# Patient Record
Sex: FEMALE | Race: WHITE | NOT HISPANIC OR LATINO | Employment: FULL TIME | URBAN - METROPOLITAN AREA
[De-identification: names, ages, dates, MRNs, and addresses within clinical notes are randomized per-mention and may not be internally consistent; named-entity substitution may affect disease eponyms.]

---

## 2017-05-03 ENCOUNTER — OFFICE VISIT (OUTPATIENT)
Dept: URGENT CARE | Facility: CLINIC | Age: 62
End: 2017-05-03
Payer: COMMERCIAL

## 2017-05-03 PROCEDURE — S9088 SERVICES PROVIDED IN URGENT: HCPCS

## 2017-05-03 PROCEDURE — 99203 OFFICE O/P NEW LOW 30 MIN: CPT

## 2017-06-15 ENCOUNTER — APPOINTMENT (OUTPATIENT)
Dept: RADIOLOGY | Facility: CLINIC | Age: 62
End: 2017-06-15
Payer: OTHER MISCELLANEOUS

## 2017-06-15 ENCOUNTER — OFFICE VISIT (OUTPATIENT)
Dept: URGENT CARE | Facility: CLINIC | Age: 62
End: 2017-06-15
Payer: OTHER MISCELLANEOUS

## 2017-06-15 ENCOUNTER — GENERIC CONVERSION - ENCOUNTER (OUTPATIENT)
Dept: OTHER | Facility: OTHER | Age: 62
End: 2017-06-15

## 2017-06-15 ENCOUNTER — TRANSCRIBE ORDERS (OUTPATIENT)
Dept: URGENT CARE | Facility: CLINIC | Age: 62
End: 2017-06-15

## 2017-06-15 DIAGNOSIS — M54.50 LOWER BACK PAIN: ICD-10-CM

## 2017-06-15 PROCEDURE — G0383 LEV 4 HOSP TYPE B ED VISIT: HCPCS

## 2017-06-15 PROCEDURE — 99284 EMERGENCY DEPT VISIT MOD MDM: CPT

## 2017-06-15 PROCEDURE — 72100 X-RAY EXAM L-S SPINE 2/3 VWS: CPT

## 2017-06-20 ENCOUNTER — OFFICE VISIT (OUTPATIENT)
Dept: URGENT CARE | Facility: CLINIC | Age: 62
End: 2017-06-20
Payer: OTHER MISCELLANEOUS

## 2017-06-20 PROCEDURE — 99213 OFFICE O/P EST LOW 20 MIN: CPT

## 2017-06-29 ENCOUNTER — OFFICE VISIT (OUTPATIENT)
Dept: URGENT CARE | Facility: CLINIC | Age: 62
End: 2017-06-29
Payer: OTHER MISCELLANEOUS

## 2017-06-29 PROCEDURE — 99213 OFFICE O/P EST LOW 20 MIN: CPT

## 2018-01-11 NOTE — RESULT NOTES
Verified Results  * XR SPINE LUMBAR 2 OR 3 VIEWS INJURY [id number] 10:16AM Annken Cain     Test Name Result Flag Reference   XR SPINE LUMBAR 2 OR 3 VIEWS (Report)     LUMBAR SPINE     INDICATION: Back pain  COMPARISON: None     VIEWS: AP, lateral and coned-down projections     IMAGES: 3     FINDINGS:     Alignment is unremarkable  There is no radiographic evidence of acute fracture or destructive osseous lesion  At L4-L5 there is degenerative disc disease with disc space loss, endplate sclerosis, osteophytosis, and a vacuum disc phenomena  Atherosclerotic vascular calcifications are noted  Visualized soft tissues appear otherwise unremarkable  IMPRESSION:     No acute osseous abnormality  Degenerative changes as described         Workstation performed: [id number]     Signed by:   Natasha Aguilar MD   6/15/17

## 2018-04-21 LAB
ALBUMIN SERPL BCP-MCNC: 3.8 G/DL (ref 3.5–5.7)
ALP SERPL-CCNC: 56 IU/L (ref 55–165)
ALT SERPL W P-5'-P-CCNC: 30 IU/L (ref 10–30)
ANION GAP SERPL CALCULATED.3IONS-SCNC: 12.3 MM/L
AST SERPL W P-5'-P-CCNC: 27 U/L (ref 7–26)
BASOPHILS # BLD AUTO: 0 X3/UL (ref 0–0.3)
BASOPHILS # BLD AUTO: 0.4 % (ref 0–2)
BILIRUB SERPL-MCNC: 0.3 MG/DL (ref 0.3–1)
BUN SERPL-MCNC: 13 MG/DL (ref 7–25)
CALCIUM SERPL-MCNC: 9.3 MG/DL (ref 8.6–10.5)
CHLORIDE SERPL-SCNC: 102 MM/L (ref 98–107)
CHOLEST SERPL-MCNC: 244 MG/DL (ref 0–200)
CO2 SERPL-SCNC: 27 MM/L (ref 21–31)
CREAT SERPL-MCNC: 0.81 MG/DL (ref 0.6–1.2)
DEPRECATED RDW RBC AUTO: 13.3 % (ref 11.5–14.5)
EGFR (HISTORICAL): > 60 GFR
EGFR AFRICAN AMERICAN (HISTORICAL): > 60 GFR
EOSINOPHIL # BLD AUTO: 0.1 X3/UL (ref 0–0.5)
EOSINOPHIL NFR BLD AUTO: 1.4 % (ref 0–5)
GLUCOSE (HISTORICAL): 96 MG/DL (ref 65–99)
HCT VFR BLD AUTO: 43.4 % (ref 37–47)
HDLC SERPL-MCNC: 61 MG/DL (ref 40–60)
HGB BLD-MCNC: 14 G/DL (ref 12–16)
LDLC SERPL CALC-MCNC: 152.8 MG/DL (ref 75–193)
LYMPHOCYTES # BLD AUTO: 2.6 X3/UL (ref 1.2–4.2)
LYMPHOCYTES NFR BLD AUTO: 32.2 % (ref 20.5–51.1)
MCH RBC QN AUTO: 30.7 PG (ref 26–34)
MCHC RBC AUTO-ENTMCNC: 32.3 G/DL (ref 31–36)
MCV RBC AUTO: 95.2 FL (ref 81–99)
MONOCYTES # BLD AUTO: 0.7 X3/UL (ref 0–1)
MONOCYTES NFR BLD AUTO: 9 % (ref 1.7–12)
NEUTROPHILS # BLD AUTO: 4.6 X3/UL (ref 1.4–6.5)
NEUTS SEG NFR BLD AUTO: 57 % (ref 42.2–75.2)
OSMOLALITY, SERUM (HISTORICAL): 274 MOSM (ref 262–291)
PLATELET # BLD AUTO: 249 X3/UL (ref 130–400)
PMV BLD AUTO: 8.3 FL (ref 8.6–11.7)
POTASSIUM SERPL-SCNC: 4.3 MM/L (ref 3.5–5.5)
RBC # BLD AUTO: 4.56 X6/UL (ref 3.9–5.2)
SODIUM SERPL-SCNC: 137 MM/L (ref 134–143)
TOTAL PROTEIN (HISTORICAL): 7 G/DL (ref 6.4–8.9)
TRIGL SERPL-MCNC: 150 MG/DL (ref 44–166)
TSH SERPL DL<=0.05 MIU/L-ACNC: 0.76 UIU/M (ref 0.45–5.33)
VLDL CHOLESTEROL (HISTORICAL): 30 MG/DL (ref 5–51)
WBC # BLD AUTO: 8 X3/UL (ref 4.8–10.8)

## 2018-10-29 ENCOUNTER — TRANSCRIBE ORDERS (OUTPATIENT)
Dept: ADMINISTRATIVE | Facility: HOSPITAL | Age: 63
End: 2018-10-29

## 2018-10-29 ENCOUNTER — HOSPITAL ENCOUNTER (OUTPATIENT)
Dept: RADIOLOGY | Facility: HOSPITAL | Age: 63
Discharge: HOME | End: 2018-10-29
Payer: COMMERCIAL

## 2018-10-29 DIAGNOSIS — E04.2 NONTOXIC MULTINODULAR GOITER: Primary | ICD-10-CM

## 2018-10-29 DIAGNOSIS — E04.2 NONTOXIC MULTINODULAR GOITER: ICD-10-CM

## 2018-10-29 DIAGNOSIS — R13.10 PROBLEMS WITH SWALLOWING AND MASTICATION: ICD-10-CM

## 2018-10-29 PROCEDURE — 71046 X-RAY EXAM CHEST 2 VIEWS: CPT

## 2018-12-06 ENCOUNTER — TRANSCRIBE ORDERS (OUTPATIENT)
Dept: ADMINISTRATIVE | Facility: HOSPITAL | Age: 63
End: 2018-12-06

## 2018-12-06 DIAGNOSIS — I67.1 CEREBRAL ANEURYSM, NONRUPTURED: Primary | ICD-10-CM

## 2018-12-13 ENCOUNTER — HOSPITAL ENCOUNTER (OUTPATIENT)
Dept: MRI IMAGING | Facility: HOSPITAL | Age: 63
Discharge: HOME | End: 2018-12-13
Payer: COMMERCIAL

## 2018-12-13 DIAGNOSIS — I67.1 CEREBRAL ANEURYSM, NONRUPTURED: ICD-10-CM

## 2018-12-13 PROCEDURE — 70544 MR ANGIOGRAPHY HEAD W/O DYE: CPT

## 2019-01-16 ENCOUNTER — HOSPITAL ENCOUNTER (OUTPATIENT)
Dept: RADIOLOGY | Facility: HOSPITAL | Age: 64
Discharge: HOME | End: 2019-01-16
Payer: COMMERCIAL

## 2019-01-16 ENCOUNTER — TRANSCRIBE ORDERS (OUTPATIENT)
Dept: ADMINISTRATIVE | Facility: HOSPITAL | Age: 64
End: 2019-01-16

## 2019-01-16 DIAGNOSIS — R93.89 ABNORMAL CXR (CHEST X-RAY): ICD-10-CM

## 2019-01-16 DIAGNOSIS — R06.02 SHORTNESS OF BREATH: ICD-10-CM

## 2019-01-16 DIAGNOSIS — J18.9 UNRESOLVED PNEUMONIA: ICD-10-CM

## 2019-01-16 DIAGNOSIS — R40.0 SOMNOLENCE: Primary | ICD-10-CM

## 2019-01-16 DIAGNOSIS — R06.02 SHORTNESS OF BREATH: Primary | ICD-10-CM

## 2019-01-16 PROCEDURE — 71046 X-RAY EXAM CHEST 2 VIEWS: CPT

## 2019-02-09 ENCOUNTER — HOSPITAL ENCOUNTER (OUTPATIENT)
Dept: RADIOLOGY | Facility: HOSPITAL | Age: 64
Discharge: HOME | End: 2019-02-09
Payer: COMMERCIAL

## 2019-02-09 ENCOUNTER — TRANSCRIBE ORDERS (OUTPATIENT)
Dept: ADMINISTRATIVE | Facility: HOSPITAL | Age: 64
End: 2019-02-09

## 2019-02-09 DIAGNOSIS — J18.1 UNRESOLVED LOBAR PNEUMONIA (HCC): Primary | ICD-10-CM

## 2019-02-09 DIAGNOSIS — J18.1 UNRESOLVED LOBAR PNEUMONIA (HCC): ICD-10-CM

## 2019-02-09 PROCEDURE — 71046 X-RAY EXAM CHEST 2 VIEWS: CPT

## 2019-02-12 ENCOUNTER — APPOINTMENT (EMERGENCY)
Dept: CT IMAGING | Facility: HOSPITAL | Age: 64
DRG: 309 | End: 2019-02-12
Payer: COMMERCIAL

## 2019-02-12 ENCOUNTER — HOSPITAL ENCOUNTER (INPATIENT)
Facility: HOSPITAL | Age: 64
LOS: 2 days | Discharge: HOME | DRG: 309 | End: 2019-02-14
Attending: EMERGENCY MEDICINE | Admitting: INTERNAL MEDICINE
Payer: COMMERCIAL

## 2019-02-12 ENCOUNTER — APPOINTMENT (EMERGENCY)
Dept: RADIOLOGY | Facility: HOSPITAL | Age: 64
DRG: 309 | End: 2019-02-12
Payer: COMMERCIAL

## 2019-02-12 DIAGNOSIS — R07.9 CHEST PAIN: Primary | ICD-10-CM

## 2019-02-12 DIAGNOSIS — I48.91 ATRIAL FIBRILLATION WITH RAPID VENTRICULAR RESPONSE (HCC): ICD-10-CM

## 2019-02-12 DIAGNOSIS — R74.01 TRANSAMINITIS: ICD-10-CM

## 2019-02-12 DIAGNOSIS — R59.0 MEDIASTINAL LYMPHADENOPATHY: ICD-10-CM

## 2019-02-12 PROBLEM — R79.89 ELEVATED TSH: Status: ACTIVE | Noted: 2019-02-12

## 2019-02-12 PROBLEM — E78.00 HYPERCHOLESTEREMIA: Status: ACTIVE | Noted: 2019-02-12

## 2019-02-12 PROBLEM — R59.1 LYMPHADENOPATHY: Status: ACTIVE | Noted: 2019-02-12

## 2019-02-12 LAB
ALBUMIN SERPL BCP-MCNC: 3.5 G/DL (ref 3.5–5)
ALP SERPL-CCNC: 135 U/L (ref 46–116)
ALT SERPL W P-5'-P-CCNC: 151 U/L (ref 12–78)
ANION GAP SERPL CALCULATED.3IONS-SCNC: 7 MMOL/L (ref 4–13)
APTT PPP: 40 SECONDS (ref 26–38)
AST SERPL W P-5'-P-CCNC: 91 U/L (ref 5–45)
BACTERIA UR QL AUTO: ABNORMAL /HPF
BASOPHILS # BLD AUTO: 0.03 THOUSANDS/ΜL (ref 0–0.1)
BASOPHILS NFR BLD AUTO: 0 % (ref 0–1)
BILIRUB SERPL-MCNC: 0.5 MG/DL (ref 0.2–1)
BILIRUB UR QL STRIP: NEGATIVE
BUN SERPL-MCNC: 14 MG/DL (ref 5–25)
CALCIUM SERPL-MCNC: 9 MG/DL (ref 8.3–10.1)
CHLORIDE SERPL-SCNC: 103 MMOL/L (ref 100–108)
CLARITY UR: CLEAR
CO2 SERPL-SCNC: 30 MMOL/L (ref 21–32)
COLOR UR: YELLOW
CREAT SERPL-MCNC: 0.98 MG/DL (ref 0.6–1.3)
DEPRECATED D DIMER PPP: 869 NG/ML (FEU)
EOSINOPHIL # BLD AUTO: 0.17 THOUSAND/ΜL (ref 0–0.61)
EOSINOPHIL NFR BLD AUTO: 2 % (ref 0–6)
ERYTHROCYTE [DISTWIDTH] IN BLOOD BY AUTOMATED COUNT: 13.2 % (ref 11.6–15.1)
GFR SERPL CREATININE-BSD FRML MDRD: 62 ML/MIN/1.73SQ M
GLUCOSE SERPL-MCNC: 94 MG/DL (ref 65–140)
GLUCOSE UR STRIP-MCNC: NEGATIVE MG/DL
HCT VFR BLD AUTO: 41.5 % (ref 34.8–46.1)
HGB BLD-MCNC: 13.2 G/DL (ref 11.5–15.4)
HGB UR QL STRIP.AUTO: ABNORMAL
IMM GRANULOCYTES # BLD AUTO: 0.04 THOUSAND/UL (ref 0–0.2)
IMM GRANULOCYTES NFR BLD AUTO: 0 % (ref 0–2)
INR PPP: 1.22 (ref 0.86–1.17)
KETONES UR STRIP-MCNC: NEGATIVE MG/DL
L PNEUMO1 AG UR QL IA.RAPID: NEGATIVE
LACTATE SERPL-SCNC: 1.4 MMOL/L (ref 0.5–2)
LEUKOCYTE ESTERASE UR QL STRIP: NEGATIVE
LYMPHOCYTES # BLD AUTO: 2.46 THOUSANDS/ΜL (ref 0.6–4.47)
LYMPHOCYTES NFR BLD AUTO: 25 % (ref 14–44)
MCH RBC QN AUTO: 31.1 PG (ref 26.8–34.3)
MCHC RBC AUTO-ENTMCNC: 31.8 G/DL (ref 31.4–37.4)
MCV RBC AUTO: 98 FL (ref 82–98)
MONOCYTES # BLD AUTO: 0.81 THOUSAND/ΜL (ref 0.17–1.22)
MONOCYTES NFR BLD AUTO: 8 % (ref 4–12)
NEUTROPHILS # BLD AUTO: 6.51 THOUSANDS/ΜL (ref 1.85–7.62)
NEUTS SEG NFR BLD AUTO: 65 % (ref 43–75)
NITRITE UR QL STRIP: NEGATIVE
NON-SQ EPI CELLS URNS QL MICRO: ABNORMAL /HPF
NRBC BLD AUTO-RTO: 0 /100 WBCS
NT-PROBNP SERPL-MCNC: 1187 PG/ML
PH UR STRIP.AUTO: 6.5 [PH] (ref 4.5–8)
PLATELET # BLD AUTO: 263 THOUSANDS/UL (ref 149–390)
PMV BLD AUTO: 9.3 FL (ref 8.9–12.7)
POTASSIUM SERPL-SCNC: 3.7 MMOL/L (ref 3.5–5.3)
PROT SERPL-MCNC: 8.2 G/DL (ref 6.4–8.2)
PROT UR STRIP-MCNC: NEGATIVE MG/DL
PROTHROMBIN TIME: 14.8 SECONDS (ref 11.8–14.2)
RBC # BLD AUTO: 4.24 MILLION/UL (ref 3.81–5.12)
RBC #/AREA URNS AUTO: ABNORMAL /HPF
S PNEUM AG UR QL: NEGATIVE
SODIUM SERPL-SCNC: 140 MMOL/L (ref 136–145)
SP GR UR STRIP.AUTO: <=1.005 (ref 1–1.03)
TROPONIN I SERPL-MCNC: <0.02 NG/ML
TSH SERPL DL<=0.05 MIU/L-ACNC: 4.21 UIU/ML (ref 0.36–3.74)
UROBILINOGEN UR QL STRIP.AUTO: 0.2 E.U./DL
WBC # BLD AUTO: 10.02 THOUSAND/UL (ref 4.31–10.16)
WBC #/AREA URNS AUTO: ABNORMAL /HPF

## 2019-02-12 PROCEDURE — 85610 PROTHROMBIN TIME: CPT | Performed by: PHYSICIAN ASSISTANT

## 2019-02-12 PROCEDURE — 96361 HYDRATE IV INFUSION ADD-ON: CPT

## 2019-02-12 PROCEDURE — 83605 ASSAY OF LACTIC ACID: CPT | Performed by: PHYSICIAN ASSISTANT

## 2019-02-12 PROCEDURE — 87040 BLOOD CULTURE FOR BACTERIA: CPT | Performed by: PHYSICIAN ASSISTANT

## 2019-02-12 PROCEDURE — 81001 URINALYSIS AUTO W/SCOPE: CPT | Performed by: INTERNAL MEDICINE

## 2019-02-12 PROCEDURE — 96374 THER/PROPH/DIAG INJ IV PUSH: CPT

## 2019-02-12 PROCEDURE — 85730 THROMBOPLASTIN TIME PARTIAL: CPT | Performed by: PHYSICIAN ASSISTANT

## 2019-02-12 PROCEDURE — 71045 X-RAY EXAM CHEST 1 VIEW: CPT

## 2019-02-12 PROCEDURE — 99223 1ST HOSP IP/OBS HIGH 75: CPT | Performed by: PHYSICIAN ASSISTANT

## 2019-02-12 PROCEDURE — 99285 EMERGENCY DEPT VISIT HI MDM: CPT

## 2019-02-12 PROCEDURE — 85025 COMPLETE CBC W/AUTO DIFF WBC: CPT | Performed by: PHYSICIAN ASSISTANT

## 2019-02-12 PROCEDURE — 87081 CULTURE SCREEN ONLY: CPT | Performed by: INTERNAL MEDICINE

## 2019-02-12 PROCEDURE — 93005 ELECTROCARDIOGRAM TRACING: CPT

## 2019-02-12 PROCEDURE — 84443 ASSAY THYROID STIM HORMONE: CPT | Performed by: PHYSICIAN ASSISTANT

## 2019-02-12 PROCEDURE — 80053 COMPREHEN METABOLIC PANEL: CPT | Performed by: PHYSICIAN ASSISTANT

## 2019-02-12 PROCEDURE — 87631 RESP VIRUS 3-5 TARGETS: CPT | Performed by: INTERNAL MEDICINE

## 2019-02-12 PROCEDURE — 87086 URINE CULTURE/COLONY COUNT: CPT | Performed by: INTERNAL MEDICINE

## 2019-02-12 PROCEDURE — 84484 ASSAY OF TROPONIN QUANT: CPT | Performed by: PHYSICIAN ASSISTANT

## 2019-02-12 PROCEDURE — 84145 PROCALCITONIN (PCT): CPT | Performed by: INTERNAL MEDICINE

## 2019-02-12 PROCEDURE — 84484 ASSAY OF TROPONIN QUANT: CPT | Performed by: INTERNAL MEDICINE

## 2019-02-12 PROCEDURE — 71275 CT ANGIOGRAPHY CHEST: CPT

## 2019-02-12 PROCEDURE — 85379 FIBRIN DEGRADATION QUANT: CPT | Performed by: PHYSICIAN ASSISTANT

## 2019-02-12 PROCEDURE — 87449 NOS EACH ORGANISM AG IA: CPT | Performed by: INTERNAL MEDICINE

## 2019-02-12 PROCEDURE — 83880 ASSAY OF NATRIURETIC PEPTIDE: CPT

## 2019-02-12 PROCEDURE — 36415 COLL VENOUS BLD VENIPUNCTURE: CPT | Performed by: PHYSICIAN ASSISTANT

## 2019-02-12 RX ORDER — DOXYCYCLINE HYCLATE 100 MG/1
100 CAPSULE ORAL
COMMUNITY
Start: 2019-02-11 | End: 2019-02-14 | Stop reason: HOSPADM

## 2019-02-12 RX ORDER — FUROSEMIDE 20 MG/1
40 TABLET ORAL 2 TIMES DAILY
Status: ON HOLD | COMMUNITY
Start: 2019-02-11 | End: 2020-02-29 | Stop reason: SDUPTHER

## 2019-02-12 RX ORDER — ATORVASTATIN CALCIUM 20 MG/1
20 TABLET, FILM COATED ORAL DAILY
COMMUNITY
Start: 2019-02-05 | End: 2019-02-14 | Stop reason: HOSPADM

## 2019-02-12 RX ORDER — ASPIRIN 81 MG/1
81 TABLET ORAL
COMMUNITY
Start: 2019-02-05 | End: 2019-02-14 | Stop reason: HOSPADM

## 2019-02-12 RX ORDER — ASPIRIN 81 MG/1
81 TABLET ORAL DAILY
Status: DISCONTINUED | OUTPATIENT
Start: 2019-02-13 | End: 2019-02-13

## 2019-02-12 RX ORDER — ONDANSETRON 2 MG/ML
4 INJECTION INTRAMUSCULAR; INTRAVENOUS EVERY 6 HOURS PRN
Status: DISCONTINUED | OUTPATIENT
Start: 2019-02-12 | End: 2019-02-14 | Stop reason: HOSPADM

## 2019-02-12 RX ORDER — VENLAFAXINE HYDROCHLORIDE 37.5 MG/1
37.5 CAPSULE, EXTENDED RELEASE ORAL DAILY
COMMUNITY
Start: 2018-12-31

## 2019-02-12 RX ORDER — MELOXICAM 7.5 MG/1
TABLET ORAL DAILY
COMMUNITY
End: 2019-02-14 | Stop reason: HOSPADM

## 2019-02-12 RX ORDER — ACETAMINOPHEN 325 MG/1
650 TABLET ORAL EVERY 6 HOURS PRN
Status: DISCONTINUED | OUTPATIENT
Start: 2019-02-12 | End: 2019-02-12

## 2019-02-12 RX ORDER — ATORVASTATIN CALCIUM 10 MG/1
20 TABLET, FILM COATED ORAL
Status: DISCONTINUED | OUTPATIENT
Start: 2019-02-12 | End: 2019-02-12

## 2019-02-12 RX ORDER — TAMOXIFEN CITRATE 20 MG/1
TABLET ORAL DAILY
Status: ON HOLD | COMMUNITY
End: 2019-02-13 | Stop reason: ALTCHOICE

## 2019-02-12 RX ORDER — FLECAINIDE ACETATE 50 MG/1
50 TABLET ORAL 2 TIMES DAILY
COMMUNITY
Start: 2019-02-11 | End: 2019-02-14 | Stop reason: HOSPADM

## 2019-02-12 RX ORDER — ASPIRIN 81 MG/1
243 TABLET, CHEWABLE ORAL ONCE
Status: COMPLETED | OUTPATIENT
Start: 2019-02-12 | End: 2019-02-12

## 2019-02-12 RX ORDER — PANTOPRAZOLE SODIUM 40 MG/1
40 TABLET, DELAYED RELEASE ORAL
Status: DISCONTINUED | OUTPATIENT
Start: 2019-02-12 | End: 2019-02-14 | Stop reason: HOSPADM

## 2019-02-12 RX ORDER — FLECAINIDE ACETATE 100 MG/1
50 TABLET ORAL 2 TIMES DAILY
Status: DISCONTINUED | OUTPATIENT
Start: 2019-02-12 | End: 2019-02-13

## 2019-02-12 RX ORDER — LEVOTHYROXINE SODIUM 0.12 MG/1
125 TABLET ORAL
Status: DISCONTINUED | OUTPATIENT
Start: 2019-02-13 | End: 2019-02-14 | Stop reason: HOSPADM

## 2019-02-12 RX ORDER — TAMOXIFEN CITRATE 10 MG/1
20 TABLET ORAL DAILY
Status: DISCONTINUED | OUTPATIENT
Start: 2019-02-13 | End: 2019-02-13

## 2019-02-12 RX ORDER — METOPROLOL SUCCINATE 100 MG/1
100 TABLET, EXTENDED RELEASE ORAL DAILY
COMMUNITY
Start: 2019-02-11 | End: 2019-02-14 | Stop reason: HOSPADM

## 2019-02-12 RX ORDER — MESALAMINE 800 MG/1
800 TABLET, DELAYED RELEASE ORAL 2 TIMES DAILY
COMMUNITY
Start: 2019-01-09 | End: 2020-02-29 | Stop reason: HOSPADM

## 2019-02-12 RX ORDER — VENLAFAXINE HYDROCHLORIDE 37.5 MG/1
37.5 CAPSULE, EXTENDED RELEASE ORAL DAILY
Status: DISCONTINUED | OUTPATIENT
Start: 2019-02-13 | End: 2019-02-14 | Stop reason: HOSPADM

## 2019-02-12 RX ORDER — OXYCODONE HYDROCHLORIDE 5 MG/1
5 TABLET ORAL EVERY 4 HOURS PRN
Status: DISCONTINUED | OUTPATIENT
Start: 2019-02-12 | End: 2019-02-14 | Stop reason: HOSPADM

## 2019-02-12 RX ORDER — PANTOPRAZOLE SODIUM 40 MG/1
40 TABLET, DELAYED RELEASE ORAL
Status: DISCONTINUED | OUTPATIENT
Start: 2019-02-13 | End: 2019-02-12

## 2019-02-12 RX ORDER — OMEPRAZOLE 40 MG/1
40 CAPSULE, DELAYED RELEASE ORAL 2 TIMES DAILY
Status: ON HOLD | COMMUNITY
Start: 2018-10-17 | End: 2019-02-14 | Stop reason: SDUPTHER

## 2019-02-12 RX ORDER — DICYCLOMINE HCL 20 MG
20 TABLET ORAL
COMMUNITY
Start: 2018-11-02 | End: 2020-02-23 | Stop reason: ALTCHOICE

## 2019-02-12 RX ORDER — LEVOTHYROXINE SODIUM 0.12 MG/1
125 TABLET ORAL DAILY
COMMUNITY
Start: 2019-01-22 | End: 2020-05-04

## 2019-02-12 RX ORDER — METOPROLOL SUCCINATE 50 MG/1
50 TABLET, EXTENDED RELEASE ORAL DAILY
COMMUNITY
Start: 2019-02-11 | End: 2019-02-14 | Stop reason: HOSPADM

## 2019-02-12 RX ADMIN — ACETAMINOPHEN 650 MG: 325 TABLET, FILM COATED ORAL at 18:01

## 2019-02-12 RX ADMIN — SODIUM CHLORIDE 1000 ML: 0.9 INJECTION, SOLUTION INTRAVENOUS at 15:50

## 2019-02-12 RX ADMIN — ATORVASTATIN CALCIUM 20 MG: 10 TABLET, FILM COATED ORAL at 18:02

## 2019-02-12 RX ADMIN — SODIUM CHLORIDE 500 ML: 0.9 INJECTION, SOLUTION INTRAVENOUS at 13:58

## 2019-02-12 RX ADMIN — IOHEXOL 85 ML: 350 INJECTION, SOLUTION INTRAVENOUS at 14:37

## 2019-02-12 RX ADMIN — DILTIAZEM HYDROCHLORIDE 5 MG/HR: 5 INJECTION INTRAVENOUS at 15:54

## 2019-02-12 RX ADMIN — APIXABAN 5 MG: 5 TABLET, FILM COATED ORAL at 18:01

## 2019-02-12 RX ADMIN — ASPIRIN 81 MG 243 MG: 81 TABLET ORAL at 13:57

## 2019-02-12 RX ADMIN — FLECAINIDE ACETATE 50 MG: 100 TABLET ORAL at 18:02

## 2019-02-12 RX ADMIN — MORPHINE SULFATE 2 MG: 2 INJECTION, SOLUTION INTRAMUSCULAR; INTRAVENOUS at 14:06

## 2019-02-12 RX ADMIN — PANTOPRAZOLE SODIUM 40 MG: 40 TABLET, DELAYED RELEASE ORAL at 20:31

## 2019-02-12 NOTE — ED PROVIDER NOTES
History  Chief Complaint   Patient presents with    Chest Pain     Chest pain starting 1 hour ago, radiating down left arm     Patient presents to the emergency department today offering a chief complaint chest pain  She states the pain left-sided chest radiating down the left arm into left neck  This has been ongoing for 1 hour  She states that it feels though there is a ton of bricks sitting on my chest   No shortness of breath  No back pain  No abdominal pain nausea vomiting  She is new to os however states she has a history of thyroid disorder as well as CHF and atrial fibrillation which she takes Eliquis for  States she took a baby aspirin earlier today  Prior to Admission Medications   Prescriptions Last Dose Informant Patient Reported? Taking?    apixaban (ELIQUIS) 5 mg 2/12/2019 at Unknown time  Yes Yes   Sig: Take 5 mg by mouth   aspirin (ECOTRIN LOW STRENGTH) 81 mg EC tablet 2/12/2019 at Unknown time  Yes Yes   Sig: Take 81 mg by mouth   atorvastatin (LIPITOR) 20 mg tablet 2/11/2019 at Unknown time  Yes Yes   Sig: Take 20 mg by mouth   dicyclomine (BENTYL) 20 mg tablet 2/12/2019 at Unknown time  Yes Yes   Sig: Take 20 mg by mouth   doxycycline hyclate (VIBRAMYCIN) 100 mg capsule   Yes Yes   Sig: Take 100 mg by mouth   flecainide (TAMBOCOR) 50 mg tablet 2/12/2019 at Unknown time  Yes Yes   Sig: Take 50 mg by mouth   furosemide (LASIX) 20 mg tablet 2/12/2019 at Unknown time  Yes Yes   Sig: Take 20 mg by mouth   levothyroxine 125 mcg tablet 2/12/2019 at Unknown time  Yes Yes   Sig: Take 125 mcg by mouth   meloxicam (MOBIC) 7 5 mg tablet 2/12/2019 at Unknown time  Yes Yes   Sig: Take by mouth   mesalamine (ASACOL) 800 MG EC tablet 2/12/2019 at Unknown time  Yes Yes   Sig: Take 800 mg by mouth   metoprolol succinate (TOPROL XL) 50 mg 24 hr tablet 2/12/2019 at Unknown time  Yes Yes   Sig: Take 50 mg by mouth   metoprolol succinate (TOPROL-XL) 100 mg 24 hr tablet 2/12/2019 at Unknown time  Yes Yes   Sig: Take 100 mg by mouth   omeprazole (PriLOSEC) 40 MG capsule 2/12/2019 at Unknown time  Yes Yes   Sig: Take 40 mg by mouth   tamoxifen (NOLVADEX) 20 mg tablet 2/12/2019 at Unknown time  Yes Yes   Sig: Take by mouth   venlafaxine (EFFEXOR XR) 37 5 mg 24 hr capsule 2/12/2019 at Unknown time  Yes Yes   Sig: Take 37 5 mg by mouth      Facility-Administered Medications: None       Past Medical History:   Diagnosis Date    [facility]fib [facility])     Brain aneurysm     Cardiac disease     [facility] )     Crohn disease ([facility] )     Disease of thyroid gland     GERD (gastroesophageal reflux disease)     Hyperlipidemia     Hypertension        Past Surgical History:   Procedure Laterality Date    HYSTERECTOMY         History reviewed  No pertinent family history  I have reviewed and agree with the history as documented  Social History     Tobacco Use    Smoking status: Never Smoker    Smokeless tobacco: Never Used   Substance Use Topics    Alcohol use: Never     Frequency: Never    Drug use: Never        Review of Systems   Constitutional: Negative  HENT: Negative  Eyes: Negative  Respiratory: Negative  Cardiovascular: Positive for chest pain  Negative for palpitations and leg swelling  Gastrointestinal: Negative  Endocrine: Negative  Genitourinary: Negative  Musculoskeletal: Negative  Skin: Negative  Allergic/Immunologic: Negative  Neurological: Negative  Hematological: Negative  Psychiatric/Behavioral: Negative  All other systems reviewed and are negative  Physical Exam  Physical Exam   Constitutional: She is oriented to person, place, and time  She appears well-developed and well-nourished  HENT:   Head: Normocephalic  Eyes: Pupils are equal, round, and reactive to light  Neck: Normal range of motion  Cardiovascular: An irregularly irregular rhythm present     Patient exhibits and irregularly irregular tachycardic rhythm at 141 beats per minute   Pulmonary/Chest: Effort normal  No accessory muscle usage or stridor  No tachypnea  No respiratory distress  Abdominal: Soft  There is no tenderness  Musculoskeletal: Normal range of motion  Right lower leg: Normal         Left lower leg: Normal    Neurological: She is alert and oriented to person, place, and time  Skin: Skin is warm  Psychiatric: She has a normal mood and affect  Vital Signs  ED Triage Vitals [02/12/19 1336]   Temperature Pulse Respirations Blood Pressure SpO2   99 1 °F (37 3 °C) (!) 157 20 138/88 95 %      Temp Source Heart Rate Source Patient Position - Orthostatic VS BP Location FiO2 (%)   Temporal Monitor Lying Right arm --      Pain Score       8           Vitals:    02/12/19 1336 02/12/19 1400 02/12/19 1415 02/12/19 1430   BP: 138/88 131/93 136/66    Pulse: (!) 157 (!) 146 (!) 127 (!) 126   Patient Position - Orthostatic VS: Lying          Visual Acuity      ED Medications  Medications   sodium chloride 0 9 % bolus 1,000 mL (has no administration in time range)   sodium chloride 0 9 % bolus 500 mL (0 mL Intravenous Stopped 2/12/19 1434)   aspirin chewable tablet 243 mg (243 mg Oral Given 2/12/19 1357)   morphine injection 2 mg (2 mg Intravenous Given 2/12/19 1406)   iohexol (OMNIPAQUE) 350 MG/ML injection (SINGLE-DOSE) 85 mL (85 mL Intravenous Given 2/12/19 1437)       Diagnostic Studies  Results Reviewed     Procedure Component Value Units Date/Time    MRSA culture [[id number]]     Lab Status:  No result Specimen:  Nares from Nose     Influenza A/B and RSV by PCR [[id number]]     Lab Status:  No result Specimen:  Nasopharyngeal Swab     TSH [[id number]]  (Abnormal) Collected:  02/12/19 1358    Lab Status:  Final result Specimen:  Blood from Arm, Left Updated:  02/12/19 1435     TSH 3RD GENERATON 4 210 uIU/mL     Narrative:       Patients undergoing fluorescein dye angiography may retain small amounts of fluorescein in the body for 48-72 hours post procedure  Samples containing fluorescein can produce falsely depressed TSH values  If the patient had this procedure,a specimen should be resubmitted post fluorescein clearance  NT-BNP PRO (BNP for [place], [place], [place], [place], [place], [place], [place], [place] campuses) [[id number]]  (Abnormal) Collected:  02/12/19 1358    Lab Status:  Final result Specimen:  Blood from Arm, Left Updated:  02/12/19 1431     NT-proBNP 1,187 pg/mL     Troponin I [[id number]]  (Normal) Collected:  02/12/19 1358    Lab Status:  Final result Specimen:  Blood from Arm, Left Updated:  02/12/19 1428     Troponin I <0 02 ng/mL     Lactic acid, plasma [[id number]]  (Normal) Collected:  02/12/19 1358    Lab Status:  Final result Specimen:  Blood from Arm, Left Updated:  02/12/19 1428     LACTIC ACID 1 4 mmol/L     Narrative:       Result may be elevated if tourniquet was used during collection  Comprehensive metabolic panel [[id number]]  (Abnormal) Collected:  02/12/19 1358    Lab Status:  Final result Specimen:  Blood from Arm, Left Updated:  02/12/19 1425     Sodium 140 mmol/L      Potassium 3 7 mmol/L      Chloride 103 mmol/L      CO2 30 mmol/L      ANION GAP 7 mmol/L      BUN 14 mg/dL      Creatinine 0 98 mg/dL      Glucose 94 mg/dL      Calcium 9 0 mg/dL      AST 91 U/L       U/L      Alkaline Phosphatase 135 U/L      Total Protein 8 2 g/dL      Albumin 3 5 g/dL      Total Bilirubin 0 50 mg/dL      eGFR 62 ml/min/1 73sq m     Narrative:       National Kidney Disease Education Program recommendations are as follows:  GFR calculation is accurate only with a steady state creatinine  Chronic Kidney disease less than 60 ml/min/1 73 sq  meters  Kidney failure less than 15 ml/min/1 73 sq  meters      D-Dimer [[id number]]  (Abnormal) Collected:  02/12/19 1358    Lab Status:  Final result Specimen:  Blood from Arm, Left Updated:  02/12/19 1421     D-Dimer, Quant 869 ng/ml (FEU)     Protime-INR [[id number]]  (Abnormal) Collected:  02/12/19 1358    Lab Status:  Final result Specimen: Blood from Arm, Left Updated:  02/12/19 1420     Protime 14 8 seconds      INR 1 22    APTT [[id number]]  (Abnormal) Collected:  02/12/19 1358    Lab Status:  Final result Specimen:  Blood from Arm, Left Updated:  02/12/19 1420     PTT 40 seconds     CBC and differential [[id number]] Collected:  02/12/19 1358    Lab Status:  Final result Specimen:  Blood from Arm, Left Updated:  02/12/19 1408     WBC 10 02 Thousand/uL      RBC 4 24 Million/uL      Hemoglobin 13 2 g/dL      Hematocrit 41 5 %      MCV 98 fL      MCH 31 1 pg      MCHC 31 8 g/dL      RDW 13 2 %      MPV 9 3 fL      Platelets 222 Thousands/uL      nRBC 0 /100 WBCs      Neutrophils Relative 65 %      Immat GRANS % 0 %      Lymphocytes Relative 25 %      Monocytes Relative 8 %      Eosinophils Relative 2 %      Basophils Relative 0 %      Neutrophils Absolute 6 51 Thousands/µL      Immature Grans Absolute 0 04 Thousand/uL      Lymphocytes Absolute 2 46 Thousands/µL      Monocytes Absolute 0 81 Thousand/µL      Eosinophils Absolute 0 17 Thousand/µL      Basophils Absolute 0 03 Thousands/µL     Blood culture #2 [[id number]] Collected:  02/12/19 1358    Lab Status: In process Specimen:  Blood from Arm, Left Updated:  02/12/19 1405    Blood culture #1 [[id number]]     Lab Status:  No result Specimen:  Blood                  CTA ED chest PE study   Final Result by David Sharma MD (02/12 1502)      1  No pulmonary embolism or other acute thoracic findings  2   Mediastinal and hilar lymphadenopathy  Common differential considerations include lymphoma, sarcoidosis, collagen vascular disease, metastatic disease, or atypical infection including tuberculosis and fungal infections  If clinical/laboratory workup    reveals no evidence for lymphoproliferative disorder or other definitive etiology, then transbronchial biopsy and/or follow-up chest CT in 3 months could be considered        Workstation performed: [id number]         XR chest 1 view portable   Final Result by Keith Morejon MD (02/12 1431)      No acute cardiopulmonary disease  Workstation performed: [id number]                    Procedures  Procedures       Phone Contacts  ED Phone Contact    ED Course  ED Course as of Feb 12 1528 Tue Feb 12, 2019   1343 Temperature: 99 1 °F (37 3 °C)   1343 Pulse(!): 157   1343 Respirations: 20   1343 SpO2: 95 %   1456 TSH 3RD GENERATON(!): 4 210   1456 LACTIC ACID: 1 4   1456 Sodium: 140   1456 Potassium: 3 7   1456 Chloride: 103   1456 CO2: 30   1456 Anion Gap: 7   1456 Calcium: 9 0   1456 Alkaline Phosphatase(!): 135   1456 Total Protein: 8 2   1456 WBC: 10 02   1456 Hemoglobin: 13 2   1456 INR(!): 1 22   1456 D-DIMER QUANTITATIVE(!): 869   1456 Troponin I: <0 02   1456 NT-proBNP(!): 1,187   1456 FINDINGS:    Cardiomediastinal silhouette appears unremarkable  The lungs are clear   No pneumothorax or pleural effusion  Osseous structures appear within normal limits for patient age  Impression       No acute cardiopulmonary disease  18 Patient states although she has been diagnosed with pneumonia she has started her antibiotics yet  1506 Impression       1   No pulmonary embolism or other acute thoracic findings  2   Mediastinal and hilar lymphadenopathy  Common differential considerations include lymphoma, sarcoidosis, collagen vascular disease, metastatic disease, or atypical infection including tuberculosis and fungal infections  If clinical/laboratory workup   reveals no evidence for lymphoproliferative disorder or other definitive etiology, then transbronchial biopsy and/or follow-up chest CT in 3 months could be considered  [phone] Patient was re-evaluated at 1510 hours  She states that she is still experiencing some of the left lateral neck pain however the left arm pain has resolved  She states she has overall improvement from prior  Heart rate at bedside is still between 122-140 atrial fibrillation    Blood pressure 121/72  HEART Risk Score      Most Recent Value   History  1 Filed at: 02/12/2019 1349   ECG  0 Filed at: 02/12/2019 1349   Age  1 Filed at: 02/12/2019 1349   Risk Factors  1 Filed at: 02/12/2019 1349   Troponin  0 Filed at: 02/12/2019 1349   Heart Score Risk Calculator   History  1 Filed at: 02/12/2019 1349   ECG  0 Filed at: 02/12/2019 1349   Age  1 Filed at: 02/12/2019 1349   Risk Factors  1 Filed at: 02/12/2019 1349   Troponin  0 Filed at: 02/12/2019 1349   HEART Score  3 Filed at: 02/12/2019 1349   HEART Score  3 Filed at: 02/12/2019 1349                            MDM    Disposition  Final diagnoses:   Chest pain   Mediastinal lymphadenopathy   Atrial fibrillation with rapid ventricular response ([facility] )     Time reflects when diagnosis was documented in both MDM as applicable and the Disposition within this note     Time User Action Codes Description Comment    2/12/2019  3:06 PM Redell Genesee Add [R07 9] Chest pain     2/12/2019  3:06 PM Jeremías Ruffini D Add [R59 0] Mediastinal lymphadenopathy     2/12/2019  3:07 PM Jeremías Ruffini D Add [I48 91] Atrial fibrillation with rapid ventricular response [facility])       ED Disposition     ED Disposition Condition Date/Time Comment    Admit Stable Tue Feb 12, 2019  3:26 PM Case was discussed with Dr Dylan Paredes and the patient's admission status was agreed to be Admission Status: inpatient status to the service of Dr Dylan Paredes   Follow-up Information    None         Patient's Medications   Discharge Prescriptions    No medications on file     No discharge procedures on file      ED Provider  Electronically Signed by           Ephraim Kolb PA-C  02/12/19 8209

## 2019-02-12 NOTE — ASSESSMENT & PLAN NOTE
· CTA chest PE study: Mediastinal and hilar lymphadenopathy  · This may be due to recent Pneumonia the patient was treated for in January 2019  · Will check LDH  · Will need repeat CT chest in 3 months

## 2019-02-12 NOTE — H&P
H&P- Richard South 1955, 59 y o  female MRN: [id number]    Unit/Bed#:  Encounter: [id number]    Primary Care Provider: Luisa Fiore MD   Date and time admitted to hospital: 2/12/2019  1:34 PM        * Atrial fibrillation with rapid ventricular response ([facility] )  Assessment & Plan  · Patient with complaint of chest heaviness, left shoulder pain radiating to left neck  · She was found to be in Afib with RVR with a heart rate in the 140's in the ED  · Admit to ICU for continued monitoring  · Start Cardizem drip/infusion  · The patient's Toprol was increased from 100 mg to 150 mg yesterday by her Cardiologist, will continue this increased dose  · Patient already anticoagulated on eliquis 5 mg PO BID  · Cardiology consult  · Echocardiogram  · Labs in am      Lymphadenopathy  Assessment & Plan  · CTA chest PE study: Mediastinal and hilar lymphadenopathy  · This may be due to recent Pneumonia the patient was treated for in January 2019  · Will check LDH  · Will need repeat CT chest in 3 months  Transaminitis  Assessment & Plan  Component      Latest Ref Rng & Units 2/12/2019   AST      5 - 45 U/L 91 (H)   ALT      12 - 78 U/L 151 (H)   Alkaline Phosphatase      46 - 116 U/L 135 (H)   · Check acute hepatitis panel  · Avoid hepatotoxins  · Continue to monitor  Elevated TSH  Assessment & Plan  · Elevated at 4 210  · Outpatient follow up with PCP for repeat TSH    Hypercholesteremia  Assessment & Plan  · Continue statin  VTE Prophylaxis: Apixaban (Eliquis)  / sequential compression device   Code Status: full code  POLST: POLST form is not discussed and not completed at this time  Anticipated Length of Stay:  Patient will be admitted on an Inpatient basis with an anticipated length of stay of  Greater than 2 midnights     Justification for Hospital Stay: monitoring and treatment of afib with rvr with cardizem drip, echo, and cardiology consult    Total Time for Visit, including Counseling / Coordination of Care: 30 minutes  Greater than 50% of this total time spent on direct patient counseling and coordination of care  Chief Complaint:   Chest heaviness    History of Present Illness:    Mary Garsia is a 59 y o  female with a history of afib who presents with a complaint of chest heaviness, left shoulder pain that radiated to left neck  The patient states the pain started about 1 hour prior to arriving in the ED  She states it felt like there was "a ton of bricks" on her chest  The patient states she was seen at her cardiologist by the [address] [place] [address] who told her she was in CHF and atrial fibrillation with a heart rate in the 140's  She states she started her on lasix and increased her Toprol from 100 mg to 150 mg yesterday  The patient states she was treated for pneumonia last month by her PCP and was sent for a repeat chest x-ray which showed the CHF  She denies any sob, cough, fevers, chills  Review of Systems:    Review of Systems   Constitutional: Negative  HENT: Negative  Eyes: Negative  Respiratory: Positive for chest tightness  Cardiovascular: Positive for chest pain  Left shoulder pain, left neck pain   Gastrointestinal: Negative  Endocrine: Negative  Genitourinary: Negative  Musculoskeletal: Negative  Skin: Negative  Allergic/Immunologic: Negative  Neurological: Negative  Hematological: Negative  Psychiatric/Behavioral: Negative  Past Medical and Surgical History:     Past Medical History:   Diagnosis Date    [facility]fib [facility])     Brain aneurysm     Cardiac disease     [facility]     Cro[facility]     Disease of thyroid gland     GERD (gastroesophageal reflux disease)     Hyperlipidemia     Hypertension        Past Surgical History:   Procedure Laterality Date    HYSTERECTOMY         Meds/Allergies:    Prior to Admission medications    Medication Sig Start Date End Date Taking?  Authorizing Provider   apixaban (ELIQUIS) 5 mg Take 5 mg by mouth 2 (two) times a day  2/5/19  Yes Historical Provider, MD   aspirin (ECOTRIN LOW STRENGTH) 81 mg EC tablet Take 81 mg by mouth 2/5/19  Yes Historical Provider, MD   atorvastatin (LIPITOR) 20 mg tablet Take 20 mg by mouth daily  2/5/19  Yes Historical Provider, MD   dicyclomine (BENTYL) 20 mg tablet Take 20 mg by mouth 11/2/18  Yes Historical Provider, MD   doxycycline hyclate (VIBRAMYCIN) 100 mg capsule Take 100 mg by mouth 2/11/19 2/18/19 Yes Historical Provider, MD   flecainide (TAMBOCOR) 50 mg tablet Take 50 mg by mouth 2 (two) times a day  2/11/19  Yes Historical Provider, MD   furosemide (LASIX) 20 mg tablet Take 20 mg by mouth daily  2/11/19  Yes Historical Provider, MD   levothyroxine 125 mcg tablet Take 125 mcg by mouth daily  1/22/19  Yes Historical Provider, MD   meloxicam (MOBIC) 7 5 mg tablet Take by mouth daily    Yes Historical Provider, MD   mesalamine (ASACOL) 800 MG EC tablet Take 800 mg by mouth 1/9/19  Yes Historical Provider, MD   metoprolol succinate (TOPROL XL) 50 mg 24 hr tablet Take 50 mg by mouth daily  2/11/19  Yes Historical Provider, MD   metoprolol succinate (TOPROL-XL) 100 mg 24 hr tablet Take 100 mg by mouth daily  2/11/19  Yes Historical Provider, MD   omeprazole (PriLOSEC) 40 MG capsule Take 40 mg by mouth daily  10/17/18  Yes Historical Provider, MD   tamoxifen (NOLVADEX) 20 mg tablet Take by mouth daily    Yes Historical Provider, MD   venlafaxine (EFFEXOR XR) 37 5 mg 24 hr capsule Take 37 5 mg by mouth daily  12/31/18  Yes Historical Provider, MD     I have reviewed home medications using allscripts  Allergies:    Allergies   Allergen Reactions    Sulfa Antibiotics Rash       Social History:     Marital Status: /Civil Union   Occupation: unknown  Patient Pre-hospital Living Situation: lives at home   Patient Pre-hospital Level of Mobility: independent  Patient Pre-hospital Diet Restrictions: none  Substance Use History:   Social History Substance and Sexual Activity   Alcohol Use Never    Frequency: Never     Social History     Tobacco Use   Smoking Status Former Smoker    Last attempt to quit: 2018    Years since quittin 1   Smokeless Tobacco Never Used     Social History     Substance and Sexual Activity   Drug Use Never       Family History:    non-contributory    Physical Exam:     Vitals:   Blood Pressure: 95/70 (19 1630)  Pulse: (!) 119 (19 1640)  Temperature: 99 1 °F (37 3 °C) (19 133)  Temp Source: Temporal (19)  Respirations: 20 (19)  Height: 5' 9" (175 3 cm) (19)  Weight - Scale: 105 kg (230 lb 9 6 oz) (19)  SpO2: 97 % (19)    Physical Exam   Constitutional: She is oriented to person, place, and time  She appears well-developed and well-nourished  She is active and cooperative  HENT:   Head: Normocephalic and atraumatic  Cardiovascular: An irregularly irregular rhythm present  Tachycardia present  Pulmonary/Chest: Effort normal and breath sounds normal  She has no wheezes  She has no rhonchi  She has no rales  Abdominal: Soft  Normal appearance and bowel sounds are normal  She exhibits no distension  There is no tenderness  Neurological: She is alert and oriented to person, place, and time  No cranial nerve deficit  Skin: Skin is warm, dry and intact  Psychiatric: She has a normal mood and affect  Her speech is normal and behavior is normal  Judgment and thought content normal  Cognition and memory are normal    Nursing note and vitals reviewed  Additional Data:     Lab Results: I have personally reviewed pertinent reports        Results from last 7 days   Lab Units 19  1358   WBC Thousand/uL 10 02   HEMOGLOBIN g/dL 13 2   HEMATOCRIT % 41 5   PLATELETS Thousands/uL 263   NEUTROS PCT % 65   LYMPHS PCT % 25   MONOS PCT % 8   EOS PCT % 2     Results from last 7 days   Lab Units 19  1358   SODIUM mmol/L 140   POTASSIUM mmol/L 3 7   CHLORIDE mmol/L 103   CO2 mmol/L 30   BUN mg/dL 14   CREATININE mg/dL 0 98   ANION GAP mmol/L 7   CALCIUM mg/dL 9 0   ALBUMIN g/dL 3 5   TOTAL BILIRUBIN mg/dL 0 50   ALK PHOS U/L 135*   ALT U/L 151*   AST U/L 91*   GLUCOSE RANDOM mg/dL 94     Results from last 7 days   Lab Units 02/12/19  1358   INR  1 22*             Results from last 7 days   Lab Units 02/12/19  1358   LACTIC ACID mmol/L 1 4       Imaging: I have personally reviewed pertinent reports  CTA ED chest PE study   Final Result by Claudia James MD (02/12 3563)      1  No pulmonary embolism or other acute thoracic findings  2   Mediastinal and hilar lymphadenopathy  Common differential considerations include lymphoma, sarcoidosis, collagen vascular disease, metastatic disease, or atypical infection including tuberculosis and fungal infections  If clinical/laboratory workup    reveals no evidence for lymphoproliferative disorder or other definitive etiology, then transbronchial biopsy and/or follow-up chest CT in 3 months could be considered  Workstation performed: [id number]         XR chest 1 view portable   Final Result by Veronica Alvarez MD (02/12 1431)      No acute cardiopulmonary disease  Workstation performed: [id number]             EKG, Pathology, and Other Studies Reviewed on Admission:   · EKG: Afib with heart rate of 141 bpm    Allscripts / Epic Records Reviewed: Yes     ** Please Note: This note has been constructed using a voice recognition system   **

## 2019-02-12 NOTE — ED PROCEDURE NOTE
Procedure  ECG 12 Lead Documentation  Date/Time: 2/12/2019 1:56 PM  Performed by: Kvng Trevino PA-C  Authorized by: Kvng Trevino PA-C     Indications / Diagnosis:  Chest pain  ECG reviewed by me, the ED Provider: yes    Patient location:  Bedside  Previous ECG:     Comparison to cardiac monitor: Yes    Interpretation:     Interpretation: abnormal    Rate:     ECG rate:  141    ECG rate assessment: tachycardic    Rhythm:     Rhythm: atrial fibrillation    QRS:     QRS axis:  Normal    QRS intervals:  Normal  Conduction:     Conduction: normal    ST segments:     ST segments:  Normal  T waves:     T waves: normal                       Kvng Trevino PA-C  02/12/19 1357

## 2019-02-12 NOTE — ASSESSMENT & PLAN NOTE
· Patient with complaint of chest heaviness, left shoulder pain radiating to left neck  · She was found to be in Afib with RVR with a heart rate in the 140's in the ED  · Admit to ICU for continued monitoring  · Start Cardizem drip/infusion  · The patient's Toprol was increased from 100 mg to 150 mg yesterday by her Cardiologist, will continue this increased dose  · Patient already anticoagulated on eliquis 5 mg PO BID    · Cardiology consult  · Echocardiogram  · Labs in am

## 2019-02-12 NOTE — ASSESSMENT & PLAN NOTE
Component      Latest Ref Rng & Units 2/12/2019   AST      5 - 45 U/L 91 (H)   ALT      12 - 78 U/L 151 (H)   Alkaline Phosphatase      46 - 116 U/L 135 (H)   · Check acute hepatitis panel  · Avoid hepatotoxins  · Continue to monitor

## 2019-02-12 NOTE — ED NOTES
Patient transported to [facility], [address] [place] [address]  02/12/19 8232

## 2019-02-13 ENCOUNTER — APPOINTMENT (INPATIENT)
Dept: RADIOLOGY | Facility: HOSPITAL | Age: 64
DRG: 309 | End: 2019-02-13
Payer: COMMERCIAL

## 2019-02-13 ENCOUNTER — ANESTHESIA EVENT (INPATIENT)
Dept: PERIOP | Facility: HOSPITAL | Age: 64
DRG: 309 | End: 2019-02-13
Payer: COMMERCIAL

## 2019-02-13 ENCOUNTER — APPOINTMENT (INPATIENT)
Dept: NON INVASIVE DIAGNOSTICS | Facility: HOSPITAL | Age: 64
DRG: 309 | End: 2019-02-13
Payer: COMMERCIAL

## 2019-02-13 LAB
ALBUMIN SERPL BCP-MCNC: 2.9 G/DL (ref 3.5–5)
ALP SERPL-CCNC: 108 U/L (ref 46–116)
ALT SERPL W P-5'-P-CCNC: 159 U/L (ref 12–78)
ANION GAP SERPL CALCULATED.3IONS-SCNC: 7 MMOL/L (ref 4–13)
AST SERPL W P-5'-P-CCNC: 105 U/L (ref 5–45)
ATRIAL RATE: 144 BPM
BACTERIA UR CULT: NORMAL
BASOPHILS # BLD AUTO: 0.03 THOUSANDS/ΜL (ref 0–0.1)
BASOPHILS NFR BLD AUTO: 0 % (ref 0–1)
BILIRUB SERPL-MCNC: 0.5 MG/DL (ref 0.2–1)
BUN SERPL-MCNC: 11 MG/DL (ref 5–25)
CALCIUM SERPL-MCNC: 8.5 MG/DL (ref 8.3–10.1)
CHLORIDE SERPL-SCNC: 104 MMOL/L (ref 100–108)
CK SERPL-CCNC: 76 U/L (ref 26–192)
CO2 SERPL-SCNC: 30 MMOL/L (ref 21–32)
CREAT SERPL-MCNC: 0.87 MG/DL (ref 0.6–1.3)
EOSINOPHIL # BLD AUTO: 0.18 THOUSAND/ΜL (ref 0–0.61)
EOSINOPHIL NFR BLD AUTO: 2 % (ref 0–6)
ERYTHROCYTE [DISTWIDTH] IN BLOOD BY AUTOMATED COUNT: 13.3 % (ref 11.6–15.1)
FLUAV AG SPEC QL: NOT DETECTED
FLUBV AG SPEC QL: NOT DETECTED
GFR SERPL CREATININE-BSD FRML MDRD: 72 ML/MIN/1.73SQ M
GLUCOSE SERPL-MCNC: 110 MG/DL (ref 65–140)
HAV IGM SER QL: NORMAL
HBV CORE IGM SER QL: NORMAL
HBV SURFACE AG SER QL: NORMAL
HCT VFR BLD AUTO: 39 % (ref 34.8–46.1)
HCV AB SER QL: NORMAL
HGB BLD-MCNC: 11.9 G/DL (ref 11.5–15.4)
IMM GRANULOCYTES # BLD AUTO: 0.02 THOUSAND/UL (ref 0–0.2)
IMM GRANULOCYTES NFR BLD AUTO: 0 % (ref 0–2)
LDH SERPL-CCNC: 259 U/L (ref 81–234)
LYMPHOCYTES # BLD AUTO: 1.37 THOUSANDS/ΜL (ref 0.6–4.47)
LYMPHOCYTES NFR BLD AUTO: 18 % (ref 14–44)
MAGNESIUM SERPL-MCNC: 1.9 MG/DL (ref 1.6–2.6)
MCH RBC QN AUTO: 30.3 PG (ref 26.8–34.3)
MCHC RBC AUTO-ENTMCNC: 30.5 G/DL (ref 31.4–37.4)
MCV RBC AUTO: 99 FL (ref 82–98)
MONOCYTES # BLD AUTO: 0.6 THOUSAND/ΜL (ref 0.17–1.22)
MONOCYTES NFR BLD AUTO: 8 % (ref 4–12)
NEUTROPHILS # BLD AUTO: 5.44 THOUSANDS/ΜL (ref 1.85–7.62)
NEUTS SEG NFR BLD AUTO: 72 % (ref 43–75)
NRBC BLD AUTO-RTO: 0 /100 WBCS
NT-PROBNP SERPL-MCNC: 811 PG/ML
PHOSPHATE SERPL-MCNC: 3.9 MG/DL (ref 2.3–4.1)
PLATELET # BLD AUTO: 217 THOUSANDS/UL (ref 149–390)
PMV BLD AUTO: 9.7 FL (ref 8.9–12.7)
POTASSIUM SERPL-SCNC: 3.8 MMOL/L (ref 3.5–5.3)
PROCALCITONIN SERPL-MCNC: <0.05 NG/ML
PROCALCITONIN SERPL-MCNC: <0.05 NG/ML
PROT SERPL-MCNC: 7.2 G/DL (ref 6.4–8.2)
QRS AXIS: 53 DEGREES
QRSD INTERVAL: 76 MS
QT INTERVAL: 318 MS
QTC INTERVAL: 487 MS
RBC # BLD AUTO: 3.93 MILLION/UL (ref 3.81–5.12)
RSV B RNA SPEC QL NAA+PROBE: NOT DETECTED
SODIUM SERPL-SCNC: 141 MMOL/L (ref 136–145)
T WAVE AXIS: 41 DEGREES
VENTRICULAR RATE: 141 BPM
WBC # BLD AUTO: 7.64 THOUSAND/UL (ref 4.31–10.16)

## 2019-02-13 PROCEDURE — 86664 EPSTEIN-BARR NUCLEAR ANTIGEN: CPT | Performed by: PHYSICIAN ASSISTANT

## 2019-02-13 PROCEDURE — 84145 PROCALCITONIN (PCT): CPT | Performed by: PHYSICIAN ASSISTANT

## 2019-02-13 PROCEDURE — 86665 EPSTEIN-BARR CAPSID VCA: CPT | Performed by: PHYSICIAN ASSISTANT

## 2019-02-13 PROCEDURE — 71045 X-RAY EXAM CHEST 1 VIEW: CPT

## 2019-02-13 PROCEDURE — 80053 COMPREHEN METABOLIC PANEL: CPT | Performed by: PHYSICIAN ASSISTANT

## 2019-02-13 PROCEDURE — 83615 LACTATE (LD) (LDH) ENZYME: CPT | Performed by: PHYSICIAN ASSISTANT

## 2019-02-13 PROCEDURE — 82550 ASSAY OF CK (CPK): CPT | Performed by: PHYSICIAN ASSISTANT

## 2019-02-13 PROCEDURE — 93306 TTE W/DOPPLER COMPLETE: CPT

## 2019-02-13 PROCEDURE — 83880 ASSAY OF NATRIURETIC PEPTIDE: CPT | Performed by: INTERNAL MEDICINE

## 2019-02-13 PROCEDURE — 86663 EPSTEIN-BARR ANTIBODY: CPT | Performed by: PHYSICIAN ASSISTANT

## 2019-02-13 PROCEDURE — 80074 ACUTE HEPATITIS PANEL: CPT | Performed by: PHYSICIAN ASSISTANT

## 2019-02-13 PROCEDURE — 85025 COMPLETE CBC W/AUTO DIFF WBC: CPT | Performed by: PHYSICIAN ASSISTANT

## 2019-02-13 PROCEDURE — 99232 SBSQ HOSP IP/OBS MODERATE 35: CPT | Performed by: PHYSICIAN ASSISTANT

## 2019-02-13 PROCEDURE — 84100 ASSAY OF PHOSPHORUS: CPT | Performed by: PHYSICIAN ASSISTANT

## 2019-02-13 PROCEDURE — 83735 ASSAY OF MAGNESIUM: CPT | Performed by: PHYSICIAN ASSISTANT

## 2019-02-13 PROCEDURE — 93306 TTE W/DOPPLER COMPLETE: CPT | Performed by: INTERNAL MEDICINE

## 2019-02-13 PROCEDURE — 93010 ELECTROCARDIOGRAM REPORT: CPT | Performed by: INTERNAL MEDICINE

## 2019-02-13 RX ORDER — METOPROLOL TARTRATE 5 MG/5ML
5 INJECTION INTRAVENOUS EVERY 6 HOURS PRN
Status: DISCONTINUED | OUTPATIENT
Start: 2019-02-13 | End: 2019-02-14 | Stop reason: HOSPADM

## 2019-02-13 RX ORDER — FLECAINIDE ACETATE 100 MG/1
100 TABLET ORAL 2 TIMES DAILY
Status: DISCONTINUED | OUTPATIENT
Start: 2019-02-13 | End: 2019-02-14

## 2019-02-13 RX ORDER — METOPROLOL TARTRATE 5 MG/5ML
5 INJECTION INTRAVENOUS EVERY 6 HOURS PRN
Status: DISCONTINUED | OUTPATIENT
Start: 2019-02-13 | End: 2019-02-13

## 2019-02-13 RX ORDER — POTASSIUM CHLORIDE 20 MEQ/1
20 TABLET, EXTENDED RELEASE ORAL ONCE
Status: COMPLETED | OUTPATIENT
Start: 2019-02-13 | End: 2019-02-13

## 2019-02-13 RX ORDER — FLECAINIDE ACETATE 100 MG/1
50 TABLET ORAL ONCE
Status: COMPLETED | OUTPATIENT
Start: 2019-02-13 | End: 2019-02-13

## 2019-02-13 RX ADMIN — PANTOPRAZOLE SODIUM 40 MG: 40 TABLET, DELAYED RELEASE ORAL at 15:47

## 2019-02-13 RX ADMIN — METOPROLOL TARTRATE 5 MG: 5 INJECTION, SOLUTION INTRAVENOUS at 16:58

## 2019-02-13 RX ADMIN — VENLAFAXINE HYDROCHLORIDE 37.5 MG: 37.5 CAPSULE, EXTENDED RELEASE ORAL at 06:43

## 2019-02-13 RX ADMIN — APIXABAN 5 MG: 5 TABLET, FILM COATED ORAL at 09:28

## 2019-02-13 RX ADMIN — ASPIRIN 81 MG: 81 TABLET, COATED ORAL at 09:34

## 2019-02-13 RX ADMIN — PANTOPRAZOLE SODIUM 40 MG: 40 TABLET, DELAYED RELEASE ORAL at 06:43

## 2019-02-13 RX ADMIN — FLECAINIDE ACETATE 100 MG: 100 TABLET ORAL at 18:12

## 2019-02-13 RX ADMIN — FLECAINIDE ACETATE 50 MG: 100 TABLET ORAL at 09:37

## 2019-02-13 RX ADMIN — APIXABAN 5 MG: 5 TABLET, FILM COATED ORAL at 18:12

## 2019-02-13 RX ADMIN — FLECAINIDE ACETATE 50 MG: 100 TABLET ORAL at 10:30

## 2019-02-13 RX ADMIN — MAGNESIUM OXIDE TAB 400 MG (241.3 MG ELEMENTAL MG) 400 MG: 400 (241.3 MG) TAB at 09:31

## 2019-02-13 RX ADMIN — METOPROLOL SUCCINATE 150 MG: 50 TABLET, EXTENDED RELEASE ORAL at 09:28

## 2019-02-13 RX ADMIN — TAMOXIFEN CITRATE 20 MG: 10 TABLET, FILM COATED ORAL at 09:33

## 2019-02-13 RX ADMIN — POTASSIUM CHLORIDE 20 MEQ: 1500 TABLET, EXTENDED RELEASE ORAL at 09:35

## 2019-02-13 RX ADMIN — LEVOTHYROXINE SODIUM 125 MCG: 125 TABLET ORAL at 05:07

## 2019-02-13 NOTE — ASSESSMENT & PLAN NOTE
· Pain has resolved  · The patient continues to be in Afib with RVR with a heart rate in the 120's  · Cardizem drip stopped overnight due to hypotension  · Continue to monitor in the ICU  · The patient's Toprol was increased from 100 mg to 150 mg on 2/11/19 by her Cardiologist, will continue this increased dose  · The patient was started on flecainide 50 mg PO BID on 2/11/19 by her cardiologist, this was increased to 100 mg PO BID by cardiology today  · Patient already anticoagulated on eliquis 5 mg PO BID  · PRN metoprolol 5 mg IV for heart rate greater than 140  · Cardiology plans for a cardioversion tomorrow 2/14/19, NPO after midnight    · Cardiology consultation appreicated  · Echocardiogram pending  · Labs in am

## 2019-02-13 NOTE — PHYSICAL THERAPY NOTE
PT NOTE:            Attempted to see pt this am however HR elevated in a-fib 108-135 at rest  Nursing also stating pt is (I) in room with ambulation ad jack  Pt going to OR tomorrow for cardioversion  Will reattempt evaluation tomorrow after cardioversion to ensure pt is (I) and safe for return home

## 2019-02-13 NOTE — ASSESSMENT & PLAN NOTE
· CTA chest PE study: Mediastinal and hilar lymphadenopathy  · This may be due to recent Pneumonia the patient was treated for in January 2019  · LDH mildly elevated 259  · Consult Heme/Onc  · Will need repeat CT chest in 3 months

## 2019-02-13 NOTE — PLAN OF CARE
Problem: Potential for Falls  Goal: Patient will remain free of falls  Description  INTERVENTIONS:  - Assess patient frequently for physical needs  -  Identify cognitive and physical deficits and behaviors that affect risk of falls    -  [place] fall precautions as indicated by assessment   - Educate patient/family on patient safety including physical limitations  - Instruct patient to call for assistance with activity based on assessment  - Modify environment to reduce risk of injury  - Consider OT/PT consult to assist with strengthening/mobility  Outcome: Progressing     Problem: PAIN - ADULT  Goal: Verbalizes/displays adequate comfort level or baseline comfort level  Description  Interventions:  - Encourage patient to monitor pain and request assistance  - Assess pain using appropriate pain scale  - Administer analgesics based on type and severity of pain and evaluate response  - Implement non-pharmacological measures as appropriate and evaluate response  - Consider cultural and social influences on pain and pain management  - Notify physician/advanced practitioner if interventions unsuccessful or patient reports new pain  Outcome: Progressing     Problem: INFECTION - ADULT  Goal: Absence or prevention of progression during hospitalization  Description  INTERVENTIONS:  - Assess and monitor for signs and symptoms of infection  - Monitor lab/diagnostic results  - Monitor all insertion sites, i e  indwelling lines  - Monitor nasal secretions for changes in amount and color  - [place] appropriate cooling/warming therapies per order  - Administer medications as ordered  - Instruct and encourage patient and family to use good hand hygiene technique  - Identify and instruct in appropriate isolation precautions for identified infection/condition   Outcome: Progressing     Problem: SAFETY ADULT  Goal: Maintain or return to baseline ADL function  Description  INTERVENTIONS:  -  Assess patient's ability to carry out ADLs; assess patient's baseline for ADL function and identify physical deficits which impact ability to perform ADLs (bathing, care of mouth/teeth, toileting, grooming, dressing, etc )  - Assess/evaluate cause of self-care deficits   - Assess range of motion  - Assess patient's mobility; develop plan if impaired  - Assess patient's need for assistive devices and provide as appropriate  - Encourage maximum independence but intervene and supervise when necessary  ¯ Involve family in performance of ADLs  ¯ Assess for home care needs following discharge   ¯ Request OT consult to assist with ADL evaluation and planning for discharge  ¯ Provide patient education as appropriate  Outcome: Progressing  Goal: Maintain or return mobility status to optimal level  Description  INTERVENTIONS:  - Assess patient's baseline mobility status (ambulation, transfers, stairs, etc )    - Identify cognitive and physical deficits and behaviors that affect mobility  - Identify mobility aids required to assist with transfers and/or ambulation (gait belt, sit-to-stand, lift, walker, cane, etc )  - [place] fall precautions as indicated by assessment  - Record patient progress and toleration of activity level on Mobility SBAR; progress patient to next Phase/Stage  - Instruct patient to call for assistance with activity based on assessment  - Request Rehabilitation consult to assist with strengthening/weightbearing, etc   Outcome: Progressing     Problem: DISCHARGE PLANNING  Goal: Discharge to home or other facility with appropriate resources  Description  INTERVENTIONS:  - Identify barriers to discharge w/patient and caregiver  - Arrange for needed discharge resources and transportation as appropriate  - Identify discharge learning needs (meds, wound care, etc )  - Arrange for interpretive services to assist at discharge as needed  - Refer to Case Management Department for coordinating discharge planning if the patient needs post-hospital services based on physician/advanced practitioner order or complex needs related to functional status, cognitive ability, or social support system  Outcome: Progressing     Problem: Knowledge Deficit  Goal: Patient/family/caregiver demonstrates understanding of disease process, treatment plan, medications, and discharge instructions  Description  Complete learning assessment and assess knowledge base  Interventions:  - Provide teaching at level of understanding  - Provide teaching via preferred learning methods  Outcome: Progressing     Problem: CARDIOVASCULAR - ADULT  Goal: Maintains optimal cardiac output and hemodynamic stability  Description  INTERVENTIONS:  - Monitor I/O, vital signs and rhythm  - Monitor for S/S and trends of decreased cardiac output i e  bleeding, hypotension  - Administer and titrate ordered vasoactive medications to optimize hemodynamic stability  - Assess quality of pulses, skin color and temperature  - Assess for signs of decreased coronary artery perfusion - ex   Angina  - Instruct patient to report change in severity of symptoms  Outcome: Progressing  Goal: Absence of cardiac dysrhythmias or at baseline rhythm  Description  INTERVENTIONS:  - Continuous cardiac monitoring, monitor vital signs, obtain 12 lead EKG if indicated  - Administer antiarrhythmic and heart rate control medications as ordered  - Monitor electrolytes and administer replacement therapy as ordered  Outcome: Progressing

## 2019-02-13 NOTE — PROGRESS NOTES
Progress Note - Giovani Howard 1955, 59 y o  female MRN: [id number]    Unit/Bed#:  Encounter: [id number]    Primary Care Provider: Mirella Rojo MD   Date and time admitted to hospital: 2/12/2019  1:34 PM        * Atrial fibrillation with rapid ventricular response ([address] )  Assessment & Plan  · Pain has resolved  · The patient continues to be in Afib with RVR with a heart rate in the 120's  · Cardizem drip stopped overnight due to hypotension  · Continue to monitor in the ICU  · The patient's Toprol was increased from 100 mg to 150 mg on 2/11/19 by her Cardiologist, will continue this increased dose  · The patient was started on flecainide 50 mg PO BID on 2/11/19 by her cardiologist, this was increased to 100 mg PO BID by cardiology today  · Patient already anticoagulated on eliquis 5 mg PO BID  · PRN metoprolol 5 mg IV for heart rate greater than 140  · Cardiology plans for a cardioversion tomorrow 2/14/19, NPO after midnight  · Cardiology consultation appreicated  · Echocardiogram pending  · Labs in am      Lymphadenopathy  Assessment & Plan  · CTA chest PE study: Mediastinal and hilar lymphadenopathy  · This may be due to recent Pneumonia the patient was treated for in January 2019  · LDH mildly elevated 259  · Consult Heme/Onc  · Will need repeat CT chest in 3 months  Transaminitis  Assessment & Plan  · acute hepatitis panel negative  · Avoid hepatotoxins  · Continue to monitor  Elevated TSH  Assessment & Plan  · Elevated at 4 210  · Outpatient follow up with PCP for repeat TSH    Hypercholesteremia  Assessment & Plan  · Continue statin  VTE Pharmacologic Prophylaxis:   Pharmacologic: Apixaban (Eliquis)  Mechanical VTE Prophylaxis in Place: Yes    Patient Centered Rounds: I have performed bedside rounds with nursing staff today  Discussions with Specialists or Other Care Team Provider: nursing, CM, and attending      Time Spent for Care: 30 minutes    More than 50% of total time spent on counseling and coordination of care as described above  Current Length of Stay: 1 day(s)    Current Patient Status: Inpatient   Certification Statement: The patient will continue to require additional inpatient hospital stay due to continued monitoring of heart rate and planned CV    Discharge Plan: TBD    Code Status: Level 1 - Full Code      Subjective: The patient was seen and examined  The patient states her pain has resolved  She denies any shortness of breath    Objective:     Vitals:   Temp (24hrs), Av °F (37 2 °C), Min:98 4 °F (36 9 °C), Max:99 6 °F (37 6 °C)    Temp:  [98 4 °F (36 9 °C)-99 6 °F (37 6 °C)] 99 6 °F (37 6 °C)  HR:  [] 113  Resp:  [15-37] 25  BP: ()/(44-84) 114/84  SpO2:  [91 %-98 %] 93 %  Body mass index is 34 05 kg/m²  Input and Output Summary (last 24 hours): Intake/Output Summary (Last 24 hours) at 2019 1430  Last data filed at 2019 1052  Gross per 24 hour   Intake 1282 54 ml   Output 300 ml   Net 982 54 ml       Physical Exam:     Physical Exam   Constitutional: She is oriented to person, place, and time  She appears well-developed and well-nourished  She is active and cooperative  Cardiovascular: Normal rate and regular rhythm  Pulmonary/Chest: Effort normal and breath sounds normal    Abdominal: Soft  Normal appearance and bowel sounds are normal  There is no tenderness  Neurological: She is alert and oriented to person, place, and time  No cranial nerve deficit  Skin: Skin is warm, dry and intact  Nursing note and vitals reviewed        Additional Data:     Labs:    Results from last 7 days   Lab Units 19  0514   WBC Thousand/uL 7 64   HEMOGLOBIN g/dL 11 9   HEMATOCRIT % 39 0   PLATELETS Thousands/uL 217   NEUTROS PCT % 72   LYMPHS PCT % 18   MONOS PCT % 8   EOS PCT % 2     Results from last 7 days   Lab Units 19  0514   SODIUM mmol/L 141   POTASSIUM mmol/L 3 8   CHLORIDE mmol/L 104   CO2 mmol/L 30   BUN mg/dL 11   CREATININE mg/dL 0 87   ANION GAP mmol/L 7   CALCIUM mg/dL 8 5   ALBUMIN g/dL 2 9*   TOTAL BILIRUBIN mg/dL 0 50   ALK PHOS U/L 108   ALT U/L 159*   AST U/L 105*   GLUCOSE RANDOM mg/dL 110     Results from last 7 days   Lab Units 02/12/19  1358   INR  1 22*             Results from last 7 days   Lab Units 02/13/19  0514 02/12/19  1708 02/12/19  1358   LACTIC ACID mmol/L  --   --  1 4   PROCALCITONIN ng/ml <0 05 <0 05  --            * I Have Reviewed All Lab Data Listed Above  * Additional Pertinent Lab Tests Reviewed: All Labs Within Last 24 Hours Reviewed    Imaging:    Imaging Reports Reviewed Today Include: none  Imaging Personally Reviewed by Myself Includes:  none    Recent Cultures (last 7 days):     Results from last 7 days   Lab Units 02/12/19  1551 02/12/19  1534   INFLUENZA B PCR   --  Not Detected   RSV PCR   --  Not Detected   LEGIONELLA URINARY ANTIGEN  Negative  --        Last 24 Hours Medication List:     Current Facility-Administered Medications:  apixaban 5 mg Oral BID Rosalba Araujo, TOLU   flecainide 100 mg Oral BID Aj Fish MD   levothyroxine 125 mcg Oral Early Morning Rosalba Araujo, TOLU   metoprolol 5 mg Intravenous Q6H PRN Rosalba Araujo, TOLU   metoprolol succinate 150 mg Oral Daily Dave Wilkinson PA-C   ondansetron 4 mg Intravenous Q6H PRN Rosalba Gayle, TOLU   oxyCODONE 5 mg Oral Q4H PRN [employer], DO   pantoprazole 40 mg Oral BID ANDREA Wilkinson PA-C   venlafaxine 37 5 mg Oral Daily Rosalba Gayle, TOLU        Today, Patient Was Seen By: Rosalba Araujo PA-C    ** Please Note: Dictation voice to text software may have been used in the creation of this document   **

## 2019-02-13 NOTE — OCCUPATIONAL THERAPY NOTE
Order received and chart review performed; per nursing report, pt is performing at Independent level with self-care tasks as well as functional mobility with no device; pt does not require skilled OT needs at this time; will d/c OT orders at this time

## 2019-02-13 NOTE — CONSULTS
Consultation - Cardiology   Sasha Singleton 59 y o  female MRN: [id number]  Unit/Bed#:  Encounter: [id number]  Physician Requesting Consult: Valerie Interiano DO  Reason for Consult / Principal Problem: SOB, arm and neck pain, AF    Assessment:  Principal Problem:    Atrial fibrillation with rapid ventricular response (HCC)  Active Problems:    Lymphadenopathy    Elevated TSH    Hypercholesteremia    Transaminitis    SOB    Arm and neck pain    Plan:  I suspect that she has been back in AF with a rapid HR which is causing her SOB  She has been on Eliquis since 1/2019  Recommend:  1  Increase Flecainide to 100 mg BID  2  NPO after MN for CV tomorrow  3  Check BNP  4  Continue Eliquis 5 mg BID  5  Continue Toprol  mg daily  6  D/C ASA    History of Present Illness     HPI: Sasha Singleton is a 59y o  year old female has a history of AF discovered in 1/2018  At that time she was evaluated for SOB  She was seen by a cardiologist in the [facility]  She underwent LIONEL / CV and her SOB improved when she was in SR  She has been on [facility] since that time  She followed up with cardiology 1 time  Over the past several weeks, her SOB has worsened  She was seen by her cardiologist's PA 2 days ago and she was thought to be in heart failure and was found the be back in AF with a rapid HR  She was admitted yesterday with chest heaviness, L shoulder pain , left neck pain      ECG shows AF with rapid ventricular response    Troponin negative    CXR - NAD    CT chest - No pulmonary embolus    Home cardiac meds - Eliquis 5 mg BID, ASA, Lipitor, Flecainide 50 mg BID ( started on 2/11/2019 ), Lasix 20 mg daily, Toprol  mg daily                Review of Systems:    Alert awake oriented, comfortable, denies any complaints  No fevers chills nausea vomiting  No weakness, dizziness, seizures  positive for - shortness of breath  Denies any palpitations, chest pain, diaphoresis  Denies leg edema, pain or paresthesias  Denies any skin rashes  Denies abdominal pain, bloody stools, masses  Denies any depression or suicidal ideations      Historical Information   Past Medical History:   Diagnosis Date    A-fib ([place] [address] )     Brain aneurysm     Cardiac disease     [facility])     Crohn disease ([place] [address] )     Disease of thyroid gland     GERD (gastroesophageal reflux disease)     Hyperlipidemia     Hypertension      Past Surgical History:   Procedure Laterality Date    HYSTERECTOMY       Social History     Substance and Sexual Activity   Alcohol Use Never    Frequency: Never     Social History     Substance and Sexual Activity   Drug Use Never     Social History     Tobacco Use   Smoking Status Former Smoker    Last attempt to quit: 2018    Years since quittin 1   Smokeless Tobacco Never Used     Family History: non-contributory    Meds/Allergies   all current active meds have been reviewed  Allergies   Allergen Reactions    Sulfa Antibiotics Rash       Objective   Vitals: Blood pressure 128/66, pulse 105, temperature 99 6 °F (37 6 °C), temperature source Tympanic, resp  rate 18, height 5' 9" (1 753 m), weight 105 kg (230 lb 9 6 oz), SpO2 94 %  , Body mass index is 34 05 kg/m² ,   Orthostatic Blood Pressures      Most Recent Value   Blood Pressure  128/66 filed at 2019 0757   Patient Position - Orthostatic VS  Lying filed at 2019 0757            Intake/Output Summary (Last 24 hours) at 2019 0911  Last data filed at 2019 0757  Gross per 24 hour   Intake 922 54 ml   Output 300 ml   Net 622 54 ml               Physical Exam:  GEN: Julia Done appears well, alert and oriented x 3, pleasant and cooperative   HEENT: pupils equal, round, and reactive to light; extraocular muscles intact  NECK: supple, no carotid bruits   HEART: irregular, normal S1 and S2, no murmurs, clicks, gallops or rubs   LUNGS: clear to auscultation bilaterally; no wheezes, rales, or rhonchi   ABDOMEN: normal bowel sounds, soft, no tenderness, no distention  EXTREMITIES: peripheral pulses normal; no clubbing, cyanosis, or edema  NEURO: no focal findings   SKIN: normal without suspicious lesions on exposed skin    Lab Results:   Admission on 02/12/2019   Component Date Value Ref Range Status    WBC 02/12/2019 10 02  4 31 - 10 16 Thousand/uL Final    RBC 02/12/2019 4 24  3 81 - 5 12 Million/uL Final    Hemoglobin 02/12/2019 13 2  11 5 - 15 4 g/dL Final    Hematocrit 02/12/2019 41 5  34 8 - 46 1 % Final    MCV 02/12/2019 98  82 - 98 fL Final    MCH 02/12/2019 31 1  26 8 - 34 3 pg Final    MCHC 02/12/2019 31 8  31 4 - 37 4 g/dL Final    RDW 02/12/2019 13 2  11 6 - 15 1 % Final    MPV 02/12/2019 9 3  8 9 - 12 7 fL Final    Platelets 19/05/5414 263  149 - 390 Thousands/uL Final    nRBC 02/12/2019 0  /100 WBCs Final    Neutrophils Relative 02/12/2019 65  43 - 75 % Final    Immat GRANS % 02/12/2019 0  0 - 2 % Final    Lymphocytes Relative 02/12/2019 25  14 - 44 % Final    Monocytes Relative 02/12/2019 8  4 - 12 % Final    Eosinophils Relative 02/12/2019 2  0 - 6 % Final    Basophils Relative 02/12/2019 0  0 - 1 % Final    Neutrophils Absolute 02/12/2019 6 51  1 85 - 7 62 Thousands/µL Final    Immature Grans Absolute 02/12/2019 0 04  0 00 - 0 20 Thousand/uL Final    Lymphocytes Absolute 02/12/2019 2 46  0 60 - 4 47 Thousands/µL Final    Monocytes Absolute 02/12/2019 0 81  0 17 - 1 22 Thousand/µL Final    Eosinophils Absolute 02/12/2019 0 17  0 00 - 0 61 Thousand/µL Final    Basophils Absolute 02/12/2019 0 03  0 00 - 0 10 Thousands/µL Final    Protime 02/12/2019 14 8* 11 8 - 14 2 seconds Final    INR 02/12/2019 1 22* 0 86 - 1 17 Final    PTT 02/12/2019 40* 26 - 38 seconds Final    Therapeutic Heparin Range =  60-90 seconds    Sodium 02/12/2019 140  136 - 145 mmol/L Final    Potassium 02/12/2019 3 7  3 5 - 5 3 mmol/L Final    Chloride 02/12/2019 103  100 - 108 mmol/L Final    CO2 02/12/2019 30  21 - 32 mmol/L Final    ANION GAP 02/12/2019 7  4 - 13 mmol/L Final    BUN 02/12/2019 14  5 - 25 mg/dL Final    Creatinine 02/12/2019 0 98  0 60 - 1 30 mg/dL Final    Standardized to IDMS reference method    Glucose 02/12/2019 94  65 - 140 mg/dL Final      If the patient is fasting, the ADA then defines impaired fasting glucose as > 100 mg/dL and diabetes as > or equal to 123 mg/dL  Specimen collection should occur prior to Sulfasalazine administration due to the potential for falsely depressed results  Specimen collection should occur prior to Sulfapyridine administration due to the potential for falsely elevated results   Calcium 02/12/2019 9 0  8 3 - 10 1 mg/dL Final    AST 02/12/2019 91* 5 - 45 U/L Final      Specimen collection should occur prior to Sulfasalazine administration due to the potential for falsely depressed results   ALT 02/12/2019 151* 12 - 78 U/L Final      Specimen collection should occur prior to Sulfasalazine administration due to the potential for falsely depressed results   Alkaline Phosphatase 02/12/2019 135* 46 - 116 U/L Final    Total Protein 02/12/2019 8 2  6 4 - 8 2 g/dL Final    Albumin 02/12/2019 3 5  3 5 - 5 0 g/dL Final    Total Bilirubin 02/12/2019 0 50  0 20 - 1 00 mg/dL Final    eGFR 02/12/2019 62  ml/min/1 73sq m Final    Troponin I 02/12/2019 <0 02  <=0 04 ng/mL Final    3Autovalidation override  Siemens Chemistry analyzer 99% cutoff is > 0 04 ng/mL in network labs     o cTnI 99% cutoff is useful only when applied to patients in the clinical setting of myocardial ischemia   o cTnI 99% cutoff should be interpreted in the context of clinical history, ECG findings and possibly cardiac imaging to establish correct diagnosis  o cTnI 99% cutoff may be suggestive but clearly not indicative of a coronary event without the clinical setting of myocardial ischemia        TSH 3RD GENERATON 02/12/2019 4 210* 0 358 - 3 740 uIU/mL Final      The recommended reference ranges for TSH during pregnancy are as follows:   First trimester 0 1 to 2 5 uIU/mL   Second trimester  0 2 to 3 0 uIU/mL   Third trimester 0 3 to 3 0 uIU/m    Note: Normal ranges may not apply to patients who are transgender, non-binary, or whose legal sex, sex at birth, and gender identity differ  Using supplements with high doses of biotin 20 to more than 300 times greater than the adequate daily intake for adults of 30 mcg/day as established by the [place] [employer], can cause falsely depress results   D-Dimer, Quant 02/12/2019 869* <500 ng/ml (FEU) Final      Reference and upper limits to exclude DVT and PE are the same  Do not use to exclude if clinical symptoms are present  Pregnant women:  1st trimester:  <220 - 1060 ng/ml (FEU)  2nd trimester:  <220 - 1880 ng/ml (FEU)  3rd trimester:   238 - 3280 ng/ml (FEU)    Note: Normal ranges may not apply to patients who are transgender, non-binary, or whose legal sex, sex at birth, and gender identity differ      LACTIC ACID 02/12/2019 1 4  0 5 - 2 0 mmol/L Final    NT-proBNP 02/12/2019 1,187* <125 pg/mL Final    Clarity, UA 02/12/2019 Clear   Final    Color, UA 02/12/2019 Yellow   Final    Specific Gravity, UA 02/12/2019 <=1 005  1 003 - 1 030 Final    pH, UA 02/12/2019 6 5  4 5 - 8 0 Final    Glucose, UA 02/12/2019 Negative  Negative mg/dl Final    Ketones, UA 02/12/2019 Negative  Negative mg/dl Final    Blood, UA 02/12/2019 Trace-Intact* Negative Final    Protein, UA 02/12/2019 Negative  Negative mg/dl Final    Nitrite, UA 02/12/2019 Negative  Negative Final    Bilirubin, UA 02/12/2019 Negative  Negative Final    Urobilinogen, UA 02/12/2019 0 2  0 2, 1 0 E U /dl E U /dl Final    Leukocytes, UA 02/12/2019 Negative  Negative Final    WBC, UA 02/12/2019 None Seen  None Seen, 0-5, 5-55, 5-65 /hpf Final    RBC, UA 02/12/2019 0-1* None Seen, 0-5 /hpf Final    Bacteria, UA 02/12/2019 Occasional  None Seen, Occasional /hpf Final    Epithelial Cells 02/12/2019 Occasional  None Seen, Occasional /hpf Final    Strep pneumoniae antigen, urine 02/12/2019 Negative  Negative Final    Legionella Urinary Antigen 02/12/2019 Negative  Negative Final    Troponin I 02/12/2019 <0 02  <=0 04 ng/mL Final    3Autovalidation override  Siemens Chemistry analyzer 99% cutoff is > 0 04 ng/mL in network labs     o cTnI 99% cutoff is useful only when applied to patients in the clinical setting of myocardial ischemia   o cTnI 99% cutoff should be interpreted in the context of clinical history, ECG findings and possibly cardiac imaging to establish correct diagnosis  o cTnI 99% cutoff may be suggestive but clearly not indicative of a coronary event without the clinical setting of myocardial ischemia   Troponin I 02/12/2019 <0 02  <=0 04 ng/mL Final    3Autovalidation override  S[employer]ens Chemistry analyzer 99% cutoff is > 0 04 ng/mL in network labs     o cTnI 99% cutoff is useful only when applied to patients in the clinical setting of myocardial ischemia   o cTnI 99% cutoff should be interpreted in the context of clinical history, ECG findings and possibly cardiac imaging to establish correct diagnosis  o cTnI 99% cutoff may be suggestive but clearly not indicative of a coronary event without the clinical setting of myocardial ischemia        Total CK 02/13/2019 76  26 - 192 U/L Final    WBC 02/13/2019 7 64  4 31 - 10 16 Thousand/uL Final    RBC 02/13/2019 3 93  3 81 - 5 12 Million/uL Final    Hemoglobin 02/13/2019 11 9  11 5 - 15 4 g/dL Final    Hematocrit 02/13/2019 39 0  34 8 - 46 1 % Final    MCV 02/13/2019 99* 82 - 98 fL Final    MCH 02/13/2019 30 3  26 8 - 34 3 pg Final    MCHC 02/13/2019 30 5* 31 4 - 37 4 g/dL Final    RDW 02/13/2019 13 3  11 6 - 15 1 % Final    MPV 02/13/2019 9 7  8 9 - 12 7 fL Final    Platelets 69/27/2317 217  149 - 390 Thousands/uL Final    nRBC 02/13/2019 0  /100 WBCs Final    Neutrophils Relative 02/13/2019 72  43 - 75 % Final    Immat GRANS % 02/13/2019 0  0 - 2 % Final    Lymphocytes Relative 02/13/2019 18  14 - 44 % Final    Monocytes Relative 02/13/2019 8  4 - 12 % Final    Eosinophils Relative 02/13/2019 2  0 - 6 % Final    Basophils Relative 02/13/2019 0  0 - 1 % Final    Neutrophils Absolute 02/13/2019 5 44  1 85 - 7 62 Thousands/µL Final    Immature Grans Absolute 02/13/2019 0 02  0 00 - 0 20 Thousand/uL Final    Lymphocytes Absolute 02/13/2019 1 37  0 60 - 4 47 Thousands/µL Final    Monocytes Absolute 02/13/2019 0 60  0 17 - 1 22 Thousand/µL Final    Eosinophils Absolute 02/13/2019 0 18  0 00 - 0 61 Thousand/µL Final    Basophils Absolute 02/13/2019 0 03  0 00 - 0 10 Thousands/µL Final    Sodium 02/13/2019 141  136 - 145 mmol/L Final    Potassium 02/13/2019 3 8  3 5 - 5 3 mmol/L Final    Chloride 02/13/2019 104  100 - 108 mmol/L Final    CO2 02/13/2019 30  21 - 32 mmol/L Final    ANION GAP 02/13/2019 7  4 - 13 mmol/L Final    BUN 02/13/2019 11  5 - 25 mg/dL Final    Creatinine 02/13/2019 0 87  0 60 - 1 30 mg/dL Final    Standardized to IDMS reference method    Glucose 02/13/2019 110  65 - 140 mg/dL Final      If the patient is fasting, the ADA then defines impaired fasting glucose as > 100 mg/dL and diabetes as > or equal to 123 mg/dL  Specimen collection should occur prior to Sulfasalazine administration due to the potential for falsely depressed results  Specimen collection should occur prior to Sulfapyridine administration due to the potential for falsely elevated results   Calcium 02/13/2019 8 5  8 3 - 10 1 mg/dL Final    AST 02/13/2019 105* 5 - 45 U/L Final      Specimen collection should occur prior to Sulfasalazine administration due to the potential for falsely depressed results   ALT 02/13/2019 159* 12 - 78 U/L Final      Specimen collection should occur prior to Sulfasalazine administration due to the potential for falsely depressed results       Alkaline Phosphatase 02/13/2019 108  46 - 116 U/L Final    Total Protein 02/13/2019 7 2  6 4 - 8 2 g/dL Final    Albumin 02/13/2019 2 9* 3 5 - 5 0 g/dL Final    Total Bilirubin 02/13/2019 0 50  0 20 - 1 00 mg/dL Final    eGFR 02/13/2019 72  ml/min/1 73sq m Final    Magnesium 02/13/2019 1 9  1 6 - 2 6 mg/dL Final    Phosphorus 02/13/2019 3 9  2 3 - 4 1 mg/dL Final    LD 02/13/2019 259* 81 - 234 U/L Final       Results from last 7 days   Lab Units 02/13/19  0514 02/12/19 2035 02/12/19  1708 02/12/19  1358   CK TOTAL U/L 76  --   --   --    TROPONIN I ng/mL  --  <0 02 <0 02 <0 02     Results from last 7 days   Lab Units 02/13/19  0514 02/12/19  1358   WBC Thousand/uL 7 64 10 02   HEMOGLOBIN g/dL 11 9 13 2   HEMATOCRIT % 39 0 41 5   PLATELETS Thousands/uL 217 263         Results from last 7 days   Lab Units 02/13/19  0514 02/12/19  1358   POTASSIUM mmol/L 3 8 3 7   CHLORIDE mmol/L 104 103   CO2 mmol/L 30 30   BUN mg/dL 11 14   CREATININE mg/dL 0 87 0 98   CALCIUM mg/dL 8 5 9 0   ALK PHOS U/L 108 135*   ALT U/L 159* 151*   AST U/L 105* 91*     Results from last 7 days   Lab Units 02/12/19  1358   INR  1 22*           Imaging: I have personally reviewed pertinent reports  EKG: Atrial Fibrillation with rapid ventricular response    Echo: Pending                Counseling / Coordination of Care  Total floor / unit time spent today 60 minutes  Greater than 50% of total time was spent with the patient and / or family counseling and / or coordination of care

## 2019-02-13 NOTE — UTILIZATION REVIEW
Initial Clinical Review    Admission:   2/12/19 @ 1527   Orders Placed This Encounter   Procedures    Inpatient Admission     Standing Status:   Standing     Number of Occurrences:   1     Order Specific Question:   Admitting Physician     Answer:   Abiodun Phillips     Order Specific Question:   Level of Care     Answer:   Level 1 Stepdown [13]     Order Specific Question:   Estimated length of stay     Answer:   More than 2 Midnights     Order Specific Question:   Certification     Answer:   I certify that inpatient services are medically necessary for this patient for a duration of greater than two midnights  See H&P and MD Progress Notes for additional information about the patient's course of treatment  ED: Date/Time/Mode of Arrival:   ED Arrival Information     Expected Arrival Acuity Means of Arrival Escorted By Service Admission Type    - 2/12/2019 13:28 Emergent Walk-In Family Member General Medicine Emergency    Arrival Complaint    CHEST PAIN        Chief Complaint:   Chief Complaint   Patient presents with    Chest Pain     Chest pain starting 1 hour ago, radiating down left arm     History of Illness:   left-sided chest radiating down the left arm into left neck  This has been ongoing for 1 hour    She states that it feels though there is a ton of bricks sitting on my chest    - states she has a history of thyroid disorder as well as CHF and atrial fibrillation which she takes Eliquis  - States she took a baby aspirin earlier today      02/12/19 1605    144Abnormal   19      96 %  Nasal cannula     02/12/19 1600    128Abnormal   19  118/73    97 %  Nasal cannula     02/12/19 1555    141Abnormal   20  113/63    97 %  Nasal cannula     02/12/19 1539    138Abnormal   20  109/69    95 %  Nasal cannula  Sitting   02/12/19 1430    126Abnormal         94 %       02/12/19 1415    127Abnormal   23Abnormal   136/66    94 %  Nasal cannula     02/12/19 1400    146Abnormal 22  131/93    90 %       02/12/19 1336  99 1 °F (37 3 °C)  157Abnormal   20  138/88    95 %  None (Room air)  Lying     105 kg (230 lb 9 6 oz)    Pertinent Labs/Diagnostic Test Results:  ast  91   105  Alt  151   159  Alk phos  135   108  Trop  <0 02  (x3)   Total ck 76    bnp  1187   811  Wbc  10 02   7 64  hgb  13 2   11 9  D-dimer  869  cxr - nad  ekg -  afib     ED Treatment:   Medication Administration from 02/12/2019 1328 to 02/12/2019 1654       Date/Time Order Dose Route Action Action by Comments     02/12/2019 1434 sodium chloride 0 9 % bolus 500 mL 0 mL Intravenous Stopped Fidelina Arriaga RN      02/12/2019 1358 sodium chloride 0 9 % bolus 500 mL 500 mL Intravenous 1333 [facility]      02/12/2019 1357 aspirin chewable tablet 243 mg 243 mg Oral Given Fidelina Arriaga RN      02/12/2019 1406 morphine injection 2 mg 2 mg Intravenous Given Fidelina Arriaga RN      02/12/2019 1437 iohexol (OMNIPAQUE) 350 MG/ML injection (SINGLE-DOSE) 85 mL 85 mL Intravenous Given Maribel Moya      02/12/2019 1550 sodium chloride 0 9 % bolus 1,000 mL 1,000 mL Intravenous [facility]3 [facility]      02/12/2019 1554 diltiazem (CARDIZEM) 125 mg in sodium chloride 0 9 % 125 mL infusion 5 mg/hr Intravenous 1333 [facility] Amina Rogers RN starting HR of 138, blood pressure 113/63 R arm  Past Medical/Surgical History:    Active Ambulatory Problems     Diagnosis Date Noted    No Active Ambulatory Problems     Resolved Ambulatory Problems     Diagnosis Date Noted    No Resolved Ambulatory Problems     Past Medical History:   Diagnosis Date    A-fib [facility])     Brain aneurysm     Cardiac disease     CHF (congestive heart failure) (HCC)     Crohn disease (HCC)     Disease of thyroid gland     GERD (gastroesophageal reflux disease)     Hyperlipidemia     Hypertension      Admitting Diagnosis: Chest pain [R07 9]  Mediastinal lymphadenopathy [R59 0]  Atrial fibrillation with rapid ventricular response ([address] ) [I48 91]      Assessment/Plan:   Afib w/rvr -  ICU, cardizem gtt,  Cont eliquis, cardiology consult, echo , serial labs   Pt's toprol increased to 150 mg yesterday by her cardiologist,  Will cont this dose    Mediastinal and hilar Lymphadenopathy, poss due to recent pnu treated for in Jan 2019 -  Ck LDH, repeat ct in 3 mo    Transaminitis -  Ck hepatitis panel, avoid hepatotoxins, monitor  Hypercholesteremia - cont statin     Admission Orders:  Admit icu  Continuous Cardizem gtt with hourly vs  Oxygen prn (continuous @ 2L w/sats 91-93%)    Sequential compression device to b/l LE  PT eval and treat  Schedule Cath lab/Cardioversion  For 2/14  Echo  I/o q shift  Flecainide 50 mg @  1800  (changed to 100 mg BID on 2/13)     2/13/2019  99 6    - 118    20-25      128/66    Weaned to RA w/sats 91-93%  Tele - Afib   reamains on continuous Cardizem gtt with cardioversion scheduled for tomorrow if no conversion  on cardizem and po flecainide     Scheduled Meds:   Current Facility-Administered Medications:  apixaban 5 mg Oral BID Joselyn Clifton PA-C    diltiazem 1-15 mg/hr Intravenous Titrated Joselyn Clifton PA-C Last Rate: Stopped (02/12/19 3216)   flecainide 100 mg Oral BID Yair Rivas MD    levothyroxine 125 mcg Oral Early Morning Joselyn Clifton PA-C    metoprolol succinate 150 mg Oral Daily Dave Wilkinson PA-C    ondansetron 4 mg Intravenous Q6H PRN Joselyn Clifton PA-C    oxyCODONE 5 mg Oral Q4H PRN Willem Montez DO    pantoprazole 40 mg Oral BID AC Dave Wilkinson PA-C    venlafaxine 37 5 mg Oral Daily Joselyn Clifton PA-C

## 2019-02-14 ENCOUNTER — APPOINTMENT (INPATIENT)
Dept: NON INVASIVE DIAGNOSTICS | Facility: HOSPITAL | Age: 64
DRG: 309 | End: 2019-02-14
Attending: INTERNAL MEDICINE
Payer: COMMERCIAL

## 2019-02-14 ENCOUNTER — ANESTHESIA (INPATIENT)
Dept: PERIOP | Facility: HOSPITAL | Age: 64
DRG: 309 | End: 2019-02-14
Payer: COMMERCIAL

## 2019-02-14 VITALS
RESPIRATION RATE: 18 BRPM | DIASTOLIC BLOOD PRESSURE: 64 MMHG | HEIGHT: 69 IN | OXYGEN SATURATION: 91 % | HEART RATE: 68 BPM | BODY MASS INDEX: 34.16 KG/M2 | TEMPERATURE: 98.5 F | SYSTOLIC BLOOD PRESSURE: 111 MMHG | WEIGHT: 230.6 LBS

## 2019-02-14 PROBLEM — I27.20 PULMONARY HYPERTENSION (HCC): Status: ACTIVE | Noted: 2019-02-14

## 2019-02-14 PROBLEM — R31.29 MICROSCOPIC HEMATURIA: Status: ACTIVE | Noted: 2019-02-14

## 2019-02-14 LAB
ALBUMIN SERPL BCP-MCNC: 2.9 G/DL (ref 3.5–5)
ALP SERPL-CCNC: 109 U/L (ref 46–116)
ALT SERPL W P-5'-P-CCNC: 138 U/L (ref 12–78)
ANION GAP SERPL CALCULATED.3IONS-SCNC: 6 MMOL/L (ref 4–13)
AST SERPL W P-5'-P-CCNC: 83 U/L (ref 5–45)
ATRIAL RATE: 76 BPM
BASOPHILS # BLD AUTO: 0.02 THOUSANDS/ΜL (ref 0–0.1)
BASOPHILS NFR BLD AUTO: 0 % (ref 0–1)
BILIRUB SERPL-MCNC: 0.4 MG/DL (ref 0.2–1)
BUN SERPL-MCNC: 13 MG/DL (ref 5–25)
CALCIUM SERPL-MCNC: 8.8 MG/DL (ref 8.3–10.1)
CHLORIDE SERPL-SCNC: 105 MMOL/L (ref 100–108)
CO2 SERPL-SCNC: 30 MMOL/L (ref 21–32)
CREAT SERPL-MCNC: 0.97 MG/DL (ref 0.6–1.3)
EBV EA IGG SER-ACNC: <9 U/ML (ref 0–8.9)
EBV NA IGG SER IA-ACNC: 35.7 U/ML (ref 0–17.9)
EBV PATRN SPEC IB-IMP: ABNORMAL
EBV VCA IGG SER IA-ACNC: <18 U/ML (ref 0–17.9)
EBV VCA IGM SER IA-ACNC: <36 U/ML (ref 0–35.9)
EOSINOPHIL # BLD AUTO: 0.19 THOUSAND/ΜL (ref 0–0.61)
EOSINOPHIL NFR BLD AUTO: 3 % (ref 0–6)
ERYTHROCYTE [DISTWIDTH] IN BLOOD BY AUTOMATED COUNT: 13.2 % (ref 11.6–15.1)
GFR SERPL CREATININE-BSD FRML MDRD: 63 ML/MIN/1.73SQ M
GLUCOSE SERPL-MCNC: 109 MG/DL (ref 65–140)
HCT VFR BLD AUTO: 38.5 % (ref 34.8–46.1)
HGB BLD-MCNC: 12 G/DL (ref 11.5–15.4)
IMM GRANULOCYTES # BLD AUTO: 0.02 THOUSAND/UL (ref 0–0.2)
IMM GRANULOCYTES NFR BLD AUTO: 0 % (ref 0–2)
LYMPHOCYTES # BLD AUTO: 1.47 THOUSANDS/ΜL (ref 0.6–4.47)
LYMPHOCYTES NFR BLD AUTO: 22 % (ref 14–44)
MCH RBC QN AUTO: 31 PG (ref 26.8–34.3)
MCHC RBC AUTO-ENTMCNC: 31.2 G/DL (ref 31.4–37.4)
MCV RBC AUTO: 100 FL (ref 82–98)
MONOCYTES # BLD AUTO: 0.57 THOUSAND/ΜL (ref 0.17–1.22)
MONOCYTES NFR BLD AUTO: 9 % (ref 4–12)
MRSA NOSE QL CULT: NORMAL
NEUTROPHILS # BLD AUTO: 4.36 THOUSANDS/ΜL (ref 1.85–7.62)
NEUTS SEG NFR BLD AUTO: 66 % (ref 43–75)
NRBC BLD AUTO-RTO: 0 /100 WBCS
P AXIS: 67 DEGREES
PLATELET # BLD AUTO: 222 THOUSANDS/UL (ref 149–390)
PMV BLD AUTO: 9.4 FL (ref 8.9–12.7)
POTASSIUM SERPL-SCNC: 4.4 MMOL/L (ref 3.5–5.3)
PR INTERVAL: 158 MS
PROT SERPL-MCNC: 7.1 G/DL (ref 6.4–8.2)
QRS AXIS: 61 DEGREES
QRSD INTERVAL: 82 MS
QT INTERVAL: 416 MS
QTC INTERVAL: 468 MS
RBC # BLD AUTO: 3.87 MILLION/UL (ref 3.81–5.12)
SODIUM SERPL-SCNC: 141 MMOL/L (ref 136–145)
T WAVE AXIS: 51 DEGREES
VENTRICULAR RATE: 76 BPM
WBC # BLD AUTO: 6.63 THOUSAND/UL (ref 4.31–10.16)

## 2019-02-14 PROCEDURE — 99239 HOSP IP/OBS DSCHRG MGMT >30: CPT | Performed by: INTERNAL MEDICINE

## 2019-02-14 PROCEDURE — 93010 ELECTROCARDIOGRAM REPORT: CPT | Performed by: INTERNAL MEDICINE

## 2019-02-14 PROCEDURE — G8979 MOBILITY GOAL STATUS: HCPCS | Performed by: PHYSICAL THERAPIST

## 2019-02-14 PROCEDURE — 97162 PT EVAL MOD COMPLEX 30 MIN: CPT | Performed by: PHYSICAL THERAPIST

## 2019-02-14 PROCEDURE — G8980 MOBILITY D/C STATUS: HCPCS | Performed by: PHYSICAL THERAPIST

## 2019-02-14 PROCEDURE — 80053 COMPREHEN METABOLIC PANEL: CPT | Performed by: PHYSICIAN ASSISTANT

## 2019-02-14 PROCEDURE — 94761 N-INVAS EAR/PLS OXIMETRY MLT: CPT

## 2019-02-14 PROCEDURE — 85025 COMPLETE CBC W/AUTO DIFF WBC: CPT | Performed by: PHYSICIAN ASSISTANT

## 2019-02-14 PROCEDURE — 93005 ELECTROCARDIOGRAM TRACING: CPT

## 2019-02-14 PROCEDURE — G8978 MOBILITY CURRENT STATUS: HCPCS | Performed by: PHYSICAL THERAPIST

## 2019-02-14 PROCEDURE — 5A2204Z RESTORATION OF CARDIAC RHYTHM, SINGLE: ICD-10-PCS | Performed by: INTERNAL MEDICINE

## 2019-02-14 PROCEDURE — 97116 GAIT TRAINING THERAPY: CPT | Performed by: PHYSICAL THERAPIST

## 2019-02-14 RX ORDER — PROPOFOL 10 MG/ML
INJECTION, EMULSION INTRAVENOUS AS NEEDED
Status: DISCONTINUED | OUTPATIENT
Start: 2019-02-14 | End: 2019-02-14 | Stop reason: SURG

## 2019-02-14 RX ORDER — OMEPRAZOLE 20 MG/1
20 CAPSULE, DELAYED RELEASE ORAL
Status: ON HOLD
Start: 2019-02-14 | End: 2020-02-29 | Stop reason: SDUPTHER

## 2019-02-14 RX ORDER — SODIUM CHLORIDE, SODIUM LACTATE, POTASSIUM CHLORIDE, CALCIUM CHLORIDE 600; 310; 30; 20 MG/100ML; MG/100ML; MG/100ML; MG/100ML
50 INJECTION, SOLUTION INTRAVENOUS CONTINUOUS
Status: DISCONTINUED | OUTPATIENT
Start: 2019-02-14 | End: 2019-02-14 | Stop reason: HOSPADM

## 2019-02-14 RX ORDER — SODIUM CHLORIDE, SODIUM LACTATE, POTASSIUM CHLORIDE, CALCIUM CHLORIDE 600; 310; 30; 20 MG/100ML; MG/100ML; MG/100ML; MG/100ML
INJECTION, SOLUTION INTRAVENOUS CONTINUOUS PRN
Status: DISCONTINUED | OUTPATIENT
Start: 2019-02-14 | End: 2019-02-14 | Stop reason: SURG

## 2019-02-14 RX ORDER — METOPROLOL SUCCINATE 100 MG/1
100 TABLET, EXTENDED RELEASE ORAL EVERY 12 HOURS
Qty: 60 TABLET | Refills: 0 | Status: SHIPPED | OUTPATIENT
Start: 2019-02-14

## 2019-02-14 RX ORDER — METOPROLOL SUCCINATE 100 MG/1
100 TABLET, EXTENDED RELEASE ORAL DAILY
Status: DISCONTINUED | OUTPATIENT
Start: 2019-02-14 | End: 2019-02-14 | Stop reason: HOSPADM

## 2019-02-14 RX ORDER — FLECAINIDE ACETATE 100 MG/1
100 TABLET ORAL EVERY 12 HOURS SCHEDULED
Qty: 60 TABLET | Refills: 0 | Status: SHIPPED | OUTPATIENT
Start: 2019-02-14 | End: 2020-02-23 | Stop reason: ALTCHOICE

## 2019-02-14 RX ORDER — METOPROLOL TARTRATE 5 MG/5ML
INJECTION INTRAVENOUS AS NEEDED
Status: DISCONTINUED | OUTPATIENT
Start: 2019-02-14 | End: 2019-02-14 | Stop reason: SURG

## 2019-02-14 RX ORDER — FLECAINIDE ACETATE 100 MG/1
50 TABLET ORAL EVERY 12 HOURS SCHEDULED
Status: DISCONTINUED | OUTPATIENT
Start: 2019-02-14 | End: 2019-02-14 | Stop reason: HOSPADM

## 2019-02-14 RX ADMIN — METOPROLOL TARTRATE 3 MG: 1 INJECTION, SOLUTION INTRAVENOUS at 09:04

## 2019-02-14 RX ADMIN — FLECAINIDE ACETATE 50 MG: 100 TABLET ORAL at 10:34

## 2019-02-14 RX ADMIN — SODIUM CHLORIDE, SODIUM LACTATE, POTASSIUM CHLORIDE, AND CALCIUM CHLORIDE: .6; .31; .03; .02 INJECTION, SOLUTION INTRAVENOUS at 08:58

## 2019-02-14 RX ADMIN — METOPROLOL TARTRATE 2 MG: 1 INJECTION, SOLUTION INTRAVENOUS at 09:07

## 2019-02-14 RX ADMIN — VENLAFAXINE HYDROCHLORIDE 37.5 MG: 37.5 CAPSULE, EXTENDED RELEASE ORAL at 06:41

## 2019-02-14 RX ADMIN — APIXABAN 5 MG: 5 TABLET, FILM COATED ORAL at 10:23

## 2019-02-14 RX ADMIN — PROPOFOL 50 MG: 10 INJECTION, EMULSION INTRAVENOUS at 09:04

## 2019-02-14 RX ADMIN — PROPOFOL 30 MG: 10 INJECTION, EMULSION INTRAVENOUS at 09:07

## 2019-02-14 RX ADMIN — PANTOPRAZOLE SODIUM 40 MG: 40 TABLET, DELAYED RELEASE ORAL at 06:41

## 2019-02-14 RX ADMIN — METOPROLOL SUCCINATE 100 MG: 100 TABLET, FILM COATED, EXTENDED RELEASE ORAL at 10:34

## 2019-02-14 RX ADMIN — LEVOTHYROXINE SODIUM 125 MCG: 125 TABLET ORAL at 05:55

## 2019-02-14 NOTE — ASSESSMENT & PLAN NOTE
· An acute hepatitis panel was completed with the following results:  Results for Chu Blue (MRN [id number]) as of 2/14/2019 17:59   Ref   Range 2/13/2019 05:14   HEPATITIS A IGM ANTIBODY Latest Ref Range: Non-reactive, Equivocal-Suggest Recollect  Non-reactive   HEPATITIS B SURFACE ANTIGEN Latest Ref Range: Non-reactive, NonReactive - Confirmed  Non-reactive   HEPATITIS B CORE IGM ANTIBODY Latest Ref Range: Non-reactive  Non-reactive   HEPATITIS C ANTIBODY Latest Ref Range: Non-reactive  Non-reactive     · Her statin will be held until the transaminitis resolves  · Avoid all hepatotoxic agents  · She will need an outpatient liver ultrasound completed  · Outpatient follow-up with Gastroenterology  Outpatient follow-up with PCP

## 2019-02-14 NOTE — DISCHARGE SUMMARY
Discharge- Susana Meeks 1955, 59 y o  female MRN: [id number]    Unit/Bed#:  Encounter: [id number]    Primary Care Provider: Mohsen Carcamo MD   Date and time admitted to hospital: 2/12/2019  1:34 PM        * At[facility] fibrillation with rapid [facility] response [facility]  Assessment & Plan  · The patient was initially treated with an IV cardizem drip/infusion  · She was seen in consultation by Cardiology and underwent a successful cardioversion today, 02/14/2019  · She was discharged home in normal sinus rhythm  · Full anti-coagulation will be continued with eliquis 5 mg PO BID  · Cardiology discontinued aspirin  · Her toprol XL was increased to 100 mg PO every 12 hours  · Her flecainide was increased to 100 mg PO every 12 hours  · She was also started on lasix 20 mg PO Qdaily  · She will need close outpatient follow-up with Cardiology  · She has an outpatient stress test and an outpatient sleep study already scheduled    Pulmonary hypertension ([facility] )  Assessment & Plan  · Outpatient follow-up with Pulmonology  · She has an outpatient sleep study already scheduled    Microscopic hematuria  Assessment & Plan  · Outpatient follow-up with Urology for a possible cystoscopy  · A urine culture was completed with the following result:  Procedure Component Value - Date/Time   Urine culture [[id number]] Collected: 02/12/19 1551   Lab Status: Final result Specimen: Urine, Clean Catch Updated: 02/13/19 1647    Urine Culture 10,000-19,000 cfu/ml     Comment: Mixed Contaminants X3            Transaminitis  Assessment & Plan  · An acute hepatitis panel was completed with the following results:  Results for Maria Del Carmen Bolivar (MRN [id number]) as of 2/14/2019 17:59   Ref   Range 2/13/2019 05:14   HEPATITIS A IGM ANTIBODY Latest Ref Range: Non-reactive, Equivocal-Suggest Recollect  Non-reactive   HEPATITIS B SURFACE ANTIGEN Latest Ref Range: Non-reactive, NonReactive - Confirmed  Non-reactive   HEPATITIS B CORE IGM ANTIBODY Latest Ref Range: Non-reactive  Non-reactive   HEPATITIS C ANTIBODY Latest Ref Range: Non-reactive  Non-reactive     · Her statin will be held until the transaminitis resolves  · Avoid all hepatotoxic agents  · She will need an outpatient liver ultrasound completed  · Outpatient follow-up with Gastroenterology  Outpatient follow-up with PCP      Hypercholesteremia  Assessment & Plan  · Hold statin in the setting of transaminitis  Outpatient follow-up with PCP in regards to this matter      Elevated TSH  Assessment & Plan  · A TSH level was check with the following result:  Results for Kimberly Garvey (MRN [id number]) as of 2/14/2019 17:59   Ref  Range 2/12/2019 13:58   TSH 3RD GENERATON Latest Ref Range: 0 358 - 3 740 uIU/mL 4 210 (H)     · Continue her home dose of PO levothyroxine  · Recheck a TSH level as an outpatient with her PCP  · If the elevated TSH level persists, she will need an increased dose of PO levothyroxine prescribed by her PCP as an outpatient    Lymphadenopathy  Assessment & Plan  · An LDH level was checked with the following result:  Results for Kimberly Garvey (MRN [id number]) as of 2/14/2019 17:59   Ref  Range 2/13/2019 05:14   LD (LDH) Latest Ref Range: 81 - 234 U/L 259 (H)     · A CTA of the chest/PE protocol was completed on 02/12/2019 with the following results/impression:  FINDINGS:     PULMONARY ARTERIAL TREE:  No pulmonary embolus demonstrated       LUNGS:  CT pulmonary arteriography protocol is acquired with the patient in expiration by design  Resultant scattered groundglass atelectatic opacities may obscure nodules, interstitial changes, pathologic groundglass opacities, and other small   abnormalities      Mild emphysema  No acute consolidation  No interstitial thickening  No endobronchial lesions    No suspicious pulmonary nodules or masses      PLEURA:  Unremarkable      HEART/GREAT VESSELS:  Unremarkable for patient's age      MEDIASTINUM AND KEYSHA:  Image numbers below are taken from series 2  Mediastinal and hilar lymphadenopathy  Prevascular enlarged node measuring 2 7 cm x 1 5 cm on image 70  Precarinal enlarged node measuring 1 8 cm x 1 4 cm on image 80  Subcarinal gabriella   confluence measuring 4 1 cm x 1 8 cm  Largest right hilar node measures 2 0 cm x 1 5 cm on image 94  Enlarged left infrahilar/periaortic node measuring 1 7 cm x 1 5 cm on image 135  There are are also prominent AP window lymph nodes, though not   pathologically enlarged       CHEST WALL AND LOWER NECK:   Shotty bilateral supraclavicular lymph nodes are demonstrated in the neck  No pathologically enlarged axillary lymphadenopathy      VISUALIZED STRUCTURES IN THE UPPER ABDOMEN:  Severe hepatic steatosis  Prior cholecystectomy  Prominent periportal lymph node measuring 8 mm in the short axis  Left periaortic nodes measuring up to 8 mm in the short axis       OSSEOUS STRUCTURES:  No acute fracture or destructive osseous lesion      IMPRESSION:     1  No pulmonary embolism or other acute thoracic findings      2   Mediastinal and hilar lymphadenopathy  Common differential considerations include lymphoma, sarcoidosis, collagen vascular disease, metastatic disease, or atypical infection including tuberculosis and fungal infections  If clinical/laboratory workup reveals no evidence for lymphoproliferative disorder or other definitive etiology, then transbronchial biopsy and/or follow-up chest CT in 3 months could be considered      · She will need outpatient follow-up with Pulmonology for surveillance imaging to ensure resolution of the lymphadenopathy  · If the lymphadenopathy persists, she will need outpatient follow-up with Hematology/Oncology and biopsy of the lymphadenopathy        Discharging Physician / Practitioner: Harshad Hopkins DO  PCP: Mary Zuniga MD  Admission Date: 02/12/19  Discharge Date: 02/14/19      Consultations During Hospital Stay:  · Cardiology    Procedures Performed:   · Cardioversion on 02/14/2019    Significant Findings / Test Results:   Xr Chest Portable    Result Date: 2/13/2019  Impression: Mild central vascular prominence  No focal consolidation  Workstation performed: [id number]     Xr Chest 1 View Portable    Result Date: 2/12/2019  Impression: No acute cardiopulmonary disease  Workstation performed: [id number]     Cta Ed Chest Pe Study    Result Date: 2/12/2019  Impression: 1  No pulmonary embolism or other acute thoracic findings  2   Mediastinal and hilar lymphadenopathy  Common differential considerations include lymphoma, sarcoidosis, collagen vascular disease, metastatic disease, or atypical infection including tuberculosis and fungal infections  If clinical/laboratory workup reveals no evidence for lymphoproliferative disorder or other definitive etiology, then transbronchial biopsy and/or follow-up chest CT in 3 months could be considered  Workstation performed: [id number]     ECG 12 lead   Order: [id number]   Status:  Final result   Visible to patient:  No (Inaccessible in [address])   Next appt:  02/18/2019 at 09:00 AM in Sleep Medicine (Usha Servin MD)   (important suggestion)  Newer results are available  Click to view them now      Ref Range & Units 2/12/19 1334   Ventricular Rate     Atrial Rate     HI Interval ms    QRSD Interval ms 76    QT Interval ms 318    QTC Interval ms 487    P Axis degrees    QRS Axis degrees 53    T Wave Axis degrees 41       Narrative     Atrial fibrillation with rapid ventricular response  Abnormal ECG  No previous ECGs available  Confirmed by Anton Castillo [phone]) on 2/13/2019 7:31:30 AM      Specimen Collected: 02/12/19 13:34   Last Resulted: 02/13/19 07:31           12 lead   Order: [id number]   Status:  Final result   Visible to patient:  No (Inaccessible in [address])   Next appt:  02/18/2019 at 09:00 AM in Sleep Medicine Usha Servin MD)    Ref Range & Units 2/14/19 0913   Ventricular Rate BPM 76    Atrial Rate BPM 76 HI Interval ms 158    QRSD Interval ms 82    QT Interval ms 416    QTC Interval ms 468    P [place] degrees 67    QRS Axis degrees 61    T Wave Axis degrees 51       Narrative     Sinus rhythm with Premature atrial complexes  Otherwise normal ECG  When compared with ECG of 12-FEB-2019 13:34,  Sinus rhythm has replaced Atrial fibrillation  Vent  rate has decreased BY  65 BPM  Confirmed by Marielena Walker [phone]) on 2/14/2019 10:59:21 AM      Specimen Collected: 02/14/19 09:13   Last Resulted: 02/14/19 10:59           Echocardiogram (02/13/2019):  SUMMARY     LEFT VENTRICLE:  Size was normal   Systolic function was at the lower limits of normal  Ejection fraction was estimated in the range of 50 % to 55 %  There was mild hypokinesis  Wall thickness was normal   There was no evidence of concentric hypertrophy      LEFT ATRIUM:  The atrium was mildly dilated      MITRAL VALVE:  There was trace regurgitation      TRICUSPID VALVE:  There was mild regurgitation      HISTORY: PRIOR HISTORY: Afib     PROCEDURE: The procedure was performed at the bedside  This was a routine study  The transthoracic approach was used  The study included complete 2D imaging, complete spectral Doppler, and color Doppler  The heart rate was 122 bpm, at the  start of the study  Image quality was adequate      LEFT VENTRICLE: Size was normal  Systolic function was at the lower limits of normal  Ejection fraction was estimated in the range of 50 % to 55 %  There was mild hypokinesis  Wall thickness was normal  There was no evidence of concentric  hypertrophy      RIGHT VENTRICLE: The size was normal  Systolic function was normal  Wall thickness was normal      LEFT ATRIUM: The atrium was mildly dilated      RIGHT ATRIUM: Size was normal      MITRAL VALVE: Valve structure was normal  There was normal leaflet separation  DOPPLER: The transmitral velocity was within the normal range  There was no evidence for stenosis   There was trace regurgitation      AORTIC VALVE: The valve was trileaflet  Leaflets exhibited normal thickness and normal cuspal separation  DOPPLER: Transaortic velocity was within the normal range  There was no evidence for stenosis  There was no regurgitation      TRICUSPID VALVE: The valve structure was normal  There was normal leaflet separation  DOPPLER: The transtricuspid velocity was within the normal range  There was no evidence for stenosis  There was mild regurgitation  Estimated peak PA  pressure was 35 mmHg      PULMONIC VALVE: Not well visualized      PERICARDIUM: There was no pericardial effusion  The pericardium was normal in appearance      AORTA: The root exhibited normal size      SYSTEM MEASUREMENT TABLES     2D  %FS: 30 99 %  Ao Diam: 2 51 cm  EDV(Teich): 69 94 ml  EF(Teich): 59 24 %  ESV(Teich): 28 51 ml  IVSd: 0 96 cm  LA Area: 18 45 cm2  LA Diam: 4 31 cm  LVEDV MOD A4C: 63 18 ml  LVEF MOD A4C: 52 35 %  LVESV MOD A4C: 30 1 ml  LVIDd: 4 cm  LVIDs: 2 76 cm  LVLd A4C: 6 77 cm  LVLs A4C: 6 32 cm  LVPWd: 0 77 cm  RA Area: 17 75 cm2  RV DIAM: 2 68 cm  SV MOD A4C: 33 07 ml  SV(Teich): 41 43 ml     CW  AV Vmax: 1 45 m/s  AV maxP 39 mmHg  TR Vmax: 2 86 m/s  TR maxP 7 mmHg     PW  E' Sept: 0 12 m/s  LVOT Vmax: 0 83 m/s  LVOT maxP 75 mmHg  MV E Juan: 1 03 m/s     [facility]     Prepared and electronically signed by  Sintia Santa MD  Signed 2019 12:43:15       Outpatient Tests Requested:  · CBC with diff , CMP, Mag, Phos in 5-7 days  · Repeat CT scan of the chest with contrast in 3 months to ensure resolution of the lymphadenopathy    Complications:  None    Reason for Admission:  Atrial fibrillation with rapid ventricular response    Hospital Course:     Lourdes Stone is a 59 y o  female patient who originally presented to the hospital on 2019 due to chest pain      Please see above list of diagnoses and related plan for additional information  Condition at Discharge: good     Discharge Day Visit / Exam:     Subjective: The patient was seen and examined  The patient is doing better  No chest pain  No shortness of breath  No abdominal pain  No nausea or vomiting  Vitals: Blood Pressure: 111/64 (02/14/19 1034)  Pulse: 68 (02/14/19 1034)  Temperature: 98 5 °F (36 9 °C) (02/14/19 0951)  Temp Source: Tympanic (02/14/19 0951)  Respirations: 18 (02/14/19 0951)  Height: 5' 9" (175 3 cm) (02/12/19 1657)  Weight - Scale: 105 kg (230 lb 9 6 oz) (02/12/19 1657)  SpO2: 91 % (02/14/19 1106)  Exam:   Physical Exam  General:  NAD, awake, alert, follows commands  HEENT:  NC/AT, mucous membranes moist  Neck:  Supple, No JVP elevation  CV:  + S1, + S2, RRR  Pulm:  Lung fields are CTA bilaterally  Abd:  Soft, Non-tender, Non-distended  Ext:  No clubbing/cyanosis/edema  Skin:  No rashes    Discharge instructions/Information to patient and family:   See after visit summary for information provided to patient and family  Provisions for Follow-Up Care:  See after visit summary for information related to follow-up care and any pertinent home health orders  Disposition:     Home    Planned Readmission:  No     Discharge Statement:  I spent 35 minutes discharging the patient  This time was spent on the day of discharge  I had direct contact with the patient on the day of discharge  Greater than 50% of the total time was spent examining patient, answering all patient questions, arranging and discussing plan of care with patient as well as directly providing post-discharge instructions  Additional time then spent on discharge activities  Discharge Medications:  See after visit summary for reconciled discharge medications provided to patient and family        ** Please Note: This note has been constructed using a voice recognition system **

## 2019-02-14 NOTE — ANESTHESIA PREPROCEDURE EVALUATION
Review of Systems/Medical History          Cardiovascular  EKG reviewed, Hyperlipidemia, Hypertension , Dysrhythmias , atrial fibrillation, CHF ,    Pulmonary       GI/Hepatic    GERD ,             Endo/Other  History of thyroid disease , hypothyroidism,      GYN       Hematology   Musculoskeletal       Neurology   Psychology           Physical Exam    Airway    Mallampati score: II    Neck ROM: full     Dental   No notable dental hx     Cardiovascular      Pulmonary      Other Findings        Anesthesia Plan  ASA Score- 3     Anesthesia Type- IV sedation with anesthesia with ASA Monitors  Additional Monitors:   Airway Plan:     Comment: I have seen the patient and reviewed the history  Patient to receive IV sedation with full ASA monitors  Risks discussed with the patient, consent signed        Plan Factors-    Induction- intravenous  Postoperative Plan-     Informed Consent- Anesthetic plan and risks discussed with patient  I personally reviewed this patient with the CRNA  Discussed and agreed on the Anesthesia Plan with the CRNA  Lan Rosales

## 2019-02-14 NOTE — ASSESSMENT & PLAN NOTE
· An LDH level was checked with the following result:  Results for Deejay Griffiths (MRN [id number]) as of 2/14/2019 17:59   Ref  Range 2/13/2019 05:14   LD (LDH) Latest Ref Range: 81 - 234 U/L 259 (H)     · A CTA of the chest/PE protocol was completed on 02/12/2019 with the following results/impression:  FINDINGS:     PULMONARY ARTERIAL TREE:  No pulmonary embolus demonstrated       LUNGS:  CT pulmonary arteriography protocol is acquired with the patient in expiration by design  Resultant scattered groundglass atelectatic opacities may obscure nodules, interstitial changes, pathologic groundglass opacities, and other small   abnormalities      Mild emphysema  No acute consolidation  No interstitial thickening  No endobronchial lesions  No suspicious pulmonary nodules or masses      PLEURA:  Unremarkable      HEART/GREAT VESSELS:  Unremarkable for patient's age      MEDIASTINUM AND KEYSHA:  Image numbers below are taken from series 2  Mediastinal and hilar lymphadenopathy  Prevascular enlarged node measuring 2 7 cm x 1 5 cm on image 70  Precarinal enlarged node measuring 1 8 cm x 1 4 cm on image 80  Subcarinal gabriella   confluence measuring 4 1 cm x 1 8 cm  Largest right hilar node measures 2 0 cm x 1 5 cm on image 94  Enlarged left infrahilar/periaortic node measuring 1 7 cm x 1 5 cm on image 135  There are are also prominent AP window lymph nodes, though not   pathologically enlarged       CHEST WALL AND LOWER NECK:   Shotty bilateral supraclavicular lymph nodes are demonstrated in the neck  No pathologically enlarged axillary lymphadenopathy      VISUALIZED STRUCTURES IN THE UPPER ABDOMEN:  Severe hepatic steatosis  Prior cholecystectomy  Prominent periportal lymph node measuring 8 mm in the short axis  Left periaortic nodes measuring up to 8 mm in the short axis       OSSEOUS STRUCTURES:  No acute fracture or destructive osseous lesion      IMPRESSION:     1    No pulmonary embolism or other acute thoracic findings      2   Mediastinal and hilar lymphadenopathy  Common differential considerations include lymphoma, sarcoidosis, collagen vascular disease, metastatic disease, or atypical infection including tuberculosis and fungal infections  If clinical/laboratory workup reveals no evidence for lymphoproliferative disorder or other definitive etiology, then transbronchial biopsy and/or follow-up chest CT in 3 months could be considered      · She will need outpatient follow-up with Pulmonology for surveillance imaging to ensure resolution of the lymphadenopathy  · If the lymphadenopathy persists, she will need outpatient follow-up with Hematology/Oncology and biopsy of the lymphadenopathy

## 2019-02-14 NOTE — ASSESSMENT & PLAN NOTE
· The patient was initially treated with an IV cardizem drip/infusion  · She was seen in consultation by Cardiology and underwent a successful cardioversion today, 02/14/2019  · She was discharged home in normal sinus rhythm  · Full anti-coagulation will be continued with eliquis 5 mg PO BID  · Cardiology discontinued aspirin  · Her toprol XL was increased to 100 mg PO every 12 hours  · Her flecainide was increased to 100 mg PO every 12 hours  · She was also started on lasix 20 mg PO Qdaily  · She will need close outpatient follow-up with Cardiology  · She has an outpatient stress test and an outpatient sleep study already scheduled

## 2019-02-14 NOTE — ASSESSMENT & PLAN NOTE
· Outpatient follow-up with Urology for a possible cystoscopy  · A urine culture was completed with the following result:  Procedure Component Value - Date/Time   Urine culture [[id number]] Collected: 02/12/19 1551   Lab Status: Final result Specimen: Urine, Clean Catch Updated: 02/13/19 1647    Urine Culture 10,000-19,000 cfu/ml     Comment: Mixed Contaminants X3

## 2019-02-14 NOTE — SOCIAL WORK
Pt is being discharged today  Pt's family will give the patient a ride home   Follow up appointments reviewed with the patient with a good understanding  Case Management reviewed discharge planning process including the following: identifying help that is needed at home, pt's preference for discharge needs and Meds at [facility]  Reviewed with Pt that any member of the healthcare team can answer questions regarding : medications, jmportance of recognizing  Signs and symptoms of any  medical problems  Case Management also encouraged pt to follow up with all recommended appointments after discharge

## 2019-02-14 NOTE — ANESTHESIA POSTPROCEDURE EVALUATION
Post-Op Assessment Note    CV Status:  Stable  Pain Score: 0    Pain management: adequate     Mental Status:  Alert and awake   Hydration Status:  Euvolemic   PONV Controlled:  Controlled   Airway Patency:  Patent   Post Op Vitals Reviewed: Yes      Staff: CRNA           BP 96/54 (02/14/19 0917)    Temp 98 3 °F (36 8 °C) (02/14/19 0917)    Pulse 64 (02/14/19 0917)   Resp 22 (02/14/19 0917)    SpO2 98 % (02/14/19 0917)

## 2019-02-14 NOTE — ASSESSMENT & PLAN NOTE
· Hold statin in the setting of transaminitis  Outpatient follow-up with PCP in regards to this matter

## 2019-02-14 NOTE — PHYSICAL THERAPY NOTE
Physical Therapy Evaluation    Patient Name: Hermila Roque    VZLCK'S Date: 2/14/2019     Problem List  Patient Active Problem List   Diagnosis    Lymphadenopathy    Atrial fibrillation with rapid ventricular response (HCC)    Elevated TSH    Hypercholesteremia    Transaminitis        Past Medical History  Past Medical History:   Diagnosis Date    [facility]     Brain aneurysm     Cardiac disease     CHF (congestive heart failure) (HCC)     [facility]hn disease ([facility]     Disease of thyroid gland     GERD (gastroesophageal reflux disease)     Hyperlipidemia     Hypertension         Past Surgical History  Past Surgical History:   Procedure Laterality Date    CARDIOVERSION N/A 2/14/2019    Procedure: CARDIOVERSION;  Surgeon: Meaghan Flores MD;  Location: [facility];  Service: Cardiology    HYSTERECTOMY             02/14/19 1211   Note Type   Note type Eval/Treat   Pain Assessment   Pain Assessment No/denies pain   Pain Score No Pain   Home Living   Type of [address] Multi-level;Bed/bath upstairs;Stairs to enter with rails  (8-10 SUZANNE w/ HR, full flight to 2nd with HR, bathroom 1st fl )   Bathroom Shower/Tub Tub/shower unit   Bathroom Toilet Standard   Bathroom Equipment Grab bars in shower   [address]   (no DME at home )   Prior Function   Level of [place] Independent with ADLs and functional mobility  ((I) ambulation no A  D )   Lives With Spouse  (sister)   ADL Assistance Independent   IADLs Independent   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   Restrictions/Precautions   Other Precautions Contact/isolation;Telemetry;Multiple lines; Fall Risk   General   Family/Caregiver Present Yes   Cognition   Arousal/Participation Alert   Orientation Level Oriented X4   Following Commands Follows all commands and directions without difficulty   RLE Assessment   RLE Assessment WFL  (4+ to 5/5 throughout)   LLE Assessment   LLE Assessment WFL  (4+ to 5/5 )   Coordination   Sensation WFL   Light Touch   RLE Light Touch Grossly intact   LLE Light Touch Grossly intact   Bed Mobility   Supine to Sit 7  Independent   Sit to Supine   (seated EOB with bell in reach)   Additional Comments Pt with no difficulty with bed mobility  Pt on room air with spo2 at rest 93-94% HR 71  Pt with exertion ambulation and stair negotiation with Spo2 91-92% HR 93  Transfers   Sit to Stand 7  Independent   Stand to Sit 7  Independent   Stand pivot 7  Independent   Additional Comments Pt (I) with all transfers and ambulation with good stability  Pt first ambulated 230ft with Spo2 90-91% HR 93 however became dizzy and lightheaded with SOB  Pt with seated rest break x 4-5 minutes then ascended/descended 22 steps reciprocally (I) with SpO2 91-92% HR 92  Pt with SOB with stair negotiation requiring standing rest break but no dizziness or lightheadedness   Ambulation/Elevation   Gait pattern WNL   Gait Assistance 7  Independent   Assistive Device None   Distance 230ft no A D  (I) with SOB and dizziness but no drop in spO2 below 91%  Stair Management Assistance 7  Independent   Stair Management Technique Reciprocal;Alternating pattern   Number of Stairs 22   Balance   Static Sitting Good   Dynamic Sitting Good   Static Standing Good   Dynamic Standing Good   Ambulatory Good   Endurance Deficit   Endurance Deficit Yes   Endurance Deficit Description limited ambulation tolerance due to SOB   Activity Tolerance   Activity Tolerance Patient limited by fatigue   Assessment   Prognosis Good   Problem List Decreased endurance   Assessment Pt is a 59year old female admitted with chest heaviness L shoulder pain radiating to neck with SOB  pt found to be in a-fib with RVR and found to have mediastinal lymphadenopathy  Pt with PMH as listed above   Pt seen for moderate complexity PT evaluation s/p cardioversion this am  Pt presents with fair to good strength good balance and independent bed mobility transfers ambulation and stair negotiation  Pt with no drop in SpO2 on room air below 90-91% with HR 71-93  Pt with one episode of lightheadedness and dizziness after 230ft requiring seated rest break prior to stair negotiation  Pt with no LOB or instability with stair negotiation  Respiratory also present to montior HR and Spo2  Pt safe to return home on discharge and is being discharged this date  Will d/c skilled PT  Goals   Patient Goals To return home and to work   Plan   Treatment/Interventions Functional transfer training;Elevations; Endurance training;Patient/family training;Bed mobility;Gait training   PT Frequency One time visit   Recommendation   Recommendation Home independently   PT - OK to Discharge Yes

## 2019-02-14 NOTE — DISCHARGE INSTRUCTIONS
YOUR LIVER TESTS WERE FOUND TO BE ELEVATED  PLEASE DO NOT TAKE YOUR LIPITOR (ATORVASTATIN) UNTIL YOUR LIVER TESTS ARE RECHECKED BY YOUR PRIMARY CARE PHYSICIAN  PLEASE ALSO AVOID ALL ALCOHOL AND AVOID ALL TYLENOL/ACETAMINOPHEN  A-fib (Atrial Fibrillation)   WHAT YOU NEED TO KNOW:   A-fib may come and go, or it may be a long-term condition  A-fib can cause blood clots, stroke, or heart failure  These conditions may become life-threatening  It is important to treat and manage a-fib to help prevent a blood clot, stroke, or heart failure  DISCHARGE INSTRUCTIONS:   Call 911 for any of the following:   · You have any of the following signs of a heart attack:      ¨ Squeezing, pressure, or pain in your chest that lasts longer than 5 minutes or returns    ¨ Discomfort or pain in your back, neck, jaw, stomach, or arm     ¨ Trouble breathing    ¨ Nausea or vomiting    ¨ Lightheadedness or a sudden cold sweat, especially with chest pain or trouble breathing    · You have any of the following signs of a stroke:      ¨ Numbness or drooping on one side of your face     ¨ Weakness in an arm or leg    ¨ Confusion or difficulty speaking    ¨ Dizziness, a severe headache, or vision loss  Seek care immediately if:  You have any of the following signs of a blood clot:  · You feel lightheaded, are short of breath, and have chest pain  · You cough up blood  · You have swelling, redness, pain, or warmth in your arm or leg  Contact your cardiologist or healthcare provider if:   · Your heart rate is higher than your healthcare provider said it should be  · You have new or worsening swelling in your legs, feet, ankles, or abdomen  · You are short of breath, even at rest      · You have questions or concerns about your condition or care  Medicines: You may need any of the following:  · Heart medicines  help control your heart rate and rhythm  You may need more than one medicine to treat your symptoms       · Blood thinners    help prevent blood clots  Examples of blood thinners include heparin and warfarin  Clots can cause strokes, heart attacks, and death  The following are general safety guidelines to follow while you are taking a blood thinner:    ¨ Watch for bleeding and bruising while you take blood thinners  Watch for bleeding from your gums or nose  Watch for blood in your urine and bowel movements  Use a soft washcloth on your skin, and a soft toothbrush to brush your teeth  This can keep your skin and gums from bleeding  If you shave, use an electric shaver  Do not play contact sports  ¨ Tell your dentist and other healthcare providers that you take anticoagulants  Wear a bracelet or necklace that says you take this medicine  ¨ Do not start or stop any medicines unless your healthcare provider tells you to  Many medicines cannot be used with blood thinners  ¨ Tell your healthcare provider right away if you forget to take the medicine, or if you take too much  ¨ Warfarin  is a blood thinner that you may need to take  The following are things you should be aware of if you take warfarin  § Foods and medicines can affect the amount of warfarin in your blood  Do not make major changes to your diet while you take warfarin  Warfarin works best when you eat about the same amount of vitamin K every day  Vitamin K is found in green leafy vegetables and certain other foods  Ask for more information about what to eat when you are taking warfarin  § You will need to see your healthcare provider for follow-up visits when you are on warfarin  You will need regular blood tests  These tests are used to decide how much medicine you need  · Antiplatelets , such as aspirin, help prevent blood clots  Take your antiplatelet medicine exactly as directed  These medicines make it more likely for you to bleed or bruise  If you are told to take aspirin, do not take acetaminophen or ibuprofen instead       · Take your medicine as directed  Contact your healthcare provider if you think your medicine is not helping or if you have side effects  Tell him or her if you are allergic to any medicine  Keep a list of the medicines, vitamins, and herbs you take  Include the amounts, and when and why you take them  Bring the list or the pill bottles to follow-up visits  Carry your medicine list with you in case of an emergency  Follow up with your cardiologist as directed: You will need regular blood tests and monitoring  Write down your questions so you remember to ask them during your visits  Manage A-fib:   · Know your target heart rate  Learn how to take your pulse and monitor your heart rate  · Manage other health conditions  This includes high blood pressure, sleep apnea, thyroid disease, diabetes, and other heart conditions  Take medicine as directed and follow your treatment plan  · Limit or do not drink alcohol  Alcohol can make a-fib hard to manage  Ask your healthcare provider if it is safe for you to drink alcohol  A drink of alcohol is 12 ounces of beer, 5 ounces of wine, or 1½ ounces of liquor  · Do not smoke  Nicotine and other chemicals in cigarettes and cigars can cause heart and lung damage  Ask your healthcare provider for information if you currently smoke and need help to quit  E-cigarettes or smokeless tobacco still contain nicotine  Talk to your healthcare provider before you use these products  · Eat heart-healthy foods  Heart healthy foods will help keep your cholesterol low  These include fruits, vegetables, whole-grain breads, low-fat dairy products, beans, lean meats, and fish  Replace butter and margarine with heart-healthy oils such as olive oil and canola oil  · Maintain a healthy weight  Ask your healthcare provider how much you should weigh  Ask him to help you create a weight loss plan if you are overweight  · Exercise for 30 minutes  most days of the week   Ask your healthcare provider about the best exercise plan for you  © 2017 [address] Information is for End User's use only and may not be sold, redistributed or otherwise used for commercial purposes  All illustrations and images included in CareNotes® are the copyrighted property of [employer]  or [place] [employer]  The above information is an  only  It is not intended as medical advice for individual conditions or treatments  Talk to your doctor, nurse or pharmacist before following any medical regimen to see if it is safe and effective for you  A-fib (Atrial Fibrillation)   WHAT YOU NEED TO KNOW:   What is atrial fibrillation (a-fib)? A-fib is an irregular heartbeat  It reduces your heart's ability to pump blood through your body  A-fib may come and go, or it may be a long-term condition  A-fib can cause life-threatening blood clots, stroke, or heart failure  It is important to treat and manage a-fib to help prevent these problems  What increases my risk for a-fib? · High blood pressure, heart failure, or heart valve disease    · Smoking    · COPD, sleep apnea, a blood clot in your lung, or other lung diseases     · Diabetes, obesity, or thyroid disease    · Heavy alcohol use or large amounts of caffeine    · Age 72 years or older     · A family history of a-fib or other heart problems  What are the signs and symptoms of a-fib? · A heartbeat that races, pounds, or flutters    · Weakness, severe tiredness, or confusion    · Feeling lightheaded, sweaty, dizzy, or faint    · Shortness of breath or anxiety    · Chest pain or pressure  How is a-fib diagnosed? Your healthcare provider will examine you  Tell the provider about your symptoms, health conditions, and medicines  Tell the provider if you drink alcohol, smoke cigarettes, or use any illegal drugs  You will need an EKG to check your heart rhythm and how fast your heart beats   You may also need to wear a Holter monitor at home while you do your usual activities  The Holter monitor is a portable EKG machine  How is a-fib treated? Conditions that cause a-fib, such as thyroid disease, will be treated  You may also need any of the following:  · Heart medicines  help control your heart rate and rhythm  You may need more than one medicine to treat your symptoms  · Antiplatelet and blood thinner medicines  help prevent blood clots  · Cardioversion  is a procedure to return your heart rate and rhythm to normal  It can be done using medicines or electric shock  · A-fib ablation  is a procedure that uses energy to burn a small area of heart tissue  This creates scar tissue and prevents electrical signals that cause a-fib  You may need this procedure more than once  Ask for more information on a-fib ablation  · A pacemaker  may be inserted into your heart  A pacemaker is a device that controls your heartbeat  A pacemaker may be inserted during an ablation procedure or surgery  Ask your healthcare provider for more information on pacemakers  · Surgery  may be needed if other procedures do not work  During surgery your healthcare provider will make cuts in the upper part of your heart  The provider will stitch the cuts together to create scar tissue  The scar tissue will prevent electrical signals that cause a-fib  How can I manage a-fib? · Know your target heart rate  Learn how to take your pulse and monitor your heart rate  · Manage other health conditions  This includes high blood pressure, sleep apnea, thyroid disease, diabetes, and other heart conditions  Take medicine as directed and follow your treatment plan  · Limit or do not drink alcohol  Alcohol can make a-fib hard to manage  Ask your healthcare provider if it is safe for you to drink alcohol  A drink of alcohol is 12 ounces of beer, 5 ounces of wine, or 1½ ounces of liquor  · Do not smoke    Nicotine and other chemicals in cigarettes and cigars can cause heart and lung damage  Ask your healthcare provider for information if you currently smoke and need help to quit  E-cigarettes or smokeless tobacco still contain nicotine  Talk to your healthcare provider before you use these products  · Eat heart-healthy foods  Heart healthy foods will help keep your cholesterol low  These include fruits, vegetables, whole-grain breads, low-fat dairy products, beans, lean meats, and fish  Replace butter and margarine with heart-healthy oils such as olive oil and canola oil  · Maintain a healthy weight  Ask your healthcare provider how much you should weigh  Ask him to help you create a weight loss plan if you are overweight  · Exercise for 30 minutes  most days of the week  Ask your healthcare provider about the best exercise plan for you  Call 911 for any of the following:   · You have any of the following signs of a heart attack:      ¨ Squeezing, pressure, or pain in your chest that lasts longer than 5 minutes or returns    ¨ Discomfort or pain in your back, neck, jaw, stomach, or arm     ¨ Trouble breathing    ¨ Nausea or vomiting    ¨ Lightheadedness or a sudden cold sweat, especially with chest pain or trouble breathing    · You have any of the following signs of a stroke:      ¨ Numbness or drooping on one side of your face     ¨ Weakness in an arm or leg    ¨ Confusion or difficulty speaking    ¨ Dizziness, a severe headache, or vision loss  When should I seek immediate care? You have any of the following signs of a blood clot:  · You feel lightheaded, are short of breath, and have chest pain  · You cough up blood  · You have swelling, redness, pain, or warmth in your arm or leg  When should I contact my healthcare provider? · Your target heart rate is not in the range it should be  · You have new or worsening swelling in your legs, feet, ankles, or abdomen       · You are short of breath, even at rest      · You have questions or concerns about your condition or care  CARE AGREEMENT:   You have the right to help plan your care  Learn about your health condition and how it may be treated  Discuss treatment options with your caregivers to decide what care you want to receive  You always have the right to refuse treatment  The above information is an  only  It is not intended as medical advice for individual conditions or treatments  Talk to your doctor, nurse or pharmacist before following any medical regimen to see if it is safe and effective for you  © 2017 [address] Information is for End User's use only and may not be sold, redistributed or otherwise used for commercial purposes  All illustrations and images included in CareNotes® are the copyrighted property of [employer]  or [place] [employer]  Sleep Apnea   WHAT YOU NEED TO KNOW:   What is sleep apnea? Sleep apnea is also called obstructive sleep apnea  It is a condition that causes you to stop breathing for 10 seconds or more while you are sleeping  During normal sleep, your throat is kept open by muscles that let air pass through easily  Sleep apnea causes the muscles and tissues around your throat to relax and block air from passing through  Sleep apnea may happen many times while you are asleep  What increases my risk for sleep apnea? · Drinking alcohol or taking medicine to relax you before you sleep    · Obesity or a neck measuring 16 inches (41 centimeters) or more     · Smoking cigarettes    · Being a man or a menopausal woman    · A family history of sleep apnea    · Certain medical conditions, such as high blood pressure, diabetes, or stroke     · A deviated septum, a large tongue or tonsils, an overbite, or a small chin  What are the signs and symptoms of sleep apnea?    · No signs of breathing for 10 seconds or more while you sleep    · Snoring loudly, snorting, gasping or choking while you sleep, and waking up suddenly because of these    · A hard time thinking, remembering things, or focusing on your tasks the following day    · Headache or nausea    · Bedwetting or waking up often during the night to urinate    · Feeling sleepy, slow, and tired during the day  How is sleep apnea diagnosed? · Your healthcare provider will ask about your signs and symptoms, when they began, and how bad they are  He may ask about medical conditions you have and what medicines you take  Tell your healthcare provider if you smoke and whether anyone in your family has sleep apnea  Your healthcare provider may ask the person who sleeps beside you about your signs  · You may need to wear a device that monitors the oxygen level in your blood while you sleep  You may need to have a sleep study (polysomnography) if you have daytime sleepiness  A sleep study helps show your brain, heart, and respiratory system are working during sleep  Sleep studies may monitor the stages of sleep, oxygen levels, body position, eye movement, and snoring  How is sleep apnea treated? · A continuous positive airway pressure (CPAP)  machine is used to keep your airway open during sleep  A mask is placed over your nose and mouth, or just your nose  The mask is hooked to the CPAP machine  The CPAP blows a gentle stream of air into the mask when you breathe  This helps keep your airway open so you can breathe more regularly  Extra oxygen may be given through the machine  · A mouth device  may be recommended by your healthcare provider  The device looks like a mouth guard or dental retainer  It stops your tongue and mouth tissues from blocking your throat while you sleep  · Surgery  may be needed to remove extra tissues that block your mouth, throat, or nose  How can I manage my symptoms? · Do not smoke  Nicotine and other chemicals in cigarettes and cigars can cause lung damage   Ask your healthcare provider for information if you currently smoke and need help to quit  E-cigarettes or smokeless tobacco still contain nicotine  Talk to your healthcare provider before you use these products  · Do not drink alcohol or take sedative medicine before you go to sleep  Alcohol and sedatives can relax the muscles and tissues around your throat  This can block the airflow to your lungs  · Maintain a healthy weight  Excess tissue around your throat may restrict your breathing  Ask your healthcare provider for information if you need to lose weight  · Sleep on your side or use pillows designed to prevent sleep apnea  This prevents your tongue or other tissues from blocking your throat  You can also raise the head of your bed  When should I seek immediate care? · You have chest pain or trouble breathing  When should I contact my healthcare provider? · You feel very tired or depressed  · You have trouble staying awake during the day  · You have trouble thinking and remembering things  · You have questions or concerns about your condition or care  CARE AGREEMENT:   You have the right to help plan your care  Learn about your health condition and how it may be treated  Discuss treatment options with your caregivers to decide what care you want to receive  You always have the right to refuse treatment  The above information is an  only  It is not intended as medical advice for individual conditions or treatments  Talk to your doctor, nurse or pharmacist before following any medical regimen to see if it is safe and effective for you  © 2017 [address] Information is for End User's use only and may not be sold, redistributed or otherwise used for commercial purposes  All illustrations and images included in CareNotes® are the copyrighted property of [employer]  or [employer]

## 2019-02-14 NOTE — ASSESSMENT & PLAN NOTE
· A TSH level was check with the following result:  Results for Anabel Saldivar (MRN [id number]) as of 2/14/2019 17:59   Ref   Range 2/12/2019 13:58   TSH 3RD GENERATON Latest Ref Range: 0 358 - 3 740 uIU/mL 4 210 (H)     · Continue her home dose of PO levothyroxine  · Recheck a TSH level as an outpatient with her PCP  · If the elevated TSH level persists, she will need an increased dose of PO levothyroxine prescribed by her PCP as an outpatient

## 2019-02-17 LAB
BACTERIA BLD CULT: NORMAL
BACTERIA BLD CULT: NORMAL

## 2019-02-18 ENCOUNTER — APPOINTMENT (OUTPATIENT)
Dept: LAB | Facility: MEDICAL CENTER | Age: 64
End: 2019-02-18
Payer: COMMERCIAL

## 2019-02-18 ENCOUNTER — OFFICE VISIT (OUTPATIENT)
Dept: SLEEP CENTER | Facility: CLINIC | Age: 64
End: 2019-02-18
Payer: COMMERCIAL

## 2019-02-18 ENCOUNTER — TRANSCRIBE ORDERS (OUTPATIENT)
Dept: LAB | Facility: MEDICAL CENTER | Age: 64
End: 2019-02-18

## 2019-02-18 VITALS
BODY MASS INDEX: 33.5 KG/M2 | HEIGHT: 69 IN | WEIGHT: 226.2 LBS | DIASTOLIC BLOOD PRESSURE: 76 MMHG | SYSTOLIC BLOOD PRESSURE: 118 MMHG | OXYGEN SATURATION: 96 %

## 2019-02-18 DIAGNOSIS — R35.1 NOCTURIA: ICD-10-CM

## 2019-02-18 DIAGNOSIS — G25.81 RLS (RESTLESS LEGS SYNDROME): Primary | ICD-10-CM

## 2019-02-18 DIAGNOSIS — G47.8 NON-RESTORATIVE SLEEP: ICD-10-CM

## 2019-02-18 DIAGNOSIS — G25.81 RESTLESS LEG SYNDROME: ICD-10-CM

## 2019-02-18 DIAGNOSIS — G25.81 RLS (RESTLESS LEGS SYNDROME): ICD-10-CM

## 2019-02-18 DIAGNOSIS — R06.83 SNORING: ICD-10-CM

## 2019-02-18 DIAGNOSIS — R40.0 SOMNOLENCE: ICD-10-CM

## 2019-02-18 DIAGNOSIS — G47.19 EXCESSIVE DAYTIME SLEEPINESS: ICD-10-CM

## 2019-02-18 DIAGNOSIS — G25.81 RESTLESS LEG SYNDROME: Primary | ICD-10-CM

## 2019-02-18 LAB
FERRITIN SERPL-MCNC: 216 NG/ML (ref 8–388)
IRON SERPL-MCNC: 45 UG/DL (ref 50–170)
TIBC SERPL-MCNC: 362 UG/DL (ref 250–450)

## 2019-02-18 PROCEDURE — 83540 ASSAY OF IRON: CPT

## 2019-02-18 PROCEDURE — 99244 OFF/OP CNSLTJ NEW/EST MOD 40: CPT | Performed by: PSYCHIATRY & NEUROLOGY

## 2019-02-18 PROCEDURE — 36415 COLL VENOUS BLD VENIPUNCTURE: CPT

## 2019-02-18 PROCEDURE — 82728 ASSAY OF FERRITIN: CPT

## 2019-02-18 PROCEDURE — 83550 IRON BINDING TEST: CPT

## 2019-02-18 NOTE — PATIENT INSTRUCTIONS
Recommendations:  1) In lab diagnostic Polysomnography and RLS labs  2) Driving safety was reviewed with patient  If the patient feels too sleepy to drive she knows not to drive  If she becomes sleepy while driving she will pull over and nap  3) Follow-up in 6-8 weeks after initiation of treatment if indicated  4) Call with any questions or concerns
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
## 2019-02-21 ENCOUNTER — HOSPITAL ENCOUNTER (OUTPATIENT)
Dept: SLEEP CENTER | Facility: HOSPITAL | Age: 64
Discharge: HOME | End: 2019-02-21
Attending: PSYCHIATRY & NEUROLOGY
Payer: COMMERCIAL

## 2019-02-21 DIAGNOSIS — G47.19 EXCESSIVE DAYTIME SLEEPINESS: ICD-10-CM

## 2019-02-21 DIAGNOSIS — G25.81 RLS (RESTLESS LEGS SYNDROME): ICD-10-CM

## 2019-02-21 DIAGNOSIS — R06.83 SNORING: ICD-10-CM

## 2019-02-21 DIAGNOSIS — R35.1 NOCTURIA: ICD-10-CM

## 2019-02-21 DIAGNOSIS — G47.8 NON-RESTORATIVE SLEEP: ICD-10-CM

## 2019-02-21 PROCEDURE — 95810 POLYSOM 6/> YRS 4/> PARAM: CPT

## 2019-02-21 PROCEDURE — 95810 POLYSOM 6/> YRS 4/> PARAM: CPT | Performed by: PSYCHIATRY & NEUROLOGY

## 2019-02-22 NOTE — PROGRESS NOTES
Sleep Study Documentation    Pre-Sleep Study       Sleep testing procedure explained to patient:YES    Patient napped prior to study:YES- more than 30 minutes  Napped after 2PM: yes    Caffeine:Dayshift worker after 12PM   Caffeine use:YES- coffee  6 to 18 ounces    Alcohol:Dayshift workers after 5PM: Alcohol use:NO    Typical day for patient:YES       Study Documentation    Sleep Study Indications: Snoring, witnessed apneas, EDS    Diagnostic   Snore:Severe  Supplemental O2: no    O2 flow rate (L/min) range N/A  O2 flow rate (L/min) final N/A  Minimum SaO2 77  Baseline SaO2 89        Mode of Therapy:    EKG abnormalities: yes:  EPOCH example and comments: A-Fib throughout    EEG abnormalities: no    Sleep Study Recorded < 2 hours: N/A    Sleep Study Recorded > 2 hours but incomplete study: N/A    Sleep Study Recorded 6 hours but no sleep obtained: NO    Patient classification: employed       Post-Sleep Study    Medication used at bedtime or during sleep study:NO    Patient reports time it took to fall asleep:less than 20 minutes    Patient reports waking up during study:1 to 2 times  Patient reports returning to sleep in 10 to 30 minutes  Patient reports sleeping 4 to 6 hours without dreaming  Patient reports sleep during study:typical    Patient rated sleepiness: Not sleepy or tired    PAP treatment:no

## 2019-02-24 DIAGNOSIS — G47.33 OSA (OBSTRUCTIVE SLEEP APNEA): Primary | ICD-10-CM

## 2019-02-24 DIAGNOSIS — I48.91 ATRIAL FIBRILLATION, UNSPECIFIED TYPE (HCC): ICD-10-CM

## 2019-02-24 DIAGNOSIS — G47.34 NOCTURNAL HYPOXEMIA: ICD-10-CM

## 2019-02-24 NOTE — PROGRESS NOTES
I have reviewed all the lab results  Iron level is slightly reduced but TIBC and Ferritin levels are adequate and likely not the reason for her RLS symptoms  Would not recommend repletion at this time

## 2019-02-25 ENCOUNTER — TELEPHONE (OUTPATIENT)
Dept: SLEEP CENTER | Facility: CLINIC | Age: 64
End: 2019-02-25

## 2019-02-25 NOTE — TELEPHONE ENCOUNTER
As per Dr Stormy Hayes: Please call with results and have the patient come in ASAP for a titration  Left message for patient to call back  Sleep study reveals sleep apnea, DR Sharif recommends CPAP titration study scheduled ASAP

## 2019-03-01 ENCOUNTER — APPOINTMENT (EMERGENCY)
Dept: CT IMAGING | Facility: HOSPITAL | Age: 64
End: 2019-03-01
Payer: COMMERCIAL

## 2019-03-01 ENCOUNTER — HOSPITAL ENCOUNTER (EMERGENCY)
Facility: HOSPITAL | Age: 64
Discharge: HOME | End: 2019-03-01
Attending: EMERGENCY MEDICINE | Admitting: EMERGENCY MEDICINE
Payer: COMMERCIAL

## 2019-03-01 ENCOUNTER — APPOINTMENT (EMERGENCY)
Dept: RADIOLOGY | Facility: HOSPITAL | Age: 64
End: 2019-03-01
Payer: COMMERCIAL

## 2019-03-01 VITALS
HEART RATE: 92 BPM | OXYGEN SATURATION: 94 % | HEIGHT: 69 IN | BODY MASS INDEX: 34.19 KG/M2 | RESPIRATION RATE: 20 BRPM | TEMPERATURE: 98.5 F | SYSTOLIC BLOOD PRESSURE: 101 MMHG | DIASTOLIC BLOOD PRESSURE: 57 MMHG | WEIGHT: 230.82 LBS

## 2019-03-01 DIAGNOSIS — I10 HTN (HYPERTENSION): ICD-10-CM

## 2019-03-01 DIAGNOSIS — R07.9 CHEST PAIN: Primary | ICD-10-CM

## 2019-03-01 DIAGNOSIS — I48.91 ATRIAL FIBRILLATION (HCC): ICD-10-CM

## 2019-03-01 LAB
ALBUMIN SERPL BCP-MCNC: 3.1 G/DL (ref 3.5–5)
ALP SERPL-CCNC: 142 U/L (ref 46–116)
ALT SERPL W P-5'-P-CCNC: 109 U/L (ref 12–78)
ANION GAP SERPL CALCULATED.3IONS-SCNC: 4 MMOL/L (ref 4–13)
AST SERPL W P-5'-P-CCNC: 75 U/L (ref 5–45)
BASOPHILS # BLD AUTO: 0.04 THOUSANDS/ΜL (ref 0–0.1)
BASOPHILS NFR BLD AUTO: 1 % (ref 0–1)
BILIRUB SERPL-MCNC: 0.3 MG/DL (ref 0.2–1)
BILIRUB UR QL STRIP: NEGATIVE
BUN SERPL-MCNC: 18 MG/DL (ref 5–25)
CALCIUM SERPL-MCNC: 9 MG/DL (ref 8.3–10.1)
CHLORIDE SERPL-SCNC: 102 MMOL/L (ref 100–108)
CLARITY UR: CLEAR
CO2 SERPL-SCNC: 34 MMOL/L (ref 21–32)
COLOR UR: YELLOW
CREAT SERPL-MCNC: 0.95 MG/DL (ref 0.6–1.3)
DEPRECATED D DIMER PPP: 797 NG/ML (FEU)
EOSINOPHIL # BLD AUTO: 0.18 THOUSAND/ΜL (ref 0–0.61)
EOSINOPHIL NFR BLD AUTO: 2 % (ref 0–6)
ERYTHROCYTE [DISTWIDTH] IN BLOOD BY AUTOMATED COUNT: 12.8 % (ref 11.6–15.1)
GFR SERPL CREATININE-BSD FRML MDRD: 64 ML/MIN/1.73SQ M
GLUCOSE SERPL-MCNC: 98 MG/DL (ref 65–140)
GLUCOSE UR STRIP-MCNC: NEGATIVE MG/DL
HCT VFR BLD AUTO: 45 % (ref 34.8–46.1)
HGB BLD-MCNC: 14 G/DL (ref 11.5–15.4)
HGB UR QL STRIP.AUTO: NEGATIVE
IMM GRANULOCYTES # BLD AUTO: 0.03 THOUSAND/UL (ref 0–0.2)
IMM GRANULOCYTES NFR BLD AUTO: 0 % (ref 0–2)
KETONES UR STRIP-MCNC: NEGATIVE MG/DL
LEUKOCYTE ESTERASE UR QL STRIP: NEGATIVE
LYMPHOCYTES # BLD AUTO: 2.91 THOUSANDS/ΜL (ref 0.6–4.47)
LYMPHOCYTES NFR BLD AUTO: 33 % (ref 14–44)
MAGNESIUM SERPL-MCNC: 2.1 MG/DL (ref 1.6–2.6)
MCH RBC QN AUTO: 30.2 PG (ref 26.8–34.3)
MCHC RBC AUTO-ENTMCNC: 31.1 G/DL (ref 31.4–37.4)
MCV RBC AUTO: 97 FL (ref 82–98)
MONOCYTES # BLD AUTO: 0.84 THOUSAND/ΜL (ref 0.17–1.22)
MONOCYTES NFR BLD AUTO: 10 % (ref 4–12)
NEUTROPHILS # BLD AUTO: 4.82 THOUSANDS/ΜL (ref 1.85–7.62)
NEUTS SEG NFR BLD AUTO: 54 % (ref 43–75)
NITRITE UR QL STRIP: NEGATIVE
NRBC BLD AUTO-RTO: 0 /100 WBCS
NT-PROBNP SERPL-MCNC: 1047 PG/ML
PH UR STRIP.AUTO: 5.5 [PH] (ref 4.5–8)
PLATELET # BLD AUTO: 340 THOUSANDS/UL (ref 149–390)
PMV BLD AUTO: 9.2 FL (ref 8.9–12.7)
POTASSIUM SERPL-SCNC: 3.9 MMOL/L (ref 3.5–5.3)
PROT SERPL-MCNC: 8 G/DL (ref 6.4–8.2)
PROT UR STRIP-MCNC: NEGATIVE MG/DL
RBC # BLD AUTO: 4.63 MILLION/UL (ref 3.81–5.12)
SODIUM SERPL-SCNC: 140 MMOL/L (ref 136–145)
SP GR UR STRIP.AUTO: <=1.005 (ref 1–1.03)
TROPONIN I SERPL-MCNC: <0.02 NG/ML
TROPONIN I SERPL-MCNC: <0.02 NG/ML
UROBILINOGEN UR QL STRIP.AUTO: 0.2 E.U./DL
WBC # BLD AUTO: 8.82 THOUSAND/UL (ref 4.31–10.16)

## 2019-03-01 PROCEDURE — 83735 ASSAY OF MAGNESIUM: CPT | Performed by: EMERGENCY MEDICINE

## 2019-03-01 PROCEDURE — 84484 ASSAY OF TROPONIN QUANT: CPT | Performed by: EMERGENCY MEDICINE

## 2019-03-01 PROCEDURE — 80053 COMPREHEN METABOLIC PANEL: CPT | Performed by: EMERGENCY MEDICINE

## 2019-03-01 PROCEDURE — 99285 EMERGENCY DEPT VISIT HI MDM: CPT

## 2019-03-01 PROCEDURE — 81003 URINALYSIS AUTO W/O SCOPE: CPT | Performed by: EMERGENCY MEDICINE

## 2019-03-01 PROCEDURE — 85379 FIBRIN DEGRADATION QUANT: CPT | Performed by: EMERGENCY MEDICINE

## 2019-03-01 PROCEDURE — 83880 ASSAY OF NATRIURETIC PEPTIDE: CPT | Performed by: EMERGENCY MEDICINE

## 2019-03-01 PROCEDURE — 71275 CT ANGIOGRAPHY CHEST: CPT

## 2019-03-01 PROCEDURE — 93005 ELECTROCARDIOGRAM TRACING: CPT

## 2019-03-01 PROCEDURE — 36415 COLL VENOUS BLD VENIPUNCTURE: CPT | Performed by: EMERGENCY MEDICINE

## 2019-03-01 PROCEDURE — 71045 X-RAY EXAM CHEST 1 VIEW: CPT

## 2019-03-01 PROCEDURE — 85025 COMPLETE CBC W/AUTO DIFF WBC: CPT | Performed by: EMERGENCY MEDICINE

## 2019-03-01 RX ADMIN — IOHEXOL 85 ML: 350 INJECTION, SOLUTION INTRAVENOUS at 11:34

## 2019-03-01 NOTE — ED PROVIDER NOTES
History  Chief Complaint   Patient presents with    Chest Pain     Chest pain starting at 0930, constant, sharp, radiating across the chest     Patient is a 59-year-old female with a history of AFib on Eliquis, hypertension, hyperlipidemia, brain aneurysm, GERD, CHF coming in today with sudden onset sharp chest pain that started while at rest   She states that she had severe sharp shooting chest pain in the central upper chest that just moved across my chest  Did radiate into her lower part of her left neck  This resolved  She was given aspirin and 1 nitro by EMS and resolved  She did not have any shortness of breath, diaphoresis or nausea  This pain did not move into her back  Patient states she was recently admitted proximally 2 weeks ago for rapid AFib words she had to be cardioverted  She does report she just had a stress test this week up in [place]  She does not know the results  She has no syncope, near-syncope, hemoptysis, fevers, chills, palpitations, lower extremity edema        History provided by:  EMS personnel and patient   used: No    Chest Pain   Pain location:  L chest and substernal area  Pain quality: sharp and shooting    Pain radiates to:  Neck  Pain radiates to the back: no    Pain severity:  Moderate  Onset quality:  Sudden  Duration:  1 hour  Timing:  Constant  Progression:  Waxing and waning  Context: at rest    Context: not breathing, no drug use, not eating, no intercourse, not lifting, no movement, not raising an arm and no stress    Relieved by:  Nitroglycerin (one)  Worsened by:  Nothing tried  Ineffective treatments:  Aspirin  Associated symptoms: no abdominal pain, no AICD problem, no altered mental status, no anorexia, no anxiety, no back pain, no claudication, no cough, no diaphoresis, no dizziness, no dysphagia, no fatigue, no fever, no headache, no heartburn, no lower extremity edema, no nausea, no near-syncope, no numbness, no orthopnea, no palpitations, no PND, no shortness of breath, no syncope, not vomiting and no weakness    Risk factors: diabetes mellitus, high cholesterol, hypertension and obesity    Risk factors: no aortic disease, no birth control, no coronary artery disease, no immobilization, not male, no Marfan's syndrome, not pregnant, no prior DVT/PE, no smoking and no surgery    Risk factors comment:  Afib      Prior to Admission Medications   Prescriptions Last Dose Informant Patient Reported? Taking? apixaban (ELIQUIS) 5 mg   Yes Yes   Sig: Take 5 mg by mouth 2 (two) times a day    dicyclomine (BENTYL) 20 mg tablet   Yes Yes   Sig: Take 20 mg by mouth   flecainide (TAMBOCOR) 100 mg tablet   No Yes   Sig: Take 1 tablet (100 mg total) by mouth every 12 (twelve) hours   furosemide (LASIX) 20 mg tablet   Yes Yes   Sig: Take 20 mg by mouth daily    levothyroxine 125 mcg tablet   Yes Yes   Sig: Take 125 mcg by mouth daily    mesalamine (ASACOL) 800 MG EC tablet   Yes Yes   Sig: Take 800 mg by mouth   metoprolol succinate (TOPROL-XL) 100 mg 24 hr tablet   No Yes   Sig: Take 1 tablet (100 mg total) by mouth every 12 (twelve) hours   omeprazole (PriLOSEC) 20 mg delayed release capsule   No Yes   Sig: Take 1 capsule (20 mg total) by mouth daily before breakfast   venlafaxine (EFFEXOR XR) 37 5 mg 24 hr capsule   Yes Yes   Sig: Take 37 5 mg by mouth daily       Facility-Administered Medications: None       Past Medical History:   Diagnosis Date    A-fib ([place] [address] )     Brain aneurysm     Cardiac disease     [facility]     Crohn disease ([place] [address] )     Disease of thyroid gland     GERD (gastroesophageal reflux disease)     Hyperlipidemia     Hypertension        Past Surgical History:   Procedure Laterality Date    CARDIOVERSION N/A 2/14/2019    Procedure: CARDIOVERSION;  Surgeon: Tee Monaco MD;  Location: [facility];  Service: Cardiology    HYSTERECTOMY      TONSILLECTOMY AND ADENOIDECTOMY         History reviewed  No pertinent family history  I have reviewed and agree with the history as documented  Social History     Tobacco Use    Smoking status: Former Smoker     Last attempt to quit: 2018     Years since quittin 1    Smokeless tobacco: Never Used   Substance Use Topics    Alcohol use: Never     Frequency: Never    Drug use: Never        Review of Systems   Constitutional: Negative for diaphoresis, fatigue and fever  HENT: Negative for ear pain, sore throat and trouble swallowing  Eyes: Negative for visual disturbance  Respiratory: Negative for cough, chest tightness and shortness of breath  Cardiovascular: Positive for chest pain  Negative for palpitations, orthopnea, claudication, syncope, PND and near-syncope  Gastrointestinal: Negative for abdominal pain, anorexia, heartburn, nausea and vomiting  Genitourinary: Negative for difficulty urinating and dysuria  Musculoskeletal: Negative for back pain and neck pain  Skin: Negative for rash  Neurological: Negative for dizziness, weakness, numbness and headaches  Psychiatric/Behavioral: Negative for confusion  All other systems reviewed and are negative  Physical Exam  Physical Exam   Constitutional: She is oriented to person, place, and time  She appears well-developed and well-nourished  No distress  HENT:   Head: Normocephalic and atraumatic  Mouth/Throat: Oropharynx is clear and moist    Patient maintaining airway maintaining secretions  Uvula midline without edema   Eyes: Pupils are equal, round, and reactive to light  Conjunctivae and EOM are normal    Neck: Normal range of motion  Neck supple  Cardiovascular: Normal heart sounds and intact distal pulses  An irregularly irregular rhythm present  Tachycardia present  No murmur heard  Pulses:       Carotid pulses are 2+ on the right side, and 2+ on the left side  Dorsalis pedis pulses are 2+ on the right side, and 2+ on the left side     Pulmonary/Chest: Effort normal and breath sounds normal  No stridor  No respiratory distress  Abdominal: Soft  Bowel sounds are normal  She exhibits no distension  There is no tenderness  Musculoskeletal: Normal range of motion  She exhibits no edema  Neurological: She is alert and oriented to person, place, and time  No cranial nerve deficit  GCS eye subscore is 4  GCS verbal subscore is 5  GCS motor subscore is 6  Skin: Skin is warm  Capillary refill takes less than 2 seconds  She is not diaphoretic  Nursing note and vitals reviewed        Vital Signs  ED Triage Vitals [03/01/19 1036]   Temperature Pulse Respirations Blood Pressure SpO2   98 5 °F (36 9 °C) 99 18 118/68 92 %      Temp Source Heart Rate Source Patient Position - Orthostatic VS BP Location FiO2 (%)   Temporal Monitor Lying Left arm --      Pain Score       No Pain           Vitals:    03/01/19 1245 03/01/19 1327 03/01/19 1400 03/01/19 1444   BP: 125/63  94/52 101/57   Pulse: 95 85 93 92   Patient Position - Orthostatic VS: Lying  Lying Lying       Visual Acuity  Visual Acuity      Most Recent Value   L Pupil Size (mm)  3   R Pupil Size (mm)  3          ED Medications  Medications   iohexol (OMNIPAQUE) 350 MG/ML injection (SINGLE-DOSE) 85 mL (85 mL Intravenous Given 3/1/19 1134)       Diagnostic Studies  Results Reviewed     Procedure Component Value Units Date/Time    Troponin I [[id number]]  (Normal) Collected:  03/01/19 1403    Lab Status:  Final result Specimen:  Blood from Arm, Right Updated:  03/01/19 1436     Troponin I <0 02 ng/mL     UA w Reflex to Microscopic [[id number]] Collected:  03/01/19 1236    Lab Status:  Final result Specimen:  Urine, Clean Catch Updated:  03/01/19 1243     Color, UA Yellow     Clarity, UA Clear     Specific Gravity, UA <=1 005     pH, UA 5 5     Leukocytes, UA Negative     Nitrite, UA Negative     Protein, UA Negative mg/dl      Glucose, UA Negative mg/dl      Ketones, UA Negative mg/dl      Urobilinogen, UA 0 2 E U /dl Bilirubin, UA Negative     Blood, UA Negative    Magnesium [[id number]]  (Normal) Collected:  03/01/19 1045    Lab Status:  Final result Specimen:  Blood from Arm, Right Updated:  03/01/19 1129     Magnesium 2 1 mg/dL     Troponin I [[id number]]  (Normal) Collected:  03/01/19 1045    Lab Status:  Final result Specimen:  Blood from Arm, Right Updated:  03/01/19 1129     Troponin I <0 02 ng/mL     B-type natriuretic peptide [[id number]]  (Abnormal) Collected:  03/01/19 1045    Lab Status:  Final result Specimen:  Blood from Arm, Right Updated:  03/01/19 1129     NT-proBNP 1,047 pg/mL     Comprehensive metabolic panel [[id number]]  (Abnormal) Collected:  03/01/19 1045    Lab Status:  Final result Specimen:  Blood from Arm, Right Updated:  03/01/19 1120     Sodium 140 mmol/L      Potassium 3 9 mmol/L      Chloride 102 mmol/L      CO2 34 mmol/L      ANION GAP 4 mmol/L      BUN 18 mg/dL      Creatinine 0 95 mg/dL      Glucose 98 mg/dL      Calcium 9 0 mg/dL      AST 75 U/L       U/L      Alkaline Phosphatase 142 U/L      Total Protein 8 0 g/dL      Albumin 3 1 g/dL      Total Bilirubin 0 30 mg/dL      eGFR 64 ml/min/1 73sq m     Narrative:       National Kidney Disease Education Program recommendations are as follows:  GFR calculation is accurate only with a steady state creatinine  Chronic Kidney disease less than 60 ml/min/1 73 sq  meters  Kidney failure less than 15 ml/min/1 73 sq  meters      D-Dimer [[id number]]  (Abnormal) Collected:  03/01/19 1045    Lab Status:  Final result Specimen:  Blood from Arm, Right Updated:  03/01/19 1113     D-Dimer, Quant 797 ng/ml (FEU)     CBC and differential [[id number]]  (Abnormal) Collected:  03/01/19 1045    Lab Status:  Final result Specimen:  Blood from Arm, Right Updated:  03/01/19 1100     WBC 8 82 Thousand/uL      RBC 4 63 Million/uL      Hemoglobin 14 0 g/dL      Hematocrit 45 0 %      MCV 97 fL      MCH 30 2 pg      MCHC 31 1 g/dL      RDW 12 8 %      MPV 9 2 fL Platelets 562 Thousands/uL      nRBC 0 /100 WBCs      Neutrophils Relative 54 %      Immat GRANS % 0 %      Lymphocytes Relative 33 %      Monocytes Relative 10 %      Eosinophils Relative 2 %      Basophils Relative 1 %      Neutrophils Absolute 4 82 Thousands/µL      Immature Grans Absolute 0 03 Thousand/uL      Lymphocytes Absolute 2 91 Thousands/µL      Monocytes Absolute 0 84 Thousand/µL      Eosinophils Absolute 0 18 Thousand/µL      Basophils Absolute 0 04 Thousands/µL                  CTA ED chest PE study   Final Result by Anneliese Cruz DO (03/01 1209)   1  No CT evidence of pulmonary embolism  2   Mild vascular congestion  3   Improving mediastinal and bilateral hilar adenopathy  Workstation performed: [id number]         XR chest 1 view portable   Final Result by Anneliese Cruz DO (03/01 1141)   No acute cardiopulmonary disease              Workstation performed: [id number]                    Procedures  Procedures       Phone Contacts  ED Phone Contact    ED Course         HEART Risk Score      Most Recent Value   History  0 Filed at: 03/01/2019 1132   ECG  1 Filed at: 03/01/2019 1132   Age  1 Filed at: 03/01/2019 1132   Risk Factors  2 Filed at: 03/01/2019 1132   Troponin  0 Filed at: 03/01/2019 1132   Heart Score Risk Calculator   History  0 Filed at: 03/01/2019 1132   ECG  1 Filed at: 03/01/2019 1132   Age  1 Filed at: 03/01/2019 1132   Risk Factors  2 Filed at: 03/01/2019 1132   Troponin  0 Filed at: 03/01/2019 1132   HEART Score  4 Filed at: 03/01/2019 1132   HEART Score  4 Filed at: 03/01/2019 1132            PERC Rule for PE      Most Recent Value   PERC Rule for PE   Age >=50  1 Filed at: 03/01/2019 1046   HR >=100  1 Filed at: 03/01/2019 1046   O2 Sat on room air < 95%  0 Filed at: 03/01/2019 1046   History of PE or DVT  0 Filed at: 03/01/2019 1046   Recent trauma or surgery  0 Filed at: 03/01/2019 1046   Hemoptysis  0 Filed at: 03/01/2019 1046   Exogenous estrogen  0 Filed at: 03/01/2019 1046   Unilateral leg swelling  0 Filed at: 03/01/2019 1046   PERC Rule for PE Results  2 Filed at: 03/01/2019 1046                Wells' Criteria for PE      Most Recent Value   Wells' Criteria for PE   Clinical signs and symptoms of DVT  0 Filed at: 03/01/2019 1046   PE is primary diagnosis or equally likely  0 Filed at: 03/01/2019 1046   HR >100  1 5 Filed at: 03/01/2019 1046   Immobilization at least 3 days or Surgery in the previous 4 weeks  0 Filed at: 03/01/2019 1046   Previous, objectively diagnosed PE or DVT  0 Filed at: 03/01/2019 1046   Hemoptysis  0 Filed at: 03/01/2019 1046   Malignancy with treatment within 6 months or palliative  0 Filed at: 03/01/2019 1046   Wells' Criteria Total  1 5 Filed at: 03/01/2019 1046            MDM  Number of Diagnoses or Management Options  Atrial fibrillation [facility]):   Chest pain:   HTN (hypertension):   Diagnosis management comments: Patient is a 81-year-old female coming in today with complaints of chest pain  On exam she is nontoxic appearing in no distress  She is in AFib  Will check basic labs as well as chest x-ray  Will add a D-dimer is patient is perc positive and low wells  EKG INTERPRETATION at 10:30 a m  RHYTHM:  Rapid AFib at 100 beats per minute with RVR  AXIS:  Normal axis  INTERVALS:   Jaskaran Side QRS COMPLEX:  QRS measured at 84 milliseconds  ST SEGMENT:  Nonspecific ST segment changes  Diffuse artifact  QT INTERVAL:  QTC measured at 466 millisecond  COMPARED WITH PRIOR   Jaskaran Side Interpretation by Danna Carrera DO    10:53 AM  [facility] cardiology office and will have faxed over a port of her stress test   Received paperwork  Interpretation reveals Lexiscan protocol with no EKG changes  Nuclear cardiac stress test negative for ischemia  LV ejection fracture is catheterization at 51%  Gated imaging reveals normal myocardial thickening and wall motion  11:20 AM  D dimer elevated   Will CT      12:14 PM  Patient with negative CT for PE - mild vascular congestion  Will call Cardiology for discussion  Will call back  Patient and family updated on labs, CT, CXR  Patient has no SOB, chest pain  Will obtain walking saturation, repeat T2 at 145 given HEART score  Patient agreeable  Patient ambulated throughout the ER with O2 sats greater than 95% without any shortness of breath or chest pain  12:55 PM  Discussed with Dr Carl Austin from Cardiology - agree with work up and patient needs appointment with EP     EKG INTERPRETATION 1333  RHYTHM:  AFib at 90 beats per minute  AXIS:  Normal axis  INTERVALS:   Nicolas Prince QRS COMPLEX:  QRS measured at 90 milliseconds  ST SEGMENT:  Nonspecific ST segment changes  Diffuse artifact  QT INTERVAL:  QTC measured at 487 milliseconds  COMPARED WITH PRIOR   Nicolas Prince Interpretation by Joanna Rizo DO    2:14 PM  Patient and family updated on EKG in my discussion with Cardiology  Offered her admission/observation overnight but she wishes to go home  Are again reviewed stress test, my discussion with cards, CT, labs  She does take all her medications and took her medications today  She wishes to wait for the T2 and go home if this is negative  2:45 PM  Patient with negative T2  Patient sitting on the edge of bed and wishes to go home  Patient does have appointment with a cardiologist on the 14th of this month and we with electrophysiologist on the 28th of this month  Discussed with patient to continue her at medications as prescribed and return to ER instructions given patient states that she has no pain and did not have a ever since this morning  Discussed with her importance of follow-up with PCP and to call her cardiologist today for follow-up  Under exam patient was alert oriented x3, cranial nerves 2-12 grossly intact with no focal deficit  Regular rhythm no normal rate  No respiratory distress  No lower extremity edema  Portions of the record may have been created with voice recognition software   Occasional wrong word or "sound a like" substitutions may have occurred due to the inherent limitations of voice recognition software  Read the chart carefully and recognize, using context, where substitutions have occurred  Amount and/or Complexity of Data Reviewed  Clinical lab tests: ordered and reviewed  Tests in the radiology section of CPT®: ordered and reviewed  Tests in the medicine section of CPT®: ordered and reviewed  Independent visualization of images, tracings, or specimens: yes (Portable upright chest x-ray reveals no acute pathology )        Disposition  Final diagnoses:   Chest pain   Atrial fibrillation ([facility] [address] )   HTN (hypertension)     Time reflects when diagnosis was documented in both MDM as applicable and the Disposition within this note     Time User Action Codes Description Comment    3/1/2019 11:35 AM Richard Alvarado Add [R07 9] Chest pain     3/1/2019  2:14 PM Stacey Alvarado Add [I48 91] Atrial fibrillation ([facility] [address] )     3/1/2019  2:40 PM Stacey Alvarado Add [I10] HTN (hypertension)       ED Disposition     ED Disposition Condition Date/Time Comment    Discharge Stable Fri Mar 1, 2019  2:39 PM Deysi Campos discharge to home/self care  Follow-up Information     Follow up With Specialties Details Why Celsa Curran MD Family Medicine Schedule an appointment as soon as possible for a visit in 2 days  [address]      Mary Muñoz MD Cardiology  CALL YOUR CARDIOLOGIST TODAY TO VERIFY YOUR APPOINTMENT WITH THE PHYSICIAN WHO SPECIALIZES IN EP  [address]  [facility]  [address], [address] [address] [facility]  [address]  [phone]            Patient's Medications   Discharge Prescriptions    No medications on file     No discharge procedures on file      ED Provider  Electronically Signed by           Greg Parr DO  03/01/19 8960

## 2019-03-05 LAB
ATRIAL RATE: 288 BPM
ATRIAL RATE: 91 BPM
QRS AXIS: 30 DEGREES
QRS AXIS: 61 DEGREES
QRSD INTERVAL: 84 MS
QRSD INTERVAL: 90 MS
QT INTERVAL: 360 MS
QT INTERVAL: 396 MS
QTC INTERVAL: 466 MS
QTC INTERVAL: 487 MS
T WAVE AXIS: 39 DEGREES
T WAVE AXIS: 72 DEGREES
VENTRICULAR RATE: 101 BPM
VENTRICULAR RATE: 91 BPM

## 2019-03-05 PROCEDURE — 93010 ELECTROCARDIOGRAM REPORT: CPT | Performed by: INTERNAL MEDICINE

## 2019-04-10 ENCOUNTER — HOSPITAL ENCOUNTER (OUTPATIENT)
Dept: SLEEP CENTER | Facility: HOSPITAL | Age: 64
Discharge: HOME | End: 2019-04-10
Attending: PSYCHIATRY & NEUROLOGY
Payer: COMMERCIAL

## 2019-04-10 DIAGNOSIS — I48.91 ATRIAL FIBRILLATION, UNSPECIFIED TYPE (HCC): ICD-10-CM

## 2019-04-10 DIAGNOSIS — G47.34 NOCTURNAL HYPOXEMIA: ICD-10-CM

## 2019-04-10 DIAGNOSIS — G47.33 OSA (OBSTRUCTIVE SLEEP APNEA): ICD-10-CM

## 2019-04-10 PROCEDURE — 95811 POLYSOM 6/>YRS CPAP 4/> PARM: CPT

## 2019-04-10 PROCEDURE — 95811 POLYSOM 6/>YRS CPAP 4/> PARM: CPT | Performed by: PSYCHIATRY & NEUROLOGY

## 2019-04-16 DIAGNOSIS — G47.33 OSA (OBSTRUCTIVE SLEEP APNEA): Primary | ICD-10-CM

## 2019-04-16 DIAGNOSIS — I48.91 ATRIAL FIBRILLATION WITH TACHYCARDIC VENTRICULAR RATE (HCC): ICD-10-CM

## 2019-04-17 ENCOUNTER — TELEPHONE (OUTPATIENT)
Dept: SLEEP CENTER | Facility: CLINIC | Age: 64
End: 2019-04-17

## 2019-07-01 ENCOUNTER — OFFICE VISIT (OUTPATIENT)
Dept: SLEEP CENTER | Facility: CLINIC | Age: 64
End: 2019-07-01
Payer: COMMERCIAL

## 2019-07-01 VITALS
HEIGHT: 70 IN | OXYGEN SATURATION: 93 % | WEIGHT: 236 LBS | DIASTOLIC BLOOD PRESSURE: 60 MMHG | BODY MASS INDEX: 33.79 KG/M2 | HEART RATE: 69 BPM | SYSTOLIC BLOOD PRESSURE: 108 MMHG

## 2019-07-01 DIAGNOSIS — G47.33 OSA (OBSTRUCTIVE SLEEP APNEA): Primary | ICD-10-CM

## 2019-07-01 PROCEDURE — 99213 OFFICE O/P EST LOW 20 MIN: CPT | Performed by: PSYCHIATRY & NEUROLOGY

## 2019-07-01 RX ORDER — DOFETILIDE 0.5 MG/1
500 CAPSULE ORAL 2 TIMES DAILY
COMMUNITY

## 2019-07-01 NOTE — PATIENT INSTRUCTIONS
Recommendations:    1) CPAP at 12cm  2) Safe driving reviewed  3) Follow-up 6 months  4) Call with any questions or concerns

## 2019-07-01 NOTE — PROGRESS NOTES
Sleep Medicine Follow-Up Note    HPI: 66yo F with MARY ELLEN presenting for a compliance visit  Treatment Summary: 2019 PSG showed severe MARY ELLEN with AHI 68 and 102 in REM  Ed of 77% with borderline oxygenation as well at baseline  2019 had CPAP titration which showed an adequate pressure of 12cm and correction of hypoxemia  HPI:  Today, patient presents unaccompanied  She stated that she was in the hospital for 4 days (ablation for A fib) and was unable to use her CPAP but otherwise has been able to use her CPAP every night  There were a few days with less than 4 hours a night but that was due to coughing  She stated that she really loves her machine and mask  She feels so much better with it  She usually is sleeping through the night  She has a nasal mask and really likes it  Treatment: CPAP  -Pressure: 12cm   -Pressure intolerance: no   -Aerophagia: yes, only if she opens her mouth   -Air Hunger: no  -Interface: NM  -Fit: good  -Chin strap: no  -HCC: YME  -Patient's perceived outcome: helping very much! Sleeping better at night  She is still tired during the day but less so       Compliance Card Data:    - Date Range: 19-19   - Settincm   - Residual AHI: 0 7   - Vibratory Snore Index: 10 7   - %  Night in Large Leak: 0%   - Average usage days used: 7h 45 m   - % Days used: 87%   - % Days with Usage > 4 hrs: 77%    Respiratory:  -Ongoing Snoring: no  -Mouth Breathing: no  -Dry Mouth: no  -Nocturnal Gasping: no    ROS:   Genitourinary none   Cardiology none   Gastrointestinal none   Neurology none   Constitutional weight change   Integumentary none   Psychiatry mood change   Musculoskeletal joint pain, muscle aches, legs twitching/jerking and leg cramps   Pulmonary none   ENT none   Endocrine none   Hematological none       Sleep Pattern:  -Position: side  -Bedtime: 7-9pm  -Lights Out: same time  -Environment:  Turns TV it on for a few mins then turns it off   -Latency: mins  -Awakenings: 0-1  -Wake Time: 5-6am  -Rise Time: same time  -Patient's estimate of Sleep Time: 6-8h    Daytime Symptoms:  -Upon Awakening: better  -Daytime fatigue/sleepiness: tired, still sleepy sometimes  -Naps: sometimes feels she could nap  -Involuntary Dozing: rests in the afternoon, unable to nap normally, more so resting her eyes  -Cognitive Symptoms: denied  -Driving: Difficulty with sleepiness and driving:  No longer sleepy while driving   -- Close calls related to sleepiness: no   -- Accidents related to sleepiness: no    Questionnaire:  Sitting and reading: Moderate chance of dozing  Watching TV: High chance of dozing  Sitting, inactive in a public place (e g  a theatre or a meeting): Would never doze  As a passenger in a car for an hour without a break: Moderate chance of dozing  Lying down to rest in the afternoon when circumstances permit: Moderate chance of dozing  Sitting and talking to someone: Would never doze  Sitting quietly after a lunch without alcohol: Would never doze  In a car, while stopped for a few minutes in traffic: Slight chance of dozing  Total score: 10    Substance Use:  -Caffeine: 1 cup in the morning and evening but its decaf only  -Alcohol: denied  -THC: denied    --> Supplemental Oxygen Use: denies      PE:    /60   Pulse 69   Ht 5' 9 5" (1 765 m)   Wt 107 kg (236 lb)   SpO2 93%   BMI 34 35 kg/m²     General:  In NAD  Pul: Respirations: unlaboured  MS: No atrophy  Neuro: No resting tremor  Gait normal turning & station; unremarkable overall  Psych: Socially appropriate  Pleasant  No overt dysphoria  Assessment: The patient has great compliance with her CPAP and her obstructive events are well controlled with a residual AHI 0 7  She is deriving a lot of benefit from using her CPAP as she is less tired than she was in the past, feeling like she is rested and sleeping better in the morning, and no longer has sleepiness while driving   Will not make any changes to her pressure today  Residual symptoms are likely due to co-morbid conditions and medications  Recommendations:    1) CPAP at 12cm  2) Safe driving reviewed  3) Follow-up 6 months  4) Call with any questions or concerns  All questions answered for the patient, who indicated understanding and agreed with the plan       Mynor Cast MD  Psychiatry/ Sleep medicine

## 2019-09-16 ENCOUNTER — APPOINTMENT (EMERGENCY)
Dept: CT IMAGING | Facility: HOSPITAL | Age: 64
End: 2019-09-16
Payer: COMMERCIAL

## 2019-09-16 ENCOUNTER — HOSPITAL ENCOUNTER (OUTPATIENT)
Facility: HOSPITAL | Age: 64
Setting detail: OBSERVATION
Discharge: HOME | End: 2019-09-17
Attending: EMERGENCY MEDICINE | Admitting: FAMILY MEDICINE
Payer: COMMERCIAL

## 2019-09-16 DIAGNOSIS — R79.89 ELEVATED TSH: ICD-10-CM

## 2019-09-16 DIAGNOSIS — R51.9 HEADACHE: ICD-10-CM

## 2019-09-16 DIAGNOSIS — E78.00 HYPERCHOLESTEREMIA: ICD-10-CM

## 2019-09-16 DIAGNOSIS — R07.9 CHEST PAIN: Primary | ICD-10-CM

## 2019-09-16 DIAGNOSIS — R94.31 ABNORMAL EKG: ICD-10-CM

## 2019-09-16 PROBLEM — E03.9 HYPOTHYROIDISM: Status: ACTIVE | Noted: 2019-09-16

## 2019-09-16 PROBLEM — R07.89 ATYPICAL CHEST PAIN: Status: ACTIVE | Noted: 2019-09-16

## 2019-09-16 PROBLEM — K50.919 CROHN'S DISEASE WITH COMPLICATION (HCC): Status: ACTIVE | Noted: 2019-09-16

## 2019-09-16 PROBLEM — R42 DIZZINESS: Status: ACTIVE | Noted: 2019-09-16

## 2019-09-16 PROBLEM — I48.0 PAROXYSMAL ATRIAL FIBRILLATION (HCC): Status: ACTIVE | Noted: 2019-02-12

## 2019-09-16 PROBLEM — I67.1 BRAIN ANEURYSM: Status: ACTIVE | Noted: 2019-09-16

## 2019-09-16 LAB
ALBUMIN SERPL BCP-MCNC: 3.3 G/DL (ref 3.5–5)
ALP SERPL-CCNC: 143 U/L (ref 46–116)
ALT SERPL W P-5'-P-CCNC: 93 U/L (ref 12–78)
ANION GAP SERPL CALCULATED.3IONS-SCNC: 10 MMOL/L (ref 4–13)
APTT PPP: 31 SECONDS (ref 23–37)
AST SERPL W P-5'-P-CCNC: 73 U/L (ref 5–45)
BASOPHILS # BLD AUTO: 0.04 THOUSANDS/ΜL (ref 0–0.1)
BASOPHILS NFR BLD AUTO: 0 % (ref 0–1)
BILIRUB SERPL-MCNC: 0.2 MG/DL (ref 0.2–1)
BILIRUB UR QL STRIP: NEGATIVE
BUN SERPL-MCNC: 13 MG/DL (ref 5–25)
CALCIUM SERPL-MCNC: 9.1 MG/DL (ref 8.3–10.1)
CHLORIDE SERPL-SCNC: 102 MMOL/L (ref 100–108)
CLARITY UR: CLEAR
CO2 SERPL-SCNC: 28 MMOL/L (ref 21–32)
COLOR UR: NORMAL
CREAT SERPL-MCNC: 0.95 MG/DL (ref 0.6–1.3)
EOSINOPHIL # BLD AUTO: 0.16 THOUSAND/ΜL (ref 0–0.61)
EOSINOPHIL NFR BLD AUTO: 2 % (ref 0–6)
ERYTHROCYTE [DISTWIDTH] IN BLOOD BY AUTOMATED COUNT: 13.6 % (ref 11.6–15.1)
GFR SERPL CREATININE-BSD FRML MDRD: 64 ML/MIN/1.73SQ M
GLUCOSE SERPL-MCNC: 134 MG/DL (ref 65–140)
GLUCOSE UR STRIP-MCNC: NEGATIVE MG/DL
HCT VFR BLD AUTO: 42.6 % (ref 34.8–46.1)
HGB BLD-MCNC: 13 G/DL (ref 11.5–15.4)
HGB UR QL STRIP.AUTO: NEGATIVE
HOLD SPECIMEN: NORMAL
IMM GRANULOCYTES # BLD AUTO: 0.09 THOUSAND/UL (ref 0–0.2)
IMM GRANULOCYTES NFR BLD AUTO: 1 % (ref 0–2)
INR PPP: 1.16 (ref 0.84–1.19)
KETONES UR STRIP-MCNC: NEGATIVE MG/DL
LEUKOCYTE ESTERASE UR QL STRIP: NEGATIVE
LYMPHOCYTES # BLD AUTO: 2.5 THOUSANDS/ΜL (ref 0.6–4.47)
LYMPHOCYTES NFR BLD AUTO: 27 % (ref 14–44)
MCH RBC QN AUTO: 28.8 PG (ref 26.8–34.3)
MCHC RBC AUTO-ENTMCNC: 30.5 G/DL (ref 31.4–37.4)
MCV RBC AUTO: 94 FL (ref 82–98)
MONOCYTES # BLD AUTO: 0.8 THOUSAND/ΜL (ref 0.17–1.22)
MONOCYTES NFR BLD AUTO: 9 % (ref 4–12)
NEUTROPHILS # BLD AUTO: 5.76 THOUSANDS/ΜL (ref 1.85–7.62)
NEUTS SEG NFR BLD AUTO: 61 % (ref 43–75)
NITRITE UR QL STRIP: NEGATIVE
NRBC BLD AUTO-RTO: 0 /100 WBCS
PH UR STRIP.AUTO: 5.5 [PH]
PLATELET # BLD AUTO: 285 THOUSANDS/UL (ref 149–390)
PMV BLD AUTO: 9.4 FL (ref 8.9–12.7)
POTASSIUM SERPL-SCNC: 4 MMOL/L (ref 3.5–5.3)
PROT SERPL-MCNC: 8.1 G/DL (ref 6.4–8.2)
PROT UR STRIP-MCNC: NEGATIVE MG/DL
PROTHROMBIN TIME: 14.8 SECONDS (ref 11.6–14.5)
RBC # BLD AUTO: 4.52 MILLION/UL (ref 3.81–5.12)
SODIUM SERPL-SCNC: 140 MMOL/L (ref 136–145)
SP GR UR STRIP.AUTO: <=1.005 (ref 1–1.03)
TROPONIN I SERPL-MCNC: <0.02 NG/ML
UROBILINOGEN UR QL STRIP.AUTO: 0.2 E.U./DL
WBC # BLD AUTO: 9.35 THOUSAND/UL (ref 4.31–10.16)

## 2019-09-16 PROCEDURE — 93005 ELECTROCARDIOGRAM TRACING: CPT

## 2019-09-16 PROCEDURE — 85025 COMPLETE CBC W/AUTO DIFF WBC: CPT

## 2019-09-16 PROCEDURE — 99285 EMERGENCY DEPT VISIT HI MDM: CPT

## 2019-09-16 PROCEDURE — 84484 ASSAY OF TROPONIN QUANT: CPT | Performed by: EMERGENCY MEDICINE

## 2019-09-16 PROCEDURE — 96375 TX/PRO/DX INJ NEW DRUG ADDON: CPT

## 2019-09-16 PROCEDURE — 85730 THROMBOPLASTIN TIME PARTIAL: CPT

## 2019-09-16 PROCEDURE — 85610 PROTHROMBIN TIME: CPT

## 2019-09-16 PROCEDURE — 84484 ASSAY OF TROPONIN QUANT: CPT | Performed by: FAMILY MEDICINE

## 2019-09-16 PROCEDURE — 99220 PR INITIAL OBSERVATION CARE/DAY 70 MINUTES: CPT | Performed by: FAMILY MEDICINE

## 2019-09-16 PROCEDURE — 96365 THER/PROPH/DIAG IV INF INIT: CPT

## 2019-09-16 PROCEDURE — 70498 CT ANGIOGRAPHY NECK: CPT

## 2019-09-16 PROCEDURE — 70496 CT ANGIOGRAPHY HEAD: CPT

## 2019-09-16 PROCEDURE — 81003 URINALYSIS AUTO W/O SCOPE: CPT | Performed by: EMERGENCY MEDICINE

## 2019-09-16 PROCEDURE — 36415 COLL VENOUS BLD VENIPUNCTURE: CPT | Performed by: EMERGENCY MEDICINE

## 2019-09-16 PROCEDURE — 84484 ASSAY OF TROPONIN QUANT: CPT

## 2019-09-16 PROCEDURE — 80053 COMPREHEN METABOLIC PANEL: CPT

## 2019-09-16 PROCEDURE — 99285 EMERGENCY DEPT VISIT HI MDM: CPT | Performed by: EMERGENCY MEDICINE

## 2019-09-16 RX ORDER — MAGNESIUM SULFATE HEPTAHYDRATE 40 MG/ML
2 INJECTION, SOLUTION INTRAVENOUS ONCE
Status: COMPLETED | OUTPATIENT
Start: 2019-09-16 | End: 2019-09-16

## 2019-09-16 RX ORDER — DIPHENHYDRAMINE HYDROCHLORIDE 50 MG/ML
25 INJECTION INTRAMUSCULAR; INTRAVENOUS ONCE
Status: COMPLETED | OUTPATIENT
Start: 2019-09-16 | End: 2019-09-16

## 2019-09-16 RX ORDER — VENLAFAXINE HYDROCHLORIDE 37.5 MG/1
37.5 CAPSULE, EXTENDED RELEASE ORAL DAILY
Status: DISCONTINUED | OUTPATIENT
Start: 2019-09-17 | End: 2019-09-17 | Stop reason: HOSPADM

## 2019-09-16 RX ORDER — DOFETILIDE 0.12 MG/1
500 CAPSULE ORAL 2 TIMES DAILY
Status: DISCONTINUED | OUTPATIENT
Start: 2019-09-16 | End: 2019-09-17 | Stop reason: HOSPADM

## 2019-09-16 RX ORDER — SODIUM CHLORIDE 9 MG/ML
100 INJECTION, SOLUTION INTRAVENOUS CONTINUOUS
Status: DISCONTINUED | OUTPATIENT
Start: 2019-09-16 | End: 2019-09-17 | Stop reason: HOSPADM

## 2019-09-16 RX ORDER — METOCLOPRAMIDE HYDROCHLORIDE 5 MG/ML
10 INJECTION INTRAMUSCULAR; INTRAVENOUS ONCE
Status: COMPLETED | OUTPATIENT
Start: 2019-09-16 | End: 2019-09-16

## 2019-09-16 RX ORDER — ASPIRIN 81 MG/1
81 TABLET, CHEWABLE ORAL DAILY
Status: DISCONTINUED | OUTPATIENT
Start: 2019-09-17 | End: 2019-09-17 | Stop reason: HOSPADM

## 2019-09-16 RX ORDER — DICYCLOMINE HCL 20 MG
20 TABLET ORAL
Status: DISCONTINUED | OUTPATIENT
Start: 2019-09-17 | End: 2019-09-17 | Stop reason: HOSPADM

## 2019-09-16 RX ORDER — MESALAMINE 400 MG/1
800 CAPSULE, DELAYED RELEASE ORAL 2 TIMES DAILY
Status: DISCONTINUED | OUTPATIENT
Start: 2019-09-16 | End: 2019-09-17 | Stop reason: HOSPADM

## 2019-09-16 RX ORDER — PANTOPRAZOLE SODIUM 40 MG/1
40 TABLET, DELAYED RELEASE ORAL
Status: DISCONTINUED | OUTPATIENT
Start: 2019-09-17 | End: 2019-09-17 | Stop reason: HOSPADM

## 2019-09-16 RX ORDER — LEVOTHYROXINE SODIUM 0.12 MG/1
125 TABLET ORAL DAILY
Status: DISCONTINUED | OUTPATIENT
Start: 2019-09-17 | End: 2019-09-17 | Stop reason: HOSPADM

## 2019-09-16 RX ORDER — MAGNESIUM SULFATE HEPTAHYDRATE 40 MG/ML
2 INJECTION, SOLUTION INTRAVENOUS ONCE
Status: DISCONTINUED | OUTPATIENT
Start: 2019-09-16 | End: 2019-09-16

## 2019-09-16 RX ORDER — ASPIRIN 81 MG/1
81 TABLET, CHEWABLE ORAL DAILY
Status: DISCONTINUED | OUTPATIENT
Start: 2019-09-17 | End: 2019-09-16 | Stop reason: SDUPTHER

## 2019-09-16 RX ORDER — METOPROLOL SUCCINATE 100 MG/1
100 TABLET, EXTENDED RELEASE ORAL EVERY 12 HOURS SCHEDULED
Status: DISCONTINUED | OUTPATIENT
Start: 2019-09-16 | End: 2019-09-17 | Stop reason: HOSPADM

## 2019-09-16 RX ORDER — FLECAINIDE ACETATE 100 MG/1
100 TABLET ORAL EVERY 12 HOURS SCHEDULED
Status: DISCONTINUED | OUTPATIENT
Start: 2019-09-16 | End: 2019-09-17 | Stop reason: HOSPADM

## 2019-09-16 RX ORDER — ATORVASTATIN CALCIUM 40 MG/1
40 TABLET, FILM COATED ORAL EVERY EVENING
Status: DISCONTINUED | OUTPATIENT
Start: 2019-09-16 | End: 2019-09-17 | Stop reason: HOSPADM

## 2019-09-16 RX ORDER — SODIUM CHLORIDE 9 MG/ML
100 INJECTION, SOLUTION INTRAVENOUS CONTINUOUS
Status: DISCONTINUED | OUTPATIENT
Start: 2019-09-16 | End: 2019-09-16 | Stop reason: SDUPTHER

## 2019-09-16 RX ADMIN — DOFETILIDE 500 MCG: 0.12 CAPSULE ORAL at 19:13

## 2019-09-16 RX ADMIN — SODIUM CHLORIDE 1000 ML: 0.9 INJECTION, SOLUTION INTRAVENOUS at 13:32

## 2019-09-16 RX ADMIN — IOHEXOL 85 ML: 350 INJECTION, SOLUTION INTRAVENOUS at 10:04

## 2019-09-16 RX ADMIN — APIXABAN 5 MG: 5 TABLET, FILM COATED ORAL at 19:13

## 2019-09-16 RX ADMIN — SODIUM CHLORIDE 100 ML/HR: 0.9 INJECTION, SOLUTION INTRAVENOUS at 18:20

## 2019-09-16 RX ADMIN — DIPHENHYDRAMINE HYDROCHLORIDE 25 MG: 50 INJECTION, SOLUTION INTRAMUSCULAR; INTRAVENOUS at 13:32

## 2019-09-16 RX ADMIN — FLECAINIDE ACETATE 100 MG: 100 TABLET ORAL at 21:49

## 2019-09-16 RX ADMIN — METOCLOPRAMIDE 10 MG: 5 INJECTION, SOLUTION INTRAMUSCULAR; INTRAVENOUS at 13:33

## 2019-09-16 RX ADMIN — MESALAMINE 800 MG: 400 CAPSULE, DELAYED RELEASE ORAL at 19:13

## 2019-09-16 RX ADMIN — MAGNESIUM SULFATE HEPTAHYDRATE 2 G: 40 INJECTION, SOLUTION INTRAVENOUS at 13:34

## 2019-09-16 RX ADMIN — METOPROLOL SUCCINATE 100 MG: 100 TABLET, FILM COATED, EXTENDED RELEASE ORAL at 21:49

## 2019-09-16 NOTE — ASSESSMENT & PLAN NOTE
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

## 2019-09-16 NOTE — ASSESSMENT & PLAN NOTE
No focal deficits or dizziness on exam and given that her dizziness is positional (when reaching for alarm) likely BPPV   However given her medical history I will   Initiate ischemic stroke pathway  Frequent neurochecks  A1c and lipid panel  2D echo  CTA reviewed   PT / OT eval   Orthostatic BP   NS @ 100cc/hr

## 2019-09-16 NOTE — ASSESSMENT & PLAN NOTE
Post surgical hypothyroidism with hx of thyroid carcinoma, follows with endocrinology   Continue Synthroid and check TSH

## 2019-09-16 NOTE — ED PROVIDER NOTES
History  Chief Complaint   Patient presents with    Chest Pain     patient states having chest pains, dizziness, and light headedness for at least one week   Dizziness     21-year-old female presents with multiple complaints  She states over the last week she has been having intermittent chest pain this last for approximately 2 minutes it is anterior nonradiating and not triggered by activity she also has some right shoulder pain which has been intermittent and seems to be separate she denies any shortness of breath no pleuritic pain she does have known dyspnea on exertion which is unchanged she denies any prior history of DVT or PE  She is anticoagulated on Eliquis for prior history of paroxysmal atrial fibrillation  She has intermittent headaches that are centered behind the left eye she currently denies headache or current chest pain  The chest pain did happen prior to arrival   She denies taking significant doses of Tylenol or ibuprofen she does have a history of coiling to her for prior aneurysm and still has a small aneurysm near the coil  She denies any fever chills she has had an intermittent cough she denies any nausea or vomiting no abdominal pain no diarrhea no history of melena or hematochezia no dysuria or increased urinary frequency she is denying any palpitations no history of falls or trauma  She has been tolerating a diet  The dizziness she is experiencing is a lightheaded sensation worse if she changes postion;           Prior to Admission Medications   Prescriptions Last Dose Informant Patient Reported? Taking?    apixaban (ELIQUIS) 5 mg 9/16/2019 at Unknown time  Yes Yes   Sig: Take 5 mg by mouth 2 (two) times a day    aspirin 81 mg chewable tablet 9/16/2019 at Unknown time  Yes Yes   Sig: Chew 81 mg   dicyclomine (BENTYL) 20 mg tablet Not Taking at Unknown time  Yes No   Sig: Take 20 mg by mouth   dofetilide (TIKOSYN) 500 mcg capsule 9/16/2019 at Unknown time  Yes Yes   Sig: Take 500 mcg by mouth 2 (two) times a day   flecainide (TAMBOCOR) 100 mg tablet 2019 at Unknown time  No Yes   Sig: Take 1 tablet (100 mg total) by mouth every 12 (twelve) hours   furosemide (LASIX) 20 mg tablet 2019 at Unknown time  Yes Yes   Sig: Take 20 mg by mouth daily    levothyroxine 125 mcg tablet 2019 at Unknown time  Yes Yes   Sig: Take 125 mcg by mouth daily    mesalamine (ASACOL) 800 MG EC tablet 2019 at Unknown time  Yes Yes   Sig: Take 800 mg by mouth 2 (two) times a day    metoprolol succinate (TOPROL-XL) 100 mg 24 hr tablet 2019 at Unknown time  No Yes   Sig: Take 1 tablet (100 mg total) by mouth every 12 (twelve) hours   omeprazole (PriLOSEC) 20 mg delayed release capsule 2019 at Unknown time  No Yes   Sig: Take 1 capsule (20 mg total) by mouth daily before breakfast   Patient taking differently: Take 40 mg by mouth 2 (two) times a day    venlafaxine (EFFEXOR XR) 37 5 mg 24 hr capsule 2019 at Unknown time  Yes Yes   Sig: Take 37 5 mg by mouth daily       Facility-Administered Medications: None       Past Medical History:   Diagnosis Date    A-fib [facility])     Brain aneurysm     Cardiac disease     CHF (congestive heart failure) (HCC)     Crohn disease ([address] )     Disease of thyroid gland     GERD (gastroesophageal reflux disease)     Hyperlipidemia     Hypertension        Past Surgical History:   Procedure Laterality Date    APPENDECTOMY      CARDIOVERSION N/A 2019    Procedure: CARDIOVERSION;  Surgeon: Shwetha Hargrove MD;  Location: [facility];  Service: Cardiology    CEREBRAL ANEURYSM REPAIR      HYSTERECTOMY      TONSILLECTOMY AND ADENOIDECTOMY         History reviewed  No pertinent family history  I have reviewed and agree with the history as documented      Social History     Tobacco Use    Smoking status: Former Smoker     Last attempt to quit: 2018     Years since quittin 7    Smokeless tobacco: Never Used   Substance Use Topics    Alcohol use: Never     Alcohol/week: 0 0 standard drinks     Frequency: Never     Drinks per session: Patient refused     Binge frequency: Never    Drug use: Never        Review of Systems   Constitutional: Positive for activity change  Negative for appetite change, chills, diaphoresis, fatigue and fever  HENT: Negative for congestion, ear pain, rhinorrhea, sneezing and sore throat  Eyes: Negative for discharge and visual disturbance  Respiratory: Negative for cough and shortness of breath  Cardiovascular: Positive for chest pain  Negative for leg swelling  Gastrointestinal: Negative for abdominal pain, blood in stool, diarrhea, nausea and vomiting  Endocrine: Negative for polyuria  Genitourinary: Negative for difficulty urinating, dysuria, frequency and urgency  Musculoskeletal: Positive for arthralgias (intermitant rt shoulder pain)  Negative for back pain, myalgias and neck pain  Skin: Negative for rash  Neurological: Positive for weakness, light-headedness and headaches  Negative for dizziness, syncope and numbness  Hematological: Negative for adenopathy  Psychiatric/Behavioral: Negative for confusion  All other systems reviewed and are negative  Physical Exam  Physical Exam   Constitutional: She is oriented to person, place, and time  She appears well-developed and well-nourished  No distress  HENT:   Head: Normocephalic and atraumatic  Right Ear: External ear normal    Left Ear: External ear normal    Nose: Nose normal    Mouth/Throat: Oropharynx is clear and moist  No oropharyngeal exudate  Eyes: Pupils are equal, round, and reactive to light  Conjunctivae and EOM are normal  Right eye exhibits no discharge  Left eye exhibits no discharge  Visual fields intact to direct confrontation   Neck: Normal range of motion  Neck supple  Cardiovascular: Normal rate, regular rhythm, normal heart sounds and intact distal pulses     Pulmonary/Chest: Effort normal and breath sounds normal  No stridor  No respiratory distress  She has no wheezes  She has no rales  Abdominal: Soft  Bowel sounds are normal  She exhibits no distension and no mass  There is no tenderness  There is no rebound and no guarding  Musculoskeletal: Normal range of motion  She exhibits no edema, tenderness or deformity  Lymphadenopathy:     She has no cervical adenopathy  Neurological: She is alert and oriented to person, place, and time  She displays normal reflexes  No cranial nerve deficit or sensory deficit  She exhibits normal muscle tone  Coordination normal    GCS 15; no pronator drift   Skin: Skin is dry  Capillary refill takes less than 2 seconds  Psychiatric: She has a normal mood and affect  Vitals reviewed        Vital Signs  ED Triage Vitals   Temperature Pulse Respirations Blood Pressure SpO2   09/16/19 0854 09/16/19 0854 09/16/19 0854 09/16/19 0854 09/16/19 0854   98 2 °F (36 8 °C) 62 18 (!) 173/70 95 %      Temp Source Heart Rate Source Patient Position - Orthostatic VS BP Location FiO2 (%)   09/16/19 0854 09/16/19 0854 09/16/19 0915 09/16/19 0854 --   Temporal Monitor Lying Left arm       Pain Score       09/16/19 0854       8           Vitals:    09/16/19 1530 09/16/19 1800 09/16/19 1837 09/16/19 1939   BP: 140/63 143/63 129/88 146/69   Pulse: 58 55 56 56   Patient Position - Orthostatic VS: Lying Lying Lying Lying         Visual Acuity  Visual Acuity      Most Recent Value   L Pupil Size (mm)  2   R Pupil Size (mm)  2   L Pupil Shape  Round   R Pupil Shape  Round          ED Medications  Medications   apixaban (ELIQUIS) tablet 5 mg (5 mg Oral Given 9/16/19 1913)   aspirin chewable tablet 81 mg (has no administration in time range)   dicyclomine (BENTYL) tablet 20 mg (has no administration in time range)   dofetilide (TIKOSYN) capsule 500 mcg (500 mcg Oral Given 9/16/19 1913)   flecainide (TAMBOCOR) tablet 100 mg (has no administration in time range)   levothyroxine tablet 125 mcg (has no administration in time range)   mesalamine (DELZICOL) delayed release capsule 800 mg (800 mg Oral Given 9/16/19 1913)   metoprolol succinate (TOPROL-XL) 24 hr tablet 100 mg (has no administration in time range)   pantoprazole (PROTONIX) EC tablet 40 mg (has no administration in time range)   venlafaxine (EFFEXOR-XR) 24 hr capsule 37 5 mg (has no administration in time range)   atorvastatin (LIPITOR) tablet 40 mg (40 mg Oral Not Given 9/16/19 1913)   sodium chloride 0 9 % infusion (100 mL/hr Intravenous New Bag 9/16/19 1820)   iohexol (OMNIPAQUE) 350 MG/ML injection (SINGLE-DOSE) 85 mL (85 mL Intravenous Given 9/16/19 1004)   metoclopramide (REGLAN) injection 10 mg (10 mg Intravenous Given 9/16/19 1333)   diphenhydrAMINE (BENADRYL) injection 25 mg (25 mg Intravenous Given 9/16/19 1332)   magnesium sulfate 2 g/50 mL IVPB (premix) 2 g (0 g Intravenous Stopped 9/16/19 1434)   sodium chloride 0 9 % bolus 1,000 mL (0 mL Intravenous Stopped 9/16/19 1500)       Diagnostic Studies  Results Reviewed     Procedure Component Value Units Date/Time    Troponin I [[id number]]  (Normal) Collected:  09/16/19 1900    Lab Status:  Final result Specimen:  Blood from Arm, Right Updated:  09/16/19 1932     Troponin I <0 02 ng/mL     Troponin I [[id number]]     Lab Status:  No result Specimen:  Blood     Troponin I [[id number]]  (Normal) Collected:  09/16/19 1301    Lab Status:  Final result Specimen:  Blood from Arm, Right Updated:  09/16/19 1331     Troponin I <0 02 ng/mL     UA w Reflex to Microscopic w Reflex to Culture [[id number]] Collected:  09/16/19 1106    Lab Status:  Final result Specimen:  Urine, Clean Catch Updated:  09/16/19 1118     Color, UA Light Yellow     Clarity, UA Clear     Specific Gravity, UA <=1 005     pH, UA 5 5     Leukocytes, UA Negative     Nitrite, UA Negative     Protein, UA Negative mg/dl      Glucose, UA Negative mg/dl      Ketones, UA Negative mg/dl      Urobilinogen, UA 0 2 E U /dl      Bilirubin, UA Negative     Blood, UA Negative    [place] draw [[id number]] Collected:  09/16/19 0911    Lab Status:  Final result Specimen:  Blood from Arm, Left Updated:  09/16/19 1101    Narrative: The following orders were created for panel order [place] draw  Procedure                               Abnormality         Status                     ---------                               -----------         ------                     Lex Hush / Yellow tube on CVRF[[id number]]                      Final result                 Please view results for these tests on the individual orders      Troponin I [[id number]]  (Normal) Collected:  09/16/19 0911    Lab Status:  Final result Specimen:  Blood from Arm, Left Updated:  09/16/19 0937     Troponin I <0 02 ng/mL     Comprehensive metabolic panel [[id number]]  (Abnormal) Collected:  09/16/19 0911    Lab Status:  Final result Specimen:  Blood from Arm, Left Updated:  09/16/19 0934     Sodium 140 mmol/L      Potassium 4 0 mmol/L      Chloride 102 mmol/L      CO2 28 mmol/L      ANION GAP 10 mmol/L      BUN 13 mg/dL      Creatinine 0 95 mg/dL      Glucose 134 mg/dL      Calcium 9 1 mg/dL      AST 73 U/L      ALT 93 U/L      Alkaline Phosphatase 143 U/L      Total Protein 8 1 g/dL      Albumin 3 3 g/dL      Total Bilirubin 0 20 mg/dL      eGFR 64 ml/min/1 73sq m     Narrative:       [place] guidelines for Chronic Kidney Disease (CKD):     Stage 1 with normal or high GFR (GFR > 90 mL/min/1 73 square meters)    Stage 2 Mild CKD (GFR = 60-89 mL/min/1 73 square meters)    Stage 3A Moderate CKD (GFR = 45-59 mL/min/1 73 square meters)    Stage 3B Moderate CKD (GFR = 30-44 mL/min/1 73 square meters)    Stage 4 Severe CKD (GFR = 15-29 mL/min/1 73 square meters)    Stage 5 End Stage CKD (GFR <15 mL/min/1 73 square meters)  Note: GFR calculation is accurate only with a steady state creatinine    Protime-INR [[id number]]  (Abnormal) Collected:  09/16/19 0911    Lab Status:  Final result Specimen:  Blood from Arm, Left Updated:  09/16/19 0932     Protime 14 8 seconds      INR 1 16    APTT [[id number]]  (Normal) Collected:  09/16/19 0911    Lab Status:  Final result Specimen:  Blood from Arm, Left Updated:  09/16/19 0932     PTT 31 seconds     CBC and differential [[id number]]  (Abnormal) Collected:  09/16/19 0911    Lab Status:  Final result Specimen:  Blood from Arm, Left Updated:  09/16/19 0918     WBC 9 35 Thousand/uL      RBC 4 52 Million/uL      Hemoglobin 13 0 g/dL      Hematocrit 42 6 %      MCV 94 fL      MCH 28 8 pg      MCHC 30 5 g/dL      RDW 13 6 %      MPV 9 4 fL      Platelets 587 Thousands/uL      nRBC 0 /100 WBCs      Neutrophils Relative 61 %      Immat GRANS % 1 %      Lymphocytes Relative 27 %      Monocytes Relative 9 %      Eosinophils Relative 2 %      Basophils Relative 0 %      Neutrophils Absolute 5 76 Thousands/µL      Immature Grans Absolute 0 09 Thousand/uL      Lymphocytes Absolute 2 50 Thousands/µL      Monocytes Absolute 0 80 Thousand/µL      Eosinophils Absolute 0 16 Thousand/µL      Basophils Absolute 0 04 Thousands/µL                  CTA head and neck with and without contrast   Final Result by Kathia Barker MD (09/16 1124)   Stable appearance status post left supraclinoid aneurysm coiling  No evidence to confirm persistent or recurrent aneurysm  Substantial artifact from the metallic coils could obscure small aneurysm recurrence        No evidence of significant stenosis, dissection or occlusion involving cervical carotid or vertebral segments or visualized cerebral arteries      Persistent mediastinal adenopathy, similar to 3/1/2019 chest CT                     Workstation performed: [id number]                    Procedures  ECG 12 Lead Documentation Only  Date/Time: 9/16/2019 8:56 AM  Performed by: Dannie Urban MD  Authorized by: Dannie Urban MD     Indications / Diagnosis:  Cp  ECG reviewed by me, the ED Provider: yes Patient location:  ED  Previous ECG:     Previous ECG:  Compared to current    Comparison ECG info:  3/1/19    Similarity:  Changes noted  Rate:     ECG rate:  65    ECG rate assessment: normal    Rhythm:     Rhythm: sinus rhythm    QRS:     QRS axis:  Normal  Comments:      Patient now in NSR prev in afib; no acute ischemic changes TWI v1, v2 seen previously    ECG 12 Lead Documentation Only  Date/Time: 9/16/2019 12:56 PM  Performed by: Srikanth Liang MD  Authorized by: Srikanth Liang MD     Indications / Diagnosis:  Recheck cp  ECG reviewed by me, the ED Provider: yes    Patient location:  ED  Previous ECG:     Previous ECG:  Compared to current    Comparison ECG info:  9/16/19 0852    Similarity:  Changes noted  Rate:     ECG rate:  54    ECG rate assessment: bradycardic    Rhythm:     Rhythm: sinus bradycardia    QRS:     QRS axis:  Normal  Comments:      twi v1-v3; v3 new; no st-t changes           ED Course  ED Course as of Sep 16 2045   Mon Sep 16, 2019   1223 Now c/o headache  mild frontal reviewed results extensively  Will admin IVF, magnesium, benadryl and metoclopromide  No chest pain will recheck delta trop and ekg      1417 Headache better  No recent stress testing according to patient was unable to complete secondary to being in afib  Serial trop neg but EKG now showing extension to TWI V!-V2 to V1-V3 recommend obs  [facility] Phill [facility]               HEART Risk Score      Most Recent Value   History  1 Filed at: 09/16/2019 1001   ECG  1 Filed at: 09/16/2019 1001   Age  1 Filed at: 09/16/2019 1001   Risk Factors  2 Filed at: 09/16/2019 1001   Troponin  0 Filed at: 09/16/2019 1001   Heart Score Risk Calculator   History  1 Filed at: 09/16/2019 1001   ECG  1 Filed at: 09/16/2019 1001   Age  1 Filed at: 09/16/2019 1001   Risk Factors  2 Filed at: 09/16/2019 1001   Troponin  0 Filed at: 09/16/2019 1001   HEART Score  5 Filed at: 09/16/2019 1001   HEART Score  5 Filed at: 09/16/2019 1001                            [facility]  Number of Diagnoses or Management Options  Diagnosis management comments: Mdm: acs, NIH zero no currently headache  Will check CTA head and neck secondary to prior hx of anerysm ; check electrolytes and consider serial EKGs      Disposition  Final diagnoses:   Chest pain - intermittant   Abnormal EKG   Headache     Time reflects when diagnosis was documented in both MDM as applicable and the Disposition within this note     Time User Action Codes Description Comment    9/16/2019  2:29 PM Ambrosio Grater Add [R07 9] Chest pain     9/16/2019  2:29 PM Ambrosio Grater Modify [R07 9] Chest pain intermittant    9/16/2019  2:29 PM Ambrosio Grater Add [R94 31] Abnormal EKG     9/16/2019  2:29 PM Ambrosio Grater Add [R51] Headache     9/16/2019  3:12 PM Mason Titus Add [R79 89] Elevated TSH       ED Disposition     ED Disposition Condition Date/Time Comment    Admit Stable Mon Sep 16, 2019  2:29 PM Case was discussed with Dr Romario Hall and the patient's admission status was agreed to be Admission Status: observation status to the service of Dr Romario Hall           Follow-up Information    None         Current Discharge Medication List      CONTINUE these medications which have NOT CHANGED    Details   apixaban (ELIQUIS) 5 mg Take 5 mg by mouth 2 (two) times a day       aspirin 81 mg chewable tablet Chew 81 mg      dofetilide (TIKOSYN) 500 mcg capsule Take 500 mcg by mouth 2 (two) times a day      flecainide (TAMBOCOR) 100 mg tablet Take 1 tablet (100 mg total) by mouth every 12 (twelve) hours  Qty: 60 tablet, Refills: 0    Associated Diagnoses: Atrial fibrillation with rapid ventricular response (HCC)      furosemide (LASIX) 20 mg tablet Take 20 mg by mouth daily       levothyroxine 125 mcg tablet Take 125 mcg by mouth daily       mesalamine (ASACOL) 800 MG EC tablet Take 800 mg by mouth 2 (two) times a day       metoprolol succinate (TOPROL-XL) 100 mg 24 hr tablet Take 1 tablet (100 mg total) by mouth every 12 (twelve) hours  Qty: 60 tablet, Refills: 0    Associated Diagnoses: Atrial fibrillation with rapid ventricular response (HCC)      omeprazole (PriLOSEC) 20 mg delayed release capsule Take 1 capsule (20 mg total) by mouth daily before breakfast    Associated Diagnoses: Chest pain      venlafaxine (EFFEXOR XR) 37 5 mg 24 hr capsule Take 37 5 mg by mouth daily       dicyclomine (BENTYL) 20 mg tablet Take 20 mg by mouth           No discharge procedures on file      ED Provider  Electronically Signed by           Mike Bernard MD  09/16/19 9977

## 2019-09-16 NOTE — ASSESSMENT & PLAN NOTE
Status post fib ablation  Currently in sinus rhythm   Rate controlled on current AV gabriella blocking regimen of metoprolol XL 100mg PO BID   AC with Eliquis     1/8/19 LIONEL : normal LV size, LVEF 55-59%, small PFO, mod LAE, aortic atherocsclerosis    2/25/19Lexiscan Cardiolite stress study : normal perfusion imaging, no inducible ischemia, LVEF 51%

## 2019-09-16 NOTE — H&P
H&P Exam - Merriam Kanner 59 y o  female MRN: [id number]    Unit/Bed#: RM03 Encounter: [id number]      Dizziness  Assessment & Plan  No focal deficits or dizziness on exam and given that her dizziness is positional (when reaching for alarm) likely BPPV   However given her medical history I will   Initiate ischemic stroke pathway  Frequent neurochecks  A1c and lipid panel  2D echo  CTA reviewed   PT / OT eval   Orthostatic BP   NS @ 100cc/hr   She stated she will not be able to complete a closed MRI (claustrophobic), therefore if symptoms recur will repeat CT Head and consult Neuro    Atypical chest pain  Assessment & Plan  Right sided, worsened lying on right   Worse with arm movement  Likely musculoskeletal however given her medical history will  Trend troponin x2  Monitor on telemetry  Check fasting lipid panel and A1c  Check 2D echo  2/25/19Lexiscan Cardiolite stress study : normal perfusion imaging, no inducible ischemia, LVEF 51%      Crohn's disease with complication (HCC)  Assessment & Plan  Established with G I  Continue Bentyl and Asacol    Brain aneurysm  Assessment & Plan  Per Neurosurgical Notes   She last underwent a diagnostic cerebral angiogram on 12/12/17  She had a left ophthalmic segment internal carotid artery aneurysm treated with coil embolization approximately 12 years ago  Tunde Busby follow-up angiogram shows a small amount of residual or recurrence at the bottom of the aneurysm as well as a small paraclinoid aneurysm adjacent to that aneurysm   She no longer smokes cigarettes (~1 year) and she has a family history of brain aneurysms (father and grandfather)    She's established with Neurosurgery     Hypothyroidism  Assessment & Plan  Post surgical hypothyroidism with hx of thyroid carcinoma, follows with endocrinology   Continue Synthroid and check TSH    Paroxysmal atrial fibrillation (HCC)  Assessment & Plan  Status post fib ablation  Currently in sinus rhythm   Rate controlled on current AV gabriella blocking regimen of metoprolol XL 100mg PO BID   AC with Eliquis     1/8/19 LIONEL : normal LV size, LVEF 55-59%, small PFO, mod LAE, aortic atherocsclerosis  2/25/19Lexiscan Cardiolite stress study : normal perfusion imaging, no inducible ischemia, LVEF 51%        History of Present Illness      The patient is a pleasant 28-year-old female with a past medical history significant for atrial fibrillation, dyslipidemia, hypertension, congestive heart failure, brain aneurysm status post clipping, and hypothyroidism presents to the ER with a chief complaint of dizziness and chest pain  Both have been on going for the past 2-3 days  The chest pain is described as right-sided without radiation to neck or back  No associated shortness of breath  It is improved by laying on her left side and worsened by lying on her right side  It is on and off  The dizziness is on and off  Most recent episode was when the patient was reaching for her alarm and felt the whole room spinning  Other events are described as lightheadedness when the patient gets up too quickly  Review of Systems   Constitutional: Negative for chills and fever  Respiratory: Negative for cough and shortness of breath  Cardiovascular: Positive for chest pain  Negative for leg swelling  Neurological: Positive for dizziness and light-headedness  Negative for tremors, seizures, syncope and speech difficulty  All other systems reviewed and are negative        Historical Information   Past Medical History:   Diagnosis Date    A-fib [facility])     Brain aneurysm     Cardiac disease     CHF (congestive heart failure) (HCC)     Crohn disease (HCC)     Disease of thyroid gland     GERD (gastroesophageal reflux disease)     Hyperlipidemia     Hypertension      Past Surgical History:   Procedure Laterality Date    APPENDECTOMY      CARDIOVERSION N/A 2/14/2019    Procedure: CARDIOVERSION;  Surgeon: Christina Gómez MD;  Location: [facility]; Service: Cardiology    CEREBRAL ANEURYSM REPAIR      HYSTERECTOMY      TONSILLECTOMY AND ADENOIDECTOMY       Social History   Social History     Substance and Sexual Activity   Alcohol Use Never    Frequency: Never     Social History     Substance and Sexual Activity   Drug Use Never     Social History     Tobacco Use   Smoking Status Former Smoker    Last attempt to quit: 2018    Years since quittin 7   Smokeless Tobacco Never Used     Family History: non-contributory    Meds/Allergies   all medications and allergies reviewed  Allergies   Allergen Reactions    Sulfa Antibiotics Rash       Objective   First Vitals:   Blood Pressure: (!) 173/70 (19 08)  Pulse: 62 (19 08)  Temperature: 98 2 °F (36 8 °C) (19 08)  Temp Source: Temporal (19)  Respirations: 18 (19)  Height: 5' 9" (175 3 cm) (19 08)  Weight - Scale: 114 kg (251 lb 12 3 oz) (19 08)  SpO2: 95 % (19)    Current Vitals:   Blood Pressure: 132/60 (19 1445)  Pulse: 61 (19 1445)  Temperature: 98 2 °F (36 8 °C) (19)  Temp Source: Temporal (19)  Respirations: 22 (19 1445)  Height: 5' 9" (175 3 cm) (19 08)  Weight - Scale: 114 kg (251 lb 12 3 oz) (19 08)  SpO2: 93 % (19 1445)      Intake/Output Summary (Last 24 hours) at 2019 1515  Last data filed at 2019 1434  Gross per 24 hour   Intake 50 ml   Output    Net 50 ml       Invasive Devices     Peripheral Intravenous Line            Peripheral IV 19 Left Antecubital less than 1 day                Physical Exam   Constitutional: She is oriented to person, place, and time  HENT:   Head: Normocephalic and atraumatic  Mouth/Throat: No oropharyngeal exudate  Eyes: No scleral icterus  Neck: No JVD present  Cardiovascular: Normal rate and regular rhythm  Pulmonary/Chest: Effort normal and breath sounds normal  No stridor  No respiratory distress  Abdominal: Soft  Bowel sounds are normal  She exhibits no distension  There is no tenderness  Musculoskeletal: Normal range of motion  She exhibits no edema  Neurological: She is alert and oriented to person, place, and time  She displays normal reflexes  No cranial nerve deficit  She exhibits normal muscle tone  Coordination normal    Skin: Skin is warm  Capillary refill takes 2 to 3 seconds  She is not diaphoretic  No erythema  Lab Results:   Lab Results   Component Value Date    WBC 9 35 09/16/2019    HGB 13 0 09/16/2019    HCT 42 6 09/16/2019    MCV 94 09/16/2019     09/16/2019     Lab Results   Component Value Date    SODIUM 140 09/16/2019    K 4 0 09/16/2019     09/16/2019    CO2 28 09/16/2019    BUN 13 09/16/2019    CREATININE 0 95 09/16/2019    GLUC 134 09/16/2019    CALCIUM 9 1 09/16/2019       Imaging:   CTA head and neck with and without contrast   Final Result   Stable appearance status post left supraclinoid aneurysm coiling  No evidence to confirm persistent or recurrent aneurysm  Substantial artifact from the metallic coils could obscure small aneurysm recurrence        No evidence of significant stenosis, dissection or occlusion involving cervical carotid or vertebral segments or visualized cerebral arteries      Persistent mediastinal adenopathy, similar to 3/1/2019 chest CT                     Workstation performed: [id number]             EKG, Pathology, and Other Studies: NSR     Code Status: Prior  Advance Directive and Living Will:      Power of :    POLST:      Counseling / Coordination of Care:

## 2019-09-16 NOTE — CASE MANAGEMENT
I self referred the patient to discuss resources that might be available to her  She denied needing any help at this time  The patient lives in a three story home with her  and sister  There is two flights of stairs in the home  She uses a CPAP at night  She has no home health services or meals on wheels at this time  She takes care of her own ADL's and she drives  She uses [address] in [facility]  She has no trouble getting or paying for her medications  She has [employer]  The patient stated that she did call her PCP prior to coming to the ED today

## 2019-09-16 NOTE — ASSESSMENT & PLAN NOTE
Right sided, worsened lying on right   Worse with arm movement  Likely musculoskeletal however given her medical history will  Trend troponin x2  Monitor on telemetry  Check fasting lipid panel and A1c  Check 2D echo  2/25/19Lexiscan Cardiolite stress study : normal perfusion imaging, no inducible ischemia, LVEF 51%

## 2019-09-17 ENCOUNTER — APPOINTMENT (OUTPATIENT)
Dept: NON INVASIVE DIAGNOSTICS | Facility: HOSPITAL | Age: 64
End: 2019-09-17
Payer: COMMERCIAL

## 2019-09-17 VITALS
WEIGHT: 246.03 LBS | HEIGHT: 69 IN | BODY MASS INDEX: 36.44 KG/M2 | SYSTOLIC BLOOD PRESSURE: 148 MMHG | HEART RATE: 68 BPM | OXYGEN SATURATION: 95 % | TEMPERATURE: 98.1 F | RESPIRATION RATE: 18 BRPM | DIASTOLIC BLOOD PRESSURE: 73 MMHG

## 2019-09-17 LAB
ANION GAP SERPL CALCULATED.3IONS-SCNC: 7 MMOL/L (ref 4–13)
ATRIAL RATE: 54 BPM
ATRIAL RATE: 65 BPM
BASOPHILS # BLD AUTO: 0.04 THOUSANDS/ΜL (ref 0–0.1)
BASOPHILS NFR BLD AUTO: 0 % (ref 0–1)
BUN SERPL-MCNC: 11 MG/DL (ref 5–25)
CALCIUM SERPL-MCNC: 8.1 MG/DL (ref 8.3–10.1)
CHLORIDE SERPL-SCNC: 106 MMOL/L (ref 100–108)
CHOLEST SERPL-MCNC: 237 MG/DL (ref 50–200)
CO2 SERPL-SCNC: 27 MMOL/L (ref 21–32)
CREAT SERPL-MCNC: 0.94 MG/DL (ref 0.6–1.3)
EOSINOPHIL # BLD AUTO: 0.17 THOUSAND/ΜL (ref 0–0.61)
EOSINOPHIL NFR BLD AUTO: 2 % (ref 0–6)
ERYTHROCYTE [DISTWIDTH] IN BLOOD BY AUTOMATED COUNT: 13.7 % (ref 11.6–15.1)
EST. AVERAGE GLUCOSE BLD GHB EST-MCNC: 137 MG/DL
GFR SERPL CREATININE-BSD FRML MDRD: 65 ML/MIN/1.73SQ M
GLUCOSE SERPL-MCNC: 117 MG/DL (ref 65–140)
HBA1C MFR BLD: 6.4 % (ref 4.2–6.3)
HCT VFR BLD AUTO: 40.4 % (ref 34.8–46.1)
HDLC SERPL-MCNC: 42 MG/DL (ref 40–60)
HGB BLD-MCNC: 12.2 G/DL (ref 11.5–15.4)
IMM GRANULOCYTES # BLD AUTO: 0.05 THOUSAND/UL (ref 0–0.2)
IMM GRANULOCYTES NFR BLD AUTO: 1 % (ref 0–2)
LDLC SERPL CALC-MCNC: 168 MG/DL (ref 0–100)
LYMPHOCYTES # BLD AUTO: 2.06 THOUSANDS/ΜL (ref 0.6–4.47)
LYMPHOCYTES NFR BLD AUTO: 23 % (ref 14–44)
MAGNESIUM SERPL-MCNC: 2.2 MG/DL (ref 1.6–2.6)
MCH RBC QN AUTO: 28.4 PG (ref 26.8–34.3)
MCHC RBC AUTO-ENTMCNC: 30.2 G/DL (ref 31.4–37.4)
MCV RBC AUTO: 94 FL (ref 82–98)
MONOCYTES # BLD AUTO: 0.69 THOUSAND/ΜL (ref 0.17–1.22)
MONOCYTES NFR BLD AUTO: 8 % (ref 4–12)
NEUTROPHILS # BLD AUTO: 6.1 THOUSANDS/ΜL (ref 1.85–7.62)
NEUTS SEG NFR BLD AUTO: 66 % (ref 43–75)
NRBC BLD AUTO-RTO: 0 /100 WBCS
P AXIS: 78 DEGREES
P AXIS: 80 DEGREES
PLATELET # BLD AUTO: 264 THOUSANDS/UL (ref 149–390)
PMV BLD AUTO: 9.2 FL (ref 8.9–12.7)
POTASSIUM SERPL-SCNC: 4.2 MMOL/L (ref 3.5–5.3)
PR INTERVAL: 138 MS
PR INTERVAL: 148 MS
QRS AXIS: 26 DEGREES
QRS AXIS: 47 DEGREES
QRSD INTERVAL: 80 MS
QRSD INTERVAL: 84 MS
QT INTERVAL: 466 MS
QT INTERVAL: 484 MS
QTC INTERVAL: 458 MS
QTC INTERVAL: 484 MS
RBC # BLD AUTO: 4.3 MILLION/UL (ref 3.81–5.12)
SODIUM SERPL-SCNC: 140 MMOL/L (ref 136–145)
T WAVE AXIS: 39 DEGREES
T WAVE AXIS: 40 DEGREES
TRIGL SERPL-MCNC: 135 MG/DL
TSH SERPL DL<=0.05 MIU/L-ACNC: 3.52 UIU/ML (ref 0.36–3.74)
VENTRICULAR RATE: 54 BPM
VENTRICULAR RATE: 65 BPM
WBC # BLD AUTO: 9.11 THOUSAND/UL (ref 4.31–10.16)

## 2019-09-17 PROCEDURE — 83036 HEMOGLOBIN GLYCOSYLATED A1C: CPT | Performed by: FAMILY MEDICINE

## 2019-09-17 PROCEDURE — G8979 MOBILITY GOAL STATUS: HCPCS | Performed by: PHYSICAL THERAPIST

## 2019-09-17 PROCEDURE — 93306 TTE W/DOPPLER COMPLETE: CPT

## 2019-09-17 PROCEDURE — 97166 OT EVAL MOD COMPLEX 45 MIN: CPT

## 2019-09-17 PROCEDURE — G8987 SELF CARE CURRENT STATUS: HCPCS

## 2019-09-17 PROCEDURE — G8978 MOBILITY CURRENT STATUS: HCPCS | Performed by: PHYSICAL THERAPIST

## 2019-09-17 PROCEDURE — 99217 PR OBSERVATION CARE DISCHARGE MANAGEMENT: CPT | Performed by: FAMILY MEDICINE

## 2019-09-17 PROCEDURE — 84443 ASSAY THYROID STIM HORMONE: CPT | Performed by: FAMILY MEDICINE

## 2019-09-17 PROCEDURE — 80061 LIPID PANEL: CPT | Performed by: FAMILY MEDICINE

## 2019-09-17 PROCEDURE — 80048 BASIC METABOLIC PNL TOTAL CA: CPT | Performed by: FAMILY MEDICINE

## 2019-09-17 PROCEDURE — 85025 COMPLETE CBC W/AUTO DIFF WBC: CPT | Performed by: FAMILY MEDICINE

## 2019-09-17 PROCEDURE — 97162 PT EVAL MOD COMPLEX 30 MIN: CPT | Performed by: PHYSICAL THERAPIST

## 2019-09-17 PROCEDURE — 93306 TTE W/DOPPLER COMPLETE: CPT | Performed by: INTERNAL MEDICINE

## 2019-09-17 PROCEDURE — 83735 ASSAY OF MAGNESIUM: CPT | Performed by: FAMILY MEDICINE

## 2019-09-17 PROCEDURE — G8988 SELF CARE GOAL STATUS: HCPCS

## 2019-09-17 PROCEDURE — 93010 ELECTROCARDIOGRAM REPORT: CPT | Performed by: INTERNAL MEDICINE

## 2019-09-17 RX ORDER — FENOFIBRATE 40 MG/1
40 TABLET ORAL DAILY
Qty: 30 EACH | Refills: 0 | Status: SHIPPED | OUTPATIENT
Start: 2019-09-17 | End: 2019-12-04 | Stop reason: HOSPADM

## 2019-09-17 RX ADMIN — METOPROLOL SUCCINATE 100 MG: 100 TABLET, FILM COATED, EXTENDED RELEASE ORAL at 09:31

## 2019-09-17 RX ADMIN — FLECAINIDE ACETATE 100 MG: 100 TABLET ORAL at 09:31

## 2019-09-17 RX ADMIN — SODIUM CHLORIDE 100 ML/HR: 0.9 INJECTION, SOLUTION INTRAVENOUS at 04:36

## 2019-09-17 RX ADMIN — ASPIRIN 81 MG 81 MG: 81 TABLET ORAL at 09:31

## 2019-09-17 RX ADMIN — MESALAMINE 800 MG: 400 CAPSULE, DELAYED RELEASE ORAL at 09:30

## 2019-09-17 RX ADMIN — DOFETILIDE 500 MCG: 0.12 CAPSULE ORAL at 09:40

## 2019-09-17 RX ADMIN — APIXABAN 5 MG: 5 TABLET, FILM COATED ORAL at 09:31

## 2019-09-17 RX ADMIN — DICYCLOMINE HYDROCHLORIDE 20 MG: 20 TABLET ORAL at 06:21

## 2019-09-17 RX ADMIN — LEVOTHYROXINE SODIUM 125 MCG: 125 TABLET ORAL at 05:44

## 2019-09-17 RX ADMIN — VENLAFAXINE HYDROCHLORIDE 37.5 MG: 37.5 CAPSULE, EXTENDED RELEASE ORAL at 09:31

## 2019-09-17 RX ADMIN — PANTOPRAZOLE SODIUM 40 MG: 40 TABLET, DELAYED RELEASE ORAL at 06:21

## 2019-09-17 NOTE — PHYSICAL THERAPY NOTE
Physical Therapy Evaluation    Patient Name: Nadia Royal    GKOZC'[id number] Date: 9/17/2019     Problem List  Principal Problem:    Atypical chest pain  Active Problems:    Paroxysmal atrial fibrillation (HCC)    Hypothyroidism    Brain aneurysm    Dizziness    Crohn's disease with complication [facility])       Past Medical History  Past Medical History:   Diagnosis Date    A-fib [facility])     Brain aneurysm     Cardiac disease     CHF (congestive heart failure) (HCC)     Crohn disease ([address] )     Disease of thyroid gland     GERD (gastroesophageal reflux disease)     Hyperlipidemia     Hypertension         Past Surgical History  Past Surgical History:   Procedure Laterality Date    APPENDECTOMY      CARDIOVERSION N/A 2/14/2019    Procedure: CARDIOVERSION;  Surgeon: Surinder Mazariegos MD;  Location: [facility];  Service: Cardiology    CEREBRAL ANEURYSM REPAIR      HYSTERECTOMY      TONSILLECTOMY AND ADENOIDECTOMY             09/17/19 0951   Note Type   Note type Eval/Treat   Pain Assessment   Pain Assessment No/denies pain   Pain Score No Pain   Home Living   Type of Home House   Home Layout Two level;Bed/bath upstairs;Stairs to enter with rails  (8 [address] with HR, full flight with HR to 2nd floor)   Bathroom Shower/Tub Tub/shower unit   [employer] Raised   Bathroom Equipment Grab bars in shower   [address]   (no DME)   Prior Function   Level of [place] Independent with ADLs and functional mobility  ((I) ambulation no A  D )   Lives With Spouse; Family;Daughter   ADL Assistance Independent   IADLs Independent   Vocational 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)   Comments (I) with driving   Restrictions/Precautions   [place] Bearing Precautions Per Order No   Other Precautions Fall Risk;Telemetry   General   Family/Caregiver Present No   Cognition   Arousal/Participation Alert   Orientation Level Oriented X4 Following Commands Follows all commands and directions without difficulty   RLE Assessment   RLE Assessment WFL  (4+/5)   LLE Assessment   LLE Assessment WFL  (4+/5)   Coordination   Sensation WFL   Light Touch   RLE Light Touch Grossly intact   LLE Light Touch Grossly intact   Bed Mobility   Supine to Sit 6  Modified independent   Additional items Bedrails   Sit to Supine 6  Modified independent   Additional items Bedrails   Additional Comments Pt is on room air with SpO2 95% HR 65  and after ambulation 97% HR 67  BP supine 145/68 standing 189/80 and after ambulation 150/66  pt complaining of lightheadedness in standing but subsided during ambulation  No LOB or instability during ambulation   Transfers   Sit to Stand 5  Supervision   Stand to Sit 5  Supervision   Stand pivot 5  Supervision   Additional Comments normal gait pattern with normal gait speed for 200ft ambulation   Ambulation/Elevation   Gait pattern Narrow ROBERTA   Gait Assistance 5  Supervision   Assistive Device None   Distance 200ft no A D  (S) no LOB or instability during ambulation   Balance   Static Sitting Good   Dynamic Sitting Good   Static Standing Fair  (lightheadedness causing unsteadiness in standing)   Dynamic Standing Fair   Ambulatory Fair +   Endurance Deficit   Endurance Deficit No   Activity Tolerance   Activity Tolerance Patient limited by fatigue  (limited by dizziness)   Assessment   Prognosis Good   Problem List Decreased strength; Impaired balance;Decreased mobility   Assessment Pt is a 59year old female with PMH including CHF crohn's a-fib HTN GERD cardiac disease brain aneurysm presenting with lightheadedness and dizziness with chest pain for the past 2-3 days  Pt seen for moderate complexity PT evaluation presenting with equal strength coordination and sensation with good mobility however pt experiencing lightheadedness/dizziness in standing but subsided during ambulation   Pt able to perform all bed mobility transfers and ambulation at a (S) to (I) level  Pt will be safe to return home on discharge but would benefit from continued activity in PT to improve improve balance dizziness ambulation and to ensure safe (I) stair negotiation  Goals   Patient Goals To return home   STG Expiration Date 09/24/19   Short Term Goal #1 Improve bilateral LE strength to 5/5 to improve all transfers to (I)    Short Term Goal #2 Improve standing and ambulatory balance to good to improve ambulation to 450ft no A D  (I) and to improve step negotiation to 11 steps with HR mod(I)   Plan   Treatment/Interventions Functional transfer training;LE strengthening/ROM; Elevations; Therapeutic exercise; Endurance training;Patient/family training;Bed mobility;Gait training   PT Frequency   (3-5x/wk)   Recommendation   Recommendation Home independently   PT - OK to Discharge Yes   Pt with SCD's on when PT entered room  SCD's reapplied and turned on with pt supine in bed with call bell in reach

## 2019-09-17 NOTE — SPEECH THERAPY NOTE
Pt admitted under observation for dizziness  Pt on stroke protocol  Passed RN dysphagia screen and tolerated a regular a diet and thin liquids  Work up for acute CVa was negative  Will acknowledge and d/c order  Inocente BINGHAM, [address] [place], CCC/SLP  [id number]  [email or web address]  Available via [email or web address] text

## 2019-09-17 NOTE — PLAN OF CARE
Problem: Potential for Falls  Goal: Patient will remain free of falls  Description  INTERVENTIONS:  - Assess patient frequently for physical needs  -  Identify cognitive and physical deficits and behaviors that affect risk of falls    -  [place] fall precautions as indicated by assessment   - Educate patient/family on patient safety including physical limitations  - Instruct patient to call for assistance with activity based on assessment  - Modify environment to reduce risk of injury  - Consider OT/PT consult to assist with strengthening/mobility  Outcome: Progressing     Problem: PAIN - ADULT  Goal: Verbalizes/displays adequate comfort level or baseline comfort level  Description  Interventions:  - Encourage patient to monitor pain and request assistance  - Assess pain using appropriate pain scale  - Administer analgesics based on type and severity of pain and evaluate response  - Implement non-pharmacological measures as appropriate and evaluate response  - Consider cultural and social influences on pain and pain management  - Notify physician/advanced practitioner if interventions unsuccessful or patient reports new pain  Outcome: Progressing     Problem: INFECTION - ADULT  Goal: Absence or prevention of progression during hospitalization  Description  INTERVENTIONS:  - Assess and monitor for signs and symptoms of infection  - Monitor lab/diagnostic results  - Monitor all insertion sites, i e  indwelling lines, tubes, and drains  - Monitor endotracheal if appropriate and nasal secretions for changes in amount and color  - [place] appropriate cooling/warming therapies per order  - Administer medications as ordered  - Instruct and encourage patient and family to use good hand hygiene technique  - Identify and instruct in appropriate isolation precautions for identified infection/condition  Outcome: Progressing     Problem: SAFETY ADULT  Goal: Maintain or return to baseline ADL function  Description  INTERVENTIONS:  -  Assess patient's ability to carry out ADLs; assess patient's baseline for ADL function and identify physical deficits which impact ability to perform ADLs (bathing, care of mouth/teeth, toileting, grooming, dressing, etc )  - Assess/evaluate cause of self-care deficits   - Assess range of motion  - Assess patient's mobility; develop plan if impaired  - Assess patient's need for assistive devices and provide as appropriate  - Encourage maximum independence but intervene and supervise when necessary  - Involve family in performance of ADLs  - Assess for home care needs following discharge   - Consider OT consult to assist with ADL evaluation and planning for discharge  - Provide patient education as appropriate  Outcome: Progressing  Goal: Maintain or return mobility status to optimal level  Description  INTERVENTIONS:  - Assess patient's baseline mobility status (ambulation, transfers, stairs, etc )    - Identify cognitive and physical deficits and behaviors that affect mobility  - Identify mobility aids required to assist with transfers and/or ambulation (gait belt, sit-to-stand, lift, walker, cane, etc )  - [place] fall precautions as indicated by assessment  - Record patient progress and toleration of activity level on Mobility SBAR; progress patient to next Phase/Stage  - Instruct patient to call for assistance with activity based on assessment  - Consider rehabilitation consult to assist with strengthening/weightbearing, etc   Outcome: Progressing     Problem: DISCHARGE PLANNING  Goal: Discharge to home or other facility with appropriate resources  Description  INTERVENTIONS:  - Identify barriers to discharge w/patient and caregiver  - Arrange for needed discharge resources and transportation as appropriate  - Identify discharge learning needs (meds, wound care, etc )  - Arrange for interpretive services to assist at discharge as needed  - Refer to Case Management Department for coordinating discharge planning if the patient needs post-hospital services based on physician/advanced practitioner order or complex needs related to functional status, cognitive ability, or social support system  Outcome: Progressing     Problem: Knowledge Deficit  Goal: Patient/family/caregiver demonstrates understanding of disease process, treatment plan, medications, and discharge instructions  Description  Complete learning assessment and assess knowledge base  Interventions:  - Provide teaching at level of understanding  - Provide teaching via preferred learning methods  Outcome: Progressing     Problem: Neurological Deficit  Goal: Neurological status is stable or improving  Description  Interventions:  - Monitor and assess patient's level of consciousness, motor function, sensory function, and level of assistance needed for ADLs  - Monitor and report changes from baseline  Collaborate with interdisciplinary team to initiate plan and implement interventions as ordered  - Provide and maintain a safe environment  - Consider seizure precautions  - Consider fall precautions  - Consider aspiration precautions  - Consider bleeding precautions  Outcome: Progressing     Problem: Activity Intolerance/Impaired Mobility  Goal: Mobility/activity is maintained at optimum level for patient  Description  Interventions:  - Assess and monitor patient  barriers to mobility and need for assistive/adaptive devices  - Assess patient's emotional response to limitations  - Collaborate with interdisciplinary team and initiate plans and interventions as ordered  - Encourage independent activity per ability   - Maintain proper body alignment  - Perform active/passive rom as tolerated/ordered    - Plan activities to conserve energy   - Turn patient as appropriate  Outcome: Progressing     Problem: Communication Impairment  Goal: Ability to express needs and understand communication  Description  Assess patient's communication skills and ability to understand information  Patient will demonstrate use of effective communication techniques, alternative methods of communication and understanding even if not able to speak  - Encourage communication and provide alternate methods of communication as needed  - Collaborate with case management/ for discharge needs  - Include patient/family/caregiver in decisions related to communication  Outcome: Progressing     Problem: Potential for Aspiration  Goal: Non-ventilated patient's risk of aspiration is minimized  Description  Assess and monitor vital signs, respiratory status, and labs (WBC)  Monitor for signs of aspiration (tachypnea, cough, rales, wheezing, cyanosis, fever)  - Assess and monitor patient's ability to swallow  - Place patient up in chair to eat if possible  - HOB up at 90 degrees to eat if unable to get patient up into chair   - Supervise patient during oral intake  - Instruct patient/ family to take small bites  - Instruct patient/ family to take small single sips when taking liquids  - Follow patient-specific strategies generated by speech pathologist   Outcome: Progressing     Problem: Nutrition  Goal: Nutrition/Hydration status is improving  Description  Monitor and assess patient's nutrition/hydration status for malnutrition (ex- brittle hair, bruises, dry skin, pale skin and conjunctiva, muscle wasting, smooth red tongue, and disorientation)  Collaborate with interdisciplinary team and initiate plan and interventions as ordered  Monitor patient's weight and dietary intake as ordered or per policy  Utilize nutrition screening tool and intervene per policy  Determine patient's food preferences and provide high-protein, high-caloric foods as appropriate  - Assist patient with eating   - Allow adequate time for meals   - Encourage patient to take dietary supplement as ordered    - Collaborate with clinical nutritionist   - Include patient/family/caregiver in decisions related to nutrition    Outcome: Progressing     Problem: DISCHARGE PLANNING - CARE MANAGEMENT  Goal: Discharge to post-acute care or home with appropriate resources  Description  INTERVENTIONS:  - Conduct assessment to determine patient/family and health care team treatment goals, and need for post-acute services based on payer coverage, community resources, and patient preferences, and barriers to discharge  - Address psychosocial, clinical, and financial barriers to discharge as identified in assessment in conjunction with the patient/family and health care team  - Arrange appropriate level of post-acute services according to patient's   needs and preference and payer coverage in collaboration with the physician and health care team  - Communicate with and update the patient/family, physician, and health care team regarding progress on the discharge plan  - Arrange appropriate transportation to post-acute venues  -outpatient follow up after dc   Outcome: Progressing

## 2019-09-17 NOTE — SOCIAL WORK
Cm met with the patient to evaluate the patients prior function and living situation and any barriers to d/c and form a safe d/c plan  Cm also evaluated the patient for any services in the home or needs for services  Pt resides at home with her spouse, sister and daughter in a house  [address] then 13 steps inside to his 2nd floor bedroom/bathroom  Pt is independent with her adls and ambulation  No services and only DME is a cpap  Pt and spouse both drive  PCP is Ryan Beckman and pharmacy [facility] in [place]  Pt plans home on dc with outpatient follow up  CM will follow and assist in dc planning

## 2019-09-17 NOTE — SOCIAL WORK
Discussed with patient preferences on discharge;understanding how to manage health at home; purpose of taking medications; importance of follow up care/appointments; and symptoms to watch out for once discharged home  Called pt's PCP office:[facility] (Dr Reese Conley)   Appointment will be on Wednesday September 18th at 2:05pm

## 2019-09-17 NOTE — PLAN OF CARE
Problem: PHYSICAL THERAPY ADULT  Goal: Performs mobility at highest level of function for planned discharge setting  See evaluation for individualized goals  Outcome: Completed  Note:   Prognosis: Good  Problem List: Decreased strength, Impaired balance, Decreased mobility  Assessment: Pt is a 59year old female with PMH including CHF crohn's a-fib HTN GERD cardiac disease brain aneurysm presenting with lightheadedness and dizziness with chest pain for the past 2-3 days  Pt seen for moderate complexity PT evaluation presenting with equal strength coordination and sensation with good mobility however pt experiencing lightheadedness/dizziness in standing but subsided during ambulation  Pt able to perform all bed mobility transfers and ambulation at a (S) to (I) level  Pt will be safe to return home on discharge but would benefit from continued activity in PT to improve improve balance dizziness ambulation and to ensure safe (I) stair negotiation  Recommendation: Home independently     PT - OK to Discharge: Yes    See flowsheet documentation for full assessment

## 2019-09-17 NOTE — DISCHARGE SUMMARY
Discharge Summary - Jeff Hassan 59 y o  female MRN: [id number]    Unit/Bed#: 410-01 Encounter: [id number]    Admission Date:   Admission Orders (From admission, onward)     Ordered        09/16/19 1428  Place in Observation (expected length of stay for this patient is less than two midnights)  Once                     Admitting Diagnosis: Dizziness [R42]  Chest pain [R07 9]  Abnormal EKG [R94 31]  Elevated TSH [R79 89]  Headache [R51]    HPI: The patient is a pleasant 71-year-old female with a past medical history significant for atrial fibrillation, dyslipidemia, hypertension, congestive heart failure, brain aneurysm status post clipping, and hypothyroidism presents to the ER with a chief complaint of dizziness and chest pain  Both have been on going for the past 2-3 days      The chest pain is described as right-sided without radiation to neck or back  No associated shortness of breath  It is improved by laying on her left side and worsened by lying on her right side  It is on and off      The dizziness is on and off  Most recent episode was when the patient was reaching for her alarm and felt the whole room spinning  Other events are described as lightheadedness when the patient gets up too quickly  Procedures Performed:   Orders Placed This Encounter   Procedures    ED ECG Documentation Only    ED ECG Documentation Only       Summary of Hospital Course:     1) Dizziness  The patient's complicated medical history, significant for cerebral aneurysm status post coiling  She was admitted to the medical floor, initiated on ischemic stroke pathway and IV fluids along with orthostatic blood pressures  Patient felt better with IV fluids, I suspected this is likely underlying BPPV versus orthostatic hypotension  She was evaluated by physical therapy and again felt to be orthostatic  She will remain hydrated follow-up with her PCP      2) Dyslipidemia  Patient states she has took a medication for cholesterol lowering the resulted in significantly elevated liver function testing  She is not aware of the medication, I elected to start her on fenofibrate and counseled her on the importance of PCP follow-up and ordered trial different statins as a suspect that was the medication with the underlying side effect  Significant Findings, Care, Treatment and Services Provided:     Complications: none    Discharge Diagnosis: see above    Resolved Problems  Date Reviewed: 7/1/2019    None          Condition at Discharge: fair         Discharge instructions/Information to patient and family:   See after visit summary for information provided to patient and family  Provisions for Follow-Up Care:  See after visit summary for information related to follow-up care and any pertinent home health orders  PCP: Shelley Madrigal MD    Disposition: Home    Planned Readmission: No      Discharge Statement   I spent 20 minutes discharging the patient  This time was spent on the day of discharge  I had direct contact with the patient on the day of discharge  Additional documentation is required if more than 30 minutes were spent on discharge  Discharge Medications:  See after visit summary for reconciled discharge medications provided to patient and family

## 2019-09-17 NOTE — UTILIZATION REVIEW
Initial Clinical Review    Admission: Date/Time/Statement:  9/16/2019 @ 1428 to OBS    Orders Placed This Encounter   Procedures    Place in Observation (expected length of stay for this patient is less than two midnights)     Standing Status:   Standing     Number of Occurrences:   1     Order Specific Question:   Admitting Physician     Answer:   Smitha Pena     Order Specific Question:   Level of Care     Answer:   Med Surg [16]     ED Arrival Information     Expected Arrival Acuity Means of Arrival Escorted By Service Admission Type    - 9/16/2019 08:46 Emergent Walk[facility]In Family Member Hospitalist Emergency    [facility]        Chief Complaint   Patient presents with    Chest Pain     patient states having chest pains, dizziness, and light headedness for at least one week   Dizziness     Assessment/Plan:  58 yo female presents to ED from home with intermittent chest pain over the past week - anterior and non radiating, also with some separate R shoulder pain  Dizziness / Lightheadedness with position change  + NEVAREZ which is unchanged  No focal deficits noted  C/O frontal HA  She is anticoagulated on Eliquis for prior history of paroxysmal atrial fibrillation  Pt states she cannoy complete MRI due to claustrophobia  Admitted to OBS with  Dizziness and Atypical Chest Pain    Plan: TELE, Neuro checks,  ECHO, IVF's, Orthostatic BP, ACS r/o    If symptome recur, will repeat CT Head & Consult Neuro    9/17:  Written for Discharge @ 1315    ED Triage Vitals   Temperature Pulse Respirations Blood Pressure SpO2   09/16/19 0854 09/16/19 0854 09/16/19 0854 09/16/19 0854 09/16/19 0854   98 2 °F (36 8 °C) 62 18 (!) 173/70 95 %      Temp Source Heart Rate Source Patient Position - Orthostatic VS BP Location FiO2 (%)   09/16/19 0854 09/16/19 0854 09/16/19 0915 09/16/19 0854 --   Temporal Monitor Lying Left arm       Pain Score       09/16/19 0854       8        Wt Readings from Last 1 Encounters:   09/16/19 112 kg (246 lb 0 5 oz)     Additional Vital Signs:   09/17/19 07:24:11  98 1 °F (36 7 °C) 68 18 148/73 98 95 %     09/17/19 03:34:07  98 1 °F (36 7 °C) 67 18 140/93 109 91 % None (Room air) Lying   09/16/19 23:39:59  97 5 °F (36 4 °C) 70 18 149/66 94 94 % None (Room air) Lying   09/16/19 21:34:22  97 6 °F (36 4 °C) 57 18 129/55 80 96 % None (Room air) Lying   09/16/19 1500   64 20 132/60  94 % None (Room air) Lying   09/16/19 1015   61 20 156/68  94 % None (Room air) Lying     9/16  GCS = 15  9/17 GCS = 15    Pertinent Labs/Diagnostic Test Results:   Results from last 7 days   Lab Units 09/17/19  0548 09/16/19  0911   WBC Thousand/uL 9 11 9 35   HEMOGLOBIN g/dL 12 2 13 0   HEMATOCRIT % 40 4 42 6   PLATELETS Thousands/uL 264 285   NEUTROS ABS Thousands/µL 6 10 5 76     Results from last 7 days   Lab Units 09/17/19  0548 09/16/19  0911   SODIUM mmol/L 140 140   POTASSIUM mmol/L 4 2 4 0   CHLORIDE mmol/L 106 102   CO2 mmol/L 27 28   ANION GAP mmol/L 7 10   BUN mg/dL 11 13   CREATININE mg/dL 0 94 0 95   EGFR ml/min/1 73sq m 65 64   CALCIUM mg/dL 8 1* 9 1   MAGNESIUM mg/dL 2 2  --      Results from last 7 days   Lab Units 09/16/19  0911   AST U/L 73*   ALT U/L 93*   ALK PHOS U/L 143*   TOTAL PROTEIN g/dL 8 1   ALBUMIN g/dL 3 3*   TOTAL BILIRUBIN mg/dL 0 20     Results from last 7 days   Lab Units 09/17/19  0548 09/16/19  0911   GLUCOSE RANDOM mg/dL 117 134     Results from last 7 days   Lab Units 09/16/19  2137 09/16/19  1900 09/16/19  1301 09/16/19  0911   TROPONIN I ng/mL <0 02 <0 02 <0 02 <0 02     Results from last 7 days   Lab Units 09/16/19  0911   PROTIME seconds 14 8*   INR  1 16   PTT seconds 31     Results from last 7 days   Lab Units 09/17/19  0548   TSH 3RD GENERATON uIU/mL 3 517     Results from last 7 days   Lab Units 09/16/19  1106   CLARITY UA  Clear   COLOR UA  Light Yellow   SPEC GRAV UA  <=1 005   PH UA  5 5   GLUCOSE UA mg/dl Negative   KETONES UA mg/dl Negative   BLOOD UA Negative   PROTEIN UA mg/dl Negative   NITRITE UA  Negative   BILIRUBIN UA  Negative   UROBILINOGEN UA E U /dl 0 2   LEUKOCYTES UA  Negative     9/16 CTA Head & Neck: Stable appearance status post left supraclinoid aneurysm coiling   No evidence to confirm persistent or recurrent aneurysm   Substantial artifact from the metallic coils could obscure small aneurysm recurrence    No evidence of significant stenosis, dissection or occlusion involving cervical carotid or vertebral segments or visualized cerebral arteries    9/16 EKG Sinus rhythm / Levonne Paganini  ED Treatment:   Medication Administration from 09/16/2019 0846 to 09/16/2019 1837       Date/Time Order Dose Route Action     09/16/2019 1333 metoclopramide (REGLAN) injection 10 mg 10 mg Intravenous Given     09/16/2019 1332 diphenhydrAMINE (BENADRYL) injection 25 mg 25 mg Intravenous Given     09/16/2019 1334 magnesium sulfate 2 g/50 mL IVPB (premix) 2 g 2 g Intravenous New Bag     09/16/2019 1332 sodium chloride 0 9 % bolus 1,000 mL 1,000 mL Intravenous New Bag     09/16/2019 1820 sodium chloride 0 9 % infusion 100 mL/hr Intravenous New Bag        Past Medical History:   Diagnosis Date    A-fib ([place] [address] )     Brain aneurysm     Cardiac disease     [facility]     Crohn disease ([facility] )     Disease of thyroid gland     GERD (gastroesophageal reflux disease)     Hyperlipidemia     Hypertension      Present on Admission:   Paroxysmal atrial fibrillation ([facility])      Admitting Diagnosis: Dizziness [R42]  Chest pain [R07 9]  Abnormal EKG [R94 31]  Elevated TSH [R79 89]  Headache [R51]  Age/Sex: 59 y o  female  Admission Orders:    Current Facility-Administered Medications:  apixaban 5 mg Oral BID    aspirin 81 mg Oral Daily    atorvastatin 40 mg Oral QPM    dicyclomine 20 mg Oral TID AC    dofetilide 500 mcg Oral BID    flecainide 100 mg Oral Q12H [address]    levothyroxine 125 mcg Oral Daily    mesalamine 800 mg Oral BID    metoprolol succinate 100 mg Oral Q12H [address]    pantoprazole 40 mg Oral Early Morning    sodium chloride 100 mL/hr Intravenous Continuous    venlafaxine 37 5 mg Oral Daily      TELE  Neuro Checks q1h x 4, q2h x 8, q4h  ECHO  SCD's  IP CONSULT TO Babak Deluna [facility] Utilization Review Department  Phone: [phone]; Fax [phone]  [email or web address]  ATTENTION: Please call with any questions or concerns to [phone]  and carefully listen to the prompts so that you are directed to the right person  Send all requests for admission clinical reviews, approved or denied determinations and any other requests to fax [phone]   All voicemails are confidential

## 2019-09-17 NOTE — PLAN OF CARE
Problem: OCCUPATIONAL THERAPY ADULT  Goal: Performs self-care activities at highest level of function for planned discharge setting  See evaluation for individualized goals  Description  Treatment Interventions: ADL retraining, Functional transfer training, Endurance training, Activityengagement, Patient/family training          See flowsheet documentation for full assessment, interventions and recommendations  Note:   Limitation: Decreased ADL status, Decreased endurance, Decreased Safe judgement during ADL, Decreased high-level ADLs     Assessment: Pt is a 59 y o  female seen for OT evaluation s/p admit to [facility] on 9/16/2019 w/ Atypical chest pain  Comorbidities affecting pt's functional performance at time of assessment include: DM, HTN, obesity, CHF and a-fib, brain aneurysm, crohn's, GERD, cardiac disease  Personal factors affecting pt at time of IE include:steps to enter environment, difficulty performing ADLS, difficulty performing IADLS , limited insight into deficits, decreased initiation and engagement  and health management   Prior to admission, pt was (I) with ADLs and IADLs with no device during functional mobility  Upon evaluation: Pt requires (S) level with no device during functional mobility 2* the following deficits impacting occupational performance: weakness, decreased strength, decreased balance and decreased safety awareness  Pt to benefit from continued skilled OT tx while in the hospital to address deficits as defined above and maximize level of functional independence w ADL's and functional mobility  Occupational Performance areas to address include: grooming, bathing/shower, toilet hygiene, dressing, functional mobility, community mobility and clothing management  From OT standpoint, recommendation at time of d/c would be home with family support        OT Discharge Recommendation: Home with family support

## 2019-09-17 NOTE — NURSING NOTE
Patient discharged to home  Ambulated off unit  Left in stable condition  AVS presented to patient and family  Verbalizes understanding  Pt has no questions

## 2019-09-17 NOTE — OCCUPATIONAL THERAPY NOTE
Occupational Therapy Evaluation     Patient Name: Angella Snider  TFXHA'B Date: 9/17/2019  Problem List  Principal Problem:    Atypical chest pain  Active Problems:    Paroxysmal atrial fibrillation (HCC)    Hypothyroidism    Brain aneurysm    Dizziness    Crohn's disease with complication [facility])    Past Medical History  Past Medical History:   Diagnosis Date    A-fib [facility])     Brain aneurysm     Cardiac disease     CHF (congestive heart failure) (HCC)     Crohn disease ([address] )     Disease of thyroid gland     GERD (gastroesophageal reflux disease)     Hyperlipidemia     Hypertension      Past Surgical History  Past Surgical History:   Procedure Laterality Date    APPENDECTOMY      CARDIOVERSION N/A 2/14/2019    Procedure: CARDIOVERSION;  Surgeon: Riana Beyer MD;  Location: [facility] MA[facility];  Service: Cardiology    CEREBRAL ANEURYSM REPAIR      HYSTERECTOMY      TONSILLECTOMY AND ADENOIDECTOMY               09/17/19 0906   Note Type   Note type Eval/Treat   Restrictions/Precautions   Weight Bearing Precautions Per Order No   Other Precautions Fall Risk   Pain Assessment   Pain Assessment No/denies pain   Home Living   Type of [address] [place] [address] Two level;Bed/bath upstairs;Stairs to enter with rails  (8 SUZANNE c HR; 12 steps to 2nd c HR)   Bathroom Shower/Tub Tub/shower unit   [employer] Raised   Bathroom Equipment Grab bars in [facility] Other (Comment);Grab bars  (no DME)   Additional Comments pt does not utilize device at baseline during functional mobility   Prior Function   Level of [place] Independent with ADLs and functional mobility   Lives With Spouse; Family;Daughter   ADL Assistance Independent   IADLs Independent   Falls in the last 6 months 0   Vocational Full time employment   Comments pt is (I) with driving   Psychosocial   Psychosocial (WDL) WDL   Subjective   Subjective "I only get dizzy when I stand up"   ADL   Where Assessed Edge of bed   LB Dressing Assistance 5  Supervision/Setup   Toileting Assistance  5  Supervision/Setup   Additional Comments pt reports x1 episode of LOB this AM with PCA and "she caught me before I went down"   Bed Mobility   Supine to Sit 6  Modified independent   Additional items Bedrails   Sit to Supine 6  Modified independent   Additional items Bedrails   Additional Comments pt on RA during session; SpO2 was WFL; minimal SOB noted during exertion and functional endurance   Transfers   Sit to Stand 5  Supervision   Stand to Sit 5  Supervision   Stand pivot 5  Supervision   Additional Comments performs with no device; initially unsteady; BP taken in supine and standing with results WFL as well as upon return to room; nurse aware of the same    Functional Mobility   Functional Mobility 5  Supervision   Additional Comments performs functional mobility in hallway and room ~200ft with no significant LOB; pt reports no dizziness during mobility, only when transfering from sitting to standing   Balance   Static Sitting Good   Dynamic Sitting Good   Static Standing Fair +   Dynamic Standing Fair +   Ambulatory Fair +   Activity Tolerance   Activity Tolerance Patient limited by fatigue   RUE Assessment   RUE Assessment WFL   LUE Assessment   LUE Assessment WFL   Hand Function   Gross Motor Coordination Functional   Fine Motor Coordination Functional   Sensation   Light Touch No apparent deficits   Sharp/Dull No apparent deficits   Proprioception   Proprioception No apparent deficits   Vision-Basic Assessment   Current Vision No visual deficits   Vision - Complex Assessment   Ocular Range of Motion [facility]   Perception   Inattention/Neglect Appears intact   Cognition   Overall Cognitive Status WFL   Arousal/Participation Alert   Attention Within functional limits   Orientation Level Oriented X4   Memory Within functional limits   Following Commands Follows all commands and directions without difficulty   Assessment Limitation Decreased ADL status; Decreased endurance;Decreased Safe judgement during ADL;Decreased high-level ADLs   Assessment Pt is a 59 y o  female seen for OT evaluation s/p admit to [facility] on 9/16/2019 w/ Atypical chest pain  Comorbidities affecting pt's functional performance at time of assessment include: DM, HTN, obesity, CHF and a-fib, brain aneurysm, crohn's, GERD, cardiac disease  Personal factors affecting pt at time of IE include:steps to enter environment, difficulty performing ADLS, difficulty performing IADLS , limited insight into deficits, decreased initiation and engagement  and health management   Prior to admission, pt was (I) with ADLs and IADLs with no device during functional mobility  Upon evaluation: Pt requires (S) level with no device during functional mobility 2* the following deficits impacting occupational performance: weakness, decreased strength, decreased balance and decreased safety awareness  Pt to benefit from continued skilled OT tx while in the hospital to address deficits as defined above and maximize level of functional independence w ADL's and functional mobility  Occupational Performance areas to address include: grooming, bathing/shower, toilet hygiene, dressing, functional mobility, community mobility and clothing management  From OT standpoint, recommendation at time of d/c would be home with family support  Goals   Patient Goals to go home today   Short Term Goal  pt will demonstrate (I) level with UB/LB bathing and grooming tasks    Long Term Goal #1 pt will demonstrate toilet transfers and hygiene at (I) level    Long Term Goal #2 pt will increase independence with functional mobility to (I) level with no LOB or dizziness   Plan   Treatment Interventions ADL retraining;Functional transfer training; Endurance training; Activityengagement;Patient/family training   Goal Expiration Date 10/01/19   OT Frequency 3-5x/wk   Recommendation   OT Discharge Recommendation Home with family support   Barthel Index   Feeding 10   Bathing 0   Grooming Score 0   Dressing Score 5   Bladder Score 10   Bowels Score 10   Toilet Use Score 5   Transfers (Bed/Chair) Score 10   Mobility (Level Surface) Score 10   Stairs Score 0   Barthel Index Score 60       Pt will benefit from continued OT services in order to maximize (I) c ADL performance, FM c no device, and improve overall endurance/strength required to complete functional tasks in preparation for d/c  Pt left supine in bed at end of session; all needs within reach; all lines intact

## 2019-09-18 NOTE — PROGRESS NOTES
Patient seen prior to discharge for diet education on hyperlipidemia  Patient states she doesn't follow any special diet  Reviewed cardiac diet with patient and   Reviewed label reading and encouraged patient to decrease saturated fat intake  RD contact information provided for questions

## 2019-09-28 ENCOUNTER — HOSPITAL ENCOUNTER (EMERGENCY)
Facility: HOSPITAL | Age: 64
Discharge: HOME | End: 2019-09-28
Attending: EMERGENCY MEDICINE | Admitting: EMERGENCY MEDICINE
Payer: COMMERCIAL

## 2019-09-28 VITALS
WEIGHT: 244.9 LBS | DIASTOLIC BLOOD PRESSURE: 69 MMHG | BODY MASS INDEX: 35.06 KG/M2 | TEMPERATURE: 98.1 F | OXYGEN SATURATION: 96 % | HEART RATE: 71 BPM | RESPIRATION RATE: 17 BRPM | HEIGHT: 70 IN | SYSTOLIC BLOOD PRESSURE: 152 MMHG
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
## 2019-09-28 DIAGNOSIS — L03.90 CELLULITIS: ICD-10-CM

## 2019-09-28 LAB
ANION GAP SERPL CALCULATED.3IONS-SCNC: 8 MMOL/L (ref 4–13)
BASOPHILS # BLD AUTO: 0.04 THOUSANDS/ΜL (ref 0–0.1)
BASOPHILS NFR BLD AUTO: 1 % (ref 0–1)
BUN SERPL-MCNC: 11 MG/DL (ref 5–25)
CALCIUM SERPL-MCNC: 8.6 MG/DL (ref 8.3–10.1)
CHLORIDE SERPL-SCNC: 106 MMOL/L (ref 100–108)
CO2 SERPL-SCNC: 26 MMOL/L (ref 21–32)
CREAT SERPL-MCNC: 0.87 MG/DL (ref 0.6–1.3)
EOSINOPHIL # BLD AUTO: 0.17 THOUSAND/ΜL (ref 0–0.61)
EOSINOPHIL NFR BLD AUTO: 2 % (ref 0–6)
ERYTHROCYTE [DISTWIDTH] IN BLOOD BY AUTOMATED COUNT: 13.7 % (ref 11.6–15.1)
GFR SERPL CREATININE-BSD FRML MDRD: 72 ML/MIN/1.73SQ M
GLUCOSE SERPL-MCNC: 143 MG/DL (ref 65–140)
HCT VFR BLD AUTO: 40.9 % (ref 34.8–46.1)
HGB BLD-MCNC: 12.4 G/DL (ref 11.5–15.4)
IMM GRANULOCYTES # BLD AUTO: 0.02 THOUSAND/UL (ref 0–0.2)
IMM GRANULOCYTES NFR BLD AUTO: 0 % (ref 0–2)
LYMPHOCYTES # BLD AUTO: 2.66 THOUSANDS/ΜL (ref 0.6–4.47)
LYMPHOCYTES NFR BLD AUTO: 31 % (ref 14–44)
MCH RBC QN AUTO: 28.5 PG (ref 26.8–34.3)
MCHC RBC AUTO-ENTMCNC: 30.3 G/DL (ref 31.4–37.4)
MCV RBC AUTO: 94 FL (ref 82–98)
MONOCYTES # BLD AUTO: 0.75 THOUSAND/ΜL (ref 0.17–1.22)
MONOCYTES NFR BLD AUTO: 9 % (ref 4–12)
NEUTROPHILS # BLD AUTO: 5.05 THOUSANDS/ΜL (ref 1.85–7.62)
NEUTS SEG NFR BLD AUTO: 57 % (ref 43–75)
NRBC BLD AUTO-RTO: 0 /100 WBCS
PLATELET # BLD AUTO: 259 THOUSANDS/UL (ref 149–390)
PMV BLD AUTO: 9.1 FL (ref 8.9–12.7)
POTASSIUM SERPL-SCNC: 4.6 MMOL/L (ref 3.5–5.3)
RBC # BLD AUTO: 4.35 MILLION/UL (ref 3.81–5.12)
SODIUM SERPL-SCNC: 140 MMOL/L (ref 136–145)
WBC # BLD AUTO: 8.69 THOUSAND/UL (ref 4.31–10.16)

## 2019-09-28 PROCEDURE — 99284 EMERGENCY DEPT VISIT MOD MDM: CPT | Performed by: EMERGENCY MEDICINE

## 2019-09-28 PROCEDURE — 36415 COLL VENOUS BLD VENIPUNCTURE: CPT | Performed by: EMERGENCY MEDICINE

## 2019-09-28 PROCEDURE — 85025 COMPLETE CBC W/AUTO DIFF WBC: CPT | Performed by: EMERGENCY MEDICINE

## 2019-09-28 PROCEDURE — 99282 EMERGENCY DEPT VISIT SF MDM: CPT

## 2019-09-28 PROCEDURE — 80048 BASIC METABOLIC PNL TOTAL CA: CPT | Performed by: EMERGENCY MEDICINE

## 2019-09-28 RX ORDER — CEPHALEXIN 500 MG/1
500 CAPSULE ORAL 4 TIMES DAILY
Qty: 28 CAPSULE | Refills: 0 | Status: SHIPPED | OUTPATIENT
Start: 2019-09-28 | End: 2019-10-05

## 2019-09-28 RX ORDER — CEPHALEXIN 250 MG/1
500 CAPSULE ORAL ONCE
Status: COMPLETED | OUTPATIENT
Start: 2019-09-28 | End: 2019-09-28

## 2019-09-28 RX ADMIN — CEPHALEXIN 500 MG: 250 CAPSULE ORAL at 21:17

## 2019-09-29 NOTE — ED PROVIDER NOTES
History  Chief Complaint   Patient presents with   [address]     Pt reports she thinks she was bit by an insect bit her on the back of her lower right leg  Area reddened on posterior right calf     HPI  52-year-old woman presents for evaluation of redness and swelling her right calf  Patient states that she had a like a bug bite earlier in the day  The redness and pain has progressively worsened  No fevers no chills  Denies any numbness in her right lower leg  Denies history of any diabetes, denies history of any MRSA infection  Denies tick bite  Prior to Admission Medications   Prescriptions Last Dose Informant Patient Reported? Taking?    apixaban (ELIQUIS) 5 mg 9/28/2019 at Unknown time  Yes Yes   Sig: Take 5 mg by mouth 2 (two) times a day    aspirin 81 mg chewable tablet 9/28/2019 at Unknown time  Yes Yes   Sig: Chew 81 mg   dicyclomine (BENTYL) 20 mg tablet Not Taking at Unknown time  Yes No   Sig: Take 20 mg by mouth   dofetilide (TIKOSYN) 500 mcg capsule 9/28/2019 at Unknown time  Yes Yes   Sig: Take 500 mcg by mouth 2 (two) times a day   fenofibrate (FENOGLIDE) 40 MG TABS 9/28/2019 at Unknown time  No Yes   Sig: Take 1 tablet (40 mg total) by mouth daily   flecainide (TAMBOCOR) 100 mg tablet 9/28/2019 at Unknown time  No Yes   Sig: Take 1 tablet (100 mg total) by mouth every 12 (twelve) hours   furosemide (LASIX) 20 mg tablet 9/28/2019 at Unknown time  Yes Yes   Sig: Take 20 mg by mouth daily    levothyroxine 125 mcg tablet 9/28/2019 at Unknown time  Yes Yes   Sig: Take 125 mcg by mouth daily    mesalamine (ASACOL) 800 MG EC tablet 9/28/2019 at Unknown time  Yes Yes   Sig: Take 800 mg by mouth 2 (two) times a day    metoprolol succinate (TOPROL-XL) 100 mg 24 hr tablet 9/28/2019 at Unknown time  No Yes   Sig: Take 1 tablet (100 mg total) by mouth every 12 (twelve) hours   omeprazole (PriLOSEC) 20 mg delayed release capsule 9/28/2019 at Unknown time  No Yes   Sig: Take 1 capsule (20 mg total) by mouth daily before breakfast   Patient taking differently: Take 40 mg by mouth 2 (two) times a day    venlafaxine (EFFEXOR XR) 37 5 mg 24 hr capsule 2019 at Unknown time  Yes Yes   Sig: Take 37 5 mg by mouth daily       Facility-Administered Medications: None       Past Medical History:   Diagnosis Date    [facility]fib [facility])     Brain aneurysm     Cardiac disease     [facility] ([facility])     Crohn disease ([address] )     Disease of thyroid gland     GERD (gastroesophageal reflux disease)     Hyperlipidemia     Hypertension        Past Surgical History:   Procedure Laterality Date    APPENDECTOMY      CARDIOVERSION N/A 2019    Procedure: CARDIOVERSION;  Surgeon: Shad Franz MD;  Location: [facility];  Service: Cardiology    CEREBRAL ANEURYSM REPAIR      HYSTERECTOMY      TONSILLECTOMY AND ADENOIDECTOMY         History reviewed  No pertinent family history  I have reviewed and agree with the history as documented  Social History     Tobacco Use    Smoking status: Former Smoker     Last attempt to quit: 2018     Years since quittin 7    Smokeless tobacco: Never Used   Substance Use Topics    Alcohol use: Never     Alcohol/week: 0 0 standard drinks     Frequency: Never     Drinks per session: Patient refused     Binge frequency: Never    Drug use: Never        Review of Systems   Constitutional: Negative  Negative for chills and fever  HENT: Negative  Negative for congestion and sore throat  Eyes: Negative  Negative for discharge and redness  Respiratory: Negative  Negative for chest tightness and shortness of breath  Cardiovascular: Negative  Negative for chest pain and palpitations  Gastrointestinal: Negative  Negative for abdominal pain, nausea and vomiting  Endocrine: Negative  Negative for cold intolerance and polyphagia  Genitourinary: Negative  Negative for difficulty urinating and dysuria  Musculoskeletal: Negative    Negative for arthralgias and back pain  Skin: Positive for color change and wound  Allergic/Immunologic: Negative  Negative for environmental allergies  Neurological: Negative  Negative for dizziness, weakness and headaches  Hematological: Negative  Psychiatric/Behavioral: Negative  Negative for behavioral problems  The patient is not nervous/anxious  All other systems reviewed and are negative  Physical Exam  Physical Exam   Constitutional: She is oriented to person, place, and time  She appears well-developed and well-nourished  No distress  HENT:   Head: Normocephalic and atraumatic  Right Ear: External ear normal    Left Ear: External ear normal    Mouth/Throat: Oropharynx is clear and moist    Eyes: Pupils are equal, round, and reactive to light  Conjunctivae and EOM are normal  Right eye exhibits no discharge  Left eye exhibits no discharge  No scleral icterus  Neck: Normal range of motion  Neck supple  No tracheal deviation present  No thyromegaly present  Cardiovascular: Normal rate, regular rhythm and intact distal pulses  Exam reveals no gallop and no friction rub  No murmur heard  Pulmonary/Chest: Effort normal and breath sounds normal  No stridor  No respiratory distress  She has no wheezes  She has no rales  Abdominal: Soft  Bowel sounds are normal  She exhibits no distension  There is no tenderness  There is no rebound and no guarding  Musculoskeletal: Normal range of motion  She exhibits edema and tenderness  She exhibits no deformity  Right posterior calf shows redness swelling and tenderness to palpation  Neurological: She is alert and oriented to person, place, and time  No cranial nerve deficit  Skin: Skin is warm and dry  No rash noted  She is not diaphoretic  No erythema  Psychiatric: She has a normal mood and affect  Her behavior is normal  Thought content normal    Nursing note and vitals reviewed        Vital Signs  ED Triage Vitals [09/28/19 2009]   Temperature Pulse Respirations Blood Pressure SpO2   98 1 °F (36 7 °C) 71 17 152/69 96 %      Temp Source Heart Rate Source Patient Position - Orthostatic VS BP Location FiO2 (%)   Temporal Monitor Sitting Right arm --      Pain Score       8           Vitals:    09/28/19 2009   BP: 152/69   Pulse: 71   Patient Position - Orthostatic VS: Sitting         Visual Acuity      ED Medications  Medications   cephalexin (KEFLEX) capsule 500 mg (500 mg Oral Given 9/28/19 2117)       Diagnostic Studies  Results Reviewed     Procedure Component Value Units Date/Time    Basic metabolic panel [[id number]]  (Abnormal) Collected:  09/28/19 2046    Lab Status:  Final result Specimen:  Blood from Hand, Right Updated:  09/28/19 2112     Sodium 140 mmol/L      Potassium 4 6 mmol/L      Chloride 106 mmol/L      CO2 26 mmol/L      ANION GAP 8 mmol/L      BUN 11 mg/dL      Creatinine 0 87 mg/dL      Glucose 143 mg/dL      Calcium 8 6 mg/dL      eGFR 72 ml/min/1 73sq m     Narrative:       [place] guidelines for Chronic Kidney Disease (CKD):     Stage 1 with normal or high GFR (GFR > 90 mL/min/1 73 square meters)    Stage 2 Mild CKD (GFR = 60-89 mL/min/1 73 square meters)    Stage 3A Moderate CKD (GFR = 45-59 mL/min/1 73 square meters)    Stage 3B Moderate CKD (GFR = 30-44 mL/min/1 73 square meters)    Stage 4 Severe CKD (GFR = 15-29 mL/min/1 73 square meters)    Stage 5 End Stage CKD (GFR <15 mL/min/1 73 square meters)  Note: GFR calculation is accurate only with a steady state creatinine    CBC and differential [[id number]]  (Abnormal) Collected:  09/28/19 2046    Lab Status:  Final result Specimen:  Blood from Hand, Right Updated:  09/28/19 2050     WBC 8 69 Thousand/uL      RBC 4 35 Million/uL      Hemoglobin 12 4 g/dL      Hematocrit 40 9 %      MCV 94 fL      MCH 28 5 pg      MCHC 30 3 g/dL      RDW 13 7 %      MPV 9 1 fL      Platelets 231 Thousands/uL      nRBC 0 /100 WBCs      Neutrophils Relative 57 % Immat GRANS % 0 %      Lymphocytes Relative 31 %      Monocytes Relative 9 %      Eosinophils Relative 2 %      Basophils Relative 1 %      Neutrophils Absolute 5 05 Thousands/µL      Immature Grans Absolute 0 02 Thousand/uL      Lymphocytes Absolute 2 66 Thousands/µL      Monocytes Absolute 0 75 Thousand/µL      Eosinophils Absolute 0 17 Thousand/µL      Basophils Absolute 0 04 Thousands/µL                  No orders to display              Procedures  Procedures       ED Course  ED Course as of Sep 29 0725   Sat Sep 28, 2019   2115 With normal labs, vital signs start p o  Antibiotics  Started on cephalexin  Patient follow-up the next 2 days for wound recheck she response to therapy  She returns for shortness fevers chills sweats worsening rash  I personally discussed return precautions with this patient and family  I provided the patient with written discharge instructions and particularly highlighted specific areas of interest to this patient, including but not limited to: medications for symptom managment, follow up recommendations, and return precautions  Patient and family are in agreement with this plan as outlined above  MDM  Number of Diagnoses or Management Options  Cellulitis:   Insect bite:   Diagnosis management comments: 62-year-old woman presents with redness and swelling to posterior right calf  Concern for cellulitis  Given how rapidly has progressed, will get some basic labs  She has normal vital signs, she does not have any crepitus in the tissue or pain that is out of proportion with exam   If basic labs are normal, will start p o  Antibiotics and discharged  Disposition  Final diagnoses:   Insect bite   Cellulitis     Time reflects when diagnosis was documented in both MDM as applicable and the Disposition within this note     Time User Action Codes Description Comment    9/28/2019  9:16 PM Rosalind Cisneros  XXXA] Insect bite     9/28/2019 9:16 PM Irais Meyers Add [L77 [id number]] Cellulitis       ED Disposition     ED Disposition Condition Date/Time Comment    Discharge Stable Sat Sep 28, 2019  9:16 PM Rad Valencia discharge to home/self care              Follow-up Information     Follow up With Specialties Details Why Contact Info Additional Information    Moriah Carpenter MD Family Medicine Schedule an appointment as soon as possible for a visit  As needed, If symptoms worsen [address]       [facility] Emergency Medicine Go to  As needed, If symptoms worsen Donny You [id number]  [phone] [facility], [address] [address], [place], [place], [address]          Discharge Medication List as of 9/28/2019  9:18 PM      START taking these medications    Details   cephalexin (KEFLEX) 500 mg capsule Take 1 capsule (500 mg total) by mouth 4 (four) times a day for 7 days, Starting Sat 9/28/2019, Until Sat 10/5/2019, Normal         CONTINUE these medications which have NOT CHANGED    Details   apixaban (ELIQUIS) 5 mg Take 5 mg by mouth 2 (two) times a day , Starting Tue 2/5/2019, Historical Med      aspirin 81 mg chewable tablet Chew 81 mg, Starting Tue 2/19/2019, Historical Med      dofetilide (TIKOSYN) 500 mcg capsule Take 500 mcg by mouth 2 (two) times a day, Historical Med      fenofibrate (FENOGLIDE) 40 MG TABS Take 1 tablet (40 mg total) by mouth daily, Starting Tue 9/17/2019, Normal      flecainide (TAMBOCOR) 100 mg tablet Take 1 tablet (100 mg total) by mouth every 12 (twelve) hours, Starting Thu 2/14/2019, Normal      furosemide (LASIX) 20 mg tablet Take 20 mg by mouth daily , Starting Mon 2/11/2019, Historical Med      levothyroxine 125 mcg tablet Take 125 mcg by mouth daily , Starting Tue 1/22/2019, Historical Med      mesalamine (ASACOL) 800 MG EC tablet Take 800 mg by mouth 2 (two) times a day , Starting Wed 1/9/2019, Historical Med      metoprolol succinate (TOPROL-XL) 100 mg 24 hr tablet Take 1 tablet (100 mg total) by mouth every 12 (twelve) hours, Starting Thu 2/14/2019, Normal      omeprazole (PriLOSEC) 20 mg delayed release capsule Take 1 capsule (20 mg total) by mouth daily before breakfast, Starting Thu 2/14/2019, No Print      venlafaxine (EFFEXOR XR) 37 5 mg 24 hr capsule Take 37 5 mg by mouth daily , Starting Mon 12/31/2018, Historical Med      dicyclomine (BENTYL) 20 mg tablet Take 20 mg by mouth, Starting Fri 11/2/2018, Historical Med           No discharge procedures on file      ED Provider  Electronically Signed by           Holden Pitt MD  09/29/19 9658

## 2019-09-29 NOTE — DISCHARGE INSTRUCTIONS
Please follow-up with the primary care provider to reassess small  Anything changes or worsens including fevers chills increasing pain or any other concerns, please return emergency department

## 2019-11-25 ENCOUNTER — TRANSCRIBE ORDERS (OUTPATIENT)
Dept: ADMINISTRATIVE | Facility: HOSPITAL | Age: 64
End: 2019-11-25

## 2019-11-25 DIAGNOSIS — Z98.890 STATUS POST ANEURYSM REPAIR: Primary | ICD-10-CM

## 2019-11-25 DIAGNOSIS — Z86.79 S/P CEREBRAL ANEURYSM REPAIR: ICD-10-CM

## 2019-11-25 DIAGNOSIS — Z86.79 STATUS POST ANEURYSM REPAIR: Primary | ICD-10-CM

## 2019-11-25 DIAGNOSIS — Z98.890 S/P CEREBRAL ANEURYSM REPAIR: ICD-10-CM

## 2019-11-29 ENCOUNTER — HOSPITAL ENCOUNTER (INPATIENT)
Facility: HOSPITAL | Age: 64
LOS: 5 days | Discharge: HOME | DRG: 193 | End: 2019-12-04
Attending: EMERGENCY MEDICINE | Admitting: FAMILY MEDICINE
Payer: COMMERCIAL

## 2019-11-29 ENCOUNTER — APPOINTMENT (EMERGENCY)
Dept: RADIOLOGY | Facility: HOSPITAL | Age: 64
DRG: 193 | End: 2019-11-29
Payer: COMMERCIAL

## 2019-11-29 ENCOUNTER — APPOINTMENT (EMERGENCY)
Dept: CT IMAGING | Facility: HOSPITAL | Age: 64
DRG: 193 | End: 2019-11-29
Payer: COMMERCIAL

## 2019-11-29 DIAGNOSIS — J44.1 COPD EXACERBATION (HCC): ICD-10-CM

## 2019-11-29 DIAGNOSIS — A41.9 SEPSIS (HCC): ICD-10-CM

## 2019-11-29 DIAGNOSIS — J18.9 PNEUMONIA: Primary | ICD-10-CM

## 2019-11-29 DIAGNOSIS — R09.02 HYPOXIA: ICD-10-CM

## 2019-11-29 DIAGNOSIS — E87.6 ACUTE HYPOKALEMIA: ICD-10-CM

## 2019-11-29 PROBLEM — R79.89 ELEVATED D-DIMER: Status: ACTIVE | Noted: 2019-11-29

## 2019-11-29 LAB
ANION GAP SERPL CALCULATED.3IONS-SCNC: 11 MMOL/L (ref 4–13)
BASOPHILS # BLD AUTO: 0.02 THOUSANDS/ΜL (ref 0–0.1)
BASOPHILS NFR BLD AUTO: 0 % (ref 0–1)
BUN SERPL-MCNC: 13 MG/DL (ref 5–25)
CALCIUM SERPL-MCNC: 8.6 MG/DL (ref 8.3–10.1)
CHLORIDE SERPL-SCNC: 104 MMOL/L (ref 100–108)
CO2 SERPL-SCNC: 26 MMOL/L (ref 21–32)
CREAT SERPL-MCNC: 1.03 MG/DL (ref 0.6–1.3)
D DIMER PPP FEU-MCNC: 1.11 UG/ML FEU
EOSINOPHIL # BLD AUTO: 0.07 THOUSAND/ΜL (ref 0–0.61)
EOSINOPHIL NFR BLD AUTO: 1 % (ref 0–6)
ERYTHROCYTE [DISTWIDTH] IN BLOOD BY AUTOMATED COUNT: 14.5 % (ref 11.6–15.1)
FLUAV RNA NPH QL NAA+PROBE: NORMAL
FLUBV RNA NPH QL NAA+PROBE: NORMAL
GFR SERPL CREATININE-BSD FRML MDRD: 58 ML/MIN/1.73SQ M
GLUCOSE SERPL-MCNC: 124 MG/DL (ref 65–140)
HCT VFR BLD AUTO: 40.6 % (ref 34.8–46.1)
HGB BLD-MCNC: 12.6 G/DL (ref 11.5–15.4)
IMM GRANULOCYTES # BLD AUTO: 0.02 THOUSAND/UL (ref 0–0.2)
IMM GRANULOCYTES NFR BLD AUTO: 0 % (ref 0–2)
LYMPHOCYTES # BLD AUTO: 1.68 THOUSANDS/ΜL (ref 0.6–4.47)
LYMPHOCYTES NFR BLD AUTO: 22 % (ref 14–44)
MCH RBC QN AUTO: 28.4 PG (ref 26.8–34.3)
MCHC RBC AUTO-ENTMCNC: 31 G/DL (ref 31.4–37.4)
MCV RBC AUTO: 91 FL (ref 82–98)
MONOCYTES # BLD AUTO: 0.76 THOUSAND/ΜL (ref 0.17–1.22)
MONOCYTES NFR BLD AUTO: 10 % (ref 4–12)
NEUTROPHILS # BLD AUTO: 5.22 THOUSANDS/ΜL (ref 1.85–7.62)
NEUTS SEG NFR BLD AUTO: 67 % (ref 43–75)
NRBC BLD AUTO-RTO: 0 /100 WBCS
NT-PROBNP SERPL-MCNC: 213 PG/ML
PLATELET # BLD AUTO: 243 THOUSANDS/UL (ref 149–390)
PMV BLD AUTO: 9.4 FL (ref 8.9–12.7)
POTASSIUM SERPL-SCNC: 3.1 MMOL/L (ref 3.5–5.3)
RBC # BLD AUTO: 4.44 MILLION/UL (ref 3.81–5.12)
RSV RNA NPH QL NAA+PROBE: NORMAL
SODIUM SERPL-SCNC: 141 MMOL/L (ref 136–145)
TROPONIN I SERPL-MCNC: <0.02 NG/ML
WBC # BLD AUTO: 7.77 THOUSAND/UL (ref 4.31–10.16)

## 2019-11-29 PROCEDURE — 84484 ASSAY OF TROPONIN QUANT: CPT | Performed by: EMERGENCY MEDICINE

## 2019-11-29 PROCEDURE — 87449 NOS EACH ORGANISM AG IA: CPT | Performed by: PHYSICIAN ASSISTANT

## 2019-11-29 PROCEDURE — 87205 SMEAR GRAM STAIN: CPT | Performed by: PHYSICIAN ASSISTANT

## 2019-11-29 PROCEDURE — 84145 PROCALCITONIN (PCT): CPT | Performed by: PHYSICIAN ASSISTANT

## 2019-11-29 PROCEDURE — 36415 COLL VENOUS BLD VENIPUNCTURE: CPT | Performed by: EMERGENCY MEDICINE

## 2019-11-29 PROCEDURE — 93005 ELECTROCARDIOGRAM TRACING: CPT

## 2019-11-29 PROCEDURE — 99285 EMERGENCY DEPT VISIT HI MDM: CPT

## 2019-11-29 PROCEDURE — 99285 EMERGENCY DEPT VISIT HI MDM: CPT | Performed by: EMERGENCY MEDICINE

## 2019-11-29 PROCEDURE — 87070 CULTURE OTHR SPECIMN AEROBIC: CPT | Performed by: PHYSICIAN ASSISTANT

## 2019-11-29 PROCEDURE — 71275 CT ANGIOGRAPHY CHEST: CPT

## 2019-11-29 PROCEDURE — 87631 RESP VIRUS 3-5 TARGETS: CPT | Performed by: EMERGENCY MEDICINE

## 2019-11-29 PROCEDURE — 85025 COMPLETE CBC W/AUTO DIFF WBC: CPT | Performed by: EMERGENCY MEDICINE

## 2019-11-29 PROCEDURE — 83880 ASSAY OF NATRIURETIC PEPTIDE: CPT | Performed by: EMERGENCY MEDICINE

## 2019-11-29 PROCEDURE — 80048 BASIC METABOLIC PNL TOTAL CA: CPT | Performed by: EMERGENCY MEDICINE

## 2019-11-29 PROCEDURE — 85379 FIBRIN DEGRADATION QUANT: CPT | Performed by: EMERGENCY MEDICINE

## 2019-11-29 PROCEDURE — 99222 1ST HOSP IP/OBS MODERATE 55: CPT | Performed by: PHYSICIAN ASSISTANT

## 2019-11-29 PROCEDURE — 71046 X-RAY EXAM CHEST 2 VIEWS: CPT

## 2019-11-29 RX ORDER — MESALAMINE 800 MG/1
800 TABLET, DELAYED RELEASE ORAL
COMMUNITY
Start: 2019-10-23 | End: 2019-12-04 | Stop reason: HOSPADM

## 2019-11-29 RX ORDER — VENLAFAXINE HYDROCHLORIDE 37.5 MG/1
37.5 CAPSULE, EXTENDED RELEASE ORAL DAILY
Status: DISCONTINUED | OUTPATIENT
Start: 2019-11-30 | End: 2019-12-04 | Stop reason: HOSPADM

## 2019-11-29 RX ORDER — ASPIRIN 81 MG/1
TABLET ORAL
COMMUNITY
Start: 2019-08-15 | End: 2019-12-04 | Stop reason: HOSPADM

## 2019-11-29 RX ORDER — FLECAINIDE ACETATE 100 MG/1
100 TABLET ORAL EVERY 12 HOURS SCHEDULED
Status: DISCONTINUED | OUTPATIENT
Start: 2019-11-29 | End: 2019-12-04 | Stop reason: HOSPADM

## 2019-11-29 RX ORDER — SODIUM CHLORIDE 9 MG/ML
3 INJECTION INTRAVENOUS AS NEEDED
Status: DISCONTINUED | OUTPATIENT
Start: 2019-11-29 | End: 2019-12-04 | Stop reason: HOSPADM

## 2019-11-29 RX ORDER — OMEPRAZOLE 40 MG/1
40 CAPSULE, DELAYED RELEASE ORAL
COMMUNITY
Start: 2019-10-31 | End: 2019-12-04 | Stop reason: HOSPADM

## 2019-11-29 RX ORDER — POTASSIUM CHLORIDE 20 MEQ/1
40 TABLET, EXTENDED RELEASE ORAL ONCE
Status: COMPLETED | OUTPATIENT
Start: 2019-11-29 | End: 2019-11-29

## 2019-11-29 RX ORDER — ONDANSETRON 2 MG/ML
4 INJECTION INTRAMUSCULAR; INTRAVENOUS EVERY 6 HOURS PRN
Status: DISCONTINUED | OUTPATIENT
Start: 2019-11-29 | End: 2019-12-04 | Stop reason: HOSPADM

## 2019-11-29 RX ORDER — DOFETILIDE 0.25 MG/1
500 CAPSULE ORAL 2 TIMES DAILY
Status: DISCONTINUED | OUTPATIENT
Start: 2019-11-29 | End: 2019-11-30

## 2019-11-29 RX ORDER — BENZONATATE 100 MG/1
100 CAPSULE ORAL ONCE
Status: COMPLETED | OUTPATIENT
Start: 2019-11-29 | End: 2019-11-29

## 2019-11-29 RX ORDER — PANTOPRAZOLE SODIUM 40 MG/1
40 TABLET, DELAYED RELEASE ORAL
Status: DISCONTINUED | OUTPATIENT
Start: 2019-11-30 | End: 2019-12-04 | Stop reason: HOSPADM

## 2019-11-29 RX ORDER — FENOFIBRATE 40 MG/1
1 TABLET ORAL
COMMUNITY
Start: 2019-10-23 | End: 2020-02-29 | Stop reason: HOSPADM

## 2019-11-29 RX ORDER — ASPIRIN 81 MG/1
81 TABLET, CHEWABLE ORAL DAILY
Status: DISCONTINUED | OUTPATIENT
Start: 2019-11-30 | End: 2019-12-04 | Stop reason: HOSPADM

## 2019-11-29 RX ORDER — FENOFIBRATE 40 MG/1
40 TABLET ORAL DAILY
Status: DISCONTINUED | OUTPATIENT
Start: 2019-11-30 | End: 2019-11-30

## 2019-11-29 RX ORDER — METOPROLOL SUCCINATE 100 MG/1
100 TABLET, EXTENDED RELEASE ORAL EVERY 12 HOURS
Status: DISCONTINUED | OUTPATIENT
Start: 2019-11-29 | End: 2019-12-04 | Stop reason: HOSPADM

## 2019-11-29 RX ORDER — DOXYCYCLINE HYCLATE 100 MG/1
100 CAPSULE ORAL
COMMUNITY
Start: 2019-11-27 | End: 2019-12-04 | Stop reason: HOSPADM

## 2019-11-29 RX ORDER — METOPROLOL SUCCINATE 100 MG/1
100 TABLET, EXTENDED RELEASE ORAL
COMMUNITY
Start: 2019-07-22 | End: 2019-12-04 | Stop reason: HOSPADM

## 2019-11-29 RX ORDER — LEVOTHYROXINE SODIUM 0.12 MG/1
125 TABLET ORAL DAILY
Status: DISCONTINUED | OUTPATIENT
Start: 2019-11-30 | End: 2019-12-04 | Stop reason: HOSPADM

## 2019-11-29 RX ORDER — ALBUTEROL SULFATE 90 UG/1
2 AEROSOL, METERED RESPIRATORY (INHALATION)
Status: ON HOLD | COMMUNITY
Start: 2019-10-03 | End: 2020-02-29 | Stop reason: SDUPTHER

## 2019-11-29 RX ORDER — CEFTRIAXONE SODIUM 2 G/50ML
2000 INJECTION, SOLUTION INTRAVENOUS ONCE
Status: COMPLETED | OUTPATIENT
Start: 2019-11-29 | End: 2019-11-29

## 2019-11-29 RX ORDER — LEVOTHYROXINE SODIUM 0.15 MG/1
150 TABLET ORAL
COMMUNITY
Start: 2019-10-15 | End: 2019-12-04 | Stop reason: HOSPADM

## 2019-11-29 RX ORDER — FUROSEMIDE 20 MG/1
TABLET ORAL
COMMUNITY
Start: 2019-08-05 | End: 2019-12-04 | Stop reason: HOSPADM

## 2019-11-29 RX ORDER — CEFTRIAXONE 1 G/50ML
1000 INJECTION, SOLUTION INTRAVENOUS EVERY 24 HOURS
Status: DISCONTINUED | OUTPATIENT
Start: 2019-11-30 | End: 2019-12-01

## 2019-11-29 RX ORDER — FUROSEMIDE 20 MG/1
20 TABLET ORAL DAILY
Status: DISCONTINUED | OUTPATIENT
Start: 2019-11-30 | End: 2019-12-04 | Stop reason: HOSPADM

## 2019-11-29 RX ADMIN — ALBUTEROL SULFATE 5 MG: 2.5 SOLUTION RESPIRATORY (INHALATION) at 17:14

## 2019-11-29 RX ADMIN — BENZONATATE 100 MG: 100 CAPSULE ORAL at 22:58

## 2019-11-29 RX ADMIN — FLECAINIDE ACETATE 100 MG: 100 TABLET ORAL at 22:57

## 2019-11-29 RX ADMIN — IOHEXOL 85 ML: 350 INJECTION, SOLUTION INTRAVENOUS at 18:42

## 2019-11-29 RX ADMIN — IPRATROPIUM BROMIDE 0.5 MG: 0.5 SOLUTION RESPIRATORY (INHALATION) at 17:14

## 2019-11-29 RX ADMIN — CEFTRIAXONE SODIUM 2000 MG: 2 INJECTION, SOLUTION INTRAVENOUS at 19:30

## 2019-11-29 RX ADMIN — METOPROLOL SUCCINATE 100 MG: 100 TABLET, FILM COATED, EXTENDED RELEASE ORAL at 22:58

## 2019-11-29 RX ADMIN — APIXABAN 5 MG: 5 TABLET, FILM COATED ORAL at 22:58

## 2019-11-29 RX ADMIN — POTASSIUM CHLORIDE 40 MEQ: 1500 TABLET, EXTENDED RELEASE ORAL at 18:18

## 2019-11-29 NOTE — ED PROVIDER NOTES
History  Chief Complaint   Patient presents with    Chest Pain     intermittent cp for the past two days, accompinied with cough and sob  patient currently wearing a holter monitor  Patient complains of subjective fever, sore throat, HA, dry cough, myalgias, arthralgias and general malaise for the past few days which culminated in substernal chest tightness today while ambulating in [employer]  The chest tightness/pain is reproducible with coughing but also occurs at rest   She did see her cardiologist earlier this week and was given "an antibiotic for flu" without relief  She did not get flu shot this season  She does have a h/o asthma but only used her inhaler once yesterday since it made her vomit  She has asthma/COPD from years of smoking; she quit 2 years ago  She was admitted in September for similar symptoms and found to have paroxysmal atrial fibrillation for which she is currently wearing a loop recorder  No recent travel or sick contacts w/similar symptoms  Denies neck pain/stiffness, SOB, abdominal pain, persistent n/v/d  12 system ROS o/w negative                     History provided by:  Patient and medical records  Chest Pain   Pain location:  Substernal area  Pain quality: sharp and tightness    Pain radiates to:  Does not radiate  Pain radiates to the back: no    Pain severity:  Mild  Onset quality:  Sudden  Duration:  4 hours  Timing:  Intermittent  Progression:  Unchanged  Chronicity:  New  Context: at rest    Context: not breathing, no drug use, not eating, not lifting, no movement, not raising an arm, no stress and no trauma    Context comment:  While coughing  Relieved by:  Rest  Worsened by:  Coughing  Ineffective treatments:  None tried  Associated symptoms: cough, headache and vomiting (Once-after using her inhaler)    Associated symptoms: no abdominal pain, no altered mental status, no anxiety, no back pain, no claudication, no diaphoresis, no dizziness, no fatigue, no fever, no lower extremity edema, no nausea, no near-syncope, no numbness, no palpitations, no shortness of breath, no syncope and no weakness    Cough:     Cough characteristics:  Non-productive    Severity:  Moderate    Onset quality:  Gradual    Duration:  3 days    Timing:  Intermittent    Progression:  Unchanged  Risk factors: high cholesterol, hypertension, obesity and smoking (Former, quit 2 years ago)    Risk factors: not male, not pregnant and no prior DVT/PE        Prior to Admission Medications   Prescriptions Last Dose Informant Patient Reported? Taking?    apixaban (ELIQUIS) 5 mg 11/29/2019 at Unknown time  Yes Yes   Sig: Take 5 mg by mouth 2 (two) times a day    aspirin 81 mg chewable tablet 11/29/2019 at Unknown time  Yes Yes   Sig: Chew 81 mg   dicyclomine (BENTYL) 20 mg tablet Not Taking at Unknown time  Yes No   Sig: Take 20 mg by mouth   dofetilide (TIKOSYN) 500 mcg capsule 11/29/2019 at Unknown time  Yes Yes   Sig: Take 500 mcg by mouth 2 (two) times a day   doxycycline hyclate (VIBRAMYCIN) 100 mg capsule 11/29/2019 at Unknown time  Yes Yes   Sig: Take 100 mg by mouth   fenofibrate (FENOGLIDE) 40 MG TABS 11/29/2019 at Unknown time  No Yes   Sig: Take 1 tablet (40 mg total) by mouth daily   flecainide (TAMBOCOR) 100 mg tablet 11/29/2019 at Unknown time  No Yes   Sig: Take 1 tablet (100 mg total) by mouth every 12 (twelve) hours   furosemide (LASIX) 20 mg tablet 11/29/2019 at Unknown time  Yes Yes   Sig: Take 20 mg by mouth daily    levothyroxine 125 mcg tablet 11/29/2019 at Unknown time  Yes Yes   Sig: Take 125 mcg by mouth daily    mesalamine (ASACOL) 800 MG EC tablet 11/29/2019 at Unknown time  Yes Yes   Sig: Take 800 mg by mouth 2 (two) times a day    metoprolol succinate (TOPROL-XL) 100 mg 24 hr tablet 11/29/2019 at Unknown time  No Yes   Sig: Take 1 tablet (100 mg total) by mouth every 12 (twelve) hours   omeprazole (PriLOSEC) 20 mg delayed release capsule 11/29/2019 at Unknown time  No Yes   Sig: Take 1 capsule (20 mg total) by mouth daily before breakfast   Patient taking differently: Take 40 mg by mouth 2 (two) times a day    venlafaxine (EFFEXOR XR) 37 5 mg 24 hr capsule 2019 at Unknown time  Yes Yes   Sig: Take 37 5 mg by mouth daily       Facility-Administered Medications: None       Past Medical History:   Diagnosis Date    A-fib [facility])     Brain aneurysm     Cancer ([place] [address] )     papillary thyroid cancer    Cardiac disease     CHF (congestive heart failure) ([place] [address] )     Crohn disease ([place] [address] )     Disease of thyroid gland     GERD (gastroesophageal reflux disease)     Hyperlipidemia     Hypertension        Past Surgical History:   Procedure Laterality Date    APPENDECTOMY      CARDIOVERSION N/A 2019    Procedure: CARDIOVERSION;  Surgeon: Shad Franz MD;  [facility];  Service: Cardiology    CEREBRAL ANEURYSM REPAIR      CHOLECYSTECTOMY      HYSTERECTOMY      THYROIDECTOMY      TONSILLECTOMY AND ADENOIDECTOMY         History reviewed  No pertinent family history  I have reviewed and agree with the history as documented  Social History     Tobacco Use    Smoking status: Former Smoker     Last attempt to quit: 2018     Years since quittin 9    Smokeless tobacco: Never Used   Substance Use Topics    Alcohol use: Never     Alcohol/week: 0 0 standard drinks     Frequency: Never     Drinks per session: Patient refused     Binge frequency: Never    Drug use: Never        Review of Systems   Constitutional: Positive for chills  Negative for diaphoresis, fatigue and fever  HENT: Positive for congestion, rhinorrhea and sore throat  Eyes: Negative for discharge and redness  Respiratory: Positive for cough and chest tightness  Negative for shortness of breath  Cardiovascular: Positive for chest pain  Negative for palpitations, claudication, leg swelling, syncope and near-syncope  Gastrointestinal: Positive for vomiting (Once-after using her inhaler)   Negative for abdominal distention, abdominal pain, constipation, diarrhea and nausea  Genitourinary: Negative for difficulty urinating, dysuria, flank pain, frequency, hematuria and urgency  Musculoskeletal: Positive for arthralgias and myalgias  Negative for back pain, neck pain and neck stiffness  Skin: Negative for pallor and rash  Neurological: Positive for headaches  Negative for dizziness, syncope, weakness, light-headedness and numbness  Hematological: Negative for adenopathy  Psychiatric/Behavioral: Negative for agitation and confusion  All other systems reviewed and are negative  Physical Exam  Physical Exam   Constitutional: She is oriented to person, place, and time  She appears well-developed and well-nourished  Non-toxic appearance  She appears ill  No distress  HENT:   Head: Normocephalic  Eyes: Pupils are equal, round, and reactive to light  Conjunctivae and EOM are normal  No scleral icterus  Neck: Normal range of motion  Neck supple  Cardiovascular: Normal rate, regular rhythm, intact distal pulses and normal pulses  No murmur heard  Pulmonary/Chest: Effort normal  No accessory muscle usage  No tachypnea  No respiratory distress  She has wheezes in the right upper field, the right middle field, the left upper field and the left middle field  Abdominal: Soft  Bowel sounds are normal  She exhibits no distension  There is no tenderness  obese   Musculoskeletal: Normal range of motion  She exhibits no tenderness  Right lower leg: She exhibits edema (trace)  She exhibits no tenderness  Left lower leg: She exhibits edema (trace)  She exhibits no tenderness  Lymphadenopathy:     She has no cervical adenopathy  Neurological: She is alert and oriented to person, place, and time  She has normal reflexes  No cranial nerve deficit or sensory deficit  She exhibits normal muscle tone  Skin: Skin is warm and dry  No rash noted  She is not diaphoretic  No erythema   No pallor  Psychiatric: She has a normal mood and affect  Her behavior is normal  Thought content normal    Vitals reviewed        Vital Signs  ED Triage Vitals [11/29/19 1639]   Temperature Pulse Respirations Blood Pressure SpO2   99 4 °F (37 4 °C) 88 22 130/81 (!) 86 %      Temp Source Heart Rate Source Patient Position - Orthostatic VS BP Location FiO2 (%)   Temporal Monitor Sitting Right arm --      Pain Score       No Pain           Vitals:    11/29/19 1639 11/29/19 1830 11/29/19 1845 11/29/19 1900   BP: 130/81  131/60 121/59   Pulse: 88 91  83   Patient Position - Orthostatic VS: Sitting            Visual Acuity      ED Medications  Medications   sodium chloride (PF) 0 9 % injection 3 mL (has no administration in time range)   cefTRIAXone (ROCEPHIN) IVPB (premix) 2,000 mg (has no administration in time range)   albuterol inhalation solution 5 mg (5 mg Nebulization Given 11/29/19 1714)   ipratropium (ATROVENT) 0 02 % inhalation solution 0 5 mg (0 5 mg Nebulization Given 11/29/19 1714)   potassium chloride (K-DUR,KLOR-CON) CR tablet 40 mEq (40 mEq Oral Given 11/29/19 1818)   iohexol (OMNIPAQUE) 350 MG/ML injection (SINGLE-DOSE) 85 mL (85 mL Intravenous Given 11/29/19 1842)       Diagnostic Studies  Results Reviewed     Procedure Component Value Units Date/Time    Influenza A/B and RSV PCR [[id number]]  (Normal) Collected:  11/29/19 1729    Lab Status:  Final result Specimen:  Nose Updated:  11/29/19 1824     INFLUENZA A PCR None Detected     INFLUENZA B PCR None Detected     RSV PCR None Detected    NT-BNP PRO [[id number]]  (Abnormal) Collected:  11/29/19 1708    Lab Status:  Final result Specimen:  Blood from Arm, Right Updated:  11/29/19 1741     NT-proBNP 213 pg/mL     D-dimer, quantitative [[id number]]  (Abnormal) Collected:  11/29/19 1708    Lab Status:  Final result Specimen:  Blood from Arm, Right Updated:  11/29/19 1737     D-Dimer, Quant 1 11 ug/ml FEU     Troponin I [[id number]]  (Normal) Collected: 11/29/19 1708    Lab Status:  Final result Specimen:  Blood from Arm, Right Updated:  11/29/19 1737     Troponin I <0 02 ng/mL     Basic metabolic panel [[id number]]  (Abnormal) Collected:  11/29/19 1708    Lab Status:  Final result Specimen:  Blood from Arm, Right Updated:  11/29/19 1727     Sodium 141 mmol/L      Potassium 3 1 mmol/L      Chloride 104 mmol/L      CO2 26 mmol/L      ANION GAP 11 mmol/L      BUN 13 mg/dL      Creatinine 1 03 mg/dL      Glucose 124 mg/dL      Calcium 8 6 mg/dL      eGFR 58 ml/min/1 73sq m     Narrative:       [place] guidelines for Chronic Kidney Disease (CKD):     Stage 1 with normal or high GFR (GFR > 90 mL/min/1 73 square meters)    Stage 2 Mild CKD (GFR = 60-89 mL/min/1 73 square meters)    Stage 3A Moderate CKD (GFR = 45-59 mL/min/1 73 square meters)    Stage 3B Moderate CKD (GFR = 30-44 mL/min/1 73 square meters)    Stage 4 Severe CKD (GFR = 15-29 mL/min/1 73 square meters)    Stage 5 End Stage CKD (GFR <15 mL/min/1 73 square meters)  Note: GFR calculation is accurate only with a steady state creatinine    CBC and differential [[id number]]  (Abnormal) Collected:  11/29/19 1708    Lab Status:  Final result Specimen:  Blood from Arm, Right Updated:  11/29/19 1718     WBC 7 77 Thousand/uL      RBC 4 44 Million/uL      Hemoglobin 12 6 g/dL      Hematocrit 40 6 %      MCV 91 fL      MCH 28 4 pg      MCHC 31 0 g/dL      RDW 14 5 %      MPV 9 4 fL      Platelets 582 Thousands/uL      nRBC 0 /100 WBCs      Neutrophils Relative 67 %      Immat GRANS % 0 %      Lymphocytes Relative 22 %      Monocytes Relative 10 %      Eosinophils Relative 1 %      Basophils Relative 0 %      Neutrophils Absolute 5 22 Thousands/µL      Immature Grans Absolute 0 02 Thousand/uL      Lymphocytes Absolute 1 68 Thousands/µL      Monocytes Absolute 0 76 Thousand/µL      Eosinophils Absolute 0 07 Thousand/µL      Basophils Absolute 0 02 Thousands/µL                  CTA ED chest PE study   Final Result by Sara Carpio MD (11/29 2916)      Patchy right upper lobe and right upper lower lobe airspace disease in keeping with multilobar pneumonia  Chronic moderate mediastinal adenopathy unchanged from 3/1/2019 study  Workstation performed: [id number]         X-ray chest 2 views   Final Result by Jennie Hilliard DO (11/29 0353)      No acute cardiopulmonary disease  Workstation performed: [id number]                    Procedures  ECG 12 Lead Documentation Only  Date/Time: 11/29/2019 4:39 PM  Performed by: James Shoemaker DO  Authorized by: James Shoemaker DO     Indications / Diagnosis:  Chest pain  ECG reviewed by me, the ED Provider: yes    Patient location:  ED  Interpretation:     Interpretation: normal    Rate:     ECG rate:  90    ECG rate assessment: normal    Rhythm:     Rhythm: sinus rhythm    Ectopy:     Ectopy: none    QRS:     QRS axis:  Normal  Conduction:     Conduction: normal    ST segments:     ST segments:  Normal  T waves:     T waves: normal      CriticalCare Time  Performed by: James Shoemaker DO  Authorized by:  James Shoemaker DO     Critical care provider statement:     Critical care time (minutes):  30    Critical care was necessary to treat or prevent imminent or life-threatening deterioration of the following conditions:  Respiratory failure and sepsis    Critical care was time spent personally by me on the following activities:  Obtaining history from patient or surrogate, development of treatment plan with patient or surrogate, discussions with consultants, evaluation of patient's response to treatment, examination of patient, ordering and performing treatments and interventions, ordering and review of laboratory studies, ordering and review of radiographic studies, re-evaluation of patient's condition and review of old charts    I assumed direction of critical care for this patient from another provider in my specialty: no ED Course  ED Course as of Nov 29 1933   Fri Nov 29, 2019   1656 After my physical exam, the patient complained of recurrence of sharp, substernal chest pain  A repeat EKG was performed at that time without change  1730 4 0-4 6 two months ago  Will replete  Potassium(!): 3 1   1909 Previously over 1000  NT-proBNP(!): 213         HEART Risk Score      Most Recent Value   History  0 Filed at: 11/29/2019 1657   ECG  0 Filed at: 11/29/2019 1657   Age  1 Filed at: 11/29/2019 1657   Risk Factors  1 Filed at: 11/29/2019 1657   Troponin  0 Filed at: 11/29/2019 1657   Heart Score Risk Calculator   History  0 Filed at: 11/29/2019 1657   ECG  0 Filed at: 11/29/2019 1657   Age  1 Filed at: 11/29/2019 1657   Risk Factors  1 Filed at: 11/29/2019 1657   Troponin  0 Filed at: 11/29/2019 1657   HEART Score  2 Filed at: 11/29/2019 1657   HEART Score  2 Filed at: 11/29/2019 1657            PERC Rule for PE      Most Recent Value   PERC Rule for PE   Age >=50  1 Filed at: 11/29/2019 1658   HR >=100  0 Filed at: 11/29/2019 1658   O2 Sat on room air < 95%  1 Filed at: 11/29/2019 1658   History of PE or DVT  0 Filed at: 11/29/2019 1658   Recent trauma or surgery  0 Filed at: 11/29/2019 1658   Hemoptysis  0 Filed at: 11/29/2019 1658   Exogenous estrogen  0 Filed at: 11/29/2019 1658   Unilateral leg swelling  0 Filed at: 11/29/2019 [facility] for PE Results  2 Filed at: 11/29/2019 1658          Initial Sepsis Screening     Row Name 11/29/19 1911                Is the patient's history suggestive of a new or worsening infection? (!) Yes (Proceed)  -AC        Suspected source of infection  pneumonia  -AC        Are two or more of the following signs & symptoms of infection both present and new to the patient?   (!) Yes (Proceed)  -AC        Indicate SIRS criteria  Tachycardia > 90 bpm;Tachypnea > 20 resp per min  -AC        If the answer is yes to both questions, suspicion of sepsis is present          If severe sepsis is present AND tissue hypoperfusion perists in the hour after fluid resuscitation or lactate > 4, the patient meets criteria for SEPTIC SHOCK          Are any of the following organ dysfunction criteria present within 6 hours of suspected infection and SIRS criteria that are NOT considered to be chronic conditions? No  -AC        Organ dysfunction          Date of presentation of severe sepsis          Time of presentation of severe sepsis          Tissue hypoperfusion persists in the hour after crystalloid fluid administration, evidenced, by either:          Was hypotension present within one hour of the conclusion of crystalloid fluid administration?         Date of presentation of septic shock          Time of presentation of septic shock            User Key  (r) = Recorded By, (t) = Taken By, (c) = Cosigned By    [address] Name Provider Type    [address], DO Physician                  MDM  Number of Diagnoses or Management Options  Diagnosis management comments: DDx: Flu-like symptoms/chest pain/hypozia - flu, other viral syndrome, pneumonia, PE, doubt ACS/MI, pneumothorax  A/P: Will check flu, cardiac workup, treat symptoms, reevaluate for further work up and disposition         Amount and/or Complexity of Data Reviewed  Clinical lab tests: ordered and reviewed  Tests in the radiology section of CPT®: ordered and reviewed  Review and summarize past medical records: yes        Disposition  Final diagnoses:   Pneumonia - multilobar, right   Hypoxia   Acute hypokalemia   Sepsis (not severe sepsis or septic shock on admission)     Time reflects when diagnosis was documented in both MDM as applicable and the Disposition within this note     Time User Action Codes Description Comment    11/29/2019  7:12 PM [address] [place] [address],[facility]  [address], [address] [J18 9] Pneumonia     11/29/2019  7:12 PM [address] [place] [address],[facility]  [address], [facility] [J18 9] Pneumonia - multilobar, right     11/29/2019  7:13 PM Nerissa Plum Add [R09 02] Hypoxia     11/29/2019  7:13 PM [place], [address] [E87 6] Acute hypokalemia     11/29/2019  7:32 PM Eilleen Meline Add [A41 9] Sepsis ([facility] )     11/29/2019  7:32 PM Eilleen Meline Remove [A41 9] Sepsis ([facility] )     11/29/2019  7:32 PM [place], [address] [A41 9] Sepsis ([place] [address] )     11/29/2019  7:33 PM Earl [place] [facility] [address] [A41 9] Sepsis (not severe sepsis or septic shock on admission)       ED Disposition     ED Disposition Condition Date/Time Comment    Admit Stable Fri Nov 29, 2019  7:22 PM Case was discussed with Fouzia Jorge PA-C and the patient's admission status was agreed to be Admission Status: inpatient status to the service of Dr Jacky Sterling   Follow-up Information    None         Patient's Medications   Discharge Prescriptions    No medications on file     No discharge procedures on file      ED Provider  Electronically Signed by           Roxana Ochoa DO  11/29/19 Ovi Alvarenga DO  11/29/19 Ovi Alvarenga,   11/29/19 1933

## 2019-11-29 NOTE — CASE MANAGEMENT
I self referred the patient to discuss resources that might be available to her  The patient denied needing any help at this time  The patient lives in a two story house with her daughter and sister  There is eight steps to get into the home and one flight of steps inside the home  She does not receive meals on wheels or home health services at this time  She takes care of her own ADL's and she drives  She uses [facility] in [place]  She has no trouble getting or paying for her medications  She has [employer]  The patient stated she did call her PCP prior to coming to the ED today

## 2019-11-30 ENCOUNTER — APPOINTMENT (INPATIENT)
Dept: NON INVASIVE DIAGNOSTICS | Facility: HOSPITAL | Age: 64
DRG: 193 | End: 2019-11-30
Payer: COMMERCIAL

## 2019-11-30 PROBLEM — J96.01 ACUTE RESPIRATORY FAILURE WITH HYPOXIA (HCC): Status: ACTIVE | Noted: 2019-11-30

## 2019-11-30 LAB
ALBUMIN SERPL BCP-MCNC: 2.7 G/DL (ref 3.5–5)
ALP SERPL-CCNC: 87 U/L (ref 46–116)
ALT SERPL W P-5'-P-CCNC: 61 U/L (ref 12–78)
ANION GAP SERPL CALCULATED.3IONS-SCNC: 10 MMOL/L (ref 4–13)
AST SERPL W P-5'-P-CCNC: 60 U/L (ref 5–45)
BILIRUB SERPL-MCNC: 0.2 MG/DL (ref 0.2–1)
BUN SERPL-MCNC: 11 MG/DL (ref 5–25)
CALCIUM SERPL-MCNC: 8.5 MG/DL (ref 8.3–10.1)
CHLORIDE SERPL-SCNC: 104 MMOL/L (ref 100–108)
CO2 SERPL-SCNC: 26 MMOL/L (ref 21–32)
CREAT SERPL-MCNC: 0.81 MG/DL (ref 0.6–1.3)
ERYTHROCYTE [DISTWIDTH] IN BLOOD BY AUTOMATED COUNT: 14.8 % (ref 11.6–15.1)
GFR SERPL CREATININE-BSD FRML MDRD: 78 ML/MIN/1.73SQ M
GLUCOSE SERPL-MCNC: 126 MG/DL (ref 65–140)
GLUCOSE SERPL-MCNC: 129 MG/DL (ref 65–140)
HCT VFR BLD AUTO: 39.4 % (ref 34.8–46.1)
HGB BLD-MCNC: 12.2 G/DL (ref 11.5–15.4)
L PNEUMO1 AG UR QL IA.RAPID: NEGATIVE
MAGNESIUM SERPL-MCNC: 1.7 MG/DL (ref 1.6–2.6)
MCH RBC QN AUTO: 28.9 PG (ref 26.8–34.3)
MCHC RBC AUTO-ENTMCNC: 31 G/DL (ref 31.4–37.4)
MCV RBC AUTO: 93 FL (ref 82–98)
PHOSPHATE SERPL-MCNC: 3.1 MG/DL (ref 2.3–4.1)
PLATELET # BLD AUTO: 232 THOUSANDS/UL (ref 149–390)
PMV BLD AUTO: 9.2 FL (ref 8.9–12.7)
POTASSIUM SERPL-SCNC: 3.5 MMOL/L (ref 3.5–5.3)
PROCALCITONIN SERPL-MCNC: 0.07 NG/ML
PROCALCITONIN SERPL-MCNC: 0.08 NG/ML
PROT SERPL-MCNC: 7.3 G/DL (ref 6.4–8.2)
RBC # BLD AUTO: 4.22 MILLION/UL (ref 3.81–5.12)
S PNEUM AG UR QL: NEGATIVE
SODIUM SERPL-SCNC: 140 MMOL/L (ref 136–145)
TROPONIN I SERPL-MCNC: <0.02 NG/ML
WBC # BLD AUTO: 6.79 THOUSAND/UL (ref 4.31–10.16)

## 2019-11-30 PROCEDURE — 36415 COLL VENOUS BLD VENIPUNCTURE: CPT | Performed by: PHYSICIAN ASSISTANT

## 2019-11-30 PROCEDURE — 93970 EXTREMITY STUDY: CPT

## 2019-11-30 PROCEDURE — 85027 COMPLETE CBC AUTOMATED: CPT | Performed by: PHYSICIAN ASSISTANT

## 2019-11-30 PROCEDURE — 94640 AIRWAY INHALATION TREATMENT: CPT

## 2019-11-30 PROCEDURE — 80053 COMPREHEN METABOLIC PANEL: CPT | Performed by: PHYSICIAN ASSISTANT

## 2019-11-30 PROCEDURE — 94664 DEMO&/EVAL PT USE INHALER: CPT

## 2019-11-30 PROCEDURE — 84100 ASSAY OF PHOSPHORUS: CPT | Performed by: PHYSICIAN ASSISTANT

## 2019-11-30 PROCEDURE — 83735 ASSAY OF MAGNESIUM: CPT | Performed by: PHYSICIAN ASSISTANT

## 2019-11-30 PROCEDURE — 82948 REAGENT STRIP/BLOOD GLUCOSE: CPT

## 2019-11-30 PROCEDURE — 84145 PROCALCITONIN (PCT): CPT | Performed by: PHYSICIAN ASSISTANT

## 2019-11-30 PROCEDURE — 99232 SBSQ HOSP IP/OBS MODERATE 35: CPT | Performed by: NURSE PRACTITIONER

## 2019-11-30 PROCEDURE — 84484 ASSAY OF TROPONIN QUANT: CPT | Performed by: NURSE PRACTITIONER

## 2019-11-30 PROCEDURE — 94760 N-INVAS EAR/PLS OXIMETRY 1: CPT

## 2019-11-30 PROCEDURE — 94660 CPAP INITIATION&MGMT: CPT

## 2019-11-30 RX ORDER — ASPIRIN 81 MG/1
81 TABLET ORAL DAILY
Status: DISCONTINUED | OUTPATIENT
Start: 2019-12-01 | End: 2019-11-30

## 2019-11-30 RX ORDER — FENOFIBRATE 48 MG/1
48 TABLET, COATED ORAL DAILY
Status: DISCONTINUED | OUTPATIENT
Start: 2019-11-30 | End: 2019-12-04 | Stop reason: HOSPADM

## 2019-11-30 RX ORDER — ACETAMINOPHEN 325 MG/1
650 TABLET ORAL EVERY 6 HOURS PRN
Status: DISCONTINUED | OUTPATIENT
Start: 2019-11-30 | End: 2019-12-04 | Stop reason: HOSPADM

## 2019-11-30 RX ORDER — GUAIFENESIN 600 MG
600 TABLET, EXTENDED RELEASE 12 HR ORAL EVERY 12 HOURS SCHEDULED
Status: DISCONTINUED | OUTPATIENT
Start: 2019-11-30 | End: 2019-12-04 | Stop reason: HOSPADM

## 2019-11-30 RX ORDER — MESALAMINE 400 MG/1
800 CAPSULE, DELAYED RELEASE ORAL 2 TIMES DAILY
Status: DISCONTINUED | OUTPATIENT
Start: 2019-11-30 | End: 2019-12-04 | Stop reason: HOSPADM

## 2019-11-30 RX ORDER — ALBUTEROL SULFATE 2.5 MG/3ML
2.5 SOLUTION RESPIRATORY (INHALATION) EVERY 4 HOURS PRN
Status: DISCONTINUED | OUTPATIENT
Start: 2019-11-30 | End: 2019-12-04 | Stop reason: HOSPADM

## 2019-11-30 RX ORDER — DOFETILIDE 0.12 MG/1
500 CAPSULE ORAL EVERY 12 HOURS SCHEDULED
Status: DISCONTINUED | OUTPATIENT
Start: 2019-11-30 | End: 2019-12-04 | Stop reason: HOSPADM

## 2019-11-30 RX ORDER — LEVALBUTEROL 1.25 MG/.5ML
1.25 SOLUTION, CONCENTRATE RESPIRATORY (INHALATION)
Status: DISCONTINUED | OUTPATIENT
Start: 2019-11-30 | End: 2019-12-04 | Stop reason: HOSPADM

## 2019-11-30 RX ADMIN — VENLAFAXINE HYDROCHLORIDE 37.5 MG: 37.5 CAPSULE, EXTENDED RELEASE ORAL at 08:51

## 2019-11-30 RX ADMIN — FUROSEMIDE 20 MG: 40 TABLET ORAL at 08:54

## 2019-11-30 RX ADMIN — METOPROLOL SUCCINATE 100 MG: 100 TABLET, FILM COATED, EXTENDED RELEASE ORAL at 08:52

## 2019-11-30 RX ADMIN — METOPROLOL SUCCINATE 100 MG: 100 TABLET, FILM COATED, EXTENDED RELEASE ORAL at 20:31

## 2019-11-30 RX ADMIN — IPRATROPIUM BROMIDE 0.5 MG: 0.5 SOLUTION RESPIRATORY (INHALATION) at 20:41

## 2019-11-30 RX ADMIN — GUAIFENESIN 600 MG: 600 TABLET, EXTENDED RELEASE ORAL at 20:31

## 2019-11-30 RX ADMIN — FLECAINIDE ACETATE 100 MG: 100 TABLET ORAL at 08:52

## 2019-11-30 RX ADMIN — APIXABAN 5 MG: 5 TABLET, FILM COATED ORAL at 19:05

## 2019-11-30 RX ADMIN — CEFTRIAXONE 1000 MG: 1 INJECTION, SOLUTION INTRAVENOUS at 19:05

## 2019-11-30 RX ADMIN — FLECAINIDE ACETATE 100 MG: 100 TABLET ORAL at 20:31

## 2019-11-30 RX ADMIN — LEVALBUTEROL HYDROCHLORIDE 1.25 MG: 1.25 SOLUTION, CONCENTRATE RESPIRATORY (INHALATION) at 20:41

## 2019-11-30 RX ADMIN — MESALAMINE 800 MG: 400 CAPSULE, DELAYED RELEASE ORAL at 19:05

## 2019-11-30 RX ADMIN — FENOFIBRATE 48 MG: 48 TABLET ORAL at 08:52

## 2019-11-30 RX ADMIN — PANTOPRAZOLE SODIUM 40 MG: 40 TABLET, DELAYED RELEASE ORAL at 05:59

## 2019-11-30 RX ADMIN — AZITHROMYCIN MONOHYDRATE 500 MG: 500 INJECTION, POWDER, LYOPHILIZED, FOR SOLUTION INTRAVENOUS at 08:48

## 2019-11-30 RX ADMIN — DOFETILIDE 500 MCG: 0.12 CAPSULE ORAL at 20:31

## 2019-11-30 RX ADMIN — LEVOTHYROXINE SODIUM 125 MCG: 125 TABLET ORAL at 05:59

## 2019-11-30 RX ADMIN — GUAIFENESIN 600 MG: 600 TABLET, EXTENDED RELEASE ORAL at 08:51

## 2019-11-30 RX ADMIN — ACETAMINOPHEN 650 MG: 325 TABLET, FILM COATED ORAL at 15:04

## 2019-11-30 RX ADMIN — DOFETILIDE 500 MCG: 0.12 CAPSULE ORAL at 09:25

## 2019-11-30 RX ADMIN — ASPIRIN 81 MG 81 MG: 81 TABLET ORAL at 08:52

## 2019-11-30 RX ADMIN — APIXABAN 5 MG: 5 TABLET, FILM COATED ORAL at 08:51

## 2019-11-30 RX ADMIN — ALBUTEROL SULFATE 2.5 MG: 2.5 SOLUTION RESPIRATORY (INHALATION) at 10:30

## 2019-11-30 NOTE — ED NOTES
C-Pap was offered to pt by respiratory and this nurse, pt refused to wear it at this time     Kelley Callaway RN  11/29/19 1618

## 2019-11-30 NOTE — ASSESSMENT & PLAN NOTE
D-dimer level at 1 11  No hx of DVT/PE  CTA negative for acute PE  Will check VAS Lower Limb Venous duplex study  Continue Eliquis

## 2019-11-30 NOTE — H&P
[facility] Internal Medicine  H&P- Chavo Rangel 1955, 62 y o  female MRN: [id number]    Unit/Bed#: RM07 Encounter: [id number]    Primary Care Provider: Dai Flores MD   Date and time admitted to hospital: 11/29/2019  4:32 PM        * Pneumonia due to infectious organism  Assessment & Plan  Presented to the ER with complaints of SOB  Associated with cough, generalized malaise  CT shows-Patchy right upper lobe and right upper lower lobe airspace disease in keeping with multilobar pneumonia  Chronic moderate mediastinal adenopathy unchanged from 3/1/2019 study  She was started on IV ceftriaxone In the ER  Admit to med surg  Respiratory protocol  Airway clearance protocol  Blood cultures pending  Check Legionella antigen urine, Strep pneumoniae urine, Sputum culture and gram stain  Check procalcitonin  Continue IV ceftriaxone for now  Continue oxygen at 2 LPM  AM labs  Supportive care    Elevated d-dimer  Assessment & Plan  D-dimer level at 1 11  No hx of DVT/PE  CTA negative for acute PE  Will check VAS Lower Limb Venous duplex study  Continue Eliquis    Atypical chest pain  Assessment & Plan  Present to ER with complaints of chest pain  She also complains of SOB and cough    She states pain is worst with coughing  D- dimer elevated but CTA negative for acute PE  EKG shows- Normal sinus rhythm at rate of 92 bpm  Initial troponin negative  Trend troponin X 3 sets  Repeat EKG   Am labs  Supportive care      Paroxysmal atrial fibrillation (HCC)  Assessment & Plan  Rate currently controlled  Anticoagulated on Eliquis  She reports hx of ablation, cardioversion  EKG today shows- Normal sinus rhythm at rate of 92 bpm  Continue Eliquis, metoprolol  Continue outpatient cardiology follow up    Hypokalemia  Assessment & Plan  Potassium at 3 1  Replace potassium  AM CMP,monitor potassium levels    Crohn's disease with complication (HCC)  Assessment & Plan  No evidence of acute flare  Continue PTA medication  Continue outpatient GI follow up    Hypothyroidism  Assessment & Plan  Continue Levothyroxine    Hypercholesteremia  Assessment & Plan  Continue fenofibrate 40 mg po daily    VTE Prophylaxis: Apixaban (Eliquis)  / sequential compression device   Code Status: Level 1- Full code  POLST: POLST form is not discussed and not completed at this time  Discussion with family: Daughter at bedside    Anticipated Length of Stay:  Patient will be admitted on an Inpatient basis with an anticipated length of stay of  > 2 midnights  Justification for Hospital Stay: Multilobar pneumonia,Chest pain, Hypokalemia, Elevated D-dimer    Total Time for Visit, including Counseling / Coordination of Care: 30 minutes  Greater than 50% of this total time spent on direct patient counseling and coordination of care  Chief Complaint:   Chest pain, Shortness of breath,Cough    History of Present Illness:    Laurine Hodgkins is a 62 y o  female who presents to the emergency room with complaints of chest pain ongoing over the past 2 days  She also reports that she has accompanying persistent nonproductive cough and shortness of breath  She also reports that she has been having increased myalgias and generalized weakness, malaise  She denies nausea and vomiting, abdominal pain, diarrhea, recent travel or sick contacts  She Is currently wearing a loop recorder by her cardiologist after she was diagnosed with paroxysmal atrial fibrillation status post cardioversion  She also has a history of asthma/COPD she is a former smoker (quit 2 years ago), hypertension, hyperlipidemia, Crohn's disease, hypothyroidism  Labs completed in emergency room with results as shown below  D-dimer was elevated CTA chest PE study negative for acute pulmonary embolism  CTA did show right multilobar pneumonia  Emergency room she received nebulized Atrovent and albuterol, potassium chloride 40 mEq p o  She was also started on ceftriaxone 2 g IV    At bedside patient is awake and alert states that her breathing has improved however she still has that dry cough  Patient has been admitted on inpatient status [facility] level care for further management and workup of multilobar pneumonia, chest pain, elevated D-dimer, hypokalemia,    Review of Systems:    Review of Systems   Constitutional: Positive for fatigue  Negative for chills, fever and unexpected weight change  HENT: Positive for congestion  Negative for sore throat, tinnitus, trouble swallowing and voice change  Eyes: Negative for photophobia, pain, redness and visual disturbance  Respiratory: Positive for cough, chest tightness and shortness of breath  Negative for wheezing  Cardiovascular: Negative for chest pain, palpitations and leg swelling  Gastrointestinal: Negative for abdominal pain, diarrhea, nausea and vomiting  Endocrine: Negative for polydipsia, polyphagia and polyuria  Genitourinary: Negative for difficulty urinating, dysuria, flank pain, frequency and hematuria  Musculoskeletal: Positive for myalgias  Negative for back pain, gait problem and joint swelling  Skin: Negative for color change, pallor, rash and wound  Neurological: Positive for weakness and headaches  Negative for dizziness, tremors, syncope, facial asymmetry and light-headedness  Psychiatric/Behavioral: Negative for agitation, confusion and sleep disturbance  The patient is not nervous/anxious          Past Medical and Surgical History:     Past Medical History:   Diagnosis Date    A-fib [facility])     Brain aneurysm     Cancer ([place] [address] )     papillary thyroid cancer    Cardiac disease     CHF (congestive heart failure) (HCC)     Crohn disease ([facility] )     Disease of thyroid gland     GERD (gastroesophageal reflux disease)     Hyperlipidemia     Hypertension        Past Surgical History:   Procedure Laterality Date    APPENDECTOMY      CARDIOVERSION N/A 2/14/2019    Procedure: CARDIOVERSION;  Surgeon: Paul Mckeon MD;  Location: [facility] MAIN OR;  Service: Cardiology    CEREBRAL ANEURYSM REPAIR      CHOLECYSTECTOMY      HYSTERECTOMY      THYROIDECTOMY      TONSILLECTOMY AND ADENOIDECTOMY         Meds/Allergies:    Prior to Admission medications    Medication Sig Start Date End Date Taking?  Authorizing Provider   albuterol (PROVENTIL HFA,VENTOLIN HFA) 90 mcg/act inhaler Inhale 2 puffs 10/3/19  Yes Historical Provider, MD   apixaban (ELIQUIS) 5 mg Take 5 mg by mouth 2 (two) times a day  2/5/19  Yes Historical Provider, MD   aspirin (EQ ASPIRIN ADULT LOW DOSE) 81 mg EC tablet TAKE 1 TABLET BY MOUTH ONCE DAILY 8/15/19  Yes Historical Provider, MD   aspirin 81 mg chewable tablet Chew 81 mg 2/19/19  Yes Historical Provider, MD   dofetilide (TIKOSYN) 500 mcg capsule Take 500 mcg by mouth 2 (two) times a day   Yes Historical Provider, MD   doxycycline hyclate (VIBRAMYCIN) 100 mg capsule Take 100 mg by mouth 11/27/19  Yes Historical Provider, MD   fenofibrate (FENOGLIDE) 40 MG TABS Take 1 tablet (40 mg total) by mouth daily 9/17/19  Yes Johan Wasserman MD   fenofibrate (FENOGLIDE) 40 MG TABS Take 1 tablet by mouth 10/23/19  Yes Historical Provider, MD   flecainide (TAMBOCOR) 100 mg tablet Take 1 tablet (100 mg total) by mouth every 12 (twelve) hours 2/14/19  Yes Ferny InternationalDO   furosemide (LASIX) 20 mg tablet Take 20 mg by mouth daily  2/11/19  Yes Historical Provider, MD   furosemide (LASIX) 20 mg tablet TAKE 1 TABLET BY MOUTH ONCE DAILY 8/5/19  Yes Historical Provider, MD   levothyroxine (SYNTHROID) 150 mcg tablet Take 150 mcg by mouth 10/15/19  Yes Historical Provider, MD   levothyroxine 125 mcg tablet Take 125 mcg by mouth daily  1/22/19  Yes Historical Provider, MD   mesalamine (ASACOL) 800 MG EC tablet Take 800 mg by mouth 2 (two) times a day  1/9/19  Yes Historical Provider, MD   mesalamine (ASACOL) 800 MG EC tablet Take 800 mg by mouth 10/23/19  Yes Historical Provider, MD   metoprolol succinate (TOPROL-XL) 100 mg 24 hr tablet Take 1 tablet (100 mg total) by mouth every 12 (twelve) hours 19  Yes Hipolito Katz, DO   metoprolol succinate (TOPROL-XL) 100 mg 24 hr tablet Take 100 mg by mouth 19  Yes Historical Provider, MD   mometasone-formoterol (DULERA) 100-5 MCG/ACT inhaler Inhale 2 puffs 10/3/19  Yes Historical Provider, MD   omeprazole (PriLOSEC) 20 mg delayed release capsule Take 1 capsule (20 mg total) by mouth daily before breakfast  Patient taking differently: Take 40 mg by mouth 2 (two) times a day  19  Yes Hipolito Katz, DO   omeprazole (PriLOSEC) 40 MG capsule Take 40 mg by mouth 10/31/19  Yes Historical Provider, MD   venlafaxine (EFFEXOR XR) 37 5 mg 24 hr capsule Take 37 5 mg by mouth daily  18  Yes Historical Provider, MD   dicyclomine (BENTYL) 20 mg tablet Take 20 mg by mouth 18   Historical Provider, MD     I have reviewed home medications with patient personally  Allergies: Allergies   Allergen Reactions    Sulfa Antibiotics Rash       Social History:     Marital Status: /Civil Union   Occupation: rETIRED  Patient Pre-hospital Living Situation: Lives with family  Patient Pre-hospital Level of Mobility: Active  Patient Pre-hospital Diet Restrictions: None reported  Substance Use History:   Social History     Substance and Sexual Activity   Alcohol Use Never    Alcohol/week: 0 0 standard drinks    Frequency: Never    Drinks per session: Patient refused    Binge frequency: Never     Social History     Tobacco Use   Smoking Status Former Smoker    Last attempt to quit: 2018    Years since quittin 9   Smokeless Tobacco Never Used     Social History     Substance and Sexual Activity   Drug Use Never       Family History:    History reviewed  No pertinent family history      Physical Exam:     Vitals:   Blood Pressure: 127/58 (19)  Pulse: 87 (19)  Temperature: 98 7 °F (37 1 °C) (19)  Temp Source: Temporal (19 1639)  Respirations: 20 (11/29/19 2100)  Height: 5' 9 5" (176 5 cm) (11/29/19 1639)  Weight - Scale: 109 kg (240 lb 4 8 oz) (11/29/19 2108)  SpO2: 97 % (11/29/19 2100)    Physical Exam   Constitutional: She is oriented to person, place, and time  No distress  HENT:   Head: Normocephalic and atraumatic  Mouth/Throat: Oropharynx is clear and moist    Eyes: Pupils are equal, round, and reactive to light  EOM are normal  Right eye exhibits no discharge  Left eye exhibits no discharge  No scleral icterus  Neck: Normal range of motion  Neck supple  No JVD present  Cardiovascular: Normal rate, regular rhythm and intact distal pulses  Pulmonary/Chest: Breath sounds normal  No stridor  No respiratory distress  She has no wheezes  She has no rales  tachypenic   Abdominal: Soft  Bowel sounds are normal  She exhibits no distension and no mass  There is no tenderness  There is no guarding  Musculoskeletal: Normal range of motion  She exhibits no edema, tenderness or deformity  Lymphadenopathy:     She has no cervical adenopathy  Neurological: She is oriented to person, place, and time  Skin: Skin is warm and dry  Capillary refill takes less than 2 seconds  No rash noted  She is not diaphoretic  No erythema  No pallor  Psychiatric: She has a normal mood and affect  Vitals reviewed  Additional Data:     Lab Results: I have personally reviewed pertinent reports  Results from last 7 days   Lab Units 11/29/19  1708   WBC Thousand/uL 7 77   HEMOGLOBIN g/dL 12 6   HEMATOCRIT % 40 6   PLATELETS Thousands/uL 243   NEUTROS PCT % 67   LYMPHS PCT % 22   MONOS PCT % 10   EOS PCT % 1     Results from last 7 days   Lab Units 11/29/19  1708   SODIUM mmol/L 141   POTASSIUM mmol/L 3 1*   CHLORIDE mmol/L 104   CO2 mmol/L 26   BUN mg/dL 13   CREATININE mg/dL 1 03   ANION GAP mmol/L 11   CALCIUM mg/dL 8 6   GLUCOSE RANDOM mg/dL 124                       Imaging: I have personally reviewed pertinent reports        CTA ED chest PE study Final Result by Gloria Jorge MD (11/29 1908)      Patchy right upper lobe and right upper lower lobe airspace disease in keeping with multilobar pneumonia  Chronic moderate mediastinal adenopathy unchanged from 3/1/2019 study  Workstation performed: [id number]         X-ray chest 2 views   Final Result by Tripp Sandhu DO (11/29 7685)      No acute cardiopulmonary disease  Workstation performed: [id number]         VAS lower limb venous duplex study, complete bilateral    (Results Pending)       EKG, Pathology, and Other Studies Reviewed on Admission:   · EKG: Normal sinus rhythm at rate of 92 bpm    Allscripts / Epic Records Reviewed: Yes     ** Please Note: This note has been constructed using a voice recognition system   **

## 2019-11-30 NOTE — ASSESSMENT & PLAN NOTE
Present to ER with complaints of chest pain  She also complains of SOB and cough    She states pain is worst with coughing  D- dimer elevated but CTA negative for acute PE  EKG shows- Normal sinus rhythm at rate of 92 bpm  Initial troponin negative  Trend troponin X 3 sets  Repeat EKG   Am labs  Supportive care

## 2019-11-30 NOTE — PROGRESS NOTES
Progress Note - Dez Rodriguez 1955, 62 y o  female MRN: [id number]    Unit/Bed#: Jeane Cardenas Encounter: [id number]    Primary Care Provider: Trang Joy MD   Date and time admitted to hospital: 11/29/2019  4:32 PM        * Acute respiratory failure with hypoxia (HCC)  Assessment & Plan  A/E/B SPO2 of 86 % on RA upon arrival to ER w/ CC of cough and SOB  Mostl likely secondary to CAP PNA- Please additional treatment plan for PNA    Pneumonia due to infectious organism  Assessment & Plan  Presented to the ER with complaints of SOB  Associated with cough, generalized malaise  CT shows-Patchy right upper lobe and right upper lower lobe airspace disease in keeping with multilobar pneumonia  Chronic moderate mediastinal adenopathy unchanged from 3/1/2019 study  She was started on IV ceftriaxone and azithromax In the ER-will continue   Admit to med surg  Respiratory protocol  Airway clearance protocol  Blood cultures pending  Check Legionella antigen urine, Strep pneumoniae urine, Sputum culture and gram stain  Procalcitonin-Neg  Titrate O@ supplementation to SPO2  92%  AM labs  Supportive care    Elevated d-dimer  Assessment & Plan  D-dimer level at 1 11  No hx of DVT/PE  CTA negative for acute PE  VAS Lower Limb Venous duplex study-negative  Continue PTA Eliquis    Crohn's disease with complication (HCC)  Assessment & Plan  No evidence of acute flare  Continue PTA medication  Continue outpatient GI follow up    Atypical chest pain  Assessment & Plan  Present to ER with complaints of chest pain  She also complains of SOB and cough    She states pain is worst with coughing  D- dimer elevated but CTA negative for acute PE  EKG shows- Normal sinus rhythm at rate of 92 bpm  Initial troponin negative- repeat   Repeat EKG -SR   Am labs  Supportive care      Hypothyroidism  Assessment & Plan  Continue Levothyroxine    MARY ELLEN (obstructive sleep apnea)  Assessment & Plan  resp protocol in place   CPAP ordered    Hypercholesteremia  Assessment & Plan  Continue fenofibrate 40 mg po daily    Paroxysmal atrial fibrillation (HCC)  Assessment & Plan  Rate currently controlled  Anticoagulated on Eliquis  She reports hx of ablation, cardioversion  EKG today shows- Normal sinus rhythm at rate of 92 bpm  Continue Eliquis, metoprolol  Continue outpatient cardiology follow up      VTE Pharmacologic Prophylaxis:   Pharmacologic: Apixaban (Eliquis)  Mechanical VTE Prophylaxis in Place: Yes    Patient Centered Rounds: I have performed bedside rounds with nursing staff today  Discussions with Specialists or Other Care Team Provider: Dr Love Crabtree    Education and Discussions with Family / Patient: POC including IV antibiotics and resp support     Time Spent for Care: 30 minutes  More than 50% of total time spent on counseling and coordination of care as described above  Current Length of Stay: 1 day(s)    Current Patient Status: Inpatient   Certification Statement: The patient will continue to require additional inpatient hospital stay due to IV antibiotics and resp support and O2 supplementation     Discharge Plan: TBD    Code Status: Level 1 - Full Code      Subjective:   Patient denies chest pain and or discomfort, admits to chest pain , admits to some diarrhea, denies, LE swelling, denies dysuria, urgency and or frequency     Objective:     Vitals:   Temp (24hrs), Av 8 °F (37 1 °C), Min:98 4 °F (36 9 °C), Max:99 4 °F (37 4 °C)    Temp:  [98 4 °F (36 9 °C)-99 4 °F (37 4 °C)] 98 4 °F (36 9 °C)  HR:  [76-91] 80  Resp:  [17-29] 20  BP: (121-140)/(58-81) 130/60  SpO2:  [86 %-97 %] 95 %  Body mass index is 35 11 kg/m²  Input and Output Summary (last 24 hours): Intake/Output Summary (Last 24 hours) at 2019 1340  Last data filed at 2019 0948  Gross per 24 hour   Intake 480 ml   Output    Net 480 ml       Physical Exam:     Physical Exam   Constitutional: She is oriented to person, place, and time   She appears well-developed and well-nourished  Nasal cannula in place  HENT:   Head: Normocephalic and atraumatic  Neck: Normal range of motion  Neck supple  Cardiovascular: Normal rate, regular rhythm, normal heart sounds and intact distal pulses  Pulmonary/Chest: Tachypnea noted  She has wheezes  She has rhonchi  Abdominal: Soft  Bowel sounds are normal    Musculoskeletal: She exhibits no edema, tenderness or deformity  Neurological: She is alert and oriented to person, place, and time  No cranial nerve deficit  Skin: Skin is warm and dry  Capillary refill takes less than 2 seconds  Psychiatric: She has a normal mood and affect  Her behavior is normal  Judgment and thought content normal        Additional Data:     Labs:    Results from last 7 days   Lab Units 11/30/19  0606 11/29/19  1708   WBC Thousand/uL 6 79 7 77   HEMOGLOBIN g/dL 12 2 12 6   HEMATOCRIT % 39 4 40 6   PLATELETS Thousands/uL 232 243   NEUTROS PCT %  --  67   LYMPHS PCT %  --  22   MONOS PCT %  --  10   EOS PCT %  --  1     Results from last 7 days   Lab Units 11/30/19  0606   SODIUM mmol/L 140   POTASSIUM mmol/L 3 5   CHLORIDE mmol/L 104   CO2 mmol/L 26   BUN mg/dL 11   CREATININE mg/dL 0 81   ANION GAP mmol/L 10   CALCIUM mg/dL 8 5   ALBUMIN g/dL 2 7*   TOTAL BILIRUBIN mg/dL 0 20   ALK PHOS U/L 87   ALT U/L 61   AST U/L 60*   GLUCOSE RANDOM mg/dL 126         Results from last 7 days   Lab Units 11/30/19  1146   POC GLUCOSE mg/dl 129         Results from last 7 days   Lab Units 11/30/19  0606 11/29/19  2116   PROCALCITONIN ng/ml 0 07 0 08           * I Have Reviewed All Lab Data Listed Above  * Additional Pertinent Lab Tests Reviewed:  All Labs Within Last 24 Hours Reviewed      Recent Cultures (last 7 days):           Last 24 Hours Medication List:     Current Facility-Administered Medications:  albuterol 2 5 mg Nebulization Q4H PRN Celtrina Akins DO   apixaban 5 mg Oral BID Олег Robles PA-C   aspirin 81 mg Oral Daily Олег Robles TOLU   [START ON 12/1/2019] azithromycin 250 [facility]venous [facility], [facility]   cefTRIAXone 1,000 mg Intravenous Q[facility]H [facility], [facility]   dofetilide 500 mcg Oral Q12H [facility], PA-PEYTON   fenofibrate 48 mg Oral Daily [facility], TOLU   flecainide 100 mg Oral Q12H [facility], TOLU   furosemide 20 mg Oral Daily [facility], PA-PEYTON   guaiFENesin 600 mg Oral Q12H [facility], PA-PEYTON   levothyroxine 125 mcg Oral Daily [facility], TOLU   metoprolol succinate 100 mg Oral Q12H [facility], [facility]   ondansetron 4 mg Intravenous Q6H PRN Олег Robles, TOLU   pantoprazole 40 mg Oral Early Morning [facility], TOLU   sodium chloride (PF) 3 mL Intravenous PRN Олег Robles, TOLU   venlafaxine 37 5 mg Oral Daily Олег Robles, TOLU        Today, Patient Was Seen By: JOSH Newell    ** Please Note: Dictation voice to text software may have been used in the creation of this document   **

## 2019-11-30 NOTE — ASSESSMENT & PLAN NOTE
Presented to the ER with complaints of SOB, cough, generalized malaise  CT shows-Patchy right upper lobe and right upper lower lobe airspace disease in keeping with multilobar pneumonia  Chronic moderate mediastinal adenopathy unchanged from 3/1/2019 study  She was started on IV ceftriaxone and azithromax In the ER which was continued on hospital day 1, however due to negative procalcitonin x 2, negative blood cultures, will discontinue IV ceftriazone and monitor closely  Continue IV azithromycin in the setting of COPD exacerbation  Continue Respiratory protocol, Airway clearance protocol  Check Legionella antigen urine, Strep pneumoniae urine both negative  Sputum culture and gram stain - still pending

## 2019-11-30 NOTE — RESPIRATORY THERAPY NOTE
RT Protocol Note  Amber Tavares 62 y o  female MRN: [id number]  Unit/Bed#: RM07 Encounter: [id number]    Assessment    Principal Problem:    Pneumonia due to infectious organism  Active Problems:    Paroxysmal atrial fibrillation (HCC)    Hypercholesteremia    MARY ELLEN (obstructive sleep apnea)    Hypothyroidism    Atypical chest pain    Crohn's disease with complication (HCC)    Elevated d-dimer      Home Pulmonary Medications:  She recently got a rescue inhaler, she only used it once, it made her vomit  She denies oxygen therapy  She does use cpap at [id number] nightly with a nasal mask, she is refusing it at this time    Home Devices/Therapy: BiPAP/CPAP    Past Medical History:   Diagnosis Date    A-fib [facility])     Brain aneurysm     Cancer ([facility] [address] )     papillary thyroid cancer    Cardiac disease     CHF (congestive heart failure) (HCC)     Crohn disease (HCC)     Disease of thyroid gland     GERD (gastroesophageal reflux disease)     Hyperlipidemia     Hypertension      Social History     Socioeconomic History    Marital status: /Civil Union     Spouse name: None    Number of children: None    Years of education: None    Highest education level: None   Occupational History    None   Social Needs    Financial resource strain: None    Food insecurity:     Worry: None     Inability: None    Transportation needs:     Medical: None     Non-medical: None   Tobacco Use    Smoking status: Former Smoker     Last attempt to quit: 2018     Years since quittin 9    Smokeless tobacco: Never Used   Substance and Sexual Activity    Alcohol use: Never     Alcohol/week: 0 0 standard drinks     Frequency: Never     Drinks per session: Patient refused     Binge frequency: Never    Drug use: Never    Sexual activity: None   Lifestyle    Physical activity:     Days per week: None     Minutes per session: None    Stress: None   Relationships    Social connections:     Talks on phone: None     Gets together: None     Attends Anglican service: None     Active member of club or organization: None     Attends meetings of clubs or organizations: None     Relationship status: None    Intimate partner violence:     Fear of current or ex partner: None     Emotionally abused: None     Physically abused: None     Forced sexual activity: None   Other Topics Concern    None   Social History Narrative    None       Subjective    Subjective Data: She stated she may feel a little better    Objective  PRN albuteral  Physical Exam:   Assessment Type: Assess only  General Appearance: Alert, Awake  Respiratory Pattern: Dyspnea with exertion  Chest Assessment: Chest expansion symmetrical  R Breath Sounds: Diminished, Crackles  L Breath Sounds: Diminished, Crackles, Expiratory wheezes  Cough: Non-productive  O2 Device: 2 lpm oxygen via nasal cannula    Vitals:  Blood pressure 127/58, pulse 88, temperature 98 7 °F (37 1 °C), resp  rate 18, height 5' 9 5" (1 765 m), weight 109 kg (240 lb 4 8 oz), SpO2 96 %  Imaging and other studies: I have personally reviewed pertinent reports  O2 Device: 2 lpm oxygen via nasal cannula     Plan    Respiratory Plan: Home Bronchodilator Patient pathway, Vent/NIV/HFNC  Airway Clearance Plan: Incentive Spirometer     Resp Comments: Patient received in her ED room, she is not in respiratory distress  She just recently got a rescue inhaler, she used it once, it made her vomit  She denies oxygen therapy at home, she does have a cpap machine that she uses nightly at 12 cmH20 with a nasal mask, (she is refusing the therapy here at the hospital at this time)

## 2019-11-30 NOTE — ASSESSMENT & PLAN NOTE
Rate currently controlled  Anticoagulated on Eliquis  She reports hx of ablation, cardioversion  EKG on admission - Normal sinus rhythm at rate of 92 bpm  Continue Eliquis, metoprolol  Continue outpatient cardiology follow up

## 2019-11-30 NOTE — RESPIRATORY THERAPY NOTE
1030 request from Ebonie TYSON to check on pt in [facility], upon arrival, pt pulse ox on room air 87-88% with checking a few fingers  BS exp wheezewith few rhonchi  Respiratory PRN tx given with Albuterol 0 083% and therapist started pt on flutter valve to mobilize secretions  After tx and flutter, pt getting venous duplex study  Unable to ambulate at this point  pt pulse ox still remained at 87-88%, placed on 2 l/m nc pulse ox 91%, increased to 3 l/m nc pulse ox 93%   JAH coronado

## 2019-11-30 NOTE — ASSESSMENT & PLAN NOTE
SPO2 of 86 % on RA upon arrival to ER  Currently stable on 2L/min per nasal cannula  Likely due to underlying pneumonia in the setting of COPD exacerbation

## 2019-11-30 NOTE — PROGRESS NOTES
Progress Note - Chad Pascual 1955, 62 y o  female MRN: [id number]    Unit/Bed#: Tacey Reach Encounter: [id number]    Primary Care Provider: Manohar Mas MD   Date and time admitted to hospital: 11/29/2019  4:32 PM        * Acute respiratory failure with hypoxia (HCC)  Assessment & Plan  A/E/B SPO2 of 86 % on RA upon arrival to ER w/ CC of cough and SOB  Mostl likely secondary to CAP PNA- Please additional treatment plan for PNA    Pneumonia due to infectious organism  Assessment & Plan  Presented to the ER with complaints of SOB  Associated with cough, generalized malaise  CT shows-Patchy right upper lobe and right upper lower lobe airspace disease in keeping with multilobar pneumonia  Chronic moderate mediastinal adenopathy unchanged from 3/1/2019 study  She was started on IV ceftriaxone and azithromax In the ER-will continue   Admit to med surg  Respiratory protocol  Airway clearance protocol  Blood cultures pending  Check Legionella antigen urine, Strep pneumoniae urine, Sputum culture and gram stain  Procalcitonin-Neg  Titrate O@ supplementation to SPO2  92%  AM labs  Supportive care    Elevated d-dimer  Assessment & Plan  D-dimer level at 1 11  No hx of DVT/PE  CTA negative for acute PE  VAS Lower Limb Venous duplex study-negative  Continue PTA Eliquis    Crohn's disease with complication (HCC)  Assessment & Plan  No evidence of acute flare  Continue PTA medication  Continue outpatient GI follow up    Atypical chest pain  Assessment & Plan  Present to ER with complaints of chest pain  She also complains of SOB and cough    She states pain is worst with coughing  D- dimer elevated but CTA negative for acute PE  EKG shows- Normal sinus rhythm at rate of 92 bpm  Initial troponin negative- repeat   Repeat EKG -SR   Am labs  Supportive care      Hypothyroidism  Assessment & Plan  Continue Levothyroxine    MARY ELLEN (obstructive sleep apnea)  Assessment & Plan  resp protocol in place   CPAP ordered    Hypercholesteremia  Assessment & Plan  Continue fenofibrate 40 mg po daily    Paroxysmal atrial fibrillation (HCC)  Assessment & Plan  Rate currently controlled  Anticoagulated on Eliquis  She reports hx of ablation, cardioversion  EKG today shows- Normal sinus rhythm at rate of 92 bpm  Continue Eliquis, metoprolol  Continue outpatient cardiology follow up      VTE Pharmacologic Prophylaxis:   Pharmacologic: Apixaban (Eliquis)  Mechanical VTE Prophylaxis in Place: Yes    Patient Centered Rounds: I have performed bedside rounds with nursing staff today  Discussions with Specialists or Other Care Team Provider: Dr Thierry Richardson    Education and Discussions with Family / Patient: POC including IV antibiotics and resp support     Time Spent for Care: 30 minutes  More than 50% of total time spent on counseling and coordination of care as described above  Current Length of Stay: 1 day(s)    Current Patient Status: Inpatient   Certification Statement: The patient will continue to require additional inpatient hospital stay due to IV antibiotics and resp support and O2 supplementation     Discharge Plan: TBD    Code Status: Level 1 - Full Code      Subjective:   Patient denies chest pain and or discomfort, admits to chest pain , admits to some diarrhea, denies, LE swelling, denies dysuria, urgency and or frequency     Objective:     Vitals:   Temp (24hrs), Av 8 °F (37 1 °C), Min:98 4 °F (36 9 °C), Max:99 4 °F (37 4 °C)    Temp:  [98 4 °F (36 9 °C)-99 4 °F (37 4 °C)] 98 4 °F (36 9 °C)  HR:  [76-91] 80  Resp:  [17-29] 20  BP: (121-140)/(58-81) 130/60  SpO2:  [86 %-97 %] 95 %  Body mass index is 35 11 kg/m²  Input and Output Summary (last 24 hours): Intake/Output Summary (Last 24 hours) at 2019 1405  Last data filed at 2019 0948  Gross per 24 hour   Intake 480 ml   Output    Net 480 ml       Physical Exam:     Physical Exam   Constitutional: She is oriented to person, place, and time   She appears well-developed and well-nourished  Nasal cannula in place  HENT:   Head: Normocephalic and atraumatic  Neck: Normal range of motion  Neck supple  Cardiovascular: Normal rate, regular rhythm, normal heart sounds and intact distal pulses  Pulmonary/Chest: Tachypnea noted  She has wheezes  She has rhonchi  Abdominal: Soft  Bowel sounds are normal    Musculoskeletal: She exhibits no edema, tenderness or deformity  Neurological: She is alert and oriented to person, place, and time  No cranial nerve deficit  Skin: Skin is warm and dry  Capillary refill takes less than 2 seconds  Psychiatric: She has a normal mood and affect  Her behavior is normal  Judgment and thought content normal        Additional Data:     Labs:    Results from last 7 days   Lab Units 11/30/19  0606 11/29/19  1708   WBC Thousand/uL 6 79 7 77   HEMOGLOBIN g/dL 12 2 12 6   HEMATOCRIT % 39 4 40 6   PLATELETS Thousands/uL 232 243   NEUTROS PCT %  --  67   LYMPHS PCT %  --  22   MONOS PCT %  --  10   EOS PCT %  --  1     Results from last 7 days   Lab Units 11/30/19  0606   SODIUM mmol/L 140   POTASSIUM mmol/L 3 5   CHLORIDE mmol/L 104   CO2 mmol/L 26   BUN mg/dL 11   CREATININE mg/dL 0 81   ANION GAP mmol/L 10   CALCIUM mg/dL 8 5   ALBUMIN g/dL 2 7*   TOTAL BILIRUBIN mg/dL 0 20   ALK PHOS U/L 87   ALT U/L 61   AST U/L 60*   GLUCOSE RANDOM mg/dL 126         Results from last 7 days   Lab Units 11/30/19  1146   POC GLUCOSE mg/dl 129         Results from last 7 days   Lab Units 11/30/19  0606 11/29/19  2116   PROCALCITONIN ng/ml 0 07 0 08           * I Have Reviewed All Lab Data Listed Above  * Additional Pertinent Lab Tests Reviewed:  All Labs Within Last 24 Hours Reviewed      Recent Cultures (last 7 days):           Last 24 Hours Medication List:     Current Facility-Administered Medications:  acetaminophen 650 mg Oral Q6H PRN JOSH Murillo   albuterol 2 5 mg Nebulization Q4H PRN Jesse Quinn DO   apixaban 5 mg Oral BID Олег Robles PA-C   aspirin 81 mg Oral Daily Олег Robles PA-C   [START ON 12/1/2019] azithromycin 250 mg Intravenous Q24H Sea Umana PA-C   cefTRIAXone 1,000 mg Intravenous Q24H Олег Robles PA-C   dofetilide 500 mcg Oral Q12H [address] Олег Robles PA-C   fenofibrate 48 mg Oral Daily Олег Robles PA-C   flecainide 100 mg Oral Q12H [address] Олег Robles PA-C   furosemide 20 mg Oral Daily Олег Robles PA-C   guaiFENesin 600 mg Oral Q12H [address] Sea Umana PA-C   levothyroxine 125 mcg Oral Daily Олег Robles PA-C   metoprolol succinate 100 mg Oral Q12H Олег Robles PA-C   ondansetron 4 mg Intravenous Q6H PRN Олег Robles PA-C   pantoprazole 40 mg Oral Early Morning Олег Robles PA-C   sodium chloride (PF) 3 mL Intravenous PRN Олег Robles PA-C   venlafaxine 37 5 mg Oral Daily Олег Robles PA-C        Today, Patient Was Seen By: JOSH Cantu    ** Please Note: Dictation voice to text software may have been used in the creation of this document   **

## 2019-11-30 NOTE — ASSESSMENT & PLAN NOTE
D-dimer level at 1 11  No hx of DVT/PE  CTA negative for acute PE  VAS Lower Limb Venous duplex study-negative  Continue PTA Eliquis

## 2019-11-30 NOTE — ASSESSMENT & PLAN NOTE
Presented to the ER with complaints of SOB  Associated with cough, generalized malaise  CT shows-Patchy right upper lobe and right upper lower lobe airspace disease in keeping with multilobar pneumonia  Chronic moderate mediastinal adenopathy unchanged from 3/1/2019 study    She was started on IV ceftriaxone In the ER  Admit to med surg  Respiratory protocol  Airway clearance protocol  Blood cultures pending  Check Legionella antigen urine, Strep pneumoniae urine, Sputum culture and gram stain  Check procalcitonin  Continue IV ceftriaxone for now  Continue oxygen at 2 LPM  AM labs  Supportive care

## 2019-11-30 NOTE — ASSESSMENT & PLAN NOTE
Rate currently controlled  Anticoagulated on Eliquis  She reports hx of ablation, cardioversion  EKG today shows- Normal sinus rhythm at rate of 92 bpm  Continue Eliquis, metoprolol  Continue outpatient cardiology follow up

## 2019-11-30 NOTE — ASSESSMENT & PLAN NOTE
Present to ER with complaints of chest pain, SOB and cough  She states pain is worst with coughing  D- dimer elevated but CTA negative for acute PE  EKG shows- Normal sinus rhythm at rate of 92 bpm  Troponin negative x 3  No further cardiac workup warranted  Suspect pleuritic pain or costochondritis with frequent coughing

## 2019-11-30 NOTE — ED NOTES
Pt provided with basin of water and toiletries to wash up  Pt currently sitting at side of bed   Call bell within reach     Chela Reed RN  11/30/19 1674

## 2019-12-01 PROBLEM — J44.1 COPD EXACERBATION (HCC): Status: ACTIVE | Noted: 2019-12-01

## 2019-12-01 LAB
ANION GAP SERPL CALCULATED.3IONS-SCNC: 6 MMOL/L (ref 4–13)
BASOPHILS # BLD AUTO: 0.01 THOUSANDS/ΜL (ref 0–0.1)
BASOPHILS NFR BLD AUTO: 0 % (ref 0–1)
BUN SERPL-MCNC: 12 MG/DL (ref 5–25)
CALCIUM SERPL-MCNC: 8.4 MG/DL (ref 8.3–10.1)
CHLORIDE SERPL-SCNC: 103 MMOL/L (ref 100–108)
CO2 SERPL-SCNC: 29 MMOL/L (ref 21–32)
CREAT SERPL-MCNC: 0.82 MG/DL (ref 0.6–1.3)
EOSINOPHIL # BLD AUTO: 0.16 THOUSAND/ΜL (ref 0–0.61)
EOSINOPHIL NFR BLD AUTO: 3 % (ref 0–6)
ERYTHROCYTE [DISTWIDTH] IN BLOOD BY AUTOMATED COUNT: 14.7 % (ref 11.6–15.1)
GFR SERPL CREATININE-BSD FRML MDRD: 76 ML/MIN/1.73SQ M
GLUCOSE SERPL-MCNC: 127 MG/DL (ref 65–140)
HCT VFR BLD AUTO: 38.9 % (ref 34.8–46.1)
HGB BLD-MCNC: 11.8 G/DL (ref 11.5–15.4)
IMM GRANULOCYTES # BLD AUTO: 0.02 THOUSAND/UL (ref 0–0.2)
IMM GRANULOCYTES NFR BLD AUTO: 0 % (ref 0–2)
LYMPHOCYTES # BLD AUTO: 1.36 THOUSANDS/ΜL (ref 0.6–4.47)
LYMPHOCYTES NFR BLD AUTO: 23 % (ref 14–44)
MCH RBC QN AUTO: 28.4 PG (ref 26.8–34.3)
MCHC RBC AUTO-ENTMCNC: 30.3 G/DL (ref 31.4–37.4)
MCV RBC AUTO: 94 FL (ref 82–98)
MONOCYTES # BLD AUTO: 0.58 THOUSAND/ΜL (ref 0.17–1.22)
MONOCYTES NFR BLD AUTO: 10 % (ref 4–12)
NEUTROPHILS # BLD AUTO: 3.81 THOUSANDS/ΜL (ref 1.85–7.62)
NEUTS SEG NFR BLD AUTO: 64 % (ref 43–75)
NRBC BLD AUTO-RTO: 0 /100 WBCS
PLATELET # BLD AUTO: 224 THOUSANDS/UL (ref 149–390)
PMV BLD AUTO: 9.5 FL (ref 8.9–12.7)
POTASSIUM SERPL-SCNC: 3.6 MMOL/L (ref 3.5–5.3)
RBC # BLD AUTO: 4.16 MILLION/UL (ref 3.81–5.12)
SODIUM SERPL-SCNC: 138 MMOL/L (ref 136–145)
WBC # BLD AUTO: 5.94 THOUSAND/UL (ref 4.31–10.16)

## 2019-12-01 PROCEDURE — 80048 BASIC METABOLIC PNL TOTAL CA: CPT | Performed by: PHYSICIAN ASSISTANT

## 2019-12-01 PROCEDURE — 94760 N-INVAS EAR/PLS OXIMETRY 1: CPT

## 2019-12-01 PROCEDURE — 99232 SBSQ HOSP IP/OBS MODERATE 35: CPT | Performed by: PHYSICIAN ASSISTANT

## 2019-12-01 PROCEDURE — 85025 COMPLETE CBC W/AUTO DIFF WBC: CPT | Performed by: PHYSICIAN ASSISTANT

## 2019-12-01 PROCEDURE — 94640 AIRWAY INHALATION TREATMENT: CPT

## 2019-12-01 PROCEDURE — 93970 EXTREMITY STUDY: CPT | Performed by: SURGERY

## 2019-12-01 RX ORDER — METHYLPREDNISOLONE SODIUM SUCCINATE 40 MG/ML
40 INJECTION, POWDER, LYOPHILIZED, FOR SOLUTION INTRAMUSCULAR; INTRAVENOUS EVERY 8 HOURS
Status: DISCONTINUED | OUTPATIENT
Start: 2019-12-01 | End: 2019-12-02

## 2019-12-01 RX ORDER — FLUTICASONE PROPIONATE 50 MCG
1 SPRAY, SUSPENSION (ML) NASAL DAILY
Status: DISCONTINUED | OUTPATIENT
Start: 2019-12-01 | End: 2019-12-04 | Stop reason: HOSPADM

## 2019-12-01 RX ADMIN — MESALAMINE 800 MG: 400 CAPSULE, DELAYED RELEASE ORAL at 08:46

## 2019-12-01 RX ADMIN — DOFETILIDE 500 MCG: 0.12 CAPSULE ORAL at 10:44

## 2019-12-01 RX ADMIN — LEVOTHYROXINE SODIUM 125 MCG: 125 TABLET ORAL at 05:58

## 2019-12-01 RX ADMIN — APIXABAN 5 MG: 5 TABLET, FILM COATED ORAL at 08:46

## 2019-12-01 RX ADMIN — PANTOPRAZOLE SODIUM 40 MG: 40 TABLET, DELAYED RELEASE ORAL at 05:58

## 2019-12-01 RX ADMIN — ASPIRIN 81 MG 81 MG: 81 TABLET ORAL at 08:46

## 2019-12-01 RX ADMIN — FUROSEMIDE 20 MG: 40 TABLET ORAL at 08:47

## 2019-12-01 RX ADMIN — GUAIFENESIN 600 MG: 600 TABLET, EXTENDED RELEASE ORAL at 20:32

## 2019-12-01 RX ADMIN — METOPROLOL SUCCINATE 100 MG: 100 TABLET, FILM COATED, EXTENDED RELEASE ORAL at 20:32

## 2019-12-01 RX ADMIN — GUAIFENESIN 600 MG: 600 TABLET, EXTENDED RELEASE ORAL at 08:47

## 2019-12-01 RX ADMIN — VENLAFAXINE HYDROCHLORIDE 37.5 MG: 37.5 CAPSULE, EXTENDED RELEASE ORAL at 08:47

## 2019-12-01 RX ADMIN — FLECAINIDE ACETATE 100 MG: 100 TABLET ORAL at 08:47

## 2019-12-01 RX ADMIN — METHYLPREDNISOLONE SODIUM SUCCINATE 40 MG: 40 INJECTION, POWDER, FOR SOLUTION INTRAMUSCULAR; INTRAVENOUS at 09:58

## 2019-12-01 RX ADMIN — LEVALBUTEROL HYDROCHLORIDE 1.25 MG: 1.25 SOLUTION, CONCENTRATE RESPIRATORY (INHALATION) at 13:14

## 2019-12-01 RX ADMIN — METHYLPREDNISOLONE SODIUM SUCCINATE 40 MG: 40 INJECTION, POWDER, FOR SOLUTION INTRAMUSCULAR; INTRAVENOUS at 17:45

## 2019-12-01 RX ADMIN — LEVALBUTEROL HYDROCHLORIDE 1.25 MG: 1.25 SOLUTION, CONCENTRATE RESPIRATORY (INHALATION) at 19:54

## 2019-12-01 RX ADMIN — FLECAINIDE ACETATE 100 MG: 100 TABLET ORAL at 20:32

## 2019-12-01 RX ADMIN — MESALAMINE 800 MG: 400 CAPSULE, DELAYED RELEASE ORAL at 17:45

## 2019-12-01 RX ADMIN — LEVALBUTEROL HYDROCHLORIDE 1.25 MG: 1.25 SOLUTION, CONCENTRATE RESPIRATORY (INHALATION) at 08:25

## 2019-12-01 RX ADMIN — IPRATROPIUM BROMIDE 0.5 MG: 0.5 SOLUTION RESPIRATORY (INHALATION) at 19:54

## 2019-12-01 RX ADMIN — IPRATROPIUM BROMIDE 0.5 MG: 0.5 SOLUTION RESPIRATORY (INHALATION) at 13:14

## 2019-12-01 RX ADMIN — FLUTICASONE PROPIONATE 1 SPRAY: 50 SPRAY, METERED NASAL at 10:45

## 2019-12-01 RX ADMIN — APIXABAN 5 MG: 5 TABLET, FILM COATED ORAL at 17:44

## 2019-12-01 RX ADMIN — FENOFIBRATE 48 MG: 48 TABLET ORAL at 08:46

## 2019-12-01 RX ADMIN — AZITHROMYCIN MONOHYDRATE 250 MG: 500 INJECTION, POWDER, LYOPHILIZED, FOR SOLUTION INTRAVENOUS at 10:49

## 2019-12-01 RX ADMIN — METOPROLOL SUCCINATE 100 MG: 100 TABLET, FILM COATED, EXTENDED RELEASE ORAL at 08:46

## 2019-12-01 RX ADMIN — IPRATROPIUM BROMIDE 0.5 MG: 0.5 SOLUTION RESPIRATORY (INHALATION) at 08:25

## 2019-12-01 NOTE — UTILIZATION REVIEW
Initial Clinical Review    Admission: Date/Time/Statement: Inpatient Admission Orders (From admission, onward)     Ordered        11/29/19 1922  Inpatient Admission  Once                   Orders Placed This Encounter   Procedures    Inpatient Admission     Standing Status:   Standing     Number of Occurrences:   1     Order Specific Question:   Admitting Physician     Answer:   Valerio Bowles     Order Specific Question:   Level of Care     Answer:   Med Surg [16]     Order Specific Question:   Estimated length of stay     Answer:   More than 2 Midnights     Order Specific Question:   Certification     Answer:   I certify that inpatient services are medically necessary for this patient for a duration of greater than two midnights  See H&P and MD Progress Notes for additional information about the patient's course of treatment  ED Arrival Information     Expected Arrival Acuity Means of Arrival Escorted By Service Admission Type    - 11/29/2019 16:31 Emergent Walk-In Family Member General Medicine Emergency    Arrival Complaint    chest pain        Chief Complaint   Patient presents with    Chest Pain     intermittent cp for the past two days, accompinied with cough and sob  patient currently wearing a holter monitor  Assessment/Plan: 61 yo f with a pmh of afib s/p cardioversion, Asthma/COPD former smoker, HTN, HLD, Crohn's disease, hypothyroidism  She complains of chest pain - SOB with a NP cough, generalized weakness , malaise  CTA showed right multilobar pneumonia    Patient has been admitted on inpatient to [facility] care for further management and workup of multilobar pneumonia, chest pain, elevated D-dimer, hypokalemia,           ED Triage Vitals [11/29/19 1639]   Temperature Pulse Respirations Blood Pressure SpO2   99 4 °F (37 4 °C) 88 22 130/81 (!) 86 %      Temp Source Heart Rate Source Patient Position - Orthostatic VS BP Location FiO2 (%)   Temporal Monitor Sitting Right arm -- Pain Score       No Pain        Wt Readings from Last 1 Encounters:   12/01/19 108 kg (238 lb 1 6 oz)     Additional Vital Signs:  Date/Time  Temp  Pulse  Resp  BP  MAP (mmHg)  SpO2  O2 Device  Patient Position - Orthostatic VS   12/01/19 1315            92 %        SpO2: 2 l/m at 12/01/19 1315   12/01/19 08:40:26    83    136/55  82  84 %Abnormal        12/01/19 0828            92 %        SpO2: 2 l/m at 12/01/19 0828   12/01/19 07:08:16  99 1 °F (37 3 °C)  66  18  111/64  80  93 %  None (Room air)  Lying   11/30/19 21:58:25  99 2 °F (37 3 °C)  78  18  118/56  77  93 %  Nasal cannula  Lying   11/30/19 2043            90 %  Nasal cannula     11/30/19 2031    76    111/55           11/30/19 1506    79  20      92 %        SpO2: 2 l/m at 11/30/19 1506   11/30/19 14:42:39  98 5 °F (36 9 °C)  79  18  103/58  73  93 %  Nasal cannula  Lying   11/30/19 1145    80  20  130/60    95 %  Nasal cannula  Sitting   11/30/19 1144    78  20  130/60    94 %    Lying   11/30/19 1030            89 %Abnormal        11/30/19 0900    88  18  134/81    94 %  Nasal cannula  Lying   11/30/19 0852    81    134/81           11/30/19 0712  98 4 °F (36 9 °C)  76  17  125/59    94 %  Nasal cannula  Lying   11/30/19 0305    88  18      96 %       11/29/19 2100  98 7 °F (37 1 °C)  87  20  127/58    97 %  Nasal cannula     11/29/19 2000    88  21  140/63    94 %  Nasal cannula     11/29/19 1930    84  21  124/61    96 %  Nasal cannula     11/29/19 1900    83  24Abnormal   121/59    96 %  Nasal cannula             Pertinent Labs/Diagnostic Test Results:   Results from last 7 days   Lab Units 12/01/19  0501 11/30/19  0606 11/29/19  1708   WBC Thousand/uL 5 94 6 79 7 77   HEMOGLOBIN g/dL 11 8 12 2 12 6   HEMATOCRIT % 38 9 39 4 40 6   PLATELETS Thousands/uL 224 232 243   NEUTROS ABS Thousands/µL 3 81  --  5 22     Results from last 7 days   Lab Units 12/01/19  0501 11/30/19  0606 11/29/19  1708   SODIUM mmol/L 138 140 141   POTASSIUM mmol/L 3 6 3 5 3 1*   CHLORIDE mmol/L 103 104 104   CO2 mmol/L 29 26 26   ANION GAP mmol/L 6 10 11   BUN mg/dL 12 11 13   CREATININE mg/dL 0 82 0 81 1 03   EGFR ml/min/1 73sq m 76 78 58   CALCIUM mg/dL 8 4 8 5 8 6   MAGNESIUM mg/dL  --  1 7  --    PHOSPHORUS mg/dL  --  3 1  --      Results from last 7 days   Lab Units 11/30/19  0606   AST U/L 60*   ALT U/L 61   ALK PHOS U/L 87   TOTAL PROTEIN g/dL 7 3   ALBUMIN g/dL 2 7*   TOTAL BILIRUBIN mg/dL 0 20     Results from last 7 days   Lab Units 11/30/19  1146   POC GLUCOSE mg/dl 129     Results from last 7 days   Lab Units 12/01/19  0501 11/30/19  0606 11/29/19  1708   GLUCOSE RANDOM mg/dL 127 126 124     Results from last 7 days   Lab Units 11/30/19  1347 11/29/19  1708   TROPONIN I ng/mL <0 02 <0 02     Results from last 7 days   Lab Units 11/29/19  1708   D-DIMER QUANTITATIVE ug/ml FEU 1 11*     Results from last 7 days   Lab Units 11/30/19  0606 11/29/19  2116   PROCALCITONIN ng/ml 0 07 0 08       Results from last 7 days   Lab Units 11/29/19  1708   NT-PRO BNP pg/mL 213*     Results from last 7 days   Lab Units 11/29/19  2116 11/29/19  1729   STREP PNEUMONIAE ANTIGEN, URINE  Negative  --    LEGIONELLA URINARY ANTIGEN  Negative  --    INFLUENZA A PCR   --  None Detected   RSV PCR   --  None Detected     Results from last 7 days   Lab Units 11/29/19  2303   SPUTUM CULTURE  Culture too young- will reincubate   GRAM STAIN RESULT  2+ Polys*  3+ Gram negative rods*       CTA chest  - 11/29  - Patchy right upper lobe and right upper lower lobe airspace disease in keeping with multilobar pneumonia  CXR 11/29 - No acute cardiopulmonary disease      ED Treatment:   Medication Administration from 11/29/2019 1631 to 11/30/2019 1442       Date/Time Order Dose Route Action     11/29/2019 1714 albuterol inhalation solution 5 mg 5 mg Nebulization Given     11/29/2019 1714 ipratropium (ATROVENT) 0 02 % inhalation solution 0 5 mg 0 5 mg Nebulization Given     11/29/2019 1818 potassium chloride (K-DUR,KLOR-CON) CR tablet 40 mEq 40 mEq Oral Given     11/29/2019 1842 iohexol (OMNIPAQUE) 350 MG/ML injection (SINGLE-DOSE) 85 mL 85 mL Intravenous Given     11/29/2019 1930 cefTRIAXone (ROCEPHIN) IVPB (premix) 2,000 mg 2,000 mg Intravenous New Bag     11/30/2019 0851 apixaban (ELIQUIS) tablet 5 mg 5 mg Oral Given     11/29/2019 2258 apixaban (ELIQUIS) tablet 5 mg 5 mg Oral Given     11/30/2019 0852 aspirin chewable tablet 81 mg 81 mg Oral Given     11/30/2019 0852 flecainide (TAMBOCOR) tablet 100 mg 100 mg Oral Given     11/29/2019 2257 flecainide (TAMBOCOR) tablet 100 mg 100 mg Oral Given     11/30/2019 0854 furosemide (LASIX) tablet 20 mg 20 mg Oral Given     11/30/2019 0559 levothyroxine tablet 125 mcg 125 mcg Oral Given     11/30/2019 0852 metoprolol succinate (TOPROL-XL) 24 hr tablet 100 mg 100 mg Oral Given     11/29/2019 2258 metoprolol succinate (TOPROL-XL) 24 hr tablet 100 mg 100 mg Oral Given     11/30/2019 0559 pantoprazole (PROTONIX) EC tablet 40 mg 40 mg Oral Given     11/30/2019 0851 venlafaxine (EFFEXOR-XR) 24 hr capsule 37 5 mg 37 5 mg Oral Given     11/29/2019 2258 benzonatate (TESSALON PERLES) capsule 100 mg 100 mg Oral Given     11/30/2019 0852 fenofibrate (TRICOR) tablet 48 mg 48 mg Oral Given     11/30/2019 1030 albuterol inhalation solution 2 5 mg 2 5 mg Nebulization Given     11/30/2019 0848 azithromycin (ZITHROMAX) 500 mg in sodium chloride 0 9 % 250 mL IVPB 500 mg Intravenous New Bag     11/30/2019 0851 guaiFENesin (MUCINEX) 12 hr tablet 600 mg 600 mg Oral Given     11/30/2019 0925 dofetilide (TIKOSYN) capsule 500 mcg 500 mcg Oral Given        Past Medical History:   Diagnosis Date    A-fib ([address] )     Brain aneurysm     Cancer ([facility] )     papillary thyroid cancer    Cardiac disease     CHF (congestive heart failure) ([facility] )     Crohn disease ([facility] )     Disease of thyroid gland     GERD (gastroesophageal reflux disease)  Hyperlipidemia     Hypertension      Present on Admission:   Paroxysmal atrial fibrillation (HCC)   Hypercholesteremia   Hypothyroidism   MARY ELLEN (obstructive sleep apnea)   Crohn's disease with complication (HCC)   Pneumonia due to infectious organism   Elevated d-dimer   Atypical chest pain   Acute respiratory failure with hypoxia (HCC)   COPD exacerbation (HCC)      Admitting Diagnosis: Acute hypokalemia [E87 6]  Chest pain [R07 9]  Pneumonia [J18 9]  Hypoxia [R09 02]  Sepsis ([facility] [address] ) [A41 9]  Age/Sex: 62 y o  female  Admission Orders:  Scheduled Medications:    Medications:  apixaban 5 mg Oral BID   aspirin 81 mg Oral Daily   azithromycin 250 mg Intravenous Q24H [address]   dofetilide 500 mcg Oral Q12H MAURICIO   fenofibrate 48 mg Oral Daily   flecainide 100 mg Oral Q12H MAURICIO   fluticasone 1 spray Each Nare Daily   furosemide 20 mg Oral Daily   guaiFENesin 600 mg Oral Q12H MAURICIO   ipratropium 0 5 mg Nebulization TID   levalbuterol 1 25 mg Nebulization TID   levothyroxine 125 mcg Oral Daily   mesalamine 800 mg Oral BID   methylPREDNISolone sodium succinate 40 mg Intravenous Q8H   metoprolol succinate 100 mg Oral Q12H   pantoprazole 40 mg Oral Early Morning   venlafaxine 37 5 mg Oral Daily     Continuous IV Infusions:     PRN Meds:    acetaminophen 650 mg Oral Q6H PRN   albuterol 2 5 mg Nebulization Q4H PRN   ondansetron 4 mg Intravenous Q6H PRN   sodium chloride (PF) 3 mL Intravenous PRN     Nursing Orders - VS q 4- check pulse ox with ambulation - elevate HOB > 30 degrees -  aspiration precautions - Oral care -  Diet regular house       Network Utilization Review Department  [email or web address]  ATTENTION: Please call with any questions or concerns to [phone] and carefully listen to the prompts so that you are directed to the right person   All voicemails are confidential   Flora Gordon all requests for admission clinical reviews, approved or denied determinations and any other requests to dedicated fax number below belonging to the campus where the patient is receiving treatment    FACILITY NAME UR FAX NUMBER   ADMISSION DENIALS (Administrative/Medical Necessity) [address] [facility] (Maternity/NICU/Pediatrics) [phone]   [facility] [address] [facility] [address] [facility] [phone]   [place] [address] [phone]   [place] [address] [phone]

## 2019-12-01 NOTE — ASSESSMENT & PLAN NOTE
In the setting of viral pneumonia  Patient is a former smoker, history of COPD  Will add IV Solu-Medrol 40 mg every 8 hours to her medication regimen given significant wheezing on exam today  Continue IV azithromycin  Suspect this is a big part of her minimal symptom improvement

## 2019-12-01 NOTE — PROGRESS NOTES
Progress Note - Laurine Hodgkins 1955, 62 y o  female MRN: [id number]    Unit/Bed#: 416-01 Encounter: [id number]    Primary Care Provider: Miladis Montana MD   Date and time admitted to hospital: 11/29/2019  4:32 PM        Pneumonia due to infectious organism  Assessment & Plan  Presented to the ER with complaints of SOB, cough, generalized malaise  CT shows-Patchy right upper lobe and right upper lower lobe airspace disease in keeping with multilobar pneumonia  Chronic moderate mediastinal adenopathy unchanged from 3/1/2019 study  She was started on IV ceftriaxone and azithromax In the ER which was continued on hospital day 1, however due to negative procalcitonin x 2, negative blood cultures, will discontinue IV ceftriazone and monitor closely  Continue IV azithromycin in the setting of COPD exacerbation  Continue Respiratory protocol, Airway clearance protocol  Check Legionella antigen urine, Strep pneumoniae urine both negative  Sputum culture and gram stain - still pending  COPD exacerbation ([facility] [address] )  Assessment & Plan  In the setting of viral pneumonia  Patient is a former smoker, history of COPD  Will add IV Solu-Medrol 40 mg every 8 hours to her medication regimen given significant wheezing on exam today  Continue IV azithromycin  Suspect this is a big part of her minimal symptom improvement  Elevated d-dimer  Assessment & Plan  D-dimer level at 1 11  No hx of DVT/PE  CTA negative for acute PE  VAS Lower Limb Venous duplex study-negative  Continue PTA Eliquis    Crohn's disease with complication (HCC)  Assessment & Plan  No evidence of acute flare  Continue PTA medication  Continue outpatient GI follow up    Atypical chest pain  Assessment & Plan  Present to ER with complaints of chest pain, SOB and cough  She states pain is worst with coughing  D- dimer elevated but CTA negative for acute PE  EKG shows- Normal sinus rhythm at rate of 92 bpm  Troponin negative x 3    No further cardiac workup warranted  Suspect pleuritic pain or costochondritis with frequent coughing  Hypothyroidism  Assessment & Plan  Continue Levothyroxine    MARY ELLEN (obstructive sleep apnea)  Assessment & Plan  resp protocol in place   CPAP ordered but patient had difficulty tolerating last night  Hypercholesteremia  Assessment & Plan  Continue fenofibrate 40 mg po daily    Paroxysmal atrial fibrillation (HCC)  Assessment & Plan  Rate currently controlled  Anticoagulated on Eliquis  She reports hx of ablation, cardioversion  EKG on admission - Normal sinus rhythm at rate of 92 bpm  Continue Eliquis, metoprolol  Continue outpatient cardiology follow up    * Acute respiratory failure with hypoxia (HCC)  Assessment & Plan  SPO2 of 86 % on RA upon arrival to ER  Currently stable on 2L/min per nasal cannula  Likely due to underlying pneumonia in the setting of COPD exacerbation  VTE Pharmacologic Prophylaxis:   Pharmacologic: Apixaban (Eliquis)  Mechanical VTE Prophylaxis in Place: No        Time Spent for Care: 30 minutes  More than 50% of total time spent on counseling and coordination of care as described above  Current Length of Stay: 2 day(s)    Current Patient Status: Inpatient   Certification Statement: The patient will continue to require additional inpatient hospital stay due to Need for IV steroids IV azithromycin  Need for supplemental oxygen    Discharge Plan / Estimated Discharge Date: TBD      Code Status: Level 1 - Full Code      Subjective:   Patient only feels about 20% better overall compared to symptoms on admission  Still with significant chest congestion and nasal congestion  She also reports dyspnea on exertion and wheezing      Objective:     Vitals:   Temp (24hrs), Av 9 °F (37 2 °C), Min:98 5 °F (36 9 °C), Max:99 2 °F (37 3 °C)    Temp:  [98 5 °F (36 9 °C)-99 2 °F (37 3 °C)] 99 1 °F (37 3 °C)  HR:  [66-83] 83  Resp:  [18-20] 18  BP: (103-136)/(55-64) 136/55  SpO2:  [84 %-95 %] 84 %  Body mass index is 35 16 kg/m²  Input and Output Summary (last 24 hours): Intake/Output Summary (Last 24 hours) at 12/1/2019 0905  Last data filed at 12/1/2019 0848  Gross per 24 hour   Intake 670 ml   Output 400 ml   Net 270 ml       Physical Exam:   Physical Exam   Constitutional: She is oriented to person, place, and time  She appears well-developed and well-nourished  No distress  Nasal cannula in place  HENT:   Head: Normocephalic  Mouth/Throat: Oropharynx is clear and moist    Neck: Neck supple  Cardiovascular: Normal rate and normal heart sounds  No murmur heard  Pulmonary/Chest: Effort normal and breath sounds normal  No respiratory distress  Abdominal: Soft  Bowel sounds are normal  There is no tenderness  Musculoskeletal: She exhibits no edema  Neurological: She is alert and oriented to person, place, and time  Skin: Skin is warm and dry  She is not diaphoretic  Psychiatric: She has a normal mood and affect  Nursing note and vitals reviewed  Additional Data:   Labs:    Results from last 7 days   Lab Units 12/01/19  0501   WBC Thousand/uL 5 94   HEMOGLOBIN g/dL 11 8   HEMATOCRIT % 38 9   PLATELETS Thousands/uL 224   NEUTROS PCT % 64   LYMPHS PCT % 23   MONOS PCT % 10   EOS PCT % 3     Results from last 7 days   Lab Units 12/01/19  0501 11/30/19  0606   POTASSIUM mmol/L 3 6 3 5   CHLORIDE mmol/L 103 104   CO2 mmol/L 29 26   BUN mg/dL 12 11   CREATININE mg/dL 0 82 0 81   CALCIUM mg/dL 8 4 8 5   ALK PHOS U/L  --  87   ALT U/L  --  61   AST U/L  --  60*           * I Have Reviewed All Lab Data Listed Above  * Additional Pertinent Lab Tests Reviewed:  All Labs Within Last 24 Hours Reviewed    Imaging:  Imaging Reports Reviewed Today Include: no new Imaging to review        Recent Cultures (last 7 days):     Results from last 7 days   Lab Units 11/29/19  2303 11/29/19  2116   SPUTUM CULTURE  Culture too young- will reincubate  --    GRAM STAIN RESULT  2+ Polys*  3+ Gram negative rods*  --    LEGIONELLA URINARY ANTIGEN   --  Negative       Last 24 Hours Medication List:     Current Facility-Administered Medications:  acetaminophen 650 mg Oral Q6H PRN [facility] Anna   albuterol 2 5 mg Nebulization Q4H PRN Charlee Martin DO   apixaban 5 mg Oral BID [facility], TOLU   aspirin 81 mg Oral Daily [facility], T[facility]   azithromycin 250 mg Intravenous Q24H [facility], TOLU   dofetilide 500 mcg Oral Q12H [facility], TOLU   fenofibrate 48 mg Oral Daily [facility], TOLU   flecainide 100 mg Oral Q12H [facility], TOLU   fluticasone 1 spray Each Nare Daily [facility], TOLU   furosemide 20 mg Oral Daily [facility], TOLU   guaiFENesin 600 [facility] Q[facility]H [facility]   ipratropium 0 5 mg Nebulization TID Chele Velazco MD   levalbuterol 1 25 mg Nebulization TID Chele Velazco MD   levothyroxine 125 mcg Oral Daily [facility], TOLU   mesalamine 800 mg Oral BID Coralee Ganser, DO   methylPREDNISolone sodium succinate 40 mg Intravenous Q8H [facility], TOLU   metoprolol succinate 100 mg Oral Q12H [facility], TOLU   ondansetron 4 mg Intravenous Q6H PRN [facility], TOLU   pantoprazole 40 mg Oral Early Morning [facility], OTLU   sodium chloride (PF) 3 mL Intravenous PRN [facility], TOLU   venlafaxine 37 5 mg Oral Daily Олегelle Robles, TOLU        Today, Patient Was Seen By: Faiza Owens PA-C    ** Please Note: Dragon 360 Dictation voice to text software may have been used in the creation of this document   **

## 2019-12-02 LAB
ANION GAP SERPL CALCULATED.3IONS-SCNC: 7 MMOL/L (ref 4–13)
ATRIAL RATE: 90 BPM
ATRIAL RATE: 92 BPM
BACTERIA SPT RESP CULT: ABNORMAL
BASOPHILS # BLD MANUAL: 0 THOUSAND/UL (ref 0–0.1)
BASOPHILS NFR MAR MANUAL: 0 % (ref 0–1)
BUN SERPL-MCNC: 12 MG/DL (ref 5–25)
CALCIUM SERPL-MCNC: 8.9 MG/DL (ref 8.3–10.1)
CHLORIDE SERPL-SCNC: 104 MMOL/L (ref 100–108)
CO2 SERPL-SCNC: 27 MMOL/L (ref 21–32)
CREAT SERPL-MCNC: 0.74 MG/DL (ref 0.6–1.3)
EOSINOPHIL # BLD MANUAL: 0 THOUSAND/UL (ref 0–0.4)
EOSINOPHIL NFR BLD MANUAL: 0 % (ref 0–6)
ERYTHROCYTE [DISTWIDTH] IN BLOOD BY AUTOMATED COUNT: 14.3 % (ref 11.6–15.1)
GFR SERPL CREATININE-BSD FRML MDRD: 86 ML/MIN/1.73SQ M
GLUCOSE SERPL-MCNC: 201 MG/DL (ref 65–140)
GRAM STN SPEC: ABNORMAL
GRAM STN SPEC: ABNORMAL
HCT VFR BLD AUTO: 40.8 % (ref 34.8–46.1)
HGB BLD-MCNC: 12.4 G/DL (ref 11.5–15.4)
LG PLATELETS BLD QL SMEAR: PRESENT
LYMPHOCYTES # BLD AUTO: 1.65 THOUSAND/UL (ref 0.6–4.47)
LYMPHOCYTES # BLD AUTO: 22 % (ref 14–44)
MCH RBC QN AUTO: 28 PG (ref 26.8–34.3)
MCHC RBC AUTO-ENTMCNC: 30.4 G/DL (ref 31.4–37.4)
MCV RBC AUTO: 92 FL (ref 82–98)
MONOCYTES # BLD AUTO: 0.15 THOUSAND/UL (ref 0–1.22)
MONOCYTES NFR BLD: 2 % (ref 4–12)
NEUTROPHILS # BLD MANUAL: 5.63 THOUSAND/UL (ref 1.85–7.62)
NEUTS BAND NFR BLD MANUAL: 4 % (ref 0–8)
NEUTS SEG NFR BLD AUTO: 71 % (ref 43–75)
NRBC BLD AUTO-RTO: 0 /100 WBCS
P AXIS: 53 DEGREES
P AXIS: 55 DEGREES
PLATELET # BLD AUTO: 232 THOUSANDS/UL (ref 149–390)
PLATELET BLD QL SMEAR: ADEQUATE
PMV BLD AUTO: 9.2 FL (ref 8.9–12.7)
POTASSIUM SERPL-SCNC: 4 MMOL/L (ref 3.5–5.3)
PR INTERVAL: 128 MS
PR INTERVAL: 132 MS
QRS AXIS: 17 DEGREES
QRS AXIS: 27 DEGREES
QRSD INTERVAL: 76 MS
QRSD INTERVAL: 76 MS
QT INTERVAL: 372 MS
QT INTERVAL: 382 MS
QTC INTERVAL: 460 MS
QTC INTERVAL: 467 MS
RBC # BLD AUTO: 4.43 MILLION/UL (ref 3.81–5.12)
RBC MORPH BLD: NORMAL
SODIUM SERPL-SCNC: 138 MMOL/L (ref 136–145)
T WAVE AXIS: 22 DEGREES
T WAVE AXIS: 36 DEGREES
TOTAL CELLS COUNTED SPEC: 100
VARIANT LYMPHS # BLD AUTO: 1 %
VENTRICULAR RATE: 90 BPM
VENTRICULAR RATE: 92 BPM
WBC # BLD AUTO: 7.51 THOUSAND/UL (ref 4.31–10.16)

## 2019-12-02 PROCEDURE — 94640 AIRWAY INHALATION TREATMENT: CPT

## 2019-12-02 PROCEDURE — 93010 ELECTROCARDIOGRAM REPORT: CPT | Performed by: INTERNAL MEDICINE

## 2019-12-02 PROCEDURE — 85027 COMPLETE CBC AUTOMATED: CPT | Performed by: PHYSICIAN ASSISTANT

## 2019-12-02 PROCEDURE — 80048 BASIC METABOLIC PNL TOTAL CA: CPT | Performed by: PHYSICIAN ASSISTANT

## 2019-12-02 PROCEDURE — 99232 SBSQ HOSP IP/OBS MODERATE 35: CPT | Performed by: PHYSICIAN ASSISTANT

## 2019-12-02 PROCEDURE — 85007 BL SMEAR W/DIFF WBC COUNT: CPT | Performed by: PHYSICIAN ASSISTANT

## 2019-12-02 PROCEDURE — 94760 N-INVAS EAR/PLS OXIMETRY 1: CPT

## 2019-12-02 RX ORDER — METHYLPREDNISOLONE SODIUM SUCCINATE 40 MG/ML
40 INJECTION, POWDER, LYOPHILIZED, FOR SOLUTION INTRAMUSCULAR; INTRAVENOUS EVERY 12 HOURS SCHEDULED
Status: DISCONTINUED | OUTPATIENT
Start: 2019-12-02 | End: 2019-12-04 | Stop reason: HOSPADM

## 2019-12-02 RX ADMIN — PANTOPRAZOLE SODIUM 40 MG: 40 TABLET, DELAYED RELEASE ORAL at 05:05

## 2019-12-02 RX ADMIN — LEVALBUTEROL HYDROCHLORIDE 1.25 MG: 1.25 SOLUTION, CONCENTRATE RESPIRATORY (INHALATION) at 08:28

## 2019-12-02 RX ADMIN — METOPROLOL SUCCINATE 100 MG: 100 TABLET, FILM COATED, EXTENDED RELEASE ORAL at 09:32

## 2019-12-02 RX ADMIN — LEVALBUTEROL HYDROCHLORIDE 1.25 MG: 1.25 SOLUTION, CONCENTRATE RESPIRATORY (INHALATION) at 13:51

## 2019-12-02 RX ADMIN — GUAIFENESIN 600 MG: 600 TABLET, EXTENDED RELEASE ORAL at 09:32

## 2019-12-02 RX ADMIN — APIXABAN 5 MG: 5 TABLET, FILM COATED ORAL at 17:12

## 2019-12-02 RX ADMIN — LEVALBUTEROL HYDROCHLORIDE 1.25 MG: 1.25 SOLUTION, CONCENTRATE RESPIRATORY (INHALATION) at 19:51

## 2019-12-02 RX ADMIN — METHYLPREDNISOLONE SODIUM SUCCINATE 40 MG: 40 INJECTION, POWDER, FOR SOLUTION INTRAMUSCULAR; INTRAVENOUS at 01:18

## 2019-12-02 RX ADMIN — IPRATROPIUM BROMIDE 0.5 MG: 0.5 SOLUTION RESPIRATORY (INHALATION) at 08:28

## 2019-12-02 RX ADMIN — IPRATROPIUM BROMIDE 0.5 MG: 0.5 SOLUTION RESPIRATORY (INHALATION) at 13:51

## 2019-12-02 RX ADMIN — FLUTICASONE PROPIONATE 1 SPRAY: 50 SPRAY, METERED NASAL at 09:27

## 2019-12-02 RX ADMIN — ASPIRIN 81 MG 81 MG: 81 TABLET ORAL at 09:32

## 2019-12-02 RX ADMIN — DOFETILIDE 500 MCG: 0.12 CAPSULE ORAL at 09:32

## 2019-12-02 RX ADMIN — FUROSEMIDE 20 MG: 40 TABLET ORAL at 09:32

## 2019-12-02 RX ADMIN — DOFETILIDE 500 MCG: 0.12 CAPSULE ORAL at 21:49

## 2019-12-02 RX ADMIN — METHYLPREDNISOLONE SODIUM SUCCINATE 40 MG: 40 INJECTION, POWDER, FOR SOLUTION INTRAMUSCULAR; INTRAVENOUS at 09:35

## 2019-12-02 RX ADMIN — LEVOTHYROXINE SODIUM 125 MCG: 125 TABLET ORAL at 05:05

## 2019-12-02 RX ADMIN — FLECAINIDE ACETATE 100 MG: 100 TABLET ORAL at 21:49

## 2019-12-02 RX ADMIN — FLECAINIDE ACETATE 100 MG: 100 TABLET ORAL at 09:35

## 2019-12-02 RX ADMIN — METOPROLOL SUCCINATE 100 MG: 100 TABLET, FILM COATED, EXTENDED RELEASE ORAL at 21:48

## 2019-12-02 RX ADMIN — MESALAMINE 800 MG: 400 CAPSULE, DELAYED RELEASE ORAL at 09:32

## 2019-12-02 RX ADMIN — GUAIFENESIN 600 MG: 600 TABLET, EXTENDED RELEASE ORAL at 21:49

## 2019-12-02 RX ADMIN — IPRATROPIUM BROMIDE 0.5 MG: 0.5 SOLUTION RESPIRATORY (INHALATION) at 19:51

## 2019-12-02 RX ADMIN — APIXABAN 5 MG: 5 TABLET, FILM COATED ORAL at 09:32

## 2019-12-02 RX ADMIN — FENOFIBRATE 48 MG: 48 TABLET ORAL at 09:32

## 2019-12-02 RX ADMIN — VENLAFAXINE HYDROCHLORIDE 37.5 MG: 37.5 CAPSULE, EXTENDED RELEASE ORAL at 09:35

## 2019-12-02 RX ADMIN — MESALAMINE 800 MG: 400 CAPSULE, DELAYED RELEASE ORAL at 17:12

## 2019-12-02 RX ADMIN — METHYLPREDNISOLONE SODIUM SUCCINATE 40 MG: 40 INJECTION, POWDER, FOR SOLUTION INTRAMUSCULAR; INTRAVENOUS at 21:49

## 2019-12-02 NOTE — PROGRESS NOTES
Progress Note - Benito Orona 1955, 62 y o  female MRN: [id number]    Unit/Bed#: 416-01 Encounter: [id number]    Primary Care Provider: Sophia Ag MD   Date and time admitted to hospital: 11/29/2019  4:32 PM        Pneumonia due to infectious organism  Assessment & Plan  Presented to the ER with complaints of SOB, cough, generalized malaise  CT shows-Patchy right upper lobe and right upper lower lobe airspace disease in keeping with multilobar pneumonia  Chronic moderate mediastinal adenopathy unchanged from 3/1/2019 study  She was started on IV ceftriaxone and azithromax In the ER which was continued on hospital day 1, however due to negative procalcitonin x 2, negative blood cultures, will discontinue IV ceftriazone and monitor closely  Continue IV azithromycin in the setting of COPD exacerbation  Continue Respiratory protocol, Airway clearance protocol  Legionella and strep pneumoniae urinary antigens are both negative  Sputum culture and gram stain: Mixed Respiratory nelida    COPD exacerbation ([facility] [address] )  Assessment & Plan  In the setting of viral pneumonia  Patient is a former smoker, history of COPD  Decrease IV Solu-Medrol 40 mg every 8 hours to every 12 hours  Continue IV azithromycin  Patient continues to need oxygen supplementation  Suspect this is a big part of her minimal symptom improvement  Elevated d-dimer  Assessment & Plan  D-dimer level at 1 11  No hx of DVT/PE  CTA negative for acute PE  VAS Lower Limb Venous duplex study-negative  Continue PTA Eliquis    Crohn's disease with complication (HCC)  Assessment & Plan  No evidence of acute flare  Continue PTA medication  Continue outpatient GI follow up    Atypical chest pain  Assessment & Plan  Present to ER with complaints of chest pain, SOB and cough  She states pain is worst with coughing  D- dimer elevated but CTA negative for acute PE  EKG shows- Normal sinus rhythm at rate of 92 bpm  Troponin negative x 3    No further cardiac workup warranted  Suspect pleuritic pain or costochondritis with frequent coughing  Hypothyroidism  Assessment & Plan  Continue Levothyroxine    MARY ELLEN (obstructive sleep apnea)  Assessment & Plan  resp protocol in place   CPAP qhs    Hypercholesteremia  Assessment & Plan  Continue fenofibrate 40 mg po daily    Paroxysmal atrial fibrillation (HCC)  Assessment & Plan  Rate currently controlled  Anticoagulated on Eliquis  She reports hx of ablation, cardioversion  EKG on admission - Normal sinus rhythm at rate of 92 bpm  Continue Eliquis, metoprolol  Continue outpatient cardiology follow up    * Acute respiratory failure with hypoxia (HCC)  Assessment & Plan  SPO2 of 86 % on RA upon arrival to ER  Currently stable on 2L/min per nasal cannula  Likely due to underlying pneumonia in the setting of COPD exacerbation  VTE Pharmacologic Prophylaxis:   Pharmacologic: Apixaban (Eliquis)  Mechanical VTE Prophylaxis in Place: Yes    Patient Centered Rounds: I have performed bedside rounds with nursing staff today  Discussions with Specialists or Other Care Team Provider:  nursing, CM, and attending      Education and Discussions with Family / Patient: patient    Time Spent for Care: 20 minutes  More than 50% of total time spent on counseling and coordination of care as described above  Current Length of Stay: 3 day(s)    Current Patient Status: Inpatient   Certification Statement: The patient will continue to require additional inpatient hospital stay due to continued need for IV steroids and increased oxygen demand    Discharge Plan: TBD    Code Status: Level 1 - Full Code      Subjective: The patient was seen and examined  The patient states she was feeling better this am however now she is feeling tight and wheezy       Objective:     Vitals:   Temp (24hrs), Av 1 °F (36 7 °C), Min:97 5 °F (36 4 °C), Max:98 7 °F (37 1 °C)    Temp:  [97 5 °F (36 4 °C)-98 7 °F (37 1 °C)] 97 5 °F (36 4 °C)  HR:  [65-81] 78  Resp:  [16-18] 18  BP: (116-157)/(57-67) 157/67  SpO2:  [90 %-95 %] 92 %  Body mass index is 34 9 kg/m²  Input and Output Summary (last 24 hours): Intake/Output Summary (Last 24 hours) at 12/2/2019 1353  Last data filed at 12/2/2019 1300  Gross per 24 hour   Intake 600 ml   Output 400 ml   Net 200 ml       Physical Exam:     Physical Exam   Constitutional: She is oriented to person, place, and time  Vital signs are normal  She appears well-developed and well-nourished  She is active  Nasal cannula in place  Cardiovascular: Normal rate and regular rhythm  Pulmonary/Chest: Effort normal  She has decreased breath sounds  She has wheezes (scattered end expiratory wheezes)  She has no rhonchi  She has no rales  Abdominal: Soft  Normal appearance and bowel sounds are normal  There is no tenderness  Neurological: She is alert and oriented to person, place, and time  No cranial nerve deficit  Skin: Skin is warm, dry and intact  Nursing note and vitals reviewed  Additional Data:     Labs:    Results from last 7 days   Lab Units 12/02/19  0449 12/01/19  0501   WBC Thousand/uL 7 51 5 94   HEMOGLOBIN g/dL 12 4 11 8   HEMATOCRIT % 40 8 38 9   PLATELETS Thousands/uL 232 224   BANDS PCT % 4  --    NEUTROS PCT %  --  64   LYMPHS PCT %  --  23   LYMPHO PCT % 22  --    MONOS PCT %  --  10   MONO PCT % 2*  --    EOS PCT % 0 3     Results from last 7 days   Lab Units 12/02/19  0449 11/30/19  0606   SODIUM mmol/L 138   < > 140   POTASSIUM mmol/L 4 0   < > 3 5   CHLORIDE mmol/L 104   < > 104   CO2 mmol/L 27   < > 26   BUN mg/dL 12   < > 11   CREATININE mg/dL 0 74   < > 0 81   ANION GAP mmol/L 7   < > 10   CALCIUM mg/dL 8 9   < > 8 5   ALBUMIN g/dL  --   --  2 7*   TOTAL BILIRUBIN mg/dL  --   --  0 20   ALK PHOS U/L  --   --  87   ALT U/L  --   --  61   AST U/L  --   --  60*   GLUCOSE RANDOM mg/dL 201*   < > 126    < > = values in this interval not displayed           Results from last 7 days Lab Units 11/30/19  1146   POC GLUCOSE mg/dl 129         Results from last 7 days   Lab Units 11/30/19  0606 11/29/19  2116   PROCALCITONIN ng/ml 0 07 0 08           * I Have Reviewed All Lab Data Listed Above  * Additional Pertinent Lab Tests Reviewed:  All Labs Within Last 24 Hours Reviewed    Imaging:    Imaging Reports Reviewed Today Include: none  Imaging Personally Reviewed by Myself Includes:  none    Recent Cultures (last 7 days):     Results from last 7 days   Lab Units 11/29/19  2303 11/29/19 2116   SPUTUM CULTURE  1+ Growth of   --    GRAM STAIN RESULT  2+ Polys*  3+ Gram negative rods*  --    LEGIONELLA URINARY ANTIGEN   --  Negative       Last 24 Hours Medication List:     Current Facility-Administered Medications:  acetaminophen 650 mg Oral Q6H PRN JOSH Sandhu    albuterol 2 5 mg Nebulization Q4H PRN Roxana Ochoa DO    apixaban 5 mg Oral BID Олег Robles PA-C    aspirin 81 mg Oral Daily Олегelle Robles PA-C    azithromycin 250 mg Intravenous Q24H [facility] Jackie Gipson MD Last Rate: Stopped (12/02/19 0933)   dofetilide 500 mcg Oral [facility]12H [facility]    fenofibrate 48 mg Oral Daily Олег Robles PA-C    flecainide 100 mg Oral Q12H [facility]    fluticasone 1 spray Each Nare Daily Sea Umana PA-C    furosemide 20 mg Oral Daily Олег Robles PA-C    guaiFENesin 600 mg Oral Q12H [facility] Sea Umana PA-C    ipratropium 0 5 mg Nebulization TID Hernesto Boogie MD    levalbuterol 1 25 mg Nebulization TID Jackie Gipson MD    levothyroxine 125 mcg Oral Daily Олге Robles PA-C    mesalamine 800 mg Oral BID Amari Dillard DO    methylPREDNISolone sodium succinate 40 mg Intravenous Q12H [facility] Dave Wilkinson PA-C    metoprolol succinate 100 mg Oral Q12H Олег Robles PA-C    ondansetron 4 mg Intravenous Q6H PRN Олег Robles PA-C    pantoprazole 40 mg Oral Early Morning Олег Robles PA-C    sodium chloride (PF) 3 mL Intravenous PRN Олег Robles PA-C    venlafaxine 37 5 mg Oral Daily Олег Robles PA-C Today, Patient Was Seen By: Cody Torres PA-C    ** Please Note: Dictation voice to text software may have been used in the creation of this document   **

## 2019-12-02 NOTE — UTILIZATION REVIEW
Notification of Inpatient Admission/Inpatient Authorization Request   This is a Notification of Inpatient Admission for [address] [address]  Be advised that this patient was admitted to our facility under Inpatient Status  Contact Teo Argueta at [phone] for additional admission information  Bruce Art  DEPT  DEDICATED -[phone]  Patient Name:   Jennifer Walter   YOB: 1955       [address]   [address]:   [address]  Tax ID: [id number]  NPI: [id number] Attending Provider/NPI: Eugenio Villeda Md [[id number]]   Place of Service Code: 24     Place of Service Name:  [address] [place] [address]   Start Date: 11/29/19 1922     Discharge Date & Time: No discharge date for patient encounter  Type of Admission: Inpatient Status Discharge Disposition   (if discharged): Home/Self Care   Patient Diagnoses: Acute hypokalemia [E87 6]  Chest pain [R07 9]  Pneumonia [J18 9]  Hypoxia [R09 02]  Sepsis ([facility] [address] ) [A41 9]     Orders: Admission Orders (From admission, onward)     Ordered        11/29/19 1922  Inpatient Admission  Once                    Assigned Utilization Review Contact: Teo Argueta  Utilization   Network Utilization Review Department  Phone: [phone]; Fax [phone]  Email: [email or web address]   ATTENTION PAYERS: Please call the assigned Utilization  directly with any questions or concerns ALL voicemails in the department are confidential  Send all requests for admission clinical reviews, approved or denied determinations and any other requests to dedicated fax number belonging to the campus where the patient is receiving treatment

## 2019-12-02 NOTE — ASSESSMENT & PLAN NOTE
Presented to the ER with complaints of SOB, cough, generalized malaise  CT shows-Patchy right upper lobe and right upper lower lobe airspace disease in keeping with multilobar pneumonia  Chronic moderate mediastinal adenopathy unchanged from 3/1/2019 study  She was started on IV ceftriaxone and azithromax In the ER which was continued on hospital day 1, however due to negative procalcitonin x 2, negative blood cultures, will discontinue IV ceftriazone and monitor closely  Continue IV azithromycin in the setting of COPD exacerbation  Continue Respiratory protocol, Airway clearance protocol  Legionella and strep pneumoniae urinary antigens are both negative    Sputum culture and gram stain: Mixed Respiratory nelida

## 2019-12-02 NOTE — ASSESSMENT & PLAN NOTE
In the setting of viral pneumonia  Patient is a former smoker, history of COPD  Decrease IV Solu-Medrol 40 mg every 8 hours to every 12 hours  Continue IV azithromycin  Patient continues to need oxygen supplementation  Suspect this is a big part of her minimal symptom improvement

## 2019-12-02 NOTE — OCCUPATIONAL THERAPY NOTE
Occupational Therapy         Patient Name: Lisa Rajan  NJOIZ'N Date: 12/2/2019      Order received and chart review performed; per nursing report, pt is performing at Independent level with self-care tasks as well as functional mobility with no device; pt does not require skilled OT needs at this time; will d/c OT orders at this time

## 2019-12-02 NOTE — PHYSICAL THERAPY NOTE
PHYSICAL THERAPY NOTE          Patient Name: Eloina Espino  AXHLR'O Date: 12/2/2019     Order received and chart review performed  Per nursing report, pt is performing functional mobility independently  Pt does not require skilled PT intervention at this time; will d/c PT orders

## 2019-12-02 NOTE — NURSING NOTE
I picked up this patient at 2 am from [facility] (DP/SNF)  Patient is awake in bed in stable condition with call bell within reach

## 2019-12-02 NOTE — SOCIAL WORK
Chart reviewed by cm, assessment was completed at the bedside, pt lives with the spouse in a 2 story home, 12-14 inside steps, br on the 1st & 2nd floor, 10 steps outside, pt is independent and drives, she is  A PCA with "Independent living", pt has a rx plan at [facility], pt has a cpap machine at home, pt has had no hhc,pt denies smoking and drinks alcohol on rare occasions, pt's  is also here at the hospital, pt's family or friend palma transport the pt home when stable for d/c, pt has a rx plan at [facility], pt remains on oxygen, pt does not have home oxygen, d/c plan was discussed at care coordination rounds today, no d/c for today, tentative d/c for tomorrow, cm palma continue to follow and assess for any additional d/c needs, Patient/caregiver received discharge checklist   Content reviewed  Patient/caregiver encouraged to participate in discharge plan of care prior to discharge home  CM reviewed d/c planning process including the following: identifying help at home, patient preference for d/c planning needs, availability of treatment team to discuss questions or concerns patient and/or family may have regarding understanding medications and recognizing signs and symptoms once discharged  CM also encouraged patient to follow up with all recommended appointments after discharge  Patient advised of importance for patient and family to participate in managing patients medical well being

## 2019-12-03 LAB
ANION GAP SERPL CALCULATED.3IONS-SCNC: 9 MMOL/L (ref 4–13)
BASOPHILS # BLD MANUAL: 0 THOUSAND/UL (ref 0–0.1)
BASOPHILS NFR MAR MANUAL: 0 % (ref 0–1)
BUN SERPL-MCNC: 16 MG/DL (ref 5–25)
CALCIUM SERPL-MCNC: 8.6 MG/DL (ref 8.3–10.1)
CHLORIDE SERPL-SCNC: 104 MMOL/L (ref 100–108)
CO2 SERPL-SCNC: 26 MMOL/L (ref 21–32)
CREAT SERPL-MCNC: 0.74 MG/DL (ref 0.6–1.3)
EOSINOPHIL # BLD MANUAL: 0 THOUSAND/UL (ref 0–0.4)
EOSINOPHIL NFR BLD MANUAL: 0 % (ref 0–6)
ERYTHROCYTE [DISTWIDTH] IN BLOOD BY AUTOMATED COUNT: 14.7 % (ref 11.6–15.1)
GFR SERPL CREATININE-BSD FRML MDRD: 86 ML/MIN/1.73SQ M
GLUCOSE SERPL-MCNC: 250 MG/DL (ref 65–140)
HCT VFR BLD AUTO: 39 % (ref 34.8–46.1)
HGB BLD-MCNC: 11.9 G/DL (ref 11.5–15.4)
LG PLATELETS BLD QL SMEAR: PRESENT
LYMPHOCYTES # BLD AUTO: 1.95 THOUSAND/UL (ref 0.6–4.47)
LYMPHOCYTES # BLD AUTO: 14 % (ref 14–44)
MCH RBC QN AUTO: 28.2 PG (ref 26.8–34.3)
MCHC RBC AUTO-ENTMCNC: 30.5 G/DL (ref 31.4–37.4)
MCV RBC AUTO: 92 FL (ref 82–98)
MONOCYTES # BLD AUTO: 0.7 THOUSAND/UL (ref 0–1.22)
MONOCYTES NFR BLD: 5 % (ref 4–12)
NEUTROPHILS # BLD MANUAL: 10.99 THOUSAND/UL (ref 1.85–7.62)
NEUTS SEG NFR BLD AUTO: 79 % (ref 43–75)
NRBC BLD AUTO-RTO: 0 /100 WBCS
PLATELET # BLD AUTO: 255 THOUSANDS/UL (ref 149–390)
PLATELET BLD QL SMEAR: ADEQUATE
PMV BLD AUTO: 9.3 FL (ref 8.9–12.7)
POTASSIUM SERPL-SCNC: 4.4 MMOL/L (ref 3.5–5.3)
RBC # BLD AUTO: 4.22 MILLION/UL (ref 3.81–5.12)
RBC MORPH BLD: NORMAL
SODIUM SERPL-SCNC: 139 MMOL/L (ref 136–145)
TOTAL CELLS COUNTED SPEC: 100
VARIANT LYMPHS # BLD AUTO: 2 %
WBC # BLD AUTO: 13.91 THOUSAND/UL (ref 4.31–10.16)

## 2019-12-03 PROCEDURE — 94760 N-INVAS EAR/PLS OXIMETRY 1: CPT

## 2019-12-03 PROCEDURE — 85027 COMPLETE CBC AUTOMATED: CPT | Performed by: PHYSICIAN ASSISTANT

## 2019-12-03 PROCEDURE — 85007 BL SMEAR W/DIFF WBC COUNT: CPT | Performed by: PHYSICIAN ASSISTANT

## 2019-12-03 PROCEDURE — 94640 AIRWAY INHALATION TREATMENT: CPT

## 2019-12-03 PROCEDURE — 99232 SBSQ HOSP IP/OBS MODERATE 35: CPT | Performed by: PHYSICIAN ASSISTANT

## 2019-12-03 PROCEDURE — 80048 BASIC METABOLIC PNL TOTAL CA: CPT | Performed by: PHYSICIAN ASSISTANT

## 2019-12-03 RX ADMIN — GUAIFENESIN 600 MG: 600 TABLET, EXTENDED RELEASE ORAL at 09:20

## 2019-12-03 RX ADMIN — FLUTICASONE PROPIONATE 1 SPRAY: 50 SPRAY, METERED NASAL at 09:28

## 2019-12-03 RX ADMIN — METOPROLOL SUCCINATE 100 MG: 100 TABLET, FILM COATED, EXTENDED RELEASE ORAL at 09:21

## 2019-12-03 RX ADMIN — LEVOTHYROXINE SODIUM 125 MCG: 125 TABLET ORAL at 06:00

## 2019-12-03 RX ADMIN — APIXABAN 5 MG: 5 TABLET, FILM COATED ORAL at 18:07

## 2019-12-03 RX ADMIN — PANTOPRAZOLE SODIUM 40 MG: 40 TABLET, DELAYED RELEASE ORAL at 06:00

## 2019-12-03 RX ADMIN — FLECAINIDE ACETATE 100 MG: 100 TABLET ORAL at 21:37

## 2019-12-03 RX ADMIN — FLECAINIDE ACETATE 100 MG: 100 TABLET ORAL at 09:24

## 2019-12-03 RX ADMIN — LEVALBUTEROL HYDROCHLORIDE 1.25 MG: 1.25 SOLUTION, CONCENTRATE RESPIRATORY (INHALATION) at 07:34

## 2019-12-03 RX ADMIN — APIXABAN 5 MG: 5 TABLET, FILM COATED ORAL at 09:20

## 2019-12-03 RX ADMIN — METHYLPREDNISOLONE SODIUM SUCCINATE 40 MG: 40 INJECTION, POWDER, FOR SOLUTION INTRAMUSCULAR; INTRAVENOUS at 09:20

## 2019-12-03 RX ADMIN — LEVALBUTEROL HYDROCHLORIDE 1.25 MG: 1.25 SOLUTION, CONCENTRATE RESPIRATORY (INHALATION) at 14:09

## 2019-12-03 RX ADMIN — MESALAMINE 800 MG: 400 CAPSULE, DELAYED RELEASE ORAL at 18:07

## 2019-12-03 RX ADMIN — IPRATROPIUM BROMIDE 0.5 MG: 0.5 SOLUTION RESPIRATORY (INHALATION) at 14:09

## 2019-12-03 RX ADMIN — GUAIFENESIN 600 MG: 600 TABLET, EXTENDED RELEASE ORAL at 21:30

## 2019-12-03 RX ADMIN — DOFETILIDE 500 MCG: 0.12 CAPSULE ORAL at 21:37

## 2019-12-03 RX ADMIN — FENOFIBRATE 48 MG: 48 TABLET ORAL at 09:20

## 2019-12-03 RX ADMIN — METHYLPREDNISOLONE SODIUM SUCCINATE 40 MG: 40 INJECTION, POWDER, FOR SOLUTION INTRAMUSCULAR; INTRAVENOUS at 21:30

## 2019-12-03 RX ADMIN — FUROSEMIDE 20 MG: 40 TABLET ORAL at 09:20

## 2019-12-03 RX ADMIN — IPRATROPIUM BROMIDE 0.5 MG: 0.5 SOLUTION RESPIRATORY (INHALATION) at 07:34

## 2019-12-03 RX ADMIN — MESALAMINE 800 MG: 400 CAPSULE, DELAYED RELEASE ORAL at 09:20

## 2019-12-03 RX ADMIN — DOFETILIDE 500 MCG: 0.12 CAPSULE ORAL at 09:24

## 2019-12-03 RX ADMIN — METOPROLOL SUCCINATE 100 MG: 100 TABLET, FILM COATED, EXTENDED RELEASE ORAL at 21:37

## 2019-12-03 RX ADMIN — AZITHROMYCIN MONOHYDRATE 250 MG: 500 INJECTION, POWDER, LYOPHILIZED, FOR SOLUTION INTRAVENOUS at 09:26

## 2019-12-03 RX ADMIN — VENLAFAXINE HYDROCHLORIDE 37.5 MG: 37.5 CAPSULE, EXTENDED RELEASE ORAL at 09:20

## 2019-12-03 RX ADMIN — ASPIRIN 81 MG 81 MG: 81 TABLET ORAL at 09:20

## 2019-12-03 NOTE — PROGRESS NOTES
Progress Note - Juliana Martinez 1955, 62 y o  female MRN: [id number]    Unit/Bed#: 416-01 Encounter: [id number]    Primary Care Provider: Malathi Boyce MD   Date and time admitted to hospital: 11/29/2019  4:32 PM        COPD exacerbation [facility])  Assessment & Plan  In the setting of viral pneumonia  Patient is a former smoker, history of COPD  IV Solu-Medrol 40 mg every 8 hours decreased to every 12 hours on 12/2/19, continue this today  Continue IV azithromycin x 1 more dose today  Patient weaned off of oxygen supplementation today  Suspect this is a big part of her minimal symptom improvement  Pneumonia due to infectious organism  Assessment & Plan  Presented to the ER with complaints of SOB, cough, generalized malaise  CT shows-Patchy right upper lobe and right upper lower lobe airspace disease in keeping with multilobar pneumonia  Chronic moderate mediastinal adenopathy unchanged from 3/1/2019 study  She was started on IV ceftriaxone and azithromax In the ER which was continued on hospital day 1, however due to negative procalcitonin x 2, negative blood cultures, will discontinue IV ceftriazone and monitor closely  Continue IV azithromycin in the setting of COPD exacerbation  She will receive last dose today  Continue Respiratory protocol, Airway clearance protocol  Legionella and strep pneumoniae urinary antigens are both negative  Sputum culture and gram stain: Mixed Respiratory nelida    Elevated d-dimer  Assessment & Plan  D-dimer level at 1 11  No hx of DVT/PE  CTA negative for acute PE  VAS Lower Limb Venous duplex study-negative  Continue PTA Eliquis    Crohn's disease with complication ([facility] [address] )  Assessment & Plan  No evidence of acute flare  Continue PTA medication  Continue outpatient GI follow up    Atypical chest pain  Assessment & Plan  Present to ER with complaints of chest pain, SOB and cough  She states pain is worst with coughing      D- dimer elevated but CTA negative for acute PE  EKG shows- Normal sinus rhythm at rate of 92 bpm  Troponin negative x 3  No further cardiac workup warranted  Suspect pleuritic pain or costochondritis with frequent coughing  Hypothyroidism  Assessment & Plan  Continue Levothyroxine    MARY ELLEN (obstructive sleep apnea)  Assessment & Plan  resp protocol in place   CPAP qhs    Hypercholesteremia  Assessment & Plan  Continue fenofibrate 40 mg po daily    Paroxysmal atrial fibrillation (HCC)  Assessment & Plan  Rate currently controlled  Anticoagulated on Eliquis  She reports hx of ablation, cardioversion  EKG on admission - Normal sinus rhythm at rate of 92 bpm  Continue Eliquis, metoprolol  Continue outpatient cardiology follow up    * Acute respiratory failure with hypoxia [facility])  Assessment & Plan  Resolved, patient weaned off oxygen today  SPO2 of 86 % on RA upon arrival to ER  Likely due to underlying pneumonia in the setting of COPD exacerbation  VTE Pharmacologic Prophylaxis:   Pharmacologic: Apixaban (Eliquis)  Mechanical VTE Prophylaxis in Place: Yes    Patient Centered Rounds: I have performed bedside rounds with nursing staff today  Discussions with Specialists or Other Care Team Provider: nursing, CM, and attending    Education and Discussions with Family / Patient: patient    Time Spent for Care: 20 minutes  More than 50% of total time spent on counseling and coordination of care as described above  Current Length of Stay: 4 day(s)    Current Patient Status: Inpatient   Certification Statement: The patient will continue to require additional inpatient hospital stay due to continued need for IV steroids and IV antibiotics  Discharge Plan: Possible discharge tomorrow 19    Code Status: Level 1 - Full Code      Subjective: The patient was seen and examined   The patient states she continues to feel short of breath and c/o a cough    Objective:     Vitals:   Temp (24hrs), Av 8 °F (36 6 °C), Min:97 4 °F (36 3 °C), Max:98 5 °F (36 9 °C)    Temp:  [97 4 °F (36 3 °C)-98 5 °F (36 9 °C)] 97 4 °F (36 3 °C)  HR:  [59-78] 59  Resp:  [17-18] 17  BP: (112-156)/(46-68) 139/67  SpO2:  [92 %-96 %] 95 %  Body mass index is 34 97 kg/m²  Input and Output Summary (last 24 hours): Intake/Output Summary (Last 24 hours) at 12/3/2019 1348  Last data filed at 12/3/2019 0900  Gross per 24 hour   Intake 540 ml   Output    Net 540 ml       Physical Exam:     Physical Exam   Constitutional: She is oriented to person, place, and time  She appears well-developed and well-nourished  She is active and cooperative  Cardiovascular: Normal rate, regular rhythm and normal heart sounds  Pulmonary/Chest: Effort normal  She has wheezes  She has no rhonchi  She has no rales  Abdominal: Soft  Normal appearance and bowel sounds are normal  There is no tenderness  Neurological: She is alert and oriented to person, place, and time  No cranial nerve deficit  Skin: Skin is warm, dry and intact  Nursing note and vitals reviewed  Additional Data:     Labs:    Results from last 7 days   Lab Units 12/03/19  0518 12/02/19  0449  12/01/19  0501   WBC Thousand/uL 13 91* 7 51  --  5 94   HEMOGLOBIN g/dL 11 9 12 4  --  11 8   HEMATOCRIT % 39 0 40 8  --  38 9   PLATELETS Thousands/uL 255 232  --  224   BANDS PCT %  --  4  --   --    NEUTROS PCT %  --   --   --  64   LYMPHS PCT %  --   --   --  23   LYMPHO PCT % 14 22   < >  --    MONOS PCT %  --   --   --  10   MONO PCT % 5 2*   < >  --    EOS PCT % 0 0   < > 3    < > = values in this interval not displayed       Results from last 7 days   Lab Units 12/03/19 0518 11/30/19  0606   SODIUM mmol/L 139   < > 140   POTASSIUM mmol/L 4 4   < > 3 5   CHLORIDE mmol/L 104   < > 104   CO2 mmol/L 26   < > 26   BUN mg/dL 16   < > 11   CREATININE mg/dL 0 74   < > 0 81   ANION GAP mmol/L 9   < > 10   CALCIUM mg/dL 8 6   < > 8 5   ALBUMIN g/dL  --   --  2 7*   TOTAL BILIRUBIN mg/dL  --   --  0 20   ALK PHOS U/L  -- --  87   ALT U/L  --   --  61   AST U/L  --   --  60*   GLUCOSE RANDOM mg/dL 250*   < > 126    < > = values in this interval not displayed  Results from last 7 days   Lab Units 11/30/19  1146   POC GLUCOSE mg/dl 129         Results from last 7 days   Lab Units 11/30/19  0606 11/29/19  2116   PROCALCITONIN ng/ml 0 07 0 08           * I Have Reviewed All Lab Data Listed Above  * Additional Pertinent Lab Tests Reviewed:  All Labs Within Last 24 Hours Reviewed    Imaging:    Imaging Reports Reviewed Today Include: none  Imaging Personally Reviewed by Myself Includes:  none    Recent Cultures (last 7 days):     Results from last 7 days   Lab Units 11/29/19  2303 11/29/19 2116   SPUTUM CULTURE  1+ Growth of   --    GRAM STAIN RESULT  2+ Polys*  3+ Gram negative rods*  --    LEGIONELLA URINARY ANTIGEN   --  Negative       Last 24 Hours Medication List:     Current Facility-Administered Medications:  acetaminophen 650 mg Oral Q6H PRN JOSH Sandhu    albuterol 2 5 mg Nebulization Q4H PRN Justina Valle DO    apixaban 5 mg Oral BID Олег Robles PA-C    aspirin 81 mg Oral Daily Олег Robles PA-C    azithromycin 250 mg Intravenous Q24H [address] Hernesto Boogie MD Last Rate: 250 mg (12/03/19 0926)   dofetilide 500 mcg Oral Q12H [address] Олег Robles PA-C    fenofibrate 48 mg Oral Daily Олег Robles PA-C    flecainide 100 mg Oral Q12H [address] Олег Robles PA-C    fluticasone 1 spray Each Nare Daily Sea Umana PA-C    furosemide 20 mg Oral Daily Олег Robles PA-C    guaiFENesin 600 mg Oral Q12H [address] Sea Umana PA-C    ipratropium 0 5 mg Nebulization TID Hernesto Boogie MD    levalbuterol 1 25 mg Nebulization TID Parker Salazar MD    levothyroxine 125 mcg Oral Daily Олег Robles PA-C    mesalamine 800 mg Oral BID Ramon Matdru,     methylPREDNISolone sodium succinate 40 mg Intravenous Q12H [address] Dave Wilkinson PA-C    metoprolol succinate 100 mg Oral Q12H Олег Robles PA-C    ondansetron 4 mg Intravenous Q6H PRN Олег Robles PA-C pantoprazole 40 mg Oral Early Morning Олег Robles PA-C    sodium chloride (PF) 3 mL Intravenous PRN Олег Robles PA-C    venlafaxine 37 5 mg Oral Daily Олег Robles PA-C         Today, Patient Was Seen By: Demario Ross PA-C    ** Please Note: Dictation voice to text software may have been used in the creation of this document   **

## 2019-12-03 NOTE — ASSESSMENT & PLAN NOTE
In the setting of viral pneumonia  Patient is a former smoker, history of COPD  IV Solu-Medrol 40 mg every 8 hours decreased to every 12 hours on 12/2/19, continue this today  Continue IV azithromycin x 1 more dose today  Patient weaned off of oxygen supplementation today  Suspect this is a big part of her minimal symptom improvement

## 2019-12-03 NOTE — ASSESSMENT & PLAN NOTE
Resolved, patient weaned off oxygen today  SPO2 of 86 % on RA upon arrival to ER  Likely due to underlying pneumonia in the setting of COPD exacerbation

## 2019-12-03 NOTE — RESPIRATORY THERAPY NOTE
Resp Care      12/02/19 2100   Non-Invasive Information   Resp Comments Pt refused to wear bipap at this time  Pt aware of risks for not wearing the CPAP for her MARY ELLEN but stilll wishes to not wear and will call RT if she changes her mind

## 2019-12-04 VITALS
RESPIRATION RATE: 18 BRPM | SYSTOLIC BLOOD PRESSURE: 144 MMHG | HEIGHT: 69 IN | WEIGHT: 235.89 LBS | DIASTOLIC BLOOD PRESSURE: 66 MMHG | OXYGEN SATURATION: 92 % | HEART RATE: 67 BPM | TEMPERATURE: 98 F | BODY MASS INDEX: 34.94 KG/M2

## 2019-12-04 LAB
ANION GAP SERPL CALCULATED.3IONS-SCNC: 12 MMOL/L (ref 4–13)
ANION GAP SERPL CALCULATED.3IONS-SCNC: 9 MMOL/L (ref 4–13)
BASOPHILS # BLD AUTO: 0.02 THOUSANDS/ΜL (ref 0–0.1)
BASOPHILS NFR BLD AUTO: 0 % (ref 0–1)
BUN SERPL-MCNC: 14 MG/DL (ref 5–25)
BUN SERPL-MCNC: 15 MG/DL (ref 5–25)
CALCIUM SERPL-MCNC: 8.7 MG/DL (ref 8.3–10.1)
CALCIUM SERPL-MCNC: 8.7 MG/DL (ref 8.3–10.1)
CHLORIDE SERPL-SCNC: 102 MMOL/L (ref 100–108)
CHLORIDE SERPL-SCNC: 102 MMOL/L (ref 100–108)
CO2 SERPL-SCNC: 19 MMOL/L (ref 21–32)
CO2 SERPL-SCNC: 27 MMOL/L (ref 21–32)
CREAT SERPL-MCNC: 0.62 MG/DL (ref 0.6–1.3)
CREAT SERPL-MCNC: 0.84 MG/DL (ref 0.6–1.3)
EOSINOPHIL # BLD AUTO: 0 THOUSAND/ΜL (ref 0–0.61)
EOSINOPHIL NFR BLD AUTO: 0 % (ref 0–6)
ERYTHROCYTE [DISTWIDTH] IN BLOOD BY AUTOMATED COUNT: 14.6 % (ref 11.6–15.1)
GFR SERPL CREATININE-BSD FRML MDRD: 74 ML/MIN/1.73SQ M
GFR SERPL CREATININE-BSD FRML MDRD: 96 ML/MIN/1.73SQ M
GLUCOSE SERPL-MCNC: 220 MG/DL (ref 65–140)
GLUCOSE SERPL-MCNC: 240 MG/DL (ref 65–140)
HCT VFR BLD AUTO: 39.7 % (ref 34.8–46.1)
HGB BLD-MCNC: 12.1 G/DL (ref 11.5–15.4)
IMM GRANULOCYTES # BLD AUTO: 0.12 THOUSAND/UL (ref 0–0.2)
IMM GRANULOCYTES NFR BLD AUTO: 1 % (ref 0–2)
LYMPHOCYTES # BLD AUTO: 2.13 THOUSANDS/ΜL (ref 0.6–4.47)
LYMPHOCYTES NFR BLD AUTO: 17 % (ref 14–44)
MCH RBC QN AUTO: 28.1 PG (ref 26.8–34.3)
MCHC RBC AUTO-ENTMCNC: 30.5 G/DL (ref 31.4–37.4)
MCV RBC AUTO: 92 FL (ref 82–98)
MONOCYTES # BLD AUTO: 0.46 THOUSAND/ΜL (ref 0.17–1.22)
MONOCYTES NFR BLD AUTO: 4 % (ref 4–12)
NEUTROPHILS # BLD AUTO: 10.06 THOUSANDS/ΜL (ref 1.85–7.62)
NEUTS SEG NFR BLD AUTO: 78 % (ref 43–75)
NRBC BLD AUTO-RTO: 0 /100 WBCS
PLATELET # BLD AUTO: 288 THOUSANDS/UL (ref 149–390)
PMV BLD AUTO: 9.5 FL (ref 8.9–12.7)
POTASSIUM SERPL-SCNC: 3.8 MMOL/L (ref 3.5–5.3)
POTASSIUM SERPL-SCNC: 5.5 MMOL/L (ref 3.5–5.3)
RBC # BLD AUTO: 4.31 MILLION/UL (ref 3.81–5.12)
SODIUM SERPL-SCNC: 133 MMOL/L (ref 136–145)
SODIUM SERPL-SCNC: 138 MMOL/L (ref 136–145)
WBC # BLD AUTO: 12.79 THOUSAND/UL (ref 4.31–10.16)

## 2019-12-04 PROCEDURE — 94760 N-INVAS EAR/PLS OXIMETRY 1: CPT

## 2019-12-04 PROCEDURE — 85025 COMPLETE CBC W/AUTO DIFF WBC: CPT | Performed by: PHYSICIAN ASSISTANT

## 2019-12-04 PROCEDURE — 94660 CPAP INITIATION&MGMT: CPT

## 2019-12-04 PROCEDURE — 94640 AIRWAY INHALATION TREATMENT: CPT

## 2019-12-04 PROCEDURE — 80048 BASIC METABOLIC PNL TOTAL CA: CPT | Performed by: PHYSICIAN ASSISTANT

## 2019-12-04 PROCEDURE — 99238 HOSP IP/OBS DSCHRG MGMT 30/<: CPT | Performed by: PHYSICIAN ASSISTANT

## 2019-12-04 RX ORDER — PREDNISONE 10 MG/1
TABLET ORAL
Qty: 30 TABLET | Refills: 0 | Status: SHIPPED | OUTPATIENT
Start: 2019-12-04 | End: 2020-01-06

## 2019-12-04 RX ADMIN — FLUTICASONE PROPIONATE 1 SPRAY: 50 SPRAY, METERED NASAL at 09:05

## 2019-12-04 RX ADMIN — FUROSEMIDE 20 MG: 40 TABLET ORAL at 09:05

## 2019-12-04 RX ADMIN — LEVALBUTEROL HYDROCHLORIDE 1.25 MG: 1.25 SOLUTION, CONCENTRATE RESPIRATORY (INHALATION) at 07:34

## 2019-12-04 RX ADMIN — LEVALBUTEROL HYDROCHLORIDE 1.25 MG: 1.25 SOLUTION, CONCENTRATE RESPIRATORY (INHALATION) at 13:19

## 2019-12-04 RX ADMIN — IPRATROPIUM BROMIDE 0.5 MG: 0.5 SOLUTION RESPIRATORY (INHALATION) at 07:34

## 2019-12-04 RX ADMIN — APIXABAN 5 MG: 5 TABLET, FILM COATED ORAL at 09:05

## 2019-12-04 RX ADMIN — METOPROLOL SUCCINATE 100 MG: 100 TABLET, FILM COATED, EXTENDED RELEASE ORAL at 09:07

## 2019-12-04 RX ADMIN — VENLAFAXINE HYDROCHLORIDE 37.5 MG: 37.5 CAPSULE, EXTENDED RELEASE ORAL at 09:08

## 2019-12-04 RX ADMIN — DOFETILIDE 500 MCG: 0.12 CAPSULE ORAL at 09:08

## 2019-12-04 RX ADMIN — ASPIRIN 81 MG 81 MG: 81 TABLET ORAL at 09:09

## 2019-12-04 RX ADMIN — FENOFIBRATE 48 MG: 48 TABLET ORAL at 09:05

## 2019-12-04 RX ADMIN — IPRATROPIUM BROMIDE 0.5 MG: 0.5 SOLUTION RESPIRATORY (INHALATION) at 13:19

## 2019-12-04 RX ADMIN — GUAIFENESIN 600 MG: 600 TABLET, EXTENDED RELEASE ORAL at 09:07

## 2019-12-04 RX ADMIN — PANTOPRAZOLE SODIUM 40 MG: 40 TABLET, DELAYED RELEASE ORAL at 05:18

## 2019-12-04 RX ADMIN — FLECAINIDE ACETATE 100 MG: 100 TABLET ORAL at 09:07

## 2019-12-04 RX ADMIN — LEVOTHYROXINE SODIUM 125 MCG: 125 TABLET ORAL at 05:18

## 2019-12-04 RX ADMIN — METHYLPREDNISOLONE SODIUM SUCCINATE 40 MG: 40 INJECTION, POWDER, FOR SOLUTION INTRAMUSCULAR; INTRAVENOUS at 09:05

## 2019-12-04 RX ADMIN — MESALAMINE 800 MG: 400 CAPSULE, DELAYED RELEASE ORAL at 09:06

## 2019-12-04 NOTE — SOCIAL WORK
Pt is being discharged today  Follow up appointments reviewed with patient with a good understanding  Case Management reviewed discharge planning process including the following: identifying help that is needed at home, pt's preference for discharge needs and Meds at [facility]  Reviewed with Pt that any member of the healthcare team can answer questions regarding : medications, jmportance of recognizing  Signs and symptoms of any  medical problems  Case Management also encouraged pt to follow up with all recommended appointments after discharge

## 2019-12-04 NOTE — PLAN OF CARE
Problem: CARDIOVASCULAR - ADULT  Goal: Maintains optimal cardiac output and hemodynamic stability  Description  INTERVENTIONS:  - Monitor I/O, vital signs and rhythm  - Monitor for S/S and trends of decreased cardiac output  - Administer and titrate ordered vasoactive medications to optimize hemodynamic stability  - Assess quality of pulses, skin color and temperature  - Assess for signs of decreased coronary artery perfusion  - Instruct patient to report change in severity of symptoms  12/4/2019 1040 by Kostas Paulson RN  Outcome: Adequate for Discharge  12/4/2019 1040 by Kostas Pualson RN  Outcome: Progressing  Goal: Absence of cardiac dysrhythmias or at baseline rhythm  Description  INTERVENTIONS:  - Continuous cardiac monitoring, vital signs, obtain 12 lead EKG if ordered  - Administer antiarrhythmic and heart rate control medications as ordered  - Monitor electrolytes and administer replacement therapy as ordered  12/4/2019 1040 by Kostas Paulson RN  Outcome: Adequate for Discharge  12/4/2019 1040 by Kostas Paulson RN  Outcome: Progressing     Problem: RESPIRATORY - ADULT  Goal: Achieves optimal ventilation and oxygenation  Description  INTERVENTIONS:  - Assess for changes in respiratory status  - Assess for changes in mentation and behavior  - Position to facilitate oxygenation and minimize respiratory effort  - Oxygen administered by appropriate delivery if ordered  - Initiate smoking cessation education as indicated  - Encourage broncho-pulmonary hygiene including cough, deep breathe, Incentive Spirometry  - Assess the need for suctioning and aspirate as needed  - Assess and instruct to report SOB or any respiratory difficulty  - Respiratory Therapy support as indicated  12/4/2019 1040 by Kostas Paulson RN  Outcome: Adequate for Discharge  12/4/2019 1040 by Kostas Paulson RN  Outcome: Progressing     Problem: PAIN - ADULT  Goal: Verbalizes/displays adequate comfort level or baseline comfort level  Description  Interventions:  - Encourage patient to monitor pain and request assistance  - Assess pain using appropriate pain scale  - Administer analgesics based on type and severity of pain and evaluate response  - Implement non-pharmacological measures as appropriate and evaluate response  - Consider cultural and social influences on pain and pain management  - Notify physician/advanced practitioner if interventions unsuccessful or patient reports new pain  12/4/2019 1040 by Juliana Xiao RN  Outcome: Adequate for Discharge  12/4/2019 1040 by Juliana Xiao RN  Outcome: Progressing     Problem: INFECTION - ADULT  Goal: Absence or prevention of progression during hospitalization  Description  INTERVENTIONS:  - Assess and monitor for signs and symptoms of infection  - Monitor lab/diagnostic results  - Monitor all insertion sites, i e  indwelling lines, tubes, and drains  - Monitor endotracheal if appropriate and nasal secretions for changes in amount and color  - [place] appropriate cooling/warming therapies per order  - Administer medications as ordered  - Instruct and encourage patient and family to use good hand hygiene technique  - Identify and instruct in appropriate isolation precautions for identified infection/condition  12/4/2019 1040 by Juliana Xiao RN  Outcome: Adequate for Discharge  12/4/2019 1040 by Juliana Xiao RN  Outcome: Progressing  Goal: Absence of fever/infection during neutropenic period  Description  INTERVENTIONS:  - Monitor WBC    12/4/2019 1040 by Juliana Xiao RN  Outcome: Adequate for Discharge  12/4/2019 1040 by Juliana Xiao RN  Outcome: Progressing     Problem: SAFETY ADULT  Goal: Patient will remain free of falls  Description  INTERVENTIONS:  - Assess patient frequently for physical needs  -  Identify cognitive and physical deficits and behaviors that affect risk of falls    -  [place] fall precautions as indicated by assessment   - Educate patient/family on patient safety including physical limitations  - Instruct patient to call for assistance with activity based on assessment  - Modify environment to reduce risk of injury  - Consider OT/PT consult to assist with strengthening/mobility  12/4/2019 1040 by Sophia Faust RN  Outcome: Adequate for Discharge  12/4/2019 1040 by Sophia Faust RN  Outcome: Progressing  Goal: Maintain or return to baseline ADL function  Description  INTERVENTIONS:  -  Assess patient's ability to carry out ADLs; assess patient's baseline for ADL function and identify physical deficits which impact ability to perform ADLs (bathing, care of mouth/teeth, toileting, grooming, dressing, etc )  - Assess/evaluate cause of self-care deficits   - Assess range of motion  - Assess patient's mobility; develop plan if impaired  - Assess patient's need for assistive devices and provide as appropriate  - Encourage maximum independence but intervene and supervise when necessary  - Involve family in performance of ADLs  - Assess for home care needs following discharge   - Consider OT consult to assist with ADL evaluation and planning for discharge  - Provide patient education as appropriate  12/4/2019 1040 by Sophia Faust RN  Outcome: Adequate for Discharge  12/4/2019 1040 by Sophia Faust RN  Outcome: Progressing  Goal: Maintain or return mobility status to optimal level  Description  INTERVENTIONS:  - Assess patient's baseline mobility status (ambulation, transfers, stairs, etc )    - Identify cognitive and physical deficits and behaviors that affect mobility  - Identify mobility aids required to assist with transfers and/or ambulation (gait belt, sit-to-stand, lift, walker, cane, etc )  - [place] fall precautions as indicated by assessment  - Record patient progress and toleration of activity level on Mobility SBAR; progress patient to next Phase/Stage  - Instruct patient to call for assistance with activity based on assessment  - Consider rehabilitation consult to assist with strengthening/weightbearing, etc   12/4/2019 1040 by Hayley More RN  Outcome: Adequate for Discharge  12/4/2019 1040 by Hayley More RN  Outcome: Progressing     Problem: DISCHARGE PLANNING  Goal: Discharge to home or other facility with appropriate resources  Description  INTERVENTIONS:  - Identify barriers to discharge w/patient and caregiver  - Arrange for needed discharge resources and transportation as appropriate  - Identify discharge learning needs (meds, wound care, etc )  - Arrange for interpretive services to assist at discharge as needed  - Refer to Case Management Department for coordinating discharge planning if the patient needs post-hospital services based on physician/advanced practitioner order or complex needs related to functional status, cognitive ability, or social support system  12/4/2019 1040 by Hayley More RN  Outcome: Adequate for Discharge  12/4/2019 1040 by Hayley More RN  Outcome: Progressing     Problem: Knowledge Deficit  Goal: Patient/family/caregiver demonstrates understanding of disease process, treatment plan, medications, and discharge instructions  Description  Complete learning assessment and assess knowledge base    Interventions:  - Provide teaching at level of understanding  - Provide teaching via preferred learning methods  12/4/2019 1040 by Hayley More RN  Outcome: Adequate for Discharge  12/4/2019 1040 by Hayley More RN  Outcome: Progressing     Problem: DISCHARGE PLANNING - CARE MANAGEMENT  Goal: Discharge to post-acute care or home with appropriate resources  Description  INTERVENTIONS:  - Conduct assessment to determine patient/family and health care team treatment goals, and need for post-acute services based on payer coverage, community resources, and patient preferences, and barriers to discharge  - Address psychosocial, clinical, and financial barriers to discharge as identified in assessment in conjunction with the patient/family and health care team  - Arrange appropriate level of post-acute services according to patient's   needs and preference and payer coverage in collaboration with the physician and health care team  - Communicate with and update the patient/family, physician, and health care team regarding progress on the discharge plan  - Arrange appropriate transportation to post-acute venues    Pt's goal is to return home   12/4/2019 1040 by Babar Lehman RN  Outcome: Adequate for Discharge  12/4/2019 1040 by Babar Lehman RN  Outcome: Progressing

## 2019-12-04 NOTE — NURSING NOTE
AVS printed and gone over with patient  Patient expressed understanding and had no questions  Patient left floor walking accompanied by her sister in stable condition

## 2019-12-04 NOTE — ASSESSMENT & PLAN NOTE
In the setting of viral pneumonia  Patient is a former smoker, history of COPD  IV Solu-Medrol 40 mg every 8 hours decreased to every 12 hours on 12/2/19  She was discharged on a prednisone taper dose   She received IV azithromycin for a 5 day course, no additional antibiotics needed at discharge  On admission the patient required oxygen supplementation, the patient was weaned off of oxygen supplementation on 12/3/19  Outpatient follow-up with PCP within 1 week    Outpatient follow-up with Pulmonology

## 2019-12-04 NOTE — PLAN OF CARE
Problem: CARDIOVASCULAR - ADULT  Goal: Maintains optimal cardiac output and hemodynamic stability  Description  INTERVENTIONS:  - Monitor I/O, vital signs and rhythm  - Monitor for S/S and trends of decreased cardiac output  - Administer and titrate ordered vasoactive medications to optimize hemodynamic stability  - Assess quality of pulses, skin color and temperature  - Assess for signs of decreased coronary artery perfusion  - Instruct patient to report change in severity of symptoms  Outcome: Progressing  Goal: Absence of cardiac dysrhythmias or at baseline rhythm  Description  INTERVENTIONS:  - Continuous cardiac monitoring, vital signs, obtain 12 lead EKG if ordered  - Administer antiarrhythmic and heart rate control medications as ordered  - Monitor electrolytes and administer replacement therapy as ordered  Outcome: Progressing     Problem: RESPIRATORY - ADULT  Goal: Achieves optimal ventilation and oxygenation  Description  INTERVENTIONS:  - Assess for changes in respiratory status  - Assess for changes in mentation and behavior  - Position to facilitate oxygenation and minimize respiratory effort  - Oxygen administered by appropriate delivery if ordered  - Initiate smoking cessation education as indicated  - Encourage broncho-pulmonary hygiene including cough, deep breathe, Incentive Spirometry  - Assess the need for suctioning and aspirate as needed  - Assess and instruct to report SOB or any respiratory difficulty  - Respiratory Therapy support as indicated  Outcome: Progressing     Problem: PAIN - ADULT  Goal: Verbalizes/displays adequate comfort level or baseline comfort level  Description  Interventions:  - Encourage patient to monitor pain and request assistance  - Assess pain using appropriate pain scale  - Administer analgesics based on type and severity of pain and evaluate response  - Implement non-pharmacological measures as appropriate and evaluate response  - Consider cultural and social influences on pain and pain management  - Notify physician/advanced practitioner if interventions unsuccessful or patient reports new pain  Outcome: Progressing     Problem: INFECTION - ADULT  Goal: Absence or prevention of progression during hospitalization  Description  INTERVENTIONS:  - Assess and monitor for signs and symptoms of infection  - Monitor lab/diagnostic results  - Monitor all insertion sites, i e  indwelling lines, tubes, and drains  - Monitor endotracheal if appropriate and nasal secretions for changes in amount and color  - [place] appropriate cooling/warming therapies per order  - Administer medications as ordered  - Instruct and encourage patient and family to use good hand hygiene technique  - Identify and instruct in appropriate isolation precautions for identified infection/condition  Outcome: Progressing  Goal: Absence of fever/infection during neutropenic period  Description  INTERVENTIONS:  - Monitor WBC    Outcome: Progressing     Problem: SAFETY ADULT  Goal: Patient will remain free of falls  Description  INTERVENTIONS:  - Assess patient frequently for physical needs  -  Identify cognitive and physical deficits and behaviors that affect risk of falls    -  [place] fall precautions as indicated by assessment   - Educate patient/family on patient safety including physical limitations  - Instruct patient to call for assistance with activity based on assessment  - Modify environment to reduce risk of injury  - Consider OT/PT consult to assist with strengthening/mobility  Outcome: Progressing  Goal: Maintain or return to baseline ADL function  Description  INTERVENTIONS:  -  Assess patient's ability to carry out ADLs; assess patient's baseline for ADL function and identify physical deficits which impact ability to perform ADLs (bathing, care of mouth/teeth, toileting, grooming, dressing, etc )  - Assess/evaluate cause of self-care deficits   - Assess range of motion  - Assess patient's mobility; develop plan if impaired  - Assess patient's need for assistive devices and provide as appropriate  - Encourage maximum independence but intervene and supervise when necessary  - Involve family in performance of ADLs  - Assess for home care needs following discharge   - Consider OT consult to assist with ADL evaluation and planning for discharge  - Provide patient education as appropriate  Outcome: Progressing  Goal: Maintain or return mobility status to optimal level  Description  INTERVENTIONS:  - Assess patient's baseline mobility status (ambulation, transfers, stairs, etc )    - Identify cognitive and physical deficits and behaviors that affect mobility  - Identify mobility aids required to assist with transfers and/or ambulation (gait belt, sit-to-stand, lift, walker, cane, etc )  - Equality fall precautions as indicated by assessment  - Record patient progress and toleration of activity level on Mobility SBAR; progress patient to next Phase/Stage  - Instruct patient to call for assistance with activity based on assessment  - Consider rehabilitation consult to assist with strengthening/weightbearing, etc   Outcome: Progressing     Problem: DISCHARGE PLANNING  Goal: Discharge to home or other facility with appropriate resources  Description  INTERVENTIONS:  - Identify barriers to discharge w/patient and caregiver  - Arrange for needed discharge resources and transportation as appropriate  - Identify discharge learning needs (meds, wound care, etc )  - Arrange for interpretive services to assist at discharge as needed  - Refer to Case Management Department for coordinating discharge planning if the patient needs post-hospital services based on physician/advanced practitioner order or complex needs related to functional status, cognitive ability, or social support system  Outcome: Progressing     Problem: Knowledge Deficit  Goal: Patient/family/caregiver demonstrates understanding of disease process, treatment plan, medications, and discharge instructions  Description  Complete learning assessment and assess knowledge base    Interventions:  - Provide teaching at level of understanding  - Provide teaching via preferred learning methods  Outcome: Progressing     Problem: DISCHARGE PLANNING - CARE MANAGEMENT  Goal: Discharge to post-acute care or home with appropriate resources  Description  INTERVENTIONS:  - Conduct assessment to determine patient/family and health care team treatment goals, and need for post-acute services based on payer coverage, community resources, and patient preferences, and barriers to discharge  - Address psychosocial, clinical, and financial barriers to discharge as identified in assessment in conjunction with the patient/family and health care team  - Arrange appropriate level of post-acute services according to patient's   needs and preference and payer coverage in collaboration with the physician and health care team  - Communicate with and update the patient/family, physician, and health care team regarding progress on the discharge plan  - Arrange appropriate transportation to post-acute venues    Pt's goal is to return home   Outcome: Progressing

## 2019-12-04 NOTE — RESPIRATORY THERAPY NOTE
Patient has refused her nebulizer and her pap therapy tonight, nurse was outside of patients room and heard her refusal  I did inform patient if she changed her mind to call

## 2019-12-04 NOTE — ASSESSMENT & PLAN NOTE
Presented to the ER with complaints of SOB, cough, generalized malaise  CT shows-Patchy right upper lobe and right upper lower lobe airspace disease in keeping with multilobar pneumonia  Chronic moderate mediastinal adenopathy unchanged from 3/1/2019 study  She was started on IV ceftriaxone and azithromax In the ER which was continued on hospital day 1, however due to negative procalcitonin x 2, negative blood cultures, will discontinue IV ceftriazone and monitor closely  The patient was continued on IV azithromycin in the setting of COPD exacerbation  She will receive a 5 day course prior to discharge  No additional antibiotics needed  Outpatient follow-up with PCP within 1 week  Continue Respiratory protocol, Airway clearance protocol  Legionella and strep pneumoniae urinary antigens are both negative    Sputum culture and gram stain: Mixed Respiratory nelida

## 2019-12-04 NOTE — ASSESSMENT & PLAN NOTE
Resolved, patient weaned off oxygen on 12/3/19  SPO2 of 86 % on RA upon arrival to ER  Likely due to underlying pneumonia in the setting of COPD exacerbation

## 2019-12-04 NOTE — DISCHARGE SUMMARY
Discharge- Josseline Patricia 1955, 62 y o  female MRN: [id number]    Unit/Bed#: 416-01 Encounter: [id number]    Primary Care Provider: Aleen Soulier, MD   Date and time admitted to hospital: 11/29/2019  4:32 PM        COPD exacerbation [facility])  Assessment & Plan  In the setting of viral pneumonia  Patient is a former smoker, history of COPD  IV Solu-Medrol 40 mg every 8 hours decreased to every 12 hours on 12/2/19  She was discharged on a prednisone taper dose   She received IV azithromycin for a 5 day course, no additional antibiotics needed at discharge  On admission the patient required oxygen supplementation, the patient was weaned off of oxygen supplementation on 12/3/19  Outpatient follow-up with PCP within 1 week  Outpatient follow-up with Pulmonology       Pneumonia due to infectious organism  Assessment & Plan  Presented to the ER with complaints of SOB, cough, generalized malaise  CT shows-Patchy right upper lobe and right upper lower lobe airspace disease in keeping with multilobar pneumonia  Chronic moderate mediastinal adenopathy unchanged from 3/1/2019 study  She was started on IV ceftriaxone and azithromax In the ER which was continued on hospital day 1, however due to negative procalcitonin x 2, negative blood cultures, will discontinue IV ceftriazone and monitor closely  The patient was continued on IV azithromycin in the setting of COPD exacerbation  She will receive a 5 day course prior to discharge  No additional antibiotics needed  Outpatient follow-up with PCP within 1 week  Continue Respiratory protocol, Airway clearance protocol  Legionella and strep pneumoniae urinary antigens are both negative    Sputum culture and gram stain: Mixed Respiratory nelida    Elevated d-dimer  Assessment & Plan  D-dimer level at 1 11  No hx of DVT/PE  CTA negative for acute PE  VAS Lower Limb Venous duplex study-negative  Continue PTA Eliquis    Crohn's disease with complication [facility])  Assessment & Plan  No evidence of acute flare  Continue PTA medication  Continue outpatient GI follow up    Atypical chest pain  Assessment & Plan  Present to ER with complaints of chest pain, SOB and cough  She states pain is worst with coughing  D- dimer elevated but CTA negative for acute PE  EKG shows- Normal sinus rhythm at rate of 92 bpm  Troponin negative x 3  No further cardiac workup warranted  Suspect pleuritic pain or costochondritis with frequent coughing  Hypothyroidism  Assessment & Plan  Continue Levothyroxine    MARY ELLEN (obstructive sleep apnea)  Assessment & Plan  CPAP qhs    Hypercholesteremia  Assessment & Plan  Continue fenofibrate 40 mg po daily    Paroxysmal atrial fibrillation (HCC)  Assessment & Plan  Rate currently controlled  Anticoagulated on Eliquis  She reports hx of ablation, cardioversion  EKG on admission - Normal sinus rhythm at rate of 92 bpm  Continue Eliquis, metoprolol  Continue outpatient cardiology follow up    * Acute respiratory failure with hypoxia [facility])  Assessment & Plan  Resolved, patient weaned off oxygen on 12/3/19  SPO2 of 86 % on RA upon arrival to ER  Likely due to underlying pneumonia in the setting of COPD exacerbation  Discharging Physician / Practitioner: Jasmine Major PA-C  PCP: Claudia Fontanez MD  Admission Date:   Admission Orders (From admission, onward)     Ordered        11/29/19 1922  Inpatient Admission  Once                   Discharge Date: 12/04/19    Resolved Problems  Date Reviewed: 12/3/2019    None          Consultations During Hospital Stay:  · none    Procedures Performed:   · none    Significant Findings / Test Results:   X-ray Chest 2 Views    Result Date: 11/29/2019  Impression: No acute cardiopulmonary disease  Workstation performed: [id number]     Cta Ed Chest Pe Study    Result Date: 11/29/2019  · Impression: Patchy right upper lobe and right upper lower lobe airspace disease in keeping with multilobar pneumonia  Chronic moderate mediastinal adenopathy unchanged from 3/1/2019 study  Workstation performed: [id number]     Incidental Findings:   · none     Test Results Pending at Discharge (will require follow up):   · none     Outpatient Tests Requested:  · none    Complications:  none    Reason for Admission: pneumonia, hypoxia, COPD exacerbation    Hospital Course:     Chavo Rangel is a 62 y o  female patient who originally presented to the hospital on 11/29/2019 due to complaints of chest pain ongoing over the past 2 days  She also reports that she has accompanying persistent nonproductive cough and shortness of breath  She also reports that she has been having increased myalgias and generalized weakness, malaise  She denies nausea and vomiting, abdominal pain, diarrhea, recent travel or sick contacts  She Is currently wearing a loop recorder by her cardiologist after she was diagnosed with paroxysmal atrial fibrillation status post cardioversion  She also has a history of asthma/COPD she is a former smoker (quit 2 years ago), hypertension, hyperlipidemia, Crohn's disease, hypothyroidism  D-dimer was elevated CTA chest PE study negative for acute pulmonary embolism  CTA did show right multilobar pneumonia  Emergency room she received nebulized Atrovent and albuterol, potassium chloride 40 mEq p o  She was also started on ceftriaxone 2 g IV  At bedside patient is awake and alert states that her breathing has improved however she still has that dry cough  Patient has been admitted on inpatient status [facility] level care for further management and workup of multilobar pneumonia, chest pain, elevated D-dimer, hypokalemia  Please see above list of diagnoses and related plan for additional information  Condition at Discharge: good     Discharge Day Visit / Exam:     Subjective: The patient states she is feeling well and wanting to go home     Vitals: Blood Pressure: 144/66 (12/04/19 0906)  Pulse: 67 (12/04/19 7201)  Temperature: 98 °F (36 7 °C) (12/03/19 2340)  Temp Source: Oral (12/03/19 1509)  Respirations: 18 (12/03/19 1509)  Height: 5' 9" (175 3 cm) (11/30/19 1442)  Weight - Scale: 107 kg (235 lb 14 3 oz) (12/04/19 0600)  SpO2: 91 % (12/04/19 0906)  Exam:   Physical Exam   Constitutional: She is oriented to person, place, and time  She appears well-developed and well-nourished  HENT:   Head: Normocephalic and atraumatic  Cardiovascular: Normal rate, regular rhythm and normal heart sounds  Pulmonary/Chest: Effort normal and breath sounds normal  No stridor  She has no wheezes  Abdominal: Soft  Bowel sounds are normal  She exhibits no distension  There is no tenderness  Neurological: She is alert and oriented to person, place, and time  No cranial nerve deficit  Skin: Skin is warm and dry  Nursing note and vitals reviewed  Discharge instructions/Information to patient and family:   See after visit summary for information provided to patient and family  Provisions for Follow-Up Care:  See after visit summary for information related to follow-up care and any pertinent home health orders  Disposition:     Home    For Discharges to [facility] SNF:   · Not Applicable to this Patient - Not Applicable to this Patient    Planned Readmission: no     Discharge Statement:  I spent 25 minutes discharging the patient  This time was spent on the day of discharge  I had direct contact with the patient on the day of discharge  Greater than 50% of the total time was spent examining patient, answering all patient questions, arranging and discussing plan of care with patient as well as directly providing post-discharge instructions  Additional time then spent on discharge activities  Discharge Medications:  See after visit summary for reconciled discharge medications provided to patient and family        ** Please Note: This note has been constructed using a voice recognition system **

## 2019-12-05 ENCOUNTER — HOSPITAL ENCOUNTER (OUTPATIENT)
Dept: MRI IMAGING | Facility: HOSPITAL | Age: 64
Discharge: HOME | End: 2019-12-05
Payer: COMMERCIAL

## 2019-12-05 DIAGNOSIS — Z86.79 S/P CEREBRAL ANEURYSM REPAIR: ICD-10-CM

## 2019-12-05 DIAGNOSIS — Z98.890 STATUS POST ANEURYSM REPAIR: ICD-10-CM

## 2019-12-05 DIAGNOSIS — Z86.79 STATUS POST ANEURYSM REPAIR: ICD-10-CM

## 2019-12-05 DIAGNOSIS — Z98.890 S/P CEREBRAL ANEURYSM REPAIR: ICD-10-CM

## 2019-12-05 PROCEDURE — 70551 MRI BRAIN STEM W/O DYE: CPT

## 2019-12-05 NOTE — UTILIZATION REVIEW
Notification of Discharge  This is a Notification of Discharge from our facility [address]  Please be advised that this patient has been discharge from our facility  Below you will find the admission and discharge date and time including the patients disposition  PRESENTATION DATE: 11/29/2019  4:32 PM  OBS ADMISSION DATE:   IP ADMISSION DATE: 11/29/19 1922   DISCHARGE DATE: 12/4/2019  2:33 PM  DISPOSITION: Home/Self Care Home/Self Care   Admission Orders listed below:  Admission Orders (From admission, onward)     Ordered        11/29/19 1922  Inpatient Admission  Once                   Please contact the UR Department if additional information is required to close this patient's authorization/case  [facility] Utilization Review Department  Main: [phone] x carefully listen to the prompts  All voicemails are confidential   [email or web address]  Send all requests for admission clinical reviews, approved or denied determinations and any other requests to dedicated fax number below belonging to the campus where the patient is receiving treatment   List of dedicated fax numbers:  [address] DENIALS (Administrative/Medical Necessity) [phone]   [address] (Maternity/NICU/Pediatrics) [phone]   [facility] [phone]   [facility] [phone]   [facility] [phone]   [facility] [phone]   Alyssa Billings [phone]   Adam Kimball [phone]   Genora Ahr [address] [phone]

## 2020-01-06 ENCOUNTER — OFFICE VISIT (OUTPATIENT)
Dept: SLEEP CENTER | Facility: CLINIC | Age: 65
End: 2020-01-06
Payer: COMMERCIAL

## 2020-01-06 ENCOUNTER — TELEPHONE (OUTPATIENT)
Dept: SLEEP CENTER | Facility: CLINIC | Age: 65
End: 2020-01-06

## 2020-01-06 VITALS
HEIGHT: 69 IN | BODY MASS INDEX: 36.14 KG/M2 | SYSTOLIC BLOOD PRESSURE: 122 MMHG | OXYGEN SATURATION: 96 % | HEART RATE: 72 BPM | WEIGHT: 244 LBS | DIASTOLIC BLOOD PRESSURE: 74 MMHG

## 2020-01-06 DIAGNOSIS — G47.33 OSA (OBSTRUCTIVE SLEEP APNEA): Primary | ICD-10-CM

## 2020-01-06 PROCEDURE — 99214 OFFICE O/P EST MOD 30 MIN: CPT | Performed by: PSYCHIATRY & NEUROLOGY

## 2020-01-06 NOTE — PROGRESS NOTES
Sleep Medicine Follow-Up Note    HPI: 64yo F with MARY ELLEN presenting for a compliance visit  Treatment Summary: 2019 PSG showed severe MARY ELLEN with AHI 68 and 102 in REM  Ed of 77% with borderline oxygenation as well at baseline  2019 had CPAP titration which showed an adequate pressure of 12cm and correction of hypoxemia  HPI:  Today, patient presents unaccompanied  She stated that she is not getting as good sleep as she was in the past with her CPAP  She is getting up a lot at night to void and is getting cramping in her legs now  When she started to use her CPAP, she no longer was getting up at night  she was recently in the hospital for PNA and she noticed that she didn't sleep as well without it  She tried to use the CPAP that they gave her in the hospital, but it was not comfortable with her  She has gained 8 lbs since July  She feels a lot of it is water retention  Her atrial fibrillation has been controlled for now  She has been depressed, spending more time asleep, going to bed early, and feeling tired all day  She wakes up feeling tired in the morning         Treatment: CPAP  -Pressure: 12cm   -Pressure intolerance: no   -Aerophagia: no   -Air Hunger: yes  -Interface: NM  -Fit: good  -Chin strap: no  -HCC: YME  -Patient's perceived outcome: helping her sleep better then without it, but now as good as it was in July    Compliance Card Data:    - Date Range:    - Settincm   - Residual AHI: 0 4   - Vibratory Snore Index: 21 7   - %  Night in Large Leak: 0%   - Average usage days used: 9h 52 m   - % Days used: 100%   - % Days with Usage > 4 hrs: 100%    Respiratory:  -Ongoing Snoring: no  -Mouth Breathing: no  -Dry Mouth: no  -Nocturnal Gasping: no    ROS:   Genitourinary need to urinate more than twice a night   Cardiology palpitations/fluttering feeling in the chest and ankle/leg swelling   Gastrointestinal frequent heartburn/acid reflux   Neurology muscle weakness and numbness/tingling of an extremity   Constitutional weight change   Integumentary none   Psychiatry anxiety, depression and mood change   Musculoskeletal joint pain, muscle aches, back pain and legs twitching/jerking   Pulmonary shortness of breath with activity, chest tightness and frequent cough   ENT none   Endocrine frequent urination   Hematological none       Sleep Pattern:  -Position: side  -Bedtime: 6pm at the earliest  -Lights Out: same time  -Environment:  Turns TV it on for a few mins then turns it off   -Latency: mins  -Awakenings: 0-1  -Wake Time: 4-6am  -Rise Time: same time  -Patient's estimate of Sleep Time: 10+ hours    Daytime Symptoms:  -Upon Awakening: tired  -Daytime fatigue/sleepiness: tired  -Naps: denied  -Involuntary Dozing: denied  -Cognitive Symptoms: denied  -Driving: Difficulty with sleepiness and driving:  no   -- Close calls related to sleepiness: no   -- Accidents related to sleepiness: no    Questionnaire:  Sitting and reading: Slight chance of dozing  Watching TV: Slight chance of dozing  Sitting, inactive in a public place (e g  a theatre or a meeting): Would never doze  As a passenger in a car for an hour without a break: Moderate chance of dozing  Lying down to rest in the afternoon when circumstances permit: Slight chance of dozing  Sitting and talking to someone: Would never doze  Sitting quietly after a lunch without alcohol: Would never doze  In a car, while stopped for a few minutes in traffic: Would never doze  Total score: 5    Substance Use:  -Caffeine: 1 cup in the morning and evening but its decaf only  -Alcohol: denied  -THC: denied    --> Supplemental Oxygen Use: denies      PE:    /74 (BP Location: Left arm, Cuff Size: Large)   Pulse 72   Ht 5' 9" (1 753 m)   Wt 111 kg (244 lb)   SpO2 96%   BMI 36 03 kg/m²     General:  In NAD  Pul: Respirations: unlaboured  MS: No atrophy  Neuro: No resting tremor    Gait normal turning & station; unremarkable overall  Psych: Socially appropriate  Pleasant  No overt dysphoria  Assessment: The patient has great compliance with her CPAP and her obstructive events are well controlled with a residual AHI 0 4  She is deriving a lot of benefit from using her CPAP as she is less tired than she was in the past, but not feeling like she is rested in the morning  She no longer has sleepiness while driving  Will increase her pressure to 13cm for air hunger and VSI  Will have her work on decreasing time in bed  Recommendations:    1) change to APAP 13cm   2) Safe driving reviewed  3) Follow-up 3 months  4) Call with any questions or concerns  5) turn humidifier on higher  6) decrease amount of time in bed, set wake time to 530pm, in bed no earlier than 9pm        All questions answered for the patient, who indicated understanding and agreed with the plan       Josephine Barber MD  Psychiatry/ Sleep medicine

## 2020-01-06 NOTE — PATIENT INSTRUCTIONS
Recommendations:    1) change to APAP 13cm   2) Safe driving reviewed  3) Follow-up 3 months  4) Call with any questions or concerns    5) turn humidifier on higher  6) decrease amount of time in bed, set wake time to 530pm, in bed no earlier than 9pm

## 2020-01-07 ENCOUNTER — TELEPHONE (OUTPATIENT)
Dept: SLEEP CENTER | Facility: CLINIC | Age: 65
End: 2020-01-07

## 2020-01-12 ENCOUNTER — HOSPITAL ENCOUNTER (OUTPATIENT)
Dept: RADIOLOGY | Facility: HOSPITAL | Age: 65
Discharge: HOME | End: 2020-01-12
Payer: COMMERCIAL

## 2020-01-12 DIAGNOSIS — J18.9 PNEUMONIA OF RIGHT LUNG DUE TO INFECTIOUS ORGANISM, UNSPECIFIED PART OF LUNG: ICD-10-CM

## 2020-01-12 PROCEDURE — 71046 X-RAY EXAM CHEST 2 VIEWS: CPT

## 2020-01-16 ENCOUNTER — TRANSCRIBE ORDERS (OUTPATIENT)
Dept: ADMINISTRATIVE | Facility: HOSPITAL | Age: 65
End: 2020-01-16

## 2020-01-16 DIAGNOSIS — R91.8 LUNG MASS: Primary | ICD-10-CM

## 2020-01-16 DIAGNOSIS — R93.89 ABNORMAL RADIOLOGICAL FINDINGS IN SKIN AND SUBCUTANEOUS TISSUE: ICD-10-CM

## 2020-01-22 ENCOUNTER — HOSPITAL ENCOUNTER (OUTPATIENT)
Dept: CT IMAGING | Facility: HOSPITAL | Age: 65
Discharge: HOME | End: 2020-01-22
Payer: COMMERCIAL

## 2020-01-22 DIAGNOSIS — R93.89 ABNORMAL RADIOLOGICAL FINDINGS IN SKIN AND SUBCUTANEOUS TISSUE: ICD-10-CM

## 2020-01-22 DIAGNOSIS — R91.8 LUNG MASS: ICD-10-CM

## 2020-01-22 PROCEDURE — 71250 CT THORAX DX C-: CPT

## 2020-02-07 ENCOUNTER — APPOINTMENT (EMERGENCY)
Dept: RADIOLOGY | Facility: HOSPITAL | Age: 65
End: 2020-02-07
Payer: COMMERCIAL

## 2020-02-07 ENCOUNTER — APPOINTMENT (EMERGENCY)
Dept: CT IMAGING | Facility: HOSPITAL | Age: 65
End: 2020-02-07
Payer: COMMERCIAL

## 2020-02-07 ENCOUNTER — HOSPITAL ENCOUNTER (EMERGENCY)
Facility: HOSPITAL | Age: 65
Discharge: HOME | End: 2020-02-07
Attending: EMERGENCY MEDICINE | Admitting: EMERGENCY MEDICINE
Payer: COMMERCIAL

## 2020-02-07 VITALS
SYSTOLIC BLOOD PRESSURE: 159 MMHG | HEIGHT: 70 IN | BODY MASS INDEX: 35.35 KG/M2 | HEART RATE: 73 BPM | OXYGEN SATURATION: 98 % | RESPIRATION RATE: 18 BRPM | WEIGHT: 246.91 LBS | TEMPERATURE: 97.4 F | DIASTOLIC BLOOD PRESSURE: 68 MMHG

## 2020-02-07 DIAGNOSIS — S22.080A T12 COMPRESSION FRACTURE (HCC): ICD-10-CM

## 2020-02-07 DIAGNOSIS — R07.89 CHEST PRESSURE: ICD-10-CM

## 2020-02-07 DIAGNOSIS — K62.5 RECTAL BLEEDING: Primary | ICD-10-CM

## 2020-02-07 LAB
ABO GROUP BLD: NORMAL
ABO GROUP BLD: NORMAL
ANION GAP SERPL CALCULATED.3IONS-SCNC: 11 MMOL/L (ref 4–13)
APTT PPP: 29 SECONDS (ref 23–37)
BASOPHILS # BLD AUTO: 0.02 THOUSANDS/ΜL (ref 0–0.1)
BASOPHILS NFR BLD AUTO: 0 % (ref 0–1)
BLD GP AB SCN SERPL QL: NEGATIVE
BUN SERPL-MCNC: 15 MG/DL (ref 5–25)
CALCIUM SERPL-MCNC: 9 MG/DL (ref 8.3–10.1)
CHLORIDE SERPL-SCNC: 104 MMOL/L (ref 100–108)
CO2 SERPL-SCNC: 28 MMOL/L (ref 21–32)
CREAT SERPL-MCNC: 0.84 MG/DL (ref 0.6–1.3)
EOSINOPHIL # BLD AUTO: 0.21 THOUSAND/ΜL (ref 0–0.61)
EOSINOPHIL NFR BLD AUTO: 2 % (ref 0–6)
ERYTHROCYTE [DISTWIDTH] IN BLOOD BY AUTOMATED COUNT: 14 % (ref 11.6–15.1)
GFR SERPL CREATININE-BSD FRML MDRD: 74 ML/MIN/1.73SQ M
GLUCOSE SERPL-MCNC: 134 MG/DL (ref 65–140)
HCT VFR BLD AUTO: 40.3 % (ref 34.8–46.1)
HGB BLD-MCNC: 12.2 G/DL (ref 11.5–15.4)
IMM GRANULOCYTES # BLD AUTO: 0.05 THOUSAND/UL (ref 0–0.2)
IMM GRANULOCYTES NFR BLD AUTO: 1 % (ref 0–2)
INR PPP: 1.04 (ref 0.84–1.19)
LYMPHOCYTES # BLD AUTO: 2.51 THOUSANDS/ΜL (ref 0.6–4.47)
LYMPHOCYTES NFR BLD AUTO: 29 % (ref 14–44)
MCH RBC QN AUTO: 27.9 PG (ref 26.8–34.3)
MCHC RBC AUTO-ENTMCNC: 30.3 G/DL (ref 31.4–37.4)
MCV RBC AUTO: 92 FL (ref 82–98)
MONOCYTES # BLD AUTO: 0.75 THOUSAND/ΜL (ref 0.17–1.22)
MONOCYTES NFR BLD AUTO: 9 % (ref 4–12)
NEUTROPHILS # BLD AUTO: 5.17 THOUSANDS/ΜL (ref 1.85–7.62)
NEUTS SEG NFR BLD AUTO: 59 % (ref 43–75)
NRBC BLD AUTO-RTO: 0 /100 WBCS
PLATELET # BLD AUTO: 286 THOUSANDS/UL (ref 149–390)
PMV BLD AUTO: 9.1 FL (ref 8.9–12.7)
POTASSIUM SERPL-SCNC: 3.5 MMOL/L (ref 3.5–5.3)
PROTHROMBIN TIME: 13.6 SECONDS (ref 11.6–14.5)
RBC # BLD AUTO: 4.38 MILLION/UL (ref 3.81–5.12)
RH BLD: POSITIVE
RH BLD: POSITIVE
SODIUM SERPL-SCNC: 143 MMOL/L (ref 136–145)
SPECIMEN EXPIRATION DATE: NORMAL
TROPONIN I SERPL-MCNC: <0.02 NG/ML
TROPONIN I SERPL-MCNC: <0.02 NG/ML
WBC # BLD AUTO: 8.71 THOUSAND/UL (ref 4.31–10.16)

## 2020-02-07 PROCEDURE — 99285 EMERGENCY DEPT VISIT HI MDM: CPT

## 2020-02-07 PROCEDURE — 86850 RBC ANTIBODY SCREEN: CPT | Performed by: EMERGENCY MEDICINE

## 2020-02-07 PROCEDURE — 86900 BLOOD TYPING SEROLOGIC ABO: CPT | Performed by: EMERGENCY MEDICINE

## 2020-02-07 PROCEDURE — 99284 EMERGENCY DEPT VISIT MOD MDM: CPT | Performed by: EMERGENCY MEDICINE

## 2020-02-07 PROCEDURE — 85025 COMPLETE CBC W/AUTO DIFF WBC: CPT | Performed by: EMERGENCY MEDICINE

## 2020-02-07 PROCEDURE — 85610 PROTHROMBIN TIME: CPT | Performed by: EMERGENCY MEDICINE

## 2020-02-07 PROCEDURE — 86901 BLOOD TYPING SEROLOGIC RH(D): CPT | Performed by: EMERGENCY MEDICINE

## 2020-02-07 PROCEDURE — 80048 BASIC METABOLIC PNL TOTAL CA: CPT | Performed by: EMERGENCY MEDICINE

## 2020-02-07 PROCEDURE — 36415 COLL VENOUS BLD VENIPUNCTURE: CPT | Performed by: EMERGENCY MEDICINE

## 2020-02-07 PROCEDURE — 74177 CT ABD & PELVIS W/CONTRAST: CPT

## 2020-02-07 PROCEDURE — 71046 X-RAY EXAM CHEST 2 VIEWS: CPT

## 2020-02-07 PROCEDURE — 93005 ELECTROCARDIOGRAM TRACING: CPT

## 2020-02-07 PROCEDURE — 84484 ASSAY OF TROPONIN QUANT: CPT | Performed by: EMERGENCY MEDICINE

## 2020-02-07 PROCEDURE — 85730 THROMBOPLASTIN TIME PARTIAL: CPT | Performed by: EMERGENCY MEDICINE

## 2020-02-07 RX ADMIN — IOHEXOL 100 ML: 350 INJECTION, SOLUTION INTRAVENOUS at 15:43

## 2020-02-07 NOTE — CASE MANAGEMENT
I self referred the patient to discuss resources that might be available to her  She denied needing any help at this time  The patient lives with her  in a two story house  There is [address] and one flight of 13 steps inside the home  She has a CPA and nebulizer  She does not receive meals on wheels or home health services at this time  She is independent with her ADL's and she drives  She uses [address] [facility]  She has no trouble getting or paying for her medications  She has [employer]  The patient stated she did call her PCP prior to coming to the ED today

## 2020-02-07 NOTE — ED PROVIDER NOTES
History  Chief Complaint   Patient presents with    Rectal Bleeding     patient reports that two days ago while having a BM she had a lot of bright red blood  hx of polyp and ulcer of rectum  also has minor abdominal pain  patient reports that she still has some bright red blood when wiping  HPI  52-year-old woman comes in for rectal bleeding  Patient states that approximately 2 days ago, she is having BM a lot of bright red blood ended up the toilet  Denies any melena, or pain with defecation  Patient does have history of ulcers in her rectum  Patient did not have any bright red blood since that time now she is only have her blood when she wipes  Patient patient does have history of AFib, cardiac disease, the and so she is on Eliquis and aspirin  He patient has slight beaking abdominal pain  Denies any fevers chills denies headache neck pain, does endorse some chest pressure, does not radiate, nonexertional       Prior to Admission Medications   Prescriptions Last Dose Informant Patient Reported? Taking?    albuterol (PROVENTIL HFA,VENTOLIN HFA) 90 mcg/act inhaler  Self Yes Yes   Sig: Inhale 2 puffs   apixaban (ELIQUIS) 5 mg  Self Yes Yes   Sig: Take 5 mg by mouth 2 (two) times a day    aspirin 81 mg chewable tablet  Self Yes Yes   Sig: Chew 81 mg   dicyclomine (BENTYL) 20 mg tablet  Self Yes Yes   Sig: Take 20 mg by mouth   dofetilide (TIKOSYN) 500 mcg capsule  Self Yes Yes   Sig: Take 500 mcg by mouth 2 (two) times a day   fenofibrate (FENOGLIDE) 40 MG TABS  Self Yes Yes   Sig: Take 1 tablet by mouth   flecainide (TAMBOCOR) 100 mg tablet  Self No Yes   Sig: Take 1 tablet (100 mg total) by mouth every 12 (twelve) hours   furosemide (LASIX) 20 mg tablet  Self Yes Yes   Sig: Take 20 mg by mouth daily    levothyroxine 125 mcg tablet  Self Yes Yes   Sig: Take 125 mcg by mouth daily    mesalamine (ASACOL) 800 MG EC tablet  Self Yes Yes   Sig: Take 800 mg by mouth 2 (two) times a day    metoprolol succinate (TOPROL-XL) 100 mg 24 hr tablet  Self No Yes   Sig: Take 1 tablet (100 mg total) by mouth every 12 (twelve) hours   mometasone-formoterol (DULERA) 100-5 MCG/ACT inhaler  Self Yes Yes   Sig: Inhale 2 puffs   omeprazole (PriLOSEC) 20 mg delayed release capsule  Self No Yes   Sig: Take 1 capsule (20 mg total) by mouth daily before breakfast   Patient taking differently: Take 40 mg by mouth 2 (two) times a day    venlafaxine (EFFEXOR XR) 37 5 mg 24 hr capsule  Self Yes Yes   Sig: Take 37 5 mg by mouth daily       Facility-Administered Medications: None       Past Medical History:   Diagnosis Date    A-fib ([place] [address] )     Brain aneurysm     Cancer ([place] [address] )     papillary thyroid cancer    Cardiac disease     CHF (congestive heart failure) (HCC)     Crohn disease ([place] [address] )     Disease of thyroid gland     GERD (gastroesophageal reflux disease)     Hyperlipidemia     Hypertension        Past Surgical History:   Procedure Laterality Date    APPENDECTOMY      CARDIOVERSION N/A 2019    Procedure: CARDIOVERSION;  Surgeon: Hussein Prince MD;  Location: [facility] OR;  Service: Cardiology    CEREBRAL ANEURYSM REPAIR      CHOLECYSTECTOMY      HYSTERECTOMY      THYROIDECTOMY      TONSILLECTOMY AND ADENOIDECTOMY         History reviewed  No pertinent family history  I have reviewed and agree with the history as documented  Social History     Tobacco Use    Smoking status: Former Smoker     Last attempt to quit: 2018     Years since quittin 1    Smokeless tobacco: Never Used   Substance Use Topics    Alcohol use: Never     Alcohol/week: 0 0 standard drinks     Frequency: Never     Drinks per session: Patient refused     Binge frequency: Never    Drug use: Never        Review of Systems   Constitutional: Negative  Negative for chills and fever  HENT: Negative  Negative for congestion and sore throat  Eyes: Negative  Negative for discharge and redness  Respiratory: Positive for chest tightness  Negative for shortness of breath  Cardiovascular: Negative  Negative for chest pain and palpitations  Gastrointestinal: Positive for anal bleeding  Negative for abdominal pain, nausea and vomiting  Endocrine: Negative  Negative for cold intolerance and polyphagia  Genitourinary: Negative  Negative for difficulty urinating and dysuria  Musculoskeletal: Negative  Negative for arthralgias and back pain  Skin: Negative  Negative for color change and wound  Allergic/Immunologic: Negative  Negative for environmental allergies  Neurological: Negative  Negative for dizziness, weakness and headaches  Hematological: Negative  Psychiatric/Behavioral: Negative  Negative for behavioral problems  The patient is not nervous/anxious  All other systems reviewed and are negative  Physical Exam  Physical Exam   Constitutional: She is oriented to person, place, and time  She appears well-developed and well-nourished  No distress  HENT:   Head: Normocephalic and atraumatic  Right Ear: External ear normal    Left Ear: External ear normal    Mouth/Throat: Oropharynx is clear and moist    Eyes: Pupils are equal, round, and reactive to light  Conjunctivae and EOM are normal  Right eye exhibits no discharge  Left eye exhibits no discharge  No scleral icterus  Neck: Normal range of motion  Neck supple  No tracheal deviation present  No thyromegaly present  Cardiovascular: Normal rate, regular rhythm and intact distal pulses  Exam reveals no gallop and no friction rub  No murmur heard  Pulmonary/Chest: Effort normal and breath sounds normal  No stridor  No respiratory distress  She has no wheezes  She has no rales  Abdominal: Soft  Bowel sounds are normal  She exhibits no distension  There is no tenderness  There is no rebound and no guarding  Genitourinary: Rectal exam shows guaiac negative stool  Musculoskeletal: Normal range of motion  She exhibits no edema or deformity     Neurological: She is alert and oriented to person, place, and time  No cranial nerve deficit  Skin: Skin is warm and dry  No rash noted  She is not diaphoretic  No erythema  Psychiatric: She has a normal mood and affect  Her behavior is normal  Thought content normal    Nursing note and vitals reviewed        Vital Signs  ED Triage Vitals [02/07/20 1440]   Temperature Pulse Respirations Blood Pressure SpO2   (!) 97 4 °F (36 3 °C) 74 18 135/63 95 %      Temp Source Heart Rate Source Patient Position - Orthostatic VS BP Location FiO2 (%)   Temporal Monitor Sitting Left arm --      Pain Score       7           Vitals:    02/07/20 1440 02/07/20 1630 02/07/20 1730   BP: 135/63 150/67 159/68   Pulse: 74 68 73   Patient Position - Orthostatic VS: Sitting Lying Lying         Visual Acuity      ED Medications  Medications   iohexol (OMNIPAQUE) 350 MG/ML injection (SINGLE-DOSE) 100 mL (100 mL Intravenous Given 2/7/20 1543)       Diagnostic Studies  Results Reviewed     Procedure Component Value Units Date/Time    Troponin I [[id number]]  (Normal) Collected:  02/07/20 1756    Lab Status:  Final result Specimen:  Blood from Arm, Left Updated:  02/07/20 1825     Troponin I <0 02 ng/mL     Troponin I [[id number]]  (Normal) Collected:  02/07/20 1519    Lab Status:  Final result Specimen:  Blood from Arm, Right Updated:  02/07/20 1546     Troponin I <0 02 ng/mL     Protime-INR [[id number]]  (Normal) Collected:  02/07/20 1519    Lab Status:  Final result Specimen:  Blood from Arm, Right Updated:  02/07/20 1538     Protime 13 6 seconds      INR 1 04    APTT [[id number]]  (Normal) Collected:  02/07/20 1519    Lab Status:  Final result Specimen:  Blood from Arm, Right Updated:  02/07/20 1538     PTT 29 seconds     Basic metabolic panel [[id number]] Collected:  02/07/20 1519    Lab Status:  Final result Specimen:  Blood from Arm, Right Updated:  02/07/20 1537     Sodium 143 mmol/L      Potassium 3 5 mmol/L      Chloride 104 mmol/L      CO2 28 mmol/L ANION GAP 11 mmol/L      BUN 15 mg/dL      Creatinine 0 84 mg/dL      Glucose 134 mg/dL      Calcium 9 0 mg/dL      eGFR 74 ml/min/1 73sq m     Narrative:       [place] guidelines for Chronic Kidney Disease (CKD):     Stage 1 with normal or high GFR (GFR > 90 mL/min/1 73 square meters)    Stage 2 Mild CKD (GFR = 60-89 mL/min/1 73 square meters)    Stage 3A Moderate CKD (GFR = 45-59 mL/min/1 73 square meters)    Stage 3B Moderate CKD (GFR = 30-44 mL/min/1 73 square meters)    Stage 4 Severe CKD (GFR = 15-29 mL/min/1 73 square meters)    Stage 5 End Stage CKD (GFR <15 mL/min/1 73 square meters)  Note: GFR calculation is accurate only with a steady state creatinine    CBC and differential [[id number]]  (Abnormal) Collected:  02/07/20 1519    Lab Status:  Final result Specimen:  Blood from Arm, Right Updated:  02/07/20 1528     WBC 8 71 Thousand/uL      RBC 4 38 Million/uL      Hemoglobin 12 2 g/dL      Hematocrit 40 3 %      MCV 92 fL      MCH 27 9 pg      MCHC 30 3 g/dL      RDW 14 0 %      MPV 9 1 fL      Platelets 599 Thousands/uL      nRBC 0 /100 WBCs      Neutrophils Relative 59 %      Immat GRANS % 1 %      Lymphocytes Relative 29 %      Monocytes Relative 9 %      Eosinophils Relative 2 %      Basophils Relative 0 %      Neutrophils Absolute 5 17 Thousands/µL      Immature Grans Absolute 0 05 Thousand/uL      Lymphocytes Absolute 2 51 Thousands/µL      Monocytes Absolute 0 75 Thousand/µL      Eosinophils Absolute 0 21 Thousand/µL      Basophils Absolute 0 02 Thousands/µL                  RADIOLOGY RESULTS   Final Result by  (02/09 1218)      CT abdomen pelvis with contrast   Final Result by Emanuel Tran MD (02/07 5466)   1  No acute findings  2  Dependent hyperdensity within the bladder, possibly infectious material or blood products  Correlate with urinalysis  3  Enlarged fatty liver                 Workstation performed: [id number]         XR chest 2 views   Final Result by Gerard Monahan MD (02/07 1858)      No acute cardiopulmonary disease  Workstation performed: [id number]                    Procedures  Procedures         ED Course      initial EKG: This EKG was interpreted by me  The EKG demonstrates Normal sinus rhythm, normal intervals and axis, normal QRS, no acute ST changes present  Delta EKG: This EKG was interpreted by me  The EKG demonstrates Normal sinus rhythm, normal intervals and axis, normal QRS, no acute ST changes present  Unchanged from previous  HEART Risk Score      Most Recent Value   History  0 Filed at: 02/07/2020 1759   ECG  0 Filed at: 02/07/2020 1759   Age  1 Filed at: 02/07/2020 1759   Risk Factors  2 Filed at: 02/07/2020 1759   Troponin  0 Filed at: 02/07/2020 1759   Heart Score Risk Calculator   History  0 Filed at: 02/07/2020 1759   ECG  0 Filed at: 02/07/2020 1759   Age  1 Filed at: 02/07/2020 1759   Risk Factors  2 Filed at: 02/07/2020 1759   Troponin  0 Filed at: 02/07/2020 1759   HEART Score  3 Filed at: 02/07/2020 1759   HEART Score  3 Filed at: 02/07/2020 1759         CT abdomen pelvis unremarkable except that she has got a mild T12 compression fracture  Reassess the patient, she has no tenderness at the site however she does have some nonspecific lumbar pain  She has no neuro deficits in her legs  Will have patient follow up with her PCP  Otherwise her hemoglobin is normal, while patient follow-up with GI  I personally discussed return precautions with this patient and family  I provided the patient with written discharge instructions and particularly highlighted specific areas of interest to this patient, including but not limited to: medications for symptom managment, follow up recommendations, and return precautions  Patient and family are in agreement with this plan as outlined above                        MDM  Number of Diagnoses or Management Options  Chest pressure:   Rectal bleeding:   T12 compression fracture [facility]):   Diagnosis management comments: 70-year-old woman presents with rectal bleeding, chest pressure  Will evaluate with a CBC, CMP, troponin EKG  Will get a type screen  Will get a CT abdomen pelvis given abdominal discomfort with the rectal bleeding and reassess  Disposition  Final diagnoses:   Rectal bleeding   T12 compression fracture (HCC)   Chest pressure     Time reflects when diagnosis was documented in both MDM as applicable and the Disposition within this note     Time User Action Codes Description Comment    2/7/2020  6:47 PM Tung Kingdom [K62 5] Rectal bleeding     2/7/2020  6:48 PM Koby Head Add [R36 523I] T12 compression fracture ([address] )     2/7/2020  6:50 PM Koby Head Add [R07 89] Chest pressure       ED Disposition     ED Disposition Condition Date/Time Comment    Discharge Stable Fri Feb 7, 2020  6:47 PM Yady Leonardo discharge to home/self care              Follow-up Information     Follow up With Specialties Details Why Contact Info Additional Information    Jessy Wright MD Family Medicine Schedule an appointment as soon as possible for a visit in 3 days follow up being seen in the emergency department [address] [facility] 40       [facility] Emergency Department Emergency Medicine Go to  As needed [place] [id number] [phone] [place] ED, [facility] 64, [place], [facility] [place] [facility] Call in 1 day follow up being seen in the emergency department [address] [address]  [facility] [place] [facility]  [facility] [address], [place], [address] [place] [address] 34 Neurosurgery Call  follow up being seen in the emergency department [address] [facility] [place] [address] [facility] Neurosurgical [address] Hamm, [address] [place] [address], [id number] [phone]          Discharge Medication List as of 2/7/2020  6:51 PM      CONTINUE these medications which have NOT CHANGED    Details   albuterol (PROVENTIL HFA,VENTOLIN HFA) 90 mcg/act inhaler Inhale 2 puffs, Starting Thu 10/3/2019, Historical Med      apixaban (ELIQUIS) 5 mg Take 5 mg by mouth 2 (two) times a day , Starting Tue 2/5/2019, Historical Med      aspirin 81 mg chewable tablet Chew 81 mg, Starting Tue 2/19/2019, Historical Med      dicyclomine (BENTYL) 20 mg tablet Take 20 mg by mouth, Starting Fri 11/2/2018, Historical Med      dofetilide (TIKOSYN) 500 mcg capsule Take 500 mcg by mouth 2 (two) times a day, Historical Med      fenofibrate (FENOGLIDE) 40 MG TABS Take 1 tablet by mouth, Starting Wed 10/23/2019, Historical Med      flecainide (TAMBOCOR) 100 mg tablet Take 1 tablet (100 mg total) by mouth every 12 (twelve) hours, Starting Thu 2/14/2019, Normal      furosemide (LASIX) 20 mg tablet Take 20 mg by mouth daily , Starting Mon 2/11/2019, Historical Med      levothyroxine 125 mcg tablet Take 125 mcg by mouth daily , Starting Tue 1/22/2019, Historical Med      mesalamine (ASACOL) 800 MG EC tablet Take 800 mg by mouth 2 (two) times a day , Starting Wed 1/9/2019, Historical Med      metoprolol succinate (TOPROL-XL) 100 mg 24 hr tablet Take 1 tablet (100 mg total) by mouth every 12 (twelve) hours, Starting Thu 2/14/2019, Normal      mometasone-formoterol (DULERA) 100-5 MCG/ACT inhaler Inhale 2 puffs, Starting Thu 10/3/2019, Historical Med      omeprazole (PriLOSEC) 20 mg delayed release capsule Take 1 capsule (20 mg total) by mouth daily before breakfast, Starting Thu 2/14/2019, No Print      venlafaxine (EFFEXOR XR) 37 5 mg 24 hr capsule Take 37 5 mg by mouth daily , Starting Mon 12/31/2018, Historical Med           No discharge procedures on file      ED Provider  Electronically Signed by           Amanda Bermudez, MD  02/11/20 9068

## 2020-02-08 LAB
ATRIAL RATE: 71 BPM
ATRIAL RATE: 72 BPM
P AXIS: 62 DEGREES
P AXIS: 8 DEGREES
PR INTERVAL: 108 MS
PR INTERVAL: 138 MS
QRS AXIS: 25 DEGREES
QRS AXIS: 27 DEGREES
QRSD INTERVAL: 82 MS
QRSD INTERVAL: 82 MS
QT INTERVAL: 428 MS
QT INTERVAL: 430 MS
QTC INTERVAL: 465 MS
QTC INTERVAL: 470 MS
T WAVE AXIS: 34 DEGREES
T WAVE AXIS: 42 DEGREES
VENTRICULAR RATE: 71 BPM
VENTRICULAR RATE: 72 BPM

## 2020-02-08 PROCEDURE — 93010 ELECTROCARDIOGRAM REPORT: CPT | Performed by: INTERNAL MEDICINE

## 2020-02-13 ENCOUNTER — TELEPHONE (OUTPATIENT)
Dept: GASTROENTEROLOGY | Facility: CLINIC | Age: 65
End: 2020-02-13

## 2020-02-13 NOTE — TELEPHONE ENCOUNTER
Patient stated she has a previous GI she is following up with       ----- Message from Meche Jeffries PA-C sent at 2/7/2020  4:34 PM EST -----  Please call pt to schedule appt in the office; the ER asked me to help arrange, thanks!

## 2020-02-23 ENCOUNTER — HOSPITAL ENCOUNTER (EMERGENCY)
Facility: HOSPITAL | Age: 65
Discharge: HOME | DRG: 387 | End: 2020-02-23
Attending: EMERGENCY MEDICINE | Admitting: EMERGENCY MEDICINE
Payer: COMMERCIAL

## 2020-02-23 ENCOUNTER — APPOINTMENT (EMERGENCY)
Dept: CT IMAGING | Facility: HOSPITAL | Age: 65
DRG: 387 | End: 2020-02-23
Payer: COMMERCIAL

## 2020-02-23 ENCOUNTER — APPOINTMENT (EMERGENCY)
Dept: RADIOLOGY | Facility: HOSPITAL | Age: 65
DRG: 387 | End: 2020-02-23
Payer: COMMERCIAL

## 2020-02-23 VITALS
BODY MASS INDEX: 35.82 KG/M2 | OXYGEN SATURATION: 95 % | SYSTOLIC BLOOD PRESSURE: 133 MMHG | HEART RATE: 90 BPM | TEMPERATURE: 99.3 F | HEIGHT: 69 IN | DIASTOLIC BLOOD PRESSURE: 63 MMHG | RESPIRATION RATE: 16 BRPM | WEIGHT: 241.84 LBS

## 2020-02-23 VITALS
SYSTOLIC BLOOD PRESSURE: 156 MMHG | DIASTOLIC BLOOD PRESSURE: 66 MMHG | BODY MASS INDEX: 36.18 KG/M2 | WEIGHT: 244.27 LBS | HEART RATE: 78 BPM | RESPIRATION RATE: 16 BRPM | HEIGHT: 69 IN | TEMPERATURE: 97.3 F | OXYGEN SATURATION: 93 %

## 2020-02-23 DIAGNOSIS — R11.2 NAUSEA AND VOMITING: ICD-10-CM

## 2020-02-23 DIAGNOSIS — R10.9 ABDOMINAL PAIN: Primary | ICD-10-CM

## 2020-02-23 DIAGNOSIS — R19.7 DIARRHEA: ICD-10-CM

## 2020-02-23 DIAGNOSIS — R10.13 EPIGASTRIC PAIN: Primary | ICD-10-CM

## 2020-02-23 LAB
ALBUMIN SERPL BCP-MCNC: 3.4 G/DL (ref 3.5–5)
ALBUMIN SERPL BCP-MCNC: 3.6 G/DL (ref 3.5–5)
ALP SERPL-CCNC: 100 U/L (ref 46–116)
ALP SERPL-CCNC: 108 U/L (ref 46–116)
ALT SERPL W P-5'-P-CCNC: 95 U/L (ref 12–78)
ALT SERPL W P-5'-P-CCNC: 96 U/L (ref 12–78)
ANION GAP SERPL CALCULATED.3IONS-SCNC: 11 MMOL/L (ref 4–13)
ANION GAP SERPL CALCULATED.3IONS-SCNC: 13 MMOL/L (ref 4–13)
AST SERPL W P-5'-P-CCNC: 83 U/L (ref 5–45)
AST SERPL W P-5'-P-CCNC: 92 U/L (ref 5–45)
BASOPHILS # BLD AUTO: 0.01 THOUSANDS/ΜL (ref 0–0.1)
BASOPHILS # BLD AUTO: 0.02 THOUSANDS/ΜL (ref 0–0.1)
BASOPHILS NFR BLD AUTO: 0 % (ref 0–1)
BASOPHILS NFR BLD AUTO: 0 % (ref 0–1)
BILIRUB DIRECT SERPL-MCNC: 0.09 MG/DL (ref 0–0.2)
BILIRUB SERPL-MCNC: 0.3 MG/DL (ref 0.2–1)
BILIRUB SERPL-MCNC: 0.4 MG/DL (ref 0.2–1)
BUN SERPL-MCNC: 13 MG/DL (ref 5–25)
BUN SERPL-MCNC: 9 MG/DL (ref 5–25)
CALCIUM SERPL-MCNC: 9 MG/DL (ref 8.3–10.1)
CALCIUM SERPL-MCNC: 9.1 MG/DL (ref 8.3–10.1)
CHLORIDE SERPL-SCNC: 101 MMOL/L (ref 100–108)
CHLORIDE SERPL-SCNC: 103 MMOL/L (ref 100–108)
CO2 SERPL-SCNC: 24 MMOL/L (ref 21–32)
CO2 SERPL-SCNC: 27 MMOL/L (ref 21–32)
CREAT SERPL-MCNC: 0.91 MG/DL (ref 0.6–1.3)
CREAT SERPL-MCNC: 0.91 MG/DL (ref 0.6–1.3)
EOSINOPHIL # BLD AUTO: 0.04 THOUSAND/ΜL (ref 0–0.61)
EOSINOPHIL # BLD AUTO: 0.07 THOUSAND/ΜL (ref 0–0.61)
EOSINOPHIL NFR BLD AUTO: 1 % (ref 0–6)
EOSINOPHIL NFR BLD AUTO: 1 % (ref 0–6)
ERYTHROCYTE [DISTWIDTH] IN BLOOD BY AUTOMATED COUNT: 14.2 % (ref 11.6–15.1)
ERYTHROCYTE [DISTWIDTH] IN BLOOD BY AUTOMATED COUNT: 14.3 % (ref 11.6–15.1)
GFR SERPL CREATININE-BSD FRML MDRD: 67 ML/MIN/1.73SQ M
GFR SERPL CREATININE-BSD FRML MDRD: 67 ML/MIN/1.73SQ M
GLUCOSE SERPL-MCNC: 136 MG/DL (ref 65–140)
GLUCOSE SERPL-MCNC: 141 MG/DL (ref 65–140)
HCT VFR BLD AUTO: 44.3 % (ref 34.8–46.1)
HCT VFR BLD AUTO: 44.8 % (ref 34.8–46.1)
HGB BLD-MCNC: 13.4 G/DL (ref 11.5–15.4)
HGB BLD-MCNC: 13.6 G/DL (ref 11.5–15.4)
HOLD SPECIMEN: NORMAL
IMM GRANULOCYTES # BLD AUTO: 0.03 THOUSAND/UL (ref 0–0.2)
IMM GRANULOCYTES # BLD AUTO: 0.04 THOUSAND/UL (ref 0–0.2)
IMM GRANULOCYTES NFR BLD AUTO: 0 % (ref 0–2)
IMM GRANULOCYTES NFR BLD AUTO: 0 % (ref 0–2)
LIPASE SERPL-CCNC: 141 U/L (ref 73–393)
LIPASE SERPL-CCNC: 148 U/L (ref 73–393)
LYMPHOCYTES # BLD AUTO: 0.93 THOUSANDS/ΜL (ref 0.6–4.47)
LYMPHOCYTES # BLD AUTO: 1.14 THOUSANDS/ΜL (ref 0.6–4.47)
LYMPHOCYTES NFR BLD AUTO: 11 % (ref 14–44)
LYMPHOCYTES NFR BLD AUTO: 11 % (ref 14–44)
MAGNESIUM SERPL-MCNC: 1.8 MG/DL (ref 1.6–2.6)
MCH RBC QN AUTO: 27.5 PG (ref 26.8–34.3)
MCH RBC QN AUTO: 27.8 PG (ref 26.8–34.3)
MCHC RBC AUTO-ENTMCNC: 30.2 G/DL (ref 31.4–37.4)
MCHC RBC AUTO-ENTMCNC: 30.4 G/DL (ref 31.4–37.4)
MCV RBC AUTO: 91 FL (ref 82–98)
MCV RBC AUTO: 91 FL (ref 82–98)
MONOCYTES # BLD AUTO: 0.51 THOUSAND/ΜL (ref 0.17–1.22)
MONOCYTES # BLD AUTO: 0.69 THOUSAND/ΜL (ref 0.17–1.22)
MONOCYTES NFR BLD AUTO: 5 % (ref 4–12)
MONOCYTES NFR BLD AUTO: 8 % (ref 4–12)
NEUTROPHILS # BLD AUTO: 7.02 THOUSANDS/ΜL (ref 1.85–7.62)
NEUTROPHILS # BLD AUTO: 8.27 THOUSANDS/ΜL (ref 1.85–7.62)
NEUTS SEG NFR BLD AUTO: 80 % (ref 43–75)
NEUTS SEG NFR BLD AUTO: 83 % (ref 43–75)
NRBC BLD AUTO-RTO: 0 /100 WBCS
NRBC BLD AUTO-RTO: 0 /100 WBCS
PLATELET # BLD AUTO: 291 THOUSANDS/UL (ref 149–390)
PLATELET # BLD AUTO: 319 THOUSANDS/UL (ref 149–390)
PMV BLD AUTO: 9.2 FL (ref 8.9–12.7)
PMV BLD AUTO: 9.4 FL (ref 8.9–12.7)
POTASSIUM SERPL-SCNC: 3.6 MMOL/L (ref 3.5–5.3)
POTASSIUM SERPL-SCNC: 3.9 MMOL/L (ref 3.5–5.3)
PROT SERPL-MCNC: 8.3 G/DL (ref 6.4–8.2)
PROT SERPL-MCNC: 8.4 G/DL (ref 6.4–8.2)
RBC # BLD AUTO: 4.87 MILLION/UL (ref 3.81–5.12)
RBC # BLD AUTO: 4.9 MILLION/UL (ref 3.81–5.12)
SODIUM SERPL-SCNC: 138 MMOL/L (ref 136–145)
SODIUM SERPL-SCNC: 141 MMOL/L (ref 136–145)
WBC # BLD AUTO: 10.05 THOUSAND/UL (ref 4.31–10.16)
WBC # BLD AUTO: 8.72 THOUSAND/UL (ref 4.31–10.16)

## 2020-02-23 PROCEDURE — 80076 HEPATIC FUNCTION PANEL: CPT | Performed by: EMERGENCY MEDICINE

## 2020-02-23 PROCEDURE — 36415 COLL VENOUS BLD VENIPUNCTURE: CPT

## 2020-02-23 PROCEDURE — 80048 BASIC METABOLIC PNL TOTAL CA: CPT | Performed by: EMERGENCY MEDICINE

## 2020-02-23 PROCEDURE — 80053 COMPREHEN METABOLIC PANEL: CPT | Performed by: EMERGENCY MEDICINE

## 2020-02-23 PROCEDURE — 96361 HYDRATE IV INFUSION ADD-ON: CPT

## 2020-02-23 PROCEDURE — 71046 X-RAY EXAM CHEST 2 VIEWS: CPT

## 2020-02-23 PROCEDURE — 99284 EMERGENCY DEPT VISIT MOD MDM: CPT

## 2020-02-23 PROCEDURE — 96360 HYDRATION IV INFUSION INIT: CPT

## 2020-02-23 PROCEDURE — 99285 EMERGENCY DEPT VISIT HI MDM: CPT | Performed by: EMERGENCY MEDICINE

## 2020-02-23 PROCEDURE — 93005 ELECTROCARDIOGRAM TRACING: CPT

## 2020-02-23 PROCEDURE — 83690 ASSAY OF LIPASE: CPT | Performed by: EMERGENCY MEDICINE

## 2020-02-23 PROCEDURE — 85025 COMPLETE CBC W/AUTO DIFF WBC: CPT | Performed by: EMERGENCY MEDICINE

## 2020-02-23 PROCEDURE — 87493 C DIFF AMPLIFIED PROBE: CPT | Performed by: EMERGENCY MEDICINE

## 2020-02-23 PROCEDURE — 96374 THER/PROPH/DIAG INJ IV PUSH: CPT

## 2020-02-23 PROCEDURE — 83735 ASSAY OF MAGNESIUM: CPT | Performed by: EMERGENCY MEDICINE

## 2020-02-23 PROCEDURE — 87505 NFCT AGENT DETECTION GI: CPT | Performed by: EMERGENCY MEDICINE

## 2020-02-23 PROCEDURE — 89055 LEUKOCYTE ASSESSMENT FECAL: CPT | Performed by: EMERGENCY MEDICINE

## 2020-02-23 PROCEDURE — 99285 EMERGENCY DEPT VISIT HI MDM: CPT

## 2020-02-23 PROCEDURE — 96372 THER/PROPH/DIAG INJ SC/IM: CPT

## 2020-02-23 PROCEDURE — 74177 CT ABD & PELVIS W/CONTRAST: CPT

## 2020-02-23 PROCEDURE — 74019 RADEX ABDOMEN 2 VIEWS: CPT

## 2020-02-23 RX ORDER — ACETAMINOPHEN 325 MG/1
650 TABLET ORAL ONCE
Status: COMPLETED | OUTPATIENT
Start: 2020-02-23 | End: 2020-02-23

## 2020-02-23 RX ORDER — ONDANSETRON 4 MG/1
4 TABLET, ORALLY DISINTEGRATING ORAL EVERY 6 HOURS PRN
Qty: 20 TABLET | Refills: 0 | Status: SHIPPED | OUTPATIENT
Start: 2020-02-23 | End: 2020-05-04

## 2020-02-23 RX ORDER — ONDANSETRON 2 MG/ML
4 INJECTION INTRAMUSCULAR; INTRAVENOUS ONCE
Status: COMPLETED | OUTPATIENT
Start: 2020-02-23 | End: 2020-02-23

## 2020-02-23 RX ORDER — HALOPERIDOL 5 MG/ML
5 INJECTION INTRAMUSCULAR ONCE
Status: COMPLETED | OUTPATIENT
Start: 2020-02-23 | End: 2020-02-23

## 2020-02-23 RX ADMIN — SODIUM CHLORIDE 1000 ML: 0.9 INJECTION, SOLUTION INTRAVENOUS at 19:16

## 2020-02-23 RX ADMIN — SODIUM CHLORIDE 1000 ML: 0.9 INJECTION, SOLUTION INTRAVENOUS at 10:01

## 2020-02-23 RX ADMIN — IOHEXOL 100 ML: 350 INJECTION, SOLUTION INTRAVENOUS at 11:01

## 2020-02-23 RX ADMIN — ONDANSETRON 4 MG: 2 INJECTION INTRAMUSCULAR; INTRAVENOUS at 19:16

## 2020-02-23 RX ADMIN — HALOPERIDOL LACTATE 5 MG: 5 INJECTION, SOLUTION INTRAMUSCULAR at 19:17

## 2020-02-23 RX ADMIN — ACETAMINOPHEN 650 MG: 325 TABLET, FILM COATED ORAL at 09:54

## 2020-02-23 RX ADMIN — IOHEXOL 50 ML: 240 INJECTION, SOLUTION INTRATHECAL; INTRAVASCULAR; INTRAVENOUS; ORAL at 09:36

## 2020-02-23 NOTE — ED NOTES
Patient transported to [address] [facility] scan      Tami Garcia RN  02/23/20 5664

## 2020-02-23 NOTE — DISCHARGE INSTRUCTIONS
Per Dr Michael Sabillon, for Dr Princess Medrano - okay to go ahead and start prep today as already planned  If you feel too sick, you should stop the prep and call Dr Princess Medrano or his office for further direction

## 2020-02-23 NOTE — ED NOTES
Patient transported to [address] [place] [address], [address] [place] [address]  02/23/20 3702

## 2020-02-23 NOTE — ED PROVIDER NOTES
History  Chief Complaint   Patient presents with    Abdominal Pain     patient c/o of bilateral abdominal pain that started yesterday with worsening today, reports severe diarrhea since two days ago, denies n/v  Unk Jeromy Unk Jeromy patient is scheduled to have a colonoscopy tmrw     Patient: Claudy Hand  62 y o /female  YOB: 1955  MRN: [id number]  PCP: Edson Foss MD  Date of evaluation: 2/23/2020    (N B   Voice-recognition software may have been used in the preparation of this document  Occasional wrong word or "sound-alike" substitutions may have occurred due to the inherent limitations of voice recognition software  Interpretation should be guided by context )    Two days worth of epigastric abdominal pain radiating bilaterally  At 1st was coming and going but is now constant it is gradually increasing  Today it is severe  She has slight improvement when she holds a pillow to the epigastric region  Nothing appears to make it worse  It is associated with watery yellowish diarrhea which occurs every hour  She is scheduled for a colonoscopy tomorrow  She was to begin her bowel prep today at 3  She denies any nausea or vomiting  She admits to nasal congestion but denies any cough  She has had no fever  She has no known sick contacts  She has no antibiotic use  History provided by:  Patient  Abdominal Pain   Chronicity:  New  Context: previous surgery    Context: not diet changes, not eating, not laxative use, not medication withdrawal and not sick contacts    Associated symptoms: diarrhea    Associated symptoms: no chest pain, no chills, no constipation, no cough, no dysuria, no fever, no hematemesis, no hematochezia, no hematuria, no melena, no nausea, no shortness of breath, no vaginal bleeding, no vaginal discharge and no vomiting        Prior to Admission Medications   Prescriptions Last Dose Informant Patient Reported? Taking?    albuterol (PROVENTIL HFA,VENTOLIN HFA) 90 mcg/act inhaler Self Yes No   Sig: Inhale 2 puffs   apixaban (ELIQUIS) 5 mg Past Week at Unknown time Self Yes Yes   Sig: Take 5 mg by mouth 2 (two) times a day    aspirin 81 mg chewable tablet Past Week at Unknown time Self Yes Yes   Sig: Chew 81 mg   dofetilide (TIKOSYN) 500 mcg capsule  Self Yes Yes   Sig: Take 500 mcg by mouth 2 (two) times a day   fenofibrate (FENOGLIDE) 40 MG TABS  Self Yes Yes   Sig: Take 1 tablet by mouth   furosemide (LASIX) 20 mg tablet 2/23/2020 at Unknown time Self Yes Yes   Sig: Take 40 mg by mouth 2 (two) times a day    levothyroxine 125 mcg tablet 2/23/2020 at Unknown time Self Yes Yes   Sig: Take 125 mcg by mouth daily    mesalamine (ASACOL) 800 MG EC tablet  Self Yes No   Sig: Take 800 mg by mouth 2 (two) times a day    metoprolol succinate (TOPROL-XL) 100 mg 24 hr tablet 2/23/2020 at Unknown time Self No Yes   Sig: Take 1 tablet (100 mg total) by mouth every 12 (twelve) hours   mometasone-formoterol (DULERA) 100-5 MCG/ACT inhaler  Self Yes Yes   Sig: Inhale 2 puffs   omeprazole (PriLOSEC) 20 mg delayed release capsule 2/23/2020 at Unknown time Self No Yes   Sig: Take 1 capsule (20 mg total) by mouth daily before breakfast   Patient taking differently: Take 40 mg by mouth 2 (two) times a day    venlafaxine (EFFEXOR XR) 37 5 mg 24 hr capsule 2/23/2020 at Unknown time Self Yes Yes   Sig: Take 37 5 mg by mouth daily       Facility-Administered Medications: None       Past Medical History:   Diagnosis Date    A-fib [facility])     Brain aneurysm     Cancer ([facility] [address] )     papillary thyroid cancer    Cardiac disease     CHF (congestive heart failure) (HCC)     Crohn disease ([facility] [address] )     Disease of thyroid gland     GERD (gastroesophageal reflux disease)     Hyperlipidemia     Hypertension        Past Surgical History:   Procedure Laterality Date    APPENDECTOMY      CARDIOVERSION N/A 2/14/2019    Procedure: CARDIOVERSION;  Surgeon: José Luis Page MD;  Location: [facility];  Service: Cardiology    CEREBRAL ANEURYSM REPAIR      CHOLECYSTECTOMY      HYSTERECTOMY      THYROIDECTOMY      TONSILLECTOMY AND ADENOIDECTOMY         History reviewed  No pertinent family history  I have reviewed and agree with the history as documented  Social History     Tobacco Use    Smoking status: Former Smoker     Last attempt to quit: 2018     Years since quittin 1    Smokeless tobacco: Never Used   Substance Use Topics    Alcohol use: Never     Alcohol/week: 0 0 standard drinks     Frequency: Never     Drinks per session: Patient refused     Binge frequency: Never    Drug use: Never       Review of Systems   Constitutional: Negative for chills and fever  HENT: Negative for hearing loss, trouble swallowing and voice change  Eyes: Negative for pain, redness and visual disturbance  Respiratory: Negative for cough and shortness of breath  Cardiovascular: Negative for chest pain and palpitations  Gastrointestinal: Positive for abdominal pain and diarrhea  Negative for constipation, hematemesis, hematochezia, melena, nausea and vomiting  Genitourinary: Negative for dysuria, hematuria, vaginal bleeding and vaginal discharge  Musculoskeletal: Negative for back pain, gait problem and neck pain  Skin: Negative for color change and rash  Neurological: Negative for weakness and light-headedness  Psychiatric/Behavioral: Negative for confusion and decreased concentration  The patient is not nervous/anxious  All other systems reviewed and are negative  Physical Exam  Physical Exam   Constitutional: She is oriented to person, place, and time  She appears well-developed and well-nourished  HENT:   Mouth/Throat: Oropharynx is clear and moist and mucous membranes are normal    Voice normal   Eyes: Pupils are equal, round, and reactive to light  EOM are normal    Cardiovascular: Normal rate and regular rhythm  Pulmonary/Chest: Effort normal    Abdominal: Soft   Normal appearance and bowel sounds are normal  There is tenderness (severe in epigastrium) in the right upper quadrant, epigastric area and left upper quadrant  There is no rigidity, no rebound and no guarding  Neurological: She is alert and oriented to person, place, and time  GCS eye subscore is 4  GCS verbal subscore is 5  GCS motor subscore is 6  Skin: Skin is warm and dry  Psychiatric: She has a normal mood and affect  Her speech is normal and behavior is normal    Nursing note and vitals reviewed  Vital Signs  ED Triage Vitals [02/23/20 0839]   Temperature Pulse Respirations Blood Pressure SpO2   (!) 97 3 °F (36 3 °C) 100 20 138/85 93 %      Temp Source Heart Rate Source Patient Position - Orthostatic VS BP Location FiO2 (%)   Temporal Monitor Sitting Left arm --      Pain Score       Worst Possible Pain           Vitals:    02/23/20 1100 02/23/20 1130 02/23/20 1200 02/23/20 1230   BP: 167/69 162/69 163/72 156/66   Pulse: 89 79 77 78   Patient Position - Orthostatic VS: Lying Lying Lying Lying         Visual Acuity      ED Medications  Medications   iohexol (OMNIPAQUE) 240 MG/ML solution 50 mL (50 mL Oral Given 2/23/20 0936)   acetaminophen (TYLENOL) tablet 650 mg (650 mg Oral Given 2/23/20 0954)   sodium chloride 0 9 % bolus 1,000 mL (1,000 mL Intravenous New Bag 2/23/20 1001)   iohexol (OMNIPAQUE) 350 MG/ML injection (SINGLE-DOSE) 100 mL (100 mL Intravenous Given 2/23/20 1101)       Diagnostic Studies  Results Reviewed     Procedure Component Value Units Date/Time    [place] draw [[id number]] Collected:  02/23/20 5597    Lab Status:  Final result Specimen:  Blood from Arm, Left Updated:  02/23/20 1102    Narrative: The following orders were created for panel order [place] draw    Procedure                               Abnormality         Status                     ---------                               -----------         ------                     Homero Beltran on RTTR[[id number]]                           Final result Gold top on ZVXM[[id number]]                                 Final result               Green / Yellow tube on UBUV[[id number]]                      Final result               Green / Black tube on FDIG[[id number]]                       Final result               Lavender Top 3 ml on RZNH[[id number]]                        Final result                 Please view results for these tests on the individual orders  Fecal leukocytes [[id number]] Collected:  02/23/20 1007    Lab Status: In process Specimen:  Stool from Rectum Updated:  02/23/20 1013    Clostridium difficile toxin by PCR with EIA [[id number]] Collected:  02/23/20 1007    Lab Status: In process Specimen:  Stool from Rectum Updated:  02/23/20 1013    Stool Enteric Bacterial Panel by PCR [[id number]] Collected:  02/23/20 1007    Lab Status:   In process Specimen:  Stool from Rectum Updated:  02/23/20 1013    CMP [[id number]]  (Abnormal) Collected:  02/23/20 0906    Lab Status:  Final result Specimen:  Blood from Arm, Left Updated:  02/23/20 0935     Sodium 141 mmol/L      Potassium 3 9 mmol/L      Chloride 103 mmol/L      CO2 27 mmol/L      ANION GAP 11 mmol/L      BUN 13 mg/dL      Creatinine 0 91 mg/dL      Glucose 141 mg/dL      Calcium 9 1 mg/dL      AST 83 U/L      ALT 96 U/L      Alkaline Phosphatase 108 U/L      Total Protein 8 3 g/dL      Albumin 3 4 g/dL      Total Bilirubin 0 40 mg/dL      eGFR 67 ml/min/1 73sq m     Narrative:       [place] guidelines for Chronic Kidney Disease (CKD):     Stage 1 with normal or high GFR (GFR > 90 mL/min/1 73 square meters)    Stage 2 Mild CKD (GFR = 60-89 mL/min/1 73 square meters)    Stage 3A Moderate CKD (GFR = 45-59 mL/min/1 73 square meters)    Stage 3B Moderate CKD (GFR = 30-44 mL/min/1 73 square meters)    Stage 4 Severe CKD (GFR = 15-29 mL/min/1 73 square meters)    Stage 5 End Stage CKD (GFR <15 mL/min/1 73 square meters)  Note: GFR calculation is accurate only with a steady state creatinine    Lipase [[id number]]  (Normal) Collected:  02/23/20 0906    Lab Status:  Final result Specimen:  Blood from Arm, Left Updated:  02/23/20 0935     Lipase 148 u/L     Magnesium [[id number]]  (Normal) Collected:  02/23/20 0906    Lab Status:  Final result Specimen:  Blood from Arm, Left Updated:  02/23/20 0935     Magnesium 1 8 mg/dL     CBC and differential [[id number]]  (Abnormal) Collected:  02/23/20 0906    Lab Status:  Final result Specimen:  Blood from Arm, Left Updated:  02/23/20 0926     WBC 10 05 Thousand/uL      RBC 4 90 Million/uL      Hemoglobin 13 6 g/dL      Hematocrit 44 8 %      MCV 91 fL      MCH 27 8 pg      MCHC 30 4 g/dL      RDW 14 2 %      MPV 9 4 fL      Platelets 358 Thousands/uL      nRBC 0 /100 WBCs      Neutrophils Relative 83 %      Immat GRANS % 0 %      Lymphocytes Relative 11 %      Monocytes Relative 5 %      Eosinophils Relative 1 %      Basophils Relative 0 %      Neutrophils Absolute 8 27 Thousands/µL      Immature Grans Absolute 0 04 Thousand/uL      Lymphocytes Absolute 1 14 Thousands/µL      Monocytes Absolute 0 51 Thousand/µL      Eosinophils Absolute 0 07 Thousand/µL      Basophils Absolute 0 02 Thousands/µL                  CT abdomen pelvis with contrast   Final Result by Zane Villalpando DO (02/23 1138)   1  No acute abnormality within the abdomen or pelvis  2   Incidental findings as described above, not significantly changed from the prior studies  Workstation performed: [id number]         XR chest 2 views   ED Interpretation by Brock Heard MD (02/23 1846)   No acute process      Final Result by Elin Solis MD (02/23 9903)      No acute cardiopulmonary disease  Workstation performed: [id number]         XR abdomen complete inc upright and/or decubitus   ED Interpretation by Brock Heard MD (02/23 4518)   No acute process      Final Result by Vikash Castro MD (02/23 1976)      Nonobstructing bowel gas pattern  Workstation performed: [id number]                    Procedures  Procedures         ED Course  ED Course as of  1256   Sun 2020   0955 WBC: 10 05   0955 Hemoglobin: 13 6   0955 Platelet Count: 583   0955 Sodium: 141   0955 Anion Gap: 11   0956 AST(!): 83   0956 ALT(!): 96   0956 Official Radiologist Reading includes the following:   FINDINGS:     There is a nonobstructive bowel gas pattern      No discernible free air on this supine study  Upright or left lateral decubitus imaging is more sensitive to detect subtle free air in the appropriate setting      No pathologic calcifications or soft tissue masses       Visualized lung bases are clear      Visualized osseous structures are unremarkable for the patient's age      Cholecystectomy clips in the right upper quadrant of the abdomen      IMPRESSION:     Nonobstructing bowel gas pattern  1428 Official Radiologist Reading includes the following:    IMPRESSION:     No acute cardiopulmonary disease  80 Official Radiologist Reading includes the followin Official Radiologist Reading includes the followin-[id number] Official Radiologist Reading includes the following:    STOMACH AND BOWEL:    Stomach distended and filled with ingested oral contrast material   Hiatal hernia  Thickening of the distal esophagus, compatible with reflux esophagitis      No evidence of small bowel obstruction      There is contrast material seen within the colon, up to the level of the proximal sigmoid colon  The colon is incompletely distended  There is mild circumferential wall thickening of the descending colon and sigmoid colon, likely due to under   distention versus the sequela of patient's known Crohn's disease  26 Calling pt's own Gastroenterologist to discuss her planned colonoscopy with prep starting today  1243 Called [phone] -- expect to hear from Dr Alyson walton from Dr James Alexandra Supexkae 303 D/W   Luz Maria for Dr Princess Medrano - OK to start prep  If she feels too sick, she should stop and call Dr Princess Medrano / service  MDM      Disposition  Final diagnoses:   Epigastric pain   Diarrhea     Time reflects when diagnosis was documented in both MDM as applicable and the Disposition within this note     Time User Action Codes Description Comment    2/23/2020 12:54 PM Luis A Adela Add [R10 13] Epigastric pain     2/23/2020 12:54 PM Isela DÍAZ Add [R19 7] Diarrhea       ED Disposition     ED Disposition Condition Date/Time Comment    Discharge Stable Sun Feb 23, 2020 12:54 PM Jennifer Walter discharge to home/self care  Follow-up Information    None         Patient's Medications   Discharge Prescriptions    No medications on file     No discharge procedures on file      PDMP Review     None          ED Provider  Electronically Signed by           Dannie Price MD  02/23/20 8203

## 2020-02-23 NOTE — ED NOTES
at [place] [id number] [address] [facility], [address] [place] [address]  02/23/20 1650

## 2020-02-23 NOTE — ED PROCEDURE NOTE
PROCEDURE  ECG 12 Lead Documentation Only  Date/Time: 2/23/2020 8:49 AM  Performed by: Dannie Price MD  Authorized by: Dannie Price MD     Indications / Diagnosis:  Epigastric pain  ECG reviewed by me, the ED Provider: yes (Interpreted by me)    Patient location:  ED  Previous ECG:     Comparison to cardiac monitor: Yes    Interpretation:     Interpretation: normal    Rate:     ECG rate:  0849    ECG rate assessment: normal    Rhythm:     Rhythm: sinus rhythm    Ectopy:     Ectopy: none    QRS:     QRS axis:  Normal  Conduction:     Conduction: normal    ST segments:     ST segments:  Normal  T waves:     T waves: normal           Dannie Price MD  02/23/20 1348

## 2020-02-23 NOTE — ED NOTES
Instructed patient in case this is a case of C-Diff to buy bleach wipes, or make a solution of bleach and water to bleach the surfaces after use  She was also instructed to wash her hands, wash her hands, wash her hands, and that using hand  does kill C-Diff  She was also explained her discharge instructions to continue with the bowel prep and call the doctor if she cannot tolerate  The patient verbalized understanding        Darrion Posey RN  02/23/20 2574

## 2020-02-23 NOTE — ED NOTES
Patient ambulating to bedside commode at this time       Daily Jaimes [facility]  02/23/20 1014

## 2020-02-23 NOTE — ED NOTES
Patient returned from [address] [place] [address] [address] [facility], [address] [place] [address]  02/23/20 5868

## 2020-02-24 LAB
ATRIAL RATE: 92 BPM
C DIFF TOX GENS STL QL NAA+PROBE: NEGATIVE
CAMPYLOBACTER DNA SPEC NAA+PROBE: NORMAL
P AXIS: 51 DEGREES
PR INTERVAL: 140 MS
QRS AXIS: 22 DEGREES
QRSD INTERVAL: 78 MS
QT INTERVAL: 382 MS
QTC INTERVAL: 472 MS
SALMONELLA DNA SPEC QL NAA+PROBE: NORMAL
SHIGA TOXIN STX GENE SPEC NAA+PROBE: NORMAL
SHIGELLA DNA SPEC QL NAA+PROBE: NORMAL
T WAVE AXIS: 38 DEGREES
VENTRICULAR RATE: 92 BPM

## 2020-02-24 PROCEDURE — 93010 ELECTROCARDIOGRAM REPORT: CPT | Performed by: INTERNAL MEDICINE

## 2020-02-24 NOTE — ED PROVIDER NOTES
History  Chief Complaint   Patient presents with    Abdominal Pain     patient c/o of increase in abdominal pain with diarrhea and vomitting a white/yellow emesis  was seen here this afternoon for same chief complaint  did follow up with [facility] and he stated to return back to emergency department and to cancel the colonscopy      This is a 72-year-old female with a history of dyspepsia, Faulkner's esophagus, COPD, possible inflammatory bowel disease, hypothyroidism, hyperlipidemia who presents with abdominal pain, nausea/vomiting, and diarrhea  The patient states that her symptoms began yesterday  She describes diffuse abdominal pain described as cramping, waxing and waning in intensity, nonradiating, associated with innumerable episodes of nonbloody, nonmelanotic diarrhea  Denies any sick contacts at home  No recent travel or antibiotic use  The patient was seen earlier today for the abdominal pain and diarrhea  She had a full workup performed including a CT scan abdomen/pelvis  Her workup was unremarkable and she was discharged  The previous doctor contacted her GI practice on-call  She was told to continue her bowel prep this afternoon for a planned colonoscopy in EGD tomorrow  When she was discharged, the patient started to experience nonbloody, nonbilious vomiting  She had 3 episodes this afternoon  She called her GI doctor who told her to come to the emergency department for re-evaluation  Denies fever/chills, lightheadedness/dizziness, numbness/weakness, headache, change in vision, URI symptoms, neck pain, chest pain, palpitations, shortness of breath, cough, back pain, flank pain, hematochezia, melena, dysuria, hematuria, abnormal vaginal discharge/bleeding  Prior to Admission Medications   Prescriptions Last Dose Informant Patient Reported? Taking?    albuterol (PROVENTIL HFA,VENTOLIN HFA) 90 mcg/act inhaler  Self Yes Yes   Sig: Inhale 2 puffs   apixaban (ELIQUIS) 5 mg  Self Yes Yes Sig: Take 5 mg by mouth 2 (two) times a day    aspirin 81 mg chewable tablet  Self Yes Yes   Sig: Chew 81 mg   dofetilide (TIKOSYN) 500 mcg capsule 2/23/2020 at Unknown time Self Yes Yes   Sig: Take 500 mcg by mouth 2 (two) times a day   fenofibrate (FENOGLIDE) 40 MG TABS 2/23/2020 at Unknown time Self Yes Yes   Sig: Take 1 tablet by mouth   furosemide (LASIX) 20 mg tablet 2/23/2020 at Unknown time Self Yes Yes   Sig: Take 40 mg by mouth 2 (two) times a day    levothyroxine 125 mcg tablet 2/23/2020 at Unknown time Self Yes Yes   Sig: Take 125 mcg by mouth daily    mesalamine (ASACOL) 800 MG EC tablet 2/23/2020 at Unknown time Self Yes Yes   Sig: Take 800 mg by mouth 2 (two) times a day    metoprolol succinate (TOPROL-XL) 100 mg 24 hr tablet 2/23/2020 at Unknown time Self No Yes   Sig: Take 1 tablet (100 mg total) by mouth every 12 (twelve) hours   mometasone-formoterol (DULERA) 100-5 MCG/ACT inhaler  Self Yes Yes   Sig: Inhale 2 puffs   omeprazole (PriLOSEC) 20 mg delayed release capsule 2/23/2020 at Unknown time Self No Yes   Sig: Take 1 capsule (20 mg total) by mouth daily before breakfast   Patient taking differently: Take 40 mg by mouth 2 (two) times a day    venlafaxine (EFFEXOR XR) 37 5 mg 24 hr capsule 2/23/2020 at Unknown time Self Yes Yes   Sig: Take 37 5 mg by mouth daily       Facility-Administered Medications: None       Past Medical History:   Diagnosis Date    [facility])     Brain aneurysm     [email or web address]     papillary thyroid cancer    Cardiac disease     CHF (congestive heart failure) (HCC)     Cro[facility] disease ([facility] [address] )     Disease of thyroid gland     GERD (gastroesophageal reflux disease)     Hyperlipidemia     Hypertension        Past Surgical History:   Procedure Laterality Date    APPENDECTOMY      CARDIOVERSION N/A 2/14/2019    Procedure: CARDIOVERSION;  Surgeon: Elijah Huizar MD;  Location: [facility] MA[facility];  Service: Cardiology    CEREBRAL ANEURYSM REPAIR      CHOLECYSTECTOMY      HYSTERECTOMY      THYROIDECTOMY      TONSILLECTOMY AND ADENOIDECTOMY         History reviewed  No pertinent family history  I have reviewed and agree with the history as documented  Social History     Tobacco Use    Smoking status: Former Smoker     Last attempt to quit: 2018     Years since quittin 1    Smokeless tobacco: Never Used   Substance Use Topics    Alcohol use: Never     Alcohol/week: 0 0 standard drinks     Frequency: Never     Drinks per session: Patient refused     Binge frequency: Never    Drug use: Never       Review of Systems   Constitutional: Negative for chills and fever  HENT: Negative for rhinorrhea, sore throat and trouble swallowing  Eyes: Negative for photophobia and visual disturbance  Respiratory: Negative for cough, chest tightness and shortness of breath  Cardiovascular: Negative for chest pain, palpitations and leg swelling  Gastrointestinal: Positive for abdominal pain, diarrhea, nausea and vomiting  Negative for blood in stool  Endocrine: Negative for polyuria  Genitourinary: Negative for dysuria, flank pain, hematuria, vaginal bleeding and vaginal discharge  Musculoskeletal: Negative for back pain and neck pain  Skin: Negative for color change and rash  Allergic/Immunologic: Negative for immunocompromised state  Neurological: Negative for dizziness, weakness, light-headedness, numbness and headaches  All other systems reviewed and are negative  Physical Exam  Physical Exam   Constitutional: Vital signs are normal  She appears well-developed  She is cooperative  No distress  HENT:   Mouth/Throat: Uvula is midline, oropharynx is clear and moist and mucous membranes are normal    Eyes: Pupils are equal, round, and reactive to light  Conjunctivae and EOM are normal    Neck: Trachea normal  No thyroid mass and no thyromegaly present     Cardiovascular: Normal rate, regular rhythm, normal heart sounds, intact distal pulses and normal pulses  No murmur heard  Pulmonary/Chest: Effort normal and breath sounds normal    Abdominal: Soft  Normal appearance and bowel sounds are normal  There is generalized tenderness  There is no rebound, no guarding and no CVA tenderness  Neurological: She is alert  Skin: Skin is warm, dry and intact  Psychiatric: She has a normal mood and affect   Her speech is normal and behavior is normal  Thought content normal        Vital Signs  ED Triage Vitals [02/23/20 1837]   Temperature Pulse Respirations Blood Pressure SpO2   99 3 °F (37 4 °C) 96 16 142/65 94 %      Temp Source Heart Rate Source Patient Position - Orthostatic VS BP Location FiO2 (%)   Temporal Monitor Sitting Left arm --      Pain Score       8           Vitals:    02/23/20 1837   BP: 142/65   Pulse: 96   Patient Position - Orthostatic VS: Sitting         Visual Acuity      ED Medications  Medications   sodium chloride 0 9 % bolus 1,000 mL (1,000 mL Intravenous New Bag 2/23/20 1916)   haloperidol lactate (HALDOL) injection 5 mg (5 mg Intramuscular Given 2/23/20 1917)   ondansetron (ZOFRAN) injection 4 mg (4 mg Intravenous Given 2/23/20 1916)       Diagnostic Studies  Results Reviewed     Procedure Component Value Units Date/Time    Basic metabolic panel [[id number]] Collected:  02/23/20 1917    Lab Status:  Final result Specimen:  Blood from Arm, Right Updated:  02/23/20 1945     Sodium 138 mmol/L      Potassium 3 6 mmol/L      Chloride 101 mmol/L      CO2 24 mmol/L      ANION GAP 13 mmol/L      BUN 9 mg/dL      Creatinine 0 91 mg/dL      Glucose 136 mg/dL      Calcium 9 0 mg/dL      eGFR 67 ml/min/1 73sq m     Narrative:       [place] guidelines for Chronic Kidney Disease (CKD):     Stage 1 with normal or high GFR (GFR > 90 mL/min/1 73 square meters)    Stage 2 Mild CKD (GFR = 60-89 mL/min/1 73 square meters)    Stage 3A Moderate CKD (GFR = 45-59 mL/min/1 73 square meters)    Stage 3B Moderate CKD (GFR = 30-44 mL/min/1 73 square meters)    Stage 4 Severe CKD (GFR = 15-29 mL/min/1 73 square meters)    Stage 5 End Stage CKD (GFR <15 mL/min/1 73 square meters)  Note: GFR calculation is accurate only with a steady state creatinine    Hepatic function panel [[id number]]  (Abnormal) Collected:  02/23/20 1917    Lab Status:  Final result Specimen:  Blood from Arm, Right Updated:  02/23/20 1945     Total Bilirubin 0 30 mg/dL      Bilirubin, Direct 0 09 mg/dL      Alkaline Phosphatase 100 U/L      AST 92 U/L      ALT 95 U/L      Total Protein 8 4 g/dL      Albumin 3 6 g/dL     Lipase [[id number]]  (Normal) Collected:  02/23/20 1917    Lab Status:  Final result Specimen:  Blood from Arm, Right Updated:  02/23/20 1945     Lipase 141 u/L     CBC and differential [[id number]]  (Abnormal) Collected:  02/23/20 1917    Lab Status:  Final result Specimen:  Blood from Arm, Right Updated:  02/23/20 1929     WBC 8 72 Thousand/uL      RBC 4 87 Million/uL      Hemoglobin 13 4 g/dL      Hematocrit 44 3 %      MCV 91 fL      MCH 27 5 pg      MCHC 30 2 g/dL      RDW 14 3 %      MPV 9 2 fL      Platelets 276 Thousands/uL      nRBC 0 /100 WBCs      Neutrophils Relative 80 %      Immat GRANS % 0 %      Lymphocytes Relative 11 %      Monocytes Relative 8 %      Eosinophils Relative 1 %      Basophils Relative 0 %      Neutrophils Absolute 7 02 Thousands/µL      Immature Grans Absolute 0 03 Thousand/uL      Lymphocytes Absolute 0 93 Thousands/µL      Monocytes Absolute 0 69 Thousand/µL      Eosinophils Absolute 0 04 Thousand/µL      Basophils Absolute 0 01 Thousands/µL                  No orders to display              Procedures  ECG 12 Lead Documentation Only  Date/Time: 2/23/2020 7:33 PM  Performed by: Александр Palacios MD  Authorized by: Александр Palacios MD     ECG reviewed by me, the ED Provider: yes    Patient location:  ED  Previous ECG:     Previous ECG:  Compared to current    Comparison ECG info:  2/9/20    Similarity:  No change    Comparison to cardiac monitor: Yes    Interpretation:     Interpretation: non-specific    Rate:     ECG rate:  89    ECG rate assessment: normal    Rhythm:     Rhythm: sinus rhythm    Ectopy:     Ectopy: none    QRS:     QRS axis:  Normal    QRS intervals:  Normal  Conduction:     Conduction: normal    ST segments:     ST segments:  Normal  T waves:     T waves: non-specific               ED Course  ED Course as of Feb 23 2048   Sun Feb 23, 2020   1945 Stable elevation of AST and ALT  MDM  Number of Diagnoses or Management Options  Diagnosis management comments: Plan to check lab work, EKG  Symptomatic treatment  Hopefully, will be able to discharge pending symptomatic treatment  Disposition  Final diagnoses:   Abdominal pain   Nausea and vomiting   Diarrhea     Time reflects when diagnosis was documented in both MDM as applicable and the Disposition within this note     Time User Action Codes Description Comment    2/23/2020  8:45 PM Rosebud Rumble Add [R10 9] Abdominal pain     2/23/2020  8:45 PM Rosebud Rumble Add [R11 2] Nausea and vomiting     2/23/2020  8:46 PM Rosebud Rumble Add [R19 7] Diarrhea       ED Disposition     ED Disposition Condition Date/Time Comment    Discharge Stable [place] Feb 23, 2020  8:45 PM Timothy Chambers discharge to home/self care              Follow-up Information     Follow up With Specialties Details Why Contact Info Additional Information    Jose Peña MD Family Medicine Schedule an appointment as soon as possible for a visit   [facility] [address]       [facility] Emergency Medicine Go to  If symptoms worsen [address] [id number]  [phone] [place] [facility], [facility] [address], [place], [place], [address], DO Gastroenterology Schedule an appointment as soon as possible for a visit   [address] [place] [address]  [phone] Patient's Medications   Discharge Prescriptions    ONDANSETRON (ZOFRAN-ODT) 4 MG DISINTEGRATING TABLET    Take 1 tablet (4 mg total) by mouth every 6 (six) hours as needed for nausea       Start Date: 2/23/2020 End Date: --       Order Dose: 4 mg       Quantity: 20 tablet    Refills: 0     No discharge procedures on file      PDMP Review     None          ED Provider  Electronically Signed by           Jin Ag MD  02/23/20 0545

## 2020-02-25 ENCOUNTER — HOSPITAL ENCOUNTER (INPATIENT)
Facility: HOSPITAL | Age: 65
LOS: 4 days | Discharge: HOME | DRG: 387 | End: 2020-02-29
Attending: EMERGENCY MEDICINE | Admitting: INTERNAL MEDICINE
Payer: COMMERCIAL

## 2020-02-25 DIAGNOSIS — K50.919 CROHN'S DISEASE WITH COMPLICATION, UNSPECIFIED GASTROINTESTINAL TRACT LOCATION (HCC): ICD-10-CM

## 2020-02-25 DIAGNOSIS — R19.7 DIARRHEA, UNSPECIFIED TYPE: ICD-10-CM

## 2020-02-25 DIAGNOSIS — S22.080A COMPRESSION FRACTURE OF T12 VERTEBRA, INITIAL ENCOUNTER (HCC): ICD-10-CM

## 2020-02-25 DIAGNOSIS — R10.10 PAIN OF UPPER ABDOMEN: Primary | ICD-10-CM

## 2020-02-25 DIAGNOSIS — I27.20 PULMONARY HYPERTENSION (HCC): ICD-10-CM

## 2020-02-25 DIAGNOSIS — R11.2 NAUSEA AND VOMITING: ICD-10-CM

## 2020-02-25 DIAGNOSIS — J44.1 COPD EXACERBATION (HCC): ICD-10-CM

## 2020-02-25 DIAGNOSIS — R07.9 CHEST PAIN: ICD-10-CM

## 2020-02-25 DIAGNOSIS — K62.5 RECTAL BLEEDING: ICD-10-CM

## 2020-02-25 DIAGNOSIS — I48.0 PAROXYSMAL ATRIAL FIBRILLATION (HCC): ICD-10-CM

## 2020-02-25 PROBLEM — G47.33 SEVERE OBSTRUCTIVE SLEEP APNEA: Status: ACTIVE | Noted: 2020-02-25

## 2020-02-25 PROBLEM — K50.90 EXACERBATION OF CROHN'S DISEASE (HCC): Status: ACTIVE | Noted: 2020-02-25

## 2020-02-25 LAB
ALBUMIN SERPL BCP-MCNC: 3.4 G/DL (ref 3.5–5)
ALP SERPL-CCNC: 103 U/L (ref 46–116)
ALT SERPL W P-5'-P-CCNC: 123 U/L (ref 12–78)
ANION GAP SERPL CALCULATED.3IONS-SCNC: 11 MMOL/L (ref 4–13)
AST SERPL W P-5'-P-CCNC: 121 U/L (ref 5–45)
ATRIAL RATE: 89 BPM
BASOPHILS # BLD AUTO: 0.02 THOUSANDS/ΜL (ref 0–0.1)
BASOPHILS NFR BLD AUTO: 0 % (ref 0–1)
BILIRUB DIRECT SERPL-MCNC: 0.1 MG/DL (ref 0–0.2)
BILIRUB SERPL-MCNC: 0.2 MG/DL (ref 0.2–1)
BUN SERPL-MCNC: 12 MG/DL (ref 5–25)
CALCIUM SERPL-MCNC: 9 MG/DL (ref 8.3–10.1)
CHLORIDE SERPL-SCNC: 102 MMOL/L (ref 100–108)
CO2 SERPL-SCNC: 28 MMOL/L (ref 21–32)
CREAT SERPL-MCNC: 0.92 MG/DL (ref 0.6–1.3)
CRP SERPL QL: 21.1 MG/L
EOSINOPHIL # BLD AUTO: 0.06 THOUSAND/ΜL (ref 0–0.61)
EOSINOPHIL NFR BLD AUTO: 1 % (ref 0–6)
ERYTHROCYTE [DISTWIDTH] IN BLOOD BY AUTOMATED COUNT: 14.2 % (ref 11.6–15.1)
ERYTHROCYTE [SEDIMENTATION RATE] IN BLOOD: 35 MM/HOUR (ref 0–20)
FLUAV RNA NPH QL NAA+PROBE: NORMAL
FLUBV RNA NPH QL NAA+PROBE: NORMAL
GFR SERPL CREATININE-BSD FRML MDRD: 66 ML/MIN/1.73SQ M
GLUCOSE SERPL-MCNC: 136 MG/DL (ref 65–140)
HCT VFR BLD AUTO: 46 % (ref 34.8–46.1)
HGB BLD-MCNC: 14 G/DL (ref 11.5–15.4)
IMM GRANULOCYTES # BLD AUTO: 0.03 THOUSAND/UL (ref 0–0.2)
IMM GRANULOCYTES NFR BLD AUTO: 0 % (ref 0–2)
LIPASE SERPL-CCNC: 189 U/L (ref 73–393)
LYMPHOCYTES # BLD AUTO: 1.42 THOUSANDS/ΜL (ref 0.6–4.47)
LYMPHOCYTES NFR BLD AUTO: 20 % (ref 14–44)
MCH RBC QN AUTO: 27.5 PG (ref 26.8–34.3)
MCHC RBC AUTO-ENTMCNC: 30.4 G/DL (ref 31.4–37.4)
MCV RBC AUTO: 90 FL (ref 82–98)
MONOCYTES # BLD AUTO: 0.46 THOUSAND/ΜL (ref 0.17–1.22)
MONOCYTES NFR BLD AUTO: 7 % (ref 4–12)
NEUTROPHILS # BLD AUTO: 5.09 THOUSANDS/ΜL (ref 1.85–7.62)
NEUTS SEG NFR BLD AUTO: 72 % (ref 43–75)
NRBC BLD AUTO-RTO: 0 /100 WBCS
P AXIS: 55 DEGREES
PLATELET # BLD AUTO: 287 THOUSANDS/UL (ref 149–390)
PMV BLD AUTO: 9.1 FL (ref 8.9–12.7)
POTASSIUM SERPL-SCNC: 3.6 MMOL/L (ref 3.5–5.3)
PR INTERVAL: 134 MS
PROT SERPL-MCNC: 8.3 G/DL (ref 6.4–8.2)
QRS AXIS: 12 DEGREES
QRSD INTERVAL: 84 MS
QT INTERVAL: 400 MS
QTC INTERVAL: 486 MS
RBC # BLD AUTO: 5.09 MILLION/UL (ref 3.81–5.12)
RSV RNA NPH QL NAA+PROBE: NORMAL
SODIUM SERPL-SCNC: 141 MMOL/L (ref 136–145)
T WAVE AXIS: 28 DEGREES
VENTRICULAR RATE: 89 BPM
WBC # BLD AUTO: 7.08 THOUSAND/UL (ref 4.31–10.16)

## 2020-02-25 PROCEDURE — 96375 TX/PRO/DX INJ NEW DRUG ADDON: CPT

## 2020-02-25 PROCEDURE — 36415 COLL VENOUS BLD VENIPUNCTURE: CPT | Performed by: EMERGENCY MEDICINE

## 2020-02-25 PROCEDURE — 85025 COMPLETE CBC W/AUTO DIFF WBC: CPT | Performed by: EMERGENCY MEDICINE

## 2020-02-25 PROCEDURE — 87081 CULTURE SCREEN ONLY: CPT | Performed by: INTERNAL MEDICINE

## 2020-02-25 PROCEDURE — 86140 C-REACTIVE PROTEIN: CPT | Performed by: EMERGENCY MEDICINE

## 2020-02-25 PROCEDURE — 93010 ELECTROCARDIOGRAM REPORT: CPT | Performed by: INTERNAL MEDICINE

## 2020-02-25 PROCEDURE — 83690 ASSAY OF LIPASE: CPT | Performed by: EMERGENCY MEDICINE

## 2020-02-25 PROCEDURE — 87631 RESP VIRUS 3-5 TARGETS: CPT | Performed by: INTERNAL MEDICINE

## 2020-02-25 PROCEDURE — 85652 RBC SED RATE AUTOMATED: CPT | Performed by: EMERGENCY MEDICINE

## 2020-02-25 PROCEDURE — 99285 EMERGENCY DEPT VISIT HI MDM: CPT

## 2020-02-25 PROCEDURE — 80076 HEPATIC FUNCTION PANEL: CPT | Performed by: EMERGENCY MEDICINE

## 2020-02-25 PROCEDURE — 99285 EMERGENCY DEPT VISIT HI MDM: CPT | Performed by: EMERGENCY MEDICINE

## 2020-02-25 PROCEDURE — 80048 BASIC METABOLIC PNL TOTAL CA: CPT | Performed by: EMERGENCY MEDICINE

## 2020-02-25 PROCEDURE — 96374 THER/PROPH/DIAG INJ IV PUSH: CPT

## 2020-02-25 PROCEDURE — 5A09357 ASSISTANCE WITH RESPIRATORY VENTILATION, LESS THAN 24 CONSECUTIVE HOURS, CONTINUOUS POSITIVE AIRWAY PRESSURE: ICD-10-PCS | Performed by: PHYSICIAN ASSISTANT

## 2020-02-25 RX ORDER — OXYCODONE HYDROCHLORIDE 10 MG/1
10 TABLET ORAL EVERY 4 HOURS PRN
Status: DISCONTINUED | OUTPATIENT
Start: 2020-02-25 | End: 2020-02-29 | Stop reason: HOSPADM

## 2020-02-25 RX ORDER — MORPHINE SULFATE 4 MG/ML
4 INJECTION, SOLUTION INTRAMUSCULAR; INTRAVENOUS ONCE
Status: COMPLETED | OUTPATIENT
Start: 2020-02-25 | End: 2020-02-25

## 2020-02-25 RX ORDER — METHYLPREDNISOLONE SODIUM SUCCINATE 40 MG/ML
40 INJECTION, POWDER, LYOPHILIZED, FOR SOLUTION INTRAMUSCULAR; INTRAVENOUS EVERY 12 HOURS SCHEDULED
Status: DISCONTINUED | OUTPATIENT
Start: 2020-02-25 | End: 2020-02-26

## 2020-02-25 RX ORDER — SODIUM CHLORIDE 9 MG/ML
75 INJECTION, SOLUTION INTRAVENOUS CONTINUOUS
Status: DISCONTINUED | OUTPATIENT
Start: 2020-02-25 | End: 2020-02-26

## 2020-02-25 RX ORDER — ONDANSETRON 2 MG/ML
4 INJECTION INTRAMUSCULAR; INTRAVENOUS ONCE
Status: COMPLETED | OUTPATIENT
Start: 2020-02-25 | End: 2020-02-25

## 2020-02-25 RX ORDER — OXYCODONE HYDROCHLORIDE 5 MG/1
5 TABLET ORAL EVERY 4 HOURS PRN
Status: DISCONTINUED | OUTPATIENT
Start: 2020-02-25 | End: 2020-02-29 | Stop reason: HOSPADM

## 2020-02-25 RX ADMIN — SODIUM CHLORIDE 100 ML/HR: 0.9 INJECTION, SOLUTION INTRAVENOUS at 23:58

## 2020-02-25 RX ADMIN — MORPHINE SULFATE 4 MG: 4 INJECTION INTRAVENOUS at 19:31

## 2020-02-25 RX ADMIN — METHYLPREDNISOLONE SODIUM SUCCINATE 40 MG: 40 INJECTION, POWDER, FOR SOLUTION INTRAMUSCULAR; INTRAVENOUS at 23:59

## 2020-02-25 RX ADMIN — ONDANSETRON 4 MG: 2 INJECTION INTRAMUSCULAR; INTRAVENOUS at 19:31

## 2020-02-26 ENCOUNTER — APPOINTMENT (INPATIENT)
Dept: ULTRASOUND IMAGING | Facility: HOSPITAL | Age: 65
DRG: 387 | End: 2020-02-26
Payer: COMMERCIAL

## 2020-02-26 PROBLEM — I70.90 ATHEROSCLEROSIS: Status: ACTIVE | Noted: 2020-02-26

## 2020-02-26 LAB
ALBUMIN SERPL BCP-MCNC: 3 G/DL (ref 3.5–5)
ALP SERPL-CCNC: 104 U/L (ref 46–116)
ALT SERPL W P-5'-P-CCNC: 149 U/L (ref 12–78)
ANION GAP SERPL CALCULATED.3IONS-SCNC: 8 MMOL/L (ref 4–13)
AST SERPL W P-5'-P-CCNC: 163 U/L (ref 5–45)
BACTERIA UR QL AUTO: ABNORMAL /HPF
BASOPHILS # BLD AUTO: 0.01 THOUSANDS/ΜL (ref 0–0.1)
BASOPHILS NFR BLD AUTO: 0 % (ref 0–1)
BILIRUB SERPL-MCNC: 0.3 MG/DL (ref 0.2–1)
BILIRUB UR QL STRIP: NEGATIVE
BUN SERPL-MCNC: 11 MG/DL (ref 5–25)
CALCIUM SERPL-MCNC: 8.4 MG/DL (ref 8.3–10.1)
CHLORIDE SERPL-SCNC: 103 MMOL/L (ref 100–108)
CLARITY UR: ABNORMAL
CO2 SERPL-SCNC: 28 MMOL/L (ref 21–32)
COLOR UR: ABNORMAL
CREAT SERPL-MCNC: 0.76 MG/DL (ref 0.6–1.3)
EOSINOPHIL # BLD AUTO: 0.01 THOUSAND/ΜL (ref 0–0.61)
EOSINOPHIL NFR BLD AUTO: 0 % (ref 0–6)
ERYTHROCYTE [DISTWIDTH] IN BLOOD BY AUTOMATED COUNT: 14.1 % (ref 11.6–15.1)
GFR SERPL CREATININE-BSD FRML MDRD: 84 ML/MIN/1.73SQ M
GLUCOSE SERPL-MCNC: 168 MG/DL (ref 65–140)
GLUCOSE UR STRIP-MCNC: NEGATIVE MG/DL
HAV IGM SER QL: NORMAL
HBV CORE IGM SER QL: NORMAL
HBV SURFACE AG SER QL: NORMAL
HCT VFR BLD AUTO: 40.4 % (ref 34.8–46.1)
HCV AB SER QL: NORMAL
HGB BLD-MCNC: 12.4 G/DL (ref 11.5–15.4)
HGB UR QL STRIP.AUTO: ABNORMAL
HYALINE CASTS #/AREA URNS LPF: ABNORMAL /LPF
IMM GRANULOCYTES # BLD AUTO: 0.03 THOUSAND/UL (ref 0–0.2)
IMM GRANULOCYTES NFR BLD AUTO: 1 % (ref 0–2)
KETONES UR STRIP-MCNC: NEGATIVE MG/DL
LACTATE SERPL-SCNC: 1.3 MMOL/L (ref 0.5–2)
LEUKOCYTE ESTERASE UR QL STRIP: NEGATIVE
LYMPHOCYTES # BLD AUTO: 1.21 THOUSANDS/ΜL (ref 0.6–4.47)
LYMPHOCYTES NFR BLD AUTO: 21 % (ref 14–44)
MAGNESIUM SERPL-MCNC: 1.7 MG/DL (ref 1.6–2.6)
MCH RBC QN AUTO: 27.9 PG (ref 26.8–34.3)
MCHC RBC AUTO-ENTMCNC: 30.7 G/DL (ref 31.4–37.4)
MCV RBC AUTO: 91 FL (ref 82–98)
MONOCYTES # BLD AUTO: 0.16 THOUSAND/ΜL (ref 0.17–1.22)
MONOCYTES NFR BLD AUTO: 3 % (ref 4–12)
MUCOUS THREADS UR QL AUTO: ABNORMAL
NEUTROPHILS # BLD AUTO: 4.48 THOUSANDS/ΜL (ref 1.85–7.62)
NEUTS SEG NFR BLD AUTO: 75 % (ref 43–75)
NITRITE UR QL STRIP: NEGATIVE
NON-SQ EPI CELLS URNS QL MICRO: ABNORMAL /HPF
NRBC BLD AUTO-RTO: 0 /100 WBCS
PH UR STRIP.AUTO: 6 [PH]
PHOSPHATE SERPL-MCNC: 2.7 MG/DL (ref 2.3–4.1)
PLATELET # BLD AUTO: 244 THOUSANDS/UL (ref 149–390)
PMV BLD AUTO: 9.5 FL (ref 8.9–12.7)
POTASSIUM SERPL-SCNC: 3.6 MMOL/L (ref 3.5–5.3)
PROT SERPL-MCNC: 7.4 G/DL (ref 6.4–8.2)
PROT UR STRIP-MCNC: NEGATIVE MG/DL
RBC # BLD AUTO: 4.45 MILLION/UL (ref 3.81–5.12)
RBC #/AREA URNS AUTO: ABNORMAL /HPF
SODIUM SERPL-SCNC: 139 MMOL/L (ref 136–145)
SP GR UR STRIP.AUTO: >=1.03 (ref 1–1.03)
TSH SERPL DL<=0.05 MIU/L-ACNC: 1.29 UIU/ML (ref 0.36–3.74)
UROBILINOGEN UR QL STRIP.AUTO: 1 E.U./DL
WBC # BLD AUTO: 5.9 THOUSAND/UL (ref 4.31–10.16)
WBC #/AREA URNS AUTO: ABNORMAL /HPF
WBC SPEC QL GRAM STN: ABNORMAL

## 2020-02-26 PROCEDURE — 80074 ACUTE HEPATITIS PANEL: CPT | Performed by: INTERNAL MEDICINE

## 2020-02-26 PROCEDURE — 83605 ASSAY OF LACTIC ACID: CPT | Performed by: INTERNAL MEDICINE

## 2020-02-26 PROCEDURE — 80053 COMPREHEN METABOLIC PANEL: CPT | Performed by: INTERNAL MEDICINE

## 2020-02-26 PROCEDURE — 87505 NFCT AGENT DETECTION GI: CPT | Performed by: INTERNAL MEDICINE

## 2020-02-26 PROCEDURE — 99255 IP/OBS CONSLTJ NEW/EST HI 80: CPT | Performed by: INTERNAL MEDICINE

## 2020-02-26 PROCEDURE — 83735 ASSAY OF MAGNESIUM: CPT | Performed by: INTERNAL MEDICINE

## 2020-02-26 PROCEDURE — 76705 ECHO EXAM OF ABDOMEN: CPT

## 2020-02-26 PROCEDURE — 84443 ASSAY THYROID STIM HORMONE: CPT | Performed by: INTERNAL MEDICINE

## 2020-02-26 PROCEDURE — 99223 1ST HOSP IP/OBS HIGH 75: CPT | Performed by: INTERNAL MEDICINE

## 2020-02-26 PROCEDURE — 87086 URINE CULTURE/COLONY COUNT: CPT | Performed by: INTERNAL MEDICINE

## 2020-02-26 PROCEDURE — 81001 URINALYSIS AUTO W/SCOPE: CPT | Performed by: INTERNAL MEDICINE

## 2020-02-26 PROCEDURE — 84100 ASSAY OF PHOSPHORUS: CPT | Performed by: INTERNAL MEDICINE

## 2020-02-26 PROCEDURE — 85025 COMPLETE CBC W/AUTO DIFF WBC: CPT | Performed by: INTERNAL MEDICINE

## 2020-02-26 PROCEDURE — 89055 LEUKOCYTE ASSESSMENT FECAL: CPT | Performed by: INTERNAL MEDICINE

## 2020-02-26 RX ORDER — POTASSIUM CHLORIDE 20 MEQ/1
40 TABLET, EXTENDED RELEASE ORAL ONCE
Status: COMPLETED | OUTPATIENT
Start: 2020-02-26 | End: 2020-02-26

## 2020-02-26 RX ORDER — ASPIRIN 81 MG/1
81 TABLET, CHEWABLE ORAL DAILY
Status: DISCONTINUED | OUTPATIENT
Start: 2020-02-26 | End: 2020-02-29 | Stop reason: HOSPADM

## 2020-02-26 RX ORDER — FLUTICASONE FUROATE AND VILANTEROL 100; 25 UG/1; UG/1
1 POWDER RESPIRATORY (INHALATION)
Status: DISCONTINUED | OUTPATIENT
Start: 2020-02-26 | End: 2020-02-26 | Stop reason: ALTCHOICE

## 2020-02-26 RX ORDER — MESALAMINE 400 MG/1
400 CAPSULE, DELAYED RELEASE ORAL 3 TIMES DAILY
Status: DISCONTINUED | OUTPATIENT
Start: 2020-02-26 | End: 2020-02-29 | Stop reason: HOSPADM

## 2020-02-26 RX ORDER — LEVOTHYROXINE SODIUM 0.12 MG/1
125 TABLET ORAL
Status: DISCONTINUED | OUTPATIENT
Start: 2020-02-26 | End: 2020-02-29 | Stop reason: HOSPADM

## 2020-02-26 RX ORDER — METOPROLOL SUCCINATE 100 MG/1
100 TABLET, EXTENDED RELEASE ORAL DAILY
Status: DISCONTINUED | OUTPATIENT
Start: 2020-02-26 | End: 2020-02-29 | Stop reason: HOSPADM

## 2020-02-26 RX ORDER — VENLAFAXINE HYDROCHLORIDE 37.5 MG/1
37.5 CAPSULE, EXTENDED RELEASE ORAL DAILY
Status: DISCONTINUED | OUTPATIENT
Start: 2020-02-26 | End: 2020-02-29 | Stop reason: HOSPADM

## 2020-02-26 RX ORDER — DOFETILIDE 0.12 MG/1
500 CAPSULE ORAL EVERY 12 HOURS SCHEDULED
Status: DISCONTINUED | OUTPATIENT
Start: 2020-02-26 | End: 2020-02-29 | Stop reason: HOSPADM

## 2020-02-26 RX ADMIN — MESALAMINE 400 MG: 400 CAPSULE, DELAYED RELEASE ORAL at 16:50

## 2020-02-26 RX ADMIN — METOPROLOL SUCCINATE 100 MG: 100 TABLET, FILM COATED, EXTENDED RELEASE ORAL at 10:36

## 2020-02-26 RX ADMIN — DOFETILIDE 500 MCG: 0.12 CAPSULE ORAL at 22:28

## 2020-02-26 RX ADMIN — POTASSIUM CHLORIDE 40 MEQ: 1500 TABLET, EXTENDED RELEASE ORAL at 10:34

## 2020-02-26 RX ADMIN — MESALAMINE 400 MG: 400 CAPSULE, DELAYED RELEASE ORAL at 11:01

## 2020-02-26 RX ADMIN — LEVOTHYROXINE SODIUM 125 MCG: 125 TABLET ORAL at 05:38

## 2020-02-26 RX ADMIN — SODIUM CHLORIDE 75 ML/HR: 0.9 INJECTION, SOLUTION INTRAVENOUS at 13:06

## 2020-02-26 RX ADMIN — VENLAFAXINE HYDROCHLORIDE 37.5 MG: 37.5 CAPSULE, EXTENDED RELEASE ORAL at 10:35

## 2020-02-26 RX ADMIN — DOFETILIDE 500 MCG: 0.12 CAPSULE ORAL at 11:42

## 2020-02-26 RX ADMIN — MESALAMINE 400 MG: 400 CAPSULE, DELAYED RELEASE ORAL at 20:10

## 2020-02-26 RX ADMIN — APIXABAN 5 MG: 5 TABLET, FILM COATED ORAL at 16:51

## 2020-02-26 RX ADMIN — APIXABAN 5 MG: 5 TABLET, FILM COATED ORAL at 10:35

## 2020-02-26 RX ADMIN — METHYLPREDNISOLONE SODIUM SUCCINATE 40 MG: 40 INJECTION, POWDER, FOR SOLUTION INTRAMUSCULAR; INTRAVENOUS at 10:33

## 2020-02-26 RX ADMIN — ASPIRIN 81 MG 81 MG: 81 TABLET ORAL at 10:35

## 2020-02-26 NOTE — ASSESSMENT & PLAN NOTE
· Admit to med/surg level of care  · Check stool cultures including a Clostridium difficile culture, a Rotavirus stool antigen test, a stool culture for enteric bacterial pathogens, and stool for fecal leukocytes    · Diagnosed with possible Crohn's disease approximately 30 years ago  · Check a fecal calprotectin level  · Continue PO mesalamine  · Initiate methylprednisolone 40 mg IV every 12 hours  · Clear liquid diet  · NSS IV fluids at 75 ml/hr  · Consult Gastroenterology

## 2020-02-26 NOTE — RESPIRATORY THERAPY NOTE
Spoke with patient about wearing CPAP  She stated that she does wear it at home, but won't wear it here  She stated that she tried and she just can't do it with our machines  Made her nurse Edwin Ford

## 2020-02-26 NOTE — ASSESSMENT & PLAN NOTE
· Check stool cultures including a Clostridium difficile culture, a Rotavirus stool antigen test, a stool culture for enteric bacterial pathogens, and stool for fecal leukocytes    · Diagnosed with possible Crohn's disease approximately 30 years ago  · Check a fecal calprotectin level  · Continue PO mesalamine  · Initiate methylprednisolone 40 mg IV every 12 hours  · Clear liquid diet  · NSS IV fluids at 75 ml/hr  · Consult Gastroenterology  · Continue eliquis for now

## 2020-02-26 NOTE — H&P
H&P- Clyde Bee 1955, 62 y o  female MRN: [id number]    Unit/Bed#: 406-01 Encounter: [id number]    Primary Care Provider: Christofer Bocanegra MD   Date and time admitted to hospital: 2/25/2020  6:37 PM        * Diarrhea  Assessment & Plan  · Admit to med/surg level of care  · Check stool cultures including a Clostridium difficile culture, a Rotavirus stool antigen test, a stool culture for enteric bacterial pathogens, and stool for fecal leukocytes  · Diagnosed with possible Crohn's disease approximately 30 years ago  · Check a fecal calprotectin level  · Continue PO mesalamine  · Initiate methylprednisolone 40 mg IV every 12 hours  · Clear liquid diet  · NSS IV fluids at 75 ml/hr  · Consult Gastroenterology    Rectal bleeding  Assessment & Plan  · Check stool cultures including a Clostridium difficile culture, a Rotavirus stool antigen test, a stool culture for enteric bacterial pathogens, and stool for fecal leukocytes    · Diagnosed with possible Crohn's disease approximately 30 years ago  · Check a fecal calprotectin level  · Continue PO mesalamine  · Initiate methylprednisolone 40 mg IV every 12 hours  · Clear liquid diet  · NSS IV fluids at 75 ml/hr  · Consult Gastroenterology  · Continue eliquis for now      Transaminitis  Assessment & Plan  · Hold fenofibrate  · Check an acute hepatitis panel  · Check an ultrasound of the liver  · Consult Gastroenterology    Paroxysmal atrial fibrillation (HCC)  Assessment & Plan  · Continue PO tikosyn and PO toprol XL  · Continue anticoagulation with eliquis 5 mg PO BID  · Outpatient follow-up with Cardiology    Atherosclerosis  Assessment & Plan  · Observed on CT scan  · Continue aspirin 81 mg daily  · Avoid statin therapy at this time in the setting of transaminitis    Severe obstructive sleep apnea  Assessment & Plan  · Continue CPAP QHS and anytime while sleeping at her home settings of 13 cmH2O    Acquired hypothyroidism  Assessment & Plan  · Check a TSH level  · Continue her home dose of PO levothyroxine      VTE Prophylaxis: Apixaban (Eliquis)  / sequential compression device   Code Status: Level 1-Full Code    Anticipated Length of Stay:  Patient will be admitted on an Inpatient basis with an anticipated length of stay of greater than 2 midnights  Justification for Hospital Stay:  The patient requires continuous IV fluids, IV methylprednisolone treatment, and a Gastroenterology consultation  Chief Complaint:  Nausea, vomiting, and diarrhea    History of Present Illness:    Benito Orona is a 62 y o  female who presents to emergency department with the complaints of nausea, vomiting, and diarrhea  The patient has been experiencing chronic, and intermittent diarrhea over last several years  She was diagnosed with possible Crohn's disease approximately 30 years ago and has been treated with PO mesalamine  Over the last few days, the patient's diarrhea has worsened  The patient has been experiencing persistent, watery diarrhea with episodes of diarrhea almost hourly over the last few days  The patient is being woke up during her sleep with episodes of diarrhea  The patient has also been experiencing intermittent, bloody diarrhea  In addition, the patient has been experiencing episodic nausea and vomiting over the last few days as well as generalized abdominal pain  The patient was scheduled for an outpatient colonoscopy on 02/24/2020, but she was unable to tolerate the bowel prep secondary to her nausea, vomiting, and diarrhea  The patient has been experiencing a decreased appetite with decreased PO intake  She also admits to an unintentional weight loss in the last few weeks  Nothing seemed to improve her symptoms  Review of Systems:    Review of Systems:  Per HPI, all other systems have been reviewed and were negative      Past Medical and Surgical History:     Past Medical History:   Diagnosis Date    [facility]fib [facility])     Brain aneurysm     Cancer [facility])     papillary thyroid cancer    Cardiac disease     CHF (congestive heart failure) (HCC)     Crohn disease (HCC)     Disease of thyroid gland     GERD (gastroesophageal reflux disease)     Hyperlipidemia     Hypertension        Past Surgical History:   Procedure Laterality Date    APPENDECTOMY      CARDIOVERSION N/A 2/14/2019    Procedure: CARDIOVERSION;  Surgeon: Nile Martins MD;  Location: [facility];  Service: Cardiology    CEREBRAL ANEURYSM REPAIR      CHOLECYSTECTOMY      HYSTERECTOMY      THYROIDECTOMY      TONSILLECTOMY AND ADENOIDECTOMY         Meds/Allergies:    Prior to Admission medications    Medication Sig Start Date End Date Taking?  Authorizing Provider   apixaban (ELIQUIS) 5 mg Take 5 mg by mouth 2 (two) times a day  2/5/19  Yes Historical Provider, MD   aspirin 81 mg chewable tablet Chew 81 mg 2/19/19  Yes Historical Provider, MD   dofetilide (TIKOSYN) 500 mcg capsule Take 500 mcg by mouth 2 (two) times a day   Yes Historical Provider, MD   fenofibrate (FENOGLIDE) 40 MG TABS Take 1 tablet by mouth 10/23/19  Yes Historical Provider, MD   furosemide (LASIX) 20 mg tablet Take 40 mg by mouth 2 (two) times a day  2/11/19  Yes Historical Provider, MD   levothyroxine 125 mcg tablet Take 125 mcg by mouth daily  1/22/19  Yes Historical Provider, MD   mesalamine (ASACOL) 800 MG EC tablet Take 800 mg by mouth 2 (two) times a day  1/9/19  Yes Historical Provider, MD   metoprolol succinate (TOPROL-XL) 100 mg 24 hr tablet Take 1 tablet (100 mg total) by mouth every 12 (twelve) hours 2/14/19  Yes Leslee Kim DO   omeprazole (PriLOSEC) 20 mg delayed release capsule Take 1 capsule (20 mg total) by mouth daily before breakfast  Patient taking differently: Take 40 mg by mouth 2 (two) times a day  2/14/19  Yes Leslee Kim DO   venlafaxine (EFFEXOR XR) 37 5 mg 24 hr capsule Take 37 5 mg by mouth daily  12/31/18  Yes Historical Provider, MD   albuterol (PROVENTIL HFA,VENTOLIN HFA) 90 mcg/act inhaler Inhale 2 puffs 10/3/19   Historical Provider, MD   mometasone-formoterol [facility]) 100-5 MCG/ACT inhaler Inhale 2 puffs 10/3/19   Historical Provider, MD   ondansetron (ZOFRAN-ODT) 4 mg disintegrating tablet Take 1 tablet (4 mg total) by mouth every 6 (six) hours as needed for nausea  Patient not taking: Reported on 2020   Ary Clayton MD     I have reviewed home medications with patient personally  Allergies: Allergies   Allergen Reactions    Sulfa Antibiotics Rash       Social History:     Marital Status: /Civil Union     Substance Use History:   Social History     Substance and Sexual Activity   Alcohol Use Never    Alcohol/week: 0 0 standard drinks    Frequency: Never    Drinks per session: Patient refused    Binge frequency: Never     Social History     Tobacco Use   Smoking Status Former Smoker    Last attempt to quit: 2018    Years since quittin 1   Smokeless Tobacco Never Used     Social History     Substance and Sexual Activity   Drug Use Never       Family History:    non-contributory    Physical Exam:     Vitals:   Blood Pressure: 152/83 (20)  Pulse: 75 (20)  Temperature: 97 6 °F (36 4 °C) (20)  Temp Source: Oral (20)  Respirations: 18 (20)  Height: 5' 10" (177 8 cm) (20)  Weight - Scale: 108 kg (238 lb 15 7 oz) (20)  SpO2: 93 % (20)    Physical Exam  General:  NAD, follows commands  HEENT:  NC/AT, mucous membranes dry  Neck:  Supple, No JVP elevation  CV:  + S1, + S2, RRR  Pulm:  Lung fields are CTA bilaterally  Abd:  Soft, Mild distension, Generalized abdominal pain with palpation  Ext:  No clubbing/cyanosis/edema  Skin:  No rashes  Neuro:  Awake, alert, oriented  Psych:  Normal mood and affect      Additional Data:     Lab Results: I have personally reviewed pertinent reports        Results from last 7 days   Lab Units 20  1904   WBC Thousand/uL 7 08 HEMOGLOBIN g/dL 14 0   HEMATOCRIT % 46 0   PLATELETS Thousands/uL 287   NEUTROS PCT % 72   LYMPHS PCT % 20   MONOS PCT % 7   EOS PCT % 1     Results from last 7 days   Lab Units 02/25/20  1904   SODIUM mmol/L 141   POTASSIUM mmol/L 3 6   CHLORIDE mmol/L 102   CO2 mmol/L 28   BUN mg/dL 12   CREATININE mg/dL 0 92   ANION GAP mmol/L 11   CALCIUM mg/dL 9 0   ALBUMIN g/dL 3 4*   TOTAL BILIRUBIN mg/dL 0 20   ALK PHOS U/L 103   ALT U/L 123*   AST U/L 121*   GLUCOSE RANDOM mg/dL 136                       Imaging: I have personally reviewed pertinent reports  US liver    (Results Pending)       [facility]     ** Please Note: This note has been constructed using a voice recognition system   **

## 2020-02-26 NOTE — PROGRESS NOTES
Pt resting comfortably in bed  Family at bedside  Pt requests to have her eliquis early now, given at this time and to have further doses re timed

## 2020-02-26 NOTE — CONSULTS
Consultation - [facility]  Amber Tavares 62 y o  female MRN: [id number]  Unit/Bed#: 406-01 Encounter: [id number]    This is a late entry note, patient was seen and examined at approximately 10:00 a m  ASSESSMENT/PLAN:   62y o  year old female with a PMH of AFib on Eliquis, hypothyroidism, MARY ELLEN again possible Crohn's disease presents to the hospital for abdominal pain, rectal bleeding and diarrhea  1  Abdominal pain  2  Diarrhea  3  Rectal bleeding  4  Ileocecal valve ulcer  5  Decreased appetite  6  Nausea and vomiting   -she states she has been having intermittent abdominal pain and diarrhea for the past 3 years, she had a colonoscopy to evaluate this at an outside hospital and 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  She was started on mesalamine but she felt this made her symptoms worse so she discontinued it  She has also tried Bentyl in the past and reports the same effect    -she had recent worsening of the abdominal pain and diarrhea and reports she was having a bowel movement approximately every hour  She did have an episode of rectal bleeding about a week ago but has not had rectal bleeding since  She has also been having nausea and vomiting  She reports a decreased appetite as a result of her symptoms    -she was scheduled for an EGD and colonoscopy as an outpatient with her primary gastroenterologist but unfortunately was unable to tolerate the colon prep earlier in the week and so she was rescheduled to June  Given her worsening symptoms she presented to the hospital for more prompt care     -on admission to the hospital she has an elevated CRP level at 21 and sed rate at 35, C diff stool test and stool enteric bacterial panel were negative    -she had a CT enterography in 2018 showing adhesions but no active disease or clear fibrostenotic changes seen   -CT of the abdomen pelvis done 3 days ago shows no acute abnormality   -she was started on Solu-Medrol 40 mg IV q 12   -given her concerning symptoms it is very reasonable to proceed with an upper endoscopy and colonoscopy for further evaluation, she does have significant epigastric tenderness on physical exam concerning for peptic ulcer disease  Duodenal biopsies for celiac disease could possibly be taken although she does appear to be on Eliquis currently so her procedure will likely be diagnostic only  Repeating the colonoscopy will help to confirm/re-stage her previous diagnosis of Crohn's disease    -supportive care and pain control as per the primary care team   -case was discussed with Lakia Prasad PA-C with [facility] today    7  Elevated LFTs   -she has a history of fatty liver disease and does have chronically elevated LFTs however her AST and ALT appear to be trending up slightly at 163 and 149 respectively today  Alk-phos and total bilirubin are within normal limits  -likely her elevated LFTs are due to ARELLANO in the setting of acute illness; acute hepatitis panel was negative and ultrasound of the liver showed hepatic steatosis  No evidence of biliary obstruction    -check celiac panel   -continue to monitor her LFTs, could consider full liver workup as it is unclear if this has been completed in past by previous gastroenterologists    -avoid hepatotoxic medications    Inpatient consult to gastroenterology  Consult performed by: Meche Jeffries PA-C  Consult ordered by: [employer], DO          Reason for Consult / Principal Problem: Diarrhea    HPI: Erika Oviedo is a 62y o  year old female with a PMH of AFib on Eliquis, hypothyroidism, MARY ELLEN again possible Crohn's disease presents to the hospital for abdominal pain, rectal bleeding and diarrhea  She states she has had intermittent abdominal pain and diarrhea for several years however over the past week she has been having worsening symptoms with watery diarrhea and more than 10 bowel movements per day    She reports nocturnal stooling and did have 1 episode of rectal bleeding about a week ago which she describes as a lot of blood in the toilet bowl which has not recurred since  She has also been having nausea and vomiting over the past couple of days  She states she has diffuse abdominal pain but it is worst in the epigastric region  She does feel that the pain occurs prior to a bowel movement but does not always resolve after  She has tried Bentyl which she did feel make her symptoms worse, she was taking mesalamine as an outpatient as well but she also felt this made her symptoms worse so she discontinued it  She has seen 2 gastroenterologist previously, she reports that she thought she was diagnosed with Crohn's disease 30 years ago but her daughter believes she was diagnosed with diverticulosis at that time and was only recently told she has Crohn's disease a few years ago when she had a colonoscopy with ileocecal valve ulcers noted  She was planned to have an outpatient colonoscopy but she was unable to tolerate the bowel prep and unfortunately the procedure was rescheduled to June  She does feel that her appetite has decreased due to her symptoms but today she is hungry and would like to have her diet advanced  Review of Systems: as per HPI  Review of Systems   Constitutional: Negative for activity change, appetite change, chills, fatigue, fever and unexpected weight change  HENT: Negative for mouth sores, sore throat and trouble swallowing  Respiratory: Negative for shortness of breath  Cardiovascular: Negative for chest pain  Gastrointestinal: Positive for abdominal pain, blood in stool and diarrhea  Negative for abdominal distention, constipation, nausea and vomiting  Skin: Negative for color change, pallor, rash and wound  Neurological: Negative for tremors and syncope  All other systems reviewed and are negative        Historical Information   Past Medical History:   Diagnosis Date    A-fib [facility])     Brain aneurysm     Cancer [facility])     papillary thyroid cancer    Cardiac disease     CHF (congestive heart failure) (HCC)     Crohn disease (HCC)     Disease of thyroid gland     GERD (gastroesophageal reflux disease)     Hyperlipidemia     Hypertension      Past Surgical History:   Procedure Laterality Date    APPENDECTOMY      CARDIOVERSION N/A 2019    Procedure: CARDIOVERSION;  Surgeon: Clarisa Fallon MD;  Location: [facility];  Service: Cardiology    CEREBRAL ANEURYSM REPAIR      CHOLECYSTECTOMY      HYSTERECTOMY      THYROIDECTOMY      TONSILLECTOMY AND ADENOIDECTOMY       Social History   Social History     Substance and Sexual Activity   Alcohol Use Never    Alcohol/week: 0 0 standard drinks    Frequency: Never    Drinks per session: Patient refused    Binge frequency: Never     Social History     Substance and Sexual Activity   Drug Use Never     Social History     Tobacco Use   Smoking Status Former Smoker    Last attempt to quit: 2018    Years since quittin 1   Smokeless Tobacco Never Used     History reviewed  No pertinent family history      Meds/Allergies     Medications Prior to Admission   Medication    apixaban (ELIQUIS) 5 mg    aspirin 81 mg chewable tablet    dofetilide (TIKOSYN) 500 mcg capsule    fenofibrate (FENOGLIDE) 40 MG TABS    furosemide (LASIX) 20 mg tablet    levothyroxine 125 mcg tablet    mesalamine (ASACOL) 800 MG EC tablet    metoprolol succinate (TOPROL-XL) 100 mg 24 hr tablet    omeprazole (PriLOSEC) 20 mg delayed release capsule    venlafaxine (EFFEXOR XR) 37 5 mg 24 hr capsule    albuterol (PROVENTIL HFA,VENTOLIN HFA) 90 mcg/act inhaler    mometasone-formoterol (DULERA) 100-5 MCG/ACT inhaler    ondansetron (ZOFRAN-ODT) 4 mg disintegrating tablet     Current Facility-Administered Medications   Medication Dose Route Frequency    apixaban (ELIQUIS) tablet 5 mg  5 mg Oral BID    aspirin chewable tablet 81 mg  81 mg Oral Daily    dofetilide (TIKOSYN) capsule 500 mcg  500 mcg Oral Q12H [facility]    levothyroxine tablet 125 mcg  125 mcg Oral Early Morning    mesalamine (DELZICOL) delayed release capsule 400 mg  400 mg Oral TID    methylPREDNISolone sodium succinate (Solu-MEDROL) injection 40 mg  40 mg Intravenous Q12H [facility]    metoprolol succinate (TOPROL-XL) 24 hr tablet 100 mg  100 mg Oral Daily    oxyCODONE (ROXICODONE) IR tablet 5 mg  5 mg Oral Q4H PRN    Or    oxyCODONE (ROXICODONE) immediate release tablet 10 mg  10 mg Oral Q4H PRN    sodium chloride 0 9 % infusion  75 mL/hr Intravenous Continuous    venlafaxine (EFFEXOR-XR) 24 hr capsule 37 5 mg  37 5 mg Oral Daily       Allergies   Allergen Reactions    Sulfa Antibiotics Rash       Objective     Blood pressure 141/61, pulse 69, temperature (!) 96 6 °F (35 9 °C), temperature source Temporal, resp  rate 18, height 5' 10" (1 778 m), weight 108 kg (238 lb 12 1 oz), SpO2 96 %  Intake/Output Summary (Last 24 hours) at 2/26/2020 1446  Last data filed at 2/26/2020 0930  Gross per 24 hour   Intake 1416 25 ml   Output 500 ml   Net 916 25 ml       PHYSICAL EXAM     Physical Exam   Constitutional: She is oriented to person, place, and time  She appears well-developed  No distress  obese   HENT:   Head: Normocephalic and atraumatic  Eyes: Right eye exhibits no discharge  Left eye exhibits no discharge  No scleral icterus  Neck: Neck supple  No tracheal deviation present  Cardiovascular: Normal rate, regular rhythm and normal heart sounds  Exam reveals no gallop and no friction rub  No murmur heard  Pulmonary/Chest: Effort normal and breath sounds normal  No stridor  No respiratory distress  She has no wheezes  She has no rales  Abdominal: Soft  Bowel sounds are normal  She exhibits no distension and no mass  There is tenderness (moderately tender in the epigastric region)  There is no rebound and no guarding  Neurological: She is alert and oriented to person, place, and time     Skin: Skin is warm and dry  Psychiatric: She has a normal mood and affect  Lab Results:   CBC:   Lab Results   Component Value Date    WBC 5 90 02/26/2020    HGB 12 4 02/26/2020    HCT 40 4 02/26/2020    MCV 91 02/26/2020     02/26/2020    MCH 27 9 02/26/2020    MCHC 30 7 (L) 02/26/2020    RDW 14 1 02/26/2020    MPV 9 5 02/26/2020    NRBC 0 02/26/2020   ,   CMP:   Lab Results   Component Value Date    K 3 6 02/26/2020     02/26/2020    CO2 28 02/26/2020    BUN 11 02/26/2020    CREATININE 0 76 02/26/2020    CALCIUM 8 4 02/26/2020     (H) 02/26/2020     (H) 02/26/2020    ALKPHOS 104 02/26/2020    EGFR 84 02/26/2020   ,   Lipase:   Lab Results   Component Value Date    LIPASE 189 02/25/2020   ,  PT/INR: No results found for: PT, INR,   Troponin: No results found for: TROPONINI,   Magnesium: No components found for: MAG,   Phosphorous:   Lab Results   Component Value Date    PHOS 2 7 02/26/2020     Imaging Studies: I have personally reviewed pertinent reports  RIGHT UPPER QUADRANT ULTRASOUND     INDICATION:     Nausea, vomiting  Elevated LFTs     COMPARISON:  2/23/2020     TECHNIQUE:   Real-time ultrasound of the right upper quadrant was performed with a curvilinear transducer with both volumetric sweeps and still imaging techniques      FINDINGS:     PANCREAS:  Portions of the pancreas are obscured by bowel gas  Visualized portions of the pancreas are unremarkable       AORTA AND IVC:  Visualized portions are normal for patient age      LIVER:  Size:  Severely enlarged  The liver measures 12 1 cm in the midclavicular line  Contour:  Surface contour is smooth  Parenchyma: There is marked diffuse increased echogenicity with smooth echotexture and significant beam attenuation with loss of periportal echogenicity  Most consistent with severe hepatic steatosis  No evidence of suspicious mass    Limited imaging of the main portal vein shows it to be patent and hepatopetal      BILIARY:  Patient has undergone cholecystectomy  No intrahepatic biliary dilatation  CBD measures 3 mm  No choledocholithiasis      KIDNEY:   Right kidney measures 12 8 x 4 5 cm  Within normal limits      ASCITES:   None      IMPRESSION:     Severe hepatomegaly and hepatic steatosis as seen on CT        Patient was seen and examined by Dr Amarilis Guerrier  All [facility] medical decisions were made by Dr Amarilis Guerrier  Thank you for allowing us to participate in the care of this present patient  We will follow-up with you closely

## 2020-02-26 NOTE — PLAN OF CARE
Problem: PAIN - ADULT  Goal: Verbalizes/displays adequate comfort level or baseline comfort level  Description  Interventions:  - Encourage patient to monitor pain and request assistance  - Assess pain using appropriate pain scale  - Administer analgesics based on type and severity of pain and evaluate response  - Implement non-pharmacological measures as appropriate and evaluate response  - Consider cultural and social influences on pain and pain management  - Notify physician/advanced practitioner if interventions unsuccessful or patient reports new pain  Outcome: Progressing     Problem: INFECTION - ADULT  Goal: Absence or prevention of progression during hospitalization  Description  INTERVENTIONS:  - Assess and monitor for signs and symptoms of infection  - Monitor lab/diagnostic results  - Monitor all insertion sites, i e  indwelling lines, tubes, and drains  - Monitor endotracheal if appropriate and nasal secretions for changes in amount and color  - [place] appropriate cooling/warming therapies per order  - Administer medications as ordered  - Instruct and encourage patient and family to use good hand hygiene technique  - Identify and instruct in appropriate isolation precautions for identified infection/condition  Outcome: Progressing  Goal: Absence of fever/infection during neutropenic period  Description  INTERVENTIONS:  - Monitor WBC    Outcome: Progressing     Problem: SAFETY ADULT  Goal: Patient will remain free of falls  Description  INTERVENTIONS:  - Assess patient frequently for physical needs  -  Identify cognitive and physical deficits and behaviors that affect risk of falls    -  [place] fall precautions as indicated by assessment   - Educate patient/family on patient safety including physical limitations  - Instruct patient to call for assistance with activity based on assessment  - Modify environment to reduce risk of injury  - Consider OT/PT consult to assist with strengthening/mobility  Outcome: Progressing  Goal: Maintain or return to baseline ADL function  Description  INTERVENTIONS:  -  Assess patient's ability to carry out ADLs; assess patient's baseline for ADL function and identify physical deficits which impact ability to perform ADLs (bathing, care of mouth/teeth, toileting, grooming, dressing, etc )  - Assess/evaluate cause of self-care deficits   - Assess range of motion  - Assess patient's mobility; develop plan if impaired  - Assess patient's need for assistive devices and provide as appropriate  - Encourage maximum independence but intervene and supervise when necessary  - Involve family in performance of ADLs  - Assess for home care needs following discharge   - Consider OT consult to assist with ADL evaluation and planning for discharge  - Provide patient education as appropriate  Outcome: Progressing  Goal: Maintain or return mobility status to optimal level  Description  INTERVENTIONS:  - Assess patient's baseline mobility status (ambulation, transfers, stairs, etc )    - Identify cognitive and physical deficits and behaviors that affect mobility  - Identify mobility aids required to assist with transfers and/or ambulation (gait belt, sit-to-stand, lift, walker, cane, etc )  - [place] fall precautions as indicated by assessment  - Record patient progress and toleration of activity level on Mobility SBAR; progress patient to next Phase/Stage  - Instruct patient to call for assistance with activity based on assessment  - Consider rehabilitation consult to assist with strengthening/weightbearing, etc   Outcome: Progressing     Problem: DISCHARGE PLANNING  Goal: Discharge to home or other facility with appropriate resources  Description  INTERVENTIONS:  - Identify barriers to discharge w/patient and caregiver  - Arrange for needed discharge resources and transportation as appropriate  - Identify discharge learning needs (meds, wound care, etc )  - Arrange for interpretive services to assist at discharge as needed  - Refer to Case Management Department for coordinating discharge planning if the patient needs post-hospital services based on physician/advanced practitioner order or complex needs related to functional status, cognitive ability, or social support system  Outcome: Progressing     Problem: Knowledge Deficit  Goal: Patient/family/caregiver demonstrates understanding of disease process, treatment plan, medications, and discharge instructions  Description  Complete learning assessment and assess knowledge base    Interventions:  - Provide teaching at level of understanding  - Provide teaching via preferred learning methods  Outcome: Progressing     Problem: GASTROINTESTINAL - ADULT  Goal: Minimal or absence of nausea and/or vomiting  Description  INTERVENTIONS:  - Administer IV fluids if ordered to ensure adequate hydration  - Maintain NPO status until nausea and vomiting are resolved  - Nasogastric tube if ordered  - Administer ordered antiemetic medications as needed  - Provide nonpharmacologic comfort measures as appropriate  - Advance diet as tolerated, if ordered  - Consider nutrition services referral to assist patient with adequate nutrition and appropriate food choices  Outcome: Progressing  Goal: Maintains adequate nutritional intake  Description  INTERVENTIONS:  - Monitor percentage of each meal consumed  - Identify factors contributing to decreased intake, treat as appropriate  - Assist with meals as needed  - Monitor I&O, weight, and lab values if indicated  - Obtain nutrition services referral as needed  Outcome: Progressing

## 2020-02-26 NOTE — ASSESSMENT & PLAN NOTE
· Continue PO tikosyn and PO toprol XL  · Continue anticoagulation with eliquis 5 mg PO BID  · Outpatient follow-up with Cardiology

## 2020-02-26 NOTE — ASSESSMENT & PLAN NOTE
· Hold fenofibrate  · Check an acute hepatitis panel  · Check an ultrasound of the liver  · Consult Gastroenterology

## 2020-02-26 NOTE — ED PROVIDER NOTES
EMERGENCY DEPARTMENT ENCOUNTER NOTE  ? CHIEF COMPLAINT  Chief Complaint   Patient presents with    Nausea     seen here sunday x2 for abdominal pain  scheduled for colonoscopy with [facility] yesterday was told to take prep   Abdominal Pain     hx of chrons and diverticulitis  HPI  Yanet Alicea is a 62 y o  female with PMH of hypertension, hyperlipidemia, CHF, Crohn's disease, brain aneurysm, atrial fibrillation, presenting with epigastric abdominal pain  Pain started on Saturday, 4 days ago, in the epigastrium and across upper abdomen  It was associated with nausea, vomiting, and, subsequently loose stools  She has had pain all the way up to 10/10 in severity, 8/10 at present  It radiates from the upper abdomen down  There is decreased appetite  There is no dysuria  In the past, patient has had Crohn's exacerbations primarily with increase in loose stools  She is on mesalamine to help with that  She has not had any blood in stool since February 7th  She was due to have colonoscopy on 02/24, however, which is started bowel prep on Sunday, 3 days ago, she did not tolerate this and had ongoing severe pains with vomiting and diarrhea  She was seen in the emergency department on 02/23 and underwent a fairly comprehensive workup which revealed unremarkable labs safe for mild transaminitis and CT of the abdomen which was read as with no acute abnormality within the abdomen or pelvis      REVIEW OF SYSTEMS  Constitutional: ?Denies fevers, chills  Eyes: ?Denies changes in vision  ENT: Denies earache or sore throat  CV: Denies chest pain   Resp: Denies shortness of breath or coughing  GI: As above  Skin: No new rashes  Neuro: No headaches  Ten systems were reviewed otherwise were unremarkable    PAST MEDICAL HISTORY  Past Medical History:   Diagnosis Date    A-fib [facility])     Brain aneurysm     Cancer ([facility] )     papillary thyroid cancer    Cardiac disease     [facility]congestive [facility] failure) ([facility] )     Crohn disease ([address] )     Disease of thyroid gland     GERD (gastroesophageal reflux disease)     Hyperlipidemia     Hypertension        SURGICAL HISTORY  Past Surgical History:   Procedure Laterality Date    APPENDECTOMY      CARDIOVERSION N/A 2/14/2019    Procedure: CARDIOVERSION;  Surgeon: Lew Walker MD;  Location: [facility];  Service: Cardiology    CEREBRAL ANEURYSM REPAIR      CHOLECYSTECTOMY      HYSTERECTOMY      THYROIDECTOMY      TONSILLECTOMY AND ADENOIDECTOMY         FAMILY HISTORY  History reviewed  No pertinent family history  CURRENT MEDICATIONS  No current facility-administered medications on file prior to encounter        Current Outpatient Medications on File Prior to Encounter   Medication Sig    albuterol (PROVENTIL HFA,VENTOLIN HFA) 90 mcg/act inhaler Inhale 2 puffs    apixaban (ELIQUIS) 5 mg Take 5 mg by mouth 2 (two) times a day     aspirin 81 mg chewable tablet Chew 81 mg    dofetilide (TIKOSYN) 500 mcg capsule Take 500 mcg by mouth 2 (two) times a day    fenofibrate (FENOGLIDE) 40 MG TABS Take 1 tablet by mouth    furosemide (LASIX) 20 mg tablet Take 40 mg by mouth 2 (two) times a day     levothyroxine 125 mcg tablet Take 125 mcg by mouth daily     mesalamine (ASACOL) 800 MG EC tablet Take 800 mg by mouth 2 (two) times a day     metoprolol succinate (TOPROL-XL) 100 mg 24 hr tablet Take 1 tablet (100 mg total) by mouth every 12 (twelve) hours    mometasone-formoterol (DULERA) 100-5 MCG/ACT inhaler Inhale 2 puffs    omeprazole (PriLOSEC) 20 mg delayed release capsule Take 1 capsule (20 mg total) by mouth daily before breakfast (Patient taking differently: Take 40 mg by mouth 2 (two) times a day )    ondansetron (ZOFRAN-ODT) 4 mg disintegrating tablet Take 1 tablet (4 mg total) by mouth every 6 (six) hours as needed for nausea    venlafaxine (EFFEXOR XR) 37 5 mg 24 hr capsule Take 37 5 mg by mouth daily        ALLERGIES  Allergies   Allergen Reactions    Sulfa Antibiotics Rash       SOCIAL HISTORY  Social History     Socioeconomic History    Marital status: /Civil Union     Spouse name: None    Number of children: None    Years of education: None    Highest education level: None   Occupational History    None   Social Needs    Financial resource strain: None    Food insecurity:     Worry: None     Inability: None    Transportation needs:     Medical: None     Non-medical: None   Tobacco Use    Smoking status: Former Smoker     Last attempt to quit: 2018     Years since quittin 1    Smokeless tobacco: Never Used   Substance and Sexual Activity    Alcohol use: Never     Alcohol/week: 0 0 standard drinks     Frequency: Never     Drinks per session: Patient refused     Binge frequency: Never    Drug use: Never    Sexual activity: None   Lifestyle    Physical activity:     Days per week: None     Minutes per session: None    Stress: None   Relationships    Social connections:     Talks on phone: None     Gets together: None     Attends [employer] service: None     Active member of club or organization: None     Attends meetings of clubs or organizations: None     Relationship status: None    Intimate partner violence:     Fear of current or ex partner: None     Emotionally abused: None     Physically abused: None     Forced sexual activity: None   Other Topics Concern    None   Social History Narrative    None       PHYSICAL EXAM    /75 (BP Location: Left arm)   Pulse 95   Temp 98 5 °F (36 9 °C) (Temporal)   Resp 16   Wt 109 kg (239 lb 3 2 oz)   SpO2 95%   BMI 35 32 kg/m²   Vital signs and nursing notes reviewed  CONSTITUTIONAL: female appearing stated age resting in bed, in no acute distress  HEENT: atraumatic, normocephalic  Sclera anicteric, conjunctiva are not injected  Moist oral mucosa  CARDIOVASCULAR/CHEST: RRR, no M/R/G  2+ radial pulses  PULMONARY: Breathing comfortably on RA   Breath sounds are equal and clear to auscultation  ABDOMEN: non-distended  BS present, normoactive  Tender to palpation in epigastrium, periumbilical region, and right upper quadrant with equivocal Freeman's  There is no rebound or guarding  MSK: moves all extremities, no deformities, no peripheral edema  NEURO: Awake, alert, and oriented x 3  Face symmetric  Moves all extremities spontaneously  No focal neurologic deficits  SKIN: Warm, appears well-perfused  MENTAL STATUS: Normal affect  ?   LABS AND TESTS    Results Reviewed     Procedure Component Value Units Date/Time    Basic metabolic panel [[id number]] Collected:  02/25/20 1904    Lab Status:  Final result Specimen:  Blood from Arm, Right Updated:  02/25/20 1930     Sodium 141 mmol/L      Potassium 3 6 mmol/L      Chloride 102 mmol/L      CO2 28 mmol/L      ANION GAP 11 mmol/L      BUN 12 mg/dL      Creatinine 0 92 mg/dL      Glucose 136 mg/dL      Calcium 9 0 mg/dL      eGFR 66 ml/min/1 73sq m     Narrative:       [place] guidelines for Chronic Kidney Disease (CKD):     Stage 1 with normal or high GFR (GFR > 90 mL/min/1 73 square meters)    Stage 2 Mild CKD (GFR = 60-89 mL/min/1 73 square meters)    Stage 3A Moderate CKD (GFR = 45-59 mL/min/1 73 square meters)    Stage 3B Moderate CKD (GFR = 30-44 mL/min/1 73 square meters)    Stage 4 Severe CKD (GFR = 15-29 mL/min/1 73 square meters)    Stage 5 End Stage CKD (GFR <15 mL/min/1 73 square meters)  Note: GFR calculation is accurate only with a steady state creatinine    Lipase [[id number]]  (Normal) Collected:  02/25/20 1904    Lab Status:  Final result Specimen:  Blood from Arm, Right Updated:  02/25/20 1930     Lipase 189 u/L     C-reactive protein [[id number]]  (Abnormal) Collected:  02/25/20 1904    Lab Status:  Final result Specimen:  Blood from Arm, Right Updated:  02/25/20 1929     CRP 21 1 mg/L     CBC and differential [[id number]]  (Abnormal) Collected:  02/25/20 1904    Lab Status:  Final result Specimen: Blood from Arm, Right Updated:  02/25/20 1913     WBC 7 08 Thousand/uL      RBC 5 09 Million/uL      Hemoglobin 14 0 g/dL      Hematocrit 46 0 %      MCV 90 fL      MCH 27 5 pg      MCHC 30 4 g/dL      RDW 14 2 %      MPV 9 1 fL      Platelets 458 Thousands/uL      nRBC 0 /100 WBCs      Neutrophils Relative 72 %      Immat GRANS % 0 %      Lymphocytes Relative 20 %      Monocytes Relative 7 %      Eosinophils Relative 1 %      Basophils Relative 0 %      Neutrophils Absolute 5 09 Thousands/µL      Immature Grans Absolute 0 03 Thousand/uL      Lymphocytes Absolute 1 42 Thousands/µL      Monocytes Absolute 0 46 Thousand/µL      Eosinophils Absolute 0 06 Thousand/µL      Basophils Absolute 0 02 Thousands/µL     Sedimentation rate, automated [[id number]] Collected:  02/25/20 1904    Lab Status: In process Specimen:  Blood from Arm, Right Updated:  02/25/20 1910          No orders to display       ED COURSE & MEDICAL DECISION MAKING  Procedures  Medications   morphine (PF) 4 mg/mL injection 4 mg (4 mg Intravenous Given 2/25/20 1931)   ondansetron (ZOFRAN) injection 4 mg (4 mg Intravenous Given 2/25/20 231)     44-year-old female presenting with worsening abdominal pain, nausea, vomiting, and diarrhea  Vital signs reviewed, afebrile, mildly hypertensive, within normal limits otherwise  Differential diagnosis includes pancreatitis, biliary colic including CT cholecystitis, colitis including IBD flare, versus another etiology of symptoms such as viral gastroenteritis, gastroenteritis secondary to another cause  IV access established, morphine and Zofran administered for pain with improvement in symptoms  Basic labs obtained, revealing unremarkable BMP, lipase of 189 not consistent with acute pancreatitis, CBC without leukocytosis  Patient does have elevated inflammatory markers with ESR of 35 and CRP of 21 1  Patient's physical exam reveals a nonsurgical abdomen    Given that she has already had 2 CTs of her abdomen in February alone, I would like to defer CT imaging at present given concern for radiation exposure  I inadvertently did not obtain LFTs and I see that patient has previously had mildly elevated transaminases  LFTs were added on  Given that this is patient's 3rd visit and she has ongoing and perhaps worsening epigastric abdominal pain, case discussed with LOREN and patient will be admitted for further evaluation and care  MDM  Number of Diagnoses or Management Options  Diarrhea, unspecified type: established and worsening  Nausea and vomiting: established and worsening  Pain of upper abdomen: established and worsening     Amount and/or Complexity of Data Reviewed  Clinical lab tests: ordered and reviewed  Review and summarize past medical records: yes    Risk of Complications, Morbidity, and/or Mortality  Presenting problems: high  Diagnostic procedures: moderate  Management options: high    Patient Progress  Patient progress: stable      CLINICAL IMPRESSION  Final diagnoses:   Pain of upper abdomen   Nausea and vomiting   Diarrhea, unspecified type       DISPOSITION  Time reflects when diagnosis was documented in both MDM as applicable and the Disposition within this note     Time User Action Codes Description Comment    2/25/2020  8:51 PM Catherin Pap Add [R10 10] Pain of upper abdomen     2/25/2020  8:51 PM Catherin Pap Add [R11 2] Nausea and vomiting     2/25/2020  8:51 PM Catherin Pap Add [R19 7] Diarrhea, unspecified type       ED Disposition     ED Disposition Condition Date/Time Comment    Admit Stable Tue Feb 25, 2020  8:51 PM Case was discussed with Dr Steen Carrel and the patient's admission status was agreed to be Admission Status: inpatient status to the service of Dr Steen Carrel   Follow-up Information    None         DISCHARGE MEDICATIONS  Patient's Medications   Discharge Prescriptions    No medications on file         This note has been generated using a voice recognition software  There may be typographic, grammatic, or word substitution errors that have escaped editorial review       Brianne Chaney MD  02/26/20 2490

## 2020-02-26 NOTE — PLAN OF CARE
Problem: PAIN - ADULT  Goal: Verbalizes/displays adequate comfort level or baseline comfort level  Description  Interventions:  - Encourage patient to monitor pain and request assistance  - Assess pain using appropriate pain scale  - Administer analgesics based on type and severity of pain and evaluate response  - Implement non-pharmacological measures as appropriate and evaluate response  - Consider cultural and social influences on pain and pain management  - Notify physician/advanced practitioner if interventions unsuccessful or patient reports new pain  Outcome: Progressing     Problem: INFECTION - ADULT  Goal: Absence or prevention of progression during hospitalization  Description  INTERVENTIONS:  - Assess and monitor for signs and symptoms of infection  - Monitor lab/diagnostic results  - Monitor all insertion sites, i e  indwelling lines, tubes, and drains  - Monitor endotracheal if appropriate and nasal secretions for changes in amount and color  - [place] appropriate cooling/warming therapies per order  - Administer medications as ordered  - Instruct and encourage patient and family to use good hand hygiene technique  - Identify and instruct in appropriate isolation precautions for identified infection/condition  Outcome: Progressing  Goal: Absence of fever/infection during neutropenic period  Description  INTERVENTIONS:  - Monitor WBC    Outcome: Progressing     Problem: SAFETY ADULT  Goal: Patient will remain free of falls  Description  INTERVENTIONS:  - Assess patient frequently for physical needs  -  Identify cognitive and physical deficits and behaviors that affect risk of falls    -  [place] fall precautions as indicated by assessment   - Educate patient/family on patient safety including physical limitations  - Instruct patient to call for assistance with activity based on assessment  - Modify environment to reduce risk of injury  - Consider OT/PT consult to assist with strengthening/mobility  Outcome: Progressing  Goal: Maintain or return to baseline ADL function  Description  INTERVENTIONS:  -  Assess patient's ability to carry out ADLs; assess patient's baseline for ADL function and identify physical deficits which impact ability to perform ADLs (bathing, care of mouth/teeth, toileting, grooming, dressing, etc )  - Assess/evaluate cause of self-care deficits   - Assess range of motion  - Assess patient's mobility; develop plan if impaired  - Assess patient's need for assistive devices and provide as appropriate  - Encourage maximum independence but intervene and supervise when necessary  - Involve family in performance of ADLs  - Assess for home care needs following discharge   - Consider OT consult to assist with ADL evaluation and planning for discharge  - Provide patient education as appropriate  Outcome: Progressing  Goal: Maintain or return mobility status to optimal level  Description  INTERVENTIONS:  - Assess patient's baseline mobility status (ambulation, transfers, stairs, etc )    - Identify cognitive and physical deficits and behaviors that affect mobility  - Identify mobility aids required to assist with transfers and/or ambulation (gait belt, sit-to-stand, lift, walker, cane, etc )  - [place] fall precautions as indicated by assessment  - Record patient progress and toleration of activity level on Mobility SBAR; progress patient to next Phase/Stage  - Instruct patient to call for assistance with activity based on assessment  - Consider rehabilitation consult to assist with strengthening/weightbearing, etc   Outcome: Progressing     Problem: DISCHARGE PLANNING  Goal: Discharge to home or other facility with appropriate resources  Description  INTERVENTIONS:  - Identify barriers to discharge w/patient and caregiver  - Arrange for needed discharge resources and transportation as appropriate  - Identify discharge learning needs (meds, wound care, etc )  - Arrange for interpretive services to assist at discharge as needed  - Refer to Case Management Department for coordinating discharge planning if the patient needs post-hospital services based on physician/advanced practitioner order or complex needs related to functional status, cognitive ability, or social support system  Outcome: Progressing     Problem: Knowledge Deficit  Goal: Patient/family/caregiver demonstrates understanding of disease process, treatment plan, medications, and discharge instructions  Description  Complete learning assessment and assess knowledge base    Interventions:  - Provide teaching at level of understanding  - Provide teaching via preferred learning methods  Outcome: Progressing     Problem: GASTROINTESTINAL - ADULT  Goal: Minimal or absence of nausea and/or vomiting  Description  INTERVENTIONS:  - Administer IV fluids if ordered to ensure adequate hydration  - Maintain NPO status until nausea and vomiting are resolved  - Nasogastric tube if ordered  - Administer ordered antiemetic medications as needed  - Provide nonpharmacologic comfort measures as appropriate  - Advance diet as tolerated, if ordered  - Consider nutrition services referral to assist patient with adequate nutrition and appropriate food choices  Outcome: Progressing     Problem: GASTROINTESTINAL - ADULT  Goal: Maintains or returns to baseline bowel function  Description  INTERVENTIONS:  - Assess bowel function  - Encourage oral fluids to ensure adequate hydration  - Administer IV fluids if ordered to ensure adequate hydration  - Administer ordered medications as needed  - Encourage mobilization and activity  - Consider nutritional services referral to assist patient with adequate nutrition and appropriate food choices  Outcome: Not Progressing, 4 loose Bms today  Goal: Maintains adequate nutritional intake  Description  INTERVENTIONS:  - Monitor percentage of each meal consumed  - Identify factors contributing to decreased intake, treat as appropriate  - Assist with meals as needed  - Monitor I&O, weight, and lab values if indicated  - Obtain nutrition services referral as needed  Outcome: Not Progressing, not eating much due to pain

## 2020-02-26 NOTE — UTILIZATION REVIEW
Initial Clinical Review    Admission: Date/Time/Statement: Admission Orders (From admission, onward)     Ordered        02/25/20 2040  Inpatient Admission  Once                   Orders Placed This Encounter   Procedures    Inpatient Admission     Standing Status:   Standing     Number of Occurrences:   1     Order Specific Question:   Admitting Physician     Answer:   Rene Henry     Order Specific Question:   Level of Care     Answer:   Med Surg [16]     Order Specific Question:   Estimated length of stay     Answer:   More than 2 Midnights     Order Specific Question:   Certification     Answer:   I certify that inpatient services are medically necessary for this patient for a duration of greater than two midnights  See H&P and MD Progress Notes for additional information about the patient's course of treatment  ED Arrival Information     Expected Arrival Acuity Means of Arrival Escorted By Service Admission Type    - 2/25/2020 18:31 Urgent Walk-In Spouse Hospitalist Urgent    Arrival Complaint    Abd Pain        Chief Complaint   Patient presents with    Nausea     seen here sunday x2 for abdominal pain  scheduled for colonoscopy with [facility] yesterday was told to take prep   Abdominal Pain     hx of chrons and diverticulitis  Assessment/Plan:   62 yof ambulatory to er from home c/o epigastric abd pain associated with n/v/d x 4 days  Hx hypertension, hyperlipidemia, CHF, Crohn's disease, brain aneurysm, atrial fibrillation  satrted bowel prep on Sunday for colonoscopy Monday, however, unable to tolerate, had severe abd pain, seen in er, work-up neg & d/c'd home  Presents today abd non-distended  BS present, normoactive  Tender to palpation in epigastrium, periumbilical region, and right upper quadrant with equivocal Freeman's  There is no rebound or guarding  Admission work-up showing elevated lft's, sed rate & crp, hepatomegaly   Admitted to inpatient status for diarrhea, started on iv steroids, ivf, gi consulted    Placed on contact & hand hygiene isolation, stool cultures including a Clostridium difficile culture, a Rotavirus stool antigen test, a stool culture for enteric bacterial pathogens, and stool for fecal leukocytes    ED Triage Vitals [02/25/20 1843]   Temperature Pulse Respirations Blood Pressure SpO2   98 5 °F (36 9 °C) 95 16 158/75 95 %      Temp Source Heart Rate Source Patient Position - Orthostatic VS BP Location FiO2 (%)   Temporal Monitor Sitting Left arm --      Pain Score       8        Wt Readings from Last 1 Encounters:   02/26/20 108 kg (238 lb 12 1 oz)     Additional Vital Signs:   02/26/20 10:33:50        141/61  88         02/26/20 06:39:15  96 6 °F (35 9 °C)Abnormal   69  18  123/56  78  96 %  None (Room air)  Lying   02/25/20 21:09:24  97 6 °F (36 4 °C)  75  18  152/83  106  93 %  None (Room air)  Sitting   02/25/20 1843  98 5 °F (36 9 °C)  95  16  158/75    95 %  None (Room air)  Sitting   Pertinent Labs/Diagnostic Test Results:   Results from last 7 days   Lab Units 02/26/20 0529 02/25/20 1904 02/23/20 1917 02/23/20  0906   WBC Thousand/uL 5 90 7 08 8 72 10 05   HEMOGLOBIN g/dL 12 4 14 0 13 4 13 6   HEMATOCRIT % 40 4 46 0 44 3 44 8   PLATELETS Thousands/uL 244 287 291 319   NEUTROS ABS Thousands/µL 4 48 5 09 7 02 8 27*     Results from last 7 days   Lab Units 02/26/20 0529 02/25/20 1904 02/23/20 1917 02/23/20  0906   SODIUM mmol/L 139 141 138 141   POTASSIUM mmol/L 3 6 3 6 3 6 3 9   CHLORIDE mmol/L 103 102 101 103   CO2 mmol/L 28 28 24 27   ANION GAP mmol/L 8 11 13 11   BUN mg/dL 11 12 9 13   CREATININE mg/dL 0 76 0 92 0 91 0 91   EGFR ml/min/1 73sq m 84 66 67 67   CALCIUM mg/dL 8 4 9 0 9 0 9 1   MAGNESIUM mg/dL 1 7  --   --  1 8   PHOSPHORUS mg/dL 2 7  --   --   --      Results from last 7 days   Lab Units 02/26/20 0529 02/25/20  1904 02/23/20 1917 02/23/20  0906   AST U/L 163* 121* 92* 83*   ALT U/L 149* 123* 95* 96*   ALK PHOS U/L 104 103 100 108   TOTAL PROTEIN g/dL 7 4 8 3* 8 4* 8 3*   ALBUMIN g/dL 3 0* 3 4* 3 6 3 4*   TOTAL BILIRUBIN mg/dL 0 30 0 20 0 30 0 40   BILIRUBIN DIRECT mg/dL  --  0 10 0 09  --      Results from last 7 days   Lab Units 02/26/20  0529 02/25/20  1904 02/23/20 1917 02/23/20  0906   GLUCOSE RANDOM mg/dL 168* 136 136 141*     Results from last 7 days   Lab Units 02/26/20  0529   TSH 3RD GENERATON uIU/mL 1 294     Results from last 7 days   Lab Units 02/26/20  0529   LACTIC ACID mmol/L 1 3     Results from last 7 days   Lab Units 02/25/20  1904 02/23/20 1917 02/23/20  0906   LIPASE u/L 189 141 148     Results from last 7 days   Lab Units 02/25/20  1904   CRP mg/L 21 1*   SED RATE mm/hour 35*     Results from last 7 days   Lab Units 02/26/20  0144   CLARITY UA  Slightly Cloudy   COLOR UA  Laura   SPEC GRAV UA  >=1 030   PH UA  6 0   GLUCOSE UA mg/dl Negative   KETONES UA mg/dl Negative   BLOOD UA  Trace-Intact*   PROTEIN UA mg/dl Negative   NITRITE UA  Negative   BILIRUBIN UA  Negative   UROBILINOGEN UA E U /dl 1 0   LEUKOCYTES UA  Negative   WBC UA /hpf 0-1*   RBC UA /hpf 1-2*   BACTERIA UA /hpf Occasional   EPITHELIAL CELLS WET PREP /hpf Moderate*   MUCUS THREADS  Moderate*     Results from last 7 days   Lab Units 02/25/20  2155   INFLUENZA A PCR  None Detected   INFLUENZA B PCR  None Detected   RSV PCR  None Detected     Results from last 7 days   Lab Units 02/23/20  1007   C DIFF TOXIN B  Negative     Results from last 7 days   Lab Units 02/23/20  1007   SALMONELLA SP PCR  None Detected   SHIGELLA SP/ENTEROINVASIVE E  COLI (EIEC)  None Detected   CAMPYLOBACTER SP (JEJUNI AND COLI)  None Detected   SHIGA TOXIN 1/SHIGA TOXIN 2  None Detected   2/23  abd xray=  Nonobstructing bowel gas pattern  Ct a/p=  1  No acute abnormality within the abdomen or pelvis      2/26  US liver=Severe hepatomegaly and hepatic steatosis as seen on CT    ED Treatment:   Medication Administration from 02/25/2020 1831 to 02/25/2020 2105       Date/Time Order Dose Route Action     02/25/2020 1931 morphine (PF) 4 mg/mL injection 4 mg 4 mg Intravenous Given     02/25/2020 1931 ondansetron (ZOFRAN) injection 4 mg 4 mg Intravenous Given        Past Medical History:   Diagnosis Date    A-fib ([facility] )     Brain aneurysm     Cancer ([facility] )     papillary thyroid cancer    Cardiac disease     CHF (congestive heart failure) (HCC)     Crohn disease ([facility] )     Disease of thyroid gland     GERD (gastroesophageal reflux disease)     Hyperlipidemia     Hypertension      Present on Admission:   Severe obstructive sleep apnea   Transaminitis   Diarrhea   Rectal bleeding   Paroxysmal atrial fibrillation (HCC)   Atherosclerosis   Acquired hypothyroidism  Admitting Diagnosis: Nausea [R11 0]  Abdominal pain [R10 9]  Nausea and vomiting [R11 2]  Pain of upper abdomen [R10 10]  Diarrhea, unspecified type [R19 7]  Age/Sex: 62 y o  female  Admission Orders:  Contact & droplet isolation  Clear liquids  Consult GI  scd  Telemetry   Scheduled Medications:  apixaban 5 mg Oral BID   aspirin 81 mg Oral Daily   dofetilide 500 mcg Oral Q12H [address]   fluticasone-vilanterol 1 puff Inhalation Daily   levothyroxine 125 mcg Oral Early Morning   mesalamine 400 mg Oral TID   methylPREDNISolone sodium succinate 40 mg Intravenous Q12H [address]   metoprolol succinate 100 mg Oral Daily   venlafaxine 37 5 mg Oral Daily     Continuous IV Infusions:  sodium chloride 75 mL/hr Intravenous Continuous     PRN Meds:  oxyCODONE 5 mg Oral Q4H PRN   Or      oxyCODONE 10 mg Oral Q4H PRN     Network Utilization Review Department  [email or web address]  ATTENTION: Please call with any questions or concerns to [phone] and carefully listen to the prompts so that you are directed to the right person   All voicemails are confidential   Craig Pink all requests for admission clinical reviews, approved or denied determinations and any other requests to dedicated fax number below belonging to the [place] where the patient is receiving treatment   List of dedicated fax numbers for the Facilities:  [address] DENIALS (Administrative/Medical Necessity) [phone]   [address] (Maternity/NICU/Pediatrics) [phone]   Israel Angel [phone]   Zee Alvarenga [phone]   [facility] [phone]   [address] [facility]  [phone]   [address] [facility] [address] [facility] [phone]   [address] [facility] [phone]   [address] [facility] [address]  [address] [facility] [address] [facility] [phone]

## 2020-02-26 NOTE — ASSESSMENT & PLAN NOTE
· Observed on CT scan  · Continue aspirin 81 mg daily  · Avoid statin therapy at this time in the setting of transaminitis

## 2020-02-26 NOTE — QUICK NOTE
Patient continues to have abdominal pain  Diarrhea has resolved  Stool studies ordered however no specimen obtain yet  GI consultation appreciated  Plan for EGD/Colonoscopy on Friday  Diet advanced to clear liquids toast/crackers  clear liquid diet after midnight  Prn pain control   Labs in am

## 2020-02-27 PROBLEM — R16.0 HEPATOMEGALY: Status: ACTIVE | Noted: 2020-02-27

## 2020-02-27 PROBLEM — R94.31 PROLONGED QT INTERVAL: Status: ACTIVE | Noted: 2020-02-27

## 2020-02-27 PROBLEM — K76.0 HEPATIC STEATOSIS: Status: ACTIVE | Noted: 2020-02-27

## 2020-02-27 LAB
ALBUMIN SERPL BCP-MCNC: 2.5 G/DL (ref 3.5–5)
ALP SERPL-CCNC: 110 U/L (ref 46–116)
ALT SERPL W P-5'-P-CCNC: 124 U/L (ref 12–78)
ANION GAP SERPL CALCULATED.3IONS-SCNC: 9 MMOL/L (ref 4–13)
AST SERPL W P-5'-P-CCNC: 100 U/L (ref 5–45)
BACTERIA UR CULT: NORMAL
BASOPHILS # BLD AUTO: 0.02 THOUSANDS/ΜL (ref 0–0.1)
BASOPHILS NFR BLD AUTO: 0 % (ref 0–1)
BILIRUB SERPL-MCNC: 0.2 MG/DL (ref 0.2–1)
BUN SERPL-MCNC: 12 MG/DL (ref 5–25)
CALCIUM SERPL-MCNC: 8.5 MG/DL (ref 8.3–10.1)
CAMPYLOBACTER DNA SPEC NAA+PROBE: NORMAL
CHLORIDE SERPL-SCNC: 107 MMOL/L (ref 100–108)
CK SERPL-CCNC: 81 U/L (ref 26–192)
CO2 SERPL-SCNC: 26 MMOL/L (ref 21–32)
CREAT SERPL-MCNC: 0.74 MG/DL (ref 0.6–1.3)
EOSINOPHIL # BLD AUTO: 0.09 THOUSAND/ΜL (ref 0–0.61)
EOSINOPHIL NFR BLD AUTO: 1 % (ref 0–6)
ERYTHROCYTE [DISTWIDTH] IN BLOOD BY AUTOMATED COUNT: 14.2 % (ref 11.6–15.1)
GFR SERPL CREATININE-BSD FRML MDRD: 86 ML/MIN/1.73SQ M
GLUCOSE SERPL-MCNC: 143 MG/DL (ref 65–140)
HCT VFR BLD AUTO: 36 % (ref 34.8–46.1)
HGB BLD-MCNC: 11 G/DL (ref 11.5–15.4)
IMM GRANULOCYTES # BLD AUTO: 0.02 THOUSAND/UL (ref 0–0.2)
IMM GRANULOCYTES NFR BLD AUTO: 0 % (ref 0–2)
LYMPHOCYTES # BLD AUTO: 2.78 THOUSANDS/ΜL (ref 0.6–4.47)
LYMPHOCYTES NFR BLD AUTO: 36 % (ref 14–44)
MCH RBC QN AUTO: 27.8 PG (ref 26.8–34.3)
MCHC RBC AUTO-ENTMCNC: 30.6 G/DL (ref 31.4–37.4)
MCV RBC AUTO: 91 FL (ref 82–98)
MONOCYTES # BLD AUTO: 0.66 THOUSAND/ΜL (ref 0.17–1.22)
MONOCYTES NFR BLD AUTO: 9 % (ref 4–12)
MRSA NOSE QL CULT: NORMAL
NEUTROPHILS # BLD AUTO: 4.09 THOUSANDS/ΜL (ref 1.85–7.62)
NEUTS SEG NFR BLD AUTO: 54 % (ref 43–75)
NRBC BLD AUTO-RTO: 0 /100 WBCS
PLATELET # BLD AUTO: 250 THOUSANDS/UL (ref 149–390)
PMV BLD AUTO: 9.7 FL (ref 8.9–12.7)
POTASSIUM SERPL-SCNC: 3.3 MMOL/L (ref 3.5–5.3)
PROCALCITONIN SERPL-MCNC: 0.09 NG/ML
PROT SERPL-MCNC: 6.5 G/DL (ref 6.4–8.2)
RBC # BLD AUTO: 3.95 MILLION/UL (ref 3.81–5.12)
RV AG STL QL: NEGATIVE
SALMONELLA DNA SPEC QL NAA+PROBE: NORMAL
SHIGA TOXIN STX GENE SPEC NAA+PROBE: NORMAL
SHIGELLA DNA SPEC QL NAA+PROBE: NORMAL
SODIUM SERPL-SCNC: 142 MMOL/L (ref 136–145)
TROPONIN I SERPL-MCNC: <0.02 NG/ML
WBC # BLD AUTO: 7.66 THOUSAND/UL (ref 4.31–10.16)

## 2020-02-27 PROCEDURE — 82550 ASSAY OF CK (CPK): CPT | Performed by: INTERNAL MEDICINE

## 2020-02-27 PROCEDURE — 87425 ROTAVIRUS AG IA: CPT | Performed by: INTERNAL MEDICINE

## 2020-02-27 PROCEDURE — 87389 HIV-1 AG W/HIV-1&-2 AB AG IA: CPT | Performed by: INTERNAL MEDICINE

## 2020-02-27 PROCEDURE — 80053 COMPREHEN METABOLIC PANEL: CPT | Performed by: PHYSICIAN ASSISTANT

## 2020-02-27 PROCEDURE — 86255 FLUORESCENT ANTIBODY SCREEN: CPT | Performed by: PHYSICIAN ASSISTANT

## 2020-02-27 PROCEDURE — 85025 COMPLETE CBC W/AUTO DIFF WBC: CPT | Performed by: PHYSICIAN ASSISTANT

## 2020-02-27 PROCEDURE — 99232 SBSQ HOSP IP/OBS MODERATE 35: CPT | Performed by: INTERNAL MEDICINE

## 2020-02-27 PROCEDURE — 83516 IMMUNOASSAY NONANTIBODY: CPT | Performed by: PHYSICIAN ASSISTANT

## 2020-02-27 PROCEDURE — 84484 ASSAY OF TROPONIN QUANT: CPT | Performed by: INTERNAL MEDICINE

## 2020-02-27 PROCEDURE — 82784 ASSAY IGA/IGD/IGG/IGM EACH: CPT | Performed by: PHYSICIAN ASSISTANT

## 2020-02-27 PROCEDURE — 83993 ASSAY FOR CALPROTECTIN FECAL: CPT | Performed by: INTERNAL MEDICINE

## 2020-02-27 PROCEDURE — 84145 PROCALCITONIN (PCT): CPT | Performed by: INTERNAL MEDICINE

## 2020-02-27 RX ORDER — FUROSEMIDE 20 MG/1
20 TABLET ORAL ONCE
Status: COMPLETED | OUTPATIENT
Start: 2020-02-27 | End: 2020-02-27

## 2020-02-27 RX ORDER — SODIUM CHLORIDE 9 MG/ML
75 INJECTION, SOLUTION INTRAVENOUS CONTINUOUS
Status: DISCONTINUED | OUTPATIENT
Start: 2020-02-27 | End: 2020-02-28

## 2020-02-27 RX ORDER — POTASSIUM CHLORIDE 20 MEQ/1
40 TABLET, EXTENDED RELEASE ORAL 2 TIMES DAILY WITH MEALS
Status: COMPLETED | OUTPATIENT
Start: 2020-02-27 | End: 2020-02-27

## 2020-02-27 RX ADMIN — POTASSIUM CHLORIDE 40 MEQ: 20 TABLET, EXTENDED RELEASE ORAL at 14:59

## 2020-02-27 RX ADMIN — DOFETILIDE 500 MCG: 0.12 CAPSULE ORAL at 20:44

## 2020-02-27 RX ADMIN — BISACODYL 10 MG: 5 TABLET, COATED ORAL at 14:59

## 2020-02-27 RX ADMIN — DOFETILIDE 500 MCG: 0.12 CAPSULE ORAL at 09:52

## 2020-02-27 RX ADMIN — POTASSIUM CHLORIDE 40 MEQ: 20 TABLET, EXTENDED RELEASE ORAL at 18:22

## 2020-02-27 RX ADMIN — ASPIRIN 81 MG 81 MG: 81 TABLET ORAL at 09:53

## 2020-02-27 RX ADMIN — VENLAFAXINE HYDROCHLORIDE 37.5 MG: 37.5 CAPSULE, EXTENDED RELEASE ORAL at 09:53

## 2020-02-27 RX ADMIN — SODIUM CHLORIDE 75 ML/HR: 0.9 INJECTION, SOLUTION INTRAVENOUS at 20:45

## 2020-02-27 RX ADMIN — MESALAMINE 400 MG: 400 CAPSULE, DELAYED RELEASE ORAL at 16:03

## 2020-02-27 RX ADMIN — LEVOTHYROXINE SODIUM 125 MCG: 125 TABLET ORAL at 05:35

## 2020-02-27 RX ADMIN — APIXABAN 5 MG: 5 TABLET, FILM COATED ORAL at 16:03

## 2020-02-27 RX ADMIN — METOPROLOL SUCCINATE 100 MG: 100 TABLET, FILM COATED, EXTENDED RELEASE ORAL at 09:56

## 2020-02-27 RX ADMIN — FUROSEMIDE 20 MG: 20 TABLET ORAL at 16:03

## 2020-02-27 RX ADMIN — POLYETHYLENE GLYCOL 3350, SODIUM SULFATE ANHYDROUS, SODIUM BICARBONATE, SODIUM CHLORIDE, POTASSIUM CHLORIDE 4000 ML: 236; 22.74; 6.74; 5.86; 2.97 POWDER, FOR SOLUTION ORAL at 16:03

## 2020-02-27 RX ADMIN — MESALAMINE 400 MG: 400 CAPSULE, DELAYED RELEASE ORAL at 20:43

## 2020-02-27 RX ADMIN — MESALAMINE 400 MG: 400 CAPSULE, DELAYED RELEASE ORAL at 09:53

## 2020-02-27 RX ADMIN — APIXABAN 5 MG: 5 TABLET, FILM COATED ORAL at 09:52

## 2020-02-27 NOTE — ASSESSMENT & PLAN NOTE
· Continue her home dose of PO levothyroxine    Results for Moises Streeter (MRN [id number]) as of 2/27/2020 13:41   Ref   Range 2/26/2020 05:29   TSH 3RD GENERATON Latest Ref Range: 0 358 - 3 740 uIU/mL 1 294

## 2020-02-27 NOTE — ASSESSMENT & PLAN NOTE
· Check stool cultures including a Clostridium difficile culture, a Rotavirus stool antigen test, and stool for fecal leukocytes    · Diagnosed with possible Crohn's disease approximately 30 years ago  · Check a fecal calprotectin level  · Continue PO mesalamine  · Clear liquid diet  · Continue eliquis for now  · The patient was seen in consultation by Gastroenterology  · The patient will undergo an EGD and colonoscopy on Friday, 02/28/2020  · NPO after midnight  · Serial laboratory testing to monitor her hemoglobin/hematocrit levels

## 2020-02-27 NOTE — ASSESSMENT & PLAN NOTE
· Lifestyle modifications are recommended including weight loss, improving her diet, and increasing her amount of activity    · Check a HgbA1c level  · Outpatient follow-up with Gastroenterology  · Outpatient surveillance imaging with PCP

## 2020-02-27 NOTE — PROGRESS NOTES
Progress Note - Ant Dolan 1955, 62 y o  female MRN: [id number]    Unit/Bed#: 406-01 Encounter: [id number]    Primary Care Provider: Adela Maradiaga MD   Date and time admitted to hospital: 2/25/2020  6:37 PM        * Diarrhea  Assessment & Plan  · Check stool cultures including a Clostridium difficile culture, a Rotavirus stool antigen test, and stool for fecal leukocytes  · Diagnosed with possible Crohn's disease approximately 30 years ago  · Check a fecal calprotectin level  · Continue PO mesalamine  · Clear liquid diet  · Continue eliquis for now  · The patient was seen in consultation by Gastroenterology  · The patient will undergo an EGD and colonoscopy on Friday, 02/28/2020  · NPO after midnight    Rectal bleeding  Assessment & Plan  · Check stool cultures including a Clostridium difficile culture, a Rotavirus stool antigen test, and stool for fecal leukocytes  · Diagnosed with possible Crohn's disease approximately 30 years ago  · Check a fecal calprotectin level  · Continue PO mesalamine  · Clear liquid diet  · Continue eliquis for now  · The patient was seen in consultation by Gastroenterology  · The patient will undergo an EGD and colonoscopy on Friday, 02/28/2020  · NPO after midnight  · Serial laboratory testing to monitor her hemoglobin/hematocrit levels      Transaminitis  Assessment & Plan  · Likely secondary to hepatic steatosis  · Continue to hold fenofibrate  · Avoid all hepatotoxic agents  · Outpatient follow-up with Gastroenterology    Results for Sharri Rubio (MRN [id number]) as of 2/27/2020 13:41   Ref   Range 2/26/2020 05:29   HEPATITIS A IGM ANTIBODY Latest Ref Range: Non-reactive, Equivocal-Suggest Recollect  Non-reactive   HEPATITIS B SURFACE ANTIGEN Latest Ref Range: Non-reactive, NonReactive - Confirmed  Non-reactive   HEPATITIS B CORE IGM ANTIBODY Latest Ref Range: Non-reactive  Non-reactive   HEPATITIS C ANTIBODY Latest Ref Range: Non-reactive  Non-reactive     US liver Status: Final result   PACS Images      Show images for US liver   Study Result     RIGHT UPPER QUADRANT ULTRASOUND     INDICATION:     Nausea, vomiting  Elevated LFTs     COMPARISON:  2/23/2020     TECHNIQUE:   Real-time ultrasound of the right upper quadrant was performed with a curvilinear transducer with both volumetric sweeps and still imaging techniques      FINDINGS:     PANCREAS:  Portions of the pancreas are obscured by bowel gas  Visualized portions of the pancreas are unremarkable       AORTA AND IVC:  Visualized portions are normal for patient age      LIVER:  Size:  Severely enlarged  The liver measures 12 1 cm in the midclavicular line  Contour:  Surface contour is smooth  Parenchyma: There is marked diffuse increased echogenicity with smooth echotexture and significant beam attenuation with loss of periportal echogenicity  Most consistent with severe hepatic steatosis  No evidence of suspicious mass  Limited imaging of the main portal vein shows it to be patent and hepatopetal      BILIARY:  Patient has undergone cholecystectomy  No intrahepatic biliary dilatation  CBD measures 3 mm  No choledocholithiasis      KIDNEY:   Right kidney measures 12 8 x 4 5 cm  Within normal limits      ASCITES:   None      IMPRESSION:     Severe hepatomegaly and hepatic steatosis as seen on CT  Paroxysmal atrial fibrillation (HCC)  Assessment & Plan  · Continue PO tikosyn and PO toprol XL  · Continue anticoagulation with eliquis 5 mg PO BID  · Outpatient follow-up with Cardiology    Hepatic steatosis  Assessment & Plan  · Lifestyle modifications are recommended including weight loss, improving her diet, and increasing her amount of activity    · Check a HgbA1c level  · Outpatient follow-up with Gastroenterology  · Outpatient surveillance imaging with PCP    Hepatomegaly  Assessment & Plan  · Likely secondary to hepatic steatosis  · Lifestyle modifications are recommended including weight loss, improving her diet, and increasing her amount of activity  · Outpatient follow-up with Gastroenterology  · Outpatient surveillance imaging with PCP    Prolonged QT interval  Assessment & Plan  · On PO tikosyn  · Avoid all QT-prolonging agents  · Follow the QT interval  · Check a repeat EKG on 2020    Atherosclerosis  Assessment & Plan  · Observed on CT scan  · Continue aspirin 81 mg daily  · Avoid statin therapy at this time in the setting of transaminitis    Severe obstructive sleep apnea  Assessment & Plan  · Continue CPAP QHS and anytime while sleeping at her home settings of 13 cmH2O    Hypokalemia  Assessment & Plan  · Replete with potassium chloride 40 meQ PO BID x 2 doses  · Follow the potassium level and magnesium level    Acquired hypothyroidism  Assessment & Plan  · Continue her home dose of PO levothyroxine    Results for Rasta Smith (MRN [id number]) as of 2020 13:41   Ref  Range 2020 05:29   TSH 3RD GENERATON Latest Ref Range: 0 358 - 3 740 uIU/mL 1  294         VTE Pharmacologic Prophylaxis:   Pharmacologic: Apixaban (Eliquis)  Mechanical VTE Prophylaxis in Place: Yes    Patient Centered Rounds: I have performed bedside rounds with nursing staff today  Time Spent for Care: 30 minutes  More than 50% of total time spent on counseling and coordination of care as described above  Current Length of Stay: 2 day(s)    Current Patient Status: Inpatient   Certification Statement: The patient will continue to require additional inpatient hospital stay due to the need for an EGD and colonoscopy on 2020  Code Status: Level 1 - Full Code      Subjective: The patient was seen and examined  The patient is doing better  The abdominal pain has improved  The diarrhea has improved      Objective:     Vitals:   Temp (24hrs), Av 5 °F (36 4 °C), Min:97 3 °F (36 3 °C), Max:97 7 °F (36 5 °C)    Temp:  [97 3 °F (36 3 °C)-97 7 °F (36 5 °C)] 97 7 °F (36 5 °C)  HR:  [68-70] 69  Resp:  [18] 18  BP: (121-141)/(55-93) 141/93  SpO2:  [93 %-94 %] 94 %  Body mass index is 34 67 kg/m²  Input and Output Summary (last 24 hours): Intake/Output Summary (Last 24 hours) at 2/27/2020 1349  Last data filed at 2/27/2020 0900  Gross per 24 hour   Intake 2546 25 ml   Output 375 ml   Net 2171 25 ml       Physical Exam:     Physical Exam  General:  NAD, follows commands  HEENT:  NC/AT, mucous membranes moist  Neck:  Supple, No JVP elevation  CV:  + S1, + S2, RRR  Pulm:  Lung fields are CTA bilaterally  Abd:  Soft, Mild epigastric tenderness with palpation, Non-distended  Ext:  No clubbing/cyanosis/edema  Skin:  No rashes  Neuro:  Awake, alert, oriented  Psych:  Normal mood and affect      Additional Data:    Labs:    Results from last 7 days   Lab Units 02/27/20  0551   WBC Thousand/uL 7 66   HEMOGLOBIN g/dL 11 0*   HEMATOCRIT % 36 0   PLATELETS Thousands/uL 250   NEUTROS PCT % 54   LYMPHS PCT % 36   MONOS PCT % 9   EOS PCT % 1     Results from last 7 days   Lab Units 02/27/20  0551   SODIUM mmol/L 142   POTASSIUM mmol/L 3 3*   CHLORIDE mmol/L 107   CO2 mmol/L 26   BUN mg/dL 12   CREATININE mg/dL 0 74   ANION GAP mmol/L 9   CALCIUM mg/dL 8 5   ALBUMIN g/dL 2 5*   TOTAL BILIRUBIN mg/dL 0 20   ALK PHOS U/L 110   ALT U/L 124*   AST U/L 100*   GLUCOSE RANDOM mg/dL 143*                 Results from last 7 days   Lab Units 02/27/20  0551 02/26/20  0529   LACTIC ACID mmol/L  --  1 3   PROCALCITONIN ng/ml 0 09  --            * I Have Reviewed All Lab Data Listed Above  * Additional Pertinent Lab Tests Reviewed:  [facility]      Recent Cultures (last 7 days):     Results from last 7 days   Lab Units 02/26/20  0144 02/23/20  1007   URINE CULTURE  30,000-39,000 cfu/ml   --    C DIFF TOXIN B   --  Negative       Last 24 Hours Medication List:     Current Facility-Administered Medications:  apixaban 5 mg Oral BID Salamonia Rana, DO   aspirin 81 mg Oral Daily Salamonia Rana, DO bisacodyl 10 mg Oral Once Memo Ozuna PA-C   dofetilide 500 mcg Oral Q12H [address] Yinka Mcpherson DO   levothyroxine 125 mcg Oral Early Morning Yinka Mcpherson DO   mesalamine 400 mg Oral TID Yinka Mcpherson DO   metoprolol succinate 100 mg Oral Daily Yinka Mcpherson DO   oxyCODONE 5 mg Oral Q4H PRN Yinka Mcpherson DO   Or       oxyCODONE 10 mg Oral Q4H PRN Yinka Mcpherson DO   polyethylene glycol 4,000 mL Oral Once Memo Ozuna PA-C   potassium chloride 40 mEq Oral BID With Meals Yinka cMpherson DO   venlafaxine 37 5 mg Oral Daily Yinka Mcpherson DO        Today, Patient Was Seen By: Yinka Mcpherson DO    ** Please Note: Dictation voice to text software may have been used in the creation of this document   **

## 2020-02-27 NOTE — ASSESSMENT & PLAN NOTE
· On PO tikosyn  · Avoid all QT-prolonging agents  · Follow the QT interval  · Check a repeat EKG on 02/28/2020

## 2020-02-27 NOTE — ASSESSMENT & PLAN NOTE
· Likely secondary to hepatic steatosis  · Continue to hold fenofibrate  · Avoid all hepatotoxic agents  · Outpatient follow-up with Gastroenterology    Results for Radha Hilliard (MRN [id number]) as of 2/27/2020 13:41   Ref  Range 2/26/2020 05:29   HEPATITIS A IGM ANTIBODY Latest Ref Range: Non-reactive, Equivocal-Suggest Recollect  Non-reactive   HEPATITIS B SURFACE ANTIGEN Latest Ref Range: Non-reactive, NonReactive - Confirmed  Non-reactive   HEPATITIS B CORE IGM ANTIBODY Latest Ref Range: Non-reactive  Non-reactive   HEPATITIS C ANTIBODY Latest Ref Range: Non-reactive  Non-reactive     US liver   Status: Final result   PACS Images      Show images for US liver   Study Result     RIGHT UPPER QUADRANT ULTRASOUND     INDICATION:     Nausea, vomiting  Elevated LFTs     COMPARISON:  2/23/2020     TECHNIQUE:   Real-time ultrasound of the right upper quadrant was performed with a curvilinear transducer with both volumetric sweeps and still imaging techniques      FINDINGS:     PANCREAS:  Portions of the pancreas are obscured by bowel gas  Visualized portions of the pancreas are unremarkable       AORTA AND IVC:  Visualized portions are normal for patient age      LIVER:  Size:  Severely enlarged  The liver measures 12 1 cm in the midclavicular line  Contour:  Surface contour is smooth  Parenchyma: There is marked diffuse increased echogenicity with smooth echotexture and significant beam attenuation with loss of periportal echogenicity  Most consistent with severe hepatic steatosis  No evidence of suspicious mass  Limited imaging of the main portal vein shows it to be patent and hepatopetal      BILIARY:  Patient has undergone cholecystectomy  No intrahepatic biliary dilatation  CBD measures 3 mm  No choledocholithiasis      KIDNEY:   Right kidney measures 12 8 x 4 5 cm  Within normal limits      ASCITES:   None      IMPRESSION:     Severe hepatomegaly and hepatic steatosis as seen on CT

## 2020-02-27 NOTE — ASSESSMENT & PLAN NOTE
· Likely secondary to hepatic steatosis  · Lifestyle modifications are recommended including weight loss, improving her diet, and increasing her amount of activity    · Outpatient follow-up with Gastroenterology  · Outpatient surveillance imaging with PCP

## 2020-02-27 NOTE — ASSESSMENT & PLAN NOTE
· Check stool cultures including a Clostridium difficile culture, a Rotavirus stool antigen test, and stool for fecal leukocytes    · Diagnosed with possible Crohn's disease approximately 30 years ago  · Check a fecal calprotectin level  · Continue PO mesalamine  · Clear liquid diet  · Continue eliquis for now  · The patient was seen in consultation by Gastroenterology  · The patient will undergo an EGD and colonoscopy on Friday, 02/28/2020  · NPO after midnight

## 2020-02-27 NOTE — SOCIAL WORK
Met with pt to discuss role as  in helping pt to develop discharge plan and to help pt carry out their plan  Pt lives in a multi-level house with her   Pt has 9 [address]  With a railing  Pt has 13 steps on the inside  Pt has her bedroom on 2nd floor  Pt has bathrooms on the 1rst or 2nd floor  Pt is independent with ADL's  Pt does the cooking and cleaning  Pt drives   Pt has never had home care or any services   Pt's PCP is Hitesh Hackett  [facility]   Pt was given a discharge check list and Meds to [facility] Papers  Both reviewed with a good understanding  Will continue to follow for any Case Management needs

## 2020-02-28 ENCOUNTER — ANESTHESIA (INPATIENT)
Dept: PERIOP | Facility: HOSPITAL | Age: 65
DRG: 387 | End: 2020-02-28
Payer: COMMERCIAL

## 2020-02-28 ENCOUNTER — APPOINTMENT (OUTPATIENT)
Dept: PERIOP | Facility: HOSPITAL | Age: 65
DRG: 387 | End: 2020-02-28
Payer: COMMERCIAL

## 2020-02-28 ENCOUNTER — ANESTHESIA EVENT (INPATIENT)
Dept: PERIOP | Facility: HOSPITAL | Age: 65
DRG: 387 | End: 2020-02-28
Payer: COMMERCIAL

## 2020-02-28 LAB
ALBUMIN SERPL BCP-MCNC: 2.9 G/DL (ref 3.5–5)
ALP SERPL-CCNC: 90 U/L (ref 46–116)
ALT SERPL W P-5'-P-CCNC: 115 U/L (ref 12–78)
ANION GAP SERPL CALCULATED.3IONS-SCNC: 9 MMOL/L (ref 4–13)
AST SERPL W P-5'-P-CCNC: 90 U/L (ref 5–45)
ATRIAL RATE: 66 BPM
BASOPHILS # BLD AUTO: 0.04 THOUSANDS/ΜL (ref 0–0.1)
BASOPHILS NFR BLD AUTO: 1 % (ref 0–1)
BILIRUB SERPL-MCNC: 0.2 MG/DL (ref 0.2–1)
BUN SERPL-MCNC: 8 MG/DL (ref 5–25)
CALCIUM SERPL-MCNC: 8.4 MG/DL (ref 8.3–10.1)
CHLORIDE SERPL-SCNC: 107 MMOL/L (ref 100–108)
CO2 SERPL-SCNC: 27 MMOL/L (ref 21–32)
CREAT SERPL-MCNC: 0.79 MG/DL (ref 0.6–1.3)
EOSINOPHIL # BLD AUTO: 0.16 THOUSAND/ΜL (ref 0–0.61)
EOSINOPHIL NFR BLD AUTO: 2 % (ref 0–6)
ERYTHROCYTE [DISTWIDTH] IN BLOOD BY AUTOMATED COUNT: 14.4 % (ref 11.6–15.1)
EST. AVERAGE GLUCOSE BLD GHB EST-MCNC: 151 MG/DL
GFR SERPL CREATININE-BSD FRML MDRD: 80 ML/MIN/1.73SQ M
GLUCOSE SERPL-MCNC: 114 MG/DL (ref 65–140)
HBA1C MFR BLD: 6.9 %
HCT VFR BLD AUTO: 38.7 % (ref 34.8–46.1)
HGB BLD-MCNC: 11.8 G/DL (ref 11.5–15.4)
HIV 1+2 AB+HIV1 P24 AG SERPL QL IA: NORMAL
IMM GRANULOCYTES # BLD AUTO: 0.01 THOUSAND/UL (ref 0–0.2)
IMM GRANULOCYTES NFR BLD AUTO: 0 % (ref 0–2)
LYMPHOCYTES # BLD AUTO: 2.8 THOUSANDS/ΜL (ref 0.6–4.47)
LYMPHOCYTES NFR BLD AUTO: 37 % (ref 14–44)
MAGNESIUM SERPL-MCNC: 1.9 MG/DL (ref 1.6–2.6)
MCH RBC QN AUTO: 27.6 PG (ref 26.8–34.3)
MCHC RBC AUTO-ENTMCNC: 30.5 G/DL (ref 31.4–37.4)
MCV RBC AUTO: 90 FL (ref 82–98)
MONOCYTES # BLD AUTO: 0.55 THOUSAND/ΜL (ref 0.17–1.22)
MONOCYTES NFR BLD AUTO: 7 % (ref 4–12)
NEUTROPHILS # BLD AUTO: 3.93 THOUSANDS/ΜL (ref 1.85–7.62)
NEUTS SEG NFR BLD AUTO: 53 % (ref 43–75)
NRBC BLD AUTO-RTO: 0 /100 WBCS
P AXIS: -59 DEGREES
PHOSPHATE SERPL-MCNC: 3.6 MG/DL (ref 2.3–4.1)
PLATELET # BLD AUTO: 269 THOUSANDS/UL (ref 149–390)
PMV BLD AUTO: 9.2 FL (ref 8.9–12.7)
POTASSIUM SERPL-SCNC: 3.6 MMOL/L (ref 3.5–5.3)
PR INTERVAL: 112 MS
PROCALCITONIN SERPL-MCNC: 0.08 NG/ML
PROT SERPL-MCNC: 6.8 G/DL (ref 6.4–8.2)
QRS AXIS: 20 DEGREES
QRSD INTERVAL: 80 MS
QT INTERVAL: 454 MS
QTC INTERVAL: 475 MS
RBC # BLD AUTO: 4.28 MILLION/UL (ref 3.81–5.12)
SODIUM SERPL-SCNC: 143 MMOL/L (ref 136–145)
T WAVE AXIS: 24 DEGREES
VENTRICULAR RATE: 66 BPM
WBC # BLD AUTO: 7.49 THOUSAND/UL (ref 4.31–10.16)
WBC SPEC QL GRAM STN: NORMAL

## 2020-02-28 PROCEDURE — 93010 ELECTROCARDIOGRAM REPORT: CPT | Performed by: INTERNAL MEDICINE

## 2020-02-28 PROCEDURE — 83036 HEMOGLOBIN GLYCOSYLATED A1C: CPT | Performed by: INTERNAL MEDICINE

## 2020-02-28 PROCEDURE — 45378 DIAGNOSTIC COLONOSCOPY: CPT | Performed by: INTERNAL MEDICINE

## 2020-02-28 PROCEDURE — 0DBB8ZX EXCISION OF ILEUM, VIA NATURAL OR ARTIFICIAL OPENING ENDOSCOPIC, DIAGNOSTIC: ICD-10-PCS | Performed by: PHYSICIAN ASSISTANT

## 2020-02-28 PROCEDURE — 83735 ASSAY OF MAGNESIUM: CPT | Performed by: INTERNAL MEDICINE

## 2020-02-28 PROCEDURE — 0DB98ZX EXCISION OF DUODENUM, VIA NATURAL OR ARTIFICIAL OPENING ENDOSCOPIC, DIAGNOSTIC: ICD-10-PCS | Performed by: PHYSICIAN ASSISTANT

## 2020-02-28 PROCEDURE — 99232 SBSQ HOSP IP/OBS MODERATE 35: CPT | Performed by: INTERNAL MEDICINE

## 2020-02-28 PROCEDURE — 88305 TISSUE EXAM BY PATHOLOGIST: CPT | Performed by: PATHOLOGY

## 2020-02-28 PROCEDURE — 93005 ELECTROCARDIOGRAM TRACING: CPT

## 2020-02-28 PROCEDURE — 80053 COMPREHEN METABOLIC PANEL: CPT | Performed by: INTERNAL MEDICINE

## 2020-02-28 PROCEDURE — 84145 PROCALCITONIN (PCT): CPT | Performed by: INTERNAL MEDICINE

## 2020-02-28 PROCEDURE — 84100 ASSAY OF PHOSPHORUS: CPT | Performed by: INTERNAL MEDICINE

## 2020-02-28 PROCEDURE — 85025 COMPLETE CBC W/AUTO DIFF WBC: CPT | Performed by: INTERNAL MEDICINE

## 2020-02-28 PROCEDURE — 43239 EGD BIOPSY SINGLE/MULTIPLE: CPT | Performed by: INTERNAL MEDICINE

## 2020-02-28 PROCEDURE — NC001 PR NO CHARGE: Performed by: INTERNAL MEDICINE

## 2020-02-28 PROCEDURE — 0DBE8ZX EXCISION OF LARGE INTESTINE, VIA NATURAL OR ARTIFICIAL OPENING ENDOSCOPIC, DIAGNOSTIC: ICD-10-PCS | Performed by: PHYSICIAN ASSISTANT

## 2020-02-28 RX ORDER — PROPOFOL 10 MG/ML
INJECTION, EMULSION INTRAVENOUS CONTINUOUS PRN
Status: DISCONTINUED | OUTPATIENT
Start: 2020-02-28 | End: 2020-02-28 | Stop reason: SURG

## 2020-02-28 RX ORDER — SODIUM CHLORIDE, SODIUM LACTATE, POTASSIUM CHLORIDE, CALCIUM CHLORIDE 600; 310; 30; 20 MG/100ML; MG/100ML; MG/100ML; MG/100ML
125 INJECTION, SOLUTION INTRAVENOUS CONTINUOUS
Status: DISCONTINUED | OUTPATIENT
Start: 2020-02-28 | End: 2020-02-28

## 2020-02-28 RX ORDER — SODIUM CHLORIDE, SODIUM LACTATE, POTASSIUM CHLORIDE, CALCIUM CHLORIDE 600; 310; 30; 20 MG/100ML; MG/100ML; MG/100ML; MG/100ML
100 INJECTION, SOLUTION INTRAVENOUS CONTINUOUS
Status: DISCONTINUED | OUTPATIENT
Start: 2020-02-28 | End: 2020-02-28

## 2020-02-28 RX ORDER — PROPOFOL 10 MG/ML
INJECTION, EMULSION INTRAVENOUS AS NEEDED
Status: DISCONTINUED | OUTPATIENT
Start: 2020-02-28 | End: 2020-02-28 | Stop reason: SURG

## 2020-02-28 RX ORDER — POTASSIUM CHLORIDE 20 MEQ/1
40 TABLET, EXTENDED RELEASE ORAL ONCE
Status: COMPLETED | OUTPATIENT
Start: 2020-02-28 | End: 2020-02-28

## 2020-02-28 RX ORDER — SODIUM CHLORIDE, SODIUM LACTATE, POTASSIUM CHLORIDE, CALCIUM CHLORIDE 600; 310; 30; 20 MG/100ML; MG/100ML; MG/100ML; MG/100ML
INJECTION, SOLUTION INTRAVENOUS CONTINUOUS PRN
Status: DISCONTINUED | OUTPATIENT
Start: 2020-02-28 | End: 2020-02-28 | Stop reason: SURG

## 2020-02-28 RX ADMIN — LEVOTHYROXINE SODIUM 125 MCG: 125 TABLET ORAL at 05:20

## 2020-02-28 RX ADMIN — PROPOFOL 50 MG: 10 INJECTION, EMULSION INTRAVENOUS at 09:58

## 2020-02-28 RX ADMIN — ASPIRIN 81 MG 81 MG: 81 TABLET ORAL at 09:15

## 2020-02-28 RX ADMIN — PROPOFOL 50 MG: 10 INJECTION, EMULSION INTRAVENOUS at 09:53

## 2020-02-28 RX ADMIN — DOFETILIDE 500 MCG: 0.12 CAPSULE ORAL at 09:16

## 2020-02-28 RX ADMIN — MESALAMINE 400 MG: 400 CAPSULE, DELAYED RELEASE ORAL at 09:15

## 2020-02-28 RX ADMIN — PROPOFOL 150 MCG/KG/MIN: 10 INJECTION, EMULSION INTRAVENOUS at 09:58

## 2020-02-28 RX ADMIN — MESALAMINE 400 MG: 400 CAPSULE, DELAYED RELEASE ORAL at 16:41

## 2020-02-28 RX ADMIN — MESALAMINE 400 MG: 400 CAPSULE, DELAYED RELEASE ORAL at 22:56

## 2020-02-28 RX ADMIN — PROPOFOL 50 MG: 10 INJECTION, EMULSION INTRAVENOUS at 09:46

## 2020-02-28 RX ADMIN — DOFETILIDE 500 MCG: 0.12 CAPSULE ORAL at 22:56

## 2020-02-28 RX ADMIN — VENLAFAXINE HYDROCHLORIDE 37.5 MG: 37.5 CAPSULE, EXTENDED RELEASE ORAL at 09:16

## 2020-02-28 RX ADMIN — SODIUM CHLORIDE, SODIUM LACTATE, POTASSIUM CHLORIDE, AND CALCIUM CHLORIDE: .6; .31; .03; .02 INJECTION, SOLUTION INTRAVENOUS at 09:42

## 2020-02-28 RX ADMIN — PROPOFOL 50 MG: 10 INJECTION, EMULSION INTRAVENOUS at 09:55

## 2020-02-28 RX ADMIN — PROPOFOL 50 MG: 10 INJECTION, EMULSION INTRAVENOUS at 09:50

## 2020-02-28 RX ADMIN — PROPOFOL 100 MG: 10 INJECTION, EMULSION INTRAVENOUS at 09:45

## 2020-02-28 RX ADMIN — APIXABAN 5 MG: 5 TABLET, FILM COATED ORAL at 09:16

## 2020-02-28 RX ADMIN — APIXABAN 5 MG: 5 TABLET, FILM COATED ORAL at 16:41

## 2020-02-28 RX ADMIN — METOPROLOL SUCCINATE 100 MG: 100 TABLET, FILM COATED, EXTENDED RELEASE ORAL at 09:16

## 2020-02-28 RX ADMIN — POTASSIUM CHLORIDE 40 MEQ: 1500 TABLET, EXTENDED RELEASE ORAL at 16:41

## 2020-02-28 NOTE — PLAN OF CARE
Problem: GASTROINTESTINAL - ADULT  Goal: Minimal or absence of nausea and/or vomiting  Description  INTERVENTIONS:  - Administer IV fluids if ordered to ensure adequate hydration  - Maintain NPO status until nausea and vomiting are resolved  - Nasogastric tube if ordered  - Administer ordered antiemetic medications as needed  - Provide nonpharmacologic comfort measures as appropriate  - Advance diet as tolerated, if ordered  - Consider nutrition services referral to assist patient with adequate nutrition and appropriate food choices  Outcome: Progressing  No nausea or vomiting noted today     Problem: GASTROINTESTINAL - ADULT  Goal: Maintains or returns to baseline bowel function  Description  INTERVENTIONS:  - Assess bowel function  - Encourage oral fluids to ensure adequate hydration  - Administer IV fluids if ordered to ensure adequate hydration  - Administer ordered medications as needed  - Encourage mobilization and activity  - Consider nutritional services referral to assist patient with adequate nutrition and appropriate food choices  Outcome: Progressing, pt started bowel prep and is having a colonoscopy and egd tomorrow        Problem: GASTROINTESTINAL - ADULT  Goal: Maintains adequate nutritional intake  Description  INTERVENTIONS:  - Monitor percentage of each meal consumed  - Identify factors contributing to decreased intake, treat as appropriate  - Assist with meals as needed  - Monitor I&O, weight, and lab values if indicated  - Obtain nutrition services referral as needed  Outcome: Progressing

## 2020-02-28 NOTE — ASSESSMENT & PLAN NOTE
· Replete with potassium chloride 40 meQ PO x 1 dose  · Follow the potassium level and magnesium level

## 2020-02-28 NOTE — ASSESSMENT & PLAN NOTE
· On PO tikosyn  · Avoid all QT-prolonging agents  · Follow the QT interval  · Check a repeat EKG today, 02/28/2020

## 2020-02-28 NOTE — PROGRESS NOTES
Progress Note - Juliana Martinez 1955, 62 y o  female MRN: [id number]    Unit/Bed#: 406-01 Encounter: [id number]    Primary Care Provider: Malathi Boyce MD   Date and time admitted to hospital: 2/25/2020  6:37 PM        * Diarrhea  Assessment & Plan  · With rectal bleeding  · Diagnosed with possible Crohn's disease approximately 30 years ago  · Check a fecal calprotectin level  · Continue PO mesalamine  · Continue eliquis for now  · The patient was seen in consultation by Gastroenterology  · The patient will undergo an EGD and colonoscopy today, 02/28/2020  · Serial laboratory testing to monitor her hemoglobin/hematocrit levels    Rectal bleeding  Assessment & Plan  · Diagnosed with possible Crohn's disease approximately 30 years ago  · Check a fecal calprotectin level  · Continue PO mesalamine  · Continue eliquis for now  · The patient was seen in consultation by Gastroenterology  · The patient will undergo an EGD and colonoscopy today, 02/28/2020  · Serial laboratory testing to monitor her hemoglobin/hematocrit levels      Transaminitis  Assessment & Plan  · Likely secondary to hepatic steatosis  · Continue to hold fenofibrate  · Avoid all hepatotoxic agents  · Outpatient follow-up with Gastroenterology    Results for Argelia Marques (MRN [id number]) as of 2/27/2020 13:41   Ref  Range 2/26/2020 05:29   HEPATITIS A IGM ANTIBODY Latest Ref Range: Non-reactive, Equivocal-Suggest Recollect  Non-reactive   HEPATITIS B SURFACE ANTIGEN Latest Ref Range: Non-reactive, NonReactive - Confirmed  Non-reactive   HEPATITIS B CORE IGM ANTIBODY Latest Ref Range: Non-reactive  Non-reactive   HEPATITIS C ANTIBODY Latest Ref Range: Non-reactive  Non-reactive     US liver   Status: Final result   PACS Images      Show images for US liver   Study Result     RIGHT UPPER QUADRANT ULTRASOUND     INDICATION:     Nausea, vomiting     Elevated LFTs     COMPARISON:  2/23/2020     TECHNIQUE:   Real-time ultrasound of the right upper quadrant was performed with a curvilinear transducer with both volumetric sweeps and still imaging techniques      FINDINGS:     PANCREAS:  Portions of the pancreas are obscured by bowel gas  Visualized portions of the pancreas are unremarkable       AORTA AND IVC:  Visualized portions are normal for patient age      LIVER:  Size:  Severely enlarged  The liver measures 12 1 cm in the midclavicular line  Contour:  Surface contour is smooth  Parenchyma: There is marked diffuse increased echogenicity with smooth echotexture and significant beam attenuation with loss of periportal echogenicity  Most consistent with severe hepatic steatosis  No evidence of suspicious mass  Limited imaging of the main portal vein shows it to be patent and hepatopetal      BILIARY:  Patient has undergone cholecystectomy  No intrahepatic biliary dilatation  CBD measures 3 mm  No choledocholithiasis      KIDNEY:   Right kidney measures 12 8 x 4 5 cm  Within normal limits      ASCITES:   None      IMPRESSION:     Severe hepatomegaly and hepatic steatosis as seen on CT  Paroxysmal atrial fibrillation (HCC)  Assessment & Plan  · Continue PO tikosyn and PO toprol XL  · Continue anticoagulation with eliquis 5 mg PO BID  · Outpatient follow-up with Cardiology    Hepatic steatosis  Assessment & Plan  · Lifestyle modifications are recommended including weight loss, improving her diet, and increasing her amount of activity  · Check a HgbA1c level  · Outpatient follow-up with Gastroenterology  · Outpatient surveillance imaging with PCP    Hepatomegaly  Assessment & Plan  · Likely secondary to hepatic steatosis  · Lifestyle modifications are recommended including weight loss, improving her diet, and increasing her amount of activity    · Outpatient follow-up with Gastroenterology  · Outpatient surveillance imaging with PCP    Prolonged QT interval  Assessment & Plan  · On PO tikosyn  · Avoid all QT-prolonging agents  · Follow the QT interval  · Check a repeat EKG today, 2020    Atherosclerosis  Assessment & Plan  · Observed on CT scan  · Continue aspirin 81 mg daily  · Avoid statin therapy at this time in the setting of transaminitis    Severe obstructive sleep apnea  Assessment & Plan  · Continue CPAP QHS and anytime while sleeping at her home settings of 13 cmH2O    Hypokalemia  Assessment & Plan  · Replete with potassium chloride 40 meQ PO x 1 dose  · Follow the potassium level and magnesium level    Acquired hypothyroidism  Assessment & Plan  · Continue her home dose of PO levothyroxine    Results for Luz Freitas (MRN [id number]) as of 2020 13:41   Ref  Range 2020 05:29   TSH 3RD GENERATON Latest Ref Range: 0 358 - 3 740 uIU/mL 1  294         VTE Pharmacologic Prophylaxis:   Pharmacologic: Apixaban (Eliquis)  Mechanical VTE Prophylaxis in Place: Yes    Patient Centered Rounds: I have performed bedside rounds with nursing staff today  Time Spent for Care: 30 minutes  More than 50% of total time spent on counseling and coordination of care as described above  Current Length of Stay: 3 day(s)    Current Patient Status: Inpatient   Certification Statement: The patient will continue to require additional inpatient hospital stay due to the need for an EGD and colonoscopy today  Code Status: Level 1 - Full Code      Subjective: The patient was seen and examined  The patient is doing better  The abdominal pain is improving  The diarrhea is improving  No rectal bleeding overnight  Objective:     Vitals:   Temp (24hrs), Av 3 °F (36 8 °C), Min:97 4 °F (36 3 °C), Max:99 2 °F (37 3 °C)    Temp:  [97 4 °F (36 3 °C)-99 2 °F (37 3 °C)] 99 1 °F (37 3 °C)  HR:  [62-85] 68  Resp:  [16-21] 20  BP: ()/(56-85) 142/67  SpO2:  [93 %-100 %] 98 %  Body mass index is 34 51 kg/m²  Input and Output Summary (last 24 hours):        Intake/Output Summary (Last 24 hours) at 2020 0354  Last data filed at 2020 1055  Gross per 24 hour   Intake 2030 ml   Output    Net 2030 ml       Physical Exam:     Physical Exam  General:  NAD, follows commands  HEENT:  NC/AT, mucous membranes moist  Neck:  Supple, No JVP elevation  CV:  + S1, + S2, RRR  Pulm:  Lung fields are CTA bilaterally  Abd:  Soft, Mild epigastric pain with palpation, Non-distended  Ext:  No clubbing/cyanosis/edema  Skin:  No rashes  Neuro:  Awake, alert, oriented  Psych:  Normal mood and affect      Additional Data:    Labs:    Results from last 7 days   Lab Units 02/28/20  0539   WBC Thousand/uL 7 49   HEMOGLOBIN g/dL 11 8   HEMATOCRIT % 38 7   PLATELETS Thousands/uL 269   NEUTROS PCT % 53   LYMPHS PCT % 37   MONOS PCT % 7   EOS PCT % 2     Results from last 7 days   Lab Units 02/28/20  0539   SODIUM mmol/L 143   POTASSIUM mmol/L 3 6   CHLORIDE mmol/L 107   CO2 mmol/L 27   BUN mg/dL 8   CREATININE mg/dL 0 79   ANION GAP mmol/L 9   CALCIUM mg/dL 8 4   ALBUMIN g/dL 2 9*   TOTAL BILIRUBIN mg/dL 0 20   ALK PHOS U/L 90   ALT U/L 115*   AST U/L 90*   GLUCOSE RANDOM mg/dL 114             Results from last 7 days   Lab Units 02/28/20  0539   HEMOGLOBIN A1C % 6 9*     Results from last 7 days   Lab Units 02/28/20  0547 02/27/20  0551 02/26/20  0529   LACTIC ACID mmol/L  --   --  1 3   PROCALCITONIN ng/ml 0 08 0 09  --            * I Have Reviewed All Lab Data Listed Above  * Additional Pertinent Lab Tests Reviewed:  Lotus 66 Admission Reviewed      Recent Cultures (last 7 days):     Results from last 7 days   Lab Units 02/26/20  0144 02/23/20  1007   URINE CULTURE  30,000-39,000 cfu/ml   --    C DIFF TOXIN B   --  Negative       Last 24 Hours Medication List:     Current Facility-Administered Medications:  apixaban 5 mg Oral BID Corazonmaria e Goodman, DO   aspirin 81 mg Oral Daily Corazon Rex, DO   dofetilide 500 mcg Oral Q12H [address] Aidan Ryan,    levothyroxine 125 mcg Oral Early Morning Aidan Ryan DO   mesalamine 400 mg Oral TID Corazon Morejons, DO   metoprolol succinate 100 mg Oral Daily Corazon Darleens, DO   oxyCODONE 5 mg Oral Q4H PRN Corazon Fells, DO   Or       oxyCODONE 10 mg Oral Q4H PRN Corazon Fells, DO   potassium chloride 40 mEq Oral Once Corazon Fells, DO   venlafaxine 37 5 mg Oral Daily Corazon Darleens, DO        Today, Patient Was Seen By: Corazon Goodman DO    ** Please Note: Dictation voice to text software may have been used in the creation of this document   **

## 2020-02-28 NOTE — ASSESSMENT & PLAN NOTE
· Likely secondary to hepatic steatosis  · Continue to hold fenofibrate  · Avoid all hepatotoxic agents  · Outpatient follow-up with Gastroenterology    Results for Joan Mcdaniels (MRN [id number]) as of 2/27/2020 13:41   Ref  Range 2/26/2020 05:29   HEPATITIS A IGM ANTIBODY Latest Ref Range: Non-reactive, Equivocal-Suggest Recollect  Non-reactive   HEPATITIS B SURFACE ANTIGEN Latest Ref Range: Non-reactive, NonReactive - Confirmed  Non-reactive   HEPATITIS B CORE IGM ANTIBODY Latest Ref Range: Non-reactive  Non-reactive   HEPATITIS C ANTIBODY Latest Ref Range: Non-reactive  Non-reactive     US liver   Status: Final result   PACS Images      Show images for US liver   Study Result     RIGHT UPPER QUADRANT ULTRASOUND     INDICATION:     Nausea, vomiting  Elevated LFTs     COMPARISON:  2/23/2020     TECHNIQUE:   Real-time ultrasound of the right upper quadrant was performed with a curvilinear transducer with both volumetric sweeps and still imaging techniques      FINDINGS:     PANCREAS:  Portions of the pancreas are obscured by bowel gas  Visualized portions of the pancreas are unremarkable       AORTA AND IVC:  Visualized portions are normal for patient age      LIVER:  Size:  Severely enlarged  The liver measures 12 1 cm in the midclavicular line  Contour:  Surface contour is smooth  Parenchyma: There is marked diffuse increased echogenicity with smooth echotexture and significant beam attenuation with loss of periportal echogenicity  Most consistent with severe hepatic steatosis  No evidence of suspicious mass  Limited imaging of the main portal vein shows it to be patent and hepatopetal      BILIARY:  Patient has undergone cholecystectomy  No intrahepatic biliary dilatation  CBD measures 3 mm  No choledocholithiasis      KIDNEY:   Right kidney measures 12 8 x 4 5 cm  Within normal limits      ASCITES:   None      IMPRESSION:     Severe hepatomegaly and hepatic steatosis as seen on CT

## 2020-02-28 NOTE — ANESTHESIA PREPROCEDURE EVALUATION
Review of Systems/Medical History  Patient summary reviewed        Cardiovascular  Hyperlipidemia, Hypertension , Dysrhythmias , atrial fibrillation, CHF ,   Comment: Mild pulmonary HTN    Prolonged QT       Transthoracic Echocardiogram  2D, M-mode, and Doppler     Study date:  17-Sep-2019     Patient: Jeni Esparza  MR number: [id number]  Account number: [de-identified]  : 1955  Age: 59 years  Gender: Female  Status: Outpatient  Location: Bedside  Height: 69 in  Weight: 246 lb  BP:     Indications: Chest pain     Diagnoses: 786 59 - CHEST PAIN NEC     Sonographer:  Dia Blas CCT  Referring Physician:  Bernard Quintero MD  Group:  [facility]  Interpreting Physician:  Yadi Patricia DO     SUMMARY     LEFT VENTRICLE:  Systolic function was normal  Ejection fraction was estimated to be 65 %  There were no regional wall motion abnormalities  Wall thickness was mildly increased      MITRAL VALVE:  There was mild regurgitation      TRICUSPID VALVE:  There was mild regurgitation  Estimated peak PA pressure was 42 mmHg      HISTORY: PRIOR HISTORY: Chest pain, atrial fibrillation, Pulmonary hypertension Pulmonary  Pneumonia, COPD , Sleep apnea ,        GI/Hepatic    GERD , Liver disease ,             Endo/Other  History of thyroid disease ,   Obesity    GYN       Hematology   Musculoskeletal       Neurology      Comment: S/P surgery brain aneurysm Psychology           Physical Exam    Airway    Mallampati score: III  TM Distance: >3 FB  Neck ROM: full     Dental   Comment: Very poor teeth, missing, cracked, chipped,     Cardiovascular      Pulmonary      Other Findings        Anesthesia Plan  ASA Score- 3     Anesthesia Type- IV sedation with anesthesia with ASA Monitors  Additional Monitors:   Airway Plan:         Plan Factors-    Induction- intravenous  Postoperative Plan-     Informed Consent- Anesthetic plan and risks discussed with patient    I personally reviewed this patient with the CRNA  Discussed and agreed on the Anesthesia Plan with the SWETHA Hernández

## 2020-02-28 NOTE — ASSESSMENT & PLAN NOTE
· Diagnosed with possible Crohn's disease approximately 30 years ago  · Check a fecal calprotectin level  · Continue PO mesalamine  · Continue eliquis for now  · The patient was seen in consultation by Gastroenterology  · The patient will undergo an EGD and colonoscopy today, 02/28/2020  · Serial laboratory testing to monitor her hemoglobin/hematocrit levels

## 2020-02-28 NOTE — ASSESSMENT & PLAN NOTE
· Continue her home dose of PO levothyroxine    Results for Betty Mckeon (MRN [id number]) as of 2/27/2020 13:41   Ref   Range 2/26/2020 05:29   TSH 3RD GENERATON Latest Ref Range: 0 358 - 3 740 uIU/mL 1 294

## 2020-02-28 NOTE — ANESTHESIA POSTPROCEDURE EVALUATION
Post-Op Assessment Note    CV Status:  Stable    Pain management: adequate     Mental Status:  Alert and awake   Hydration Status:  Euvolemic   PONV Controlled:  Controlled   Airway Patency:  Patent   Post Op Vitals Reviewed: Yes      Staff: CRNA           BP      Temp      Pulse     Resp      SpO2      See PACU Record for vital signs

## 2020-02-28 NOTE — ASSESSMENT & PLAN NOTE
· With rectal bleeding  · Diagnosed with possible Crohn's disease approximately 30 years ago  · Check a fecal calprotectin level  · Continue PO mesalamine  · Continue eliquis for now  · The patient was seen in consultation by Gastroenterology  · The patient will undergo an EGD and colonoscopy today, 02/28/2020  · Serial laboratory testing to monitor her hemoglobin/hematocrit levels

## 2020-02-29 VITALS
TEMPERATURE: 98.1 F | SYSTOLIC BLOOD PRESSURE: 141 MMHG | HEIGHT: 70 IN | BODY MASS INDEX: 34.4 KG/M2 | DIASTOLIC BLOOD PRESSURE: 71 MMHG | OXYGEN SATURATION: 93 % | WEIGHT: 240.3 LBS | HEART RATE: 64 BPM | RESPIRATION RATE: 18 BRPM

## 2020-02-29 PROBLEM — S22.080A T12 COMPRESSION FRACTURE (HCC): Status: ACTIVE | Noted: 2020-02-29

## 2020-02-29 PROBLEM — R73.9 HYPERGLYCEMIA: Status: ACTIVE | Noted: 2020-02-29

## 2020-02-29 PROBLEM — Q61.02 MULTIPLE RENAL CYSTS: Status: ACTIVE | Noted: 2020-02-29

## 2020-02-29 PROBLEM — K63.5 COLON POLYP: Status: ACTIVE | Noted: 2020-02-29

## 2020-02-29 LAB
ALBUMIN SERPL BCP-MCNC: 2.8 G/DL (ref 3.5–5)
ALP SERPL-CCNC: 109 U/L (ref 46–116)
ALT SERPL W P-5'-P-CCNC: 98 U/L (ref 12–78)
ANION GAP SERPL CALCULATED.3IONS-SCNC: 9 MMOL/L (ref 4–13)
AST SERPL W P-5'-P-CCNC: 65 U/L (ref 5–45)
BASOPHILS # BLD AUTO: 0.04 THOUSANDS/ΜL (ref 0–0.1)
BASOPHILS NFR BLD AUTO: 1 % (ref 0–1)
BILIRUB SERPL-MCNC: 0.2 MG/DL (ref 0.2–1)
BUN SERPL-MCNC: 11 MG/DL (ref 5–25)
CALCIUM SERPL-MCNC: 8.9 MG/DL (ref 8.3–10.1)
CHLORIDE SERPL-SCNC: 106 MMOL/L (ref 100–108)
CO2 SERPL-SCNC: 26 MMOL/L (ref 21–32)
CREAT SERPL-MCNC: 0.85 MG/DL (ref 0.6–1.3)
ENDOMYSIUM IGA SER QL: NEGATIVE
EOSINOPHIL # BLD AUTO: 0.17 THOUSAND/ΜL (ref 0–0.61)
EOSINOPHIL NFR BLD AUTO: 2 % (ref 0–6)
ERYTHROCYTE [DISTWIDTH] IN BLOOD BY AUTOMATED COUNT: 14.5 % (ref 11.6–15.1)
GFR SERPL CREATININE-BSD FRML MDRD: 73 ML/MIN/1.73SQ M
GLIADIN PEPTIDE IGA SER-ACNC: 4 UNITS (ref 0–19)
GLIADIN PEPTIDE IGG SER-ACNC: 8 UNITS (ref 0–19)
GLUCOSE SERPL-MCNC: 133 MG/DL (ref 65–140)
HCT VFR BLD AUTO: 40.6 % (ref 34.8–46.1)
HGB BLD-MCNC: 12.4 G/DL (ref 11.5–15.4)
IGA SERPL-MCNC: 315 MG/DL (ref 87–352)
IMM GRANULOCYTES # BLD AUTO: 0.02 THOUSAND/UL (ref 0–0.2)
IMM GRANULOCYTES NFR BLD AUTO: 0 % (ref 0–2)
LYMPHOCYTES # BLD AUTO: 2.56 THOUSANDS/ΜL (ref 0.6–4.47)
LYMPHOCYTES NFR BLD AUTO: 33 % (ref 14–44)
MAGNESIUM SERPL-MCNC: 2 MG/DL (ref 1.6–2.6)
MCH RBC QN AUTO: 28.2 PG (ref 26.8–34.3)
MCHC RBC AUTO-ENTMCNC: 30.5 G/DL (ref 31.4–37.4)
MCV RBC AUTO: 93 FL (ref 82–98)
MONOCYTES # BLD AUTO: 0.62 THOUSAND/ΜL (ref 0.17–1.22)
MONOCYTES NFR BLD AUTO: 8 % (ref 4–12)
NEUTROPHILS # BLD AUTO: 4.44 THOUSANDS/ΜL (ref 1.85–7.62)
NEUTS SEG NFR BLD AUTO: 56 % (ref 43–75)
NRBC BLD AUTO-RTO: 0 /100 WBCS
PHOSPHATE SERPL-MCNC: 4 MG/DL (ref 2.3–4.1)
PLATELET # BLD AUTO: 294 THOUSANDS/UL (ref 149–390)
PMV BLD AUTO: 9.1 FL (ref 8.9–12.7)
POTASSIUM SERPL-SCNC: 3.7 MMOL/L (ref 3.5–5.3)
PROCALCITONIN SERPL-MCNC: <0.05 NG/ML
PROT SERPL-MCNC: 7.1 G/DL (ref 6.4–8.2)
RBC # BLD AUTO: 4.39 MILLION/UL (ref 3.81–5.12)
SODIUM SERPL-SCNC: 141 MMOL/L (ref 136–145)
TTG IGA SER-ACNC: <2 U/ML (ref 0–3)
TTG IGG SER-ACNC: 3 U/ML (ref 0–5)
WBC # BLD AUTO: 7.85 THOUSAND/UL (ref 4.31–10.16)

## 2020-02-29 PROCEDURE — 83735 ASSAY OF MAGNESIUM: CPT | Performed by: INTERNAL MEDICINE

## 2020-02-29 PROCEDURE — 84145 PROCALCITONIN (PCT): CPT | Performed by: INTERNAL MEDICINE

## 2020-02-29 PROCEDURE — 84100 ASSAY OF PHOSPHORUS: CPT | Performed by: INTERNAL MEDICINE

## 2020-02-29 PROCEDURE — 99239 HOSP IP/OBS DSCHRG MGMT >30: CPT | Performed by: INTERNAL MEDICINE

## 2020-02-29 PROCEDURE — 85025 COMPLETE CBC W/AUTO DIFF WBC: CPT | Performed by: INTERNAL MEDICINE

## 2020-02-29 PROCEDURE — 80053 COMPREHEN METABOLIC PANEL: CPT | Performed by: INTERNAL MEDICINE

## 2020-02-29 RX ORDER — OMEPRAZOLE 20 MG/1
20 CAPSULE, DELAYED RELEASE ORAL
Refills: 0
Start: 2020-02-29 | End: 2020-05-04

## 2020-02-29 RX ORDER — POTASSIUM CHLORIDE 20 MEQ/1
40 TABLET, EXTENDED RELEASE ORAL ONCE
Status: COMPLETED | OUTPATIENT
Start: 2020-02-29 | End: 2020-02-29

## 2020-02-29 RX ORDER — ALBUTEROL SULFATE 90 UG/1
2 AEROSOL, METERED RESPIRATORY (INHALATION) EVERY 6 HOURS PRN
Refills: 0
Start: 2020-02-29

## 2020-02-29 RX ORDER — ASPIRIN 81 MG/1
81 TABLET, CHEWABLE ORAL DAILY
Refills: 0
Start: 2020-02-29

## 2020-02-29 RX ORDER — MESALAMINE 400 MG/1
400 CAPSULE, DELAYED RELEASE ORAL 3 TIMES DAILY
Refills: 0
Start: 2020-02-29 | End: 2020-07-20

## 2020-02-29 RX ORDER — FUROSEMIDE 20 MG/1
40 TABLET ORAL DAILY
Refills: 0
Start: 2020-02-29 | End: 2020-05-04

## 2020-02-29 RX ADMIN — ASPIRIN 81 MG 81 MG: 81 TABLET ORAL at 08:13

## 2020-02-29 RX ADMIN — VENLAFAXINE HYDROCHLORIDE 37.5 MG: 37.5 CAPSULE, EXTENDED RELEASE ORAL at 08:13

## 2020-02-29 RX ADMIN — POTASSIUM CHLORIDE 40 MEQ: 1500 TABLET, EXTENDED RELEASE ORAL at 08:13

## 2020-02-29 RX ADMIN — METOPROLOL SUCCINATE 100 MG: 100 TABLET, FILM COATED, EXTENDED RELEASE ORAL at 08:13

## 2020-02-29 RX ADMIN — LEVOTHYROXINE SODIUM 125 MCG: 125 TABLET ORAL at 05:18

## 2020-02-29 RX ADMIN — DOFETILIDE 500 MCG: 0.12 CAPSULE ORAL at 08:13

## 2020-02-29 RX ADMIN — APIXABAN 5 MG: 5 TABLET, FILM COATED ORAL at 08:13

## 2020-02-29 RX ADMIN — MESALAMINE 400 MG: 400 CAPSULE, DELAYED RELEASE ORAL at 08:13

## 2020-02-29 NOTE — ASSESSMENT & PLAN NOTE
· Lifestyle modifications are recommended including weight loss, improving her diet, and increasing her amount of activity  · Outpatient follow-up with Gastroenterology  · Outpatient surveillance imaging with PCP    Results for Rossana Clark (MRN [id number]) as of 2/29/2020 11:04   Ref  Range 2/28/2020 05:39   Hemoglobin A1C Latest Ref Range: Normal 3 8-5 6%; PreDiabetic 5 7-6 4%;  Diabetic >=6 5%; Glycemic control for adults with diabetes <7 0% % 6 9 (H)   EAG Latest Units: mg/dl 151

## 2020-02-29 NOTE — ASSESSMENT & PLAN NOTE
· On PO tikosyn  · Avoid all QT-prolonging agents  · Follow the QT interval  · Outpatient follow-up with Cardiology

## 2020-02-29 NOTE — PLAN OF CARE
Problem: PAIN - ADULT  Goal: Verbalizes/displays adequate comfort level or baseline comfort level  Description  Interventions:  - Encourage patient to monitor pain and request assistance  - Assess pain using appropriate pain scale  - Administer analgesics based on type and severity of pain and evaluate response  - Implement non-pharmacological measures as appropriate and evaluate response  - Consider cultural and social influences on pain and pain management  - Notify physician/advanced practitioner if interventions unsuccessful or patient reports new pain  Outcome: Adequate for Discharge     Problem: INFECTION - ADULT  Goal: Absence or prevention of progression during hospitalization  Description  INTERVENTIONS:  - Assess and monitor for signs and symptoms of infection  - Monitor lab/diagnostic results  - Monitor all insertion sites, i e  indwelling lines, tubes, and drains  - Monitor endotracheal if appropriate and nasal secretions for changes in amount and color  - [place] appropriate cooling/warming therapies per order  - Administer medications as ordered  - Instruct and encourage patient and family to use good hand hygiene technique  - Identify and instruct in appropriate isolation precautions for identified infection/condition  Outcome: Adequate for Discharge  Goal: Absence of fever/infection during neutropenic period  Description  INTERVENTIONS:  - Monitor WBC    Outcome: Adequate for Discharge     Problem: SAFETY ADULT  Goal: Patient will remain free of falls  Description  INTERVENTIONS:  - Assess patient frequently for physical needs  -  Identify cognitive and physical deficits and behaviors that affect risk of falls    -  [place] fall precautions as indicated by assessment   - Educate patient/family on patient safety including physical limitations  - Instruct patient to call for assistance with activity based on assessment  - Modify environment to reduce risk of injury  - Consider OT/PT consult to assist with strengthening/mobility  Outcome: Adequate for Discharge  Goal: Maintain or return to baseline ADL function  Description  INTERVENTIONS:  -  Assess patient's ability to carry out ADLs; assess patient's baseline for ADL function and identify physical deficits which impact ability to perform ADLs (bathing, care of mouth/teeth, toileting, grooming, dressing, etc )  - Assess/evaluate cause of self-care deficits   - Assess range of motion  - Assess patient's mobility; develop plan if impaired  - Assess patient's need for assistive devices and provide as appropriate  - Encourage maximum independence but intervene and supervise when necessary  - Involve family in performance of ADLs  - Assess for home care needs following discharge   - Consider OT consult to assist with ADL evaluation and planning for discharge  - Provide patient education as appropriate  Outcome: Adequate for Discharge  Goal: Maintain or return mobility status to optimal level  Description  INTERVENTIONS:  - Assess patient's baseline mobility status (ambulation, transfers, stairs, etc )    - Identify cognitive and physical deficits and behaviors that affect mobility  - Identify mobility aids required to assist with transfers and/or ambulation (gait belt, sit-to-stand, lift, walker, cane, etc )  - [place] fall precautions as indicated by assessment  - Record patient progress and toleration of activity level on Mobility SBAR; progress patient to next Phase/Stage  - Instruct patient to call for assistance with activity based on assessment  - Consider rehabilitation consult to assist with strengthening/weightbearing, etc   Outcome: Adequate for Discharge     Problem: DISCHARGE PLANNING  Goal: Discharge to home or other facility with appropriate resources  Description  INTERVENTIONS:  - Identify barriers to discharge w/patient and caregiver  - Arrange for needed discharge resources and transportation as appropriate  - Identify discharge learning needs (meds, wound care, etc )  - Arrange for interpretive services to assist at discharge as needed  - Refer to Case Management Department for coordinating discharge planning if the patient needs post-hospital services based on physician/advanced practitioner order or complex needs related to functional status, cognitive ability, or social support system  Outcome: Adequate for Discharge     Problem: Knowledge Deficit  Goal: Patient/family/caregiver demonstrates understanding of disease process, treatment plan, medications, and discharge instructions  Description  Complete learning assessment and assess knowledge base    Interventions:  - Provide teaching at level of understanding  - Provide teaching via preferred learning methods  Outcome: Adequate for Discharge     Problem: GASTROINTESTINAL - ADULT  Goal: Minimal or absence of nausea and/or vomiting  Description  INTERVENTIONS:  - Administer IV fluids if ordered to ensure adequate hydration  - Maintain NPO status until nausea and vomiting are resolved  - Nasogastric tube if ordered  - Administer ordered antiemetic medications as needed  - Provide nonpharmacologic comfort measures as appropriate  - Advance diet as tolerated, if ordered  - Consider nutrition services referral to assist patient with adequate nutrition and appropriate food choices  Outcome: Adequate for Discharge  Goal: Maintains or returns to baseline bowel function  Description  INTERVENTIONS:  - Assess bowel function  - Encourage oral fluids to ensure adequate hydration  - Administer IV fluids if ordered to ensure adequate hydration  - Administer ordered medications as needed  - Encourage mobilization and activity  - Consider nutritional services referral to assist patient with adequate nutrition and appropriate food choices  Outcome: Adequate for Discharge  Goal: Maintains adequate nutritional intake  Description  INTERVENTIONS:  - Monitor percentage of each meal consumed  - Identify factors contributing to decreased intake, treat as appropriate  - Assist with meals as needed  - Monitor I&O, weight, and lab values if indicated  - Obtain nutrition services referral as needed  Outcome: Adequate for Discharge     Problem: DISCHARGE PLANNING - CARE MANAGEMENT  Goal: Discharge to post-acute care or home with appropriate resources  Description  INTERVENTIONS:  - Conduct assessment to determine patient/family and health care team treatment goals, and need for post-acute services based on payer coverage, community resources, and patient preferences, and barriers to discharge  - Address psychosocial, clinical, and financial barriers to discharge as identified in assessment in conjunction with the patient/family and health care team  - Arrange appropriate level of post-acute services according to patient's   needs and preference and payer coverage in collaboration with the physician and health care team  - Communicate with and update the patient/family, physician, and health care team regarding progress on the discharge plan  - Arrange appropriate transportation to post-acute venues  Pt will return home with spouse      Outcome: Adequate for Discharge

## 2020-02-29 NOTE — ASSESSMENT & PLAN NOTE
· Likely secondary to hepatic steatosis  · The patient's fenofibrate was held during the hospitalization and will continue to be held until the transaminitis resolves  · Avoid all hepatotoxic agents  · Outpatient follow-up with Gastroenterology    Results for Bob Mir (MRN [id number]) as of 2/27/2020 13:41   Ref  Range 2/26/2020 05:29   HEPATITIS A IGM ANTIBODY Latest Ref Range: Non-reactive, Equivocal-Suggest Recollect  Non-reactive   HEPATITIS B SURFACE ANTIGEN Latest Ref Range: Non-reactive, NonReactive - Confirmed  Non-reactive   HEPATITIS B CORE IGM ANTIBODY Latest Ref Range: Non-reactive  Non-reactive   HEPATITIS C ANTIBODY Latest Ref Range: Non-reactive  Non-reactive     US liver   Status: Final result   PACS Images      Show images for US liver   Study Result     RIGHT UPPER QUADRANT ULTRASOUND     INDICATION:     Nausea, vomiting  Elevated LFTs     COMPARISON:  2/23/2020     TECHNIQUE:   Real-time ultrasound of the right upper quadrant was performed with a curvilinear transducer with both volumetric sweeps and still imaging techniques      FINDINGS:     PANCREAS:  Portions of the pancreas are obscured by bowel gas  Visualized portions of the pancreas are unremarkable       AORTA AND IVC:  Visualized portions are normal for patient age      LIVER:  Size:  Severely enlarged  The liver measures 12 1 cm in the midclavicular line  Contour:  Surface contour is smooth  Parenchyma: There is marked diffuse increased echogenicity with smooth echotexture and significant beam attenuation with loss of periportal echogenicity  Most consistent with severe hepatic steatosis  No evidence of suspicious mass  Limited imaging of the main portal vein shows it to be patent and hepatopetal      BILIARY:  Patient has undergone cholecystectomy  No intrahepatic biliary dilatation  CBD measures 3 mm  No choledocholithiasis      KIDNEY:   Right kidney measures 12 8 x 4 5 cm     Within normal limits      ASCITES: None      IMPRESSION:     Severe hepatomegaly and hepatic steatosis as seen on CT

## 2020-02-29 NOTE — ASSESSMENT & PLAN NOTE
· Outpatient DEXA scan with PCP to be evaluated for osteoporosis  · Initiate oscal plus vitamin D 1 tablet PO Qdaily  · Outpatient Orthopedic Spine evaluation if the patient is symptomatic

## 2020-02-29 NOTE — ASSESSMENT & PLAN NOTE
· Found on colonoscopy on 02/28/2020:  IMPRESSION:  · Moderate amount of semi-liquid stool in the right colon interfering with visualization  Large lesions are unlikely to be missed but smaller lesions may have been obscured  · 6 mm sessile polyp in the sigmoid colon  Not resected given that patient is on Eliquis  · Large, external and internal hemorrhoids  · All observed locations appeared otherwise normal  Terminal ileum and colonic mucosa normal  Biopsies were performed for the evaluation of microscopic colitis     RECOMMENDATION:  Repeat in 6 months due to an inadequate bowel preparation and colon polyp which was not resected  Follow up biopsy results  No evidence of inflammatory bowel disease on this exam    Start immodium as needed for diarrhea and follow up in GI office as outpatient       · Outpatient follow-up with Gastroenterology

## 2020-02-29 NOTE — SOCIAL WORK
MSW received a Distress Thermometer from [facility] radiation oncology, dated 5/4/18 from pt where she self scored a 3  Pt checked off nervousness and worry as her stressors  The pt had a few physical problems and as per her chart, the nurses appear to be addressing these issues  The pt's score does not warrant any further MSW intervention  MSW will provide cancer care services should the pt request or be referred  Pt is being discharged today  Follow up appointments reviewed with a good understanding  Case Management reviewed discharge planning process including the following: identifying help that is needed at home, pt's preference for discharge needs and Meds at [facility]  Reviewed with Pt that any member of the healthcare team can answer questions regarding : medications, jmportance of recognizing  Signs and symptoms of any  medical problems  Case Management also encouraged pt to follow up with all recommended appointments after discharge

## 2020-02-29 NOTE — ASSESSMENT & PLAN NOTE
· Diagnosed with possible Crohn's disease approximately 30 years ago  · A fecal calprotectin level was checked with results pending at the time discharge  · Continue PO mesalamine  · Continue eliquis for now  · The patient was seen in consultation by Gastroenterology  · The patient underwent an EGD and colonoscopy on 02/28/2020 with the following impressions/recommendations:  IMPRESSION:  · Regular Z-line 38 cm from the incisors  · 3 cm hiatal hernia - GE junction 38 cm from the incisors, diaphragmatic impression 41 cm from the incisors  · The stomach and duodenum appeared otherwise normal  ·   Biopsies were obtained in the  duodenum to evaluate for Celiac disease     RECOMMENDATION:  Follow up biopsy results  Proceed with colonoscopy     IMPRESSION:  · Moderate amount of semi-liquid stool in the right colon interfering with visualization  Large lesions are unlikely to be missed but smaller lesions may have been obscured  · 6 mm sessile polyp in the sigmoid colon  Not resected given that patient is on Eliquis  · Large, external and internal hemorrhoids  · All observed locations appeared otherwise normal  Terminal ileum and colonic mucosa normal  Biopsies were performed for the evaluation of microscopic colitis     RECOMMENDATION:  Repeat in 6 months due to an inadequate bowel preparation and colon polyp which was not resected  Follow up biopsy results  No evidence of inflammatory bowel disease on this exam    Start immodium as needed for diarrhea and follow up in GI office as outpatient       · Outpatient follow-up with Gastroenterology

## 2020-02-29 NOTE — DISCHARGE SUMMARY
Discharge- [facility] 1955, 62 y o  female MRN: [id number]    Unit/Bed#: 406-01 Encounter: [id number]    Primary Care Provider: Mildred Thomson MD   Date and time admitted to hospital: 2/25/2020  6:37 PM        * Diarrhea  Assessment & Plan  · Diagnosed with possible Crohn's disease approximately 30 years ago  · A fecal calprotectin level was checked with results pending at the time discharge  · Continue PO mesalamine  · Continue eliquis for now  · The patient was seen in consultation by Gastroenterology  · The patient underwent an EGD and colonoscopy on 02/28/2020 with the following impressions/recommendations:  IMPRESSION:  · Regular Z-line 38 cm from the incisors  · 3 cm hiatal hernia - GE junction 38 cm from the incisors, diaphragmatic impression 41 cm from the incisors  · The stomach and duodenum appeared otherwise normal  ·   Biopsies were obtained in the  duodenum to evaluate for Celiac disease     RECOMMENDATION:  Follow up biopsy results  Proceed with colonoscopy     IMPRESSION:  · Moderate amount of semi-liquid stool in the right colon interfering with visualization  Large lesions are unlikely to be missed but smaller lesions may have been obscured  · 6 mm sessile polyp in the sigmoid colon  Not resected given that patient is on Eliquis  · Large, external and internal hemorrhoids  · All observed locations appeared otherwise normal  Terminal ileum and colonic mucosa normal  Biopsies were performed for the evaluation of microscopic colitis     RECOMMENDATION:  Repeat in 6 months due to an inadequate bowel preparation and colon polyp which was not resected  Follow up biopsy results  No evidence of inflammatory bowel disease on this exam    Start immodium as needed for diarrhea and follow up in GI office as outpatient       · Outpatient follow-up with Gastroenterology         Rectal bleeding  Assessment & Plan  · Diagnosed with possible Crohn's disease approximately 30 years ago  · A fecal calprotectin level was checked with results pending at the time discharge  · Continue PO mesalamine  · Continue eliquis for now  · The patient was seen in consultation by Gastroenterology  · The patient underwent an EGD and colonoscopy on 02/28/2020 with the following impressions/recommendations:  IMPRESSION:  · Regular Z-line 38 cm from the incisors  · 3 cm hiatal hernia - GE junction 38 cm from the incisors, diaphragmatic impression 41 cm from the incisors  · The stomach and duodenum appeared otherwise normal  ·   Biopsies were obtained in the  duodenum to evaluate for Celiac disease     RECOMMENDATION:  Follow up biopsy results  Proceed with colonoscopy     IMPRESSION:  · Moderate amount of semi-liquid stool in the right colon interfering with visualization  Large lesions are unlikely to be missed but smaller lesions may have been obscured  · 6 mm sessile polyp in the sigmoid colon  Not resected given that patient is on Eliquis  · Large, external and internal hemorrhoids  · All observed locations appeared otherwise normal  Terminal ileum and colonic mucosa normal  Biopsies were performed for the evaluation of microscopic colitis     RECOMMENDATION:  Repeat in 6 months due to an inadequate bowel preparation and colon polyp which was not resected  Follow up biopsy results  No evidence of inflammatory bowel disease on this exam    Start immodium as needed for diarrhea and follow up in GI office as outpatient  · Outpatient follow-up with Gastroenterology                Transaminitis  Assessment & Plan  · Likely secondary to hepatic steatosis  · The patient's fenofibrate was held during the hospitalization and will continue to be held until the transaminitis resolves  · Avoid all hepatotoxic agents  · Outpatient follow-up with Gastroenterology  · Follow the LFT's (liver function tests) as an outpatient with her PCP    Results for Anders Frazier (MRN [id number]) as of 2/27/2020 13:41   Ref   Range 2/26/2020 05:29   HEPATITIS A IGM ANTIBODY Latest Ref Range: Non-reactive, Equivocal-Suggest Recollect  Non-reactive   HEPATITIS B SURFACE ANTIGEN Latest Ref Range: Non-reactive, NonReactive - Confirmed  Non-reactive   HEPATITIS B CORE IGM ANTIBODY Latest Ref Range: Non-reactive  Non-reactive   HEPATITIS C ANTIBODY Latest Ref Range: Non-reactive  Non-reactive     US liver   Status: Final result   PACS Images      Show images for US liver   Study Result     RIGHT UPPER QUADRANT ULTRASOUND     INDICATION:     Nausea, vomiting  Elevated LFTs     COMPARISON:  2/23/2020     TECHNIQUE:   Real-time ultrasound of the right upper quadrant was performed with a curvilinear transducer with both volumetric sweeps and still imaging techniques      FINDINGS:     PANCREAS:  Portions of the pancreas are obscured by bowel gas  Visualized portions of the pancreas are unremarkable       AORTA AND IVC:  Visualized portions are normal for patient age      LIVER:  Size:  Severely enlarged  The liver measures 12 1 cm in the midclavicular line  Contour:  Surface contour is smooth  Parenchyma: There is marked diffuse increased echogenicity with smooth echotexture and significant beam attenuation with loss of periportal echogenicity  Most consistent with severe hepatic steatosis  No evidence of suspicious mass  Limited imaging of the main portal vein shows it to be patent and hepatopetal      BILIARY:  Patient has undergone cholecystectomy  No intrahepatic biliary dilatation  CBD measures 3 mm  No choledocholithiasis      KIDNEY:   Right kidney measures 12 8 x 4 5 cm  Within normal limits      ASCITES:   None      IMPRESSION:     Severe hepatomegaly and hepatic steatosis as seen on CT           Paroxysmal atrial fibrillation (HCC)  Assessment & Plan  · Continue PO tikosyn and PO toprol XL  · Continue anticoagulation with eliquis 5 mg PO BID  · Outpatient follow-up with Cardiology    T12 compression fracture [facility])  Assessment & Plan  · Outpatient DEXA scan with PCP to be evaluated for osteoporosis  · Initiate oscal plus vitamin D 1 tablet PO Qdaily  · Outpatient Orthopedic Spine evaluation if the patient is symptomatic    Multiple renal cysts  Assessment & Plan  Outpatient follow-up with PCP in regards to this matter    Hyperglycemia  Assessment & Plan  Results for Juliet Garcia (MRN [id number]) as of 2/29/2020 11:04   Ref  Range 2/28/2020 05:39   Hemoglobin A1C Latest Ref Range: Normal 3 8-5 6%; PreDiabetic 5 7-6 4%; Diabetic >=6 5%; Glycemic control for adults with diabetes <7 0% % 6 9 (H)   EAG Latest Units: mg/dl 151     · Lifestyle modifications are recommended including weight loss, improving her diet, and increasing her amount of activity  Outpatient follow-up with PCP in regards to this matter    Colon polyp  Assessment & Plan  · Found on colonoscopy on 02/28/2020:  IMPRESSION:  · Moderate amount of semi-liquid stool in the right colon interfering with visualization  Large lesions are unlikely to be missed but smaller lesions may have been obscured  · 6 mm sessile polyp in the sigmoid colon  Not resected given that patient is on Eliquis  · Large, external and internal hemorrhoids  · All observed locations appeared otherwise normal  Terminal ileum and colonic mucosa normal  Biopsies were performed for the evaluation of microscopic colitis     RECOMMENDATION:  Repeat in 6 months due to an inadequate bowel preparation and colon polyp which was not resected  Follow up biopsy results  No evidence of inflammatory bowel disease on this exam    Start immodium as needed for diarrhea and follow up in GI office as outpatient  · Outpatient follow-up with Gastroenterology       Hepatic steatosis  Assessment & Plan  · Lifestyle modifications are recommended including weight loss, improving her diet, and increasing her amount of activity    · Outpatient follow-up with Gastroenterology  · Outpatient surveillance imaging with PCP    Results for Juliet Garcia (MRN [id number]) as of 2/29/2020 11:04   Ref  Range 2/28/2020 05:39   Hemoglobin A1C Latest Ref Range: Normal 3 8-5 6%; PreDiabetic 5 7-6 4%; Diabetic >=6 5%; Glycemic control for adults with diabetes <7 0% % 6 9 (H)   EAG Latest Units: mg/dl 151       Hepatomegaly  Assessment & Plan  · Likely secondary to hepatic steatosis  · Lifestyle modifications are recommended including weight loss, improving her diet, and increasing her amount of activity  · Outpatient follow-up with Gastroenterology  · Outpatient surveillance imaging with PCP    Prolonged QT interval  Assessment & Plan  · On PO tikosyn  · Avoid all QT-prolonging agents  · Follow the QT interval  · Outpatient follow-up with Cardiology    Atherosclerosis  Assessment & Plan  · Observed on CT scan  · Continue aspirin 81 mg daily  · Avoid statin therapy at this time in the setting of transaminitis    Severe obstructive sleep apnea  Assessment & Plan  · Continue CPAP QHS and anytime while sleeping at her home settings of 13 cmH2O    Hypokalemia  Assessment & Plan  · Improved with potassium chloride supplementation  · Follow the potassium level and magnesium level after discharge with her PCP    Acquired hypothyroidism  Assessment & Plan  · Continue her home dose of PO levothyroxine    Results for Cam Soria (MRN [id number]) as of 2/27/2020 13:41   Ref  Range 2/26/2020 05:29   TSH 3RD GENERATON Latest Ref Range: 0 358 - 3 740 uIU/mL 1 294     Discharging Physician / Practitioner: Fredy Beard DO  PCP: Zee North MD  Admission Date:   Admission Orders (From admission, onward)     Ordered        02/25/20 2040  Inpatient Admission  Once                   Discharge Date: 02/29/20    Consultations During Hospital Stay:  · Gastroenterology    Procedures Performed:   · EGD and colonoscopy on 02/28/2020    Significant Findings / Test Results:   Us Liver    Result Date: 2/26/2020  Impression: Severe hepatomegaly and hepatic steatosis as seen on CT   Workstation performed: [id number] CT abdomen pelvis with contrast   Status: Final result   PACS Images      Show images for CT abdomen pelvis with contrast   Study Result     CT ABDOMEN AND PELVIS WITH IV CONTRAST     INDICATION:   Epigastric pain  2 day history of epigastric abdominal pain radiating bilaterally  Worsening symptoms  Diarrhea      COMPARISON:  None      TECHNIQUE:  CT examination of the abdomen and pelvis was performed  Axial, sagittal, and coronal 2D reformatted images were created from the source data and submitted for interpretation      Radiation dose length product (DLP) for this visit:  693 mGy-cm   This examination, like all CT scans performed in the [facility], was performed utilizing techniques to minimize radiation dose exposure, including the use of iterative   reconstruction and automated exposure control      IV Contrast:  100 mL of iohexol (OMNIPAQUE)        Enteric Contrast:  Enteric contrast was administered            FINDINGS:  ABDOMEN  LOWER CHEST:  Mild emphysematous changes in the lower lobes of the lungs bilaterally            LIVER/BILIARY TREE:  Enlarged with fatty infiltrative changes         GALLBLADDER:  Surgically absent            SPLEEN:  Unremarkable         PANCREAS:  Unremarkable         ADRENAL GLANDS:  Unremarkable         KIDNEYS/URETERS:  No renal calyceal or ureteric calculi  No hydronephrosis or hydroureter      One or more sharply circumscribed subcentimeter renal hypodensities are noted  These lesions are too small to accurately characterize, but are statistically most likely to represent benign cortical renal cyst(s)  According to the guidelines published in   the [facility] Paper of the ACR Incidental Findings Committee (Radiology 2010), no further workup of these lesions is recommended               STOMACH AND BOWEL:    Stomach distended and filled with ingested oral contrast material   Hiatal hernia    Thickening of the distal esophagus, compatible with reflux esophagitis      No evidence of small bowel obstruction      There is contrast material seen within the colon, up to the level of the proximal sigmoid colon  The colon is incompletely distended  There is mild circumferential wall thickening of the descending colon and sigmoid colon, likely due to under   distention versus the sequela of patient's known Crohn's disease         APPENDIX:  Surgically absent            ABDOMINOPELVIC CAVITY:  No ascites or free intraperitoneal air  No lymphadenopathy         VESSELS:  Atherosclerotic changes are present  No evidence of aneurysm            PELVIS  REPRODUCTIVE ORGANS:  Surgically absent         URINARY BLADDER:  Unremarkable            ABDOMINAL WALL/INGUINAL REGIONS:  Unremarkable         OSSEOUS STRUCTURES:  No acute fracture or destructive osseous lesion      Degenerative changes in the thoracolumbar spine  Stable, mild compression fracture superior endplate W70            IMPRESSION:  1  No acute abnormality within the abdomen or pelvis  2   Incidental findings as described above, not significantly changed from the prior studies  Microbiology Results (last 21 days)     Procedure Component Value - Date/Time   Rotavirus antigen, stool [[id number]] (Normal) Collected: 02/27/20 0925   Lab Status: Final result Specimen: Stool from Rectum Updated: 02/27/20 1526    Rotavirus Negative   Calprotectin,Fecal [[id number]] Collected: 02/27/20 0925   Lab Status:  In process Specimen: Stool from Rectum Updated: 02/27/20 0939   Stool Enteric Bacterial Panel by PCR [[id number]] (Normal) Collected: 02/26/20 1136   Lab Status: Final result Specimen: Stool from Per Rectum Updated: 02/27/20 0355    Salmonella sp PCR None Detected    Shigella sp/Enteroinvasive E  coli (EIEC) PCR None Detected    Campylobacter sp (jejuni and coli) PCR None Detected    Shiga toxin 1/Shiga toxin 2 genes PCR None Detected   Fecal leukocytes [[id number]] Collected: 02/26/20 1136   Lab Status: Final result Specimen: Stool from Per Rectum Updated: 02/28/20 1405    White Blood Cells, Stool No white blood cells seen  Narrative:     Performed at: [facility] [address]  [id number]  : Aftab Ford MD, Phone:  [phone]   Urine culture [[id number]] Collected: 02/26/20 0147   Lab Status: Final result Specimen: Urine, Clean Catch Updated: 02/27/20 1139    Urine Culture 30,000-39,000 cfu/ml     Comment: Mixed Contaminants X4      Influenza A/B and RSV PCR [[id number]] (Normal) Collected: 02/25/20 2155   Lab Status: Final result Specimen: Nares from Nose Updated: 02/25/20 2247    INFLUENZA A PCR None Detected    INFLUENZA B PCR None Detected    RSV PCR None Detected   MRSA culture [[id number]] (Normal) Collected: 02/25/20 2155   Lab Status: Final result Specimen: Nares from Nose Updated: 02/27/20 1238    MRSA Culture Only No Methicillin Resistant Staphlyococcus aureus (MRSA) isolated   Stool Enteric Bacterial Panel by PCR [[id number]] (Normal) Collected: 02/23/20 1007   Lab Status: Final result Specimen: Stool from Rectum Updated: 02/24/20 0614    Salmonella sp PCR None Detected    Shigella sp/Enteroinvasive E  coli (EIEC) PCR None Detected    Campylobacter sp (jejuni and coli) PCR None Detected    Shiga toxin 1/Shiga toxin 2 genes PCR None Detected   Clostridium difficile toxin by PCR with EIA [[id number]] (Normal) Collected: 02/23/20 1007   Lab Status: Final result Specimen: Stool from Rectum Updated: 02/24/20 0543    C difficile toxin by PCR Negative    Comment: No evidence for C  difficile colonization or infection   No special contact precautions required  Fecal leukocytes [[id number]] (Abnormal) Collected: 02/23/20 1007   Lab Status: Final result Specimen: Stool from Rectum Updated: 02/26/20 1605    White Blood Cells, Stool Few white blood cells  Abnormal    Narrative:     Performed at: [facility] [address]  [id number]  : Aftab Ford MD, Phone:  [phone]       Test Results Pending at Discharge (will require follow up): · Fecal calprotectin level (Follow-up with PCP)     Outpatient Tests Requested:  · CBC with diff , CMP, Mag, Phos, Vitamin D 25-OH level in 5-7 days with her PCP  · DEXA scan with PCP  · Repeat colonoscopy with polyp removal per Gastroenterology    Complications:  None    Reason for Admission:  Diarrhea, rectal bleeding, and intractable abdominal pain    Hospital Course:     Yoseph Casas is a 62 y o  female patient who originally presented to the hospital on 2/25/2020 due to diarrhea, rectal bleeding, and intractable abdominal pain  Please see above list of diagnoses and related plan for additional information  Condition at Discharge: good     Discharge Day Visit / Exam:     Subjective: The patient was seen and examined  The patient is doing better  No chest pain  No shortness of breath  No abdominal pain  No nausea or vomiting  Vitals: Blood Pressure: 141/71 (02/29/20 0715)  Pulse: 64 (02/29/20 0715)  Temperature: 98 1 °F (36 7 °C) (02/29/20 0715)  Temp Source: Temporal (02/29/20 0715)  Respirations: 18 (02/29/20 0715)  Height: 5' 10" (177 8 cm) (02/25/20 2109)  Weight - Scale: 109 kg (240 lb 4 8 oz) (02/29/20 0541)  SpO2: 93 % (02/29/20 0715)  Exam:   Physical Exam  General:  NAD, follows commands  HEENT:  NC/AT, mucous membranes moist  Neck:  Supple, No JVP elevation  CV:  + S1, + S2, RRR  Pulm:  Lung fields are CTA bilaterally  Abd:  Soft, Non-tender, Non-distended  Ext:  No clubbing/cyanosis/edema  Skin:  No rashes  Neuro:  Awake, alert, oriented  Psych:  Normal mood and affect    Discharge instructions/Information to patient and family:   See after visit summary for information provided to patient and family  Provisions for Follow-Up Care:  See after visit summary for information related to follow-up care and any pertinent home health orders        Disposition:     Home    Planned Readmission:  No Discharge Statement:  I spent 40 minutes discharging the patient  This time was spent on the day of discharge  I had direct contact with the patient on the day of discharge  Greater than 50% of the total time was spent examining patient, answering all patient questions, arranging and discussing plan of care with patient as well as directly providing post-discharge instructions  Additional time then spent on discharge activities  Discharge Medications:  See after visit summary for reconciled discharge medications provided to patient and family        ** Please Note: This note has been constructed using a voice recognition system **

## 2020-02-29 NOTE — NURSING NOTE
Instructions reviewed with patient  Patient verbalized understanding of all instructions and offered no complaints at time of d/c

## 2020-02-29 NOTE — ASSESSMENT & PLAN NOTE
· Continue her home dose of PO levothyroxine    Results for Aj Blas (MRN [id number]) as of 2/27/2020 13:41   Ref   Range 2/26/2020 05:29   TSH 3RD GENERATON Latest Ref Range: 0 358 - 3 740 uIU/mL 1 294

## 2020-02-29 NOTE — ASSESSMENT & PLAN NOTE
Results for Matt Banks (MRN [id number]) as of 2/29/2020 11:04   Ref  Range 2/28/2020 05:39   Hemoglobin A1C Latest Ref Range: Normal 3 8-5 6%; PreDiabetic 5 7-6 4%; Diabetic >=6 5%; Glycemic control for adults with diabetes <7 0% % 6 9 (H)   EAG Latest Units: mg/dl 151     · Lifestyle modifications are recommended including weight loss, improving her diet, and increasing her amount of activity    Outpatient follow-up with PCP in regards to this matter

## 2020-03-02 ENCOUNTER — TELEPHONE (OUTPATIENT)
Dept: GASTROENTEROLOGY | Facility: AMBULARY SURGERY CENTER | Age: 65
End: 2020-03-02

## 2020-03-02 LAB — CALPROTECTIN STL-MCNT: 39 UG/G (ref 0–120)

## 2020-03-02 NOTE — UTILIZATION REVIEW
Notification of Discharge  This is a Notification of Discharge from our facility [address]  Please be advised that this patient has been discharge from our facility  Below you will find the admission and discharge date and time including the patients disposition  PRESENTATION DATE: 2/25/2020  6:37 PM  OBS ADMISSION DATE:   IP ADMISSION DATE: 2/25/20 2040   DISCHARGE DATE: 2/29/2020 12:35 PM  DISPOSITION: Home/Self Care Home/Self Care   Admission Orders listed below:  Admission Orders (From admission, onward)     Ordered        02/25/20 2040  Inpatient Admission  Once                   Please contact the UR Department if additional information is required to close this patient's authorization/case  [address] [facility] Utilization Review Department  Main: [phone] x carefully listen to the prompts  All voicemails are confidential   [email or web address]  Send all requests for admission clinical reviews, approved or denied determinations and any other requests to dedicated fax number below belonging to the campus where the patient is receiving treatment   List of dedicated fax numbers:  [address] DENIALS (Administrative/Medical Necessity) [phone]   [address] (Maternity/NICU/Pediatrics) [phone]   Randy Astorga [phone]   True Constant [phone]   Deandre Standing [phone]   [facility] [address] [facility] [phone]   [address] [facility] [phone]   [address] [facility] [address] [facility]  [address] [facility] [address] [facility] [phone]

## 2020-03-02 NOTE — TELEPHONE ENCOUNTER
Patients GI provider:  Dr Jolie Bower    Number to return call: (  [phone]     Reason for call: Pt calling to schedule 4 week follow up post colon / egd    Scheduled procedure/appointment date if applicable: Apt/procedure  NA

## 2020-03-03 NOTE — RESULT ENCOUNTER NOTE
My medical assistant will call patient with results      Small intestine and colon biopsies are normal

## 2020-04-24 NOTE — UTILIZATION REVIEW
URGENT/EMERGENT  INPATIENT/SPU AUTHORIZATION REQUEST    Date: 04/24/20            # Pages in this Request:     X New Request   Additional Information for PA#:     Office Contact Name:  Carrie Espinoza Title: Utilization Review, [facility] Nurse     Phone: [phone]  Ext  Availability (Date/Time): Wednesday - Friday 8 am- 4 pm    x Inpatient Review  SPU Review       x Current        Late Pick-up   · How your facility was first notified of the Late Pick-up:  · When your facility was first notified of the Late Pick-up (date):         RECIPIENT INFORMATION    Recipient ID#: [id number]   Recipient Name: Amber Tavares     YOB: 1955  62 y o  Recipient Alias:     Gender:   Male X Female Medicaid Eligibility ([address] [place] [address]): INSURANCE INFORMATION    (All other private or governmental health insurance benefits must be utilized prior to billing the [place] Program)    Was this admission the result of an MVA, other accident, assault, injury, fall, gunshot, bite etc ? Yes x No                   If yes, provide a brief description of the incident  Does the recipient have other insurance coverage? Yes x No        Insurance Company Name/Policy #      Did that insurance pay on this claim? Yes  No        Did that insurance deny this claim? Yes  No    If yes, reason for denial:      Does the recipient have Medicare? Yes x No        Did Medicare exhaust prior to this admission? Yes  No        Did Medicare partially pay this claim? Yes  No        Did that insurance deny this claim? Yes  No    If yes, reason for denial:          Was the recipient a prisoner at the time of admission? Yes x No            PROVIDER INFORMATION    Hospital Name: [address] [place] [address] Provider ID#: [id number]    [address] [place] [address] Physician Name: Adrianne Wilkes Provider ID#: [id number]        ADMISSION INFORMATION    Type of Admission: (please choose one)    X ED      Direct    If yes, from where?      Transfer If yes, transferring hospital (inpatient, rehab, or psych) Provider Name/Provider ID#: Admission Floor or Unit Type: Med Surg     Dates/Times:        ED Date/Time: 2/25/2020  6:37 PM        Observation Date/Time:          Admission Date/Time: 2/25/20 2040        Discharge or Transfer Date/Time: 2/29/2020 12:35 PM        DIAGNOSIS/PROCEDURE CODES    Primary Diagnosis Code/Primary Diagnosis Code description:  G81 460 Crohn's disease, unspecified, with rectal bleeding   K44 9 Diaphragmatic hernia without obstruction or gangrene   K64 8 Other hemorrhoids   K64 4 Residual hemorrhoidal skin tags   Additional Diagnosis Code(s) and Description(s)-(up to three additional codes):    Procedure Code (one) and description:  [id number] Excision of Ileum, Endo, Diagn      CLINICAL INFORMATION - PRIOR ADMISSION ONLY    Is there a prior admission with a discharge date within 30 days of the date of this admission? X No (Proceed to the next section - "Clinical Information - General Review Checklist:)      Yes (Provide the following information)     Prior admission dates:    MA Prior Authorization Number:        Review Outcome:     Diagnosis Code(s)/Description:    Procedure Code/Description:    Findings:    Treatment:    Condition on Discharge:   Vitals:    Labs:   Imaging:   Medications: Follow-up Instructions:    Disposition:        CLINICAL INFORMATION - GENERAL REVIEW CHECKLIST    EMERGENCY DEPARTMENT: (Proceed to "ADMISSION" if Direct Admission)    Presenting Signs/Symptoms: 62 yof ambulatory to er from home c/o epigastric abd pain associated with n/v/d x 4 days  Hx hypertension, hyperlipidemia, CHF, Crohn's disease, brain aneurysm, atrial fibrillation  satrted bowel prep on Sunday for colonoscopy Monday, however, unable to tolerate, had severe abd pain, seen in er, work-up neg & d/c'd home  Presents today abd non-distended   BS present, normoactive   Tender to palpation in epigastrium, periumbilical region, and right upper quadrant with equivocal Freeman's  Heather Willson is no rebound or guarding  Admission work-up showing elevated lft's, sed rate & crp, hepatomegaly  Admitted to inpatient status for diarrhea, started on iv steroids, ivf, gi consulted    Placed on contact & hand hygiene isolation, stool cultures including a Clostridium difficile culture, a Rotavirus stool antigen test, a stool culture for enteric bacterial pathogens, and stool for fecal leukocytes    Medication/treatment prior to arrival in the ED:    Past Medical History:     Diagnosis    A-fib ([place] [address] )    Cancer ([place] [address] )    Cardiac disease    CHF (congestive heart failure) ([place] [address] )    Crohn disease ([place] [address] )    Disease of thyroid gland    GERD (gastroesophageal reflux disease)    Hyperlipidemia    Hypertension       Clinical Exam:    Initial Vital Signs: (Temp, Pulse, Resp, and BP)   ED Triage Vitals [02/25/20 1843]   Temperature Pulse Respirations Blood Pressure SpO2   98 5 °F (36 9 °C) 95 16 158/75 95 %      Temp Source Heart Rate Source Patient Position - Orthostatic VS BP Location FiO2 (%)   Temporal Monitor Sitting Left arm --      Pain Score       8           Pertinent Repeat Vital Signs: (include times they were obtained)  02/26/20 10:33:50            141/61   88            02/26/20 06:39:15   96 6 °F (35 9 °C)Abnormal    69   18   123/56   78   96 %   None (Room air)   Lying   02/25/20 21:09:24   97 6 °F (36 4 °C)   75   18   152/83   106   93 %   None (Room air)   Sitting   02/25/20 1843   98 5 °F (36 9 °C)   95   16   158/75      95 %   None (Room air)   Sitting       Pertinent Sustained Findings: (include times they were obtained)    Weight in Kilograms:  Wt Readings from Last 1 Encounters:   02/29/20 109 kg (240 lb 4 8 oz)       Pertinent Labs (results):  Results from last 7 days   Lab Units 02/26/20  0529 02/25/20  1904 02/23/20  1917 02/23/20  0906   WBC Thousand/uL 5 90 7 08 8 72 10 05   HEMOGLOBIN g/dL 12 4 14 0 13 4 13 6   HEMATOCRIT % 40 4 46 0 44 3 44 8   PLATELETS Thousands/uL 244 287 291 319   NEUTROS ABS Thousands/µL 4 48 5 09 7 02 8 27*              Results from last 7 days   Lab Units 02/26/20  0529 02/25/20 1904 02/23/20 1917 02/23/20  0906   SODIUM mmol/L 139 141 138 141   POTASSIUM mmol/L 3 6 3 6 3 6 3 9   CHLORIDE mmol/L 103 102 101 103   CO2 mmol/L 28 28 24 27   ANION GAP mmol/L 8 11 13 11   BUN mg/dL 11 12 9 13   CREATININE mg/dL 0 76 0 92 0 91 0 91   EGFR ml/min/1 73sq m 84 66 67 67   CALCIUM mg/dL 8 4 9 0 9 0 9 1   MAGNESIUM mg/dL 1 7  --   --  1 8   PHOSPHORUS mg/dL 2 7  --   --   --               Results from last 7 days   Lab Units 02/26/20 0529 02/25/20 1904 02/23/20 1917 02/23/20  0906   AST U/L 163* 121* 92* 83*   ALT U/L 149* 123* 95* 96*   ALK PHOS U/L 104 103 100 108   TOTAL PROTEIN g/dL 7 4 8 3* 8 4* 8 3*   ALBUMIN g/dL 3 0* 3 4* 3 6 3 4*   TOTAL BILIRUBIN mg/dL 0 30 0 20 0 30 0 40   BILIRUBIN DIRECT mg/dL  --  0 10 0 09  --               Results from last 7 days   Lab Units 02/26/20  0529 02/25/20 1904 02/23/20 1917 02/23/20  0906   GLUCOSE RANDOM mg/dL 168* 136 136 141*           Results from last 7 days   Lab Units 02/26/20  0529   TSH 3RD GENERATON uIU/mL 1  294           Results from last 7 days   Lab Units 02/26/20  0529   LACTIC ACID mmol/L 1 3             Results from last 7 days   Lab Units 02/25/20 1904 02/23/20 1917 02/23/20  0906   LIPASE u/L 189 141 148           Results from last 7 days   Lab Units 02/25/20  1904   CRP mg/L 21 1*   SED RATE mm/hour 35*           Results from last 7 days   Lab Units 02/26/20  0144   CLARITY UA   Slightly Cloudy   COLOR UA   Laura   SPEC GRAV UA   >=1 030   PH UA   6 0   GLUCOSE UA mg/dl Negative   KETONES UA mg/dl Negative   BLOOD UA   Trace-Intact*   PROTEIN UA mg/dl Negative   NITRITE UA   Negative   BILIRUBIN UA   Negative   UROBILINOGEN UA E U /dl 1 0   LEUKOCYTES UA   Negative   WBC UA /hpf 0-1*   RBC UA /hpf 1-2*   BACTERIA UA /hpf Occasional   EPITHELIAL CELLS WET PREP /hpf Moderate*   MUCUS THREADS   Moderate*           Results from last 7 days   Lab Units 02/25/20  2155   INFLUENZA A PCR   None Detected   INFLUENZA B PCR   None Detected   RSV PCR   None Detected           Results from last 7 days   Lab Units 02/23/20  1007   C DIFF TOXIN B   Negative           Results from last 7 days   Lab Units 02/23/20  1007   SALMONELLA SP PCR   None Detected   SHIGELLA SP/ENTEROINVASIVE E  COLI (EIEC)   None Detected   CAMPYLOBACTER SP (JEJUNI AND COLI)   None Detected   SHIGA TOXIN 1/SHIGA TOXIN 2   None Detected       Radiology (results):  2/23  abd xray=  Nonobstructing bowel gas pattern  Ct a/p=  1   No acute abnormality within the abdomen or pelvis      2/26  US liver=Severe hepatomegaly and hepatic steatosis as seen on CT  EKG (results): Other tests (results):    Tests pending final results:    Treatment in the ED:   Medication Administration from 02/25/2020 1831 to 02/25/2020 2105       Date/Time Order Dose Route Action     02/25/2020 1931 morphine (PF) 4 mg/mL injection 4 mg 4 mg Intravenous Given     02/25/2020 1931 ondansetron (ZOFRAN) injection 4 mg 4 mg Intravenous Given           Other treatments:      Change in condition while in the ED:     Response to ED Treatment:          OBSERVATION: (Proceed to "ADMISSION" if Direct Admission)    Orders written during the observation period  Meds Name, dose, route, time, how may doses given:  PRN Meds Name, dose, route, time, how many doses given within the first 24 hrs :  IVs Type, rate, and total amt  ordered/given:  Labs, imaging, other:  Consults and findings:    Test Results during the observation period  Pertinent Lab tests (dates/results):  Culture results (blood, urine, spinal, wound, respiratory, etc ):  Imaging tests (dates/results):  EKG (dates/results):   Other test (dates/results):  Tests pending (dates/results):    Surgical or Invasive Procedures during the observation period  Name of surgery/procedure:  Date & Time:  Patient Response:  Post-operative orders:  Operative Report/Findings:    Response to Treatment, Major Change in Condition, Major Charge in Treatment during the observation period          ADMISSION:    DIRECT Admissions Only:    · Presenting Signs/Symptoms:   ·   · Medication/treatment prior to arrival:  ·   · Past Medical History:  ·   · Clinical Exam on admission:  ·   · Vital Signs on admission: (Temp, Pulse, Resp, and BP)  ·   · Weight in kilograms:     ALL Admissions:    Admission Orders and Other Orders written within the first 24 hrs after admission  Meds Name, dose, route, time, how may doses given:  apixaban 5 mg Oral BID   aspirin 81 mg Oral Daily   dofetilide 500 mcg Oral Q12H [address]   fluticasone-vilanterol 1 puff Inhalation Daily   levothyroxine 125 mcg Oral Early Morning   mesalamine 400 mg Oral TID   methylPREDNISolone sodium succinate 40 mg Intravenous Q12H [address]   metoprolol succinate 100 mg Oral Daily   venlafaxine 37 5 mg Oral Daily        PRN Meds Name, dose, route, time, how many doses given within the first 24 hrs :  oxyCODONE 5 mg Oral Q4H PRN   Or         oxyCODONE 10 mg Oral Q4H PRN       IVs Type, rate, and total amt  ordered/given:  sodium chloride 75 mL/hr Intravenous Continuous       Labs, imaging, other:      Consults and findings: Gastroenterology - 2/26 - 63 yo F with history of afib on eliquis, yaneth, hypothyroidism admitted with abdominal pain, rectal bleeding and diarrhea  She reports a vague history of possible Crohn's disease diagnosed 2-3 years ago  She has been on mesalamine (Delzicol 400mg TID) during this time  She states her Crohn's disease was diagnosed due to symptoms of diarrhea for years and was noted to have IV valve ulcers  Pathology and procedure report are not available for review  Per EMR notes she also has a history of Max's esophagus without dysplasia       She has had recent worsening of diarrhea now 6-10 times per day, and intermittent rectal bleeding   She notes moderate epigastric abdominal pain  Labs this admission show elevated CRP  C diff and stool enteric pathogens negative  She was empirically started on solumedrol 40mg IV BID     Diarrhea: ddx includes infections, inflammatory and malabsorptive causes, versus IBS  Will plan for EGD/colonoscopy Friday  Please hold IV solumedrol until IBD is confirmed on her colonoscopy given the vague history     Elevated LFTS: AST//160s and otherwise normal bilirubin and ALP  Imaging shows severe hepatic steatosis, this is likely due to NAFLD  Chronic hepatitis panel negative  Continue to montior lfts  She would benefit from US elastography as outpatient  Send autoimmune serologies          Test Results after admission  Pertinent Lab tests (dates/results):  Culture results (blood, urine, spinal, wound, respiratory, etc ):  Imaging tests (dates/results):  EKG (dates/results): Other test (dates/results):  Tests pending (dates/results):    Surgical or Invasive Procedures  Name of surgery/procedure: EGD - Colonoscopy   Date & Time: 2/26/2020  Patient Response: tolerated   Post-operative orders: same   Operative Report/Findings:  EGD 2/26  1600  Impression:  Regular Z-line 38 cm from the incisors  3 cm hiatal hernia - GE junction 38 cm from the incisors, diaphragmatic   impression 41 cm from the incisors  The stomach and duodenum appeared otherwise normal    Biopsies were obtained in the  duodenum to evaluate for Celiac disease  Colonoscopy  - 2/    Impression:  Moderate amount of semi-liquid stool in the right colon interfering with   visualization  Large lesions are unlikely to be missed but smaller lesions   may have been obscured  6 mm sessile polyp in the sigmoid colon  Not resected given that patient is   on Eliquis  Large, external and internal hemorrhoids  All observed locations appeared otherwise normal  Terminal ileum and   colonic mucosa normal  Biopsies were performed for the evaluation of   microscopic colitis    RECOMMENDATION:  Repeat in 6 months due to an inadequate bowel preparation and colon polyp   which was not resected  Follow up biopsy results  No evidence of inflammatory bowel disease on this exam    Start immodium as needed for diarrhea and follow up in GI office as   outpatient  Response to Treatment, Major Change in Condition, Major Charge in Treatment anytime during admission  * Diarrhea  Assessment & Plan  · Diagnosed with possible Crohn's disease approximately 30 years ago  · A fecal calprotectin level was checked with results pending at the time discharge  · Continue PO mesalamine  · Continue eliquis for now  · The patient was seen in consultation by Gastroenterology  · The patient underwent an EGD and colonoscopy on 02/28/2020 with the following impressions/recommendations:  IMPRESSION:  · Regular Z-line 38 cm from the incisors  · 3 cm hiatal hernia - GE junction 38 cm from the incisors, diaphragmatic impression 41 cm from the incisors  · The stomach and duodenum appeared otherwise normal  ·   Biopsies were obtained in the  duodenum to evaluate for Celiac disease     RECOMMENDATION:  Follow up biopsy results  Proceed with colonoscopy      IMPRESSION:  · Moderate amount of semi-liquid stool in the right colon interfering with visualization  Large lesions are unlikely to be missed but smaller lesions may have been obscured  · 6 mm sessile polyp in the sigmoid colon  Not resected given that patient is on Eliquis  · Large, external and internal hemorrhoids  · All observed locations appeared otherwise normal  Terminal ileum and colonic mucosa normal  Biopsies were performed for the evaluation of microscopic colitis     RECOMMENDATION:  Repeat in 6 months due to an inadequate bowel preparation and colon polyp which was not resected  Follow up biopsy results  No evidence of inflammatory bowel disease on this exam    Start immodium as needed for diarrhea and follow up in GI office as outpatient     · Outpatient follow-up with Gastroenterology           Rectal bleeding  Assessment & Plan  · Diagnosed with possible Crohn's disease approximately 30 years ago  · A fecal calprotectin level was checked with results pending at the time discharge  · Continue PO mesalamine  · Continue eliquis for now  · The patient was seen in consultation by Gastroenterology  · The patient underwent an EGD and colonoscopy on 02/28/2020 with the following impressions/recommendations:  IMPRESSION:  · Regular Z-line 38 cm from the incisors  · 3 cm hiatal hernia - GE junction 38 cm from the incisors, diaphragmatic impression 41 cm from the incisors  · The stomach and duodenum appeared otherwise normal  ·   Biopsies were obtained in the  duodenum to evaluate for Celiac disease     RECOMMENDATION:  Follow up biopsy results  Proceed with colonoscopy      IMPRESSION:  · Moderate amount of semi-liquid stool in the right colon interfering with visualization  Large lesions are unlikely to be missed but smaller lesions may have been obscured  · 6 mm sessile polyp in the sigmoid colon  Not resected given that patient is on Eliquis  · Large, external and internal hemorrhoids  · All observed locations appeared otherwise normal  Terminal ileum and colonic mucosa normal  Biopsies were performed for the evaluation of microscopic colitis     RECOMMENDATION:  Repeat in 6 months due to an inadequate bowel preparation and colon polyp which was not resected  Follow up biopsy results  No evidence of inflammatory bowel disease on this exam    Start immodium as needed for diarrhea and follow up in GI office as outpatient       · Outpatient follow-up with Gastroenterology                    Transaminitis  Assessment & Plan  · Likely secondary to hepatic steatosis  · The patient's fenofibrate was held during the hospitalization and will continue to be held until the transaminitis resolves  · Avoid all hepatotoxic agents  · Outpatient follow-up with Gastroenterology  · Follow the LFT's (liver function tests) as an outpatient with her PCP     Disposition on Discharge  Home, Rehab, SNF, LTC, Shelter, etc : Home/Self Care    Cease to Breathe (CTB)  If a patient expires during an admission, in addition to the above information, please include:    Summary/timeline of the patient's decline in condition:    Medications and treatment:    Patient response to treatment:    Date and time patient ceased to breathe:        Is there a Readmission that follows this admission? Yes x No    If yes, provide dates:          InterQual Review    InterQual Criteria Met:  Yes X No  N/A        Please include the InterQual Review, InterQual year/version used, and the criteria selected:         PLEASE SUBMIT THE COMPLETED FORM TO [facility] [phone] or VIA E-MAIL TO [email or web address]    Signature: Hever Martines Date:  04/24/20    Confidentiality Notice: The documents accompanying this telecopy may contain confidential information belonging to the sender  The information is intended only for the use of the individual named above  If you are not the intended recipient, you are hereby notified  That any disclosure, copying, distribution or taking of any telecopy is strictly prohibited

## 2020-04-29 ENCOUNTER — TRANSCRIBE ORDERS (OUTPATIENT)
Dept: SLEEP CENTER | Facility: CLINIC | Age: 65
End: 2020-04-29

## 2020-04-29 DIAGNOSIS — G47.33 OBSTRUCTIVE SLEEP APNEA (ADULT) (PEDIATRIC): Primary | ICD-10-CM

## 2020-05-04 ENCOUNTER — OFFICE VISIT (OUTPATIENT)
Dept: SLEEP CENTER | Facility: CLINIC | Age: 65
End: 2020-05-04
Payer: COMMERCIAL

## 2020-05-04 VITALS
BODY MASS INDEX: 34.93 KG/M2 | WEIGHT: 244 LBS | HEIGHT: 70 IN | HEART RATE: 97 BPM | SYSTOLIC BLOOD PRESSURE: 130 MMHG | DIASTOLIC BLOOD PRESSURE: 75 MMHG

## 2020-05-04 DIAGNOSIS — G47.33 OSA (OBSTRUCTIVE SLEEP APNEA): Primary | ICD-10-CM

## 2020-05-04 DIAGNOSIS — J96.01 ACUTE RESPIRATORY FAILURE WITH HYPOXIA (HCC): ICD-10-CM

## 2020-05-04 DIAGNOSIS — I48.0 PAROXYSMAL ATRIAL FIBRILLATION (HCC): ICD-10-CM

## 2020-05-04 DIAGNOSIS — G25.81 RLS (RESTLESS LEGS SYNDROME): ICD-10-CM

## 2020-05-04 DIAGNOSIS — J44.1 COPD EXACERBATION (HCC): ICD-10-CM

## 2020-05-04 DIAGNOSIS — I27.20 PULMONARY HYPERTENSION (HCC): ICD-10-CM

## 2020-05-04 PROCEDURE — 99214 OFFICE O/P EST MOD 30 MIN: CPT | Performed by: PSYCHIATRY & NEUROLOGY

## 2020-05-04 RX ORDER — PRAMIPEXOLE DIHYDROCHLORIDE 0.12 MG/1
0.12 TABLET ORAL
Qty: 30 TABLET | Refills: 1 | Status: SHIPPED | OUTPATIENT
Start: 2020-05-04 | End: 2020-06-22

## 2020-05-04 RX ORDER — FENOFIBRATE 54 MG/1
54 TABLET ORAL DAILY
COMMUNITY

## 2020-05-04 RX ORDER — PRAMIPEXOLE DIHYDROCHLORIDE 0.12 MG/1
0.12 TABLET ORAL
Qty: 30 TABLET | Refills: 1 | Status: SHIPPED | OUTPATIENT
Start: 2020-05-04 | End: 2020-05-04 | Stop reason: SDUPTHER

## 2020-05-06 ENCOUNTER — APPOINTMENT (OUTPATIENT)
Dept: LAB | Facility: HOSPITAL | Age: 65
End: 2020-05-06
Attending: INTERNAL MEDICINE
Payer: COMMERCIAL

## 2020-05-06 ENCOUNTER — TELEPHONE (OUTPATIENT)
Dept: GASTROENTEROLOGY | Facility: CLINIC | Age: 65
End: 2020-05-06

## 2020-05-06 ENCOUNTER — OFFICE VISIT (OUTPATIENT)
Dept: GASTROENTEROLOGY | Facility: CLINIC | Age: 65
End: 2020-05-06
Payer: COMMERCIAL

## 2020-05-06 VITALS
HEIGHT: 70 IN | DIASTOLIC BLOOD PRESSURE: 74 MMHG | HEART RATE: 75 BPM | BODY MASS INDEX: 35.33 KG/M2 | TEMPERATURE: 98.7 F | SYSTOLIC BLOOD PRESSURE: 133 MMHG | WEIGHT: 246.8 LBS

## 2020-05-06 DIAGNOSIS — R19.7 DIARRHEA, UNSPECIFIED TYPE: Primary | ICD-10-CM

## 2020-05-06 DIAGNOSIS — R74.8 ELEVATED LIVER ENZYMES: ICD-10-CM

## 2020-05-06 DIAGNOSIS — R19.7 DIARRHEA, UNSPECIFIED TYPE: ICD-10-CM

## 2020-05-06 LAB
ALBUMIN SERPL BCP-MCNC: 3.5 G/DL (ref 3.5–5)
ALP SERPL-CCNC: 130 U/L (ref 46–116)
ALT SERPL W P-5'-P-CCNC: 122 U/L (ref 12–78)
AST SERPL W P-5'-P-CCNC: 95 U/L (ref 5–45)
BILIRUB DIRECT SERPL-MCNC: 0.09 MG/DL (ref 0–0.2)
BILIRUB SERPL-MCNC: 0.2 MG/DL (ref 0.2–1)
CRP SERPL QL: 10.9 MG/L
PROT SERPL-MCNC: 8.2 G/DL (ref 6.4–8.2)

## 2020-05-06 PROCEDURE — 80076 HEPATIC FUNCTION PANEL: CPT

## 2020-05-06 PROCEDURE — 99204 OFFICE O/P NEW MOD 45 MIN: CPT | Performed by: INTERNAL MEDICINE

## 2020-05-06 PROCEDURE — 86140 C-REACTIVE PROTEIN: CPT

## 2020-05-06 PROCEDURE — 36415 COLL VENOUS BLD VENIPUNCTURE: CPT

## 2020-05-06 RX ORDER — MESALAMINE 375 MG/1
375 CAPSULE, EXTENDED RELEASE ORAL DAILY
COMMUNITY
End: 2020-07-29

## 2020-05-07 ENCOUNTER — APPOINTMENT (OUTPATIENT)
Dept: LAB | Facility: HOSPITAL | Age: 65
End: 2020-05-07
Attending: INTERNAL MEDICINE
Payer: COMMERCIAL

## 2020-05-07 DIAGNOSIS — R19.7 DIARRHEA, UNSPECIFIED TYPE: ICD-10-CM

## 2020-05-07 PROCEDURE — 87329 GIARDIA AG IA: CPT

## 2020-05-07 PROCEDURE — 83993 ASSAY FOR CALPROTECTIN FECAL: CPT

## 2020-05-07 PROCEDURE — 87209 SMEAR COMPLEX STAIN: CPT

## 2020-05-07 PROCEDURE — 87177 OVA AND PARASITES SMEARS: CPT

## 2020-05-08 ENCOUNTER — TELEPHONE (OUTPATIENT)
Dept: GASTROENTEROLOGY | Facility: CLINIC | Age: 65
End: 2020-05-08

## 2020-05-11 LAB — G LAMBLIA AG STL QL IA: NEGATIVE

## 2020-05-12 LAB
CALPROTECTIN STL-MCNT: 90 UG/G (ref 0–120)
O+P STL CONC: NORMAL

## 2020-05-22 ENCOUNTER — TELEPHONE (OUTPATIENT)
Dept: GASTROENTEROLOGY | Facility: AMBULARY SURGERY CENTER | Age: 65
End: 2020-05-22

## 2020-05-22 DIAGNOSIS — K63.89 SMALL INTESTINAL BACTERIAL OVERGROWTH: Primary | ICD-10-CM

## 2020-05-22 DIAGNOSIS — R74.8 ELEVATED LIVER ENZYMES: ICD-10-CM

## 2020-05-22 RX ORDER — METRONIDAZOLE 250 MG/1
250 TABLET ORAL EVERY 8 HOURS SCHEDULED
Qty: 30 TABLET | Refills: 0 | Status: SHIPPED | OUTPATIENT
Start: 2020-05-22 | End: 2020-06-01

## 2020-05-30 DIAGNOSIS — K63.89 SMALL INTESTINAL BACTERIAL OVERGROWTH: ICD-10-CM

## 2020-06-01 ENCOUNTER — HOSPITAL ENCOUNTER (OUTPATIENT)
Dept: RADIOLOGY | Facility: HOSPITAL | Age: 65
Discharge: HOME | End: 2020-06-01
Attending: INTERNAL MEDICINE
Payer: COMMERCIAL

## 2020-06-01 DIAGNOSIS — R74.8 ELEVATED LIVER ENZYMES: ICD-10-CM

## 2020-06-01 PROCEDURE — 76981 USE PARENCHYMA: CPT

## 2020-06-02 ENCOUNTER — TELEPHONE (OUTPATIENT)
Dept: GASTROENTEROLOGY | Facility: AMBULARY SURGERY CENTER | Age: 65
End: 2020-06-02

## 2020-06-02 DIAGNOSIS — R19.7 DIARRHEA, UNSPECIFIED TYPE: Primary | ICD-10-CM

## 2020-06-02 DIAGNOSIS — R79.82 ELEVATED C-REACTIVE PROTEIN (CRP): ICD-10-CM

## 2020-06-03 ENCOUNTER — TELEPHONE (OUTPATIENT)
Dept: GASTROENTEROLOGY | Facility: AMBULARY SURGERY CENTER | Age: 65
End: 2020-06-03

## 2020-06-03 NOTE — TELEPHONE ENCOUNTER
----- Message from [facility], MD sent at 6/3/2020  1:59 PM EDT -----  Please let patient know that her fiber scan was negative      Thank you

## 2020-06-03 NOTE — TELEPHONE ENCOUNTER
Patient mentioned she has an order for MRI of abdomen w/wo contrast and MRCP  , she stated she will not be able to do that without having sedation  Can someone please call patient   thanks

## 2020-06-04 ENCOUNTER — TELEPHONE (OUTPATIENT)
Dept: GASTROENTEROLOGY | Facility: AMBULARY SURGERY CENTER | Age: 65
End: 2020-06-04

## 2020-06-04 NOTE — TELEPHONE ENCOUNTER
----- Message from [facility], MD sent at 6/3/2020  1:59 PM EDT -----  Please let patient know that her fiber scan was negative      Thank you

## 2020-06-05 NOTE — TELEPHONE ENCOUNTER
Please let patient know that she cannot be fully sedated for exam because she needs to listen to instructions with regard to breathing during the MRI   She can ask her PCP for anti anxiety medication to be taken prior to procedure

## 2020-06-19 ENCOUNTER — HOSPITAL ENCOUNTER (OUTPATIENT)
Dept: CT IMAGING | Facility: HOSPITAL | Age: 65
Discharge: HOME | End: 2020-06-19
Payer: COMMERCIAL

## 2020-06-19 DIAGNOSIS — R19.7 DIARRHEA, UNSPECIFIED TYPE: ICD-10-CM

## 2020-06-19 DIAGNOSIS — R79.82 ELEVATED C-REACTIVE PROTEIN (CRP): ICD-10-CM

## 2020-06-19 PROCEDURE — 74177 CT ABD & PELVIS W/CONTRAST: CPT

## 2020-06-19 RX ORDER — METRONIDAZOLE 250 MG/1
TABLET ORAL
Qty: 30 TABLET | Refills: 0 | OUTPATIENT
Start: 2020-06-19

## 2020-06-19 RX ADMIN — IOHEXOL 100 ML: 350 INJECTION, SOLUTION INTRAVENOUS at 11:35

## 2020-06-22 DIAGNOSIS — G25.81 RLS (RESTLESS LEGS SYNDROME): ICD-10-CM

## 2020-06-22 RX ORDER — PRAMIPEXOLE DIHYDROCHLORIDE 0.12 MG/1
TABLET ORAL
Qty: 30 TABLET | Refills: 0 | Status: SHIPPED | OUTPATIENT
Start: 2020-06-22 | End: 2020-07-20 | Stop reason: SDUPTHER

## 2020-07-10 ENCOUNTER — TELEPHONE (OUTPATIENT)
Dept: GASTROENTEROLOGY | Facility: CLINIC | Age: 65
End: 2020-07-10

## 2020-07-20 ENCOUNTER — OFFICE VISIT (OUTPATIENT)
Dept: SLEEP CENTER | Facility: CLINIC | Age: 65
End: 2020-07-20
Payer: COMMERCIAL

## 2020-07-20 VITALS
DIASTOLIC BLOOD PRESSURE: 68 MMHG | WEIGHT: 244 LBS | TEMPERATURE: 99.3 F | HEIGHT: 70 IN | BODY MASS INDEX: 34.93 KG/M2 | SYSTOLIC BLOOD PRESSURE: 128 MMHG

## 2020-07-20 DIAGNOSIS — G47.33 OBSTRUCTIVE SLEEP APNEA (ADULT) (PEDIATRIC): ICD-10-CM

## 2020-07-20 DIAGNOSIS — G25.81 RLS (RESTLESS LEGS SYNDROME): ICD-10-CM

## 2020-07-20 PROCEDURE — 99214 OFFICE O/P EST MOD 30 MIN: CPT | Performed by: PSYCHIATRY & NEUROLOGY

## 2020-07-20 RX ORDER — PRAMIPEXOLE DIHYDROCHLORIDE 0.25 MG/1
0.25 TABLET ORAL
Qty: 90 TABLET | Refills: 1 | Status: SHIPPED | OUTPATIENT
Start: 2020-07-20 | End: 2021-01-18 | Stop reason: SDUPTHER

## 2020-07-20 NOTE — PATIENT INSTRUCTIONS
Recommendations:    1) continue CPAP 13cm   2) Safe driving reviewed  3) Follow-up 6 months  4) Call with any questions or concerns    5) Pramipexole 0 25mg at bedtime

## 2020-07-20 NOTE — PROGRESS NOTES
Sleep Medicine Follow-Up Note    HPI: 60yo F with MARY ELLEN presenting for a compliance visit  Treatment Summary: 2019 PSG showed severe MARY ELLEN with AHI 68 and 102 in REM  Ed of 77% with borderline oxygenation as well at baseline  2019 had CPAP titration which showed an adequate pressure of 12cm and correction of hypoxemia  HPI:  Today, patient presents unaccompanied  The patient stated that that pramipexole was working better it the beginning, but now some nights she is still kept awake by the RLS and then has cramps at times  She will have to get out of bed and walk around  She denied gambling or daytime sleepiness  She has no issues with her CPAP  She still has some marks on her face in the morning, but they subside  Treatment: CPAP  -Pressure: 13cm   -Pressure intolerance: no   -Aerophagia: no   -Air Hunger: no  -Interface: NM  -Fit: good  -Chin strap: no  -HCC: YME  -Patient's perceived outcome: sleeps well with the CPAP and no longer waking up in the middle of the night       Compliance Card Data:    - Date Range: 20-20   - Settincm   - Residual AHI: 0 4   - Vibratory Snore Index: 0   - %  Night in Large Leak: 0%   - Average usage days used: 9h 25m   - % Days used: 100%   - % Days with Usage > 4 hrs: 100%    Respiratory:  -Ongoing Snoring: no  -Mouth Breathing: no  -Dry Mouth: no  -Nocturnal Gasping: no    ROS:   Genitourinary none   Cardiology ankle/leg swelling   Gastrointestinal frequent heartburn/acid reflux   Neurology none   Constitutional excessive sweating at night and weight change   Integumentary none   Psychiatry none   Musculoskeletal back pain, sciatica and leg cramps   Pulmonary shortness of breath with activity   ENT throat clearing   Endocrine none   Hematological none       Sleep Pattern:  -Position: side  -Bedtime: 8-10pm  -Lights Out: same time  -Environment:  Turns TV it on for a few mins then turns it off   -Latency: mins  -Awakenings: 0  -Wake Time: 5-6am  -Rise Time: same time  -Patient's estimate of Sleep Time: 8-10 hours    Daytime Symptoms:  -Upon Awakening: better  -Daytime fatigue/sleepiness: tired in the afternoon  -Naps: denied  -Involuntary Dozing: denied  -Cognitive Symptoms: denied  -Driving: Difficulty with sleepiness and driving:  no   -- Close calls related to sleepiness: no   -- Accidents related to sleepiness: no    Questionnaire:  Sitting and reading: Would never doze  Watching TV: Slight chance of dozing  Sitting, inactive in a public place (e g  a theatre or a meeting): Would never doze  As a passenger in a car for an hour without a break: Slight chance of dozing  Lying down to rest in the afternoon when circumstances permit: Slight chance of dozing  Sitting and talking to someone: Would never doze  Sitting quietly after a lunch without alcohol: Would never doze  In a car, while stopped for a few minutes in traffic: Would never doze  Total score: 3    Substance Use:  -Caffeine: 1 cup in the morning and evening sometimes, but its decaf only  -Alcohol: denied  -THC: denied    --> Supplemental Oxygen Use: denies  --> no changes in her medical history since last visit  PE:    /68 (BP Location: Left arm, Cuff Size: Large)   Temp 99 3 °F (37 4 °C) (Tympanic)   Ht 5' 10" (1 778 m)   Wt 111 kg (244 lb)   BMI 35 01 kg/m²     General:  In NAD  Pul: Respirations: unlaboured  MS: No atrophy  Neuro: No resting tremor  Gait normal turning & station; unremarkable overall  Psych: Socially appropriate  Pleasant  No overt dysphoria  Assessment: The patient has great compliance with her CPAP and her obstructive events are well controlled with a residual AHI 0 4  She is deriving a benefit from using her CPAP as she is less tired than she was in the past and no longed having sleeping while driving  She was benefiting from pramipexole initially, but RLS is still bothersome   Will increase dose to Pramipexole 0 25mg at bedtime to address RLS  Recommendations:    1) continue CPAP 13cm   2) Safe driving reviewed  3) Follow-up 6 months  4) Call with any questions or concerns  5) Pramipexole 0 25mg at bedtime        All questions answered for the patient, who indicated understanding and agreed with the plan       Dolly Kee MD  Psychiatry/ Sleep medicine

## 2020-07-29 ENCOUNTER — OFFICE VISIT (OUTPATIENT)
Dept: GASTROENTEROLOGY | Facility: CLINIC | Age: 65
End: 2020-07-29
Payer: COMMERCIAL

## 2020-07-29 ENCOUNTER — APPOINTMENT (OUTPATIENT)
Dept: LAB | Facility: HOSPITAL | Age: 65
End: 2020-07-29
Attending: INTERNAL MEDICINE
Payer: COMMERCIAL

## 2020-07-29 VITALS
HEART RATE: 74 BPM | BODY MASS INDEX: 34.93 KG/M2 | TEMPERATURE: 99.4 F | HEIGHT: 70 IN | WEIGHT: 244 LBS | SYSTOLIC BLOOD PRESSURE: 147 MMHG | DIASTOLIC BLOOD PRESSURE: 96 MMHG

## 2020-07-29 DIAGNOSIS — K21.9 GASTROESOPHAGEAL REFLUX DISEASE, ESOPHAGITIS PRESENCE NOT SPECIFIED: ICD-10-CM

## 2020-07-29 DIAGNOSIS — K63.89 SMALL INTESTINAL BACTERIAL OVERGROWTH: ICD-10-CM

## 2020-07-29 DIAGNOSIS — K50.919 CROHN'S DISEASE WITH COMPLICATION, UNSPECIFIED GASTROINTESTINAL TRACT LOCATION (HCC): Primary | ICD-10-CM

## 2020-07-29 DIAGNOSIS — R74.8 ELEVATED LIVER ENZYMES: ICD-10-CM

## 2020-07-29 PROCEDURE — 99214 OFFICE O/P EST MOD 30 MIN: CPT | Performed by: INTERNAL MEDICINE

## 2020-07-29 PROCEDURE — 86038 ANTINUCLEAR ANTIBODIES: CPT

## 2020-07-29 PROCEDURE — 82728 ASSAY OF FERRITIN: CPT

## 2020-07-29 PROCEDURE — 36415 COLL VENOUS BLD VENIPUNCTURE: CPT

## 2020-07-29 PROCEDURE — 82390 ASSAY OF CERULOPLASMIN: CPT

## 2020-07-29 PROCEDURE — 86235 NUCLEAR ANTIGEN ANTIBODY: CPT

## 2020-07-29 PROCEDURE — 82104 ALPHA-1-ANTITRYPSIN PHENO: CPT

## 2020-07-29 PROCEDURE — 86256 FLUORESCENT ANTIBODY TITER: CPT

## 2020-07-29 PROCEDURE — 83540 ASSAY OF IRON: CPT

## 2020-07-29 PROCEDURE — 83550 IRON BINDING TEST: CPT

## 2020-07-29 PROCEDURE — 82103 ALPHA-1-ANTITRYPSIN TOTAL: CPT

## 2020-07-29 RX ORDER — OMEPRAZOLE 40 MG/1
40 CAPSULE, DELAYED RELEASE ORAL DAILY
Qty: 30 CAPSULE | Refills: 1 | Status: ON HOLD | OUTPATIENT
Start: 2020-07-29 | End: 2021-02-02

## 2020-07-29 RX ORDER — METRONIDAZOLE 250 MG/1
250 TABLET ORAL EVERY 8 HOURS SCHEDULED
Qty: 30 TABLET | Refills: 0 | Status: SHIPPED | OUTPATIENT
Start: 2020-07-29 | End: 2020-08-08

## 2020-07-29 RX ORDER — MESALAMINE 400 MG/1
800 CAPSULE, DELAYED RELEASE ORAL 2 TIMES DAILY
Qty: 120 CAPSULE | Refills: 1 | Status: SHIPPED | OUTPATIENT
Start: 2020-07-29 | End: 2020-09-29 | Stop reason: SDUPTHER

## 2020-07-29 NOTE — PROGRESS NOTES
[facility] - Outpatient Consultation  Amy Hussein 62 y o  female MRN: [id number]  Encounter: [id number]          ASSESSMENT AND PLAN:    1  Diarrhea  - reported history of Crohns disease but normal EGD, colonoscopy, minimal findings on CT and normal calprotectin  Differential of IBD vs small intestinal bacterial overgrowth  - fecal calprotectin is borderline elevated  - stool giardia ag, ova and parasites were negative  - CT enterography is normal  - stop Apriso  - start Delzicol 800 mg twice a day  If this is not covered by insurance can try Lialda  - will start treatment for small intestinal bacterial overgrowth with metronidazole 250 mg three times a day for 10 days  - start a probiotic while on antibiotics  - start loperamide 2 mg tab as needed up to four times per day for diarrhea (over the counter)    2  Elevated liver tests  - acute hepatitis panel negative  - fibroscan/elsatography (F1) normal-mild   - check chronic liver disease labs    3  GERD  - decrease omeprazole 40 mg once a day   - use pepcid/famotidine 20 mg twice a day as needed  - try lifestyle changes to reduce acid reflux    4  Co-morbidities: AFib on Eliquis, hypothyroidism, MARY ELLEN    Follow up in 1 month with Dr Jett Post      ____________________________________________________________________  HPI:  61 yo W who presents for possible Crohns disease  Co-morbidities: AFib on Eliquis, hypothyroidism, MARY ELLEN    Diarrhea  - now having 5-8 BMs per day  - small amount of blood with wiping   - no abd pain, nausea, or vomiting  - has been off of steroids for a few months  - reports mesalamine/delzicol worked better than [facility]  Had resolution of symptoms       GERD  - heartburn and acid reflux about 2-3x per week  - symptoms controlled on PPI 40 mg BID    Liver enzymes elevated on labs from mid July 2020 ([facility] labs)  - , , , Tbili 0 3    ROS: No constipation, odynophagia, dysphagia, hematemesis, melena, early satiety, appetite changes, wt loss  IBD history  - CT enterography 6/2020 did not show any bowel inflammation  - fecal calprotectin 90 and CRP 10 9 in May 2020  - IBD flare 2/2020 with diarrhea, abd pain and rectal bleeding 2/2020  Also had nausea and vomiting then  Elevated CRP level at 21, sed rate at 35 and fecal calpro 39  CT A/P only showed thickening of distal esophagus, mild circumferential thickening of descending and sigmoid colon which may be due to under distention vs Crohns  Had EGD and colonoscopy at that time that showed hiatal hernia, normal stomach, normal duodenum s/p bx, normal TI s/p bx, and normal colon s/p bx  Biopsies all came back normal  In February she was discharged on mesalamine (which was a home medication prior to admission)  - CT enterography in 2018 showing adhesions but no active disease or clear fibrostenotic changes seen  Reports diagnosed with Crohn's disease diagnosed 30 years ago  Started on mesalamine (Apriso 375 mg 4 tabs daily) for about 1 year  Diagnosed after diarrhea for years  Colonoscopy showed ileocecal valve ulcers  REVIEW OF SYSTEMS:    CONSTITUTIONAL: Denies any fever, chills, rigors, and weight loss  HEENT: No earache or tinnitus  Denies hearing loss or visual disturbances  CARDIOVASCULAR: No chest pain or palpitations  RESPIRATORY: Denies any cough, hemoptysis, shortness of breath or dyspnea on exertion  GASTROINTESTINAL: As noted in the History of Present Illness  GENITOURINARY: No problems with urination  Denies any hematuria or dysuria  NEUROLOGIC: No dizziness or vertigo, denies headaches  MUSCULOSKELETAL: Denies any muscle or joint pain  SKIN: Denies skin rashes or itching  ENDOCRINE: Denies excessive thirst  Denies intolerance to heat or cold  PSYCHOSOCIAL: Denies depression or anxiety  Denies any recent memory loss         Historical Information   Past Medical History:   Diagnosis Date    A-fib [facility])     Brain aneurysm  Cancer ([facility] [address] )     papillary thyroid cancer    Cardiac disease     CHF (congestive heart failure) (HCC)     Crohn disease (HCC)     Disease of thyroid gland     GERD (gastroesophageal reflux disease)     Hyperlipidemia     Hypertension      Past Surgical History:   Procedure Laterality Date    APPENDECTOMY      CARDIOVERSION N/A 2019    Procedure: CARDIOVERSION;  Surgeon: Gianfranco Araujo MD;  Location: [facility];  Service: Cardiology    CEREBRAL ANEURYSM REPAIR      CHOLECYSTECTOMY      HYSTERECTOMY      THYROIDECTOMY      TONSILLECTOMY AND ADENOIDECTOMY       Social History   Social History     Substance and Sexual Activity   Alcohol Use Never    Alcohol/week: 0 0 standard drinks    Frequency: Never    Drinks per session: Patient refused    Binge frequency: Never     Social History     Substance and Sexual Activity   Drug Use Never     Social History     Tobacco Use   Smoking Status Former Smoker    Last attempt to quit: 2018    Years since quittin 5   Smokeless Tobacco Never Used     No family history on file      Meds/Allergies       Current Outpatient Medications:     albuterol (PROVENTIL HFA,VENTOLIN HFA) 90 mcg/act inhaler    apixaban (ELIQUIS) 5 mg    aspirin 81 mg chewable tablet    Blood Glucose Monitoring Suppl (ONETOUCH VERIO) w/Device KIT    Blood Glucose Monitoring Suppl (ONETOUCH VERIO) w/Device KIT    dofetilide (TIKOSYN) 500 mcg capsule    EQ ASPIRIN ADULT LOW DOSE 81 MG EC tablet    fenofibrate (TRICOR) 54 MG tablet    furosemide (LASIX) 20 mg tablet    glucose blood test strip    Lancets (ONETOUCH DELICA PLUS [id number]) MISC    mesalamine (APRISO) 0 375 g 24 hr capsule    metFORMIN (GLUCOPHAGE-XR) 500 mg 24 hr tablet    metoprolol succinate (TOPROL-XL) 100 mg 24 hr tablet    mometasone-formoterol (DULERA) 100-5 MCG/ACT inhaler    Multiple Vitamins-Minerals (CENTRUM SILVER) tablet    naproxen (Naprosyn) 500 mg tablet    omeprazole (PriLOSEC) 40 MG capsule    OneTouch Delica Lancets 19E MISC    ONETOUCH VERIO test strip    pramipexole (MIRAPEX) 0 25 mg tablet    SYNTHROID 150 MCG tablet    venlafaxine (EFFEXOR XR) 37 5 mg 24 hr capsule    Allergies   Allergen Reactions    Sulfa Antibiotics Rash    Other            Objective     Blood pressure 147/96, pulse 74, temperature 99 4 °F (37 4 °C), temperature source Tympanic, height 5' 10" (1 778 m), weight 111 kg (244 lb)  Body mass index is 35 01 kg/m²  PHYSICAL EXAM:      General Appearance:   Alert, cooperative, no distress   HEENT:   Normocephalic, atraumatic, anicteric  Neck:  Supple, symmetrical, trachea midline   Lungs:   Clear to auscultation bilaterally; no rales, rhonchi or wheezing; respirations unlabored    Heart[de-identified]   Regular rate and rhythm; no murmur, rub, or gallop  Abdomen:   Soft, non-tender, non-distended; normal bowel sounds; no masses, no organomegaly    Rectal:   Deferred   Neuro:   Alert, oriented, no gross deficits, normal strength and tone   Extremities:  No cyanosis, clubbing or edema    Psych:  Normal mood and affect    Skin:  No jaundice, rashes, or lesions    Lymph nodes:  No palpable cervical lymphadenopathy        Lab Results:   No visits with results within 1 Day(s) from this visit  Latest known visit with results is:   Appointment on 05/07/2020   Component Date Value    Calprotectin 05/07/2020 90     Giardia Ag, Stl 05/07/2020 Negative     Ova + Parasite Exam 05/07/2020 No ova, cysts, or parasites seen     One negative specimen does not rule out the possibility of a  parasitic infection  Radiology Results:   RUQ US 2/26/20  Severe hepatomegaly and hepatic steatosis as seen on CT  CTE 11/2018  IMPRESSION:  Moderate quality examination for the evaluation of the small bowel and colon  Equivocal features of Crohn's disease given ulcers detected at the ileocecal valve by colonoscopy    Focal narrowing of the ascending colon, probably peristalsis if no stricture was seen on colonoscopy  Possible adhesions in the region of the right lowerquadrant/ileocecal valve, although these may be postsurgical   No definite fistula formation or pericolonic abscess   No definite active inflammation      Endoscopies:

## 2020-07-29 NOTE — PATIENT INSTRUCTIONS
Diarrhea  - concern for Crohns vs small intestinal bacterial overgrowth   - stop Apriso  - start Delzicol 800 mg twice a day  If this is not covered by insurance can try Lialda    - will start treatment for small intestinal bacterial overgrowth with metronidazole 250 mg three times a day for 10 days  - start a probiotic while on antibiotics  - start loperamide 2 mg tab as needed up to four times per day for diarrhea (over the counter)    Acid reflux and heartburn   - decrease omeprazole 40 mg once a day (take 30 minutes before)  - use pepcid/famotidine 20 mg twice a day as needed  - try lifestyle changes to reduce acid reflux: cut down on caffeine (coffee, tea, soda, chocolate), peppermint, spicy/greasy foods, trigger foods (citrus, red sauces); avoid laying down for 2-3 hours after meals; elevate the head of your bed; avoid tight clothing around abdomen; and lose weight    Elevated liver enzymes  - check chronic liver disease labs    Follow up in 1 month with Dr Barbara Ames

## 2020-07-30 ENCOUNTER — TELEPHONE (OUTPATIENT)
Dept: GASTROENTEROLOGY | Facility: CLINIC | Age: 65
End: 2020-07-30

## 2020-07-30 LAB
FERRITIN SERPL-MCNC: 64 NG/ML (ref 8–388)
IRON SATN MFR SERPL: 8 %
IRON SERPL-MCNC: 36 UG/DL (ref 50–170)
TIBC SERPL-MCNC: 425 UG/DL (ref 250–450)

## 2020-07-30 NOTE — TELEPHONE ENCOUNTER
----- Message from Tara Reilly MD sent at 7/30/2020  1:31 PM EDT -----  Regarding: RE: Lakesha Katz?  i'm fine with that or in August  Whichever the pt prefers  ----- Message -----  From: David Matos MA  Sent: 7/30/2020   9:28 AM EDT  To: Tara Reilly MD  Subject: Lakesha Katz? As per Dr Elkin Russell, pt must be seen in 1 month  The next time you are here is August 11 or 12th or September 29 or 30  I did ask the patient if she would do a virtual visit, she declined  I overbooked the patient for 9/29  Would this be ok or do you have any other suggestions? Please advise

## 2020-07-31 LAB
A1AT SERPL-MCNC: 179 MG/DL (ref 101–187)
ACTIN IGG SERPL-ACNC: 8 UNITS (ref 0–19)
CERULOPLASMIN SERPL-MCNC: 29.6 MG/DL (ref 19–39)
MITOCHONDRIA M2 IGG SER-ACNC: <20 UNITS (ref 0–20)
RYE IGE QN: NEGATIVE

## 2020-08-03 LAB
A1AT PHENOTYP SERPL IFE: NORMAL
A1AT SERPL-MCNC: 180 MG/DL (ref 101–187)

## 2020-08-24 ENCOUNTER — TELEPHONE (OUTPATIENT)
Dept: GASTROENTEROLOGY | Facility: AMBULARY SURGERY CENTER | Age: 65
End: 2020-08-24

## 2020-08-24 NOTE — TELEPHONE ENCOUNTER
----- Message from [facility], MD sent at 8/17/2020  9:35 PM EDT -----  Please let pt know that tests to evaluate for liver conditions show:  - normal alpha 1 antitrypsin, normal antimitochondrial antibody, anti-smooth muscle antibody, anti-nuclear antibody, ceruloplasmin, iron storage level  - iron level and saturation is low    Recommend follow up in clinic       Thank you,  Bernice Segura

## 2020-09-17 DIAGNOSIS — K50.919 CROHN'S DISEASE WITH COMPLICATION, UNSPECIFIED GASTROINTESTINAL TRACT LOCATION (HCC): ICD-10-CM

## 2020-09-29 ENCOUNTER — OFFICE VISIT (OUTPATIENT)
Dept: GASTROENTEROLOGY | Facility: CLINIC | Age: 65
End: 2020-09-29
Payer: COMMERCIAL

## 2020-09-29 VITALS
SYSTOLIC BLOOD PRESSURE: 118 MMHG | DIASTOLIC BLOOD PRESSURE: 61 MMHG | WEIGHT: 248 LBS | TEMPERATURE: 99.8 F | HEART RATE: 73 BPM | HEIGHT: 70 IN | BODY MASS INDEX: 35.5 KG/M2

## 2020-09-29 DIAGNOSIS — R19.7 DIARRHEA, UNSPECIFIED TYPE: ICD-10-CM

## 2020-09-29 DIAGNOSIS — K50.919 CROHN'S DISEASE WITH COMPLICATION, UNSPECIFIED GASTROINTESTINAL TRACT LOCATION (HCC): Primary | ICD-10-CM

## 2020-09-29 DIAGNOSIS — K62.5 RECTAL BLEEDING: ICD-10-CM

## 2020-09-29 DIAGNOSIS — R15.2 FECAL URGENCY: ICD-10-CM

## 2020-09-29 DIAGNOSIS — R16.0 HEPATOMEGALY: ICD-10-CM

## 2020-09-29 DIAGNOSIS — K76.0 HEPATIC STEATOSIS: ICD-10-CM

## 2020-09-29 PROCEDURE — 99214 OFFICE O/P EST MOD 30 MIN: CPT | Performed by: INTERNAL MEDICINE

## 2020-09-29 RX ORDER — DICYCLOMINE HCL 20 MG
20 TABLET ORAL EVERY 6 HOURS PRN
Qty: 120 TABLET | Refills: 2 | Status: SHIPPED | OUTPATIENT
Start: 2020-09-29 | End: 2022-06-23 | Stop reason: ALTCHOICE

## 2020-09-29 RX ORDER — MESALAMINE 400 MG/1
800 CAPSULE, DELAYED RELEASE ORAL 2 TIMES DAILY
Qty: 120 CAPSULE | Refills: 1 | Status: SHIPPED | OUTPATIENT
Start: 2020-09-29 | End: 2020-11-25 | Stop reason: SDUPTHER

## 2020-10-06 ENCOUNTER — APPOINTMENT (OUTPATIENT)
Dept: LAB | Facility: HOSPITAL | Age: 65
End: 2020-10-06
Attending: INTERNAL MEDICINE
Payer: COMMERCIAL

## 2020-10-06 ENCOUNTER — TELEPHONE (OUTPATIENT)
Dept: SURGERY | Facility: CLINIC | Age: 65
End: 2020-10-06

## 2020-10-06 DIAGNOSIS — R19.7 DIARRHEA, UNSPECIFIED TYPE: ICD-10-CM

## 2020-10-06 DIAGNOSIS — K50.919 CROHN'S DISEASE WITH COMPLICATION, UNSPECIFIED GASTROINTESTINAL TRACT LOCATION (HCC): ICD-10-CM

## 2020-10-06 LAB
ALBUMIN SERPL BCP-MCNC: 3.1 G/DL (ref 3.5–5)
ALP SERPL-CCNC: 129 U/L (ref 46–116)
ALT SERPL W P-5'-P-CCNC: 125 U/L (ref 12–78)
ANION GAP SERPL CALCULATED.3IONS-SCNC: 2 MMOL/L (ref 4–13)
AST SERPL W P-5'-P-CCNC: 119 U/L (ref 5–45)
BASOPHILS # BLD AUTO: 0.05 THOUSANDS/ΜL (ref 0–0.1)
BASOPHILS NFR BLD AUTO: 1 % (ref 0–1)
BILIRUB SERPL-MCNC: 0.2 MG/DL (ref 0.2–1)
BUN SERPL-MCNC: 14 MG/DL (ref 5–25)
CALCIUM ALBUM COR SERPL-MCNC: 9.9 MG/DL (ref 8.3–10.1)
CALCIUM SERPL-MCNC: 9.2 MG/DL (ref 8.3–10.1)
CHLORIDE SERPL-SCNC: 105 MMOL/L (ref 100–108)
CO2 SERPL-SCNC: 31 MMOL/L (ref 21–32)
CREAT SERPL-MCNC: 0.89 MG/DL (ref 0.6–1.3)
CRP SERPL QL: 13.8 MG/L
EOSINOPHIL # BLD AUTO: 0.16 THOUSAND/ΜL (ref 0–0.61)
EOSINOPHIL NFR BLD AUTO: 2 % (ref 0–6)
ERYTHROCYTE [DISTWIDTH] IN BLOOD BY AUTOMATED COUNT: 14.6 % (ref 11.6–15.1)
GFR SERPL CREATININE-BSD FRML MDRD: 69 ML/MIN/1.73SQ M
GLUCOSE P FAST SERPL-MCNC: 157 MG/DL (ref 65–99)
HCT VFR BLD AUTO: 40.6 % (ref 34.8–46.1)
HGB BLD-MCNC: 12 G/DL (ref 11.5–15.4)
IMM GRANULOCYTES # BLD AUTO: 0.03 THOUSAND/UL (ref 0–0.2)
IMM GRANULOCYTES NFR BLD AUTO: 0 % (ref 0–2)
LYMPHOCYTES # BLD AUTO: 2.37 THOUSANDS/ΜL (ref 0.6–4.47)
LYMPHOCYTES NFR BLD AUTO: 27 % (ref 14–44)
MCH RBC QN AUTO: 28 PG (ref 26.8–34.3)
MCHC RBC AUTO-ENTMCNC: 29.6 G/DL (ref 31.4–37.4)
MCV RBC AUTO: 95 FL (ref 82–98)
MONOCYTES # BLD AUTO: 0.7 THOUSAND/ΜL (ref 0.17–1.22)
MONOCYTES NFR BLD AUTO: 8 % (ref 4–12)
NEUTROPHILS # BLD AUTO: 5.6 THOUSANDS/ΜL (ref 1.85–7.62)
NEUTS SEG NFR BLD AUTO: 62 % (ref 43–75)
NRBC BLD AUTO-RTO: 0 /100 WBCS
PLATELET # BLD AUTO: 272 THOUSANDS/UL (ref 149–390)
PMV BLD AUTO: 9.9 FL (ref 8.9–12.7)
POTASSIUM SERPL-SCNC: 4.2 MMOL/L (ref 3.5–5.3)
PROT SERPL-MCNC: 7.7 G/DL (ref 6.4–8.2)
RBC # BLD AUTO: 4.29 MILLION/UL (ref 3.81–5.12)
SODIUM SERPL-SCNC: 138 MMOL/L (ref 136–145)
WBC # BLD AUTO: 8.91 THOUSAND/UL (ref 4.31–10.16)

## 2020-10-06 PROCEDURE — 87329 GIARDIA AG IA: CPT

## 2020-10-06 PROCEDURE — 82656 EL-1 FECAL QUAL/SEMIQ: CPT

## 2020-10-06 PROCEDURE — 87209 SMEAR COMPLEX STAIN: CPT

## 2020-10-06 PROCEDURE — 36415 COLL VENOUS BLD VENIPUNCTURE: CPT

## 2020-10-06 PROCEDURE — 85025 COMPLETE CBC W/AUTO DIFF WBC: CPT

## 2020-10-06 PROCEDURE — 83993 ASSAY FOR CALPROTECTIN FECAL: CPT

## 2020-10-06 PROCEDURE — 80053 COMPREHEN METABOLIC PANEL: CPT

## 2020-10-06 PROCEDURE — 86140 C-REACTIVE PROTEIN: CPT

## 2020-10-06 PROCEDURE — 87177 OVA AND PARASITES SMEARS: CPT

## 2020-10-06 PROCEDURE — 82705 FATS/LIPIDS FECES QUAL: CPT

## 2020-10-07 LAB — G LAMBLIA AG STL QL IA: NEGATIVE

## 2020-10-08 LAB
FAT STL QL: NORMAL
NEUTRAL FAT STL QL: NORMAL
O+P STL CONC: NORMAL

## 2020-10-13 LAB — CALPROTECTIN STL-MCNT: 95 UG/G (ref 0–120)

## 2020-10-15 ENCOUNTER — HOSPITAL ENCOUNTER (OUTPATIENT)
Dept: GASTROENTEROLOGY | Facility: HOSPITAL | Age: 65
Discharge: HOME | End: 2020-10-15
Attending: INTERNAL MEDICINE
Payer: COMMERCIAL

## 2020-10-15 DIAGNOSIS — K50.919 CROHN'S DISEASE WITH COMPLICATION, UNSPECIFIED GASTROINTESTINAL TRACT LOCATION (HCC): ICD-10-CM

## 2020-10-15 PROCEDURE — 91110 GI TRC IMG INTRAL ESOPH-ILE: CPT

## 2020-10-16 LAB — ELASTASE PANC STL-MCNT: >500 UG ELAST./G

## 2020-10-26 RX ORDER — MESALAMINE 400 MG/1
CAPSULE, DELAYED RELEASE ORAL
Qty: 120 CAPSULE | Refills: 0 | OUTPATIENT
Start: 2020-10-26

## 2020-10-28 PROCEDURE — 91110 GI TRC IMG INTRAL ESOPH-ILE: CPT | Performed by: INTERNAL MEDICINE

## 2020-11-02 ENCOUNTER — TRANSCRIBE ORDERS (OUTPATIENT)
Dept: ADMINISTRATIVE | Facility: HOSPITAL | Age: 65
End: 2020-11-02

## 2020-11-02 DIAGNOSIS — Z86.79 PERSONAL HISTORY OF OTHER DISEASES OF THE CIRCULATORY SYSTEM: ICD-10-CM

## 2020-11-02 DIAGNOSIS — R09.89 OTHER SPECIFIED SYMPTOMS AND SIGNS INVOLVING THE CIRCULATORY AND RESPIRATORY SYSTEMS: Primary | ICD-10-CM

## 2020-11-02 DIAGNOSIS — R42 DIZZINESS AND GIDDINESS: ICD-10-CM

## 2020-11-02 DIAGNOSIS — Z98.890 OTHER SPECIFIED POSTPROCEDURAL STATES: ICD-10-CM

## 2020-11-02 DIAGNOSIS — H53.8 OTHER VISUAL DISTURBANCES: ICD-10-CM

## 2020-11-03 ENCOUNTER — HOSPITAL ENCOUNTER (OUTPATIENT)
Dept: MRI IMAGING | Facility: HOSPITAL | Age: 65
Discharge: HOME | End: 2020-11-03
Payer: COMMERCIAL

## 2020-11-03 DIAGNOSIS — Z98.890 OTHER SPECIFIED POSTPROCEDURAL STATES: ICD-10-CM

## 2020-11-03 DIAGNOSIS — Z86.79 PERSONAL HISTORY OF OTHER DISEASES OF THE CIRCULATORY SYSTEM: ICD-10-CM

## 2020-11-03 DIAGNOSIS — R42 DIZZINESS AND GIDDINESS: ICD-10-CM

## 2020-11-03 DIAGNOSIS — R09.89 OTHER SPECIFIED SYMPTOMS AND SIGNS INVOLVING THE CIRCULATORY AND RESPIRATORY SYSTEMS: ICD-10-CM

## 2020-11-03 DIAGNOSIS — H53.8 OTHER VISUAL DISTURBANCES: ICD-10-CM

## 2020-11-03 PROCEDURE — 70551 MRI BRAIN STEM W/O DYE: CPT

## 2020-11-03 PROCEDURE — G1004 CDSM NDSC: HCPCS

## 2020-11-09 ENCOUNTER — APPOINTMENT (EMERGENCY)
Dept: RADIOLOGY | Facility: HOSPITAL | Age: 65
End: 2020-11-09
Payer: COMMERCIAL

## 2020-11-09 ENCOUNTER — HOSPITAL ENCOUNTER (EMERGENCY)
Facility: HOSPITAL | Age: 65
Discharge: HOME | End: 2020-11-09
Attending: EMERGENCY MEDICINE | Admitting: EMERGENCY MEDICINE
Payer: COMMERCIAL

## 2020-11-09 VITALS
HEART RATE: 61 BPM | TEMPERATURE: 98.1 F | WEIGHT: 256.84 LBS | OXYGEN SATURATION: 93 % | BODY MASS INDEX: 36.85 KG/M2 | RESPIRATION RATE: 18 BRPM | SYSTOLIC BLOOD PRESSURE: 122 MMHG | DIASTOLIC BLOOD PRESSURE: 59 MMHG

## 2020-11-09 DIAGNOSIS — R42 VERTIGO: Primary | ICD-10-CM

## 2020-11-09 LAB
ALBUMIN SERPL BCP-MCNC: 3.2 G/DL (ref 3.5–5)
ALP SERPL-CCNC: 133 U/L (ref 46–116)
ALT SERPL W P-5'-P-CCNC: 162 U/L (ref 12–78)
ANION GAP SERPL CALCULATED.3IONS-SCNC: 5 MMOL/L (ref 4–13)
AST SERPL W P-5'-P-CCNC: 161 U/L (ref 5–45)
BASOPHILS # BLD AUTO: 0.03 THOUSANDS/ΜL (ref 0–0.1)
BASOPHILS NFR BLD AUTO: 0 % (ref 0–1)
BILIRUB SERPL-MCNC: 0.2 MG/DL (ref 0.2–1)
BUN SERPL-MCNC: 13 MG/DL (ref 5–25)
CALCIUM ALBUM COR SERPL-MCNC: 9.7 MG/DL (ref 8.3–10.1)
CALCIUM SERPL-MCNC: 9.1 MG/DL (ref 8.3–10.1)
CHLORIDE SERPL-SCNC: 101 MMOL/L (ref 100–108)
CO2 SERPL-SCNC: 31 MMOL/L (ref 21–32)
CREAT SERPL-MCNC: 0.93 MG/DL (ref 0.6–1.3)
EOSINOPHIL # BLD AUTO: 0.18 THOUSAND/ΜL (ref 0–0.61)
EOSINOPHIL NFR BLD AUTO: 2 % (ref 0–6)
ERYTHROCYTE [DISTWIDTH] IN BLOOD BY AUTOMATED COUNT: 14 % (ref 11.6–15.1)
GFR SERPL CREATININE-BSD FRML MDRD: 65 ML/MIN/1.73SQ M
GLUCOSE SERPL-MCNC: 241 MG/DL (ref 65–140)
HCT VFR BLD AUTO: 40.7 % (ref 34.8–46.1)
HGB BLD-MCNC: 12.4 G/DL (ref 11.5–15.4)
HOLD SPECIMEN: NORMAL
IMM GRANULOCYTES # BLD AUTO: 0.04 THOUSAND/UL (ref 0–0.2)
IMM GRANULOCYTES NFR BLD AUTO: 0 % (ref 0–2)
LYMPHOCYTES # BLD AUTO: 2.78 THOUSANDS/ΜL (ref 0.6–4.47)
LYMPHOCYTES NFR BLD AUTO: 27 % (ref 14–44)
MCH RBC QN AUTO: 28.2 PG (ref 26.8–34.3)
MCHC RBC AUTO-ENTMCNC: 30.5 G/DL (ref 31.4–37.4)
MCV RBC AUTO: 93 FL (ref 82–98)
MONOCYTES # BLD AUTO: 0.72 THOUSAND/ΜL (ref 0.17–1.22)
MONOCYTES NFR BLD AUTO: 7 % (ref 4–12)
NEUTROPHILS # BLD AUTO: 6.42 THOUSANDS/ΜL (ref 1.85–7.62)
NEUTS SEG NFR BLD AUTO: 64 % (ref 43–75)
NRBC BLD AUTO-RTO: 0 /100 WBCS
PLATELET # BLD AUTO: 295 THOUSANDS/UL (ref 149–390)
PMV BLD AUTO: 9.5 FL (ref 8.9–12.7)
POTASSIUM SERPL-SCNC: 3.8 MMOL/L (ref 3.5–5.3)
PROT SERPL-MCNC: 8.1 G/DL (ref 6.4–8.2)
RBC # BLD AUTO: 4.39 MILLION/UL (ref 3.81–5.12)
SODIUM SERPL-SCNC: 137 MMOL/L (ref 136–145)
TROPONIN I SERPL-MCNC: <0.02 NG/ML
WBC # BLD AUTO: 10.17 THOUSAND/UL (ref 4.31–10.16)

## 2020-11-09 PROCEDURE — 36415 COLL VENOUS BLD VENIPUNCTURE: CPT

## 2020-11-09 PROCEDURE — 84484 ASSAY OF TROPONIN QUANT: CPT | Performed by: EMERGENCY MEDICINE

## 2020-11-09 PROCEDURE — 99284 EMERGENCY DEPT VISIT MOD MDM: CPT

## 2020-11-09 PROCEDURE — 99284 EMERGENCY DEPT VISIT MOD MDM: CPT | Performed by: EMERGENCY MEDICINE

## 2020-11-09 PROCEDURE — 85025 COMPLETE CBC W/AUTO DIFF WBC: CPT | Performed by: EMERGENCY MEDICINE

## 2020-11-09 PROCEDURE — 93005 ELECTROCARDIOGRAM TRACING: CPT

## 2020-11-09 PROCEDURE — 71045 X-RAY EXAM CHEST 1 VIEW: CPT

## 2020-11-09 PROCEDURE — 80053 COMPREHEN METABOLIC PANEL: CPT | Performed by: EMERGENCY MEDICINE

## 2020-11-09 RX ORDER — MECLIZINE HYDROCHLORIDE 25 MG/1
25 TABLET ORAL 3 TIMES DAILY PRN
Qty: 30 TABLET | Refills: 0 | Status: ON HOLD | OUTPATIENT
Start: 2020-11-09 | End: 2021-02-02

## 2020-11-09 RX ORDER — MECLIZINE HCL 12.5 MG/1
25 TABLET ORAL ONCE
Status: COMPLETED | OUTPATIENT
Start: 2020-11-09 | End: 2020-11-09

## 2020-11-09 RX ADMIN — MECLIZINE HYDROCHLORIDE 25 MG: 12.5 TABLET ORAL at 19:55

## 2020-11-10 LAB
ATRIAL RATE: 74 BPM
P AXIS: 57 DEGREES
PR INTERVAL: 142 MS
QRS AXIS: 15 DEGREES
QRSD INTERVAL: 82 MS
QT INTERVAL: 414 MS
QTC INTERVAL: 459 MS
T WAVE AXIS: 38 DEGREES
VENTRICULAR RATE: 74 BPM

## 2020-11-10 PROCEDURE — 93010 ELECTROCARDIOGRAM REPORT: CPT | Performed by: INTERNAL MEDICINE

## 2020-11-17 ENCOUNTER — TRANSCRIBE ORDERS (OUTPATIENT)
Dept: ADMINISTRATIVE | Facility: HOSPITAL | Age: 65
End: 2020-11-17

## 2020-11-17 DIAGNOSIS — Z98.890 OTHER SPECIFIED POSTPROCEDURAL STATES: Primary | ICD-10-CM

## 2020-11-17 DIAGNOSIS — R42 DIZZINESS AND GIDDINESS: ICD-10-CM

## 2020-11-18 ENCOUNTER — HOSPITAL ENCOUNTER (OUTPATIENT)
Dept: RADIOLOGY | Facility: HOSPITAL | Age: 65
Discharge: HOME | End: 2020-11-18
Payer: COMMERCIAL

## 2020-11-18 ENCOUNTER — ANESTHESIA EVENT (OUTPATIENT)
Dept: RADIOLOGY | Facility: HOSPITAL | Age: 65
End: 2020-11-18

## 2020-11-18 ENCOUNTER — ANESTHESIA (OUTPATIENT)
Dept: RADIOLOGY | Facility: HOSPITAL | Age: 65
End: 2020-11-18

## 2020-11-18 VITALS
SYSTOLIC BLOOD PRESSURE: 118 MMHG | TEMPERATURE: 96.5 F | RESPIRATION RATE: 18 BRPM | DIASTOLIC BLOOD PRESSURE: 48 MMHG | HEART RATE: 76 BPM | WEIGHT: 247 LBS | HEIGHT: 69 IN | OXYGEN SATURATION: 95 % | BODY MASS INDEX: 36.58 KG/M2

## 2020-11-18 DIAGNOSIS — Z86.79 PERSONAL HISTORY OF OTHER DISEASES OF THE CIRCULATORY SYSTEM: ICD-10-CM

## 2020-11-18 DIAGNOSIS — H53.8 OTHER VISUAL DISTURBANCES: ICD-10-CM

## 2020-11-18 DIAGNOSIS — Z98.890 OTHER SPECIFIED POSTPROCEDURAL STATES: ICD-10-CM

## 2020-11-18 DIAGNOSIS — R09.89 OTHER SPECIFIED SYMPTOMS AND SIGNS INVOLVING THE CIRCULATORY AND RESPIRATORY SYSTEMS: ICD-10-CM

## 2020-11-18 DIAGNOSIS — R42 DIZZINESS AND GIDDINESS: ICD-10-CM

## 2020-11-18 LAB — GLUCOSE SERPL-MCNC: 112 MG/DL (ref 65–140)

## 2020-11-18 PROCEDURE — 70549 MR ANGIOGRAPH NECK W/O&W/DYE: CPT

## 2020-11-18 PROCEDURE — 82948 REAGENT STRIP/BLOOD GLUCOSE: CPT

## 2020-11-18 PROCEDURE — A9585 GADOBUTROL INJECTION: HCPCS | Performed by: STUDENT IN AN ORGANIZED HEALTH CARE EDUCATION/TRAINING PROGRAM

## 2020-11-18 RX ORDER — PROPOFOL 10 MG/ML
INJECTION, EMULSION INTRAVENOUS AS NEEDED
Status: DISCONTINUED | OUTPATIENT
Start: 2020-11-18 | End: 2020-11-18

## 2020-11-18 RX ORDER — LIDOCAINE HYDROCHLORIDE 10 MG/ML
INJECTION, SOLUTION EPIDURAL; INFILTRATION; INTRACAUDAL; PERINEURAL AS NEEDED
Status: DISCONTINUED | OUTPATIENT
Start: 2020-11-18 | End: 2020-11-18

## 2020-11-18 RX ORDER — DEXAMETHASONE SODIUM PHOSPHATE 4 MG/ML
INJECTION, SOLUTION INTRA-ARTICULAR; INTRALESIONAL; INTRAMUSCULAR; INTRAVENOUS; SOFT TISSUE AS NEEDED
Status: DISCONTINUED | OUTPATIENT
Start: 2020-11-18 | End: 2020-11-18

## 2020-11-18 RX ORDER — GLYCOPYRROLATE 0.2 MG/ML
INJECTION INTRAMUSCULAR; INTRAVENOUS AS NEEDED
Status: DISCONTINUED | OUTPATIENT
Start: 2020-11-18 | End: 2020-11-18

## 2020-11-18 RX ORDER — ONDANSETRON 2 MG/ML
INJECTION INTRAMUSCULAR; INTRAVENOUS AS NEEDED
Status: DISCONTINUED | OUTPATIENT
Start: 2020-11-18 | End: 2020-11-18

## 2020-11-18 RX ADMIN — PROPOFOL 50 MG: 10 INJECTION, EMULSION INTRAVENOUS at 12:37

## 2020-11-18 RX ADMIN — PHENYLEPHRINE HYDROCHLORIDE 100 MCG: 10 INJECTION INTRAVENOUS at 12:47

## 2020-11-18 RX ADMIN — PHENYLEPHRINE HYDROCHLORIDE 100 MCG: 10 INJECTION INTRAVENOUS at 12:54

## 2020-11-18 RX ADMIN — GLYCOPYRROLATE 0.2 MG: 0.2 INJECTION, SOLUTION INTRAMUSCULAR; INTRAVENOUS at 12:38

## 2020-11-18 RX ADMIN — GADOBUTROL 12 ML: 604.72 INJECTION INTRAVENOUS at 13:53

## 2020-11-18 RX ADMIN — PHENYLEPHRINE HYDROCHLORIDE 100 MCG: 10 INJECTION INTRAVENOUS at 13:02

## 2020-11-18 RX ADMIN — PHENYLEPHRINE HYDROCHLORIDE 100 MCG: 10 INJECTION INTRAVENOUS at 12:45

## 2020-11-18 RX ADMIN — DEXAMETHASONE SODIUM PHOSPHATE 8 MG: 4 INJECTION, SOLUTION INTRAMUSCULAR; INTRAVENOUS at 12:48

## 2020-11-18 RX ADMIN — PROPOFOL 50 MG: 10 INJECTION, EMULSION INTRAVENOUS at 12:38

## 2020-11-18 RX ADMIN — PROPOFOL 50 MG: 10 INJECTION, EMULSION INTRAVENOUS at 12:35

## 2020-11-18 RX ADMIN — PHENYLEPHRINE HYDROCHLORIDE 100 MCG: 10 INJECTION INTRAVENOUS at 12:59

## 2020-11-18 RX ADMIN — PHENYLEPHRINE HYDROCHLORIDE 100 MCG: 10 INJECTION INTRAVENOUS at 12:41

## 2020-11-18 RX ADMIN — ONDANSETRON 4 MG: 2 INJECTION INTRAMUSCULAR; INTRAVENOUS at 12:48

## 2020-11-18 RX ADMIN — PROPOFOL 50 MG: 10 INJECTION, EMULSION INTRAVENOUS at 12:36

## 2020-11-18 RX ADMIN — PHENYLEPHRINE HYDROCHLORIDE 100 MCG: 10 INJECTION INTRAVENOUS at 12:51

## 2020-11-18 RX ADMIN — LIDOCAINE HYDROCHLORIDE 100 MG: 10 INJECTION, SOLUTION EPIDURAL; INFILTRATION; INTRACAUDAL; PERINEURAL at 12:38

## 2020-11-19 ENCOUNTER — TRANSCRIBE ORDERS (OUTPATIENT)
Dept: PHYSICAL THERAPY | Facility: HOME HEALTHCARE | Age: 65
End: 2020-11-19

## 2020-11-19 ENCOUNTER — TELEPHONE (OUTPATIENT)
Dept: GASTROENTEROLOGY | Facility: CLINIC | Age: 65
End: 2020-11-19

## 2020-11-19 ENCOUNTER — EVALUATION (OUTPATIENT)
Dept: PHYSICAL THERAPY | Facility: HOME HEALTHCARE | Age: 65
End: 2020-11-19
Payer: COMMERCIAL

## 2020-11-19 DIAGNOSIS — R42 DIZZINESS: Primary | ICD-10-CM

## 2020-11-19 PROCEDURE — 97140 MANUAL THERAPY 1/> REGIONS: CPT | Performed by: PHYSICAL THERAPIST

## 2020-11-19 PROCEDURE — 97162 PT EVAL MOD COMPLEX 30 MIN: CPT | Performed by: PHYSICAL THERAPIST

## 2020-11-19 PROCEDURE — 97535 SELF CARE MNGMENT TRAINING: CPT | Performed by: PHYSICAL THERAPIST

## 2020-11-24 ENCOUNTER — ANESTHESIA (OUTPATIENT)
Dept: PERIOP | Facility: HOSPITAL | Age: 65
End: 2020-11-24

## 2020-11-24 ENCOUNTER — HOSPITAL ENCOUNTER (OUTPATIENT)
Dept: PERIOP | Facility: HOSPITAL | Age: 65
Setting detail: OUTPATIENT SURGERY
Discharge: HOME | End: 2020-11-24
Attending: INTERNAL MEDICINE | Admitting: INTERNAL MEDICINE
Payer: COMMERCIAL

## 2020-11-24 ENCOUNTER — ANESTHESIA EVENT (OUTPATIENT)
Dept: PERIOP | Facility: HOSPITAL | Age: 65
End: 2020-11-24

## 2020-11-24 VITALS
HEART RATE: 67 BPM | OXYGEN SATURATION: 98 % | TEMPERATURE: 98.8 F | SYSTOLIC BLOOD PRESSURE: 123 MMHG | DIASTOLIC BLOOD PRESSURE: 59 MMHG | RESPIRATION RATE: 18 BRPM

## 2020-11-24 VITALS — HEART RATE: 73 BPM

## 2020-11-24 DIAGNOSIS — R19.7 DIARRHEA, UNSPECIFIED TYPE: ICD-10-CM

## 2020-11-24 DIAGNOSIS — K21.9 GASTROESOPHAGEAL REFLUX DISEASE, UNSPECIFIED WHETHER ESOPHAGITIS PRESENT: Primary | ICD-10-CM

## 2020-11-24 LAB — GLUCOSE SERPL-MCNC: 119 MG/DL (ref 65–140)

## 2020-11-24 PROCEDURE — 45385 COLONOSCOPY W/LESION REMOVAL: CPT | Performed by: INTERNAL MEDICINE

## 2020-11-24 PROCEDURE — 45380 COLONOSCOPY AND BIOPSY: CPT | Performed by: INTERNAL MEDICINE

## 2020-11-24 PROCEDURE — 88305 TISSUE EXAM BY PATHOLOGIST: CPT | Performed by: PATHOLOGY

## 2020-11-24 PROCEDURE — 82948 REAGENT STRIP/BLOOD GLUCOSE: CPT

## 2020-11-24 RX ORDER — SODIUM CHLORIDE, SODIUM LACTATE, POTASSIUM CHLORIDE, CALCIUM CHLORIDE 600; 310; 30; 20 MG/100ML; MG/100ML; MG/100ML; MG/100ML
INJECTION, SOLUTION INTRAVENOUS CONTINUOUS PRN
Status: DISCONTINUED | OUTPATIENT
Start: 2020-11-24 | End: 2020-11-24

## 2020-11-24 RX ORDER — SODIUM CHLORIDE, SODIUM LACTATE, POTASSIUM CHLORIDE, CALCIUM CHLORIDE 600; 310; 30; 20 MG/100ML; MG/100ML; MG/100ML; MG/100ML
125 INJECTION, SOLUTION INTRAVENOUS CONTINUOUS
Status: DISCONTINUED | OUTPATIENT
Start: 2020-11-24 | End: 2020-11-28 | Stop reason: HOSPADM

## 2020-11-24 RX ORDER — LIDOCAINE HYDROCHLORIDE 10 MG/ML
INJECTION, SOLUTION EPIDURAL; INFILTRATION; INTRACAUDAL; PERINEURAL AS NEEDED
Status: DISCONTINUED | OUTPATIENT
Start: 2020-11-24 | End: 2020-11-24

## 2020-11-24 RX ORDER — OMEPRAZOLE 40 MG/1
40 CAPSULE, DELAYED RELEASE ORAL
Qty: 30 CAPSULE | Refills: 4 | Status: SHIPPED | OUTPATIENT
Start: 2020-11-24 | End: 2021-06-02

## 2020-11-24 RX ORDER — PROPOFOL 10 MG/ML
INJECTION, EMULSION INTRAVENOUS CONTINUOUS PRN
Status: DISCONTINUED | OUTPATIENT
Start: 2020-11-24 | End: 2020-11-24

## 2020-11-24 RX ORDER — PROPOFOL 10 MG/ML
INJECTION, EMULSION INTRAVENOUS AS NEEDED
Status: DISCONTINUED | OUTPATIENT
Start: 2020-11-24 | End: 2020-11-24

## 2020-11-24 RX ORDER — ONDANSETRON 2 MG/ML
4 INJECTION INTRAMUSCULAR; INTRAVENOUS ONCE AS NEEDED
Status: DISCONTINUED | OUTPATIENT
Start: 2020-11-24 | End: 2020-11-28 | Stop reason: HOSPADM

## 2020-11-24 RX ADMIN — PROPOFOL 50 MG: 10 INJECTION, EMULSION INTRAVENOUS at 07:42

## 2020-11-24 RX ADMIN — PROPOFOL 50 MG: 10 INJECTION, EMULSION INTRAVENOUS at 07:38

## 2020-11-24 RX ADMIN — PROPOFOL 50 MG: 10 INJECTION, EMULSION INTRAVENOUS at 07:40

## 2020-11-24 RX ADMIN — PROPOFOL 100 MG: 10 INJECTION, EMULSION INTRAVENOUS at 07:37

## 2020-11-24 RX ADMIN — PROPOFOL 120 MCG/KG/MIN: 10 INJECTION, EMULSION INTRAVENOUS at 07:37

## 2020-11-24 RX ADMIN — PROPOFOL 50 MG: 10 INJECTION, EMULSION INTRAVENOUS at 07:39

## 2020-11-24 RX ADMIN — LIDOCAINE HYDROCHLORIDE 50 MG: 10 INJECTION, SOLUTION EPIDURAL; INFILTRATION; INTRACAUDAL; PERINEURAL at 07:37

## 2020-11-24 RX ADMIN — SODIUM CHLORIDE, SODIUM LACTATE, POTASSIUM CHLORIDE, AND CALCIUM CHLORIDE: .6; .31; .03; .02 INJECTION, SOLUTION INTRAVENOUS at 07:33

## 2020-11-25 ENCOUNTER — APPOINTMENT (OUTPATIENT)
Dept: PHYSICAL THERAPY | Facility: HOME HEALTHCARE | Age: 65
End: 2020-11-25
Payer: COMMERCIAL

## 2020-11-25 ENCOUNTER — TELEPHONE (OUTPATIENT)
Dept: PHYSICAL THERAPY | Facility: HOME HEALTHCARE | Age: 65
End: 2020-11-25

## 2020-11-25 DIAGNOSIS — K50.919 CROHN'S DISEASE WITH COMPLICATION, UNSPECIFIED GASTROINTESTINAL TRACT LOCATION (HCC): ICD-10-CM

## 2020-11-25 RX ORDER — MESALAMINE 400 MG/1
CAPSULE, DELAYED RELEASE ORAL
Qty: 120 CAPSULE | Refills: 0 | Status: SHIPPED | OUTPATIENT
Start: 2020-11-25 | End: 2020-12-19 | Stop reason: SDUPTHER

## 2020-12-19 DIAGNOSIS — K50.919 CROHN'S DISEASE WITH COMPLICATION, UNSPECIFIED GASTROINTESTINAL TRACT LOCATION (HCC): ICD-10-CM

## 2020-12-19 RX ORDER — MESALAMINE 400 MG/1
CAPSULE, DELAYED RELEASE ORAL
Qty: 120 CAPSULE | Refills: 0 | Status: SHIPPED | OUTPATIENT
Start: 2020-12-19 | End: 2021-01-16 | Stop reason: SDUPTHER

## 2020-12-23 ENCOUNTER — TELEMEDICINE (OUTPATIENT)
Dept: GASTROENTEROLOGY | Facility: CLINIC | Age: 65
End: 2020-12-23
Payer: COMMERCIAL

## 2020-12-23 DIAGNOSIS — K76.0 NAFLD (NONALCOHOLIC FATTY LIVER DISEASE): ICD-10-CM

## 2020-12-23 DIAGNOSIS — R74.01 TRANSAMINITIS: ICD-10-CM

## 2020-12-23 DIAGNOSIS — K50.919 CROHN'S DISEASE WITH COMPLICATION, UNSPECIFIED GASTROINTESTINAL TRACT LOCATION (HCC): ICD-10-CM

## 2020-12-23 DIAGNOSIS — Z86.010 PERSONAL HISTORY OF COLONIC POLYPS: ICD-10-CM

## 2020-12-23 DIAGNOSIS — R19.7 DIARRHEA, UNSPECIFIED TYPE: Primary | ICD-10-CM

## 2020-12-23 PROCEDURE — 99214 OFFICE O/P EST MOD 30 MIN: CPT | Performed by: INTERNAL MEDICINE

## 2020-12-23 NOTE — TELEPHONE ENCOUNTER
----- Message from [facility], MD sent at 7/1/2020 11:46 AM EDT -----  Please reschedule patient for clinic appointment with me or Dr Mi Sims      Thank you,    Sharita Kumar Methotrexate Pregnancy And Lactation Text: This medication is Pregnancy Category X and is known to cause fetal harm. This medication is excreted in breast milk.

## 2021-01-16 DIAGNOSIS — K50.919 CROHN'S DISEASE WITH COMPLICATION, UNSPECIFIED GASTROINTESTINAL TRACT LOCATION (HCC): ICD-10-CM

## 2021-01-16 RX ORDER — MESALAMINE 400 MG/1
CAPSULE, DELAYED RELEASE ORAL
Qty: 120 CAPSULE | Refills: 0 | Status: SHIPPED | OUTPATIENT
Start: 2021-01-16 | End: 2021-03-21 | Stop reason: SDUPTHER

## 2021-01-18 ENCOUNTER — OFFICE VISIT (OUTPATIENT)
Dept: SLEEP CENTER | Facility: CLINIC | Age: 66
End: 2021-01-18
Payer: COMMERCIAL

## 2021-01-18 ENCOUNTER — TELEPHONE (OUTPATIENT)
Dept: SLEEP CENTER | Facility: CLINIC | Age: 66
End: 2021-01-18

## 2021-01-18 VITALS
WEIGHT: 252 LBS | HEIGHT: 69 IN | HEART RATE: 61 BPM | SYSTOLIC BLOOD PRESSURE: 120 MMHG | DIASTOLIC BLOOD PRESSURE: 68 MMHG | BODY MASS INDEX: 37.33 KG/M2 | TEMPERATURE: 97.5 F | OXYGEN SATURATION: 95 %

## 2021-01-18 DIAGNOSIS — G47.33 OSA (OBSTRUCTIVE SLEEP APNEA): Primary | ICD-10-CM

## 2021-01-18 DIAGNOSIS — G25.81 RLS (RESTLESS LEGS SYNDROME): ICD-10-CM

## 2021-01-18 PROCEDURE — 99214 OFFICE O/P EST MOD 30 MIN: CPT | Performed by: PSYCHIATRY & NEUROLOGY

## 2021-01-18 RX ORDER — VENLAFAXINE HYDROCHLORIDE 75 MG/1
CAPSULE, EXTENDED RELEASE ORAL
Status: ON HOLD | COMMUNITY
Start: 2020-11-10 | End: 2021-02-02

## 2021-01-18 RX ORDER — ATORVASTATIN CALCIUM 10 MG/1
10 TABLET, FILM COATED ORAL DAILY
COMMUNITY
Start: 2020-11-10

## 2021-01-18 RX ORDER — GLIPIZIDE 5 MG/1
TABLET, FILM COATED, EXTENDED RELEASE ORAL
COMMUNITY
Start: 2021-01-05

## 2021-01-18 RX ORDER — ASPIRIN 325 MG
81 TABLET ORAL DAILY
Status: ON HOLD | COMMUNITY
Start: 2020-11-25 | End: 2021-02-02

## 2021-01-18 RX ORDER — PRAMIPEXOLE DIHYDROCHLORIDE 0.5 MG/1
0.5 TABLET ORAL
Qty: 90 TABLET | Refills: 1 | Status: SHIPPED | OUTPATIENT
Start: 2021-01-18 | End: 2021-09-17

## 2021-01-18 NOTE — PROGRESS NOTES
Sleep Medicine Follow-Up Note    HPI: 57yo F with MARY ELLEN presenting for a compliance visit  Treatment Summary: 2019 PSG showed severe MARY ELLEN with AHI 68 and 102 in REM  Ed of 77% with borderline oxygenation as well at baseline  2019 had CPAP titration which showed an adequate pressure of 12cm and correction of hypoxemia  HPI:  Today, patient presents unaccompanied  The patient stated she continues having issues with leak and a dry mouth  She sometimes feels that she isnt even getting any pressure  She is thinking of a different mask with  NP as she still gets  A bloody nose often  She feels tired and doesn't feel like the CPAP is going anything  She sleeps through the night comfortably, but her leg cramps are happening more frequently  She has gained 20 lbs since her titration  Treatment: CPAP  -Pressure: 13cm   -Pressure intolerance: no   -Aerophagia: no   -Air Hunger: yes  -Interface: NM  -Fit: good  -Chin strap: no  -HCC: YME  -Patient's perceived outcome: sleeps well with the CPAP and no longer waking up in the middle of the night       Compliance Card Data:    - Date Range: 20-21   - Settincm   - Residual AHI: 0 5   - %  Night in Large Leak: 0%   - Average usage days used: 8h 7m   - % Days used: 100%   - % Days with Usage > 4 hrs: 100%    Respiratory:  -Ongoing Snoring: no  -Mouth Breathing: no  -Dry Mouth: yes  -Nocturnal Gasping: no    ROS:   Genitourinary none   Cardiology Frequent chest pain or angina, , palpitations/fluttering feeling in the chest and ankle/leg swelling   Gastrointestinal none   Neurology none   Constitutional fatigue   Integumentary none   Psychiatry anxiety, depression, aggressiveness or irritability and mood change   Musculoskeletal joint pain, muscle aches, back pain, legs twitching/jerking, sciatica and leg cramps   Pulmonary shortness of breath with activity, chest tightness, wheezing and frequent cough   ENT throat clearing   Endocrine none Hematological none       Sleep Pattern:  -Position: side  -Bedtime: 10pm  -Lights Out: same time  -Environment:  Turns TV it on for a few mins then turns it off   -Latency: mins  -Awakenings: 0  -Wake Time: 5-6am  -Rise Time: same time  -Patient's estimate of Sleep Time: 8 hours    Daytime Symptoms:  -Upon Awakening: better  -Daytime fatigue/sleepiness: tired in the afternoon  -Naps: denied  -Involuntary Dozing: sometimes  -Cognitive Symptoms: denied  -Driving: Difficulty with sleepiness and driving:  no   -- Close calls related to sleepiness: no   -- Accidents related to sleepiness: no    Questionnaire:  Sitting and reading: Slight chance of dozing  Watching TV: Moderate chance of dozing  Sitting, inactive in a public place (e g  a theatre or a meeting): Slight chance of dozing  As a passenger in a car for an hour without a break: Slight chance of dozing  Lying down to rest in the afternoon when circumstances permit: Moderate chance of dozing  Sitting and talking to someone: Would never doze  Sitting quietly after a lunch without alcohol: Would never doze  In a car, while stopped for a few minutes in traffic: Would never doze  Total score: 7    Substance Use:  -Caffeine: 1 cup in the morning and evening sometimes, but its decaf only  -Alcohol: denied  -THC: denied    --> Supplemental Oxygen Use: denies  --> no changes in her medical history since last visit  PE:    /68 (BP Location: Left arm, Cuff Size: Large)   Pulse 61   Temp 97 5 °F (36 4 °C) (Temporal)   Ht 5' 9" (1 753 m)   Wt 114 kg (252 lb)   SpO2 95%   BMI 37 21 kg/m²     General:  In NAD  Pul: Respirations: unlaboured  MS: No atrophy  Neuro: No resting tremor  Gait normal turning & station; unremarkable overall  Psych: Socially appropriate  Pleasant  No overt dysphoria  Assessment: The patient has great compliance with her CPAP and her obstructive events are well controlled with a residual AHI 0 5   She is deriving a benefit from using her CPAP as she is less tired than she was in the past and no longed having sleeping while driving  She was benefiting from pramipexole initially, but RLS is still bothersome  Will increase dose to Pramipexole 0 5mg at bedtime to address RLS  Will increase her pressure as she has daytime tiredness and air hunger at times  Will have her change her mask to NP  Recommendations:    1) CPAP 14-20cm with NP  2) Safe driving reviewed  3) Follow-up 3 months  4) Call with any questions or concerns  5) Pramipexole 0 5mg at bedtime        All questions answered for the patient, who indicated understanding and agreed with the plan       Mook Veronica MD  Psychiatry/ Sleep medicine

## 2021-01-18 NOTE — PATIENT INSTRUCTIONS
Recommendations:    1) CPAP 14-20cm with NP  2) Safe driving reviewed  3) Follow-up 3 months  4) Call with any questions or concerns    5) Pramipexole 0 5mg at bedtime

## 2021-01-19 ENCOUNTER — TELEPHONE (OUTPATIENT)
Dept: SLEEP CENTER | Facility: CLINIC | Age: 66
End: 2021-01-19

## 2021-02-02 ENCOUNTER — APPOINTMENT (EMERGENCY)
Dept: CT IMAGING | Facility: HOSPITAL | Age: 66
DRG: 342 | End: 2021-02-02
Payer: COMMERCIAL

## 2021-02-02 ENCOUNTER — APPOINTMENT (EMERGENCY)
Dept: RADIOLOGY | Facility: HOSPITAL | Age: 66
DRG: 342 | End: 2021-02-02
Payer: COMMERCIAL

## 2021-02-02 ENCOUNTER — HOSPITAL ENCOUNTER (INPATIENT)
Facility: HOSPITAL | Age: 66
LOS: 2 days | Discharge: NURSING, REHAB OR LONG-TERM CARE | DRG: 342 | End: 2021-02-04
Attending: INTERNAL MEDICINE | Admitting: STUDENT IN AN ORGANIZED HEALTH CARE EDUCATION/TRAINING PROGRAM
Payer: COMMERCIAL

## 2021-02-02 DIAGNOSIS — S83.91XA RIGHT KNEE SPRAIN: Primary | ICD-10-CM

## 2021-02-02 DIAGNOSIS — S82.141A TIBIAL PLATEAU FRACTURE, RIGHT, CLOSED, INITIAL ENCOUNTER: ICD-10-CM

## 2021-02-02 PROBLEM — J44.9 COPD (CHRONIC OBSTRUCTIVE PULMONARY DISEASE) (HCC): Status: ACTIVE | Noted: 2019-12-01

## 2021-02-02 PROBLEM — S82.201A FRACTURE OF RIGHT TIBIA: Status: ACTIVE | Noted: 2021-02-02

## 2021-02-02 LAB
ALBUMIN SERPL BCP-MCNC: 3.9 G/DL (ref 3.5–5.7)
ALP SERPL-CCNC: 92 U/L (ref 55–165)
ALT SERPL W P-5'-P-CCNC: 50 U/L (ref 7–52)
ANION GAP SERPL CALCULATED.3IONS-SCNC: 7 MMOL/L (ref 4–13)
APTT PPP: 33 SECONDS (ref 23–37)
AST SERPL W P-5'-P-CCNC: 50 U/L (ref 13–39)
BASOPHILS # BLD AUTO: 0 THOUSANDS/ΜL (ref 0–0.1)
BASOPHILS NFR BLD AUTO: 0 % (ref 0–2)
BILIRUB SERPL-MCNC: 0.3 MG/DL (ref 0.2–1)
BUN SERPL-MCNC: 14 MG/DL (ref 7–25)
CALCIUM SERPL-MCNC: 9 MG/DL (ref 8.6–10.5)
CHLORIDE SERPL-SCNC: 102 MMOL/L (ref 98–107)
CO2 SERPL-SCNC: 28 MMOL/L (ref 21–31)
CREAT SERPL-MCNC: 0.82 MG/DL (ref 0.6–1.2)
EOSINOPHIL # BLD AUTO: 0.1 THOUSAND/ΜL (ref 0–0.61)
EOSINOPHIL NFR BLD AUTO: 1 % (ref 0–5)
ERYTHROCYTE [DISTWIDTH] IN BLOOD BY AUTOMATED COUNT: 15.3 % (ref 11.5–14.5)
GFR SERPL CREATININE-BSD FRML MDRD: 76 ML/MIN/1.73SQ M
GLUCOSE SERPL-MCNC: 104 MG/DL (ref 65–140)
GLUCOSE SERPL-MCNC: 135 MG/DL (ref 65–99)
HCT VFR BLD AUTO: 36.1 % (ref 42–47)
HGB BLD-MCNC: 11.3 G/DL (ref 12–16)
INR PPP: 1.23 (ref 0.84–1.19)
LYMPHOCYTES # BLD AUTO: 2.6 THOUSANDS/ΜL (ref 0.6–4.47)
LYMPHOCYTES NFR BLD AUTO: 21 % (ref 21–51)
MCH RBC QN AUTO: 27.4 PG (ref 26–34)
MCHC RBC AUTO-ENTMCNC: 31.4 G/DL (ref 31–37)
MCV RBC AUTO: 87 FL (ref 81–99)
MONOCYTES # BLD AUTO: 0.8 THOUSAND/ΜL (ref 0.17–1.22)
MONOCYTES NFR BLD AUTO: 6 % (ref 2–12)
NEUTROPHILS # BLD AUTO: 9.1 THOUSANDS/ΜL (ref 1.4–6.5)
NEUTS SEG NFR BLD AUTO: 72 % (ref 42–75)
PLATELET # BLD AUTO: 301 THOUSANDS/UL (ref 149–390)
PMV BLD AUTO: 7.8 FL (ref 8.6–11.7)
POTASSIUM SERPL-SCNC: 3.7 MMOL/L (ref 3.5–5.5)
PROT SERPL-MCNC: 7.6 G/DL (ref 6.4–8.9)
PROTHROMBIN TIME: 15.3 SECONDS (ref 11.6–14.5)
RBC # BLD AUTO: 4.15 MILLION/UL (ref 3.9–5.2)
SODIUM SERPL-SCNC: 137 MMOL/L (ref 134–143)
WBC # BLD AUTO: 12.7 THOUSAND/UL (ref 4.8–10.8)

## 2021-02-02 PROCEDURE — 73564 X-RAY EXAM KNEE 4 OR MORE: CPT

## 2021-02-02 PROCEDURE — 96374 THER/PROPH/DIAG INJ IV PUSH: CPT

## 2021-02-02 PROCEDURE — 99285 EMERGENCY DEPT VISIT HI MDM: CPT | Performed by: INTERNAL MEDICINE

## 2021-02-02 PROCEDURE — 80053 COMPREHEN METABOLIC PANEL: CPT | Performed by: INTERNAL MEDICINE

## 2021-02-02 PROCEDURE — 0241U HB NFCT DS VIR RESP RNA 4 TRGT: CPT | Performed by: PHYSICIAN ASSISTANT

## 2021-02-02 PROCEDURE — 82948 REAGENT STRIP/BLOOD GLUCOSE: CPT

## 2021-02-02 PROCEDURE — 85610 PROTHROMBIN TIME: CPT | Performed by: INTERNAL MEDICINE

## 2021-02-02 PROCEDURE — 85730 THROMBOPLASTIN TIME PARTIAL: CPT | Performed by: INTERNAL MEDICINE

## 2021-02-02 PROCEDURE — 85025 COMPLETE CBC W/AUTO DIFF WBC: CPT | Performed by: INTERNAL MEDICINE

## 2021-02-02 PROCEDURE — 99285 EMERGENCY DEPT VISIT HI MDM: CPT

## 2021-02-02 PROCEDURE — 73700 CT LOWER EXTREMITY W/O DYE: CPT

## 2021-02-02 PROCEDURE — 99223 1ST HOSP IP/OBS HIGH 75: CPT | Performed by: STUDENT IN AN ORGANIZED HEALTH CARE EDUCATION/TRAINING PROGRAM

## 2021-02-02 PROCEDURE — 36415 COLL VENOUS BLD VENIPUNCTURE: CPT | Performed by: INTERNAL MEDICINE

## 2021-02-02 RX ORDER — LEVOTHYROXINE SODIUM 0.07 MG/1
150 TABLET ORAL
Status: DISCONTINUED | OUTPATIENT
Start: 2021-02-03 | End: 2021-02-04 | Stop reason: HOSPADM

## 2021-02-02 RX ORDER — ATORVASTATIN CALCIUM 10 MG/1
10 TABLET, FILM COATED ORAL DAILY
Status: DISCONTINUED | OUTPATIENT
Start: 2021-02-03 | End: 2021-02-04 | Stop reason: HOSPADM

## 2021-02-02 RX ORDER — FLUTICASONE FUROATE AND VILANTEROL 200; 25 UG/1; UG/1
1 POWDER RESPIRATORY (INHALATION) DAILY
Status: DISCONTINUED | OUTPATIENT
Start: 2021-02-03 | End: 2021-02-04 | Stop reason: HOSPADM

## 2021-02-02 RX ORDER — HYDROMORPHONE HCL/PF 1 MG/ML
0.5 SYRINGE (ML) INJECTION ONCE
Status: DISCONTINUED | OUTPATIENT
Start: 2021-02-02 | End: 2021-02-02

## 2021-02-02 RX ORDER — HYDROMORPHONE HCL/PF 1 MG/ML
1 SYRINGE (ML) INJECTION EVERY 4 HOURS PRN
Status: DISCONTINUED | OUTPATIENT
Start: 2021-02-02 | End: 2021-02-04 | Stop reason: HOSPADM

## 2021-02-02 RX ORDER — ASPIRIN 81 MG/1
81 TABLET, CHEWABLE ORAL DAILY
Status: DISCONTINUED | OUTPATIENT
Start: 2021-02-03 | End: 2021-02-03

## 2021-02-02 RX ORDER — DICYCLOMINE HCL 20 MG
20 TABLET ORAL EVERY 6 HOURS PRN
Status: DISCONTINUED | OUTPATIENT
Start: 2021-02-02 | End: 2021-02-04 | Stop reason: HOSPADM

## 2021-02-02 RX ORDER — OXYCODONE HYDROCHLORIDE 10 MG/1
10 TABLET ORAL EVERY 4 HOURS PRN
Status: DISCONTINUED | OUTPATIENT
Start: 2021-02-02 | End: 2021-02-04 | Stop reason: HOSPADM

## 2021-02-02 RX ORDER — HYDROMORPHONE HCL/PF 1 MG/ML
0.5 SYRINGE (ML) INJECTION ONCE
Status: COMPLETED | OUTPATIENT
Start: 2021-02-02 | End: 2021-02-02

## 2021-02-02 RX ORDER — VENLAFAXINE HYDROCHLORIDE 75 MG/1
75 CAPSULE, EXTENDED RELEASE ORAL DAILY
Status: DISCONTINUED | OUTPATIENT
Start: 2021-02-03 | End: 2021-02-04 | Stop reason: HOSPADM

## 2021-02-02 RX ORDER — ACETAMINOPHEN 325 MG/1
650 TABLET ORAL EVERY 6 HOURS PRN
Status: DISCONTINUED | OUTPATIENT
Start: 2021-02-02 | End: 2021-02-04 | Stop reason: HOSPADM

## 2021-02-02 RX ORDER — TRAMADOL HYDROCHLORIDE 50 MG/1
50 TABLET ORAL ONCE
Status: COMPLETED | OUTPATIENT
Start: 2021-02-02 | End: 2021-02-02

## 2021-02-02 RX ORDER — OXYCODONE HYDROCHLORIDE 5 MG/1
5 TABLET ORAL EVERY 4 HOURS PRN
Status: DISCONTINUED | OUTPATIENT
Start: 2021-02-02 | End: 2021-02-02

## 2021-02-02 RX ORDER — PANTOPRAZOLE SODIUM 40 MG/1
40 TABLET, DELAYED RELEASE ORAL
Status: DISCONTINUED | OUTPATIENT
Start: 2021-02-03 | End: 2021-02-04 | Stop reason: HOSPADM

## 2021-02-02 RX ORDER — MESALAMINE 400 MG/1
800 CAPSULE, DELAYED RELEASE ORAL 2 TIMES DAILY
Status: DISCONTINUED | OUTPATIENT
Start: 2021-02-02 | End: 2021-02-04 | Stop reason: HOSPADM

## 2021-02-02 RX ORDER — ONDANSETRON 2 MG/ML
4 INJECTION INTRAMUSCULAR; INTRAVENOUS EVERY 6 HOURS PRN
Status: DISCONTINUED | OUTPATIENT
Start: 2021-02-02 | End: 2021-02-03

## 2021-02-02 RX ORDER — FENOFIBRATE 54 MG/1
54 TABLET ORAL DAILY
Status: DISCONTINUED | OUTPATIENT
Start: 2021-02-03 | End: 2021-02-02

## 2021-02-02 RX ORDER — TRAMADOL HYDROCHLORIDE 50 MG/1
50 TABLET ORAL EVERY 8 HOURS PRN
Qty: 10 TABLET | Refills: 0 | Status: SHIPPED | OUTPATIENT
Start: 2021-02-02 | End: 2021-02-05

## 2021-02-02 RX ORDER — HYDROMORPHONE HCL/PF 1 MG/ML
0.5 SYRINGE (ML) INJECTION EVERY 4 HOURS PRN
Status: DISCONTINUED | OUTPATIENT
Start: 2021-02-02 | End: 2021-02-02

## 2021-02-02 RX ORDER — IBUPROFEN 600 MG/1
600 TABLET ORAL ONCE
Status: COMPLETED | OUTPATIENT
Start: 2021-02-02 | End: 2021-02-02

## 2021-02-02 RX ORDER — ALBUTEROL SULFATE 90 UG/1
2 AEROSOL, METERED RESPIRATORY (INHALATION) EVERY 6 HOURS PRN
Status: DISCONTINUED | OUTPATIENT
Start: 2021-02-02 | End: 2021-02-04 | Stop reason: HOSPADM

## 2021-02-02 RX ORDER — MECLIZINE HCL 12.5 MG/1
25 TABLET ORAL 3 TIMES DAILY PRN
Status: DISCONTINUED | OUTPATIENT
Start: 2021-02-02 | End: 2021-02-03

## 2021-02-02 RX ORDER — HEPARIN SODIUM 5000 [USP'U]/ML
5000 INJECTION, SOLUTION INTRAVENOUS; SUBCUTANEOUS EVERY 8 HOURS SCHEDULED
Status: DISCONTINUED | OUTPATIENT
Start: 2021-02-02 | End: 2021-02-04

## 2021-02-02 RX ORDER — DOFETILIDE 0.25 MG/1
500 CAPSULE ORAL 2 TIMES DAILY
Status: DISCONTINUED | OUTPATIENT
Start: 2021-02-02 | End: 2021-02-04 | Stop reason: HOSPADM

## 2021-02-02 RX ORDER — PRAMIPEXOLE DIHYDROCHLORIDE 0.5 MG/1
0.5 TABLET ORAL
Status: DISCONTINUED | OUTPATIENT
Start: 2021-02-02 | End: 2021-02-04 | Stop reason: HOSPADM

## 2021-02-02 RX ORDER — METOPROLOL SUCCINATE 50 MG/1
100 TABLET, EXTENDED RELEASE ORAL EVERY 12 HOURS
Status: DISCONTINUED | OUTPATIENT
Start: 2021-02-02 | End: 2021-02-04 | Stop reason: HOSPADM

## 2021-02-02 RX ORDER — VENLAFAXINE HYDROCHLORIDE 37.5 MG/1
37.5 CAPSULE, EXTENDED RELEASE ORAL DAILY
Status: DISCONTINUED | OUTPATIENT
Start: 2021-02-03 | End: 2021-02-02

## 2021-02-02 RX ADMIN — HYDROMORPHONE HYDROCHLORIDE 0.5 MG: 1 INJECTION, SOLUTION INTRAMUSCULAR; INTRAVENOUS; SUBCUTANEOUS at 15:52

## 2021-02-02 RX ADMIN — PRAMIPEXOLE DIHYDROCHLORIDE 0.5 MG: 0.5 TABLET ORAL at 22:23

## 2021-02-02 RX ADMIN — IBUPROFEN 600 MG: 600 TABLET, FILM COATED ORAL at 14:06

## 2021-02-02 RX ADMIN — HEPARIN SODIUM 5000 UNITS: 5000 INJECTION INTRAVENOUS; SUBCUTANEOUS at 22:27

## 2021-02-02 RX ADMIN — MESALAMINE 800 MG: 400 CAPSULE, DELAYED RELEASE ORAL at 19:56

## 2021-02-02 RX ADMIN — DOFETILIDE 500 MCG: 0.25 CAPSULE ORAL at 19:54

## 2021-02-02 RX ADMIN — HYDROMORPHONE HYDROCHLORIDE 0.5 MG: 1 INJECTION, SOLUTION INTRAMUSCULAR; INTRAVENOUS; SUBCUTANEOUS at 20:21

## 2021-02-02 RX ADMIN — TRAMADOL HYDROCHLORIDE 50 MG: 50 TABLET, FILM COATED ORAL at 13:50

## 2021-02-02 RX ADMIN — OXYCODONE HYDROCHLORIDE 5 MG: 5 TABLET ORAL at 20:47

## 2021-02-02 RX ADMIN — METOPROLOL SUCCINATE 100 MG: 50 TABLET, EXTENDED RELEASE ORAL at 18:34

## 2021-02-02 RX ADMIN — HYDROMORPHONE HYDROCHLORIDE 0.5 MG: 1 INJECTION, SOLUTION INTRAMUSCULAR; INTRAVENOUS; SUBCUTANEOUS at 17:50

## 2021-02-02 NOTE — DISCHARGE INSTRUCTIONS
Take Motrin twice a day for pain    Take tramadol for severe pain up to 3 a day  Ice and elevate  Use crutches

## 2021-02-02 NOTE — ED PROVIDER NOTES
History  Chief Complaint   Patient presents with    Knee Injury     Pt was helping clean a vehicle of snow, twisted and heard a pop of the R knee  76-year-old female was helping her  clean off his truck when her foot slipped and twisted, and then sure to pop  She is unable bear weight at this time  She has a history of diabetes, hyperlipidemia hypertension and Crohn's          Prior to Admission Medications   Prescriptions Last Dose Informant Patient Reported? Taking? Martine Low Dose 81 MG EC tablet  Self Yes No   Sig: Take 81 mg by mouth daily   Blood Glucose Monitoring Suppl (ONETOUCH VERIO) w/Device KIT  Self Yes No   Sig: Use to check sugars once a day  Dx E11 9   Blood Glucose Monitoring Suppl (ONETOUCH VERIO) w/Device KIT  Self Yes No   Sig: daily Use to check glucose   Lancets (ONETOUCH DELICA PLUS [id number]) MISC  Self Yes No   Sig: USE 1 TO CHECK GLUCOSE ONCE DAILY   ONETOUCH VERIO test strip  Self Yes No   Sig: USE 1 STRIP TO CHECK GLUCOSE ONCE DAILY   OneTouch Delica Lancets 76X MISC  Self Yes No   Sig: Use to check sugars once a day   Dx E11 9   SYNTHROID 150 MCG tablet  Self Yes No   Sig: TAKE 1 TABLET BY MOUTH ONCE DAILY FIRST THING IN THE MORNING (AT LEAST 30 MINUTES PRIOR TO BREAKFAST OR OTHER MEDS)   albuterol (PROVENTIL HFA,VENTOLIN HFA) 90 mcg/act inhaler  Self No No   Sig: Inhale 2 puffs every 6 (six) hours as needed for wheezing or shortness of breath   apixaban (ELIQUIS) 5 mg  Self Yes No   Sig: Take 5 mg by mouth 2 (two) times a day    aspirin 81 mg chewable tablet  Self No No   Sig: Chew 1 tablet (81 mg total) daily   atorvastatin (LIPITOR) 10 mg tablet  Self Yes No   Sig: Take 10 mg by mouth daily   dicyclomine (BENTYL) 20 mg tablet  Self No No   Sig: Take 1 tablet (20 mg total) by mouth every 6 (six) hours as needed (urgency, abdominal pain)   dofetilide (TIKOSYN) 500 mcg capsule  Self Yes No   Sig: Take 500 mcg by mouth 2 (two) times a day   fenofibrate (TRICOR) 54 MG tablet Self Yes No   Sig: Take 54 mg by mouth daily   furosemide (LASIX) 20 mg tablet  Self Yes No   Sig: Take 20 mg by mouth 2 (two) times a day   glipiZIDE (GLUCOTROL XL) 5 mg 24 hr tablet  Self Yes No   Sig: TAKE 1 TABLET BY MOUTH ONCE DAILY 30 MINUTES BEFORE A MEAL (REPLACES METFORMIN)   glucose blood test strip  Self Yes No   Sig: Use to check sugars once a day   Dx E11 9   meclizine (ANTIVERT) 25 mg tablet  Self No No   Sig: Take 1 tablet (25 mg total) by mouth 3 (three) times a day as needed for dizziness   mesalamine (DELZICOL) 400 mg  Self No No   Sig: Take 2 capsules by mouth twice daily   metFORMIN (GLUCOPHAGE-XR) 500 mg 24 hr tablet  Self Yes No   Sig: Take 1,000 mg by mouth    metoprolol succinate (TOPROL-XL) 100 mg 24 hr tablet  Self No No   Sig: Take 1 tablet (100 mg total) by mouth every 12 (twelve) hours   Patient taking differently: Take 100 mg by mouth every 12 (twelve) hours 1/2 pill in the PM   mometasone-formoterol (DULERA) 100-5 MCG/ACT inhaler  Self No No   Sig: Inhale 2 puffs 2 (two) times a day   naproxen (Naprosyn) 500 mg tablet  Self Yes No   Sig: Take by mouth every 12 (twelve) hours   omeprazole (PriLOSEC) 40 MG capsule  Self No No   Sig: Take 1 capsule (40 mg total) by mouth daily   Patient not taking: Reported on 1/18/2021   omeprazole (PriLOSEC) 40 MG capsule   No No   Sig: Take 1 capsule (40 mg total) by mouth daily before breakfast   pramipexole (MIRAPEX) 0 5 mg tablet   No No   Sig: Take 1 tablet (0 5 mg total) by mouth daily at bedtime   venlafaxine (EFFEXOR XR) 37 5 mg 24 hr capsule  Self Yes No   Sig: Take 37 5 mg by mouth daily    venlafaxine (EFFEXOR-XR) 75 mg 24 hr capsule  Self Yes No   Sig: TAKE 1 CAPSULE BY MOUTH ONCE DAILY DO NOT CUT CRUSH OR CHEW      Facility-Administered Medications: None       Past Medical History:   Diagnosis Date    A-fib ([facility] )     Brain aneurysm     Cancer ([facility] )     papillary thyroid cancer    Cardiac disease     CHF (congestive heart failure) ([facility] )  Crohn disease ([facility] )     Diabetes mellitus ([facility] )     Disease of thyroid gland     GERD (gastroesophageal reflux disease)     Hyperlipidemia     Hypertension        Past Surgical History:   Procedure Laterality Date    APPENDECTOMY      CARDIOVERSION N/A 2/14/2019    Procedure: CARDIOVERSION;  Surgeon: Robert Gamble MD;  Location: [facility] OR;  Service: Cardiology    CEREBRAL ANEURYSM REPAIR      CHOLECYSTECTOMY      HYSTERECTOMY      THYROIDECTOMY      TONSILLECTOMY AND ADENOIDECTOMY         History reviewed  No pertinent family history  I have reviewed and agree with the history as documented  E-Cigarette/Vaping    E-Cigarette Use Never User      E-Cigarette/Vaping Substances     Social History     Tobacco Use    Smoking status: Former Smoker     Quit date: 1/1/2018     Years since quitting: 3 0    Smokeless tobacco: Never Used   Substance Use Topics    Alcohol use: Yes     Alcohol/week: 0 0 standard drinks     Frequency: Monthly or less     Comment: social    Drug use: Never       Review of Systems   Constitutional: Negative for chills and fever  HENT: Negative for rhinorrhea and sore throat  Eyes: Negative for visual disturbance  Respiratory: Negative for cough and shortness of breath  Cardiovascular: Negative for chest pain and leg swelling  Gastrointestinal: Negative for abdominal pain, diarrhea, nausea and vomiting  Genitourinary: Negative for dysuria  Musculoskeletal: Negative for back pain and myalgias  Right knee pain   Skin: Negative for rash  Neurological: Negative for dizziness and headaches  Psychiatric/Behavioral: Negative for confusion  All other systems reviewed and are negative  Physical Exam  Physical Exam  Vitals signs and nursing note reviewed  Constitutional:       Appearance: Normal appearance  She is well-developed  HENT:      Head: Normocephalic  Mouth/Throat:      Pharynx: No oropharyngeal exudate     Eyes:      General: No scleral icterus  Neck:      Musculoskeletal: Normal range of motion and neck supple  Vascular: No JVD  Trachea: No tracheal deviation  Cardiovascular:      Rate and Rhythm: Normal rate and regular rhythm  Heart sounds: Normal heart sounds  No murmur  Pulmonary:      Effort: Pulmonary effort is normal  No respiratory distress  Breath sounds: Normal breath sounds  No wheezing or rales  Abdominal:      General: Bowel sounds are normal       Palpations: Abdomen is soft  Tenderness: There is no abdominal tenderness  There is no guarding  Musculoskeletal: Normal range of motion  General: Swelling and tenderness present  Comments: Tenderness and swelling right knee  Drawer sign negative   Skin:     General: Skin is warm and dry  Neurological:      Mental Status: She is alert and oriented to person, place, and time  Cranial Nerves: No cranial nerve deficit  Sensory: No sensory deficit  Motor: No abnormal muscle tone        Comments: 5/5 motor, nl sens   Psychiatric:         Behavior: Behavior normal          Vital Signs  ED Triage Vitals [02/02/21 1305]   Temperature Pulse Resp Blood Pressure SpO2   98 2 °F (36 8 °C) 75 -- 129/54 98 %      Temp Source Heart Rate Source Patient Position - Orthostatic VS BP Location FiO2 (%)   Tympanic Monitor Sitting Right arm --      Pain Score       8           Vitals:    02/02/21 1305   BP: 129/54   Pulse: 75   Patient Position - Orthostatic VS: Sitting         Visual Acuity      ED Medications  Medications   HYDROmorphone (DILAUDID) injection 0 5 mg (has no administration in time range)   oxyCODONE (ROXICODONE) IR tablet 5 mg (has no administration in time range)   insulin lispro (HumaLOG) 100 units/mL subcutaneous injection 1-5 Units (has no administration in time range)   acetaminophen (TYLENOL) tablet 650 mg (has no administration in time range)   ondansetron (ZOFRAN) injection 4 mg (has no administration in time range) albuterol (PROVENTIL HFA,VENTOLIN HFA) inhaler 2 puff (has no administration in time range)   aspirin chewable tablet 81 mg (has no administration in time range)   atorvastatin (LIPITOR) tablet 10 mg (has no administration in time range)   dicyclomine (BENTYL) tablet 20 mg (has no administration in time range)   fenofibrate (TRICOR) tablet 54 mg (has no administration in time range)   dofetilide (TIKOSYN) capsule 500 mcg (has no administration in time range)   meclizine (ANTIVERT) tablet 25 mg (has no administration in time range)   metoprolol succinate (TOPROL-XL) 24 hr tablet 100 mg (has no administration in time range)   fluticasone-vilanterol (BREO ELLIPTA) 200-25 MCG/INH inhaler 1 puff (has no administration in time range)   pantoprazole (PROTONIX) EC tablet 40 mg (has no administration in time range)   pramipexole (MIRAPEX) tablet 0 5 mg (has no administration in time range)   levothyroxine tablet 150 mcg (has no administration in time range)   venlafaxine (EFFEXOR-XR) 24 hr capsule 75 mg (has no administration in time range)   traMADol (ULTRAM) tablet 50 mg (50 mg Oral Given 2/2/21 1350)   ibuprofen (MOTRIN) tablet 600 mg (600 mg Oral Given 2/2/21 1406)   HYDROmorphone (DILAUDID) injection 0 5 mg (0 5 mg Intravenous Given 2/2/21 1552)       Diagnostic Studies  Results Reviewed     Procedure Component Value Units Date/Time    Protime-INR [[id number]]     Lab Status: No result Specimen: Blood     Comprehensive metabolic panel [[id number]]  (Abnormal) Collected: 02/02/21 1602    Lab Status: Final result Specimen: Blood from Arm, Left Updated: 02/02/21 1628     Sodium 137 mmol/L      Potassium 3 7 mmol/L      Chloride 102 mmol/L      CO2 28 mmol/L      ANION GAP 7 mmol/L      BUN 14 mg/dL      Creatinine 0 82 mg/dL      Glucose 135 mg/dL      Calcium 9 0 mg/dL      AST 50 U/L      ALT 50 U/L      Alkaline Phosphatase 92 U/L      Total Protein 7 6 g/dL      Albumin 3 9 g/dL      Total Bilirubin 0 30 mg/dL eGFR 76 ml/min/1 73sq m     Narrative:      [place] guidelines for Chronic Kidney Disease (CKD):     Stage 1 with normal or high GFR (GFR > 90 mL/min/1 73 square meters)    Stage 2 Mild CKD (GFR = 60-89 mL/min/1 73 square meters)    Stage 3A Moderate CKD (GFR = 45-59 mL/min/1 73 square meters)    Stage 3B Moderate CKD (GFR = 30-44 mL/min/1 73 square meters)    Stage 4 Severe CKD (GFR = 15-29 mL/min/1 73 square meters)    Stage 5 End Stage CKD (GFR <15 mL/min/1 73 square meters)  Note: GFR calculation is accurate only with a steady state creatinine    APTT [[id number]]  (Normal) Collected: 02/02/21 1602    Lab Status: Final result Specimen: Blood from Arm, Left Updated: 02/02/21 1622     PTT 33 seconds     Protime-INR [[id number]]  (Abnormal) Collected: 02/02/21 1602    Lab Status: Final result Specimen: Blood from Arm, Left Updated: 02/02/21 1622     Protime 15 3 seconds      INR 1 23    CBC and differential [[id number]]  (Abnormal) Collected: 02/02/21 1602    Lab Status: Final result Specimen: Blood from Arm, Left Updated: 02/02/21 1612     WBC 12 70 Thousand/uL      RBC 4 15 Million/uL      Hemoglobin 11 3 g/dL      Hematocrit 36 1 %      MCV 87 fL      MCH 27 4 pg      MCHC 31 4 g/dL      RDW 15 3 %      MPV 7 8 fL      Platelets 786 Thousands/uL      Neutrophils Relative 72 %      Lymphocytes Relative 21 %      Monocytes Relative 6 %      Eosinophils Relative 1 %      Basophils Relative 0 %      Neutrophils Absolute 9 10 Thousands/µL      Lymphocytes Absolute 2 60 Thousands/µL      Monocytes Absolute 0 80 Thousand/µL      Eosinophils Absolute 0 10 Thousand/µL      Basophils Absolute 0 00 Thousands/µL                  CT lower extremity wo contrast right   ED Interpretation by Savannah Gilford, DO (02/02 1608)   Mildly comminuted tibial plateau fracture with 5-6 mm depression confirmed by CT scan      Final Result by Cristal Connelly MD (02/02 6987)      1    Fracture of the right lateral tibial plateau with 5 to 6 mm of depression  2   Large joint effusion  Workstation performed: [id number]         XR knee 4+ vw right injury   ED Interpretation by Adriana Rene DO (02/02 1443)   Irregular lateral tibial plateau suspicious for tibial plateau fracture noted by Radiology  Will refer for CT scanning  Final Result by Makayla Kennedy DO (02/02 1411)      On the AP view only there is an irregularity of the lateral tibial plateau suspicious for nondisplaced tibial plateau fracture  There is a moderate joint effusion in the suprapatellar bursa  Mild degenerative osteoarthritis  Recommend CT or MRI imaging to exclude lateral tibial plateau fracture and evaluate etiology of joint effusion  Please note there is a discrepancy between the preliminary report provided by the emergency department and this report  This examination was marked "immediate notification" in Epic in order to begin the standard process by which the radiology reading room liaison alerts the referring practitioner                Workstation performed: [id number]                    Procedures  Procedures         ED Course  ED Course as of Feb 02 1728   Tue Feb 02, 2021   1442 Patient struggling with both      1442 XR knee 4+ vw right injury(*)   1532 CT lower extremity wo contrast right   1647 Discussed case with Dr Manuel Minaya as well as hospitalist will admit for further evaluation                                              MDM    Disposition  Final diagnoses:   Right knee sprain   Tibial plateau fracture, right, closed, initial encounter     Time reflects when diagnosis was documented in both MDM as applicable and the Disposition within this note     Time User Action Codes Description Comment    2/2/2021  2:00 PM Patricia Tavares Add [V15 [id number]] Right knee sprain     2/2/2021  4:50 PM Patricia Tavares Add [K22 271K] Tibial plateau fracture, right, closed, initial encounter       ED Disposition ED Disposition Condition Date/Time Comment    Admit Uriah Lopez Feb 2, 2021  4:50 PM Case was discussed with Dr Dariel Penny and the patient's admission status was agreed to be Admission Status: inpatient status to the service of Dr Dariel Penny           Follow-up Information     Follow up With Specialties Details Why [address] [place] [address], [address] [place] [address]  [phone]            Patient's Medications   Discharge Prescriptions    TRAMADOL (ULTRAM) 50 MG TABLET    Take 1 tablet (50 mg total) by mouth every 8 (eight) hours as needed for moderate pain for up to 3 days       Start Date: 2/2/2021  End Date: 2/5/2021       Order Dose: 50 mg       Quantity: 10 tablet    Refills: 0         PDMP Review       Value Time User    PDMP Reviewed  Yes 2/25/2020  6:47 PM Jd Fernandez MD          ED Provider  Electronically Signed by           Arash Cam DO  02/02/21 4239

## 2021-02-02 NOTE — ASSESSMENT & PLAN NOTE
67-year-old female with past medical history of Crohn's, diabetes, AFib on Eliquis presented with fall  She twisted her knee and heard a pop  In the ED, imaging shows right lateral tibial plateau fracture  ED discussed with Ortho, recommends monitoring overnight with neurovascular checks to rule out compartment syndrome  Neurovascular checks q 4 hours  Oxycodone and Dilaudid p r n   For pain  Ortho consulted, appreciate recommendations  NPO midnight

## 2021-02-02 NOTE — H&P
H&P- Yessi Hernandez 1955, 60 y o  female MRN: [id number]    Unit/Bed#: Z2 H2 Encounter: [id number]    Primary Care Provider: Arleen Christine MD   Date and time admitted to hospital: 2/2/2021  1:03 PM        * Fracture of right tibia  Assessment & Plan  31-year-old female with past medical history of Crohn's, diabetes, AFib on Eliquis presented with fall  She twisted her knee and heard a pop  In the ED, imaging shows right lateral tibial plateau fracture  ED discussed with Ortho, recommends monitoring overnight with neurovascular checks to rule out compartment syndrome  Neurovascular checks q 4 hours  Oxycodone and Dilaudid p r n  For pain  Ortho consulted, appreciate recommendations  NPO midnight    [facility])  Assessment & Plan  Hold Eliquis, and further anticoagulation given concern of potential compartment syndrome and hemorrhagic conversion  Potential surgery by Ortho tomorrow  Continue Toprol XL and Tikosyn  History of ablation    Diabetes mellitus, type 2 (HCC)  Assessment & Plan  Lab Results   Component Value Date    HGBA1C 6 9 (H) 02/28/2020     Hold metformin and glipizide  Sliding scale and Accu-Cheks    No results for input(s): POCGLU in the last 72 hours  Blood Sugar Average: Last 72 hrs:      COPD (chronic obstructive pulmonary disease) (HCC)  Assessment & Plan  History of asthma and COPD  Not in exacerbation  Continue albuterol, and Breo    Crohn's disease with complication ([facility])  Assessment & Plan  Continue mesalamine and Bentyl    Acquired hypothyroidism  Assessment & Plan  Continue levothyroxine 150 mcg in morning          VTE Prophylaxis: Heparin  / sequential compression device   Code Status: Full Code  POLST: There is no POLST form on file for this patient (pre-hospital)  Discussion with family: Patient    Anticipated Length of Stay:  Patient will be admitted on an Inpatient basis with an anticipated length of stay of 2 midnights     Justification for Hospital Stay: Right Tibial Fracture    Total Time for Visit, including Counseling / Coordination of Care: 45 minutes  Greater than 50% of this total time spent on direct patient counseling and coordination of care  Chief Complaint:   Right Leg Pain    History of Present Illness:    Kiera Chairez is a 60 y o  female who presents with fall and right leg pain  Past medical history of COPD, diabetes, Crohn's disease and hypothyroidism  Patient was helping her  clean her car of snow when she suddenly twisted her leg and fell where she heard a pop in her right lower leg  In the ED, imaging shows right lateral tibial fracture  ED did discuss with Ortho which recommend neurovascular checks given concern of possible compartment syndrome  Patient currently denies any fevers, chills, nausea, dysuria and or abdominal pain  Review of Systems:    Review of Systems   Constitutional: Negative for activity change, appetite change, chills and fever  HENT: Negative for congestion, facial swelling, sinus pain and sore throat  Respiratory: Negative for cough, shortness of breath and wheezing  Cardiovascular: Negative for chest pain, palpitations and leg swelling  Gastrointestinal: Negative for abdominal distention, abdominal pain, constipation, diarrhea, nausea and vomiting  Endocrine: Negative for cold intolerance, heat intolerance, polydipsia, polyphagia and polyuria  Genitourinary: Negative for dysuria, flank pain and urgency  Musculoskeletal: Positive for joint swelling  Skin: Negative for color change and pallor  Neurological: Negative for dizziness, syncope, weakness, light-headedness and headaches  Psychiatric/Behavioral: Negative for agitation, confusion and dysphoric mood         Past Medical and Surgical History:     Past Medical History:   Diagnosis Date    A-fib [facility])     Brain aneurysm     [facility] )     papillary thyroid cancer    Cardiac disease     CHF [facility]congestive heart failure) [facility] )     Crohn disease ([facility] )     Diabetes mellitus ([facility] )     Disease of thyroid gland     GERD (gastroesophageal reflux disease)     Hyperlipidemia     Hypertension        Past Surgical History:   Procedure Laterality Date    APPENDECTOMY      CARDIOVERSION N/A 2/14/2019    Procedure: CARDIOVERSION;  Surgeon: Phylliss Halsted, MD;  Location: [facility] MAIN OR;  Service: Cardiology    CEREBRAL ANEURYSM REPAIR      CHOLECYSTECTOMY      HYSTERECTOMY      THYROIDECTOMY      TONSILLECTOMY AND ADENOIDECTOMY         Meds/Allergies:    Prior to Admission medications    Medication Sig Start Date End Date Taking? Authorizing Provider   albuterol (PROVENTIL HFA,VENTOLIN HFA) 90 mcg/act inhaler Inhale 2 puffs every 6 (six) hours as needed for wheezing or shortness of breath 2/29/20   Erlin Mattson DO   apixaban (ELIQUIS) 5 mg Take 5 mg by mouth 2 (two) times a day  2/5/19   Historical Provider, MD   aspirin 81 mg chewable tablet Chew 1 tablet (81 mg total) daily 2/29/20   Erlin Mattson DO   atorvastatin (LIPITOR) 10 mg tablet Take 10 mg by mouth daily 11/10/20   Historical Provider, MD   Martine Low Dose 81 MG EC tablet Take 81 mg by mouth daily 11/25/20   Historical Provider, MD   Blood Glucose Monitoring Suppl (Saloni Arreola) w/Device KIT Use to check sugars once a day   Dx E11 9 1/28/20   Historical Provider, MD   Blood Glucose Monitoring Suppl (Saloni Arreola) w/Device KIT daily Use to check glucose 1/28/20   Historical Provider, MD   dicyclomine (BENTYL) 20 mg tablet Take 1 tablet (20 mg total) by mouth every 6 (six) hours as needed (urgency, abdominal pain) 9/29/20   Audrey Conde MD   dofetilide (TIKOSYN) 500 mcg capsule Take 500 mcg by mouth 2 (two) times a day    Historical Provider, MD   fenofibrate (TRICOR) 54 MG tablet Take 54 mg by mouth daily    Historical Provider, MD   furosemide (LASIX) 20 mg tablet Take 20 mg by mouth 2 (two) times a day 6/10/20   Historical Provider, MD   glipiZIDE (GLUCOTROL XL) 5 mg 24 hr tablet TAKE 1 TABLET BY MOUTH ONCE DAILY 30 MINUTES BEFORE A MEAL (REPLACES METFORMIN) 1/5/21   Historical Provider, MD   glucose blood test strip Use to check sugars once a day  Dx E11 9 1/28/20   Historical Provider, MD   Lancets ([address]) [address] USE 1 TO CHECK GLUCOSE ONCE DAILY 1/28/20   Historical Provider, MD   meclizine (ANTIVERT) 25 mg tablet Take 1 tablet (25 mg total) by mouth 3 (three) times a day as needed for dizziness 11/9/20   Chiki Estevez,    mesalamine (DELZICOL) 400 mg Take 2 capsules by mouth twice daily 1/16/21   Jessika Viramontes MD   metFORMIN (GLUCOPHAGE-XR) 500 mg 24 hr tablet Take 1,000 mg by mouth  1/28/20   Historical Provider, MD   metoprolol succinate (TOPROL-XL) 100 mg 24 hr tablet Take 1 tablet (100 mg total) by mouth every 12 (twelve) hours  Patient taking differently: Take 100 mg by mouth every 12 (twelve) hours 1/2 pill in the PM 2/14/19   [facility], DO   mometasone-formoterol [facility]) 100-5 MCG/ACT inhaler Inhale 2 puffs 2 (two) times a day 2/29/20   [facility], DO   naproxen (Naprosyn) 500 mg tablet Take by mouth every 12 (twelve) hours 7/15/15   Historical Provider, MD   omeprazole (PriLOSEC) 40 MG capsule Take 1 capsule (40 mg total) by mouth daily  Patient not taking: Reported on 1/18/2021 7/29/20   Gustavo Islas MD   omeprazole (PriLOSEC) 40 MG capsule Take 1 capsule (40 mg total) by mouth daily before breakfast 11/24/20 12/24/20  MD Manoj Lynn Lancets 52Z MISC Use to check sugars once a day   Dx E11 9 1/28/20   Historical Provider, MD Renetta Robles test strip USE 1 STRIP TO [address] DAILY 1/28/20   Historical Provider, MD   pramipexole (MIRAPEX) 0 5 mg tablet Take 1 tablet (0 5 mg total) by mouth daily at bedtime 1/18/21   Rodrigo Andrade MD   SYNTHROID 150 MCG tablet TAKE 1 TABLET BY MOUTH ONCE DAILY FIRST THING IN THE MORNING (AT LEAST 30 MINUTES PRIOR TO BREAKFAST OR OTHER MEDS) 1/21/20   Historical Provider, MD   traMADol (ULTRAM) 50 mg tablet Take 1 tablet (50 mg total) by mouth every 8 (eight) hours as needed for moderate pain for up to 3 days 2/2/21 2/5/21  Kris Grayson,    venlafaxine (EFFEXOR XR) 37 5 mg 24 hr capsule Take 37 5 mg by mouth daily  12/31/18   Historical Provider, MD   venlafaxine (EFFEXOR-XR) 75 mg 24 hr capsule TAKE 1 CAPSULE BY MOUTH ONCE DAILY DO NOT CUT CRUSH OR CHEW 11/10/20   Historical Provider, MD   mesalamine (DELZICOL) 400 mg Take 2 capsules (800 mg total) by mouth 2 (two) times a day 9/29/20   Nichelle Pugh MD   mesalamine (DELZICOL) 400 mg Take 2 capsules by mouth twice daily 11/25/20   Nichelle Pugh MD   mesalamine (DELZICOL) 400 mg Take 2 capsules by mouth twice daily 12/19/20   Nichelle Pugh MD     I have reviewed home medications with patient personally  Allergies: Allergies   Allergen Reactions    Sulfa Antibiotics Rash    Other        Social History:     Marital Status: /Civil Union   Occupation:   Patient Pre-hospital Living Situation: Home with   Patient Pre-hospital Level of Mobility: ambulatory  Patient Pre-hospital Diet Restrictions: None  Substance Use History:   Social History     Substance and Sexual Activity   Alcohol Use Yes    Alcohol/week: 0 0 standard drinks    Frequency: Monthly or less    Comment: social     Social History     Tobacco Use   Smoking Status Former Smoker    Quit date: 1/1/2018    Years since quitting: 3 0   Smokeless Tobacco Never Used     Social History     Substance and Sexual Activity   Drug Use Never       Family History:    History reviewed  No pertinent family history      Physical Exam:     Vitals:   Blood Pressure: 129/54 (02/02/21 1305)  Pulse: 75 (02/02/21 1305)  Temperature: 98 2 °F (36 8 °C) (02/02/21 1305)  Temp Source: Tympanic (02/02/21 1305)  Height: 5' 9" (175 3 cm) (02/02/21 1305)  Weight - Scale: 114 kg (251 lb 5 2 oz) (02/02/21 1305)  SpO2: 98 % (02/02/21 1305)    Physical Exam  Vitals signs and nursing note reviewed  Constitutional:       Appearance: Normal appearance  She is obese  HENT:      Head: Normocephalic and atraumatic  Eyes:      Conjunctiva/sclera: Conjunctivae normal       Pupils: Pupils are equal, round, and reactive to light  Cardiovascular:      Rate and Rhythm: Normal rate and regular rhythm  Heart sounds: Normal heart sounds  Pulmonary:      Breath sounds: Normal breath sounds  No wheezing or rhonchi  Abdominal:      General: Bowel sounds are normal  There is no distension  Palpations: Abdomen is soft  Tenderness: There is no abdominal tenderness  Musculoskeletal: Normal range of motion  General: Tenderness present  Comments: Right lower extremity tenderness, swelling  Palpable pulses, sensory intact, warm   Skin:     General: Skin is warm and dry  Neurological:      General: No focal deficit present  Mental Status: She is alert and oriented to person, place, and time  Mental status is at baseline  Additional Data:     Lab Results: I have personally reviewed pertinent reports  Results from last 7 days   Lab Units 02/02/21  1602   WBC Thousand/uL 12 70*   HEMOGLOBIN g/dL 11 3*   HEMATOCRIT % 36 1*   PLATELETS Thousands/uL 301   NEUTROS PCT % 72   LYMPHS PCT % 21   MONOS PCT % 6   EOS PCT % 1     Results from last 7 days   Lab Units 02/02/21  1602   SODIUM mmol/L 137   POTASSIUM mmol/L 3 7   CHLORIDE mmol/L 102   CO2 mmol/L 28   BUN mg/dL 14   CREATININE mg/dL 0 82   ANION GAP mmol/L 7   CALCIUM mg/dL 9 0   ALBUMIN g/dL 3 9   TOTAL BILIRUBIN mg/dL 0 30   ALK PHOS U/L 92   ALT U/L 50   AST U/L 50*   GLUCOSE RANDOM mg/dL 135*     Results from last 7 days   Lab Units 02/02/21  1602   INR  1 23*                   Imaging: I have personally reviewed pertinent reports        CT lower extremity wo contrast right   ED Interpretation by Berlin Pelaez DO (02/02 1608)   Mildly comminuted tibial plateau fracture with 5-6 mm depression confirmed by CT scan      Final Result by Lilian Hardiwck MD (02/02 4987)      1  Fracture of the right lateral tibial plateau with 5 to 6 mm of depression  2   Large joint effusion  Workstation performed: [id number]         XR knee 4+ vw right injury   ED Interpretation by Bernadette Sethi DO (02/02 1443)   Irregular lateral tibial plateau suspicious for tibial plateau fracture noted by Radiology  Will refer for CT scanning  Final Result by Johnathan Muhammad DO (02/02 1411)      On the AP view only there is an irregularity of the lateral tibial plateau suspicious for nondisplaced tibial plateau fracture  There is a moderate joint effusion in the suprapatellar bursa  Mild degenerative osteoarthritis  Recommend CT or MRI imaging to exclude lateral tibial plateau fracture and evaluate etiology of joint effusion  Please note there is a discrepancy between the preliminary report provided by the emergency department and this report  This examination was marked "immediate notification" in Epic in order to begin the standard process by which the radiology reading room liaison alerts the referring practitioner  Workstation performed: [id number]             EKG, Pathology, and Other Studies Reviewed on Admission:   · EKG:  None    Allscripts / Epic Records Reviewed: Yes     ** Please Note: This note has been constructed using a voice recognition system   **

## 2021-02-02 NOTE — ED NOTES
Pt unable to demo crutch walking  Pt unable to take two steps with crutches  Dr Mary Coppola made aware        Joslyn Marvin RN  02/02/21 7186

## 2021-02-02 NOTE — Clinical Note
Laurine Hodgkins was seen and treated in our emergency department on 2/2/2021  Diagnosis: Knee sprain    Sariah Hyde  may return to work on return date  She may return on this date: 02/08/2021    Left follow-up with orthopedics     If you have any questions or concerns, please don't hesitate to call        Savannah Gilford, DO    ______________________________           _______________          _______________  Hospital Representative                              Date                                Time

## 2021-02-02 NOTE — ASSESSMENT & PLAN NOTE
Lab Results   Component Value Date    HGBA1C 6 9 (H) 02/28/2020     Hold metformin and glipizide  Sliding scale and Accu-Cheks    No results for input(s): POCGLU in the last 72 hours      Blood Sugar Average: Last 72 hrs:

## 2021-02-02 NOTE — ASSESSMENT & PLAN NOTE
Hold Eliquis, and further anticoagulation given concern of potential compartment syndrome and hemorrhagic conversion  Potential surgery by Ortho tomorrow  Continue Toprol XL and Tikosyn  History of ablation

## 2021-02-02 NOTE — RESPIRATORY THERAPY NOTE
Patient is bringing in her home Cpap unit but is declining to be Covid tested at this time  Patient wants to try 2 liters O2 first and only wear her Cpap unit if she really needs it  If she needs her Cpap  unit then she will get the Covid test    Dr Angelina Meraz and CAR Nolan aware

## 2021-02-03 LAB
ALBUMIN SERPL BCP-MCNC: 3.6 G/DL (ref 3.5–5.7)
ALP SERPL-CCNC: 83 U/L (ref 55–165)
ALT SERPL W P-5'-P-CCNC: 43 U/L (ref 7–52)
ANION GAP SERPL CALCULATED.3IONS-SCNC: 7 MMOL/L (ref 4–13)
AST SERPL W P-5'-P-CCNC: 45 U/L (ref 13–39)
BASOPHILS # BLD AUTO: 0 THOUSANDS/ΜL (ref 0–0.1)
BASOPHILS NFR BLD AUTO: 0 % (ref 0–2)
BILIRUB SERPL-MCNC: 0.5 MG/DL (ref 0.2–1)
BUN SERPL-MCNC: 14 MG/DL (ref 7–25)
CALCIUM SERPL-MCNC: 9 MG/DL (ref 8.6–10.5)
CHLORIDE SERPL-SCNC: 100 MMOL/L (ref 98–107)
CO2 SERPL-SCNC: 29 MMOL/L (ref 21–31)
CREAT SERPL-MCNC: 0.73 MG/DL (ref 0.6–1.2)
EOSINOPHIL # BLD AUTO: 0.2 THOUSAND/ΜL (ref 0–0.61)
EOSINOPHIL NFR BLD AUTO: 1 % (ref 0–5)
ERYTHROCYTE [DISTWIDTH] IN BLOOD BY AUTOMATED COUNT: 15.5 % (ref 11.5–14.5)
FLUAV RNA RESP QL NAA+PROBE: NEGATIVE
FLUBV RNA RESP QL NAA+PROBE: NEGATIVE
GFR SERPL CREATININE-BSD FRML MDRD: 87 ML/MIN/1.73SQ M
GLUCOSE SERPL-MCNC: 145 MG/DL (ref 65–140)
GLUCOSE SERPL-MCNC: 155 MG/DL (ref 65–140)
GLUCOSE SERPL-MCNC: 162 MG/DL (ref 65–140)
GLUCOSE SERPL-MCNC: 166 MG/DL (ref 65–99)
GLUCOSE SERPL-MCNC: 171 MG/DL (ref 65–140)
HCT VFR BLD AUTO: 34.4 % (ref 42–47)
HGB BLD-MCNC: 10.8 G/DL (ref 12–16)
LYMPHOCYTES # BLD AUTO: 1.9 THOUSANDS/ΜL (ref 0.6–4.47)
LYMPHOCYTES NFR BLD AUTO: 15 % (ref 21–51)
MCH RBC QN AUTO: 27.5 PG (ref 26–34)
MCHC RBC AUTO-ENTMCNC: 31.3 G/DL (ref 31–37)
MCV RBC AUTO: 88 FL (ref 81–99)
MONOCYTES # BLD AUTO: 0.9 THOUSAND/ΜL (ref 0.17–1.22)
MONOCYTES NFR BLD AUTO: 7 % (ref 2–12)
NEUTROPHILS # BLD AUTO: 9.7 THOUSANDS/ΜL (ref 1.4–6.5)
NEUTS SEG NFR BLD AUTO: 76 % (ref 42–75)
PLATELET # BLD AUTO: 283 THOUSANDS/UL (ref 149–390)
PMV BLD AUTO: 8.4 FL (ref 8.6–11.7)
POTASSIUM SERPL-SCNC: 3.7 MMOL/L (ref 3.5–5.5)
PROT SERPL-MCNC: 7.1 G/DL (ref 6.4–8.9)
RBC # BLD AUTO: 3.91 MILLION/UL (ref 3.9–5.2)
RSV RNA RESP QL NAA+PROBE: NEGATIVE
SARS-COV-2 RNA RESP QL NAA+PROBE: NEGATIVE
SODIUM SERPL-SCNC: 136 MMOL/L (ref 134–143)
WBC # BLD AUTO: 12.8 THOUSAND/UL (ref 4.8–10.8)

## 2021-02-03 PROCEDURE — 99232 SBSQ HOSP IP/OBS MODERATE 35: CPT | Performed by: INTERNAL MEDICINE

## 2021-02-03 PROCEDURE — 80053 COMPREHEN METABOLIC PANEL: CPT | Performed by: STUDENT IN AN ORGANIZED HEALTH CARE EDUCATION/TRAINING PROGRAM

## 2021-02-03 PROCEDURE — 97163 PT EVAL HIGH COMPLEX 45 MIN: CPT

## 2021-02-03 PROCEDURE — 82948 REAGENT STRIP/BLOOD GLUCOSE: CPT

## 2021-02-03 PROCEDURE — 94760 N-INVAS EAR/PLS OXIMETRY 1: CPT

## 2021-02-03 PROCEDURE — 99254 IP/OBS CNSLTJ NEW/EST MOD 60: CPT | Performed by: ORTHOPAEDIC SURGERY

## 2021-02-03 PROCEDURE — 85025 COMPLETE CBC W/AUTO DIFF WBC: CPT | Performed by: STUDENT IN AN ORGANIZED HEALTH CARE EDUCATION/TRAINING PROGRAM

## 2021-02-03 PROCEDURE — 97530 THERAPEUTIC ACTIVITIES: CPT

## 2021-02-03 PROCEDURE — 27530 TREAT KNEE FRACTURE: CPT | Performed by: ORTHOPAEDIC SURGERY

## 2021-02-03 PROCEDURE — 97110 THERAPEUTIC EXERCISES: CPT

## 2021-02-03 RX ORDER — MECLIZINE HCL 12.5 MG/1
12.5 TABLET ORAL EVERY 8 HOURS PRN
Status: DISCONTINUED | OUTPATIENT
Start: 2021-02-03 | End: 2021-02-04 | Stop reason: HOSPADM

## 2021-02-03 RX ORDER — HYDRALAZINE HYDROCHLORIDE 20 MG/ML
5 INJECTION INTRAMUSCULAR; INTRAVENOUS EVERY 6 HOURS PRN
Status: DISCONTINUED | OUTPATIENT
Start: 2021-02-03 | End: 2021-02-04 | Stop reason: HOSPADM

## 2021-02-03 RX ADMIN — PRAMIPEXOLE DIHYDROCHLORIDE 0.5 MG: 0.5 TABLET ORAL at 20:53

## 2021-02-03 RX ADMIN — DOFETILIDE 500 MCG: 0.25 CAPSULE ORAL at 10:39

## 2021-02-03 RX ADMIN — HYDROMORPHONE HYDROCHLORIDE 1 MG: 1 INJECTION, SOLUTION INTRAMUSCULAR; INTRAVENOUS; SUBCUTANEOUS at 10:29

## 2021-02-03 RX ADMIN — MESALAMINE 800 MG: 400 CAPSULE, DELAYED RELEASE ORAL at 09:41

## 2021-02-03 RX ADMIN — VENLAFAXINE HYDROCHLORIDE 75 MG: 75 CAPSULE, EXTENDED RELEASE ORAL at 09:41

## 2021-02-03 RX ADMIN — ONDANSETRON 4 MG: 2 INJECTION INTRAMUSCULAR; INTRAVENOUS at 10:34

## 2021-02-03 RX ADMIN — PANTOPRAZOLE SODIUM 40 MG: 40 TABLET, DELAYED RELEASE ORAL at 05:22

## 2021-02-03 RX ADMIN — FLUTICASONE FUROATE AND VILANTEROL TRIFENATATE 1 PUFF: 200; 25 POWDER RESPIRATORY (INHALATION) at 09:41

## 2021-02-03 RX ADMIN — HYDROMORPHONE HYDROCHLORIDE 1 MG: 1 INJECTION, SOLUTION INTRAMUSCULAR; INTRAVENOUS; SUBCUTANEOUS at 14:33

## 2021-02-03 RX ADMIN — METOPROLOL SUCCINATE 100 MG: 50 TABLET, EXTENDED RELEASE ORAL at 17:23

## 2021-02-03 RX ADMIN — ATORVASTATIN CALCIUM 10 MG: 10 TABLET, FILM COATED ORAL at 09:41

## 2021-02-03 RX ADMIN — HYDROMORPHONE HYDROCHLORIDE 1 MG: 1 INJECTION, SOLUTION INTRAMUSCULAR; INTRAVENOUS; SUBCUTANEOUS at 20:00

## 2021-02-03 RX ADMIN — LEVOTHYROXINE SODIUM 150 MCG: 75 TABLET ORAL at 05:22

## 2021-02-03 RX ADMIN — HYDROMORPHONE HYDROCHLORIDE 1 MG: 1 INJECTION, SOLUTION INTRAMUSCULAR; INTRAVENOUS; SUBCUTANEOUS at 06:03

## 2021-02-03 RX ADMIN — DOFETILIDE 500 MCG: 0.25 CAPSULE ORAL at 17:23

## 2021-02-03 RX ADMIN — HYDROMORPHONE HYDROCHLORIDE 1 MG: 1 INJECTION, SOLUTION INTRAMUSCULAR; INTRAVENOUS; SUBCUTANEOUS at 00:38

## 2021-02-03 RX ADMIN — INSULIN LISPRO 1 UNITS: 100 INJECTION, SOLUTION INTRAVENOUS; SUBCUTANEOUS at 17:24

## 2021-02-03 RX ADMIN — HEPARIN SODIUM 5000 UNITS: 5000 INJECTION INTRAVENOUS; SUBCUTANEOUS at 20:53

## 2021-02-03 RX ADMIN — METOPROLOL SUCCINATE 100 MG: 50 TABLET, EXTENDED RELEASE ORAL at 05:23

## 2021-02-03 RX ADMIN — MESALAMINE 800 MG: 400 CAPSULE, DELAYED RELEASE ORAL at 17:22

## 2021-02-03 NOTE — NURSING NOTE
Knee immobilizer ordered for patient, but none currently in stock  Unit clerk informed, and will place an order for one  Ortho PA, Essie Cruz, made aware

## 2021-02-03 NOTE — PLAN OF CARE
Problem: Potential for Falls  Goal: Patient will remain free of falls  Description: INTERVENTIONS:  - Assess patient frequently for physical needs  -  Identify cognitive and physical deficits and behaviors that affect risk of falls    -  [place] fall precautions as indicated by assessment   - Educate patient/family on patient safety including physical limitations  - Instruct patient to call for assistance with activity based on assessment  - Modify environment to reduce risk of injury  - Consider OT/PT consult to assist with strengthening/mobility  Outcome: Progressing     Problem: MUSCULOSKELETAL - ADULT  Goal: Maintain or return mobility to safest level of function  Description: INTERVENTIONS:  - Assess patient's ability to carry out ADLs; assess patient's baseline for ADL function and identify physical deficits which impact ability to perform ADLs (bathing, care of mouth/teeth, toileting, grooming, dressing, etc )  - Assess/evaluate cause of self-care deficits   - Assess range of motion  - Assess patient's mobility  - Assess patient's need for assistive devices and provide as appropriate  - Encourage maximum independence but intervene and supervise when necessary  - Involve family in performance of ADLs  - Assess for home care needs following discharge   - Consider OT consult to assist with ADL evaluation and planning for discharge  - Provide patient education as appropriate  Outcome: Progressing  Goal: Maintain proper alignment of affected body part  Description: INTERVENTIONS:  - Support, maintain and protect limb and body alignment  - Provide patient/ family with appropriate education  Outcome: Progressing     Problem: INFECTION - ADULT  Goal: Absence or prevention of progression during hospitalization  Description: INTERVENTIONS:  - Assess and monitor for signs and symptoms of infection  - Monitor lab/diagnostic results  - Monitor all insertion sites, i e  indwelling lines, tubes, and drains  - Monitor endotracheal if appropriate and nasal secretions for changes in amount and color  - [place] appropriate cooling/warming therapies per order  - Administer medications as ordered  - Instruct and encourage patient and family to use good hand hygiene technique  - Identify and instruct in appropriate isolation precautions for identified infection/condition  Outcome: Progressing  Goal: Absence of fever/infection during neutropenic period  Description: INTERVENTIONS:  - Monitor WBC    Outcome: Progressing     Problem: SAFETY ADULT  Goal: Patient will remain free of falls  Description: INTERVENTIONS:  - Assess patient frequently for physical needs  -  Identify cognitive and physical deficits and behaviors that affect risk of falls    -  [place] fall precautions as indicated by assessment   - Educate patient/family on patient safety including physical limitations  - Instruct patient to call for assistance with activity based on assessment  - Modify environment to reduce risk of injury  - Consider OT/PT consult to assist with strengthening/mobility  Outcome: Progressing  Goal: Maintain or return to baseline ADL function  Description: INTERVENTIONS:  -  Assess patient's ability to carry out ADLs; assess patient's baseline for ADL function and identify physical deficits which impact ability to perform ADLs (bathing, care of mouth/teeth, toileting, grooming, dressing, etc )  - Assess/evaluate cause of self-care deficits   - Assess range of motion  - Assess patient's mobility; develop plan if impaired  - Assess patient's need for assistive devices and provide as appropriate  - Encourage maximum independence but intervene and supervise when necessary  - Involve family in performance of ADLs  - Assess for home care needs following discharge   - Consider OT consult to assist with ADL evaluation and planning for discharge  - Provide patient education as appropriate  Outcome: Progressing  Goal: Maintain or return mobility status to optimal level  Description: INTERVENTIONS:  - Assess patient's baseline mobility status (ambulation, transfers, stairs, etc )    - Identify cognitive and physical deficits and behaviors that affect mobility  - Identify mobility aids required to assist with transfers and/or ambulation (gait belt, sit-to-stand, lift, walker, cane, etc )  - [place] fall precautions as indicated by assessment  - Record patient progress and toleration of activity level on Mobility SBAR; progress patient to next Phase/Stage  - Instruct patient to call for assistance with activity based on assessment  - Consider rehabilitation consult to assist with strengthening/weightbearing, etc   Outcome: Progressing     Problem: DISCHARGE PLANNING  Goal: Discharge to home or other facility with appropriate resources  Description: INTERVENTIONS:  - Identify barriers to discharge w/patient and caregiver  - Arrange for needed discharge resources and transportation as appropriate  - Identify discharge learning needs (meds, wound care, etc )  - Arrange for interpretive services to assist at discharge as needed  - Refer to Case Management Department for coordinating discharge planning if the patient needs post-hospital services based on physician/advanced practitioner order or complex needs related to functional status, cognitive ability, or social support system  Outcome: Progressing     Problem: Knowledge Deficit  Goal: Patient/family/caregiver demonstrates understanding of disease process, treatment plan, medications, and discharge instructions  Description: Complete learning assessment and assess knowledge base    Interventions:  - Provide teaching at level of understanding  - Provide teaching via preferred learning methods  Outcome: Progressing     Problem: Prexisting or High Potential for Compromised Skin Integrity  Goal: Skin integrity is maintained or improved  Description: INTERVENTIONS:  - Identify patients at risk for skin breakdown  - Assess and monitor skin integrity  - Assess and monitor nutrition and hydration status  - Monitor labs   - Assess for incontinence   - Turn and reposition patient  - Assist with mobility/ambulation  - Relieve pressure over bony prominences  - Avoid friction and shearing  - Provide appropriate hygiene as needed including keeping skin clean and dry  - Evaluate need for skin moisturizer/barrier cream  - Collaborate with interdisciplinary team   - Patient/family teaching  - Consider wound care consult   Outcome: Progressing

## 2021-02-03 NOTE — NURSING NOTE
Patient having difficulty falling asleep and is wanting to wear her c-pap  COVID-19 swabbing performed and respiratory was contacted to inform them of the same  Patient  Also reports that her pain level continues to climb  Patient was medicated with both dilaudid and roxicodone as ordered  At worst, the patient's pain was a 10/10; at its best 5/10  Julian TYSON contacted with same  Awaiting response

## 2021-02-03 NOTE — PHYSICAL THERAPY NOTE
Physical Therapy Treatment Session Note    Patient's Name: Reji Ann    Admitting Diagnosis  Knee injury [S89 90XA]  Right knee sprain [S83 91XA]  Tibial plateau fracture, right, closed, initial encounter [S82 141A]    Problem List  Patient Active Problem List   Diagnosis    Lymphadenopathy    [facility]-fib ([facility] )    Elevated TSH    Hypercholesteremia    Transaminitis    Microscopic hematuria    Pulmonary hypertension (HCC)    MARY ELLEN (obstructive sleep apnea)    Acquired hypothyroidism    Brain aneurysm    Atypical chest pain    Dizziness    Crohn's disease with complication ([place] [address] )    Pneumonia due to infectious organism    Elevated d-dimer    Hypokalemia    Acute respiratory failure with hypoxia (HCC)    COPD (chronic obstructive pulmonary disease) (HCC)    Exacerbation of Crohn's disease (HCC)    Severe obstructive sleep apnea    Diarrhea    Rectal bleeding    Atherosclerosis    Prolonged QT interval    Hepatomegaly    Hepatic steatosis    Colon polyp    Hyperglycemia    Multiple renal cysts    T12 compression fracture (HCC)    Diabetes mellitus, type 2 (HCC)    Fracture of right tibia       Past Medical History  Past Medical History:   Diagnosis Date    A-fib ([place] [address] )     Brain aneurysm     Cancer ([place] [address] )     papillary thyroid cancer    Cardiac disease     CHF (congestive heart failure) (HCC)     Crohn disease ([place] [address] )     Diabetes mellitus ([facility] )     Disease of thyroid gland     GERD (gastroesophageal reflux disease)     Hyperlipidemia     Hypertension        Past Surgical History  Past Surgical History:   Procedure Laterality Date    APPENDECTOMY      CARDIOVERSION N/A 2/14/2019    Procedure: CARDIOVERSION;  Surgeon: Dorie Mehta MD;  Location: [facility];  Service: Cardiology    CEREBRAL ANEURYSM REPAIR      CHOLECYSTECTOMY      HYSTERECTOMY      THYROIDECTOMY      TONSILLECTOMY AND ADENOIDECTOMY          02/03/21 0916   PT Last Visit   PT Visit Date 02/03/21   Note Type Note Type Treatment  (following initial evaluation)   Pain Assessment   Pain Assessment Tool 0-10   Pain Score Worst Possible Pain   Pain Location/Orientation Orientation: Right;Other (Comment)  (lower AP leg)   Pain Radiating Towards into R shin w/mobility   Pain Onset/Description Onset: Ongoing;Frequency: Constant/Continuous; Descriptor: Aching;Descriptor: Sore;Descriptor: Rue Cooks   Effect of Pain on Daily Activities yes   Patient's Stated Pain Goal No pain   Hospital Pain Intervention(s) Medication (See MAR); Repositioned;Cold applied;Elevated  (cold packs x 2)   Multiple Pain Sites Yes   Restrictions/Precautions   Weight Bearing Precautions Per Order Yes   RLE Weight Bearing Per Order NWB   Other Precautions WBS; Chair Alarm; Bed Alarm; Fall Risk;Pain   General   Family/Caregiver Present No   Cognition   Overall Cognitive Status WFL   Arousal/Participation Alert; Responsive; Cooperative   Attention Within functional limits   Orientation Level Oriented X4   Memory Within functional limits   Following Commands Follows one step commands with increased time or repetition   Comments Pt  agreeable to PT tx session, pleasant  Subjective   Subjective "I can try the recliner"   Bed Mobility   Additional Comments DNT bed mobility as pt  was sitting on bedside prior/recliner after session  Transfers   Sit to Stand 4  Minimal assistance   Additional items Assist x 1;Bedrails; Increased time required;Verbal cues   Stand to Sit 4  Minimal assistance   Additional items Assist x 1;Bedrails; Increased time required;Verbal cues   Stand pivot 3  Moderate assistance   Additional items Assist x 1; Armrests; Increased time required;Verbal cues   Ambulation/Elevation   Gait pattern Antalgic; Forward Flexion;Decreased R stance  (NWB maintained w/ LLE pivoting)   Gait Assistance 3  Moderate assist   Additional items Assist x 1;Verbal cues; Tactile cues   Assistive Device Rolling walker   Distance 1 foot  (bedside->recliner)   Stair Management Assistance Not tested  (unsafe for trialing at this time)   Balance   Static Sitting Good   Dynamic Sitting Fair +   Static Standing Fair   Dynamic Standing Fair -   Ambulatory Fair -   Endurance Deficit   Endurance Deficit Yes   Activity Tolerance   Activity Tolerance Patient limited by fatigue;Patient limited by pain   Nurse Made Aware yes, [address] RN   Exercises   Glute Sets Sitting;20 reps;AROM; Bilateral   Hip Flexion Sitting;20 reps;AROM; Left   Hip Abduction Sitting;20 reps;AROM; Bilateral   Hip Adduction Sitting;20 reps;AROM; Bilateral   Knee AROM Long Arc Quad Sitting;20 reps;AROM; Left   Ankle Pumps Sitting;20 reps;AROM; Bilateral   Balance Training   (additional TE:BUE shoulder taps, elbow curls x 20 reps each)   Assessment   Prognosis Good   Problem List Decreased strength;Decreased range of motion;Decreased endurance; Impaired balance;Decreased mobility; Decreased coordination;Decreased safety awareness;Orthopedic restrictions;Pain;Obesity   Assessment Pt seen for PT treatment session this date with interventions consisting of Therapeutic exercise consisting of: AROM 20 reps LLE all, appropriate RLE in sitting position and therapeutic activity consisting of training: sit<>stand transfers, static standing tolerance for <1 minutes w/ B UE support, vc and tactile cues for static sitting posture faciliation, vc and tactile cues for static standing posture faciliation and stand pivot transfers towards L direction  Pt agreeable to PT treatment session upon arrival, pt found seated at EOB, A&O x 4  In comparison to previous session, pt with improvements in advancement to TE  Post session: chair alarm engaged, all needs in reach and RN notified of session findings/recommendations Continue to recommend Home PT vs  STR at time of d/c in order to maximize pt's functional independence and safety w/ mobility   Pt continues to be functioning below baseline level, and remains limited 2* factors listed above and including weakness, pain, impaired balance, pending potential surgical interventions  PT will continue to see pt while here in order to address the deficits listed above and provide interventions consistent w/ POC in effort to achieve LTGs  Barriers to Discharge Inaccessible home environment; Other (Comment)   Barriers to Discharge Comments [facility] home, pending surgical interventions prn   Goals   Patient Goals to have less pain  Kayla Gould RN providing med  s)   LTG Expiration Date 02/13/21   Long Term Goal #1 LTGs remain appropriate   PT Treatment Day 1   Plan   Treatment/Interventions ADL retraining;Functional transfer training;LE strengthening/ROM; Therapeutic exercise;Elevations; Endurance training;Patient/family training;Equipment eval/education; Bed mobility;Gait training;Spoke to nursing;OT   Progress Progressing toward goals   PT Frequency 7x/wk   Recommendation   PT Discharge Recommendation Other (Comment)  (HHPT w/social support vs PAR)   Equipment Recommended Walker   PT - OK to Discharge No   Additional Comments Upon conclusion, pt  was resting OOB on recliner w/chair alarm engaged and all needs within reach  Additional Comments 2 [facility] raw score of 14, standardized score of 35 55  As per the data, this score is correlated most closely with a SNF recommendation  However,the therapist's discharge disposition recommendation may vary as numerous social factors and objective findings contribute to the suggested destination     AM-PAC Basic Mobility Inpatient   Turning in Bed Without Bedrails 3   Lying on Back to Sitting on Edge of Flat Bed 3   Moving Bed to Chair 3   Standing Up From Chair 2   Walk in Room 2   Climb 3-5 Stairs 1   Basic Mobility Inpatient Raw Score 14   Basic Mobility Standardized Score 35 55     Treatment Time: [id number]      Theresa Winter, PT

## 2021-02-03 NOTE — CONSULTS
Consultation - Vivi Arm 60 y o  female MRN: [id number]  Unit/Bed#: -01 Encounter: [id number]      Assessment/Plan     Assessment:  Right lateral tibial plateau fracture  Plan:  No surgical intervention required at this time  Long knee immobilizer  Non weight-bearing right lower extremity  Continue physical therapy and occupational therapy  DC planning, possibly to rehab  Follow-up outpatient 1 month with new x-rays    History of Present Illness   Physician Requesting Consult: Marcelo Kahn MD  Reason for Consult / Principal Problem:  Right lateral tibial plateau fracture    HPI: Andrews Spears is a 60 y o   female who presents to the emergency department after suffering an injury to her knee on 02/02/2021  The patient states that she was outside helping her  clean snow off of their truck  She states she felt a pop in her knee and had immediate pain  She states she might have twisted her knee prior to this  She denies falling directly on her knee  She also states she then had significant swelling  She denies any loss of consciousness  X-rays and a CT scan were performed which were consistent with a fracture of her right lateral tibial plateau with approximately 5 mm of depression  At which point, Orthopedic surgery was consulted  Inpatient consult to Orthopedic Surgery  Consult performed by: Jm Russo PA-C  Consult ordered by: Marcelo Kahn MD          Review of Systems   Constitutional: Negative for chills, fever and unexpected weight change  HENT: Negative for nosebleeds and sore throat  Eyes: Negative for pain, redness and visual disturbance  Respiratory: Negative for cough, shortness of breath and wheezing  Cardiovascular: Negative for chest pain, palpitations and leg swelling  Gastrointestinal: Negative for abdominal pain, nausea and vomiting  Endocrine: Negative for polydipsia and polyuria  Genitourinary: Negative for dysuria and hematuria  Musculoskeletal: Positive for arthralgias and joint swelling  As noted in HPI   Skin: Negative for rash and wound  Neurological: Negative for dizziness, numbness and headaches  Psychiatric/Behavioral: Negative for decreased concentration and suicidal ideas  The patient is not nervous/anxious  Historical Information   Past Medical History:   Diagnosis Date    [facility]fib [facility])     Brain aneurysm     Cancer ([place] [address] )     papillary thyroid cancer    Cardiac disease     [facility]     Crohn disease ([place] [address] )     Diabetes mellitus ([place] [address] )     Disease of thyroid gland     GERD (gastroesophageal reflux disease)     Hyperlipidemia     Hypertension      Past Surgical History:   Procedure Laterality Date    APPENDECTOMY      CARDIOVERSION N/A 2/14/2019    Procedure: CARDIOVERSION;  Surgeon: Mo Tobias MD;  Location: [facility];  Service: Cardiology    CEREBRAL ANEURYSM REPAIR      CHOLECYSTECTOMY      HYSTERECTOMY      THYROIDECTOMY      TONSILLECTOMY AND ADENOIDECTOMY       Social History   Social History     Substance and Sexual Activity   Alcohol Use Yes    Alcohol/week: 0 0 standard drinks    Frequency: Monthly or less    Comment: social     Social History     Substance and Sexual Activity   Drug Use Never     E-Cigarette/Vaping    E-Cigarette Use Never User      E-Cigarette/Vaping Substances     Social History     Tobacco Use   Smoking Status Former Smoker    Quit date: 1/1/2018    Years since quitting: 3 0   Smokeless Tobacco Never Used     Family History: non-contributory    Meds/Allergies   all current active meds have been reviewed  Allergies   Allergen Reactions    Sulfa Antibiotics Rash    Other        Objective   Vitals: Blood pressure 141/65, pulse 71, temperature 98 °F (36 7 °C), temperature source Temporal, resp  rate 18, height 5' 9" (1 753 m), weight 117 kg (257 lb 0 9 oz), SpO2 95 %  ,Body mass index is 37 96 kg/m²        Intake/Output Summary (Last 24 hours) at 2/3/2021 1742  Last data filed at 2/2/2021 1900  Gross per 24 hour   Intake 200 ml   Output --   Net 200 ml     I/O last 24 hours: In: 200 [P O :200]  Out: -     Invasive Devices     Peripheral Intravenous Line            Peripheral IV 02/02/21 Left Antecubital 1 day                Physical Exam  Ortho Exam  Right lower extremity neurovascularly intact  Toes are pink and mobile  Compartments are soft  There is tenderness to palpation of the fracture site  Range of motion of the knee is limited to pain  There is an effusion present  There is brisk cap refill  Sensation intact  Negative Homans    Physical Exam   Constitutional: Appears well-developed and well-nourished  No distress  HENT:   Head: Normocephalic  Eyes: Conjunctivae are normal  Right eye exhibits no discharge  Left eye exhibits no discharge  No scleral icterus  Cardiovascular: Normal rate  Pulmonary/Chest: Effort normal    Neurological: Alert and oriented to person, place, and time  Skin: Skin is warm and dry  No rash noted  The patient is not diaphoretic  No erythema  No pallor  Psychiatric: Normal mood and affect  Behavior is normal  Judgment and thought content normal      Lab Results: I have personally reviewed pertinent lab results  Imaging Studies: CT scan of the lower extremity shows a fracture of the right lateral tibial plateau with approximately 5-6 mm of depression  EKG, Pathology, and Other Studies: I have personally reviewed pertinent reports  VTE Prophylaxis: Sequential compression device (Venodyne)     Code Status: Level 1 - Full Code  Advance Directive and Living Will:      Power of :    POLST:      Counseling / Coordination of Care  Total floor / unit time spent today 30 minutes  Greater than 50% of total time was spent with the patient and / or family counseling and / or coordination of care

## 2021-02-03 NOTE — ASSESSMENT & PLAN NOTE
· Currently rate controlled  · Continue Toprol and Tikosyn  · Eliquis on hold in anticipation of surgical intervention  · Resume anticoagulation once cleared by ortho

## 2021-02-03 NOTE — PLAN OF CARE
Problem: PHYSICAL THERAPY ADULT  Goal: Performs mobility at highest level of function for planned discharge setting  See evaluation for individualized goals  Description: Treatment/Interventions: ADL retraining, Functional transfer training, LE strengthening/ROM, Therapeutic exercise, Endurance training, Patient/family training, Equipment eval/education, Bed mobility, Gait training, Spoke to nursing, OT  Equipment Recommended: Linette Strange       See flowsheet documentation for full assessment, interventions and recommendations  Note: Prognosis: Good  Problem List: Decreased strength, Decreased range of motion, Decreased endurance, Impaired balance, Decreased mobility, Decreased coordination, Decreased safety awareness, Orthopedic restrictions, Pain, Obesity  Assessment: Pt is 60 y o  female seen for PT evaluation s/p admit to [address] [place] [address] on 2/2/2021 w/ Fracture of right tibia  PT consulted to assess pt's functional mobility and d/c needs  Order placed for PT eval and tx, w/ up and OOB as tolerated order, NWB RLE maintained at this time pending ortho consult  Comorbidities affecting pt's physical performance at time of assessment include:pain,R tibial fx, COPD, Crohn's disease, DM   PTA, pt was independent w/ all functional mobility w/ o AD usage  Personal factors affecting pt at time of IE include: ambulating w/ assistive device, stairs to enter home, inability to ambulate household distances, inability to navigate community distances, inability to navigate level surfaces w/o external assistance, unable to perform dynamic tasks in community, unable to perform physical activity, limited insight into impairments, inability to perform IADLs and inability to perform ADLs   Please find objective findings from PT assessment regarding body systems outlined above with impairments and limitations including weakness, decreased ROM, impaired balance, decreased endurance, impaired coordination, gait deviations, pain, decreased activity tolerance, decreased functional mobility tolerance, fall risk and orthopedic restrictions  The following objective measures performed on IE also reveal limitations: AM-PAC 6-Clicks: 32/66  Pt's clinical presentation is currently unstable/unpredictable seen in pt's presentation of pain severity w/ sudden onset PTA, abnormal lab values, pending orthopedic consultation w/potential for surgical interventions,immobility w/increased risk for medical complications  Pt to benefit from continued PT tx to address deficits as defined above and maximize level of functional independent mobility and consistency  From PT/mobility standpoint, recommendation at time of d/c would be Home PT vs  STR pending progress in order to facilitate return to PLOF  Barriers to Discharge: Inaccessible home environment  Barriers to Discharge Comments: [facility] home  PT Discharge Recommendation: Other (Comment)(HHPT w/social support vs PAR)     PT - OK to Discharge: No    See flowsheet documentation for full assessment

## 2021-02-03 NOTE — PLAN OF CARE
Problem: PHYSICAL THERAPY ADULT  Goal: Performs mobility at highest level of function for planned discharge setting  See evaluation for individualized goals  Description: Treatment/Interventions: ADL retraining, Functional transfer training, LE strengthening/ROM, Therapeutic exercise, Endurance training, Patient/family training, Equipment eval/education, Bed mobility, Gait training, Spoke to nursing, OT  Equipment Recommended: Александр Dupree Walker       See flowsheet documentation for full assessment, interventions and recommendations  2/3/2021 1221 by Dulce Maria Fagan, PT  Outcome: Progressing  Note: Prognosis: Good  Problem List: Decreased strength, Decreased range of motion, Decreased endurance, Impaired balance, Decreased mobility, Decreased coordination, Decreased safety awareness, Orthopedic restrictions, Pain, Obesity  Assessment: Pt seen for PT treatment session this date with interventions consisting of Therapeutic exercise consisting of: AROM 20 reps LLE all, appropriate RLE in sitting position and therapeutic activity consisting of training: sit<>stand transfers, static standing tolerance for <1 minutes w/ B UE support, vc and tactile cues for static sitting posture faciliation, vc and tactile cues for static standing posture faciliation and stand pivot transfers towards L direction  Pt agreeable to PT treatment session upon arrival, pt found seated at EOB, A&O x 4  In comparison to previous session, pt with improvements in advancement to TE  Post session: chair alarm engaged, all needs in reach and RN notified of session findings/recommendations Continue to recommend Home PT vs  STR at time of d/c in order to maximize pt's functional independence and safety w/ mobility  Pt continues to be functioning below baseline level, and remains limited 2* factors listed above and including weakness, pain, impaired balance, pending potential surgical interventions   PT will continue to see pt while here in order to address the deficits listed above and provide interventions consistent w/ POC in effort to achieve LTGs  Barriers to Discharge: Inaccessible home environment, Other (Comment)  Barriers to Discharge Comments: [facility] home, pending surgical interventions prn  PT Discharge Recommendation: Other (Comment)(HHPT w/social support vs PAR)     PT - OK to Discharge: No    See flowsheet documentation for full assessment

## 2021-02-03 NOTE — CASE MANAGEMENT
Assessment completed, pt was made aware of the role of the cm, pt is independent, she works and drives, she lives with her  in a home with  A basement, 1st&2nd & attic, 12-14 steps inside to each level, 8-10 steps outside, DME: glucometer, pulse ox, bp cuff, no hx of hhc, hx of depression, pt denies smoking and states she drinks alcohol on rare occasions , pt ' family will transport when stable for d/c ortho, pt/ot eval ordered, cm will continue to work on a safe d/c plan, CM reviewed d/c planning process including the following: identifying help at home, patient preference for d/c planning needs, availability of treatment team to discuss questions or concerns patient and/or family may have regarding understanding medications and recognizing signs and symptoms once discharged  CM also encouraged patient to follow up with all recommended appointments after discharge  Patient advised of importance for patient and family to participate in managing patients medical well being  Patient/caregiver received discharge checklist   Content reviewed  Patient/caregiver encouraged to participate in discharge plan of care prior to discharge home

## 2021-02-03 NOTE — PROGRESS NOTES
Progress Note - Reji Ann 1955, 60 y o  female MRN: [id number]    Unit/Bed#: -01 Encounter: [id number]    Primary Care Provider: Corinne Solders, MD   Date and time admitted to hospital: 2/2/2021  1:03 PM        * Fracture of right tibia  Assessment & Plan  · Orthopedic consult pending  · Discussed case with ortho via tiger text and at this time patient is not scheduled for any surgical intervention today  Will initiate diet and follow-up with ortho  · Continue pain control as needed  · Official orthopedic consult pending  · Imaging results reviewed    Diabetes mellitus, type 2 ([facility] )  Assessment & Plan  · Diabetic diet  · Insulin sliding scale  · Resume home diabetic regimen on discharge    COPD (chronic obstructive pulmonary disease) ([facility] )  Assessment & Plan  No signs of acute exacerbation  Continue albuterol and Breo    Crohn's disease with complication (HCC)  Assessment & Plan  · No signs of acute exacerbation at this time  · Continue mesalamine and Bentyl    Acquired hypothyroidism  Assessment & Plan  Continue levothyroxine    A-fib (HCC)  Assessment & Plan  · Currently rate controlled  · Continue Toprol and Tikosyn  · Eliquis on hold in anticipation of surgical intervention  · Resume anticoagulation once cleared by ortho      VTE Prophylaxis:  Heparin    Patient Centered Rounds: I have performed bedside rounds with nursing staff today  Discussions with Specialists or Other Care Team Provider: yes  Education and Discussions with Family / Patient:  Updated patient regarding plan of care    Current Length of Stay: 1 day(s)    Current Patient Status: Inpatient   Certification Statement: The patient will continue to require additional inpatient hospital stay due to Knee fracture    Discharge Plan:  Pending hospital course    Code Status: Level 1 - Full Code    Subjective:   No surgery scheduled for today per ortho team   Patient states she does have pain in right knee worse with movement    No nausea or vomiting  Patient states she is hungry  Diet has been initiated    Objective:     Vitals:   Temp (24hrs), Av 6 °F (36 4 °C), Min:97 2 °F (36 2 °C), Max:98 2 °F (36 8 °C)    Temp:  [97 2 °F (36 2 °C)-98 2 °F (36 8 °C)] 97 2 °F (36 2 °C)  HR:  [62-75] 62  Resp:  [16-20] 18  BP: (129-173)/(54-78) 158/70  SpO2:  [93 %-98 %] 93 %  Body mass index is 37 96 kg/m²  Input and Output Summary (last 24 hours): Intake/Output Summary (Last 24 hours) at 2/3/2021 1151  Last data filed at 2021 1900  Gross per 24 hour   Intake 200 ml   Output --   Net 200 ml       Physical Exam:   Physical Exam  Constitutional:       General: She is not in acute distress  Appearance: She is obese  HENT:      Head: Normocephalic and atraumatic  Nose: Nose normal       Mouth/Throat:      Mouth: Mucous membranes are moist    Eyes:      Extraocular Movements: Extraocular movements intact  Conjunctiva/sclera: Conjunctivae normal    Neck:      Musculoskeletal: Normal range of motion and neck supple  Cardiovascular:      Rate and Rhythm: Normal rate and regular rhythm  Pulmonary:      Effort: Pulmonary effort is normal  No respiratory distress  Abdominal:      Palpations: Abdomen is soft  Tenderness: There is no abdominal tenderness  Musculoskeletal:      Comments: Decreased range of motion right knee  Significant swelling of right knee noted as well   Skin:     General: Skin is warm and dry  Neurological:      General: No focal deficit present  Mental Status: She is alert  Cranial Nerves: No cranial nerve deficit     Psychiatric:         Mood and Affect: Mood normal          Behavior: Behavior normal          Additional Data:     Labs:    Results from last 7 days   Lab Units 21  0555   WBC Thousand/uL 12 80*   HEMOGLOBIN g/dL 10 8*   HEMATOCRIT % 34 4*   PLATELETS Thousands/uL 283   NEUTROS PCT % 76*   LYMPHS PCT % 15*   MONOS PCT % 7   EOS PCT % 1     Results from last 7 days Lab Units 02/03/21  0555   SODIUM mmol/L 136   POTASSIUM mmol/L 3 7   CHLORIDE mmol/L 100   CO2 mmol/L 29   BUN mg/dL 14   CREATININE mg/dL 0 73   CALCIUM mg/dL 9 0   ALK PHOS U/L 83   ALT U/L 43   AST U/L 45*     Results from last 7 days   Lab Units 02/02/21  1602   INR  1 23*     Results from last 7 days   Lab Units 02/03/21  1142 02/03/21  0558 02/02/21  1805   POC GLUCOSE mg/dl 145* 155* 104           * I Have Reviewed All Lab Data Listed Above  * Additional Pertinent Lab Tests Reviewed:  Lotus 66 Admission  Reviewed    Imaging:  Imaging Reports Reviewed Today Include:  No new imaging    Recent Cultures (last 7 days):           Last 24 Hours Medication List:   Current Facility-Administered Medications   Medication Dose Route Frequency Provider Last Rate    acetaminophen  650 mg Oral Q6H PRN Ana Maria Alvarado MD      albuterol  2 puff Inhalation Q6H PRN Ana Maria Alvarado MD      atorvastatin  10 mg Oral Daily Ana Maria Alvarado MD      dicyclomine  20 mg Oral Q6H PRN Ana Maria Alvarado MD      dofetilide  500 mcg Oral BID Ana Maria Alvarado MD      fluticasone-vilanterol  1 puff Inhalation Daily Ana Maria Alvarado MD      heparin (porcine)  5,000 Units Subcutaneous [facility] Ana Maria Alvarado MD      hydrALAZINE  5 mg Intravenous Q6H PRN Natalia Maldonado MD      HYDROmorphone  1 mg Intravenous Q4H PRN Taryn Krishnamurthy, PA-C      insulin lispro  1-5 Units Subcutaneous TID [facility] Ana Maria Alvarado MD      levothyroxine  150 mcg Oral Early Morning Ana Maria Alvarado MD      meclizine  25 mg Oral TID PRN Ana Maria Alvarado MD      mesalamine  800 mg Oral BID Ana Maria Alvarado MD      metoprolol succinate  100 mg Oral Q12H Ana Maria Alvarado MD      ondansetron  4 mg Intravenous Q6H PRN Ana Maria Alvarado MD      oxyCODONE  10 mg Oral Q4H PRN Taryn Krishnamurthy, PA-C      pantoprazole  40 mg Oral Early Morning Ana Maria Alvarado MD      pramipexole  0 5 mg Oral HS Ana Maria Alvarado MD      venlafaxine  75 mg Oral Daily Ana Maria Alvarado MD          Today, Patient Was Seen By: Jerardo Barnes MD    ** Please Note: Dictation voice to text software may have been used in the creation of this document   **

## 2021-02-03 NOTE — RESPIRATORY THERAPY NOTE
02/02/21 2000   Non-Invasive Information   Resp Comments pt refsuing the coivid swab, unable to use cpap until pt has swab, rn aware, pt aware to call if she changes her mind

## 2021-02-03 NOTE — PHYSICAL THERAPY NOTE
Physical Therapy Evaluation     Patient's Name: Tong Thomas    Admitting Diagnosis  Knee injury [S89 90XA]  Right knee sprain [S83 91XA]  Tibial plateau fracture, right, closed, initial encounter [S82 141A]    Problem List  Patient Active Problem List   Diagnosis    Lymphadenopathy    [facility]fib [facility] )    Elevated TSH    Hypercholesteremia    Transaminitis    Microscopic hematuria    Pulmonary hypertension (HCC)    MARY ELLEN (obstructive sleep apnea)    Acquired hypothyroidism    Brain aneurysm    Atypical chest pain    Dizziness    Crohn's disease with complication ([facility] )    Pneumonia due to infectious organism    Elevated d-dimer    Hypokalemia    Acute respiratory failure with hypoxia (HCC)    COPD (chronic obstructive pulmonary disease) (HCC)    Exacerbation of Crohn's disease (HCC)    Severe obstructive sleep apnea    Diarrhea    Rectal bleeding    Atherosclerosis    Prolonged QT interval    Hepatomegaly    Hepatic steatosis    Colon polyp    Hyperglycemia    Multiple renal cysts    T12 compression fracture (HCC)    Diabetes mellitus, type 2 (HCC)    Fracture of right tibia       Past Medical History  Past Medical History:   Diagnosis Date    A-fib ([facility] )     Brain aneurysm     Cancer ([facility] )     papillary thyroid cancer    Cardiac disease     CHF (congestive heart failure) (HCC)     [facility]     Diabetes mellitus [facility] )     Disease of thyroid gland     GERD (gastroesophageal reflux disease)     Hyperlipidemia     Hypertension        Past Surgical History  Past Surgical History:   Procedure Laterality Date    APPENDECTOMY      CARDIOVERSION N/A 2/14/2019    Procedure: CARDIOVERSION;  Surgeon: Greg Lala MD;  Location: [facility];  Service: Cardiology    CEREBRAL ANEURYSM REPAIR      CHOLECYSTECTOMY      HYSTERECTOMY      THYROIDECTOMY      TONSILLECTOMY AND ADENOIDECTOMY          02/03/21 0900   PT Last Visit   PT Visit Date 02/03/21   Note Type   Note type Evaluation   Pain Assessment   Pain Assessment Tool 0-10   Pain Score Worst Possible Pain   Pain Location/Orientation Orientation: Right;Location: Knee; Other (Comment)  (lower AP leg)   Pain Radiating Towards into R shin   Pain Onset/Description Onset: Ongoing;Frequency: Constant/Continuous; Descriptor: Aching;Descriptor: Mora Blank; Descriptor: Sore   Effect of Pain on Daily Activities yes   Patient's Stated Pain Goal No pain   Hospital Pain Intervention(s) Medication (See MAR); Repositioned;Elevated;Cold applied  (cold packs x 2)   Multiple Pain Sites Yes   Home Living   Type of [address] [place] [address] Two level;Performs ADLs on one level;Stairs to enter with rails  (FF to 2nd floor, ~8 SUZANNE, full bath on main)   Bathroom Shower/Tub Tub/shower unit   [employer] Raised   Bathroom Equipment Grab bars in shower;Grab bars around toilet   [address]   (pt  did not utilize or have an AD PTA  )   Prior Function   Level of [place] Independent with ADLs and functional mobility   Lives With Spouse   ADL Assistance Independent   IADLs Independent   Falls in the last 6 months 0   Vocational Full time employment  ([employer])   Restrictions/Precautions   Weight Bearing Precautions Per Order Yes   RLE Weight Bearing Per Order NWB  (maintained at this time, ortho pending)   Other Precautions Fall Risk;Pain;WBS;Chair Alarm; Bed Alarm   General   Family/Caregiver Present No   Cognition   Overall Cognitive Status WFL   Arousal/Participation Alert   Orientation Level Oriented X4   Memory Within functional limits   Following Commands Follows one step commands with increased time or repetition   Comments Pt  agreeable to PT assessment, pleasant     RUE Assessment   RUE Assessment WFL   LUE Assessment   LUE Assessment WFL   RLE Assessment   RLE Assessment X  (restricted ROM 2/2 pain)   LLE Assessment   LLE Assessment X  (4-/5 gross musculature)   Coordination   Movements are Fluid and Coordinated 0 Coordination and Movement Description Incremental, antalgic mobility requiring increased time & tactile cues of 1  Light Touch   RLE Light Touch Grossly intact   LLE Light Touch Grossly intact   Sharp/Dull   RLE Sharp/Dull Grossly intact   LLE Sharp/Dull Grossly intact   Bed Mobility   Supine to Sit 4  Minimal assistance   Additional items Assist x 1;HOB elevated; Bedrails; Increased time required;Verbal cues;LE management   Sit to Supine   (DNT as pt  was resting on bedside upon conclusion )   Additional Comments Pt  denied lightheadedness/dizziness w/positional change  Transfers   Sit to Stand 4  Minimal assistance   Additional items Assist x 1;Bedrails; Increased time required;Verbal cues   Stand to Sit 4  Minimal assistance   Additional items Assist x 1; Increased time required;Verbal cues; Bedrails   Stand pivot 4  Minimal assistance   Additional items Assist x 1; Increased time required;Armrests; Verbal cues   Toilet transfer 4  Minimal assistance   Additional items Assist x 1; Increased time required;Verbal cues; Commode;Raised toilet seat;Armrests   Ambulation/Elevation   Gait pattern Antalgic; Forward Flexion;Decreased R stance  (NWB RLE w/pivoting on LLE)   Gait Assistance 3  Moderate assist   Additional items Assist x 1;Verbal cues; Tactile cues   Assistive Device Rolling walker   Distance 2 feet total  (to/from [facility])   Stair Management Assistance Not tested  (unsafe for trialing at this time)   Balance   Static Sitting Good   Dynamic Sitting Fair +   Static Standing Fair   Dynamic Standing Fair -   Ambulatory Fair -   Endurance Deficit   Endurance Deficit Yes   Activity Tolerance   Activity Tolerance Patient limited by fatigue;Patient limited by pain   Medical Staff Made Aware Vania winn OT    Nurse Made Aware yes,Janice YOON & JUNITO Paiz   Assessment   Prognosis Good   Problem List Decreased strength;Decreased range of motion;Decreased endurance; Impaired balance;Decreased mobility; Decreased coordination;Decreased safety awareness;Orthopedic restrictions;Pain;Obesity   Assessment Pt is 60 y o  female seen for PT evaluation s/p admit to [address] [place] [address] on 2/2/2021 w/ Fracture of right tibia  PT consulted to assess pt's functional mobility and d/c needs  Order placed for PT eval and tx, w/ up and OOB as tolerated order, NWB RLE maintained at this time pending ortho consult  Comorbidities affecting pt's physical performance at time of assessment include:pain,R tibial fx, COPD, Crohn's disease, DM   PTA, pt was independent w/ all functional mobility w/ o AD usage  Personal factors affecting pt at time of IE include: ambulating w/ assistive device, stairs to enter home, inability to ambulate household distances, inability to navigate community distances, inability to navigate level surfaces w/o external assistance, unable to perform dynamic tasks in community, unable to perform physical activity, limited insight into impairments, inability to perform IADLs and inability to perform ADLs  Please find objective findings from PT assessment regarding body systems outlined above with impairments and limitations including weakness, decreased ROM, impaired balance, decreased endurance, impaired coordination, gait deviations, pain, decreased activity tolerance, decreased functional mobility tolerance, fall risk and orthopedic restrictions  The following objective measures performed on IE also reveal limitations: AM-PAC 6-Clicks: 15/45  Pt's clinical presentation is currently unstable/unpredictable seen in pt's presentation of pain severity w/ sudden onset PTA, abnormal lab values, pending orthopedic consultation w/potential for surgical interventions,immobility w/increased risk for medical complications  Pt to benefit from continued PT tx to address deficits as defined above and maximize level of functional independent mobility and consistency   From PT/mobility standpoint, recommendation at time of d/c would be Home PT vs  STR pending progress in order to facilitate return to PLOF  Barriers to Discharge Inaccessible home environment   Barriers to Discharge Comments SUZANNE home   Goals   Patient Goals to have less pain  Aida Marques RN to provide pain medications)   LT Expiration Date 02/13/21   Long Term Goal #1 1 )Patient will complete bed mobility with supervision of 1 for decrease need for caregiver assistance, decrease burden of care  2 ) Patient will complete transfers with supervision of 1 to decrease risk of falls, facilitate upright standing posture  3 )LLE strength to greater than/equal to 4/5 gross musculature to increase ability to safely transfer, control descent to chair  4 ) Patient will exhibit increase static standing balance to Fair+ , for 30-45 seconds without LOB and supervision of 1 to improve activity tolerance  5 ) Patient will exhibit increase dynamic ambulatory balance to Fair+ w/ RW CGA of 1 w/applicable WB restriction maintained to improve ability to mobilize to toilet, chair and decrease risk for additional medical complications  6 ) Patient will exhibit good self monitoring and ability to follow 2 step commands to increase complexity of tasks and resume ADL's without LOB  PT Treatment Day 0  (please see additional tx note )   Plan   Treatment/Interventions ADL retraining;Functional transfer training;LE strengthening/ROM; Therapeutic exercise; Endurance training;Patient/family training;Equipment eval/education; Bed mobility;Gait training;Spoke to nursing;OT   PT Frequency 7x/wk   Recommendation   PT Discharge Recommendation Other (Comment)  (HHPT w/social support vs PAR)   Equipment Recommended Walker   PT - OK to Discharge No   Additional Comments Upon conclusion, pt  was resting on bedside w/all needs within reach  Additional Comments 2 [facility] raw score of 14, standardized score of 35 55  As per the data, this score is correlated most closely with a SNF recommendation   However,the therapist's discharge disposition recommendation may vary as numerous social factors and objective findings contribute to the suggested destination     AM-PAC Basic Mobility Inpatient   Turning in Bed Without Bedrails 3   Lying on Back to Sitting on Edge of Flat Bed 3   Moving Bed to Chair 3   Standing Up From Chair 2   Walk in Room 2   Climb 3-5 Stairs 1   Basic Mobility Inpatient Raw Score 14   Basic Mobility Standardized Score 35 55     Eb Curiel, PT

## 2021-02-03 NOTE — UTILIZATION REVIEW
Notification of Inpatient Admission/Inpatient Authorization Request   This is a Notification of Inpatient Admission for [facility]  Be advised that this patient was admitted to our facility under Inpatient Status  Contact Donna Astorga at [phone] for additional admission information  [facility] DEPT  DEDICATED -[phone]  Patient Name:   Gilma England   YOB: 1955       [address]   [address]:   [address]  Tax ID: [id number]  NPI: [id number] Attending Provider/NPI:  Phone:  Address: Mahnaz Kumari [place]   [phone]  Same as PEYTON/Oscar Rivas 1106 of Service Code: 24 Place of Service Name: [address] [place] [address]   Start Date: 2/2/21 1651 Discharge Date & Time: No discharge date for patient encounter  Type of Admission: Inpatient Status Discharge Disposition   (if discharged): Home/Self Care   Patient Diagnoses: Knee injury [S89 90XA]  Right knee sprain [S83 91XA]  Tibial plateau fracture, right, closed, initial encounter [S82 141A]     Orders: Admission Orders (From admission, onward)     Ordered        02/02/21 1702  Inpatient Admission  Once                    Assigned Utilization Review Contact: [address] [place] [address] Utilization Review Department  Phone: [phone]; Fax [phone]  Email: [email or web address]   ATTENTION PAYERS: Please call the assigned Utilization  directly with any questions or concerns ALL voicemails in the department are confidential  Send all requests for admission clinical reviews, approved or denied determinations and any other requests to dedicated fax number belonging to the campus where the patient is receiving treatment

## 2021-02-03 NOTE — OCCUPATIONAL THERAPY NOTE
[address] Cancellation Note     Patient Name: Khoa Calles  [id number] Date: 2/3/2021  Problem List  Principal Problem:    Fracture of right tibia  Active Problems:    A-fib [facility])    Acquired hypothyroidism    Crohn's disease with complication (HCC)    COPD (chronic obstructive pulmonary disease) ([facility] )    Diabetes mellitus, type 2 ([facility] )          02/03/21 0720   OT Last Visit   OT Visit Date 02/03/21   Note Type   Note type Evaluation   Cancel Reasons Medical status       OT order received and chart review performed  At this time, OT evaluation cancelled due to patient pending ortho  OT will follow and evaluate as appropriate        Kole Saldana MS, OTR/L

## 2021-02-03 NOTE — ASSESSMENT & PLAN NOTE
· Orthopedic consult pending  · Discussed case with ortho via tiger text and at this time patient is not scheduled for any surgical intervention today    Will initiate diet and follow-up with ortho  · Continue pain control as needed  · Official orthopedic consult pending  · Imaging results reviewed

## 2021-02-03 NOTE — UTILIZATION REVIEW
Initial Clinical Review    Admission: Date/Time/Statement:   Admission Orders (From admission, onward)     Ordered        02/02/21 1702  Inpatient Admission  Once                   Orders Placed This Encounter   Procedures    Inpatient Admission     Standing Status:   Standing     Number of Occurrences:   1     Order Specific Question:   Level of Care     Answer:   Med Surg [16]     Order Specific Question:   Estimated length of stay     Answer:   More than 2 Midnights     Order Specific Question:   Certification     Answer:   I certify that inpatient services are medically necessary for this patient for a duration of greater than two midnights  See H&P and MD Progress Notes for additional information about the patient's course of treatment  ED Arrival Information     Expected Arrival Acuity Means of Arrival Escorted By Service Admission Type    - 2/2/2021 12:46 Urgent Wheelchair Spouse General Medicine Urgent    Arrival Complaint    knee injury        Chief Complaint   Patient presents with    Knee Injury     Pt was helping clean a vehicle of snow, twisted and heard a pop of the R knee  Assessment/Plan: 60year old female, presented to the ED @ [address], from home via wheelchair with spouse     Admitted as Inpatient due to fracture right tibia  PTA  Was helping  clean her car of snow when she suddenly twisted her leg and fell where she heard a pop in her right lower leg  In the ED, imaging shows right lateral tibial fracture  + joint swelling  Neurovascular checks q 4 h  Analgesia PRN  Consult Ortho  NPO after MN  VTE Prophylaxis: Heparin  / sequential compression device     02/03/2021  Progress Note:  Consult Ortho:  Pending  Not scheduled for any surgical intervention today  Will initiate diet and follow-up with ortho  Analgesia PRN        ED Triage Vitals   Temperature Pulse Respirations Blood Pressure SpO2   02/02/21 1305 02/02/21 1305 02/02/21 1800 02/02/21 1305 02/02/21 1305   98 2 °F (36 8 °C) 75 20 129/54 98 %      Temp Source Heart Rate Source Patient Position - Orthostatic VS BP Location FiO2 (%)   02/02/21 1305 02/02/21 1305 02/02/21 1305 02/02/21 1305 --   Tympanic Monitor Sitting Right arm       Pain Score       02/02/21 1305       8          Wt Readings from Last 1 Encounters:   02/02/21 117 kg (257 lb 0 9 oz)     Additional Vital Signs:   Date/Time  Temp  Pulse  Resp  BP  MAP (mmHg)  SpO2  O2 Device  O2 Interface Device  Patient Position - Orthostatic VS   02/03/21 0942  --  --  --  158/70  --  --  --  --  Sitting   02/03/21 0802  97 2 °F (36 2 °C)Abnormal   62  18  173/78Abnormal   112  93 %  None (Room air)  --  Lying   02/03/21 0551  98 °F (36 7 °C)  67  18  168/72  104  93 %  None (Room air)  --  Lying   02/03/21 0000  97 5 °F (36 4 °C)  65  16  138/64  92  94 %  Other (comment)   CPAP Prongs  Lying   O2 Device: cpap at 02/03/21 0000   02/02/21 1800  97 2 °F (36 2 °C)Abnormal   68  20  142/65  --  94 %  None (Room air)  --  Lying   02/02/21 1305  98 2 °F (36 8 °C)  75  --  129/54  --  98 %  None (Room air)  --  Sitting     02/02/2021 @ 1544  CT right lower extremity:  1   Fracture of the right lateral tibial plateau with 5 to 6 mm of depression  2   Large joint effusion  02/02/2021 @ 1407  Right knee X:  On the AP view only there is an irregularity of the lateral tibial plateau suspicious for nondisplaced tibial plateau fracture  There is a moderate joint effusion in the suprapatellar bursa  Mild degenerative osteoarthritis  Recommend CT or MRI imaging to exclude lateral tibial plateau fracture and evaluate etiology of joint effusion   Please note there is a discrepancy between the preliminary report provided by the emergency department and this report     This examination was marked "immediate notification" in Epic in order to begin the standard process by which the radiology reading room liaison alerts the referring practitioner        Pertinent Labs/Diagnostic Test Results:   Results from last 7 days   Lab Units 02/02/21  2333   SARS-COV-2  Negative     Results from last 7 days   Lab Units 02/03/21  0555 02/02/21  1602   WBC Thousand/uL 12 80* 12 70*   HEMOGLOBIN g/dL 10 8* 11 3*   HEMATOCRIT % 34 4* 36 1*   PLATELETS Thousands/uL 283 301   NEUTROS ABS Thousands/µL 9 70* 9 10*     Results from last 7 days   Lab Units 02/03/21  0555 02/02/21  1602   SODIUM mmol/L 136 137   POTASSIUM mmol/L 3 7 3 7   CHLORIDE mmol/L 100 102   CO2 mmol/L 29 28   ANION GAP mmol/L 7 7   BUN mg/dL 14 14   CREATININE mg/dL 0 73 0 82   EGFR ml/min/1 73sq m 87 76   CALCIUM mg/dL 9 0 9 0     Results from last 7 days   Lab Units 02/03/21  0555 02/02/21  1602   AST U/L 45* 50*   ALT U/L 43 50   ALK PHOS U/L 83 92   TOTAL PROTEIN g/dL 7 1 7 6   ALBUMIN g/dL 3 6 3 9   TOTAL BILIRUBIN mg/dL 0 50 0 30     Results from last 7 days   Lab Units 02/03/21  1142 02/03/21  0558 02/02/21  1805   POC GLUCOSE mg/dl 145* 155* 104     Results from last 7 days   Lab Units 02/03/21  0555 02/02/21  1602   GLUCOSE RANDOM mg/dL 166* 135*     Results from last 7 days   Lab Units 02/02/21  1602   PROTIME seconds 15 3*   INR  1 23*   PTT seconds 33     Results from last 7 days   Lab Units 02/02/21  2333   INFLUENZA A PCR  Negative   INFLUENZA B PCR  Negative   RSV PCR  Negative     ED Treatment:   Medication Administration from 02/02/2021 1246 to 02/02/2021 1753       Date/Time Order Dose Route Action     02/02/2021 1350 traMADol (ULTRAM) tablet 50 mg 50 mg Oral Given     02/02/2021 1406 ibuprofen (MOTRIN) tablet 600 mg 600 mg Oral Given     02/02/2021 1552 HYDROmorphone (DILAUDID) injection 0 5 mg 0 5 mg Intravenous Given     02/02/2021 1750 HYDROmorphone (DILAUDID) injection 0 5 mg 0 5 mg Intravenous Given        Past Medical History:   Diagnosis Date    [facility]-fib ([facility] )     Brain aneurysm     Cancer ([facility] )     papillary thyroid cancer    Cardiac disease     CHF (congestive heart failure) ([facility] )     Crohn disease ([place] [address] )     Diabetes mellitus ([place] [address] )     Disease of thyroid gland     GERD (gastroesophageal reflux disease)     Hyperlipidemia     Hypertension      Present on Admission:   A-fib ([place] [address] )   Crohn's disease with complication ([place] [address] )   COPD (chronic obstructive pulmonary disease) ([place] [address] )   Diabetes mellitus, type 2 ([place] [address] )   Acquired hypothyroidism      Admitting Diagnosis: Knee injury [S89 90XA]  Right knee sprain [S83 91XA]  Tibial plateau fracture, right, closed, initial encounter [S82 141A]  Age/Sex: 60 y o  female  Admission Orders:  Scheduled Medications:  atorvastatin, 10 mg, Oral, Daily  dofetilide, 500 mcg, Oral, BID  fluticasone-vilanterol, 1 puff, Inhalation, Daily  heparin (porcine), 5,000 Units, Subcutaneous, Q8H [address]  insulin lispro, 1-5 Units, Subcutaneous, TID AC  levothyroxine, 150 mcg, Oral, Early Morning  mesalamine, 800 mg, Oral, BID  metoprolol succinate, 100 mg, Oral, Q12H  pantoprazole, 40 mg, Oral, Early Morning  pramipexole, 0 5 mg, Oral, HS  venlafaxine, 75 mg, Oral, Daily      Continuous IV Infusions:     PRN Meds:  acetaminophen, 650 mg, Oral, Q6H PRN  albuterol, 2 puff, Inhalation, Q6H PRN  dicyclomine, 20 mg, Oral, Q6H PRN  hydrALAZINE, 5 mg, Intravenous, Q6H PRN  HYDROmorphone, 1 mg, Intravenous, Q4H PRN  meclizine, 25 mg, Oral, TID PRN  ondansetron, 4 mg, Intravenous, Q6H PRN  oxyCODONE, 10 mg, Oral, Q4H PRN    Daily weight / I&O  Neurovascular checks q4h  CPAP HS FiO2 35%  Consult PT/OT  Srikanth SCDs  IP CONSULT TO ORTHOPEDIC SURGERY    Network Utilization Review Department  ATTENTION: Please call with any questions or concerns to [phone] and carefully listen to the prompts so that you are directed to the right person  All voicemails are confidential   Mandy Hernandez all requests for admission clinical reviews, approved or denied determinations and any other requests to dedicated fax number below belonging to the campus where the patient is receiving treatment   List of dedicated fax numbers for the Facilities:  [address] [place] [address] DENIALS (Administrative/Medical Necessity) [phone]   [address] (Maternity/NICU/Pediatrics) 261 [facility],7Th Floor [facility] 40 [address] [facility] [address] Leander Atkinson 8814 (  Karena Watson "Carina" 103) [address] [address] Jess Morin 1481 [address] [place] [address] [phone]

## 2021-02-04 ENCOUNTER — HOSPITAL ENCOUNTER (INPATIENT)
Facility: HOSPITAL | Age: 66
LOS: 20 days | Discharge: HOME HEALTH | DRG: 862 | End: 2021-02-24
Attending: INTERNAL MEDICINE | Admitting: INTERNAL MEDICINE
Payer: COMMERCIAL

## 2021-02-04 VITALS
HEIGHT: 69 IN | BODY MASS INDEX: 38.14 KG/M2 | DIASTOLIC BLOOD PRESSURE: 53 MMHG | SYSTOLIC BLOOD PRESSURE: 117 MMHG | RESPIRATION RATE: 18 BRPM | OXYGEN SATURATION: 96 % | WEIGHT: 257.5 LBS | TEMPERATURE: 97.2 F | HEART RATE: 76 BPM

## 2021-02-04 DIAGNOSIS — E87.6 HYPOKALEMIA: ICD-10-CM

## 2021-02-04 DIAGNOSIS — R60.0 LOCALIZED EDEMA: Primary | ICD-10-CM

## 2021-02-04 LAB
ANION GAP SERPL CALCULATED.3IONS-SCNC: 7 MMOL/L (ref 4–13)
BASOPHILS # BLD AUTO: 0 THOUSANDS/ΜL (ref 0–0.1)
BASOPHILS NFR BLD AUTO: 0 % (ref 0–2)
BUN SERPL-MCNC: 13 MG/DL (ref 7–25)
CALCIUM SERPL-MCNC: 8.7 MG/DL (ref 8.6–10.5)
CHLORIDE SERPL-SCNC: 102 MMOL/L (ref 98–107)
CO2 SERPL-SCNC: 27 MMOL/L (ref 21–31)
CREAT SERPL-MCNC: 0.67 MG/DL (ref 0.6–1.2)
EOSINOPHIL # BLD AUTO: 0.2 THOUSAND/ΜL (ref 0–0.61)
EOSINOPHIL NFR BLD AUTO: 1 % (ref 0–5)
ERYTHROCYTE [DISTWIDTH] IN BLOOD BY AUTOMATED COUNT: 15.1 % (ref 11.5–14.5)
GFR SERPL CREATININE-BSD FRML MDRD: 93 ML/MIN/1.73SQ M
GLUCOSE SERPL-MCNC: 142 MG/DL (ref 65–140)
GLUCOSE SERPL-MCNC: 144 MG/DL (ref 65–99)
GLUCOSE SERPL-MCNC: 147 MG/DL (ref 65–140)
GLUCOSE SERPL-MCNC: 181 MG/DL (ref 65–140)
HCT VFR BLD AUTO: 35.2 % (ref 42–47)
HGB BLD-MCNC: 11.1 G/DL (ref 12–16)
LYMPHOCYTES # BLD AUTO: 2.3 THOUSANDS/ΜL (ref 0.6–4.47)
LYMPHOCYTES NFR BLD AUTO: 18 % (ref 21–51)
MCH RBC QN AUTO: 27.6 PG (ref 26–34)
MCHC RBC AUTO-ENTMCNC: 31.6 G/DL (ref 31–37)
MCV RBC AUTO: 87 FL (ref 81–99)
MONOCYTES # BLD AUTO: 1.1 THOUSAND/ΜL (ref 0.17–1.22)
MONOCYTES NFR BLD AUTO: 8 % (ref 2–12)
NEUTROPHILS # BLD AUTO: 9.1 THOUSANDS/ΜL (ref 1.4–6.5)
NEUTS SEG NFR BLD AUTO: 72 % (ref 42–75)
PLATELET # BLD AUTO: 291 THOUSANDS/UL (ref 149–390)
PMV BLD AUTO: 8.2 FL (ref 8.6–11.7)
POTASSIUM SERPL-SCNC: 4.1 MMOL/L (ref 3.5–5.5)
RBC # BLD AUTO: 4.03 MILLION/UL (ref 3.9–5.2)
SODIUM SERPL-SCNC: 136 MMOL/L (ref 134–143)
WBC # BLD AUTO: 12.7 THOUSAND/UL (ref 4.8–10.8)

## 2021-02-04 PROCEDURE — 97116 GAIT TRAINING THERAPY: CPT

## 2021-02-04 PROCEDURE — 97167 OT EVAL HIGH COMPLEX 60 MIN: CPT

## 2021-02-04 PROCEDURE — 82948 REAGENT STRIP/BLOOD GLUCOSE: CPT

## 2021-02-04 PROCEDURE — 99239 HOSP IP/OBS DSCHRG MGMT >30: CPT | Performed by: STUDENT IN AN ORGANIZED HEALTH CARE EDUCATION/TRAINING PROGRAM

## 2021-02-04 PROCEDURE — 85025 COMPLETE CBC W/AUTO DIFF WBC: CPT | Performed by: INTERNAL MEDICINE

## 2021-02-04 PROCEDURE — 97110 THERAPEUTIC EXERCISES: CPT

## 2021-02-04 PROCEDURE — 80048 BASIC METABOLIC PNL TOTAL CA: CPT | Performed by: INTERNAL MEDICINE

## 2021-02-04 PROCEDURE — 94760 N-INVAS EAR/PLS OXIMETRY 1: CPT

## 2021-02-04 PROCEDURE — 97530 THERAPEUTIC ACTIVITIES: CPT

## 2021-02-04 RX ORDER — ATORVASTATIN CALCIUM 10 MG/1
10 TABLET, FILM COATED ORAL DAILY
Status: CANCELLED | OUTPATIENT
Start: 2021-02-05

## 2021-02-04 RX ORDER — ACETAMINOPHEN 325 MG/1
650 TABLET ORAL EVERY 6 HOURS PRN
Status: CANCELLED | OUTPATIENT
Start: 2021-02-04

## 2021-02-04 RX ORDER — VENLAFAXINE HYDROCHLORIDE 75 MG/1
75 CAPSULE, EXTENDED RELEASE ORAL DAILY
Status: CANCELLED | OUTPATIENT
Start: 2021-02-05

## 2021-02-04 RX ORDER — METOPROLOL SUCCINATE 50 MG/1
100 TABLET, EXTENDED RELEASE ORAL EVERY 12 HOURS
Status: CANCELLED | OUTPATIENT
Start: 2021-02-04

## 2021-02-04 RX ORDER — PRAMIPEXOLE DIHYDROCHLORIDE 0.5 MG/1
0.5 TABLET ORAL
Status: CANCELLED | OUTPATIENT
Start: 2021-02-04

## 2021-02-04 RX ORDER — PANTOPRAZOLE SODIUM 40 MG/1
40 TABLET, DELAYED RELEASE ORAL
Status: CANCELLED | OUTPATIENT
Start: 2021-02-05

## 2021-02-04 RX ORDER — HYDROMORPHONE HCL/PF 1 MG/ML
1 SYRINGE (ML) INJECTION EVERY 4 HOURS PRN
Status: CANCELLED | OUTPATIENT
Start: 2021-02-04

## 2021-02-04 RX ORDER — LEVOTHYROXINE SODIUM 0.07 MG/1
150 TABLET ORAL
Status: CANCELLED | OUTPATIENT
Start: 2021-02-05

## 2021-02-04 RX ORDER — MESALAMINE 400 MG/1
800 CAPSULE, DELAYED RELEASE ORAL 2 TIMES DAILY
Status: CANCELLED | OUTPATIENT
Start: 2021-02-04

## 2021-02-04 RX ORDER — FLUTICASONE FUROATE AND VILANTEROL 200; 25 UG/1; UG/1
1 POWDER RESPIRATORY (INHALATION) DAILY
Status: CANCELLED | OUTPATIENT
Start: 2021-02-05

## 2021-02-04 RX ORDER — MECLIZINE HCL 12.5 MG/1
12.5 TABLET ORAL EVERY 8 HOURS PRN
Status: CANCELLED | OUTPATIENT
Start: 2021-02-04

## 2021-02-04 RX ORDER — DOFETILIDE 0.5 MG/1
500 CAPSULE ORAL 2 TIMES DAILY
Status: CANCELLED | OUTPATIENT
Start: 2021-02-04

## 2021-02-04 RX ORDER — DICYCLOMINE HCL 20 MG
20 TABLET ORAL EVERY 6 HOURS PRN
Status: CANCELLED | OUTPATIENT
Start: 2021-02-04

## 2021-02-04 RX ORDER — OXYCODONE HYDROCHLORIDE 10 MG/1
10 TABLET ORAL EVERY 4 HOURS PRN
Status: CANCELLED | OUTPATIENT
Start: 2021-02-04

## 2021-02-04 RX ORDER — HYDRALAZINE HYDROCHLORIDE 20 MG/ML
5 INJECTION INTRAMUSCULAR; INTRAVENOUS EVERY 6 HOURS PRN
Status: CANCELLED | OUTPATIENT
Start: 2021-02-04

## 2021-02-04 RX ORDER — ALBUTEROL SULFATE 90 UG/1
2 AEROSOL, METERED RESPIRATORY (INHALATION) EVERY 6 HOURS PRN
Status: CANCELLED | OUTPATIENT
Start: 2021-02-04

## 2021-02-04 RX ADMIN — HYDROMORPHONE HYDROCHLORIDE 1 MG: 1 INJECTION, SOLUTION INTRAMUSCULAR; INTRAVENOUS; SUBCUTANEOUS at 11:19

## 2021-02-04 RX ADMIN — METOPROLOL SUCCINATE 100 MG: 50 TABLET, EXTENDED RELEASE ORAL at 05:30

## 2021-02-04 RX ADMIN — DOFETILIDE 500 MCG: 0.25 CAPSULE ORAL at 08:46

## 2021-02-04 RX ADMIN — HYDROMORPHONE HYDROCHLORIDE 1 MG: 1 INJECTION, SOLUTION INTRAMUSCULAR; INTRAVENOUS; SUBCUTANEOUS at 05:33

## 2021-02-04 RX ADMIN — LEVOTHYROXINE SODIUM 150 MCG: 75 TABLET ORAL at 05:30

## 2021-02-04 RX ADMIN — MESALAMINE 800 MG: 400 CAPSULE, DELAYED RELEASE ORAL at 08:46

## 2021-02-04 RX ADMIN — APIXABAN 5 MG: 5 TABLET, FILM COATED ORAL at 13:45

## 2021-02-04 RX ADMIN — HYDROMORPHONE HYDROCHLORIDE 1 MG: 1 INJECTION, SOLUTION INTRAMUSCULAR; INTRAVENOUS; SUBCUTANEOUS at 15:38

## 2021-02-04 RX ADMIN — ATORVASTATIN CALCIUM 10 MG: 10 TABLET, FILM COATED ORAL at 08:47

## 2021-02-04 RX ADMIN — HYDROMORPHONE HYDROCHLORIDE 1 MG: 1 INJECTION, SOLUTION INTRAMUSCULAR; INTRAVENOUS; SUBCUTANEOUS at 01:02

## 2021-02-04 RX ADMIN — INSULIN LISPRO 1 UNITS: 100 INJECTION, SOLUTION INTRAVENOUS; SUBCUTANEOUS at 08:45

## 2021-02-04 RX ADMIN — PANTOPRAZOLE SODIUM 40 MG: 40 TABLET, DELAYED RELEASE ORAL at 05:30

## 2021-02-04 RX ADMIN — VENLAFAXINE HYDROCHLORIDE 75 MG: 75 CAPSULE, EXTENDED RELEASE ORAL at 08:47

## 2021-02-04 RX ADMIN — FLUTICASONE FUROATE AND VILANTEROL TRIFENATATE 1 PUFF: 200; 25 POWDER RESPIRATORY (INHALATION) at 08:46

## 2021-02-04 NOTE — OCCUPATIONAL THERAPY NOTE
Occupational Therapy Evaluation      Marika Rule    2/4/2021    Patient Active Problem List   Diagnosis    Lymphadenopathy    A-fib ([facility] )    Elevated TSH    Hypercholesteremia    Transaminitis    Microscopic hematuria    Pulmonary hypertension (HCC)    MARY ELLEN (obstructive sleep apnea)    Acquired hypothyroidism    Brain aneurysm    Atypical chest pain    Dizziness    Crohn's disease with complication ([facility] )    Pneumonia due to infectious organism    Elevated d-dimer    Hypokalemia    Acute respiratory failure with hypoxia (HCC)    COPD (chronic obstructive pulmonary disease) (HCC)    Exacerbation of Crohn's disease (HCC)    Severe obstructive sleep apnea    Diarrhea    Rectal bleeding    Atherosclerosis    Prolonged QT interval    Hepatomegaly    Hepatic steatosis    Colon polyp    Hyperglycemia    Multiple renal cysts    T12 compression fracture (HCC)    Diabetes mellitus, type 2 (HCC)    Fracture of right tibia       Past Medical History:   Diagnosis Date    A-fib ([place] [address] )     Brain aneurysm     Cancer ([place] [address] )     papillary thyroid cancer    Cardiac disease     CHF (congestive heart failure) (HCC)     Crohn disease ([place] [address] )     Diabetes mellitus ([place] [address] )     Disease of thyroid gland     GERD (gastroesophageal reflux disease)     Hyperlipidemia     Hypertension        Past Surgical History:   Procedure Laterality Date    APPENDECTOMY      CARDIOVERSION N/A 2/14/2019    Procedure: CARDIOVERSION;  Surgeon: Simone Schaffer MD;  Location: [facility];  Service: Cardiology    CEREBRAL ANEURYSM REPAIR      CHOLECYSTECTOMY      HYSTERECTOMY      THYROIDECTOMY      TONSILLECTOMY AND ADENOIDECTOMY          02/04/21 0803   OT Last Visit   OT Visit Date 02/04/21   Note Type   Note type Evaluation   Restrictions/Precautions   Weight Bearing Precautions Per Order Yes   RLE Weight Bearing Per Order NWB   Braces or Orthoses LE Immobilizer   Other Precautions Fall Risk;Pain;WBS;Chair Alarm; Bed Alarm   Pain Assessment   Pain Assessment Tool 0-10   Pain Score 7   Pain Location/Orientation Orientation: Right;Location: Knee   Pain Onset/Description Onset: Ongoing;Frequency: Constant/Continuous; Descriptor: [address] Pain Intervention(s) Ambulation/increased activity;Repositioned;Medication (See MAR)   Home Living   Type of [address] [place] [address] Two level;Stairs to enter with rails;Bed/bath upstairs  (8 [address]NE, full flight to 2nd floor )   Bathroom Shower/Tub Tub/shower unit   [employer] Raised   Bathroom Equipment Grab bars in shower;Grab bars around toilet   [address]   (no AD used at baseline )   Prior Function   Level of [place] Independent with ADLs and functional mobility   Lives With Tila Roldan 187 in the last 6 months 0   Vocational Full time employment  (PCA)   Comments (+) driving    Lifestyle   Autonomy Patient reporting being independent with ADLs/IADLs, ambulatory with no AD and lives with  in a two story house with 8 SUZANNE   Reciprocal Relationships supportive     Service to Others full-time PCA   ADL   Eating Assistance 6  Modified independent   [address] 5  Supervision/Setup   LB Bathing Assistance 4  [address] 4  [address] 3  Moderate [address]  3  Moderate Assistance   Bed Mobility   Additional Comments DNT bed mobility: pt seated at EOB upon arrival to room and seated on commode at end of eval with call bell within reach  Transfers   Sit to Stand 4  Minimal assistance   Additional items Assist x 1;Verbal cues; Impulsive; Bedrails   Stand to Sit 4  Minimal assistance   Additional items Assist x 1; Increased time required;Verbal cues;Armrests   Stand pivot 4  Minimal assistance   Additional items Assist x 1; Increased time required;Verbal cues;Armrests   Toilet transfer 4  Minimal assistance   Additional items Assist x 1; Increased time required;Verbal cues; Commode;Armrests   Balance   Static Sitting Good   Dynamic Sitting Fair +   Static Standing Fair   Dynamic Standing Fair -   Activity Tolerance   Activity Tolerance Patient limited by pain   Medical Staff Made Aware PT Adilene Churchill present upon arrival to room and at end of session    Nurse Made Aware RN made aware of outcomes    RUE Assessment   RUE Assessment WFL  (grossly 4/5 MMT based on functional assessment )   LUE Assessment   LUE Assessment WFL  (grossly 4/5 MMT based on functional assessment )   Hand Function   Gross Motor Coordination Functional   Fine Motor Coordination Functional   Sensation   Light Touch No apparent deficits  (per pt )   Vision-Basic Assessment   Current Vision Wears glasses only for reading   Cognition   Overall Cognitive Status [facility]   Arousal/Participation Alert; Responsive; Cooperative   Attention Within functional limits   Orientation Level Oriented X4   Memory Within functional limits   Following Commands Follows one step commands with increased time or repetition   Comments pt agreeable to OT eval    Assessment   Limitation Decreased ADL status; Decreased UE strength;Decreased endurance;Decreased self-care trans;Decreased high-level ADLs   Prognosis Good   Assessment Patient is a 60 y o  female seen for OT evaluation s/p admit to [address] [place] [address]  on 2/2/2021 w/Fracture of right tibia  Commorbidities affecting patient's functional performance at time of assessment include: A-fib, hypothyroidism, COPD, crohn's disease, and DM2  Orders placed for OT evaluation and treatment, NWB RLE, and up and OOB as tolerated   Performed at least two patient identifiers during session including name and wristband  Prior to admission, Patient reporting being independent with ADLs/IADLs, ambulatory with no AD and lives with  in a two story house with 8 SUZ[address]NE  Personal factors affecting patient at time of initial evaluation include: steps to enter, difficulty performing ADLs and difficulty performing IADLs  Upon evaluation, patient requires supervision and minimal  assist for UB ADLs, minimal  and moderate assist for LB ADLs, transfers and functional ambulation in room and bathroom with minimal  assist, with the use of Rolling Walker  Patient is oriented x 4 and presents with ability to recognize a problem, define a problem, identify alternative plan, select a plan, organize steps in a plan, implement plan and evaluate outcome (problem solving)  Occupational performance is affected by the following deficits: decreased muscle strength, dynamic sit/ stand balance deficit with poor standing tolerance time for self care and functional mobility, decreased activity tolerance, increased pain, orthopedic restrictions and delayed righting and equilibrium reactions  Patient to benefit from continued Occupational Therapy treatment while in the hospital to address deficits as defined above and maximize level of functional independence with ADLs and functional mobility  Occupational Performance areas to address include: grooming , bathing/ shower, dressing, toilet hygiene, transfer to all surfaces, functional ambulation, medication routine/ management, IADLS: Household maintenance, IADLs: safety procedures and IADLs: meal prep/ clean up  From OT standpoint, recommendation at time of d/c would be Post-Acute Rehabilitation Services  Goals   Patient Goals to go to rehab    Plan   Treatment Interventions ADL retraining;Functional transfer training;UE strengthening/ROM; Endurance training;Patient/family training; Compensatory technique education; Activityengagement;Equipment evaluation/education   Goal Expiration Date 02/14/21   OT Treatment Day 0   OT Frequency 3-5x/wk   Recommendation   OT Discharge Recommendation Post-Acute Rehabilitation Services   OT - OK to Discharge Yes  (Once medically cleared )   Additional Comments  The patient's raw score on the AM-PAC Daily Activity inpatient short form is 16, standardized score is 35 96, less than 39 4  Patients at this level are likely to benefit from DC to post-acute rehabilitation services  Please refer to the recommendation of the Occupational Therapist for safe DC planning  -[facility] Daily Activity Inpatient   Lower Body Dressing 2   Bathing 2   Toileting 2   Upper Body Dressing 3   Grooming 3   Eating 4   Daily Activity Raw Score 16   Daily Activity Standardized Score (Calc for Raw Score >=11) 35 96   AM-[facility] Applied Cognition Inpatient   Following a Speech/Presentation 4   Understanding Ordinary Conversation 4   Taking Medications 4   Remembering Where Things Are Placed or Put Away 4   Remembering List of 4-5 Errands 3   Taking Care of Complicated Tasks 3   Applied Cognition Raw Score 22   Applied Cognition Standardized Score 47 83     GOALS:    *ADL transfers with (I) for inc'd independence with ADLs/purposeful tasks    *UB ADL with (I) for inc'd independence with self cares    *LB ADL with (I) using AE prn for inc'd independence with self cares    *Toileting with (I) for clothing management and hygiene for return to [facility] with personal care    *Increase stand tolerance x5 m for inc'd tolerance with standing purposeful tasks    *Participate in 10m UE therex to increase overall stamina/activity tolerance for purposeful tasks    *Bed mobility- (I) for inc'd independence to manage own comfort and initiate EOB & OOB purposeful tasks    *Patient will verbalize 3 safety awareness/ principles to prevent falls in the home setting  *Patient will verbalize and demonstrate use of energy conservation/deep breathing techniques and work simplification skills during functional activities with no verbal cues       *Patient will increase OOB/sitting tolerance to 2-4 hours per day to increase participation in self-care and leisure tasks with no s/s of exertion         Leobardo Alba MS, OTR/L

## 2021-02-04 NOTE — CASE MANAGEMENT
Cm received a call from [place] at [address] [facility] , snf admission was approved Edson Bhupendra #[id number] clinical needs to faxed to # [phone] in 2 business days, I placed a damián to margoth [facility] and made her aware, pt had a covid test, they are able to acetp the pt today, rn was given the rm# and report #, I met with the pt again and made her aware that I received approval from the insurance company to go to [facility] today, she asked me to call her 's cell phone because he has that connected  to his hearing aides, I called him at 15:40 to# [phone] and I made him aware of the d/c plan, pt and family are in agerement with the d/c and [facility]

## 2021-02-04 NOTE — ASSESSMENT & PLAN NOTE
· Currently rate controlled  · Continue Toprol and Tikosyn  · Eliquis was on hold in anticipation of surgical intervention  · Resume eliquis

## 2021-02-04 NOTE — PLAN OF CARE
Problem: OCCUPATIONAL THERAPY ADULT  Goal: Performs self-care activities at highest level of function for planned discharge setting  See evaluation for individualized goals  Description: Treatment Interventions: ADL retraining, Functional transfer training, UE strengthening/ROM, Endurance training, Patient/family training, Compensatory technique education, Activityengagement, Equipment evaluation/education          See flowsheet documentation for full assessment, interventions and recommendations  Note: Limitation: Decreased ADL status, Decreased UE strength, Decreased endurance, Decreased self-care trans, Decreased high-level ADLs  Prognosis: Good  Assessment: Patient is a 60 y o  female seen for OT evaluation s/p admit to [address] [place] [address]  on 2/2/2021 w/Fracture of right tibia  Commorbidities affecting patient's functional performance at time of assessment include: A-fib, hypothyroidism, COPD, crohn's disease, and DM2  Orders placed for OT evaluation and treatment, NWB RLE, and up and OOB as tolerated   Performed at least two patient identifiers during session including name and wristband  Prior to admission, Patient reporting being independent with ADLs/IADLs, ambulatory with no AD and lives with  in a two story house with 8 SU[address]NE  Personal factors affecting patient at time of initial evaluation include: steps to enter, difficulty performing ADLs and difficulty performing IADLs  Upon evaluation, patient requires supervision and minimal  assist for UB ADLs, minimal  and moderate assist for LB ADLs, transfers and functional ambulation in room and bathroom with minimal  assist, with the use of Rolling Walker  Patient is oriented x 4 and presents with ability to recognize a problem, define a problem, identify alternative plan, select a plan, organize steps in a plan, implement plan and evaluate outcome (problem solving)    Occupational performance is affected by the following deficits: decreased muscle strength, dynamic sit/ stand balance deficit with poor standing tolerance time for self care and functional mobility, decreased activity tolerance, increased pain, orthopedic restrictions and delayed righting and equilibrium reactions  Patient to benefit from continued Occupational Therapy treatment while in the hospital to address deficits as defined above and maximize level of functional independence with ADLs and functional mobility  Occupational Performance areas to address include: grooming , bathing/ shower, dressing, toilet hygiene, transfer to all surfaces, functional ambulation, medication routine/ management, IADLS: Household maintenance, IADLs: safety procedures and IADLs: meal prep/ clean up  From OT standpoint, recommendation at time of d/c would be Post-Acute Rehabilitation Services         OT Discharge Recommendation: Post-Acute Rehabilitation Services  OT - OK to Discharge: Yes(Once medically cleared )     Trinh Ramires OT

## 2021-02-04 NOTE — PHYSICAL THERAPY NOTE
02/04/21 1330   PT Last Visit   PT Visit Date 02/04/21   Note Type   Note Type Treatment   Pain Assessment   Pain Assessment Tool 0-10   Pain Score 8   Pain Location/Orientation Orientation: Right;Location: Knee   Restrictions/Precautions   Weight Bearing Precautions Per Order Yes   RLE Weight Bearing Per Order NWB   Braces or Orthoses LE Immobilizer   Other Precautions Chair Alarm; Bed Alarm; Fall Risk;Pain;WBS   General   Chart Reviewed Yes   Response to Previous Treatment Patient with no complaints from previous session  Family/Caregiver Present No   Cognition   Overall Cognitive Status WFL   Arousal/Participation Alert; Responsive; Cooperative   Attention Within functional limits   Orientation Level Oriented X4   Memory Within functional limits   Following Commands Follows one step commands without difficulty   Comments pt agreeable to PT session   Bed Mobility   Supine to Sit 4  Minimal assistance   Additional items Assist x 1;HOB elevated; Bedrails; Increased time required;Verbal cues;LE management   Sit to Supine 3  Moderate assistance   Additional items Assist x 1;HOB elevated; Bedrails; Increased time required;LE management;Verbal cues   Transfers   Sit to Stand 4  Minimal assistance   Additional items Assist x 1;Bedrails; Increased time required;Verbal cues   Stand to Sit 4  Minimal assistance   Additional items Assist x 1; Increased time required;Verbal cues; Bedrails   Balance   Static Sitting Normal   Dynamic Sitting Good   Static Standing Fair   Dynamic Standing Fair -   Endurance Deficit   Endurance Deficit Yes   Activity Tolerance   Activity Tolerance Patient limited by fatigue;Patient limited by pain   Nurse Made Aware RN made aware of outcomes   Exercises   Quad Sets Supine;10 reps;AROM; Bilateral   Heelslides Supine;10 reps;AROM; Left   Glute Sets Supine;10 reps;AROM; Bilateral   Hip Flexion Supine;10 reps;AAROM; Right;AROM; Left   Hip Abduction Supine;10 reps;AROM; Left;AAROM; Right   Hip Adduction Supine;10 reps;AAROM; Right;AROM; Left   Ankle Pumps Supine;10 reps;AROM; Bilateral   Assessment   Prognosis Good   Problem List Decreased strength;Decreased range of motion;Decreased endurance; Impaired balance;Decreased mobility; Decreased coordination;Decreased safety awareness; Obesity;Orthopedic restrictions;Pain   Assessment Pt seen for PT treatment session this date with interventions consisting of Therapeutic exercise consisting of: AROM and AAROM 10 reps B LE in supine position and therapeutic activity consisting of training: bed mobility, supine<>sit transfers and sit<>stand transfers  Pt agreeable to PT treatment session upon arrival, pt found supine in bed w/ HOB elevated, in no apparent distress and responsive  In comparison to previous session, pt with improvements in functional activity tolerance  Post session: pt returned BTB, bed alarm engaged, all needs in reach and RN notified of session findings/recommendations Continue to recommend STR at time of d/c in order to maximize pt's functional independence and safety w/ mobility  Pt continues to be functioning below baseline level, and remains limited 2* factors listed above and including WBS, fall risk, pain and inability to ambulate  PT will continue to see pt while here in order to address the deficits listed above and provide interventions consistent w/ POC in effort to achieve STGs  Pt seen for PT treatment session this date with interventions consisting of Therapeutic exercise consisting of: AROM and AAROM 10 reps B LE in supine position and therapeutic activity consisting of training: bed mobility, supine<>sit transfers and sit<>stand transfers  Pt agreeable to PT treatment session upon arrival, pt found supine in bed w/ HOB elevated, in no apparent distress and responsive  In comparison to previous session, pt with improvements in functional activity tolerance   Post session: pt returned BTB, bed alarm engaged, all needs in reach and RN notified of session findings/recommendations Continue to recommend STR at time of d/c in order to maximize pt's functional independence and safety w/ mobility  Pt continues to be functioning below baseline level, and remains limited 2* factors listed above and including WBS, fall risk, pain and inability to ambulate  PT will continue to see pt while here in order to address the deficits listed above and provide interventions consistent w/ POC in effort to achieve STGs  Barriers to Discharge Inaccessible home environment   Goals   Patient Goals to go to rehab   PT Treatment Day 3   Plan   Treatment/Interventions Functional transfer training;LE strengthening/ROM; Therapeutic exercise; Endurance training;Bed mobility;Gait training   Progress Progressing toward goals   PT Frequency 7x/wk   Recommendation   PT Discharge Recommendation Post-Acute Rehabilitation Services   Equipment Recommended Walker   PT - OK to Discharge Yes  (when medically stable if to STR)   Additional Comments upon conclusion, pt resting in bed with all needs in reach   Additional Comments 2 Pt's raw score on the AM-[facility] Basic Mobility inpatient short form is 14 , standardized score is 35 55  Patients at this level are likely to benefit from DC to SNF, however, please refer to therapist recommendation for safe DC planning     AM-[facility] Basic Mobility Inpatient   Turning in Bed Without Bedrails 3   Lying on Back to Sitting on Edge of Flat Bed 3   Moving Bed to Chair 3   Standing Up From Chair 2   Walk in Room 2   Climb 3-5 Stairs 1   Basic Mobility Inpatient Raw Score 14   Basic Mobility Standardized Score 35 55

## 2021-02-04 NOTE — PLAN OF CARE
Problem: Potential for Falls  Goal: Patient will remain free of falls  Description: INTERVENTIONS:  - Assess patient frequently for physical needs  -  Identify cognitive and physical deficits and behaviors that affect risk of falls    -  [place] fall precautions as indicated by assessment   - Educate patient/family on patient safety including physical limitations  - Instruct patient to call for assistance with activity based on assessment  - Modify environment to reduce risk of injury  - Consider OT/PT consult to assist with strengthening/mobility  Outcome: Progressing     Problem: MUSCULOSKELETAL - ADULT  Goal: Maintain or return mobility to safest level of function  Description: INTERVENTIONS:  - Assess patient's ability to carry out ADLs; assess patient's baseline for ADL function and identify physical deficits which impact ability to perform ADLs (bathing, care of mouth/teeth, toileting, grooming, dressing, etc )  - Assess/evaluate cause of self-care deficits   - Assess range of motion  - Assess patient's mobility  - Assess patient's need for assistive devices and provide as appropriate  - Encourage maximum independence but intervene and supervise when necessary  - Involve family in performance of ADLs  - Assess for home care needs following discharge   - Consider OT consult to assist with ADL evaluation and planning for discharge  - Provide patient education as appropriate  Outcome: Progressing  Goal: Maintain proper alignment of affected body part  Description: INTERVENTIONS:  - Support, maintain and protect limb and body alignment  - Provide patient/ family with appropriate education  Outcome: Progressing     Problem: PAIN - ADULT  Goal: Verbalizes/displays adequate comfort level or baseline comfort level  Description: Interventions:  - Encourage patient to monitor pain and request assistance  - Assess pain using appropriate pain scale  - Administer analgesics based on type and severity of pain and evaluate response  - Implement non-pharmacological measures as appropriate and evaluate response  - Consider cultural and social influences on pain and pain management  - Notify physician/advanced practitioner if interventions unsuccessful or patient reports new pain  Outcome: Progressing     Problem: INFECTION - ADULT  Goal: Absence or prevention of progression during hospitalization  Description: INTERVENTIONS:  - Assess and monitor for signs and symptoms of infection  - Monitor lab/diagnostic results  - Monitor all insertion sites, i e  indwelling lines, tubes, and drains  - Monitor endotracheal if appropriate and nasal secretions for changes in amount and color  - [place] appropriate cooling/warming therapies per order  - Administer medications as ordered  - Instruct and encourage patient and family to use good hand hygiene technique  - Identify and instruct in appropriate isolation precautions for identified infection/condition  Outcome: Progressing  Goal: Absence of fever/infection during neutropenic period  Description: INTERVENTIONS:  - Monitor WBC    Outcome: Progressing     Problem: SAFETY ADULT  Goal: Patient will remain free of falls  Description: INTERVENTIONS:  - Assess patient frequently for physical needs  -  Identify cognitive and physical deficits and behaviors that affect risk of falls    -  [place] fall precautions as indicated by assessment   - Educate patient/family on patient safety including physical limitations  - Instruct patient to call for assistance with activity based on assessment  - Modify environment to reduce risk of injury  - Consider OT/PT consult to assist with strengthening/mobility  Outcome: Progressing  Goal: Maintain or return to baseline ADL function  Description: INTERVENTIONS:  -  Assess patient's ability to carry out ADLs; assess patient's baseline for ADL function and identify physical deficits which impact ability to perform ADLs (bathing, care of mouth/teeth, toileting, grooming, dressing, etc )  - Assess/evaluate cause of self-care deficits   - Assess range of motion  - Assess patient's mobility; develop plan if impaired  - Assess patient's need for assistive devices and provide as appropriate  - Encourage maximum independence but intervene and supervise when necessary  - Involve family in performance of ADLs  - Assess for home care needs following discharge   - Consider OT consult to assist with ADL evaluation and planning for discharge  - Provide patient education as appropriate  Outcome: Progressing  Goal: Maintain or return mobility status to optimal level  Description: INTERVENTIONS:  - Assess patient's baseline mobility status (ambulation, transfers, stairs, etc )    - Identify cognitive and physical deficits and behaviors that affect mobility  - Identify mobility aids required to assist with transfers and/or ambulation (gait belt, sit-to-stand, lift, walker, cane, etc )  - [place] fall precautions as indicated by assessment  - Record patient progress and toleration of activity level on Mobility SBAR; progress patient to next Phase/Stage  - Instruct patient to call for assistance with activity based on assessment  - Consider rehabilitation consult to assist with strengthening/weightbearing, etc   Outcome: Progressing     Problem: DISCHARGE PLANNING  Goal: Discharge to home or other facility with appropriate resources  Description: INTERVENTIONS:  - Identify barriers to discharge w/patient and caregiver  - Arrange for needed discharge resources and transportation as appropriate  - Identify discharge learning needs (meds, wound care, etc )  - Arrange for interpretive services to assist at discharge as needed  - Refer to Case Management Department for coordinating discharge planning if the patient needs post-hospital services based on physician/advanced practitioner order or complex needs related to functional status, cognitive ability, or social support system  Outcome: Progressing     Problem: Knowledge Deficit  Goal: Patient/family/caregiver demonstrates understanding of disease process, treatment plan, medications, and discharge instructions  Description: Complete learning assessment and assess knowledge base    Interventions:  - Provide teaching at level of understanding  - Provide teaching via preferred learning methods  Outcome: Progressing     Problem: Prexisting or High Potential for Compromised Skin Integrity  Goal: Skin integrity is maintained or improved  Description: INTERVENTIONS:  - Identify patients at risk for skin breakdown  - Assess and monitor skin integrity  - Assess and monitor nutrition and hydration status  - Monitor labs   - Assess for incontinence   - Turn and reposition patient  - Assist with mobility/ambulation  - Relieve pressure over bony prominences  - Avoid friction and shearing  - Provide appropriate hygiene as needed including keeping skin clean and dry  - Evaluate need for skin moisturizer/barrier cream  - Collaborate with interdisciplinary team   - Patient/family teaching  - Consider wound care consult   Outcome: Progressing

## 2021-02-04 NOTE — ASSESSMENT & PLAN NOTE
· Diabetic diet  · Insulin sliding scale  · Resume home diabetic regimen on discharge  · Glucose adequately controlled

## 2021-02-04 NOTE — INCIDENTAL FINDINGS
The following findings require follow up:  Radiographic finding   Finding: XR knee 4+ vw right injury: On the AP view only there is an irregularity of the lateral tibial plateau suspicious for nondisplaced tibial plateau fracture , There is a moderate joint effusion in the suprapatellar bursa , Mild degenerative osteoarthritis  , Recommend CT or MRI imaging to exclude lateral tibial plateau fracture and evaluate etiology of joint effusion    Please note there is a discrepancy between the preliminary report provided by the emergency department and this report   Follow up required: PCP

## 2021-02-04 NOTE — CASE MANAGEMENT
I met with pt and made her aware that [facility] do not hacve any open beds, pt was accepted to the [facility], pt stated where ever I can get a bed, authorization process has been started, cm will continue to work on a safe d/c plan

## 2021-02-04 NOTE — NURSING NOTE
Patient was evaluated by the hospitalist, who gave the okay for the patient to be transferred to the [facility]  IV was removed with catheter tip intact, DSD and pressure applied  Patient's belongings, including her personal c-pap machine, all accompanied her to the [facility]  Face to face report was given to RN, Daily Membreno

## 2021-02-04 NOTE — DISCHARGE SUMMARY
Discharge- Mari Houser 1955, 60 y o  female MRN: [id number]    Unit/Bed#: -01 Encounter: [id number]    Primary Care Provider: Aleen Soulier, MD   Date and time admitted to hospital: 2/2/2021  1:03 PM        Diabetes mellitus, type 2 ([facility] )  Assessment & Plan  · Diabetic diet  · Insulin sliding scale  · Resume home diabetic regimen on discharge  · Glucose adequately controlled     COPD (chronic obstructive pulmonary disease) ([facility] )  Assessment & Plan  · No signs of acute exacerbation  Continue albuterol and Breo    Crohn's disease with complication (HCC)  Assessment & Plan  · No signs of acute exacerbation at this time  · Continue mesalamine and Bentyl    Acquired hypothyroidism  Assessment & Plan  · Continue levothyroxine    A-fib (HCC)  Assessment & Plan  · Currently rate controlled  · Continue Toprol and Tikosyn  · Eliquis was on hold in anticipation of surgical intervention  · Resume eliquis    * Fracture of right tibia  Assessment & Plan  · Status post orthopedic surgery repair  · Continue pain control  · Now placed into acute rehab     Discharging Physician / Practitioner: Tali Mendoza MD  PCP: Aleen Soulier, MD  Admission Date:   Admission Orders (From admission, onward)     Ordered        02/02/21 1702  Inpatient Admission  Once                   Discharge Date: 02/04/21    Disposition:      [facility]  ·     Reason for Admission: Fracture of right tibia    Discharge Diagnoses:     Please see assessment and plan section above for further details regarding discharge diagnoses       Resolved Problems  Date Reviewed: 2/2/2021    None          Consultations During Hospital Stay:  · Orthopedic surgery    Procedures Performed:   · Tibial fracture repair     Medication Adjustments and Discharge Medications:  · Summary of Medication Adjustments made as a result of this hospitalization:  See med rec  · Medication Dosing Tapers - Please refer to Discharge Medication List for details on any medication dosing tapers (if applicable to patient)  · Medications being temporarily held (include recommended restart time):  See med rec  · Discharge Medication List: See after visit summary for reconciled discharge medications  Wound Care Recommendations:  When applicable, please see wound care section of After Visit Summary  Diet Recommendations at Discharge:  Diet -        Diet Orders   (From admission, onward)             Start     Ordered    02/03/21 1136  Diet [place]/CHO Controlled; Consistent Carbohydrate Diet Level 2 (5 carb servings/75 grams CHO/meal)  Diet effective midnight     Question Answer Comment   Diet Type Osmel/CHO Controlled    [place]/CHO Controlled Consistent Carbohydrate Diet Level 2 (5 carb servings/75 grams CHO/meal)    RD to adjust diet per protocol? Yes        02/03/21 1135                Instructions for any Catheters / Lines Present at Discharge (including removal date, if applicable): na    Significant Findings / Test Results:   Principal Problem:    Fracture of right tibia  Active Problems:    A-fib (HCC)    Acquired hypothyroidism    Crohn's disease with complication (HCC)    COPD (chronic obstructive pulmonary disease) (HCC)    Diabetes mellitus, type 2 ([address] )  Resolved Problems:    * No resolved hospital problems  *  ·   ·     Incidental Findings:   · See above     Test Results Pending at Discharge (will require follow up): · None     Outpatient Tests Requested:  · Follow-up with PCP and Orthopedic surgery    Complications:  None    Hospital Course:     Rachel Gay is a 60 y o  female patient who originally presented to the hospital on 2/2/2021 due to right tibial fracture now status post orthopedic surgery repair and stable for discharge to acute rehab  Condition at Discharge: good     Discharge Day Visit / Exam:     Subjective:  Patient feels well, no chest pain or chest palpitations, no shortness of breath  Appetite is good    Vitals: Blood Pressure: 117/53 (02/04/21 1547)  Pulse: 76 (02/04/21 1547)  Temperature: (!) 97 2 °F (36 2 °C) (02/04/21 1547)  Temp Source: Temporal (02/04/21 1547)  Respirations: 18 (02/04/21 1547)  Height: 5' 9" (175 3 cm) (02/02/21 1800)  Weight - Scale: 117 kg (257 lb 8 oz) (02/04/21 0600)  SpO2: 96 % (02/04/21 1547)  Exam:   Physical Exam  Vitals signs and nursing note reviewed  Constitutional:       Appearance: Normal appearance  HENT:      Head: Normocephalic and atraumatic  Nose: Nose normal  No congestion  Mouth/Throat:      Mouth: Mucous membranes are dry  Pharynx: Oropharynx is clear  Eyes:      General:         Right eye: No discharge  Left eye: No discharge  Neck:      Musculoskeletal: Normal range of motion and neck supple  Cardiovascular:      Rate and Rhythm: Normal rate and regular rhythm  Pulmonary:      Effort: Pulmonary effort is normal  No respiratory distress  Abdominal:      General: Abdomen is flat  Palpations: Abdomen is soft  Musculoskeletal:      Right lower leg: No edema  Left lower leg: No edema  Skin:     General: Skin is warm and dry  Neurological:      General: No focal deficit present  Mental Status: She is alert  Mental status is at baseline  Psychiatric:         Mood and Affect: Mood normal          Behavior: Behavior normal          Goals of Care Discussions:  · Code Status at Discharge: Level 1 - Full Code  · Were there any Goals of Care Discussions during Hospitalization?: No  · Results of any General Goals of Care Discussions: na   · POLST Completed: No   · If POLST Completed, Summary of POLST Agreement Provided Here: na   · OK to Rehospitalize if Needed? No    Discharge instructions/Information to patient and family:   See after visit summary section titled Discharge Instructions for information provided to patient and family  Planned Readmission: Acute rehab     Discharge Statement:  I spent 30+ minutes discharging the patient   This time was spent on the day of discharge  I had direct contact with the patient on the day of discharge  Greater than 50% of the total time was spent examining patient, answering all patient questions, arranging and discussing plan of care with patient as well as directly providing post-discharge instructions  Additional time then spent on discharge activities      ** Please Note: This note has been constructed using a voice recognition system **

## 2021-02-04 NOTE — PHYSICAL THERAPY NOTE
Physical Therapy Treatment Session Note    Patient's Name: Shellie Porras    Admitting Diagnosis  Knee injury [S89 90XA]  Right knee sprain [S83 91XA]  Tibial plateau fracture, right, closed, initial encounter [S82 141A]    Problem List  Patient Active Problem List   Diagnosis    Lymphadenopathy    [facility]-fib ([facility] )    Elevated TSH    Hypercholesteremia    Transaminitis    Microscopic hematuria    Pulmonary hypertension (HCC)    MARY ELLEN (obstructive sleep apnea)    Acquired hypothyroidism    Brain aneurysm    Atypical chest pain    Dizziness    Cro[place]'s disease with complication ([place] [address] )    Pneumonia due to infectious organism    Elevated d-dimer    Hypokalemia    Acute respiratory failure with hypoxia (HCC)    COPD (chronic obstructive pulmonary disease) (HCC)    Exacerbation of Crohn's disease (HCC)    Severe obstructive sleep apnea    Diarrhea    Rectal bleeding    Atherosclerosis    Prolonged QT interval    Hepatomegaly    Hepatic steatosis    Colon polyp    Hyperglycemia    Multiple renal cysts    T12 compression fracture (HCC)    Diabetes mellitus, type 2 (HCC)    Fracture of right tibia       Past Medical History  Past Medical History:   Diagnosis Date    A-fib ([place] [address] )     Brain aneurysm     Cancer ([place] [address] )     papillary thyroid cancer    Cardiac disease     CHF (congestive heart failure) (HCC)     Crohn disease ([place] [address] )     Diabetes mellitus ([place] [address] )     Disease of thyroid gland     GERD (gastroesophageal reflux disease)     Hyperlipidemia     Hypertension        Past Surgical History  Past Surgical History:   Procedure Laterality Date    APPENDECTOMY      CARDIOVERSION N/A 2/14/2019    Procedure: CARDIOVERSION;  Surgeon: Matilda Worthington MD;  Location: [facility];  Service: Cardiology    CEREBRAL ANEURYSM REPAIR      CHOLECYSTECTOMY      HYSTERECTOMY      THYROIDECTOMY      TONSILLECTOMY AND ADENOIDECTOMY          02/04/21 0747   PT Last Visit   PT Visit Date 02/04/21   Note Type Note Type Treatment   Pain Assessment   Pain Assessment Tool 0-10   Pain Score 7   Pain Location/Orientation Orientation: Right;Location: Knee; Other (Comment)  (leg)   Pain Radiating Towards into R shin   Pain Onset/Description Onset: Ongoing;Frequency: Constant/Continuous; Descriptor: Aching;Descriptor: William Goldberg; Descriptor: Sore   Effect of Pain on Daily Activities yes   Patient's Stated Pain Goal No pain   Hospital Pain Intervention(s) Medication (See MAR); Repositioned; Ambulation/increased activity; Elevated;Cold applied; Other (Comment)  (immobilizer placed during session)   Multiple Pain Sites Yes   Restrictions/Precautions   Weight Bearing Precautions Per Order Yes   RLE Weight Bearing Per Order NWB  (to be maintained,no surgical interventions indicated)   Braces or Orthoses LE Immobilizer  (RLE)   Other Precautions WBS; Fall Risk;Pain; Chair Alarm; Bed Alarm   General   Chart Reviewed Yes   Additional Pertinent History Skinny ortho TOLU as per dictation: No surgical intervention required at this time, NWB RLE, long knee immobilizer   Family/Caregiver Present No   Cognition   Overall Cognitive Status WFL   Arousal/Participation Alert; Responsive; Cooperative   Attention Within functional limits   Orientation Level Oriented X4   Memory Within functional limits   Following Commands Follows one step commands with increased time or repetition   Comments Pt  agreeable to PT tx session,pleasant  Subjective   Subjective "I would love to get washed up"   Bed Mobility   Supine to Sit 4  Minimal assistance   Additional items Assist x 1;HOB elevated; Bedrails; Increased time required;Verbal cues;LE management   Sit to Supine 3  Moderate assistance   Additional items Assist x 1;Bedrails; Increased time required;Verbal cues;LE management   Additional Comments Pt  denied lightheadedness/dizziness upon positional change  Transfers   Sit to Stand 4  Minimal assistance   Additional items Assist x 1;Bedrails; Increased time required;Verbal cues   Stand to Sit 4  Minimal assistance   Additional items Assist x 1;Bedrails; Increased time required;Verbal cues   Stand pivot 4  Minimal assistance   Additional items Assist x 1; Increased time required;Verbal cues;Armrests   Toilet transfer 4  Minimal assistance   Additional items Assist x 1; Armrests; Increased time required;Verbal cues; Commode;Raised toilet seat   Additional Comments RLE immobilizer fit appropriately w/o issue, pt  reported increased feeling of stability & decreased sharp pain w/movement  ADL completion on bedside > 20 minutes  UB bathing w/set up only, exception of mid back; LB bathing/dressing mod A of 1 w/o LOB  Ambulation/Elevation   Gait pattern Improper Weight shift; Antalgic;Decreased R stance; Excessively slow  (pivoting pattern on LLE, hopping bedside->recliner)   Gait Assistance 4  Minimal assist   Additional items Assist x 1;Verbal cues; Tactile cues   Assistive Device Rolling walker   Distance 8 feet total  (bedside->BSC->bedside for ADLs->recliner)   Stair Management Assistance Not tested  (unsafe for trialing at this time)   Balance   Static Sitting Normal   Dynamic Sitting Good   Static Standing Fair   Dynamic Standing Fair -   Ambulatory Fair -   Endurance Deficit   Endurance Deficit Yes   Endurance Deficit Description Pain & fatigue w/functional mobility  Activity Tolerance   Activity Tolerance Patient limited by pain   Medical Staff Made Aware yes, Regis Rizo OT & Arlyn TEE; Yan Page ortho [address]   Nurse Made Aware yes, [address] RN   Assessment   Prognosis Good   Problem List Decreased strength;Decreased range of motion;Decreased endurance; Impaired balance;Decreased mobility; Decreased coordination;Decreased safety awareness;Orthopedic restrictions;Pain;Obesity   Assessment Pt seen for PT treatment session this date with interventions consisting of gait training w/ emphasis on improving pt's ability to ambulate level surfaces x 8 feet total with min A provided by therapist with RW and therapeutic activity consisting of training: supine<>sit transfers, sit<>stand transfers, static sitting tolerance at EOB for >20 minutes w/ o UE support, static standing tolerance for ~1 minutes w/ B UE support, vc and tactile cues for static sitting posture faciliation, vc and tactile cues for static standing posture faciliation, stand pivot transfers towards B direction and toileting on BSC,full UB/LB ADLs  Pt agreeable to PT treatment session upon arrival, pt found supine in bed w/ HOB elevated, A&O x 4  In comparison to previous session, pt with improvements in balance, functional mobility advancement  Post session: pt returned back to recliner, chair alarm engaged, all needs in reach and RN notified of session findings/recommendations Continue to recommend STR at time of d/c in order to maximize pt's functional independence and safety w/ mobility  Pt continues to be functioning below baseline level, and remains limited 2* factors listed above and including weakness, pain, decreased endurance, gait deviations, WB restriction RLE  PT will continue to see pt while here in order to address the deficits listed above and provide interventions consistent w/ POC in effort to achieve LTGs  Barriers to Discharge Inaccessible home environment; Other (Comment)   Barriers to Discharge Comments SU[facility] home  (pt  and I discussed PAR, she appeared in agreeament)   Goals   Patient Goals to go to rehab   LTG Expiration Date 02/13/21   Long Term Goal #1 LTGs remain appropriate   PT Treatment Day 2   Plan   Treatment/Interventions ADL retraining;Functional transfer training;LE strengthening/ROM; Therapeutic exercise; Endurance training;Elevations; Patient/family training;Equipment eval/education; Bed mobility;Gait training;Spoke to nursing;OT;Spoke to case management;Spoke to advanced practitioner   Progress Progressing toward goals   PT Frequency 7x/wk   Recommendation   PT Discharge Recommendation Post-Acute Rehabilitation Services  (updated recommendation)   Equipment Recommended Walker   PT - OK to Discharge Yes  (if medically stable, to PAR)   Additional Comments Upon conclusion, pt  was resting OOB on recliner w/chair alarm engaged and all needs within reach,CPs x 2 placed RLE  Additional Comments 2 [facility] raw score of 14, standardized score of 35 55  As per the data, this score is correlated most closely with a SNF recommendation  However,the therapist's discharge disposition recommendation may vary as numerous social factors and objective findings contribute to the suggested destination     AM-PAC Basic Mobility Inpatient   Turning in Bed Without Bedrails 3   Lying on Back to Sitting on Edge of Flat Bed 3   Moving Bed to Chair 3   Standing Up From Chair 2   Walk in Room 2   Climb 3-5 Stairs 1   Basic Mobility Inpatient Raw Score 14   Basic Mobility Standardized Score 35 55     Treatment Time: 7810-2691    Amy Morales, PT

## 2021-02-04 NOTE — PLAN OF CARE
Problem: PHYSICAL THERAPY ADULT  Goal: Performs mobility at highest level of function for planned discharge setting  See evaluation for individualized goals  Description: Treatment/Interventions: ADL retraining, Functional transfer training, LE strengthening/ROM, Therapeutic exercise, Endurance training, Patient/family training, Equipment eval/education, Bed mobility, Gait training, Spoke to nursing, OT  Equipment Recommended: Mode [employer] Walker       See flowsheet documentation for full assessment, interventions and recommendations  Outcome: Progressing  Note: Prognosis: Good  Problem List: Decreased strength, Decreased range of motion, Decreased endurance, Impaired balance, Decreased mobility, Decreased coordination, Decreased safety awareness, Obesity, Orthopedic restrictions, Pain  Assessment: Pt seen for PT treatment session this date with interventions consisting of Therapeutic exercise consisting of: AROM and AAROM 10 reps B LE in supine position and therapeutic activity consisting of training: bed mobility, supine<>sit transfers and sit<>stand transfers  Pt agreeable to PT treatment session upon arrival, pt found supine in bed w/ HOB elevated, in no apparent distress and responsive  In comparison to previous session, pt with improvements in functional activity tolerance  Post session: pt returned BTB, bed alarm engaged, all needs in reach and RN notified of session findings/recommendations Continue to recommend STR at time of d/c in order to maximize pt's functional independence and safety w/ mobility  Pt continues to be functioning below baseline level, and remains limited 2* factors listed above and including WBS, fall risk, pain and inability to ambulate  PT will continue to see pt while here in order to address the deficits listed above and provide interventions consistent w/ POC in effort to achieve STGs  Pt seen for PT treatment session this date with interventions consisting of Therapeutic exercise consisting of: AROM and AAROM 10 reps B LE in supine position and therapeutic activity consisting of training: bed mobility, supine<>sit transfers and sit<>stand transfers  Pt agreeable to PT treatment session upon arrival, pt found supine in bed w/ HOB elevated, in no apparent distress and responsive  In comparison to previous session, pt with improvements in functional activity tolerance  Post session: pt returned BTB, bed alarm engaged, all needs in reach and RN notified of session findings/recommendations Continue to recommend STR at time of d/c in order to maximize pt's functional independence and safety w/ mobility  Pt continues to be functioning below baseline level, and remains limited 2* factors listed above and including WBS, fall risk, pain and inability to ambulate  PT will continue to see pt while here in order to address the deficits listed above and provide interventions consistent w/ POC in effort to achieve STGs  Barriers to Discharge: Inaccessible home environment  Barriers to Discharge Comments: [facility] home(pt  and I discussed PAR, she appeared in agreeament)  PT Discharge Recommendation: [phone]     PT - OK to Discharge: Yes(when medically stable if to STR)    See flowsheet documentation for full assessment

## 2021-02-04 NOTE — CASE MANAGEMENT
Physical therapist came to cm and made me aware that the pt needs rehab, I met with the pt and gave her a list, A post acute care recommendation was made by your care team for STR  Discussed Freedom of Choice with patient  List of facilities given to patient via in person  patient aware the list is custom filtered for them by insurance and that [facility] post acute providers are designated  Pt reviewed the list and requested that I send a referral to [facility] and [place], referrals were sent, pt will need authorization and will need transportation  cm will continue to work on a safe d/c plan

## 2021-02-04 NOTE — PLAN OF CARE
Problem: PHYSICAL THERAPY ADULT  Goal: Performs mobility at highest level of function for planned discharge setting  See evaluation for individualized goals  Description: Treatment/Interventions: ADL retraining, Functional transfer training, LE strengthening/ROM, Therapeutic exercise, Endurance training, Patient/family training, Equipment eval/education, Bed mobility, Gait training, Spoke to nursing, OT  Equipment Recommended: Chasidy Perkins Walker       See flowsheet documentation for full assessment, interventions and recommendations  Outcome: Progressing  Note: Prognosis: Good  Problem List: Decreased strength, Decreased range of motion, Decreased endurance, Impaired balance, Decreased mobility, Decreased coordination, Decreased safety awareness, Orthopedic restrictions, Pain, Obesity  Assessment: Pt seen for PT treatment session this date with interventions consisting of gait training w/ emphasis on improving pt's ability to ambulate level surfaces x 8 feet total with min A provided by therapist with RW and therapeutic activity consisting of training: supine<>sit transfers, sit<>stand transfers, static sitting tolerance at EOB for >20 minutes w/ o UE support, static standing tolerance for ~1 minutes w/ B UE support, vc and tactile cues for static sitting posture faciliation, vc and tactile cues for static standing posture faciliation, stand pivot transfers towards B direction and toileting on BSC,full UB/LB ADLs  Pt agreeable to PT treatment session upon arrival, pt found supine in bed w/ HOB elevated, A&O x 4  In comparison to previous session, pt with improvements in balance, functional mobility advancement  Post session: pt returned back to recliner, chair alarm engaged, all needs in reach and RN notified of session findings/recommendations Continue to recommend STR at time of d/c in order to maximize pt's functional independence and safety w/ mobility   Pt continues to be functioning below baseline level, and remains limited 2* factors listed above and including weakness, pain, decreased endurance, gait deviations, WB restriction RLE  PT will continue to see pt while here in order to address the deficits listed above and provide interventions consistent w/ POC in effort to achieve LTGs  Barriers to Discharge: Inaccessible home environment, Other (Comment)  Barriers to Discharge Comments: [facility] home(pt  and I discussed PAR, she appeared in agreeament)  PT Discharge Recommendation: (S) Post-Acute Rehabilitation Services(updated recommendation)     PT - OK to Discharge: Yes(if medically stable, to PAR)    See flowsheet documentation for full assessment

## 2021-02-05 LAB
GLUCOSE SERPL-MCNC: 102 MG/DL (ref 65–140)
GLUCOSE SERPL-MCNC: 141 MG/DL (ref 65–140)

## 2021-02-05 PROCEDURE — 82948 REAGENT STRIP/BLOOD GLUCOSE: CPT

## 2021-02-06 LAB — GLUCOSE SERPL-MCNC: 143 MG/DL (ref 65–140)

## 2021-02-06 PROCEDURE — 82948 REAGENT STRIP/BLOOD GLUCOSE: CPT

## 2021-02-07 LAB
GLUCOSE SERPL-MCNC: 129 MG/DL (ref 65–140)
GLUCOSE SERPL-MCNC: 149 MG/DL (ref 65–140)
GLUCOSE SERPL-MCNC: 150 MG/DL (ref 65–140)

## 2021-02-07 PROCEDURE — 82948 REAGENT STRIP/BLOOD GLUCOSE: CPT

## 2021-02-08 LAB
GLUCOSE SERPL-MCNC: 125 MG/DL (ref 65–140)
GLUCOSE SERPL-MCNC: 142 MG/DL (ref 65–140)

## 2021-02-08 PROCEDURE — 82948 REAGENT STRIP/BLOOD GLUCOSE: CPT

## 2021-02-09 LAB
GLUCOSE SERPL-MCNC: 128 MG/DL (ref 65–140)
GLUCOSE SERPL-MCNC: 138 MG/DL (ref 65–140)

## 2021-02-09 PROCEDURE — 82948 REAGENT STRIP/BLOOD GLUCOSE: CPT

## 2021-02-10 ENCOUNTER — TELEPHONE (OUTPATIENT)
Dept: SLEEP CENTER | Facility: CLINIC | Age: 66
End: 2021-02-10

## 2021-02-10 LAB
ANION GAP SERPL CALCULATED.3IONS-SCNC: 10 MMOL/L (ref 4–13)
BASOPHILS # BLD AUTO: 0.1 THOUSANDS/ΜL (ref 0–0.1)
BASOPHILS NFR BLD AUTO: 1 % (ref 0–2)
BUN SERPL-MCNC: 16 MG/DL (ref 7–25)
CALCIUM SERPL-MCNC: 9.4 MG/DL (ref 8.6–10.5)
CHLORIDE SERPL-SCNC: 99 MMOL/L (ref 98–107)
CO2 SERPL-SCNC: 28 MMOL/L (ref 21–31)
CREAT SERPL-MCNC: 0.78 MG/DL (ref 0.6–1.2)
EOSINOPHIL # BLD AUTO: 0.5 THOUSAND/ΜL (ref 0–0.61)
EOSINOPHIL NFR BLD AUTO: 4 % (ref 0–5)
ERYTHROCYTE [DISTWIDTH] IN BLOOD BY AUTOMATED COUNT: 15.5 % (ref 11.5–14.5)
ERYTHROCYTE [SEDIMENTATION RATE] IN BLOOD: 103 MM/HOUR (ref 0–29)
GFR SERPL CREATININE-BSD FRML MDRD: 81 ML/MIN/1.73SQ M
GLUCOSE SERPL-MCNC: 121 MG/DL (ref 65–99)
GLUCOSE SERPL-MCNC: 140 MG/DL (ref 65–140)
GLUCOSE SERPL-MCNC: 146 MG/DL (ref 65–140)
HCT VFR BLD AUTO: 35.7 % (ref 42–47)
HGB BLD-MCNC: 11.5 G/DL (ref 12–16)
LYMPHOCYTES # BLD AUTO: 2.5 THOUSANDS/ΜL (ref 0.6–4.47)
LYMPHOCYTES NFR BLD AUTO: 21 % (ref 21–51)
MCH RBC QN AUTO: 27.9 PG (ref 26–34)
MCHC RBC AUTO-ENTMCNC: 32.2 G/DL (ref 31–37)
MCV RBC AUTO: 87 FL (ref 81–99)
MONOCYTES # BLD AUTO: 0.9 THOUSAND/ΜL (ref 0.17–1.22)
MONOCYTES NFR BLD AUTO: 7 % (ref 2–12)
NEUTROPHILS # BLD AUTO: 8 THOUSANDS/ΜL (ref 1.4–6.5)
NEUTS SEG NFR BLD AUTO: 67 % (ref 42–75)
PLATELET # BLD AUTO: 375 THOUSANDS/UL (ref 149–390)
PMV BLD AUTO: 6.8 FL (ref 8.6–11.7)
POTASSIUM SERPL-SCNC: 4.2 MMOL/L (ref 3.5–5.5)
RBC # BLD AUTO: 4.11 MILLION/UL (ref 3.9–5.2)
SODIUM SERPL-SCNC: 137 MMOL/L (ref 134–143)
WBC # BLD AUTO: 11.9 THOUSAND/UL (ref 4.8–10.8)

## 2021-02-10 PROCEDURE — 85652 RBC SED RATE AUTOMATED: CPT | Performed by: NURSE PRACTITIONER

## 2021-02-10 PROCEDURE — 80048 BASIC METABOLIC PNL TOTAL CA: CPT | Performed by: NURSE PRACTITIONER

## 2021-02-10 PROCEDURE — 82948 REAGENT STRIP/BLOOD GLUCOSE: CPT

## 2021-02-10 PROCEDURE — 85025 COMPLETE CBC W/AUTO DIFF WBC: CPT | Performed by: NURSE PRACTITIONER

## 2021-02-10 NOTE — TELEPHONE ENCOUNTER
Received call from patient who states since the pressure on her machine was increased to 14, she finds that it's blowing too hard and water is splashing up the tube  She would like the pressure set back to 13 where it previously was  Dr Vianca Kennedy, please advise

## 2021-02-11 DIAGNOSIS — G47.33 OSA (OBSTRUCTIVE SLEEP APNEA): Primary | ICD-10-CM

## 2021-02-11 LAB
GLUCOSE SERPL-MCNC: 111 MG/DL (ref 65–140)
GLUCOSE SERPL-MCNC: 132 MG/DL (ref 65–140)

## 2021-02-11 PROCEDURE — 82948 REAGENT STRIP/BLOOD GLUCOSE: CPT

## 2021-02-12 ENCOUNTER — TELEPHONE (OUTPATIENT)
Dept: SLEEP CENTER | Facility: CLINIC | Age: 66
End: 2021-02-12

## 2021-02-18 LAB
ANION GAP SERPL CALCULATED.3IONS-SCNC: 8 MMOL/L (ref 4–13)
BASOPHILS # BLD AUTO: 0 THOUSANDS/ΜL (ref 0–0.1)
BASOPHILS NFR BLD AUTO: 0 % (ref 0–2)
BUN SERPL-MCNC: 12 MG/DL (ref 7–25)
CALCIUM SERPL-MCNC: 9.4 MG/DL (ref 8.6–10.5)
CHLORIDE SERPL-SCNC: 99 MMOL/L (ref 98–107)
CO2 SERPL-SCNC: 31 MMOL/L (ref 21–31)
CREAT SERPL-MCNC: 0.78 MG/DL (ref 0.6–1.2)
EOSINOPHIL # BLD AUTO: 0.2 THOUSAND/ΜL (ref 0–0.61)
EOSINOPHIL NFR BLD AUTO: 2 % (ref 0–5)
ERYTHROCYTE [DISTWIDTH] IN BLOOD BY AUTOMATED COUNT: 15.4 % (ref 11.5–14.5)
ERYTHROCYTE [SEDIMENTATION RATE] IN BLOOD: 56 MM/HOUR (ref 0–29)
GFR SERPL CREATININE-BSD FRML MDRD: 81 ML/MIN/1.73SQ M
GLUCOSE P FAST SERPL-MCNC: 142 MG/DL (ref 65–99)
GLUCOSE SERPL-MCNC: 142 MG/DL (ref 65–99)
HCT VFR BLD AUTO: 36.6 % (ref 42–47)
HGB BLD-MCNC: 11.7 G/DL (ref 12–16)
LYMPHOCYTES # BLD AUTO: 2.5 THOUSANDS/ΜL (ref 0.6–4.47)
LYMPHOCYTES NFR BLD AUTO: 28 % (ref 21–51)
MCH RBC QN AUTO: 27.6 PG (ref 26–34)
MCHC RBC AUTO-ENTMCNC: 31.8 G/DL (ref 31–37)
MCV RBC AUTO: 87 FL (ref 81–99)
MONOCYTES # BLD AUTO: 0.7 THOUSAND/ΜL (ref 0.17–1.22)
MONOCYTES NFR BLD AUTO: 8 % (ref 2–12)
NEUTROPHILS # BLD AUTO: 5.6 THOUSANDS/ΜL (ref 1.4–6.5)
NEUTS SEG NFR BLD AUTO: 62 % (ref 42–75)
PLATELET # BLD AUTO: 368 THOUSANDS/UL (ref 149–390)
PMV BLD AUTO: 7.2 FL (ref 8.6–11.7)
POTASSIUM SERPL-SCNC: 3.5 MMOL/L (ref 3.5–5.5)
RBC # BLD AUTO: 4.23 MILLION/UL (ref 3.9–5.2)
SODIUM SERPL-SCNC: 138 MMOL/L (ref 134–143)
WBC # BLD AUTO: 9 THOUSAND/UL (ref 4.8–10.8)

## 2021-02-18 PROCEDURE — 85025 COMPLETE CBC W/AUTO DIFF WBC: CPT | Performed by: NURSE PRACTITIONER

## 2021-02-18 PROCEDURE — 80048 BASIC METABOLIC PNL TOTAL CA: CPT | Performed by: NURSE PRACTITIONER

## 2021-02-18 PROCEDURE — 85652 RBC SED RATE AUTOMATED: CPT | Performed by: NURSE PRACTITIONER

## 2021-02-22 PROCEDURE — U0003 INFECTIOUS AGENT DETECTION BY NUCLEIC ACID (DNA OR RNA); SEVERE ACUTE RESPIRATORY SYNDROME CORONAVIRUS 2 (SARS-COV-2) (CORONAVIRUS DISEASE [COVID-19]), AMPLIFIED PROBE TECHNIQUE, MAKING USE OF HIGH THROUGHPUT TECHNOLOGIES AS DESCRIBED BY CMS-2020-01-R: HCPCS | Performed by: INTERNAL MEDICINE

## 2021-02-22 PROCEDURE — U0005 INFEC AGEN DETEC AMPLI PROBE: HCPCS | Performed by: INTERNAL MEDICINE

## 2021-02-23 LAB — SARS-COV-2 RNA RESP QL NAA+PROBE: NEGATIVE

## 2021-03-09 ENCOUNTER — OFFICE VISIT (OUTPATIENT)
Dept: OBGYN CLINIC | Facility: CLINIC | Age: 66
End: 2021-03-09
Payer: COMMERCIAL

## 2021-03-09 ENCOUNTER — APPOINTMENT (OUTPATIENT)
Dept: RADIOLOGY | Facility: CLINIC | Age: 66
End: 2021-03-09
Payer: COMMERCIAL

## 2021-03-09 ENCOUNTER — LAB REQUISITION (OUTPATIENT)
Dept: LAB | Facility: HOSPITAL | Age: 66
End: 2021-03-09
Payer: COMMERCIAL

## 2021-03-09 VITALS
WEIGHT: 257 LBS | SYSTOLIC BLOOD PRESSURE: 121 MMHG | BODY MASS INDEX: 38.06 KG/M2 | HEIGHT: 69 IN | DIASTOLIC BLOOD PRESSURE: 74 MMHG | HEART RATE: 70 BPM

## 2021-03-09 DIAGNOSIS — S82.141D DISPLACED BICONDYLAR FRACTURE OF RIGHT TIBIA, SUBSEQUENT ENCOUNTER FOR CLOSED FRACTURE WITH ROUTINE HEALING: ICD-10-CM

## 2021-03-09 DIAGNOSIS — S82.141A TIBIAL PLATEAU FRACTURE, RIGHT, CLOSED, INITIAL ENCOUNTER: Primary | ICD-10-CM

## 2021-03-09 DIAGNOSIS — S82.141A TIBIAL PLATEAU FRACTURE, RIGHT, CLOSED, INITIAL ENCOUNTER: ICD-10-CM

## 2021-03-09 LAB
ANION GAP SERPL CALCULATED.3IONS-SCNC: 8 MMOL/L (ref 4–13)
BASOPHILS # BLD AUTO: 0 THOUSANDS/ΜL (ref 0–0.1)
BASOPHILS NFR BLD AUTO: 0 % (ref 0–2)
BUN SERPL-MCNC: 17 MG/DL (ref 7–25)
CALCIUM SERPL-MCNC: 9.2 MG/DL (ref 8.6–10.5)
CHLORIDE SERPL-SCNC: 101 MMOL/L (ref 98–107)
CO2 SERPL-SCNC: 32 MMOL/L (ref 21–31)
CREAT SERPL-MCNC: 0.78 MG/DL (ref 0.6–1.2)
EOSINOPHIL # BLD AUTO: 0.3 THOUSAND/ΜL (ref 0–0.61)
EOSINOPHIL NFR BLD AUTO: 2 % (ref 0–5)
ERYTHROCYTE [DISTWIDTH] IN BLOOD BY AUTOMATED COUNT: 15.3 % (ref 11.5–14.5)
ERYTHROCYTE [SEDIMENTATION RATE] IN BLOOD: 83 MM/HOUR (ref 0–29)
GFR SERPL CREATININE-BSD FRML MDRD: 81 ML/MIN/1.73SQ M
GLUCOSE SERPL-MCNC: 136 MG/DL (ref 65–99)
HCT VFR BLD AUTO: 37.8 % (ref 42–47)
HGB BLD-MCNC: 11.7 G/DL (ref 12–16)
LYMPHOCYTES # BLD AUTO: 2.6 THOUSANDS/ΜL (ref 0.6–4.47)
LYMPHOCYTES NFR BLD AUTO: 23 % (ref 21–51)
MCH RBC QN AUTO: 26.7 PG (ref 26–34)
MCHC RBC AUTO-ENTMCNC: 31.1 G/DL (ref 31–37)
MCV RBC AUTO: 86 FL (ref 81–99)
MONOCYTES # BLD AUTO: 0.6 THOUSAND/ΜL (ref 0.17–1.22)
MONOCYTES NFR BLD AUTO: 6 % (ref 2–12)
NEUTROPHILS # BLD AUTO: 7.7 THOUSANDS/ΜL (ref 1.4–6.5)
NEUTS SEG NFR BLD AUTO: 69 % (ref 42–75)
PLATELET # BLD AUTO: 312 THOUSANDS/UL (ref 149–390)
PMV BLD AUTO: 8.2 FL (ref 8.6–11.7)
POTASSIUM SERPL-SCNC: 3.9 MMOL/L (ref 3.5–5.5)
RBC # BLD AUTO: 4.4 MILLION/UL (ref 3.9–5.2)
SODIUM SERPL-SCNC: 141 MMOL/L (ref 134–143)
WBC # BLD AUTO: 11.1 THOUSAND/UL (ref 4.8–10.8)

## 2021-03-09 PROCEDURE — 73560 X-RAY EXAM OF KNEE 1 OR 2: CPT

## 2021-03-09 PROCEDURE — 27530 TREAT KNEE FRACTURE: CPT | Performed by: PHYSICIAN ASSISTANT

## 2021-03-09 PROCEDURE — 99213 OFFICE O/P EST LOW 20 MIN: CPT | Performed by: ORTHOPAEDIC SURGERY

## 2021-03-09 PROCEDURE — 85025 COMPLETE CBC W/AUTO DIFF WBC: CPT | Performed by: INTERNAL MEDICINE

## 2021-03-09 PROCEDURE — 80048 BASIC METABOLIC PNL TOTAL CA: CPT | Performed by: INTERNAL MEDICINE

## 2021-03-09 PROCEDURE — 85652 RBC SED RATE AUTOMATED: CPT | Performed by: INTERNAL MEDICINE

## 2021-03-09 NOTE — PROGRESS NOTES
ASSESSMENT/PLAN:    Diagnoses and all orders for this visit:    Tibial plateau fracture, right, closed, initial encounter  -     XR knee 1 or 2 vw right; Future  -     Brace    Other orders  -     Cancel: DXA bone density spine hip and pelvis; Future  -     Fracture / Dislocation Treatment        X-rays of the patient's right knee show a minimally displaced tibial plateau fracture  The  The fracture is in acceptable alignment  Patient was given a hinged knee brace to wear  She can now toe-touch weight bear  She no longer needs the knee immobilizer  She will follow up with our office in 1 month with new x-rays of her right knee two views  The patient is acceptable to this plan  Return in about 1 month (around 4/9/2021)  _____________________________________________________  CHIEF COMPLAINT:  Chief Complaint   Patient presents with    Right Knee - Pain         SUBJECTIVE:  Satinder Leroy is a 60 y o  female   Status post minimally displaced tibial plateau fracture of her right knee from 02/02/2021  The patient was already seen in the hospital as a consult  She was given a knee immobilizer and was to remain nonweightbearing  The patient presents to our office today with a knee immobilizer in place  She denies any significant pain  She denies any numbness or tingling  She denies any fever or chills      The following portions of the patient's history were reviewed and updated as appropriate: allergies, current medications, past family history, past medical history, past social history, past surgical history and problem list     PAST MEDICAL HISTORY:  Past Medical History:   Diagnosis Date    [facility])     Brain aneurysm     [facility] )     papillary thyroid cancer    Cardiac disease     [facility]) [facility] )     Cro[facility] disease [facility] )     [facility]     Disease of thyroid gland     GERD (gastroesophageal reflux disease)     Hyperlipidemia     Hypertension PAST SURGICAL HISTORY:  Past Surgical History:   Procedure Laterality Date    APPENDECTOMY      CARDIOVERSION N/A 2/14/2019    Procedure: CARDIOVERSION;  Surgeon: Robert Gamble MD;  Location: [facility];  Service: Cardiology    CEREBRAL ANEURYSM REPAIR      CHOLECYSTECTOMY      HYSTERECTOMY      THYROIDECTOMY      TONSILLECTOMY AND ADENOIDECTOMY         FAMILY HISTORY:  History reviewed  No pertinent family history  SOCIAL HISTORY:  Social History     Tobacco Use    Smoking status: Former Smoker     Quit date: 1/1/2018     Years since quitting: 3 1    Smokeless tobacco: Never Used   Substance Use Topics    Alcohol use: Yes     Alcohol/week: 0 0 standard drinks     Frequency: Monthly or less     Comment: social    Drug use: Never       MEDICATIONS:    Current Outpatient Medications:     albuterol (PROVENTIL HFA,VENTOLIN HFA) 90 mcg/act inhaler, Inhale 2 puffs every 6 (six) hours as needed for wheezing or shortness of breath, Disp: , Rfl: 0    apixaban (ELIQUIS) 5 mg, Take 5 mg by mouth 2 (two) times a day , Disp: , Rfl:     aspirin 81 mg chewable tablet, Chew 1 tablet (81 mg total) daily, Disp: , Rfl: 0    atorvastatin (LIPITOR) 10 mg tablet, Take 10 mg by mouth daily, Disp: , Rfl:     Blood Glucose Monitoring Suppl (ONETOUCH VERIO) w/Device KIT, Use to check sugars once a day   Dx E11 9, Disp: , Rfl:     Blood Glucose Monitoring Suppl (Pam Cokato) w/Device KIT, daily Use to check glucose, Disp: , Rfl:     dicyclomine (BENTYL) 20 mg tablet, Take 1 tablet (20 mg total) by mouth every 6 (six) hours as needed (urgency, abdominal pain), Disp: 120 tablet, Rfl: 2    dofetilide (TIKOSYN) 500 mcg capsule, Take 500 mcg by mouth 2 (two) times a day, Disp: , Rfl:     fenofibrate (TRICOR) 54 MG tablet, Take 54 mg by mouth daily, Disp: , Rfl:     furosemide (LASIX) 20 mg tablet, Take 20 mg by mouth 2 (two) times a day, Disp: , Rfl:     glipiZIDE (GLUCOTROL XL) 5 mg 24 hr tablet, TAKE 1 TABLET BY MOUTH ONCE DAILY 30 MINUTES BEFORE A MEAL (REPLACES METFORMIN), Disp: , Rfl:     glucose blood test strip, Use to check sugars once a day  Dx E11 9, Disp: , Rfl:     Lancets (ONETOUCH DELICA PLUS [id number]) MISC, USE 1 TO CHECK GLUCOSE ONCE DAILY, Disp: , Rfl:     mesalamine (DELZICOL) 400 mg, Take 2 capsules by mouth twice daily, Disp: 120 capsule, Rfl: 0    mometasone-formoterol (DULERA) 100-5 MCG/ACT inhaler, Inhale 2 puffs 2 (two) times a day, Disp: , Rfl: 0    OneTouch Delica Lancets 48C MISC, Use to check sugars once a day  Dx E11 9, Disp: , Rfl:     ONETOUCH VERIO test strip, USE 1 STRIP TO CHECK GLUCOSE ONCE DAILY, Disp: , Rfl:     pramipexole (MIRAPEX) 0 5 mg tablet, Take 1 tablet (0 5 mg total) by mouth daily at bedtime, Disp: 90 tablet, Rfl: 1    SYNTHROID 150 MCG tablet, TAKE 1 TABLET BY MOUTH ONCE DAILY FIRST THING IN THE MORNING (AT LEAST 30 MINUTES PRIOR TO BREAKFAST OR OTHER MEDS), Disp: , Rfl:     venlafaxine (EFFEXOR XR) 37 5 mg 24 hr capsule, Take 37 5 mg by mouth daily , Disp: , Rfl:     metoprolol succinate (TOPROL-XL) 100 mg 24 hr tablet, Take 1 tablet (100 mg total) by mouth every 12 (twelve) hours, Disp: 60 tablet, Rfl: 0    omeprazole (PriLOSEC) 40 MG capsule, Take 1 capsule (40 mg total) by mouth daily before breakfast, Disp: 30 capsule, Rfl: 4    ALLERGIES:  Allergies   Allergen Reactions    Sulfa Antibiotics Rash    Other        ROS:  Review of Systems     Constitutional: Negative for fatigue, fever or loss of appetite  HENT: Negative  Respiratory: Negative for shortness of breath, dyspnea  Cardiovascular: Negative for chest pain/tightness  Gastrointestinal: Negative for abdominal pain, N/V  Endocrine: Negative for cold/heat intolerance, unexplained weight loss/gain  Genitourinary: Negative for flank pain, dysuria, hematuria  Musculoskeletal:   Negative for arthralgia   Skin: Negative for rash      Neurological: Negative for numbness or tingling  Psychiatric/Behavioral: Negative for agitation  _____________________________________________________  PHYSICAL EXAMINATION:    Blood pressure 121/74, pulse 70, height 5' 9" (1 753 m), weight 117 kg (257 lb)  Constitutional: Oriented to person, place, and time  Appears well-developed and well-nourished  No distress  HENT:   Head: Normocephalic  Eyes: Conjunctivae are normal  Right eye exhibits no discharge  Left eye exhibits no discharge  No scleral icterus  Cardiovascular: Normal rate  Pulmonary/Chest: Effort normal    Neurological: Alert and oriented to person, place, and time  Skin: Skin is warm and dry  No rash noted  Not diaphoretic  No erythema  No pallor  Psychiatric: Normal mood and affect  Behavior is normal  Judgment and thought content normal       MUSCULOSKELETAL EXAMINATION:   Physical Exam  Ortho Exam     right lower extremity is neurovascularly intact  Toes are pink and mobile   Compartments are soft   Negative Homans   Slight tenderness to palpation of the fracture site   No ligament laxity  Brisk cap refill   Sensation intact    Objective:  BP Readings from Last 1 Encounters:   03/09/21 121/74      Wt Readings from Last 1 Encounters:   03/09/21 117 kg (257 lb)        BMI:   Estimated body mass index is 37 95 kg/m² as calculated from the following:    Height as of this encounter: 5' 9" (1 753 m)  Weight as of this encounter: 117 kg (257 lb)  Radiographs:  _____________________________________________________  STUDIES REVIEWED:  I have personally reviewed pertinent films and reports in PACS  X-rays of the patient's right knee show a minimally displaced tibial plateau fracture  The  The fracture is in acceptable alignment       PROCEDURES PERFORMED:  Fracture / Dislocation Treatment    Date/Time: 3/9/2021 10:27 AM  Performed by: Joshua Guardado PA-C  Authorized by: Joshua Guardado PA-C     Patient Location:  Clinic  Risks and benefits: Risks, benefits and alternatives were discussed    Injury location:  Knee  Location details:  Right knee  Injury type:  Fracture  Fracture type: tibial plateau    Immobilization:  Brace  Patient tolerance:  Patient tolerated the procedure well with no immediate complications          Honey Morales PA-C

## 2021-03-20 DIAGNOSIS — K50.919 CROHN'S DISEASE WITH COMPLICATION, UNSPECIFIED GASTROINTESTINAL TRACT LOCATION (HCC): ICD-10-CM

## 2021-03-21 RX ORDER — MESALAMINE 400 MG/1
CAPSULE, DELAYED RELEASE ORAL
Qty: 120 CAPSULE | Refills: 0 | Status: SHIPPED | OUTPATIENT
Start: 2021-03-21 | End: 2021-04-19

## 2021-04-13 ENCOUNTER — OFFICE VISIT (OUTPATIENT)
Dept: OBGYN CLINIC | Facility: CLINIC | Age: 66
End: 2021-04-13

## 2021-04-13 ENCOUNTER — APPOINTMENT (OUTPATIENT)
Dept: RADIOLOGY | Facility: CLINIC | Age: 66
End: 2021-04-13
Payer: COMMERCIAL

## 2021-04-13 ENCOUNTER — TELEPHONE (OUTPATIENT)
Dept: OBGYN CLINIC | Facility: HOSPITAL | Age: 66
End: 2021-04-13

## 2021-04-13 VITALS
BODY MASS INDEX: 38.06 KG/M2 | HEART RATE: 62 BPM | SYSTOLIC BLOOD PRESSURE: 115 MMHG | HEIGHT: 69 IN | WEIGHT: 257 LBS | DIASTOLIC BLOOD PRESSURE: 71 MMHG

## 2021-04-13 DIAGNOSIS — S82.141D CLOSED FRACTURE OF RIGHT TIBIAL PLATEAU WITH ROUTINE HEALING, SUBSEQUENT ENCOUNTER: ICD-10-CM

## 2021-04-13 DIAGNOSIS — S82.141D CLOSED FRACTURE OF RIGHT TIBIAL PLATEAU WITH ROUTINE HEALING, SUBSEQUENT ENCOUNTER: Primary | ICD-10-CM

## 2021-04-13 PROCEDURE — 99024 POSTOP FOLLOW-UP VISIT: CPT | Performed by: ORTHOPAEDIC SURGERY

## 2021-04-13 PROCEDURE — 73560 X-RAY EXAM OF KNEE 1 OR 2: CPT

## 2021-04-13 NOTE — TELEPHONE ENCOUNTER
This patient is current with KACI Kohli from [facility] just spoke with them  Their nursing discharged her earlier this month so there is no longer a need for nursing  If patient wants to continue home care, please fax order and today's ovn's to [facility]    Fax#[phone]

## 2021-04-13 NOTE — TELEPHONE ENCOUNTER
The patient was given a note today for home physical therapy until she plateaus    Once this occurs, she will need to start outpatient physical therapy

## 2021-04-13 NOTE — PROGRESS NOTES
ASSESSMENT/PLAN:    Diagnoses and all orders for this visit:    Closed fracture of right tibial plateau with routine healing, subsequent encounter  -     Cancel: XR knee 1 or 2 vw right; Future  -     XR knee 1 or 2 vw right; Future  -     Ambulatory Referral to  Geovany Blackman; Future    Other orders  -     Cancel: DXA bone density spine hip and pelvis; Future        X-rays of the patient's right knee show well-healed right tibial plateau fracture  The fracture is healed in good alignment  There is good callus formation  The patient may now weight bear as tolerated  She will continue home physical therapy for strengthening, stretching and range of motion  If she plateaus with home therapy she should move to outpatient physical therapy  The patient will follow up with our office in 6 weeks for a final set of x-rays of the right knee two views  The patient is acceptable to this plan  Return in about 6 weeks (around 5/25/2021)  _____________________________________________________  CHIEF COMPLAINT:  Chief Complaint   Patient presents with    Right Knee - Fracture, Follow-up         SUBJECTIVE:  Nuris Murphy is a 60 y o  female  status post tibial plateau fracture of her right knee from 02/02/2021  Last visit, the patient was given a hinged knee brace and was allowed toe-touch weightbearing  She denies any significant pain today  She states she has been participating in home physical therapy  She denies any numbness or tingling  She denies any fever or chills      The following portions of the patient's history were reviewed and updated as appropriate: allergies, current medications, past family history, past medical history, past social history, past surgical history and problem list     PAST MEDICAL HISTORY:  Past Medical History:   Diagnosis Date    A-fib [facility])     Brain aneurysm     Cancer [facility] [address] [facility]     papillary thyroid cancer    Cardiac disease     CHF (congestive heart failure) ([facility] [address] [facility]     Crohn disease ([facility] [address] [facility]     Diabetes mellitus ([facility] )     Disease of thyroid gland     GERD (gastroesophageal reflux disease)     Hyperlipidemia     Hypertension        PAST SURGICAL HISTORY:  Past Surgical History:   Procedure Laterality Date    APPENDECTOMY      CARDIOVERSION N/A 2/14/2019    Procedure: CARDIOVERSION;  Surgeon: Clara Downing MD;  Location: [facility];  Service: Cardiology    CEREBRAL ANEURYSM REPAIR      CHOLECYSTECTOMY      HYSTERECTOMY      THYROIDECTOMY      TONSILLECTOMY AND ADENOIDECTOMY         FAMILY HISTORY:  History reviewed  No pertinent family history  SOCIAL HISTORY:  Social History     Tobacco Use    Smoking status: Former Smoker     Quit date: 1/1/2018     Years since quitting: 3 2    Smokeless tobacco: Never Used   Substance Use Topics    Alcohol use: Yes     Alcohol/week: 0 0 standard drinks     Frequency: Monthly or less     Comment: social    Drug use: Never       MEDICATIONS:    Current Outpatient Medications:     albuterol (PROVENTIL HFA,VENTOLIN HFA) 90 mcg/act inhaler, Inhale 2 puffs every 6 (six) hours as needed for wheezing or shortness of breath, Disp: , Rfl: 0    apixaban (ELIQUIS) 5 mg, Take 5 mg by mouth 2 (two) times a day , Disp: , Rfl:     aspirin 81 mg chewable tablet, Chew 1 tablet (81 mg total) daily, Disp: , Rfl: 0    atorvastatin (LIPITOR) 10 mg tablet, Take 10 mg by mouth daily, Disp: , Rfl:     Blood Glucose Monitoring Suppl (ONETOUCH VERIO) w/Device KIT, Use to check sugars once a day   Dx E11 9, Disp: , Rfl:     Blood Glucose Monitoring Suppl (Patrizia Rodriguez) w/Device KIT, daily Use to check glucose, Disp: , Rfl:     dicyclomine (BENTYL) 20 mg tablet, Take 1 tablet (20 mg total) by mouth every 6 (six) hours as needed (urgency, abdominal pain), Disp: 120 tablet, Rfl: 2    dofetilide (TIKOSYN) 500 mcg capsule, Take 500 mcg by mouth 2 (two) times a day, Disp: , Rfl:     fenofibrate (TRICOR) 54 MG tablet, Take 54 mg by mouth daily, Disp: , Rfl:     furosemide (LASIX) 20 mg tablet, Take 20 mg by mouth 2 (two) times a day, Disp: , Rfl:     glipiZIDE (GLUCOTROL XL) 5 mg 24 hr tablet, TAKE 1 TABLET BY MOUTH ONCE DAILY 30 MINUTES BEFORE A MEAL (REPLACES METFORMIN), Disp: , Rfl:     glucose blood test strip, Use to check sugars once a day  Dx E11 9, Disp: , Rfl:     Lancets (ONETOUCH DELICA PLUS NPWURU80L) MISC, USE 1 TO CHECK GLUCOSE ONCE DAILY, Disp: , Rfl:     mesalamine (DELZICOL) 400 mg, Take 2 capsules by mouth twice daily, Disp: 120 capsule, Rfl: 0    metoprolol succinate (TOPROL-XL) 100 mg 24 hr tablet, Take 1 tablet (100 mg total) by mouth every 12 (twelve) hours, Disp: 60 tablet, Rfl: 0    mometasone-formoterol (DULERA) 100-5 MCG/ACT inhaler, Inhale 2 puffs 2 (two) times a day, Disp: , Rfl: 0    OneTouch Delica Lancets 67H MISC, Use to check sugars once a day  Dx E11 9, Disp: , Rfl:     ONETOUCH VERIO test strip, USE 1 STRIP TO CHECK GLUCOSE ONCE DAILY, Disp: , Rfl:     pramipexole (MIRAPEX) 0 5 mg tablet, Take 1 tablet (0 5 mg total) by mouth daily at bedtime, Disp: 90 tablet, Rfl: 1    SYNTHROID 150 MCG tablet, TAKE 1 TABLET BY MOUTH ONCE DAILY FIRST THING IN THE MORNING (AT LEAST 30 MINUTES PRIOR TO BREAKFAST OR OTHER MEDS), Disp: , Rfl:     venlafaxine (EFFEXOR XR) 37 5 mg 24 hr capsule, Take 37 5 mg by mouth daily , Disp: , Rfl:     omeprazole (PriLOSEC) 40 MG capsule, Take 1 capsule (40 mg total) by mouth daily before breakfast, Disp: 30 capsule, Rfl: 4    ALLERGIES:  Allergies   Allergen Reactions    Sulfa Antibiotics Rash    Other        ROS:  Review of Systems     Constitutional: Negative for fatigue, fever or loss of appetite  HENT: Negative  Respiratory: Negative for shortness of breath, dyspnea  Cardiovascular: Negative for chest pain/tightness  Gastrointestinal: Negative for abdominal pain, N/V  Endocrine: Negative for cold/heat intolerance, unexplained weight loss/gain     Genitourinary: Negative for flank pain, dysuria, hematuria  Musculoskeletal: Negative for arthralgia   Skin: Negative for rash  Neurological: Negative for numbness or tingling  Psychiatric/Behavioral: Negative for agitation  _____________________________________________________  PHYSICAL EXAMINATION:    Blood pressure 115/71, pulse 62, height 5' 9" (1 753 m), weight 117 kg (257 lb)  Constitutional: Oriented to person, place, and time  Appears well-developed and well-nourished  No distress  HENT:   Head: Normocephalic  Eyes: Conjunctivae are normal  Right eye exhibits no discharge  Left eye exhibits no discharge  No scleral icterus  Cardiovascular: Normal rate  Pulmonary/Chest: Effort normal    Neurological: Alert and oriented to person, place, and time  Skin: Skin is warm and dry  No rash noted  Not diaphoretic  No erythema  No pallor  Psychiatric: Normal mood and affect  Behavior is normal  Judgment and thought content normal       MUSCULOSKELETAL EXAMINATION:   Physical Exam  Ortho Exam      Right lower extremity is neurovascularly intact  Toes are pink mobile  Compartments are soft   There is no tenderness to palpation of previous fracture site   Brisk cap refill   Sensation intact   Lower extremity strength is 4/5   Negative Homans     Objective:  BP Readings from Last 1 Encounters:   04/13/21 115/71      Wt Readings from Last 1 Encounters:   04/13/21 117 kg (257 lb)        BMI:   Estimated body mass index is 37 95 kg/m² as calculated from the following:    Height as of this encounter: 5' 9" (1 753 m)  Weight as of this encounter: 117 kg (257 lb)  Radiographs:  _____________________________________________________  STUDIES REVIEWED:  I have personally reviewed pertinent films and reports in PACS  X-rays show the tibial plateau fracture to be completely healed  There is callus formation  The fracture is healed in acceptable alignment        Wojciech Reese PA-C

## 2021-04-19 DIAGNOSIS — K50.919 CROHN'S DISEASE WITH COMPLICATION, UNSPECIFIED GASTROINTESTINAL TRACT LOCATION (HCC): ICD-10-CM

## 2021-04-19 RX ORDER — MESALAMINE 400 MG/1
CAPSULE, DELAYED RELEASE ORAL
Qty: 120 CAPSULE | Refills: 0 | Status: SHIPPED | OUTPATIENT
Start: 2021-04-19 | End: 2021-05-19

## 2021-04-22 ENCOUNTER — EVALUATION (OUTPATIENT)
Dept: PHYSICAL THERAPY | Facility: HOME HEALTHCARE | Age: 66
End: 2021-04-22
Payer: COMMERCIAL

## 2021-04-22 DIAGNOSIS — S82.101A CLOSED FRACTURE OF PROXIMAL END OF RIGHT TIBIA, UNSPECIFIED FRACTURE MORPHOLOGY, INITIAL ENCOUNTER: Primary | ICD-10-CM

## 2021-04-22 PROCEDURE — 97162 PT EVAL MOD COMPLEX 30 MIN: CPT | Performed by: PHYSICAL THERAPIST

## 2021-04-22 PROCEDURE — 97116 GAIT TRAINING THERAPY: CPT | Performed by: PHYSICAL THERAPIST

## 2021-04-22 PROCEDURE — 97110 THERAPEUTIC EXERCISES: CPT | Performed by: PHYSICAL THERAPIST

## 2021-04-22 NOTE — PROGRESS NOTES
PT Evaluation     Today's date: 2021  Patient name: Eleanor Arriaga  : 1955  MRN: [id number]  Referring provider: Dedrick Glover DO  Dx:   Encounter Diagnosis     ICD-10-CM    1  Closed fracture of proximal end of right tibia, unspecified fracture morphology, initial encounter  S82 101A                   Assessment  Assessment details: Pt Giovani Howard is a 60 y o  who presents to [facility] s/p a non surgical tibial fracture R LE  Pt presents with limited RLE ROM, decreased LE strength, edema, impaired soft tissue mobility, and gait/balance dysfunction  Pt is currently using AD for mobility and limited to ambulation tolerance < 10 minutes for household distances only  Stair negotiation with non-reciprocal gait pattern and use of HR  No reported falls since discharge home  She notes that she is significantly limited at home and sedentary majority of the time  She attempted showering recently but fearful of falling and inability to stand for prolonged periods so is looking into getting a shower chair  She is out of work per MD orders and restricted from driving at this time  Pt would benefit from skilled therapy services to address outlined impairments, work towards goals, and restore pts PLOF  Thank you!    Impairments: abnormal gait, abnormal or restricted ROM, activity intolerance, difficulty understanding, impaired balance, impaired physical strength, lacks appropriate home exercise program, pain with function, safety issue, weight-bearing intolerance and poor posture   Understanding of Dx/Px/POC: good   Prognosis: good    Goals  STGs to be achieved in 4 weeks:  -Pt to demonstrate reduced subjective pain rating "at worst" by at least 2-3 points from Initial Eval to allow for reduced pain with ADLs and improved functional activity tolerance    -Pt to demonstrate full AROM of R knee in order to maximize joint mobility and function and allow for progression of exercise program and achievement of goals    -Pt to demonstrate increased MMT of RLE by at least 1/2-1 grade in order to improve safety and stability with ADLs and functional mobility  LTGs to be achieved in 6-8 weeks:  -Pt will be I with HEP in order to continue to improve quality of life and independence and reduce risk for re-injury    -Pt to demonstrate return to activities of daily living without limiations or restrictions    -Pt to demonstrate return to work activities without limitations or restrictions    -Pt to demonstrate ambulation without AD > 30 minutes for return to community activities without limitations   -Pt to demonstrate improved function as noted by achieving or exceeding predicted score on FOTO outcomes assessment tool  Plan  Plan details: PT provided pt with detailed HEP program; pt verbalized understanding of program    Patient would benefit from: skilled physical therapy  Planned modality interventions: cryotherapy  Planned therapy interventions: manual therapy, neuromuscular re-education, patient education, self care, home exercise program, gait training, functional ROM exercises, flexibility, strengthening, stretching, therapeutic exercise, balance and balance/weight bearing training  Duration in weeks: 6  Plan of Care beginning date: 4/22/2021  Plan of Care expiration date: 6/3/2021  Treatment plan discussed with: patient        Subjective Evaluation    History of Present Illness  Mechanism of injury: Pt notes that on 2/2/21 she was helping her  clear snow off his trunk when she lifting the  up and felt the R knee pop  She had immediate pain and difficulty moving so she went to the ED in [place]  She had x-rays taken which did not initially show fracture due to severe swelling; ED consulted with Dr Judit Guevara who ordered a CT scan which was (+) for tibial fracture  Pt was admitted but did not have surgical repair  Pt went to rehab x 4 weeks wearing a knee immobilizer   She was discharged home and had home therapy services  Recent return to Dr Deonte Calixto who discharged the immobilizer and pt is now WBAT  She is using RW which MD wants her to start weaning off  Referral to [facility] for continued therapy services     Quality of life: good    Pain  At best pain ratin  At worst pain ratin  Quality: dull ache  Relieving factors: rest  Aggravating factors: standing, walking and stair climbing    Social Support  Steps to enter house: yes  Stairs in house: yes   Lives in: multiple-level home  Lives with: spouse    Employment status: not working    Diagnostic Tests  Abnormal x-ray: fracture healed   Treatments  Current treatment: physical therapy  Discharged from (in last 30 days): home health care  Patient Goals  Patient goals for therapy: increased strength, independence with ADLs/IADLs, return to work, increased motion, improved balance, decreased pain and decreased edema          Objective     Neurological Testing     Sensation     Knee   Left Knee   Intact: light touch    Right Knee   Intact: light touch     Active Range of Motion   Left Hip   Normal active range of motion    Right Hip   Flexion: 65 degrees   Left Knee   Flexion: 120 degrees   Extension: 0 degrees     Right Knee   Flexion: 95 degrees with pain  Extension: -3 degrees with pain  Left Ankle/Foot   Dorsiflexion (ke): 4 degrees   Plantar flexion: 60 degrees     Right Ankle/Foot   Dorsiflexion (ke): 0 degrees   Plantar flexion: 36 degrees     Strength/Myotome Testing     Left Hip   Normal muscle strength    Right Hip   Planes of Motion   Flexion: 3-  Abduction: 3+  Adduction: 3+    Left Knee   Normal strength    Right Knee   Flexion: 3-  Extension: 3-    Left Ankle/Foot   Normal strength    Right Ankle/Foot   Dorsiflexion: 3-  Plantar flexion: 3-    Ambulation   Weight-Bearing Status   Weight-Bearing Status (Right): weight-bearing as tolerated    Assistive device used: wheeled walker and single point cane    Additional Weight-Bearing Status Details  Household ambulation only at this time < 10 minutes  Community ambulation with use of scooter     Ambulation: Stairs   Pattern: non-reciprocal  Railings: one rail  Pattern: non-reciprocal  Railings: one rail               Re-eval Date: 6/3/21    Date 4/22       Visit Count 1       FOTO Completed             Precautions: WBAT        Manuals 4/22       R knee flex/ext                                Neuro Re-Ed         Balance as appropriate                                 Ther Ex        NuStep  L1 10 minutes       HR/TR        Standing hip flex/abd/ext mayo         Squats        Step-ups         Heel slides        QS        SAQ   > LAQ        Hip add        Hip abd        Ankle tband: all planes        SLR                        Ther Activity                        Gait Training        With use of SPC  Education on weaning away from  and transition to [facility]   Encouraged symmetrical weight shifting and heel strike R LE               Modalities        CP prn

## 2021-04-26 ENCOUNTER — OFFICE VISIT (OUTPATIENT)
Dept: PHYSICAL THERAPY | Facility: HOME HEALTHCARE | Age: 66
End: 2021-04-26
Payer: COMMERCIAL

## 2021-04-26 DIAGNOSIS — S82.101A CLOSED FRACTURE OF PROXIMAL END OF RIGHT TIBIA, UNSPECIFIED FRACTURE MORPHOLOGY, INITIAL ENCOUNTER: Primary | ICD-10-CM

## 2021-04-26 PROCEDURE — 97140 MANUAL THERAPY 1/> REGIONS: CPT

## 2021-04-26 PROCEDURE — 97110 THERAPEUTIC EXERCISES: CPT

## 2021-04-26 NOTE — PROGRESS NOTES
Daily Note     Today's date: 2021  Patient name: Sharlotte Castleman  : 1955  MRN: [id number]  Referring provider: Alfredo Fong DO  Dx:   Encounter Diagnosis     ICD-10-CM    1  Closed fracture of proximal end of right tibia, unspecified fracture morphology, initial encounter  S82 101A               Subjective: Pt reports she is having increased pain today and isn't sure why  Pt reports 7/10 pain R side of her LB and down R LE  Objective: See treatment diary below      Assessment: Tolerated treatment fair  Verbal cues needed t/o exercise to perform correctly  No increased pain reported t/o session  ROM and strength deficits noted R LE  Patient would benefit from continued PT      Plan: Continue per plan of care          Re-eval Date: 6/3/21    Date 21      Visit Count 1 2      FOTO Completed             Precautions: WBAT        Manuals 21      R knee flex/ext  8 min                               Neuro Re-Ed         Balance as appropriate                                 Ther Ex        NuStep  L1 10 minutes L 1  10 min       HR/TR  1 x 15 ea       Standing hip flex/abd/ext srikanth   1 x 10 ea Srikanth       Squats        Step-ups         Heel slides  1 x 15       QS  5" 1 x 15       SAQ   > LAQ  5" 1 x 15   5" 1 x 15       Hip add  5" 1 x 15       Hip abd        Ankle tband: all planes  Yellow  1 x 10 ea dir      SLR  1 x 15                       Ther Activity                        Gait Training        With use of SPC  Education on weaning away from [address] [place] [address] and transition to [facility]   Encouraged symmetrical weight shifting and heel strike R LE               Modalities        CP prn   Pt declined

## 2021-04-28 ENCOUNTER — HOSPITAL ENCOUNTER (EMERGENCY)
Facility: HOSPITAL | Age: 66
Discharge: HOME | End: 2021-04-28
Attending: EMERGENCY MEDICINE
Payer: COMMERCIAL

## 2021-04-28 ENCOUNTER — TELEPHONE (OUTPATIENT)
Dept: OBGYN CLINIC | Facility: HOSPITAL | Age: 66
End: 2021-04-28

## 2021-04-28 ENCOUNTER — APPOINTMENT (EMERGENCY)
Dept: RADIOLOGY | Facility: HOSPITAL | Age: 66
End: 2021-04-28
Payer: COMMERCIAL

## 2021-04-28 ENCOUNTER — TELEPHONE (OUTPATIENT)
Dept: PHYSICAL THERAPY | Facility: OTHER | Age: 66
End: 2021-04-28

## 2021-04-28 ENCOUNTER — APPOINTMENT (OUTPATIENT)
Dept: PHYSICAL THERAPY | Facility: HOME HEALTHCARE | Age: 66
End: 2021-04-28
Payer: COMMERCIAL

## 2021-04-28 VITALS
HEIGHT: 69 IN | HEART RATE: 64 BPM | RESPIRATION RATE: 18 BRPM | SYSTOLIC BLOOD PRESSURE: 148 MMHG | OXYGEN SATURATION: 98 % | WEIGHT: 255 LBS | BODY MASS INDEX: 37.77 KG/M2 | TEMPERATURE: 97.5 F | DIASTOLIC BLOOD PRESSURE: 67 MMHG

## 2021-04-28 DIAGNOSIS — M79.18 PAIN IN RIGHT BUTTOCK: Primary | ICD-10-CM

## 2021-04-28 DIAGNOSIS — M53.3 CHRONIC RIGHT SI JOINT PAIN: ICD-10-CM

## 2021-04-28 DIAGNOSIS — G89.29 CHRONIC RIGHT SI JOINT PAIN: ICD-10-CM

## 2021-04-28 PROCEDURE — 96372 THER/PROPH/DIAG INJ SC/IM: CPT

## 2021-04-28 PROCEDURE — 99283 EMERGENCY DEPT VISIT LOW MDM: CPT

## 2021-04-28 PROCEDURE — 73502 X-RAY EXAM HIP UNI 2-3 VIEWS: CPT

## 2021-04-28 PROCEDURE — 99284 EMERGENCY DEPT VISIT MOD MDM: CPT | Performed by: EMERGENCY MEDICINE

## 2021-04-28 RX ORDER — LIDOCAINE 50 MG/G
1 PATCH TOPICAL ONCE
Status: DISCONTINUED | OUTPATIENT
Start: 2021-04-28 | End: 2021-04-28 | Stop reason: HOSPADM

## 2021-04-28 RX ORDER — ACETAMINOPHEN 325 MG/1
650 TABLET ORAL ONCE
Status: COMPLETED | OUTPATIENT
Start: 2021-04-28 | End: 2021-04-28

## 2021-04-28 RX ORDER — KETOROLAC TROMETHAMINE 30 MG/ML
15 INJECTION, SOLUTION INTRAMUSCULAR; INTRAVENOUS ONCE
Status: COMPLETED | OUTPATIENT
Start: 2021-04-28 | End: 2021-04-28

## 2021-04-28 RX ORDER — METHOCARBAMOL 500 MG/1
500 TABLET, FILM COATED ORAL 2 TIMES DAILY
Qty: 20 TABLET | Refills: 0 | Status: SHIPPED | OUTPATIENT
Start: 2021-04-28 | End: 2021-12-02

## 2021-04-28 RX ORDER — METHOCARBAMOL 500 MG/1
500 TABLET, FILM COATED ORAL ONCE
Status: COMPLETED | OUTPATIENT
Start: 2021-04-28 | End: 2021-04-28

## 2021-04-28 RX ADMIN — ACETAMINOPHEN 650 MG: 325 TABLET, FILM COATED ORAL at 12:56

## 2021-04-28 RX ADMIN — METHOCARBAMOL 500 MG: 500 TABLET ORAL at 12:58

## 2021-04-28 RX ADMIN — KETOROLAC TROMETHAMINE 15 MG: 30 INJECTION, SOLUTION INTRAMUSCULAR at 12:56

## 2021-04-28 RX ADMIN — LIDOCAINE 5% 1 PATCH: 700 PATCH TOPICAL at 12:58

## 2021-04-28 NOTE — ED NOTES
Daughter at bedside     Alan Miller, [facility]  04/28/21 5026

## 2021-04-28 NOTE — TELEPHONE ENCOUNTER
Nurse reached out to discuss recent referral entered for  [facility] program and offerings  Patient declined to participate in the program at this time  Patient would like to mention complaints to current therapist and is seeing Ortho  Nurse confirmed patient has [facility] contact information and encouraged to call program back if needed in the future  Patient agreed and very appreciative of call and discussion  Nurse wished her well and referral closed per protocol

## 2021-04-28 NOTE — TELEPHONE ENCOUNTER
BMP/PT/PTT/EKG/HCG/covid negative Patient sees Dr Ansari    Patient called in this morning to let us know she is in a lot of R Hip pain and she is going to the ER this morning

## 2021-04-29 ENCOUNTER — OFFICE VISIT (OUTPATIENT)
Dept: PHYSICAL THERAPY | Facility: HOME HEALTHCARE | Age: 66
End: 2021-04-29
Payer: COMMERCIAL

## 2021-04-29 DIAGNOSIS — S82.101A CLOSED FRACTURE OF PROXIMAL END OF RIGHT TIBIA, UNSPECIFIED FRACTURE MORPHOLOGY, INITIAL ENCOUNTER: Primary | ICD-10-CM

## 2021-04-29 PROCEDURE — 97110 THERAPEUTIC EXERCISES: CPT

## 2021-04-29 PROCEDURE — 97140 MANUAL THERAPY 1/> REGIONS: CPT

## 2021-04-29 NOTE — PROGRESS NOTES
Daily Note     Today's date: 2021  Patient name: Maty Macedo  : 1955  MRN: [id number]  Referring provider: Lelia Garcia DO  Dx:   Encounter Diagnosis     ICD-10-CM    1  Closed fracture of proximal end of right tibia, unspecified fracture morphology, initial encounter  S82 101A        Start Time: 1345          Subjective: I went to the ED yesterday because of the pain  They gave me a shot of Toradol, a Lidocaine patch, Tylenol and muscle relaxer  I am still in a lot of pain  8/10  Objective: See treatment diary below    Assessment: Tolerated treatment fair  Pt  With c/o pain with all wt bearing and transfers  Pt denied pain in seated and supine positions  Pt reports relief of pain to 5/10 at end with CP in seated position  Patient would benefit from continued PT    Plan: Continue per plan of care          Re-eval Date: 6/3/21    Date 21     Visit Count 1 2      FOTO Completed             Precautions: WBAT        Manuals 21     R knee flex/ext  8 min  10'             Neuro Re-Ed         Balance as appropriate                         Ther Ex   21     NuStep  L1 10 minutes L 1  10 min  L 1   10'     HR/TR  1 x 15 ea  1 x 15 ea     Standing hip flex/abd/ext srikanth   1 x 10 ea Srikanth  1 x 10 ea Srikanth     Squats   Pain     Step-ups         Heel slides  1 x 15  1 x 15     QS  5" 1 x 15  5" 1 x 15     SAQ   > LAQ  5" 1 x 15   5" 1 x 15  5" 1 x 15  5" 1 x 15     Hip add  5" 1 x 15  5"  1 x 15     Hip abd        Ankle tband: all planes  Yellow  1 x 10 ea dir Yellow  1 x 10 ea     SLR  1 x 15  1 x 10                     Ther Activity                        Gait Training        With use of SPC  Education on weaning away from RW and transition to [facility]   Encouraged symmetrical weight shifting and heel strike R LE               Modalities        CP prn   Pt declined  Seated 10'

## 2021-04-30 ENCOUNTER — OFFICE VISIT (OUTPATIENT)
Dept: PHYSICAL THERAPY | Facility: HOME HEALTHCARE | Age: 66
End: 2021-04-30
Payer: COMMERCIAL

## 2021-04-30 DIAGNOSIS — S82.101A CLOSED FRACTURE OF PROXIMAL END OF RIGHT TIBIA, UNSPECIFIED FRACTURE MORPHOLOGY, INITIAL ENCOUNTER: Primary | ICD-10-CM

## 2021-04-30 PROCEDURE — 97140 MANUAL THERAPY 1/> REGIONS: CPT

## 2021-04-30 PROCEDURE — 97110 THERAPEUTIC EXERCISES: CPT

## 2021-04-30 NOTE — PROGRESS NOTES
Daily Note     Today's date: 2021  Patient name: Chuckie Chatman  : 1955  MRN: [id number]  Referring provider: Seth Abdi DO  Dx:   Encounter Diagnosis     ICD-10-CM    1  Closed fracture of proximal end of right tibia, unspecified fracture morphology, initial encounter  S82 101A               Subjective: Pt reports she has 4/10 pain in her R LB and hip  Objective: See treatment diary below      Assessment: Tolerated treatment fairly well  Verbal cues needed t/o exercise to perform correctly  Increased reps to twenty with all supine and seated exercises today  No increased pain reported t/o session  ROM and strength deficits noted R LE  Patient would benefit from continued PT      Plan: Continue per plan of care          Re-eval Date: 6/3/21    Date 21    Visit Count 1 2 3 4    FOTO Completed             Precautions: WBAT        Manuals 21    R knee flex/ext  8 min  10' 8 min             Neuro Re-Ed         Balance as appropriate                         Ther Ex   21     NuStep  L1 10 minutes L 1  10 min  L 1   10' L 1  10 min     HR/TR  1 x 15 ea  1 x 15 ea 1 x 15 ea     Standing hip flex/abd/ext srikanth   1 x 10 ea Srikanth  1 x 10 ea Srkianth 1 x 10 ea Srikanth    Squats   Pain     Step-ups         Heel slides  1 x 15  1 x 15 1 x 20     QS  5" 1 x 15  5" 1 x 15 5" 1 x 20     SAQ   > LAQ  5" 1 x 15   5" 1 x 15  5" 1 x 15  5" 1 x 15 5" 1 x 20   5" 1 x 20     Hip add  5" 1 x 15  5"  1 x 15 5" 1 x 20     Hip abd        Ankle tband: all planes  Yellow  1 x 10 ea dir Yellow  1 x 10 ea Yellow   1 x 20 ea     SLR  1 x 15  1 x 10 2 x 10                     Ther Activity                        Gait Training        With use of SPC  Education on weaning away from RW and transition to [address] [place] [address]   Encouraged symmetrical weight shifting and heel strike R LE               Modalities        CP prn   Pt declined  Seated 10' Seated 10 min

## 2021-04-30 NOTE — ED PROVIDER NOTES
History  Chief Complaint   Patient presents with    Hip Pain     pt reports right hip pain started sunday; worse with movement, states broke tibia a few months ago currently in rehab for it     HPI  50-year-old woman comes in for evaluation of right hip and buttock pain  Patient has history of right lower leg fracture that was non operative her her orthopedist   She has been following with physical therapy  She recently started physical therapy comments since that time, she has had pain to her right hip buttock and right lower back  She denies any bowel or bladder incontinence, denies any saddle anesthesia, denies any pain radiating down her legs, denies any paresthesias going down her legs  Denies any IV drug use, remote history of injections in her spine secondary to pain  Patient otherwise denies any fevers chills sweats, denies any cough congestion headaches neck pain  Denies any upper back pain  Denies any falls or recent trauma  Prior to Admission Medications   Prescriptions Last Dose Informant Patient Reported? Taking? Blood Glucose Monitoring Suppl (Leblanc Limb) w/Device KIT  Self Yes No   Sig: Use to check sugars once a day  Dx E11 9   Blood Glucose Monitoring Suppl (ONETOUCH VERIO) w/Device KIT  Self Yes No   Sig: daily Use to check glucose   Lancets (ONETOUCH DELICA PLUS [id number]) MISC  Self Yes No   Sig: USE 1 TO CHECK GLUCOSE ONCE DAILY   ONETOUCH VERIO test strip  Self Yes No   Sig: USE 1 STRIP TO CHECK GLUCOSE ONCE DAILY   OneTouch Delica Lancets 38M MISC  Self Yes No   Sig: Use to check sugars once a day   Dx E11 9   SYNTHROID 150 MCG tablet  Self Yes No   Sig: TAKE 1 TABLET BY MOUTH ONCE DAILY FIRST THING IN THE MORNING (AT LEAST 30 MINUTES PRIOR TO BREAKFAST OR OTHER MEDS)   albuterol (PROVENTIL HFA,VENTOLIN HFA) 90 mcg/act inhaler  Self No No   Sig: Inhale 2 puffs every 6 (six) hours as needed for wheezing or shortness of breath   apixaban (ELIQUIS) 5 mg  Self Yes No   Sig: Take 5 mg by mouth 2 (two) times a day    aspirin 81 mg chewable tablet  Self No No   Sig: Chew 1 tablet (81 mg total) daily   atorvastatin (LIPITOR) 10 mg tablet  Self Yes No   Sig: Take 10 mg by mouth daily   dicyclomine (BENTYL) 20 mg tablet  Self No No   Sig: Take 1 tablet (20 mg total) by mouth every 6 (six) hours as needed (urgency, abdominal pain)   dofetilide (TIKOSYN) 500 mcg capsule  Self Yes No   Sig: Take 500 mcg by mouth 2 (two) times a day   fenofibrate (TRICOR) 54 MG tablet  Self Yes No   Sig: Take 54 mg by mouth daily   furosemide (LASIX) 20 mg tablet  Self Yes No   Sig: Take 20 mg by mouth 2 (two) times a day   glipiZIDE (GLUCOTROL XL) 5 mg 24 hr tablet  Self Yes No   Sig: TAKE 1 TABLET BY MOUTH ONCE DAILY 30 MINUTES BEFORE A MEAL (REPLACES METFORMIN)   glucose blood test strip  Self Yes No   Sig: Use to check sugars once a day   Dx E11 9   mesalamine (DELZICOL) 400 mg   No No   Sig: Take 2 capsules by mouth twice daily   metoprolol succinate (TOPROL-XL) 100 mg 24 hr tablet  Self No No   Sig: Take 1 tablet (100 mg total) by mouth every 12 (twelve) hours   mometasone-formoterol (DULERA) 100-5 MCG/ACT inhaler  Self No No   Sig: Inhale 2 puffs 2 (two) times a day   omeprazole (PriLOSEC) 40 MG capsule   No No   Sig: Take 1 capsule (40 mg total) by mouth daily before breakfast   pramipexole (MIRAPEX) 0 5 mg tablet   No No   Sig: Take 1 tablet (0 5 mg total) by mouth daily at bedtime   venlafaxine (EFFEXOR XR) 37 5 mg 24 hr capsule  Self Yes No   Sig: Take 37 5 mg by mouth daily       Facility-Administered Medications: None       Past Medical History:   Diagnosis Date    A-fib ([place] [address] )     Brain aneurysm     Cancer ([place] [address] )     papillary thyroid cancer    Cardiac disease     CHF (congestive heart failure) (HCC)     Crohn disease ([place] [address] )     Diabetes mellitus ([place] [address] )     Disease of thyroid gland     GERD (gastroesophageal reflux disease)     Hyperlipidemia     Hypertension        Past Surgical History: Procedure Laterality Date    APPENDECTOMY      CARDIOVERSION N/A 2/14/2019    Procedure: CARDIOVERSION;  Surgeon: Jenni Ho MD;  Location: [facility];  Service: Cardiology    CEREBRAL ANEURYSM REPAIR      CHOLECYSTECTOMY      HYSTERECTOMY      THYROIDECTOMY      TONSILLECTOMY AND ADENOIDECTOMY         History reviewed  No pertinent family history  I have reviewed and agree with the history as documented  E-Cigarette/Vaping    E-Cigarette Use Never User      E-Cigarette/Vaping Substances     Social History     Tobacco Use    Smoking status: Former Smoker     Quit date: 1/1/2018     Years since quitting: 3 3    Smokeless tobacco: Never Used   Substance Use Topics    Alcohol use: Yes     Alcohol/week: 0 0 standard drinks     Frequency: Monthly or less     Comment: social    Drug use: Never       Review of Systems   Constitutional: Negative  Negative for chills and fever  HENT: Negative  Negative for congestion and sore throat  Eyes: Negative  Negative for discharge and redness  Respiratory: Negative  Negative for chest tightness and shortness of breath  Cardiovascular: Negative  Negative for chest pain and palpitations  Gastrointestinal: Negative  Negative for abdominal pain, nausea and vomiting  Endocrine: Negative  Negative for cold intolerance and polyphagia  Genitourinary: Negative  Negative for difficulty urinating and dysuria  Musculoskeletal: Positive for back pain  Negative for arthralgias  Right hip pain right buttock pain   Skin: Negative  Negative for color change and wound  Allergic/Immunologic: Negative  Negative for environmental allergies  Neurological: Negative  Negative for dizziness, weakness and headaches  Hematological: Negative  Psychiatric/Behavioral: Negative  Negative for behavioral problems  The patient is not nervous/anxious  All other systems reviewed and are negative        Physical Exam  Physical Exam  Vitals signs and nursing note reviewed  Constitutional:       General: She is not in acute distress  Appearance: Normal appearance  She is well-developed  She is not diaphoretic  HENT:      Head: Normocephalic and atraumatic  Right Ear: External ear normal       Left Ear: External ear normal       Nose: Nose normal  No congestion or rhinorrhea  Mouth/Throat:      Mouth: Mucous membranes are moist    Eyes:      General: No scleral icterus  Right eye: No discharge  Left eye: No discharge  Conjunctiva/sclera: Conjunctivae normal       Pupils: Pupils are equal, round, and reactive to light  Neck:      Musculoskeletal: Normal range of motion and neck supple  No neck rigidity or muscular tenderness  Thyroid: No thyromegaly  Trachea: No tracheal deviation  Cardiovascular:      Rate and Rhythm: Regular rhythm  Pulses: Normal pulses  Heart sounds: Normal heart sounds  No murmur  No friction rub  No gallop  Pulmonary:      Effort: Pulmonary effort is normal  No respiratory distress  Breath sounds: Normal breath sounds  No stridor  No wheezing, rhonchi or rales  Abdominal:      General: Abdomen is flat  Bowel sounds are normal  There is no distension  Palpations: Abdomen is soft  Tenderness: There is no abdominal tenderness  There is no guarding or rebound  Musculoskeletal:         General: Swelling and tenderness present  No deformity or signs of injury  Comments: Patient does have swelling to the right knee which she states is normal   She does have decreased range of motion of the right knee secondary to her previous fracture  She has no tenderness over the right greater trochanter, she does have mild tenderness palpation the right buttock, she is point tender over the right SI joint  Skin:     General: Skin is warm and dry  Capillary Refill: Capillary refill takes less than 2 seconds  Findings: No erythema, lesion or rash     Neurological: General: No focal deficit present  Mental Status: She is alert and oriented to person, place, and time  Cranial Nerves: No cranial nerve deficit  Sensory: No sensory deficit  Psychiatric:         Mood and Affect: Mood normal          Behavior: Behavior normal          Thought Content: Thought content normal          Vital Signs  ED Triage Vitals [04/28/21 1217]   Temperature Pulse Respirations Blood Pressure SpO2   97 5 °F (36 4 °C) 64 18 148/67 98 %      Temp src Heart Rate Source Patient Position - Orthostatic VS BP Location FiO2 (%)   -- Monitor Sitting Right arm --      Pain Score       8           Vitals:    04/28/21 1217   BP: 148/67   Pulse: 64   Patient Position - Orthostatic VS: Sitting         Visual Acuity      ED Medications  Medications   acetaminophen (TYLENOL) tablet 650 mg (650 mg Oral Given 4/28/21 1256)   ketorolac (TORADOL) injection 15 mg (15 mg Intramuscular Given 4/28/21 1256)   methocarbamol (ROBAXIN) tablet 500 mg (500 mg Oral Given 4/28/21 1258)       Diagnostic Studies  Results Reviewed     None                 XR hip/pelv 2-3 vws right   ED Interpretation by Angelica Slade MD (04/28 1342)   No fracture        Final Result by Daniele Rodas MD (04/28 1554)      No acute osseous abnormality  Workstation performed: [id number]                    Procedures  Procedures         ED Course                             SBIRT 20yo+      Most Recent Value   SBIRT (24 yo +)   In order to provide better care to our patients, we are screening all of our patients for alcohol and drug use  Would it be okay to ask you these screening questions? No Filed at: 04/28/2021 1351                    MDM  Number of Diagnoses or Management Options  Chronic right SI joint pain:   Pain in right buttock:   Diagnosis management comments: Patient comes in with pain over the right SI joint as well as pain in the right buttock    No red flags on history and physical   Will get x-rays of her right hip and pelvis  Will treat for sciatic up  Will [facility] patient to [facility]  Disposition  Final diagnoses:   Pain in right buttock   Chronic right SI joint pain     Time reflects when diagnosis was documented in both MDM as applicable and the Disposition within this note     Time User Action Codes Description Comment    4/28/2021  1:38 PM Phylliss Stammer Add [W08 97] Pain in right buttock     4/28/2021  1:38 PM Phylliss Stammer Add [M53 3,  G89 29] Chronic right SI joint pain       ED Disposition     ED Disposition Condition Date/Time Comment    Discharge Stable Wed Apr 28, 2021  1:37 PM Maty Macedo discharge to home/self care              Follow-up Information     Follow up With Specialties Details Why Contact Info Additional Information    Mary Rojas MD Family Medicine Call in 1 day follow up being seen in the emergency department [address]       [facility] Emergency Department Emergency Medicine Go to  As needed, If symptoms worsen [place] [phone]  [facility] Emergency Department, [address], [address]          Discharge Medication List as of 4/28/2021  1:39 PM      START taking these medications    Details   methocarbamol (ROBAXIN) 500 mg tablet Take 1 tablet (500 mg total) by mouth 2 (two) times a day, Starting Wed 4/28/2021, Normal         CONTINUE these medications which have NOT CHANGED    Details   albuterol (PROVENTIL HFA,VENTOLIN HFA) 90 mcg/act inhaler Inhale 2 puffs every 6 (six) hours as needed for wheezing or shortness of breath, Starting Sat 2/29/2020, No Print      apixaban (ELIQUIS) 5 mg Take 5 mg by mouth 2 (two) times a day , Starting Tue 2/5/2019, Historical Med      aspirin 81 mg chewable tablet Chew 1 tablet (81 mg total) daily, Starting Sat 2/29/2020, No Print      atorvastatin (LIPITOR) 10 mg tablet Take 10 mg by mouth daily, Starting Tue 11/10/2020, Historical Med      !! Blood Glucose Monitoring Suppl (ONETOUCH VERIO) w/Device KIT Use to check sugars once a day  Dx E11 9, Historical Med      !! Blood Glucose Monitoring Suppl (Santiago Lopes) w/Device KIT daily Use to check glucose, Starting Tue 1/28/2020, Historical Med      dicyclomine (BENTYL) 20 mg tablet Take 1 tablet (20 mg total) by mouth every 6 (six) hours as needed (urgency, abdominal pain), Starting Tue 9/29/2020, Normal      dofetilide (TIKOSYN) 500 mcg capsule Take 500 mcg by mouth 2 (two) times a day, Historical Med      fenofibrate (TRICOR) 54 MG tablet Take 54 mg by mouth daily, Historical Med      furosemide (LASIX) 20 mg tablet Take 20 mg by mouth 2 (two) times a day, Starting Wed 6/10/2020, Historical Med      glipiZIDE (GLUCOTROL XL) 5 mg 24 hr tablet TAKE 1 TABLET BY MOUTH ONCE DAILY 30 MINUTES BEFORE A MEAL (REPLACES METFORMIN), Historical Med      !! glucose blood test strip Use to check sugars once a day  Dx E11 9, Historical Med      !! Lancets (ONETOUCH DELICA PLUS [id number]) MISC USE 1 TO CHECK GLUCOSE ONCE DAILY, Historical Med      mesalamine (DELZICOL) 400 mg Take 2 capsules by mouth twice daily, Normal      metoprolol succinate (TOPROL-XL) 100 mg 24 hr tablet Take 1 tablet (100 mg total) by mouth every 12 (twelve) hours, Starting Thu 2/14/2019, Normal      mometasone-formoterol (DULERA) 100-5 MCG/ACT inhaler Inhale 2 puffs 2 (two) times a day, Starting Sat 2/29/2020, No Print      omeprazole (PriLOSEC) 40 MG capsule Take 1 capsule (40 mg total) by mouth daily before breakfast, Starting Tue 11/24/2020, Until Thu 12/24/2020, Normal      !! OneTouch Delica Lancets 18Y MISC Use to check sugars once a day   Dx E11 9, Historical Med      !! ONETOUCH VERIO test strip USE 1 STRIP TO CHECK GLUCOSE ONCE DAILY, Historical Med      pramipexole (MIRAPEX) 0 5 mg tablet Take 1 tablet (0 5 mg total) by mouth daily at bedtime, Starting Mon 1/18/2021, Normal SYNTHROID 150 MCG tablet TAKE 1 TABLET BY MOUTH ONCE DAILY FIRST THING IN THE MORNING (AT LEAST 30 MINUTES PRIOR TO BREAKFAST OR OTHER MEDS), Historical Med      venlafaxine (EFFEXOR XR) 37 5 mg 24 hr capsule Take 37 5 mg by mouth daily , Starting Mon 12/31/2018, Historical Med       !! - Potential duplicate medications found  Please discuss with provider              PDMP Review       Value Time User    PDMP Reviewed  Yes 2/25/2020  6:47 PM Belvie Holter, MD          ED Provider  Electronically Signed by           Anita Garcia MD  04/30/21 0080

## 2021-05-03 ENCOUNTER — OFFICE VISIT (OUTPATIENT)
Dept: PHYSICAL THERAPY | Facility: HOME HEALTHCARE | Age: 66
End: 2021-05-03
Payer: COMMERCIAL

## 2021-05-03 DIAGNOSIS — S82.101A CLOSED FRACTURE OF PROXIMAL END OF RIGHT TIBIA, UNSPECIFIED FRACTURE MORPHOLOGY, INITIAL ENCOUNTER: Primary | ICD-10-CM

## 2021-05-03 PROCEDURE — 97112 NEUROMUSCULAR REEDUCATION: CPT

## 2021-05-03 PROCEDURE — 97110 THERAPEUTIC EXERCISES: CPT

## 2021-05-03 PROCEDURE — 97140 MANUAL THERAPY 1/> REGIONS: CPT

## 2021-05-03 NOTE — PROGRESS NOTES
Daily Note     Today's date: 5/3/2021  Patient name: Walter Brantley  : 1955  MRN: [id number]  Referring provider: Sree Cunningham DO  Dx:   Encounter Diagnosis     ICD-10-CM    1  Closed fracture of proximal end of right tibia, unspecified fracture morphology, initial encounter  S82 101A               Subjective: Pt reports she has some pain in her R hip  Objective: See treatment diary below      Assessment: Tolerated treatment fairly well  Verbal cues needed t/o exercise to perform correctly  Progressed to  Romberg on foam, bosu lunges and step ups this session  No increased pain reported t/o session  Patient would benefit from continued PT      Plan: Continue per plan of care          Re-eval Date: 6/3/21    Date 4/22 4/26/21 4-29-21 4/30/21 5/3/21   Visit Count 1 2 3 4 5   FOTO Completed             Precautions: WBAT        Manuals 4/22 4/26/21 4-29-21 4/30/21 5/3/21   R knee flex/ext  8 min  10' 8 min  8 min            Neuro Re-Ed         Balance as appropriate      Bosu lunges  1 x 15         Romberg on foam 20" x 3           Ther Ex   21     NuStep  L1 10 minutes L 1  10 min  L 1   10' L 1  10 min  L1  10 min    HR/TR  1 x 15 ea  1 x 15 ea 1 x 15 ea  1 x 15 ea    Standing hip flex/abd/ext srikanth   1 x 10 ea Srikanth  1 x 10 ea Srikanth 1 x 10 ea Srikanth 1 x 15 ea Srikanth    Squats   Pain     Step-ups      B Fwd 1 x 15    Heel slides  1 x 15  1 x 15 1 x 20  1 x 20    QS  5" 1 x 15  5" 1 x 15 5" 1 x 20  5" 1 x 20    SAQ   > LAQ  5" 1 x 15   5" 1 x 15  5" 1 x 15  5" 1 x 15 5" 1 x 20   5" 1 x 20  5"  1 x 20  5" 1 x 20    Hip add  5" 1 x 15  5"  1 x 15 5" 1 x 20  5"  1 x 20    Hip abd        Ankle tband: all planes  Yellow  1 x 10 ea dir Yellow  1 x 10 ea Yellow   1 x 20 ea  Yellow   1 x 20 ea    SLR  1 x 15  1 x 10 2 x 10  2 x 10                    Ther Activity                        Gait Training        With use of SPC  Education on weaning away from  and transition to [facility]   Encouraged symmetrical weight shifting and heel strike R LE               Modalities        CP prn   Pt declined  Seated 10' Seated 10 min  Seated 10 min

## 2021-05-05 ENCOUNTER — OFFICE VISIT (OUTPATIENT)
Dept: PHYSICAL THERAPY | Facility: HOME HEALTHCARE | Age: 66
End: 2021-05-05
Payer: COMMERCIAL

## 2021-05-05 DIAGNOSIS — S82.101A CLOSED FRACTURE OF PROXIMAL END OF RIGHT TIBIA, UNSPECIFIED FRACTURE MORPHOLOGY, INITIAL ENCOUNTER: Primary | ICD-10-CM

## 2021-05-05 PROCEDURE — 97140 MANUAL THERAPY 1/> REGIONS: CPT

## 2021-05-05 PROCEDURE — 97110 THERAPEUTIC EXERCISES: CPT

## 2021-05-05 PROCEDURE — 97112 NEUROMUSCULAR REEDUCATION: CPT

## 2021-05-05 NOTE — PROGRESS NOTES
Daily Note     Today's date: 2021  Patient name: Zan Suarez  : 1955  MRN: [id number]  Referring provider: Rickie Siemens, DO  Dx: No diagnosis found  Start Time: 1300          Subjective: I have a little pain at my hip, but its not bad  I have been using the Lidocaine patches and they seem to help  Objective: See treatment diary below    Assessment: Tolerated treatment well  Challenged pt with addition of Tandem stance on foam with balance deficits noted but able to self correct  Pt continues to rely on [facility] during ambulation  VC's needed for heel/toe pattern  Pt also with lack of TKE during ambulation  Patient would benefit from continued PT    Plan: Continue per plan of care   Add t-band TKE NV       Re-eval Date: 6/3/21    Date 5-5-21 4/26/21 4-29-21 4/30/21 5/3/21   Visit Count 6 2 3 4 5   FOTO              Precautions: WBAT        Manuals 5-5-21 4/26/21 4-29-21 4/30/21 5/3/21   R knee flex/ext 8' 8 min  10' 8 min  8 min            Neuro Re-Ed         Balance as appropriate  Bosu lunge  1 x 15    Bosu lunges  1 x 15     Romberg on foam 20" x 3    Romberg on foam 20" x 3    Tandem stance   15" x 1  ea       Ther Ex 21     NuStep  L1 10' L 1  10 min  L 1   10' L 1  10 min  L1  10 min    HR/TR 1 x 20 1 x 15 ea  1 x 15 ea 1 x 15 ea  1 x 15 ea    Standing hip flex/abd/ext srikanth  1 x 15 ea srikanth 1 x 10 ea Srikanth  1 x 10 ea Srikanth 1 x 10 ea Srikanth 1 x 15 ea Srikanth    Squats   Pain     Step-ups  C fwd 1 x 10    B Fwd 1 x 15    T-band TKE NV       Heel slides 1 x 20 1 x 15  1 x 15 1 x 20  1 x 20    QS 5" 1 x 20 5" 1 x 15  5" 1 x 15 5" 1 x 20  5" 1 x 20    SAQ   > LAQ 5" 1 x 20  5" 1 x 20 5" 1 x 15   5" 1 x 15  5" 1 x 15  5" 1 x 15 5" 1 x 20   5" 1 x 20  5"  1 x 20  5" 1 x 20    Hip add 5" 1 x 20 5" 1 x 15  5"  1 x 15 5" 1 x 20  5"  1 x 20    Hip abd        Ankle tband: all planes Yellow  1 x 20 ea Yellow  1 x 10 ea dir Yellow  1 x 10 ea Yellow   1 x 20 ea  Yellow   1 x 20 ea    SLR 2 x 10 1 x 15  1 x 10 2 x 10  2 x 10                    Ther Activity                        Gait Training        With use of SPC  Education on weaning away from [address] [place] [address] and transition to [facility]   Encouraged symmetrical weight shifting and heel strike R LE               Modalities        CP prn  Declined Pt declined  Seated 10' Seated 10 min  Seated 10 min

## 2021-05-10 ENCOUNTER — OFFICE VISIT (OUTPATIENT)
Dept: PHYSICAL THERAPY | Facility: HOME HEALTHCARE | Age: 66
End: 2021-05-10
Payer: COMMERCIAL

## 2021-05-10 DIAGNOSIS — S82.101A CLOSED FRACTURE OF PROXIMAL END OF RIGHT TIBIA, UNSPECIFIED FRACTURE MORPHOLOGY, INITIAL ENCOUNTER: Primary | ICD-10-CM

## 2021-05-10 PROCEDURE — 97110 THERAPEUTIC EXERCISES: CPT

## 2021-05-10 PROCEDURE — 97140 MANUAL THERAPY 1/> REGIONS: CPT

## 2021-05-10 PROCEDURE — 97112 NEUROMUSCULAR REEDUCATION: CPT

## 2021-05-10 NOTE — PROGRESS NOTES
Daily Note     Today's date: 5/10/2021  Patient name: Luiz Johnson  : 1955  MRN: [id number]  Referring provider: Avery Tim DO  Dx:   Encounter Diagnosis     ICD-10-CM    1  Closed fracture of proximal end of right tibia, unspecified fracture morphology, initial encounter  S82 101A                   Subjective: My knee is feeling pretty good today  Objective: See treatment diary below    Assessment: Tolerated treatment well  Pt with average pain of 2/10 t/o session  Pt was able to nathan added T-band TKE with good nathan  Pt reports using SPC less at home  Pt states good understanding of HEP  Patient would benefit from continued PT    Plan: Continue per plan of care          Re-eval Date: 6/3/21    Date 5-5-21 5-10-21 4-29-21 4/30/21 5/3/21   Visit Count 6 7 3 4 5   FOTO              Precautions: WBAT        Manuals 5-5-21 5-10-21 4-29-21 4/30/21 5/3/21   R knee flex/ext 8' 8 min  10' 8 min  8 min            Neuro Re-Ed         Balance as appropriate  Bosu lunge  1 x 15 Bosu lunge  1 x 15   Bosu lunges  1 x 15     Romberg on foam 20" x 3 Rhomberg  Foam 20" x 3   Romberg on foam 20" x 3    Tandem stance   15" x 1  ea Tandem  Stance foam  15" x 2 ea      Ther Ex 5-5-21 5-10-21 4-29-21     NuStep  L1 10' L 1  10 min  L 1   10' L 1  10 min  L1  10 min    HR/TR 1 x 20 1 x 15 ea   foam 1 x 15 ea 1 x 15 ea  1 x 15 ea    Standing hip flex/abd/ext mayo  1 x 15 ea mayo 1 x 15 ea Mayo  1 x 10 ea Mayo 1 x 10 ea Mayo 1 x 15 ea Mayo    Squats   Pain     Step-ups  C fwd 1 x 10 C fwd 1 x 10   B Fwd 1 x 15    T-band TKE NV Red 1 x 15      Heel slides 1 x 20 1 x 20  1 x 15 1 x 20  1 x 20    QS 5" 1 x 20 5" 1 x 15  5" 1 x 15 5" 1 x 20  5" 1 x 20    SAQ   > LAQ 5" 1 x 20  5" 1 x 20 5" 1 x 15   5" 1 x 20  5" 1 x 15  5" 1 x 15 5" 1 x 20   5" 1 x 20  5"  1 x 20  5" 1 x 20    Hip add 5" 1 x 20 5" 1 x 15  5"  1 x 15 5" 1 x 20  5"  1 x 20    Hip abd        Ankle tband: all planes Yellow  1 x 20 ea Yellow  1 x 10 ea dir Yellow  1 x 10 ea Yellow   1 x 20 ea  Yellow   1 x 20 ea    SLR 2 x 10 1 x 15  1 x 10 2 x 10  2 x 10                    Ther Activity                        Gait Training        With use of SPC  Education on weaning away from [address] [place] [address] and transition to [facility]   Encouraged symmetrical weight shifting and heel strike R LE               Modalities        CP prn  Declined Pt declined  Seated 10' Seated 10 min  Seated 10 min

## 2021-05-12 ENCOUNTER — OFFICE VISIT (OUTPATIENT)
Dept: PHYSICAL THERAPY | Facility: HOME HEALTHCARE | Age: 66
End: 2021-05-12
Payer: COMMERCIAL

## 2021-05-12 ENCOUNTER — OFFICE VISIT (OUTPATIENT)
Dept: GASTROENTEROLOGY | Facility: CLINIC | Age: 66
End: 2021-05-12
Payer: COMMERCIAL

## 2021-05-12 VITALS
SYSTOLIC BLOOD PRESSURE: 159 MMHG | DIASTOLIC BLOOD PRESSURE: 62 MMHG | HEIGHT: 69 IN | TEMPERATURE: 97.9 F | HEART RATE: 69 BPM | BODY MASS INDEX: 37.36 KG/M2 | WEIGHT: 252.2 LBS

## 2021-05-12 DIAGNOSIS — K63.5 POLYP OF COLON, UNSPECIFIED PART OF COLON, UNSPECIFIED TYPE: ICD-10-CM

## 2021-05-12 DIAGNOSIS — K76.0 HEPATIC STEATOSIS: ICD-10-CM

## 2021-05-12 DIAGNOSIS — K50.919 CROHN'S DISEASE WITH COMPLICATION, UNSPECIFIED GASTROINTESTINAL TRACT LOCATION (HCC): Primary | ICD-10-CM

## 2021-05-12 DIAGNOSIS — R19.7 DIARRHEA, UNSPECIFIED TYPE: ICD-10-CM

## 2021-05-12 DIAGNOSIS — S82.101A CLOSED FRACTURE OF PROXIMAL END OF RIGHT TIBIA, UNSPECIFIED FRACTURE MORPHOLOGY, INITIAL ENCOUNTER: Primary | ICD-10-CM

## 2021-05-12 DIAGNOSIS — K21.9 GASTROESOPHAGEAL REFLUX DISEASE, UNSPECIFIED WHETHER ESOPHAGITIS PRESENT: ICD-10-CM

## 2021-05-12 PROBLEM — K62.5 RECTAL BLEEDING: Status: RESOLVED | Noted: 2020-02-25 | Resolved: 2021-05-12

## 2021-05-12 PROCEDURE — 97110 THERAPEUTIC EXERCISES: CPT | Performed by: PHYSICAL THERAPIST

## 2021-05-12 PROCEDURE — 99214 OFFICE O/P EST MOD 30 MIN: CPT | Performed by: INTERNAL MEDICINE

## 2021-05-12 PROCEDURE — 97112 NEUROMUSCULAR REEDUCATION: CPT | Performed by: PHYSICAL THERAPIST

## 2021-05-12 PROCEDURE — 97116 GAIT TRAINING THERAPY: CPT | Performed by: PHYSICAL THERAPIST

## 2021-05-12 RX ORDER — TRAMADOL HYDROCHLORIDE 50 MG/1
50 TABLET ORAL EVERY 8 HOURS PRN
COMMUNITY
Start: 2021-02-26 | End: 2022-06-23

## 2021-05-12 RX ORDER — TRAMADOL HYDROCHLORIDE 50 MG/1
50 TABLET ORAL
COMMUNITY
Start: 2021-02-24 | End: 2022-06-23

## 2021-05-12 NOTE — PROGRESS NOTES
Daily Note     Today's date: 2021  Patient name: Chuckie Chatman  : 1955  MRN: [id number]  Referring provider: Seth Abdi DO  Dx:   Encounter Diagnosis     ICD-10-CM    1  Closed fracture of proximal end of right tibia, unspecified fracture morphology, initial encounter  S82 101A                   Subjective: Pt states that she is feeling better but wants to get rid of the cane now  Objective: See treatment diary below      Assessment: Pt tolerable to significant progression of TE this date  She continues to show increased stance time on L LE; metronome 80 bpm used to help improved symmetrical gait pattern with good improvements observed with feedback  Continue with focus on restoring typical gait pattern  Plan: Continue per plan of care          Re-eval Date: 6/3/21    Date 5-5-21 5-10-21 5/12     Visit Count 6 7 8     FOTO              Precautions: WBAT        Manuals 5-5-21 5-10-21 5/12     R knee flex/ext 8' 8 min  DC             Neuro Re-Ed         Balance as appropriate  Bosu lunge  1 x 15 Bosu lunge  1 x 15       Romberg on foam 20" x 3 Rhomberg  Foam 20" x 3 Romberg   Foam   30" x 1 EO  30" x 2 EC       Tandem stance   15" x 1  ea Tandem  Stance foam  15" x 2 ea Tandem   Foam EO   15" x 2 ea         SLS   10" x 1 EO              Ther Ex 5-5-21 5-10-21      NuStep  L1 10' L 1  10 min  L3  10 minutes      HR/TR 1 x 20 1 x 15 ea   foam 1 x 15 ea   Foam      Standing hip flex/abd/ext srikanth  1 x 15 ea srikanth 1 x 15 ea Srikanth  1 x 15 ea L LE only      Squats        Step-ups  C fwd 1 x 10 C fwd 1 x 10 C Fwd   1 x 15      T-band TKE NV Red 1 x 15      SAQ   > LAQ 5" 1 x 20  5" 1 x 20 5" 1 x 15   5" 1 x 20  LAQ   5" x 20      Hip add 5" 1 x 20 5" 1 x 15  5" x 10      Hip abd        Ankle tband: all planes Yellow  1 x 20 ea Yellow  1 x 10 ea dir Red In/Ev x 10   Green DF/PF x 10      SLR 2 x 10 1 x 15  2 x 10      S/L SLR    2 x 10     Bridge    2 x 10                                      Ther Activity                        Gait Training        With use of SPC  Education on weaning away from [address] [place] [address] and transition to [facility]   Encouraged symmetrical weight shifting and heel strike R LE  Without use of AD   Metronome for improved symmetricalweight shifting   80 bpm   250 feet x 2  with S              Modalities        CP prn  Declined Pt declined  Declined

## 2021-05-14 ENCOUNTER — OFFICE VISIT (OUTPATIENT)
Dept: PHYSICAL THERAPY | Facility: HOME HEALTHCARE | Age: 66
End: 2021-05-14
Payer: COMMERCIAL

## 2021-05-14 DIAGNOSIS — S82.101A CLOSED FRACTURE OF PROXIMAL END OF RIGHT TIBIA, UNSPECIFIED FRACTURE MORPHOLOGY, INITIAL ENCOUNTER: Primary | ICD-10-CM

## 2021-05-14 PROCEDURE — 97110 THERAPEUTIC EXERCISES: CPT

## 2021-05-14 PROCEDURE — 97112 NEUROMUSCULAR REEDUCATION: CPT

## 2021-05-14 PROCEDURE — 97116 GAIT TRAINING THERAPY: CPT

## 2021-05-14 NOTE — PROGRESS NOTES
Daily Note     Today's date: 2021  Patient name: Kobi Simpson  : 1955  MRN: [id number]  Referring provider: Annie Essex, DO  Dx:   Encounter Diagnosis     ICD-10-CM    1  Closed fracture of proximal end of right tibia, unspecified fracture morphology, initial encounter  S82 101A                   Subjective: Pt reports she is stiff all over today  Objective: See treatment diary below      Assessment: Tolerated treatment well  Verbal cues needed t/o exercise to perform correctly and for correct gait sequence and weight shifting with gait training  No increased pain reported t/o session  Pt challenged with balance activities on foam  Patient would benefit from continued PT  Plan: Continue per plan of care          Re-eval Date: 6/3/21    Date 5-5-21 5-10-21 5/12 5/14/21    Visit Count 6 7 8 9    FOTO              Precautions: WBAT        Manuals 5-5-21 5-10-21 5/12 5/14/21    R knee flex/ext 8' 8 min  DC DC            Neuro Re-Ed         Balance as appropriate  Bosu lunge  1 x 15 Bosu lunge  1 x 15       Romberg on foam 20" x 3 Rhomberg  Foam 20" x 3 Romberg   Foam   30" x 1 EO  30" x 2 EC  Romberg foam   30" x 1 EO  30" x 2 EC     Tandem stance   15" x 1  ea Tandem  Stance foam  15" x 2 ea Tandem   Foam EO   15" x 2 ea  Tandem foam   EO  15" x 2        SLS   10" x 1 EO  SLS   15" x 1 EO            Ther Ex 5-5-21 5-10-21      NuStep  L1 10' L 1  10 min  L3  10 minutes  L 3  10 min     HR/TR 1 x 20 1 x 15 ea   foam 1 x 15 ea   Foam  1 x 15 ea foam     Standing hip flex/abd/ext srikanth  1 x 15 ea srikanth 1 x 15 ea Srikanth  1 x 15 ea L LE only  1 x 15 ea L LE foam    Squats        Step-ups  C fwd 1 x 10 C fwd 1 x 10 C Fwd   1 x 15  C Fwd   1 x 15     T-band TKE NV Red 1 x 15      SAQ   > LAQ 5" 1 x 20  5" 1 x 20 5" 1 x 15   5" 1 x 20  LAQ   5" x 20  LAQ   5" x 20     Hip add 5" 1 x 20 5" 1 x 15  5" x 10  5"x 20    Hip abd        Ankle tband: all planes Yellow  1 x 20 ea Yellow  1 x 10 ea dir Red In/Ev x 10 Green DF/PF x 10  Red inv/ev  1 x 10   Green   DF/PF 1 x 10    SLR 2 x 10 1 x 15  2 x 10  2 x 10     S/L SLR    2 x 10 2 x 10     Bridge    2 x 10  2 x 10                                     Ther Activity                        Gait Training        With use of SPC  Education on weaning away from [address] [place] [address] and transition to [facility]   Encouraged symmetrical weight shifting and heel strike R LE  Without use of AD   Metronome for improved symmetricalweight shifting   80 bpm   250 feet x 2  with S  Without use of AD metronome for improved symmetrical gait   80 bpm  250 feet x 2 with Sh            Modalities        CP prn  Declined Pt declined  Declined Declined

## 2021-05-16 NOTE — PROGRESS NOTES
[facility] - Outpatient Follow-up Note  Brooke Muller 60 y o  female MRN: [id number]  Encounter: [id number]          ASSESSMENT AND PLAN:      1  Crohn's disease with complication, unspecified gastrointestinal tract location ([facility] )  2  Diarrhea, unspecified type  3  Polyp of colon, unspecified part of colon, unspecified type  Her diarrhea is most likely due to irritable bowel syndrome but we will continue to monitor for evidence of a Crohn's flare  She should continue to have surveillance colonoscopy because her history of colon polyps  She should continue her Delzicol as prescribed  4  Hepatic steatosis  She most likely has nonalcoholic fatty liver disease but fortunately there was no evidence of fibrosis on her elastography  5  Gastroesophageal reflux disease, unspecified whether esophagitis present  I encouraged her to continue omeprazole for reflux  She can take Pepcid as needed and if she has a stretch with more severe reflux symptoms then she can try increasing her omeprazole to twice a day     ______________________________________________________________________    SUBJECTIVE:  She presents for follow-up of her Crohn's disease, irritable bowel syndrome, hepatic steatosis, and reflux  She has mild diarrhea symptoms that respond to Imodium and has been taking Delzicol for her Crohn's disease  She has hepatic steatosis but fortunately her ultrasound with elastography did not show evidence of significant fibrosis last year  Her reflux has been reasonably well controlled on omeprazole  I reviewed her procedure report, biopsy results, labs, ultrasound with elastography, and last PCP note  REVIEW OF SYSTEMS IS OTHERWISE NEGATIVE        Historical Information   Past Medical History:   Diagnosis Date    A-fib [facility])     Brain aneurysm     Cancer ([facility] )     papillary thyroid cancer    Cardiac disease     CHF (congestive heart failure) ([facility] )     Crohn disease ([place] [address] )     Diabetes mellitus ([facility] )     Disease of thyroid gland     GERD (gastroesophageal reflux disease)     Hyperlipidemia     Hypertension      Past Surgical History:   Procedure Laterality Date    APPENDECTOMY      CARDIOVERSION N/A 2/14/2019    Procedure: CARDIOVERSION;  Surgeon: Jenni Ho MD;  Location: [facility];  Service: Cardiology    CEREBRAL ANEURYSM REPAIR      CHOLECYSTECTOMY      HYSTERECTOMY      THYROIDECTOMY      TONSILLECTOMY AND ADENOIDECTOMY       Social History   Social History     Substance and Sexual Activity   Alcohol Use Yes    Alcohol/week: 0 0 standard drinks    Frequency: Monthly or less    Comment: social     Social History     Substance and Sexual Activity   Drug Use Never     Social History     Tobacco Use   Smoking Status Former Smoker    Quit date: 1/1/2018    Years since quitting: 3 3   Smokeless Tobacco Never Used     History reviewed  No pertinent family history      Meds/Allergies       Current Outpatient Medications:     albuterol (PROVENTIL HFA,VENTOLIN HFA) 90 mcg/act inhaler    apixaban (ELIQUIS) 5 mg    aspirin 81 mg chewable tablet    atorvastatin (LIPITOR) 10 mg tablet    Blood Glucose Monitoring Suppl (ONETOUCH VERIO) w/Device KIT    Blood Glucose Monitoring Suppl (ONETOUCH VERIO) w/Device KIT    dicyclomine (BENTYL) 20 mg tablet    dofetilide (TIKOSYN) 500 mcg capsule    fenofibrate (TRICOR) 54 MG tablet    furosemide (LASIX) 20 mg tablet    glipiZIDE (GLUCOTROL XL) 5 mg 24 hr tablet    glucose blood test strip    Lancets (ONETOUCH DELICA PLUS [id number]) MISC    mesalamine (DELZICOL) 400 mg    methocarbamol (ROBAXIN) 500 mg tablet    metoprolol succinate (TOPROL-XL) 100 mg 24 hr tablet    mometasone-formoterol (DULERA) 100-5 MCG/ACT inhaler    OneTouch Delica Lancets 06Q MISC    ONETOUCH VERIO test strip    pramipexole (MIRAPEX) 0 5 mg tablet    SYNTHROID 150 MCG tablet    traMADol (ULTRAM) 50 mg tablet    traMADol (ULTRAM) 50 mg tablet    venlafaxine (EFFEXOR XR) 37 5 mg 24 hr capsule    omeprazole (PriLOSEC) 40 MG capsule    Allergies   Allergen Reactions    Sulfa Antibiotics Rash    Other            Objective     Blood pressure 159/62, pulse 69, temperature 97 9 °F (36 6 °C), temperature source Tympanic, height 5' 9" (1 753 m), weight 114 kg (252 lb 3 2 oz)  Body mass index is 37 24 kg/m²  PHYSICAL EXAM:      General Appearance:   Alert, cooperative, no distress   HEENT:   Normocephalic, atraumatic, anicteric      Neck:  Supple, symmetrical, trachea midline   Lungs:   Clear to auscultation bilaterally; no rales, rhonchi or wheezing; respirations unlabored    Heart[de-identified]   Regular rate and rhythm; no murmur, rub, or gallop  Abdomen:   Soft, non-tender, non-distended; normal bowel sounds; no masses, no organomegaly    Genitalia:   Deferred    Rectal:   Deferred    Extremities:  No cyanosis, clubbing or edema    Pulses:  2+ and symmetric    Skin:  No jaundice, rashes, or lesions    Lymph nodes:  No palpable cervical lymphadenopathy        Lab Results:   No visits with results within 1 Day(s) from this visit     Latest known visit with results is:   Lab Requisition on 03/09/2021   Component Date Value    Sodium 03/09/2021 141     Potassium 03/09/2021 3 9     Chloride 03/09/2021 101     CO2 03/09/2021 32*    ANION GAP 03/09/2021 8     BUN 03/09/2021 17     Creatinine 03/09/2021 0 78     Glucose 03/09/2021 136*    Calcium 03/09/2021 9 2     eGFR 03/09/2021 81     WBC 03/09/2021 11 10*    RBC 03/09/2021 4 40     Hemoglobin 03/09/2021 11 7*    Hematocrit 03/09/2021 37 8*    MCV 03/09/2021 86     MCH 03/09/2021 26 7     MCHC 03/09/2021 31 1     RDW 03/09/2021 15 3*    MPV 03/09/2021 8 2*    Platelets 87/52/9663 312     Neutrophils Relative 03/09/2021 69     Lymphocytes Relative 03/09/2021 23     Monocytes Relative 03/09/2021 6     Eosinophils Relative 03/09/2021 2     Basophils Relative 03/09/2021 0     Neutrophils Absolute 03/09/2021 7 70*    Lymphocytes Absolute 03/09/2021 2 60     Monocytes Absolute 03/09/2021 0 60     Eosinophils Absolute 03/09/2021 0 30     Basophils Absolute 03/09/2021 0 00     Sed Rate 03/09/2021 83*         Radiology Results:   Xr Hip/pelv 2-3 Vws Right    Result Date: 4/28/2021  Narrative: RIGHT HIP INDICATION:   pain in R hip and R si joint  COMPARISON:  None VIEWS:  XR HIP/PELV 2-3 VWS RIGHT W PELVIS IF PERFORMED FINDINGS: There is no acute fracture or dislocation  No significant hip degenerative changes  No lytic or blastic osseous lesion  Soft tissues are unremarkable  The visualized lumbar spine is unremarkable  Impression: No acute osseous abnormality   Workstation performed: [id number]

## 2021-05-17 ENCOUNTER — TELEPHONE (OUTPATIENT)
Dept: SLEEP CENTER | Facility: CLINIC | Age: 66
End: 2021-05-17

## 2021-05-17 ENCOUNTER — TELEPHONE (OUTPATIENT)
Dept: OBGYN CLINIC | Facility: HOSPITAL | Age: 66
End: 2021-05-17

## 2021-05-17 ENCOUNTER — OFFICE VISIT (OUTPATIENT)
Dept: PHYSICAL THERAPY | Facility: HOME HEALTHCARE | Age: 66
End: 2021-05-17
Payer: COMMERCIAL

## 2021-05-17 ENCOUNTER — OFFICE VISIT (OUTPATIENT)
Dept: SLEEP CENTER | Facility: CLINIC | Age: 66
End: 2021-05-17
Payer: COMMERCIAL

## 2021-05-17 VITALS
OXYGEN SATURATION: 93 % | WEIGHT: 251 LBS | HEART RATE: 64 BPM | BODY MASS INDEX: 37.18 KG/M2 | SYSTOLIC BLOOD PRESSURE: 122 MMHG | HEIGHT: 69 IN | TEMPERATURE: 97.1 F | DIASTOLIC BLOOD PRESSURE: 70 MMHG

## 2021-05-17 DIAGNOSIS — G47.33 OSA (OBSTRUCTIVE SLEEP APNEA): Primary | ICD-10-CM

## 2021-05-17 DIAGNOSIS — S82.101A CLOSED FRACTURE OF PROXIMAL END OF RIGHT TIBIA, UNSPECIFIED FRACTURE MORPHOLOGY, INITIAL ENCOUNTER: Primary | ICD-10-CM

## 2021-05-17 DIAGNOSIS — G25.81 RLS (RESTLESS LEGS SYNDROME): ICD-10-CM

## 2021-05-17 PROCEDURE — 99214 OFFICE O/P EST MOD 30 MIN: CPT | Performed by: PSYCHIATRY & NEUROLOGY

## 2021-05-17 PROCEDURE — 97140 MANUAL THERAPY 1/> REGIONS: CPT

## 2021-05-17 PROCEDURE — 97110 THERAPEUTIC EXERCISES: CPT

## 2021-05-17 NOTE — PROGRESS NOTES
Daily Note     Today's date: 2021  Patient name: Pepe Santana  : 1955  MRN: [id number]  Referring provider: Michelle Block DO  Dx:   Encounter Diagnosis     ICD-10-CM    1  Closed fracture of proximal end of right tibia, unspecified fracture morphology, initial encounter  S82 101A        Start Time: 1055          Subjective: I have no pain in my R knee  I have had pain of 7/10 at my R inner thigh, buttock and LB since Friday  The pain seems like it is coming from my herniated discs  Objective: See treatment diary below    Assessment: Modified session 2* pt with c/o pain at LB with R LE radiating pain to R buttock and inner thigh  Pt also c/o spasms at R inner thigh and groin regions  Added trial of SBB and PPT in attempt to reduce current s/s at LB and R LE  Advised pt to contact Dr cuevas go to ED if needed  Pt with report of same pain at end of Tx  Patient would benefit from continued PT    Plan: Continue per plan of care          Re-eval Date: 6/3/21    Date 5-5-21 5-10-21 5/12 5/14/21 5-17-21   Visit Count 6 7 8 9    FOTO              Precautions: WBAT        Manuals 5-5-21 5-10-21 5/12 5/14/21 5-17-21   R knee flex/ext 8' 8 min  DC DC Srikanth HS/Piri/Add  10'           Neuro Re-Ed         Balance as appropriate  Bosu lunge  1 x 15 Bosu lunge  1 x 15   Held    Romberg on foam 20" x 3 Rhomberg  Foam 20" x 3 Romberg   Foam   30" x 1 EO  30" x 2 EC  Romberg foam   30" x 1 EO  30" x 2 EC Held    Tandem stance   15" x 1  ea Tandem  Stance foam  15" x 2 ea Tandem   Foam EO   15" x 2 ea  Tandem foam   EO  15" x 2  Held      SLS   10" x 1 EO  SLS   15" x 1 EO Held           Ther Ex 5-5-21 5-10-21   5-17-21   NuStep  L1 10' L 1  10 min  L3  10 minutes  L 3  10 min  L 1  10'   SBB     5" x 5 to start  5" x 5 at end   PPT     5" x 15   HR/TR 1 x 20 1 x 15 ea   foam 1 x 15 ea   Foam  1 x 15 ea foam  Held   Standing hip flex/abd/ext srikanth  1 x 15 ea srikanth 1 x 15 ea Srikanth  1 x 15 ea L LE only  1 x 15 ea L LE foam Held Squats        Step-ups  C fwd 1 x 10 C fwd 1 x 10 C Fwd   1 x 15  C Fwd   1 x 15  Held   T-band TKE NV Red 1 x 15      SAQ   > LAQ 5" 1 x 20  5" 1 x 20 5" 1 x 15   5" 1 x 20  LAQ   5" x 20  LAQ   5" x 20  Held   Hip add 5" 1 x 20 5" 1 x 15  5" x 10  5"x 20 Held   Hip abd        Ankle tband: all planes Yellow  1 x 20 ea Yellow  1 x 10 ea dir Red In/Ev x 10   Green DF/PF x 10  Red inv/ev  1 x 10   Green   DF/PF 1 x 10 Held   SLR 2 x 10 1 x 15  2 x 10  2 x 10  Held   S/L SLR    2 x 10 2 x 10  Held   Bridge    2 x 10  2 x 10  Held                                   Ther Activity                        Gait Training        With use of SPC  Education on weaning away from [employer] and transition to [facility]   Encouraged symmetrical weight shifting and heel strike R LE  Without use of AD   Metronome for improved symmetricalweight shifting   80 bpm   250 feet x 2  with S  Without use of AD metronome for improved symmetrical gait   80 bpm  250 feet x 2 with Sh Held           Modalities        CP prn  Declined Pt declined  Declined Declined  Declined

## 2021-05-17 NOTE — PATIENT INSTRUCTIONS
Recommendations:    1) CPAP 14-20cm with NP  2) Safe driving reviewed  3) Follow-up 6 months  4) Call with any questions or concerns    5) Pramipexole 0 5mg at bedtime

## 2021-05-17 NOTE — TELEPHONE ENCOUNTER
I will see her on the same date to examine her back  After reviewing the medical records, it appears that she has had pain along her back for least 19 days

## 2021-05-17 NOTE — TELEPHONE ENCOUNTER
Received a script for a PC to 14-20cm, this was done in care  Pt  Was told while she was in the office

## 2021-05-17 NOTE — PROGRESS NOTES
Sleep Medicine Follow-Up Note    HPI: 57yo F with MARY ELLEN presenting for a compliance visit  Treatment Summary: 2019 PSG showed severe MARY ELLEN with AHI 68 and 102 in REM  Ed of 77% with borderline oxygenation as well at baseline  2019 had CPAP titration which showed an adequate pressure of 12cm and correction of hypoxemia  HPI:  Today, patient presents accompanied by daughter and grandson  Since she changed to NP, she has been feeling a lot better with the mask  She feels good with her CPAP  She is still tired, this has always been there  She broke her tibia and then in rehab for a month and then took her CPAP with her  She has not worked since  This was in the beginning of February and she is still in PT and not driving  She tried the higher pressure, but then she was getting too much water and hose in the hospital's CPAP, so it was put back down  She has started to snore a little bit  She has gained and now losing some weight after a month in rehab  Treatment: CPAP  -Pressure: 13cm   -Pressure intolerance: no   -Aerophagia: no   -Air Hunger: no  -Interface: NP  -Fit: good  -Chin strap: no  -HCC: YME  -Patient's perceived outcome: sleeps well with CPAP       Compliance Card Data:    - Date Range: 21-21   - Settincm   - Residual AHI: 0 7   - %  Night in Large Leak: 6 min   - Average usage days used: 6h 30m   - % Days used: 100%   - % Days with Usage > 4 hrs: 90%    Respiratory:  -Ongoing Snoring: a little bit  -Mouth Breathing: no  -Dry Mouth: sometimes, but less than before  -Nocturnal Gasping: no    ROS:   Genitourinary none   Cardiology ankle/leg swelling   Gastrointestinal frequent heartburn/acid reflux   Neurology difficulty with memory   Constitutional fatigue and weight change   Integumentary none   Psychiatry anxiety, depression, aggressiveness or irritability and mood change   Musculoskeletal joint pain, muscle aches, back pain, legs twitching/jerking and leg cramps Pulmonary shortness of breath with activity and snoring   ENT throat clearing   Endocrine none   Hematological none       Sleep Pattern:  -Position: side  -Bedtime: 12-1am  -Lights Out: same time  -Environment:  TV is on loud due to her  not having working hearing aids    -Latency: mins  -Awakenings: a few times with pain  -Wake Time: varies  -Rise Time: same time  -Patient's estimate of Sleep Time: 6-7 hours    Daytime Symptoms:  -Upon Awakening: better  -Daytime fatigue/sleepiness: tired in the afternoon  -Naps: denied  -Involuntary Dozing: sometimes  -Cognitive Symptoms: denied  -Driving: Doesn't drive yet    Questionnaire:  Sitting and reading: Slight chance of dozing  Watching TV: Slight chance of dozing  Sitting, inactive in a public place (e g  a theatre or a meeting): Would never doze  As a passenger in a car for an hour without a break: Slight chance of dozing  Lying down to rest in the afternoon when circumstances permit: Slight chance of dozing  Sitting and talking to someone: Would never doze  Sitting quietly after a lunch without alcohol: Would never doze  In a car, while stopped for a few minutes in traffic: Would never doze  Total score: 4    Substance Use:  -Caffeine: 1 cup in the morning and evening sometimes, but its decaf only  -Alcohol: denied  -THC: denied    --> Supplemental Oxygen Use: denies  --> broke her right tibia since last visit  PE:    /70 (BP Location: Left arm, Cuff Size: Large)   Pulse 64   Temp (!) 97 1 °F (36 2 °C) (Temporal)   Ht 5' 9" (1 753 m)   Wt 114 kg (251 lb)   SpO2 93%   BMI 37 07 kg/m²     General:  In NAD  Pul: Respirations: unlaboured  MS: No atrophy  Neuro: No resting tremor  Gait normal turning & station; unremarkable overall  Psych: Socially appropriate  Pleasant  No overt dysphoria  Assessment: The patient has great compliance with her CPAP and her obstructive events are well controlled with a residual AHI 0 7   She is deriving a benefit from using her CPAP as she is less tired than she was in the past and sleeping better with it then without it  She has started to snore a little  She is in a hospital bed due to a broken tibia, so this is preventing adequate sleep at night  She will be moving back to her regular bed soon, hopefully  She was taking tramadol for her pain which was also helping her RLS, but knows this is habit forming and will be going back to pramipexole  Will increase her pressure for snoring  Recommendations:    1) CPAP 14-20cm with NP  2) Safe driving reviewed  3) Follow-up 6 months  4) Call with any questions or concerns  5) Pramipexole 0 5mg at bedtime        All questions answered for the patient, who indicated understanding and agreed with the plan       Kortney Potts MD  Psychiatry/ Sleep medicine

## 2021-05-17 NOTE — TELEPHONE ENCOUNTER
Patient sees Dr Ben Merino  Patient called because she is having a lot of pain in her hip and lower back area  I offered to make an appointment in [address] [place] [address] today or to see any other doctor for the back pain, but she refused  She only wants to see Dr Ben Merino  She already has an appointment schedule on May 25 but is for her tibia

## 2021-05-18 DIAGNOSIS — K50.919 CROHN'S DISEASE WITH COMPLICATION, UNSPECIFIED GASTROINTESTINAL TRACT LOCATION (HCC): ICD-10-CM

## 2021-05-19 ENCOUNTER — OFFICE VISIT (OUTPATIENT)
Dept: PHYSICAL THERAPY | Facility: HOME HEALTHCARE | Age: 66
End: 2021-05-19
Payer: COMMERCIAL

## 2021-05-19 DIAGNOSIS — S82.101A CLOSED FRACTURE OF PROXIMAL END OF RIGHT TIBIA, UNSPECIFIED FRACTURE MORPHOLOGY, INITIAL ENCOUNTER: Primary | ICD-10-CM

## 2021-05-19 PROCEDURE — 97110 THERAPEUTIC EXERCISES: CPT | Performed by: PHYSICAL THERAPIST

## 2021-05-19 RX ORDER — MESALAMINE 400 MG/1
CAPSULE, DELAYED RELEASE ORAL
Qty: 120 CAPSULE | Refills: 0 | Status: SHIPPED | OUTPATIENT
Start: 2021-05-19 | End: 2021-06-18

## 2021-05-19 NOTE — PROGRESS NOTES
Daily Note     Today's date: 2021  Patient name: Anatoliy Washington  : 1955  MRN: [id number]  Referring provider: Tita Camarillo DO  Dx:   Encounter Diagnosis     ICD-10-CM    1  Closed fracture of proximal end of right tibia, unspecified fracture morphology, initial encounter  S82 101A                   Subjective: Patient c/o continued low back pain and radicular symptoms in her right LE in the thigh and groin area  Objective: See treatment diary below    Assessment: Patient demonstrated increased pain with L/S extension against wall and right lateral side glides, stated some decrease in pain level after performing left lateral side glides against wall; patient advised to attempt left lateral side glides against wall at home  Plan: Continue per plan of care  Assess response to addition of left lateral side glides against wall at home          Re-eval Date: 6/3/21    Date 5-19-21 5-10-21 5/12 5/14/21 5-17-21   Visit Count 11 7 8 9 10   FOTO              Precautions: WBAT        Manuals 5-19-21 5-10-21 5/12 5/14/21 5-17-21   R knee flex/ext  8 min  DC DC Srikanth HS/Piri/Add  10'           Neuro Re-Ed         Balance as appropriate   Bosu lunge  1 x 15   Held     Rhomberg  Foam 20" x 3 Romberg   Foam   30" x 1 EO  30" x 2 EC  Romberg foam   30" x 1 EO  30" x 2 EC Held     Tandem  Stance foam  15" x 2 ea Tandem   Foam EO   15" x 2 ea  Tandem foam   EO  15" x 2  Held      SLS   10" x 1 EO  SLS   15" x 1 EO Held           Ther Ex 5-19-21 5-10-21   5-17-21   NuStep  L1 10' L 1  10 min  L3  10 minutes  L 3  10 min  L 1  10'   SBB Standing L/S extension against wall    5" x 5 to start  5" x 5 at end   Prone press ups Pain- unable       Right lateral side glides against wall 10x       Left lateral side glides against wall 3x10       PPT     5" x 15   HR/TR  1 x 15 ea   foam 1 x 15 ea   Foam  1 x 15 ea foam  Held   Standing hip flex/abd/ext srikanth   1 x 15 ea Srikanth  1 x 15 ea L LE only  1 x 15 ea L LE foam Held   [employer] Step-ups   C fwd 1 x 10 C Fwd   1 x 15  C Fwd   1 x 15  Held   T-band TKE  Red 1 x 15      SAQ   > LAQ LAQ   5" x 20  5" 1 x 15   5" 1 x 20  LAQ   5" x 20  LAQ   5" x 20  Held   Hip add  5" 1 x 15  5" x 10  5"x 20 Held   Hip abd        Ankle tband: all planes  Yellow  1 x 10 ea dir Red In/Ev x 10   Green DF/PF x 10  Red inv/ev  1 x 10   Green   DF/PF 1 x 10 Held   SLR 2x10 1 x 15  2 x 10  2 x 10  Held   S/L SLR  2x10  2 x 10 2 x 10  Held   Bridge  2x10  2 x 10  2 x 10  Held   Supine hip rolls to right                                Ther Activity                        Gait Training        With use of SPC    Without use of AD   Metronome for improved symmetricalweight shifting   80 bpm   250 feet x 2  with S  Without use of AD metronome for improved symmetrical gait   80 bpm  250 feet x 2 with Sh Held           Modalities        CP prn  Declined Pt declined  Declined Declined  Declined

## 2021-05-20 ENCOUNTER — HOSPITAL ENCOUNTER (EMERGENCY)
Facility: HOSPITAL | Age: 66
Discharge: HOME | End: 2021-05-20
Attending: EMERGENCY MEDICINE | Admitting: EMERGENCY MEDICINE
Payer: COMMERCIAL

## 2021-05-20 VITALS
TEMPERATURE: 97.3 F | DIASTOLIC BLOOD PRESSURE: 63 MMHG | SYSTOLIC BLOOD PRESSURE: 147 MMHG | RESPIRATION RATE: 18 BRPM | OXYGEN SATURATION: 97 % | HEART RATE: 69 BPM

## 2021-05-20 DIAGNOSIS — M25.551 RIGHT HIP PAIN: Primary | ICD-10-CM

## 2021-05-20 PROCEDURE — 96372 THER/PROPH/DIAG INJ SC/IM: CPT

## 2021-05-20 PROCEDURE — 99283 EMERGENCY DEPT VISIT LOW MDM: CPT

## 2021-05-20 PROCEDURE — 99284 EMERGENCY DEPT VISIT MOD MDM: CPT | Performed by: EMERGENCY MEDICINE

## 2021-05-20 RX ORDER — KETOROLAC TROMETHAMINE 30 MG/ML
15 INJECTION, SOLUTION INTRAMUSCULAR; INTRAVENOUS ONCE
Status: COMPLETED | OUTPATIENT
Start: 2021-05-20 | End: 2021-05-20

## 2021-05-20 RX ORDER — LIDOCAINE 50 MG/G
1 PATCH TOPICAL ONCE
Status: DISCONTINUED | OUTPATIENT
Start: 2021-05-20 | End: 2021-05-21 | Stop reason: HOSPADM

## 2021-05-20 RX ORDER — ACETAMINOPHEN 325 MG/1
975 TABLET ORAL ONCE
Status: COMPLETED | OUTPATIENT
Start: 2021-05-20 | End: 2021-05-20

## 2021-05-20 RX ADMIN — KETOROLAC TROMETHAMINE 15 MG: 30 INJECTION, SOLUTION INTRAMUSCULAR; INTRAVENOUS at 22:18

## 2021-05-20 RX ADMIN — ACETAMINOPHEN 975 MG: 325 TABLET, FILM COATED ORAL at 22:17

## 2021-05-20 RX ADMIN — LIDOCAINE 5% 1 PATCH: 700 PATCH TOPICAL at 22:19

## 2021-05-21 ENCOUNTER — OFFICE VISIT (OUTPATIENT)
Dept: PHYSICAL THERAPY | Facility: HOME HEALTHCARE | Age: 66
End: 2021-05-21
Payer: COMMERCIAL

## 2021-05-21 DIAGNOSIS — S82.101A CLOSED FRACTURE OF PROXIMAL END OF RIGHT TIBIA, UNSPECIFIED FRACTURE MORPHOLOGY, INITIAL ENCOUNTER: Primary | ICD-10-CM

## 2021-05-21 PROCEDURE — 97110 THERAPEUTIC EXERCISES: CPT

## 2021-05-21 NOTE — PROGRESS NOTES
Daily Note     Today's date: 2021  Patient name: Uma Guzmán  : 1955  MRN: [id number]  Referring provider: Dereck Hunt DO  Dx:   Encounter Diagnosis     ICD-10-CM    1  Closed fracture of proximal end of right tibia, unspecified fracture morphology, initial encounter  S82 101A                   Subjective: Pt reports she went to the ER yesterday because she was in so much pain in her R hip and R LE  Pt reports ER gave her lidocaine patch and a shot for the pain  and told her to continue with PT and follow up with her Dr  [address]   reports she has 8/10 pain R LB, R hip and down her R LE today  Pt also reports she has an appt with Dr Bhaskar Sterling on Tuesday  Objective: See treatment diary below      Assessment: Tolerated treatment fair  Some verbal cues needed to perform exercises correctly  Modified program due to increased pain and Pt 's tolerance  Pain level same t/o session  Patient would benefit from continued PT       Plan: Continue per plan of care  Await any Dr recommendations next week        Re-eval Date: 6/3/21    Date 21   Visit Count 11 12 8 9 10   FOTO              Precautions: WBAT        Manuals 21   R knee flex/ext   DC DC Mayo HS/Piri/Add  10'           Neuro Re-Ed         Balance as appropriate      Held      Romberg   Foam   30" x 1 EO  30" x 2 EC  Romberg foam   30" x 1 EO  30" x 2 EC Held      Tandem   Foam EO   15" x 2 ea  Tandem foam   EO  15" x 2  Held      SLS   10" x 1 EO  SLS   15" x 1 EO Held           Ther Ex 21   NuStep  L1 10' L 1  10 min  L3  10 minutes  L 3  10 min  L 1  10'   SBB Standing L/S extension against wall    5" x 5 to start  5" x 5 at end   Prone press ups Pain- unable       Right lateral side glides against wall 10x       Left lateral side glides against wall 3x10       PPT     5" x 15   HR/TR  1 x 20 ea   foam 1 x 15 ea   Foam  1 x 15 ea foam  Held   Standing hip flex/abd/ext mayo 1 x 15 ea R LE 1 x 15 ea L LE only  1 x 15 ea L LE foam Held   Squats        Step-ups    C Fwd   1 x 15  C Fwd   1 x 15  Held   T-band TKE        SAQ   > LAQ LAQ   5" x 20  LAQ  5" 1 x 20  LAQ   5" x 20  LAQ   5" x 20  Held   Hip add  5" 1 x 20 5" x 10  5"x 20 Held   Hip abd        Ankle tband: all planes  Red inv/ever  1 x 10 ea   Green   DF/PF 1 x 10 ea  Red In/Ev x 10   Green DF/PF x 10  Red inv/ev  1 x 10   Green   DF/PF 1 x 10 Held   SLR 2x10 1 x 10 2 x 10  2 x 10  Held   S/L SLR  2x10 2 x 10  2 x 10 2 x 10  Held   Bridge  2x10 1 x 10  2 x 10  2 x 10  Held   Supine hip rolls to right                                Ther Activity                        Gait Training        With use of SPC    Without use of AD   Metronome for improved symmetricalweight shifting   80 bpm   250 feet x 2  with S  Without use of AD metronome for improved symmetrical gait   80 bpm  250 feet x 2 with Sh Held           Modalities        CP prn  Declined Pt declined  Declined Declined  Declined

## 2021-05-21 NOTE — ED PROVIDER NOTES
History  Chief Complaint   Patient presents with    Hip Pain     C/o R hip pain for the past week  States she broke her tibia in february and is still following with physical therapy  Patient is a 43-year-old female with a history of chronic right hip pain who presents for hip pain/buttock  Patient says the the pain has been ongoing for the past few weeks  She has been going to physical therapy for it  She was seen here on 04/25 7 for similar symptoms and had negative hip x-rays at that time  She says that she was given tramadol for the hip pain but does not like to take it    She says that she did not take anything for the pain tonight  She denies any new trauma to her hip or leg since she broke her tib fib in February  The patient does follow with an orthopedic surgeon dizziness scheduled to see them on Tuesday  She denies any numbness or tingling in the leg  She denies any back pain  Prior to Admission Medications   Prescriptions Last Dose Informant Patient Reported? Taking? Blood Glucose Monitoring Suppl (Sammy Christopher) w/Device KIT  Self Yes No   Sig: Use to check sugars once a day  Dx E11 9   Blood Glucose Monitoring Suppl (ONETOUCH VERIO) w/Device KIT  Self Yes No   Sig: daily Use to check glucose   Lancets (ONETOUCH DELICA PLUS [id number]) MISC  Self Yes No   Sig: USE 1 TO CHECK GLUCOSE ONCE DAILY   ONETOUCH VERIO test strip  Self Yes No   Sig: USE 1 STRIP TO CHECK GLUCOSE ONCE DAILY   OneTouch Delica Lancets 54J MISC  Self Yes No   Sig: Use to check sugars once a day   Dx E11 9   SYNTHROID 150 MCG tablet  Self Yes No   Sig: TAKE 1 TABLET BY MOUTH ONCE DAILY FIRST THING IN THE MORNING (AT LEAST 30 MINUTES PRIOR TO BREAKFAST OR OTHER MEDS)   albuterol (PROVENTIL HFA,VENTOLIN HFA) 90 mcg/act inhaler  Self No No   Sig: Inhale 2 puffs every 6 (six) hours as needed for wheezing or shortness of breath   apixaban (ELIQUIS) 5 mg  Self Yes No   Sig: Take 5 mg by mouth 2 (two) times a day aspirin 81 mg chewable tablet  Self No No   Sig: Chew 1 tablet (81 mg total) daily   atorvastatin (LIPITOR) 10 mg tablet  Self Yes No   Sig: Take 10 mg by mouth daily   dicyclomine (BENTYL) 20 mg tablet  Self No No   Sig: Take 1 tablet (20 mg total) by mouth every 6 (six) hours as needed (urgency, abdominal pain)   Patient not taking: Reported on 5/17/2021   dofetilide (TIKOSYN) 500 mcg capsule  Self Yes No   Sig: Take 500 mcg by mouth 2 (two) times a day   fenofibrate (TRICOR) 54 MG tablet  Self Yes No   Sig: Take 54 mg by mouth daily   furosemide (LASIX) 20 mg tablet  Self Yes No   Sig: Take 20 mg by mouth 2 (two) times a day   glipiZIDE (GLUCOTROL XL) 5 mg 24 hr tablet  Self Yes No   Sig: TAKE 1 TABLET BY MOUTH ONCE DAILY 30 MINUTES BEFORE A MEAL (REPLACES METFORMIN)   glucose blood test strip  Self Yes No   Sig: Use to check sugars once a day   Dx E11 9   mesalamine (DELZICOL) 400 mg   No No   Sig: Take 2 capsules by mouth twice daily   methocarbamol (ROBAXIN) 500 mg tablet  Self No No   Sig: Take 1 tablet (500 mg total) by mouth 2 (two) times a day   metoprolol succinate (TOPROL-XL) 100 mg 24 hr tablet  Self No No   Sig: Take 1 tablet (100 mg total) by mouth every 12 (twelve) hours   mometasone-formoterol (DULERA) 100-5 MCG/ACT inhaler  Self No No   Sig: Inhale 2 puffs 2 (two) times a day   omeprazole (PriLOSEC) 40 MG capsule  Self No No   Sig: Take 1 capsule (40 mg total) by mouth daily before breakfast   pramipexole (MIRAPEX) 0 5 mg tablet  Self No No   Sig: Take 1 tablet (0 5 mg total) by mouth daily at bedtime   traMADol (ULTRAM) 50 mg tablet  Self Yes No   Sig: Take 50 mg by mouth   traMADol (ULTRAM) 50 mg tablet  Self Yes No   Sig: Take 50 mg by mouth every 8 (eight) hours as needed   venlafaxine (EFFEXOR XR) 37 5 mg 24 hr capsule  Self Yes No   Sig: Take 37 5 mg by mouth daily       Facility-Administered Medications: None       Past Medical History:   Diagnosis Date    A-fib ([address] )     Brain aneurysm  Cancer [facility])     papillary thyroid cancer    Cardiac disease     CHF (congestive heart failure) (HCC)     Crohn disease (HCC)     Diabetes mellitus ([address] )     Disease of thyroid gland     GERD (gastroesophageal reflux disease)     Hyperlipidemia     Hypertension        Past Surgical History:   Procedure Laterality Date    APPENDECTOMY      CARDIOVERSION N/A 2/14/2019    Procedure: CARDIOVERSION;  Surgeon: Romie Murrell MD;  Location: [facility];  Service: Cardiology    CEREBRAL ANEURYSM REPAIR      CHOLECYSTECTOMY      HYSTERECTOMY      THYROIDECTOMY      TONSILLECTOMY AND ADENOIDECTOMY         History reviewed  No pertinent family history  I have reviewed and agree with the history as documented  E-Cigarette/Vaping    E-Cigarette Use Never User      E-Cigarette/Vaping Substances     Social History     Tobacco Use    Smoking status: Former Smoker     Quit date: 1/1/2018     Years since quitting: 3 3    Smokeless tobacco: Never Used   Substance Use Topics    Alcohol use: Yes     Alcohol/week: 0 0 standard drinks     Frequency: Monthly or less     Comment: social    Drug use: Never       Review of Systems   Constitutional: Negative for chills, diaphoresis and fever  HENT: Negative for congestion, sinus pressure, sore throat and trouble swallowing  Eyes: Negative for pain, discharge and itching  Respiratory: Negative for cough, chest tightness, shortness of breath and wheezing  Cardiovascular: Negative for chest pain, palpitations and leg swelling  Gastrointestinal: Negative for abdominal distention, abdominal pain, blood in stool, diarrhea, nausea and vomiting  Endocrine: Negative for polyphagia and polyuria  Genitourinary: Negative for difficulty urinating, dysuria, flank pain, hematuria, pelvic pain and vaginal bleeding  Musculoskeletal: Positive for arthralgias (R hip pain)  Negative for back pain  Skin: Negative for rash     Neurological: Negative for dizziness, syncope, weakness, light-headedness and headaches  Physical Exam  Physical Exam  Vitals signs and nursing note reviewed  Constitutional:       General: She is not in acute distress  Appearance: She is well-developed  HENT:      Head: Normocephalic and atraumatic  Right Ear: External ear normal       Left Ear: External ear normal       Mouth/Throat:      Pharynx: No oropharyngeal exudate  Eyes:      Conjunctiva/sclera: Conjunctivae normal       Pupils: Pupils are equal, round, and reactive to light  Neck:      Musculoskeletal: Normal range of motion and neck supple  Cardiovascular:      Rate and Rhythm: Normal rate and regular rhythm  Heart sounds: Normal heart sounds  No murmur  No friction rub  No gallop  Pulmonary:      Effort: Pulmonary effort is normal  No respiratory distress  Breath sounds: Normal breath sounds  No wheezing or rales  Abdominal:      General: There is no distension  Palpations: Abdomen is soft  Tenderness: There is no abdominal tenderness  There is no guarding  Musculoskeletal: Normal range of motion  General: Tenderness (R anterior hip/groin area) present  No deformity  Comments: Tenderness at R SI joint     Lymphadenopathy:      Cervical: No cervical adenopathy  Skin:     General: Skin is warm and dry  Neurological:      General: No focal deficit present  Mental Status: She is alert and oriented to person, place, and time  Cranial Nerves: No cranial nerve deficit  Sensory: No sensory deficit  Motor: No abnormal muscle tone           Vital Signs  ED Triage Vitals [05/20/21 2132]   Temperature Pulse Respirations Blood Pressure SpO2   (!) 97 3 °F (36 3 °C) 69 18 147/63 97 %      Temp Source Heart Rate Source Patient Position - Orthostatic VS BP Location FiO2 (%)   Temporal Monitor Sitting Right arm --      Pain Score       Worst Possible Pain           Vitals:    05/20/21 2132   BP: 147/63   Pulse: 69 Patient Position - Orthostatic VS: Sitting         Visual Acuity      ED Medications  Medications   lidocaine (LIDODERM) 5 % patch 1 patch (1 patch Topical Medication Applied 5/20/21 2219)   acetaminophen (TYLENOL) tablet 975 mg (975 mg Oral Given 5/20/21 2217)   ketorolac (TORADOL) injection 15 mg (15 mg Intramuscular Given 5/20/21 2218)       Diagnostic Studies  Results Reviewed     None                 No orders to display              Procedures  Procedures         ED Course                                           MDM  Number of Diagnoses or Management Options  Right hip pain:   Diagnosis management comments: 55-year-old female presenting for acute on chronic right hip/right tibial pain  Seen on 04/27 for similar symptoms  Is being seen by PT  Did not take her pain medicine tonight  Denies any numbness or tingling or weakness in her legs  Has right hip tenderness on exam   Given no new traumatic injury and negative x-rays at last visit, do not believe imaging is required at this time  Will give Toradol, Tylenol and Lidoderm patch  Patient told to take her pain medicine as needed if the pain gets severe  Disposition  Final diagnoses:   Right hip pain - chronic     Time reflects when diagnosis was documented in both MDM as applicable and the Disposition within this note     Time User Action Codes Description Comment    5/20/2021 10:12 PM Gladis Rinne Add [C95 327] Right hip pain     5/20/2021 10:12 PM Gladis Rinne Modify [X45 [id number]] Right hip pain chronic      ED Disposition     ED Disposition Condition Date/Time Comment    Discharge Stable Thu May 20, 2021 10:12 PM Breanna Manuel discharge to home/self care              Follow-up Information     Follow up With Specialties Details Why Contact Info    your orthopedic doctor  Schedule an appointment as soon as possible for a visit  For follow up of chronic hip pain           Discharge Medication List as of 5/20/2021 10:16 PM      CONTINUE these medications which have NOT CHANGED    Details   albuterol (PROVENTIL HFA,VENTOLIN HFA) 90 mcg/act inhaler Inhale 2 puffs every 6 (six) hours as needed for wheezing or shortness of breath, Starting Sat 2/29/2020, No Print      apixaban (ELIQUIS) 5 mg Take 5 mg by mouth 2 (two) times a day , Starting Tue 2/5/2019, Historical Med      aspirin 81 mg chewable tablet Chew 1 tablet (81 mg total) daily, Starting Sat 2/29/2020, No Print      atorvastatin (LIPITOR) 10 mg tablet Take 10 mg by mouth daily, Starting Tue 11/10/2020, Historical Med      !! Blood Glucose Monitoring Suppl (ONETOUCH VERIO) w/Device KIT Use to check sugars once a day  Dx E11 9, Historical Med      !! Blood Glucose Monitoring Suppl ([employer]) w/Device KIT daily Use to check glucose, Starting Tue 1/28/2020, Historical Med      dicyclomine (BENTYL) 20 mg tablet Take 1 tablet (20 mg total) by mouth every 6 (six) hours as needed (urgency, abdominal pain), Starting Tue 9/29/2020, Normal      dofetilide (TIKOSYN) 500 mcg capsule Take 500 mcg by mouth 2 (two) times a day, Historical Med      fenofibrate (TRICOR) 54 MG tablet Take 54 mg by mouth daily, Historical Med      furosemide (LASIX) 20 mg tablet Take 20 mg by mouth 2 (two) times a day, Starting Wed 6/10/2020, Historical Med      glipiZIDE (GLUCOTROL XL) 5 mg 24 hr tablet TAKE 1 TABLET BY MOUTH ONCE DAILY 30 MINUTES BEFORE A MEAL (REPLACES METFORMIN), Historical Med      !! glucose blood test strip Use to check sugars once a day   Dx E11 9, Historical Med      !! Lancets (ONETOUCH DELICA PLUS [id number]) MISC USE 1 TO CHECK GLUCOSE ONCE DAILY, Historical Med      mesalamine (DELZICOL) 400 mg Take 2 capsules by mouth twice daily, Normal      methocarbamol (ROBAXIN) 500 mg tablet Take 1 tablet (500 mg total) by mouth 2 (two) times a day, Starting Wed 4/28/2021, Normal      metoprolol succinate (TOPROL-XL) 100 mg 24 hr tablet Take 1 tablet (100 mg total) by mouth every 12 (twelve) hours, Starting [facility] 2/14/2019, Normal      mometasone-formoterol (DULERA) 100-5 MCG/ACT inhaler Inhale 2 puffs 2 (two) times a day, Starting Sat 2/29/2020, No Print      omeprazole (PriLOSEC) 40 MG capsule Take 1 capsule (40 mg total) by mouth daily before breakfast, Starting Tue 11/24/2020, Until Mon 5/17/2021, Normal      !! OneTouch Delica Lancets 71J MISC Use to check sugars once a day  Dx E11 9, Historical Med      !! ONETOUCH VERIO test strip USE 1 STRIP TO CHECK GLUCOSE ONCE DAILY, Historical Med      pramipexole (MIRAPEX) 0 5 mg tablet Take 1 tablet (0 5 mg total) by mouth daily at bedtime, Starting Mon 1/18/2021, Normal      SYNTHROID 150 MCG tablet TAKE 1 TABLET BY MOUTH ONCE DAILY FIRST THING IN THE MORNING (AT LEAST 30 MINUTES PRIOR TO BREAKFAST OR OTHER MEDS), Historical Med      !! traMADol (ULTRAM) 50 mg tablet Take 50 mg by mouth, Starting Wed 2/24/2021, Historical Med      !! traMADol (ULTRAM) 50 mg tablet Take 50 mg by mouth every 8 (eight) hours as needed, Starting Fri 2/26/2021, Historical Med      venlafaxine (EFFEXOR XR) 37 5 mg 24 hr capsule Take 37 5 mg by mouth daily , Starting Mon 12/31/2018, Historical Med       !! - Potential duplicate medications found  Please discuss with provider  No discharge procedures on file      PDMP Review       Value Time User    PDMP Reviewed  Yes 2/25/2020  6:47 PM Chaya Arguelles MD          ED Provider  Electronically Signed by           Brad Ford DO  05/20/21 3324

## 2021-05-25 ENCOUNTER — APPOINTMENT (OUTPATIENT)
Dept: RADIOLOGY | Facility: CLINIC | Age: 66
End: 2021-05-25
Payer: COMMERCIAL

## 2021-05-25 ENCOUNTER — OFFICE VISIT (OUTPATIENT)
Dept: OBGYN CLINIC | Facility: CLINIC | Age: 66
End: 2021-05-25
Payer: COMMERCIAL

## 2021-05-25 VITALS
HEART RATE: 69 BPM | BODY MASS INDEX: 37.18 KG/M2 | DIASTOLIC BLOOD PRESSURE: 80 MMHG | SYSTOLIC BLOOD PRESSURE: 157 MMHG | WEIGHT: 251 LBS | HEIGHT: 69 IN

## 2021-05-25 DIAGNOSIS — M25.551 PAIN IN RIGHT HIP: ICD-10-CM

## 2021-05-25 DIAGNOSIS — M53.3 CHRONIC RIGHT SI JOINT PAIN: ICD-10-CM

## 2021-05-25 DIAGNOSIS — M54.16 RADICULOPATHY, LUMBAR REGION: ICD-10-CM

## 2021-05-25 DIAGNOSIS — S82.101A CLOSED FRACTURE OF PROXIMAL END OF RIGHT TIBIA, UNSPECIFIED FRACTURE MORPHOLOGY, INITIAL ENCOUNTER: ICD-10-CM

## 2021-05-25 DIAGNOSIS — G89.29 CHRONIC RIGHT SI JOINT PAIN: ICD-10-CM

## 2021-05-25 DIAGNOSIS — S82.141D CLOSED FRACTURE OF RIGHT TIBIAL PLATEAU WITH ROUTINE HEALING, SUBSEQUENT ENCOUNTER: Primary | ICD-10-CM

## 2021-05-25 PROCEDURE — 20605 DRAIN/INJ JOINT/BURSA W/O US: CPT | Performed by: ORTHOPAEDIC SURGERY

## 2021-05-25 PROCEDURE — 99213 OFFICE O/P EST LOW 20 MIN: CPT | Performed by: ORTHOPAEDIC SURGERY

## 2021-05-25 PROCEDURE — 72100 X-RAY EXAM L-S SPINE 2/3 VWS: CPT

## 2021-05-25 PROCEDURE — 73560 X-RAY EXAM OF KNEE 1 OR 2: CPT

## 2021-05-25 PROCEDURE — 72170 X-RAY EXAM OF PELVIS: CPT

## 2021-05-25 RX ORDER — BETAMETHASONE SODIUM PHOSPHATE AND BETAMETHASONE ACETATE 3; 3 MG/ML; MG/ML
6 INJECTION, SUSPENSION INTRA-ARTICULAR; INTRALESIONAL; INTRAMUSCULAR; SOFT TISSUE
Status: COMPLETED | OUTPATIENT
Start: 2021-05-25 | End: 2021-05-25

## 2021-05-25 RX ORDER — BUPIVACAINE HYDROCHLORIDE 2.5 MG/ML
1 INJECTION, SOLUTION INFILTRATION; PERINEURAL
Status: COMPLETED | OUTPATIENT
Start: 2021-05-25 | End: 2021-05-25

## 2021-05-25 RX ADMIN — BUPIVACAINE HYDROCHLORIDE 1 ML: 2.5 INJECTION, SOLUTION INFILTRATION; PERINEURAL at 11:08

## 2021-05-25 RX ADMIN — BETAMETHASONE SODIUM PHOSPHATE AND BETAMETHASONE ACETATE 6 MG: 3; 3 INJECTION, SUSPENSION INTRA-ARTICULAR; INTRALESIONAL; INTRAMUSCULAR; SOFT TISSUE at 11:08

## 2021-05-25 NOTE — PROGRESS NOTES
Assessment/Plan:  Assessment/Plan   Diagnoses and all orders for this visit:    Closed fracture of right tibial plateau with routine healing, subsequent encounter  -     XR knee 1 or 2 vw right; Future    Chronic right SI joint pain  -     XR spine lumbar 2 or 3 views injury; Future  -     XR pelvis ap only 1 or 2 vw; Future  -     Medium joint arthrocentesis    Radiculopathy, lumbar region  -     MRI lumbar spine wo contrast; Future    Other orders  -     Cancel: DXA bone density spine hip and pelvis; Future  -     Medium joint arthrocentesis        Reviewed physical exam findings and x-ray findings with patient at time of visit  Her tibial plateau fracture shows good healing and anatomical alignment with callus formation  She can continue physical therapy for strengthening and stretching for the right lower extremity as long as she continues to see benefit  As far as her radicular symptoms, her physical exam is consistent with right SI joint pain,   And her x-ray and physical exam findings are consistent with 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  She was offered, and accepted,  Depo-Medrol and betamethasone injection to the  Right SI joint for relief of pain and inflammation  Patient tolerated treatment well  She is also being provided a referral for MRI of the lumbar spine for further evaluation of lumbar radiculopathy  She will be seen for follow-up after the MRI is complete  Patient is in agreement with this treatment plan  as far as the patient's right knee is concerned, she is doing well  She can continue weight bear as tolerated  Continue home exercise program   She is cleared to drive  She is having more issues along her low back which has been present for least 20 years  There is diffuse tenderness along her right SI joint that radiates into her groin and thigh region  X-rays show arthritic changes  At this point, the right SI joint was injected with Celestone and Marcaine    An MRI is ordered of her lumbar spine to rule out a nerve compression process  She returned back once the MRI is complete    Subjective:   Patient ID: Simpson Olszewski is a 60 y o  female  HPI  Patient presents for follow-up evaluation s/p right tibial plateau fracture, DOI 2/2/21  She is now 3 5 months (16 weeks) post injury  He was last seen in regards to this issue on 4/13/2021, at which time she was recommended transition to full weight-bearing as tolerated, and to transition from home-based therapy to outpatient physical therapy  Additionally, she notes that she has had ongoing right-sided lumbar pain with radiating symptoms down the posterior aspect of the right lower extremity  She describes the pain as burning/achy, but denies any numbness or tingling distally  The following portions of the patient's history were reviewed and updated as appropriate: allergies, current medications, past family history, past medical history, past social history, past surgical history and problem list     Past Medical History:   Diagnosis Date    A-fib [facility])     Brain aneurysm     Cancer ([facility] )     papillary thyroid cancer    Cardiac disease     CHF (congestive heart failure) ([facility] )     Crohn disease ([facility] )     Diabetes mellitus ([facility] )     Disease of thyroid gland     GERD (gastroesophageal reflux disease)     Hyperlipidemia     Hypertension      Past Surgical History:   Procedure Laterality Date    APPENDECTOMY      CARDIOVERSION N/A 2/14/2019    Procedure: CARDIOVERSION;  Surgeon: Nelida Mahoney MD;  Location: [facility];  Service: Cardiology    CEREBRAL ANEURYSM REPAIR      CHOLECYSTECTOMY      HYSTERECTOMY      THYROIDECTOMY      TONSILLECTOMY AND ADENOIDECTOMY       History reviewed  No pertinent family history      Social History     Socioeconomic History    Marital status: /Civil Union     Spouse name: None    Number of children: None    Years of education: None    Highest education level: None   Occupational History    None   Social Needs    Financial resource strain: None    Food insecurity     Worry: None     Inability: None    Transportation needs     Medical: None     Non-medical: None   Tobacco Use    Smoking status: Former Smoker     Quit date: 1/1/2018     Years since quitting: 3 3    Smokeless tobacco: Never Used   Substance and Sexual Activity    Alcohol use: Yes     Alcohol/week: 0 0 standard drinks     Frequency: Monthly or less     Comment: social    Drug use: Never    Sexual activity: None   Lifestyle    Physical activity     Days per week: None     Minutes per session: None    Stress: None   Relationships    Social connections     Talks on phone: None     Gets together: None     Attends [employer] service: None     Active member of club or organization: None     Attends meetings of clubs or organizations: None     Relationship status: None    Intimate partner violence     Fear of current or ex partner: None     Emotionally abused: None     Physically abused: None     Forced sexual activity: None   Other Topics Concern    None   Social History Narrative    None       Current Outpatient Medications:     albuterol (PROVENTIL HFA,VENTOLIN HFA) 90 mcg/act inhaler, Inhale 2 puffs every 6 (six) hours as needed for wheezing or shortness of breath, Disp: , Rfl: 0    apixaban (ELIQUIS) 5 mg, Take 5 mg by mouth 2 (two) times a day , Disp: , Rfl:     aspirin 81 mg chewable tablet, Chew 1 tablet (81 mg total) daily, Disp: , Rfl: 0    atorvastatin (LIPITOR) 10 mg tablet, Take 10 mg by mouth daily, Disp: , Rfl:     Blood Glucose Monitoring Suppl (Flazio 8080) w/Device KIT, Use to check sugars once a day   Dx E11 9, Disp: , Rfl:     Blood Glucose Monitoring Suppl (Fe[employer] For[employer] 8080) w/Device KIT, daily Use to check glucose, Disp: , Rfl:     dofetilide (TIKOSYN) 500 mcg capsule, Take 500 mcg by mouth 2 (two) times a day, Disp: , Rfl:     fenofibrate (TRICOR) 54 MG tablet, Take 54 mg by mouth daily, Disp: , Rfl:     furosemide (LASIX) 20 mg tablet, Take 20 mg by mouth 2 (two) times a day, Disp: , Rfl:     glipiZIDE (GLUCOTROL XL) 5 mg 24 hr tablet, TAKE 1 TABLET BY MOUTH ONCE DAILY 30 MINUTES BEFORE A MEAL (REPLACES METFORMIN), Disp: , Rfl:     glucose blood test strip, Use to check sugars once a day  Dx E11 9, Disp: , Rfl:     Lancets (ONETOUCH DELICA PLUS [id number]) MISC, USE 1 TO CHECK GLUCOSE ONCE DAILY, Disp: , Rfl:     mesalamine (DELZICOL) 400 mg, Take 2 capsules by mouth twice daily, Disp: 120 capsule, Rfl: 0    methocarbamol (ROBAXIN) 500 mg tablet, Take 1 tablet (500 mg total) by mouth 2 (two) times a day, Disp: 20 tablet, Rfl: 0    metoprolol succinate (TOPROL-XL) 100 mg 24 hr tablet, Take 1 tablet (100 mg total) by mouth every 12 (twelve) hours, Disp: 60 tablet, Rfl: 0    mometasone-formoterol (DULERA) 100-5 MCG/ACT inhaler, Inhale 2 puffs 2 (two) times a day, Disp: , Rfl: 0    OneTouch Delica Lancets 66L MISC, Use to check sugars once a day   Dx E11 9, Disp: , Rfl:     ONETOUCH VERIO test strip, USE 1 STRIP TO CHECK GLUCOSE ONCE DAILY, Disp: , Rfl:     pramipexole (MIRAPEX) 0 5 mg tablet, Take 1 tablet (0 5 mg total) by mouth daily at bedtime, Disp: 90 tablet, Rfl: 1    SYNTHROID 150 MCG tablet, TAKE 1 TABLET BY MOUTH ONCE DAILY FIRST THING IN THE MORNING (AT LEAST 30 MINUTES PRIOR TO BREAKFAST OR OTHER MEDS), Disp: , Rfl:     traMADol (ULTRAM) 50 mg tablet, Take 50 mg by mouth, Disp: , Rfl:     traMADol (ULTRAM) 50 mg tablet, Take 50 mg by mouth every 8 (eight) hours as needed, Disp: , Rfl:     venlafaxine (EFFEXOR XR) 37 5 mg 24 hr capsule, Take 37 5 mg by mouth daily , Disp: , Rfl:     dicyclomine (BENTYL) 20 mg tablet, Take 1 tablet (20 mg total) by mouth every 6 (six) hours as needed (urgency, abdominal pain) (Patient not taking: Reported on 5/17/2021), Disp: 120 tablet, Rfl: 2    omeprazole (PriLOSEC) 40 MG capsule, Take 1 capsule (40 mg total) by mouth daily before breakfast, Disp: 30 capsule, Rfl: 4    Allergies   Allergen Reactions    Sulfa Antibiotics Rash    Other        Review of Systems   Constitutional: Negative for fatigue  Gastrointestinal: Negative for nausea  Musculoskeletal:        As noted in HPI   Neurological: Negative for dizziness, weakness, numbness and headaches  All other systems reviewed and are negative  Objective:  /80   Pulse 69   Ht 5' 9" (1 753 m)   Wt 114 kg (251 lb)   BMI 37 07 kg/m²     Ortho Exam   Right knee -    patient ambulates with antalgic gait pattern, using single-point cane as assistive device  Skin is warm and dry to touch with no signs of erythema, ecchymosis, infection   No soft tissue swelling or effusion noted  No tenderness to palpation over mediolateral tibial plateau   ROM 0°- 601°   Knee is stable to varus, valgus, anterior, posterior stress   - Darek's sign  -  Medial Marisabel's, -  Lateral Marisabel's  -  Pivot shift   2+ TP and DP pulses with brisk capillary refill to the toes  Sural, saphenous, tibial, superficial and deep peroneal motor and sensory distributions intact  Sensation light touch intact distally      Lumbar spine -    no obvious anatomical deformity   Skin is warm dry to touch with no signs of erythema, ecchymosis, infection  Mildly TTP over lumbar midline, TTP over right SI joint  Pain exacerbation with both forward flexion and joint extension  + Supine SLR  +   Right-sided Sitting root sign    Physical Exam  Constitutional:       Appearance: She is well-developed  HENT:      Right Ear: External ear normal       Left Ear: External ear normal       Nose: Nose normal    Eyes:      Conjunctiva/sclera: Conjunctivae normal       Pupils: Pupils are equal, round, and reactive to light  Neck:      Musculoskeletal: Normal range of motion  Pulmonary:      Effort: Pulmonary effort is normal    Musculoskeletal: Normal range of motion  Skin:     General: Skin is warm and dry  Neurological:      Mental Status: She is alert and oriented to person, place, and time  Psychiatric:         Behavior: Behavior normal          Thought Content: Thought content normal          Judgment: Judgment normal          Imaging:   Attending Physician has personally reviewed pertinent imaging in PACS, impression is as follows:    Review of radiographic series taken  5/25/2021 of the  Right knee shows well-healed tibial plateau fracture with callus formation and well-maintained anatomical alignment     Review of radiographic series taken 5/25/2021 of the AP pelvis and lumbar spine is significant for DJD at the L4-L5 and L5-S1 levels with L5-S1 spondylolisthesis and foraminal stenosis    Medium joint arthrocentesis  Universal Protocol:  Procedure performed by: Isa Wayne, LAT, ATC)  Consent: Verbal consent obtained  Risks and benefits: risks, benefits and alternatives were discussed  Consent given by: patient  Timeout called at: 5/25/2021 10:53 AM   Radiology Images displayed and confirmed  If images not available, report reviewed: imaging studies available  Patient identity confirmed: verbally with patient    Supporting Documentation  Indications: joint swelling and pain   Procedure Details  Location: R Sacroiliac    Needle size: 22 G (Spinal)  Ultrasound guidance: no  Approach: posterior  Medications administered: 1 mL bupivacaine 0 25 %; 6 mg betamethasone acetate-betamethasone sodium phosphate 6 (3-3) mg/mL    Patient tolerance: patient tolerated the procedure well with no immediate complications  Dressing:  Sterile dressing applied        Scribe Attestation    I,:  Isa Wayne am acting as a scribe while in the presence of the attending physician :       I,:  Minus Click, DO personally performed the services described in this documentation    as scribed in my presence :

## 2021-05-26 ENCOUNTER — OFFICE VISIT (OUTPATIENT)
Dept: PHYSICAL THERAPY | Facility: HOME HEALTHCARE | Age: 66
End: 2021-05-26
Payer: COMMERCIAL

## 2021-05-26 DIAGNOSIS — S82.101A CLOSED FRACTURE OF PROXIMAL END OF RIGHT TIBIA, UNSPECIFIED FRACTURE MORPHOLOGY, INITIAL ENCOUNTER: Primary | ICD-10-CM

## 2021-05-26 PROCEDURE — 97110 THERAPEUTIC EXERCISES: CPT

## 2021-05-26 NOTE — PROGRESS NOTES
Daily Note     Today's date: 2021  Patient name: Tavares Lanza  : 1955  MRN: [id number]  Referring provider: Jory Oconnell DO  Dx: No diagnosis found  Subjective: I saw Dr Lillie Schmidt  He said my hip and groin area pain is coming from my back and not my leg  He took an X-ray which showed L4 and  S1 DDD and arthritis  I have an MRI sched for 21    He gave me an injection at LB and I feel really good today  No pain at LB or hip/LE  I drove to PT and am not using my cane  Objective: See treatment diary below    Assessment: Tolerated treatment well  Pt was able to resume TE as per flow sheet without c/o LB or R LE pain  Pt declined CP/MHP at end  Pt reports understanding of HEP and will complete as daily ability allows  Patient would benefit from continued PT    Plan: Continue per plan of care        Re-eval Date: 6/3/21    Date 21   Visit Count 11 12 13 9 10   FOTO              Precautions: WBAT        Manuals 21   R knee flex/ext    DC Mayo HS/Piri/Add  10'           Neuro Re-Ed         Balance as appropriate      Held      Romberg   Foam   15" x 3 EO   Romberg foam   30" x 1 EO  30" x 2 EC Held      Tandem   Foam EO   15" x 1 ea  Tandem foam   EO  15" x 2  Held      SLS Firm  10" x 2 EO  SLS   15" x 1 EO Held           Ther Ex 21   NuStep  L1 10' L 1  10 min  L3  10' L 3  10 min  L 1  10'   SBB Standing L/S extension against wall    5" x 5 to start  5" x 5 at end   Prone press ups Pain- unable       Right lateral side glides against wall 10x       Left lateral side glides against wall 3x10       PPT     5" x 15   HR/TR  1 x 20 ea   foam 1 x 15 ea   Foam  1 x 15 ea foam  Held   Standing hip flex/abd/ext mayo   1 x 15 ea R LE 1 x 15 ea   R LE only   Foam 1 x 15 ea L LE foam Held   Squats        Step-ups    C Fwd   1 x 15  C Fwd   1 x 15  Held   T-band TKE        SAQ   > LAQ LAQ   5" x 20  LAQ  5" 1 x 20  LAQ   5" x 20  LAQ   5" x 20  Held   Hip add  5" 1 x 20 5" x 20  5"x 20 Held   Hip abd        Ankle tband: all planes  Red inv/ever  1 x 10 ea   Green   DF/PF 1 x 10 ea  Red In/Ev x 10   Green DF/PF x 10  Red inv/ev  1 x 10   Green   DF/PF 1 x 10 Held   SLR 2x10 1 x 10 1 x 15 2 x 10  Held   S/L SLR  2x10 2 x 10  1 x 15 2 x 10  Held   Bridge  2x10 1 x 10  1 x 15  2 x 10  Held   Supine hip rolls to right                                Ther Activity                        Gait Training        With use of SPC    NP Pt declined Without use of AD metronome for improved symmetrical gait   80 bpm  250 feet x 2 with Sh Held           Modalities        CP prn  Declined Pt declined  Declined Declined  Declined

## 2021-05-28 ENCOUNTER — OFFICE VISIT (OUTPATIENT)
Dept: PHYSICAL THERAPY | Facility: HOME HEALTHCARE | Age: 66
End: 2021-05-28
Payer: COMMERCIAL

## 2021-05-28 DIAGNOSIS — S82.101A CLOSED FRACTURE OF PROXIMAL END OF RIGHT TIBIA, UNSPECIFIED FRACTURE MORPHOLOGY, INITIAL ENCOUNTER: Primary | ICD-10-CM

## 2021-05-28 PROCEDURE — 97110 THERAPEUTIC EXERCISES: CPT

## 2021-05-28 NOTE — PROGRESS NOTES
PT Re-Evaluation     Today's date: 2021  Patient name: Meri Knight  : 1955  MRN: [id number]  Referring provider: Richard Owens DO  Dx:   Encounter Diagnosis     ICD-10-CM    1  Closed fracture of proximal end of right tibia, unspecified fracture morphology, initial encounter  S82 101A        Start Time: 1115  Stop Time: 1153  Total time in clinic (min): 38 minutes    Assessment  Assessment details: Pt Robert Boy is a 60 y o  who presents to OPPT s/p a non surgical tibial fracture R LE  Pt presents with limited RLE ROM, decreased LE strength, edema, impaired soft tissue mobility, and gait/balance dysfunction  Pt is currently using AD for mobility and limited to ambulation tolerance < 10 minutes for household distances only  Stair negotiation with non-reciprocal gait pattern and use of HR  No reported falls since discharge home  She notes that she is significantly limited at home and sedentary majority of the time  She attempted showering recently but fearful of falling and inability to stand for prolonged periods so is looking into getting a shower chair  She is out of work per MD orders and restricted from driving at this time  Pt would benefit from skilled therapy services to address outlined impairments, work towards goals, and restore pts PLOF  Thank you! UPDATE: Pt has shown good progression with overall mobility and strength since [facility]  She is now able to ambulate without AD with tolerance 15-20 minutes  She can ascend stairs with reciprocal pattern but continues to descend with non-reciprocal gait  She continues to have strength deficits in the R LE and would benefit from continued therapy to address remaining impairments  Thank you!        Impairments: activity intolerance, impaired balance, impaired physical strength, pain with function, safety issue and weight-bearing intolerance  Understanding of Dx/Px/POC: good   Prognosis: good    Goals  STGs to be achieved in 4 weeks:  -Pt to demonstrate reduced subjective pain rating "at worst" by at least 2-3 points from Initial Eval to allow for reduced pain with ADLs and improved functional activity tolerance  MET  -Pt to demonstrate full AROM of R knee in order to maximize joint mobility and function and allow for progression of exercise program and achievement of goals  MET  -Pt to demonstrate increased MMT of RLE by at least 1/2-1 grade in order to improve safety and stability with ADLs and functional mobility  MET    LTGs to be achieved in 6-8 weeks:  -Pt will be I with HEP in order to continue to improve quality of life and independence and reduce risk for re-injury    -Pt to demonstrate return to activities of daily living without limiations or restrictions    -Pt to demonstrate return to work activities without limitations or restrictions    -Pt to demonstrate ambulation without AD > 30 minutes for return to community activities without limitations   -Pt to demonstrate improved function as noted by achieving or exceeding predicted score on FOTO outcomes assessment tool  Plan  Plan details: PT provided pt with detailed HEP program; pt verbalized understanding of program    Patient would benefit from: skilled physical therapy  Planned modality interventions: cryotherapy  Planned therapy interventions: manual therapy, neuromuscular re-education, patient education, self care, home exercise program, gait training, functional ROM exercises, flexibility, strengthening, stretching, therapeutic exercise, balance and balance/weight bearing training  Duration in weeks: 4  Plan of Care beginning date: 5/28/2021  Plan of Care expiration date: 6/28/2021  Treatment plan discussed with: patient        Subjective Evaluation    History of Present Illness  Mechanism of injury: UPDATE: Pt reporting that she is really seeing a difference with therapy and would like to continue a little bit longer to get stronger yet     Quality of life: good    Pain  At best pain ratin  At worst pain ratin  Quality: dull ache  Relieving factors: rest  Aggravating factors: standing, walking and stair climbing    Social Support  Steps to enter house: yes  Stairs in house: yes   Lives in: multiple-level home  Lives with: spouse    Employment status: not working    Diagnostic Tests  Abnormal x-ray: fracture healed   Treatments  Current treatment: physical therapy  Discharged from (in last 30 days): home health care  Patient Goals  Patient goals for therapy: increased strength, independence with ADLs/IADLs, return to work, increased motion, improved balance, decreased pain and decreased edema          Objective     Neurological Testing     Sensation     Knee   Left Knee   Intact: light touch    Right Knee   Intact: light touch     Active Range of Motion   Left Hip   Normal active range of motion    Right Hip   Flexion: 65 degrees   Left Knee   Flexion: 120 degrees   Extension: 0 degrees     Right Knee   Flexion: 105 degrees   Extension: 0 degrees   Left Ankle/Foot   Dorsiflexion (ke): 4 degrees   Plantar flexion: 60 degrees     Right Ankle/Foot   Dorsiflexion (ke): 2 degrees   Plantar flexion: 45 degrees     Strength/Myotome Testing     Left Hip   Normal muscle strength    Right Hip   Planes of Motion   Flexion: 3-  Abduction: 3+  Adduction: 3+    Left Knee   Normal strength    Right Knee   Flexion: 4-  Extension: 4-    Left Ankle/Foot   Normal strength    Right Ankle/Foot   Dorsiflexion: 4-  Plantar flexion: 4-    Ambulation   Weight-Bearing Status   Weight-Bearing Status (Right): full weight-bearing      Additional Weight-Bearing Status Details  No use of AD  Tolerance 15-20 minutes     Ambulation: Stairs   Pattern: non-reciprocal  Railings: one rail  Pattern: non-reciprocal  Railings: one rail

## 2021-06-02 ENCOUNTER — OFFICE VISIT (OUTPATIENT)
Dept: PHYSICAL THERAPY | Facility: HOME HEALTHCARE | Age: 66
End: 2021-06-02
Payer: COMMERCIAL

## 2021-06-02 DIAGNOSIS — S82.101A CLOSED FRACTURE OF PROXIMAL END OF RIGHT TIBIA, UNSPECIFIED FRACTURE MORPHOLOGY, INITIAL ENCOUNTER: Primary | ICD-10-CM

## 2021-06-02 DIAGNOSIS — K21.9 GASTROESOPHAGEAL REFLUX DISEASE, UNSPECIFIED WHETHER ESOPHAGITIS PRESENT: ICD-10-CM

## 2021-06-02 PROCEDURE — 97110 THERAPEUTIC EXERCISES: CPT

## 2021-06-02 PROCEDURE — 97112 NEUROMUSCULAR REEDUCATION: CPT

## 2021-06-02 RX ORDER — OMEPRAZOLE 40 MG/1
CAPSULE, DELAYED RELEASE ORAL
Qty: 30 CAPSULE | Refills: 0 | Status: SHIPPED | OUTPATIENT
Start: 2021-06-02 | End: 2021-06-28

## 2021-06-02 NOTE — PROGRESS NOTES
Daily Note     Today's date: 2021  Patient name: Gerber Mclain  : 1955  MRN: [id number]  Referring provider: Britton Barnes DO  Dx:   Encounter Diagnosis     ICD-10-CM    1  Closed fracture of proximal end of right tibia, unspecified fracture morphology, initial encounter  S82 101A        Start Time: 0105          Subjective:     Objective: See treatment diary below    Assessment: Tolerated treatment well  Challenged pt with added 2# with LAQ and combination hip add/bridge with good nathan  Weakness noted with SL abd and supine SLR  Occasional hesitation with transfers to avoid LB and R LE discomfort  Patient would benefit from continued PT    Plan: Continue per plan of care        Re-eval Date: 6/3/21    Date 21   Visit Count 11 12 13 1 10   FOTO              Precautions: WBAT        Manuals 21   R knee flex/ext                Neuro Re-Ed         Balance as appropriate            Romberg   Foam   15" x 3 EO   Romberg foam   30" x 1 EO  30" x 2 EC Rhomberg  Foam  30" x 1 EO  30" x 2 EC      Tandem   Foam EO   15" x 1 ea  Tandem foam   EO  30" x 2  Tandem foam  EO 30" x 2 ea      SLS Firm  10" x 2 EO  SLS   15" x 2 EO SLS   15" x 2 EO           Ther Ex 21   NuStep L 1   10' L 1  10' L3  10' L 3  10' L 3  10'    HR/TR  1 x 20 ea   foam 1 x 15 ea   Foam  1 x 20 ea foam  Foam  1  x20 ea   Standing hip flex/abd/ext mayo   1 x 15 ea R LE 1 x 15 ea   R LE only   Foam 1 x 15 ea L LE foam Foam  1 x 15 ea   L LE   Squats        Step-ups    C Fwd   1 x 15  C Fwd   1 x 20 C Fwd  1 x 20    T-band TKE        SAQ   > LAQ LAQ   5" x 20  LAQ  5" 1 x 20  LAQ   5" x 20  LAQ   5" x 20  2# LAQ  5" x 20   Hip add  5" 1 x 20 5" x 20  5"x 20 W/bridge below   Hip abd        Ankle tband: all planes  Red inv/ever  1 x 10 ea   Green   DF/PF 1 x 10 ea  Red In/Ev x 10   Green DF/PF x 10  Red inv/ev  1 x 20   Green   DF/PF 1 x 20 Red In/Ev 1 x 20  Green PT/DF 1 x 20   SLR 2x10 1 x 10 1 x 15 2 x 10  2  x10   S/L SLR  2x10 2 x 10  1 x 15 2 x 10  2 x 10   Bridge  2x10 1 x 10  1 x 15  2 x 10  W/hip add 2 x 10   Supine hip rolls to right                                Ther Activity                        Gait Training        With use of SPC    NP Pt declined             Modalities        CP prn  Declined Pt declined  Declined Declined  Declined

## 2021-06-03 ENCOUNTER — HOSPITAL ENCOUNTER (OUTPATIENT)
Dept: MRI IMAGING | Facility: HOSPITAL | Age: 66
Discharge: HOME | End: 2021-06-03
Attending: ORTHOPAEDIC SURGERY
Payer: COMMERCIAL

## 2021-06-03 DIAGNOSIS — M54.16 RADICULOPATHY, LUMBAR REGION: ICD-10-CM

## 2021-06-03 PROCEDURE — G1004 CDSM NDSC: HCPCS

## 2021-06-03 PROCEDURE — 72148 MRI LUMBAR SPINE W/O DYE: CPT

## 2021-06-04 ENCOUNTER — APPOINTMENT (OUTPATIENT)
Dept: PHYSICAL THERAPY | Facility: HOME HEALTHCARE | Age: 66
End: 2021-06-04
Payer: COMMERCIAL

## 2021-06-07 ENCOUNTER — APPOINTMENT (OUTPATIENT)
Dept: PHYSICAL THERAPY | Facility: HOME HEALTHCARE | Age: 66
End: 2021-06-07
Payer: COMMERCIAL

## 2021-06-10 ENCOUNTER — OFFICE VISIT (OUTPATIENT)
Dept: PHYSICAL THERAPY | Facility: HOME HEALTHCARE | Age: 66
End: 2021-06-10
Payer: COMMERCIAL

## 2021-06-10 DIAGNOSIS — S82.101A CLOSED FRACTURE OF PROXIMAL END OF RIGHT TIBIA, UNSPECIFIED FRACTURE MORPHOLOGY, INITIAL ENCOUNTER: Primary | ICD-10-CM

## 2021-06-10 PROCEDURE — 97110 THERAPEUTIC EXERCISES: CPT

## 2021-06-10 PROCEDURE — 97112 NEUROMUSCULAR REEDUCATION: CPT

## 2021-06-10 NOTE — PROGRESS NOTES
Daily Note     Today's date: 6/10/2021  Patient name: Elier Drake  : 1955  MRN: [id number]  Referring provider: Joselin Douglass DO  Dx:   Encounter Diagnosis     ICD-10-CM    1  Closed fracture of proximal end of right tibia, unspecified fracture morphology, initial encounter  S82 101A                   Subjective: Pt reports she is feeling good and has no pain  Objective: See treatment diary below      Assessment: Tolerated treatment well  Patient with no c/o pain t/o session or at end of session  See DC summary today dated 6/10/21 for assessment  Plan: Pt to be DC from PT        Re-eval Date: 6/3/21    Date 6/10/21 5/21/21 5-26-21 5/28/21 6-2-21   Visit Count 3 12 13 1 2   FOTO              Precautions: WBAT        Manuals 6/10/21 5-21-21 5-26-21 5/28/21 6-2-21   R knee flex/ext                Neuro Re-Ed         Balance as appropriate          Romberg Foam 30" x 2 EC  Romberg   Foam   15" x 3 EO   Romberg foam   30" x 1 EO  30" x 2 EC Rhomberg  Foam  30" x 1 EO  30" x 2 EC    Tandem foam   EO 30" x 2   Tandem   Foam EO   15" x 1 ea  Tandem foam   EO  30" x 2  Tandem foam  EO 30" x 2 ea    SLS  15" x  2 EO  SLS Firm  10" x 2 EO  SLS   15" x 2 EO SLS   15" x 2 EO           Ther Ex     21   NuStep L 3   10' L 1  10' L3  10' L 3  10' L 3  10'    HR/TR Foam 1 x  20 ea  1 x 20 ea   foam 1 x 15 ea   Foam  1 x 20 ea foam  Foam  1  x20 ea   Standing hip flex/abd/ext maoy  Foam   1 x 20 ea R LE 1 x 15 ea R LE 1 x 15 ea   R LE only   Foam 1 x 15 ea L LE foam Foam  1 x 15 ea   R LE   Squats        Step-ups  C Fwd 1 x 20   C Fwd   1 x 15  C Fwd   1 x 20 C Fwd  1 x 20    T-band TKE        SAQ   > LAQ LAQ   5" x 20  2# LAQ  5" 1 x 20  LAQ   5" x 20  LAQ   5" x 20  2# LAQ  5" x 20   Hip add  5" 1 x 20 5" x 20  5"x 20 W/bridge below   Hip abd        Ankle tband: all planes Red Inv/Ever   1 x 20   Green PF/PF  1 x 20  Red inv/ever  1 x 10 ea   Green   DF/PF 1 x 10 ea  Red In/Ev x 10   Green DF/PF x 10  Red inv/ev  1 x 20   Green   DF/PF 1 x 20 Red In/Ev 1 x 20  Green PT/DF 1 x 20   SLR 2x10 1 x 10 1 x 15 2 x 10  2  x10   S/L SLR  2x10 2 x 10  1 x 15 2 x 10  2 x 10   Bridge  2x10 with add 1 x 10  1 x 15  2 x 10  W/hip add 2 x 10   Supine hip rolls to right                                Ther Activity                        Gait Training        With use of SPC    NP Pt declined             Modalities        CP prn  Declined Pt declined  Declined Declined  Declined

## 2021-06-11 NOTE — PROGRESS NOTES
PT Discharge    Today's date: 2021  Patient name: Simpson Olszewski  : 1955  MRN: [id number]  Referring provider: Raad Escamilla DO  Dx:   Encounter Diagnosis     ICD-10-CM    1  Closed fracture of proximal end of right tibia, unspecified fracture morphology, initial encounter  S82 101A        Start Time: 9864  Stop Time: 0950  Total time in clinic (min): 40 minutes    Assessment  Assessment details: The patient had her PT IE on 21 and she was seen in PT for a total of 16 visits with her last treatment on 6/10/21  She had been doing better and wishes to be D/C from PT  Unable to assess her current status, refer to her last re-eval for her final assessment  Progress towards her goals as outlined in her last re-eval   D/C PT secondary to patient request   D/C PT  Impairments: activity intolerance, impaired balance, impaired physical strength, pain with function, safety issue and weight-bearing intolerance  Understanding of Dx/Px/POC: good   Prognosis: good    Goals  STGs to be achieved in 4 weeks:  -Pt to demonstrate reduced subjective pain rating "at worst" by at least 2-3 points from Initial Eval to allow for reduced pain with ADLs and improved functional activity tolerance  MET  -Pt to demonstrate full AROM of R knee in order to maximize joint mobility and function and allow for progression of exercise program and achievement of goals  MET  -Pt to demonstrate increased MMT of RLE by at least 1/2-1 grade in order to improve safety and stability with ADLs and functional mobility   MET    LTGs to be achieved in 6-8 weeks:  -Pt will be I with HEP in order to continue to improve quality of life and independence and reduce risk for re-injury    -Pt to demonstrate return to activities of daily living without limiations or restrictions    -Pt to demonstrate return to work activities without limitations or restrictions    -Pt to demonstrate ambulation without AD > 30 minutes for return to community activities without limitations   -Pt to demonstrate improved function as noted by achieving or exceeding predicted score on FOTO outcomes assessment tool  Plan  Plan details:  D/C PT secondary to patient request   D/C PT  Planned modality interventions: cryotherapy  Planned therapy interventions: manual therapy, neuromuscular re-education, patient education, self care, home exercise program, gait training, functional ROM exercises, flexibility, strengthening, stretching, therapeutic exercise, balance and balance/weight bearing training        Subjective Evaluation    History of Present Illness  Mechanism of injury: UPDATE: Pt reporting that she is really seeing a difference with therapy and would like to continue a little bit longer to get stronger yet     Quality of life: good    Pain  At best pain ratin  At worst pain ratin  Quality: dull ache  Relieving factors: rest  Aggravating factors: standing, walking and stair climbing    Social Support  Steps to enter house: yes  Stairs in house: yes   Lives in: multiple-level home  Lives with: spouse    Employment status: not working    Diagnostic Tests  Abnormal x-ray: fracture healed   Treatments  Current treatment: physical therapy  Discharged from (in last 30 days): home health care  Patient Goals  Patient goals for therapy: increased strength, independence with ADLs/IADLs, return to work, increased motion, improved balance, decreased pain and decreased edema          Objective     Neurological Testing     Sensation     Knee   Left Knee   Intact: light touch    Right Knee   Intact: light touch     Active Range of Motion   Left Hip   Normal active range of motion    Right Hip   Flexion: 65 degrees   Left Knee   Flexion: 120 degrees   Extension: 0 degrees     Right Knee   Flexion: 105 degrees   Extension: 0 degrees   Left Ankle/Foot   Dorsiflexion (ke): 4 degrees   Plantar flexion: 60 degrees     Right Ankle/Foot   Dorsiflexion (ke): 2 degrees   Plantar flexion: 45 degrees     Strength/Myotome Testing     Left Hip   Normal muscle strength    Right Hip   Planes of Motion   Flexion: 3-  Abduction: 3+  Adduction: 3+    Left Knee   Normal strength    Right Knee   Flexion: 4-  Extension: 4-    Left Ankle/Foot   Normal strength    Right Ankle/Foot   Dorsiflexion: 4-  Plantar flexion: 4-    Ambulation   Weight-Bearing Status   Weight-Bearing Status (Right): full weight-bearing      Additional Weight-Bearing Status Details  No use of AD  Tolerance 15-20 minutes     Ambulation: Stairs   Pattern: non-reciprocal  Railings: one rail  Pattern: non-reciprocal  Railings: one rail

## 2021-06-14 ENCOUNTER — APPOINTMENT (OUTPATIENT)
Dept: PHYSICAL THERAPY | Facility: HOME HEALTHCARE | Age: 66
End: 2021-06-14
Payer: COMMERCIAL

## 2021-06-15 ENCOUNTER — OFFICE VISIT (OUTPATIENT)
Dept: OBGYN CLINIC | Facility: CLINIC | Age: 66
End: 2021-06-15
Payer: COMMERCIAL

## 2021-06-15 VITALS
WEIGHT: 251 LBS | DIASTOLIC BLOOD PRESSURE: 73 MMHG | HEIGHT: 69 IN | BODY MASS INDEX: 37.18 KG/M2 | SYSTOLIC BLOOD PRESSURE: 143 MMHG | HEART RATE: 73 BPM

## 2021-06-15 DIAGNOSIS — G89.29 CHRONIC RIGHT SI JOINT PAIN: ICD-10-CM

## 2021-06-15 DIAGNOSIS — M54.16 RADICULOPATHY, LUMBAR REGION: Primary | ICD-10-CM

## 2021-06-15 DIAGNOSIS — M53.3 CHRONIC RIGHT SI JOINT PAIN: ICD-10-CM

## 2021-06-15 PROCEDURE — 99213 OFFICE O/P EST LOW 20 MIN: CPT | Performed by: ORTHOPAEDIC SURGERY

## 2021-06-15 NOTE — LETTER
Tea 15, 2021     Patient: Luiza Doran   YOB: 1955   Date of Visit: 6/15/2021       To Whom it May Concern:    Hermila Roque is under my professional care  She was seen in my office on 6/15/2021  She   Is cleared to return to work without limitations or restrictions beginning 6/21/2021  If you have any questions or concerns, please don't hesitate to call           Sincerely,          Michell Bermudez, DO        CC: No Recipients

## 2021-06-17 ENCOUNTER — APPOINTMENT (OUTPATIENT)
Dept: PHYSICAL THERAPY | Facility: HOME HEALTHCARE | Age: 66
End: 2021-06-17
Payer: COMMERCIAL

## 2021-06-18 DIAGNOSIS — K50.919 CROHN'S DISEASE WITH COMPLICATION, UNSPECIFIED GASTROINTESTINAL TRACT LOCATION (HCC): ICD-10-CM

## 2021-06-18 RX ORDER — MESALAMINE 400 MG/1
CAPSULE, DELAYED RELEASE ORAL
Qty: 120 CAPSULE | Refills: 0 | Status: SHIPPED | OUTPATIENT
Start: 2021-06-18 | End: 2021-07-22

## 2021-06-21 ENCOUNTER — APPOINTMENT (OUTPATIENT)
Dept: PHYSICAL THERAPY | Facility: HOME HEALTHCARE | Age: 66
End: 2021-06-21
Payer: COMMERCIAL

## 2021-06-24 ENCOUNTER — APPOINTMENT (OUTPATIENT)
Dept: PHYSICAL THERAPY | Facility: HOME HEALTHCARE | Age: 66
End: 2021-06-24
Payer: COMMERCIAL

## 2021-06-28 ENCOUNTER — APPOINTMENT (OUTPATIENT)
Dept: PHYSICAL THERAPY | Facility: HOME HEALTHCARE | Age: 66
End: 2021-06-28
Payer: COMMERCIAL

## 2021-06-28 DIAGNOSIS — K21.9 GASTROESOPHAGEAL REFLUX DISEASE, UNSPECIFIED WHETHER ESOPHAGITIS PRESENT: ICD-10-CM

## 2021-06-28 RX ORDER — OMEPRAZOLE 40 MG/1
CAPSULE, DELAYED RELEASE ORAL
Qty: 30 CAPSULE | Refills: 0 | Status: SHIPPED | OUTPATIENT
Start: 2021-06-28 | End: 2021-07-27

## 2021-07-22 DIAGNOSIS — K50.919 CROHN'S DISEASE WITH COMPLICATION, UNSPECIFIED GASTROINTESTINAL TRACT LOCATION (HCC): ICD-10-CM

## 2021-07-22 RX ORDER — MESALAMINE 400 MG/1
CAPSULE, DELAYED RELEASE ORAL
Qty: 120 CAPSULE | Refills: 0 | Status: SHIPPED | OUTPATIENT
Start: 2021-07-22 | End: 2021-08-18 | Stop reason: SDUPTHER

## 2021-07-27 DIAGNOSIS — K21.9 GASTROESOPHAGEAL REFLUX DISEASE, UNSPECIFIED WHETHER ESOPHAGITIS PRESENT: ICD-10-CM

## 2021-07-27 RX ORDER — OMEPRAZOLE 40 MG/1
CAPSULE, DELAYED RELEASE ORAL
Qty: 30 CAPSULE | Refills: 0 | Status: SHIPPED | OUTPATIENT
Start: 2021-07-27 | End: 2021-08-24

## 2021-08-18 DIAGNOSIS — K50.919 CROHN'S DISEASE WITH COMPLICATION, UNSPECIFIED GASTROINTESTINAL TRACT LOCATION (HCC): ICD-10-CM

## 2021-08-18 RX ORDER — MESALAMINE 400 MG/1
CAPSULE, DELAYED RELEASE ORAL
Qty: 120 CAPSULE | Refills: 0 | Status: SHIPPED | OUTPATIENT
Start: 2021-08-18 | End: 2021-09-17

## 2021-08-24 DIAGNOSIS — K21.9 GASTROESOPHAGEAL REFLUX DISEASE, UNSPECIFIED WHETHER ESOPHAGITIS PRESENT: ICD-10-CM

## 2021-08-24 RX ORDER — OMEPRAZOLE 40 MG/1
CAPSULE, DELAYED RELEASE ORAL
Qty: 30 CAPSULE | Refills: 0 | Status: SHIPPED | OUTPATIENT
Start: 2021-08-24 | End: 2021-08-28 | Stop reason: SDUPTHER

## 2021-08-28 DIAGNOSIS — K21.9 GASTROESOPHAGEAL REFLUX DISEASE, UNSPECIFIED WHETHER ESOPHAGITIS PRESENT: ICD-10-CM

## 2021-08-28 RX ORDER — OMEPRAZOLE 40 MG/1
CAPSULE, DELAYED RELEASE ORAL
Qty: 30 CAPSULE | Refills: 0 | Status: SHIPPED | OUTPATIENT
Start: 2021-08-28 | End: 2021-10-06

## 2021-08-31 ENCOUNTER — OFFICE VISIT (OUTPATIENT)
Dept: OBGYN CLINIC | Facility: CLINIC | Age: 66
End: 2021-08-31
Payer: COMMERCIAL

## 2021-08-31 VITALS
WEIGHT: 251 LBS | HEART RATE: 73 BPM | BODY MASS INDEX: 37.18 KG/M2 | HEIGHT: 69 IN | SYSTOLIC BLOOD PRESSURE: 148 MMHG | DIASTOLIC BLOOD PRESSURE: 75 MMHG

## 2021-08-31 DIAGNOSIS — M53.3 CHRONIC RIGHT SI JOINT PAIN: Primary | ICD-10-CM

## 2021-08-31 DIAGNOSIS — G89.29 CHRONIC RIGHT SI JOINT PAIN: Primary | ICD-10-CM

## 2021-08-31 PROCEDURE — 20610 DRAIN/INJ JOINT/BURSA W/O US: CPT | Performed by: ORTHOPAEDIC SURGERY

## 2021-08-31 PROCEDURE — 99213 OFFICE O/P EST LOW 20 MIN: CPT | Performed by: ORTHOPAEDIC SURGERY

## 2021-08-31 RX ORDER — BUPIVACAINE HYDROCHLORIDE 2.5 MG/ML
1 INJECTION, SOLUTION INFILTRATION; PERINEURAL
Status: COMPLETED | OUTPATIENT
Start: 2021-08-31 | End: 2021-08-31

## 2021-08-31 RX ORDER — BETAMETHASONE SODIUM PHOSPHATE AND BETAMETHASONE ACETATE 3; 3 MG/ML; MG/ML
6 INJECTION, SUSPENSION INTRA-ARTICULAR; INTRALESIONAL; INTRAMUSCULAR; SOFT TISSUE
Status: COMPLETED | OUTPATIENT
Start: 2021-08-31 | End: 2021-08-31

## 2021-08-31 RX ADMIN — BETAMETHASONE SODIUM PHOSPHATE AND BETAMETHASONE ACETATE 6 MG: 3; 3 INJECTION, SUSPENSION INTRA-ARTICULAR; INTRALESIONAL; INTRAMUSCULAR; SOFT TISSUE at 15:21

## 2021-08-31 RX ADMIN — BUPIVACAINE HYDROCHLORIDE 1 ML: 2.5 INJECTION, SOLUTION INFILTRATION; PERINEURAL at 15:21

## 2021-08-31 NOTE — PROGRESS NOTES
ASSESSMENT/PLAN:    Diagnoses and all orders for this visit:    Chronic right SI joint pain    Other orders  -     Cancel: DXA bone density spine hip and pelvis; Future  -     Medium joint arthrocentesis          The patient's right SI joint was injected with Celestone and Marcaine  She tolerated the injection quite well  She will follow up with our office in 3 months  The patient is acceptable to this plan  Return in about 3 months (around 11/30/2021)  The right SI joint was injected with Celestone and Marcaine  She tolerated suture quite well  Return back in 3 months for evaluation  If her condition changes, she will not hesitate to let us know  Physical examination shows point tenderness along this region with a positive Chuck's  Neurologic intact      _____________________________________________________  CHIEF COMPLAINT:  Chief Complaint   Patient presents with    Right Hip - Follow-up         SUBJECTIVE:  Manasa Mir is a 60 y o  female who presents  To our office complaining of right-sided lower back pain  The patient has a history has a history of right-sided sacroiliitis  The patient has had a corticosteroid injection in the past, and states they gave her adequate pain relief  She would like an injection today  She denies any numbness or tingling  She denies any fever or chills       The following portions of the patient's history were reviewed and updated as appropriate: allergies, current medications, past family history, past medical history, past social history, past surgical history and problem list     PAST MEDICAL HISTORY:  Past Medical History:   Diagnosis Date    [facility])     Brain aneurysm     [facility] [address] [facility]     papillary thyroid cancer    Cardiac disease     CHF [facility]congestive [facility] failure) [facility] [address] [facility]     [facility] disease [facility] [address] [facility]     [facility]     Disease of thyroid gland     GERD (gastroesophageal reflux disease)     Hyperlipidemia     Hypertension PAST SURGICAL HISTORY:  Past Surgical History:   Procedure Laterality Date    APPENDECTOMY      CARDIOVERSION N/A 2/14/2019    Procedure: CARDIOVERSION;  Surgeon: Lalitha Rashid MD;  Location: [facility];  Service: Cardiology    CEREBRAL ANEURYSM REPAIR      CHOLECYSTECTOMY      HYSTERECTOMY      THYROIDECTOMY      TONSILLECTOMY AND ADENOIDECTOMY         FAMILY HISTORY:  History reviewed  No pertinent family history  SOCIAL HISTORY:  Social History     Tobacco Use    Smoking status: Former Smoker     Quit date: 1/1/2018     Years since quitting: 3 6    Smokeless tobacco: Never Used   Vaping Use    Vaping Use: Never used   Substance Use Topics    Alcohol use: Yes     Alcohol/week: 0 0 standard drinks     Comment: social    Drug use: Never       MEDICATIONS:    Current Outpatient Medications:     albuterol (PROVENTIL HFA,VENTOLIN HFA) 90 mcg/act inhaler, Inhale 2 puffs every 6 (six) hours as needed for wheezing or shortness of breath, Disp: , Rfl: 0    apixaban (ELIQUIS) 5 mg, Take 5 mg by mouth 2 (two) times a day , Disp: , Rfl:     aspirin 81 mg chewable tablet, Chew 1 tablet (81 mg total) daily, Disp: , Rfl: 0    atorvastatin (LIPITOR) 10 mg tablet, Take 10 mg by mouth daily, Disp: , Rfl:     Blood Glucose Monitoring Suppl (ONETOUCH VERIO) w/Device KIT, Use to check sugars once a day   Dx E11 9, Disp: , Rfl:     Blood Glucose Monitoring Suppl (Jessica Vides) w/Device KIT, daily Use to check glucose, Disp: , Rfl:     dicyclomine (BENTYL) 20 mg tablet, Take 1 tablet (20 mg total) by mouth every 6 (six) hours as needed (urgency, abdominal pain) (Patient not taking: Reported on 5/17/2021), Disp: 120 tablet, Rfl: 2    dofetilide (TIKOSYN) 500 mcg capsule, Take 500 mcg by mouth 2 (two) times a day, Disp: , Rfl:     fenofibrate (TRICOR) 54 MG tablet, Take 54 mg by mouth daily, Disp: , Rfl:     furosemide (LASIX) 20 mg tablet, Take 20 mg by mouth 2 (two) times a day, Disp: , Rfl:    glipiZIDE (GLUCOTROL XL) 5 mg 24 hr tablet, TAKE 1 TABLET BY MOUTH ONCE DAILY 30 MINUTES BEFORE A MEAL (REPLACES METFORMIN), Disp: , Rfl:     glucose blood test strip, Use to check sugars once a day  Dx E11 9, Disp: , Rfl:     Lancets (ONETOUCH DELICA PLUS [id number]) MISC, USE 1 TO CHECK GLUCOSE ONCE DAILY, Disp: , Rfl:     mesalamine (DELZICOL) 400 mg, Take 2 capsules by mouth twice daily, Disp: 120 capsule, Rfl: 0    methocarbamol (ROBAXIN) 500 mg tablet, Take 1 tablet (500 mg total) by mouth 2 (two) times a day, Disp: 20 tablet, Rfl: 0    metoprolol succinate (TOPROL-XL) 100 mg 24 hr tablet, Take 1 tablet (100 mg total) by mouth every 12 (twelve) hours, Disp: 60 tablet, Rfl: 0    mometasone-formoterol (DULERA) 100-5 MCG/ACT inhaler, Inhale 2 puffs 2 (two) times a day, Disp: , Rfl: 0    omeprazole (PriLOSEC) 40 MG capsule, TAKE 1 CAPSULE BY MOUTH ONCE DAILY BEFORE BREAKFAST, Disp: 30 capsule, Rfl: 0    OneTouch Delica Lancets 95C MISC, Use to check sugars once a day  Dx E11 9, Disp: , Rfl:     ONETOUCH VERIO test strip, USE 1 STRIP TO CHECK GLUCOSE ONCE DAILY, Disp: , Rfl:     pramipexole (MIRAPEX) 0 5 mg tablet, Take 1 tablet (0 5 mg total) by mouth daily at bedtime, Disp: 90 tablet, Rfl: 1    SYNTHROID 150 MCG tablet, TAKE 1 TABLET BY MOUTH ONCE DAILY FIRST THING IN THE MORNING (AT LEAST 30 MINUTES PRIOR TO BREAKFAST OR OTHER MEDS), Disp: , Rfl:     traMADol (ULTRAM) 50 mg tablet, Take 50 mg by mouth (Patient not taking: Reported on 8/31/2021), Disp: , Rfl:     traMADol (ULTRAM) 50 mg tablet, Take 50 mg by mouth every 8 (eight) hours as needed, Disp: , Rfl:     venlafaxine (EFFEXOR XR) 37 5 mg 24 hr capsule, Take 37 5 mg by mouth daily , Disp: , Rfl:     ALLERGIES:  Allergies   Allergen Reactions    Sulfa Antibiotics Rash    Other        ROS:  Review of Systems     Constitutional: Negative for fatigue, fever or loss of appetite  HENT: Negative      Respiratory: Negative for shortness of breath, dyspnea  Cardiovascular: Negative for chest pain/tightness  Gastrointestinal: Negative for abdominal pain, N/V  Endocrine: Negative for cold/heat intolerance, unexplained weight loss/gain  Genitourinary: Negative for flank pain, dysuria, hematuria  Musculoskeletal: Positive for arthralgia   Skin: Negative for rash  Neurological: Negative for numbness or tingling  Psychiatric/Behavioral: Negative for agitation  _____________________________________________________  PHYSICAL EXAMINATION:    Blood pressure 148/75, pulse 73, height 5' 9" (1 753 m), weight 114 kg (251 lb)  Constitutional: Oriented to person, place, and time  Appears well-developed and well-nourished  No distress  HENT:   Head: Normocephalic  Eyes: Conjunctivae are normal  Right eye exhibits no discharge  Left eye exhibits no discharge  No scleral icterus  Cardiovascular: Normal rate  Pulmonary/Chest: Effort normal    Neurological: Alert and oriented to person, place, and time  Skin: Skin is warm and dry  No rash noted  Not diaphoretic  No erythema  No pallor  Psychiatric: Normal mood and affect  Behavior is normal  Judgment and thought content normal       MUSCULOSKELETAL EXAMINATION:   Physical Exam  Ortho Exam      Right lower extremity is neurovascularly intact   Toes are pink and mobile  Compartments are soft   No warmth, erythema or ecchymosis present   Tenderness to palpation of the SI joint  Positive HECTOR test   Brisk cap refill   Sensation intact     Objective:  BP Readings from Last 1 Encounters:   08/31/21 148/75      Wt Readings from Last 1 Encounters:   08/31/21 114 kg (251 lb)        BMI:   Estimated body mass index is 37 07 kg/m² as calculated from the following:    Height as of this encounter: 5' 9" (1 753 m)  Weight as of this encounter: 114 kg (251 lb)  PROCEDURES PERFORMED:  Medium joint arthrocentesis  Universal Protocol:  Consent: Verbal consent obtained    Risks and benefits: risks, benefits and alternatives were discussed  Consent given by: patient  Patient understanding: patient states understanding of the procedure being performed  Site marked: the operative site was marked  Supporting Documentation  Indications: pain   Procedure Details  Location: Right SI joint    Preparation: Patient was prepped and draped in the usual sterile fashion  Needle size: 27 G  Ultrasound guidance: no  Approach: dorsal  Medications administered: 1 mL bupivacaine 0 25 %; 6 mg betamethasone acetate-betamethasone sodium phosphate 6 (3-3) mg/mL    Patient tolerance: patient tolerated the procedure well with no immediate complications  Dressing:  Sterile dressing applied            Dre Duckworth PA-C

## 2021-09-08 ENCOUNTER — TELEPHONE (OUTPATIENT)
Dept: OBGYN CLINIC | Facility: HOSPITAL | Age: 66
End: 2021-09-08

## 2021-09-08 NOTE — TELEPHONE ENCOUNTER
Pt sees Dr Ansari    Pt was last seen on 08/31 pt has a shot, and yesterday the pt's pain is back worse then it was before the shot  Pt is not taking anything for pain, she is at work  I was able to have Bill Kwan RN talk to patient

## 2021-09-08 NOTE — TELEPHONE ENCOUNTER
Patient should follow up with pain management for fluoroscopic injections    A referral will be made tomorrow

## 2021-09-08 NOTE — TELEPHONE ENCOUNTER
I spoke with patient and she had 7 days of reflief from SI joint injection and then the pain came back yesterday 10/10  She is using lidoderm patches OTC  Advised tylenol 1000mg TID, ice/heat 20 on 20 off  Patient on Eliquis and has brain aneurysm hx so not NSAIDS allowed  Please advise how to proceed  Patient was to follow up in November?

## 2021-09-15 ENCOUNTER — OFFICE VISIT (OUTPATIENT)
Dept: GASTROENTEROLOGY | Facility: CLINIC | Age: 66
End: 2021-09-15
Payer: COMMERCIAL

## 2021-09-15 VITALS
WEIGHT: 248.6 LBS | TEMPERATURE: 99.3 F | SYSTOLIC BLOOD PRESSURE: 162 MMHG | DIASTOLIC BLOOD PRESSURE: 74 MMHG | BODY MASS INDEX: 36.82 KG/M2 | HEART RATE: 72 BPM | HEIGHT: 69 IN

## 2021-09-15 DIAGNOSIS — K63.5 POLYP OF COLON, UNSPECIFIED PART OF COLON, UNSPECIFIED TYPE: ICD-10-CM

## 2021-09-15 DIAGNOSIS — D50.9 IRON DEFICIENCY ANEMIA, UNSPECIFIED IRON DEFICIENCY ANEMIA TYPE: ICD-10-CM

## 2021-09-15 DIAGNOSIS — K62.5 RECTAL BLEEDING: ICD-10-CM

## 2021-09-15 DIAGNOSIS — K76.0 HEPATIC STEATOSIS: ICD-10-CM

## 2021-09-15 DIAGNOSIS — K50.919 CROHN'S DISEASE WITH COMPLICATION, UNSPECIFIED GASTROINTESTINAL TRACT LOCATION (HCC): Primary | ICD-10-CM

## 2021-09-15 PROCEDURE — 99214 OFFICE O/P EST MOD 30 MIN: CPT | Performed by: INTERNAL MEDICINE

## 2021-09-15 NOTE — PROGRESS NOTES
[facility] - Outpatient Follow-up Note  Chauncey Turcios 60 y o  female MRN: [id number]  Encounter: [id number]          ASSESSMENT AND PLAN:      1  Crohn's disease with complication, unspecified gastrointestinal tract location [facility])  Her diarrhea is most likely due to her irritable bowel syndrome and she does not have other signs of a Crohn's flare  We will continue the Delzicol and Imodium as needed  2  Polyp of colon, unspecified part of colon, unspecified type  3  Iron deficiency anemia  4  Rectal bleeding  She is due for a surveillance colonoscopy in 2023 because of her history of colon polyps  However because of her recent rectal bleeding and her iron deficiency anemia I will schedule her for repeat colonoscopy as she is concerned and would like a repeat evaluation  This could be due to Crohn's disease, polyps, or malignancy with bleeding exacerbated by her Eliquis  5  Hepatic steatosis  She most likely has nonalcoholic fatty liver disease but fortunately there was no evidence of fibrosis on her elastography  6  Gastroesophageal reflux disease  I encouraged her to continue taking omeprazole for her reflux and to increase to twice a day since he still has frequent reflux symptoms  If she gets breakthrough symptoms then she can take Pepcid as needed  ______________________________________________________________________    SUBJECTIVE:  She presents for follow-up of her Crohn's disease, irritable bowel syndrome, hepatic steatosis, and reflux  She is taking Delzicol for her Crohn's disease and she takes Imodium as needed when she gets diarrhea  She denies any bleeding or weight loss  She has a Paddock steatosis but fortunately her ultrasound with elastography did not show evidence of significant fibrosis last year  Her reflux has been well controlled on omeprazole and she has not had any nausea, vomiting, or dysphagia        REVIEW OF SYSTEMS IS OTHERWISE NEGATIVE  Historical Information   Past Medical History:   Diagnosis Date    [facility])     Brain aneurysm     [facility]     papillary thyroid cancer    Cardiac disease     CHF (congestive heart failure) (HCC)     [facility] [address] [facility]     Diabetes mellitus ([facility] )     Disease of thyroid gland     GERD (gastroesophageal reflux disease)     Hyperlipidemia     Hypertension      Past Surgical History:   Procedure Laterality Date    APPENDECTOMY      CARDIOVERSION N/A 2/14/2019    Procedure: CARDIOVERSION;  Surgeon: Riana Beyer MD;  Location: [facility];  Service: Cardiology    CEREBRAL ANEURYSM REPAIR      CHOLECYSTECTOMY      HYSTERECTOMY      THYROIDECTOMY      TONSILLECTOMY AND ADENOIDECTOMY       Social History   Social History     Substance and Sexual Activity   Alcohol Use Yes    Alcohol/week: 0 0 standard drinks    Comment: social     Social History     Substance and Sexual Activity   Drug Use Never     Social History     Tobacco Use   Smoking Status Former Smoker    Quit date: 1/1/2018    Years since quitting: 3 7   Smokeless Tobacco Never Used     History reviewed  No pertinent family history      Meds/Allergies       Current Outpatient Medications:     albuterol (PROVENTIL HFA,VENTOLIN HFA) 90 mcg/act inhaler    apixaban (ELIQUIS) 5 mg    aspirin 81 mg chewable tablet    atorvastatin (LIPITOR) 10 mg tablet    Blood Glucose Monitoring Suppl (ONETOUCH VERIO) w/Device KIT    Blood Glucose Monitoring Suppl (ONETOUCH VERIO) w/Device KIT    dicyclomine (BENTYL) 20 mg tablet    dofetilide (TIKOSYN) 500 mcg capsule    fenofibrate (TRICOR) 54 MG tablet    furosemide (LASIX) 20 mg tablet    glipiZIDE (GLUCOTROL XL) 5 mg 24 hr tablet    glucose blood test strip    Lancets (ONETOUCH DELICA PLUS [id number]) MISC    mesalamine (DELZICOL) 400 mg    methocarbamol (ROBAXIN) 500 mg tablet    metoprolol succinate (TOPROL-XL) 100 mg 24 hr tablet    mometasone-formoterol (DULERA) 100-5 MCG/ACT inhaler    omeprazole (PriLOSEC) 40 MG capsule    OneTouch Delica Lancets 63E MISC    ONETOUCH VERIO test strip    pramipexole (MIRAPEX) 0 5 mg tablet    SYNTHROID 150 MCG tablet    traMADol (ULTRAM) 50 mg tablet    traMADol (ULTRAM) 50 mg tablet    venlafaxine (EFFEXOR XR) 37 5 mg 24 hr capsule    Allergies   Allergen Reactions    Sulfa Antibiotics Rash    Other            Objective     Blood pressure 162/74, pulse 72, temperature 99 3 °F (37 4 °C), temperature source Tympanic, height 5' 9" (1 753 m), weight 113 kg (248 lb 9 6 oz)  Body mass index is 36 71 kg/m²  PHYSICAL EXAM:      General Appearance:   Alert, cooperative, no distress   HEENT:   Normocephalic, atraumatic, anicteric      Neck:  Supple, symmetrical, trachea midline   Lungs:   Clear to auscultation bilaterally; no rales, rhonchi or wheezing; respirations unlabored    Heart[de-identified]   Regular rate and rhythm; no murmur, rub, or gallop  Abdomen:   Soft, non-tender, non-distended; normal bowel sounds; no masses, no organomegaly    Genitalia:   Deferred    Rectal:   Deferred    Extremities:  No cyanosis, clubbing or edema    Pulses:  2+ and symmetric    Skin:  No jaundice, rashes, or lesions    Lymph nodes:  No palpable cervical lymphadenopathy        Lab Results:   No visits with results within 1 Day(s) from this visit     Latest known visit with results is:   Lab Requisition on 03/09/2021   Component Date Value    Sodium 03/09/2021 141     Potassium 03/09/2021 3 9     Chloride 03/09/2021 101     CO2 03/09/2021 32*    ANION GAP 03/09/2021 8     BUN 03/09/2021 17     Creatinine 03/09/2021 0 78     Glucose 03/09/2021 136*    Calcium 03/09/2021 9 2     eGFR 03/09/2021 81     WBC 03/09/2021 11 10*    RBC 03/09/2021 4 40     Hemoglobin 03/09/2021 11 7*    Hematocrit 03/09/2021 37 8*    MCV 03/09/2021 86     MCH 03/09/2021 26 7     MCHC 03/09/2021 31 1     RDW 03/09/2021 15 3*    MPV 03/09/2021 8 2*    Platelets 03/09/2021 312     Neutrophils Relative 03/09/2021 69     Lymphocytes Relative 03/09/2021 23     Monocytes Relative 03/09/2021 6     Eosinophils Relative 03/09/2021 2     Basophils Relative 03/09/2021 0     Neutrophils Absolute 03/09/2021 7 70*    Lymphocytes Absolute 03/09/2021 2 60     Monocytes Absolute 03/09/2021 0 60     Eosinophils Absolute 03/09/2021 0 30     Basophils Absolute 03/09/2021 0 00     Sed Rate 03/09/2021 83*         Radiology Results:   No results found

## 2021-09-15 NOTE — PATIENT INSTRUCTIONS
Scheduled patient for a Colonoscopy on 11/24/2021 with Dr Kay Casas  Gave Miralax/Dulcolax instructions  Recall in Epic for a  Follow up appointment on 12/2021 with Dr Kay Casas  Patient expressed understanding

## 2021-09-16 DIAGNOSIS — K50.919 CROHN'S DISEASE WITH COMPLICATION, UNSPECIFIED GASTROINTESTINAL TRACT LOCATION (HCC): ICD-10-CM

## 2021-09-17 RX ORDER — MESALAMINE 400 MG/1
CAPSULE, DELAYED RELEASE ORAL
Qty: 120 CAPSULE | Refills: 0 | Status: SHIPPED | OUTPATIENT
Start: 2021-09-17 | End: 2021-10-19

## 2021-09-30 ENCOUNTER — TELEPHONE (OUTPATIENT)
Dept: GASTROENTEROLOGY | Facility: CLINIC | Age: 66
End: 2021-09-30

## 2021-10-01 ENCOUNTER — TELEPHONE (OUTPATIENT)
Dept: OTOLARYNGOLOGY | Facility: CLINIC | Age: 66
End: 2021-10-01

## 2021-10-06 DIAGNOSIS — K21.9 GASTROESOPHAGEAL REFLUX DISEASE, UNSPECIFIED WHETHER ESOPHAGITIS PRESENT: ICD-10-CM

## 2021-10-06 RX ORDER — OMEPRAZOLE 40 MG/1
CAPSULE, DELAYED RELEASE ORAL
Qty: 30 CAPSULE | Refills: 0 | Status: SHIPPED | OUTPATIENT
Start: 2021-10-06 | End: 2021-10-11

## 2021-10-11 ENCOUNTER — TELEPHONE (OUTPATIENT)
Dept: SLEEP CENTER | Facility: CLINIC | Age: 66
End: 2021-10-11

## 2021-10-11 DIAGNOSIS — K21.9 GASTROESOPHAGEAL REFLUX DISEASE, UNSPECIFIED WHETHER ESOPHAGITIS PRESENT: Primary | ICD-10-CM

## 2021-10-11 RX ORDER — OMEPRAZOLE 40 MG/1
40 CAPSULE, DELAYED RELEASE ORAL
Qty: 60 CAPSULE | Refills: 3 | Status: SHIPPED | OUTPATIENT
Start: 2021-10-11 | End: 2022-02-07

## 2021-10-14 ENCOUNTER — CONSULT (OUTPATIENT)
Dept: PAIN MEDICINE | Facility: CLINIC | Age: 66
End: 2021-10-14
Payer: COMMERCIAL

## 2021-10-14 VITALS
BODY MASS INDEX: 37.06 KG/M2 | HEART RATE: 64 BPM | WEIGHT: 250.2 LBS | SYSTOLIC BLOOD PRESSURE: 137 MMHG | HEIGHT: 69 IN | DIASTOLIC BLOOD PRESSURE: 78 MMHG

## 2021-10-14 DIAGNOSIS — M51.36 LUMBAR DEGENERATIVE DISC DISEASE: ICD-10-CM

## 2021-10-14 DIAGNOSIS — S82.101A CLOSED FRACTURE OF PROXIMAL END OF RIGHT TIBIA, UNSPECIFIED FRACTURE MORPHOLOGY, INITIAL ENCOUNTER: ICD-10-CM

## 2021-10-14 DIAGNOSIS — M47.816 LUMBAR SPONDYLOSIS: ICD-10-CM

## 2021-10-14 DIAGNOSIS — M51.26 LUMBAR DISC HERNIATION: ICD-10-CM

## 2021-10-14 PROCEDURE — 99244 OFF/OP CNSLTJ NEW/EST MOD 40: CPT | Performed by: ANESTHESIOLOGY

## 2021-10-14 RX ORDER — BACLOFEN 10 MG/1
10 TABLET ORAL 3 TIMES DAILY
COMMUNITY
End: 2021-12-02

## 2021-10-19 DIAGNOSIS — K50.919 CROHN'S DISEASE WITH COMPLICATION, UNSPECIFIED GASTROINTESTINAL TRACT LOCATION (HCC): ICD-10-CM

## 2021-10-19 RX ORDER — MESALAMINE 400 MG/1
CAPSULE, DELAYED RELEASE ORAL
Qty: 120 CAPSULE | Refills: 0 | Status: SHIPPED | OUTPATIENT
Start: 2021-10-19 | End: 2021-11-18

## 2021-10-20 NOTE — TELEPHONE ENCOUNTER
Medicare Wellness Visit, Male    The best way to live healthy is to have a lifestyle where you eat a well-balanced diet, exercise regularly, limit alcohol use, and quit all forms of tobacco/nicotine, if applicable. Regular preventive services are another way to keep healthy. Preventive services (vaccines, screening tests, monitoring & exams) can help personalize your care plan, which helps you manage your own care. Screening tests can find health problems at the earliest stages, when they are easiest to treat. [facility] follows the current, evidence-based guidelines published by the [facility] Fel[facility] ([facility]) when recommending preventive services for our patients. Because we follow these guidelines, sometimes recommendations change over time as research supports it. (For example, a prostate screening blood test is no longer routinely recommended for men with no symptoms). Of course, you and your doctor may decide to screen more often for some diseases, based on your risk and co-morbidities (chronic disease you are already diagnosed with). Preventive services for you include:  - Medicare offers their members a free annual wellness visit, which is time for you and your primary care provider to discuss and plan for your preventive service needs. Take advantage of this benefit every year!  -All adults over age 72 should receive the recommended pneumonia vaccines. Current USPSTF guidelines recommend a series of two vaccines for the best pneumonia protection.   -All adults should have a flu vaccine yearly and tetanus vaccine every 10 years.  -All adults age 48 and older should receive the shingles vaccines (series of two vaccines).        -All adults age 38-68 who are overweight should have a diabetes screening test once every three years.   -Other screening tests & preventive services for persons with diabetes include: an eye exam to screen for diabetic Pressure change to Cpaop 13cm h20  Patient may want to change mask   She is going to use the machine with new pressure first  retinopathy, a kidney function test, a foot exam, and stricter control over your cholesterol.   -Cardiovascular screening for adults with routine risk involves an electrocardiogram (ECG) at intervals determined by the provider.   -Colorectal cancer screening should be done for adults age 54-65 with no increased risk factors for colorectal cancer. There are a number of acceptable methods of screening for this type of cancer. Each test has its own benefits and drawbacks. Discuss with your provider what is most appropriate for you during your annual wellness visit. The different tests include: colonoscopy (considered the best screening method), a fecal occult blood test, a fecal DNA test, and sigmoidoscopy.  -All adults born between [facility] should be screened once for Hepatitis C.  -An Abdominal Aortic Aneurysm (AAA) Screening is recommended for men age 73-68 who has ever smoked in their lifetime.      Here is a list of your current Health Maintenance items (your personalized list of preventive services) with a due date:  Health Maintenance Due   Topic Date Due    Eye Exam  Never done    Shingles Vaccine (1 of 2) Never done    DTaP/Tdap/Td  (2 - Td or Tdap) 06/01/2016    Diabetic Foot Care  10/05/2021    Albumin Urine Test  10/05/2021

## 2021-10-22 ENCOUNTER — EVALUATION (OUTPATIENT)
Dept: PHYSICAL THERAPY | Facility: HOME HEALTHCARE | Age: 66
End: 2021-10-22
Payer: COMMERCIAL

## 2021-10-22 DIAGNOSIS — M47.816 LUMBAR SPONDYLOSIS: ICD-10-CM

## 2021-10-22 DIAGNOSIS — M51.36 LUMBAR DEGENERATIVE DISC DISEASE: ICD-10-CM

## 2021-10-22 DIAGNOSIS — M51.26 LUMBAR DISC HERNIATION: ICD-10-CM

## 2021-10-22 PROCEDURE — 97162 PT EVAL MOD COMPLEX 30 MIN: CPT | Performed by: PHYSICAL THERAPIST

## 2021-10-22 PROCEDURE — 97110 THERAPEUTIC EXERCISES: CPT | Performed by: PHYSICAL THERAPIST

## 2021-10-22 PROCEDURE — 97112 NEUROMUSCULAR REEDUCATION: CPT | Performed by: PHYSICAL THERAPIST

## 2021-10-25 ENCOUNTER — APPOINTMENT (OUTPATIENT)
Dept: PHYSICAL THERAPY | Facility: HOME HEALTHCARE | Age: 66
End: 2021-10-25
Payer: COMMERCIAL

## 2021-10-29 ENCOUNTER — OFFICE VISIT (OUTPATIENT)
Dept: PHYSICAL THERAPY | Facility: HOME HEALTHCARE | Age: 66
End: 2021-10-29
Payer: COMMERCIAL

## 2021-10-29 ENCOUNTER — APPOINTMENT (OUTPATIENT)
Dept: PHYSICAL THERAPY | Facility: HOME HEALTHCARE | Age: 66
End: 2021-10-29
Payer: COMMERCIAL

## 2021-10-29 DIAGNOSIS — M51.26 LUMBAR DISC HERNIATION: Primary | ICD-10-CM

## 2021-10-29 DIAGNOSIS — M47.816 LUMBAR SPONDYLOSIS: ICD-10-CM

## 2021-10-29 DIAGNOSIS — M51.36 LUMBAR DEGENERATIVE DISC DISEASE: ICD-10-CM

## 2021-10-29 PROCEDURE — 97112 NEUROMUSCULAR REEDUCATION: CPT

## 2021-11-01 ENCOUNTER — OFFICE VISIT (OUTPATIENT)
Dept: PHYSICAL THERAPY | Facility: HOME HEALTHCARE | Age: 66
End: 2021-11-01
Payer: MEDICARE

## 2021-11-01 DIAGNOSIS — M47.816 LUMBAR SPONDYLOSIS: ICD-10-CM

## 2021-11-01 DIAGNOSIS — M51.26 LUMBAR DISC HERNIATION: Primary | ICD-10-CM

## 2021-11-01 DIAGNOSIS — M51.36 LUMBAR DEGENERATIVE DISC DISEASE: ICD-10-CM

## 2021-11-01 PROCEDURE — 97110 THERAPEUTIC EXERCISES: CPT | Performed by: PHYSICAL THERAPIST

## 2021-11-01 PROCEDURE — 97112 NEUROMUSCULAR REEDUCATION: CPT | Performed by: PHYSICAL THERAPIST

## 2021-11-03 ENCOUNTER — TELEPHONE (OUTPATIENT)
Dept: GASTROENTEROLOGY | Facility: CLINIC | Age: 66
End: 2021-11-03

## 2021-11-03 ENCOUNTER — OFFICE VISIT (OUTPATIENT)
Dept: PHYSICAL THERAPY | Facility: HOME HEALTHCARE | Age: 66
End: 2021-11-03
Payer: MEDICARE

## 2021-11-03 DIAGNOSIS — M51.36 LUMBAR DEGENERATIVE DISC DISEASE: ICD-10-CM

## 2021-11-03 DIAGNOSIS — M47.816 LUMBAR SPONDYLOSIS: ICD-10-CM

## 2021-11-03 DIAGNOSIS — M51.26 LUMBAR DISC HERNIATION: Primary | ICD-10-CM

## 2021-11-03 PROCEDURE — 97110 THERAPEUTIC EXERCISES: CPT

## 2021-11-03 PROCEDURE — 97112 NEUROMUSCULAR REEDUCATION: CPT

## 2021-11-08 ENCOUNTER — OFFICE VISIT (OUTPATIENT)
Dept: PHYSICAL THERAPY | Facility: HOME HEALTHCARE | Age: 66
End: 2021-11-08
Payer: MEDICARE

## 2021-11-08 DIAGNOSIS — M51.26 LUMBAR DISC HERNIATION: Primary | ICD-10-CM

## 2021-11-08 PROCEDURE — 97110 THERAPEUTIC EXERCISES: CPT

## 2021-11-08 PROCEDURE — 97112 NEUROMUSCULAR REEDUCATION: CPT

## 2021-11-10 ENCOUNTER — OFFICE VISIT (OUTPATIENT)
Dept: PHYSICAL THERAPY | Facility: HOME HEALTHCARE | Age: 66
End: 2021-11-10
Payer: MEDICARE

## 2021-11-10 DIAGNOSIS — M51.36 LUMBAR DEGENERATIVE DISC DISEASE: ICD-10-CM

## 2021-11-10 DIAGNOSIS — M51.26 LUMBAR DISC HERNIATION: Primary | ICD-10-CM

## 2021-11-10 DIAGNOSIS — M47.816 LUMBAR SPONDYLOSIS: ICD-10-CM

## 2021-11-10 PROCEDURE — 97112 NEUROMUSCULAR REEDUCATION: CPT

## 2021-11-10 PROCEDURE — 97110 THERAPEUTIC EXERCISES: CPT

## 2021-11-15 ENCOUNTER — OFFICE VISIT (OUTPATIENT)
Dept: PHYSICAL THERAPY | Facility: HOME HEALTHCARE | Age: 66
End: 2021-11-15
Payer: MEDICARE

## 2021-11-15 DIAGNOSIS — M51.26 LUMBAR DISC HERNIATION: Primary | ICD-10-CM

## 2021-11-15 PROCEDURE — 97112 NEUROMUSCULAR REEDUCATION: CPT

## 2021-11-15 PROCEDURE — 97110 THERAPEUTIC EXERCISES: CPT

## 2021-11-17 ENCOUNTER — OFFICE VISIT (OUTPATIENT)
Dept: PHYSICAL THERAPY | Facility: HOME HEALTHCARE | Age: 66
End: 2021-11-17
Payer: MEDICARE

## 2021-11-17 DIAGNOSIS — M51.26 LUMBAR DISC HERNIATION: Primary | ICD-10-CM

## 2021-11-17 PROCEDURE — 97110 THERAPEUTIC EXERCISES: CPT

## 2021-11-17 PROCEDURE — 97112 NEUROMUSCULAR REEDUCATION: CPT

## 2021-11-18 DIAGNOSIS — K50.919 CROHN'S DISEASE WITH COMPLICATION, UNSPECIFIED GASTROINTESTINAL TRACT LOCATION (HCC): ICD-10-CM

## 2021-11-18 RX ORDER — MESALAMINE 400 MG/1
CAPSULE, DELAYED RELEASE ORAL
Qty: 120 CAPSULE | Refills: 0 | Status: SHIPPED | OUTPATIENT
Start: 2021-11-18 | End: 2021-12-13

## 2021-11-20 ENCOUNTER — OFFICE VISIT (OUTPATIENT)
Dept: URGENT CARE | Facility: CLINIC | Age: 66
End: 2021-11-20
Payer: MEDICARE

## 2021-11-20 VITALS
SYSTOLIC BLOOD PRESSURE: 159 MMHG | DIASTOLIC BLOOD PRESSURE: 67 MMHG | TEMPERATURE: 97 F | RESPIRATION RATE: 18 BRPM | HEART RATE: 71 BPM | OXYGEN SATURATION: 95 %
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
## 2021-11-20 DIAGNOSIS — L08.9 INFECTED INSECT BITE OF RIGHT UPPER EXTREMITY, INITIAL ENCOUNTER: Primary | ICD-10-CM

## 2021-11-20 DIAGNOSIS — S40.861A INFECTED INSECT BITE OF RIGHT UPPER EXTREMITY, INITIAL ENCOUNTER: Primary | ICD-10-CM

## 2021-11-20 PROCEDURE — G0463 HOSPITAL OUTPT CLINIC VISIT: HCPCS | Performed by: PHYSICIAN ASSISTANT

## 2021-11-20 PROCEDURE — 99213 OFFICE O/P EST LOW 20 MIN: CPT | Performed by: PHYSICIAN ASSISTANT

## 2021-11-20 RX ORDER — PREDNISONE 10 MG/1
TABLET ORAL
Qty: 26 TABLET | Refills: 0 | Status: SHIPPED | OUTPATIENT
Start: 2021-11-20 | End: 2022-06-23 | Stop reason: ALTCHOICE

## 2021-11-20 RX ORDER — CEPHALEXIN 500 MG/1
500 CAPSULE ORAL EVERY 8 HOURS SCHEDULED
Qty: 21 CAPSULE | Refills: 0 | Status: SHIPPED | OUTPATIENT
Start: 2021-11-20 | End: 2021-11-27

## 2021-11-22 ENCOUNTER — OFFICE VISIT (OUTPATIENT)
Dept: PHYSICAL THERAPY | Facility: HOME HEALTHCARE | Age: 66
End: 2021-11-22
Payer: MEDICARE

## 2021-11-22 DIAGNOSIS — M51.26 LUMBAR DISC HERNIATION: Primary | ICD-10-CM

## 2021-11-22 PROCEDURE — 97110 THERAPEUTIC EXERCISES: CPT

## 2021-11-22 PROCEDURE — 97112 NEUROMUSCULAR REEDUCATION: CPT

## 2021-11-24 ENCOUNTER — OFFICE VISIT (OUTPATIENT)
Dept: PHYSICAL THERAPY | Facility: HOME HEALTHCARE | Age: 66
End: 2021-11-24
Payer: MEDICARE

## 2021-11-24 DIAGNOSIS — M51.26 LUMBAR DISC HERNIATION: Primary | ICD-10-CM

## 2021-11-24 PROCEDURE — 97110 THERAPEUTIC EXERCISES: CPT | Performed by: PHYSICAL THERAPIST

## 2021-11-29 ENCOUNTER — TELEPHONE (OUTPATIENT)
Dept: GASTROENTEROLOGY | Facility: CLINIC | Age: 66
End: 2021-11-29

## 2021-11-30 ENCOUNTER — OFFICE VISIT (OUTPATIENT)
Dept: OBGYN CLINIC | Facility: CLINIC | Age: 66
End: 2021-11-30
Payer: MEDICARE

## 2021-11-30 VITALS
SYSTOLIC BLOOD PRESSURE: 159 MMHG | DIASTOLIC BLOOD PRESSURE: 73 MMHG | HEART RATE: 84 BPM | HEIGHT: 69 IN | BODY MASS INDEX: 36.29 KG/M2 | WEIGHT: 245 LBS

## 2021-11-30 DIAGNOSIS — G89.29 CHRONIC RIGHT SI JOINT PAIN: Primary | ICD-10-CM

## 2021-11-30 DIAGNOSIS — M53.3 CHRONIC RIGHT SI JOINT PAIN: Primary | ICD-10-CM

## 2021-11-30 PROCEDURE — 99213 OFFICE O/P EST LOW 20 MIN: CPT | Performed by: ORTHOPAEDIC SURGERY

## 2021-11-30 PROCEDURE — 1123F ACP DISCUSS/DSCN MKR DOCD: CPT | Performed by: ORTHOPAEDIC SURGERY

## 2021-12-02 ENCOUNTER — OFFICE VISIT (OUTPATIENT)
Dept: PAIN MEDICINE | Facility: CLINIC | Age: 66
End: 2021-12-02
Payer: COMMERCIAL

## 2021-12-02 VITALS
WEIGHT: 247 LBS | DIASTOLIC BLOOD PRESSURE: 73 MMHG | SYSTOLIC BLOOD PRESSURE: 124 MMHG | HEART RATE: 79 BPM | BODY MASS INDEX: 36.58 KG/M2 | HEIGHT: 69 IN

## 2021-12-02 DIAGNOSIS — M54.50 CHRONIC BILATERAL LOW BACK PAIN WITHOUT SCIATICA: ICD-10-CM

## 2021-12-02 DIAGNOSIS — G89.4 CHRONIC PAIN SYNDROME: Primary | ICD-10-CM

## 2021-12-02 DIAGNOSIS — G89.29 CHRONIC BILATERAL LOW BACK PAIN WITHOUT SCIATICA: ICD-10-CM

## 2021-12-02 DIAGNOSIS — M47.816 LUMBAR SPONDYLOSIS: ICD-10-CM

## 2021-12-02 DIAGNOSIS — M54.16 LUMBAR RADICULOPATHY: ICD-10-CM

## 2021-12-02 PROCEDURE — 99213 OFFICE O/P EST LOW 20 MIN: CPT | Performed by: NURSE PRACTITIONER

## 2021-12-06 ENCOUNTER — OFFICE VISIT (OUTPATIENT)
Dept: URGENT CARE | Facility: CLINIC | Age: 66
End: 2021-12-06
Payer: COMMERCIAL

## 2021-12-06 VITALS
BODY MASS INDEX: 36.77 KG/M2 | DIASTOLIC BLOOD PRESSURE: 80 MMHG | TEMPERATURE: 98 F | RESPIRATION RATE: 20 BRPM | OXYGEN SATURATION: 99 % | HEART RATE: 88 BPM | WEIGHT: 249 LBS | SYSTOLIC BLOOD PRESSURE: 156 MMHG

## 2021-12-06 DIAGNOSIS — L50.9 HIVES OF UNKNOWN ORIGIN: Primary | ICD-10-CM

## 2021-12-06 PROCEDURE — 99213 OFFICE O/P EST LOW 20 MIN: CPT | Performed by: FAMILY MEDICINE

## 2021-12-06 PROCEDURE — S9088 SERVICES PROVIDED IN URGENT: HCPCS | Performed by: FAMILY MEDICINE

## 2021-12-06 RX ORDER — PREDNISONE 50 MG/1
50 TABLET ORAL DAILY
Qty: 5 TABLET | Refills: 0 | Status: SHIPPED | OUTPATIENT
Start: 2021-12-06 | End: 2021-12-11

## 2021-12-09 ENCOUNTER — TELEPHONE (OUTPATIENT)
Dept: SURGERY | Facility: HOSPITAL | Age: 66
End: 2021-12-09

## 2021-12-10 ENCOUNTER — HOSPITAL ENCOUNTER (OUTPATIENT)
Dept: PERIOP | Facility: HOSPITAL | Age: 66
Setting detail: OUTPATIENT SURGERY
Discharge: HOME | End: 2021-12-10
Attending: INTERNAL MEDICINE
Payer: COMMERCIAL

## 2021-12-10 ENCOUNTER — ANESTHESIA EVENT (OUTPATIENT)
Dept: PERIOP | Facility: HOSPITAL | Age: 66
End: 2021-12-10

## 2021-12-10 ENCOUNTER — ANESTHESIA (OUTPATIENT)
Dept: PERIOP | Facility: HOSPITAL | Age: 66
End: 2021-12-10

## 2021-12-10 VITALS
RESPIRATION RATE: 18 BRPM | HEIGHT: 69 IN | OXYGEN SATURATION: 99 % | TEMPERATURE: 97 F | BODY MASS INDEX: 36.88 KG/M2 | DIASTOLIC BLOOD PRESSURE: 76 MMHG | SYSTOLIC BLOOD PRESSURE: 133 MMHG | HEART RATE: 64 BPM | WEIGHT: 249 LBS

## 2021-12-10 DIAGNOSIS — K50.919 CROHN'S DISEASE WITH COMPLICATION, UNSPECIFIED GASTROINTESTINAL TRACT LOCATION (HCC): ICD-10-CM

## 2021-12-10 DIAGNOSIS — K63.5 POLYP OF COLON, UNSPECIFIED PART OF COLON, UNSPECIFIED TYPE: ICD-10-CM

## 2021-12-10 DIAGNOSIS — D50.9 IRON DEFICIENCY ANEMIA, UNSPECIFIED IRON DEFICIENCY ANEMIA TYPE: ICD-10-CM

## 2021-12-10 DIAGNOSIS — K62.5 RECTAL BLEEDING: ICD-10-CM

## 2021-12-10 PROCEDURE — 88305 TISSUE EXAM BY PATHOLOGIST: CPT | Performed by: PATHOLOGY

## 2021-12-10 PROCEDURE — 45385 COLONOSCOPY W/LESION REMOVAL: CPT | Performed by: INTERNAL MEDICINE

## 2021-12-10 PROCEDURE — 45380 COLONOSCOPY AND BIOPSY: CPT | Performed by: INTERNAL MEDICINE

## 2021-12-10 RX ORDER — SODIUM CHLORIDE, SODIUM LACTATE, POTASSIUM CHLORIDE, CALCIUM CHLORIDE 600; 310; 30; 20 MG/100ML; MG/100ML; MG/100ML; MG/100ML
INJECTION, SOLUTION INTRAVENOUS CONTINUOUS PRN
Status: DISCONTINUED | OUTPATIENT
Start: 2021-12-10 | End: 2021-12-10

## 2021-12-10 RX ORDER — PROPOFOL 10 MG/ML
INJECTION, EMULSION INTRAVENOUS AS NEEDED
Status: DISCONTINUED | OUTPATIENT
Start: 2021-12-10 | End: 2021-12-10

## 2021-12-10 RX ORDER — SODIUM CHLORIDE, SODIUM LACTATE, POTASSIUM CHLORIDE, CALCIUM CHLORIDE 600; 310; 30; 20 MG/100ML; MG/100ML; MG/100ML; MG/100ML
125 INJECTION, SOLUTION INTRAVENOUS CONTINUOUS
Status: DISCONTINUED | OUTPATIENT
Start: 2021-12-10 | End: 2021-12-14 | Stop reason: HOSPADM

## 2021-12-10 RX ORDER — ONDANSETRON 2 MG/ML
4 INJECTION INTRAMUSCULAR; INTRAVENOUS ONCE AS NEEDED
Status: DISCONTINUED | OUTPATIENT
Start: 2021-12-10 | End: 2021-12-14 | Stop reason: HOSPADM

## 2021-12-10 RX ADMIN — PROPOFOL 40 MG: 10 INJECTION, EMULSION INTRAVENOUS at 08:58

## 2021-12-10 RX ADMIN — PROPOFOL 40 MG: 10 INJECTION, EMULSION INTRAVENOUS at 08:59

## 2021-12-10 RX ADMIN — PROPOFOL 50 MG: 10 INJECTION, EMULSION INTRAVENOUS at 09:01

## 2021-12-10 RX ADMIN — PROPOFOL 50 MG: 10 INJECTION, EMULSION INTRAVENOUS at 08:56

## 2021-12-10 RX ADMIN — PROPOFOL 40 MG: 10 INJECTION, EMULSION INTRAVENOUS at 09:13

## 2021-12-10 RX ADMIN — PROPOFOL 30 MG: 10 INJECTION, EMULSION INTRAVENOUS at 09:07

## 2021-12-10 RX ADMIN — PROPOFOL 50 MG: 10 INJECTION, EMULSION INTRAVENOUS at 09:04

## 2021-12-10 RX ADMIN — SODIUM CHLORIDE, SODIUM LACTATE, POTASSIUM CHLORIDE, AND CALCIUM CHLORIDE: .6; .31; .03; .02 INJECTION, SOLUTION INTRAVENOUS at 08:45

## 2021-12-13 DIAGNOSIS — K50.919 CROHN'S DISEASE WITH COMPLICATION, UNSPECIFIED GASTROINTESTINAL TRACT LOCATION (HCC): ICD-10-CM

## 2021-12-13 RX ORDER — MESALAMINE 400 MG/1
CAPSULE, DELAYED RELEASE ORAL
Qty: 120 CAPSULE | Refills: 0 | Status: SHIPPED | OUTPATIENT
Start: 2021-12-13 | End: 2022-01-17

## 2021-12-20 ENCOUNTER — TELEPHONE (OUTPATIENT)
Dept: GASTROENTEROLOGY | Facility: CLINIC | Age: 66
End: 2021-12-20

## 2021-12-21 ENCOUNTER — TELEPHONE (OUTPATIENT)
Dept: GASTROENTEROLOGY | Facility: CLINIC | Age: 66
End: 2021-12-21

## 2021-12-23 RX ORDER — BACLOFEN 10 MG/1
TABLET ORAL
COMMUNITY
Start: 2021-12-05

## 2021-12-27 ENCOUNTER — OFFICE VISIT (OUTPATIENT)
Dept: GASTROENTEROLOGY | Facility: CLINIC | Age: 66
End: 2021-12-27
Payer: COMMERCIAL

## 2021-12-27 VITALS
TEMPERATURE: 98.7 F | DIASTOLIC BLOOD PRESSURE: 77 MMHG | BODY MASS INDEX: 36.43 KG/M2 | SYSTOLIC BLOOD PRESSURE: 142 MMHG | WEIGHT: 246 LBS | HEIGHT: 69 IN | HEART RATE: 80 BPM

## 2021-12-27 DIAGNOSIS — K62.5 RECTAL BLEEDING: ICD-10-CM

## 2021-12-27 DIAGNOSIS — K63.5 POLYP OF COLON, UNSPECIFIED PART OF COLON, UNSPECIFIED TYPE: ICD-10-CM

## 2021-12-27 DIAGNOSIS — K50.919 CROHN'S DISEASE WITH COMPLICATION, UNSPECIFIED GASTROINTESTINAL TRACT LOCATION (HCC): Primary | ICD-10-CM

## 2021-12-27 DIAGNOSIS — K21.9 GASTROESOPHAGEAL REFLUX DISEASE, UNSPECIFIED WHETHER ESOPHAGITIS PRESENT: ICD-10-CM

## 2021-12-27 DIAGNOSIS — K76.0 HEPATIC STEATOSIS: ICD-10-CM

## 2021-12-27 PROCEDURE — 99214 OFFICE O/P EST MOD 30 MIN: CPT | Performed by: NURSE PRACTITIONER

## 2022-01-17 DIAGNOSIS — K50.919 CROHN'S DISEASE WITH COMPLICATION, UNSPECIFIED GASTROINTESTINAL TRACT LOCATION (HCC): ICD-10-CM

## 2022-01-17 RX ORDER — MESALAMINE 400 MG/1
CAPSULE, DELAYED RELEASE ORAL
Qty: 120 CAPSULE | Refills: 0 | Status: SHIPPED | OUTPATIENT
Start: 2022-01-17 | End: 2022-02-19 | Stop reason: SDUPTHER

## 2022-02-07 DIAGNOSIS — K21.9 GASTROESOPHAGEAL REFLUX DISEASE, UNSPECIFIED WHETHER ESOPHAGITIS PRESENT: ICD-10-CM

## 2022-02-07 RX ORDER — OMEPRAZOLE 40 MG/1
CAPSULE, DELAYED RELEASE ORAL
Qty: 60 CAPSULE | Refills: 0 | Status: SHIPPED | OUTPATIENT
Start: 2022-02-07 | End: 2022-03-08

## 2022-02-18 DIAGNOSIS — K50.919 CROHN'S DISEASE WITH COMPLICATION, UNSPECIFIED GASTROINTESTINAL TRACT LOCATION (HCC): ICD-10-CM

## 2022-02-19 RX ORDER — MESALAMINE 400 MG/1
CAPSULE, DELAYED RELEASE ORAL
Qty: 120 CAPSULE | Refills: 0 | Status: SHIPPED | OUTPATIENT
Start: 2022-02-19 | End: 2022-04-01

## 2022-03-14 ENCOUNTER — HOSPITAL ENCOUNTER (EMERGENCY)
Facility: HOSPITAL | Age: 67
Discharge: HOME | End: 2022-03-14
Attending: EMERGENCY MEDICINE | Admitting: EMERGENCY MEDICINE
Payer: COMMERCIAL

## 2022-03-14 ENCOUNTER — APPOINTMENT (EMERGENCY)
Dept: RADIOLOGY | Facility: HOSPITAL | Age: 67
End: 2022-03-14
Payer: COMMERCIAL

## 2022-03-14 VITALS
SYSTOLIC BLOOD PRESSURE: 135 MMHG | RESPIRATION RATE: 20 BRPM | TEMPERATURE: 97.7 F | WEIGHT: 242 LBS | DIASTOLIC BLOOD PRESSURE: 58 MMHG | BODY MASS INDEX: 35.74 KG/M2 | OXYGEN SATURATION: 91 % | HEART RATE: 59 BPM

## 2022-03-14 DIAGNOSIS — R07.89 ATYPICAL CHEST PAIN: Primary | ICD-10-CM

## 2022-03-14 LAB
2HR DELTA HS TROPONIN: 0 NG/L
ALBUMIN SERPL BCP-MCNC: 3.4 G/DL (ref 3.5–5)
ALP SERPL-CCNC: 125 U/L (ref 46–116)
ALT SERPL W P-5'-P-CCNC: 64 U/L (ref 12–78)
ANION GAP SERPL CALCULATED.3IONS-SCNC: 9 MMOL/L (ref 4–13)
AST SERPL W P-5'-P-CCNC: 49 U/L (ref 5–45)
BASOPHILS # BLD AUTO: 0.03 THOUSANDS/ΜL (ref 0–0.1)
BASOPHILS NFR BLD AUTO: 0 % (ref 0–1)
BILIRUB SERPL-MCNC: 0.25 MG/DL (ref 0.2–1)
BUN SERPL-MCNC: 11 MG/DL (ref 5–25)
CALCIUM ALBUM COR SERPL-MCNC: 9.5 MG/DL (ref 8.3–10.1)
CALCIUM SERPL-MCNC: 9 MG/DL (ref 8.3–10.1)
CARDIAC TROPONIN I PNL SERPL HS: 3 NG/L
CARDIAC TROPONIN I PNL SERPL HS: 3 NG/L
CHLORIDE SERPL-SCNC: 103 MMOL/L (ref 100–108)
CO2 SERPL-SCNC: 29 MMOL/L (ref 21–32)
CREAT SERPL-MCNC: 0.85 MG/DL (ref 0.6–1.3)
EOSINOPHIL # BLD AUTO: 0.16 THOUSAND/ΜL (ref 0–0.61)
EOSINOPHIL NFR BLD AUTO: 2 % (ref 0–6)
ERYTHROCYTE [DISTWIDTH] IN BLOOD BY AUTOMATED COUNT: 12.9 % (ref 11.6–15.1)
FLUAV RNA RESP QL NAA+PROBE: NEGATIVE
FLUBV RNA RESP QL NAA+PROBE: NEGATIVE
GFR SERPL CREATININE-BSD FRML MDRD: 72 ML/MIN/1.73SQ M
GLUCOSE SERPL-MCNC: 108 MG/DL (ref 65–140)
HCT VFR BLD AUTO: 44.6 % (ref 34.8–46.1)
HGB BLD-MCNC: 14.2 G/DL (ref 11.5–15.4)
IMM GRANULOCYTES # BLD AUTO: 0.03 THOUSAND/UL (ref 0–0.2)
IMM GRANULOCYTES NFR BLD AUTO: 0 % (ref 0–2)
LIPASE SERPL-CCNC: 150 U/L (ref 73–393)
LYMPHOCYTES # BLD AUTO: 2.94 THOUSANDS/ΜL (ref 0.6–4.47)
LYMPHOCYTES NFR BLD AUTO: 30 % (ref 14–44)
MCH RBC QN AUTO: 29.7 PG (ref 26.8–34.3)
MCHC RBC AUTO-ENTMCNC: 31.8 G/DL (ref 31.4–37.4)
MCV RBC AUTO: 93 FL (ref 82–98)
MONOCYTES # BLD AUTO: 0.72 THOUSAND/ΜL (ref 0.17–1.22)
MONOCYTES NFR BLD AUTO: 7 % (ref 4–12)
NEUTROPHILS # BLD AUTO: 5.85 THOUSANDS/ΜL (ref 1.85–7.62)
NEUTS SEG NFR BLD AUTO: 61 % (ref 43–75)
NRBC BLD AUTO-RTO: 0 /100 WBCS
NT-PROBNP SERPL-MCNC: 369 PG/ML
PLATELET # BLD AUTO: 306 THOUSANDS/UL (ref 149–390)
PMV BLD AUTO: 9.4 FL (ref 8.9–12.7)
POTASSIUM SERPL-SCNC: 3.5 MMOL/L (ref 3.5–5.3)
PROT SERPL-MCNC: 8.1 G/DL (ref 6.4–8.2)
RBC # BLD AUTO: 4.78 MILLION/UL (ref 3.81–5.12)
RSV RNA RESP QL NAA+PROBE: NEGATIVE
SARS-COV-2 RNA RESP QL NAA+PROBE: NEGATIVE
SODIUM SERPL-SCNC: 141 MMOL/L (ref 136–145)
WBC # BLD AUTO: 9.73 THOUSAND/UL (ref 4.31–10.16)

## 2022-03-14 PROCEDURE — 84484 ASSAY OF TROPONIN QUANT: CPT

## 2022-03-14 PROCEDURE — 80053 COMPREHEN METABOLIC PANEL: CPT

## 2022-03-14 PROCEDURE — 36415 COLL VENOUS BLD VENIPUNCTURE: CPT

## 2022-03-14 PROCEDURE — 83690 ASSAY OF LIPASE: CPT | Performed by: EMERGENCY MEDICINE

## 2022-03-14 PROCEDURE — 85025 COMPLETE CBC W/AUTO DIFF WBC: CPT

## 2022-03-14 PROCEDURE — 99285 EMERGENCY DEPT VISIT HI MDM: CPT

## 2022-03-14 PROCEDURE — 83880 ASSAY OF NATRIURETIC PEPTIDE: CPT | Performed by: EMERGENCY MEDICINE

## 2022-03-14 PROCEDURE — 0241U HB NFCT DS VIR RESP RNA 4 TRGT: CPT | Performed by: EMERGENCY MEDICINE

## 2022-03-14 PROCEDURE — 71045 X-RAY EXAM CHEST 1 VIEW: CPT

## 2022-03-14 PROCEDURE — 99285 EMERGENCY DEPT VISIT HI MDM: CPT | Performed by: EMERGENCY MEDICINE

## 2022-03-14 PROCEDURE — 93005 ELECTROCARDIOGRAM TRACING: CPT

## 2022-03-14 NOTE — ED PROVIDER NOTES
History  Chief Complaint   Patient presents with    Chest Pain     chest pain radiating to left arm on and off for 1 week       History provided by:  Patient  Chest Pain  Pain location:  L chest and L lateral chest  Pain quality: aching    Pain radiates to:  L shoulder  Pain radiates to the back: no    Pain severity:  Mild  Onset quality:  Gradual  Duration:  1 day  Timing:  Intermittent  Progression:  Waxing and waning  Chronicity:  New  Context: at rest and stress    Context: not breathing, not lifting and no movement    Relieved by:  Nothing  Worsened by:  Nothing tried  Ineffective treatments:  None tried  Associated symptoms: anxiety, dizziness, fatigue and shortness of breath    Associated symptoms: no abdominal pain, no altered mental status, no anorexia, no back pain, no cough, no diaphoresis, no fever, no headache, no lower extremity edema, no nausea, no near-syncope, no numbness, no orthopnea, no palpitations, no PND, no syncope, not vomiting and no weakness    Risk factors: diabetes mellitus, high cholesterol, hypertension, male sex and obesity    Risk factors: no aortic disease        Prior to Admission Medications   Prescriptions Last Dose Informant Patient Reported? Taking? Blood Glucose Monitoring Suppl ([employer]) w/Device KIT  Self Yes No   Sig: Use to check sugars once a day  Dx E11 9   Blood Glucose Monitoring Suppl (ONETOUCH VERIO) w/Device KIT  Self Yes No   Sig: daily Use to check glucose   Diclofenac Sodium (VOLTAREN) 1 %  Self Yes No   Sig: Apply topically   Diclofenac Sodium (VOLTAREN) 1 %  Self Yes No   Sig: APPLY TOPICALLY TO AFFECTED AREA 4 TIMES A DAY AS NEEDED FOR PAIN APPLY TO BACK  Lancets (ONETOUCH DELICA PLUS [id number]) MISC  Self Yes No   Sig: USE 1 TO CHECK GLUCOSE ONCE DAILY   ONETOUCH VERIO test strip  Self Yes No   Sig: USE 1 STRIP TO CHECK GLUCOSE ONCE DAILY   OneTouch Delica Lancets 23C MISC  Self Yes No   Sig: Use to check sugars once a day   Dx E11 9   SYNTHROID 150 MCG tablet  Self Yes No   Sig: TAKE 1 TABLET BY MOUTH ONCE DAILY FIRST THING IN THE MORNING (AT LEAST 30 MINUTES PRIOR TO BREAKFAST OR OTHER MEDS)   albuterol (PROVENTIL HFA,VENTOLIN HFA) 90 mcg/act inhaler  Self No No   Sig: Inhale 2 puffs every 6 (six) hours as needed for wheezing or shortness of breath   apixaban (ELIQUIS) 5 mg  Self Yes No   Sig: Take 5 mg by mouth 2 (two) times a day    aspirin 81 mg chewable tablet  Self No No   Sig: Chew 1 tablet (81 mg total) daily   atorvastatin (LIPITOR) 10 mg tablet  Self Yes No   Sig: Take 10 mg by mouth daily   baclofen 10 mg tablet  Self Yes No   dicyclomine (BENTYL) 20 mg tablet  Self No No   Sig: Take 1 tablet (20 mg total) by mouth every 6 (six) hours as needed (urgency, abdominal pain)   dofetilide (TIKOSYN) 500 mcg capsule  Self Yes No   Sig: Take 500 mcg by mouth 2 (two) times a day   fenofibrate (TRICOR) 54 MG tablet  Self Yes No   Sig: Take 54 mg by mouth daily   furosemide (LASIX) 20 mg tablet  Self Yes No   Sig: Take 20 mg by mouth 2 (two) times a day   glipiZIDE (GLUCOTROL XL) 5 mg 24 hr tablet  Self Yes No   Sig: TAKE 1 TABLET BY MOUTH ONCE DAILY 30 MINUTES BEFORE A MEAL (REPLACES METFORMIN)   glucose blood test strip  Self Yes No   Sig: Use to check sugars once a day   Dx E11 9   mesalamine (DELZICOL) 400 mg   No No   Sig: Take 2 capsules by mouth twice daily   metoprolol succinate (TOPROL-XL) 100 mg 24 hr tablet  Self No No   Sig: Take 1 tablet (100 mg total) by mouth every 12 (twelve) hours   mometasone-formoterol (DULERA) 100-5 MCG/ACT inhaler  Self No No   Sig: Inhale 2 puffs 2 (two) times a day   omeprazole (PriLOSEC) 40 MG capsule   No No   Sig: Take 1 capsule (40 mg total) by mouth 2 (two) times a day   pramipexole (MIRAPEX) 0 5 mg tablet  Self No No   Sig: TAKE 1 TABLET BY MOUTH ONCE DAILY AT BEDTIME   predniSONE 10 mg tablet  Self No No   Sig: Take 3 tabs BID X 2 days, 2 tabs BID X 2 days, 1 tab BID X 2 days, 1 tab daily X 2 days   traMADol (ULTRAM) 50 mg tablet  Self Yes No   Sig: Take 50 mg by mouth     traMADol (ULTRAM) 50 mg tablet  Self Yes No   Sig: Take 50 mg by mouth every 8 (eight) hours as needed   venlafaxine (EFFEXOR XR) 37 5 mg 24 hr capsule  Self Yes No   Sig: Take 37 5 mg by mouth daily       Facility-Administered Medications: None       Past Medical History:   Diagnosis Date    A-fib ([place] [address] )     Brain aneurysm     Cancer ([place] [address] )     papillary thyroid cancer    Cardiac disease     CHF (congestive heart failure) (HCC)     Crohn disease ([place] [address] )     Diabetes mellitus ([place] [address] )     Disease of thyroid gland     GERD (gastroesophageal reflux disease)     Hyperlipidemia     Hypertension        Past Surgical History:   Procedure Laterality Date    APPENDECTOMY      CARDIOVERSION N/A 2019    Procedure: CARDIOVERSION;  Surgeon: Pema Weathers MD;  Location: [facility];  Service: Cardiology    CEREBRAL ANEURYSM REPAIR      CHOLECYSTECTOMY      HYSTERECTOMY      THYROIDECTOMY      TONSILLECTOMY AND ADENOIDECTOMY         History reviewed  No pertinent family history  I have reviewed and agree with the history as documented  E-Cigarette/Vaping    E-Cigarette Use Never User      E-Cigarette/Vaping Substances     Social History     Tobacco Use    Smoking status: Former Smoker     Quit date: 2018     Years since quittin 2    Smokeless tobacco: Never Used   Vaping Use    Vaping Use: Never used   Substance Use Topics    Alcohol use: Yes     Alcohol/week: 0 0 standard drinks     Comment: social    Drug use: Never       Review of Systems   Constitutional: Positive for fatigue  Negative for chills, diaphoresis and fever  HENT: Negative for ear pain and sore throat  Eyes: Negative for pain and visual disturbance  Respiratory: Positive for shortness of breath  Negative for cough  Cardiovascular: Positive for chest pain  Negative for palpitations, orthopnea, leg swelling, syncope, PND and near-syncope  Gastrointestinal: Negative for abdominal pain, anorexia, nausea and vomiting  Genitourinary: Negative for dysuria and hematuria  Musculoskeletal: Negative for arthralgias and back pain  Skin: Negative for color change and rash  Neurological: Positive for dizziness  Negative for seizures, syncope, weakness, numbness and headaches  All other systems reviewed and are negative  Physical Exam  Physical Exam  Vitals and nursing note reviewed  Constitutional:       General: She is not in acute distress  Appearance: She is well-developed  She is obese  She is not ill-appearing, toxic-appearing or diaphoretic  HENT:      Head: Normocephalic and atraumatic  Eyes:      General: Lids are normal       Conjunctiva/sclera: Conjunctivae normal    Neck:      Vascular: No JVD  Trachea: No tracheal deviation  Cardiovascular:      Rate and Rhythm: Normal rate and regular rhythm  Heart sounds: Normal heart sounds  No murmur heard  No friction rub  No gallop  Pulmonary:      Effort: Pulmonary effort is normal  No respiratory distress  Breath sounds: Normal breath sounds  No decreased breath sounds, wheezing, rhonchi or rales  Chest:      Chest wall: No tenderness, crepitus or edema  Abdominal:      Palpations: Abdomen is soft  Tenderness: There is no abdominal tenderness  Skin:     General: Skin is warm and dry  Capillary Refill: Capillary refill takes less than 2 seconds  Coloration: Skin is not cyanotic or pale  Neurological:      General: No focal deficit present  Mental Status: She is alert and oriented to person, place, and time           Vital Signs  ED Triage Vitals [03/14/22 1412]   Temperature Pulse Respirations Blood Pressure SpO2   97 7 °F (36 5 °C) 72 18 165/72 93 %      Temp Source Heart Rate Source Patient Position - Orthostatic VS BP Location FiO2 (%)   Temporal Monitor Sitting Left arm --      Pain Score       8           Vitals:    03/14/22 1500 03/14/22 1530 03/14/22 1600 03/14/22 1630   BP: 155/65 150/65 138/62 135/58   Pulse: 58 57 59 59   Patient Position - Orthostatic VS: Lying Lying Lying Lying         Visual Acuity  Visual Acuity      Most Recent Value   L Pupil Size (mm) 3   R Pupil Size (mm) 3          ED Medications  Medications - No data to display    Diagnostic Studies  Results Reviewed     Procedure Component Value Units Date/Time    HS Troponin I 2hr [[id number]]  (Normal) Collected: 03/14/22 1630    Lab Status: Final result Specimen: Blood from Arm, Left Updated: 03/14/22 1656     hs TnI 2hr 3 ng/L      Delta 2hr hsTnI 0 ng/L     COVID/FLU/RSV - 2 hour TAT [[id number]]  (Normal) Collected: 03/14/22 1554    Lab Status: Final result Specimen: Nasopharyngeal Swab Updated: 03/14/22 1637     SARS-CoV-2 Negative     INFLUENZA A PCR Negative     INFLUENZA B PCR Negative     RSV PCR Negative    Narrative:      FOR PEDIATRIC PATIENTS - copy/paste COVID Guidelines URL to browser: [email or web address]  [email or web address]    SARS-CoV-2 assay is a Nucleic Acid Amplification assay intended for the  qualitative detection of nucleic acid from SARS-CoV-2 in nasopharyngeal  swabs  Results are for the presumptive identification of SARS-CoV-2 RNA  Positive results are indicative of infection with SARS-CoV-2, the virus  causing COVID-19, but do not rule out bacterial infection or co-infection  with other viruses  Laboratories within the [place] and its  territories are required to report all positive results to the appropriate  public health authorities  Negative results do not preclude SARS-CoV-2  infection and should not be used as the sole basis for treatment or other  patient management decisions  Negative results must be combined with  clinical observations, patient history, and epidemiological information  This test has not been FDA cleared or approved      This test has been authorized by FDA under an Emergency Use Authorization  (EUA)  This test is only authorized for the duration of time the  declaration that circumstances exist justifying the authorization of the  emergency use of an in vitro diagnostic tests for detection of SARS-CoV-2  virus and/or diagnosis of COVID-19 infection under section 564(b)(1) of  the Act, 21 U  S C  710VPZ-9(Y)(8), unless the authorization is terminated  or revoked sooner  The test has been validated but independent review by FDA  and CLIA is pending  Test performed using [email or web address] Gene[employer]pert: This RT-PCR assay targets N2,  a region unique to SARS-CoV-2  A conserved region in the E-gene was chosen  for pan-Sarbecovirus detection which includes SARS-CoV-2      Lipase [[id number]]  (Normal) Collected: 03/14/22 1417    Lab Status: Final result Specimen: Blood from Arm, Left Updated: 03/14/22 1453     Lipase 150 u/L     NT-BNP PRO [[id number]]  (Abnormal) Collected: 03/14/22 1417    Lab Status: Final result Specimen: Blood from Arm, Left Updated: 03/14/22 1453     NT-proBNP 369 pg/mL     HS Troponin 0hr (reflex protocol) [[id number]]  (Normal) Collected: 03/14/22 1417    Lab Status: Final result Specimen: Blood from Arm, Left Updated: 03/14/22 1447     hs TnI 0hr 3 ng/L     Comprehensive metabolic panel [[id number]]  (Abnormal) Collected: 03/14/22 1417    Lab Status: Final result Specimen: Blood from Arm, Left Updated: 03/14/22 1442     Sodium 141 mmol/L      Potassium 3 5 mmol/L      Chloride 103 mmol/L      CO2 29 mmol/L      ANION GAP 9 mmol/L      BUN 11 mg/dL      Creatinine 0 85 mg/dL      Glucose 108 mg/dL      Calcium 9 0 mg/dL      Corrected Calcium 9 5 mg/dL      AST 49 U/L      ALT 64 U/L      Alkaline Phosphatase 125 U/L      Total Protein 8 1 g/dL      Albumin 3 4 g/dL      Total Bilirubin 0 25 mg/dL      eGFR 72 ml/min/1 73sq m     Narrative:      [place] guidelines for Chronic Kidney Disease (CKD):     Stage 1 with normal or high GFR (GFR > 90 mL/min/1 73 square meters)    Stage 2 Mild CKD (GFR = 60-89 mL/min/1 73 square meters)    Stage 3A Moderate CKD (GFR = 45-59 mL/min/1 73 square meters)    Stage 3B Moderate CKD (GFR = 30-44 mL/min/1 73 square meters)    Stage 4 Severe CKD (GFR = 15-29 mL/min/1 73 square meters)    Stage 5 End Stage CKD (GFR <15 mL/min/1 73 square meters)  Note: GFR calculation is accurate only with a steady state creatinine    CBC and differential [[id number]] Collected: 03/14/22 1417    Lab Status: Final result Specimen: Blood from Arm, Left Updated: 03/14/22 1423     WBC 9 73 Thousand/uL      RBC 4 78 Million/uL      Hemoglobin 14 2 g/dL      Hematocrit 44 6 %      MCV 93 fL      MCH 29 7 pg      MCHC 31 8 g/dL      RDW 12 9 %      MPV 9 4 fL      Platelets 849 Thousands/uL      nRBC 0 /100 WBCs      Neutrophils Relative 61 %      Immat GRANS % 0 %      Lymphocytes Relative 30 %      Monocytes Relative 7 %      Eosinophils Relative 2 %      Basophils Relative 0 %      Neutrophils Absolute 5 85 Thousands/µL      Immature Grans Absolute 0 03 Thousand/uL      Lymphocytes Absolute 2 94 Thousands/µL      Monocytes Absolute 0 72 Thousand/µL      Eosinophils Absolute 0 16 Thousand/µL      Basophils Absolute 0 03 Thousands/µL                  XR chest 1 view portable   ED Interpretation by Shadia Perales DO (03/14 1528)   No acute cardiopulmonary processes seen by myself  Final Result by Mo Palumbo MD (03/14 1607)      No active pulmonary disease  Workstation performed: [id number]                    Procedures  Procedures         ED Course  ED Course as of 03/14/22 2101   Mon Mar 14, 2022   1409 EKG interpreted by myself  EKG dated 03/14/2022 at 2:00 p m  Demonstrates normal sinus rhythm 66 beats per minute, normal [place] interval, normal QRS interval, normal QT interval, no STEMI    1620 Labs unremarkable for patient  Unclear etiology   She is anticoagulated, doubt PE, no hemodynamic changes to suggest  Possibly mild CHF  Viral syndrome not excluded will check flu/covid/rsv  Will trend a 2 hr troponin  Likely OP mgmt if no significant findings  1651 Discussed findings patient was time  Viral swabs negative  Troponin negative  X-ray negative  Labs within normal limits  Mildly elevated BNP with symptoms not consistent with overt CHF exacerbation  Instructed follow-up with PCP  Strict return precautions for new or worsening symptoms  Will obtain delta troponin prior to patient's departure from the emergency department  Anticipate outpatient management  Patient is compliant with anticoagulation therapy low suspicion for PE  Patient agreeable plan will discharge  1657 hs TnI 2hr: 3   1657 Delta 2hr hsTnI: 0             HEART Risk Score      Most Recent Value   Heart Score Risk Calculator    History 0 Filed at: 03/14/2022 1657   ECG 0 Filed at: 03/14/2022 1657   Age 2 Filed at: 03/14/2022 1657   Risk Factors 2 Filed at: 03/14/2022 1657   Troponin 0 Filed at: 03/14/2022 1657   HEART Score 4 Filed at: 03/14/2022 1657                        SBIRT 20yo+      Most Recent Value   SBIRT (23 yo +)    In order to provide better care to our patients, we are screening all of our patients for alcohol and drug use  Would it be okay to ask you these screening questions? Yes Filed at: 03/14/2022 1434   Initial Alcohol Screen: US AUDIT-C     1  How often do you have a drink containing alcohol? 0 Filed at: 03/14/2022 1434   2  How many drinks containing alcohol do you have on a typical day you are drinking? 0 Filed at: 03/14/2022 1434   3a  Male UNDER 65: How often do you have five or more drinks on one occasion? 0 Filed at: 03/14/2022 1434   3b  FEMALE Any Age, or MALE 65+: How often do you have 4 or more drinks on one occassion? 0 Filed at: 03/14/2022 1434   Audit-C Score 0 Filed at: 03/14/2022 1434   CHARLY: How many times in the past year have you    Used an illegal drug or used a prescription medication for non-medical reasons? Never Filed at: 03/14/2022 1434                    [facility]  Number of Diagnoses or Management Options  Atypical chest pain: new and requires workup     Amount and/or Complexity of Data Reviewed  Clinical lab tests: ordered and reviewed  Tests in the radiology section of CPT®: ordered and reviewed  Tests in the medicine section of CPT®: ordered and reviewed  Decide to obtain previous medical records or to obtain history from someone other than the patient: yes  Obtain history from someone other than the patient: yes  Review and summarize past medical records: yes  Independent visualization of images, tracings, or specimens: yes    Risk of Complications, Morbidity, and/or Mortality  Presenting problems: moderate  Diagnostic procedures: moderate  Management options: moderate    Patient Progress  Patient progress: stable      Disposition  Final diagnoses:   Atypical chest pain     Time reflects when diagnosis was documented in both MDM as applicable and the Disposition within this note     Time User Action Codes Description Comment    3/14/2022  4:07 PM Samanta Armstrong Add [R07 89] Atypical chest pain       ED Disposition     ED Disposition Condition Date/Time Comment    Discharge Stable Mon Mar 14, 2022  4:52 PM Juanito Casiano discharge to home/self care              Follow-up Information     Follow up With Specialties Details Why Contact Info Additional Information    Luis Antonio Novak MD Family Medicine Schedule an appointment as soon as possible for a visit   John Mandujano [address]       [facility] Emergency Department Emergency Medicine Go to  If symptoms worsen [place] [phone]  [address] [place] [address] Emergency Department[address] Cardiology Schedule an appointment as soon as possible for a visit  As needed 3 [facility] [place] [phone] [id number] [facility] [place], [address] [facility] [address], [place], [place], [address]          Discharge Medication List as of 3/14/2022  4:59 PM      CONTINUE these medications which have NOT CHANGED    Details   albuterol (PROVENTIL HFA,VENTOLIN HFA) 90 mcg/act inhaler Inhale 2 puffs every 6 (six) hours as needed for wheezing or shortness of breath, Starting Sat 2/29/2020, No Print      apixaban (ELIQUIS) 5 mg Take 5 mg by mouth 2 (two) times a day , Starting Tue 2/5/2019, Historical Med      aspirin 81 mg chewable tablet Chew 1 tablet (81 mg total) daily, Starting Sat 2/29/2020, No Print      atorvastatin (LIPITOR) 10 mg tablet Take 10 mg by mouth daily, Starting Tue 11/10/2020, Historical Med      baclofen 10 mg tablet Starting Sun 12/5/2021, Historical Med      !! Blood Glucose Monitoring Suppl (ONETOUCH VERIO) w/Device KIT Use to check sugars once a day  Dx E11 9, Historical Med      !! Blood Glucose Monitoring Suppl (Marya Hinds) w/Device KIT daily Use to check glucose, Starting Tue 1/28/2020, Historical Med      !! Diclofenac Sodium (VOLTAREN) 1 % Apply topically, Starting Fri 9/17/2021, Historical Med      !!  Diclofenac Sodium (VOLTAREN) 1 % APPLY TOPICALLY TO AFFECTED AREA 4 TIMES A DAY AS NEEDED FOR PAIN APPLY TO BACK , Historical Med      dicyclomine (BENTYL) 20 mg tablet Take 1 tablet (20 mg total) by mouth every 6 (six) hours as needed (urgency, abdominal pain), Starting Tue 9/29/2020, Normal      dofetilide (TIKOSYN) 500 mcg capsule Take 500 mcg by mouth 2 (two) times a day, Historical Med      fenofibrate (TRICOR) 54 MG tablet Take 54 mg by mouth daily, Historical Med      furosemide (LASIX) 20 mg tablet Take 20 mg by mouth 2 (two) times a day, Starting Wed 6/10/2020, Historical Med      glipiZIDE (GLUCOTROL XL) 5 mg 24 hr tablet TAKE 1 TABLET BY MOUTH ONCE DAILY 30 MINUTES BEFORE A MEAL (REPLACES METFORMIN), Historical Med !! glucose blood test strip Use to check sugars once a day  Dx E11 9, Historical Med      !! Lancets (ONETOUCH DELICA PLUS [id number]) MISC USE 1 TO CHECK GLUCOSE ONCE DAILY, Historical Med      mesalamine (DELZICOL) 400 mg Take 2 capsules by mouth twice daily, Normal      metoprolol succinate (TOPROL-XL) 100 mg 24 hr tablet Take 1 tablet (100 mg total) by mouth every 12 (twelve) hours, Starting Thu 2/14/2019, Normal      mometasone-formoterol (DULERA) 100-5 MCG/ACT inhaler Inhale 2 puffs 2 (two) times a day, Starting Sat 2/29/2020, No Print      omeprazole (PriLOSEC) 40 MG capsule Take 1 capsule (40 mg total) by mouth 2 (two) times a day, Starting Tue 3/8/2022, Normal      !! OneTouch Delica Lancets 66T MISC Use to check sugars once a day  Dx E11 9, Historical Med      !! ONETOUCH VERIO test strip USE 1 STRIP TO CHECK GLUCOSE ONCE DAILY, Historical Med      pramipexole (MIRAPEX) 0 5 mg tablet TAKE 1 TABLET BY MOUTH ONCE DAILY AT BEDTIME, Normal      predniSONE 10 mg tablet Take 3 tabs BID X 2 days, 2 tabs BID X 2 days, 1 tab BID X 2 days, 1 tab daily X 2 days, Normal      SYNTHROID 150 MCG tablet TAKE 1 TABLET BY MOUTH ONCE DAILY FIRST THING IN THE MORNING (AT LEAST 30 MINUTES PRIOR TO BREAKFAST OR OTHER MEDS), Historical Med      !! traMADol (ULTRAM) 50 mg tablet Take 50 mg by mouth  , Starting Wed 2/24/2021, Historical Med      !! traMADol (ULTRAM) 50 mg tablet Take 50 mg by mouth every 8 (eight) hours as needed, Starting Fri 2/26/2021, Historical Med      venlafaxine (EFFEXOR XR) 37 5 mg 24 hr capsule Take 37 5 mg by mouth daily , Starting Mon 12/31/2018, Historical Med       !! - Potential duplicate medications found  Please discuss with provider                PDMP Review       Value Time User    PDMP Reviewed  Yes 2/25/2020  6:47 PM Maribell Dimas MD          ED Provider  Electronically Signed by           Leda Sal,   03/14/22 [address], DO  03/14/22 3322

## 2022-03-14 NOTE — DISCHARGE INSTRUCTIONS
Thank you for visiting the Emergency Department today  No clear findings of symptoms on workup  No signs of heart damage  No signs of viral infection or pneumonia  Labs normal/baseline  Symptoms could be related to stress/anxiety  Contact PCP for follow up, possible medication adjustments  Return immediately to ER if symptoms worsen or develop new features

## 2022-03-14 NOTE — ED NOTES
Callbell within reach  Bed in low position  Siderails up  HOB elevated  Patient resting comfortably at this time with family at bedside        Judith Plata, [place]  03/14/22 3597

## 2022-03-15 LAB
ATRIAL RATE: 66 BPM
P AXIS: 65 DEGREES
PR INTERVAL: 152 MS
QRS AXIS: 36 DEGREES
QRSD INTERVAL: 84 MS
QT INTERVAL: 450 MS
QTC INTERVAL: 471 MS
T WAVE AXIS: 57 DEGREES
VENTRICULAR RATE: 66 BPM

## 2022-03-15 PROCEDURE — 93010 ELECTROCARDIOGRAM REPORT: CPT | Performed by: INTERNAL MEDICINE

## 2022-04-01 DIAGNOSIS — K50.919 CROHN'S DISEASE WITH COMPLICATION, UNSPECIFIED GASTROINTESTINAL TRACT LOCATION (HCC): ICD-10-CM

## 2022-04-01 RX ORDER — MESALAMINE 400 MG/1
CAPSULE, DELAYED RELEASE ORAL
Qty: 120 CAPSULE | Refills: 0 | Status: SHIPPED | OUTPATIENT
Start: 2022-04-01 | End: 2022-05-02

## 2022-04-19 ENCOUNTER — APPOINTMENT (EMERGENCY)
Dept: CT IMAGING | Facility: HOSPITAL | Age: 67
End: 2022-04-19
Payer: COMMERCIAL

## 2022-04-19 ENCOUNTER — HOSPITAL ENCOUNTER (EMERGENCY)
Facility: HOSPITAL | Age: 67
Discharge: HOME | End: 2022-04-19
Attending: EMERGENCY MEDICINE
Payer: COMMERCIAL

## 2022-04-19 VITALS
TEMPERATURE: 99.9 F | OXYGEN SATURATION: 95 % | SYSTOLIC BLOOD PRESSURE: 112 MMHG | DIASTOLIC BLOOD PRESSURE: 56 MMHG | RESPIRATION RATE: 18 BRPM | HEART RATE: 68 BPM

## 2022-04-19 DIAGNOSIS — R07.9 CHEST PAIN, UNSPECIFIED: Primary | ICD-10-CM

## 2022-04-19 LAB
2HR DELTA HS TROPONIN: 1 NG/L
ALBUMIN SERPL BCP-MCNC: 3.2 G/DL (ref 3.5–5)
ALP SERPL-CCNC: 98 U/L (ref 46–116)
ALT SERPL W P-5'-P-CCNC: 46 U/L (ref 12–78)
ANION GAP SERPL CALCULATED.3IONS-SCNC: 6 MMOL/L (ref 4–13)
AST SERPL W P-5'-P-CCNC: 38 U/L (ref 5–45)
BASOPHILS # BLD AUTO: 0.03 THOUSANDS/ΜL (ref 0–0.1)
BASOPHILS NFR BLD AUTO: 0 % (ref 0–1)
BILIRUB SERPL-MCNC: 0.24 MG/DL (ref 0.2–1)
BUN SERPL-MCNC: 15 MG/DL (ref 5–25)
CALCIUM ALBUM COR SERPL-MCNC: 9.3 MG/DL (ref 8.3–10.1)
CALCIUM SERPL-MCNC: 8.7 MG/DL (ref 8.3–10.1)
CARDIAC TROPONIN I PNL SERPL HS: 3 NG/L
CARDIAC TROPONIN I PNL SERPL HS: 4 NG/L
CHLORIDE SERPL-SCNC: 106 MMOL/L (ref 100–108)
CO2 SERPL-SCNC: 28 MMOL/L (ref 21–32)
CREAT SERPL-MCNC: 0.94 MG/DL (ref 0.6–1.3)
EOSINOPHIL # BLD AUTO: 0.11 THOUSAND/ΜL (ref 0–0.61)
EOSINOPHIL NFR BLD AUTO: 1 % (ref 0–6)
ERYTHROCYTE [DISTWIDTH] IN BLOOD BY AUTOMATED COUNT: 13 % (ref 11.6–15.1)
GFR SERPL CREATININE-BSD FRML MDRD: 63 ML/MIN/1.73SQ M
GLUCOSE SERPL-MCNC: 126 MG/DL (ref 65–140)
HCT VFR BLD AUTO: 38.7 % (ref 34.8–46.1)
HGB BLD-MCNC: 12 G/DL (ref 11.5–15.4)
IMM GRANULOCYTES # BLD AUTO: 0.04 THOUSAND/UL (ref 0–0.2)
IMM GRANULOCYTES NFR BLD AUTO: 0 % (ref 0–2)
LYMPHOCYTES # BLD AUTO: 1.04 THOUSANDS/ΜL (ref 0.6–4.47)
LYMPHOCYTES NFR BLD AUTO: 11 % (ref 14–44)
MCH RBC QN AUTO: 29.5 PG (ref 26.8–34.3)
MCHC RBC AUTO-ENTMCNC: 31 G/DL (ref 31.4–37.4)
MCV RBC AUTO: 95 FL (ref 82–98)
MONOCYTES # BLD AUTO: 0.5 THOUSAND/ΜL (ref 0.17–1.22)
MONOCYTES NFR BLD AUTO: 5 % (ref 4–12)
NEUTROPHILS # BLD AUTO: 7.63 THOUSANDS/ΜL (ref 1.85–7.62)
NEUTS SEG NFR BLD AUTO: 83 % (ref 43–75)
NRBC BLD AUTO-RTO: 0 /100 WBCS
NT-PROBNP SERPL-MCNC: 570 PG/ML
PLATELET # BLD AUTO: 266 THOUSANDS/UL (ref 149–390)
PMV BLD AUTO: 9.9 FL (ref 8.9–12.7)
POTASSIUM SERPL-SCNC: 3.5 MMOL/L (ref 3.5–5.3)
PROT SERPL-MCNC: 7.8 G/DL (ref 6.4–8.2)
RBC # BLD AUTO: 4.07 MILLION/UL (ref 3.81–5.12)
SODIUM SERPL-SCNC: 140 MMOL/L (ref 136–145)
WBC # BLD AUTO: 9.35 THOUSAND/UL (ref 4.31–10.16)

## 2022-04-19 PROCEDURE — 87636 SARSCOV2 & INF A&B AMP PRB: CPT | Performed by: EMERGENCY MEDICINE

## 2022-04-19 PROCEDURE — 99285 EMERGENCY DEPT VISIT HI MDM: CPT | Performed by: EMERGENCY MEDICINE

## 2022-04-19 PROCEDURE — 85025 COMPLETE CBC W/AUTO DIFF WBC: CPT | Performed by: EMERGENCY MEDICINE

## 2022-04-19 PROCEDURE — 74174 CTA ABD&PLVS W/CONTRAST: CPT

## 2022-04-19 PROCEDURE — 84484 ASSAY OF TROPONIN QUANT: CPT | Performed by: EMERGENCY MEDICINE

## 2022-04-19 PROCEDURE — 93005 ELECTROCARDIOGRAM TRACING: CPT

## 2022-04-19 PROCEDURE — 83880 ASSAY OF NATRIURETIC PEPTIDE: CPT | Performed by: EMERGENCY MEDICINE

## 2022-04-19 PROCEDURE — G1004 CDSM NDSC: HCPCS

## 2022-04-19 PROCEDURE — 99285 EMERGENCY DEPT VISIT HI MDM: CPT

## 2022-04-19 PROCEDURE — 71275 CT ANGIOGRAPHY CHEST: CPT

## 2022-04-19 PROCEDURE — 36415 COLL VENOUS BLD VENIPUNCTURE: CPT | Performed by: EMERGENCY MEDICINE

## 2022-04-19 PROCEDURE — 80053 COMPREHEN METABOLIC PANEL: CPT | Performed by: EMERGENCY MEDICINE

## 2022-04-19 RX ORDER — ACETAMINOPHEN 325 MG/1
975 TABLET ORAL ONCE
Status: COMPLETED | OUTPATIENT
Start: 2022-04-19 | End: 2022-04-19

## 2022-04-19 RX ADMIN — IOHEXOL 100 ML: 350 INJECTION, SOLUTION INTRAVENOUS at 18:27

## 2022-04-19 RX ADMIN — ACETAMINOPHEN 975 MG: 325 TABLET ORAL at 19:25

## 2022-04-19 NOTE — ED PROVIDER NOTES
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  Patient states that she had earlier gone to the pharmacy to  medications and had no issues at that time  Upon arrival, patient appear to be anxious in mild distress with possible min hyperventilation and after reassurance, symptoms improved  Patient with residual chest tightness at time of initial arrival   No ripping or tearing type pain  No nausea or vomiting, dizziness or diaphoresis  Mild bilateral lower extremity edema  Patient states that she is on her feet and does not wear compression stockings  Patient denies any PND, NEVAREZ orthopnea  No recent illness  No fever or chills  Mild nonproductive cough  Patient overall well-appearing and not in any distress  Prior to Admission Medications   Prescriptions Last Dose Informant Patient Reported? Taking? Blood Glucose Monitoring Suppl (Caleb Fairbanks) w/Device KIT  Self Yes No   Sig: Use to check sugars once a day  Dx E11 9   Blood Glucose Monitoring Suppl (ONETOUCH VERIO) w/Device KIT  Self Yes No   Sig: daily Use to check glucose   Diclofenac Sodium (VOLTAREN) 1 %  Self Yes No   Sig: Apply topically   Diclofenac Sodium (VOLTAREN) 1 %  Self Yes No   Sig: APPLY TOPICALLY TO AFFECTED AREA 4 TIMES A DAY AS NEEDED FOR PAIN APPLY TO BACK  Lancets (ONETOUCH DELICA PLUS [id number]) MISC  Self Yes No   Sig: USE 1 TO CHECK GLUCOSE ONCE DAILY   ONETOUCH VERIO test strip  Self Yes No   Sig: USE 1 STRIP TO CHECK GLUCOSE ONCE DAILY   OneTouch Delica Lancets 77K MISC  Self Yes No   Sig: Use to check sugars once a day   Dx E11 9   SYNTHROID 150 MCG tablet  Self Yes No   Sig: TAKE 1 TABLET BY MOUTH ONCE DAILY FIRST THING IN THE MORNING (AT LEAST 30 MINUTES PRIOR TO BREAKFAST OR OTHER MEDS)   albuterol (PROVENTIL HFA,VENTOLIN HFA) 90 mcg/act inhaler  Self No No   Sig: Inhale 2 puffs every 6 (six) hours as needed for wheezing or shortness of breath   apixaban (ELIQUIS) 5 mg  Self Yes No   Sig: Take 5 mg by mouth 2 (two) times a day    aspirin 81 mg chewable tablet  Self No No   Sig: Chew 1 tablet (81 mg total) daily   atorvastatin (LIPITOR) 10 mg tablet  Self Yes No   Sig: Take 10 mg by mouth daily   baclofen 10 mg tablet  Self Yes No   dicyclomine (BENTYL) 20 mg tablet  Self No No   Sig: Take 1 tablet (20 mg total) by mouth every 6 (six) hours as needed (urgency, abdominal pain)   dofetilide (TIKOSYN) 500 mcg capsule  Self Yes No   Sig: Take 500 mcg by mouth 2 (two) times a day   fenofibrate (TRICOR) 54 MG tablet  Self Yes No   Sig: Take 54 mg by mouth daily   furosemide (LASIX) 20 mg tablet  Self Yes No   Sig: Take 20 mg by mouth 2 (two) times a day   glipiZIDE (GLUCOTROL XL) 5 mg 24 hr tablet  Self Yes No   Sig: TAKE 1 TABLET BY MOUTH ONCE DAILY 30 MINUTES BEFORE A MEAL (REPLACES METFORMIN)   glucose blood test strip  Self Yes No   Sig: Use to check sugars once a day   Dx E11 9   mesalamine (DELZICOL) 400 mg   No No   Sig: Take 2 capsules by mouth twice daily   metoprolol succinate (TOPROL-XL) 100 mg 24 hr tablet  Self No No   Sig: Take 1 tablet (100 mg total) by mouth every 12 (twelve) hours   mometasone-formoterol (DULERA) 100-5 MCG/ACT inhaler  Self No No   Sig: Inhale 2 puffs 2 (two) times a day   omeprazole (PriLOSEC) 40 MG capsule   No No   Sig: Take 1 capsule (40 mg total) by mouth 2 (two) times a day   pramipexole (MIRAPEX) 0 5 mg tablet   No No   Sig: TAKE 1 TABLET BY MOUTH ONCE DAILY AT BEDTIME   predniSONE 10 mg tablet  Self No No   Sig: Take 3 tabs BID X 2 days, 2 tabs BID X 2 days, 1 tab BID X 2 days, 1 tab daily X 2 days   traMADol (ULTRAM) 50 mg tablet  Self Yes No   Sig: Take 50 mg by mouth     traMADol (ULTRAM) 50 mg tablet  Self Yes No   Sig: Take 50 mg by mouth every 8 (eight) hours as needed   venlafaxine (EFFEXOR XR) 37 5 mg 24 hr capsule  Self Yes No   Sig: Take 37 5 mg by mouth daily       Facility-Administered Medications: None       Past Medical History:   Diagnosis Date    A-fib ([place] [address] )     Brain aneurysm     Cancer ([place] [address] )     papillary thyroid cancer    Cardiac disease     CHF (congestive heart failure) (HCC)     Crohn disease ([place] [address] )     Diabetes mellitus ([place] [address] )     Disease of thyroid gland     GERD (gastroesophageal reflux disease)     Hyperlipidemia     Hypertension        Past Surgical History:   Procedure Laterality Date    APPENDECTOMY      CARDIOVERSION N/A 2019    Procedure: CARDIOVERSION;  Surgeon: Giovani Barron MD;  Location: [facility];  Service: Cardiology    CEREBRAL ANEURYSM REPAIR      CHOLECYSTECTOMY      HYSTERECTOMY      THYROIDECTOMY      TONSILLECTOMY AND ADENOIDECTOMY         History reviewed  No pertinent family history  I have reviewed and agree with the history as documented  E-Cigarette/Vaping    E-Cigarette Use Never User      E-Cigarette/Vaping Substances    Nicotine No     THC No     CBD No     Flavoring No     Other No     Unknown No      Social History     Tobacco Use    Smoking status: Former Smoker     Quit date: 2018     Years since quittin 2    Smokeless tobacco: Never Used   Vaping Use    Vaping Use: Never used   Substance Use Topics    Alcohol use: Yes     Alcohol/week: 0 0 standard drinks     Comment: social    Drug use: Never       Review of Systems   Constitutional: Negative for activity change, appetite change, chills, diaphoresis, fatigue and fever  HENT: Negative for dental problem, ear pain, sore throat, trouble swallowing and voice change  Eyes: Negative for pain and visual disturbance  Respiratory: Positive for chest tightness and shortness of breath  Negative for cough and wheezing  Cardiovascular: Positive for chest pain and leg swelling  Negative for palpitations  Gastrointestinal: Negative for abdominal pain, anal bleeding, blood in stool, diarrhea, nausea, rectal pain and vomiting  Endocrine: Negative for polydipsia, polyphagia and polyuria  Genitourinary: Negative for difficulty urinating, dysuria, flank pain, frequency, hematuria and urgency  Musculoskeletal: Negative for back pain, joint swelling, myalgias, neck pain and neck stiffness  Skin: Negative for pallor, rash and wound  Neurological: Negative for dizziness, facial asymmetry, speech difficulty, weakness, light-headedness, numbness and headaches  Hematological: Negative for adenopathy  Psychiatric/Behavioral: Negative for agitation  The patient is not nervous/anxious  All other systems reviewed and are negative  Physical Exam  Physical Exam  Vitals and nursing note reviewed  Constitutional:       General: She is not in acute distress  Appearance: She is well-developed  She is not ill-appearing, toxic-appearing or diaphoretic  HENT:      Head: Normocephalic and atraumatic  Right Ear: External ear normal       Left Ear: External ear normal       Nose: Nose normal       Mouth/Throat:      Pharynx: No oropharyngeal exudate  Eyes:      General: No scleral icterus  Right eye: No discharge  Left eye: No discharge  Extraocular Movements: Extraocular movements intact  Conjunctiva/sclera: Conjunctivae normal       Pupils: Pupils are equal, round, and reactive to light  Neck:      Thyroid: No thyromegaly  Vascular: No hepatojugular reflux or JVD  Trachea: No tracheal deviation  Cardiovascular:      Rate and Rhythm: Normal rate and regular rhythm  Pulses: Normal pulses  Heart sounds: S1 normal and S2 normal  Heart sounds not distant  Murmur heard  Crescendo decrescendo systolic murmur is present with a grade of 2/6  No friction rub  No gallop  Pulmonary:      Effort: Pulmonary effort is normal  No tachypnea, accessory muscle usage, respiratory distress or retractions  Breath sounds: Normal breath sounds  No stridor or decreased air movement  No decreased breath sounds, wheezing, rhonchi or rales  Chest:      Chest wall: No tenderness  Abdominal:      General: There is no distension  Palpations: Abdomen is soft  There is no mass  Tenderness: There is no abdominal tenderness  There is no guarding or rebound  Hernia: No hernia is present  Musculoskeletal:         General: No tenderness or deformity  Normal range of motion  Cervical back: Normal range of motion and neck supple  Right lower le+ Pitting Edema present  Left lower le+ Pitting Edema present  Lymphadenopathy:      Cervical: No cervical adenopathy  Skin:     General: Skin is warm and dry  Capillary Refill: Capillary refill takes less than 2 seconds  Coloration: Skin is not pale  Findings: No erythema or rash  Neurological:      General: No focal deficit present  Mental Status: She is alert and oriented to person, place, and time  Mental status is at baseline  GCS: GCS eye subscore is 4  GCS verbal subscore is 5  GCS motor subscore is 6  Cranial Nerves: Cranial nerves are intact  No cranial nerve deficit, dysarthria or facial asymmetry  Sensory: Sensation is intact  No sensory deficit  Motor: Motor function is intact  No weakness, tremor, abnormal muscle tone or seizure activity  Coordination: Coordination is intact  Deep Tendon Reflexes: Reflexes are normal and symmetric   Reflexes normal    Psychiatric:         Behavior: Behavior normal          Vital Signs  ED Triage Vitals   Temperature Pulse Respirations Blood Pressure SpO2   22 1650 22 1645 22 1645 22 1645 22 1645   99 9 °F (37 7 °C) 72 (!) 33 125/59 98 %      Temp Source Heart Rate Source Patient Position - Orthostatic VS BP Location FiO2 (%)   04/19/22 1650 04/19/22 1745 04/19/22 1745 04/19/22 1745 --   Temporal Monitor Sitting Left arm       Pain Score       04/19/22 1650       8           Vitals:    04/19/22 1930 04/19/22 2015 04/19/22 2030 04/19/22 2100   BP: 118/56 123/55 (!) 85/38 112/56   Pulse: 69 66 67 68   Patient Position - Orthostatic VS: Sitting            Visual Acuity      ED Medications  Medications   iohexol (OMNIPAQUE) 350 MG/ML injection (SINGLE-DOSE) 100 mL (100 mL Intravenous Given 4/19/22 1827)   acetaminophen (TYLENOL) tablet 975 mg (975 mg Oral Given 4/19/22 1925)       Diagnostic Studies  Results Reviewed     Procedure Component Value Units Date/Time    COVID/FLU - 24 hour TAT [[id number]] Collected: 04/19/22 2104    Lab Status:  In process Specimen: Nares from Nasopharyngeal Swab Updated: 04/19/22 2111    HS Troponin I 2hr [[id number]]  (Normal) Collected: 04/19/22 1929    Lab Status: Final result Specimen: Blood from Arm, Right Updated: 04/19/22 2004     hs TnI 2hr 4 ng/L      Delta 2hr hsTnI 1 ng/L     HS Troponin I 4hr [[id number]]     Lab Status: No result Specimen: Blood     NT-BNP PRO [[id number]]  (Abnormal) Collected: 04/19/22 1721    Lab Status: Final result Specimen: Blood from Arm, Right Updated: 04/19/22 1801     NT-proBNP 570 pg/mL     HS Troponin 0hr (reflex protocol) [[id number]]  (Normal) Collected: 04/19/22 1721    Lab Status: Final result Specimen: Blood from Arm, Right Updated: 04/19/22 1757     hs TnI 0hr 3 ng/L     Comprehensive metabolic panel [[id number]]  (Abnormal) Collected: 04/19/22 1721    Lab Status: Final result Specimen: Blood from Arm, Right Updated: 04/19/22 1754     Sodium 140 mmol/L      Potassium 3 5 mmol/L      Chloride 106 mmol/L      CO2 28 mmol/L      ANION GAP 6 mmol/L      BUN 15 mg/dL      Creatinine 0 94 mg/dL      Glucose 126 mg/dL      Calcium 8 7 mg/dL      Corrected Calcium 9 3 mg/dL      AST 38 U/L      ALT 46 U/L      Alkaline Phosphatase 98 U/L Total Protein 7 8 g/dL      Albumin 3 2 g/dL      Total Bilirubin 0 24 mg/dL      eGFR 63 ml/min/1 73sq m     Narrative:      [place] guidelines for Chronic Kidney Disease (CKD):     Stage 1 with normal or high GFR (GFR > 90 mL/min/1 73 square meters)    Stage 2 Mild CKD (GFR = 60-89 mL/min/1 73 square meters)    Stage 3A Moderate CKD (GFR = 45-59 mL/min/1 73 square meters)    Stage 3B Moderate CKD (GFR = 30-44 mL/min/1 73 square meters)    Stage 4 Severe CKD (GFR = 15-29 mL/min/1 73 square meters)    Stage 5 End Stage CKD (GFR <15 mL/min/1 73 square meters)  Note: GFR calculation is accurate only with a steady state creatinine    CBC and differential [[id number]]  (Abnormal) Collected: 04/19/22 1721    Lab Status: Final result Specimen: Blood from Arm, Right Updated: 04/19/22 1727     WBC 9 35 Thousand/uL      RBC 4 07 Million/uL      Hemoglobin 12 0 g/dL      Hematocrit 38 7 %      MCV 95 fL      MCH 29 5 pg      MCHC 31 0 g/dL      RDW 13 0 %      MPV 9 9 fL      Platelets 276 Thousands/uL      nRBC 0 /100 WBCs      Neutrophils Relative 83 %      Immat GRANS % 0 %      Lymphocytes Relative 11 %      Monocytes Relative 5 %      Eosinophils Relative 1 %      Basophils Relative 0 %      Neutrophils Absolute 7 63 Thousands/µL      Immature Grans Absolute 0 04 Thousand/uL      Lymphocytes Absolute 1 04 Thousands/µL      Monocytes Absolute 0 50 Thousand/µL      Eosinophils Absolute 0 11 Thousand/µL      Basophils Absolute 0 03 Thousands/µL                  CTA dissection protocol chest/abdomen/pelvis   ED Interpretation by Johnna Flores DO (04/19 1836)   No obvious acute aortic dissection  Final Result by Jennifer Nguyen MD (04/19 1913)      No aortic aneurysm or aortic dissection  Stable background paraseptal and centrilobular emphysema  Extensive adenopathy in the mediastinum    There is a structure in the high anterior mediastinum that has clearly increased in size now measuring 3 cm of uncertain etiology (previous measurement 1 8 cm)  Recommend PET/CT scan for further evaluation  This could represent abnormal lymph node versus thymic mass  No acute inflammatory process identified in the abdomen or pelvis  The study was marked in EPIC for significant notification follow-up notification  Workstation performed: [id number]                        Procedures  ECG 12 Lead Documentation Only    Date/Time: 4/19/2022 4:45 PM  Performed by: Agnieszka Eddy DO  Authorized by: Agnieszka Eddy DO     Indications / Diagnosis:  Dyspnea, chest pain  Patient location:  ED  Previous ECG:     Previous ECG:  Compared to current    Comparison ECG info:  3/14/22    Similarity:  No change    Comparison to cardiac monitor: Yes    Interpretation:     Interpretation: normal    Rate:     ECG rate:  73    ECG rate assessment: normal    Rhythm:     Rhythm: sinus rhythm    Ectopy:     Ectopy: none    QRS:     QRS axis:  Normal    QRS intervals:  Normal  Conduction:     Conduction: normal    ST segments:     ST segments:  Normal  T waves:     T waves: normal      ECG 12 Lead Documentation Only    Date/Time: 4/19/2022 7:27 PM  Performed by: Agnieszka Eddy DO  Authorized by: Agnieszka Eddy DO     Indications / Diagnosis:  Chest pain  ECG reviewed by me, the ED Provider: yes    Patient location:  ED  Previous ECG:     Previous ECG:  Compared to current    Comparison ECG info:  4/19/22    Similarity:  No change    Comparison to cardiac monitor: Yes    Interpretation:     Interpretation: normal    Rate:     ECG rate:  71    ECG rate assessment: normal    Rhythm:     Rhythm: sinus rhythm    Ectopy:     Ectopy: none    QRS:     QRS axis:  Normal  Conduction:     Conduction: normal    ST segments:     ST segments:  Normal  T waves:     T waves: normal               ED Course      2108 hours:  Imaging and labs results reviewed    Patient signed/8/time and CT imaging results and understands need for follow-up  Ambulatory referral for Cardiology already in the system  HEART Risk Score      Most Recent Value   Heart Score Risk Calculator    History 1 Filed at: 04/19/2022 1852   ECG 0 Filed at: 04/19/2022 1852   Age 2 Filed at: 04/19/2022 1852   Risk Factors 1 Filed at: 04/19/2022 1852   Troponin 0 Filed at: 04/19/2022 1852   HEART Score 4 Filed at: 04/19/2022 1852                PERC Rule for PE      Most Recent Value   PERC Rule for PE    Age >=50 1 Filed at: 04/19/2022 1854   HR >=100 0 Filed at: 04/19/2022 1854   O2 Sat on room air < 95% 0 Filed at: 04/19/2022 1854   History of PE or DVT 0 Filed at: 04/19/2022 1854   Recent trauma or surgery 0 Filed at: 04/19/2022 1854   Hemoptysis 0 Filed at: 04/19/2022 1854   Exogenous estrogen 0 Filed at: 04/19/2022 1854   Unilateral leg swelling 0 Filed at: 04/19/2022 1854   PERC Rule for PE Results 1 Filed at: 04/19/2022 1854              SBIRT 20yo+      Most Recent Value   SBIRT (25 yo +)    In order to provide better care to our patients, we are screening all of our patients for alcohol and drug use  Would it be okay to ask you these screening questions? Yes Filed at: 04/19/2022 1745   Initial Alcohol Screen: US AUDIT-C     1  How often do you have a drink containing alcohol? 0 Filed at: 04/19/2022 1745   2  How many drinks containing alcohol do you have on a typical day you are drinking? 0 Filed at: 04/19/2022 1745   3a  Male UNDER 65: How often do you have five or more drinks on one occasion? 0 Filed at: 04/19/2022 1745   3b  FEMALE Any Age, or MALE 65+: How often do you have 4 or more drinks on one occassion? 0 Filed at: 04/19/2022 1745   Audit-C Score 0 Filed at: 04/19/2022 1745   CHARLY: How many times in the past year have you    Used an illegal drug or used a prescription medication for non-medical reasons?  Never Filed at: 04/19/2022 6006          Wells' Criteria for PE      Most Recent Value   Wells' Criteria for PE    Clinical signs and symptoms of DVT 0 Filed at: 04/19/2022 7463   PE is primary diagnosis or equally likely 0 Filed at: 04/19/2022 1855   HR >100 0 Filed at: 04/19/2022 1855   Immobilization at least 3 days or Surgery in the previous 4 weeks 0 Filed at: 04/19/2022 1855   Previous, objectively diagnosed PE or DVT 0 Filed at: 04/19/2022 1855   Hemoptysis 0 Filed at: 04/19/2022 1855   Malignancy with treatment within 6 months or palliative 0 Filed at: 04/19/2022 9177   Wells' Criteria Total 0 Filed at: 04/19/2022 1855                [facility]  Number of Diagnoses or Management Options  Diagnosis management comments: 78-year-old female presents for evaluation of acute onset tree located chest pain as well as associated dyspnea with element of hyperventilation  Only improvement upon arrival but with residual chest tightness  Different blood pressures in upper extremities, SBP  mmHg and  mmHg; but no ripping or tearing type pain  CTA Aortic dissection, BP management as needed, Labs, EKG, CXR, Symptom management  Disposition as appropriate  Amount and/or Complexity of Data Reviewed  Clinical lab tests: ordered and reviewed  Tests in the radiology section of CPT®: ordered and reviewed        Disposition  Final diagnoses:   Chest pain, unspecified     Time reflects when diagnosis was documented in both MDM as applicable and the Disposition within this note     Time User Action Codes Description Comment    4/19/2022  8:22 PM Estela Parry Add [R07 9] Chest pain, unspecified       ED Disposition     ED Disposition Condition Date/Time Comment    Discharge Stable Tue Apr 19, 2022  8:22 PM Azul Reyes discharge to home/self care              Follow-up Information     Follow up With Specialties Details Why Manuel Coleman MD Family Medicine In 1 day Follow up [id number] [facility]  [phone]            Patient's Medications   Discharge Prescriptions    No medications on file       No discharge procedures on file      PDMP Review       Value Time User    PDMP Reviewed  Yes 2/25/2020  6:47 PM Jd Fernandez MD          ED Provider  Electronically Signed by           Dedra Momin,   04/19/22 2108       Dedra Momin DO  04/19/22 2115

## 2022-04-19 NOTE — Clinical Note
Bebeto Schafer was seen and treated in our emergency department on 4/19/2022  Diagnosis:     Ernesto Mclean  may return to school on return date  She may return on this date: 04/22/2022         If you have any questions or concerns, please don't hesitate to call        Tiffany Reynaldo DO    ______________________________           _______________          _______________  Hospital Representative                              Date                                Time

## 2022-04-20 LAB
ATRIAL RATE: 71 BPM
ATRIAL RATE: 73 BPM
FLUAV RNA RESP QL NAA+PROBE: NEGATIVE
FLUBV RNA RESP QL NAA+PROBE: NEGATIVE
P AXIS: 66 DEGREES
P AXIS: 91 DEGREES
PR INTERVAL: 140 MS
PR INTERVAL: 140 MS
QRS AXIS: 32 DEGREES
QRS AXIS: 44 DEGREES
QRSD INTERVAL: 78 MS
QRSD INTERVAL: 78 MS
QT INTERVAL: 392 MS
QT INTERVAL: 406 MS
QTC INTERVAL: 431 MS
QTC INTERVAL: 441 MS
SARS-COV-2 RNA RESP QL NAA+PROBE: NEGATIVE
T WAVE AXIS: 51 DEGREES
T WAVE AXIS: 55 DEGREES
VENTRICULAR RATE: 71 BPM
VENTRICULAR RATE: 73 BPM

## 2022-04-20 PROCEDURE — 93010 ELECTROCARDIOGRAM REPORT: CPT | Performed by: INTERNAL MEDICINE

## 2022-04-20 NOTE — ED RE-EVALUATION NOTE
Imaging and lab results reviewed with patient  Reassured that noncardiac chest pain at this time  Patient states under some stress related to work  Reviewed imaging results with the patient and patient signed/8/time the imaging results  Patient understands the need for follow-up  Copy of the results given to the patient to present to her PCP               Cary Mcwilliams DO  04/19/22 0190

## 2022-05-02 DIAGNOSIS — K50.919 CROHN'S DISEASE WITH COMPLICATION, UNSPECIFIED GASTROINTESTINAL TRACT LOCATION (HCC): ICD-10-CM

## 2022-05-02 RX ORDER — MESALAMINE 400 MG/1
CAPSULE, DELAYED RELEASE ORAL
Qty: 120 CAPSULE | Refills: 0 | Status: SHIPPED | OUTPATIENT
Start: 2022-05-02 | End: 2022-06-02

## 2022-05-11 ENCOUNTER — APPOINTMENT (OUTPATIENT)
Dept: LAB | Facility: MEDICAL CENTER | Age: 67
End: 2022-05-11
Payer: COMMERCIAL

## 2022-05-11 DIAGNOSIS — F31.60 MIXED BIPOLAR I DISORDER (HCC): ICD-10-CM

## 2022-05-11 LAB
ALBUMIN SERPL BCP-MCNC: 3.1 G/DL (ref 3.5–5)
ALP SERPL-CCNC: 111 U/L (ref 46–116)
ALT SERPL W P-5'-P-CCNC: 38 U/L (ref 12–78)
ANION GAP SERPL CALCULATED.3IONS-SCNC: 3 MMOL/L (ref 4–13)
AST SERPL W P-5'-P-CCNC: 35 U/L (ref 5–45)
BASOPHILS # BLD AUTO: 0.02 THOUSANDS/ΜL (ref 0–0.1)
BASOPHILS NFR BLD AUTO: 0 % (ref 0–1)
BILIRUB SERPL-MCNC: 0.35 MG/DL (ref 0.2–1)
BUN SERPL-MCNC: 16 MG/DL (ref 5–25)
CALCIUM ALBUM COR SERPL-MCNC: 10 MG/DL (ref 8.3–10.1)
CALCIUM SERPL-MCNC: 9.3 MG/DL (ref 8.3–10.1)
CHLORIDE SERPL-SCNC: 106 MMOL/L (ref 100–108)
CHOLEST SERPL-MCNC: 143 MG/DL
CO2 SERPL-SCNC: 29 MMOL/L (ref 21–32)
CREAT SERPL-MCNC: 0.97 MG/DL (ref 0.6–1.3)
EOSINOPHIL # BLD AUTO: 0.32 THOUSAND/ΜL (ref 0–0.61)
EOSINOPHIL NFR BLD AUTO: 3 % (ref 0–6)
ERYTHROCYTE [DISTWIDTH] IN BLOOD BY AUTOMATED COUNT: 13.6 % (ref 11.6–15.1)
GFR SERPL CREATININE-BSD FRML MDRD: 61 ML/MIN/1.73SQ M
GLUCOSE P FAST SERPL-MCNC: 127 MG/DL (ref 65–99)
HCT VFR BLD AUTO: 43.8 % (ref 34.8–46.1)
HDLC SERPL-MCNC: 41 MG/DL
HGB BLD-MCNC: 13.5 G/DL (ref 11.5–15.4)
IMM GRANULOCYTES # BLD AUTO: 0.03 THOUSAND/UL (ref 0–0.2)
IMM GRANULOCYTES NFR BLD AUTO: 0 % (ref 0–2)
LDLC SERPL CALC-MCNC: 83 MG/DL (ref 0–100)
LYMPHOCYTES # BLD AUTO: 1.98 THOUSANDS/ΜL (ref 0.6–4.47)
LYMPHOCYTES NFR BLD AUTO: 21 % (ref 14–44)
MCH RBC QN AUTO: 28.7 PG (ref 26.8–34.3)
MCHC RBC AUTO-ENTMCNC: 30.8 G/DL (ref 31.4–37.4)
MCV RBC AUTO: 93 FL (ref 82–98)
MONOCYTES # BLD AUTO: 0.85 THOUSAND/ΜL (ref 0.17–1.22)
MONOCYTES NFR BLD AUTO: 9 % (ref 4–12)
NEUTROPHILS # BLD AUTO: 6.09 THOUSANDS/ΜL (ref 1.85–7.62)
NEUTS SEG NFR BLD AUTO: 67 % (ref 43–75)
NONHDLC SERPL-MCNC: 102 MG/DL
NRBC BLD AUTO-RTO: 0 /100 WBCS
PLATELET # BLD AUTO: 282 THOUSANDS/UL (ref 149–390)
PMV BLD AUTO: 10 FL (ref 8.9–12.7)
POTASSIUM SERPL-SCNC: 4.4 MMOL/L (ref 3.5–5.3)
PROT SERPL-MCNC: 8 G/DL (ref 6.4–8.2)
RBC # BLD AUTO: 4.7 MILLION/UL (ref 3.81–5.12)
SODIUM SERPL-SCNC: 138 MMOL/L (ref 136–145)
TRIGL SERPL-MCNC: 93 MG/DL
TSH SERPL DL<=0.05 MIU/L-ACNC: 2.93 UIU/ML (ref 0.45–4.5)
WBC # BLD AUTO: 9.29 THOUSAND/UL (ref 4.31–10.16)

## 2022-05-11 PROCEDURE — 36415 COLL VENOUS BLD VENIPUNCTURE: CPT

## 2022-05-11 PROCEDURE — 80061 LIPID PANEL: CPT

## 2022-05-11 PROCEDURE — 84443 ASSAY THYROID STIM HORMONE: CPT

## 2022-05-11 PROCEDURE — 85025 COMPLETE CBC W/AUTO DIFF WBC: CPT

## 2022-05-11 PROCEDURE — 80053 COMPREHEN METABOLIC PANEL: CPT

## 2022-06-02 DIAGNOSIS — K50.919 CROHN'S DISEASE WITH COMPLICATION, UNSPECIFIED GASTROINTESTINAL TRACT LOCATION (HCC): ICD-10-CM

## 2022-06-02 RX ORDER — MESALAMINE 400 MG/1
CAPSULE, DELAYED RELEASE ORAL
Qty: 120 CAPSULE | Refills: 0 | Status: SHIPPED | OUTPATIENT
Start: 2022-06-02 | End: 2022-07-03

## 2022-06-17 ENCOUNTER — HOSPITAL ENCOUNTER (OUTPATIENT)
Dept: RADIOLOGY | Age: 67
Discharge: HOME | End: 2022-06-17
Payer: COMMERCIAL

## 2022-06-17 DIAGNOSIS — R63.4 LOSS OF WEIGHT: ICD-10-CM

## 2022-06-17 DIAGNOSIS — Z85.850 PERSONAL HISTORY OF MALIGNANT NEOPLASM OF THYROID: ICD-10-CM

## 2022-06-17 DIAGNOSIS — D38.3 NEOPLASM OF UNCERTAIN BEHAVIOR OF MEDIASTINUM: ICD-10-CM

## 2022-06-17 DIAGNOSIS — D49.89 PLASMA CELL NEOPLASM: ICD-10-CM

## 2022-06-17 LAB — GLUCOSE SERPL-MCNC: 118 MG/DL (ref 65–140)

## 2022-06-17 PROCEDURE — 78815 PET IMAGE W/CT SKULL-THIGH: CPT

## 2022-06-17 PROCEDURE — A9552 F18 FDG: HCPCS

## 2022-06-17 PROCEDURE — G1004 CDSM NDSC: HCPCS

## 2022-06-17 PROCEDURE — 82948 REAGENT STRIP/BLOOD GLUCOSE: CPT

## 2022-06-23 ENCOUNTER — OFFICE VISIT (OUTPATIENT)
Dept: SLEEP CENTER | Facility: CLINIC | Age: 67
End: 2022-06-23
Payer: COMMERCIAL

## 2022-06-23 VITALS
HEIGHT: 69 IN | TEMPERATURE: 96.7 F | BODY MASS INDEX: 36.26 KG/M2 | WEIGHT: 244.8 LBS | OXYGEN SATURATION: 95 % | DIASTOLIC BLOOD PRESSURE: 60 MMHG | SYSTOLIC BLOOD PRESSURE: 110 MMHG | HEART RATE: 60 BPM

## 2022-06-23 DIAGNOSIS — I27.20 PULMONARY HYPERTENSION (HCC): ICD-10-CM

## 2022-06-23 DIAGNOSIS — G47.34 SLEEP RELATED HYPOXIA: ICD-10-CM

## 2022-06-23 DIAGNOSIS — F41.9 ANXIETY AND DEPRESSION: ICD-10-CM

## 2022-06-23 DIAGNOSIS — I10 ESSENTIAL HYPERTENSION: ICD-10-CM

## 2022-06-23 DIAGNOSIS — R68.2 DRY MOUTH: ICD-10-CM

## 2022-06-23 DIAGNOSIS — G25.81 RLS (RESTLESS LEGS SYNDROME): ICD-10-CM

## 2022-06-23 DIAGNOSIS — Z71.89 CPAP USE COUNSELING: ICD-10-CM

## 2022-06-23 DIAGNOSIS — J44.9 CHRONIC OBSTRUCTIVE PULMONARY DISEASE, UNSPECIFIED COPD TYPE (HCC): ICD-10-CM

## 2022-06-23 DIAGNOSIS — G47.9 SLEEP DISTURBANCE: ICD-10-CM

## 2022-06-23 DIAGNOSIS — G89.29 CHRONIC BILATERAL LOW BACK PAIN WITHOUT SCIATICA: ICD-10-CM

## 2022-06-23 DIAGNOSIS — G47.33 OSA (OBSTRUCTIVE SLEEP APNEA): Primary | ICD-10-CM

## 2022-06-23 DIAGNOSIS — G89.4 CHRONIC PAIN SYNDROME: ICD-10-CM

## 2022-06-23 DIAGNOSIS — G47.19 EXCESSIVE DAYTIME SLEEPINESS: ICD-10-CM

## 2022-06-23 DIAGNOSIS — M54.50 CHRONIC BILATERAL LOW BACK PAIN WITHOUT SCIATICA: ICD-10-CM

## 2022-06-23 DIAGNOSIS — F32.A ANXIETY AND DEPRESSION: ICD-10-CM

## 2022-06-23 DIAGNOSIS — M54.16 LUMBAR RADICULOPATHY: ICD-10-CM

## 2022-06-23 DIAGNOSIS — E11.9 TYPE 2 DIABETES MELLITUS WITHOUT COMPLICATION, WITHOUT LONG-TERM CURRENT USE OF INSULIN (HCC): ICD-10-CM

## 2022-06-23 DIAGNOSIS — E66.9 OBESITY (BMI 30-39.9): ICD-10-CM

## 2022-06-23 PROCEDURE — 99214 OFFICE O/P EST MOD 30 MIN: CPT | Performed by: INTERNAL MEDICINE

## 2022-06-23 RX ORDER — LISINOPRIL 10 MG/1
10 TABLET ORAL EVERY MORNING
COMMUNITY
Start: 2022-06-07

## 2022-06-23 RX ORDER — QUETIAPINE FUMARATE 25 MG/1
25 TABLET, FILM COATED ORAL
COMMUNITY
Start: 2022-06-06

## 2022-06-23 RX ORDER — GABAPENTIN 100 MG/1
100 CAPSULE ORAL
Qty: 90 CAPSULE | Refills: 2 | Status: SHIPPED | OUTPATIENT
Start: 2022-06-23

## 2022-06-23 RX ORDER — AMLODIPINE BESYLATE 5 MG/1
5 TABLET ORAL EVERY MORNING
COMMUNITY
Start: 2022-03-21

## 2022-06-23 RX ORDER — HYDROXYZINE HYDROCHLORIDE 10 MG/1
10 TABLET, FILM COATED ORAL 3 TIMES DAILY
COMMUNITY
Start: 2022-06-06

## 2022-06-23 RX ORDER — NITROGLYCERIN 0.4 MG/1
0.4 TABLET SUBLINGUAL
COMMUNITY
Start: 2022-03-21

## 2022-06-23 RX ORDER — ATORVASTATIN CALCIUM 20 MG/1
20 TABLET, FILM COATED ORAL EVERY MORNING
COMMUNITY
Start: 2022-06-07

## 2022-06-23 NOTE — PROGRESS NOTES
Review of Systems      Genitourinary need to urinate more than twice a night and hot flashes at night   Cardiology Frequent chest pain or angina,  and palpitations/fluttering feeling in the chest   Gastrointestinal frequent heartburn/acid reflux   Neurology none   Constitutional fatigue and weight change   Integumentary none   Psychiatry anxiety, depression, aggressiveness or irritability and mood change   Musculoskeletal joint pain, muscle aches, back pain, legs twitching/jerking and leg cramps   Pulmonary shortness of breath with activity, chest tightness, wheezing and frequent cough   ENT throat clearing   Endocrine none   Hematological none

## 2022-06-23 NOTE — PROGRESS NOTES
Follow-Up Note - 4 Nasrin Stevens  73 y o  female  :1955  DPV:[id number]  DOS:2022    CC: I saw this patient for follow-up in clinic today for Sleep disordered breathing, Coexisting Sleep and Medical Problems  She was previously under care of Dr Edilma Negron and was last seen in May of 2021  She is using a dream Station 1 machine that she got in 2019 and has registered with Madan Nagel  Results of prior studies:2019 PSG showed severe MARY ELLEN with AHI 68 and 102 in REM  Ed of 77% with borderline oxygenation as well at baseline  2019 had CPAP titration which showed an adequate pressure of 12cm and correction of hypoxemia  PFSH, Problem List, Medications & Allergies were reviewed in EMR  Interval changes: none reported  She  has a past medical history of A-fib [facility]), Brain aneurysm, [facility] ([facility] ), Cardiac disease, CHF (congestive heart failure) ([facility] ), Crohn disease ([facility] ), Diabetes mellitus ([facility] ), Disease of thyroid gland, GERD (gastroesophageal reflux disease), Hyperlipidemia, and Hypertension  She has a current medication list which includes the following prescription(s): albuterol, amlodipine, apixaban, aspirin, atorvastatin, atorvastatin, baclofen, onetouch verio, onetouch verio, dofetilide, fenofibrate, furosemide, gabapentin, glipizide, glucose blood, hydroxyzine hcl, onetouch delica plus [id number], lisinopril, mesalamine, metoprolol succinate, mometasone-formoterol, nitroglycerin, omeprazole, onetouch delica lancets 66F, onetouch verio, quetiapine, sertraline, synthroid, and venlafaxine  PHYSIOLOGICAL DATA REVIEW AND INTERPRETATION:    using PAP > 4 hours/night 67%  Estimated PIERCE 0 5/hour with pressure of 15 3cm H2O @90th/95th percentile; Patient has not been using ozone based devices to sanitize the machine      SUBJECTIVE: Regarding use of PAP, Kellen reports:   · significant adverse effects: dry mouth/throat, dry nose, mask leaks  and removes mask unknowingly; has not noticed any fibres or foreign material in air line  · She is benefiting from use: sleeping better and unable to sleep without  · She reports restless leg symptoms that awaken her from sleep almost nightly in spite of Mirapex 0 5 mg q h s  Sleep Routine: Nita Desai reports getting 7 hrs sleep  ; she has no difficulty initiating or maintaining sleep   She arises spontaneously and rarely feels refreshed   Nita Desai reports Excessive Daytime Sleepiness, feels like napping but does not have the opportunity  She rated herself at Total score: 9 /24 on the [place] Sleepiness Scale  Habits: reports that she quit smoking about 4 years ago  She has never used smokeless tobacco ,  reports current alcohol use ,  reports no history of drug use , Caffeine use:limited ; Exercise routine: sometimes   ROS: reviewed & as attached  Significant for weight has been stable  She continues to report nasal and respiratory symptoms  She has palpitations and chest pains for which she is under care     She reports acid reflux  She has radicular symptoms, musculoskeletal aches and pains  She has ongoing feelings of anxiety and depression  EXAM: /60 (BP Location: Left arm, Cuff Size: Large)   Pulse 60   Temp (!) 96 7 °F (35 9 °C) (Temporal)   Ht 5' 9" (1 753 m)   Wt 111 kg (244 lb 12 8 oz)   SpO2 95%   BMI 36 15 kg/m²     Wt Readings from Last 3 Encounters:   06/23/22 111 kg (244 lb 12 8 oz)   03/14/22 110 kg (242 lb)   12/27/21 112 kg (246 lb)      Patient is well groomed; well appearing  CNS: Alert, orientated, clear & coherent speech  Psych: cooperativeand in no distress  Mental state:  Appears depressed and has a constricted affect  H&N: EOMI; NC/AT:no facial pressure marks, no rashes  Skin/Extrem: col & hydration normal; no edema  Resp: Respiratory effort is normal  Physical findings otherwise essentially unchanged from previous  IMPRESSION: Problem List Items & Comorbidities Addressed this Visit    1   MARY ELLEN (obstructive sleep apnea)  gabapentin (NEURONTIN) 100 mg capsule    PAP DME Resupply/Reorder   2  Sleep related hypoxia     3  CPAP use counseling     4  RLS (restless legs syndrome)     5  Sleep disturbance     6  Excessive daytime sleepiness     7  Dry mouth     8  Anxiety and depression     9  Essential hypertension     10  Obesity (BMI 30-39 9)     11  Type 2 diabetes mellitus without complication, without long-term current use of insulin ([facility] )     12  Chronic obstructive pulmonary disease, unspecified COPD type ([facility] )     13  P[facility] ([facility] )     14  Lumbar radiculopathy     15  Chronic pain syndrome     16  Chronic bilateral low back pain without sciatica         PLAN:  I reviewed results of prior studies and physiologic data with the patient  I reviewed prior clinic records  Notified of [employer] devices recall and their recommendation to discontinue use  Risks of discontinuing PAP vs continuing while awaiting resolution were explained and patient indicates understanding  I discussed treatment options with risks and benefits  Patient elected to  continue using Pap while awaiting remediation  Instructed  to register machine with [employer] & track progress on Rosemarie Duval  Care of equipment, methods to improve comfort using PAP and importance of compliance with therapy were discussed  Instructed not to use ozone based or other unapproved  cleaning devices  Pressure setting:  Continue 14-20 cmH2O  Rx provided to replace  supplies and Care coordinated with DME provider  Since restless leg symptoms are not controlled and in view of her radicular symptoms, I advised gabapentin in place of Mirapex  This will need to be titrated to effect  Discussed strategies for weight reduction  Follow-up is advised in 2-3 months to monitor progress, compliance and to adjust therapy  Thank you for allowing me to participate in the care of this patient      Sincerely,    Authenticated electronically by Clarence Alonzo MD on 63/62/96   Board Certified Specialist     Portions of the record may have been created with voice recognition software  Occasional wrong word or "sound a like" substitutions may have occurred due to the inherent limitations of voice recognition software  There may also be notations and random deletions of words or characters from malfunctioning software  Read the chart carefully and recognize, using context, where substitutions/deletions have occurred

## 2022-06-24 ENCOUNTER — TELEPHONE (OUTPATIENT)
Dept: SLEEP CENTER | Facility: CLINIC | Age: 67
End: 2022-06-24

## 2022-06-27 ENCOUNTER — HOSPITAL ENCOUNTER (OUTPATIENT)
Dept: ULTRASOUND IMAGING | Facility: HOSPITAL | Age: 67
Discharge: HOME | End: 2022-06-27
Payer: COMMERCIAL

## 2022-06-27 DIAGNOSIS — E89.0 POSTSURGICAL HYPOTHYROIDISM: ICD-10-CM

## 2022-06-27 DIAGNOSIS — Z85.850 PERSONAL HISTORY OF MALIGNANT NEOPLASM OF THYROID: ICD-10-CM

## 2022-06-27 PROCEDURE — 76536 US EXAM OF HEAD AND NECK: CPT

## 2022-07-03 DIAGNOSIS — K50.919 CROHN'S DISEASE WITH COMPLICATION, UNSPECIFIED GASTROINTESTINAL TRACT LOCATION (HCC): ICD-10-CM

## 2022-07-03 RX ORDER — MESALAMINE 400 MG/1
CAPSULE, DELAYED RELEASE ORAL
Qty: 120 CAPSULE | Refills: 0 | Status: SHIPPED | OUTPATIENT
Start: 2022-07-03 | End: 2022-08-03 | Stop reason: SDUPTHER

## 2022-08-03 DIAGNOSIS — K50.919 CROHN'S DISEASE WITH COMPLICATION, UNSPECIFIED GASTROINTESTINAL TRACT LOCATION (HCC): ICD-10-CM

## 2022-08-03 RX ORDER — MESALAMINE 400 MG/1
CAPSULE, DELAYED RELEASE ORAL
Qty: 120 CAPSULE | Refills: 0 | Status: SHIPPED | OUTPATIENT
Start: 2022-08-03 | End: 2022-08-30

## 2022-08-22 ENCOUNTER — OFFICE VISIT (OUTPATIENT)
Dept: GASTROENTEROLOGY | Facility: CLINIC | Age: 67
End: 2022-08-22
Payer: COMMERCIAL

## 2022-08-22 VITALS
RESPIRATION RATE: 18 BRPM | TEMPERATURE: 98.6 F | HEART RATE: 58 BPM | DIASTOLIC BLOOD PRESSURE: 81 MMHG | OXYGEN SATURATION: 95 % | WEIGHT: 248.2 LBS | BODY MASS INDEX: 36.76 KG/M2 | SYSTOLIC BLOOD PRESSURE: 142 MMHG | HEIGHT: 69 IN

## 2022-08-22 DIAGNOSIS — K62.5 RECTAL BLEEDING: ICD-10-CM

## 2022-08-22 DIAGNOSIS — K50.919 CROHN'S DISEASE WITH COMPLICATION, UNSPECIFIED GASTROINTESTINAL TRACT LOCATION (HCC): ICD-10-CM

## 2022-08-22 DIAGNOSIS — K59.00 CONSTIPATION, UNSPECIFIED CONSTIPATION TYPE: ICD-10-CM

## 2022-08-22 DIAGNOSIS — K64.9 HEMORRHOIDS, UNSPECIFIED HEMORRHOID TYPE: Primary | ICD-10-CM

## 2022-08-22 PROCEDURE — 99214 OFFICE O/P EST MOD 30 MIN: CPT | Performed by: NURSE PRACTITIONER

## 2022-08-22 RX ORDER — PRAMIPEXOLE DIHYDROCHLORIDE 0.5 MG/1
0.5 TABLET ORAL
COMMUNITY
Start: 2022-06-28

## 2022-08-22 RX ORDER — POLYETHYLENE GLYCOL 3350 17 G/17G
17 POWDER, FOR SOLUTION ORAL DAILY
Qty: 578 G | Refills: 3 | Status: SHIPPED | OUTPATIENT
Start: 2022-08-22

## 2022-08-22 RX ORDER — HYDROCORTISONE 25 MG/G
CREAM TOPICAL 2 TIMES DAILY
Qty: 28 G | Refills: 3 | Status: SHIPPED | OUTPATIENT
Start: 2022-08-22

## 2022-08-22 NOTE — PROGRESS NOTES
[facility] - Outpatient Follow-up Note  Tez Bond 73 y o  female MRN: [id number]  Encounter: [id number]          ASSESSMENT AND PLAN:      1  Hemorrhoids, unspecified hemorrhoid type  2  Crohn's disease with complication, unspecified gastrointestinal tract location ([address] )  3  Rectal bleeding  4  Constipation, unspecified constipation type    Patient has a history of Crohn's disease in which she takes mesalamine twice a day  She reports over the past month her stools have been difficult to pass and she is straining  She reports some rectal bleeding when she has a bowel movement  Her main concern today is rectal pain and hemorrhoids  She states that she has been using preparation H with little improvement  She states that the pain is constant  Discussed treating underlying constipation  Patient is hesitant given diarrhea at times  Discussed using a half cap of MiraLax daily and titrate up or down as needed at this time  Patient is agreeable  On rectal exam, external hemorrhoid noted at the 5 o'clock position  It was non edematous and non erythematous, it did not appear to be thrombosed  Patient's last colonoscopy was in December that revealed internal hemorrhoids, 1 adenomatous polyp and no evidence of Crohn's disease  Will order Anusol cream   Encouraged patient to take Tylenol as needed  She verbalized understanding       - polyethylene glycol (GLYCOLAX) 17 GM/SCOOP powder; Take 17 g by mouth daily  Dispense: 578 g; Refill: 3  - hydrocortisone (ANUSOL-HC) 2 5 % rectal cream; Apply topically 2 (two) times a day  Dispense: 28 g; Refill: 3    Will see patient back in 3 months or sooner if needed  ______________________________________________________________________    SUBJECTIVE:  Yvette Layton is a 71-year-old female that presents today for hemorrhoids and rectal bleeding  Patient reports that for about a month she has been having to strain with her bowel movements    She reports when she has a bowel movement she does have some rectal bleeding but more bothersome is the rectal discomfort she is having  She states the rectal pain is constant  She states that she has used preparation H with little relief  She does have a history of Crohn's disease in which she takes mesalamine twice a day  Her last colonoscopy was in December that revealed internal hemorrhoids, 1 adenoma with no evidence of active Crohn's disease and she was recommended to repeat in 5 years  REVIEW OF SYSTEMS:  Review of Systems   Gastrointestinal: Positive for anal bleeding, constipation and rectal pain  All other systems reviewed and are negative  Historical Information   Past Medical History:   Diagnosis Date    A-fib [facility])     Brain aneurysm     Cancer ([facility] )     papillary thyroid cancer    Cardiac disease     [facility]est[facility]) [facility]     Cro[place] disease ([place] [address] )     Diabetes mellitus ([place] [address] )     Disease of thyroid gland     GERD (gastroesophageal reflux disease)     Hyperlipidemia     Hypertension      Past Surgical History:   Procedure Laterality Date    APPENDECTOMY      CARDIOVERSION N/A 2019    Procedure: CARDIOVERSION;  Surgeon: Nelida Mahoney MD;  Location[facility] OR;  Service: Cardiology    CEREBRAL ANEURYSM REPAIR      CHOLECYSTECTOMY      HYSTERECTOMY      THYROIDECTOMY      TONSILLECTOMY AND ADENOIDECTOMY       Social History   Social History     Substance and Sexual Activity   Alcohol Use Yes    Alcohol/week: 0 0 standard drinks    Comment: social     Social History     Substance and Sexual Activity   Drug Use Never     Social History     Tobacco Use   Smoking Status Former Smoker    Quit date: 2018    Years since quittin 6   Smokeless Tobacco Never Used     History reviewed  No pertinent family history      Meds/Allergies       Current Outpatient Medications:     albuterol (PROVENTIL HFA,VENTOLIN HFA) 90 mcg/act inhaler    amLODIPine (NORVASC) 5 mg tablet    apixaban (ELIQUIS) 5 mg    aspirin 81 mg chewable tablet    atorvastatin (LIPITOR) 10 mg tablet    atorvastatin (LIPITOR) 20 mg tablet    baclofen 10 mg tablet    Blood Glucose Monitoring Suppl (ONETOUCH VERIO) w/Device KIT    Blood Glucose Monitoring Suppl (ONETOUCH VERIO) w/Device KIT    dofetilide (TIKOSYN) 500 mcg capsule    fenofibrate (TRICOR) 54 MG tablet    furosemide (LASIX) 20 mg tablet    gabapentin (NEURONTIN) 100 mg capsule    glipiZIDE (GLUCOTROL XL) 5 mg 24 hr tablet    glucose blood test strip    hydrocortisone (ANUSOL-HC) 2 5 % rectal cream    hydrOXYzine HCL (ATARAX) 10 mg tablet    Lancets (ONETOUCH DELICA PLUS [id number]) MISC    lisinopril (ZESTRIL) 10 mg tablet    mesalamine (DELZICOL) 400 mg    metoprolol succinate (TOPROL-XL) 100 mg 24 hr tablet    mometasone-formoterol (DULERA) 100-5 MCG/ACT inhaler    nitroglycerin (NITROSTAT) 0 4 mg SL tablet    omeprazole (PriLOSEC) 40 MG capsule    OneTouch Delica Lancets 80J MISC    ONETOUCH VERIO test strip    polyethylene glycol (GLYCOLAX) 17 GM/SCOOP powder    pramipexole (MIRAPEX) 0 5 mg tablet    QUEtiapine (SEROquel) 25 mg tablet    SYNTHROID 150 MCG tablet    Trelegy Ellipta 200-62 5-25 MCG/INH AEPB inhaler    venlafaxine (EFFEXOR-XR) 37 5 mg 24 hr capsule    sertraline (ZOLOFT) 50 mg tablet    Allergies   Allergen Reactions    Sulfa Antibiotics Rash    Other            Objective     Blood pressure 142/81, pulse 58, temperature 98 6 °F (37 °C), temperature source Tympanic, resp  rate 18, height 5' 9" (1 753 m), weight 113 kg (248 lb 3 2 oz), SpO2 95 %  Body mass index is 36 65 kg/m²  PHYSICAL EXAM:      General Appearance:   Alert, cooperative, no distress   HEENT:   Normocephalic, atraumatic, anicteric       Neck:  Supple, symmetrical, trachea midline   Lungs:   Clear to auscultation bilaterally; no rales, rhonchi or wheezing; respirations unlabored    Heart[de-identified]   Regular rate and rhythm; no murmur, rub, or gallop  Abdomen:   Soft, non-tender, non-distended; normal bowel sounds; no masses, no organomegaly    Genitalia:   Deferred    Rectal:   Skin surrounding rectum pink, non edematous, external hemorrhoid noted at the 5 o'clock position, non-thrombosed   Extremities:  No cyanosis, clubbing or edema    Skin:  No jaundice, rashes, or lesions             Lab Results:   No visits with results within 1 Day(s) from this visit  Latest known visit with results is:   Hospital Outpatient Visit on 06/17/2022   Component Date Value    POC Glucose 06/17/2022 118          Radiology Results:   No results found

## 2022-08-30 DIAGNOSIS — K50.919 CROHN'S DISEASE WITH COMPLICATION, UNSPECIFIED GASTROINTESTINAL TRACT LOCATION (HCC): ICD-10-CM

## 2022-08-30 RX ORDER — MESALAMINE 400 MG/1
CAPSULE, DELAYED RELEASE ORAL
Qty: 120 CAPSULE | Refills: 0 | Status: SHIPPED | OUTPATIENT
Start: 2022-08-30 | End: 2022-10-05

## 2022-09-01 ENCOUNTER — OFFICE VISIT (OUTPATIENT)
Dept: SLEEP CENTER | Facility: CLINIC | Age: 67
End: 2022-09-01
Payer: COMMERCIAL

## 2022-09-01 VITALS
BODY MASS INDEX: 35.36 KG/M2 | DIASTOLIC BLOOD PRESSURE: 70 MMHG | SYSTOLIC BLOOD PRESSURE: 102 MMHG | HEART RATE: 71 BPM | WEIGHT: 247 LBS | OXYGEN SATURATION: 93 % | HEIGHT: 70 IN | TEMPERATURE: 97.3 F

## 2022-09-01 DIAGNOSIS — G47.9 SLEEP DISTURBANCE: ICD-10-CM

## 2022-09-01 DIAGNOSIS — M54.16 LUMBAR RADICULOPATHY: ICD-10-CM

## 2022-09-01 DIAGNOSIS — R68.2 DRY MOUTH: ICD-10-CM

## 2022-09-01 DIAGNOSIS — G47.33 OSA (OBSTRUCTIVE SLEEP APNEA): Primary | ICD-10-CM

## 2022-09-01 DIAGNOSIS — G47.19 EXCESSIVE DAYTIME SLEEPINESS: ICD-10-CM

## 2022-09-01 DIAGNOSIS — G47.19 OTHER HYPERSOMNIA: ICD-10-CM

## 2022-09-01 DIAGNOSIS — G47.33 OBSTRUCTIVE SLEEP APNEA (ADULT) (PEDIATRIC): ICD-10-CM

## 2022-09-01 DIAGNOSIS — G47.34 SLEEP RELATED HYPOXIA: ICD-10-CM

## 2022-09-01 DIAGNOSIS — G47.34 IDIOPATHIC SLEEP RELATED NONOBSTRUCTIVE ALVEOLAR HYPOVENTILATION: ICD-10-CM

## 2022-09-01 DIAGNOSIS — F41.9 ANXIETY AND DEPRESSION: ICD-10-CM

## 2022-09-01 DIAGNOSIS — G89.4 CHRONIC PAIN SYNDROME: ICD-10-CM

## 2022-09-01 DIAGNOSIS — G25.81 RESTLESS LEGS SYNDROME: ICD-10-CM

## 2022-09-01 DIAGNOSIS — G25.81 RLS (RESTLESS LEGS SYNDROME): ICD-10-CM

## 2022-09-01 DIAGNOSIS — E11.9 TYPE 2 DIABETES MELLITUS WITHOUT COMPLICATION, WITHOUT LONG-TERM CURRENT USE OF INSULIN (HCC): ICD-10-CM

## 2022-09-01 DIAGNOSIS — E66.9 OBESITY (BMI 30-39.9): ICD-10-CM

## 2022-09-01 DIAGNOSIS — I27.20 PULMONARY HYPERTENSION (HCC): ICD-10-CM

## 2022-09-01 DIAGNOSIS — F32.A ANXIETY AND DEPRESSION: ICD-10-CM

## 2022-09-01 DIAGNOSIS — I10 ESSENTIAL HYPERTENSION: ICD-10-CM

## 2022-09-01 DIAGNOSIS — J44.9 CHRONIC OBSTRUCTIVE PULMONARY DISEASE, UNSPECIFIED COPD TYPE (HCC): ICD-10-CM

## 2022-09-01 PROCEDURE — 99214 OFFICE O/P EST MOD 30 MIN: CPT | Performed by: INTERNAL MEDICINE

## 2022-09-01 NOTE — PROGRESS NOTES
Review of Systems      Genitourinary need to urinate more than twice a night   Cardiology palpitations/fluttering feeling in the chest   Gastrointestinal frequent heartburn/acid reflux   Neurology balance problems   Constitutional claustrophobia, fatigue and weight change   Integumentary none   Psychiatry anxiety, depression and mood change   Musculoskeletal muscle aches, back pain, legs twitching/jerking and leg cramps   Pulmonary shortness of breath with activity and frequent cough   ENT throat clearing and ringing in ears   Endocrine none   Hematological none

## 2022-09-01 NOTE — PROGRESS NOTES
Follow-Up Note - 4 Nasrin Stevens  73 y o  female  :1955  [facility]:[id number]  DOS:2022    CC: I saw this patient for follow-up in clinic today for Sleep disordered breathing, Coexisting Sleep and Medical Problems  She is using a dream Station 1 machine and is not sure whether it has been probably registered with Valente Woods  Results of prior studies:2019 PSG showed severe MARY ELLEN with AHI 68 and 102 in REM  Ed of 77% with borderline oxygenation as well at baseline  2019 had CPAP titration which showed an adequate pressure of 12cm and correction of hypoxemia  PFSH, Problem List, Medications & Allergies were reviewed in EMR  Interval changes: none reported  She  has a past medical history of A-fib [facility]), Brain aneurysm, Cancer ([facility] ), Cardiac disease, CHF (congestive heart failure) ([facility] ), Crohn disease ([facility] ), Diabetes mellitus ([facility] ), Disease of thyroid gland, GERD (gastroesophageal reflux disease), Hyperlipidemia, and Hypertension  She has a current medication list which includes the following prescription(s): albuterol, amlodipine, apixaban, aspirin, atorvastatin, atorvastatin, baclofen, onetouch verio, onetouch verio, dofetilide, fenofibrate, furosemide, gabapentin, glipizide, glucose blood, hydrocortisone, hydroxyzine hcl, onetouch delica plus [id number], lisinopril, mesalamine, metoprolol succinate, mometasone-formoterol, nitroglycerin, omeprazole, onetouch delica lancets 34H, onetouch verio, polyethylene glycol, pramipexole, quetiapine, sertraline, synthroid, trelegy ellipta, and venlafaxine  PHYSIOLOGICAL DATA REVIEW AND INTERPRETATION:    using PAP > 4 hours/night 37%  Estimated PIERCE 1/hour with pressure of 15 2cm H2O @90th/95th percentile; Patient has not been using ozone based devices to sanitize the machine      SUBJECTIVE: Regarding use of PAP, Kellen reports:   · some adverse effects: dry mouth/throat and mask leaks ; has not noticed any fibres or foreign material in air line      · She is benefiting from use: sleeping better when she uses CPAP  · Restless leg symptoms are controlled on Mirapex  Sleep Routine: Alberto Guzman reports getting varying amounts sleep  ; she has difficulty initiating and maintaining sleep   She arises spontaneously and is not always refreshed  Alberto Guzman reports Excessive Daytime Sleepiness, feels like napping but does not have the opportunity  She rated herself at Total score: 15 /24 on the [place] Sleepiness Scale  Habits: reports that she quit smoking about 4 years ago  She has never used smokeless tobacco ,  reports current alcohol use ,  reports no history of drug use , Caffeine use:limited ; Exercise routine: none   ROS: reviewed & as attached  Significant for weight gain  She has nasal and respiratory symptoms due to allergies and COPD  She has random palpitations     She has acid reflux  She has musculoskeletal aches and pains  She has ongoing feelings of anxiety and depression  EXAM: /70 (BP Location: Left arm, Cuff Size: Large)   Pulse 71   Temp (!) 97 3 °F (36 3 °C) (Temporal)   Ht 5' 9 5" (1 765 m)   Wt 112 kg (247 lb)   SpO2 93%   BMI 35 95 kg/m²     Wt Readings from Last 3 Encounters:   09/01/22 112 kg (247 lb)   08/22/22 113 kg (248 lb 3 2 oz)   06/23/22 111 kg (244 lb 12 8 oz)      Patient is well groomed; well appearing  CNS: Alert, orientated, clear & coherent speech  Psych: cooperativeand in no distress  Mental state:appears normal   H&N: EOMI; NC/AT:no facial pressure marks, no rashes  Skin/Extrem: col & hydration normal; no edema  Resp: Respiratory effort is normal  Physical findings otherwise essentially unchanged from previous  IMPRESSION: Problem List Items & Comorbidities Addressed this Visit    1  MARY ELLEN (obstructive sleep apnea)  PAP DME Resupply/Reorder   2  Sleep related hypoxia     3  RLS (restless legs syndrome)     4  Sleep disturbance     5  Excessive daytime sleepiness     6  Dry mouth     7   Anxiety and depression     8  Essential hypertension     9  Obesity (BMI 30-39 9)     10  Type 2 diabetes mellitus without complication, without long-term current use of insulin ([facility] )     11  Chronic obstructive pulmonary disease, unspecified COPD type ([facility] )     12  P[facility] hypertension ([facility] )     13  Lumbar radiculopathy     14  Chronic pain syndrome         PLAN:  I reviewed results of prior studies and physiologic data with the patient  Notified of [employer] devices recall and their recommendation to discontinue use  Risks of discontinuing PAP vs continuing while awaiting resolution were explained and patient indicates understanding  I discussed treatment options with risks and benefits  Patient elected to  continue using Pap while awaiting remediation  Instructed  to register machine with [employer] & track progress on Rosemarie Gee  Care of equipment, methods to improve comfort using PAP and importance of compliance with therapy were discussed  Instructed not to use ozone based or other unapproved  cleaning devices  Pressure settin-20 cmH2O  May continue Mirapex for restless leg symptoms  Rx provided to replace  supplies and Care coordinated with DME provider  Multi component Cognitive behavioral therapy for Insomnia undertaken - Sleep Restriction, Stimulus control, Relaxation techniques and Sleep hygiene were discussed  She needs reviewing of her medications with view to minimizing use of sedating medications especially during the daytime  Discussed strategies for weight reduction  Follow-up is advised in 1 year or sooner if needed to monitor progress, compliance and to adjust therapy  Thank you for allowing me to participate in the care of this patient  Sincerely,     Authenticated electronically on    Board Certified Specialist     Portions of the record may have been created with voice recognition software   Occasional wrong word or "sound a like" substitutions may have occurred due to the inherent limitations of voice recognition software  There may also be notations and random deletions of words or characters from malfunctioning software  Read the chart carefully and recognize, using context, where substitutions/deletions have occurred

## 2022-09-01 NOTE — PATIENT INSTRUCTIONS

## 2022-09-02 ENCOUNTER — HOSPITAL ENCOUNTER (EMERGENCY)
Facility: HOSPITAL | Age: 67
Discharge: HOME | End: 2022-09-02
Attending: EMERGENCY MEDICINE
Payer: COMMERCIAL

## 2022-09-02 ENCOUNTER — APPOINTMENT (OUTPATIENT)
Dept: RADIOLOGY | Facility: HOSPITAL | Age: 67
End: 2022-09-02
Payer: COMMERCIAL

## 2022-09-02 VITALS
BODY MASS INDEX: 36.65 KG/M2 | WEIGHT: 251.77 LBS | TEMPERATURE: 98 F | HEART RATE: 45 BPM | SYSTOLIC BLOOD PRESSURE: 109 MMHG | OXYGEN SATURATION: 94 % | DIASTOLIC BLOOD PRESSURE: 54 MMHG | RESPIRATION RATE: 18 BRPM

## 2022-09-02 DIAGNOSIS — S46.811A STRAIN OF RIGHT TRAPEZIUS MUSCLE, INITIAL ENCOUNTER: ICD-10-CM

## 2022-09-02 DIAGNOSIS — R07.89 CHEST WALL PAIN: Primary | ICD-10-CM

## 2022-09-02 LAB
2HR DELTA HS TROPONIN: -1 NG/L
ALBUMIN SERPL BCP-MCNC: 3.5 G/DL (ref 3.5–5)
ALP SERPL-CCNC: 110 U/L (ref 46–116)
ALT SERPL W P-5'-P-CCNC: 41 U/L (ref 12–78)
ANION GAP SERPL CALCULATED.3IONS-SCNC: 11 MMOL/L (ref 4–13)
AST SERPL W P-5'-P-CCNC: 32 U/L (ref 5–45)
ATRIAL RATE: 57 BPM
BASOPHILS # BLD AUTO: 0.04 THOUSANDS/ΜL (ref 0–0.1)
BASOPHILS NFR BLD AUTO: 0 % (ref 0–1)
BILIRUB SERPL-MCNC: 0.23 MG/DL (ref 0.2–1)
BUN SERPL-MCNC: 15 MG/DL (ref 5–25)
CALCIUM SERPL-MCNC: 9 MG/DL (ref 8.3–10.1)
CARDIAC TROPONIN I PNL SERPL HS: 3 NG/L
CARDIAC TROPONIN I PNL SERPL HS: 4 NG/L
CHLORIDE SERPL-SCNC: 101 MMOL/L (ref 96–108)
CO2 SERPL-SCNC: 28 MMOL/L (ref 21–32)
CREAT SERPL-MCNC: 1 MG/DL (ref 0.6–1.3)
EOSINOPHIL # BLD AUTO: 0.16 THOUSAND/ΜL (ref 0–0.61)
EOSINOPHIL NFR BLD AUTO: 1 % (ref 0–6)
ERYTHROCYTE [DISTWIDTH] IN BLOOD BY AUTOMATED COUNT: 14.6 % (ref 11.6–15.1)
GFR SERPL CREATININE-BSD FRML MDRD: 59 ML/MIN/1.73SQ M
GLUCOSE SERPL-MCNC: 180 MG/DL (ref 65–140)
HCT VFR BLD AUTO: 41.9 % (ref 34.8–46.1)
HGB BLD-MCNC: 13.3 G/DL (ref 11.5–15.4)
HOLD SPECIMEN: NORMAL
IMM GRANULOCYTES # BLD AUTO: 0.04 THOUSAND/UL (ref 0–0.2)
IMM GRANULOCYTES NFR BLD AUTO: 0 % (ref 0–2)
LYMPHOCYTES # BLD AUTO: 2.35 THOUSANDS/ΜL (ref 0.6–4.47)
LYMPHOCYTES NFR BLD AUTO: 21 % (ref 14–44)
MCH RBC QN AUTO: 30 PG (ref 26.8–34.3)
MCHC RBC AUTO-ENTMCNC: 31.7 G/DL (ref 31.4–37.4)
MCV RBC AUTO: 95 FL (ref 82–98)
MONOCYTES # BLD AUTO: 0.62 THOUSAND/ΜL (ref 0.17–1.22)
MONOCYTES NFR BLD AUTO: 6 % (ref 4–12)
NEUTROPHILS # BLD AUTO: 7.91 THOUSANDS/ΜL (ref 1.85–7.62)
NEUTS SEG NFR BLD AUTO: 72 % (ref 43–75)
NRBC BLD AUTO-RTO: 0 /100 WBCS
P AXIS: 73 DEGREES
PLATELET # BLD AUTO: 263 THOUSANDS/UL (ref 149–390)
PMV BLD AUTO: 9.5 FL (ref 8.9–12.7)
POTASSIUM SERPL-SCNC: 3.5 MMOL/L (ref 3.5–5.3)
PR INTERVAL: 156 MS
PROT SERPL-MCNC: 8.1 G/DL (ref 6.4–8.4)
QRS AXIS: 47 DEGREES
QRSD INTERVAL: 84 MS
QT INTERVAL: 470 MS
QTC INTERVAL: 457 MS
RBC # BLD AUTO: 4.43 MILLION/UL (ref 3.81–5.12)
SODIUM SERPL-SCNC: 140 MMOL/L (ref 135–147)
T WAVE AXIS: 66 DEGREES
VENTRICULAR RATE: 57 BPM
WBC # BLD AUTO: 11.12 THOUSAND/UL (ref 4.31–10.16)

## 2022-09-02 PROCEDURE — 71045 X-RAY EXAM CHEST 1 VIEW: CPT

## 2022-09-02 PROCEDURE — 36415 COLL VENOUS BLD VENIPUNCTURE: CPT

## 2022-09-02 PROCEDURE — 93010 ELECTROCARDIOGRAM REPORT: CPT | Performed by: INTERNAL MEDICINE

## 2022-09-02 PROCEDURE — 93005 ELECTROCARDIOGRAM TRACING: CPT

## 2022-09-02 PROCEDURE — 99285 EMERGENCY DEPT VISIT HI MDM: CPT | Performed by: EMERGENCY MEDICINE

## 2022-09-02 PROCEDURE — 85025 COMPLETE CBC W/AUTO DIFF WBC: CPT

## 2022-09-02 PROCEDURE — 80053 COMPREHEN METABOLIC PANEL: CPT

## 2022-09-02 PROCEDURE — 84484 ASSAY OF TROPONIN QUANT: CPT

## 2022-09-02 PROCEDURE — 99285 EMERGENCY DEPT VISIT HI MDM: CPT

## 2022-09-02 PROCEDURE — 96374 THER/PROPH/DIAG INJ IV PUSH: CPT

## 2022-09-02 RX ORDER — KETOROLAC TROMETHAMINE 30 MG/ML
15 INJECTION, SOLUTION INTRAMUSCULAR; INTRAVENOUS ONCE
Status: COMPLETED | OUTPATIENT
Start: 2022-09-02 | End: 2022-09-02

## 2022-09-02 RX ORDER — METHOCARBAMOL 500 MG/1
500 TABLET, FILM COATED ORAL 2 TIMES DAILY
Qty: 20 TABLET | Refills: 0 | Status: SHIPPED | OUTPATIENT
Start: 2022-09-02 | End: 2022-10-05

## 2022-09-02 RX ORDER — LIDOCAINE 50 MG/G
1 PATCH TOPICAL ONCE
Status: DISCONTINUED | OUTPATIENT
Start: 2022-09-02 | End: 2022-09-02 | Stop reason: HOSPADM

## 2022-09-02 RX ADMIN — KETOROLAC TROMETHAMINE 15 MG: 30 INJECTION, SOLUTION INTRAMUSCULAR at 09:15

## 2022-09-02 RX ADMIN — LIDOCAINE 5% 1 PATCH: 700 PATCH TOPICAL at 09:15

## 2022-09-02 NOTE — ED PROVIDER NOTES
History  Chief Complaint   Patient presents with    Chest Pain     CONSTANT CHEST PAIN RADIATING TO BACK SINCE LAST NIGHT     Patient is a 73-year-old female presenting with right upper chest wall pain that radiates to the right scapular region  States pain occurred approximately 8:00 a m  Last evening  Patient was able to get to sleep the pain still present upon awakening this morning  Denies any diaphoresis or shortness of breath  No rash  Patient is right-hand dominant  Nonsmoker  Pain worse with palpation  No nausea or vomiting  Patient has had previous catheterization in 2021 with no stents placed our other interventions at that time  Currently pain is only present with palpation of the chest wall and the right trapezius, rhomboid region  No radiation to the neck or jaw  No radiation to the left side of her chest or left shoulder  Prior to Admission Medications   Prescriptions Last Dose Informant Patient Reported? Taking? Blood Glucose Monitoring Suppl ([place]) w/Device KIT  Self Yes No   Sig: Use to check sugars once a day  Dx E11 9   Blood Glucose Monitoring Suppl (ONETOUCH VERIO) w/Device KIT  Self Yes No   Sig: daily Use to check glucose   Lancets (ONETOUCH DELICA PLUS [id number]) MISC  Self Yes No   Sig: USE 1 TO CHECK GLUCOSE ONCE DAILY   ONETOUCH VERIO test strip  Self Yes No   Sig: USE 1 STRIP TO CHECK GLUCOSE ONCE DAILY   OneTouch Delica Lancets 22J MISC  Self Yes No   Sig: Use to check sugars once a day   Dx E11 9   QUEtiapine (SEROquel) 25 mg tablet  Self Yes No   Sig: Take 25 mg by mouth daily at bedtime   SYNTHROID 150 MCG tablet  Self Yes No   Sig: TAKE 1 TABLET BY MOUTH ONCE DAILY FIRST THING IN THE MORNING (AT LEAST 30 MINUTES PRIOR TO BREAKFAST OR OTHER MEDS)   Trelegy Ellipta 200-62 5-25 MCG/INH AEPB inhaler  Self Yes No   Sig: INHALE 1 PUFF BY MOUTH IN THE MORNING   albuterol (PROVENTIL HFA,VENTOLIN HFA) 90 mcg/act inhaler  Self No No   Sig: Inhale 2 puffs every 6 (six) hours as needed for wheezing or shortness of breath   amLODIPine (NORVASC) 5 mg tablet  Self Yes No   Sig: Take 5 mg by mouth every morning   apixaban (ELIQUIS) 5 mg  Self Yes No   Sig: Take 5 mg by mouth 2 (two) times a day    aspirin 81 mg chewable tablet  Self No No   Sig: Chew 1 tablet (81 mg total) daily   atorvastatin (LIPITOR) 10 mg tablet  Self Yes No   Sig: Take 10 mg by mouth daily   atorvastatin (LIPITOR) 20 mg tablet  Self Yes No   Sig: Take 20 mg by mouth every morning   baclofen 10 mg tablet  Self Yes No   dofetilide (TIKOSYN) 500 mcg capsule  Self Yes No   Sig: Take 500 mcg by mouth 2 (two) times a day   fenofibrate (TRICOR) 54 MG tablet  Self Yes No   Sig: Take 54 mg by mouth daily   furosemide (LASIX) 20 mg tablet  Self Yes No   Sig: Take 20 mg by mouth 2 (two) times a day   gabapentin (NEURONTIN) 100 mg capsule  Self No No   Sig: Take 1 capsule (100 mg total) by mouth daily at bedtime Take 1 half hour before bedtime  glipiZIDE (GLUCOTROL XL) 5 mg 24 hr tablet  Self Yes No   Sig: TAKE 1 TABLET BY MOUTH ONCE DAILY 30 MINUTES BEFORE A MEAL (REPLACES METFORMIN)   glucose blood test strip  Self Yes No   Sig: Use to check sugars once a day   Dx E11 9   hydrOXYzine HCL (ATARAX) 10 mg tablet  Self Yes No   Sig: Take 10 mg by mouth 3 (three) times a day   hydrocortisone (ANUSOL-HC) 2 5 % rectal cream  Self No No   Sig: Apply topically 2 (two) times a day   lisinopril (ZESTRIL) 10 mg tablet  Self Yes No   Sig: Take 10 mg by mouth every morning   mesalamine (DELZICOL) 400 mg  Self No No   Sig: Take 2 capsules by mouth twice daily   metoprolol succinate (TOPROL-XL) 100 mg 24 hr tablet  Self No No   Sig: Take 1 tablet (100 mg total) by mouth every 12 (twelve) hours   mometasone-formoterol (DULERA) 100-5 MCG/ACT inhaler  Self No No   Sig: Inhale 2 puffs 2 (two) times a day   nitroglycerin (NITROSTAT) 0 4 mg SL tablet  Self Yes No   Sig: Place 0 4 mg under the tongue every 5 (five) minutes as needed Do not exceeda total of 3 doses in 15 minutes   omeprazole (PriLOSEC) 40 MG capsule  Self No No   Sig: Take 1 capsule (40 mg total) by mouth 2 (two) times a day   polyethylene glycol (GLYCOLAX) 17 GM/SCOOP powder  Self No No   Sig: Take 17 g by mouth daily   pramipexole (MIRAPEX) 0 5 mg tablet  Self Yes No   Sig: Take 0 5 mg by mouth daily at bedtime   sertraline (ZOLOFT) 50 mg tablet  Self Yes No   venlafaxine (EFFEXOR-XR) 37 5 mg 24 hr capsule  Self Yes No   Sig: Take 37 5 mg by mouth daily       Facility-Administered Medications: None       Past Medical History:   Diagnosis Date    A-fib ([place] [address] )     Brain aneurysm     Cancer ([place] [address] )     papillary thyroid cancer    Cardiac disease     CHF (congestive heart failure) (HCC)     Crohn disease ([place] [address] )     Diabetes mellitus ([place] [address] )     Disease of thyroid gland     GERD (gastroesophageal reflux disease)     Hyperlipidemia     Hypertension        Past Surgical History:   Procedure Laterality Date    APPENDECTOMY      CARDIOVERSION N/A 2019    Procedure: CARDIOVERSION;  Surgeon: Kristin Cage MD;  Location: [facility];  Service: Cardiology    CEREBRAL ANEURYSM REPAIR      CHOLECYSTECTOMY      HYSTERECTOMY      THYROIDECTOMY      TONSILLECTOMY AND ADENOIDECTOMY         History reviewed  No pertinent family history  I have reviewed and agree with the history as documented  E-Cigarette/Vaping    E-Cigarette Use Never User      E-Cigarette/Vaping Substances    Nicotine No     THC No     CBD No     Flavoring No     Other No     Unknown No      Social History     Tobacco Use    Smoking status: Former Smoker     Quit date: 2018     Years since quittin 6    Smokeless tobacco: Never Used   Vaping Use    Vaping Use: Never used   Substance Use Topics    Alcohol use:  Yes     Alcohol/week: 0 0 standard drinks     Comment: social    Drug use: Never       Review of Systems   Constitutional: Negative for appetite change, chills, fatigue, fever and unexpected weight change  HENT: Negative for congestion, ear pain, rhinorrhea and sore throat  Eyes: Negative for pain and visual disturbance  Respiratory: Negative for cough, chest tightness, shortness of breath and wheezing  Cardiovascular: Positive for chest pain  Negative for palpitations and leg swelling  Gastrointestinal: Negative for abdominal pain, constipation, diarrhea, nausea and vomiting  Genitourinary: Negative for difficulty urinating, dysuria, frequency, hematuria, menstrual problem, pelvic pain, vaginal bleeding and vaginal discharge  Musculoskeletal: Positive for back pain  Negative for arthralgias and neck pain  Skin: Negative for color change and rash  Neurological: Negative for dizziness, seizures, syncope, light-headedness and headaches  Psychiatric/Behavioral: Negative for confusion and sleep disturbance  The patient is not nervous/anxious  All other systems reviewed and are negative  Physical Exam  Physical Exam  Vitals and nursing note reviewed  Constitutional:       General: She is not in acute distress  Appearance: She is well-developed  She is not ill-appearing, toxic-appearing or diaphoretic  HENT:      Head: Normocephalic and atraumatic  Eyes:      General: No scleral icterus  Extraocular Movements: Extraocular movements intact  Conjunctiva/sclera: Conjunctivae normal    Cardiovascular:      Rate and Rhythm: Normal rate and regular rhythm  Pulses:           Carotid pulses are 2+ on the right side and 2+ on the left side  Radial pulses are 2+ on the right side and 2+ on the left side  Dorsalis pedis pulses are 2+ on the right side and 2+ on the left side  Posterior tibial pulses are 2+ on the right side and 2+ on the left side  Heart sounds: Normal heart sounds  No murmur heard  No friction rub  No gallop     Pulmonary:      Effort: Pulmonary effort is normal  No accessory muscle usage or respiratory distress  Breath sounds: Normal breath sounds  No stridor  No decreased breath sounds, wheezing, rhonchi or rales  Chest:      Chest wall: Tenderness present  No mass or edema  Abdominal:      Palpations: Abdomen is soft  There is no mass  Tenderness: There is no abdominal tenderness  There is no guarding or rebound  Hernia: No hernia is present  Musculoskeletal:         General: Normal range of motion  Cervical back: Normal range of motion and neck supple  Right lower leg: No tenderness  Edema present  Left lower leg: No tenderness  Edema present  Comments: Trace bilateral pedal edema  No calf pain tenderness or asymmetry  Skin:     General: Skin is warm and dry  Capillary Refill: Capillary refill takes less than 2 seconds  Coloration: Skin is not pale  Findings: No erythema or rash  Neurological:      General: No focal deficit present  Mental Status: She is alert and oriented to person, place, and time     Psychiatric:         Mood and Affect: Mood normal          Behavior: Behavior normal          Vital Signs  ED Triage Vitals [09/02/22 0836]   Temperature Pulse Respirations Blood Pressure SpO2   98 °F (36 7 °C) 60 20 158/67 96 %      Temp Source Heart Rate Source Patient Position - Orthostatic VS BP Location FiO2 (%)   Temporal Monitor Sitting Left arm --      Pain Score       10 - Worst Possible Pain           Vitals:    09/02/22 0945 09/02/22 1000 09/02/22 1030 09/02/22 1100   BP: 112/54 120/60 123/57 109/54   Pulse: (!) 51 (!) 47 (!) 45 (!) 45   Patient Position - Orthostatic VS: Lying Lying Lying Lying         Visual Acuity  Visual Acuity    Flowsheet Row Most Recent Value   L Pupil Size (mm) 3   R Pupil Size (mm) 3          ED Medications  Medications   lidocaine (LIDODERM) 5 % patch 1 patch (1 patch Topical Medication Applied 9/2/22 0915)   ketorolac (TORADOL) injection 15 mg (15 mg Intravenous Given 9/2/22 0915)       Diagnostic Studies  Results Reviewed     Procedure Component Value Units Date/Time    HS Troponin I 2hr [[id number]]  (Normal) Collected: 09/02/22 1027    Lab Status: Final result Specimen: Blood from Arm, Right Updated: 09/02/22 1103     hs TnI 2hr 3 ng/L      Delta 2hr hsTnI -1 ng/L     HS Troponin I 4hr [[id number]]     Lab Status: No result Specimen: Blood     [place] draw [[id number]] Collected: 09/02/22 0854    Lab Status: Final result Specimen: Blood from Arm, Right Updated: 09/02/22 1003    Narrative: The following orders were created for panel order [place] draw  Procedure                               Abnormality         Status                     ---------                               -----------         ------                     Lidia Dinah Top on QORG[[id number]]                           Final result               Gold top on TDGA[[id number]]                                 Final result               Green / Yellow tube on PWNK[[id number]]                      Final result                 Please view results for these tests on the individual orders      HS Troponin 0hr (reflex protocol) [[id number]]  (Normal) Collected: 09/02/22 0854    Lab Status: Final result Specimen: Blood from Arm, Right Updated: 09/02/22 0923     hs TnI 0hr 4 ng/L     Comprehensive metabolic panel [[id number]]  (Abnormal) Collected: 09/02/22 0854    Lab Status: Final result Specimen: Blood from Arm, Right Updated: 09/02/22 0915     Sodium 140 mmol/L      Potassium 3 5 mmol/L      Chloride 101 mmol/L      CO2 28 mmol/L      ANION GAP 11 mmol/L      BUN 15 mg/dL      Creatinine 1 00 mg/dL      Glucose 180 mg/dL      Calcium 9 0 mg/dL      AST 32 U/L      ALT 41 U/L      Alkaline Phosphatase 110 U/L      Total Protein 8 1 g/dL      Albumin 3 5 g/dL      Total Bilirubin 0 23 mg/dL      eGFR 59 ml/min/1 73sq m     Narrative:      [place] guidelines for Chronic Kidney Disease (CKD):     Stage 1 with normal or high GFR (GFR > 90 mL/min/1 73 square meters)    Stage 2 Mild CKD (GFR = 60-89 mL/min/1 73 square meters)    Stage 3A Moderate CKD (GFR = 45-59 mL/min/1 73 square meters)    Stage 3B Moderate CKD (GFR = 30-44 mL/min/1 73 square meters)    Stage 4 Severe CKD (GFR = 15-29 mL/min/1 73 square meters)    Stage 5 End Stage CKD (GFR <15 mL/min/1 73 square meters)  Note: GFR calculation is accurate only with a steady state creatinine    CBC and differential [[id number]]  (Abnormal) Collected: 09/02/22 0854    Lab Status: Final result Specimen: Blood from Arm, Right Updated: 09/02/22 0900     WBC 11 12 Thousand/uL      RBC 4 43 Million/uL      Hemoglobin 13 3 g/dL      Hematocrit 41 9 %      MCV 95 fL      MCH 30 0 pg      MCHC 31 7 g/dL      RDW 14 6 %      MPV 9 5 fL      Platelets 997 Thousands/uL      nRBC 0 /100 WBCs      Neutrophils Relative 72 %      Immat GRANS % 0 %      Lymphocytes Relative 21 %      Monocytes Relative 6 %      Eosinophils Relative 1 %      Basophils Relative 0 %      Neutrophils Absolute 7 91 Thousands/µL      Immature Grans Absolute 0 04 Thousand/uL      Lymphocytes Absolute 2 35 Thousands/µL      Monocytes Absolute 0 62 Thousand/µL      Eosinophils Absolute 0 16 Thousand/µL      Basophils Absolute 0 04 Thousands/µL                  XR chest 1 view portable   Final Result by Janey Mart MD (09/02 6114)      No acute cardiopulmonary disease        Findings are stable            Workstation performed: [id number]                    Procedures  ECG 12 Lead Documentation Only    Date/Time: 9/2/2022 9:52 AM  Performed by: Doris Rodriguez DO  Authorized by: Doris Rodriguez DO     Indications / Diagnosis:  Right chest/shoulder/back pain  ECG reviewed by me, the ED Provider: yes    Patient location:  ED  Rate:     ECG rate:  58    ECG rate assessment: bradycardic    Rhythm:     Rhythm: sinus rhythm    Ectopy:     Ectopy: none    QRS:     QRS axis:  Normal    QRS intervals: Normal  Conduction:     Conduction: normal    ST segments:     ST segments:  Normal  T waves:     T waves: normal      ECG 12 Lead Documentation Only    Date/Time: 9/2/2022 10:33 AM  Performed by: Bi Jauregui DO  Authorized by: Bi Jauregui DO     Indications / Diagnosis:  Repeat EKG, right shoulder/back pain  ECG reviewed by me, the ED Provider: yes    Rate:     ECG rate:  48    ECG rate assessment: bradycardic    Rhythm:     Rhythm: sinus rhythm    Ectopy:     Ectopy: none    QRS:     QRS axis:  Normal    QRS intervals:  Normal  Conduction:     Conduction: normal    ST segments:     ST segments:  Normal  T waves:     T waves: normal               ED Course             HEART Risk Score    Flowsheet Row Most Recent Value   Heart Score Risk Calculator    History 0 Filed at: 09/02/2022 0953   ECG 0 Filed at: 09/02/2022 8342   Age 2 Filed at: 09/02/2022 4725   Risk Factors 2 Filed at: 09/02/2022 0953   Troponin 0 Filed at: 09/02/2022 0643   HEART Score 4 Filed at: 09/02/2022 2817                        SBIRT 20yo+    Flowsheet Row Most Recent Value   SBIRT (25 yo +)    In order to provide better care to our patients, we are screening all of our patients for alcohol and drug use  Would it be okay to ask you these screening questions?  Unable to answer at this time Filed at: 09/02/2022 0905                    MDM    Disposition  Final diagnoses:   Chest wall pain   Strain of right trapezius muscle, initial encounter     Time reflects when diagnosis was documented in both MDM as applicable and the Disposition within this note     Time User Action Codes Description Comment    9/2/2022 11:15 AM Nicolette VALERO Add [R07 89] Chest wall pain     9/2/2022 11:16 AM Beverley Fitzpatrick Add [I27 796U] Strain of right trapezius muscle, initial encounter       ED Disposition     ED Disposition   Discharge    Condition   Stable    Date/Time   Fri Sep 2, 2022 11:15 AM    Comment   Gerber Mclain discharge to home/self care  Follow-up Information     Follow up With Specialties Details Why Balbir Gloria MD Family Medicine Schedule an appointment as soon as possible for a visit   [facility] [address] [place] [address]  [phone]            Discharge Medication List as of 9/2/2022 11:18 AM      START taking these medications    Details   methocarbamol (ROBAXIN) 500 mg tablet Take 1 tablet (500 mg total) by mouth 2 (two) times a day, Starting Fri 9/2/2022, Normal         CONTINUE these medications which have NOT CHANGED    Details   albuterol (PROVENTIL HFA,VENTOLIN HFA) 90 mcg/act inhaler Inhale 2 puffs every 6 (six) hours as needed for wheezing or shortness of breath, Starting Sat 2/29/2020, No Print      amLODIPine (NORVASC) 5 mg tablet Take 5 mg by mouth every morning, Starting Mon 3/21/2022, Historical Med      apixaban (ELIQUIS) 5 mg Take 5 mg by mouth 2 (two) times a day , Starting Tue 2/5/2019, Historical Med      aspirin 81 mg chewable tablet Chew 1 tablet (81 mg total) daily, Starting Sat 2/29/2020, No Print      !! atorvastatin (LIPITOR) 10 mg tablet Take 10 mg by mouth daily, Starting Tue 11/10/2020, Historical Med      !! atorvastatin (LIPITOR) 20 mg tablet Take 20 mg by mouth every morning, Starting Tue 6/7/2022, Historical Med      baclofen 10 mg tablet Starting Sun 12/5/2021, Historical Med      !! Blood Glucose Monitoring Suppl (ONETOUCH VERIO) w/Device KIT Use to check sugars once a day  Dx E11 9, Historical Med      !!  Blood Glucose Monitoring Suppl ([place]) w/Device KIT daily Use to check glucose, Starting Tue 1/28/2020, Historical Med      dofetilide (TIKOSYN) 500 mcg capsule Take 500 mcg by mouth 2 (two) times a day, Historical Med      fenofibrate (TRICOR) 54 MG tablet Take 54 mg by mouth daily, Historical Med      furosemide (LASIX) 20 mg tablet Take 20 mg by mouth 2 (two) times a day, Starting Wed 6/10/2020, Historical Med      gabapentin (NEURONTIN) 100 mg capsule Take 1 capsule (100 mg total) by mouth daily at bedtime Take 1 half hour before bedtime  , Starting Thu 6/23/2022, Normal      glipiZIDE (GLUCOTROL XL) 5 mg 24 hr tablet TAKE 1 TABLET BY MOUTH ONCE DAILY 30 MINUTES BEFORE A MEAL (REPLACES METFORMIN), Historical Med      !! glucose blood test strip Use to check sugars once a day  Dx E11 9, Historical Med      hydrocortisone (ANUSOL-HC) 2 5 % rectal cream Apply topically 2 (two) times a day, Starting Mon 8/22/2022, Normal      hydrOXYzine HCL (ATARAX) 10 mg tablet Take 10 mg by mouth 3 (three) times a day, Starting Mon 6/6/2022, Historical Med      !! Lancets (ONETOUCH DELICA PLUS [id number]) MISC USE 1 TO CHECK GLUCOSE ONCE DAILY, Historical Med      lisinopril (ZESTRIL) 10 mg tablet Take 10 mg by mouth every morning, Starting Tue 6/7/2022, Historical Med      mesalamine (DELZICOL) 400 mg Take 2 capsules by mouth twice daily, Normal      metoprolol succinate (TOPROL-XL) 100 mg 24 hr tablet Take 1 tablet (100 mg total) by mouth every 12 (twelve) hours, Starting Thu 2/14/2019, Normal      mometasone-formoterol (DULERA) 100-5 MCG/ACT inhaler Inhale 2 puffs 2 (two) times a day, Starting Sat 2/29/2020, No Print      nitroglycerin (NITROSTAT) 0 4 mg SL tablet Place 0 4 mg under the tongue every 5 (five) minutes as needed Do not exceeda total of 3 doses in 15 minutes, Starting Mon 3/21/2022, Historical Med      omeprazole (PriLOSEC) 40 MG capsule Take 1 capsule (40 mg total) by mouth 2 (two) times a day, Starting Tue 3/8/2022, Normal      !! OneTouch Delica Lancets 83H MISC Use to check sugars once a day   Dx E11 9, Historical Med      !! ONETOUCH VERIO test strip USE 1 STRIP TO CHECK GLUCOSE ONCE DAILY, Historical Med      polyethylene glycol (GLYCOLAX) 17 GM/SCOOP powder Take 17 g by mouth daily, Starting Mon 8/22/2022, Normal      pramipexole (MIRAPEX) 0 5 mg tablet Take 0 5 mg by mouth daily at bedtime, Starting Tue 6/28/2022, Historical Med      QUEtiapine (SEROquel) 25 mg tablet Take 25 mg by mouth daily at bedtime, Starting Mon 6/6/2022, Historical Med      sertraline (ZOLOFT) 50 mg tablet Historical Med      SYNTHROID 150 MCG tablet TAKE 1 TABLET BY MOUTH ONCE DAILY FIRST THING IN THE MORNING (AT LEAST 30 MINUTES PRIOR TO BREAKFAST OR OTHER MEDS), Historical Med      Trelegy Ellipta 200-62 5-25 MCG/INH AEPB inhaler INHALE 1 PUFF BY MOUTH IN THE MORNING, Historical Med      venlafaxine (EFFEXOR-XR) 37 5 mg 24 hr capsule Take 37 5 mg by mouth daily , Starting Mon 12/31/2018, Historical Med       !! - Potential duplicate medications found  Please discuss with provider  No discharge procedures on file      PDMP Review       Value Time User    PDMP Reviewed  Yes 2/25/2020  6:47 PM Belvie Holter, MD          ED Provider  Electronically Signed by           Ramin Clay DO  09/02/22 5097

## 2022-09-04 LAB
ATRIAL RATE: 46 BPM
P AXIS: 89 DEGREES
PR INTERVAL: 162 MS
QRS AXIS: 37 DEGREES
QRSD INTERVAL: 82 MS
QT INTERVAL: 544 MS
QTC INTERVAL: 476 MS
T WAVE AXIS: 42 DEGREES
VENTRICULAR RATE: 46 BPM

## 2022-09-04 PROCEDURE — 93010 ELECTROCARDIOGRAM REPORT: CPT | Performed by: INTERNAL MEDICINE

## 2022-09-05 DIAGNOSIS — K21.9 GASTROESOPHAGEAL REFLUX DISEASE, UNSPECIFIED WHETHER ESOPHAGITIS PRESENT: ICD-10-CM

## 2022-09-05 RX ORDER — OMEPRAZOLE 40 MG/1
CAPSULE, DELAYED RELEASE ORAL
Qty: 60 CAPSULE | Refills: 0 | Status: SHIPPED | OUTPATIENT
Start: 2022-09-05 | End: 2022-10-05

## 2022-09-06 ENCOUNTER — TELEPHONE (OUTPATIENT)
Dept: SLEEP CENTER | Facility: CLINIC | Age: 67
End: 2022-09-06

## 2022-09-23 ENCOUNTER — OFFICE VISIT (OUTPATIENT)
Dept: GASTROENTEROLOGY | Facility: CLINIC | Age: 67
End: 2022-09-23
Payer: COMMERCIAL

## 2022-09-23 VITALS
TEMPERATURE: 99.3 F | HEIGHT: 70 IN | WEIGHT: 245 LBS | BODY MASS INDEX: 35.07 KG/M2 | SYSTOLIC BLOOD PRESSURE: 106 MMHG | DIASTOLIC BLOOD PRESSURE: 66 MMHG | HEART RATE: 65 BPM

## 2022-09-23 DIAGNOSIS — K50.919 CROHN'S DISEASE WITH COMPLICATION, UNSPECIFIED GASTROINTESTINAL TRACT LOCATION (HCC): ICD-10-CM

## 2022-09-23 DIAGNOSIS — K59.00 CONSTIPATION, UNSPECIFIED CONSTIPATION TYPE: ICD-10-CM

## 2022-09-23 DIAGNOSIS — K62.5 RECTAL BLEEDING: ICD-10-CM

## 2022-09-23 DIAGNOSIS — K64.9 HEMORRHOIDS, UNSPECIFIED HEMORRHOID TYPE: Primary | ICD-10-CM

## 2022-09-23 PROCEDURE — 99214 OFFICE O/P EST MOD 30 MIN: CPT | Performed by: NURSE PRACTITIONER

## 2022-09-23 NOTE — PROGRESS NOTES
[facility] - Outpatient Follow-up Note  Lynda Prince 73 y o  female MRN: [id number]  Encounter: [id number]          ASSESSMENT AND PLAN:      1  Hemorrhoids, unspecified hemorrhoid type  2  Rectal bleeding  3  Constipation, unspecified constipation type    Patient was seen in August with complaints of hemorrhoids, rectal bleeding and straining to have a bowel movement  She was recommended for half a cap of MiraLax daily, Anusol cream twice a day and Tylenol as needed  Patient states that she has done all these things including warm compresses with little resolution of her discomfort  Again noted hemorrhoid at 5:00 position that does not appear thrombosed but is tender to touch  Surrounding anal skin is clean, intact and pink  Nontender to touch  Patient's last colonoscopy was in December that did reveal internal hemorrhoids  Patient reports that she typically has a daily bowel movement but does have to partially strain to defecate  Encouraged patient to increase MiraLax to 1 capful daily and continue Anusol cream   Will have patient evaluated by General surgery for possible hemorrhoidectomy  Given patient's history of Crohn's disease, will have patient undergo fecal calprotectin  - Ambulatory Referral to General Surgery; Future  - Calprotectin,Fecal; Future      4  Crohn's disease with complication, unspecified gastrointestinal tract location [facility])    Patient has a reported history of Crohn's disease although has had an EGD, colonoscopy, CT enterography and capsule enteroscopy with no evidence of inflammatory bowel disease  She currently takes mesalamine twice a day  She denies any abdominal pain or diarrhea  - continue mesalamine    Will see patient back in 3 months or sooner if needed    ______________________________________________________________________    SUBJECTIVE:  Kaiser Reyes a 71-year-old female that presents today for follow-up of hepatic steatosis, GERD, hemorrhoids, Crohn's disease, rectal bleeding and constipation  Patient was last seen in August with complaints of hemorrhoids, rectal bleeding and straining with her bowel movements  She was recommended for a half cap of MiraLax daily, Anusol b i d  and Tylenol is needed  She states that she has been doing all of this as well as warm compresses  She states that the rectal discomfort continues  She states that she typically has a daily bowel movement with the MiraLax but still has to strain to defecate  She reports some rectal bleeding but no other drainage  She denies any abdominal pain or diarrhea  Her last colonoscopy was in December that revealed internal hemorrhoids, 1 adenoma and no evidence of active Crohn's disease and she was recommended to repeat in 5 years  She does currently take mesalamine twice a day  Patient also has a history of hepatic steatosis  Most recent metabolic panel drawn earlier this month revealed normal liver functions  Patient has a history of GERD in which she takes omeprazole 40 mg daily with good control  Her last EGD was in 2020 that revealed a 3 cm hiatal hernia and a normal appearing stomach and duodenum  REVIEW OF SYSTEMS:  Review of Systems   Gastrointestinal: Positive for anal bleeding and rectal pain  Negative for abdominal distention, abdominal pain, diarrhea and nausea  Constipation: Strains with bowel movement  All other systems reviewed and are negative          Historical Information   Past Medical History:   Diagnosis Date    A-fib [facility])     Brain aneurysm     Cancer ([place] [address] )     papillary thyroid cancer    Cardiac disease     CHF (congestive heart failure) (HCC)     Crohn disease ([facility] )     Diabetes mellitus ([facility] )     Disease of thyroid gland     GERD (gastroesophageal reflux disease)     Hyperlipidemia     Hypertension      Past Surgical History:   Procedure Laterality Date    APPENDECTOMY      CARDIOVERSION N/A 2/14/2019    Procedure: CARDIOVERSION;  Surgeon: Ema Nixon MD;  Location: [facility];  Service: Cardiology    CEREBRAL ANEURYSM REPAIR      CHOLECYSTECTOMY      HYSTERECTOMY      THYROIDECTOMY      TONSILLECTOMY AND ADENOIDECTOMY       Social History   Social History     Substance and Sexual Activity   Alcohol Use Yes    Alcohol/week: 0 0 standard drinks    Comment: social     Social History     Substance and Sexual Activity   Drug Use Never     Social History     Tobacco Use   Smoking Status Former Smoker    Quit date: 2018    Years since quittin 7   Smokeless Tobacco Never Used     No family history on file      Meds/Allergies       Current Outpatient Medications:     albuterol (PROVENTIL HFA,VENTOLIN HFA) 90 mcg/act inhaler    amLODIPine (NORVASC) 5 mg tablet    apixaban (ELIQUIS) 5 mg    aspirin 81 mg chewable tablet    atorvastatin (LIPITOR) 10 mg tablet    atorvastatin (LIPITOR) 20 mg tablet    baclofen 10 mg tablet    Blood Glucose Monitoring Suppl (ONETOUCH VERIO) w/Device KIT    Blood Glucose Monitoring Suppl (ONETOUCH VERIO) w/Device KIT    dofetilide (TIKOSYN) 500 mcg capsule    fenofibrate (TRICOR) 54 MG tablet    gabapentin (NEURONTIN) 100 mg capsule    glipiZIDE (GLUCOTROL XL) 5 mg 24 hr tablet    glucose blood test strip    hydrocortisone (ANUSOL-HC) 2 5 % rectal cream    hydrOXYzine HCL (ATARAX) 10 mg tablet    Lancets (ONETOUCH DELICA PLUS ELOHVY83A) MISC    lisinopril (ZESTRIL) 10 mg tablet    mesalamine (DELZICOL) 400 mg    methocarbamol (ROBAXIN) 500 mg tablet    metoprolol succinate (TOPROL-XL) 100 mg 24 hr tablet    mometasone-formoterol (DULERA) 100-5 MCG/ACT inhaler    nitroglycerin (NITROSTAT) 0 4 mg SL tablet    omeprazole (PriLOSEC) 40 MG capsule    OneTouch Delica Lancets 76R MISC    ONETOUCH VERIO test strip    polyethylene glycol (GLYCOLAX) 17 GM/SCOOP powder    pramipexole (MIRAPEX) 0 5 mg tablet    QUEtiapine (SEROquel) 25 mg tablet    sertraline (ZOLOFT) 50 mg tablet    SYNTHROID 150 MCG tablet    Trelegy Ellipta 200-62 5-25 MCG/INH AEPB inhaler    venlafaxine (EFFEXOR-XR) 37 5 mg 24 hr capsule    furosemide (LASIX) 20 mg tablet    Allergies   Allergen Reactions    Sulfa Antibiotics Rash    Other            Objective     Blood pressure 106/66, pulse 65, temperature 99 3 °F (37 4 °C), height 5' 9 5" (1 765 m), weight 111 kg (245 lb)  Body mass index is 35 66 kg/m²  PHYSICAL EXAM:      General Appearance:   Alert, cooperative, no distress   HEENT:   Normocephalic, atraumatic, anicteric  Neck:  Supple, symmetrical, trachea midline   Lungs:   Clear to auscultation bilaterally; no rales, rhonchi or wheezing; respirations unlabored    Heart[de-identified]   Regular rate and rhythm; no murmur, rub, or gallop  Abdomen:   Soft, non-tender, non-distended; normal bowel sounds; no masses, no organomegaly    Genitalia:   Deferred    Rectal:   Hemorrhoid at the 5 o'clock position, tender to touch, not thrombosed, surrounding skin pink   Extremities:  No cyanosis, clubbing or edema    Skin:  No jaundice, rashes, or lesions             Lab Results:   No visits with results within 1 Day(s) from this visit     Latest known visit with results is:   Admission on 09/02/2022, Discharged on 09/02/2022   Component Date Value    Ventricular Rate 09/02/2022 57     Atrial Rate 09/02/2022 57     NH Interval 09/02/2022 156     QRSD Interval 09/02/2022 84     QT Interval 09/02/2022 470     QTC Interval 09/02/2022 457     P Axis 09/02/2022 73     QRS Axis 09/02/2022 47     T Wave Axis 09/02/2022 66     WBC 09/02/2022 11 12 (A)    RBC 09/02/2022 4 43     Hemoglobin 09/02/2022 13 3     Hematocrit 09/02/2022 41 9     MCV 09/02/2022 95     MCH 09/02/2022 30 0     MCHC 09/02/2022 31 7     RDW 09/02/2022 14 6     MPV 09/02/2022 9 5     Platelets 00/33/6027 263     nRBC 09/02/2022 0     Neutrophils Relative 09/02/2022 72     Immat GRANS % 09/02/2022 0     Lymphocytes Relative 09/02/2022 21     Monocytes Relative 09/02/2022 6     Eosinophils Relative 09/02/2022 1     Basophils Relative 09/02/2022 0     Neutrophils Absolute 09/02/2022 7 91 (A)    Immature Grans Absolute 09/02/2022 0 04     Lymphocytes Absolute 09/02/2022 2 35     Monocytes Absolute 09/02/2022 0 62     Eosinophils Absolute 09/02/2022 0 16     Basophils Absolute 09/02/2022 0 04     Sodium 09/02/2022 140     Potassium 09/02/2022 3 5     Chloride 09/02/2022 101     CO2 09/02/2022 28     ANION GAP 09/02/2022 11     BUN 09/02/2022 15     Creatinine 09/02/2022 1 00     Glucose 09/02/2022 180 (A)    Calcium 09/02/2022 9 0     AST 09/02/2022 32     ALT 09/02/2022 41     Alkaline Phosphatase 09/02/2022 110     Total Protein 09/02/2022 8 1     Albumin 09/02/2022 3 5     Total Bilirubin 09/02/2022 0 23     eGFR 09/02/2022 59     hs TnI 0hr 09/02/2022 4     Extra Tube 09/02/2022 Hold for add-ons   hs TnI 2hr 09/02/2022 3     Delta 2hr hsTnI 09/02/2022 -1     Ventricular Rate 09/02/2022 46     Atrial Rate 09/02/2022 46     OH Interval 09/02/2022 162     QRSD Interval 09/02/2022 82     QT Interval 09/02/2022 544     QTC Interval 09/02/2022 476     P Axis 09/02/2022 89     QRS Axis 09/02/2022 37     T Wave Axis 09/02/2022 42          Radiology Results:   XR chest 1 view portable    Result Date: 9/2/2022  Narrative: CHEST INDICATION:   cp  COMPARISON:  3/14/2022 EXAM PERFORMED/VIEWS:  XR CHEST PORTABLE Single view FINDINGS: Cardiomediastinal silhouette appears unremarkable  The lungs are clear  No pneumothorax or pleural effusion  Osseous structures appear within normal limits for patient age  Impression: No acute cardiopulmonary disease   Findings are stable Workstation performed: [id number]

## 2022-09-25 ENCOUNTER — APPOINTMENT (OUTPATIENT)
Dept: LAB | Facility: HOSPITAL | Age: 67
End: 2022-09-25
Payer: COMMERCIAL

## 2022-09-25 DIAGNOSIS — K64.9 HEMORRHOIDS, UNSPECIFIED HEMORRHOID TYPE: ICD-10-CM

## 2022-09-25 PROCEDURE — 83993 ASSAY FOR CALPROTECTIN FECAL: CPT

## 2022-09-28 ENCOUNTER — CONSULT (OUTPATIENT)
Dept: SURGERY | Facility: CLINIC | Age: 67
End: 2022-09-28
Payer: COMMERCIAL

## 2022-09-28 VITALS
SYSTOLIC BLOOD PRESSURE: 140 MMHG | DIASTOLIC BLOOD PRESSURE: 79 MMHG | OXYGEN SATURATION: 96 % | WEIGHT: 242.4 LBS | HEIGHT: 70 IN | HEART RATE: 50 BPM | RESPIRATION RATE: 20 BRPM | BODY MASS INDEX: 34.7 KG/M2 | TEMPERATURE: 98 F

## 2022-09-28 DIAGNOSIS — K64.9 HEMORRHOIDS, UNSPECIFIED HEMORRHOID TYPE: Primary | ICD-10-CM

## 2022-09-28 PROCEDURE — 99204 OFFICE O/P NEW MOD 45 MIN: CPT | Performed by: SURGERY

## 2022-09-28 RX ORDER — HEPARIN SODIUM 5000 [USP'U]/ML
5000 INJECTION, SOLUTION INTRAVENOUS; SUBCUTANEOUS ONCE
Status: CANCELLED | OUTPATIENT
Start: 2022-10-07 | End: 2022-09-28

## 2022-09-28 RX ORDER — SODIUM CHLORIDE, SODIUM LACTATE, POTASSIUM CHLORIDE, CALCIUM CHLORIDE 600; 310; 30; 20 MG/100ML; MG/100ML; MG/100ML; MG/100ML
75 INJECTION, SOLUTION INTRAVENOUS CONTINUOUS
Status: CANCELLED | OUTPATIENT
Start: 2022-10-07

## 2022-09-29 ENCOUNTER — TELEPHONE (OUTPATIENT)
Dept: OTHER | Facility: OTHER | Age: 67
End: 2022-09-29

## 2022-09-29 NOTE — TELEPHONE ENCOUNTER
Patient called in regarding blood thinner hold; was told that it would be left up to cardiologist she sees at [facility]  Patient stated office needed to fax request to their office at [phone]

## 2022-09-29 NOTE — H&P (VIEW-ONLY)
Assessment/Plan:    Hemorrhoids  Symptomatic hemorrhoids  It appears that most her symptoms are coming from external hemorrhoid in the left lateral/inferior position  No active bleeding  Rectal exam deferred due to significant pain  No obvious masses noted externally  No gross blood noted  Patient has tried multiple over-the-counter medications including multiple stool softeners and laxative, Sitz baths, some topical ointments  Unfortunately none this is work  She wished to proceed with the removal   Patient will be scheduled for excisional hemorrhoidectomy 1 column, possibly 2  The procedure self including all associated risks and benefits were discussed the patient great length     Patient is currently on Eliquis and therefore will have to all this  Will get her to follow-up with her medical/cardiac physician for optimization  Diagnoses and all orders for this visit:    Hemorrhoids, unspecified hemorrhoid type          Subjective:      Patient ID: Sandy Ashby is a 73 y o  female  18-year-old female with known CAD, AFib, CHF, suspected Crohn disease currently on medical management, diabetes, thyroid disease, GERD, hyperlipidemia, hypertension, presents to the office today in consultation for evaluation of symptomatic hemorrhoids  Patient has had hemorrhoids for some time now however with past few months stapling consistently symptomatic  Her main complaint is pain  There is some bleeding  She has a history of a colonoscopy which did show some internal hemorrhoids  She was sent to me from Gastroenterology due to persistent and worsening pain associated with her hemorrhoids  She does take stool softeners, including MiraLax     She does take fiber  She does have daily bowel movements of all her constipation is slightly improved she still has significant problems with this ongoing hemorrhoid        The following portions of the patient's history were reviewed and updated as appropriate:   She has a past medical history of A-fib [facility]), Brain aneurysm, Cancer ([facility] ), Cardiac disease, CHF (congestive heart failure) ([facility] ), Crohn disease ([place] [address] ), Diabetes mellitus ([facility] ), Disease of thyroid gland, GERD (gastroesophageal reflux disease), Hyperlipidemia, and Hypertension  She   Patient Active Problem List    Diagnosis Date Noted    Hemorrhoids 09/28/2022    Chronic pain syndrome 12/02/2021    Chronic bilateral low back pain without sciatica 12/02/2021    Lumbar spondylosis 12/02/2021    Lumbar radiculopathy 12/02/2021    Fracture of right tibia 02/02/2021    Diabetes mellitus, type 2 ([place] [address] )     Colon polyp 02/29/2020    Hyperglycemia 02/29/2020    Multiple renal cysts 02/29/2020    T12 compression fracture (HCC) 02/29/2020    Prolonged QT interval 02/27/2020    Hepatomegaly 02/27/2020    Hepatic steatosis 02/27/2020    Atherosclerosis 02/26/2020    Exacerbation of Crohn's disease ([facility] ) 02/25/2020    Severe obstructive sleep apnea 02/25/2020    Diarrhea 02/25/2020    COPD (chronic obstructive pulmonary disease) ([facility] ) 12/01/2019    Acute respiratory failure with hypoxia ([facility] ) 11/30/2019    Pneumonia due to infectious organism 11/29/2019    Elevated d-dimer 11/29/2019    Hypokalemia 11/29/2019    Acquired hypothyroidism 09/16/2019    Brain aneurysm 09/16/2019    Atypical chest pain 09/16/2019    Dizziness 09/16/2019    Crohn's disease with complication ([facility] ) 14/73/4914    MARY ELLEN (obstructive sleep apnea)     Microscopic hematuria 02/14/2019    Pulmonary hypertension ([facility] ) 02/14/2019    Lymphadenopathy 02/12/2019    A-fib ([place] [address] ) 02/12/2019    Elevated TSH 02/12/2019    Hypercholesteremia 02/12/2019    Transaminitis 02/12/2019     She  has a past surgical history that includes Hysterectomy; Cardioversion (N/A, 2/14/2019); Tonsillectomy and adenoidectomy; Appendectomy; Cerebral aneurysm repair; Cholecystectomy; and Thyroidectomy  Her family history is not on file    She  reports that she quit smoking about 4 years ago  She has never used smokeless tobacco  She reports current alcohol use  She reports that she does not use drugs  Current Outpatient Medications   Medication Sig Dispense Refill    albuterol (PROVENTIL HFA,VENTOLIN HFA) 90 mcg/act inhaler Inhale 2 puffs every 6 (six) hours as needed for wheezing or shortness of breath  0    amLODIPine (NORVASC) 5 mg tablet Take 5 mg by mouth every morning      apixaban (ELIQUIS) 5 mg Take 5 mg by mouth 2 (two) times a day       aspirin 81 mg chewable tablet Chew 1 tablet (81 mg total) daily  0    atorvastatin (LIPITOR) 10 mg tablet Take 10 mg by mouth daily      atorvastatin (LIPITOR) 20 mg tablet Take 20 mg by mouth every morning      baclofen 10 mg tablet       Blood Glucose Monitoring Suppl (ONETOUCH VERIO) w/Device KIT Use to check sugars once a day  Dx E11 9      Blood Glucose Monitoring Suppl (ONETOUCH VERIO) w/Device KIT daily Use to check glucose      dofetilide (TIKOSYN) 500 mcg capsule Take 500 mcg by mouth 2 (two) times a day      fenofibrate (TRICOR) 54 MG tablet Take 54 mg by mouth daily      furosemide (LASIX) 20 mg tablet Take 20 mg by mouth 2 (two) times a day      gabapentin (NEURONTIN) 100 mg capsule Take 1 capsule (100 mg total) by mouth daily at bedtime Take 1 half hour before bedtime  90 capsule 2    glipiZIDE (GLUCOTROL XL) 5 mg 24 hr tablet TAKE 1 TABLET BY MOUTH ONCE DAILY 30 MINUTES BEFORE A MEAL (REPLACES METFORMIN)      glucose blood test strip Use to check sugars once a day   Dx E11 9      hydrocortisone (ANUSOL-HC) 2 5 % rectal cream Apply topically 2 (two) times a day 28 g 3    hydrOXYzine HCL (ATARAX) 10 mg tablet Take 10 mg by mouth 3 (three) times a day      Lancets (ONETOUCH DELICA PLUS [id number]) MISC USE 1 TO CHECK GLUCOSE ONCE DAILY      lisinopril (ZESTRIL) 10 mg tablet Take 10 mg by mouth every morning      mesalamine (DELZICOL) 400 mg Take 2 capsules by mouth twice daily 120 capsule 0    methocarbamol (ROBAXIN) 500 mg tablet Take 1 tablet (500 mg total) by mouth 2 (two) times a day 20 tablet 0    metoprolol succinate (TOPROL-XL) 100 mg 24 hr tablet Take 1 tablet (100 mg total) by mouth every 12 (twelve) hours 60 tablet 0    mometasone-formoterol (DULERA) 100-5 MCG/ACT inhaler Inhale 2 puffs 2 (two) times a day  0    nitroglycerin (NITROSTAT) 0 4 mg SL tablet Place 0 4 mg under the tongue every 5 (five) minutes as needed Do not exceeda total of 3 doses in 15 minutes      omeprazole (PriLOSEC) 40 MG capsule Take 1 capsule by mouth twice daily 60 capsule 0    OneTouch Delica Lancets 02Z MISC Use to check sugars once a day  Dx E11 9      ONETOUCH VERIO test strip USE 1 STRIP TO CHECK GLUCOSE ONCE DAILY      polyethylene glycol (GLYCOLAX) 17 GM/SCOOP powder Take 17 g by mouth daily 578 g 3    pramipexole (MIRAPEX) 0 5 mg tablet Take 0 5 mg by mouth daily at bedtime      QUEtiapine (SEROquel) 25 mg tablet Take 25 mg by mouth daily at bedtime      sertraline (ZOLOFT) 50 mg tablet       SYNTHROID 150 MCG tablet TAKE 1 TABLET BY MOUTH ONCE DAILY FIRST THING IN THE MORNING (AT LEAST 30 MINUTES PRIOR TO BREAKFAST OR OTHER MEDS)      Trelegy Ellipta 200-62 5-25 MCG/INH AEPB inhaler INHALE 1 PUFF BY MOUTH IN THE MORNING      venlafaxine (EFFEXOR-XR) 37 5 mg 24 hr capsule Take 37 5 mg by mouth daily        No current facility-administered medications for this visit  She is allergic to sulfa antibiotics and other       Review of Systems      Review systems completed, all negative except as noted above HPI  Objective:      /79 (BP Location: Right arm, Patient Position: Sitting, Cuff Size: Extra-Large)   Pulse (!) 50   Temp 98 °F (36 7 °C) (Tympanic)   Resp 20   Ht 5' 9 5" (1 765 m)   Wt 110 kg (242 lb 6 4 oz)   SpO2 96%   BMI 35 28 kg/m²          Physical Exam  Vitals reviewed  Constitutional:       General: She is not in acute distress  Appearance: Normal appearance   She is not ill-appearing, toxic-appearing or diaphoretic  HENT:      Head: Normocephalic and atraumatic  Right Ear: External ear normal       Left Ear: External ear normal    Eyes:      General: No scleral icterus  Right eye: No discharge  Left eye: No discharge  Cardiovascular:      Rate and Rhythm: Normal rate and regular rhythm  Heart sounds: Normal heart sounds  No friction rub  No gallop  Pulmonary:      Effort: Pulmonary effort is normal  No respiratory distress  Breath sounds: Normal breath sounds  No wheezing  Abdominal:      General: There is no distension  Palpations: Abdomen is soft  There is no mass  Tenderness: There is no abdominal tenderness  Genitourinary:     Rectum: Tenderness, external hemorrhoid and internal hemorrhoid present  No mass or anal fissure  Normal anal tone  Comments: No evidence of any thrombosed external hemorrhoid  No gross blood  No obvious masses  There is a tender hemorrhoid column in the left lateral/anterior position EVELIA and anoscopy deferred due to pain  Musculoskeletal:         General: No deformity or signs of injury  Normal range of motion  Cervical back: Normal range of motion  Skin:     General: Skin is warm  Coloration: Skin is not jaundiced or pale  Findings: No bruising or erythema  Neurological:      General: No focal deficit present  Mental Status: She is alert and oriented to person, place, and time  Cranial Nerves: No cranial nerve deficit  Psychiatric:         Mood and Affect: Mood normal          Behavior: Behavior normal          Thought Content: Thought content normal          Judgment: Judgment normal            Note:     GI office note dated September 23, 2022 was personally reviewed by me

## 2022-09-29 NOTE — PROGRESS NOTES
Assessment/Plan:    Hemorrhoids  Symptomatic hemorrhoids  It appears that most her symptoms are coming from external hemorrhoid in the left lateral/inferior position  No active bleeding  Rectal exam deferred due to significant pain  No obvious masses noted externally  No gross blood noted  Patient has tried multiple over-the-counter medications including multiple stool softeners and laxative, Sitz baths, some topical ointments  Unfortunately none this is work  She wished to proceed with the removal   Patient will be scheduled for excisional hemorrhoidectomy 1 column, possibly 2  The procedure self including all associated risks and benefits were discussed the patient great length     Patient is currently on Eliquis and therefore will have to all this  Will get her to follow-up with her medical/cardiac physician for optimization  Diagnoses and all orders for this visit:    Hemorrhoids, unspecified hemorrhoid type          Subjective:      Patient ID: Amy Hussein is a 73 y o  female  36-year-old female with known CAD, AFib, CHF, suspected Crohn disease currently on medical management, diabetes, thyroid disease, GERD, hyperlipidemia, hypertension, presents to the office today in consultation for evaluation of symptomatic hemorrhoids  Patient has had hemorrhoids for some time now however with past few months stapling consistently symptomatic  Her main complaint is pain  There is some bleeding  She has a history of a colonoscopy which did show some internal hemorrhoids  She was sent to me from Gastroenterology due to persistent and worsening pain associated with her hemorrhoids  She does take stool softeners, including MiraLax     She does take fiber  She does have daily bowel movements of all her constipation is slightly improved she still has significant problems with this ongoing hemorrhoid        The following portions of the patient's history were reviewed and updated as appropriate:   She has a past medical history of A-fib [facility]), Brain aneurysm, Cancer ([place] [address] ), Cardiac disease, CHF (congestive heart failure) ([place] [address] ), Crohn disease ([place] [address] ), Diabetes mellitus ([facility] ), Disease of thyroid gland, GERD (gastroesophageal reflux disease), Hyperlipidemia, and Hypertension  She   Patient Active Problem List    Diagnosis Date Noted    Hemorrhoids 09/28/2022    Chronic pain syndrome 12/02/2021    Chronic bilateral low back pain without sciatica 12/02/2021    Lumbar spondylosis 12/02/2021    Lumbar radiculopathy 12/02/2021    Fracture of right tibia 02/02/2021    Diabetes mellitus, type 2 ([place] [address] )     Colon polyp 02/29/2020    Hyperglycemia 02/29/2020    Multiple renal cysts 02/29/2020    T12 compression fracture (HCC) 02/29/2020    Prolonged QT interval 02/27/2020    Hepatomegaly 02/27/2020    Hepatic steatosis 02/27/2020    Atherosclerosis 02/26/2020    Exacerbation of Crohn's disease ([facility] ) 02/25/2020    Severe obstructive sleep apnea 02/25/2020    Diarrhea 02/25/2020    COPD (chronic obstructive pulmonary disease) ([facility] ) 12/01/2019    Acute respiratory failure with hypoxia ([facility] ) 11/30/2019    Pneumonia due to infectious organism 11/29/2019    Elevated d-dimer 11/29/2019    Hypokalemia 11/29/2019    Acquired hypothyroidism 09/16/2019    Brain aneurysm 09/16/2019    Atypical chest pain 09/16/2019    Dizziness 09/16/2019    Crohn's disease with complication ([place] [address] ) 07/79/5758    MARY ELLEN (obstructive sleep apnea)     Microscopic hematuria 02/14/2019    Pulmonary hypertension ([facility] ) 02/14/2019    Lymphadenopathy 02/12/2019    A-fib ([place] [address] ) 02/12/2019    Elevated TSH 02/12/2019    Hypercholesteremia 02/12/2019    Transaminitis 02/12/2019     She  has a past surgical history that includes Hysterectomy; Cardioversion (N/A, 2/14/2019); Tonsillectomy and adenoidectomy; Appendectomy; Cerebral aneurysm repair; Cholecystectomy; and Thyroidectomy  Her family history is not on file    She  reports that she quit smoking about 4 years ago  She has never used smokeless tobacco  She reports current alcohol use  She reports that she does not use drugs  Current Outpatient Medications   Medication Sig Dispense Refill    albuterol (PROVENTIL HFA,VENTOLIN HFA) 90 mcg/act inhaler Inhale 2 puffs every 6 (six) hours as needed for wheezing or shortness of breath  0    amLODIPine (NORVASC) 5 mg tablet Take 5 mg by mouth every morning      apixaban (ELIQUIS) 5 mg Take 5 mg by mouth 2 (two) times a day       aspirin 81 mg chewable tablet Chew 1 tablet (81 mg total) daily  0    atorvastatin (LIPITOR) 10 mg tablet Take 10 mg by mouth daily      atorvastatin (LIPITOR) 20 mg tablet Take 20 mg by mouth every morning      baclofen 10 mg tablet       Blood Glucose Monitoring Suppl (ONETOUCH VERIO) w/Device KIT Use to check sugars once a day  Dx E11 9      Blood Glucose Monitoring Suppl (ONETOUCH VERIO) w/Device KIT daily Use to check glucose      dofetilide (TIKOSYN) 500 mcg capsule Take 500 mcg by mouth 2 (two) times a day      fenofibrate (TRICOR) 54 MG tablet Take 54 mg by mouth daily      furosemide (LASIX) 20 mg tablet Take 20 mg by mouth 2 (two) times a day      gabapentin (NEURONTIN) 100 mg capsule Take 1 capsule (100 mg total) by mouth daily at bedtime Take 1 half hour before bedtime  90 capsule 2    glipiZIDE (GLUCOTROL XL) 5 mg 24 hr tablet TAKE 1 TABLET BY MOUTH ONCE DAILY 30 MINUTES BEFORE A MEAL (REPLACES METFORMIN)      glucose blood test strip Use to check sugars once a day   Dx E11 9      hydrocortisone (ANUSOL-HC) 2 5 % rectal cream Apply topically 2 (two) times a day 28 g 3    hydrOXYzine HCL (ATARAX) 10 mg tablet Take 10 mg by mouth 3 (three) times a day      Lancets (ONETOUCH DELICA PLUS [id number]) MISC USE 1 TO CHECK GLUCOSE ONCE DAILY      lisinopril (ZESTRIL) 10 mg tablet Take 10 mg by mouth every morning      mesalamine (DELZICOL) 400 mg Take 2 capsules by mouth twice daily 120 capsule 0    methocarbamol (ROBAXIN) 500 mg tablet Take 1 tablet (500 mg total) by mouth 2 (two) times a day 20 tablet 0    metoprolol succinate (TOPROL-XL) 100 mg 24 hr tablet Take 1 tablet (100 mg total) by mouth every 12 (twelve) hours 60 tablet 0    mometasone-formoterol (DULERA) 100-5 MCG/ACT inhaler Inhale 2 puffs 2 (two) times a day  0    nitroglycerin (NITROSTAT) 0 4 mg SL tablet Place 0 4 mg under the tongue every 5 (five) minutes as needed Do not exceeda total of 3 doses in 15 minutes      omeprazole (PriLOSEC) 40 MG capsule Take 1 capsule by mouth twice daily 60 capsule 0    OneTouch Delica Lancets 43P MISC Use to check sugars once a day  Dx E11 9      ONETOUCH VERIO test strip USE 1 STRIP TO CHECK GLUCOSE ONCE DAILY      polyethylene glycol (GLYCOLAX) 17 GM/SCOOP powder Take 17 g by mouth daily 578 g 3    pramipexole (MIRAPEX) 0 5 mg tablet Take 0 5 mg by mouth daily at bedtime      QUEtiapine (SEROquel) 25 mg tablet Take 25 mg by mouth daily at bedtime      sertraline (ZOLOFT) 50 mg tablet       SYNTHROID 150 MCG tablet TAKE 1 TABLET BY MOUTH ONCE DAILY FIRST THING IN THE MORNING (AT LEAST 30 MINUTES PRIOR TO BREAKFAST OR OTHER MEDS)      Trelegy Ellipta 200-62 5-25 MCG/INH AEPB inhaler INHALE 1 PUFF BY MOUTH IN THE MORNING      venlafaxine (EFFEXOR-XR) 37 5 mg 24 hr capsule Take 37 5 mg by mouth daily        No current facility-administered medications for this visit  She is allergic to sulfa antibiotics and other       Review of Systems      Review systems completed, all negative except as noted above HPI  Objective:      /79 (BP Location: Right arm, Patient Position: Sitting, Cuff Size: Extra-Large)   Pulse (!) 50   Temp 98 °F (36 7 °C) (Tympanic)   Resp 20   Ht 5' 9 5" (1 765 m)   Wt 110 kg (242 lb 6 4 oz)   SpO2 96%   BMI 35 28 kg/m²          Physical Exam  Vitals reviewed  Constitutional:       General: She is not in acute distress  Appearance: Normal appearance   She is not ill-appearing, toxic-appearing or diaphoretic  HENT:      Head: Normocephalic and atraumatic  Right Ear: External ear normal       Left Ear: External ear normal    Eyes:      General: No scleral icterus  Right eye: No discharge  Left eye: No discharge  Cardiovascular:      Rate and Rhythm: Normal rate and regular rhythm  Heart sounds: Normal heart sounds  No friction rub  No gallop  Pulmonary:      Effort: Pulmonary effort is normal  No respiratory distress  Breath sounds: Normal breath sounds  No wheezing  Abdominal:      General: There is no distension  Palpations: Abdomen is soft  There is no mass  Tenderness: There is no abdominal tenderness  Genitourinary:     Rectum: Tenderness, external hemorrhoid and internal hemorrhoid present  No mass or anal fissure  Normal anal tone  Comments: No evidence of any thrombosed external hemorrhoid  No gross blood  No obvious masses  There is a tender hemorrhoid column in the left lateral/anterior position EVELIA and anoscopy deferred due to pain  Musculoskeletal:         General: No deformity or signs of injury  Normal range of motion  Cervical back: Normal range of motion  Skin:     General: Skin is warm  Coloration: Skin is not jaundiced or pale  Findings: No bruising or erythema  Neurological:      General: No focal deficit present  Mental Status: She is alert and oriented to person, place, and time  Cranial Nerves: No cranial nerve deficit  Psychiatric:         Mood and Affect: Mood normal          Behavior: Behavior normal          Thought Content: Thought content normal          Judgment: Judgment normal            Note:     GI office note dated September 23, 2022 was personally reviewed by me

## 2022-09-29 NOTE — ASSESSMENT & PLAN NOTE
Symptomatic hemorrhoids  It appears that most her symptoms are coming from external hemorrhoid in the left lateral/inferior position  No active bleeding  Rectal exam deferred due to significant pain  No obvious masses noted externally  No gross blood noted  Patient has tried multiple over-the-counter medications including multiple stool softeners and laxative, Sitz baths, some topical ointments  Unfortunately none this is work  She wished to proceed with the removal   Patient will be scheduled for excisional hemorrhoidectomy 1 column, possibly 2  The procedure self including all associated risks and benefits were discussed the patient great length     Patient is currently on Eliquis and therefore will have to all this  Will get her to follow-up with her medical/cardiac physician for optimization

## 2022-09-29 NOTE — H&P
Assessment/Plan:    Hemorrhoids  Symptomatic hemorrhoids  It appears that most her symptoms are coming from external hemorrhoid in the left lateral/inferior position  No active bleeding  Rectal exam deferred due to significant pain  No obvious masses noted externally  No gross blood noted  Patient has tried multiple over-the-counter medications including multiple stool softeners and laxative, Sitz baths, some topical ointments  Unfortunately none this is work  She wished to proceed with the removal   Patient will be scheduled for excisional hemorrhoidectomy 1 column, possibly 2  The procedure self including all associated risks and benefits were discussed the patient great length     Patient is currently on Eliquis and therefore will have to all this  Will get her to follow-up with her medical/cardiac physician for optimization  Diagnoses and all orders for this visit:    Hemorrhoids, unspecified hemorrhoid type          Subjective:      Patient ID: Terri Tobin is a 73 y o  female  71-year-old female with known CAD, AFib, CHF, suspected Crohn disease currently on medical management, diabetes, thyroid disease, GERD, hyperlipidemia, hypertension, presents to the office today in consultation for evaluation of symptomatic hemorrhoids  Patient has had hemorrhoids for some time now however with past few months stapling consistently symptomatic  Her main complaint is pain  There is some bleeding  She has a history of a colonoscopy which did show some internal hemorrhoids  She was sent to me from Gastroenterology due to persistent and worsening pain associated with her hemorrhoids  She does take stool softeners, including MiraLax     She does take fiber  She does have daily bowel movements of all her constipation is slightly improved she still has significant problems with this ongoing hemorrhoid        The following portions of the patient's history were reviewed and updated as appropriate:   She has a past medical history of A-fib [facility]), Brain aneurysm, Cancer ([facility] ), Cardiac disease, CHF (congestive heart failure) ([facility] ), Crohn disease ([place] [address] ), Diabetes mellitus ([facility] ), Disease of thyroid gland, GERD (gastroesophageal reflux disease), Hyperlipidemia, and Hypertension  She   Patient Active Problem List    Diagnosis Date Noted    Hemorrhoids 09/28/2022    Chronic pain syndrome 12/02/2021    Chronic bilateral low back pain without sciatica 12/02/2021    Lumbar spondylosis 12/02/2021    Lumbar radiculopathy 12/02/2021    Fracture of right tibia 02/02/2021    Diabetes mellitus, type 2 ([place] [address] )     Colon polyp 02/29/2020    Hyperglycemia 02/29/2020    Multiple renal cysts 02/29/2020    T12 compression fracture (HCC) 02/29/2020    Prolonged QT interval 02/27/2020    Hepatomegaly 02/27/2020    Hepatic steatosis 02/27/2020    Atherosclerosis 02/26/2020    Exacerbation of Crohn's disease ([facility] ) 02/25/2020    Severe obstructive sleep apnea 02/25/2020    Diarrhea 02/25/2020    COPD (chronic obstructive pulmonary disease) ([facility] ) 12/01/2019    Acute respiratory failure with hypoxia ([facility] ) 11/30/2019    Pneumonia due to infectious organism 11/29/2019    Elevated d-dimer 11/29/2019    Hypokalemia 11/29/2019    Acquired hypothyroidism 09/16/2019    Brain aneurysm 09/16/2019    Atypical chest pain 09/16/2019    Dizziness 09/16/2019    Crohn's disease with complication ([place] [address] ) 45/51/8054    MARY ELLEN (obstructive sleep apnea)     Microscopic hematuria 02/14/2019    Pulmonary hypertension ([facility] ) 02/14/2019    Lymphadenopathy 02/12/2019    A-fib ([place] [address] ) 02/12/2019    Elevated TSH 02/12/2019    Hypercholesteremia 02/12/2019    Transaminitis 02/12/2019     She  has a past surgical history that includes Hysterectomy; Cardioversion (N/A, 2/14/2019); Tonsillectomy and adenoidectomy; Appendectomy; Cerebral aneurysm repair; Cholecystectomy; and Thyroidectomy  Her family history is not on file    She  reports that she quit smoking about 4 years ago  She has never used smokeless tobacco  She reports current alcohol use  She reports that she does not use drugs  Current Outpatient Medications   Medication Sig Dispense Refill    albuterol (PROVENTIL HFA,VENTOLIN HFA) 90 mcg/act inhaler Inhale 2 puffs every 6 (six) hours as needed for wheezing or shortness of breath  0    amLODIPine (NORVASC) 5 mg tablet Take 5 mg by mouth every morning      apixaban (ELIQUIS) 5 mg Take 5 mg by mouth 2 (two) times a day       aspirin 81 mg chewable tablet Chew 1 tablet (81 mg total) daily  0    atorvastatin (LIPITOR) 10 mg tablet Take 10 mg by mouth daily      atorvastatin (LIPITOR) 20 mg tablet Take 20 mg by mouth every morning      baclofen 10 mg tablet       Blood Glucose Monitoring Suppl (ONETOUCH VERIO) w/Device KIT Use to check sugars once a day  Dx E11 9      Blood Glucose Monitoring Suppl (ONETOUCH VERIO) w/Device KIT daily Use to check glucose      dofetilide (TIKOSYN) 500 mcg capsule Take 500 mcg by mouth 2 (two) times a day      fenofibrate (TRICOR) 54 MG tablet Take 54 mg by mouth daily      furosemide (LASIX) 20 mg tablet Take 20 mg by mouth 2 (two) times a day      gabapentin (NEURONTIN) 100 mg capsule Take 1 capsule (100 mg total) by mouth daily at bedtime Take 1 half hour before bedtime  90 capsule 2    glipiZIDE (GLUCOTROL XL) 5 mg 24 hr tablet TAKE 1 TABLET BY MOUTH ONCE DAILY 30 MINUTES BEFORE A MEAL (REPLACES METFORMIN)      glucose blood test strip Use to check sugars once a day   Dx E11 9      hydrocortisone (ANUSOL-HC) 2 5 % rectal cream Apply topically 2 (two) times a day 28 g 3    hydrOXYzine HCL (ATARAX) 10 mg tablet Take 10 mg by mouth 3 (three) times a day      Lancets (ONETOUCH DELICA PLUS [id number]) MISC USE 1 TO CHECK GLUCOSE ONCE DAILY      lisinopril (ZESTRIL) 10 mg tablet Take 10 mg by mouth every morning      mesalamine (DELZICOL) 400 mg Take 2 capsules by mouth twice daily 120 capsule 0    methocarbamol (ROBAXIN) 500 mg tablet Take 1 tablet (500 mg total) by mouth 2 (two) times a day 20 tablet 0    metoprolol succinate (TOPROL-XL) 100 mg 24 hr tablet Take 1 tablet (100 mg total) by mouth every 12 (twelve) hours 60 tablet 0    mometasone-formoterol (DULERA) 100-5 MCG/ACT inhaler Inhale 2 puffs 2 (two) times a day  0    nitroglycerin (NITROSTAT) 0 4 mg SL tablet Place 0 4 mg under the tongue every 5 (five) minutes as needed Do not exceeda total of 3 doses in 15 minutes      omeprazole (PriLOSEC) 40 MG capsule Take 1 capsule by mouth twice daily 60 capsule 0    OneTouch Delica Lancets 86K MISC Use to check sugars once a day  Dx E11 9      ONETOUCH VERIO test strip USE 1 STRIP TO CHECK GLUCOSE ONCE DAILY      polyethylene glycol (GLYCOLAX) 17 GM/SCOOP powder Take 17 g by mouth daily 578 g 3    pramipexole (MIRAPEX) 0 5 mg tablet Take 0 5 mg by mouth daily at bedtime      QUEtiapine (SEROquel) 25 mg tablet Take 25 mg by mouth daily at bedtime      sertraline (ZOLOFT) 50 mg tablet       SYNTHROID 150 MCG tablet TAKE 1 TABLET BY MOUTH ONCE DAILY FIRST THING IN THE MORNING (AT LEAST 30 MINUTES PRIOR TO BREAKFAST OR OTHER MEDS)      Trelegy Ellipta 200-62 5-25 MCG/INH AEPB inhaler INHALE 1 PUFF BY MOUTH IN THE MORNING      venlafaxine (EFFEXOR-XR) 37 5 mg 24 hr capsule Take 37 5 mg by mouth daily        No current facility-administered medications for this visit  She is allergic to sulfa antibiotics and other       Review of Systems      Review systems completed, all negative except as noted above HPI  Objective:      /79 (BP Location: Right arm, Patient Position: Sitting, Cuff Size: Extra-Large)   Pulse (!) 50   Temp 98 °F (36 7 °C) (Tympanic)   Resp 20   Ht 5' 9 5" (1 765 m)   Wt 110 kg (242 lb 6 4 oz)   SpO2 96%   BMI 35 28 kg/m²          Physical Exam  Vitals reviewed  Constitutional:       General: She is not in acute distress  Appearance: Normal appearance   She is not ill-appearing, toxic-appearing or diaphoretic  HENT:      Head: Normocephalic and atraumatic  Right Ear: External ear normal       Left Ear: External ear normal    Eyes:      General: No scleral icterus  Right eye: No discharge  Left eye: No discharge  Cardiovascular:      Rate and Rhythm: Normal rate and regular rhythm  Heart sounds: Normal heart sounds  No friction rub  No gallop  Pulmonary:      Effort: Pulmonary effort is normal  No respiratory distress  Breath sounds: Normal breath sounds  No wheezing  Abdominal:      General: There is no distension  Palpations: Abdomen is soft  There is no mass  Tenderness: There is no abdominal tenderness  Genitourinary:     Rectum: Tenderness, external hemorrhoid and internal hemorrhoid present  No mass or anal fissure  Normal anal tone  Comments: No evidence of any thrombosed external hemorrhoid  No gross blood  No obvious masses  There is a tender hemorrhoid column in the left lateral/anterior position EVELIA and anoscopy deferred due to pain  Musculoskeletal:         General: No deformity or signs of injury  Normal range of motion  Cervical back: Normal range of motion  Skin:     General: Skin is warm  Coloration: Skin is not jaundiced or pale  Findings: No bruising or erythema  Neurological:      General: No focal deficit present  Mental Status: She is alert and oriented to person, place, and time  Cranial Nerves: No cranial nerve deficit  Psychiatric:         Mood and Affect: Mood normal          Behavior: Behavior normal          Thought Content: Thought content normal          Judgment: Judgment normal            Note:     GI office note dated September 23, 2022 was personally reviewed by me

## 2022-09-30 DIAGNOSIS — K50.919 CROHN'S DISEASE WITH COMPLICATION, UNSPECIFIED GASTROINTESTINAL TRACT LOCATION (HCC): Primary | ICD-10-CM

## 2022-09-30 LAB — CALPROTECTIN STL-MCNT: 114 UG/G (ref 0–120)

## 2022-10-03 ENCOUNTER — ANESTHESIA EVENT (OUTPATIENT)
Dept: PERIOP | Facility: HOSPITAL | Age: 67
End: 2022-10-03
Payer: COMMERCIAL

## 2022-10-03 ENCOUNTER — TELEPHONE (OUTPATIENT)
Dept: SLEEP CENTER | Facility: CLINIC | Age: 67
End: 2022-10-03

## 2022-10-03 DIAGNOSIS — G47.33 OSA (OBSTRUCTIVE SLEEP APNEA): ICD-10-CM

## 2022-10-03 RX ORDER — GABAPENTIN 100 MG/1
100 CAPSULE ORAL
Qty: 90 CAPSULE | Refills: 5 | Status: CANCELLED | OUTPATIENT
Start: 2022-10-03

## 2022-10-03 NOTE — TELEPHONE ENCOUNTER
Patient requesting RX for Gabapentin   She reports she is taking 300 mg HS  Patient seen by you on 9/1/2022 and you recommend one year follow up     Patient is scheduled for 9/7/2023

## 2022-10-03 NOTE — ANESTHESIA PREPROCEDURE EVALUATION
Procedure:  HEMORRHOIDECTOMY EXCISION (N/A Anus)    TTE: for hx TIA, EF 65%, RV wnl, PASP 42 mild pulm HTN  ECG: SB    COPD - Terlegy albuterol, does not take Trelegy states has made breathing worse  Has not used albuterol >2 weeks  States breathing at baseline  Afib - eliquis  Hx TIA - ASA, statin  Cardiac meds: amlodipine, lisinopril and lasix  Takes lasix BID last took last night did not take this am  Chron's    States episodes of chest pain but not associated with activity occurs at rest and randomly she states she thinks it is anxiety  She denies SOB or chest pressure with activity  Denies MARY ELLEN  Relevant Problems   CARDIO   (+) A-fib (HCC)   (+) Atypical chest pain   (+) Hypercholesteremia      ENDO   (+) Acquired hypothyroidism   (+) Diabetes mellitus, type 2 (HCC)      GI/HEPATIC   (+) Hepatic steatosis   (+) Hepatomegaly      /RENAL   (+) Multiple renal cysts      MUSCULOSKELETAL   (+) Chronic bilateral low back pain without sciatica   (+) Lumbar spondylosis      NEURO/PSYCH   (+) Chronic bilateral low back pain without sciatica   (+) Chronic pain syndrome      PULMONARY   (+) Acute respiratory failure with hypoxia (HCC)   (+) COPD (chronic obstructive pulmonary disease) (HCC)   (+) MARY ELLEN (obstructive sleep apnea)   (+) Pneumonia due to infectious organism   (+) Severe obstructive sleep apnea        Physical Exam    Airway    Mallampati score: II  TM Distance: >3 FB  Neck ROM: full     Dental       Cardiovascular      Pulmonary      Other Findings        Anesthesia Plan  ASA Score- 3     Anesthesia Type- general with ASA Monitors  Additional Monitors:   Airway Plan: ETT  Plan Factors-Exercise tolerance (METS): >4 METS  Chart reviewed  EKG reviewed  Existing labs reviewed  Patient summary reviewed  Patient is a current smoker  Obstructive sleep apnea risk education given perioperatively  Induction- intravenous      Postoperative Plan-     Informed Consent- Anesthetic plan and risks discussed with patient  I personally reviewed this patient with the CRNA  Discussed and agreed on the Anesthesia Plan with the CRNA  Kaylene Mars       Discussed risk of dental damage due to underlying poor dentition

## 2022-10-04 DIAGNOSIS — K50.919 CROHN'S DISEASE WITH COMPLICATION, UNSPECIFIED GASTROINTESTINAL TRACT LOCATION (HCC): ICD-10-CM

## 2022-10-05 RX ORDER — MESALAMINE 400 MG/1
CAPSULE, DELAYED RELEASE ORAL
Qty: 120 CAPSULE | Refills: 0 | Status: SHIPPED | OUTPATIENT
Start: 2022-10-05 | End: 2022-10-30 | Stop reason: SDUPTHER

## 2022-10-05 NOTE — PRE-PROCEDURE INSTRUCTIONS
Pre-Surgery Instructions:   Medication Instructions    albuterol (PROVENTIL HFA,VENTOLIN HFA) 90 mcg/act inhaler Uses PRN- OK to take day of surgery    amLODIPine (NORVASC) 5 mg tablet Take day of surgery   apixaban (ELIQUIS) 5 mg Last dose 10/1    aspirin 81 mg chewable tablet Last dose 10/1    atorvastatin (LIPITOR) 20 mg tablet Take day of surgery   dofetilide (TIKOSYN) 500 mcg capsule Take day of surgery   fenofibrate (TRICOR) 54 MG tablet Take day of surgery   furosemide (LASIX) 20 mg tablet Hold day of surgery   gabapentin (NEURONTIN) 100 mg capsule Take day of surgery   glipiZIDE (GLUCOTROL XL) 5 mg 24 hr tablet Hold day of surgery   hydrocortisone (ANUSOL-HC) 2 5 % rectal cream Uses PRN- DO NOT take day of surgery    hydrOXYzine HCL (ATARAX) 10 mg tablet Take day of surgery   lisinopril (ZESTRIL) 10 mg tablet Hold day of surgery   mesalamine (DELZICOL) 400 mg Take day of surgery   metoprolol succinate (TOPROL-XL) 100 mg 24 hr tablet Take day of surgery   nitroglycerin (NITROSTAT) 0 4 mg SL tablet Uses PRN- OK to take day of surgery    omeprazole (PriLOSEC) 40 MG capsule Take day of surgery   polyethylene glycol (GLYCOLAX) 17 GM/SCOOP powder Uses PRN- DO NOT take day of surgery    pramipexole (MIRAPEX) 0 5 mg tablet Take day of surgery   QUEtiapine (SEROquel) 25 mg tablet Take night before surgery    sertraline (ZOLOFT) 50 mg tablet Take day of surgery   SYNTHROID 150 MCG tablet Take day of surgery   venlafaxine (EFFEXOR-XR) 37 5 mg 24 hr capsule Take day of surgery  Spoke with pt via phone  Advised hospital location will call with arrival time night before surgery  NPO after midnight the night before surgery, including chewing gum and hard candy  A small sip of water is allowed to take approved medications the morning of surgery       Non-vaccinated patients and visitors need to remain masked at all times while in the hospital     Instructed to leave contacts/jewelery/valuables at home  Ok to wear dentures, glasses and hearing aides into the hospital - they will be removed for surgery  No smoking or alcohol consumption 24 hours prior to surgery  Avoid all Aspirin products and OTC Vit/Supp  Tylenol ok to take prn  Showering instructions reviewed for night before and morning of surgery using surgical soap or antibacterial soap  Patient verbalized understanding of all of the above, including medication instructions

## 2022-10-07 ENCOUNTER — ANESTHESIA (OUTPATIENT)
Dept: PERIOP | Facility: HOSPITAL | Age: 67
End: 2022-10-07
Payer: COMMERCIAL

## 2022-10-07 ENCOUNTER — HOSPITAL ENCOUNTER (OUTPATIENT)
Facility: HOSPITAL | Age: 67
Setting detail: OUTPATIENT SURGERY
Discharge: HOME | End: 2022-10-07
Attending: SURGERY | Admitting: SURGERY
Payer: COMMERCIAL

## 2022-10-07 VITALS
TEMPERATURE: 98.1 F | RESPIRATION RATE: 18 BRPM | DIASTOLIC BLOOD PRESSURE: 59 MMHG | HEART RATE: 59 BPM | OXYGEN SATURATION: 97 % | SYSTOLIC BLOOD PRESSURE: 135 MMHG

## 2022-10-07 DIAGNOSIS — K64.9 HEMORRHOIDS, UNSPECIFIED HEMORRHOID TYPE: Primary | ICD-10-CM

## 2022-10-07 DIAGNOSIS — K64.9 HEMORRHOID: ICD-10-CM

## 2022-10-07 LAB
GLUCOSE SERPL-MCNC: 102 MG/DL (ref 65–140)
GLUCOSE SERPL-MCNC: 104 MG/DL (ref 65–140)

## 2022-10-07 PROCEDURE — 46320 REMOVAL OF HEMORRHOID CLOT: CPT | Performed by: SURGERY

## 2022-10-07 PROCEDURE — 88304 TISSUE EXAM BY PATHOLOGIST: CPT | Performed by: PATHOLOGY

## 2022-10-07 PROCEDURE — 82948 REAGENT STRIP/BLOOD GLUCOSE: CPT

## 2022-10-07 PROCEDURE — 46320 REMOVAL OF HEMORRHOID CLOT: CPT

## 2022-10-07 RX ORDER — FENTANYL CITRATE/PF 50 MCG/ML
25 SYRINGE (ML) INJECTION
Status: COMPLETED | OUTPATIENT
Start: 2022-10-07 | End: 2022-10-07

## 2022-10-07 RX ORDER — DOCUSATE SODIUM 100 MG/1
100 CAPSULE, LIQUID FILLED ORAL 2 TIMES DAILY
Qty: 30 CAPSULE | Refills: 1 | Status: SHIPPED | OUTPATIENT
Start: 2022-10-07

## 2022-10-07 RX ORDER — DEXMEDETOMIDINE HYDROCHLORIDE 100 UG/ML
INJECTION, SOLUTION INTRAVENOUS AS NEEDED
Status: DISCONTINUED | OUTPATIENT
Start: 2022-10-07 | End: 2022-10-07

## 2022-10-07 RX ORDER — HYDROMORPHONE HCL/PF 1 MG/ML
0.5 SYRINGE (ML) INJECTION
Status: DISCONTINUED | OUTPATIENT
Start: 2022-10-07 | End: 2022-10-07 | Stop reason: HOSPADM

## 2022-10-07 RX ORDER — FENTANYL CITRATE 50 UG/ML
INJECTION, SOLUTION INTRAMUSCULAR; INTRAVENOUS AS NEEDED
Status: DISCONTINUED | OUTPATIENT
Start: 2022-10-07 | End: 2022-10-07

## 2022-10-07 RX ORDER — DEXAMETHASONE SODIUM PHOSPHATE 4 MG/ML
INJECTION, SOLUTION INTRA-ARTICULAR; INTRALESIONAL; INTRAMUSCULAR; INTRAVENOUS; SOFT TISSUE AS NEEDED
Status: DISCONTINUED | OUTPATIENT
Start: 2022-10-07 | End: 2022-10-07

## 2022-10-07 RX ORDER — SODIUM CHLORIDE, SODIUM LACTATE, POTASSIUM CHLORIDE, CALCIUM CHLORIDE 600; 310; 30; 20 MG/100ML; MG/100ML; MG/100ML; MG/100ML
75 INJECTION, SOLUTION INTRAVENOUS CONTINUOUS
Status: DISCONTINUED | OUTPATIENT
Start: 2022-10-07 | End: 2022-10-07 | Stop reason: HOSPADM

## 2022-10-07 RX ORDER — SODIUM CHLORIDE, SODIUM LACTATE, POTASSIUM CHLORIDE, CALCIUM CHLORIDE 600; 310; 30; 20 MG/100ML; MG/100ML; MG/100ML; MG/100ML
INJECTION, SOLUTION INTRAVENOUS CONTINUOUS PRN
Status: DISCONTINUED | OUTPATIENT
Start: 2022-10-07 | End: 2022-10-07

## 2022-10-07 RX ORDER — GABAPENTIN 100 MG/1
300 CAPSULE ORAL 2 TIMES DAILY
Qty: 30 CAPSULE | Refills: 1 | Status: SHIPPED | OUTPATIENT
Start: 2022-10-07

## 2022-10-07 RX ORDER — OXYCODONE HYDROCHLORIDE AND ACETAMINOPHEN 5; 325 MG/1; MG/1
2 TABLET ORAL EVERY 4 HOURS PRN
Status: DISCONTINUED | OUTPATIENT
Start: 2022-10-07 | End: 2022-10-07 | Stop reason: HOSPADM

## 2022-10-07 RX ORDER — MORPHINE SULFATE 10 MG/ML
2 INJECTION, SOLUTION INTRAMUSCULAR; INTRAVENOUS EVERY 4 HOURS PRN
Status: DISCONTINUED | OUTPATIENT
Start: 2022-10-07 | End: 2022-10-07 | Stop reason: HOSPADM

## 2022-10-07 RX ORDER — LIDOCAINE HYDROCHLORIDE 20 MG/ML
JELLY TOPICAL AS NEEDED
Qty: 30 ML | Refills: 2 | Status: SHIPPED | OUTPATIENT
Start: 2022-10-07

## 2022-10-07 RX ORDER — ALBUTEROL SULFATE 2.5 MG/3ML
2.5 SOLUTION RESPIRATORY (INHALATION) ONCE AS NEEDED
Status: DISCONTINUED | OUTPATIENT
Start: 2022-10-07 | End: 2022-10-07 | Stop reason: HOSPADM

## 2022-10-07 RX ORDER — ONDANSETRON 2 MG/ML
4 INJECTION INTRAMUSCULAR; INTRAVENOUS ONCE AS NEEDED
Status: DISCONTINUED | OUTPATIENT
Start: 2022-10-07 | End: 2022-10-07 | Stop reason: HOSPADM

## 2022-10-07 RX ORDER — ONDANSETRON 2 MG/ML
INJECTION INTRAMUSCULAR; INTRAVENOUS AS NEEDED
Status: DISCONTINUED | OUTPATIENT
Start: 2022-10-07 | End: 2022-10-07

## 2022-10-07 RX ORDER — NEOSTIGMINE METHYLSULFATE 1 MG/ML
INJECTION INTRAVENOUS AS NEEDED
Status: DISCONTINUED | OUTPATIENT
Start: 2022-10-07 | End: 2022-10-07

## 2022-10-07 RX ORDER — PROPOFOL 10 MG/ML
INJECTION, EMULSION INTRAVENOUS AS NEEDED
Status: DISCONTINUED | OUTPATIENT
Start: 2022-10-07 | End: 2022-10-07

## 2022-10-07 RX ORDER — HEPARIN SODIUM 5000 [USP'U]/ML
5000 INJECTION, SOLUTION INTRAVENOUS; SUBCUTANEOUS ONCE
Status: COMPLETED | OUTPATIENT
Start: 2022-10-07 | End: 2022-10-07

## 2022-10-07 RX ORDER — GLYCOPYRROLATE 0.2 MG/ML
INJECTION INTRAMUSCULAR; INTRAVENOUS AS NEEDED
Status: DISCONTINUED | OUTPATIENT
Start: 2022-10-07 | End: 2022-10-07

## 2022-10-07 RX ORDER — ROCURONIUM BROMIDE 10 MG/ML
INJECTION, SOLUTION INTRAVENOUS AS NEEDED
Status: DISCONTINUED | OUTPATIENT
Start: 2022-10-07 | End: 2022-10-07

## 2022-10-07 RX ORDER — BUPIVACAINE HYDROCHLORIDE 5 MG/ML
INJECTION, SOLUTION PERINEURAL AS NEEDED
Status: DISCONTINUED | OUTPATIENT
Start: 2022-10-07 | End: 2022-10-07 | Stop reason: HOSPADM

## 2022-10-07 RX ORDER — OXYCODONE HYDROCHLORIDE AND ACETAMINOPHEN 5; 325 MG/1; MG/1
1 TABLET ORAL EVERY 4 HOURS PRN
Qty: 20 TABLET | Refills: 0 | Status: SHIPPED | OUTPATIENT
Start: 2022-10-07

## 2022-10-07 RX ORDER — MAGNESIUM HYDROXIDE 1200 MG/15ML
LIQUID ORAL AS NEEDED
Status: DISCONTINUED | OUTPATIENT
Start: 2022-10-07 | End: 2022-10-07 | Stop reason: HOSPADM

## 2022-10-07 RX ADMIN — HEPARIN SODIUM 5000 UNITS: 5000 INJECTION INTRAVENOUS; SUBCUTANEOUS at 11:56

## 2022-10-07 RX ADMIN — FENTANYL CITRATE 25 MCG: 50 INJECTION, SOLUTION INTRAMUSCULAR; INTRAVENOUS at 13:52

## 2022-10-07 RX ADMIN — PROPOFOL 50 MG: 10 INJECTION, EMULSION INTRAVENOUS at 12:53

## 2022-10-07 RX ADMIN — FENTANYL CITRATE 25 MCG: 50 INJECTION, SOLUTION INTRAMUSCULAR; INTRAVENOUS at 14:12

## 2022-10-07 RX ADMIN — DEXMEDETOMIDINE 8 MCG: 100 INJECTION, SOLUTION, CONCENTRATE INTRAVENOUS at 12:40

## 2022-10-07 RX ADMIN — GLYCOPYRROLATE 0.4 MCG: 0.2 INJECTION, SOLUTION INTRAMUSCULAR; INTRAVENOUS at 13:35

## 2022-10-07 RX ADMIN — FENTANYL CITRATE 25 MCG: 50 INJECTION, SOLUTION INTRAMUSCULAR; INTRAVENOUS at 14:17

## 2022-10-07 RX ADMIN — FENTANYL CITRATE 25 MCG: 50 INJECTION, SOLUTION INTRAMUSCULAR; INTRAVENOUS at 14:24

## 2022-10-07 RX ADMIN — SODIUM CHLORIDE, SODIUM LACTATE, POTASSIUM CHLORIDE, AND CALCIUM CHLORIDE 75 ML/HR: .6; .31; .03; .02 INJECTION, SOLUTION INTRAVENOUS at 11:52

## 2022-10-07 RX ADMIN — PROPOFOL 200 MG: 10 INJECTION, EMULSION INTRAVENOUS at 12:43

## 2022-10-07 RX ADMIN — PROPOFOL 50 MG: 10 INJECTION, EMULSION INTRAVENOUS at 12:48

## 2022-10-07 RX ADMIN — NEOSTIGMINE METHYLSULFATE 2 MG: 1 INJECTION INTRAMUSCULAR; INTRAVENOUS; SUBCUTANEOUS at 13:35

## 2022-10-07 RX ADMIN — ONDANSETRON HYDROCHLORIDE 4 MG: 2 INJECTION, SOLUTION INTRAVENOUS at 13:10

## 2022-10-07 RX ADMIN — ROCURONIUM BROMIDE 10 MG: 50 INJECTION, SOLUTION INTRAVENOUS at 12:43

## 2022-10-07 RX ADMIN — FENTANYL CITRATE 50 MCG: 50 INJECTION, SOLUTION INTRAMUSCULAR; INTRAVENOUS at 12:43

## 2022-10-07 RX ADMIN — DEXAMETHASONE SODIUM PHOSPHATE 8 MG: 4 INJECTION, SOLUTION INTRAMUSCULAR; INTRAVENOUS at 13:00

## 2022-10-07 RX ADMIN — SODIUM CHLORIDE, SODIUM LACTATE, POTASSIUM CHLORIDE, AND CALCIUM CHLORIDE: .6; .31; .03; .02 INJECTION, SOLUTION INTRAVENOUS at 12:40

## 2022-10-07 RX ADMIN — FENTANYL CITRATE 50 MCG: 50 INJECTION, SOLUTION INTRAMUSCULAR; INTRAVENOUS at 13:10

## 2022-10-07 NOTE — PERIOPERATIVE NURSING NOTE
Patient discharged from PACU AAOX4, pain 1/10 at surgical site  Vitals stable  Transferred to [address] [place] [address]

## 2022-10-07 NOTE — INTERVAL H&P NOTE
H&P reviewed  After examining the patient I find no changes in the patients condition since the H&P had been written      Vitals:    10/07/22 1130   BP: 130/62   Pulse: (!) 50   Resp: 18   Temp: 97 6 °F (36 4 °C)   SpO2: 97%

## 2022-10-07 NOTE — DISCHARGE INSTRUCTIONS
Follow-up with Dr Maliha Pinto in 3-4 weeks as per appointment  Regular diet as tolerated  Low intensity activity as tolerated  No dressing needed  Would recommend warm Sitz baths twice a day and after bowel movements  You may restart Eliquis tomorrow  If developed fever, nausea vomiting, worsening pain, excessive drainage or bleeding, inability to urinate, please call the office or go to the ER

## 2022-10-07 NOTE — OP NOTE
OPERATIVE REPORT  PATIENT NAME: Breanna Manuel    :  1955  MRN: [id number]  Pt Location: [facility] OR ROOM 01    SURGERY DATE: 10/7/2022    Surgeon(s) and Role: * Rosalba Hurley DO - Primary     * Rosalie Mock - Assisting    Preop Diagnosis:  Hemorrhoid [K64 9]    Post-Op Diagnosis Codes:     * Hemorrhoid [K64 9]    Procedure(s) (LRB):  HEMORRHOIDECTOMY EXCISION (N/A)    Specimen(s):  ID Type Source Tests Collected by Time Destination   1 :  Tissue Hemorrhoids TISSUE EXAM Rosalba Hurley DO 10/7/2022 1319        Estimated Blood Loss:   Minimal    Drains:  * No LDAs found *    Anesthesia Type:   General    Operative Indications:  Hemorrhoid [K64 9]      Operative Findings: On inspection numerous hemorrhoidal pile is throughout  Both external and internal hemorrhoids  And excisional hemorrhoidectomy of the left lateral/anterior hemorrhoidal piles  Complications:   None    Procedure and Technique:  Patient was brought to operating arena and remained supine position operating stretcher  All the regular monitor devices then connected  The patient underwent general anesthesia with endotracheal intubation without complication  The patient was subsequently repositioned to the prone chandrika-knife position  Care was taken make sure that all pressure points were padded and the ET tube remained in place  The patient was then flexed and the buttocks were taped apart  The patient then prepped draped usual sterile fashion  Time-out was performed to verify correct patient, position, procedure, and site  On inspection there are numerous hemorrhoidal piles in the right anterior right posterior and left lateral positions  No active bleeding noted  Questionable small anterior fissure  Digital rectal exam did not reveal any masses  A pudendal block was performed bilaterally with Marcaine  The left lateral hemorrhoidal pile which was mildly thrombosed and was symptomatic to the patient was addressed    The hemorrhoidal pile was grasped with a hemostat and elevated  The anoderm was incised using Bovie electrocautery  The anal sphincter fibers were identified and gently  from the hemorrhoidal pile  Using the Harmonic hand-held heat energy device the hemorrhoid piles were then taken down the left anterior position down to the pedicle  Hemostasis was achieved  The anal canal was then irrigated with normal saline  Hemostasis again was checked and appeared to be adequate  Lidocaine was then instilled submucosal at the left lateral position  The anal canal was then packed with Gelfoam wrapped in Surgicel    ABD and mesh underwear placed     I was present for the entire procedure, A qualified resident physician was not available and A physician assistant was required during the procedure for retraction tissue handling,dissection and suturing    Patient Disposition:  PACU         SIGNATURE: Sheila Al DO  DATE: October 7, 2022  TIME: 1:39 PM

## 2022-10-07 NOTE — TELEPHONE ENCOUNTER
Left call back message for patient  Advised to call back to discuss if she is still taking Mirapex as well as gabapentin  Per Dr Kerri Delgado she should not be taking both

## 2022-10-07 NOTE — ANESTHESIA POSTPROCEDURE EVALUATION
Post-Op Assessment Note    CV Status:  Stable  Pain Score: 0    Pain management: adequate     Mental Status:  Agitated and awake   Hydration Status:  Euvolemic   PONV Controlled:  Controlled   Airway Patency:  Patent   Two or more mitigation strategies used for obstructive sleep apnea   Post Op Vitals Reviewed: Yes      Staff: Anesthesiologist, with CRNAs         No complications documented      BP   148/74   Temp   97 7   Pulse 97   Resp 16   SpO2 98

## 2022-10-10 ENCOUNTER — APPOINTMENT (EMERGENCY)
Dept: CT IMAGING | Facility: HOSPITAL | Age: 67
End: 2022-10-10
Payer: COMMERCIAL

## 2022-10-10 ENCOUNTER — HOSPITAL ENCOUNTER (EMERGENCY)
Facility: HOSPITAL | Age: 67
Discharge: HOME | End: 2022-10-10
Attending: EMERGENCY MEDICINE
Payer: COMMERCIAL

## 2022-10-10 ENCOUNTER — TELEPHONE (OUTPATIENT)
Dept: SURGERY | Facility: CLINIC | Age: 67
End: 2022-10-10

## 2022-10-10 VITALS
OXYGEN SATURATION: 94 % | WEIGHT: 248.9 LBS | DIASTOLIC BLOOD PRESSURE: 62 MMHG | TEMPERATURE: 97.8 F | SYSTOLIC BLOOD PRESSURE: 144 MMHG | BODY MASS INDEX: 36.23 KG/M2 | HEART RATE: 55 BPM | RESPIRATION RATE: 20 BRPM

## 2022-10-10 DIAGNOSIS — Z98.890 STATUS POST HEMORRHOIDECTOMY: ICD-10-CM

## 2022-10-10 DIAGNOSIS — Z87.19 STATUS POST HEMORRHOIDECTOMY: ICD-10-CM

## 2022-10-10 DIAGNOSIS — R39.198 DIFFICULTY URINATING: ICD-10-CM

## 2022-10-10 DIAGNOSIS — K62.5 RECTAL BLEEDING: Primary | ICD-10-CM

## 2022-10-10 DIAGNOSIS — N20.0 KIDNEY STONE: ICD-10-CM

## 2022-10-10 DIAGNOSIS — E87.6 HYPOKALEMIA: ICD-10-CM

## 2022-10-10 LAB
ANION GAP SERPL CALCULATED.3IONS-SCNC: 8 MMOL/L (ref 4–13)
APTT PPP: 32 SECONDS (ref 23–37)
BASOPHILS # BLD AUTO: 0.03 THOUSANDS/ΜL (ref 0–0.1)
BASOPHILS NFR BLD AUTO: 0 % (ref 0–1)
BILIRUB UR QL STRIP: NEGATIVE
BUN SERPL-MCNC: 12 MG/DL (ref 5–25)
CALCIUM SERPL-MCNC: 8.8 MG/DL (ref 8.3–10.1)
CHLORIDE SERPL-SCNC: 103 MMOL/L (ref 96–108)
CLARITY UR: CLEAR
CO2 SERPL-SCNC: 30 MMOL/L (ref 21–32)
COLOR UR: YELLOW
CREAT SERPL-MCNC: 0.98 MG/DL (ref 0.6–1.3)
EOSINOPHIL # BLD AUTO: 0.15 THOUSAND/ΜL (ref 0–0.61)
EOSINOPHIL NFR BLD AUTO: 2 % (ref 0–6)
ERYTHROCYTE [DISTWIDTH] IN BLOOD BY AUTOMATED COUNT: 13.3 % (ref 11.6–15.1)
GFR SERPL CREATININE-BSD FRML MDRD: 60 ML/MIN/1.73SQ M
GLUCOSE SERPL-MCNC: 177 MG/DL (ref 65–140)
GLUCOSE UR STRIP-MCNC: NEGATIVE MG/DL
HCT VFR BLD AUTO: 39.5 % (ref 34.8–46.1)
HGB BLD-MCNC: 12.7 G/DL (ref 11.5–15.4)
HGB UR QL STRIP.AUTO: NEGATIVE
IMM GRANULOCYTES # BLD AUTO: 0.03 THOUSAND/UL (ref 0–0.2)
IMM GRANULOCYTES NFR BLD AUTO: 0 % (ref 0–2)
INR PPP: 1.34 (ref 0.84–1.19)
KETONES UR STRIP-MCNC: NEGATIVE MG/DL
LEUKOCYTE ESTERASE UR QL STRIP: NEGATIVE
LYMPHOCYTES # BLD AUTO: 1.63 THOUSANDS/ΜL (ref 0.6–4.47)
LYMPHOCYTES NFR BLD AUTO: 18 % (ref 14–44)
MCH RBC QN AUTO: 30.4 PG (ref 26.8–34.3)
MCHC RBC AUTO-ENTMCNC: 32.2 G/DL (ref 31.4–37.4)
MCV RBC AUTO: 95 FL (ref 82–98)
MONOCYTES # BLD AUTO: 0.55 THOUSAND/ΜL (ref 0.17–1.22)
MONOCYTES NFR BLD AUTO: 6 % (ref 4–12)
NEUTROPHILS # BLD AUTO: 6.77 THOUSANDS/ΜL (ref 1.85–7.62)
NEUTS SEG NFR BLD AUTO: 74 % (ref 43–75)
NITRITE UR QL STRIP: NEGATIVE
NRBC BLD AUTO-RTO: 0 /100 WBCS
PH UR STRIP.AUTO: 6 [PH]
PLATELET # BLD AUTO: 260 THOUSANDS/UL (ref 149–390)
PMV BLD AUTO: 9.3 FL (ref 8.9–12.7)
POTASSIUM SERPL-SCNC: 3.1 MMOL/L (ref 3.5–5.3)
PROT UR STRIP-MCNC: NEGATIVE MG/DL
PROTHROMBIN TIME: 16.8 SECONDS (ref 11.6–14.5)
RBC # BLD AUTO: 4.18 MILLION/UL (ref 3.81–5.12)
SODIUM SERPL-SCNC: 141 MMOL/L (ref 135–147)
SP GR UR STRIP.AUTO: 1.01 (ref 1–1.03)
UROBILINOGEN UR QL STRIP.AUTO: 0.2 E.U./DL
WBC # BLD AUTO: 9.16 THOUSAND/UL (ref 4.31–10.16)

## 2022-10-10 PROCEDURE — 85730 THROMBOPLASTIN TIME PARTIAL: CPT | Performed by: PHYSICIAN ASSISTANT

## 2022-10-10 PROCEDURE — 99284 EMERGENCY DEPT VISIT MOD MDM: CPT | Performed by: PHYSICIAN ASSISTANT

## 2022-10-10 PROCEDURE — 74177 CT ABD & PELVIS W/CONTRAST: CPT

## 2022-10-10 PROCEDURE — 99284 EMERGENCY DEPT VISIT MOD MDM: CPT

## 2022-10-10 PROCEDURE — 81003 URINALYSIS AUTO W/O SCOPE: CPT | Performed by: PHYSICIAN ASSISTANT

## 2022-10-10 PROCEDURE — G1004 CDSM NDSC: HCPCS

## 2022-10-10 PROCEDURE — 36415 COLL VENOUS BLD VENIPUNCTURE: CPT | Performed by: PHYSICIAN ASSISTANT

## 2022-10-10 PROCEDURE — 85025 COMPLETE CBC W/AUTO DIFF WBC: CPT | Performed by: PHYSICIAN ASSISTANT

## 2022-10-10 PROCEDURE — 85610 PROTHROMBIN TIME: CPT | Performed by: PHYSICIAN ASSISTANT

## 2022-10-10 PROCEDURE — 80048 BASIC METABOLIC PNL TOTAL CA: CPT | Performed by: PHYSICIAN ASSISTANT

## 2022-10-10 RX ORDER — LIDOCAINE HYDROCHLORIDE 20 MG/ML
JELLY TOPICAL ONCE
Status: DISCONTINUED | OUTPATIENT
Start: 2022-10-10 | End: 2022-10-10

## 2022-10-10 RX ORDER — LIDOCAINE HYDROCHLORIDE 20 MG/ML
1 JELLY TOPICAL ONCE
Status: COMPLETED | OUTPATIENT
Start: 2022-10-10 | End: 2022-10-10

## 2022-10-10 RX ORDER — POTASSIUM CHLORIDE 20 MEQ/1
40 TABLET, EXTENDED RELEASE ORAL ONCE
Status: COMPLETED | OUTPATIENT
Start: 2022-10-10 | End: 2022-10-10

## 2022-10-10 RX ADMIN — LIDOCAINE HYDROCHLORIDE 1 APPLICATION: 20 JELLY TOPICAL at 12:06

## 2022-10-10 RX ADMIN — POTASSIUM CHLORIDE 40 MEQ: 1500 TABLET, EXTENDED RELEASE ORAL at 11:42

## 2022-10-10 RX ADMIN — IOHEXOL 100 ML: 350 INJECTION, SOLUTION INTRAVENOUS at 10:41

## 2022-10-10 NOTE — DISCHARGE INSTRUCTIONS
Continue stool softener and miralax to move your bowels  If you continue to have issues urinating or unable, reconsider having a catheter inserted  You should follow up with Dr Darryl Cameron for post op check  Return to ER as needed

## 2022-10-10 NOTE — TELEPHONE ENCOUNTER
Patient called c/o increased pressure in her rectum  Also reports she is still bleeding  Patient states she did have a small bm, but is having issues trying to urinate due to the increased pressure  Patient states that she has to press on her abdomen in order to be able to pee  Denies any fevers    Please advise

## 2022-10-10 NOTE — Clinical Note
Bonnie Carlton was seen and treated in our emergency department on 10/10/2022  Diagnosis:     Joe Nguyen    She may return on this date: 10/17/2022         If you have any questions or concerns, please don't hesitate to call        Hanane Nixon PA-C    ______________________________           _______________          _______________  Hospital Representative                              Date                                Time

## 2022-10-10 NOTE — ED PROVIDER NOTES
History  Chief Complaint   Patient presents with   • Abdominal Pain     Pt had hemorrhoid surgery 4 days ago  Having abdominal pressure and feels like cant empty bladder and having some rectal bledding     78year old female with PMH afib on eliquis, h/o thyroid cancer, CHF, COPD/asthma, Crohn's disease, HTN, HLD, GERD, MARY ELLEN presents ambulatory from home for evaluation of rectal bleeding and abdominal pain  Pt had hemorrhoidectomy performed on 10/7/22 by Dr Prabhjot Dial  She reports continued pain/pressure in the rectum along with intermittent bleeding  Pt also reports difficultly urinating and feels like she has to push on her bladder to empty it  She reports pain in lower abdomen which she describes as a pressure  Denies N/V/D  She admits to some constipation and reports needing to strain  She did have a small BM  She has noticed blood when wiping  Denies fever, chills  Denies chest pain, SOB  Denies dysuria, frequency, hematuria  No reported aggravating or alleviating factors  She has been taking percocet for pain  She has been taking colace  She has resumed taking her eliquis after surgery  Denies dizziness, lightheadedness  No syncope or falls  History provided by:  Patient   used: No    Abdominal Pain  Pain location:  Suprapubic  Pain quality: pressure    Pain radiates to:  Does not radiate  Chronicity:  New  Context: previous surgery    Context: not recent illness, not recent travel, not retching, not sick contacts and not trauma    Relieved by:  Nothing  Worsened by:  Nothing  Associated symptoms: no chest pain, no chills, no constipation, no cough, no diarrhea, no dysuria, no fatigue, no fever, no hematuria, no nausea, no shortness of breath, no sore throat, no vaginal bleeding and no vomiting    Risk factors: obesity        Prior to Admission Medications   Prescriptions Last Dose Informant Patient Reported? Taking?    Blood Glucose Monitoring Suppl (Alessio Greene) w/Device KIT  Self Yes No   Sig: Use to check sugars once a day  Dx E11 9   Blood Glucose Monitoring Suppl (ONETOUCH VERIO) w/Device KIT  Self Yes No   Sig: daily Use to check glucose   Lancets (ONETOUCH DELICA PLUS [id number]) MISC  Self Yes No   Sig: USE 1 TO CHECK GLUCOSE ONCE DAILY   ONETOUCH VERIO test strip  Self Yes No   Sig: USE 1 STRIP TO CHECK GLUCOSE ONCE DAILY   OneTouch Delica Lancets 21C MISC  Self Yes No   Sig: Use to check sugars once a day   Dx E11 9   QUEtiapine (SEROquel) 25 mg tablet  Self Yes No   Sig: Take 25 mg by mouth daily at bedtime   SYNTHROID 150 MCG tablet  Self Yes No   Sig: TAKE 1 TABLET BY MOUTH ONCE DAILY FIRST THING IN THE MORNING (AT LEAST 30 MINUTES PRIOR TO BREAKFAST OR OTHER MEDS)   Trelegy Ellipta 200-62 5-25 MCG/INH AEPB inhaler  Self Yes No   Sig: INHALE 1 PUFF BY MOUTH IN THE MORNING   albuterol (PROVENTIL HFA,VENTOLIN HFA) 90 mcg/act inhaler  Self No No   Sig: Inhale 2 puffs every 6 (six) hours as needed for wheezing or shortness of breath   amLODIPine (NORVASC) 5 mg tablet  Self Yes No   Sig: Take 5 mg by mouth every morning   apixaban (ELIQUIS) 5 mg  Self Yes No   Sig: Take 5 mg by mouth 2 (two) times a day    aspirin 81 mg chewable tablet  Self No No   Sig: Chew 1 tablet (81 mg total) daily   atorvastatin (LIPITOR) 20 mg tablet  Self Yes No   Sig: Take 20 mg by mouth every morning   docusate sodium (COLACE) 100 mg capsule   No No   Sig: Take 1 capsule (100 mg total) by mouth 2 (two) times a day   dofetilide (TIKOSYN) 500 mcg capsule  Self Yes No   Sig: Take 500 mcg by mouth 2 (two) times a day   fenofibrate (TRICOR) 54 MG tablet  Self Yes No   Sig: Take 54 mg by mouth daily   furosemide (LASIX) 20 mg tablet  Self Yes No   Sig: Take 20 mg by mouth 2 (two) times a day   gabapentin (Neurontin) 100 mg capsule   No No   Sig: Take 3 capsules (300 mg total) by mouth 2 (two) times a day   glipiZIDE (GLUCOTROL XL) 5 mg 24 hr tablet  Self Yes No   Sig: TAKE 1 TABLET BY MOUTH ONCE DAILY 30 MINUTES BEFORE A MEAL (REPLACES METFORMIN)   glucose blood test strip  Self Yes No   Sig: Use to check sugars once a day  Dx E11 9   hydrOXYzine HCL (ATARAX) 10 mg tablet  Self Yes No   Sig: Take 10 mg by mouth 3 (three) times a day   hydrocortisone (ANUSOL-HC) 2 5 % rectal cream  Self No No   Sig: Apply topically 2 (two) times a day   lidocaine (XYLOCAINE) 2 % topical gel   No No   Sig: Apply topically as needed for mild pain Apply to anal area PRN pain     lisinopril (ZESTRIL) 10 mg tablet  Self Yes No   Sig: Take 10 mg by mouth every morning   mesalamine (DELZICOL) 400 mg   No No   Sig: Take 2 capsules by mouth twice daily   metoprolol succinate (TOPROL-XL) 100 mg 24 hr tablet  Self No No   Sig: Take 1 tablet (100 mg total) by mouth every 12 (twelve) hours   nitroglycerin (NITROSTAT) 0 4 mg SL tablet  Self Yes No   Sig: Place 0 4 mg under the tongue every 5 (five) minutes as needed Do not exceeda total of 3 doses in 15 minutes   omeprazole (PriLOSEC) 40 MG capsule   No No   Sig: Take 1 capsule by mouth twice daily   oxyCODONE-acetaminophen (PERCOCET) 5-325 mg per tablet   No No   Sig: Take 1 tablet by mouth every 4 (four) hours as needed for moderate pain or severe pain Max Daily Amount: 6 tablets   polyethylene glycol (GLYCOLAX) 17 GM/SCOOP powder  Self No No   Sig: Take 17 g by mouth daily   pramipexole (MIRAPEX) 0 5 mg tablet  Self Yes No   Sig: Take 0 5 mg by mouth daily at bedtime   sertraline (ZOLOFT) 50 mg tablet  Self Yes No   venlafaxine (EFFEXOR-XR) 37 5 mg 24 hr capsule  Self Yes No   Sig: Take 37 5 mg by mouth daily       Facility-Administered Medications: None       Past Medical History:   Diagnosis Date   • A-fib ([facility])    • Asthma    • Brain aneurysm    • Cancer ([facility])     papillary thyroid cancer   • Cardiac disease    • CHF (congestive heart failure) ([facility])    • COPD (chronic obstructive pulmonary disease) ([facility])    • CPAP (continuous positive airway pressure) dependence    • Crohn disease ([facility] )    • Diabetes mellitus ([place] [address] )    • Disease of thyroid gland    • GERD (gastroesophageal reflux disease)    • Hyperlipidemia    • Hypertension    • Sleep apnea        Past Surgical History:   Procedure Laterality Date   • APPENDECTOMY     • CARDIOVERSION N/A 2019    Procedure: CARDIOVERSION;  Surgeon: Kinjal Dodson MD;  Location: [facility];  Service: Cardiology   • CEREBRAL ANEURYSM REPAIR     • CHOLECYSTECTOMY     • HYSTERECTOMY     • THYROIDECTOMY     • TONSILLECTOMY AND ADENOIDECTOMY         History reviewed  No pertinent family history  I have reviewed and agree with the history as documented  E-Cigarette/Vaping   • E-Cigarette Use Never User      E-Cigarette/Vaping Substances   • Nicotine No    • THC No    • CBD No    • Flavoring No    • Other No    • Unknown No      Social History     Tobacco Use   • Smoking status: Former Smoker     Quit date: 2018     Years since quittin 7   • Smokeless tobacco: Never Used   Vaping Use   • Vaping Use: Never used   Substance Use Topics   • Alcohol use: Yes     Alcohol/week: 0 0 standard drinks     Comment: social   • Drug use: Never       Review of Systems   Constitutional: Negative  Negative for chills, fatigue and fever  HENT: Negative  Negative for congestion, rhinorrhea and sore throat  Eyes: Negative  Respiratory: Negative  Negative for cough, shortness of breath and wheezing  Cardiovascular: Negative  Negative for chest pain, palpitations and leg swelling  Gastrointestinal: Positive for abdominal pain, anal bleeding and rectal pain  Negative for constipation, diarrhea, nausea and vomiting  Genitourinary: Positive for decreased urine volume and difficulty urinating  Negative for dysuria, flank pain, frequency, hematuria and vaginal bleeding  Musculoskeletal: Negative  Negative for back pain and myalgias  Skin: Negative  Negative for rash  Neurological: Negative  Negative for dizziness, light-headedness and headaches  Psychiatric/Behavioral: Negative  Negative for confusion  All other systems reviewed and are negative  Physical Exam  Physical Exam  Vitals and nursing note reviewed  Exam conducted with a chaperone present Nigel Gu)  Constitutional:       General: She is not in acute distress  Appearance: She is well-developed  She is obese  She is not toxic-appearing  HENT:      Head: Normocephalic and atraumatic  Right Ear: Hearing and external ear normal       Left Ear: Hearing and external ear normal       Mouth/Throat:      Mouth: Mucous membranes are moist       Pharynx: Oropharynx is clear  Uvula midline  Eyes:      General: Lids are normal  No scleral icterus  Conjunctiva/sclera: Conjunctivae normal       Pupils: Pupils are equal, round, and reactive to light  Neck:      Trachea: Trachea and phonation normal  No tracheal deviation  Cardiovascular:      Rate and Rhythm: Normal rate and regular rhythm  Pulses: Normal pulses  Radial pulses are 2+ on the right side and 2+ on the left side  Femoral pulses are 2+ on the right side and 2+ on the left side  Heart sounds: Normal heart sounds, S1 normal and S2 normal    Pulmonary:      Effort: Pulmonary effort is normal  No tachypnea or respiratory distress  Breath sounds: Normal breath sounds  No wheezing or rhonchi  Abdominal:      General: Bowel sounds are normal  There is no distension  Palpations: Abdomen is soft  Tenderness: There is abdominal tenderness in the suprapubic area  There is no right CVA tenderness, left CVA tenderness, guarding or rebound  Genitourinary:     Rectum: Tenderness present  No mass or anal fissure  Normal anal tone  Comments: S/p hemorrhoidectomy; incision site appears clean dry/intact without signs of infection  No active bleeding  No hematoma  no residual hemorrhoid noted  There is bruising noted around the rectum  Musculoskeletal:         General: No tenderness  Normal range of motion  Cervical back: Normal range of motion  Right lower leg: No edema  Left lower leg: No edema  Skin:     General: Skin is warm and dry  Capillary Refill: Capillary refill takes less than 2 seconds  Findings: Bruising present  No rash  Comments: Pt has a small bruise on right lateral neck as well as overlying RLQ abdomen  Neurological:      General: No focal deficit present  Mental Status: She is alert and oriented to person, place, and time  GCS: GCS eye subscore is 4  GCS verbal subscore is 5  GCS motor subscore is 6  Cranial Nerves: No cranial nerve deficit  Sensory: No sensory deficit  Motor: No abnormal muscle tone        Gait: Gait normal    Psychiatric:         Mood and Affect: Mood normal          Speech: Speech normal          Behavior: Behavior normal          Vital Signs  ED Triage Vitals [10/10/22 0939]   Temperature Pulse Respirations Blood Pressure SpO2   97 8 °F (36 6 °C) 64 20 143/67 98 %      Temp Source Heart Rate Source Patient Position - Orthostatic VS BP Location FiO2 (%)   Temporal Monitor Sitting Right arm --      Pain Score       8           Vitals:    10/10/22 0939 10/10/22 1130   BP: 143/67 144/62   Pulse: 64 55   Patient Position - Orthostatic VS: Sitting Lying         Visual Acuity      ED Medications  Medications   iohexol (OMNIPAQUE) 350 MG/ML injection (SINGLE-DOSE) 100 mL (100 mL Intravenous Given 10/10/22 1041)   potassium chloride (K-DUR,KLOR-CON) CR tablet 40 mEq (40 mEq Oral Given 10/10/22 1142)   lidocaine (URO-JET) 2 % urethral/mucosal gel 1 application (1 application Urethral Given 10/10/22 1206)       Diagnostic Studies  Results Reviewed     Procedure Component Value Units Date/Time    UA w Reflex to Microscopic w Reflex to Culture [[id number]] Collected: 10/10/22 1038    Lab Status: Final result Specimen: Urine, Clean Catch Updated: 10/10/22 1045     Color, UA Yellow     Clarity, UA Clear Specific Gravity, UA 1 015     pH, UA 6 0     Leukocytes, UA Negative     Nitrite, UA Negative     Protein, UA Negative mg/dl      Glucose, UA Negative mg/dl      Ketones, UA Negative mg/dl      Urobilinogen, UA 0 2 E U /dl      Bilirubin, UA Negative     Occult Blood, UA Negative    Protime-INR [[id number]]  (Abnormal) Collected: 10/10/22 1005    Lab Status: Final result Specimen: Blood from Arm, Right Updated: 10/10/22 1035     Protime 16 8 seconds      INR 1 34    APTT [[id number]]  (Normal) Collected: 10/10/22 1005    Lab Status: Final result Specimen: Blood from Arm, Right Updated: 10/10/22 1035     PTT 32 seconds     Basic metabolic panel [[id number]]  (Abnormal) Collected: 10/10/22 1005    Lab Status: Final result Specimen: Blood from Arm, Right Updated: 10/10/22 1021     Sodium 141 mmol/L      Potassium 3 1 mmol/L      Chloride 103 mmol/L      CO2 30 mmol/L      ANION GAP 8 mmol/L      BUN 12 mg/dL      Creatinine 0 98 mg/dL      Glucose 177 mg/dL      Calcium 8 8 mg/dL      eGFR 60 ml/min/1 73sq m     Narrative:      [place] guidelines for Chronic Kidney Disease (CKD):   •  Stage 1 with normal or high GFR (GFR > 90 mL/min/1 73 square meters)  •  Stage 2 Mild CKD (GFR = 60-89 mL/min/1 73 square meters)  •  Stage 3A Moderate CKD (GFR = 45-59 mL/min/1 73 square meters)  •  Stage 3B Moderate CKD (GFR = 30-44 mL/min/1 73 square meters)  •  Stage 4 Severe CKD (GFR = 15-29 mL/min/1 73 square meters)  •  Stage 5 End Stage CKD (GFR <15 mL/min/1 73 square meters)  Note: GFR calculation is accurate only with a steady state creatinine    CBC and differential [[id number]] Collected: 10/10/22 1005    Lab Status: Final result Specimen: Blood from Arm, Right Updated: 10/10/22 1013     WBC 9 16 Thousand/uL      RBC 4 18 Million/uL      Hemoglobin 12 7 g/dL      Hematocrit 39 5 %      MCV 95 fL      MCH 30 4 pg      MCHC 32 2 g/dL      RDW 13 3 %      MPV 9 3 fL      Platelets 040 Thousands/uL nRBC 0 /100 WBCs      Neutrophils Relative 74 %      Immat GRANS % 0 %      Lymphocytes Relative 18 %      Monocytes Relative 6 %      Eosinophils Relative 2 %      Basophils Relative 0 %      Neutrophils Absolute 6 77 Thousands/µL      Immature Grans Absolute 0 03 Thousand/uL      Lymphocytes Absolute 1 63 Thousands/µL      Monocytes Absolute 0 55 Thousand/µL      Eosinophils Absolute 0 15 Thousand/µL      Basophils Absolute 0 03 Thousands/µL                  CT abdomen pelvis with contrast   Final Result by Bryan Conley MD (10/10 5192)      1  Perianal soft tissue thickening, consistent with postoperative inflammation following recent hemorrhoidectomy  No evidence of abscess or fistula  2   2 mm nonobstructing left renal calculus  Workstation performed: [id number]                    Procedures  Procedures         ED Course  ED Course as of 10/10/22 1419   Mon Oct 10, 2022   0017 Reviewed recent records, had hemorrhoidectomy on 10/7/22 Dr Ritika Peralta  2932 Bladder scan obtained = 331 mL; pt reports urge to urinate, will plan to repeat after she attempts to void  1014 WBC: 9 16   1014 Hemoglobin: 12 7  stable   1014 Platelet Count: 639   1025 Glucose, Random(!): 177  Known diabetic   1025 Creatinine: 0 98   1025 BUN: 12   1025 Sodium: 141   1025 Potassium(!): 3 1  Will replete   1025 Chloride: 103   1025 CO2: 30   1025 Anion Gap: 8   1025 Calcium: 8 8   1025 eGFR: 60   1026 Pt taken for CT scan  1036 POCT INR(!): 1 34   1036 PROTIME(!): 16 8   1036 PTT: 32   1039 UA collected  1043 CT performed and pending interpretation  1045 UA w Reflex to Microscopic w Reflex to Culture  negative   1046 Pt notes her urge to urinate went away but she did feel like she was straining when she went  A repeat bladder scan was obtained and = 479 mL    1120 CT abdomen pelvis with contrast  IMPRESSION:     1    Perianal soft tissue thickening, consistent with postoperative inflammation following recent hemorrhoidectomy  No evidence of abscess or fistula      2   2 mm nonobstructing left renal calculus  26 Pt and spouse updated on results  She declines a urinary catheterization  She is resting comfortably  Reviewed all results  Pt notes she was unable to get the lidocaine topical from the pharmacy - will give dose here for her rectal pain  Pt afebrile and hemodynamically stable  No post op complications noted  Expected post op appearance  No active bleeding  Pt advised to avoid straining during BMs  Continue colace and miralax  Limit perocet as pain tolerates  Advised post op check with Dr Orbie Severin  In regards to urinary complaints, no evidence of infection  There is some degree of retention noted  We discussed urinary catheterization which pt declined both straight cath as well as chopra catheter  She is still able to void on her own  Will discharge with strict return precautions as pt is not wanting any intervention of this  Mild hypokalemia was repleted with PO potassium  Incidental 2 mm nonobstructing left renal calculus reviewed with pt  Not etiology of her symptoms  Discussed continued symptomatic/supportive care  Strict return precautions outlined  Advised outpatient follow up with PCP or return to ER for change in condition as outlined  Pt and spouse verbalized understanding and had no further questions  SBIRT 20yo+    Flowsheet Row Most Recent Value   SBIRT (23 yo +)    In order to provide better care to our patients, we are screening all of our patients for alcohol and drug use  Would it be okay to ask you these screening questions? Yes Filed at: 10/10/2022 8551   Initial Alcohol Screen: US AUDIT-C     1  How often do you have a drink containing alcohol? 0 Filed at: 10/10/2022 0948   2  How many drinks containing alcohol do you have on a typical day you are drinking? 0 Filed at: 10/10/2022 0948   3a  Male UNDER 65:  How often do you have five or more drinks on one occasion? 0 Filed at: 10/10/2022 0948   3b  FEMALE Any Age, or MALE 65+: How often do you have 4 or more drinks on one occassion? 0 Filed at: 10/10/2022 0948   Audit-C Score 0 Filed at: 10/10/2022 4729   CHARLY: How many times in the past year have you    Used an illegal drug or used a prescription medication for non-medical reasons?  Never Filed at: 10/10/2022 8995                    [facility]  Number of Diagnoses or Management Options  Difficulty urinating: new and requires workup  Hypokalemia: new and requires workup  Kidney stone: new and requires workup  Rectal bleeding: new and requires workup     Amount and/or Complexity of Data Reviewed  Clinical lab tests: ordered and reviewed  Tests in the radiology section of CPT®: ordered and reviewed  Decide to obtain previous medical records or to obtain history from someone other than the patient: yes  Obtain history from someone other than the patient: yes  Review and summarize past medical records: yes  Independent visualization of images, tracings, or specimens: yes    Patient Progress  Patient progress: improved      Disposition  Final diagnoses:   Rectal bleeding   Status post hemorrhoidectomy   Difficulty urinating   Hypokalemia   Kidney stone - 2 mm nonobstructing left renal calculus     Time reflects when diagnosis was documented in both MDM as applicable and the Disposition within this note     Time User Action Codes Description Comment    10/10/2022 11:35 AM Rulon Ruths Add [K62 5] Rectal bleeding     10/10/2022 11:35 AM Rulon Ruths Add [C99 062,  Z87 19] Status post hemorrhoidectomy     10/10/2022 11:36 AM Rulon Ruths Add [R39 198] Difficulty urinating     10/10/2022 11:36 AM Rulon Ruths Add [E87 6] Hypokalemia     10/10/2022 11:36 AM Rulon Ruths Add [N20 0] Kidney stone     10/10/2022 11:36 AM Rulon Ruths Modify [N20 0] Kidney stone 2 mm nonobstructing left renal calculus      ED Disposition     ED Disposition   Discharge    Condition   Stable    Date/Time   Mon Oct 10, 2022 11:34 AM    Comment   Meri Knight discharge to home/self care  Follow-up Information     Follow up With Specialties Details Why Contact Info Additional Information    Lizz Carmona DO General Surgery, Wound Care Go to   [address]  [phone]       [facility] Emergency Department Emergency Medicine  As needed [phone]  [address] [facility] Emergency Department, [address], [place], [address] [facility], [address]          Discharge Medication List as of 10/10/2022 11:44 AM      CONTINUE these medications which have NOT CHANGED    Details   albuterol (PROVENTIL HFA,VENTOLIN HFA) 90 mcg/act inhaler Inhale 2 puffs every 6 (six) hours as needed for wheezing or shortness of breath, Starting Sat 2/29/2020, No Print      amLODIPine (NORVASC) 5 mg tablet Take 5 mg by mouth every morning, Starting Mon 3/21/2022, Historical Med      apixaban (ELIQUIS) 5 mg Take 5 mg by mouth 2 (two) times a day , Starting Tue 2/5/2019, Historical Med      aspirin 81 mg chewable tablet Chew 1 tablet (81 mg total) daily, Starting Sat 2/29/2020, No Print      atorvastatin (LIPITOR) 20 mg tablet Take 20 mg by mouth every morning, Starting Tue 6/7/2022, Historical Med      !! Blood Glucose Monitoring Suppl (ONETOUCH VERIO) w/Device KIT Use to check sugars once a day  Dx E11 9, Historical Med      !!  Blood Glucose Monitoring Suppl (Leblanc Limb) w/Device KIT daily Use to check glucose, Starting Tue 1/28/2020, Historical Med      docusate sodium (COLACE) 100 mg capsule Take 1 capsule (100 mg total) by mouth 2 (two) times a day, Starting Fri 10/7/2022, Normal      dofetilide (TIKOSYN) 500 mcg capsule Take 500 mcg by mouth 2 (two) times a day, Historical Med      fenofibrate (TRICOR) 54 MG tablet Take 54 mg by mouth daily, Historical Med      furosemide (LASIX) 20 mg tablet Take 20 mg by mouth 2 (two) times a day, Starting Wed 6/10/2020, Historical Med      gabapentin (Neurontin) 100 mg capsule Take 3 capsules (300 mg total) by mouth 2 (two) times a day, Starting Fri 10/7/2022, Normal      glipiZIDE (GLUCOTROL XL) 5 mg 24 hr tablet TAKE 1 TABLET BY MOUTH ONCE DAILY 30 MINUTES BEFORE A MEAL (REPLACES METFORMIN), Historical Med      !! glucose blood test strip Use to check sugars once a day  Dx E11 9, Historical Med      hydrocortisone (ANUSOL-HC) 2 5 % rectal cream Apply topically 2 (two) times a day, Starting Mon 8/22/2022, Normal      hydrOXYzine HCL (ATARAX) 10 mg tablet Take 10 mg by mouth 3 (three) times a day, Starting Mon 6/6/2022, Historical Med      !! Lancets (ONETOUCH DELICA PLUS [id number]) MISC USE 1 TO CHECK GLUCOSE ONCE DAILY, Historical Med      lidocaine (XYLOCAINE) 2 % topical gel Apply topically as needed for mild pain Apply to anal area PRN pain  , Starting Fri 10/7/2022, Normal      lisinopril (ZESTRIL) 10 mg tablet Take 10 mg by mouth every morning, Starting Tue 6/7/2022, Historical Med      mesalamine (DELZICOL) 400 mg Take 2 capsules by mouth twice daily, Normal      metoprolol succinate (TOPROL-XL) 100 mg 24 hr tablet Take 1 tablet (100 mg total) by mouth every 12 (twelve) hours, Starting Thu 2/14/2019, Normal      nitroglycerin (NITROSTAT) 0 4 mg SL tablet Place 0 4 mg under the tongue every 5 (five) minutes as needed Do not exceeda total of 3 doses in 15 minutes, Starting Mon 3/21/2022, Historical Med      omeprazole (PriLOSEC) 40 MG capsule Take 1 capsule by mouth twice daily, Normal      !! OneTouch Delica Lancets 97B MISC Use to check sugars once a day   Dx E11 9, Historical Med      !! ONETOUCH VERIO test strip USE 1 STRIP TO CHECK GLUCOSE ONCE DAILY, Historical Med      oxyCODONE-acetaminophen (PERCOCET) 5-325 mg per tablet Take 1 tablet by mouth every 4 (four) hours as needed for moderate pain or severe pain Max Daily Amount: 6 tablets, Starting Fri 10/7/2022, Normal      polyethylene glycol (GLYCOLAX) 17 GM/SCOOP powder Take 17 g by mouth daily, Starting Mon 8/22/2022, Normal      pramipexole (MIRAPEX) 0 5 mg tablet Take 0 5 mg by mouth daily at bedtime, Starting Tue 6/28/2022, Historical Med      QUEtiapine (SEROquel) 25 mg tablet Take 25 mg by mouth daily at bedtime, Starting Mon 6/6/2022, Historical Med      sertraline (ZOLOFT) 50 mg tablet Historical Med      SYNTHROID 150 MCG tablet TAKE 1 TABLET BY MOUTH ONCE DAILY FIRST THING IN THE MORNING (AT LEAST 30 MINUTES PRIOR TO BREAKFAST OR OTHER MEDS), Historical Med      Trelegy Ellipta 200-62 5-25 MCG/INH AEPB inhaler INHALE 1 PUFF BY MOUTH IN THE MORNING, Historical Med      venlafaxine (EFFEXOR-XR) 37 5 mg 24 hr capsule Take 37 5 mg by mouth daily , Starting Mon 12/31/2018, Historical Med       !! - Potential duplicate medications found  Please discuss with provider  No discharge procedures on file      PDMP Review       Value Time User    PDMP Reviewed  Yes 2/25/2020  6:47 PM Kristine French MD          ED Provider  Electronically Signed by           Ray Reis PA-C  10/10/22 1694

## 2022-10-14 ENCOUNTER — TELEPHONE (OUTPATIENT)
Dept: SURGERY | Facility: CLINIC | Age: 67
End: 2022-10-14

## 2022-10-14 ENCOUNTER — HOSPITAL ENCOUNTER (EMERGENCY)
Facility: HOSPITAL | Age: 67
Discharge: HOME | End: 2022-10-15
Attending: EMERGENCY MEDICINE
Payer: COMMERCIAL

## 2022-10-14 VITALS
HEART RATE: 57 BPM | WEIGHT: 242 LBS | TEMPERATURE: 98.6 F | DIASTOLIC BLOOD PRESSURE: 64 MMHG | BODY MASS INDEX: 34.65 KG/M2 | OXYGEN SATURATION: 95 % | RESPIRATION RATE: 18 BRPM | SYSTOLIC BLOOD PRESSURE: 159 MMHG | HEIGHT: 70 IN

## 2022-10-14 DIAGNOSIS — R33.8 ACUTE URINARY RETENTION: ICD-10-CM

## 2022-10-14 DIAGNOSIS — K62.5 RECTAL BLEEDING: Primary | ICD-10-CM

## 2022-10-14 LAB
ALBUMIN SERPL BCP-MCNC: 3.4 G/DL (ref 3.5–5)
ALP SERPL-CCNC: 87 U/L (ref 46–116)
ALT SERPL W P-5'-P-CCNC: 32 U/L (ref 12–78)
ANION GAP SERPL CALCULATED.3IONS-SCNC: 9 MMOL/L (ref 4–13)
AST SERPL W P-5'-P-CCNC: 24 U/L (ref 5–45)
BACTERIA UR QL AUTO: ABNORMAL /HPF
BASOPHILS # BLD AUTO: 0.03 THOUSANDS/ÂΜL (ref 0–0.1)
BASOPHILS NFR BLD AUTO: 0 % (ref 0–1)
BILIRUB SERPL-MCNC: 0.25 MG/DL (ref 0.2–1)
BILIRUB UR QL STRIP: NEGATIVE
BUN SERPL-MCNC: 13 MG/DL (ref 5–25)
CALCIUM ALBUM COR SERPL-MCNC: 9.6 MG/DL (ref 8.3–10.1)
CALCIUM SERPL-MCNC: 9.1 MG/DL (ref 8.3–10.1)
CARDIAC TROPONIN I PNL SERPL HS: 3 NG/L (ref 8–18)
CHLORIDE SERPL-SCNC: 102 MMOL/L (ref 96–108)
CLARITY UR: CLEAR
CO2 SERPL-SCNC: 28 MMOL/L (ref 21–32)
COLOR UR: YELLOW
CREAT SERPL-MCNC: 1.06 MG/DL (ref 0.6–1.3)
EOSINOPHIL # BLD AUTO: 0.19 THOUSAND/ÂΜL (ref 0–0.61)
EOSINOPHIL NFR BLD AUTO: 2 % (ref 0–6)
ERYTHROCYTE [DISTWIDTH] IN BLOOD BY AUTOMATED COUNT: 13.4 % (ref 11.6–15.1)
GFR SERPL CREATININE-BSD FRML MDRD: 54 ML/MIN/1.73SQ M
GLUCOSE SERPL-MCNC: 102 MG/DL (ref 65–140)
GLUCOSE UR STRIP-MCNC: NEGATIVE MG/DL
HCT VFR BLD AUTO: 41.8 % (ref 34.8–46.1)
HGB BLD-MCNC: 13.1 G/DL (ref 11.5–15.4)
HGB UR QL STRIP.AUTO: ABNORMAL
IMM GRANULOCYTES # BLD AUTO: 0.02 THOUSAND/UL (ref 0–0.2)
IMM GRANULOCYTES NFR BLD AUTO: 0 % (ref 0–2)
KETONES UR STRIP-MCNC: NEGATIVE MG/DL
LEUKOCYTE ESTERASE UR QL STRIP: NEGATIVE
LIPASE SERPL-CCNC: 148 U/L (ref 73–393)
LYMPHOCYTES # BLD AUTO: 2.76 THOUSANDS/ÂΜL (ref 0.6–4.47)
LYMPHOCYTES NFR BLD AUTO: 24 % (ref 14–44)
MAGNESIUM SERPL-MCNC: 1.8 MG/DL (ref 1.6–2.6)
MCH RBC QN AUTO: 29.3 PG (ref 26.8–34.3)
MCHC RBC AUTO-ENTMCNC: 31.3 G/DL (ref 31.4–37.4)
MCV RBC AUTO: 94 FL (ref 82–98)
MONOCYTES # BLD AUTO: 0.91 THOUSAND/ÂΜL (ref 0.17–1.22)
MONOCYTES NFR BLD AUTO: 8 % (ref 4–12)
NEUTROPHILS # BLD AUTO: 7.49 THOUSANDS/ÂΜL (ref 1.85–7.62)
NEUTS SEG NFR BLD AUTO: 66 % (ref 43–75)
NITRITE UR QL STRIP: NEGATIVE
NON-SQ EPI CELLS URNS QL MICRO: ABNORMAL /HPF
NRBC BLD AUTO-RTO: 0 /100 WBCS
PH UR STRIP.AUTO: 6 [PH]
PLATELET # BLD AUTO: 293 THOUSANDS/UL (ref 149–390)
PMV BLD AUTO: 9.4 FL (ref 8.9–12.7)
POTASSIUM SERPL-SCNC: 3.5 MMOL/L (ref 3.5–5.3)
PROT SERPL-MCNC: 7.9 G/DL (ref 6.4–8.4)
PROT UR STRIP-MCNC: NEGATIVE MG/DL
RBC # BLD AUTO: 4.47 MILLION/UL (ref 3.81–5.12)
RBC #/AREA URNS AUTO: ABNORMAL /HPF
SODIUM SERPL-SCNC: 139 MMOL/L (ref 135–147)
SP GR UR STRIP.AUTO: 1.01 (ref 1–1.03)
UROBILINOGEN UR QL STRIP.AUTO: 0.2 E.U./DL
WBC # BLD AUTO: 11.4 THOUSAND/UL (ref 4.31–10.16)
WBC #/AREA URNS AUTO: ABNORMAL /HPF

## 2022-10-14 PROCEDURE — 81001 URINALYSIS AUTO W/SCOPE: CPT | Performed by: EMERGENCY MEDICINE

## 2022-10-14 PROCEDURE — 83690 ASSAY OF LIPASE: CPT | Performed by: EMERGENCY MEDICINE

## 2022-10-14 PROCEDURE — 84484 ASSAY OF TROPONIN QUANT: CPT | Performed by: EMERGENCY MEDICINE

## 2022-10-14 PROCEDURE — 83735 ASSAY OF MAGNESIUM: CPT | Performed by: EMERGENCY MEDICINE

## 2022-10-14 PROCEDURE — 93005 ELECTROCARDIOGRAM TRACING: CPT

## 2022-10-14 PROCEDURE — 99285 EMERGENCY DEPT VISIT HI MDM: CPT

## 2022-10-14 PROCEDURE — 36415 COLL VENOUS BLD VENIPUNCTURE: CPT | Performed by: EMERGENCY MEDICINE

## 2022-10-14 PROCEDURE — 85025 COMPLETE CBC W/AUTO DIFF WBC: CPT | Performed by: EMERGENCY MEDICINE

## 2022-10-14 PROCEDURE — 99285 EMERGENCY DEPT VISIT HI MDM: CPT | Performed by: EMERGENCY MEDICINE

## 2022-10-14 PROCEDURE — 80053 COMPREHEN METABOLIC PANEL: CPT | Performed by: EMERGENCY MEDICINE

## 2022-10-14 PROCEDURE — 51798 US URINE CAPACITY MEASURE: CPT

## 2022-10-14 RX ORDER — LIDOCAINE HYDROCHLORIDE 20 MG/ML
1 JELLY TOPICAL ONCE
Status: COMPLETED | OUTPATIENT
Start: 2022-10-14 | End: 2022-10-14

## 2022-10-14 RX ADMIN — LIDOCAINE HYDROCHLORIDE 1 APPLICATION: 20 JELLY TOPICAL at 22:00

## 2022-10-14 NOTE — TELEPHONE ENCOUNTER
Patient aware and verbally understood
Patient calling back to report ongoing pain, bleeding and difficulty with urinating  Patient reports she did present to the ER and had multiple studies done along with labs and was found to have a kidney stone and acute urinary retention  Patient feels like she is not improving with her symptoms and asking for guidance on what to do  Please advise 
Patient given an earlier appt, reports that she is waiting for a prior auth for the lidocaine jelly but that she was given a tube when seen in the ER  Patient verbally understands that she should report back to the ER if pain worsens  ER did provide patient with a work note for this week but she is asking for a new note for next week  Patient doesn't feel that she will be able to return due to persistent pain 
Patient/Spouse

## 2022-10-15 ENCOUNTER — TELEPHONE (OUTPATIENT)
Dept: EMERGENCY DEPT | Facility: HOSPITAL | Age: 67
End: 2022-10-15

## 2022-10-15 DIAGNOSIS — Z97.8 COMPLAINT ABOUT URINARY CATHETER: Primary | ICD-10-CM

## 2022-10-15 DIAGNOSIS — R39.9 COMPLAINT ABOUT URINARY CATHETER: Primary | ICD-10-CM

## 2022-10-15 LAB — CARDIAC TROPONIN I PNL SERPL HS: 4 NG/L (ref 8–18)

## 2022-10-15 PROCEDURE — 93005 ELECTROCARDIOGRAM TRACING: CPT

## 2022-10-15 PROCEDURE — 84484 ASSAY OF TROPONIN QUANT: CPT | Performed by: EMERGENCY MEDICINE

## 2022-10-15 PROCEDURE — 36415 COLL VENOUS BLD VENIPUNCTURE: CPT | Performed by: EMERGENCY MEDICINE

## 2022-10-15 RX ORDER — PHENAZOPYRIDINE HYDROCHLORIDE 100 MG/1
100 TABLET, FILM COATED ORAL 3 TIMES DAILY PRN
Qty: 9 TABLET | Refills: 0 | Status: SHIPPED | OUTPATIENT
Start: 2022-10-15

## 2022-10-15 NOTE — ED PROVIDER NOTES
History  Chief Complaint   Patient presents with   • Rectal Bleeding     Had a hemorrhoidectomy done on the 7th  Has been bleeding since  States that she feels today she has had an increase in bleeding  Feels a little lightheaded and dizzy  Takes eliquis daily     79-year-old female with history of atrial fibrillation on Eliquis thyroid cancer CHF COPD asthma Crohn's disease hypertension hyperlipidemia GERD obstructive sleep apnea presents with continued rectal bleeding from her hemorrhoidectomy site; on 10 722 she underwent hemorrhoidectomy by Dr Emily Blas she was evaluated in the ED on 10/10/22 ED note was reviewed she is noted to have hypokalemia she underwent CT abdomen pelvis which demonstrated soft tissue swelling but no evidence of abscess in the perirectal region consistent with surgery and she had urinary retention but declined the a Espinal catheter  Patient states that she continues to bleed she did have a clot in her pull up today she is having difficulty urinating and complains of bilateral lower quadrant cramping she was able to urinate a larger amount today when she was in a warm shower or some time she urinates drops otherwise a small amount  It hurts her hemorrhoids when she bears down in attempts to urinate  She states she has been having brown bowel movements  Denies any fevers  She has been using the lidocaine gel provided to her from the emergency department she denies any nausea or vomiting she use 1 Percocet tablet today to help her with her pain there is no history of any trauma or falls  She complains of bilateral lower quadrant cramping she did have some chest pain upon arrival here which was brief and lasting seconds and she does complains of some lightheadedness/dizziness she denies any numbness or paresthesias she has no lower extremity edema no weakness she has some chronic back pain which is unchanged            Prior to Admission Medications   Prescriptions Last Dose Informant Patient Reported? Taking? Blood Glucose Monitoring Suppl (Eduarda Pean) w/Device KIT  Self Yes No   Sig: Use to check sugars once a day  Dx E11 9   Blood Glucose Monitoring Suppl (ONETOUCH VERIO) w/Device KIT  Self Yes No   Sig: daily Use to check glucose   Lancets (ONETOUCH DELICA PLUS [id number]) MISC  Self Yes No   Sig: USE 1 TO CHECK GLUCOSE ONCE DAILY   ONETOUCH VERIO test strip  Self Yes No   Sig: USE 1 STRIP TO CHECK GLUCOSE ONCE DAILY   OneTouch Delica Lancets 79F MISC  Self Yes No   Sig: Use to check sugars once a day   Dx E11 9   QUEtiapine (SEROquel) 25 mg tablet  Self Yes No   Sig: Take 25 mg by mouth daily at bedtime   SYNTHROID 150 MCG tablet  Self Yes No   Sig: TAKE 1 TABLET BY MOUTH ONCE DAILY FIRST THING IN THE MORNING (AT LEAST 30 MINUTES PRIOR TO BREAKFAST OR OTHER MEDS)   Trelegy Ellipta 200-62 5-25 MCG/INH AEPB inhaler  Self Yes No   Sig: INHALE 1 PUFF BY MOUTH IN THE MORNING   albuterol (PROVENTIL HFA,VENTOLIN HFA) 90 mcg/act inhaler  Self No No   Sig: Inhale 2 puffs every 6 (six) hours as needed for wheezing or shortness of breath   amLODIPine (NORVASC) 5 mg tablet  Self Yes No   Sig: Take 5 mg by mouth every morning   apixaban (ELIQUIS) 5 mg  Self Yes No   Sig: Take 5 mg by mouth 2 (two) times a day    aspirin 81 mg chewable tablet  Self No No   Sig: Chew 1 tablet (81 mg total) daily   atorvastatin (LIPITOR) 20 mg tablet  Self Yes No   Sig: Take 20 mg by mouth every morning   docusate sodium (COLACE) 100 mg capsule   No No   Sig: Take 1 capsule (100 mg total) by mouth 2 (two) times a day   dofetilide (TIKOSYN) 500 mcg capsule  Self Yes No   Sig: Take 500 mcg by mouth 2 (two) times a day   fenofibrate (TRICOR) 54 MG tablet  Self Yes No   Sig: Take 54 mg by mouth daily   furosemide (LASIX) 20 mg tablet  Self Yes No   Sig: Take 20 mg by mouth 2 (two) times a day   gabapentin (Neurontin) 100 mg capsule   No No   Sig: Take 3 capsules (300 mg total) by mouth 2 (two) times a day   glipiZIDE (GLUCOTROL XL) 5 mg 24 hr tablet  Self Yes No   Sig: TAKE 1 TABLET BY MOUTH ONCE DAILY 30 MINUTES BEFORE A MEAL (REPLACES METFORMIN)   glucose blood test strip  Self Yes No   Sig: Use to check sugars once a day  Dx E11 9   hydrOXYzine HCL (ATARAX) 10 mg tablet  Self Yes No   Sig: Take 10 mg by mouth 3 (three) times a day   hydrocortisone (ANUSOL-HC) 2 5 % rectal cream  Self No No   Sig: Apply topically 2 (two) times a day   lidocaine (XYLOCAINE) 2 % topical gel   No No   Sig: Apply topically as needed for mild pain Apply to anal area PRN pain     lisinopril (ZESTRIL) 10 mg tablet  Self Yes No   Sig: Take 10 mg by mouth every morning   mesalamine (DELZICOL) 400 mg   No No   Sig: Take 2 capsules by mouth twice daily   metoprolol succinate (TOPROL-XL) 100 mg 24 hr tablet  Self No No   Sig: Take 1 tablet (100 mg total) by mouth every 12 (twelve) hours   nitroglycerin (NITROSTAT) 0 4 mg SL tablet  Self Yes No   Sig: Place 0 4 mg under the tongue every 5 (five) minutes as needed Do not exceeda total of 3 doses in 15 minutes   omeprazole (PriLOSEC) 40 MG capsule   No No   Sig: Take 1 capsule by mouth twice daily   oxyCODONE-acetaminophen (PERCOCET) 5-325 mg per tablet   No No   Sig: Take 1 tablet by mouth every 4 (four) hours as needed for moderate pain or severe pain Max Daily Amount: 6 tablets   polyethylene glycol (GLYCOLAX) 17 GM/SCOOP powder  Self No No   Sig: Take 17 g by mouth daily   pramipexole (MIRAPEX) 0 5 mg tablet  Self Yes No   Sig: Take 0 5 mg by mouth daily at bedtime   sertraline (ZOLOFT) 50 mg tablet  Self Yes No   venlafaxine (EFFEXOR-XR) 37 5 mg 24 hr capsule  Self Yes No   Sig: Take 37 5 mg by mouth daily       Facility-Administered Medications: None       Past Medical History:   Diagnosis Date   • A-fib (HCC)    • Asthma    • Brain aneurysm    • Cancer (HCC)     papillary thyroid cancer   • Cardiac disease    • CHF (congestive heart failure) (HCC)    • COPD (chronic obstructive pulmonary disease) (HCC) • CPAP (continuous positive airway pressure) dependence    • Crohn disease (HCC)    • Diabetes mellitus ([address] )    • Disease of thyroid gland    • GERD (gastroesophageal reflux disease)    • Hyperlipidemia    • Hypertension    • Sleep apnea        Past Surgical History:   Procedure Laterality Date   • APPENDECTOMY     • CARDIOVERSION N/A 2019    Procedure: CARDIOVERSION;  Surgeon: Valery Moon MD;  Location: [facility];  Service: Cardiology   • CEREBRAL ANEURYSM REPAIR     • CHOLECYSTECTOMY     • HYSTERECTOMY     • OK EXCISION THROMBOSED HEMORRHOID, EXTERNAL N/A 10/7/2022    Procedure: HEMORRHOIDECTOMY EXCISION;  Surgeon: Neo Bhakta DO;  Location: [facility];  Service: General   • THYROIDECTOMY     • TONSILLECTOMY AND ADENOIDECTOMY         No family history on file  I have reviewed and agree with the history as documented  E-Cigarette/Vaping   • E-Cigarette Use Never User      E-Cigarette/Vaping Substances   • Nicotine No    • THC No    • CBD No    • Flavoring No    • Other No    • Unknown No      Social History     Tobacco Use   • Smoking status: Former Smoker     Quit date: 2018     Years since quittin 7   • Smokeless tobacco: Never Used   Vaping Use   • Vaping Use: Never used   Substance Use Topics   • Alcohol use: Yes     Alcohol/week: 0 0 standard drinks     Comment: social   • Drug use: Never       Review of Systems   Constitutional: Negative for activity change, appetite change, chills and fever  HENT: Negative for congestion, ear pain, rhinorrhea, sneezing and sore throat  Eyes: Negative for discharge  Respiratory: Negative for cough and shortness of breath  Cardiovascular: Negative for chest pain and leg swelling  Gastrointestinal: Positive for abdominal pain (crampy bilateral lower quadrants) and anal bleeding  Negative for blood in stool, diarrhea, nausea and vomiting  Endocrine: Negative for polyuria     Genitourinary: Positive for difficulty urinating and flank pain  Negative for dysuria, frequency and urgency  Musculoskeletal: Negative for back pain and myalgias  Skin: Negative for rash  Neurological: Negative for dizziness and numbness  Hematological: Negative for adenopathy  Psychiatric/Behavioral: Negative for confusion  All other systems reviewed and are negative  Physical Exam  Physical Exam  Vitals and nursing note reviewed  Exam conducted with a chaperone present  Constitutional:       General: She is not in acute distress  Appearance: She is well-developed  She is not ill-appearing, toxic-appearing or diaphoretic  HENT:      Head: Normocephalic and atraumatic  Right Ear: Tympanic membrane and external ear normal       Left Ear: Tympanic membrane and external ear normal       Nose: Nose normal       Mouth/Throat:      Mouth: Mucous membranes are moist       Pharynx: No oropharyngeal exudate or posterior oropharyngeal erythema  Eyes:      General:         Right eye: No discharge  Left eye: No discharge  Extraocular Movements: Extraocular movements intact  Conjunctiva/sclera: Conjunctivae normal       Pupils: Pupils are equal, round, and reactive to light  Neck:      Comments: No midline or paraspinous tenderness  Cardiovascular:      Rate and Rhythm: Normal rate and regular rhythm  Heart sounds: Normal heart sounds  Pulmonary:      Effort: Pulmonary effort is normal  No respiratory distress  Breath sounds: Normal breath sounds  No wheezing, rhonchi or rales  Chest:      Chest wall: No tenderness  Abdominal:      General: Bowel sounds are normal  There is no distension  Palpations: Abdomen is soft  Tenderness: There is abdominal tenderness (Bilateral lower quadrants most prominent over the suprapubic region  )  There is no right CVA tenderness, left CVA tenderness, guarding or rebound        Comments: Back no midline or CVA tenderness   Genitourinary:     Comments: Perirectal region was examined the rectal exam was not performed chaperoned by daughter  Patient some oozing from the incisional site  No evidence of induration perianal sensation is intact  Musculoskeletal:         General: No tenderness or deformity  Normal range of motion  Cervical back: Normal range of motion and neck supple  Right lower leg: No edema  Left lower leg: No edema  Skin:     General: Skin is warm and dry  Capillary Refill: Capillary refill takes less than 2 seconds  Neurological:      General: No focal deficit present  Mental Status: She is alert and oriented to person, place, and time  Mental status is at baseline  Cranial Nerves: No cranial nerve deficit  Sensory: No sensory deficit  Motor: No weakness or abnormal muscle tone  Coordination: Coordination normal       Gait: Gait normal       Comments: Motor strength is 5/5 to the proximal distal musculature light touch is intact she has intact great toe dorsiflexion as well as plantar dorsiflexion of the feet she is able to ambulate with a steady gait to the bathroom     Psychiatric:         Mood and Affect: Mood normal          Vital Signs  ED Triage Vitals [10/14/22 2012]   Temperature Pulse Respirations Blood Pressure SpO2   98 6 °F (37 °C) 57 18 159/64 95 %      Temp Source Heart Rate Source Patient Position - Orthostatic VS BP Location FiO2 (%)   Temporal Monitor Sitting Right arm --      Pain Score       10 - Worst Possible Pain           Vitals:    10/14/22 2012   BP: 159/64   Pulse: 57   Patient Position - Orthostatic VS: Sitting         Visual Acuity      ED Medications  Medications   lidocaine (URO-JET) 2 % urethral/mucosal gel 1 application (1 application Urethral Given 10/14/22 2200)       Diagnostic Studies  Results Reviewed     Procedure Component Value Units Date/Time    High Sensitivity Troponin I Random [[id number]]  (Abnormal) Collected: 10/15/22 0018    Lab Status: Final result Specimen: Blood from Arm, Right Updated: 10/15/22 0058     HS TnI random 4 ng/L     Urine Microscopic [[id number]]  (Abnormal) Collected: 10/14/22 2101    Lab Status: Final result Specimen: Urine, Clean Catch Updated: 10/14/22 2200     RBC, UA 30-50 /hpf      WBC, UA 1-2 /hpf      Epithelial Cells Occasional /hpf      Bacteria, UA Occasional /hpf     High Sensitivity Troponin I Random [[id number]]  (Abnormal) Collected: 10/14/22 2123    Lab Status: Final result Specimen: Blood from Arm, Right Updated: 10/14/22 2156     HS TnI random 3 ng/L     Comprehensive metabolic panel [[id number]]  (Abnormal) Collected: 10/14/22 2123    Lab Status: Final result Specimen: Blood from Arm, Right Updated: 10/14/22 2148     Sodium 139 mmol/L      Potassium 3 5 mmol/L      Chloride 102 mmol/L      CO2 28 mmol/L      ANION GAP 9 mmol/L      BUN 13 mg/dL      Creatinine 1 06 mg/dL      Glucose 102 mg/dL      Calcium 9 1 mg/dL      Corrected Calcium 9 6 mg/dL      AST 24 U/L      ALT 32 U/L      Alkaline Phosphatase 87 U/L      Total Protein 7 9 g/dL      Albumin 3 4 g/dL      Total Bilirubin 0 25 mg/dL      eGFR 54 ml/min/1 73sq m     Narrative:      [place] guidelines for Chronic Kidney Disease (CKD):   •  Stage 1 with normal or high GFR (GFR > 90 mL/min/1 73 square meters)  •  Stage 2 Mild CKD (GFR = 60-89 mL/min/1 73 square meters)  •  Stage 3A Moderate CKD (GFR = 45-59 mL/min/1 73 square meters)  •  Stage 3B Moderate CKD (GFR = 30-44 mL/min/1 73 square meters)  •  Stage 4 Severe CKD (GFR = 15-29 mL/min/1 73 square meters)  •  Stage 5 End Stage CKD (GFR <15 mL/min/1 73 square meters)  Note: GFR calculation is accurate only with a steady state creatinine    Lipase [[id number]]  (Normal) Collected: 10/14/22 2123    Lab Status: Final result Specimen: Blood from Arm, Right Updated: 10/14/22 2143     Lipase 148 u/L     Magnesium [[id number]]  (Normal) Collected: 10/14/22 2123    Lab Status: Final result Specimen: Blood from Arm, Right Updated: 10/14/22 2142     Magnesium 1 8 mg/dL     CBC and differential [[id number]]  (Abnormal) Collected: 10/14/22 2123    Lab Status: Final result Specimen: Blood from Arm, Right Updated: 10/14/22 2131     WBC 11 40 Thousand/uL      RBC 4 47 Million/uL      Hemoglobin 13 1 g/dL      Hematocrit 41 8 %      MCV 94 fL      MCH 29 3 pg      MCHC 31 3 g/dL      RDW 13 4 %      MPV 9 4 fL      Platelets 676 Thousands/uL      nRBC 0 /100 WBCs      Neutrophils Relative 66 %      Immat GRANS % 0 %      Lymphocytes Relative 24 %      Monocytes Relative 8 %      Eosinophils Relative 2 %      Basophils Relative 0 %      Neutrophils Absolute 7 49 Thousands/µL      Immature Grans Absolute 0 02 Thousand/uL      Lymphocytes Absolute 2 76 Thousands/µL      Monocytes Absolute 0 91 Thousand/µL      Eosinophils Absolute 0 19 Thousand/µL      Basophils Absolute 0 03 Thousands/µL     UA w Reflex to Microscopic w Reflex to Culture [[id number]]  (Abnormal) Collected: 10/14/22 2101    Lab Status: Final result Specimen: Urine, Clean Catch Updated: 10/14/22 2124     Color, UA Yellow     Clarity, UA Clear     Specific Gravity, UA 1 015     pH, UA 6 0     Leukocytes, UA Negative     Nitrite, UA Negative     Protein, UA Negative mg/dl      Glucose, UA Negative mg/dl      Ketones, UA Negative mg/dl      Urobilinogen, UA 0 2 E U /dl      Bilirubin, UA Negative     Occult Blood, UA Large                 No orders to display              Procedures  ECG 12 Lead Documentation Only    Date/Time: 10/14/2022 9:28 PM  Performed by: Amy Garcia MD  Authorized by: Amy Garcia MD     Indications / Diagnosis:  Cp  ECG reviewed by me, the ED Provider: yes    Patient location:  ED  Previous ECG:     Previous ECG:  Compared to current    Comparison ECG info:  9/2/22 1026    Similarity:  No change  Rate:     ECG rate:  59    ECG rate assessment: bradycardic    Rhythm:     Rhythm: sinus bradycardia    QRS:     QRS axis:  Normal  Comments:  previously 476 nonspecific twave changes similar to previous    ECG 12 Lead Documentation Only    Date/Time: 10/15/2022 12:25 AM  Performed by: Michael Denson MD  Authorized by: Michael Denson MD     Indications / Diagnosis:  Delta ekg  Patient location:  ED  Previous ECG:     Previous ECG:  Compared to current    Comparison ECG info:  10/14/22 2123    Similarity:  No change  Rate:     ECG rate:  50    ECG rate assessment: bradycardic    Rhythm:     Rhythm: sinus bradycardia    QRS:     QRS axis:  Normal  Comments:       prev 471; no acute ischemic changes             ED Course  ED Course as of 10/15/22 0145   Fri Oct 14, 2022   2057 Patient able to void 100ml yellow urine with pink BRB in toilet bowel no clots   2126 PVR on bladderscan approx 500ml   2208 TC commuincation with Dr Babar Young is recommending surgifoam to rectum and follow up in office early next week agrees with chopra catheter placement   2234 Reviewed Dr Cole Uribe recommendations with patient she is carefully considering if she wants to proceed with surgifoam   Sat Oct 15, 2022   0037 Abdm re-palped NT no further rectal pressure declines surgifoam to rectal region             HEART Risk Score    Flowsheet Row Most Recent Value   Heart Score Risk Calculator    History 0 Filed at: 10/15/2022 0049   ECG 1 Filed at: 10/15/2022 0049   Age 1 Filed at: 10/15/2022 0049   Risk Factors 1 Filed at: 10/15/2022 0049   Troponin 0 Filed at: 10/15/2022 0049   HEART Score 3 Filed at: 10/15/2022 0049                                      [facility]  Number of Diagnoses or Management Options  Diagnosis management comments: Mdm:  Patient has no induration or increased erythema around the perirectal region is mildly tender in soft there is some oozing consistent with being on Eliquis  Rectal exam was not pursued    I reviewed with the patient I recommend proceeding with Chopra catheterization as straining urinate is aggravating her rectal region  Will recheck white count hemoglobin assess for acute coronary syndrome electrolyte abnormalities and re-evaluate will confer with General surgery  CT a/p 10/10/22   1  Perianal soft tissue thickening, consistent with postoperative inflammation following recent hemorrhoidectomy  No evidence of abscess or fistula      2   2 mm nonobstructing left renal calculus          Disposition  Final diagnoses:   Rectal bleeding - s/p hemmoroidectomy 10/07/22   Acute urinary retention - PVR apporx 500ml     Time reflects when diagnosis was documented in both MDM as applicable and the Disposition within this note     Time User Action Codes Description Comment    10/14/2022 11:50 PM [address] [place] [K62 5] Rectal bleeding     10/14/2022 11:50 PM Basilio Coker [K62 5] Rectal bleeding s/p hemmoroidectomy 10/07/22    10/14/2022 11:50 PM Zully Chin Add [R33 8] Acute urinary retention     10/14/2022 11:51 PM Zully Chin Modify [R33 8] Acute urinary retention PVR apporx 500ml      ED Disposition     ED Disposition   Discharge    Condition   Stable    Date/Time   Fri Oct 14, 2022 11:50 PM    Comment   Arprecious Tatiana discharge to home/self care                 Follow-up Information     Follow up With Specialties Details Why Contact Info Additional Information    Troy Baldwin,  General Surgery, Wound Care Go to  keep followup appointment [address] [facility] [address] [facility] Call in 3 days follow up later in week reguarding voiding trials and removal of chopra [address] [id number]  70  [facility], [address] [place] [address], [facility]          Patient's Medications   Discharge Prescriptions    No medications on file           PDMP Review       Value Time User    PDMP Reviewed  Yes 2/25/2020  6:47 PM Gerardo Valle MD          ED Provider  Electronically Signed by           Juliane Enrique MD  10/15/22 9474

## 2022-10-15 NOTE — DISCHARGE INSTRUCTIONS
Chopra catheter to remain in place until recheck with urology  Return with fever recurrent chest pain, abdominal pain, nausea vomiting, any difficulties with chopra catheter, red stools lightheadedness or any new or worsening symptoms

## 2022-10-15 NOTE — TELEPHONE ENCOUNTER
Pt called and indicates that her catheter is bothering her and it burns  No reported leakage  No hematuria  Will trial Rx pyridium for discomfort  Advised to push plenty of fluids  Advised f/u with urology or return to ER as needed

## 2022-10-16 ENCOUNTER — HOSPITAL ENCOUNTER (EMERGENCY)
Facility: HOSPITAL | Age: 67
Discharge: HOME | End: 2022-10-16
Attending: EMERGENCY MEDICINE
Payer: COMMERCIAL

## 2022-10-16 VITALS
WEIGHT: 242 LBS | OXYGEN SATURATION: 98 % | HEART RATE: 64 BPM | HEIGHT: 70 IN | SYSTOLIC BLOOD PRESSURE: 138 MMHG | BODY MASS INDEX: 34.65 KG/M2 | TEMPERATURE: 97.5 F | RESPIRATION RATE: 18 BRPM | DIASTOLIC BLOOD PRESSURE: 63 MMHG

## 2022-10-16 DIAGNOSIS — Z97.8 INDWELLING FOLEY CATHETER PRESENT: ICD-10-CM

## 2022-10-16 DIAGNOSIS — Z98.890 S/P HEMORRHOIDECTOMY: ICD-10-CM

## 2022-10-16 DIAGNOSIS — N39.0 UTI (URINARY TRACT INFECTION): ICD-10-CM

## 2022-10-16 DIAGNOSIS — R31.9 HEMATURIA: Primary | ICD-10-CM

## 2022-10-16 DIAGNOSIS — Z87.19 S/P HEMORRHOIDECTOMY: ICD-10-CM

## 2022-10-16 LAB
BACTERIA UR QL AUTO: ABNORMAL /HPF
BILIRUB UR QL STRIP: ABNORMAL
CLARITY UR: ABNORMAL
COLOR UR: ABNORMAL
GLUCOSE UR STRIP-MCNC: NEGATIVE MG/DL
HGB UR QL STRIP.AUTO: ABNORMAL
KETONES UR STRIP-MCNC: ABNORMAL MG/DL
LEUKOCYTE ESTERASE UR QL STRIP: ABNORMAL
NITRITE UR QL STRIP: POSITIVE
NON-SQ EPI CELLS URNS QL MICRO: ABNORMAL /HPF
PH UR STRIP.AUTO: 6.5 [PH]
PROT UR STRIP-MCNC: >=300 MG/DL
RBC #/AREA URNS AUTO: ABNORMAL /HPF
SP GR UR STRIP.AUTO: 1.02 (ref 1–1.03)
UROBILINOGEN UR QL STRIP.AUTO: 1 E.U./DL
WBC #/AREA URNS AUTO: ABNORMAL /HPF

## 2022-10-16 PROCEDURE — 99284 EMERGENCY DEPT VISIT MOD MDM: CPT | Performed by: EMERGENCY MEDICINE

## 2022-10-16 PROCEDURE — 99283 EMERGENCY DEPT VISIT LOW MDM: CPT

## 2022-10-16 PROCEDURE — 87086 URINE CULTURE/COLONY COUNT: CPT

## 2022-10-16 PROCEDURE — 81001 URINALYSIS AUTO W/SCOPE: CPT

## 2022-10-16 RX ORDER — CEPHALEXIN 250 MG/1
500 CAPSULE ORAL ONCE
Status: COMPLETED | OUTPATIENT
Start: 2022-10-16 | End: 2022-10-16

## 2022-10-16 RX ORDER — PHENAZOPYRIDINE HYDROCHLORIDE 200 MG/1
200 TABLET, FILM COATED ORAL 3 TIMES DAILY
Qty: 6 TABLET | Refills: 0 | Status: SHIPPED | OUTPATIENT
Start: 2022-10-16

## 2022-10-16 RX ORDER — CEPHALEXIN 500 MG/1
500 CAPSULE ORAL EVERY 6 HOURS SCHEDULED
Qty: 28 CAPSULE | Refills: 0 | Status: SHIPPED | OUTPATIENT
Start: 2022-10-16 | End: 2022-10-23

## 2022-10-16 RX ORDER — PHENAZOPYRIDINE HYDROCHLORIDE 100 MG/1
200 TABLET, FILM COATED ORAL ONCE
Status: COMPLETED | OUTPATIENT
Start: 2022-10-16 | End: 2022-10-16

## 2022-10-16 RX ADMIN — CEPHALEXIN 500 MG: 250 CAPSULE ORAL at 19:25

## 2022-10-16 RX ADMIN — PHENAZOPYRIDINE 200 MG: 100 TABLET ORAL at 18:14

## 2022-10-16 NOTE — DISCHARGE INSTRUCTIONS
Plenty of fliuds  Finished 1 week of antibiotics  Pyridium will will turn urine orange when combined with a red urine may be an abnormal color that helps numb the bladder and will help with any bladder spasms  Keep follow-up appoint with Dr Kimberly Aparicio and will need to schedule follow-up with Urology    Return with fever back pain throwing up any problems with Espinal catheter any concerns

## 2022-10-16 NOTE — ED PROVIDER NOTES
History  Chief Complaint   Patient presents with   • Blood in Urine     Noticed bloody urine starting today  Catheter placed on Friday here in ED for retention  Also states she is having a burning sensation and pressure      29-year-old female with history of atrial fibrillation on Eliquis thyroid cancer CHF COPD asthma Crohn's disease hypertension hyperlipidemia GERD and obstructive sleep apnea underwent hemorrhoid surgery on 10/7/2022  She was recheck in the emergency department on 10/10 and 10/14 due to the fact that she was restarted on Eliquis and had continued rectal bleeding  She underwent a CT scan on 10/10/22 there were no complications from the hemorrhoid surgery expected soft tissue sweeling but she was found have urinary retention thought to be secondary to the Percocet that she was taking to manage her pain  She declined a Espinal catheter that time but returned 3 days later was evaluated by me and then agreed to have a Espinal catheter placed  She had greater than 500ml on a postvoid residual   Dr Melissa Patel  was consulted at that time the oozing from the rectal region was as expected there was a recommendation to place Surg[facility] but patient declined that as well  Patient did have some burning with urination and irritation from the Espinal pyridium  was called in however the pharmacy states that the script was not there so patient never filled it she presents today due to intermittent gross hematuria she denies any clots in the urine  A friend who was with her today states that approximately noon today she had chandler urine upon arrival here she does have dark red urine but no clots in the catheter bag  She denies any fever or chills  She has been able to tolerate a diet she has no chest pain shortness of breath or lightheadedness  No flank pain  She still complains of rectal pressure  She is able to have bowel movements but it is very uncomfortable    She still has some oozing  from the rectal region  Prior to Admission Medications   Prescriptions Last Dose Informant Patient Reported? Taking? Blood Glucose Monitoring Suppl ([place] Deepali) w/Device KIT  Self Yes No   Sig: Use to check sugars once a day  Dx E11 9   Blood Glucose Monitoring Suppl (ONETOUCH VERIO) w/Device KIT  Self Yes No   Sig: daily Use to check glucose   Lancets (ONETOUCH DELICA PLUS [id number]) MISC  Self Yes No   Sig: USE 1 TO CHECK GLUCOSE ONCE DAILY   ONETOUCH VERIO test strip  Self Yes No   Sig: USE 1 STRIP TO CHECK GLUCOSE ONCE DAILY   OneTouch Delica Lancets 00A MISC  Self Yes No   Sig: Use to check sugars once a day   Dx E11 9   QUEtiapine (SEROquel) 25 mg tablet  Self Yes No   Sig: Take 25 mg by mouth daily at bedtime   SYNTHROID 150 MCG tablet  Self Yes No   Sig: TAKE 1 TABLET BY MOUTH ONCE DAILY FIRST THING IN THE MORNING (AT LEAST 30 MINUTES PRIOR TO BREAKFAST OR OTHER MEDS)   Trelegy Ellipta 200-62 5-25 MCG/INH AEPB inhaler  Self Yes No   Sig: INHALE 1 PUFF BY MOUTH IN THE MORNING   albuterol (PROVENTIL HFA,VENTOLIN HFA) 90 mcg/act inhaler  Self No No   Sig: Inhale 2 puffs every 6 (six) hours as needed for wheezing or shortness of breath   amLODIPine (NORVASC) 5 mg tablet  Self Yes No   Sig: Take 5 mg by mouth every morning   apixaban (ELIQUIS) 5 mg  Self Yes No   Sig: Take 5 mg by mouth 2 (two) times a day    aspirin 81 mg chewable tablet  Self No No   Sig: Chew 1 tablet (81 mg total) daily   atorvastatin (LIPITOR) 20 mg tablet  Self Yes No   Sig: Take 20 mg by mouth every morning   docusate sodium (COLACE) 100 mg capsule   No No   Sig: Take 1 capsule (100 mg total) by mouth 2 (two) times a day   dofetilide (TIKOSYN) 500 mcg capsule  Self Yes No   Sig: Take 500 mcg by mouth 2 (two) times a day   fenofibrate (TRICOR) 54 MG tablet  Self Yes No   Sig: Take 54 mg by mouth daily   furosemide (LASIX) 20 mg tablet  Self Yes No   Sig: Take 20 mg by mouth 2 (two) times a day   gabapentin (Neurontin) 100 mg capsule   No No Sig: Take 3 capsules (300 mg total) by mouth 2 (two) times a day   glipiZIDE (GLUCOTROL XL) 5 mg 24 hr tablet  Self Yes No   Sig: TAKE 1 TABLET BY MOUTH ONCE DAILY 30 MINUTES BEFORE A MEAL (REPLACES METFORMIN)   glucose blood test strip  Self Yes No   Sig: Use to check sugars once a day  Dx E11 9   hydrOXYzine HCL (ATARAX) 10 mg tablet  Self Yes No   Sig: Take 10 mg by mouth 3 (three) times a day   hydrocortisone (ANUSOL-HC) 2 5 % rectal cream  Self No No   Sig: Apply topically 2 (two) times a day   lidocaine (XYLOCAINE) 2 % topical gel   No No   Sig: Apply topically as needed for mild pain Apply to anal area PRN pain     lisinopril (ZESTRIL) 10 mg tablet  Self Yes No   Sig: Take 10 mg by mouth every morning   mesalamine (DELZICOL) 400 mg   No No   Sig: Take 2 capsules by mouth twice daily   metoprolol succinate (TOPROL-XL) 100 mg 24 hr tablet  Self No No   Sig: Take 1 tablet (100 mg total) by mouth every 12 (twelve) hours   nitroglycerin (NITROSTAT) 0 4 mg SL tablet  Self Yes No   Sig: Place 0 4 mg under the tongue every 5 (five) minutes as needed Do not exceeda total of 3 doses in 15 minutes   omeprazole (PriLOSEC) 40 MG capsule   No No   Sig: Take 1 capsule by mouth twice daily   oxyCODONE-acetaminophen (PERCOCET) 5-325 mg per tablet   No No   Sig: Take 1 tablet by mouth every 4 (four) hours as needed for moderate pain or severe pain Max Daily Amount: 6 tablets   phenazopyridine (PYRIDIUM) 100 mg tablet   No No   Sig: Take 1 tablet (100 mg total) by mouth 3 (three) times a day as needed for bladder spasms   polyethylene glycol (GLYCOLAX) 17 GM/SCOOP powder  Self No No   Sig: Take 17 g by mouth daily   pramipexole (MIRAPEX) 0 5 mg tablet  Self Yes No   Sig: Take 0 5 mg by mouth daily at bedtime   sertraline (ZOLOFT) 50 mg tablet  Self Yes No   venlafaxine (EFFEXOR-XR) 37 5 mg 24 hr capsule  Self Yes No   Sig: Take 37 5 mg by mouth daily       Facility-Administered Medications: None       Past Medical History: Diagnosis Date   • A-fib [facility])    • Asthma    • Brain aneurysm    • Cancer ([facility] )     papillary thyroid cancer   • Cardiac disease    • CHF (congestive heart failure) (HCC)    • COPD (chronic obstructive pulmonary disease) (HCC)    • CPAP (continuous positive airway pressure) dependence    • Crohn disease (HCC)    • Diabetes mellitus ([facility] )    • Disease of thyroid gland    • GERD (gastroesophageal reflux disease)    • Hyperlipidemia    • Hypertension    • Sleep apnea        Past Surgical History:   Procedure Laterality Date   • APPENDECTOMY     • CARDIOVERSION N/A 2019    Procedure: CARDIOVERSION;  Surgeon: Morena Johnsno MD;  Location: [facility] OR;  Service: Cardiology   • CEREBRAL ANEURYSM REPAIR     • CHOLECYSTECTOMY     • HYSTERECTOMY     • VT EXCISION THROMBOSED HEMORRHOID, EXTERNAL N/A 10/7/2022    Procedure: HEMORRHOIDECTOMY EXCISION;  Surgeon: Rhina Villegas DO;  Location: [facility];  Service: General   • THYROIDECTOMY     • TONSILLECTOMY AND ADENOIDECTOMY         History reviewed  No pertinent family history  I have reviewed and agree with the history as documented  E-Cigarette/Vaping   • E-Cigarette Use Never User      E-Cigarette/Vaping Substances   • Nicotine No    • THC No    • CBD No    • Flavoring No    • Other No    • Unknown No      Social History     Tobacco Use   • Smoking status: Former Smoker     Quit date: 2018     Years since quittin 7   • Smokeless tobacco: Never Used   Vaping Use   • Vaping Use: Never used   Substance Use Topics   • Alcohol use: Yes     Alcohol/week: 0 0 standard drinks     Comment: social   • Drug use: Never       Review of Systems   Constitutional: Negative for activity change, appetite change, chills, diaphoresis, fatigue and fever  HENT: Negative for congestion, ear pain, rhinorrhea, sneezing and sore throat  Eyes: Negative for discharge  Respiratory: Negative for cough and shortness of breath      Cardiovascular: Negative for chest pain and leg swelling  Gastrointestinal: Positive for abdominal pain (suprapubic) and anal bleeding  Negative for abdominal distention, blood in stool, diarrhea, nausea and vomiting  Endocrine: Negative for polyuria  Genitourinary: Positive for difficulty urinating and hematuria  Negative for decreased urine volume, dysuria, frequency and urgency  Musculoskeletal: Negative for back pain and myalgias  Skin: Negative for rash  Neurological: Negative for dizziness, weakness, light-headedness, numbness and headaches  Hematological: Negative for adenopathy  Psychiatric/Behavioral: Negative for confusion  All other systems reviewed and are negative  Physical Exam  Physical Exam  Vitals and nursing note reviewed  Exam conducted with a chaperone present  Constitutional:       General: She is not in acute distress  Appearance: She is well-developed  She is not diaphoretic  HENT:      Head: Normocephalic and atraumatic  Right Ear: External ear normal       Left Ear: External ear normal       Nose: Nose normal       Mouth/Throat:      Mouth: Mucous membranes are moist       Pharynx: No oropharyngeal exudate or posterior oropharyngeal erythema  Eyes:      General:         Right eye: No discharge  Left eye: No discharge  Extraocular Movements: Extraocular movements intact  Conjunctiva/sclera: Conjunctivae normal       Pupils: Pupils are equal, round, and reactive to light  Neck:      Comments: No midline or paraspinous tenderness  Cardiovascular:      Rate and Rhythm: Normal rate and regular rhythm  Pulses: Normal pulses  Heart sounds: Normal heart sounds  Pulmonary:      Effort: Pulmonary effort is normal  No respiratory distress  Breath sounds: Normal breath sounds  Abdominal:      General: Bowel sounds are normal  There is no distension  Palpations: Abdomen is soft  Tenderness: There is abdominal tenderness (mild suprapubic tenderness)   There is no right CVA tenderness, left CVA tenderness, guarding or rebound  Comments: Back no midline T or L spine tenderness   Genitourinary:     Comments: Rectal exam chaperoned by Fuentes Grullon improved inflammation in the perirectal region  There is no active bleeding  No induration or tenderness to palpation rectal exam was not performed mary and anal sensation is intact there is no evidence of secondary infection  Musculoskeletal:         General: No tenderness or deformity  Normal range of motion  Cervical back: Normal range of motion and neck supple  No rigidity or tenderness  Right lower leg: No edema  Left lower leg: No edema  Lymphadenopathy:      Cervical: No cervical adenopathy  Skin:     General: Skin is warm and dry  Capillary Refill: Capillary refill takes less than 2 seconds  Neurological:      Mental Status: She is alert and oriented to person, place, and time  Cranial Nerves: No cranial nerve deficit  Sensory: No sensory deficit  Motor: No weakness or abnormal muscle tone        Coordination: Coordination normal       Comments: Gait steady   Psychiatric:         Mood and Affect: Mood normal          Vital Signs  ED Triage Vitals   Temperature Pulse Respirations Blood Pressure SpO2   10/16/22 1701 10/16/22 1658 10/16/22 1658 10/16/22 1658 10/16/22 1658   97 5 °F (36 4 °C) 66 18 126/55 94 %      Temp Source Heart Rate Source Patient Position - Orthostatic VS BP Location FiO2 (%)   10/16/22 1701 10/16/22 1658 10/16/22 1658 10/16/22 1658 --   Temporal Monitor Sitting Left arm       Pain Score       10/16/22 1658       8           Vitals:    10/16/22 1658 10/16/22 1900   BP: 126/55 138/63   Pulse: 66 64   Patient Position - Orthostatic VS: Sitting          Visual Acuity      ED Medications  Medications   phenazopyridine (PYRIDIUM) tablet 200 mg (200 mg Oral Given 10/16/22 1814)   cephalexin (KEFLEX) capsule 500 mg (500 mg Oral Given 10/16/22 1925)       Diagnostic Studies  Results Reviewed     Procedure Component Value Units Date/Time    Urine Microscopic [[id number]]  (Abnormal) Collected: 10/16/22 1812    Lab Status: Final result Specimen: Urine, Indwelling Espinal Catheter Updated: 10/16/22 1849     RBC, UA       Field obscured, unable to enumerate     /hpf     WBC, UA       Field obscured, unable to enumerate     /hpf     Epithelial Cells       Field obscured, unable to enumerate     /hpf     Bacteria, UA       Field obscured, unable to enumerate     /hpf    Urine culture [[id number]] Collected: 10/16/22 1812    Lab Status: In process Specimen: Urine, Indwelling Espinal Catheter Updated: 10/16/22 1849    UA w Reflex to Microscopic w Reflex to Culture [[id number]]  (Abnormal) Collected: 10/16/22 1812    Lab Status: Final result Specimen: Urine, Indwelling Espinal Catheter Updated: 10/16/22 1827     Color, UA Red     Clarity, UA Turbid     Specific Gravity, UA 1 025     pH, UA 6 5     Leukocytes, UA Small     Nitrite, UA Positive     Protein, UA >=300 mg/dl      Glucose, UA Negative mg/dl      Ketones, UA Trace mg/dl      Urobilinogen, UA 1 0 E U /dl      Bilirubin, UA Small     Occult Blood, UA Large                 No orders to display              Procedures  Procedures         ED Course  ED Course as of 10/17/22 0252   Sun Oct 16, 2022   1820 Post void residual 77ml on bladder scan                                             MDM  Number of Diagnoses or Management Options  Diagnosis management comments: Mdm:  Upon arrival the Espinal catheter was clamped upside down in the in the leg aranda which may have been contributing to interim Mitten obstruction of the catheter  Red urine is flowing freely from the catheter  Will check a bladder scan and send a UA also recheck perirectal region to evaluate surgical site  Patient was reassured she is not demonstrating any signs or symptoms of active ureterolithiasis    We discussed that the Eliquis may be contributing to the gross hematuria and the there is inflammation of the bladder due to over distension  This time patient is not demonstrating any systemic signs of infection such as fever  Disposition  Final diagnoses:   Hematuria   UTI (urinary tract infection)   S/P hemorrhoidectomy - POD #9   Indwelling Espinal catheter present - urinary retention greater than 500ml 3 days ago     Time reflects when diagnosis was documented in both MDM as applicable and the Disposition within this note     Time User Action Codes Description Comment    10/16/2022  7:16 PM Lorette Pacer Add [R31 9] Hematuria     10/16/2022  7:16 PM Lorette Pacer Add [N39 0] UTI (urinary tract infection)     10/16/2022  7:18 PM Lorette Pacer Add [Y03 544,  Z87 19] S/P hemorrhoidectomy     10/16/2022  7:18 PM Akhil Torres [H23 928,  Z87 19] S/P hemorrhoidectomy POD #9    10/16/2022  7:22 PM Lorette Pacer Add [Z97 8] Indwelling Espinal catheter present     10/16/2022  7:22 PM Lorette Pacer Modify [Z97 8] Indwelling Espinal catheter present urinary retention greater than 500ml 3 days ago      ED Disposition     ED Disposition   Discharge    Condition   Stable    Date/Time   Sun Oct 16, 2022  7:22 PM    Comment   Walter Brantley discharge to home/self care                 Follow-up Information     Follow up With Specialties Details Why Contact Info Additional Information    Divya Morel DO General Surgery, Wound Care Call in 1 day recheck of hemorrhoidectomy [phone] [facility] In 1 day recheck of blood in urine uti and urinary retention [address] [phone]  [phone]  [facility] [place], [address] [place] [address], [address] [place] [address]          Discharge Medication List as of 10/16/2022  7:22 PM      START taking these medications    Details   cephalexin (KEFLEX) 500 mg capsule Take 1 capsule (500 mg total) by mouth every 6 (six) hours for 7 days, Starting Sun 10/16/2022, Until Sun 10/23/2022, Normal      !! phenazopyridine (PYRIDIUM) 200 mg tablet Take 1 tablet (200 mg total) by mouth 3 (three) times a day, Starting Sun 10/16/2022, Normal       !! - Potential duplicate medications found  Please discuss with provider  CONTINUE these medications which have NOT CHANGED    Details   albuterol (PROVENTIL HFA,VENTOLIN HFA) 90 mcg/act inhaler Inhale 2 puffs every 6 (six) hours as needed for wheezing or shortness of breath, Starting Sat 2/29/2020, No Print      amLODIPine (NORVASC) 5 mg tablet Take 5 mg by mouth every morning, Starting Mon 3/21/2022, Historical Med      apixaban (ELIQUIS) 5 mg Take 5 mg by mouth 2 (two) times a day , Starting Tue 2/5/2019, Historical Med      aspirin 81 mg chewable tablet Chew 1 tablet (81 mg total) daily, Starting Sat 2/29/2020, No Print      atorvastatin (LIPITOR) 20 mg tablet Take 20 mg by mouth every morning, Starting Tue 6/7/2022, Historical Med      !! Blood Glucose Monitoring Suppl (ONETOUCH VERIO) w/Device KIT Use to check sugars once a day  Dx E11 9, Historical Med      !!  Blood Glucose Monitoring Suppl ([facility]) w/Device KIT daily Use to check glucose, Starting Tue 1/28/2020, Historical Med      docusate sodium (COLACE) 100 mg capsule Take 1 capsule (100 mg total) by mouth 2 (two) times a day, Starting Fri 10/7/2022, Normal      dofetilide (TIKOSYN) 500 mcg capsule Take 500 mcg by mouth 2 (two) times a day, Historical Med      fenofibrate (TRICOR) 54 MG tablet Take 54 mg by mouth daily, Historical Med      furosemide (LASIX) 20 mg tablet Take 20 mg by mouth 2 (two) times a day, Starting Wed 6/10/2020, Historical Med      gabapentin (Neurontin) 100 mg capsule Take 3 capsules (300 mg total) by mouth 2 (two) times a day, Starting Fri 10/7/2022, Normal      glipiZIDE (GLUCOTROL XL) 5 mg 24 hr tablet TAKE 1 TABLET BY MOUTH ONCE DAILY 30 MINUTES BEFORE A MEAL (REPLACES METFORMIN), Historical Med      !! glucose blood test strip Use to check sugars once a day  Dx E11 9, Historical Med      hydrocortisone (ANUSOL-HC) 2 5 % rectal cream Apply topically 2 (two) times a day, Starting Mon 8/22/2022, Normal      hydrOXYzine HCL (ATARAX) 10 mg tablet Take 10 mg by mouth 3 (three) times a day, Starting Mon 6/6/2022, Historical Med      !! Lancets (ONETOUCH DELICA PLUS [id number]) MISC USE 1 TO CHECK GLUCOSE ONCE DAILY, Historical Med      lidocaine (XYLOCAINE) 2 % topical gel Apply topically as needed for mild pain Apply to anal area PRN pain  , Starting Fri 10/7/2022, Normal      lisinopril (ZESTRIL) 10 mg tablet Take 10 mg by mouth every morning, Starting Tue 6/7/2022, Historical Med      mesalamine (DELZICOL) 400 mg Take 2 capsules by mouth twice daily, Normal      metoprolol succinate (TOPROL-XL) 100 mg 24 hr tablet Take 1 tablet (100 mg total) by mouth every 12 (twelve) hours, Starting Thu 2/14/2019, Normal      nitroglycerin (NITROSTAT) 0 4 mg SL tablet Place 0 4 mg under the tongue every 5 (five) minutes as needed Do not exceeda total of 3 doses in 15 minutes, Starting Mon 3/21/2022, Historical Med      omeprazole (PriLOSEC) 40 MG capsule Take 1 capsule by mouth twice daily, Normal      !! OneTouch Delica Lancets 79F MISC Use to check sugars once a day   Dx E11 9, Historical Med      !! ONETOUCH VERIO test strip USE 1 STRIP TO CHECK GLUCOSE ONCE DAILY, Historical Med      oxyCODONE-acetaminophen (PERCOCET) 5-325 mg per tablet Take 1 tablet by mouth every 4 (four) hours as needed for moderate pain or severe pain Max Daily Amount: 6 tablets, Starting Fri 10/7/2022, Normal      !! phenazopyridine (PYRIDIUM) 100 mg tablet Take 1 tablet (100 mg total) by mouth 3 (three) times a day as needed for bladder spasms, Starting Sat 10/15/2022, Normal      polyethylene glycol (GLYCOLAX) 17 GM/SCOOP powder Take 17 g by mouth daily, Starting Mon 8/22/2022, Normal      pramipexole (MIRAPEX) 0 5 mg tablet Take 0 5 mg by mouth daily at bedtime, Starting Tue 6/28/2022, Historical Med      QUEtiapine (SEROquel) 25 mg tablet Take 25 mg by mouth daily at bedtime, Starting Mon 6/6/2022, Historical Med      sertraline (ZOLOFT) 50 mg tablet Historical Med      SYNTHROID 150 MCG tablet TAKE 1 TABLET BY MOUTH ONCE DAILY FIRST THING IN THE MORNING (AT LEAST 30 MINUTES PRIOR TO BREAKFAST OR OTHER MEDS), Historical Med      Trelegy Ellipta 200-62 5-25 MCG/INH AEPB inhaler INHALE 1 PUFF BY MOUTH IN THE MORNING, Historical Med      venlafaxine (EFFEXOR-XR) 37 5 mg 24 hr capsule Take 37 5 mg by mouth daily , Starting Mon 12/31/2018, Historical Med       !! - Potential duplicate medications found  Please discuss with provider  No discharge procedures on file      PDMP Review       Value Time User    PDMP Reviewed  Yes 2/25/2020  6:47 PM Ramesh Marquez MD          ED Provider  Electronically Signed by           Alea Iglesias MD  10/17/22 2812

## 2022-10-17 ENCOUNTER — TELEPHONE (OUTPATIENT)
Dept: UROLOGY | Facility: AMBULATORY SURGERY CENTER | Age: 67
End: 2022-10-17

## 2022-10-17 NOTE — TELEPHONE ENCOUNTER
What is the reason for the patient’s appointment? NP- seen in the ER yesterday for UTI, kidney stone and urine retention  Has blood in her urine, no more clots  Sent home with catheter  When she urinates on the catheter it hurts/burns really bad even with the medication that they gave her  She would like an appointment as soon as possible  Patient can be reached at [phone]  What office location does the patient prefer? [place]    Imaging/Lab Results: in Epic    Do we accept the patient's insurance or is the patient Self-Pay? [employer] [facility]    Has the patient had any previous Urologist(s)? No    Have patient records been requested? No    Has the patient had any outside testing done? No    Does the patient have a personal history of cancer? Yes, thyroid  Removed 2 years ago

## 2022-10-17 NOTE — TELEPHONE ENCOUNTER
Okay to schedule for 10/25, stone is non-obstructing and gross hematuria is likely secondary to UTI

## 2022-10-17 NOTE — TELEPHONE ENCOUNTER
Returned call to patient  Patient reports that she went to Ed for blood in her urine   They placed a chopra catheter, urine testing and imaging (CT)  Was prescribed antibiotic keflex  , pyridium and advised to fu with urology  Patient chopra is draining without difficulty , lighter red urine , no fevers, chills , nausea or vomiting  Report adb discomfort pyridium is not helping  Advised to contact pcp for recomendation since she is not established with our practice  Advised we will contact her with an appt for np/fu for ed visit

## 2022-10-18 ENCOUNTER — OFFICE VISIT (OUTPATIENT)
Dept: SURGERY | Facility: CLINIC | Age: 67
End: 2022-10-18

## 2022-10-18 VITALS
TEMPERATURE: 97.3 F | BODY MASS INDEX: 34.59 KG/M2 | WEIGHT: 241.6 LBS | OXYGEN SATURATION: 90 % | DIASTOLIC BLOOD PRESSURE: 58 MMHG | HEART RATE: 59 BPM | HEIGHT: 70 IN | SYSTOLIC BLOOD PRESSURE: 108 MMHG

## 2022-10-18 DIAGNOSIS — R33.9 URINARY RETENTION: Primary | ICD-10-CM

## 2022-10-18 DIAGNOSIS — K64.9 HEMORRHOIDS, UNSPECIFIED HEMORRHOID TYPE: ICD-10-CM

## 2022-10-18 LAB
ATRIAL RATE: 50 BPM
ATRIAL RATE: 59 BPM
BACTERIA UR CULT: NORMAL
P AXIS: 76 DEGREES
P AXIS: 79 DEGREES
PR INTERVAL: 146 MS
PR INTERVAL: 146 MS
QRS AXIS: 11 DEGREES
QRS AXIS: 48 DEGREES
QRSD INTERVAL: 82 MS
QRSD INTERVAL: 82 MS
QT INTERVAL: 476 MS
QT INTERVAL: 514 MS
QTC INTERVAL: 468 MS
QTC INTERVAL: 471 MS
T WAVE AXIS: 37 DEGREES
T WAVE AXIS: 63 DEGREES
VENTRICULAR RATE: 50 BPM
VENTRICULAR RATE: 59 BPM

## 2022-10-18 PROCEDURE — 99024 POSTOP FOLLOW-UP VISIT: CPT | Performed by: SURGERY

## 2022-10-18 PROCEDURE — 93010 ELECTROCARDIOGRAM REPORT: CPT | Performed by: INTERNAL MEDICINE

## 2022-10-18 NOTE — PROGRESS NOTES
Assessment/Plan:    Hemorrhoids  Status post excisional hemorrhoidectomy  Occasional bleeding but otherwise doing well  On exam wound healing well  No active bleeding  Mild tenderness  Urinary retention  Urinary tension after undergoing hemorrhoidectomy  Patient currently with Espinal catheter  Follow-up with urology as scheduled next week  Diagnoses and all orders for this visit:    Urinary retention    Hemorrhoids, unspecified hemorrhoid type          Subjective:      Patient ID: Simpson Olszewski is a 78 y o  female  60-year-old female who is status post excisional hemorrhoidectomy, left anterior lateral, complicated by urinary retention outflow catheter presents today for follow-up  Doing well  Overall pain still present in the rectal area but improved  She states still better than her preoperative hemorrhoidal pain  Mild bleeding which is occasional   Does complain some pressure secondary to the urinary retention in the catheter placement  She does have a follow-up with Urology next week  The following portions of the patient's history were reviewed and updated as appropriate:   She  has a past medical history of [facility]fib ([facility] ), Asthma, Brain aneurysm, Cancer ([facility] ), Cardiac disease, CHF (congestive heart failure) ([facility] ), COPD (chronic obstructive pulmonary disease) ([facility] ), CPAP (continuous positive airway pressure) dependence, Crohn disease ([facility] ), Diabetes mellitus ([facility] ), Disease of thyroid gland, GERD (gastroesophageal reflux disease), Hyperlipidemia, Hypertension, and Sleep apnea    She   Patient Active Problem List    Diagnosis Date Noted   • Urinary retention 10/18/2022   • Hemorrhoids 09/28/2022   • Chronic pain syndrome 12/02/2021   • Chronic bilateral low back pain without sciatica 12/02/2021   • Lumbar spondylosis 12/02/2021   • Lumbar radiculopathy 12/02/2021   • Fracture of right tibia 02/02/2021   • Diabetes mellitus, type 2 ([facility] )    • Colon polyp 02/29/2020   • Hyperglycemia 02/29/2020   • Multiple renal cysts 02/29/2020   • T12 compression fracture ([place] [address] ) 02/29/2020   • Prolonged QT interval 02/27/2020   • Hepatomegaly 02/27/2020   • Hepatic steatosis 02/27/2020   • Atherosclerosis 02/26/2020   • Exacerbation of Crohn's disease ([place] [address] ) 02/25/2020   • Severe obstructive sleep apnea 02/25/2020   • Diarrhea 02/25/2020   • COPD (chronic obstructive pulmonary disease) ([place] [address] ) 12/01/2019   • Acute respiratory failure with hypoxia ([place] [address] ) 11/30/2019   • Pneumonia due to infectious organism 11/29/2019   • Elevated d-dimer 11/29/2019   • Hypokalemia 11/29/2019   • Acquired hypothyroidism 09/16/2019   • Brain aneurysm 09/16/2019   • Atypical chest pain 09/16/2019   • Dizziness 09/16/2019   • Crohn's disease with complication ([place] [address] ) 50/19/5251   • MARY ELLEN (obstructive sleep apnea)    • Microscopic hematuria 02/14/2019   • Pulmonary hypertension ([place] [address] ) 02/14/2019   • Lymphadenopathy 02/12/2019   • A-fib ([place] [address] ) 02/12/2019   • Elevated TSH 02/12/2019   • Hypercholesteremia 02/12/2019   • Transaminitis 02/12/2019     She  has a past surgical history that includes Hysterectomy; Cardioversion (N/A, 2/14/2019); Tonsillectomy and adenoidectomy; Appendectomy; Cerebral aneurysm repair; Cholecystectomy; Thyroidectomy; and pr excision thrombosed hemorrhoid, external (N/A, 10/7/2022)  Her family history is not on file  She  reports that she quit smoking about 4 years ago  She has never used smokeless tobacco  She reports current alcohol use  She reports that she does not use drugs    Current Outpatient Medications   Medication Sig Dispense Refill   • albuterol (PROVENTIL HFA,VENTOLIN HFA) 90 mcg/act inhaler Inhale 2 puffs every 6 (six) hours as needed for wheezing or shortness of breath  0   • amLODIPine (NORVASC) 5 mg tablet Take 5 mg by mouth every morning     • apixaban (ELIQUIS) 5 mg Take 5 mg by mouth 2 (two) times a day      • aspirin 81 mg chewable tablet Chew 1 tablet (81 mg total) daily  0   • atorvastatin (LIPITOR) 20 mg tablet Take 20 mg by mouth every morning     • Blood Glucose Monitoring Suppl (ONETOUCH VERIO) w/Device KIT Use to check sugars once a day  Dx E11 9     • Blood Glucose Monitoring Suppl (Lendon Bane) w/Device KIT daily Use to check glucose     • cephalexin (KEFLEX) 500 mg capsule Take 1 capsule (500 mg total) by mouth every 6 (six) hours for 7 days 28 capsule 0   • docusate sodium (COLACE) 100 mg capsule Take 1 capsule (100 mg total) by mouth 2 (two) times a day 30 capsule 1   • dofetilide (TIKOSYN) 500 mcg capsule Take 500 mcg by mouth 2 (two) times a day     • fenofibrate (TRICOR) 54 MG tablet Take 54 mg by mouth daily     • furosemide (LASIX) 20 mg tablet Take 20 mg by mouth 2 (two) times a day     • gabapentin (Neurontin) 100 mg capsule Take 3 capsules (300 mg total) by mouth 2 (two) times a day 30 capsule 1   • glipiZIDE (GLUCOTROL XL) 5 mg 24 hr tablet TAKE 1 TABLET BY MOUTH ONCE DAILY 30 MINUTES BEFORE A MEAL (REPLACES METFORMIN)     • hydrOXYzine HCL (ATARAX) 10 mg tablet Take 10 mg by mouth 3 (three) times a day     • Lancets (ONETOUCH DELICA PLUS [id number]) MISC USE 1 TO CHECK GLUCOSE ONCE DAILY     • lisinopril (ZESTRIL) 10 mg tablet Take 10 mg by mouth every morning     • mesalamine (DELZICOL) 400 mg Take 2 capsules by mouth twice daily 120 capsule 0   • metoprolol succinate (TOPROL-XL) 100 mg 24 hr tablet Take 1 tablet (100 mg total) by mouth every 12 (twelve) hours 60 tablet 0   • nitroglycerin (NITROSTAT) 0 4 mg SL tablet Place 0 4 mg under the tongue every 5 (five) minutes as needed Do not exceeda total of 3 doses in 15 minutes     • omeprazole (PriLOSEC) 40 MG capsule Take 1 capsule by mouth twice daily 60 capsule 5   • OneTouch Delica Lancets 32Q MISC Use to check sugars once a day   Dx E11 9     • ONETOUCH VERIO test strip USE 1 STRIP TO CHECK GLUCOSE ONCE DAILY     • oxyCODONE-acetaminophen (PERCOCET) 5-325 mg per tablet Take 1 tablet by mouth every 4 (four) hours as needed for moderate pain or severe pain Max Daily Amount: 6 tablets 20 tablet 0   • phenazopyridine (PYRIDIUM) 100 mg tablet Take 1 tablet (100 mg total) by mouth 3 (three) times a day as needed for bladder spasms 9 tablet 0   • phenazopyridine (PYRIDIUM) 200 mg tablet Take 1 tablet (200 mg total) by mouth 3 (three) times a day 6 tablet 0   • polyethylene glycol (GLYCOLAX) 17 GM/SCOOP powder Take 17 g by mouth daily 578 g 3   • pramipexole (MIRAPEX) 0 5 mg tablet Take 0 5 mg by mouth daily at bedtime     • QUEtiapine (SEROquel) 25 mg tablet Take 25 mg by mouth daily at bedtime     • sertraline (ZOLOFT) 50 mg tablet      • SYNTHROID 150 MCG tablet TAKE 1 TABLET BY MOUTH ONCE DAILY FIRST THING IN THE MORNING (AT LEAST 30 MINUTES PRIOR TO BREAKFAST OR OTHER MEDS)     • Trelegy Ellipta 200-62 5-25 MCG/INH AEPB inhaler INHALE 1 PUFF BY MOUTH IN THE MORNING     • glucose blood test strip Use to check sugars once a day  Dx E11 9     • hydrocortisone (ANUSOL-HC) 2 5 % rectal cream Apply topically 2 (two) times a day (Patient not taking: Reported on 10/18/2022) 28 g 3   • lidocaine (XYLOCAINE) 2 % topical gel Apply topically as needed for mild pain Apply to anal area PRN pain  (Patient not taking: Reported on 10/18/2022) 30 mL 2   • venlafaxine (EFFEXOR-XR) 37 5 mg 24 hr capsule Take 37 5 mg by mouth daily  (Patient not taking: Reported on 10/18/2022)       No current facility-administered medications for this visit  She is allergic to sulfa antibiotics       Review of Systems   Constitutional: Negative for activity change, appetite change, chills, diaphoresis, fatigue, fever and unexpected weight change  Skin: Positive for wound (Perianal)  Objective:      /58   Pulse 59   Temp (!) 97 3 °F (36 3 °C) (Tympanic)   Ht 5' 9 5" (1 765 m)   Wt 110 kg (241 lb 9 6 oz)   SpO2 90%   BMI 35 17 kg/m²          Physical Exam  Vitals reviewed  Constitutional:       General: She is not in acute distress       Appearance: Normal appearance  She is not ill-appearing, toxic-appearing or diaphoretic  Genitourinary:     Rectum: Tenderness and external hemorrhoid present  Normal anal tone  Comments: Wound from left anterior lateral hemorrhoidectomy appear healthy  No active bleeding  No evidence of infection  Mild tender palpation  Right-sided hemorrhoids remain present  Neurological:      Mental Status: She is alert

## 2022-10-18 NOTE — ASSESSMENT & PLAN NOTE
Urinary tension after undergoing hemorrhoidectomy  Patient currently with Espinal catheter  Follow-up with urology as scheduled next week

## 2022-10-18 NOTE — ASSESSMENT & PLAN NOTE
Status post excisional hemorrhoidectomy  Occasional bleeding but otherwise doing well  On exam wound healing well  No active bleeding  Mild tenderness

## 2022-10-20 PROCEDURE — 88304 TISSUE EXAM BY PATHOLOGIST: CPT | Performed by: PATHOLOGY

## 2022-10-25 ENCOUNTER — PROCEDURE VISIT (OUTPATIENT)
Dept: UROLOGY | Facility: CLINIC | Age: 67
End: 2022-10-25
Payer: COMMERCIAL

## 2022-10-25 ENCOUNTER — OFFICE VISIT (OUTPATIENT)
Dept: UROLOGY | Facility: CLINIC | Age: 67
End: 2022-10-25

## 2022-10-25 VITALS
DIASTOLIC BLOOD PRESSURE: 82 MMHG | BODY MASS INDEX: 35.08 KG/M2 | HEART RATE: 74 BPM | SYSTOLIC BLOOD PRESSURE: 132 MMHG | WEIGHT: 241 LBS

## 2022-10-25 DIAGNOSIS — R31.0 GROSS HEMATURIA: ICD-10-CM

## 2022-10-25 DIAGNOSIS — R33.8 ACUTE URINARY RETENTION: Primary | ICD-10-CM

## 2022-10-25 LAB — POST-VOID RESIDUAL VOLUME, ML POC: 80 ML

## 2022-10-25 PROCEDURE — 51798 US URINE CAPACITY MEASURE: CPT

## 2022-10-25 NOTE — PROGRESS NOTES
10/25/2022  Erich Hope is a 78 y o  female  [id number]    Diagnosis: Post op urinary retention   Chief Complaint     PVR          Patient presents for follow up post void residual  Patient seen this morning by Yair Cabrera     Plan:  Follow up with Yair Cabrera in 3 months       Assessment:      Patient voided 150 mL in the office  Post void residual measured via hand held bladder scanner to be 80 mL  Patient states she was able to void 3 times since Espinal catheter was removed this morning  No complaints of bladder pressure or discomfort  She will contact our office with any issues urinating       Recent Results (from the past 6 hour(s))   POCT Measure PVR    Collection Time: 10/25/22  3:06 PM   Result Value Ref Range    POST-VOID RESIDUAL VOLUME, ML POC 80 mL         Satnam Evans

## 2022-10-25 NOTE — PROGRESS NOTES
10/25/2022    No chief complaint on file  Assessment and Plan    78 y o  female new patient to office    1  Urinary retention  · Likely in the setting of post-operative urinary retention  · Will perform TOV today  · No longer taking Percocet for pain and is having regular bowel movements    2  Gross hematuria  · Continues to experience intermittent episodes of gross hematuria  Etiology is unclear may be related to bladder overdistention versus infection versus irritation from the chopra catheter  · She does take Eliquis for A-fib  · CT abdomen and pelvis with contrast 10/10/2022  · Unremarkable bladder  · 2 mm non-obstructing left renal calculi  · At this time I would not recommend a cystoscopy as her urine is clear yellow  If her hematuria persist after removal of the chopra catheter, I would then consider a cystoscopy to further evaluate the source of her bleeding  She is a former smoker and brother diagnosed with Renal Cancer  History of Present Illness  Lois Isbell is a 78 y o  female PMHx signinficant for A-fib on Eliquis, thyroid cancer, CHF, COPD, Asthma, Crohn's disease, HTN, HLD, GERD, and Sleep Apnea who is a new patient to office  Here for evaluation of urinary retention and gross hematuria  Patient has had multiple visits to [facility] Emergency Department following hemorrhoid surgery on 10/07/2022  Per review of chart patient was seen 10/10 and 10/14 due to reports of blood in her stool after restarting Eliquis  CT imaging from 18/82/8347 showed no complications from the hemorrhoid surgery except soft tissue swelling but she was found to have urinary retention thought to be secondary to Percocet that she had been taking for pain  She would declined a Chopra catheter however returned on 10/14 and had a Chopra catheter placed for a PVR greater than 500 mL  She was again seen on 10/16 for reports of gross hematuria in her Chopra catheter bag as well as burning and pressure  Her UA from 10/16 was nitrate positive however her urine culture showed no growth  Further review of emergency department encounter mentions that her Espinal catheter was clamped upside down in the leg aranda causing obstruction of the catheter  She was noted to have red urine without clots  She was discharged home and treated for a suspected UTI and started on Keflex  She presents today reporting no prior history of urinary retention in the past  She has since stopped taking Percocet since experiencing urinary retention  She is able to have regular bowel movements but continues to experience some blood in her stool  She also reports intermittent blood in her urine  She reports that if she is sitting still and not moving her urine is clear yellow, however, when she stands up or moves around she will experience gross hematuria  She does take Eliquis for A-fib  He urine today is clear yellow  She is a former smoker having quite about 6 years ago  She was smoking since the age of 14 about 0 5 ppd on average  Does reports a family history of Renal Cancer in her brother, no family history of bladder or ureteral cancer  Review of Systems   Constitutional: Negative for chills and fever  HENT: Negative for ear pain and sore throat  Eyes: Negative for pain and visual disturbance  Respiratory: Negative for cough and shortness of breath  Cardiovascular: Negative for chest pain and palpitations  Gastrointestinal: Negative for abdominal pain, constipation, diarrhea, nausea and vomiting  Genitourinary: Positive for dysuria, hematuria and urgency  Negative for flank pain, frequency and pelvic pain  Musculoskeletal: Negative for arthralgias and back pain  Skin: Negative for color change and rash  Neurological: Negative for dizziness, seizures and syncope  All other systems reviewed and are negative                Vitals  Vitals:    10/25/22 0853   BP: 132/82   Pulse: 74   Weight: 109 kg (241 lb) Physical Exam  Vitals reviewed  Constitutional:       General: She is not in acute distress  Appearance: Normal appearance  She is normal weight  She is not ill-appearing or toxic-appearing  HENT:      Head: Normocephalic and atraumatic  Nose: Nose normal    Eyes:      General: No scleral icterus  Conjunctiva/sclera: Conjunctivae normal    Cardiovascular:      Rate and Rhythm: Normal rate  Pulses: Normal pulses  Pulmonary:      Effort: Pulmonary effort is normal  No respiratory distress  Abdominal:      General: Abdomen is flat  Palpations: Abdomen is soft  Tenderness: There is no abdominal tenderness  There is no right CVA tenderness or left CVA tenderness  Hernia: No hernia is present  Genitourinary:     Comments: Espinal catheter draining clear yellow urine to gravity  Musculoskeletal:         General: Normal range of motion  Cervical back: Normal range of motion  Skin:     General: Skin is warm and dry  Neurological:      General: No focal deficit present  Mental Status: She is alert and oriented to person, place, and time  Mental status is at baseline  Psychiatric:         Mood and Affect: Mood normal          Behavior: Behavior normal          Thought Content:  Thought content normal          Judgment: Judgment normal          Past History  Past Medical History:   Diagnosis Date   • A-fib ([place] [address] )    • Asthma    • Brain aneurysm    • Cancer ([place] [address] )     papillary thyroid cancer   • Cardiac disease    • [facility]congest[facility] failure) [facility]    • COPD (chronic obstructive pulmonary disease) ([facility])    • CPAP (continuous positive airway pressure) dependence    • Crohn disease ([facility])    • Diabetes mellitus ([place] [address] )    • Disease of thyroid gland    • GERD (gastroesophageal reflux disease)    • Hyperlipidemia    • Hypertension    • Sleep apnea      Social History     Socioeconomic History   • Marital status: /Civil Union     Spouse name: None   • Number of children: None   • Years of education: None   • Highest education level: None   Occupational History   • None   Tobacco Use   • Smoking status: Former Smoker     Quit date: 2018     Years since quittin 8   • Smokeless tobacco: Never Used   Vaping Use   • Vaping Use: Never used   Substance and Sexual Activity   • Alcohol use: Yes     Alcohol/week: 0 0 standard drinks     Comment: social   • Drug use: Never   • Sexual activity: None   Other Topics Concern   • None   Social History Narrative   • None     Social Determinants of Health     Financial Resource Strain: Not on file   Food Insecurity: Not on file   Transportation Needs: Not on file   Physical Activity: Not on file   Stress: Not on file   Social Connections: Not on file   Intimate Partner Violence: Not on file   Housing Stability: Not on file     Social History     Tobacco Use   Smoking Status Former Smoker   • Quit date: 2018   • Years since quittin 8   Smokeless Tobacco Never Used     History reviewed  No pertinent family history  The following portions of the patient's history were reviewed and updated as appropriate allergies, current medications, past medical history, past social history, past surgical history and problem list    Imaging:    10/10/2022  CT ABDOMEN AND PELVIS WITH IV CONTRAST     INDICATION:   Abdominal pain, post-op  abd pain  52-year-old female with rectal bleeding and abdominal pain, 3 days post hemorrhoidectomy      COMPARISON:  CTA of the chest, abdomen and pelvis from 2022      TECHNIQUE:  CT examination of the abdomen and pelvis was performed  Axial, sagittal, and coronal 2D reformatted images were created from the source data and submitted for interpretation      Radiation dose length product (DLP) for this visit:  966 91 mGy-cm     This examination, like all CT scans performed in the [facility], was performed utilizing techniques to minimize radiation dose exposure, including the use of iterative   reconstruction and automated exposure control      IV Contrast:  100 mL of iohexol (OMNIPAQUE)  Enteric Contrast:  Enteric contrast was not administered      FINDINGS:     ABDOMEN     LOWER CHEST:  No clinically significant abnormality identified in the visualized lower chest      LIVER/BILIARY TREE:  Liver unremarkable  Prominent common bile duct, measuring 8 mm in maximal diameter with no intrahepatic ductal dilatation, most likely physiologic in this postcholecystectomy patient      GALLBLADDER:  Postcholecystectomy      SPLEEN:  Unremarkable      PANCREAS:  Unremarkable      ADRENAL GLANDS:  Unremarkable      KIDNEYS/URETERS:  2 mm calculus in the lower pole of the left kidney  8 mm simple appearing left lower pole cortical cyst   No hydronephrosis      STOMACH AND BOWEL:  Unremarkable      APPENDIX:  Post appendectomy      ABDOMINOPELVIC CAVITY: No lymphadenopathy  No ascites or discrete fluid collection  No extraluminal gas            VESSELS:  Atherosclerotic changes are present  No evidence of aneurysm      PELVIS     REPRODUCTIVE ORGANS:  Post hysterectomy      URINARY BLADDER:  Unremarkable      ABDOMINAL WALL/INGUINAL REGIONS:  No hernia, mass or fluid collection  Increased attenuation in the perineum, centered at the anal verge, without fluid collection or fistula      OSSEOUS STRUCTURES:  No recent fracture or destructive lesion  Degenerative disc disease at L4-L5      IMPRESSION:     1  Perianal soft tissue thickening, consistent with postoperative inflammation following recent hemorrhoidectomy  No evidence of abscess or fistula      2   2 mm nonobstructing left renal calculus  Results  No results found for this or any previous visit (from the past 1 hour(s))  ]  No results found for: PSA  Lab Results   Component Value Date    CALCIUM 9 1 10/14/2022     04/21/2018    K 3 5 10/14/2022    CO2 28 10/14/2022     10/14/2022    BUN 13 10/14/2022    CREATININE 1 06 10/14/2022     Lab Results   Component Value Date    WBC 11 40 (H) 10/14/2022    HGB 13 1 10/14/2022    HCT 41 8 10/14/2022    MCV 94 10/14/2022     10/14/2022       Please Note:  Voice dictation software has been used to create this document  There may be inadvertent transcriptions errors       Ele Anthony

## 2022-10-29 DIAGNOSIS — K64.9 HEMORRHOIDS, UNSPECIFIED HEMORRHOID TYPE: ICD-10-CM

## 2022-10-29 DIAGNOSIS — K50.919 CROHN'S DISEASE WITH COMPLICATION, UNSPECIFIED GASTROINTESTINAL TRACT LOCATION (HCC): ICD-10-CM

## 2022-10-30 RX ORDER — MESALAMINE 400 MG/1
CAPSULE, DELAYED RELEASE ORAL
Qty: 120 CAPSULE | Refills: 0 | Status: SHIPPED | OUTPATIENT
Start: 2022-10-30

## 2022-10-31 ENCOUNTER — HOSPITAL ENCOUNTER (EMERGENCY)
Facility: HOSPITAL | Age: 67
Discharge: HOME | End: 2022-10-31
Attending: EMERGENCY MEDICINE

## 2022-10-31 ENCOUNTER — APPOINTMENT (EMERGENCY)
Dept: RADIOLOGY | Facility: HOSPITAL | Age: 67
End: 2022-10-31

## 2022-10-31 VITALS
SYSTOLIC BLOOD PRESSURE: 111 MMHG | DIASTOLIC BLOOD PRESSURE: 54 MMHG | WEIGHT: 244.27 LBS | BODY MASS INDEX: 34.97 KG/M2 | HEIGHT: 70 IN | OXYGEN SATURATION: 95 % | RESPIRATION RATE: 18 BRPM | TEMPERATURE: 100.6 F | HEART RATE: 82 BPM

## 2022-10-31 DIAGNOSIS — U07.1 COVID-19: Primary | ICD-10-CM

## 2022-10-31 LAB
ALBUMIN SERPL BCP-MCNC: 3.2 G/DL (ref 3.5–5)
ALP SERPL-CCNC: 81 U/L (ref 46–116)
ALT SERPL W P-5'-P-CCNC: 28 U/L (ref 12–78)
ANION GAP SERPL CALCULATED.3IONS-SCNC: 8 MMOL/L (ref 4–13)
ATRIAL RATE: 71 BPM
BACTERIA UR QL AUTO: NORMAL /HPF
BASOPHILS # BLD AUTO: 0.02 THOUSANDS/ÂΜL (ref 0–0.1)
BASOPHILS NFR BLD AUTO: 0 % (ref 0–1)
BILIRUB SERPL-MCNC: 0.24 MG/DL (ref 0.2–1)
BILIRUB UR QL STRIP: NEGATIVE
BUN SERPL-MCNC: 15 MG/DL (ref 5–25)
CALCIUM ALBUM COR SERPL-MCNC: 9.6 MG/DL (ref 8.3–10.1)
CALCIUM SERPL-MCNC: 9 MG/DL (ref 8.3–10.1)
CARDIAC TROPONIN I PNL SERPL HS: 5 NG/L
CHLORIDE SERPL-SCNC: 100 MMOL/L (ref 96–108)
CLARITY UR: CLEAR
CO2 SERPL-SCNC: 30 MMOL/L (ref 21–32)
COLOR UR: YELLOW
CREAT SERPL-MCNC: 1.04 MG/DL (ref 0.6–1.3)
EOSINOPHIL # BLD AUTO: 0.08 THOUSAND/ÂΜL (ref 0–0.61)
EOSINOPHIL NFR BLD AUTO: 1 % (ref 0–6)
ERYTHROCYTE [DISTWIDTH] IN BLOOD BY AUTOMATED COUNT: 13.2 % (ref 11.6–15.1)
FLUAV RNA RESP QL NAA+PROBE: NEGATIVE
FLUBV RNA RESP QL NAA+PROBE: NEGATIVE
GFR SERPL CREATININE-BSD FRML MDRD: 56 ML/MIN/1.73SQ M
GLUCOSE SERPL-MCNC: 102 MG/DL (ref 65–140)
GLUCOSE UR STRIP-MCNC: NEGATIVE MG/DL
HCT VFR BLD AUTO: 39.9 % (ref 34.8–46.1)
HGB BLD-MCNC: 12.6 G/DL (ref 11.5–15.4)
HGB UR QL STRIP.AUTO: ABNORMAL
IMM GRANULOCYTES # BLD AUTO: 0.03 THOUSAND/UL (ref 0–0.2)
IMM GRANULOCYTES NFR BLD AUTO: 0 % (ref 0–2)
KETONES UR STRIP-MCNC: NEGATIVE MG/DL
LACTATE SERPL-SCNC: 1 MMOL/L (ref 0.5–2)
LEUKOCYTE ESTERASE UR QL STRIP: NEGATIVE
LYMPHOCYTES # BLD AUTO: 0.7 THOUSANDS/ÂΜL (ref 0.6–4.47)
LYMPHOCYTES NFR BLD AUTO: 9 % (ref 14–44)
MCH RBC QN AUTO: 29.8 PG (ref 26.8–34.3)
MCHC RBC AUTO-ENTMCNC: 31.6 G/DL (ref 31.4–37.4)
MCV RBC AUTO: 94 FL (ref 82–98)
MONOCYTES # BLD AUTO: 0.71 THOUSAND/ÂΜL (ref 0.17–1.22)
MONOCYTES NFR BLD AUTO: 9 % (ref 4–12)
NEUTROPHILS # BLD AUTO: 6.21 THOUSANDS/ÂΜL (ref 1.85–7.62)
NEUTS SEG NFR BLD AUTO: 81 % (ref 43–75)
NITRITE UR QL STRIP: NEGATIVE
NON-SQ EPI CELLS URNS QL MICRO: NORMAL /HPF
NRBC BLD AUTO-RTO: 0 /100 WBCS
P AXIS: 61 DEGREES
PH UR STRIP.AUTO: 7 [PH]
PLATELET # BLD AUTO: 251 THOUSANDS/UL (ref 149–390)
PMV BLD AUTO: 9.5 FL (ref 8.9–12.7)
POTASSIUM SERPL-SCNC: 3.6 MMOL/L (ref 3.5–5.3)
PR INTERVAL: 144 MS
PROCALCITONIN SERPL-MCNC: 0.08 NG/ML
PROT SERPL-MCNC: 7.4 G/DL (ref 6.4–8.4)
PROT UR STRIP-MCNC: NEGATIVE MG/DL
QRS AXIS: 21 DEGREES
QRSD INTERVAL: 80 MS
QT INTERVAL: 402 MS
QTC INTERVAL: 436 MS
RBC # BLD AUTO: 4.23 MILLION/UL (ref 3.81–5.12)
RBC #/AREA URNS AUTO: NORMAL /HPF
RSV RNA RESP QL NAA+PROBE: NEGATIVE
SARS-COV-2 RNA RESP QL NAA+PROBE: POSITIVE
SODIUM SERPL-SCNC: 138 MMOL/L (ref 135–147)
SP GR UR STRIP.AUTO: 1.01 (ref 1–1.03)
T WAVE AXIS: 44 DEGREES
UROBILINOGEN UR QL STRIP.AUTO: 0.2 E.U./DL
VENTRICULAR RATE: 71 BPM
WBC # BLD AUTO: 7.75 THOUSAND/UL (ref 4.31–10.16)
WBC #/AREA URNS AUTO: NORMAL /HPF

## 2022-10-31 RX ORDER — METHYLPREDNISOLONE SODIUM SUCCINATE 125 MG/2ML
100 INJECTION, POWDER, LYOPHILIZED, FOR SOLUTION INTRAMUSCULAR; INTRAVENOUS ONCE
Status: COMPLETED | OUTPATIENT
Start: 2022-10-31 | End: 2022-10-31

## 2022-10-31 RX ORDER — ACETAMINOPHEN 325 MG/1
975 TABLET ORAL ONCE
Status: COMPLETED | OUTPATIENT
Start: 2022-10-31 | End: 2022-10-31

## 2022-10-31 RX ORDER — PREDNISONE 20 MG/1
40 TABLET ORAL DAILY
Qty: 8 TABLET | Refills: 0 | Status: SHIPPED | OUTPATIENT
Start: 2022-11-01 | End: 2022-11-05

## 2022-10-31 RX ORDER — GABAPENTIN 300 MG/1
CAPSULE ORAL
Qty: 30 CAPSULE | Refills: 0 | Status: SHIPPED | OUTPATIENT
Start: 2022-10-31

## 2022-10-31 RX ADMIN — ACETAMINOPHEN 975 MG: 325 TABLET ORAL at 02:08

## 2022-10-31 RX ADMIN — METHYLPREDNISOLONE SODIUM SUCCINATE 100 MG: 125 INJECTION, POWDER, FOR SOLUTION INTRAMUSCULAR; INTRAVENOUS at 03:28

## 2022-10-31 NOTE — ED PROVIDER NOTES
History  Chief Complaint   Patient presents with   • Fever - 9 weeks to 74 years     Started having body aches and feeling warm earlier tonight around [place] Pro is a 78y o  year old female with PMH of Crohns disease on mesalamine, Afib on eliquis, CHF, COPD, DM, brain aneurysm presenting to the [facility] ED for fevers  Patient started several hours ago with generalized body aches, fevers and chills  Noted to have nonproductive cough and tightness sensation throughout chest  Patient had one episode where she had the sudden urge to urinate though denies dysuria/hematuria  Patient has history of Crohns disease and follows with GI Dr Peter Guadarrama  Maintained on mesalamine for treatment  Patient denies N/V/D or abdominal pain at time of evaluation  Patient has not taken/received any medications at home for relief of symptoms  Patient is vaccinated against [address]  No reported sick contacts  History provided by:  Patient and medical records   used: No        Prior to Admission Medications   Prescriptions Last Dose Informant Patient Reported? Taking? Blood Glucose Monitoring Suppl (uJan Dewey) w/Device KIT  Self Yes No   Sig: Use to check sugars once a day  Dx E11 9   Blood Glucose Monitoring Suppl (ONETOUCH VERIO) w/Device KIT  Self Yes No   Sig: daily Use to check glucose   Lancets (ONETOUCH DELICA PLUS [id number]) MISC  Self Yes No   Sig: USE 1 TO CHECK GLUCOSE ONCE DAILY   ONETOUCH VERIO test strip  Self Yes No   Sig: USE 1 STRIP TO CHECK GLUCOSE ONCE DAILY   OneTouch Delica Lancets 55S MISC  Self Yes No   Sig: Use to check sugars once a day   Dx E11 9   QUEtiapine (SEROquel) 25 mg tablet  Self Yes No   Sig: Take 25 mg by mouth daily at bedtime   SYNTHROID 150 MCG tablet  Self Yes No   Sig: TAKE 1 TABLET BY MOUTH ONCE DAILY FIRST THING IN THE MORNING (AT LEAST 30 MINUTES PRIOR TO BREAKFAST OR OTHER MEDS)   Trelegy Ellipta 200-62 5-25 MCG/INH AEPB inhaler  Self Yes No   Sig: INHALE 1 PUFF BY MOUTH IN THE MORNING   albuterol (PROVENTIL HFA,VENTOLIN HFA) 90 mcg/act inhaler  Self No No   Sig: Inhale 2 puffs every 6 (six) hours as needed for wheezing or shortness of breath   amLODIPine (NORVASC) 5 mg tablet  Self Yes No   Sig: Take 5 mg by mouth every morning   apixaban (ELIQUIS) 5 mg  Self Yes No   Sig: Take 5 mg by mouth 2 (two) times a day    aspirin 81 mg chewable tablet  Self No No   Sig: Chew 1 tablet (81 mg total) daily   atorvastatin (LIPITOR) 20 mg tablet  Self Yes No   Sig: Take 20 mg by mouth every morning   docusate sodium (COLACE) 100 mg capsule  Self No No   Sig: Take 1 capsule (100 mg total) by mouth 2 (two) times a day   dofetilide (TIKOSYN) 500 mcg capsule  Self Yes No   Sig: Take 500 mcg by mouth 2 (two) times a day   fenofibrate (TRICOR) 54 MG tablet  Self Yes No   Sig: Take 54 mg by mouth daily   furosemide (LASIX) 20 mg tablet  Self Yes No   Sig: Take 20 mg by mouth 2 (two) times a day   gabapentin (Neurontin) 100 mg capsule  Self No No   Sig: Take 3 capsules (300 mg total) by mouth 2 (two) times a day   glipiZIDE (GLUCOTROL XL) 5 mg 24 hr tablet  Self Yes No   Sig: TAKE 1 TABLET BY MOUTH ONCE DAILY 30 MINUTES BEFORE A MEAL (REPLACES METFORMIN)   glucose blood test strip  Self Yes No   Sig: Use to check sugars once a day  Dx E11 9   hydrOXYzine HCL (ATARAX) 10 mg tablet  Self Yes No   Sig: Take 10 mg by mouth 3 (three) times a day   hydrocortisone (ANUSOL-HC) 2 5 % rectal cream  Self No No   Sig: Apply topically 2 (two) times a day   Patient not taking: No sig reported   lidocaine (XYLOCAINE) 2 % topical gel  Self No No   Sig: Apply topically as needed for mild pain Apply to anal area PRN pain     Patient not taking: No sig reported   lisinopril (ZESTRIL) 10 mg tablet  Self Yes No   Sig: Take 10 mg by mouth every morning   mesalamine (DELZICOL) 400 mg   No No   Sig: Take 2 capsules by mouth twice daily   metoprolol succinate (TOPROL-XL) 100 mg 24 hr tablet  Self No No   Sig: Take 1 tablet (100 mg total) by mouth every 12 (twelve) hours   nitroglycerin (NITROSTAT) 0 4 mg SL tablet  Self Yes No   Sig: Place 0 4 mg under the tongue every 5 (five) minutes as needed Do not exceeda total of 3 doses in 15 minutes   omeprazole (PriLOSEC) 40 MG capsule  Self No No   Sig: Take 1 capsule by mouth twice daily   oxyCODONE-acetaminophen (PERCOCET) 5-325 mg per tablet  Self No No   Sig: Take 1 tablet by mouth every 4 (four) hours as needed for moderate pain or severe pain Max Daily Amount: 6 tablets   phenazopyridine (PYRIDIUM) 100 mg tablet  Self No No   Sig: Take 1 tablet (100 mg total) by mouth 3 (three) times a day as needed for bladder spasms   phenazopyridine (PYRIDIUM) 200 mg tablet  Self No No   Sig: Take 1 tablet (200 mg total) by mouth 3 (three) times a day   polyethylene glycol (GLYCOLAX) 17 GM/SCOOP powder  Self No No   Sig: Take 17 g by mouth daily   pramipexole (MIRAPEX) 0 5 mg tablet  Self Yes No   Sig: Take 0 5 mg by mouth daily at bedtime   sertraline (ZOLOFT) 50 mg tablet  Self Yes No   venlafaxine (EFFEXOR-XR) 37 5 mg 24 hr capsule  Self Yes No   Sig: Take 37 5 mg by mouth daily    Patient not taking: No sig reported      Facility-Administered Medications: None       Past Medical History:   Diagnosis Date   • A-fib ([facility])    • Asthma    • Brain aneurysm    • Cancer ([facility])     papillary thyroid cancer   • Cardiac disease    • CHF (congestive heart failure) ([facility])    • COPD (chronic obstructive pulmonary disease) ([facility])    • CPAP (continuous positive airway pressure) dependence    • Crohn disease ([facility])    • Diabetes mellitus ([facility] [address] )    • Disease of thyroid gland    • GERD (gastroesophageal reflux disease)    • Hyperlipidemia    • Hypertension    • Sleep apnea        Past Surgical History:   Procedure Laterality Date   • APPENDECTOMY     • CARDIOVERSION N/A 2/14/2019    Procedure: CARDIOVERSION;  Surgeon: Geni Howard MD;  Location: [facility];  Service: Cardiology   • CEREBRAL ANEURYSM REPAIR     • CHOLECYSTECTOMY     • HYSTERECTOMY     • AZ EXCISION THROMBOSED HEMORRHOID, EXTERNAL N/A 10/7/2022    Procedure: HEMORRHOIDECTOMY EXCISION;  Surgeon: Tio Avila DO;  Location: [facility];  Service: General   • THYROIDECTOMY     • TONSILLECTOMY AND ADENOIDECTOMY         History reviewed  No pertinent family history  I have reviewed and agree with the history as documented  E-Cigarette/Vaping   • E-Cigarette Use Never User      E-Cigarette/Vaping Substances   • Nicotine No    • THC No    • CBD No    • Flavoring No    • Other No    • Unknown No      Social History     Tobacco Use   • Smoking status: Former Smoker     Quit date: 2018     Years since quittin 8   • Smokeless tobacco: Never Used   Vaping Use   • Vaping Use: Never used   Substance Use Topics   • Alcohol use: Yes     Alcohol/week: 0 0 standard drinks     Comment: social   • Drug use: Never       Review of Systems   Constitutional: Positive for chills and fever  HENT: Negative for congestion and rhinorrhea  Eyes: Negative for photophobia and visual disturbance  Respiratory: Positive for cough and chest tightness  Negative for choking, shortness of breath and wheezing  Cardiovascular: Negative for chest pain and leg swelling  Gastrointestinal: Negative for abdominal distention, abdominal pain, diarrhea, nausea and vomiting  Endocrine: Negative for polyuria  Genitourinary: Positive for urgency  Negative for dysuria, flank pain and hematuria  Musculoskeletal: Positive for myalgias  Negative for arthralgias and neck stiffness  Skin: Negative for rash  Neurological: Positive for headaches  Negative for syncope, weakness, light-headedness and numbness  Psychiatric/Behavioral: Negative for behavioral problems and confusion  All other systems reviewed and are negative  Physical Exam  Physical Exam  Vitals and nursing note reviewed  Constitutional:       General: She is not in acute distress  Appearance: Normal appearance  She is well-developed  She is obese  She is not ill-appearing, toxic-appearing or diaphoretic  HENT:      Head: Normocephalic and atraumatic  Nose: No congestion or rhinorrhea  Eyes:      General:         Right eye: No discharge  Left eye: No discharge  Cardiovascular:      Rate and Rhythm: Normal rate and regular rhythm  Pulmonary:      Effort: No tachypnea, accessory muscle usage or respiratory distress  Breath sounds: No stridor  Examination of the right-middle field reveals wheezing  Examination of the left-middle field reveals wheezing  Examination of the right-lower field reveals wheezing  Examination of the left-lower field reveals wheezing  Wheezing present  No decreased breath sounds, rhonchi or rales  Abdominal:      General: There is no distension  Palpations: Abdomen is soft  Tenderness: There is no abdominal tenderness  There is no right CVA tenderness, left CVA tenderness, guarding or rebound  Musculoskeletal:      Cervical back: Normal range of motion and neck supple  No rigidity  Right lower leg: No tenderness  No edema  Left lower leg: No tenderness  No edema  Skin:     Capillary Refill: Capillary refill takes less than 2 seconds  Findings: No rash  Neurological:      Mental Status: She is alert and oriented to person, place, and time     Psychiatric:         Mood and Affect: Mood normal          Behavior: Behavior normal          Vital Signs  ED Triage Vitals   Temperature Pulse Respirations Blood Pressure SpO2   10/31/22 0136 10/31/22 0136 10/31/22 0136 10/31/22 0138 10/31/22 0138   (!) 100 6 °F (38 1 °C) 82 18 111/54 92 %      Temp Source Heart Rate Source Patient Position - Orthostatic VS BP Location FiO2 (%)   10/31/22 0136 10/31/22 0136 -- -- --   Temporal Monitor         Pain Score       10/31/22 0208       Med Not Given for Pain - for MAR use only           Vitals:    10/31/22 0136 10/31/22 0138   BP: 111/54   Pulse: 82          Visual Acuity      ED Medications  Medications   acetaminophen (TYLENOL) tablet 975 mg (975 mg Oral Given 10/31/22 0208)   methylPREDNISolone sodium succinate (Solu-MEDROL) injection 100 mg (100 mg Intravenous Given 10/31/22 0328)       Diagnostic Studies  Results Reviewed     Procedure Component Value Units Date/Time    COVID19, Influenza A/B, RSV PCR, [facility] - 2 hour STAT [[id number]]  (Abnormal) Collected: 10/31/22 0207    Lab Status: Final result Specimen: Nares from Nose Updated: 10/31/22 0259     SARS-CoV-2 Positive     INFLUENZA A PCR Negative     INFLUENZA B PCR Negative     RSV PCR Negative    Narrative:      FOR PEDIATRIC PATIENTS - copy/paste COVID Guidelines URL to browser: [email or web address]  [employer]    SARS-CoV-2 assay is a Nucleic Acid Amplification assay intended for the  qualitative detection of nucleic acid from SARS-CoV-2 in nasopharyngeal  swabs  Results are for the presumptive identification of SARS-CoV-2 RNA  Positive results are indicative of infection with SARS-CoV-2, the virus  causing COVID-19, but do not rule out bacterial infection or co-infection  with other viruses  Laboratories within the [place] and its  territories are required to report all positive results to the appropriate  public health authorities  Negative results do not preclude SARS-CoV-2  infection and should not be used as the sole basis for treatment or other  patient management decisions  Negative results must be combined with  clinical observations, patient history, and epidemiological information  This test has not been FDA cleared or approved  This test has been authorized by FDA under an Emergency Use Authorization  (EUA)   This test is only authorized for the duration of time the  declaration that circumstances exist justifying the authorization of the  emergency use of an in vitro diagnostic tests for detection of SARS-CoV-2  virus and/or diagnosis of COVID-19 infection under section 564(b)(1) of  the Act, 21 U  S C  418LRS-0(O)(9), unless the authorization is terminated  or revoked sooner  The test has been validated but independent review by FDA  and CLIA is pending  Test performed using [employer] Gene[employer]pert: This RT-PCR assay targets N2,  a region unique to SARS-CoV-2  A conserved region in the E-gene was chosen  for pan-Sarbecovirus detection which includes SARS-CoV-2  According to CMS-2020-01-R, this platform meets the definition of high-throughput technology      Urine Microscopic [[id number]]  (Normal) Collected: 10/31/22 0207    Lab Status: Final result Specimen: Urine, Clean Catch Updated: 10/31/22 0258     RBC, UA 1-2 /hpf      WBC, UA 0-1 /hpf      Epithelial Cells None Seen /hpf      Bacteria, UA Occasional /hpf     Comprehensive metabolic panel [[id number]]  (Abnormal) Collected: 10/31/22 0207    Lab Status: Final result Specimen: Blood from Arm, Left Updated: 10/31/22 0244     Sodium 138 mmol/L      Potassium 3 6 mmol/L      Chloride 100 mmol/L      CO2 30 mmol/L      ANION GAP 8 mmol/L      BUN 15 mg/dL      Creatinine 1 04 mg/dL      Glucose 102 mg/dL      Calcium 9 0 mg/dL      Corrected Calcium 9 6 mg/dL      AST --     ALT 28 U/L      Alkaline Phosphatase 81 U/L      Total Protein 7 4 g/dL      Albumin 3 2 g/dL      Total Bilirubin 0 24 mg/dL      eGFR 56 ml/min/1 73sq m     Narrative:      Darren guidelines for Chronic Kidney Disease (CKD):   •  Stage 1 with normal or high GFR (GFR > 90 mL/min/1 73 square meters)  •  Stage 2 Mild CKD (GFR = 60-89 mL/min/1 73 square meters)  •  Stage 3A Moderate CKD (GFR = 45-59 mL/min/1 73 square meters)  •  Stage 3B Moderate CKD (GFR = 30-44 mL/min/1 73 square meters)  •  Stage 4 Severe CKD (GFR = 15-29 mL/min/1 73 square meters)  •  Stage 5 End Stage CKD (GFR <15 mL/min/1 73 square meters)  Note: GFR calculation is accurate only with a steady state creatinine Lactic acid [[id number]]  (Normal) Collected: 10/31/22 0207    Lab Status: Final result Specimen: Blood from Arm, Left Updated: 10/31/22 0244     LACTIC ACID 1 0 mmol/L     Narrative:      Result may be elevated if tourniquet was used during collection      Procalcitonin [[id number]]  (Normal) Collected: 10/31/22 0207    Lab Status: Final result Specimen: Blood from Arm, Left Updated: 10/31/22 0241     Procalcitonin 0 08 ng/ml     HS Troponin 0hr (reflex protocol) [[id number]]  (Normal) Collected: 10/31/22 0207    Lab Status: Final result Specimen: Blood from Arm, Left Updated: 10/31/22 0238     hs TnI 0hr 5 ng/L     UA w Reflex to Microscopic w Reflex to Culture [[id number]]  (Abnormal) Collected: 10/31/22 0207    Lab Status: Final result Specimen: Urine, Clean Catch Updated: 10/31/22 0225     Color, UA Yellow     Clarity, UA Clear     Specific Gravity, UA 1 015     pH, UA 7 0     Leukocytes, UA Negative     Nitrite, UA Negative     Protein, UA Negative mg/dl      Glucose, UA Negative mg/dl      Ketones, UA Negative mg/dl      Urobilinogen, UA 0 2 E U /dl      Bilirubin, UA Negative     Occult Blood, UA Trace-Intact    CBC and differential [[id number]]  (Abnormal) Collected: 10/31/22 0207    Lab Status: Final result Specimen: Blood from Arm, Left Updated: 10/31/22 0216     WBC 7 75 Thousand/uL      RBC 4 23 Million/uL      Hemoglobin 12 6 g/dL      Hematocrit 39 9 %      MCV 94 fL      MCH 29 8 pg      MCHC 31 6 g/dL      RDW 13 2 %      MPV 9 5 fL      Platelets 937 Thousands/uL      nRBC 0 /100 WBCs      Neutrophils Relative 81 %      Immat GRANS % 0 %      Lymphocytes Relative 9 %      Monocytes Relative 9 %      Eosinophils Relative 1 %      Basophils Relative 0 %      Neutrophils Absolute 6 21 Thousands/µL      Immature Grans Absolute 0 03 Thousand/uL      Lymphocytes Absolute 0 70 Thousands/µL      Monocytes Absolute 0 71 Thousand/µL      Eosinophils Absolute 0 08 Thousand/µL      Basophils Absolute 0 02 Thousands/µL                  XR chest portable   ED Interpretation by Wade Toure DO (10/31 0220)   No acute cardiopulmonary disease  Procedures  Procedures         ED Course  ED Course as of 10/31/22 0356   [facility] Oct 31, 2022   0207 Procedure Note: EKG  Date/Time: 10/31/22 2:06 AM   Interpreted by: Wade Toure  Indications / Diagnosis: chest tightness  ECG reviewed by me, the ED Provider: yes   The EKG demonstrates:  Rhythm: normal sinus rhythm 71 BPM  Intervals: Normal WV and QT intervals  Axis: Normal axis  QRS/Blocks: Normal QRS  ST Changes: No acute ST/T waves changes  No SUZANNE  No TWI    0259 SARS-COV-2(!): Positive             HEART Risk Score    Flowsheet Row Most Recent Value   Heart Score Risk Calculator    History 0 Filed at: 10/31/2022 0259   ECG 0 Filed at: 10/31/2022 0259   Age 2 Filed at: 10/31/2022 0259   Risk Factors 2 Filed at: 10/31/2022 0259   Troponin 0 Filed at: 10/31/2022 0259   HEART Score 4 Filed at: 10/31/2022 0259                                      MDM  Number of Diagnoses or Management Options  COVID-19  Diagnosis management comments:   78 y o  female presenting for fevers, cough and chest tightness  VSS, nontoxic appearing  Speaking in full sentences  Will order labs to screen for pneumonia, UTI or other source of infection  Will also check EKG/Troponin though low suspicion for ACS  Lab results reviewed with patient, including positive COVID19 test result  With good pleth, oxygen saturation > 90% in ED at rest and with exertion  Disposition: I have discussed with the patient our plan to discharge them from the ED and the patient is in agreement with this plan  Discharge Plan: Rx for prednisone for COVID 19 and COPD exacerbation  Unfortunately paxlovid contraindicated given daily eliquis use and risk for bleeding  PRN tylenol for fever  Instructed to monitor pulse ox at home  Confirmed patient has a pulse oximeter   Discussed possibility of disease progression requiring hospitalization  RTED precautions emphasized  The patient was provided a written after visit summary with strict RTED precautions  Followup: I have discussed with the patient plan to follow up with their PCP  Contact information provided in AVS        Amount and/or Complexity of Data Reviewed  Clinical lab tests: ordered and reviewed  Tests in the radiology section of CPT®: ordered and reviewed  Review and summarize past medical records: yes  Independent visualization of images, tracings, or specimens: yes    Patient Progress  Patient progress: stable      Disposition  Final diagnoses:   COVID-19     Time reflects when diagnosis was documented in both MDM as applicable and the Disposition within this note     Time User Action Codes Description Comment    10/31/2022  3:18 AM Nataly Rizzo Add [U07 1] COVID-19       ED Disposition     ED Disposition   Discharge    Condition   Stable    Date/Time   Mon Oct 31, 2022  3:18 AM    Comment   Verna Machado discharge to home/self care  Follow-up Information     Follow up With Specialties Details Why Rosalinda Albert MD Family Medicine Schedule an appointment as soon as possible for a visit  To make appointment for reevaluation in 3-5 days   [id number] [facility]  [phone]            Discharge Medication List as of 10/31/2022  3:38 AM      START taking these medications    Details   predniSONE 20 mg tablet Take 2 tablets (40 mg total) by mouth daily for 4 days Do not start before November 1, 2022 , Starting Tue 11/1/2022, Until Sat 11/5/2022, Normal         CONTINUE these medications which have NOT CHANGED    Details   albuterol (PROVENTIL HFA,VENTOLIN HFA) 90 mcg/act inhaler Inhale 2 puffs every 6 (six) hours as needed for wheezing or shortness of breath, Starting Sat 2/29/2020, No Print      amLODIPine (NORVASC) 5 mg tablet Take 5 mg by mouth every morning, Starting Mon 3/21/2022, Historical Med apixaban (ELIQUIS) 5 mg Take 5 mg by mouth 2 (two) times a day , Starting Tue 2/5/2019, Historical Med      aspirin 81 mg chewable tablet Chew 1 tablet (81 mg total) daily, Starting Sat 2/29/2020, No Print      atorvastatin (LIPITOR) 20 mg tablet Take 20 mg by mouth every morning, Starting Tue 6/7/2022, Historical Med      !! Blood Glucose Monitoring Suppl (ONETOUCH VERIO) w/Device KIT Use to check sugars once a day  Dx E11 9, Historical Med      !! Blood Glucose Monitoring Suppl (Cinthya Rubio) w/Device KIT daily Use to check glucose, Starting Tue 1/28/2020, Historical Med      docusate sodium (COLACE) 100 mg capsule Take 1 capsule (100 mg total) by mouth 2 (two) times a day, Starting Fri 10/7/2022, Normal      dofetilide (TIKOSYN) 500 mcg capsule Take 500 mcg by mouth 2 (two) times a day, Historical Med      fenofibrate (TRICOR) 54 MG tablet Take 54 mg by mouth daily, Historical Med      furosemide (LASIX) 20 mg tablet Take 20 mg by mouth 2 (two) times a day, Starting Wed 6/10/2020, Historical Med      gabapentin (Neurontin) 100 mg capsule Take 3 capsules (300 mg total) by mouth 2 (two) times a day, Starting Fri 10/7/2022, Normal      glipiZIDE (GLUCOTROL XL) 5 mg 24 hr tablet TAKE 1 TABLET BY MOUTH ONCE DAILY 30 MINUTES BEFORE A MEAL (REPLACES METFORMIN), Historical Med      !! glucose blood test strip Use to check sugars once a day  Dx E11 9, Historical Med      hydrocortisone (ANUSOL-HC) 2 5 % rectal cream Apply topically 2 (two) times a day, Starting Mon 8/22/2022, Normal      hydrOXYzine HCL (ATARAX) 10 mg tablet Take 10 mg by mouth 3 (three) times a day, Starting Mon 6/6/2022, Historical Med      !! Lancets (ONETOUCH DELICA PLUS [id number]) MISC USE 1 TO CHECK GLUCOSE ONCE DAILY, Historical Med      lidocaine (XYLOCAINE) 2 % topical gel Apply topically as needed for mild pain Apply to anal area PRN pain  , Starting Fri 10/7/2022, Normal      lisinopril (ZESTRIL) 10 mg tablet Take 10 mg by mouth every morning, Starting Tue 6/7/2022, Historical Med      mesalamine (DELZICOL) 400 mg Take 2 capsules by mouth twice daily, Normal      metoprolol succinate (TOPROL-XL) 100 mg 24 hr tablet Take 1 tablet (100 mg total) by mouth every 12 (twelve) hours, Starting Thu 2/14/2019, Normal      nitroglycerin (NITROSTAT) 0 4 mg SL tablet Place 0 4 mg under the tongue every 5 (five) minutes as needed Do not exceeda total of 3 doses in 15 minutes, Starting Mon 3/21/2022, Historical Med      omeprazole (PriLOSEC) 40 MG capsule Take 1 capsule by mouth twice daily, Normal      !! OneTouch Delica Lancets 77T MISC Use to check sugars once a day   Dx E11 9, Historical Med      !! ONETOUCH VERIO test strip USE 1 STRIP TO CHECK GLUCOSE ONCE DAILY, Historical Med      oxyCODONE-acetaminophen (PERCOCET) 5-325 mg per tablet Take 1 tablet by mouth every 4 (four) hours as needed for moderate pain or severe pain Max Daily Amount: 6 tablets, Starting Fri 10/7/2022, Normal      !! phenazopyridine (PYRIDIUM) 100 mg tablet Take 1 tablet (100 mg total) by mouth 3 (three) times a day as needed for bladder spasms, Starting Sat 10/15/2022, Normal      !! phenazopyridine (PYRIDIUM) 200 mg tablet Take 1 tablet (200 mg total) by mouth 3 (three) times a day, Starting Sun 10/16/2022, Normal      polyethylene glycol (GLYCOLAX) 17 GM/SCOOP powder Take 17 g by mouth daily, Starting Mon 8/22/2022, Normal      pramipexole (MIRAPEX) 0 5 mg tablet Take 0 5 mg by mouth daily at bedtime, Starting Tue 6/28/2022, Historical Med      QUEtiapine (SEROquel) 25 mg tablet Take 25 mg by mouth daily at bedtime, Starting Mon 6/6/2022, Historical Med      sertraline (ZOLOFT) 50 mg tablet Historical Med      SYNTHROID 150 MCG tablet TAKE 1 TABLET BY MOUTH ONCE DAILY FIRST THING IN THE MORNING (AT LEAST 30 MINUTES PRIOR TO BREAKFAST OR OTHER MEDS), Historical Med      Trelegy Ellipta 200-62 5-25 MCG/INH AEPB inhaler INHALE 1 PUFF BY MOUTH IN THE MORNING, Historical Med venlafaxine (EFFEXOR-XR) 37 5 mg 24 hr capsule Take 37 5 mg by mouth daily , Starting Mon 12/31/2018, Historical Med       !! - Potential duplicate medications found  Please discuss with provider  No discharge procedures on file      PDMP Review       Value Time User    PDMP Reviewed  Yes 2/25/2020  6:47 PM Lurdes Tan MD          ED Provider  Electronically Signed by           Jhon Nelson DO  10/31/22 6063

## 2022-10-31 NOTE — Clinical Note
Kavitha Connolly was seen and treated in our emergency department on 10/31/2022  No restrictions            Diagnosis: Vicenta Vera  may return to work on return date  She may return on this date: 11/07/2022         If you have any questions or concerns, please don't hesitate to call        Wess Essex, DO    ______________________________           _______________          _______________  Hospital Representative                              Date                                Time

## 2022-10-31 NOTE — DISCHARGE INSTRUCTIONS
You have been seen for fevers and diagnosed with COVID19  Please complete the course of prednisone as prescribed  You can take OTC tylenol as needed for fevers  Please monitor your pulse ox at home as discussed  Return to the emergency department if you develop chest pain, worsening trouble breathing, lethargy, oxygen saturation less than 90% or any other symptoms of concern  Please follow up with your PCP by calling the number provided

## 2022-11-14 ENCOUNTER — OFFICE VISIT (OUTPATIENT)
Dept: SURGERY | Facility: CLINIC | Age: 67
End: 2022-11-14

## 2022-11-14 ENCOUNTER — TELEPHONE (OUTPATIENT)
Dept: UROLOGY | Facility: AMBULATORY SURGERY CENTER | Age: 67
End: 2022-11-14

## 2022-11-14 VITALS
WEIGHT: 247.4 LBS | TEMPERATURE: 99.9 F | HEIGHT: 70 IN | HEART RATE: 63 BPM | DIASTOLIC BLOOD PRESSURE: 72 MMHG | SYSTOLIC BLOOD PRESSURE: 124 MMHG | BODY MASS INDEX: 35.42 KG/M2

## 2022-11-14 DIAGNOSIS — K64.9 HEMORRHOIDS, UNSPECIFIED HEMORRHOID TYPE: Primary | ICD-10-CM

## 2022-11-14 NOTE — ASSESSMENT & PLAN NOTE
S/p exicional hemorrhoidectomy  Improved  Still with some occasional bleeding and pain  The pain overall is better  She still has remaining hemorrhoids as not all were removed in one sitting  She c/o constipation and is only taking colace  I advised to start Miralax and encouraged sitz baths  Th excisional site is healing well

## 2022-11-14 NOTE — TELEPHONE ENCOUNTER
Pt is a a patient of Mychal Russo    Pt was seen on 10/25/22    Pt states that she is having problems with urination She states that she feels like has to go but nothing comes out and other times she coughs and she urinates  Pt does have an appointment 01/23/23 but she wants to know if there is anything that can be can or an earlier appointment       Pt can be reached at [phone]

## 2022-11-14 NOTE — PROGRESS NOTES
Assessment/Plan:    Hemorrhoids  S/p exicional hemorrhoidectomy  Improved  Still with some occasional bleeding and pain  The pain overall is better  She still has remaining hemorrhoids as not all were removed in one sitting  She c/o constipation and is only taking colace  I advised to start Miralax and encouraged sitz baths  Th excisional site is healing well  Diagnoses and all orders for this visit:    Hemorrhoids, unspecified hemorrhoid type          Subjective:      Patient ID: Julita Orellana is a 78 y o  female  78 Femals s/p left lateral and anterior hemorrhoidectomy presents for follow up  Still having some bleeding and discomfort although improved  Only taking colace for constipation  The following portions of the patient's history were reviewed and updated as appropriate: She  has a past medical history of A-fib ([facility] ), Asthma, Brain aneurysm, Cancer ([facility] ), Cardiac disease, CHF (congestive heart failure) ([facility] ), COPD (chronic obstructive pulmonary disease) ([place] [id number] ), CPAP (continuous positive airway pressure) dependence, Crohn disease ([facility] ), Diabetes mellitus ([facility] ), Disease of thyroid gland, GERD (gastroesophageal reflux disease), Hyperlipidemia, Hypertension, and Sleep apnea    She   Patient Active Problem List    Diagnosis Date Noted   • Urinary retention 10/18/2022   • Hemorrhoids 09/28/2022   • Chronic pain syndrome 12/02/2021   • Chronic bilateral low back pain without sciatica 12/02/2021   • Lumbar spondylosis 12/02/2021   • Lumbar radiculopathy 12/02/2021   • Fracture of right tibia 02/02/2021   • Diabetes mellitus, type 2 ([facility] )    • Colon polyp 02/29/2020   • Hyperglycemia 02/29/2020   • Multiple renal cysts 02/29/2020   • T12 compression fracture ([facility] ) 02/29/2020   • Prolonged QT interval 02/27/2020   • Hepatomegaly 02/27/2020   • Hepatic steatosis 02/27/2020   • Atherosclerosis 02/26/2020   • Exacerbation of Crohn's disease ([facility] ) 02/25/2020   • Severe obstructive sleep apnea 02/25/2020 • Diarrhea 02/25/2020   • COPD (chronic obstructive pulmonary disease) ([place] [address] ) 12/01/2019   • Acute respiratory failure with hypoxia ([place] [address] ) 11/30/2019   • Pneumonia due to infectious organism 11/29/2019   • Elevated d-dimer 11/29/2019   • Hypokalemia 11/29/2019   • Acquired hypothyroidism 09/16/2019   • Brain aneurysm 09/16/2019   • Atypical chest pain 09/16/2019   • Dizziness 09/16/2019   • Crohn's disease with complication ([place] [address] ) 71/71/7229   • MARY ELLEN (obstructive sleep apnea)    • Microscopic hematuria 02/14/2019   • Pulmonary hypertension ([place] [address] ) 02/14/2019   • Lymphadenopathy 02/12/2019   • A-fib ([place] [address] ) 02/12/2019   • Elevated TSH 02/12/2019   • Hypercholesteremia 02/12/2019   • Transaminitis 02/12/2019     She  has a past surgical history that includes Hysterectomy; Cardioversion (N/A, 2/14/2019); Tonsillectomy and adenoidectomy; Appendectomy; Cerebral aneurysm repair; Cholecystectomy; Thyroidectomy; and pr excision thrombosed hemorrhoid, external (N/A, 10/7/2022)  Her family history is not on file  She  reports that she quit smoking about 4 years ago  She has never used smokeless tobacco  She reports current alcohol use  She reports that she does not use drugs  Current Outpatient Medications   Medication Sig Dispense Refill   • albuterol (PROVENTIL HFA,VENTOLIN HFA) 90 mcg/act inhaler Inhale 2 puffs every 6 (six) hours as needed for wheezing or shortness of breath  0   • amLODIPine (NORVASC) 5 mg tablet Take 5 mg by mouth every morning     • apixaban (ELIQUIS) 5 mg Take 5 mg by mouth 2 (two) times a day      • aspirin 81 mg chewable tablet Chew 1 tablet (81 mg total) daily  0   • atorvastatin (LIPITOR) 20 mg tablet Take 20 mg by mouth every morning     • Blood Glucose Monitoring Suppl (ONETOUCH VERIO) w/Device KIT Use to check sugars once a day   Dx E11 9     • Blood Glucose Monitoring Suppl (ONETOUCH VERIO) w/Device KIT daily Use to check glucose     • docusate sodium (COLACE) 100 mg capsule Take 1 capsule (100 mg total) by mouth 2 (two) times a day 30 capsule 1   • dofetilide (TIKOSYN) 500 mcg capsule Take 500 mcg by mouth 2 (two) times a day     • fenofibrate (TRICOR) 54 MG tablet Take 54 mg by mouth daily     • furosemide (LASIX) 20 mg tablet Take 20 mg by mouth 2 (two) times a day     • gabapentin (NEURONTIN) 300 mg capsule Take 1 capsule by mouth twice daily 30 capsule 0   • glipiZIDE (GLUCOTROL XL) 5 mg 24 hr tablet TAKE 1 TABLET BY MOUTH ONCE DAILY 30 MINUTES BEFORE A MEAL (REPLACES METFORMIN)     • glucose blood test strip Use to check sugars once a day  Dx E11 9     • hydrOXYzine HCL (ATARAX) 10 mg tablet Take 10 mg by mouth 3 (three) times a day     • Lancets (ONETOUCH DELICA PLUS [id number]) MISC USE 1 TO CHECK GLUCOSE ONCE DAILY     • lisinopril (ZESTRIL) 10 mg tablet Take 10 mg by mouth every morning     • mesalamine (DELZICOL) 400 mg Take 2 capsules by mouth twice daily 120 capsule 0   • metoprolol succinate (TOPROL-XL) 100 mg 24 hr tablet Take 1 tablet (100 mg total) by mouth every 12 (twelve) hours 60 tablet 0   • nitroglycerin (NITROSTAT) 0 4 mg SL tablet Place 0 4 mg under the tongue every 5 (five) minutes as needed Do not exceeda total of 3 doses in 15 minutes     • omeprazole (PriLOSEC) 40 MG capsule Take 1 capsule by mouth twice daily 60 capsule 5   • OneTouch Delica Lancets 00T MISC Use to check sugars once a day   Dx E11 9     • ONETOUCH VERIO test strip USE 1 STRIP TO CHECK GLUCOSE ONCE DAILY     • oxyCODONE-acetaminophen (PERCOCET) 5-325 mg per tablet Take 1 tablet by mouth every 4 (four) hours as needed for moderate pain or severe pain Max Daily Amount: 6 tablets 20 tablet 0   • phenazopyridine (PYRIDIUM) 100 mg tablet Take 1 tablet (100 mg total) by mouth 3 (three) times a day as needed for bladder spasms 9 tablet 0   • polyethylene glycol (GLYCOLAX) 17 GM/SCOOP powder Take 17 g by mouth daily 578 g 3   • pramipexole (MIRAPEX) 0 5 mg tablet Take 0 5 mg by mouth daily at bedtime     • sertraline (ZOLOFT) 50 mg tablet      • SYNTHROID 150 MCG tablet TAKE 1 TABLET BY MOUTH ONCE DAILY FIRST THING IN THE MORNING (AT LEAST 30 MINUTES PRIOR TO BREAKFAST OR OTHER MEDS)     • hydrocortisone (ANUSOL-HC) 2 5 % rectal cream Apply topically 2 (two) times a day (Patient not taking: No sig reported) 28 g 3   • lidocaine (XYLOCAINE) 2 % topical gel Apply topically as needed for mild pain Apply to anal area PRN pain  (Patient not taking: No sig reported) 30 mL 2   • Trelegy Ellipta 200-62 5-25 MCG/INH AEPB inhaler INHALE 1 PUFF BY MOUTH IN THE MORNING (Patient not taking: Reported on 11/14/2022)     • venlafaxine (EFFEXOR-XR) 37 5 mg 24 hr capsule Take 37 5 mg by mouth daily  (Patient not taking: No sig reported)       No current facility-administered medications for this visit  She is allergic to sulfa antibiotics       Review of Systems   Gastrointestinal: Positive for blood in stool, constipation and rectal pain  Objective:      /72   Pulse 63   Temp 99 9 °F (37 7 °C)   Ht 5' 9 5" (1 765 m)   Wt 112 kg (247 lb 6 4 oz)   BMI 36 01 kg/m²          Physical Exam  Constitutional:       General: She is not in acute distress  Appearance: Normal appearance  She is not ill-appearing or diaphoretic  Genitourinary:     Comments: Exicsional hemorrhoidectomy site healing well  Remaining hemorrhoids noted in the anterior and right sided position  No gross blood  Neurological:      Mental Status: She is alert

## 2022-11-15 DIAGNOSIS — K64.9 HEMORRHOIDS, UNSPECIFIED HEMORRHOID TYPE: ICD-10-CM

## 2022-11-15 NOTE — TELEPHONE ENCOUNTER
Called and spoke with Fernanda Hait  Reports being able to urinate, however she does not feel like she is completely emptying her bladder when she goes  Patient with history of urinary retention which occurred post op  Patient scheduled for nurse visit for urine testing and PVR to rule out infection and retention

## 2022-11-16 ENCOUNTER — TELEPHONE (OUTPATIENT)
Dept: UROLOGY | Facility: CLINIC | Age: 67
End: 2022-11-16

## 2022-11-16 ENCOUNTER — PROCEDURE VISIT (OUTPATIENT)
Dept: UROLOGY | Facility: CLINIC | Age: 67
End: 2022-11-16

## 2022-11-16 VITALS
BODY MASS INDEX: 35.62 KG/M2 | WEIGHT: 248.8 LBS | HEART RATE: 70 BPM | SYSTOLIC BLOOD PRESSURE: 142 MMHG | DIASTOLIC BLOOD PRESSURE: 72 MMHG | HEIGHT: 70 IN

## 2022-11-16 DIAGNOSIS — R33.9 INCOMPLETE BLADDER EMPTYING: Primary | ICD-10-CM

## 2022-11-16 LAB
BACTERIA UR QL AUTO: ABNORMAL /HPF
BILIRUB UR QL STRIP: NEGATIVE
CLARITY UR: ABNORMAL
COLOR UR: YELLOW
GLUCOSE UR STRIP-MCNC: NEGATIVE MG/DL
HGB UR QL STRIP.AUTO: ABNORMAL
KETONES UR STRIP-MCNC: NEGATIVE MG/DL
LEUKOCYTE ESTERASE UR QL STRIP: ABNORMAL
MUCOUS THREADS UR QL AUTO: ABNORMAL
NITRITE UR QL STRIP: NEGATIVE
NON-SQ EPI CELLS URNS QL MICRO: ABNORMAL /HPF
PH UR STRIP.AUTO: 6 [PH]
POST-VOID RESIDUAL VOLUME, ML POC: 22 ML
PROT UR STRIP-MCNC: ABNORMAL MG/DL
RBC #/AREA URNS AUTO: ABNORMAL /HPF
SL AMB  POCT GLUCOSE, UA: NORMAL
SL AMB LEUKOCYTE ESTERASE,UA: NORMAL
SL AMB POCT BILIRUBIN,UA: NORMAL
SL AMB POCT BLOOD,UA: NORMAL
SL AMB POCT CLARITY,UA: NORMAL
SL AMB POCT COLOR,UA: YELLOW
SL AMB POCT KETONES,UA: NORMAL
SL AMB POCT NITRITE,UA: NORMAL
SL AMB POCT PH,UA: 5
SL AMB POCT SPECIFIC GRAVITY,UA: 1.02
SL AMB POCT URINE PROTEIN: NORMAL
SL AMB POCT UROBILINOGEN: 0.2
SP GR UR STRIP.AUTO: 1.02 (ref 1–1.03)
UROBILINOGEN UR STRIP-ACNC: <2 MG/DL
WBC #/AREA URNS AUTO: ABNORMAL /HPF

## 2022-11-16 RX ORDER — GABAPENTIN 300 MG/1
CAPSULE ORAL
Qty: 30 CAPSULE | Refills: 0 | Status: SHIPPED | OUTPATIENT
Start: 2022-11-16 | End: 2022-11-27

## 2022-11-16 NOTE — TELEPHONE ENCOUNTER
Called and left voicemail message for patient that Tiffanie Guajardo recommends awaiting results of urinalysis at this time  Advised that our office will contact her with results when available  Asked her to call back with any further questions

## 2022-11-16 NOTE — PROGRESS NOTES
I supervised the Advanced Practitioner  I reviewed the Advanced Practitioner note and agree      Jcarlos Wood MD 11/16/22

## 2022-11-16 NOTE — PROGRESS NOTES
11/16/2022  Amy Hussein is a 78 y o  female  [id number]    Diagnosis: Incomplete bladder emptying  Chief Complaint    urine testing, PVR         Patient presents for follow up post void residual      Patient contacted the office yesterday regarding incomplete bladder emptying and urgency  Patient seen today for nurse visit  Urine dip in office showed large blood  Negative for leukocytes/nitrates  Only enough urine collected for u/a which is being sent out to lab  PVR WNL  Patient reports feeling urgency, however sometimes has to really strain and barely gets any urine out  Other times she gets urgency and has incontinence because urine just leaks out right away  She is well hydrated with water and is on Lasix  Not on any OAB meds  Patient with history of hemorrhoids and had surgery to remove some, however still has issues with rectal bleeding  She was seen by GI for this recently and is on stool softeners  Reports no constipation at this time  Plan: Will discuss with Deepika Melvin then contact patient to discuss        Assessment:      Vitals:    11/16/22 1334   BP: 142/72   Pulse: 70   Weight: 113 kg (248 lb 12 8 oz)   Height: 5' 9 5" (1 765 m)           Patient voided 15 mL in the office  Post void residual measured via hand held bladder scanner to be 22 mL      Recent Results (from the past 6 hour(s))   POCT urine dip    Collection Time: 11/16/22  1:46 PM   Result Value Ref Range    LEUKOCYTE ESTERASE,UA -     NITRITE,UA -     SL AMB POCT UROBILINOGEN 0 2     POCT URINE PROTEIN -      PH,UA 5 0     BLOOD,UA +++     SPECIFIC GRAVITY,UA 1 020     KETONES,UA -     BILIRUBIN,UA -     GLUCOSE, UA -      COLOR,UA yellow     CLARITY,UA hazy    POCT Measure PVR    Collection Time: 11/16/22  1:46 PM   Result Value Ref Range    POST-VOID RESIDUAL VOLUME, ML POC 22 mL         China Pack RN

## 2022-11-17 NOTE — RESULT ENCOUNTER NOTE
Urine microscopic does not appear to show infection  If patients symptoms persist, I would recommend repeat urine testing with culture  No antibiotic indicated at this time

## 2022-11-26 DIAGNOSIS — K64.9 HEMORRHOIDS, UNSPECIFIED HEMORRHOID TYPE: ICD-10-CM

## 2022-11-26 DIAGNOSIS — K50.919 CROHN'S DISEASE WITH COMPLICATION, UNSPECIFIED GASTROINTESTINAL TRACT LOCATION (HCC): ICD-10-CM

## 2022-11-27 RX ORDER — MESALAMINE 400 MG/1
CAPSULE, DELAYED RELEASE ORAL
Qty: 120 CAPSULE | Refills: 0 | Status: SHIPPED | OUTPATIENT
Start: 2022-11-27

## 2022-11-27 RX ORDER — GABAPENTIN 300 MG/1
CAPSULE ORAL
Qty: 30 CAPSULE | Refills: 0 | Status: SHIPPED | OUTPATIENT
Start: 2022-11-27 | End: 2022-12-08 | Stop reason: SDUPTHER

## 2022-12-02 ENCOUNTER — TELEPHONE (OUTPATIENT)
Dept: GASTROENTEROLOGY | Facility: CLINIC | Age: 67
End: 2022-12-02

## 2022-12-08 DIAGNOSIS — K50.919 CROHN'S DISEASE WITH COMPLICATION, UNSPECIFIED GASTROINTESTINAL TRACT LOCATION (HCC): ICD-10-CM

## 2022-12-08 DIAGNOSIS — K64.9 HEMORRHOIDS, UNSPECIFIED HEMORRHOID TYPE: ICD-10-CM

## 2022-12-08 RX ORDER — MESALAMINE 400 MG/1
800 CAPSULE, DELAYED RELEASE ORAL 2 TIMES DAILY
Qty: 120 CAPSULE | Refills: 0 | OUTPATIENT
Start: 2022-12-08

## 2022-12-08 RX ORDER — GABAPENTIN 300 MG/1
300 CAPSULE ORAL 2 TIMES DAILY
Qty: 30 CAPSULE | Refills: 0
Start: 2022-12-08

## 2022-12-12 ENCOUNTER — OFFICE VISIT (OUTPATIENT)
Dept: SURGERY | Facility: CLINIC | Age: 67
End: 2022-12-12

## 2022-12-12 VITALS
BODY MASS INDEX: 35.13 KG/M2 | WEIGHT: 245.4 LBS | TEMPERATURE: 98.7 F | HEART RATE: 64 BPM | HEIGHT: 70 IN | SYSTOLIC BLOOD PRESSURE: 136 MMHG | DIASTOLIC BLOOD PRESSURE: 77 MMHG

## 2022-12-12 DIAGNOSIS — K64.9 HEMORRHOIDS, UNSPECIFIED HEMORRHOID TYPE: Primary | ICD-10-CM

## 2022-12-12 NOTE — PROGRESS NOTES
Assessment/Plan:    Hemorrhoids  Status post excisional hemorrhoidectomy  Overall doing much better than when I saw her in the office a few weeks prior  No significant pain  No significant bleeding  States she following feels back to normal and is happy with how things are going  This time patient did defer exam   Follow-up as needed       Diagnoses and all orders for this visit:    Hemorrhoids, unspecified hemorrhoid type          Subjective:      Patient ID: Zan Suarez is a 78 y o  female  29-year-old female status post excisional hemorrhoidectomy presents today for follow-up  Overall doing well  Her pain is finally subsided and no longer causes her any significant discomfort  No significant bleeding  Her bowel movements have been more less normal although she is having some mild urgency since she has been off of her mesalamine for approximately 1 week due to issues with the medication being filled  The following portions of the patient's history were reviewed and updated as appropriate:   She  has a past medical history of A-fib ([facility] ), Asthma, Brain aneurysm, Cancer ([facility] ), Cardiac disease, CHF (congestive heart failure) ([facility] ), COPD (chronic obstructive pulmonary disease) ([facility] ), CPAP (continuous positive airway pressure) dependence, Crohn disease ([facility] ), Diabetes mellitus ([facility] ), Disease of thyroid gland, GERD (gastroesophageal reflux disease), Hyperlipidemia, Hypertension, and Sleep apnea    She   Patient Active Problem List    Diagnosis Date Noted   • Urinary retention 10/18/2022   • Hemorrhoids 09/28/2022   • Chronic pain syndrome 12/02/2021   • Chronic bilateral low back pain without sciatica 12/02/2021   • Lumbar spondylosis 12/02/2021   • Lumbar radiculopathy 12/02/2021   • Fracture of right tibia 02/02/2021   • Diabetes mellitus, type 2 ([address] )    • Colon polyp 02/29/2020   • Hyperglycemia 02/29/2020   • Multiple renal cysts 02/29/2020   • T12 compression fracture ([address] ) 02/29/2020   • Prolonged QT interval 02/27/2020   • Hepatomegaly 02/27/2020   • Hepatic steatosis 02/27/2020   • Atherosclerosis 02/26/2020   • Exacerbation of Crohn's disease ([place] [address] ) 02/25/2020   • Severe obstructive sleep apnea 02/25/2020   • Diarrhea 02/25/2020   • COPD (chronic obstructive pulmonary disease) ([place] [address] ) 12/01/2019   • Acute respiratory failure with hypoxia ([place] [address] ) 11/30/2019   • Pneumonia due to infectious organism 11/29/2019   • Elevated d-dimer 11/29/2019   • Hypokalemia 11/29/2019   • Acquired hypothyroidism 09/16/2019   • Brain aneurysm 09/16/2019   • Atypical chest pain 09/16/2019   • Dizziness 09/16/2019   • Crohn's disease with complication ([place] [address] ) 41/10/0076   • MARY ELLEN (obstructive sleep apnea)    • Microscopic hematuria 02/14/2019   • Pulmonary hypertension ([place] [address] ) 02/14/2019   • Lymphadenopathy 02/12/2019   • A-fib ([place] [address] ) 02/12/2019   • Elevated TSH 02/12/2019   • Hypercholesteremia 02/12/2019   • Transaminitis 02/12/2019     She  has a past surgical history that includes Hysterectomy; Cardioversion (N/A, 2/14/2019); Tonsillectomy and adenoidectomy; Appendectomy; Cerebral aneurysm repair; Cholecystectomy; Thyroidectomy; and pr exc thrombosed hemorrhoid xtrnl (N/A, 10/7/2022)  Her family history is not on file  She  reports that she quit smoking about 4 years ago  Her smoking use included cigarettes  She has never used smokeless tobacco  She reports current alcohol use  She reports that she does not use drugs    Current Outpatient Medications   Medication Sig Dispense Refill   • albuterol (PROVENTIL HFA,VENTOLIN HFA) 90 mcg/act inhaler Inhale 2 puffs every 6 (six) hours as needed for wheezing or shortness of breath  0   • amLODIPine (NORVASC) 5 mg tablet Take 5 mg by mouth every morning     • apixaban (ELIQUIS) 5 mg Take 5 mg by mouth 2 (two) times a day      • aspirin 81 mg chewable tablet Chew 1 tablet (81 mg total) daily  0   • atorvastatin (LIPITOR) 20 mg tablet Take 20 mg by mouth every morning     • Blood Glucose Monitoring Suppl ([facility]) w/Device KIT Use to check sugars once a day  Dx E11 9     • Blood Glucose Monitoring Suppl ([facility]) w/Device KIT daily Use to check glucose     • docusate sodium (COLACE) 100 mg capsule Take 1 capsule (100 mg total) by mouth 2 (two) times a day 30 capsule 1   • dofetilide (TIKOSYN) 500 mcg capsule Take 500 mcg by mouth 2 (two) times a day     • fenofibrate (TRICOR) 54 MG tablet Take 54 mg by mouth daily     • furosemide (LASIX) 20 mg tablet Take 20 mg by mouth 2 (two) times a day     • gabapentin (NEURONTIN) 300 mg capsule Take 1 capsule (300 mg total) by mouth 2 (two) times a day 30 capsule 0   • glipiZIDE (GLUCOTROL XL) 5 mg 24 hr tablet TAKE 1 TABLET BY MOUTH ONCE DAILY 30 MINUTES BEFORE A MEAL (REPLACES METFORMIN)     • glucose blood test strip Use to check sugars once a day  Dx E11 9     • hydrocortisone (ANUSOL-HC) 2 5 % rectal cream Apply topically 2 (two) times a day 28 g 3   • hydrOXYzine HCL (ATARAX) 10 mg tablet Take 10 mg by mouth 3 (three) times a day     • Lancets (ONETOUCH DELICA PLUS [id number]) MISC USE 1 TO CHECK GLUCOSE ONCE DAILY     • lidocaine (XYLOCAINE) 2 % topical gel Apply topically as needed for mild pain Apply to anal area PRN pain  30 mL 2   • lisinopril (ZESTRIL) 10 mg tablet Take 10 mg by mouth every morning     • mesalamine (DELZICOL) 400 mg Take 2 capsules by mouth twice daily 120 capsule 0   • metoprolol succinate (TOPROL-XL) 100 mg 24 hr tablet Take 1 tablet (100 mg total) by mouth every 12 (twelve) hours 60 tablet 0   • nitroglycerin (NITROSTAT) 0 4 mg SL tablet Place 0 4 mg under the tongue every 5 (five) minutes as needed Do not exceeda total of 3 doses in 15 minutes     • omeprazole (PriLOSEC) 40 MG capsule Take 1 capsule by mouth twice daily 60 capsule 5   • OneTouch Delica Lancets 46Z MISC Use to check sugars once a day   Dx E11 9     • ONETOUCH VERIO test strip USE 1 STRIP TO CHECK GLUCOSE ONCE DAILY     • oxyCODONE-acetaminophen (PERCOCET) 5-325 mg per tablet Take 1 tablet by mouth every 4 (four) hours as needed for moderate pain or severe pain Max Daily Amount: 6 tablets 20 tablet 0   • phenazopyridine (PYRIDIUM) 100 mg tablet Take 1 tablet (100 mg total) by mouth 3 (three) times a day as needed for bladder spasms 9 tablet 0   • polyethylene glycol (GLYCOLAX) 17 GM/SCOOP powder Take 17 g by mouth daily 578 g 3   • pramipexole (MIRAPEX) 0 5 mg tablet Take 0 5 mg by mouth daily at bedtime     • sertraline (ZOLOFT) 50 mg tablet      • SYNTHROID 150 MCG tablet TAKE 1 TABLET BY MOUTH ONCE DAILY FIRST THING IN THE MORNING (AT LEAST 30 MINUTES PRIOR TO BREAKFAST OR OTHER MEDS)     • venlafaxine (EFFEXOR-XR) 37 5 mg 24 hr capsule Take 37 5 mg by mouth daily     • Trelegy Ellipta 200-62 5-25 MCG/INH AEPB inhaler INHALE 1 PUFF BY MOUTH IN THE MORNING (Patient not taking: Reported on 12/12/2022)       No current facility-administered medications for this visit  She is allergic to sulfa antibiotics       Review of Systems   Constitutional: Negative for activity change, appetite change, chills, diaphoresis, fatigue, fever and unexpected weight change  Skin: Negative for color change, pallor, rash and wound  Objective:      /77 (BP Location: Right arm, Patient Position: Sitting, Cuff Size: Standard)   Pulse 64   Temp 98 7 °F (37 1 °C) (Tympanic)   Ht 5' 9 5" (1 765 m)   Wt 111 kg (245 lb 6 4 oz)   BMI 35 72 kg/m²          Physical Exam  Vitals reviewed  Constitutional:       General: She is not in acute distress  Appearance: Normal appearance  She is not ill-appearing, toxic-appearing or diaphoretic  Genitourinary:     Comments: Patient deferred exam on this office visit  Neurological:      Mental Status: She is alert

## 2022-12-12 NOTE — ASSESSMENT & PLAN NOTE
Status post excisional hemorrhoidectomy  Overall doing much better than when I saw her in the office a few weeks prior  No significant pain  No significant bleeding  States she following feels back to normal and is happy with how things are going    This time patient did defer exam   Follow-up as needed

## 2022-12-19 ENCOUNTER — HOSPITAL ENCOUNTER (EMERGENCY)
Facility: HOSPITAL | Age: 67
Discharge: HOME | End: 2022-12-19
Attending: EMERGENCY MEDICINE

## 2022-12-19 ENCOUNTER — APPOINTMENT (EMERGENCY)
Dept: RADIOLOGY | Facility: HOSPITAL | Age: 67
End: 2022-12-19

## 2022-12-19 VITALS
WEIGHT: 249.78 LBS | HEART RATE: 75 BPM | SYSTOLIC BLOOD PRESSURE: 155 MMHG | RESPIRATION RATE: 22 BRPM | DIASTOLIC BLOOD PRESSURE: 66 MMHG | BODY MASS INDEX: 35.76 KG/M2 | OXYGEN SATURATION: 96 % | TEMPERATURE: 101.1 F | HEIGHT: 70 IN

## 2022-12-19 DIAGNOSIS — R53.81 MALAISE AND FATIGUE: ICD-10-CM

## 2022-12-19 DIAGNOSIS — J10.1 INFLUENZA A: Primary | ICD-10-CM

## 2022-12-19 DIAGNOSIS — R53.83 MALAISE AND FATIGUE: ICD-10-CM

## 2022-12-19 DIAGNOSIS — R06.00 DYSPNEA: ICD-10-CM

## 2022-12-19 LAB
ALBUMIN SERPL BCP-MCNC: 3.4 G/DL (ref 3.5–5)
ALP SERPL-CCNC: 81 U/L (ref 46–116)
ALT SERPL W P-5'-P-CCNC: 27 U/L (ref 12–78)
ANION GAP SERPL CALCULATED.3IONS-SCNC: 9 MMOL/L (ref 4–13)
AST SERPL W P-5'-P-CCNC: 28 U/L (ref 5–45)
BACTERIA UR QL AUTO: ABNORMAL /HPF
BASOPHILS # BLD AUTO: 0.02 THOUSANDS/ÂΜL (ref 0–0.1)
BASOPHILS NFR BLD AUTO: 0 % (ref 0–1)
BILIRUB SERPL-MCNC: 0.25 MG/DL (ref 0.2–1)
BILIRUB UR QL STRIP: NEGATIVE
BUN SERPL-MCNC: 10 MG/DL (ref 5–25)
CALCIUM ALBUM COR SERPL-MCNC: 9.1 MG/DL (ref 8.3–10.1)
CALCIUM SERPL-MCNC: 8.6 MG/DL (ref 8.3–10.1)
CARDIAC TROPONIN I PNL SERPL HS: 3 NG/L
CHLORIDE SERPL-SCNC: 104 MMOL/L (ref 96–108)
CLARITY UR: ABNORMAL
CO2 SERPL-SCNC: 28 MMOL/L (ref 21–32)
COLOR UR: YELLOW
CREAT SERPL-MCNC: 0.91 MG/DL (ref 0.6–1.3)
EOSINOPHIL # BLD AUTO: 0.09 THOUSAND/ÂΜL (ref 0–0.61)
EOSINOPHIL NFR BLD AUTO: 1 % (ref 0–6)
ERYTHROCYTE [DISTWIDTH] IN BLOOD BY AUTOMATED COUNT: 13.4 % (ref 11.6–15.1)
FLUAV RNA RESP QL NAA+PROBE: POSITIVE
FLUBV RNA RESP QL NAA+PROBE: NEGATIVE
GFR SERPL CREATININE-BSD FRML MDRD: 65 ML/MIN/1.73SQ M
GLUCOSE SERPL-MCNC: 105 MG/DL (ref 65–140)
GLUCOSE UR STRIP-MCNC: NEGATIVE MG/DL
HCT VFR BLD AUTO: 40.5 % (ref 34.8–46.1)
HGB BLD-MCNC: 12.9 G/DL (ref 11.5–15.4)
HGB UR QL STRIP.AUTO: ABNORMAL
IMM GRANULOCYTES # BLD AUTO: 0.02 THOUSAND/UL (ref 0–0.2)
IMM GRANULOCYTES NFR BLD AUTO: 0 % (ref 0–2)
KETONES UR STRIP-MCNC: NEGATIVE MG/DL
LACTATE SERPL-SCNC: 1.4 MMOL/L (ref 0.5–2)
LEUKOCYTE ESTERASE UR QL STRIP: NEGATIVE
LIPASE SERPL-CCNC: 155 U/L (ref 73–393)
LYMPHOCYTES # BLD AUTO: 0.69 THOUSANDS/ÂΜL (ref 0.6–4.47)
LYMPHOCYTES NFR BLD AUTO: 7 % (ref 14–44)
MCH RBC QN AUTO: 29.7 PG (ref 26.8–34.3)
MCHC RBC AUTO-ENTMCNC: 31.9 G/DL (ref 31.4–37.4)
MCV RBC AUTO: 93 FL (ref 82–98)
MONOCYTES # BLD AUTO: 0.59 THOUSAND/ÂΜL (ref 0.17–1.22)
MONOCYTES NFR BLD AUTO: 6 % (ref 4–12)
NEUTROPHILS # BLD AUTO: 8.05 THOUSANDS/ÂΜL (ref 1.85–7.62)
NEUTS SEG NFR BLD AUTO: 86 % (ref 43–75)
NITRITE UR QL STRIP: NEGATIVE
NON-SQ EPI CELLS URNS QL MICRO: ABNORMAL /HPF
NRBC BLD AUTO-RTO: 0 /100 WBCS
NT-PROBNP SERPL-MCNC: 566 PG/ML
PH UR STRIP.AUTO: 7.5 [PH]
PLATELET # BLD AUTO: 270 THOUSANDS/UL (ref 149–390)
PMV BLD AUTO: 9.7 FL (ref 8.9–12.7)
POTASSIUM SERPL-SCNC: 3.6 MMOL/L (ref 3.5–5.3)
PROT SERPL-MCNC: 8.4 G/DL (ref 6.4–8.4)
PROT UR STRIP-MCNC: NEGATIVE MG/DL
RBC # BLD AUTO: 4.34 MILLION/UL (ref 3.81–5.12)
RBC #/AREA URNS AUTO: ABNORMAL /HPF
RSV RNA RESP QL NAA+PROBE: NEGATIVE
SARS-COV-2 RNA RESP QL NAA+PROBE: NEGATIVE
SODIUM SERPL-SCNC: 141 MMOL/L (ref 135–147)
SP GR UR STRIP.AUTO: 1.01 (ref 1–1.03)
UROBILINOGEN UR QL STRIP.AUTO: 0.2 E.U./DL
WBC # BLD AUTO: 9.46 THOUSAND/UL (ref 4.31–10.16)
WBC #/AREA URNS AUTO: ABNORMAL /HPF

## 2022-12-19 RX ORDER — POTASSIUM CHLORIDE 20MEQ/15ML
40 LIQUID (ML) ORAL ONCE
Status: COMPLETED | OUTPATIENT
Start: 2022-12-19 | End: 2022-12-19

## 2022-12-19 RX ORDER — IPRATROPIUM BROMIDE AND ALBUTEROL SULFATE 2.5; .5 MG/3ML; MG/3ML
3 SOLUTION RESPIRATORY (INHALATION) ONCE
Status: COMPLETED | OUTPATIENT
Start: 2022-12-19 | End: 2022-12-19

## 2022-12-19 RX ORDER — DEXAMETHASONE SODIUM PHOSPHATE 4 MG/ML
10 INJECTION, SOLUTION INTRA-ARTICULAR; INTRALESIONAL; INTRAMUSCULAR; INTRAVENOUS; SOFT TISSUE ONCE
Status: COMPLETED | OUTPATIENT
Start: 2022-12-19 | End: 2022-12-19

## 2022-12-19 RX ORDER — ACETAMINOPHEN 325 MG/1
975 TABLET ORAL ONCE
Status: COMPLETED | OUTPATIENT
Start: 2022-12-19 | End: 2022-12-19

## 2022-12-19 RX ADMIN — IPRATROPIUM BROMIDE AND ALBUTEROL SULFATE 3 ML: 2.5; .5 SOLUTION RESPIRATORY (INHALATION) at 14:30

## 2022-12-19 RX ADMIN — ACETAMINOPHEN 975 MG: 325 TABLET ORAL at 14:30

## 2022-12-19 RX ADMIN — POTASSIUM CHLORIDE 40 MEQ: 1.5 SOLUTION ORAL at 15:44

## 2022-12-19 RX ADMIN — DEXAMETHASONE SODIUM PHOSPHATE 10 MG: 4 INJECTION, SOLUTION INTRAMUSCULAR; INTRAVENOUS at 14:30

## 2022-12-19 NOTE — SEPSIS NOTE
Sepsis Note   Prema Barker 78 y o  female MRN: [id number]  Unit/Bed#: RM02 Encounter: [id number]       qSOFA     [address] Name 12/19/22 1313                Altered mental status GCS < 15 --        Respiratory Rate > / =05 1        Systolic BP < / =443 0        Q Sofa Score 1                   Initial Sepsis Screening     Row Name 12/19/22 1422                Is the patient's history suggestive of a new or worsening infection? Yes (Proceed)  -JT        Suspected source of infection --        Are two or more of the following signs & symptoms of infection both present and new to the patient? --        Indicate SIRS criteria Tachypnea > 20 resp per min; Hyperthemia > 38 3C (100 9F)  -JT        If the answer is yes to both questions, suspicion of sepsis is present --        If severe sepsis is present AND tissue hypoperfusion perists in the hour after fluid resuscitation or lactate > 4, the patient meets criteria for SEPTIC SHOCK --        Are any of the following organ dysfunction criteria present within 6 hours of suspected infection and SIRS criteria that are NOT considered to be chronic conditions?  No  -JT        Organ dysfunction --        Date of presentation of severe sepsis --        Time of presentation of severe sepsis --        Tissue hypoperfusion persists in the hour after crystalloid fluid administration, evidenced, by either: --        Was hypotension present within one hour of the conclusion of crystalloid fluid administration? --        Date of presentation of septic shock --        Time of presentation of septic shock --              User Key  (r) = Recorded By, (t) = Taken By, (c) = Cosigned By    [address] Name Provider Type    DO Charlene Guallpa

## 2022-12-19 NOTE — ED PROVIDER NOTES
History  Chief Complaint   Patient presents with   • Chest Pain     Patient reports chest pain and shortness of breath starting this morning  Patient did take a breathing treatment at home which helped "a little bit"     81-year-old female presents for evaluation of acute onset respiratory distress which began upon waking this morning  Patient states that she used nebulizer with minimal relief  Patient states that yesterday she was fine and denies any recent illness  Patient also describes generalized nonradiating chest tightness  No obvious alleviating or exacerbating factors  No nausea or vomiting,or diaphoresis  No lower extremity edema or swelling  Patient with malaise  Subjective fever and myalgias  No urinary symptoms  No flank pain  No abdominal pain however on exam, there is noted left upper quadrant tenderness  On exam, the patient is ill-appearing but not toxic  Patient reports compliance with medications including diuretics  Prior to Admission Medications   Prescriptions Last Dose Informant Patient Reported? Taking? Blood Glucose Monitoring Suppl (Kamilla Laila) w/Device KIT   Yes No   Sig: Use to check sugars once a day  Dx E11 9   Blood Glucose Monitoring Suppl (Fooda) w/Device KIT   Yes No   Sig: daily Use to check glucose   Lancets (ONETOUCH DELICA PLUS UFFOTD06H) MISC   Yes No   Sig: USE 1 TO CHECK GLUCOSE ONCE DAILY   ONETOUCH VERIO test strip   Yes No   Sig: USE 1 STRIP TO CHECK GLUCOSE ONCE DAILY   OneTouch Delica Lancets 77F MISC   Yes No   Sig: Use to check sugars once a day   Dx E11 9   SYNTHROID 150 MCG tablet   Yes No   Sig: TAKE 1 TABLET BY MOUTH ONCE DAILY FIRST THING IN THE MORNING (AT LEAST 30 MINUTES PRIOR TO BREAKFAST OR OTHER MEDS)   Trelegy Ellipta 200-62 5-25 MCG/INH AEPB inhaler   Yes No   Sig: INHALE 1 PUFF BY MOUTH IN THE MORNING   Patient not taking: Reported on 12/12/2022   albuterol (PROVENTIL HFA,VENTOLIN HFA) 90 mcg/act inhaler   No No   Sig: Inhale 2 puffs every 6 (six) hours as needed for wheezing or shortness of breath   amLODIPine (NORVASC) 5 mg tablet   Yes No   Sig: Take 5 mg by mouth every morning   apixaban (ELIQUIS) 5 mg   Yes No   Sig: Take 5 mg by mouth 2 (two) times a day    aspirin 81 mg chewable tablet   No No   Sig: Chew 1 tablet (81 mg total) daily   atorvastatin (LIPITOR) 20 mg tablet   Yes No   Sig: Take 20 mg by mouth every morning   docusate sodium (COLACE) 100 mg capsule   No No   Sig: Take 1 capsule (100 mg total) by mouth 2 (two) times a day   dofetilide (TIKOSYN) 500 mcg capsule   Yes No   Sig: Take 500 mcg by mouth 2 (two) times a day   fenofibrate (TRICOR) 54 MG tablet   Yes No   Sig: Take 54 mg by mouth daily   furosemide (LASIX) 20 mg tablet   Yes No   Sig: Take 20 mg by mouth 2 (two) times a day   gabapentin (NEURONTIN) 300 mg capsule   No No   Sig: Take 1 capsule (300 mg total) by mouth 2 (two) times a day   glipiZIDE (GLUCOTROL XL) 5 mg 24 hr tablet   Yes No   Sig: TAKE 1 TABLET BY MOUTH ONCE DAILY 30 MINUTES BEFORE A MEAL (REPLACES METFORMIN)   glucose blood test strip   Yes No   Sig: Use to check sugars once a day  Dx E11 9   hydrOXYzine HCL (ATARAX) 10 mg tablet   Yes No   Sig: Take 10 mg by mouth 3 (three) times a day   hydrocortisone (ANUSOL-HC) 2 5 % rectal cream   No No   Sig: Apply topically 2 (two) times a day   lidocaine (XYLOCAINE) 2 % topical gel   No No   Sig: Apply topically as needed for mild pain Apply to anal area PRN pain     lisinopril (ZESTRIL) 10 mg tablet   Yes No   Sig: Take 10 mg by mouth every morning   mesalamine (DELZICOL) 400 mg   No No   Sig: Take 2 capsules by mouth twice daily   metoprolol succinate (TOPROL-XL) 100 mg 24 hr tablet   No No   Sig: Take 1 tablet (100 mg total) by mouth every 12 (twelve) hours   nitroglycerin (NITROSTAT) 0 4 mg SL tablet   Yes No   Sig: Place 0 4 mg under the tongue every 5 (five) minutes as needed Do not exceeda total of 3 doses in 15 minutes   omeprazole (PriLOSEC) 40 MG capsule   No No   Sig: Take 1 capsule by mouth twice daily   oxyCODONE-acetaminophen (PERCOCET) 5-325 mg per tablet   No No   Sig: Take 1 tablet by mouth every 4 (four) hours as needed for moderate pain or severe pain Max Daily Amount: 6 tablets   phenazopyridine (PYRIDIUM) 100 mg tablet   No No   Sig: Take 1 tablet (100 mg total) by mouth 3 (three) times a day as needed for bladder spasms   polyethylene glycol (GLYCOLAX) 17 GM/SCOOP powder   No No   Sig: Take 17 g by mouth daily   pramipexole (MIRAPEX) 0 5 mg tablet   Yes No   Sig: Take 0 5 mg by mouth daily at bedtime   sertraline (ZOLOFT) 50 mg tablet   Yes No   venlafaxine (EFFEXOR-XR) 37 5 mg 24 hr capsule   Yes No   Sig: Take 37 5 mg by mouth daily      Facility-Administered Medications: None       Past Medical History:   Diagnosis Date   • A-fib (HCC)    • Asthma    • Brain aneurysm    • Cancer (HCC)     papillary thyroid cancer   • Cardiac disease    • CHF (congestive heart failure) (HCC)    • COPD (chronic obstructive pulmonary disease) (HCC)    • CPAP (continuous positive airway pressure) dependence    • Crohn disease (HCC)    • Diabetes mellitus (HCC)    • Disease of thyroid gland    • GERD (gastroesophageal reflux disease)    • Hyperlipidemia    • Hypertension    • Sleep apnea        Past Surgical History:   Procedure Laterality Date   • APPENDECTOMY     • CARDIOVERSION N/A 2/14/2019    Procedure: CARDIOVERSION;  Surgeon: Gearline Cooks, MD;  Location: [facility];  Service: Cardiology   • CEREBRAL ANEURYSM REPAIR     • CHOLECYSTECTOMY     • HYSTERECTOMY     • VT EXCISION THROMBOSED HEMORRHOID, EXTERNAL N/A 10/7/2022    Procedure: HEMORRHOIDECTOMY EXCISION;  Surgeon: Nathalie Lin DO;  Location: [facility];  Service: General   • THYROIDECTOMY     • TONSILLECTOMY AND ADENOIDECTOMY         History reviewed  No pertinent family history  I have reviewed and agree with the history as documented      E-Cigarette/Vaping   • E-Cigarette Use Never User      E-Cigarette/Vaping Substances   • Nicotine No    • THC No    • CBD No    • Flavoring No    • Other No    • Unknown No      Social History     Tobacco Use   • Smoking status: Former     Types: Cigarettes     Quit date: 2018     Years since quittin 9   • Smokeless tobacco: Never   Vaping Use   • Vaping Use: Never used   Substance Use Topics   • Alcohol use: Yes     Alcohol/week: 0 0 standard drinks     Comment: social   • Drug use: Never       Review of Systems   Constitutional: Positive for chills, fatigue and fever  Negative for activity change, appetite change and diaphoresis  HENT: Negative for congestion, dental problem, ear pain, rhinorrhea, sore throat, trouble swallowing and voice change  Eyes: Negative for pain and visual disturbance  Respiratory: Positive for chest tightness, shortness of breath and wheezing  Negative for cough  Cardiovascular: Negative for chest pain, palpitations and leg swelling  Gastrointestinal: Negative for abdominal pain, anal bleeding, blood in stool, diarrhea, nausea, rectal pain and vomiting  Endocrine: Negative for polydipsia, polyphagia and polyuria  Genitourinary: Negative for difficulty urinating, dysuria, flank pain, frequency, hematuria and urgency  Musculoskeletal: Negative for back pain, joint swelling, myalgias, neck pain and neck stiffness  Skin: Negative for pallor, rash and wound  Neurological: Negative for dizziness, facial asymmetry, speech difficulty, weakness, light-headedness, numbness and headaches  Hematological: Negative for adenopathy  Psychiatric/Behavioral: Negative for agitation  The patient is not nervous/anxious  All other systems reviewed and are negative  Physical Exam  Physical Exam  Vitals and nursing note reviewed  Constitutional:       General: She is not in acute distress  Appearance: She is well-developed  She is ill-appearing  She is not toxic-appearing or diaphoretic     HENT: Head: Normocephalic and atraumatic  Right Ear: External ear normal       Left Ear: External ear normal       Nose: Congestion and rhinorrhea present  Mouth/Throat:      Pharynx: Oropharynx is clear  Uvula midline  No pharyngeal swelling, oropharyngeal exudate, posterior oropharyngeal erythema or uvula swelling  Tonsils: No tonsillar exudate  Eyes:      General: No visual field deficit or scleral icterus  Right eye: No discharge  Left eye: No discharge  Extraocular Movements: Extraocular movements intact  Conjunctiva/sclera: Conjunctivae normal       Pupils: Pupils are equal, round, and reactive to light  Neck:      Thyroid: No thyromegaly  Vascular: No hepatojugular reflux or JVD  Trachea: No tracheal deviation  Cardiovascular:      Rate and Rhythm: Normal rate and regular rhythm  Pulses: Normal pulses  Heart sounds: Normal heart sounds, S1 normal and S2 normal  No murmur heard  No friction rub  No gallop  Pulmonary:      Effort: Pulmonary effort is normal  No accessory muscle usage, respiratory distress or retractions  Breath sounds: Normal air entry  No stridor or decreased air movement  Examination of the right-lower field reveals wheezing  Examination of the left-lower field reveals wheezing  Wheezing present  No decreased breath sounds, rhonchi or rales  Chest:      Chest wall: No deformity, tenderness, crepitus or edema  Abdominal:      General: There is no distension  Palpations: Abdomen is soft  There is no mass  Tenderness: There is no abdominal tenderness  There is no guarding or rebound  Hernia: No hernia is present  Musculoskeletal:         General: No tenderness or deformity  Normal range of motion  Cervical back: Normal range of motion and neck supple  Right lower leg: No tenderness  No edema  Left lower leg: No tenderness  No edema     Lymphadenopathy:      Head:      Right side of head: Submandibular and tonsillar adenopathy present  Left side of head: Submandibular and tonsillar adenopathy present  Cervical: No cervical adenopathy  Skin:     General: Skin is warm and dry  Capillary Refill: Capillary refill takes less than 2 seconds  Coloration: Skin is not cyanotic or pale  Findings: No erythema or rash  Neurological:      General: No focal deficit present  Mental Status: She is alert and oriented to person, place, and time  GCS: GCS eye subscore is 4  GCS verbal subscore is 5  GCS motor subscore is 6  Cranial Nerves: Cranial nerves 2-12 are intact  No cranial nerve deficit, dysarthria or facial asymmetry  Sensory: Sensation is intact  No sensory deficit  Motor: Motor function is intact  No weakness or tremor  Coordination: Coordination is intact  Gait: Gait is intact  Deep Tendon Reflexes: Reflexes are normal and symmetric   Reflexes normal    Psychiatric:         Mood and Affect: Mood normal          Behavior: Behavior normal          Vital Signs  ED Triage Vitals [12/19/22 1313]   Temperature Pulse Respirations Blood Pressure SpO2   (!) 101 1 °F (38 4 °C) 75 22 155/66 96 %      Temp Source Heart Rate Source Patient Position - Orthostatic VS BP Location FiO2 (%)   Temporal Monitor Lying Left arm --      Pain Score       8           Vitals:    12/19/22 1313   BP: 155/66   Pulse: 75   Patient Position - Orthostatic VS: Lying         Visual Acuity      ED Medications  Medications   ipratropium-albuterol (DUO-NEB) 0 5-2 5 mg/3 mL inhalation solution 3 mL (3 mL Nebulization Given 12/19/22 1430)   dexamethasone (DECADRON) injection 10 mg (10 mg Intravenous Given 12/19/22 1430)   acetaminophen (TYLENOL) tablet 975 mg (975 mg Oral Given 12/19/22 1430)   potassium chloride oral solution 40 mEq (40 mEq Oral Given 12/19/22 1544)       Diagnostic Studies  Results Reviewed     Procedure Component Value Units Date/Time    Urine Microscopic [[id number]]  (Abnormal) Collected: 12/19/22 1548    Lab Status: Final result Specimen: Urine, Clean Catch Updated: 12/19/22 1559     RBC, UA 30-50 /hpf      WBC, UA 0-1 /hpf      Epithelial Cells Occasional /hpf      Bacteria, UA Occasional /hpf     UA w Reflex to Microscopic w Reflex to Culture [[id number]]  (Abnormal) Collected: 12/19/22 1548    Lab Status: Final result Specimen: Urine, Clean Catch Updated: 12/19/22 1553     Color, UA Yellow     Clarity, UA Cloudy     Specific Gravity, UA 1 015     pH, UA 7 5     Leukocytes, UA Negative     Nitrite, UA Negative     Protein, UA Negative mg/dl      Glucose, UA Negative mg/dl      Ketones, UA Negative mg/dl      Urobilinogen, UA 0 2 E U /dl      Bilirubin, UA Negative     Occult Blood, UA Large    NT-BNP PRO [[id number]]  (Abnormal) Collected: 12/19/22 1343    Lab Status: Final result Specimen: Blood from Arm, Right Updated: 12/19/22 1539     NT-proBNP 566 pg/mL     FLU/RSV/COVID - if FLU/RSV clinically relevant [[id number]]  (Abnormal) Collected: 12/19/22 1343    Lab Status: Final result Specimen: Nares from Nose Updated: 12/19/22 1434     SARS-CoV-2 Negative     INFLUENZA A PCR Positive     INFLUENZA B PCR Negative     RSV PCR Negative    Narrative:      FOR PEDIATRIC PATIENTS - copy/paste COVID Guidelines URL to browser: [email or web address]    SARS-CoV-2 assay is a Nucleic Acid Amplification assay intended for the  qualitative detection of nucleic acid from SARS-CoV-2 in nasopharyngeal  swabs  Results are for the presumptive identification of SARS-CoV-2 RNA  Positive results are indicative of infection with SARS-CoV-2, the virus  causing COVID-19, but do not rule out bacterial infection or co-infection  with other viruses  Laboratories within the [place] and its  territories are required to report all positive results to the appropriate  public health authorities   Negative results do not preclude SARS-CoV-2  infection and should not be used as the sole basis for treatment or other  patient management decisions  Negative results must be combined with  clinical observations, patient history, and epidemiological information  This test has not been FDA cleared or approved  This test has been authorized by FDA under an Emergency Use Authorization  (EUA)  This test is only authorized for the duration of time the  declaration that circumstances exist justifying the authorization of the  emergency use of an in vitro diagnostic tests for detection of SARS-CoV-2  virus and/or diagnosis of COVID-19 infection under section 564(b)(1) of  the Act, 21 U  S C  263CFA-9(V)(9), unless the authorization is terminated  or revoked sooner  The test has been validated but independent review by FDA  and CLIA is pending  Test performed using [employer] GeneXpert: This RT-PCR assay targets N2,  a region unique to SARS-CoV-2  A conserved region in the E-gene was chosen  for pan-Sarbecovirus detection which includes SARS-CoV-2  According to CMS-2020-01-R, this platform meets the definition of high-[employer] technology  Lipase [[id number]]  (Normal) Collected: 12/19/22 1343    Lab Status: Final result Specimen: Blood Updated: 12/19/22 1433     Lipase 155 u/L     HS Troponin 0hr (reflex protocol) [[id number]]  (Normal) Collected: 12/19/22 1343    Lab Status: Final result Specimen: Blood from Arm, Right Updated: 12/19/22 1417     hs TnI 0hr 3 ng/L     Lactic acid [[id number]]  (Normal) Collected: 12/19/22 1343    Lab Status: Final result Specimen: Blood from Arm, Right Updated: 12/19/22 1411     LACTIC ACID 1 4 mmol/L     Narrative:      Result may be elevated if tourniquet was used during collection      Comprehensive metabolic panel [[id number]]  (Abnormal) Collected: 12/19/22 1343    Lab Status: Final result Specimen: Blood from Arm, Right Updated: 12/19/22 1409     Sodium 141 mmol/L      Potassium 3 6 mmol/L      Chloride 104 mmol/L      CO2 28 mmol/L      ANION GAP 9 mmol/L      BUN 10 mg/dL      Creatinine 0 91 mg/dL      Glucose 105 mg/dL      Calcium 8 6 mg/dL      Corrected Calcium 9 1 mg/dL      AST 28 U/L      ALT 27 U/L      Alkaline Phosphatase 81 U/L      Total Protein 8 4 g/dL      Albumin 3 4 g/dL      Total Bilirubin 0 25 mg/dL      eGFR 65 ml/min/1 73sq m     Narrative:      [place] guidelines for Chronic Kidney Disease (CKD):   •  Stage 1 with normal or high GFR (GFR > 90 mL/min/1 73 square meters)  •  Stage 2 Mild CKD (GFR = 60-89 mL/min/1 73 square meters)  •  Stage 3A Moderate CKD (GFR = 45-59 mL/min/1 73 square meters)  •  Stage 3B Moderate CKD (GFR = 30-44 mL/min/1 73 square meters)  •  Stage 4 Severe CKD (GFR = 15-29 mL/min/1 73 square meters)  •  Stage 5 End Stage CKD (GFR <15 mL/min/1 73 square meters)  Note: GFR calculation is accurate only with a steady state creatinine    Blood culture #1 [[id number]] Collected: 12/19/22 1343    Lab Status: In process Specimen: Blood from Arm, Right Updated: 12/19/22 1400    Blood culture #2 [[id number]] Collected: 12/19/22 1343    Lab Status:  In process Specimen: Blood from Arm, Left Updated: 12/19/22 1400    CBC and differential [[id number]]  (Abnormal) Collected: 12/19/22 1343    Lab Status: Final result Specimen: Blood from Arm, Right Updated: 12/19/22 1352     WBC 9 46 Thousand/uL      RBC 4 34 Million/uL      Hemoglobin 12 9 g/dL      Hematocrit 40 5 %      MCV 93 fL      MCH 29 7 pg      MCHC 31 9 g/dL      RDW 13 4 %      MPV 9 7 fL      Platelets 018 Thousands/uL      nRBC 0 /100 WBCs      Neutrophils Relative 86 %      Immat GRANS % 0 %      Lymphocytes Relative 7 %      Monocytes Relative 6 %      Eosinophils Relative 1 %      Basophils Relative 0 %      Neutrophils Absolute 8 05 Thousands/µL      Immature Grans Absolute 0 02 Thousand/uL      Lymphocytes Absolute 0 69 Thousands/µL      Monocytes Absolute 0 59 Thousand/µL      Eosinophils Absolute 0 09 Thousand/µL      Basophils Absolute 0 02 Thousands/µL                  XR chest 1 view portable   ED Interpretation by Rik Ross DO (12/19 1420)   No obvious acute cardiopulmonary disease  Compared to chest x-ray from 10/31/2022  Procedures  ECG 12 Lead Documentation Only    Date/Time: 12/19/2022 1:20 PM  Performed by: Rik Ross DO  Authorized by: Rik Ross DO     Indications / Diagnosis:  Dyspnea  ECG reviewed by me, the ED Provider: yes    Patient location:  ED  Previous ECG:     Previous ECG:  Compared to current    Comparison ECG info:  10/31/22    Similarity:  No change    Comparison to cardiac monitor: Yes    Rate:     ECG rate:  91    ECG rate assessment: normal    Rhythm:     Rhythm: sinus rhythm    Ectopy:     Ectopy: none    QRS:     QRS axis:  Normal    QRS intervals:  Normal  Conduction:     Conduction: normal    ST segments:     ST segments:  Normal  T waves:     T waves: normal               ED Course       1500 hrs : Patient reassessed  Resting comfortably now  Patient did have transient episode of dyspnea which was resolved with calming  Likely postnasal drip  Imaging and labs reviewed with patient  Influenza A positive  Plan to discharge patient home with return precautions  1600 hours: Patient reassessed  Feeling much better now  All labs and imaging reviewed with patient  Will discharge home  Return precautions provided        HEART Risk Score    Flowsheet Row Most Recent Value   Heart Score Risk Calculator    History 0 Filed at: 12/19/2022 1421   ECG 0 Filed at: 12/19/2022 1421   Age 2 Filed at: 12/19/2022 1421   Risk Factors 1 Filed at: 12/19/2022 1421   Troponin 0 Filed at: 12/19/2022 1421   HEART Score 3 Filed at: 12/19/2022 1421                PERC Rule for PE    Flowsheet Row Most Recent Value   PERC Rule for PE    Age >=50 1 Filed at: 12/19/2022 1422   HR >=100 0 Filed at: 12/19/2022 1422   O2 Sat on room air < 95% 0 Filed at: 12/19/2022 1422   History of PE or DVT 0 Filed at: 12/19/2022 1422   Recent trauma or surgery 0 Filed at: 12/19/2022 1422   Hemoptysis 0 Filed at: 12/19/2022 1422   Exogenous estrogen 0 Filed at: 12/19/2022 1422   Unilateral leg swelling 0 Filed at: 12/19/2022 1422   [address] for PE Results 1 Filed at: 12/19/2022 1422           Initial Sepsis Screening     Row Name 12/19/22 1422                Is the patient's history suggestive of a new or worsening infection? Yes (Proceed)  -JT        Suspected source of infection --        Are two or more of the following signs & symptoms of infection both present and new to the patient? --        Indicate SIRS criteria Tachypnea > 20 resp per min; Hyperthemia > 38 3C (100 9F)  -JT        If the answer is yes to both questions, suspicion of sepsis is present --        If severe sepsis is present AND tissue hypoperfusion perists in the hour after fluid resuscitation or lactate > 4, the patient meets criteria for SEPTIC SHOCK --        Are any of the following organ dysfunction criteria present within 6 hours of suspected infection and SIRS criteria that are NOT considered to be chronic conditions?  No  -JT        Organ dysfunction --        Date of presentation of severe sepsis --        Time of presentation of severe sepsis --        Tissue hypoperfusion persists in the hour after crystalloid fluid administration, evidenced, by either: --        Was hypotension present within one hour of the conclusion of crystalloid fluid administration? --        Date of presentation of septic shock --        Time of presentation of septic shock --              User Key  (r) = Recorded By, (t) = Taken By, (c) = Cosigned By    Initials Name Provider Type    JT Sirisha Nath DO Physician                    Lara Vincent Criteria for PE    Flowsheet Row Most Recent Value   Dash' Criteria for PE    Clinical signs and symptoms of DVT 0 Filed at: 12/19/2022 1422   PE is primary diagnosis or equally likely 0 Filed at: 12/19/2022 1422   HR >100 0 Filed at: 12/19/2022 1422   Immobilization at least 3 days or Surgery in the previous 4 weeks 0 Filed at: 12/19/2022 1422   Previous, objectively diagnosed PE or DVT 0 Filed at: 12/19/2022 1422   Hemoptysis 0 Filed at: 12/19/2022 1422   Malignancy with treatment within 6 months or palliative 0 Filed at: 12/19/2022 1422   Wells' Criteria Total 0 Filed at: 12/19/2022 1422                [facility]  Number of Diagnoses or Management Options  Dyspnea  Influenza A  Malaise and fatigue  Diagnosis management comments: 61-year-old female presents for evaluation of acute onset respiratory distress which began upon waking this morning  Patient states that she used nebulizer with minimal relief  Patient states that yesterday she was fine and denies any recent illness  Patient also describes generalized nonradiating chest tightness  No obvious alleviating or exacerbating factors  No nausea or vomiting,or diaphoresis  No lower extremity edema or swelling  Patient with malaise  Subjective fever and myalgias  No urinary symptoms  No flank pain  No abdominal pain however on exam, there is noted left upper quadrant tenderness  Labs, EKG, CXR, IV fluids, infectious/ sepsis workup, Antibiotics/ antivirals, symptom management, disposition as appropriate  No antibiotics at this time as patient diagnosed with influenza A         Amount and/or Complexity of Data Reviewed  Clinical lab tests: ordered and reviewed  Tests in the radiology section of CPT®: ordered and reviewed  Review and summarize past medical records: yes        Disposition  Final diagnoses:   Influenza A   Dyspnea   Malaise and fatigue     Time reflects when diagnosis was documented in both MDM as applicable and the Disposition within this note     Time User Action Codes Description Comment    12/19/2022  3:13 PM Wesley Donovan Add [J10 1] Influenza A     12/19/2022  3:13 PM Jordan Razo [R06 00] Dyspnea     12/19/2022  3:14 PM Chuck Donovan Add [R53 81,  R53 83] Malaise and fatigue       ED Disposition     ED Disposition   Discharge    Condition   Stable    Date/Time   Mon Dec 19, 2022  3:13 PM    Comment   Vida Wang discharge to home/self care  Follow-up Information     Follow up With Specialties Details Why Eden Don MD Family Medicine In 1 day Follow up [address]  [phone]            Patient's Medications   Discharge Prescriptions    No medications on file       No discharge procedures on file      PDMP Review       Value Time User    PDMP Reviewed  Yes 2/25/2020  6:47 PM Rizwana Srivastava MD          ED Provider  Electronically Signed by           Lesleigh Gaucher,   12/19/22 [address] [place] [address], DO  12/19/22 [address] [place] [address], DO  12/19/22 5023

## 2022-12-19 NOTE — Clinical Note
Cristal Jackson was seen and treated in our emergency department on 12/19/2022  Diagnosis:     Erik Bethea  may return to work on return date  She may return on this date: 12/26/2022         If you have any questions or concerns, please don't hesitate to call        Juani Tellez DO    ______________________________           _______________          _______________  Hospital Representative                              Date                                Time

## 2022-12-19 NOTE — SEPSIS NOTE
Sepsis Note   Shazia Cervantes 78 y o  female MRN: [id number]  Unit/Bed#: RM02 Encounter: [id number]       qSOFA     [address] Name 12/19/22 1313                Altered mental status GCS < 15 --        Respiratory Rate > / =75 1        Systolic BP < / =032 0        Q Sofa Score 1                   Initial Sepsis Screening     Row Name 12/19/22 1422                Is the patient's history suggestive of a new or worsening infection? Yes (Proceed)  -JT        Suspected source of infection --        Are two or more of the following signs & symptoms of infection both present and new to the patient? --        Indicate SIRS criteria Tachypnea > 20 resp per min; Hyperthemia > 38 3C (100 9F)  -JT        If the answer is yes to both questions, suspicion of sepsis is present --        If severe sepsis is present AND tissue hypoperfusion perists in the hour after fluid resuscitation or lactate > 4, the patient meets criteria for SEPTIC SHOCK --        Are any of the following organ dysfunction criteria present within 6 hours of suspected infection and SIRS criteria that are NOT considered to be chronic conditions? No  -JT        Organ dysfunction --        Date of presentation of severe sepsis --        Time of presentation of severe sepsis --        Tissue hypoperfusion persists in the hour after crystalloid fluid administration, evidenced, by either: --        Was hypotension present within one hour of the conclusion of crystalloid fluid administration? --        Date of presentation of septic shock --        Time of presentation of septic shock --              User Key  (r) = Recorded By, (t) = Taken By, (c) = Cosigned By    Initials Name Provider Type    Nisreen Yang DO Physician                  No antibiotics as patient diagnosed with influenza A

## 2022-12-20 ENCOUNTER — HOSPITAL ENCOUNTER (OUTPATIENT)
Facility: HOSPITAL | Age: 67
Setting detail: OBSERVATION
Discharge: HOME | End: 2022-12-21
Attending: EMERGENCY MEDICINE | Admitting: INTERNAL MEDICINE

## 2022-12-20 ENCOUNTER — APPOINTMENT (EMERGENCY)
Dept: RADIOLOGY | Facility: HOSPITAL | Age: 67
End: 2022-12-20

## 2022-12-20 DIAGNOSIS — I50.9 CHF (CONGESTIVE HEART FAILURE) (HCC): ICD-10-CM

## 2022-12-20 DIAGNOSIS — R06.00 DYSPNEA: Primary | ICD-10-CM

## 2022-12-20 DIAGNOSIS — E87.6 HYPOKALEMIA: ICD-10-CM

## 2022-12-20 DIAGNOSIS — R79.89 ELEVATED BRAIN NATRIURETIC PEPTIDE (BNP) LEVEL: ICD-10-CM

## 2022-12-20 DIAGNOSIS — J10.1 INFLUENZA A: ICD-10-CM

## 2022-12-20 PROBLEM — I10 ESSENTIAL HYPERTENSION: Status: ACTIVE | Noted: 2022-12-20

## 2022-12-20 LAB
ALBUMIN SERPL BCP-MCNC: 3.3 G/DL (ref 3.5–5)
ALP SERPL-CCNC: 74 U/L (ref 46–116)
ALT SERPL W P-5'-P-CCNC: 31 U/L (ref 12–78)
ANION GAP SERPL CALCULATED.3IONS-SCNC: 8 MMOL/L (ref 4–13)
AST SERPL W P-5'-P-CCNC: 32 U/L (ref 5–45)
ATRIAL RATE: 106 BPM
BASOPHILS # BLD AUTO: 0.01 THOUSANDS/ÂΜL (ref 0–0.1)
BASOPHILS NFR BLD AUTO: 0 % (ref 0–1)
BILIRUB SERPL-MCNC: 0.28 MG/DL (ref 0.2–1)
BUN SERPL-MCNC: 10 MG/DL (ref 5–25)
CALCIUM ALBUM COR SERPL-MCNC: 9.4 MG/DL (ref 8.3–10.1)
CALCIUM SERPL-MCNC: 8.8 MG/DL (ref 8.3–10.1)
CARDIAC TROPONIN I PNL SERPL HS: 3 NG/L
CHLORIDE SERPL-SCNC: 104 MMOL/L (ref 96–108)
CO2 SERPL-SCNC: 28 MMOL/L (ref 21–32)
CREAT SERPL-MCNC: 0.96 MG/DL (ref 0.6–1.3)
EOSINOPHIL # BLD AUTO: 0 THOUSAND/ÂΜL (ref 0–0.61)
EOSINOPHIL NFR BLD AUTO: 0 % (ref 0–6)
ERYTHROCYTE [DISTWIDTH] IN BLOOD BY AUTOMATED COUNT: 13.4 % (ref 11.6–15.1)
GFR SERPL CREATININE-BSD FRML MDRD: 61 ML/MIN/1.73SQ M
GLUCOSE SERPL-MCNC: 109 MG/DL (ref 65–140)
GLUCOSE SERPL-MCNC: 112 MG/DL (ref 65–140)
GLUCOSE SERPL-MCNC: 132 MG/DL (ref 65–140)
GLUCOSE SERPL-MCNC: 62 MG/DL (ref 65–140)
GLUCOSE SERPL-MCNC: 67 MG/DL (ref 65–140)
HCT VFR BLD AUTO: 40.6 % (ref 34.8–46.1)
HGB BLD-MCNC: 12.7 G/DL (ref 11.5–15.4)
IMM GRANULOCYTES # BLD AUTO: 0.03 THOUSAND/UL (ref 0–0.2)
IMM GRANULOCYTES NFR BLD AUTO: 0 % (ref 0–2)
LYMPHOCYTES # BLD AUTO: 1.09 THOUSANDS/ÂΜL (ref 0.6–4.47)
LYMPHOCYTES NFR BLD AUTO: 10 % (ref 14–44)
MCH RBC QN AUTO: 29.2 PG (ref 26.8–34.3)
MCHC RBC AUTO-ENTMCNC: 31.3 G/DL (ref 31.4–37.4)
MCV RBC AUTO: 93 FL (ref 82–98)
MONOCYTES # BLD AUTO: 0.66 THOUSAND/ÂΜL (ref 0.17–1.22)
MONOCYTES NFR BLD AUTO: 6 % (ref 4–12)
NEUTROPHILS # BLD AUTO: 8.73 THOUSANDS/ÂΜL (ref 1.85–7.62)
NEUTS SEG NFR BLD AUTO: 83 % (ref 43–75)
NRBC BLD AUTO-RTO: 0 /100 WBCS
NT-PROBNP SERPL-MCNC: 872 PG/ML
PLATELET # BLD AUTO: 261 THOUSANDS/UL (ref 149–390)
PMV BLD AUTO: 9.3 FL (ref 8.9–12.7)
POTASSIUM SERPL-SCNC: 3.4 MMOL/L (ref 3.5–5.3)
PR INTERVAL: 144 MS
PROCALCITONIN SERPL-MCNC: 0.07 NG/ML
PROT SERPL-MCNC: 8.4 G/DL (ref 6.4–8.4)
QRS AXIS: 41 DEGREES
QRSD INTERVAL: 74 MS
QT INTERVAL: 386 MS
QTC INTERVAL: 512 MS
RBC # BLD AUTO: 4.35 MILLION/UL (ref 3.81–5.12)
SODIUM SERPL-SCNC: 140 MMOL/L (ref 135–147)
T WAVE AXIS: 51 DEGREES
VENTRICULAR RATE: 106 BPM
WBC # BLD AUTO: 10.52 THOUSAND/UL (ref 4.31–10.16)

## 2022-12-20 RX ORDER — ACETAMINOPHEN 325 MG/1
650 TABLET ORAL EVERY 6 HOURS PRN
Status: DISCONTINUED | OUTPATIENT
Start: 2022-12-20 | End: 2022-12-21

## 2022-12-20 RX ORDER — SODIUM CHLORIDE FOR INHALATION 0.9 %
3 VIAL, NEBULIZER (ML) INHALATION
Status: DISCONTINUED | OUTPATIENT
Start: 2022-12-20 | End: 2022-12-20

## 2022-12-20 RX ORDER — PRAMIPEXOLE DIHYDROCHLORIDE 0.25 MG/1
0.5 TABLET ORAL
Status: DISCONTINUED | OUTPATIENT
Start: 2022-12-20 | End: 2022-12-21 | Stop reason: HOSPADM

## 2022-12-20 RX ORDER — ATORVASTATIN CALCIUM 10 MG/1
20 TABLET, FILM COATED ORAL EVERY MORNING
Status: DISCONTINUED | OUTPATIENT
Start: 2022-12-21 | End: 2022-12-21 | Stop reason: HOSPADM

## 2022-12-20 RX ORDER — LEVOTHYROXINE SODIUM 0.15 MG/1
150 TABLET ORAL
Status: DISCONTINUED | OUTPATIENT
Start: 2022-12-21 | End: 2022-12-21 | Stop reason: HOSPADM

## 2022-12-20 RX ORDER — ASPIRIN 81 MG/1
81 TABLET, CHEWABLE ORAL DAILY
Status: DISCONTINUED | OUTPATIENT
Start: 2022-12-20 | End: 2022-12-21 | Stop reason: HOSPADM

## 2022-12-20 RX ORDER — BUDESONIDE 0.5 MG/2ML
0.5 INHALANT ORAL
Status: COMPLETED | OUTPATIENT
Start: 2022-12-20 | End: 2022-12-21

## 2022-12-20 RX ORDER — FUROSEMIDE 10 MG/ML
20 INJECTION INTRAMUSCULAR; INTRAVENOUS ONCE
Status: COMPLETED | OUTPATIENT
Start: 2022-12-20 | End: 2022-12-20

## 2022-12-20 RX ORDER — LORATADINE 10 MG/1
10 TABLET ORAL DAILY
Status: DISCONTINUED | OUTPATIENT
Start: 2022-12-20 | End: 2022-12-21 | Stop reason: HOSPADM

## 2022-12-20 RX ORDER — TERBUTALINE SULFATE 1 MG/ML
0.25 INJECTION, SOLUTION SUBCUTANEOUS ONCE
Status: COMPLETED | OUTPATIENT
Start: 2022-12-20 | End: 2022-12-20

## 2022-12-20 RX ORDER — FENOFIBRATE 48 MG/1
48 TABLET, COATED ORAL DAILY
Status: DISCONTINUED | OUTPATIENT
Start: 2022-12-21 | End: 2022-12-21 | Stop reason: HOSPADM

## 2022-12-20 RX ORDER — INSULIN LISPRO 100 [IU]/ML
1-5 INJECTION, SOLUTION INTRAVENOUS; SUBCUTANEOUS
Status: DISCONTINUED | OUTPATIENT
Start: 2022-12-20 | End: 2022-12-21 | Stop reason: HOSPADM

## 2022-12-20 RX ORDER — PHENAZOPYRIDINE HYDROCHLORIDE 100 MG/1
100 TABLET, FILM COATED ORAL 3 TIMES DAILY PRN
Status: DISCONTINUED | OUTPATIENT
Start: 2022-12-20 | End: 2022-12-21 | Stop reason: HOSPADM

## 2022-12-20 RX ORDER — GUAIFENESIN 600 MG/1
600 TABLET, EXTENDED RELEASE ORAL EVERY 12 HOURS SCHEDULED
Status: DISCONTINUED | OUTPATIENT
Start: 2022-12-20 | End: 2022-12-21 | Stop reason: HOSPADM

## 2022-12-20 RX ORDER — FENOFIBRATE 54 MG/1
54 TABLET ORAL DAILY
Status: DISCONTINUED | OUTPATIENT
Start: 2022-12-20 | End: 2022-12-20

## 2022-12-20 RX ORDER — VENLAFAXINE HYDROCHLORIDE 37.5 MG/1
37.5 CAPSULE, EXTENDED RELEASE ORAL DAILY
Status: DISCONTINUED | OUTPATIENT
Start: 2022-12-20 | End: 2022-12-21 | Stop reason: HOSPADM

## 2022-12-20 RX ORDER — OSELTAMIVIR PHOSPHATE 75 MG/1
75 CAPSULE ORAL EVERY 12 HOURS SCHEDULED
Status: DISCONTINUED | OUTPATIENT
Start: 2022-12-20 | End: 2022-12-21 | Stop reason: HOSPADM

## 2022-12-20 RX ORDER — POTASSIUM CHLORIDE 20 MEQ/1
40 TABLET, EXTENDED RELEASE ORAL ONCE
Status: COMPLETED | OUTPATIENT
Start: 2022-12-20 | End: 2022-12-20

## 2022-12-20 RX ORDER — METOPROLOL SUCCINATE 100 MG/1
100 TABLET, EXTENDED RELEASE ORAL EVERY 12 HOURS
Status: DISCONTINUED | OUTPATIENT
Start: 2022-12-20 | End: 2022-12-21 | Stop reason: HOSPADM

## 2022-12-20 RX ORDER — FUROSEMIDE 40 MG/1
20 TABLET ORAL 2 TIMES DAILY
Status: DISCONTINUED | OUTPATIENT
Start: 2022-12-20 | End: 2022-12-20

## 2022-12-20 RX ORDER — DOFETILIDE 0.12 MG/1
500 CAPSULE ORAL 2 TIMES DAILY
Status: DISCONTINUED | OUTPATIENT
Start: 2022-12-20 | End: 2022-12-21 | Stop reason: HOSPADM

## 2022-12-20 RX ORDER — LEVALBUTEROL 1.25 MG/.5ML
1.25 SOLUTION, CONCENTRATE RESPIRATORY (INHALATION)
Status: COMPLETED | OUTPATIENT
Start: 2022-12-20 | End: 2022-12-21

## 2022-12-20 RX ORDER — FUROSEMIDE 20 MG/1
20 TABLET ORAL 2 TIMES DAILY
Status: DISCONTINUED | OUTPATIENT
Start: 2022-12-21 | End: 2022-12-21 | Stop reason: HOSPADM

## 2022-12-20 RX ORDER — GABAPENTIN 300 MG/1
300 CAPSULE ORAL 2 TIMES DAILY
Status: DISCONTINUED | OUTPATIENT
Start: 2022-12-20 | End: 2022-12-21 | Stop reason: HOSPADM

## 2022-12-20 RX ORDER — ONDANSETRON 2 MG/ML
4 INJECTION INTRAMUSCULAR; INTRAVENOUS EVERY 6 HOURS PRN
Status: DISCONTINUED | OUTPATIENT
Start: 2022-12-20 | End: 2022-12-21 | Stop reason: HOSPADM

## 2022-12-20 RX ORDER — PANTOPRAZOLE SODIUM 40 MG/1
40 TABLET, DELAYED RELEASE ORAL
Status: DISCONTINUED | OUTPATIENT
Start: 2022-12-21 | End: 2022-12-21 | Stop reason: HOSPADM

## 2022-12-20 RX ADMIN — ASPIRIN 81 MG CHEWABLE TABLET 81 MG: 81 TABLET CHEWABLE at 15:29

## 2022-12-20 RX ADMIN — FUROSEMIDE 20 MG: 10 INJECTION, SOLUTION INTRAMUSCULAR; INTRAVENOUS at 13:17

## 2022-12-20 RX ADMIN — LEVALBUTEROL HYDROCHLORIDE 1.25 MG: 1.25 SOLUTION, CONCENTRATE RESPIRATORY (INHALATION) at 15:30

## 2022-12-20 RX ADMIN — LEVALBUTEROL HYDROCHLORIDE 1.25 MG: 1.25 SOLUTION, CONCENTRATE RESPIRATORY (INHALATION) at 21:09

## 2022-12-20 RX ADMIN — POTASSIUM CHLORIDE 40 MEQ: 1500 TABLET, EXTENDED RELEASE ORAL at 13:17

## 2022-12-20 RX ADMIN — IPRATROPIUM BROMIDE 0.5 MG: 0.5 SOLUTION RESPIRATORY (INHALATION) at 15:30

## 2022-12-20 RX ADMIN — BUDESONIDE 0.5 MG: 0.5 INHALANT RESPIRATORY (INHALATION) at 21:09

## 2022-12-20 RX ADMIN — LORATADINE 10 MG: 10 TABLET ORAL at 18:49

## 2022-12-20 RX ADMIN — IPRATROPIUM BROMIDE 0.5 MG: 0.5 SOLUTION RESPIRATORY (INHALATION) at 21:09

## 2022-12-20 RX ADMIN — GABAPENTIN 300 MG: 300 CAPSULE ORAL at 18:49

## 2022-12-20 RX ADMIN — DOFETILIDE 500 MCG: 0.12 CAPSULE ORAL at 21:06

## 2022-12-20 RX ADMIN — METOPROLOL SUCCINATE 100 MG: 100 TABLET, EXTENDED RELEASE ORAL at 18:49

## 2022-12-20 RX ADMIN — APIXABAN 5 MG: 5 TABLET, FILM COATED ORAL at 18:49

## 2022-12-20 RX ADMIN — GUAIFENESIN 600 MG: 600 TABLET ORAL at 20:19

## 2022-12-20 RX ADMIN — TERBUTALINE SULFATE 0.25 MG: 1 INJECTION SUBCUTANEOUS at 13:19

## 2022-12-20 RX ADMIN — OSELTAMIVIR PHOSPHATE 75 MG: 75 CAPSULE ORAL at 20:19

## 2022-12-20 RX ADMIN — ISODIUM CHLORIDE 3 ML: 0.03 SOLUTION RESPIRATORY (INHALATION) at 15:30

## 2022-12-20 RX ADMIN — VENLAFAXINE HYDROCHLORIDE 37.5 MG: 37.5 CAPSULE, EXTENDED RELEASE ORAL at 18:49

## 2022-12-20 NOTE — ASSESSMENT & PLAN NOTE
Pressure initially elevated on admission  Seen in the ED, blood pressure was systolic in the 40H  Hold lisinopril, Norvasc  Continue to monitor blood pressure, resuming Toprol-XL given history of A  fib

## 2022-12-20 NOTE — RESPIRATORY THERAPY NOTE
RT Protocol Note  Javier Ceja 78 y o  female MRN: [id number]  Unit/Bed#: 928-17 Encounter: [id number]    Assessment    Principal Problem:    Influenza A  Active Problems:    A-fib (HCC)    MARY ELLEN (obstructive sleep apnea)    Acquired hypothyroidism    COPD (chronic obstructive pulmonary disease) (HCC)    Diabetes mellitus, type 2 (HCC)    Essential hypertension      Home Pulmonary Medications:  Albuterol neb prn, cpap  At HS,   Home Devices/Therapy: BiPAP/CPAP, Other (Comment) (neb prn doesn't use often)    Past Medical History:   Diagnosis Date   • [facility] ([facility] )    • Asthma    • Brain aneurysm    • Cancer (HCC)     papillary thyroid cancer   • Cardiac disease    • CHF (congestive heart failure) (HCC)    • COPD (chronic obstructive pulmonary disease) (HCC)    • CPAP (continuous positive airway pressure) dependence    • Crohn disease (HCC)    • Diabetes mellitus (HCC)    • Disease of thyroid gland    • GERD (gastroesophageal reflux disease)    • Hyperlipidemia    • Hypertension    • Sleep apnea      Social History     Socioeconomic History   • Marital status: /Civil Union     Spouse name: None   • Number of children: None   • Years of education: None   • Highest education level: None   Occupational History   • None   Tobacco Use   • Smoking status: Former     Types: Cigarettes     Quit date: 2018     Years since quittin 9   • Smokeless tobacco: Never   Vaping Use   • Vaping Use: Never used   Substance and Sexual Activity   • Alcohol use: Not Currently     Comment: social   • Drug use: Never   • Sexual activity: None   Other Topics Concern   • None   Social History Narrative   • None     Social Determinants of Health     Financial Resource Strain: Not on file   Food Insecurity: Not on file   Transportation Needs: Not on file   Physical Activity: Not on file   Stress: Not on file   Social Connections: Not on file   Intimate Partner Violence: Not on file   Housing Stability: Not on file       Subjective Objective    Physical Exam:   Assessment Type: Assess only  General Appearance: Alert, Awake  Respiratory Pattern: Tachypneic, Symmetrical, Spontaneous  Chest Assessment: Chest expansion symmetrical, Trachea midline  Bilateral Breath Sounds: Diminished, Expiratory wheezes (posterirorly)  Cough: Non-productive, Moist, Strong  O2 Device: RA    Vitals:  Blood pressure 132/64, pulse 69, temperature 98 °F (36 7 °C), resp  rate 20, height 5' 9" (1 753 m), weight 109 kg (240 lb 4 8 oz), SpO2 95 %  Imaging and other studies: I have personally reviewed pertinent reports        O2 Device: RA     Plan  Bronchodilator therapy with xopenex 1 25mg/ Atrovent 0 5mg TID, cpap at HS

## 2022-12-20 NOTE — ASSESSMENT & PLAN NOTE
· 78-year-old female with past medical history of A  fib status post ablation, COPD, type 2 diabetes, and MARYE LLEN  · Presented to the ED with shortness of breath, myalgias and fatigue  · Tested positive for influenza A, on 12/19, discharged with steroids with no improvement  · Patient returns today with continued weakness, shortness of breath  · Chest x-ray shows no acute processes, some mild vascular congestion  · proBNP slightly elevated at 900, compared to yesterday at 500, ED gave IV Lasix 20  · Continue home diuretics 20 twice daily  · Start Tamiflu, supportive care with Mucinex, loratadine and nebulizers  · Check procalcitonin, hold antibiotics at this time, blood cultures collected

## 2022-12-20 NOTE — ASSESSMENT & PLAN NOTE
Lab Results   Component Value Date    HGBA1C 7 0 (H) 08/24/2022     Not on insulin, hold home p o  medication  Sliding scale, Accu-Cheks  Diabetic diet

## 2022-12-20 NOTE — ASSESSMENT & PLAN NOTE
Patient with history of COPD, without exacerbation  Currently on room air, history of tobacco abuse, reported quitting 6 years ago  Does notice some mild wheezing on exam  We will schedule nebs Xopenex and Atrovent for today

## 2022-12-20 NOTE — ASSESSMENT & PLAN NOTE
History of A  fib status post ablation  EKG showing sinus rhythm  Continue Tikosyn 500 mg twice daily, Eliquis 5 mg twice daily and Toprol- mg twice daily

## 2022-12-20 NOTE — ED PROVIDER NOTES
History  Chief Complaint   Patient presents with   • Shortness of Breath     Patient seen here last night and dx with flu; patient states SOB beginning today     67-year-old female presents on return visit for recurrent dyspnea which onset again this morning with continued malaise and episodes of diaphoresis according to her   Patient states that she feels her chest is full and has not had any relief even from her CPAP  Patient denies any chest pain, nausea or vomiting, dizziness  No lower extremity edema or swelling  Patient continues to be compliant with her medications to include her Lasix which she did take this morning  Patient diagnosed with influenza A yesterday emergency department and was discharged home in stable condition with return precautions to include worsening dyspnea  On exam, the patient is ill-appearing/ conversational dyspnea but not toxic  Prior to Admission Medications   Prescriptions Last Dose Informant Patient Reported? Taking? Blood Glucose Monitoring Suppl (Orpha Lien) w/Device KIT   Yes No   Sig: Use to check sugars once a day  Dx E11 9   Blood Glucose Monitoring Suppl (Orpha Lien) w/Device KIT   Yes No   Sig: daily Use to check glucose   Lancets (ONETOUCH DELICA PLUS [id number]) MISC   Yes No   Sig: USE 1 TO CHECK GLUCOSE ONCE DAILY   ONETOUCH VERIO test strip   Yes No   Sig: USE 1 STRIP TO CHECK GLUCOSE ONCE DAILY   OneTouch Delica Lancets 54H MISC   Yes No   Sig: Use to check sugars once a day   Dx E11 9   SYNTHROID 150 MCG tablet   Yes No   Sig: TAKE 1 TABLET BY MOUTH ONCE DAILY FIRST THING IN THE MORNING (AT LEAST 30 MINUTES PRIOR TO BREAKFAST OR OTHER MEDS)   Trelegy Ellipta 200-62 5-25 MCG/INH AEPB inhaler   Yes No   Sig: INHALE 1 PUFF BY MOUTH IN THE MORNING   Patient not taking: Reported on 12/12/2022   albuterol (PROVENTIL HFA,VENTOLIN HFA) 90 mcg/act inhaler   No No   Sig: Inhale 2 puffs every 6 (six) hours as needed for wheezing or shortness of breath   amLODIPine (NORVASC) 5 mg tablet   Yes No   Sig: Take 5 mg by mouth every morning   apixaban (ELIQUIS) 5 mg   Yes No   Sig: Take 5 mg by mouth 2 (two) times a day    aspirin 81 mg chewable tablet   No No   Sig: Chew 1 tablet (81 mg total) daily   atorvastatin (LIPITOR) 20 mg tablet   Yes No   Sig: Take 20 mg by mouth every morning   docusate sodium (COLACE) 100 mg capsule   No No   Sig: Take 1 capsule (100 mg total) by mouth 2 (two) times a day   dofetilide (TIKOSYN) 500 mcg capsule   Yes No   Sig: Take 500 mcg by mouth 2 (two) times a day   fenofibrate (TRICOR) 54 MG tablet   Yes No   Sig: Take 54 mg by mouth daily   furosemide (LASIX) 20 mg tablet   Yes No   Sig: Take 20 mg by mouth 2 (two) times a day   gabapentin (NEURONTIN) 300 mg capsule   No No   Sig: Take 1 capsule (300 mg total) by mouth 2 (two) times a day   glipiZIDE (GLUCOTROL XL) 5 mg 24 hr tablet   Yes No   Sig: TAKE 1 TABLET BY MOUTH ONCE DAILY 30 MINUTES BEFORE A MEAL (REPLACES METFORMIN)   glucose blood test strip   Yes No   Sig: Use to check sugars once a day  Dx E11 9   hydrOXYzine HCL (ATARAX) 10 mg tablet   Yes No   Sig: Take 10 mg by mouth 3 (three) times a day   hydrocortisone (ANUSOL-HC) 2 5 % rectal cream   No No   Sig: Apply topically 2 (two) times a day   lidocaine (XYLOCAINE) 2 % topical gel   No No   Sig: Apply topically as needed for mild pain Apply to anal area PRN pain     lisinopril (ZESTRIL) 10 mg tablet   Yes No   Sig: Take 10 mg by mouth every morning   mesalamine (DELZICOL) 400 mg   No No   Sig: Take 2 capsules by mouth twice daily   metoprolol succinate (TOPROL-XL) 100 mg 24 hr tablet   No No   Sig: Take 1 tablet (100 mg total) by mouth every 12 (twelve) hours   nitroglycerin (NITROSTAT) 0 4 mg SL tablet   Yes No   Sig: Place 0 4 mg under the tongue every 5 (five) minutes as needed Do not exceeda total of 3 doses in 15 minutes   omeprazole (PriLOSEC) 40 MG capsule   No No   Sig: Take 1 capsule by mouth twice daily oxyCODONE-acetaminophen (PERCOCET) 5-325 mg per tablet   No No   Sig: Take 1 tablet by mouth every 4 (four) hours as needed for moderate pain or severe pain Max Daily Amount: 6 tablets   phenazopyridine (PYRIDIUM) 100 mg tablet   No No   Sig: Take 1 tablet (100 mg total) by mouth 3 (three) times a day as needed for bladder spasms   polyethylene glycol (GLYCOLAX) 17 GM/SCOOP powder   No No   Sig: Take 17 g by mouth daily   pramipexole (MIRAPEX) 0 5 mg tablet   Yes No   Sig: Take 0 5 mg by mouth daily at bedtime   sertraline (ZOLOFT) 50 mg tablet   Yes No   venlafaxine (EFFEXOR-XR) 37 5 mg 24 hr capsule   Yes No   Sig: Take 37 5 mg by mouth daily      Facility-Administered Medications: None       Past Medical History:   Diagnosis Date   • A-fib ([facility] [address] )    • Asthma    • Brain aneurysm    • Cancer (HCC)     papillary thyroid cancer   • Cardiac disease    • CHF (congestive heart failure) (HCC)    • COPD (chronic obstructive pulmonary disease) (HCC)    • CPAP (continuous positive airway pressure) dependence    • Crohn disease (HCC)    • Diabetes mellitus (HCC)    • Disease of thyroid gland    • GERD (gastroesophageal reflux disease)    • Hyperlipidemia    • Hypertension    • Sleep apnea        Past Surgical History:   Procedure Laterality Date   • APPENDECTOMY     • CARDIOVERSION N/A 2/14/2019    Procedure: CARDIOVERSION;  Surgeon: Selena Terrazas MD;  Location: [facility];  Service: Cardiology   • CEREBRAL ANEURYSM REPAIR     • CHOLECYSTECTOMY     • HYSTERECTOMY     • AR EXCISION THROMBOSED HEMORRHOID, EXTERNAL N/A 10/7/2022    Procedure: HEMORRHOIDECTOMY EXCISION;  Surgeon: Xu Stapleton DO;  Location: [facility];  Service: General   • THYROIDECTOMY     • TONSILLECTOMY AND ADENOIDECTOMY         History reviewed  No pertinent family history  I have reviewed and agree with the history as documented      E-Cigarette/Vaping   • E-Cigarette Use Never User      E-Cigarette/Vaping Substances   • Nicotine No    • THC No    • CBD No    • Flavoring No    • Other No    • Unknown No      Social History     Tobacco Use   • Smoking status: Former     Types: Cigarettes     Quit date: 2018     Years since quittin 9   • Smokeless tobacco: Never   Vaping Use   • Vaping Use: Never used   Substance Use Topics   • Alcohol use: Yes     Alcohol/week: 0 0 standard drinks     Comment: social   • Drug use: Never       Review of Systems   Constitutional: Negative for activity change, appetite change, chills, diaphoresis, fatigue and fever  HENT: Negative for dental problem, ear pain, sore throat, trouble swallowing and voice change  Eyes: Negative for pain and visual disturbance  Respiratory: Positive for shortness of breath and wheezing  Negative for cough and chest tightness  Cardiovascular: Negative for chest pain, palpitations and leg swelling  Gastrointestinal: Negative for abdominal pain, anal bleeding, blood in stool, diarrhea, nausea, rectal pain and vomiting  Endocrine: Negative for polydipsia, polyphagia and polyuria  Genitourinary: Negative for difficulty urinating, dysuria, flank pain, frequency, hematuria and urgency  Musculoskeletal: Negative for back pain, joint swelling, myalgias, neck pain and neck stiffness  Skin: Negative for pallor, rash and wound  Neurological: Negative for dizziness, facial asymmetry, speech difficulty, weakness, light-headedness, numbness and headaches  Hematological: Negative for adenopathy  Psychiatric/Behavioral: Negative for agitation  The patient is not nervous/anxious  All other systems reviewed and are negative  Physical Exam  Physical Exam  Vitals and nursing note reviewed  Constitutional:       General: She is not in acute distress  Appearance: She is well-developed  She is ill-appearing  She is not toxic-appearing or diaphoretic  HENT:      Head: Normocephalic and atraumatic        Right Ear: External ear normal       Left Ear: External ear normal  Nose: Nose normal       Mouth/Throat:      Mouth: Mucous membranes are moist       Pharynx: No pharyngeal swelling or oropharyngeal exudate  Eyes:      General: No visual field deficit or scleral icterus  Right eye: No discharge  Left eye: No discharge  Extraocular Movements: Extraocular movements intact  Conjunctiva/sclera: Conjunctivae normal       Pupils: Pupils are equal, round, and reactive to light  Neck:      Thyroid: No thyromegaly  Vascular: No JVD  Trachea: No tracheal deviation  Cardiovascular:      Rate and Rhythm: Normal rate and regular rhythm  Pulses: Normal pulses  Heart sounds: Normal heart sounds, S1 normal and S2 normal  No murmur heard  No friction rub  No gallop  Pulmonary:      Effort: Pulmonary effort is normal  No accessory muscle usage, respiratory distress or retractions  Breath sounds: No stridor or decreased air movement  Examination of the right-middle field reveals wheezing  Examination of the right-lower field reveals wheezing  Wheezing present  No decreased breath sounds, rhonchi or rales  Chest:      Chest wall: No tenderness  Abdominal:      General: There is no distension  Palpations: Abdomen is soft  There is no mass  Tenderness: There is no abdominal tenderness  There is no guarding or rebound  Hernia: No hernia is present  Musculoskeletal:         General: No tenderness or deformity  Normal range of motion  Cervical back: Normal range of motion and neck supple  Right lower leg: No edema  Left lower leg: No edema  Lymphadenopathy:      Cervical: No cervical adenopathy  Skin:     General: Skin is warm and dry  Capillary Refill: Capillary refill takes less than 2 seconds  Coloration: Skin is not pale  Findings: No erythema or rash  Neurological:      General: No focal deficit present  Mental Status: She is alert and oriented to person, place, and time  GCS: GCS eye subscore is 4  GCS verbal subscore is 5  GCS motor subscore is 6  Cranial Nerves: Cranial nerves 2-12 are intact  No cranial nerve deficit, dysarthria or facial asymmetry  Sensory: Sensation is intact  No sensory deficit  Motor: Motor function is intact  No weakness or tremor  Coordination: Coordination is intact  Gait: Gait is intact  Deep Tendon Reflexes: Reflexes are normal and symmetric   Reflexes normal    Psychiatric:         Behavior: Behavior normal          Vital Signs  ED Triage Vitals   Temperature Pulse Respirations Blood Pressure SpO2   12/20/22 1203 12/20/22 1203 12/20/22 1203 12/20/22 1215 12/20/22 1203   98 9 °F (37 2 °C) 76 20 158/70 90 %      Temp Source Heart Rate Source Patient Position - Orthostatic VS BP Location FiO2 (%)   12/20/22 1203 12/20/22 1203 12/20/22 1215 12/20/22 1215 --   Temporal Monitor Sitting Right arm       Pain Score       12/20/22 1203       No Pain           Vitals:    12/20/22 1203 12/20/22 1215   BP:  158/70   Pulse: 76 68   Patient Position - Orthostatic VS:  Sitting         Visual Acuity      ED Medications  Medications   furosemide (LASIX) injection 20 mg (20 mg Intravenous Given 12/20/22 1317)   potassium chloride (K-DUR,KLOR-CON) CR tablet 40 mEq (40 mEq Oral Given 12/20/22 1317)   terbutaline (BRETHINE) injection 0 25 mg (0 25 mg Subcutaneous Given 12/20/22 1319)       Diagnostic Studies  Results Reviewed     Procedure Component Value Units Date/Time    NT-BNP PRO [[id number]]  (Abnormal) Collected: 12/20/22 1208    Lab Status: Final result Specimen: Blood from Arm, Left Updated: 12/20/22 1252     NT-proBNP 872 pg/mL     HS Troponin 0hr (reflex protocol) [[id number]]  (Normal) Collected: 12/20/22 1208    Lab Status: Final result Specimen: Blood from Arm, Left Updated: 12/20/22 1235     hs TnI 0hr 3 ng/L     HS Troponin I 2hr [[id number]]     Lab Status: No result Specimen: Blood     CBC and differential [[id number]]  (Abnormal) Collected: 12/20/22 1208    Lab Status: Final result Specimen: Blood from Arm, Left Updated: 12/20/22 1230     WBC 10 52 Thousand/uL      RBC 4 35 Million/uL      Hemoglobin 12 7 g/dL      Hematocrit 40 6 %      MCV 93 fL      MCH 29 2 pg      MCHC 31 3 g/dL      RDW 13 4 %      MPV 9 3 fL      Platelets 563 Thousands/uL      nRBC 0 /100 WBCs      Neutrophils Relative 83 %      Immat GRANS % 0 %      Lymphocytes Relative 10 %      Monocytes Relative 6 %      Eosinophils Relative 0 %      Basophils Relative 0 %      Neutrophils Absolute 8 73 Thousands/µL      Immature Grans Absolute 0 03 Thousand/uL      Lymphocytes Absolute 1 09 Thousands/µL      Monocytes Absolute 0 66 Thousand/µL      Eosinophils Absolute 0 00 Thousand/µL      Basophils Absolute 0 01 Thousands/µL     Comprehensive metabolic panel [[id number]]  (Abnormal) Collected: 12/20/22 1208    Lab Status: Final result Specimen: Blood from Arm, Left Updated: 12/20/22 1226     Sodium 140 mmol/L      Potassium 3 4 mmol/L      Chloride 104 mmol/L      CO2 28 mmol/L      ANION GAP 8 mmol/L      BUN 10 mg/dL      Creatinine 0 96 mg/dL      Glucose 112 mg/dL      Calcium 8 8 mg/dL      Corrected Calcium 9 4 mg/dL      AST 32 U/L      ALT 31 U/L      Alkaline Phosphatase 74 U/L      Total Protein 8 4 g/dL      Albumin 3 3 g/dL      Total Bilirubin 0 28 mg/dL      eGFR 61 ml/min/1 73sq m     Narrative:      [place] guidelines for Chronic Kidney Disease (CKD):   •  Stage 1 with normal or high GFR (GFR > 90 mL/min/1 73 square meters)  •  Stage 2 Mild CKD (GFR = 60-89 mL/min/1 73 square meters)  •  Stage 3A Moderate CKD (GFR = 45-59 mL/min/1 73 square meters)  •  Stage 3B Moderate CKD (GFR = 30-44 mL/min/1 73 square meters)  •  Stage 4 Severe CKD (GFR = 15-29 mL/min/1 73 square meters)  •  Stage 5 End Stage CKD (GFR <15 mL/min/1 73 square meters)  Note: GFR calculation is accurate only with a steady state creatinine                 XR chest 1 view portable   ED Interpretation by Demetria Schwab DO (12/20 1223)   Mild vascular congestion, grossly unchanged when compared to chest x-ray from 12/19/2022  Procedures  ECG 12 Lead Documentation Only    Date/Time: 12/20/2022 12:09 PM  Performed by: Demetria Schwab DO  Authorized by: Demetria Schwab DO     Indications / Diagnosis:  Dyspnea  ECG reviewed by me, the ED Provider: yes    Patient location:  ED  Previous ECG:     Previous ECG:  Compared to current    Comparison ECG info:  12/19/22    Similarity:  Changes noted  Interpretation:     Interpretation: abnormal    Rate:     ECG rate:  71    ECG rate assessment: normal    Rhythm:     Rhythm: sinus rhythm    Ectopy:     Ectopy: none    QRS:     QRS axis:  Normal    QRS intervals:  Normal  Conduction:     Conduction: normal    ST segments:     ST segments:  Non-specific  T waves:     T waves: inverted      Inverted:  V1, V2 and V3  CriticalCare Time  Performed by: Demetria Schwab DO  Authorized by: Demetria Schwab DO     Critical care provider statement:     Critical care time (minutes):  32    Critical care time was exclusive of:  Separately billable procedures and treating other patients and teaching time    Critical care was necessary to treat or prevent imminent or life-threatening deterioration of the following conditions:  Respiratory failure    Critical care was time spent personally by me on the following activities:  Blood draw for specimens, obtaining history from patient or surrogate, development of treatment plan with patient or surrogate, evaluation of patient's response to treatment, examination of patient, ordering and performing treatments and interventions, ordering and review of laboratory studies, ordering and review of radiographic studies, review of old charts and re-evaluation of patient's condition  Comments:      Dyspnea, concern for because of heart failure, treated with Lasix IV  ED Course                   MDM  Number of Diagnoses or Management Options  CHF (congestive heart failure) (HCC)  Dyspnea  Elevated brain natriuretic peptide (BNP) level  Hypokalemia  Influenza A  Diagnosis management comments: 61-year-old female presents on return visit for recurrent dyspnea which onset again this morning with continued malaise and episodes of diaphoresis according to her   Patient states that she feels her chest is full and has not had any relief even from her CPAP  Patient denies any chest pain, nausea or vomiting, dizziness  No lower extremity edema or swelling  Patient continues to be compliant with her medications to include her Lasix which she did take this morning  Patient diagnosed with influenza A yesterday emergency department and was discharged home in stable condition with return precautions to include worsening dyspnea  On exam, the patient is ill-appearing/ conversational dyspnea but not toxic  We will check basic labs, repeat chest x-ray, EKG, and symptom management  Diuresis  Admission         Amount and/or Complexity of Data Reviewed  Clinical lab tests: ordered and reviewed  Tests in the radiology section of CPT®: ordered and reviewed  Review and summarize past medical records: yes        Disposition  Final diagnoses:   Dyspnea   Influenza A   Hypokalemia   Elevated brain natriuretic peptide (BNP) level   CHF (congestive heart failure) ([facility] [address] )     Time reflects when diagnosis was documented in both MDM as applicable and the Disposition within this note     Time User Action Codes Description Comment    12/20/2022  1:32 PM Loreatha Leisure Add [R06 00] Dyspnea     12/20/2022  1:32 PM Lina Donovan Add [J10 1] Influenza A     12/20/2022  1:32 PM Loreatha Leisure Add [E87 6] Hypokalemia     12/20/2022  1:34 PM Loreatha Leisure Add [R79 89] Elevated brain natriuretic peptide (BNP) level     12/20/2022  1:34 PM Loreatha Leisure Add [I50 9] CHF (congestive heart failure) [facility])       ED Disposition     ED Disposition   Admit    Condition   Stable    Date/Time   Tue Dec 20, 2022  1:32 PM    Comment   Case was discussed with LOREN and the patient's admission status was agreed to be Admission Status: observation status to the service of Dr Maureen Mehta   Follow-up Information    None         Patient's Medications   Discharge Prescriptions    No medications on file       No discharge procedures on file      PDMP Review       Value Time User    PDMP Reviewed  Yes 2/25/2020  6:47 PM Sindy Turk MD          ED Provider  Electronically Signed by           Rianna Lozada DO  12/20/22 3794

## 2022-12-20 NOTE — H&P
[address] [facility] [address] [place]  H&P- Meagan Mariusz 1955, 78 y o  female MRN: [id number]  Unit/Bed#: Rowdy Sanchez Encounter: [id number]  Primary Care Provider: Veronika Arnold MD   Date and time admitted to hospital: 12/20/2022 12:00 PM    * Influenza A  Assessment & Plan  · 15-year-old female with past medical history of A  fib status post ablation, COPD, type 2 diabetes, and MARY ELLEN  · Presented to the ED with shortness of breath, myalgias and fatigue  · Tested positive for influenza A, on 12/19, discharged with steroids with no improvement  · Patient returns today with continued weakness, shortness of breath  · Chest x-ray shows no acute processes, some mild vascular congestion  · proBNP slightly elevated at 900, compared to yesterday at 500, ED gave IV Lasix 20  · Continue home diuretics 20 twice daily  · Start Tamiflu, supportive care with Mucinex, loratadine and nebulizers  · Check procalcitonin, hold antibiotics at this time, blood cultures collected    A-fib [facility])  Assessment & Plan  History of A  fib status post ablation  EKG showing sinus rhythm  Continue Tikosyn 500 mg twice daily, Eliquis 5 mg twice daily and Toprol- mg twice daily    Diabetes mellitus, type 2 (HCC)  Assessment & Plan  Lab Results   Component Value Date    HGBA1C 7 0 (H) 08/24/2022     Not on insulin, hold home p o  medication  Sliding scale, Accu-Cheks  Diabetic diet    Essential hypertension  Assessment & Plan  Pressure initially elevated on admission  Seen in the ED, blood pressure was systolic in the 91E  Hold lisinopril, Norvasc  Continue to monitor blood pressure, resuming Toprol-XL given history of A  fib    COPD (chronic obstructive pulmonary disease) ([facility] [address] )  Assessment & Plan  Patient with history of COPD, without exacerbation  Currently on room air, history of tobacco abuse, reported quitting 6 years ago  Does notice some mild wheezing on exam  We will schedule nebs Xopenex and Atrovent for today    Acquired hypothyroidism  Assessment & Plan  Continue levothyroxine    MARY ELLEN (obstructive sleep apnea)  Assessment & Plan  CPAP bedtime      VTE Prophylaxis: Apixaban (Eliquis)  / sequential compression device   Code Status: FC  POLST: There is no POLST form on file for this patient (pre-hospital)  Discussion with family: patient    Anticipated Length of Stay:  Patient will be admitted on an Observation basis with an anticipated length of stay of 2 midnights  Justification for Hospital Stay: Monitor respiratory status, lab monitoring and supportive care    Total Time for Visit, including Counseling / Coordination of Care: 60 minutes  Greater than 50% of this total time spent on direct patient counseling and coordination of care  Chief Complaint:   Shortness of breath, myalgias    History of Present Illness:    Vlad Simons is a 78 y o  female who presents with shortness of breath and dyspnea  Patient is she presented to the ED yesterday, diagnosed with influenza A and recommended supportive care at home, Decadron was given on discharge  Patient returned today with worsening dyspnea, continue diaphoresis and myalgias  Patient does report having chills, fevers though not yet documented in ED  She is compliant on all her medications, denying any chest pain, nausea vomiting or diarrhea  proBNP was slightly elevated, with prior chest x-ray showing mild congestion  Slight leukocytosis, though may be due to steroids  Plan to admit, start patient on Tamiflu, give supportive care and check procalcitonin to rule out pneumonia  Patient has history of COPD, without tobacco use for greater than 5 years  Review of Systems:    Review of Systems   Constitutional: Positive for activity change, appetite change, chills and fever  HENT: Negative for congestion, facial swelling, sinus pain and sore throat  Respiratory: Positive for cough and shortness of breath  Negative for wheezing      Cardiovascular: Negative for chest pain, palpitations and leg swelling  Gastrointestinal: Negative for abdominal distention, abdominal pain, constipation, diarrhea, nausea and vomiting  Endocrine: Negative for cold intolerance, heat intolerance, polydipsia, polyphagia and polyuria  Genitourinary: Negative for dysuria, flank pain and urgency  Skin: Negative for color change and pallor  Neurological: Negative for dizziness, syncope, weakness, light-headedness and headaches  Psychiatric/Behavioral: Negative for agitation, confusion and dysphoric mood  Past Medical and Surgical History:     Past Medical History:   Diagnosis Date   • A-fib [facility])    • Asthma    • Brain aneurysm    • Cancer ([place] [address] )     papillary thyroid cancer   • Cardiac disease    • CHF (congestive heart failure) ([facility])    • COPD (chronic obstructive pulmonary disease) ([facility])    • CPAP (continuous positive airway pressure) dependence    • Crohn disease ([facility])    • Diabetes mellitus ([place] [address] )    • Disease of thyroid gland    • GERD (gastroesophageal reflux disease)    • Hyperlipidemia    • Hypertension    • Sleep apnea        Past Surgical History:   Procedure Laterality Date   • APPENDECTOMY     • CARDIOVERSION N/A 2/14/2019    Procedure: CARDIOVERSION;  Surgeon: Juan J De La Cruz MD;  Location: [facility] MAIN OR;  Service: Cardiology   • CEREBRAL ANEURYSM REPAIR     • CHOLECYSTECTOMY     • HYSTERECTOMY     • MA EXCISION THROMBOSED HEMORRHOID, EXTERNAL N/A 10/7/2022    Procedure: HEMORRHOIDECTOMY EXCISION;  Surgeon: Tony Perez DO;  Location: [facility] MAIN OR;  Service: General   • THYROIDECTOMY     • TONSILLECTOMY AND ADENOIDECTOMY         Meds/Allergies:    Prior to Admission medications    Medication Sig Start Date End Date Taking?  Authorizing Provider   albuterol (PROVENTIL HFA,VENTOLIN HFA) 90 mcg/act inhaler Inhale 2 puffs every 6 (six) hours as needed for wheezing or shortness of breath 2/29/20   Arturo Ryan DO   amLODIPine (NORVASC) 5 mg tablet Take 5 mg by mouth every morning 3/21/22   Historical Provider, MD   apixaban (ELIQUIS) 5 mg Take 5 mg by mouth 2 (two) times a day  2/5/19   Historical Provider, MD   aspirin 81 mg chewable tablet Chew 1 tablet (81 mg total) daily 2/29/20   Chele Ryan DO   atorvastatin (LIPITOR) 20 mg tablet Take 20 mg by mouth every morning 6/7/22   Historical Provider, MD   Blood Glucose Monitoring Suppl (Harlon Marker) w/Device KIT Use to check sugars once a day  Dx E11 9 1/28/20   Historical Provider, MD   Blood Glucose Monitoring Suppl (Harlon Marker) w/Device KIT daily Use to check glucose 1/28/20   Historical Provider, MD   docusate sodium (COLACE) 100 mg capsule Take 1 capsule (100 mg total) by mouth 2 (two) times a day 10/7/22   Ronnie Garsia DO   dofetilide (TIKOSYN) 500 mcg capsule Take 500 mcg by mouth 2 (two) times a day    Historical Provider, MD   fenofibrate (TRICOR) 54 MG tablet Take 54 mg by mouth daily    Historical Provider, MD   furosemide (LASIX) 20 mg tablet Take 20 mg by mouth 2 (two) times a day 6/10/20   Historical Provider, MD   gabapentin (NEURONTIN) 300 mg capsule Take 1 capsule (300 mg total) by mouth 2 (two) times a day 12/8/22   Ronnie Garsia DO   glipiZIDE (GLUCOTROL XL) 5 mg 24 hr tablet TAKE 1 TABLET BY MOUTH ONCE DAILY 30 MINUTES BEFORE A MEAL (REPLACES METFORMIN) 1/5/21   Historical Provider, MD   glucose blood test strip Use to check sugars once a day  Dx E11 9 1/28/20   Historical Provider, MD   hydrocortisone (ANUSOL-HC) 2 5 % rectal cream Apply topically 2 (two) times a day 8/22/22   JOSH Caputo   hydrOXYzine HCL (ATARAX) 10 mg tablet Take 10 mg by mouth 3 (three) times a day 6/6/22   Historical Provider, MD   Lancets ([address]) [address] USE 1 TO [address] DAILY 1/28/20   Historical Provider, MD   lidocaine (XYLOCAINE) 2 % topical gel Apply topically as needed for mild pain Apply to anal area PRN pain   10/7/22   Ronnie Garsia DO   lisinopril (ZESTRIL) 10 mg tablet Take 10 mg by mouth every morning 6/7/22   Historical Provider, MD   mesalamine (DELZICOL) 400 mg Take 2 capsules by mouth twice daily 11/27/22   Rajendra Garcia MD   metoprolol succinate (TOPROL-XL) 100 mg 24 hr tablet Take 1 tablet (100 mg total) by mouth every 12 (twelve) hours 2/14/19   Angella Ryan DO   nitroglycerin (NITROSTAT) 0 4 mg SL tablet Place 0 4 mg under the tongue every 5 (five) minutes as needed Do not exceeda total of 3 doses in 15 minutes 3/21/22   Historical Provider, MD   omeprazole (PriLOSEC) 40 MG capsule Take 1 capsule by mouth twice daily 10/5/22   Christel Chavez PA-C   OneTouch Delica Lancets 97Y MISC Use to check sugars once a day   Dx E11 9 1/28/20   Historical Provider, MD Barrientos Anu test strip USE 1 STRIP TO [address] [facility] DAILY 1/28/20   Historical Provider, MD   oxyCODONE-acetaminophen (PERCOCET) 5-325 mg per tablet Take 1 tablet by mouth every 4 (four) hours as needed for moderate pain or severe pain Max Daily Amount: 6 tablets 10/7/22   Sherri Lacrosse,    phenazopyridine (PYRIDIUM) 100 mg tablet Take 1 tablet (100 mg total) by mouth 3 (three) times a day as needed for bladder spasms 10/15/22   Nancy Steward PA-C   polyethylene glycol [facility]) 17 GM/SCOOP powder Take 17 g by mouth daily 8/22/22   JOSH Millan   pramipexole (MIRAPEX) 0 5 mg tablet Take 0 5 mg by mouth daily at bedtime 6/28/22   Historical Provider, MD   sertraline (ZOLOFT) 50 mg tablet  6/6/22   Historical Provider, MD   SYNTHROID 150 MCG tablet TAKE 1 TABLET BY MOUTH ONCE DAILY FIRST THING IN THE MORNING (AT LEAST 30 MINUTES PRIOR TO BREAKFAST OR OTHER MEDS) 1/21/20   Historical Provider, MD Sussy Valladares 200-62 5-25 MCG/INH AEPB inhaler INHALE 1 PUFF BY MOUTH IN THE MORNING  Patient not taking: Reported on 12/12/2022 7/28/22   Historical Provider, MD   venlafaxine (EFFEXOR-XR) 37 5 mg 24 hr capsule Take 37 5 mg by mouth daily 12/31/18   Historical Provider, MD COHEN have reviewed home medications with patient personally  Allergies: Allergies   Allergen Reactions   • Sulfa Antibiotics Rash       Social History:     Marital Status: /Civil Union   Occupation:   Patient Pre-hospital Living Situation: Home  Patient Pre-hospital Level of Mobility: Ambulatory  Patient Pre-hospital Diet Restrictions: None  Substance Use History:   Social History     Substance and Sexual Activity   Alcohol Use Yes   • Alcohol/week: 0 0 standard drinks    Comment: social     Social History     Tobacco Use   Smoking Status Former   • Types: Cigarettes   • Quit date: 2018   • Years since quittin 9   Smokeless Tobacco Never     Social History     Substance and Sexual Activity   Drug Use Never       Family History:    History reviewed  No pertinent family history  Physical Exam:     Vitals:   Blood Pressure: 158/70 (22 1215)  Pulse: 68 (22 1215)  Temperature: 98 9 °F (37 2 °C) (22 1203)  Temp Source: Temporal (22 1203)  Respirations: 20 (22 1215)  Height: 5' 9" (175 3 cm) (22 1203)  Weight - Scale: 112 kg (247 lb 9 2 oz) (22 1203)  SpO2: 95 % (22 1215)    Physical Exam  Vitals and nursing note reviewed  Constitutional:       Appearance: She is obese  She is ill-appearing  She is not diaphoretic  HENT:      Head: Normocephalic and atraumatic  Eyes:      General: No scleral icterus  Conjunctiva/sclera: Conjunctivae normal    Cardiovascular:      Rate and Rhythm: Normal rate and regular rhythm  Pulmonary:      Effort: Pulmonary effort is normal       Breath sounds: Wheezing ( mild bilateral) present  No rhonchi  Comments: Decreased breath sounds  Abdominal:      General: Bowel sounds are normal  There is no distension  Palpations: Abdomen is soft  Tenderness: There is no abdominal tenderness  Musculoskeletal:         General: No swelling  Right lower leg: No edema  Left lower leg: No edema     Skin: General: Skin is warm  Neurological:      General: No focal deficit present  Mental Status: She is alert  Mental status is at baseline  Additional Data:     Lab Results: I have personally reviewed pertinent reports  Results from last 7 days   Lab Units 12/20/22  1208   WBC Thousand/uL 10 52*   HEMOGLOBIN g/dL 12 7   HEMATOCRIT % 40 6   PLATELETS Thousands/uL 261   NEUTROS PCT % 83*   LYMPHS PCT % 10*   MONOS PCT % 6   EOS PCT % 0     Results from last 7 days   Lab Units 12/20/22  1208   SODIUM mmol/L 140   POTASSIUM mmol/L 3 4*   CHLORIDE mmol/L 104   CO2 mmol/L 28   BUN mg/dL 10   CREATININE mg/dL 0 96   ANION GAP mmol/L 8   CALCIUM mg/dL 8 8   ALBUMIN g/dL 3 3*   TOTAL BILIRUBIN mg/dL 0 28   ALK PHOS U/L 74   ALT U/L 31   AST U/L 32   GLUCOSE RANDOM mg/dL 112                 Results from last 7 days   Lab Units 12/19/22  1343   LACTIC ACID mmol/L 1 4       Imaging: I have personally reviewed pertinent reports  XR chest 1 view portable   ED Interpretation by Elie Peralta DO (12/20 1223)   Mild vascular congestion, grossly unchanged when compared to chest x-ray from 12/19/2022  EKG, Pathology, and Other Studies Reviewed on Admission:   · EKG: NSR    Allscripts / Epic Records Reviewed: Yes     ** Please Note: This note has been constructed using a voice recognition system   **

## 2022-12-21 VITALS
OXYGEN SATURATION: 95 % | WEIGHT: 240.3 LBS | HEIGHT: 69 IN | DIASTOLIC BLOOD PRESSURE: 53 MMHG | HEART RATE: 65 BPM | SYSTOLIC BLOOD PRESSURE: 129 MMHG | BODY MASS INDEX: 35.59 KG/M2 | RESPIRATION RATE: 21 BRPM | TEMPERATURE: 98.4 F

## 2022-12-21 LAB
ALBUMIN SERPL BCP-MCNC: 2.8 G/DL (ref 3.5–5)
ALP SERPL-CCNC: 60 U/L (ref 46–116)
ALT SERPL W P-5'-P-CCNC: 28 U/L (ref 12–78)
ANION GAP SERPL CALCULATED.3IONS-SCNC: 9 MMOL/L (ref 4–13)
AST SERPL W P-5'-P-CCNC: 34 U/L (ref 5–45)
ATRIAL RATE: 71 BPM
BASOPHILS # BLD AUTO: 0.02 THOUSANDS/ÂΜL (ref 0–0.1)
BASOPHILS NFR BLD AUTO: 0 % (ref 0–1)
BILIRUB SERPL-MCNC: 0.36 MG/DL (ref 0.2–1)
BUN SERPL-MCNC: 14 MG/DL (ref 5–25)
CALCIUM ALBUM COR SERPL-MCNC: 9.2 MG/DL (ref 8.3–10.1)
CALCIUM SERPL-MCNC: 8.2 MG/DL (ref 8.3–10.1)
CHLORIDE SERPL-SCNC: 102 MMOL/L (ref 96–108)
CO2 SERPL-SCNC: 26 MMOL/L (ref 21–32)
CREAT SERPL-MCNC: 1.17 MG/DL (ref 0.6–1.3)
EOSINOPHIL # BLD AUTO: 0 THOUSAND/ÂΜL (ref 0–0.61)
EOSINOPHIL NFR BLD AUTO: 0 % (ref 0–6)
ERYTHROCYTE [DISTWIDTH] IN BLOOD BY AUTOMATED COUNT: 13.6 % (ref 11.6–15.1)
GFR SERPL CREATININE-BSD FRML MDRD: 48 ML/MIN/1.73SQ M
GLUCOSE SERPL-MCNC: 107 MG/DL (ref 65–140)
GLUCOSE SERPL-MCNC: 178 MG/DL (ref 65–140)
GLUCOSE SERPL-MCNC: 90 MG/DL (ref 65–140)
HCT VFR BLD AUTO: 36.5 % (ref 34.8–46.1)
HGB BLD-MCNC: 11.3 G/DL (ref 11.5–15.4)
IMM GRANULOCYTES # BLD AUTO: 0.02 THOUSAND/UL (ref 0–0.2)
IMM GRANULOCYTES NFR BLD AUTO: 0 % (ref 0–2)
LYMPHOCYTES # BLD AUTO: 1.57 THOUSANDS/ÂΜL (ref 0.6–4.47)
LYMPHOCYTES NFR BLD AUTO: 19 % (ref 14–44)
MCH RBC QN AUTO: 28.5 PG (ref 26.8–34.3)
MCHC RBC AUTO-ENTMCNC: 31 G/DL (ref 31.4–37.4)
MCV RBC AUTO: 92 FL (ref 82–98)
MONOCYTES # BLD AUTO: 0.94 THOUSAND/ÂΜL (ref 0.17–1.22)
MONOCYTES NFR BLD AUTO: 11 % (ref 4–12)
NEUTROPHILS # BLD AUTO: 5.79 THOUSANDS/ÂΜL (ref 1.85–7.62)
NEUTS SEG NFR BLD AUTO: 70 % (ref 43–75)
NRBC BLD AUTO-RTO: 0 /100 WBCS
P AXIS: 77 DEGREES
PLATELET # BLD AUTO: 210 THOUSANDS/UL (ref 149–390)
PMV BLD AUTO: 10.2 FL (ref 8.9–12.7)
POTASSIUM SERPL-SCNC: 3.6 MMOL/L (ref 3.5–5.3)
PR INTERVAL: 146 MS
PROCALCITONIN SERPL-MCNC: 0.17 NG/ML
PROT SERPL-MCNC: 7.3 G/DL (ref 6.4–8.4)
QRS AXIS: 60 DEGREES
QRSD INTERVAL: 80 MS
QT INTERVAL: 404 MS
QTC INTERVAL: 439 MS
RBC # BLD AUTO: 3.96 MILLION/UL (ref 3.81–5.12)
SODIUM SERPL-SCNC: 137 MMOL/L (ref 135–147)
T WAVE AXIS: 53 DEGREES
VENTRICULAR RATE: 71 BPM
WBC # BLD AUTO: 8.34 THOUSAND/UL (ref 4.31–10.16)

## 2022-12-21 RX ORDER — OSELTAMIVIR PHOSPHATE 75 MG/1
75 CAPSULE ORAL EVERY 12 HOURS SCHEDULED
Qty: 9 CAPSULE | Refills: 0 | Status: SHIPPED | OUTPATIENT
Start: 2022-12-21 | End: 2022-12-21

## 2022-12-21 RX ORDER — PREDNISONE 20 MG/1
40 TABLET ORAL DAILY
Qty: 6 TABLET | Refills: 0 | Status: SHIPPED | OUTPATIENT
Start: 2022-12-21 | End: 2022-12-24

## 2022-12-21 RX ORDER — ACETAMINOPHEN 325 MG/1
650 TABLET ORAL EVERY 6 HOURS PRN
Status: DISCONTINUED | OUTPATIENT
Start: 2022-12-21 | End: 2022-12-21 | Stop reason: HOSPADM

## 2022-12-21 RX ADMIN — IPRATROPIUM BROMIDE 0.5 MG: 0.5 SOLUTION RESPIRATORY (INHALATION) at 07:59

## 2022-12-21 RX ADMIN — ONDANSETRON 4 MG: 2 INJECTION INTRAMUSCULAR; INTRAVENOUS at 00:36

## 2022-12-21 RX ADMIN — PANTOPRAZOLE SODIUM 40 MG: 40 TABLET, DELAYED RELEASE ORAL at 06:37

## 2022-12-21 RX ADMIN — OSELTAMIVIR PHOSPHATE 75 MG: 75 CAPSULE ORAL at 09:39

## 2022-12-21 RX ADMIN — FENOFIBRATE 48 MG: 48 TABLET ORAL at 09:39

## 2022-12-21 RX ADMIN — BUDESONIDE 0.5 MG: 0.5 INHALANT RESPIRATORY (INHALATION) at 07:59

## 2022-12-21 RX ADMIN — INSULIN LISPRO 1 UNITS: 100 INJECTION, SOLUTION INTRAVENOUS; SUBCUTANEOUS at 11:41

## 2022-12-21 RX ADMIN — LORATADINE 10 MG: 10 TABLET ORAL at 09:39

## 2022-12-21 RX ADMIN — FUROSEMIDE 20 MG: 20 TABLET ORAL at 09:39

## 2022-12-21 RX ADMIN — DOFETILIDE 500 MCG: 0.12 CAPSULE ORAL at 09:39

## 2022-12-21 RX ADMIN — ACETAMINOPHEN 650 MG: 325 TABLET ORAL at 00:53

## 2022-12-21 RX ADMIN — LEVALBUTEROL HYDROCHLORIDE 1.25 MG: 1.25 SOLUTION, CONCENTRATE RESPIRATORY (INHALATION) at 07:59

## 2022-12-21 RX ADMIN — METOPROLOL SUCCINATE 100 MG: 100 TABLET, EXTENDED RELEASE ORAL at 06:37

## 2022-12-21 RX ADMIN — GABAPENTIN 300 MG: 300 CAPSULE ORAL at 09:39

## 2022-12-21 RX ADMIN — ASPIRIN 81 MG CHEWABLE TABLET 81 MG: 81 TABLET CHEWABLE at 09:39

## 2022-12-21 RX ADMIN — APIXABAN 5 MG: 5 TABLET, FILM COATED ORAL at 09:39

## 2022-12-21 RX ADMIN — ATORVASTATIN CALCIUM 20 MG: 10 TABLET, FILM COATED ORAL at 09:39

## 2022-12-21 RX ADMIN — VENLAFAXINE HYDROCHLORIDE 37.5 MG: 37.5 CAPSULE, EXTENDED RELEASE ORAL at 09:39

## 2022-12-21 RX ADMIN — GUAIFENESIN 600 MG: 600 TABLET ORAL at 09:39

## 2022-12-21 RX ADMIN — LEVOTHYROXINE SODIUM 150 MCG: 150 TABLET ORAL at 06:37

## 2022-12-21 NOTE — PLAN OF CARE
Problem: MOBILITY - ADULT  Goal: Maintain or return to baseline ADL function  Description: INTERVENTIONS:  -  Assess patient's ability to carry out ADLs; (independent after setup)  - Assess/evaluate cause of self-care deficits (fatigue)  - Assess range of motion  - Assess patient's mobility; (standby assist)  - Assess patient's need for assistive devices and provide as appropriate  - Encourage maximum independence but intervene and supervise when necessary  - Involve family in performance of ADLs  - Assess for home care needs following discharge   - Consider OT consult to assist with ADL evaluation and planning for discharge  - Provide patient education as appropriate  Outcome: Progressing  Goal: Maintains/Returns to pre admission functional level  Description: INTERVENTIONS:  - Perform BMAT or MOVE assessment daily    - Set and communicate daily mobility goal to care team and patient/family/caregiver     - Collaborate with rehabilitation services on mobility goals if consulted  - Ambulate patient 3 times a day  - Out of bed to chair 3 times a day   - Out of bed for meals 3 times a day  - Out of bed for toileting  - Record patient progress and toleration of activity level   Outcome: Progressing     Problem: Potential for Falls  Goal: Patient will remain free of falls  Description: INTERVENTIONS:  - Educate patient/family on patient safety including physical limitations  - Instruct patient to call for assistance with activity (standby assist)  - Consult OT/PT to assist with strengthening/mobility   - Keep Call bell within reach  - Keep bed low and locked with side rails adjusted as appropriate  - Keep care items and personal belongings within reach  - Initiate and maintain comfort rounds  - Make Fall Risk Sign visible to staff (moderate fall risk)  - Offer Toileting every 1-2 Hours, in advance of need  - Obtain necessary fall risk management equipment: nonskid footwear  Outcome: Progressing     Problem: PAIN - ADULT  Goal: Verbalizes/displays adequate comfort level or baseline comfort level  Description: Interventions:  - Encourage patient to monitor pain and request assistance  - Assess pain using appropriate pain scale (0-10 pain scale)  - Administer analgesics based on type and severity of pain and evaluate response  - Implement non-pharmacological measures as appropriate and evaluate response  - Consider cultural and social influences on pain and pain management  - Notify physician/advanced practitioner if interventions unsuccessful or patient reports new pain  Outcome: Progressing     Problem: INFECTION - ADULT  Goal: Absence or prevention of progression during hospitalization  Description: INTERVENTIONS:  - Assess and monitor for signs and symptoms of infection  - Monitor lab/diagnostic results  - Monitor all insertion sites, i e  indwelling lines  - Administer medications as ordered  - Instruct and encourage patient and family to use good hand hygiene technique  - Identify and instruct in appropriate isolation precautions for identified infection/condition (contact and droplet)  Outcome: Progressing     Problem: DISCHARGE PLANNING  Goal: Discharge to home or other facility with appropriate resources  Description: INTERVENTIONS:  - Identify barriers to discharge w/patient and caregiver  - Arrange for needed discharge resources and transportation as appropriate  - Identify discharge learning needs (meds, wound care, etc )  - Refer to Case Management Department for coordinating discharge planning if the patient needs post-hospital services based on physician/advanced practitioner order or complex needs related to functional status, cognitive ability, or social support system  Outcome: Progressing     Problem: Knowledge Deficit  Goal: Patient/family/caregiver demonstrates understanding of disease process, treatment plan, medications, and discharge instructions  Description: Complete learning assessment and assess knowledge base    Interventions:  - Provide teaching at level of understanding  - Provide teaching via preferred learning methods  Outcome: Progressing     Problem: RESPIRATORY - ADULT  Goal: Achieves optimal ventilation and oxygenation  Description: INTERVENTIONS:  - Assess for changes in respiratory status  - Assess for changes in mentation and behavior  - Position to facilitate oxygenation and minimize respiratory effort  - Oxygen administered by appropriate delivery if ordered  - Encourage broncho-pulmonary hygiene including cough, deep breathe, Incentive Spirometry  - Assess and instruct to report SOB or any respiratory difficulty  - Respiratory Therapy support as indicated  Outcome: Progressing     Problem: METABOLIC, FLUID AND ELECTROLYTES - ADULT  Goal: Electrolytes maintained within normal limits  Description: INTERVENTIONS:  - Monitor labs and assess patient for signs and symptoms of electrolyte imbalances  - Administer electrolyte replacement as ordered  - Monitor response to electrolyte replacements, including repeat lab results as appropriate  - Instruct patient on fluid and nutrition as appropriate  Outcome: Progressing  Goal: Fluid balance maintained  Description: INTERVENTIONS:  - Monitor labs   - Monitor I/O and WT  - Instruct patient on fluid and nutrition as appropriate  - Assess for signs & symptoms of volume excess or deficit  Outcome: Progressing  Goal: Glucose maintained within target range  Description: INTERVENTIONS:  - Monitor Blood Glucose as ordered  - Assess for signs and symptoms of hyperglycemia and hypoglycemia  - Administer ordered medications to maintain glucose within target range  - Assess nutritional intake and initiate nutrition service referral as needed  Outcome: Progressing

## 2022-12-21 NOTE — NURSING NOTE
At [id number] patient's fingerstick blood sugar was 67, patient asymptomatic  Given orange juice and dietary notified to bring supper tray  At 1803 patient's fingerstick blood sugar was 62, patient still asymptomatic, given juice and crackers  Dietary re-notified to bring meal  Patient then received dinner tray and tolerated diet  Fingerstick blood sugar at 1905 was 132  Patient did state that she took her morning medications but had not eaten today

## 2022-12-21 NOTE — CASE MANAGEMENT
Case Management Assessment & Discharge Planning Note    Patient name Arch Alert  Location [address] [id number] MRN [id number]  : 1955 Date 2022       Current Admission Date: 2022  Current Admission Antony VIVAS   Patient Active Problem List    Diagnosis Date Noted   • Influenza A 2022   • Essential hypertension 2022   • Urinary retention 10/18/2022   • Hemorrhoids 2022   • Chronic pain syndrome 2021   • Chronic bilateral low back pain without sciatica 2021   • Lumbar spondylosis 2021   • Lumbar radiculopathy 2021   • Fracture of right tibia 2021   • Diabetes mellitus, type 2 ([facility] )    • Colon polyp 2020   • Hyperglycemia 2020   • Multiple renal cysts 2020   • T12 compression fracture ([facility] ) 2020   • Prolonged QT interval 2020   • Hepatomegaly 2020   • Hepatic steatosis 2020   • Atherosclerosis 2020   • Exacerbation of Crohn's disease ([facility] ) 2020   • Severe obstructive sleep apnea 2020   • Diarrhea 2020   • COPD (chronic obstructive pulmonary disease) ([facility] ) 2019   • Acute respiratory failure with hypoxia ([facility] ) 2019   • Pneumonia due to infectious organism 2019   • Elevated d-dimer 2019   • Hypokalemia 2019   • Acquired hypothyroidism 2019   • Brain aneurysm 2019   • Atypical chest pain 2019   • Dizziness 2019   • Crohn's disease with complication ([facility] )    • MARY ELLEN (obstructive sleep apnea)    • Microscopic hematuria 2019   • Pulmonary hypertension ([facility] ) 2019   • Lymphadenopathy 2019   • A-fib ([facility] ) 2019   • Elevated TSH 2019   • Hypercholesteremia 2019   • Transaminitis 2019      LOS (days): 0  Geometric Mean LOS (GMLOS) (days):   Days to GMLOS:     OBJECTIVE:              Current admission status: Observation       Preferred Pharmacy:   [address] [place], [place] - [address]   [address]  [place] [address]  Phone: [phone] Fax: [phone]    Primary Care Provider: Leda Rees MD    Primary Insurance: Ian GARCIA  Secondary Insurance: [id number]    ASSESSMENT:  [address] Proxies    There are no active Health Care Proxies on file  Readmission Root Cause  30 Day Readmission: No    Patient Information  Admitted from[de-identified] Home  Mental Status: Alert  During Assessment patient was accompanied by: Not accompanied during assessment  Assessment information provided by[de-identified] Patient  Primary Caregiver: Self  Support Systems: Spouse/significant other  [place] of Residence: [facility] [address] do you live in?: [address] entry access options   Select all that apply : Stairs  Number of steps to enter home : 8  Do the steps have railings?: Yes  Type of Current Residence: [address] [place] home  Upon entering residence, is there a bedroom on the main floor (no further steps)?: No  A bedroom is located on the following floor levels of residence (select all that apply):: 2nd Floor  Upon entering residence, is there a bathroom on the main floor (no further steps)?: Yes (Pt has a bathroon on 1rst and 2nd floor )  Number of steps to 2nd floor from main floor: One Flight  In the last 12 months, was there a time when you were not able to pay the mortgage or rent on time?: No  In the last 12 months, how many places have you lived?: 1  In the last 12 months, was there a time when you did not have a steady place to sleep or slept in a shelter (including now)?: No  Homeless/housing insecurity resource given?: N/A  Living Arrangements: Lives w/ Spouse/significant other  Is patient a ?: No    Activities of Daily Living Prior to Admission  Functional Status: Independent  Completes ADLs independently?: Yes  Ambulates independently?: Yes  Does patient use assisted devices?: No  Assisted Devices (DME) used: CPAP, Straight Cane, Nebulizer (Pt has a glucometer)  DME Company Name (respiratory supplies): Pt got Cpap from [place]  Does patient currently own DME?: Yes  What DME does the patient currently own?: CPAP, Nebulizer, Straight Cane  Does patient have a history of Outpatient Therapy (PT/OT)?: No  Does the patient have a history of Short-Term Rehab?: No  Does patient have a history of HHC?: No  Does patient currently have [place] [address]?: No         Patient Information Continued  Income Source: Employed (Pt works full time at [employer] as a CNA )  Does patient have prescription coverage?: Yes  Within the past 12 months, you worried that your food would run out before you got the money to buy more : Never true  Within the past 12 months, the food you bought just didn't last and you didn't have money to get more : Never true  Food insecurity resource given?: N/A  Does patient receive dialysis treatments?: No  Does patient have a history of substance abuse?: No  Does patient have a history of Mental Health Diagnosis?: No         Means of Transportation  Means of Transport to Appts[de-identified] Drives Self (Pt 's  will give the patient a ride home from the hospital)  In the past 12 months, has lack of transportation kept you from medical appointments or from getting medications?: No  In the past 12 months, has lack of transportation kept you from meetings, work, or from getting things needed for daily living?: No  Was application for public transport provided?: N/A        DISCHARGE DETAILS:    Discharge planning discussed with[de-identified] Pt  Freedom of Choice: Yes     CM contacted family/caregiver?: No- see comments  Were Treatment Team discharge recommendations reviewed with patient/caregiver?: Yes  Did patient/caregiver verbalize understanding of patient care needs?: Yes  Were patient/caregiver advised of the risks associated with not following Treatment Team discharge recommendations?: Yes         [facility]         Is the patient interested in [facility] at discharge?: No    DME Referral Provided  Referral made for DME?: No         Would you like to participate in our [address] service program?  : No - Declined    Treatment Team Recommendation: Home  Discharge Destination Plan[de-identified] Home  Transport at Discharge : Family, Automobile       Discussed with patient preferences on discharge : Understanding how to manage health at home , purpose of taking meds, importance of follow up appointments and symptoms to watch out for  Nurse to review AVS with patient  All follow up providers listed    To transport the patient home

## 2022-12-21 NOTE — RESPIRATORY THERAPY NOTE
12/21/22 0759   Respiratory Assessment   Assessment Type During-treatment   General Appearance Alert; Awake   Respiratory Pattern Dyspnea with exertion   Chest Assessment Chest expansion symmetrical;Trachea midline   Bilateral Breath Sounds Diminished; Expiratory wheezes   Cough Non-productive   Resp Comments RN placed pt on NC overnight due to dropping SpO2   NC removed and SpO2 is maintaining at 93%   O2 Device room air   Oxygen Therapy/Pulse Ox   O2 Device None (Room air)   SpO2 94 %   SpO2 Activity At Rest   $ Pulse Oximetry Spot Check Charge Completed

## 2022-12-21 NOTE — ASSESSMENT & PLAN NOTE
Patient with history of COPD, without exacerbation  Currently on room air, history of tobacco abuse, reported quitting 6 years ago

## 2022-12-21 NOTE — UTILIZATION REVIEW
Initial Clinical Review    Admission: Date/Time/Statement:   Admission Orders (From admission, onward)     Ordered        12/20/22 1332  Place in Observation  Once                      Orders Placed This Encounter   Procedures   • Place in Observation     Standing Status:   Standing     Number of Occurrences:   1     Order Specific Question:   Level of Care     Answer:   Med Surg [16]     ED Arrival Information     Expected   -    Arrival   12/20/2022 11:51    Acuity   Emergent            Means of arrival   Walk-In    Escorted by   Family Member    Service   Hospitalist    Admission type   Emergency            Arrival complaint   SOB           Chief Complaint   Patient presents with   • Shortness of Breath     Patient seen here last night and dx with flu; patient states SOB beginning today       Initial Presentation: 78 y o  female to the ED from home with complaints of shortness of breath  Seen in the ED 12/19 and diagnosed with FLU  Returns with worsening shortness of breath  Admitted under observation for influenza A, Afib, DMII  H/O COPD, started on decadron during previous visit  Arrives with pulse ox 90%  Given Nebulizer in the ED  Wheezing breath sounds, tachypneic  Start Tamiflu  CXR shows unchanged vascular congestion     Slight increase in PRoBNP, given IV lasix       Date:     Day 2:      ED Triage Vitals   Temperature Pulse Respirations Blood Pressure SpO2   12/20/22 1203 12/20/22 1203 12/20/22 1203 12/20/22 1215 12/20/22 1203   98 9 °F (37 2 °C) 76 20 158/70 90 %      Temp Source Heart Rate Source Patient Position - Orthostatic VS BP Location FiO2 (%)   12/20/22 1203 12/20/22 1203 12/20/22 1215 12/20/22 1215 --   Temporal Monitor Sitting Right arm       Pain Score       12/20/22 1203       No Pain          Wt Readings from Last 1 Encounters:   12/20/22 109 kg (240 lb 4 8 oz)     Additional Vital Signs:   MAP (mmHg) SpO2 Calculated FIO2 (%) - Nasal Cannula Nasal Cannula O2 Flow Rate (L/min) O2 Device O2 Interface Device Patient Position - Orthostatic VS        12/21/22 06:36:02 -- 64 -- 119/39 Abnormal  66 95 % -- -- -- -- Lying   12/21/22 02:02:02 98 4 °F (36 9 °C) 81 -- -- -- 94 % -- -- -- -- --   12/21/22 0113 -- -- -- -- -- 95 % 28 2 L/min Nasal cannula -- --   12/21/22 01:08:20 -- 75 -- -- -- 90 % -- -- -- -- --   12/21/22 0100 -- -- -- -- -- 79 % Abnormal  -- -- None (Room air) -- --   12/21/22 00:34:39 99 2 °F (37 3 °C) 78 -- -- -- 90 % -- -- OFF  -- --   O2 Device: nausea at 12/21/22 0034   12/21/22 00:26:39 101 1 °F (38 4 °C) Abnormal  83 -- -- -- 93 % -- -- -- -- --   12/20/22 2208 -- -- -- -- -- 91 % -- -- CPAP Under the nose mask --   12/20/22 2110 -- -- -- -- -- 93 % -- -- None (Room air) -- --   12/20/22 21:05:16 98 1 °F (36 7 °C) 76 21 131/44 Abnormal  73 94 % -- -- None (Room air) -- Lying   12/20/22 2020 -- -- -- -- -- -- -- -- None (Room air) -- --   12/20/22 1835 -- 69 20 -- -- 95 %  -- -- -- -- --   SpO2: ra at 12/20/22 1835 12/20/22 16:57:42 98 °F (36 7 °C) 75 -- 132/64 87 94 % -- -- -- -- --   12/20/22 16:56:56 98 °F (36 7 °C) 74 15 132/64 87 93 % -- -- -- -- --   12/20/22 1630 -- 73 18 150/67 96 91 % -- -- None (Room air) -- Sitting   12/20/22 1530 -- 69 18 123/58 84 93 % -- -- None (Room air) -- Sitting   12/20/22 1215 -- 68 20 158/70 100 95 % -- -- None (Room air) -- Sitting   12/20/22 1203 98 9 °F (37 2 °C) 76 20 -- -- 90 % -- -- None (Room air) --        Pertinent Labs/Diagnostic Test Results:   12/20 EKG:  Interpretation:     Interpretation: abnormal     Rate:     ECG rate:  71     ECG rate assessment: normal     Rhythm:     Rhythm: sinus rhythm     Ectopy:     Ectopy: none     QRS:     QRS axis:  Normal     QRS intervals:  Normal   Conduction:     Conduction: normal     ST segments:     ST segments:  Non-specific   T waves:     T waves: inverted       Inverted:  V1, V2 and V3  XR chest 1 view portable   ED Interpretation by Sudhir Anderson DO (12/20 1223)   Mild vascular congestion, grossly unchanged when compared to chest x-ray from 12/19/2022  Final Result by Aydee Hayes MD (12/20 1658)      Findings most suggestive of pulmonary vascular congestion  Findings concur with the referring clinician's preliminary interpretation already in the patient's electronic health record                      Workstation performed: [id number]           Results from last 7 days   Lab Units 12/19/22  1343   SARS-COV-2  Negative     Results from last 7 days   Lab Units 12/21/22  0405 12/20/22  1208 12/19/22  1343   WBC Thousand/uL 8 34 10 52* 9 46   HEMOGLOBIN g/dL 11 3* 12 7 12 9   HEMATOCRIT % 36 5 40 6 40 5   PLATELETS Thousands/uL 210 261 270   NEUTROS ABS Thousands/µL 5 79 8 73* 8 05*         Results from last 7 days   Lab Units 12/21/22  0405 12/20/22  1208 12/19/22  1343   SODIUM mmol/L 137 140 141   POTASSIUM mmol/L 3 6 3 4* 3 6   CHLORIDE mmol/L 102 104 104   CO2 mmol/L 26 28 28   ANION GAP mmol/L 9 8 9   BUN mg/dL 14 10 10   CREATININE mg/dL 1 17 0 96 0 91   EGFR ml/min/1 73sq m 48 61 65   CALCIUM mg/dL 8 2* 8 8 8 6     Results from last 7 days   Lab Units 12/21/22  0405 12/20/22  1208 12/19/22  1343   AST U/L 34 32 28   ALT U/L 28 31 27   ALK PHOS U/L 60 74 81   TOTAL PROTEIN g/dL 7 3 8 4 8 4   ALBUMIN g/dL 2 8* 3 3* 3 4*   TOTAL BILIRUBIN mg/dL 0 36 0 28 0 25     Results from last 7 days   Lab Units 12/21/22  0728 12/20/22  2125 12/20/22  1905 12/20/22  1803 12/20/22  1716   POC GLUCOSE mg/dl 90 109 132 62* 67     Results from last 7 days   Lab Units 12/21/22  0405 12/20/22  1208 12/19/22  1343   GLUCOSE RANDOM mg/dL 107 112 105       Results from last 7 days   Lab Units 12/20/22  1208 12/19/22  1343   HS TNI 0HR ng/L 3 3                 Results from last 7 days   Lab Units 12/21/22  0405 12/20/22  1208   PROCALCITONIN ng/ml 0 17 0 07     Results from last 7 days   Lab Units 12/19/22  1343   LACTIC ACID mmol/L 1 4           Results from last 7 days   Lab Units 12/20/22  1208 12/19/22  1343   NT-PRO BNP pg/mL 872* 566*       Results from last 7 days   Lab Units 12/19/22  1343   LIPASE u/L 155       Results from last 7 days   Lab Units 12/19/22  1548   CLARITY UA  Cloudy   COLOR UA  Yellow   SPEC GRAV UA  1 015   PH UA  7 5   GLUCOSE UA mg/dl Negative   KETONES UA mg/dl Negative   BLOOD UA  Large*   PROTEIN UA mg/dl Negative   NITRITE UA  Negative   BILIRUBIN UA  Negative   UROBILINOGEN UA E U /dl 0 2   LEUKOCYTES UA  Negative   WBC UA /hpf 0-1   RBC UA /hpf 30-50*   BACTERIA UA /hpf Occasional   EPITHELIAL CELLS WET PREP /hpf Occasional     Results from last 7 days   Lab Units 12/19/22  1343   INFLUENZA A PCR  Positive*   INFLUENZA B PCR  Negative   RSV PCR  Negative         Results from last 7 days   Lab Units 12/19/22  1343   BLOOD CULTURE  No Growth at 24 hrs  No Growth at 24 hrs         ED Treatment:   Medication Administration from 12/20/2022 1151 to 12/20/2022 1650       Date/Time Order Dose Route Action     12/20/2022 1317 EST furosemide (LASIX) injection 20 mg 20 mg Intravenous Given     12/20/2022 1317 EST potassium chloride (K-DUR,KLOR-CON) CR tablet 40 mEq 40 mEq Oral Given     12/20/2022 1319 EST terbutaline (BRETHINE) injection 0 25 mg 0 25 mg Subcutaneous Given     12/20/2022 1529 EST aspirin chewable tablet 81 mg 81 mg Oral Given     12/20/2022 1530 EST levalbuterol (XOPENEX) inhalation solution 1 25 mg 1 25 mg Nebulization Given     12/20/2022 1530 EST sodium chloride 0 9 % inhalation solution 3 mL 3 mL Nebulization Given     12/20/2022 1530 EST ipratropium (ATROVENT) 0 02 % inhalation solution 0 5 mg 0 5 mg Nebulization Given        Past Medical History:   Diagnosis Date   • A-fib ([place] [address] )    • Asthma    • Brain aneurysm    • Cancer ([place] [address] )     papillary thyroid cancer   • Cardiac disease    • CHF (congestive heart failure) (HCC)    • COPD (chronic obstructive pulmonary disease) ([place] [address] )    • CPAP (continuous positive airway pressure) dependence    • Crohn disease ([facility] )    • Diabetes mellitus ([facility] )    • Disease of thyroid gland    • GERD (gastroesophageal reflux disease)    • Hyperlipidemia    • Hypertension    • Sleep apnea        Admitting Diagnosis: Hypokalemia [E87 6]  CHF (congestive heart failure) ([facility]) [I50 9]  Dyspnea [R06 00]  SOB (shortness of breath) [R06 02]  Influenza A [J10 1]  Elevated brain natriuretic peptide (BNP) level [R79 89]  Age/Sex: 78 y o  female  Admission Orders:  Scheduled Medications:  apixaban, 5 mg, Oral, BID  aspirin, 81 mg, Oral, Daily  atorvastatin, 20 mg, Oral, QAM  budesonide, 0 5 mg, Nebulization, Q12H  dofetilide, 500 mcg, Oral, BID  fenofibrate, 48 mg, Oral, Daily  furosemide, 20 mg, Oral, BID  gabapentin, 300 mg, Oral, BID  guaiFENesin, 600 mg, Oral, Q12H MAURICIO  insulin lispro, 1-5 Units, Subcutaneous, TID AC  ipratropium, 0 5 mg, Nebulization, TID  levalbuterol, 1 25 mg, Nebulization, TID  levothyroxine, 150 mcg, Oral, Early Morning  loratadine, 10 mg, Oral, Daily  metoprolol succinate, 100 mg, Oral, Q12H  oseltamivir, 75 mg, Oral, Q12H MAURICIO  pantoprazole, 40 mg, Oral, BID AC  pramipexole, 0 5 mg, Oral, HS  venlafaxine, 37 5 mg, Oral, Daily      Continuous IV Infusions:     PRN Meds:  acetaminophen, 650 mg, Oral, Q6H PRN  ondansetron, 4 mg, Intravenous, Q6H PRN  phenazopyridine, 100 mg, Oral, TID PRN          Network Utilization Review Department  ATTENTION: Please call with any questions or concerns to [phone] and carefully listen to the prompts so that you are directed to the right person  All voicemails are confidential   Pratima Rosas all requests for admission clinical reviews, approved or denied determinations and any other requests to dedicated fax number below belonging to the campus where the patient is receiving treatment   List of dedicated fax numbers for the Facilities:  FACILITY NAME UR FAX NUMBER   ADMISSION DENIALS (Administrative/Medical Necessity) [address] [facility] (Maternity/NICU/Pediatrics) [phone]   [facility] [address] [facility] [phone]   [facility] [phone] [address] [place] [id number] [place] [address] Thierry Lopez 28 [phone] [address] [place] Zulema Carrillo [facility], [facility] 134 [facility] [phone]

## 2022-12-21 NOTE — RESPIRATORY THERAPY NOTE
RN removed CPAP mask due to nausea  I checked patient, on room air SpO2 is 95%  Patient is very anxious and complaining of a headache  RN made aware  Patient states they do not like our mask and only like the nasal pillows  She will have  bring in her machine from home because she is claustrophobic and our mask is too much for her

## 2022-12-21 NOTE — ASSESSMENT & PLAN NOTE
· 58-year-old female with past medical history of A  fib status post ablation, COPD, type 2 diabetes, and MARY ELLEN  · Presented to the ED with shortness of breath, myalgias and fatigue  · Tested positive for influenza A, on 12/19, discharged with steroids with no improvement  · Patient returns today with continued weakness, shortness of breath  · Chest x-ray shows no acute processes, some mild vascular congestion  · Recommend completing tamiflu 5 day course  · Discharged home

## 2022-12-21 NOTE — DISCHARGE SUMMARY
[address] [facility] [address]  Discharge- Julita Keshav 1955, 78 y o  female MRN: [id number]  Unit/Bed#: 179-29 Encounter: [id number]  Primary Care Provider: Sofia Boxer, MD   Date and time admitted to hospital: 12/20/2022 12:00 PM    * Influenza A  Assessment & Plan  · 52-year-old female with past medical history of A  fib status post ablation, COPD, type 2 diabetes, and MARY ELLEN  · Presented to the ED with shortness of breath, myalgias and fatigue  · Tested positive for influenza A, on 12/19, discharged with steroids with no improvement  · Patient returns today with continued weakness, shortness of breath  · Chest x-ray shows no acute processes, some mild vascular congestion  · Discharged home    A-fib [facility])  Assessment & Plan  History of A  fib status post ablation  EKG showing sinus rhythm  Continue Tikosyn 500 mg twice daily, Eliquis 5 mg twice daily and Toprol- mg twice daily    Diabetes mellitus, type 2 (HCC)  Assessment & Plan  Lab Results   Component Value Date    HGBA1C 7 0 (H) 08/24/2022     Continue glipizide on discharge    Essential hypertension  Assessment & Plan  Pressure initially elevated on admission  Continue home blood pressure medications on discharge    COPD (chronic obstructive pulmonary disease) ([facility] [address] )  Assessment & Plan  Patient with history of COPD, without exacerbation  Currently on room air, history of tobacco abuse, reported quitting 6 years ago    Acquired hypothyroidism  Assessment & Plan  Continue levothyroxine    MARY ELLEN (obstructive sleep apnea)  Assessment & Plan  CPAP bedtime      Bilateral upper extremity redness and tenderness with no significant swelling  Unclear of etiology, suspect likely reaction possibly Tamiflu  Discontinue Tamiflu on discharge    Discharging Physician / Practitioner: Madisyn Munoz MD  PCP: Sofia Boxer, MD  Admission Date:   Admission Orders (From admission, onward)     Ordered        12/20/22 1332  Place in Observation  Once Discharge Date: 12/21/22    Medical Problems     Resolved Problems  Date Reviewed: 12/21/2022   None         Consultations During Hospital Stay:  · none    Procedures Performed:   · none    Significant Findings / Test Results:   XR chest 1 view portable    Result Date: 12/20/2022  · Impression: Findings most suggestive of pulmonary vascular congestion  Findings concur with the referring clinician's preliminary interpretation already in the patient's electronic health record  Workstation performed: [id number]   ·     Incidental Findings:   · none     Test Results Pending at Discharge (will require follow up):   · none     Outpatient Tests Requested:  · none    Complications:  none    Reason for Admission: fevers, myalgias    Hospital Course:      Obdulia Eastman is a 78 y o  female who presents with shortness of breath and dyspnea  Patient is she presented to the ED yesterday, diagnosed with influenza A and recommended supportive care at home, Decadron was given on discharge  Patient returned today with worsening dyspnea, continue diaphoresis and myalgias  Patient does report having chills, fevers though not yet documented in ED  She is compliant on all her medications, denying any chest pain, nausea vomiting or diarrhea  proBNP was slightly elevated, with prior chest x-ray showing mild congestion  Slight leukocytosis, though may be due to steroids  As patient was monitored, labs were negative for any infection  Procalcitonin negative x2  Patient did have a fever 101  1  Her breathing appears to be stable, slightly improved from day prior  She is tolerating p o  intake  As patient is influenza A positive, recommended that patient continue supportive care with plenty of fluids, tolerating diet and as needed cough medications  Due to swelling, bilateral erythema recommend discontinuing Tamiflu    Will recommend short course of steroids prednisone 40 mg for 3 days, please utilize Benadryl 25 mg every 8 hours as needed  Please see above list of diagnoses and related plan for additional information  Condition at Discharge: fair     Discharge Day Visit / Exam:     Subjective: Patient reports feeling slightly better than day prior  She did have a fever, T-max of 101 1  Currently tolerating diet and drinking fluids  On room air, continues have episodes of some dyspnea  Vitals: Blood Pressure: 129/53 (12/21/22 0933)  Pulse: 65 (12/21/22 0933)  Temperature: 98 4 °F (36 9 °C) (12/21/22 0202)  Temp Source: Oral (12/21/22 0202)  Respirations: 21 (12/20/22 2105)  Height: 5' 9" (175 3 cm) (12/20/22 1657)  Weight - Scale: 109 kg (240 lb 4 8 oz) (12/20/22 1657)  SpO2: 95 % (12/21/22 0933)  Exam:   Physical Exam  Vitals and nursing note reviewed  Constitutional:       Appearance: Normal appearance  She is obese  HENT:      Head: Normocephalic and atraumatic  Eyes:      General: No scleral icterus  Conjunctiva/sclera: Conjunctivae normal    Cardiovascular:      Rate and Rhythm: Normal rate and regular rhythm  Pulmonary:      Effort: Pulmonary effort is normal  No respiratory distress  Breath sounds: No wheezing or rhonchi  Abdominal:      General: Bowel sounds are normal  There is no distension  Palpations: Abdomen is soft  Musculoskeletal:         General: No swelling  Right lower leg: No edema  Left lower leg: No edema  Skin:     General: Skin is warm and dry  Neurological:      General: No focal deficit present  Mental Status: She is alert  Mental status is at baseline  Discussion with Family: patient    Discharge instructions/Information to patient and family:   See after visit summary for information provided to patient and family  Provisions for Follow-Up Care:  See after visit summary for information related to follow-up care and any pertinent home health orders        Disposition:     Home    Planned Readmission: none     Discharge Statement:  I spent 35 minutes discharging the patient  This time was spent on the day of discharge  I had direct contact with the patient on the day of discharge  Greater than 50% of the total time was spent examining patient, answering all patient questions, arranging and discussing plan of care with patient as well as directly providing post-discharge instructions  Additional time then spent on discharge activities  Discharge Medications:  See after visit summary for reconciled discharge medications provided to patient and family        ** Please Note: This note has been constructed using a voice recognition system **

## 2022-12-24 DIAGNOSIS — K64.9 HEMORRHOIDS, UNSPECIFIED HEMORRHOID TYPE: ICD-10-CM

## 2022-12-24 LAB
BACTERIA BLD CULT: NORMAL
BACTERIA BLD CULT: NORMAL

## 2022-12-24 RX ORDER — GABAPENTIN 300 MG/1
CAPSULE ORAL
Qty: 30 CAPSULE | Refills: 0 | Status: SHIPPED | OUTPATIENT
Start: 2022-12-24

## 2022-12-30 DIAGNOSIS — K50.919 CROHN'S DISEASE WITH COMPLICATION, UNSPECIFIED GASTROINTESTINAL TRACT LOCATION (HCC): ICD-10-CM

## 2022-12-31 RX ORDER — MESALAMINE 400 MG/1
CAPSULE, DELAYED RELEASE ORAL
Qty: 120 CAPSULE | Refills: 0 | Status: SHIPPED | OUTPATIENT
Start: 2022-12-31

## 2023-01-04 DIAGNOSIS — K64.9 HEMORRHOIDS, UNSPECIFIED HEMORRHOID TYPE: ICD-10-CM

## 2023-01-04 RX ORDER — GABAPENTIN 300 MG/1
CAPSULE ORAL
Qty: 30 CAPSULE | Refills: 0 | Status: SHIPPED | OUTPATIENT
Start: 2023-01-04

## 2023-01-12 NOTE — PROGRESS NOTES
[facility] - Outpatient Follow-up Note  Sharlotte Castleman 78 y o  female MRN: [id number]  Encounter: [id number]          ASSESSMENT AND PLAN:    Sharlotte Castleman is a 78 y o  female reportedly with prior diagnosis of Crohn's disease although currently appears to be in remission, maintained on mesalamine twice a day, rectal bleeding, hemorrhoids, constipation who presents for follow-up  Overall doing well as long as she takes mesalamine  Some increased stools after eating but formed (hyperdefecation)  Could be from IBS or other functional pathology  Colonoscopy December 2021 with a transverse colon polyp that was removed and hypertrophied anal papilla, as well as internal hemorrhoids  Capsule endoscopy from 2020 without any evidence of Crohn's disease  Prior EGD with hiatal hernia  CT abdomen pelvis from October with perianal soft tissue thickening consistent with postoperative inflammation in the setting of hemorrhoidectomy  PET/CT from June with anterior mediastinal density consistent with benign finding as well as waxing and waning size of hilar adenopathy consistent with benign lymph nodes  CT enterography from 2020 but no evidence of bowel inflammation  Prior elastography with F1 fibrosis  Most recent CMP with albumin 2 8 and otherwise relatively normal   Normal white blood cell count, MCV, platelets, however hemoglobin 11 3     1  Crohn's disease with complication, unspecified gastrointestinal tract location ([address] )    2  Gastroesophageal reflux disease, unspecified whether esophagitis present    3  Hemorrhoids, unspecified hemorrhoid type    4  Diarrhea, unspecified type    5  Hepatic steatosis    6  Severe obesity (BMI 35 0-39  9) with comorbidity ([address] )    7  Anemia, unspecified type    8  Steatosis ([address] )    9   Abnormal LFTs        Orders Placed This Encounter   Procedures   • Calprotectin,Fecal   • US elastography   • C-reactive protein     Continue mesalamine 800 mg twice daily  Continue Colace  MiraLAX as needed for constipation  Try to drink at least 8 cups of water per day  Can try high-fiber diet  Preparation H suppositories and cream as needed for hemorrhoids  Repeat CRP, iron levels, calprotectin  Low FODMAP diet  Continue omeprazole twice daily  Add pepcid at bedtime  Gaviscon or Tums as needed for breakthrough symptoms  Next colonoscopy in 2026  Trend LFTs  Repeat elastography    ______________________________________________________________________    SUBJECTIVE:    Zan Suarez is a 78 y o  female who presents with complaint of Crohn's / colitis    She had hemorrhoids removed and it was painful  1 is still there  The last time she was here she did not want a scope  When she eats she has to have a BM, no matter what she eats  But it is formed  + heartburn, no matter what she eats  She can feel the acid coming up, worse at times if she does not get food into her stomach  Some nausea  No dysphagia, odynophagia  No bright red blood per rectum/rectal bleeding, no melena  + urgency, no nocturnal BMs, + incontinence/accidents  occasional abdominal pain  The mesalamine seems to help her and it helps her stools         Answers for HPI/ROS submitted by the patient on 1/6/2023  When you are not experiencing symptoms of your inflammatory bowel disease, how many bowel movements do you typically have each day?: 3  What is the average (typical) number of bowel movements that you had in a single day during the last week?: 4  Over the last 3 days, have you had any bowel movements where you passed blood without stool?: No  Since your last visit, have you received any vaccinations?: No  Since the last visit, have you had an infection?: No  Is there any possibility that you may be pregnant?: No  In the past three months, have you used tobacco in any form?: No  During the last month, have you taken narcotic pain medications (such as Percocet, oxycodone, Oxycontin, morphine, Vicodin, Dilaudid, MS Contin) for your inflammatory bowel disease?: No  Have you awoken at night because you needed to move your bowels during the last month? : No  Have you had leakage of stool while sleeping during the last month?: No  Have you had leakage of stool while you were awake during the last month?: Yes  In the last 6 months, have you unintentionally lost weight?: No  Fever: No  Eye irritation: No  Mouth sores: No  Sore throat: No  Chest pain: Yes  Shortness of breath: Yes  Numbness or tingling in your hands or feet: No  Skin rash: No  Pain or swelling in your joints: Yes  Bruising or bleeding: No  Felt depressed or blue: Yes        REVIEW OF SYSTEMS IS OTHERWISE NEGATIVE    10 point ROS reviewed and negative, except as above    Historical Information   Past Medical History:   Diagnosis Date   • A-fib [facility])    • Asthma    • Brain aneurysm    • Cancer ([facility] [address] )     papillary thyroid cancer   • Cardiac disease    • CHF (congestive heart failure) (HCC)    • COPD (chronic obstructive pulmonary disease) (HCC)    • CPAP (continuous positive airway pressure) dependence    • Crohn disease (HCC)    • Diabetes mellitus (HCC)    • Disease of thyroid gland    • GERD (gastroesophageal reflux disease)    • Hyperlipidemia    • Hypertension    • Sleep apnea      Past Surgical History:   Procedure Laterality Date   • APPENDECTOMY     • CARDIOVERSION N/A 2/14/2019    Procedure: CARDIOVERSION;  Surgeon: Valery Moon MD;  Location: [facility]IN [facility];  Service: Cardiology   • CEREBRAL ANEURYSM REPAIR     • CHOLECYSTECTOMY     • HYSTERECTOMY     • OH EXC THROMBOSED Dom Cabello N/A 10/7/2022    Procedure: HEMORRHOIDECTOMY EXCISION;  Surgeon: Neo Bhakta DO;  Location: [facility] MA[facility];  Service: General   • THYROIDECTOMY     • TONSILLECTOMY AND ADENOIDECTOMY       Social History   Social History     Substance and Sexual Activity   Alcohol Use Not Currently    Comment: social     Social History     Substance and Sexual Activity   Drug Use Never     Social History     Tobacco Use   Smoking Status Former   • Types: Cigarettes   • Quit date: 2018   • Years since quittin 0   Smokeless Tobacco Never     History reviewed  No pertinent family history  Meds/Allergies       Current Outpatient Medications:   •  amLODIPine (NORVASC) 5 mg tablet  •  apixaban (ELIQUIS) 5 mg  •  aspirin 81 mg chewable tablet  •  atorvastatin (LIPITOR) 20 mg tablet  •  Blood Glucose Monitoring Suppl (ONETOUCH VERIO) w/Device KIT  •  Blood Glucose Monitoring Suppl (ONETOUCH VERIO) w/Device KIT  •  docusate sodium (COLACE) 100 mg capsule  •  dofetilide (TIKOSYN) 500 mcg capsule  •  famotidine (PEPCID) 40 MG tablet  •  fenofibrate (TRICOR) 54 MG tablet  •  furosemide (LASIX) 20 mg tablet  •  gabapentin (NEURONTIN) 300 mg capsule  •  glipiZIDE (GLUCOTROL XL) 5 mg 24 hr tablet  •  glucose blood test strip  •  hydrOXYzine HCL (ATARAX) 10 mg tablet  •  Lancets (ONETOUCH DELICA PLUS [id number]) MISC  •  lisinopril (ZESTRIL) 10 mg tablet  •  mesalamine (DELZICOL) 400 mg  •  metoprolol succinate (TOPROL-XL) 100 mg 24 hr tablet  •  nitroglycerin (NITROSTAT) 0 4 mg SL tablet  •  omeprazole (PriLOSEC) 40 MG capsule  •  OneTouch Delica Lancets 87W MISC  •  ONETOUCH VERIO test strip  •  sertraline (ZOLOFT) 50 mg tablet  •  SYNTHROID 150 MCG tablet  •  venlafaxine (EFFEXOR-XR) 37 5 mg 24 hr capsule    Allergies   Allergen Reactions   • Sulfa Antibiotics Rash           Objective     Pulse (!) 54, resp  rate 18, height 5' 9" (1 753 m), weight 117 kg (257 lb), SpO2 94 %  Body mass index is 37 95 kg/m²  PHYSICAL EXAMINATION:    General Appearance:   Alert, cooperative, no distress   HEENT:  Normocephalic, atraumatic, anicteric  Neck supple, symmetrical, trachea midline  Lungs:   Equal chest rise and unlabored breathing, normal effort, no coughing  Cardiovascular:   No visualized JVD  Abdomen:   No abdominal distension  Skin:   No jaundice, rashes, or lesions      Musculoskeletal: Normal range of motion visualized  Psych:  Normal affect and normal insight  Neuro:  Alert and appropriate  Lab Results:   No visits with results within 1 Day(s) from this visit     Latest known visit with results is:   Admission on 12/20/2022, Discharged on 12/21/2022   Component Date Value   • WBC 12/20/2022 10 52 (H)    • RBC 12/20/2022 4 35    • Hemoglobin 12/20/2022 12 7    • Hematocrit 12/20/2022 40 6    • MCV 12/20/2022 93    • MCH 12/20/2022 29 2    • MCHC 12/20/2022 31 3 (L)    • RDW 12/20/2022 13 4    • MPV 12/20/2022 9 3    • Platelets 96/09/3756 261    • nRBC 12/20/2022 0    • Neutrophils Relative 12/20/2022 83 (H)    • Immat GRANS % 12/20/2022 0    • Lymphocytes Relative 12/20/2022 10 (L)    • Monocytes Relative 12/20/2022 6    • Eosinophils Relative 12/20/2022 0    • Basophils Relative 12/20/2022 0    • Neutrophils Absolute 12/20/2022 8 73 (H)    • Immature Grans Absolute 12/20/2022 0 03    • Lymphocytes Absolute 12/20/2022 1 09    • Monocytes Absolute 12/20/2022 0 66    • Eosinophils Absolute 12/20/2022 0 00    • Basophils Absolute 12/20/2022 0 01    • Sodium 12/20/2022 140    • Potassium 12/20/2022 3 4 (L)    • Chloride 12/20/2022 104    • CO2 12/20/2022 28    • ANION GAP 12/20/2022 8    • BUN 12/20/2022 10    • Creatinine 12/20/2022 0 96    • Glucose 12/20/2022 112    • Calcium 12/20/2022 8 8    • Corrected Calcium 12/20/2022 9 4    • AST 12/20/2022 32    • ALT 12/20/2022 31    • Alkaline Phosphatase 12/20/2022 74    • Total Protein 12/20/2022 8 4    • Albumin 12/20/2022 3 3 (L)    • Total Bilirubin 12/20/2022 0 28    • eGFR 12/20/2022 61    • hs TnI 0hr 12/20/2022 3    • Ventricular Rate 12/20/2022 71    • Atrial Rate 12/20/2022 71    • OK Interval 12/20/2022 146    • QRSD Interval 12/20/2022 80    • QT Interval 12/20/2022 404    • QTC Interval 12/20/2022 439    • P Axis 12/20/2022 77    • QRS Axis 12/20/2022 60    • T Wave Axis 12/20/2022 53    • NT-proBNP 12/20/2022 872 (H)    • Procalcitonin 12/20/2022 0 07    • POC Glucose 12/20/2022 67    • POC Glucose 12/20/2022 62 (L)    • POC Glucose 12/20/2022 132    • POC Glucose 12/20/2022 109    • Sodium 12/21/2022 137    • Potassium 12/21/2022 3 6    • Chloride 12/21/2022 102    • CO2 12/21/2022 26    • ANION GAP 12/21/2022 9    • BUN 12/21/2022 14    • Creatinine 12/21/2022 1 17    • Glucose 12/21/2022 107    • Calcium 12/21/2022 8 2 (L)    • Corrected Calcium 12/21/2022 9 2    • AST 12/21/2022 34    • ALT 12/21/2022 28    • Alkaline Phosphatase 12/21/2022 60    • Total Protein 12/21/2022 7 3    • Albumin 12/21/2022 2 8 (L)    • Total Bilirubin 12/21/2022 0 36    • eGFR 12/21/2022 48    • WBC 12/21/2022 8 34    • RBC 12/21/2022 3 96    • Hemoglobin 12/21/2022 11 3 (L)    • Hematocrit 12/21/2022 36 5    • MCV 12/21/2022 92    • MCH 12/21/2022 28 5    • MCHC 12/21/2022 31 0 (L)    • RDW 12/21/2022 13 6    • MPV 12/21/2022 10 2    • Platelets 92/75/4728 210    • nRBC 12/21/2022 0    • Neutrophils Relative 12/21/2022 70    • Immat GRANS % 12/21/2022 0    • Lymphocytes Relative 12/21/2022 19    • Monocytes Relative 12/21/2022 11    • Eosinophils Relative 12/21/2022 0    • Basophils Relative 12/21/2022 0    • Neutrophils Absolute 12/21/2022 5 79    • Immature Grans Absolute 12/21/2022 0 02    • Lymphocytes Absolute 12/21/2022 1 57    • Monocytes Absolute 12/21/2022 0 94    • Eosinophils Absolute 12/21/2022 0 00    • Basophils Absolute 12/21/2022 0 02    • Procalcitonin 12/21/2022 0 17    • POC Glucose 12/21/2022 90    • POC Glucose 12/21/2022 178 (H)        Lab Results   Component Value Date    WBC 8 34 12/21/2022    HGB 11 3 (L) 12/21/2022    HCT 36 5 12/21/2022    MCV 92 12/21/2022     12/21/2022       Lab Results   Component Value Date     04/21/2018    SODIUM 137 12/21/2022    K 3 6 12/21/2022     12/21/2022    CO2 26 12/21/2022    ANIONGAP 12 3 04/21/2018    AGAP 9 12/21/2022    BUN 14 12/21/2022    CREATININE 1 17 12/21/2022 GLUC 107 12/21/2022    GLUF 127 (H) 05/11/2022    CALCIUM 8 2 (L) 12/21/2022    AST 34 12/21/2022    ALT 28 12/21/2022    ALKPHOS 60 12/21/2022    PROT 7 0 04/21/2018    TP 7 3 12/21/2022    BILITOT 0 3 04/21/2018    TBILI 0 36 12/21/2022    EGFR 48 12/21/2022       Lab Results   Component Value Date    CRP 13 8 (H) 10/06/2020       Lab Results   Component Value Date    DUF0LKJNXBZB 2 930 05/11/2022       Lab Results   Component Value Date    IRON 36 (L) 07/29/2020    TIBC 425 07/29/2020    FERRITIN 64 07/29/2020       Radiology Results:   XR chest 1 view portable    Result Date: 12/20/2022  Narrative: CHEST INDICATION:   Shortness of breath  COMPARISON:  12/19/2022 EXAM PERFORMED/VIEWS:  XR CHEST PORTABLE FINDINGS: The cardiac silhouette is without change from the prior study  Prominence of pulmonary vascularity noted similar to the prior study  No focal infiltrate  No pneumothorax or pleural effusion Osseous structures appear within normal limits for patient age  Impression: Findings most suggestive of pulmonary vascular congestion  Findings concur with the referring clinician's preliminary interpretation already in the patient's electronic health record  Workstation performed: [id number]     XR chest 1 view portable    Result Date: 12/19/2022  Narrative: CHEST INDICATION:   dyspnea  COMPARISON:  10/31/2022 EXAM PERFORMED/VIEWS:  XR CHEST PORTABLE FINDINGS: Cardiomediastinal silhouette appears unremarkable  No infiltrate or mass or pleural fluid or pneumothorax identified  Some vascular congestion is seen in the lung bases  Osseous structures appear within normal limits for patient age  Impression: Mild vascular congestion at the bases  No infiltrates   Workstation performed: [id number]

## 2023-01-13 ENCOUNTER — OFFICE VISIT (OUTPATIENT)
Dept: GASTROENTEROLOGY | Facility: CLINIC | Age: 68
End: 2023-01-13

## 2023-01-13 VITALS
BODY MASS INDEX: 38.06 KG/M2 | WEIGHT: 257 LBS | RESPIRATION RATE: 18 BRPM | HEART RATE: 54 BPM | HEIGHT: 69 IN | OXYGEN SATURATION: 94 %

## 2023-01-13 DIAGNOSIS — D64.9 ANEMIA, UNSPECIFIED TYPE: ICD-10-CM

## 2023-01-13 DIAGNOSIS — K64.9 HEMORRHOIDS, UNSPECIFIED HEMORRHOID TYPE: ICD-10-CM

## 2023-01-13 DIAGNOSIS — K50.919 CROHN'S DISEASE WITH COMPLICATION, UNSPECIFIED GASTROINTESTINAL TRACT LOCATION (HCC): Primary | ICD-10-CM

## 2023-01-13 DIAGNOSIS — K21.9 GASTROESOPHAGEAL REFLUX DISEASE, UNSPECIFIED WHETHER ESOPHAGITIS PRESENT: ICD-10-CM

## 2023-01-13 DIAGNOSIS — E66.01 SEVERE OBESITY (BMI 35.0-39.9) WITH COMORBIDITY (HCC): ICD-10-CM

## 2023-01-13 DIAGNOSIS — K76.0 HEPATIC STEATOSIS: ICD-10-CM

## 2023-01-13 DIAGNOSIS — R79.89 ABNORMAL LFTS: ICD-10-CM

## 2023-01-13 DIAGNOSIS — R19.7 DIARRHEA, UNSPECIFIED TYPE: ICD-10-CM

## 2023-01-13 DIAGNOSIS — E88.89 STEATOSIS (HCC): ICD-10-CM

## 2023-01-13 RX ORDER — FAMOTIDINE 40 MG/1
40 TABLET, FILM COATED ORAL
Qty: 30 TABLET | Refills: 3 | Status: SHIPPED | OUTPATIENT
Start: 2023-01-13

## 2023-01-17 NOTE — ASSESSMENT & PLAN NOTE
Continue fenofibrate 40 mg po daily [New Patient Visit] : a new patient visit [[employer] ] : provided by [employer]   [Time Spent: ____ minutes] : Total time spent using  services: [unfilled] minutes. The patient's primary language is not English thus required  services. [FreeTextEntry1] : The patient reports having low back pain with radiation to posterior thighs. [Interpreters_IDNumber] : [id number] [Interpreters_FullName] : Ania [TWNoteComboBox1] : Japanese

## 2023-01-18 ENCOUNTER — APPOINTMENT (OUTPATIENT)
Dept: LAB | Facility: HOSPITAL | Age: 68
End: 2023-01-18

## 2023-01-18 DIAGNOSIS — K50.919 CROHN'S DISEASE WITH COMPLICATION, UNSPECIFIED GASTROINTESTINAL TRACT LOCATION (HCC): ICD-10-CM

## 2023-01-20 ENCOUNTER — HOSPITAL ENCOUNTER (OUTPATIENT)
Dept: ULTRASOUND IMAGING | Facility: HOSPITAL | Age: 68
Discharge: HOME | End: 2023-01-20
Attending: INTERNAL MEDICINE

## 2023-01-20 DIAGNOSIS — R79.89 ABNORMAL LFTS: ICD-10-CM

## 2023-01-20 DIAGNOSIS — E88.89 STEATOSIS (HCC): ICD-10-CM

## 2023-01-21 LAB — CALPROTECTIN STL-MCNT: <16 UG/G (ref 0–120)

## 2023-01-22 DIAGNOSIS — K64.9 HEMORRHOIDS, UNSPECIFIED HEMORRHOID TYPE: ICD-10-CM

## 2023-01-23 ENCOUNTER — TELEPHONE (OUTPATIENT)
Dept: GASTROENTEROLOGY | Facility: CLINIC | Age: 68
End: 2023-01-23

## 2023-01-23 ENCOUNTER — OFFICE VISIT (OUTPATIENT)
Dept: UROLOGY | Facility: CLINIC | Age: 68
End: 2023-01-23

## 2023-01-23 VITALS
WEIGHT: 248 LBS | BODY MASS INDEX: 35.5 KG/M2 | HEART RATE: 72 BPM | SYSTOLIC BLOOD PRESSURE: 128 MMHG | HEIGHT: 70 IN | DIASTOLIC BLOOD PRESSURE: 78 MMHG

## 2023-01-23 DIAGNOSIS — R31.0 GROSS HEMATURIA: ICD-10-CM

## 2023-01-23 DIAGNOSIS — N39.3 STRESS INCONTINENCE: ICD-10-CM

## 2023-01-23 DIAGNOSIS — R33.8 ACUTE URINARY RETENTION: Primary | ICD-10-CM

## 2023-01-23 RX ORDER — GABAPENTIN 300 MG/1
CAPSULE ORAL
Qty: 30 CAPSULE | Refills: 0 | Status: SHIPPED | OUTPATIENT
Start: 2023-01-23 | End: 2023-02-06

## 2023-01-23 RX ORDER — CHOLECALCIFEROL (VITAMIN D3) 1250 MCG
1 CAPSULE ORAL WEEKLY
COMMUNITY
Start: 2023-01-21

## 2023-01-23 RX ORDER — SERTRALINE HYDROCHLORIDE 100 MG/1
TABLET, FILM COATED ORAL
COMMUNITY
Start: 2023-01-16

## 2023-01-23 NOTE — PATIENT INSTRUCTIONS
Kegel Exercises for Women   AMBULATORY CARE:   Kegel exercises  help strengthen your pelvic muscles  Pelvic muscles hold your pelvic organs, such as your bladder and uterus, in place  Kegel exercises help prevent or control problems with urine incontinence (leakage)  Incontinence may be caused by pregnancy, childbirth, or menopause  Contact your healthcare provider if:   You cannot feel your muscles tighten or relax  You continue to leak urine  You have questions or concerns about your condition or care  Use the correct muscles:  Pelvic muscles are the muscles you use to control urine flow  To target these muscles, stop and start the flow of urine several times  This will help you become familiar with how it feels to tighten and relax these muscles  How to do Kegel exercises:   Empty your bladder  You may lie down, stand up, or sit down to do these exercises  When you first try to do these exercises, it may be easier if you lie down  Tighten or squeeze your pelvic muscles slowly  It may feel like you are trying to hold back urine or gas  Hold this position for 3 seconds  Relax for 3 seconds  Repeat this cycle 10 times  Do 10 sets of Kegel exercises, at least 3 times a day  Do not hold your breath when you do Kegel exercises  Keep your stomach, back, and leg muscles relaxed  As your muscles get stronger, you will be able to hold the squeeze longer  Your healthcare provider may ask that you increase your pelvic muscle squeeze to 10 seconds  After you squeeze for 10 seconds, relax for 10 seconds  What else you should know:   Once you know how to do Kegel exercises, use different positions  You can do these exercises while you lie on the floor, sit at your desk or watch TV, and while you stand  You may notice improved bladder control within about 6 weeks  Tighten your pelvic muscles before you sneeze, cough, or lift to prevent urine leakage      Follow up with your doctor as directed: Write down your questions so you remember to ask them during your visits  © Copyright [place] 2022 Information is for End User's use only and may not be sold, redistributed or otherwise used for commercial purposes  All illustrations and images included in CareNotes® are the copyrighted property of A D A M , Inc  or Thalia Lal  The above information is an  only  It is not intended as medical advice for individual conditions or treatments  Talk to your doctor, nurse or pharmacist before following any medical regimen to see if it is safe and effective for you

## 2023-01-23 NOTE — PROGRESS NOTES
1/23/2023    No chief complaint on file  Assessment and Plan    78 y o  female     1  Urinary retention  · Resolved and was likely secondary to recent surgery and opioid use resulting in constipation  · Follow-up as needed  2   Gross hematuria  · Denies recurrent episodes and was most likely secondary to chopra catheter  · Follow-up in 6 months with repeat urine testing    3  Stress urinary incontinence  · + JACQUE with coughing and sneezing  · Recommend kegel exercises and she was provided a teaching handout today  Should these be ineffective we did discuss possible PFPT in the future  · Follow-up in 6 months to reassesses  Subjective:      She presents today reporting that she is doing very well and feels as thought she is emptying her bladder without difficulty  She does experience some JACQUE with cough or sneezing  She denies any gross hematuria as well since her last office visit  History of Present Illness  Vlad Simons is a 78 y o  female here for follow-up evaluation of urinary retention and gross hematuria  She was initially seen by me on 10/25/2022 for evaluation of acute urinary tension following hemorrhoid surgery on 10/7/2022  She had a Chopra catheter placed after multiple visits to [address] [place] [address] emergency department following surgery for issues with urinary retention  Chopra catheter was eventually placed for PVR greater than 500  Following placement of Chopra catheter she had experienced episodes of gross hematuria as well as burning and pressure  At the time of her office visit with me she reported that she had no longer been taking Percocet which was thought to be causing her issues with constipation resulting in her urinary retention  She was able to successfully pass a void trial that same day  Following her office visit she contacted our office on 11/16/2022 complaining of incomplete emptying and urgency    Urine testing was done which was negative for infection and a PVR was within normal limits  Review of Systems   Constitutional: Negative for chills and fever  HENT: Negative for ear pain and sore throat  Eyes: Negative for pain and visual disturbance  Respiratory: Negative for cough and shortness of breath  Cardiovascular: Negative for chest pain and palpitations  Gastrointestinal: Negative for abdominal pain, constipation, diarrhea, nausea and vomiting  Genitourinary: Negative for difficulty urinating, dysuria, flank pain, frequency, hematuria, pelvic pain and urgency  JACQUE   Musculoskeletal: Negative for arthralgias and back pain  Skin: Negative for color change and rash  Neurological: Negative for dizziness, seizures, syncope and weakness  All other systems reviewed and are negative  Vitals  Vitals:    01/23/23 1343   BP: 128/78   Pulse: 72   Weight: 112 kg (248 lb)   Height: 5' 9 5" (1 765 m)       Physical Exam  Vitals reviewed  Constitutional:       General: She is not in acute distress  Appearance: Normal appearance  She is normal weight  She is not ill-appearing or toxic-appearing  HENT:      Head: Normocephalic and atraumatic  Nose: Nose normal    Eyes:      General: No scleral icterus  Conjunctiva/sclera: Conjunctivae normal    Cardiovascular:      Rate and Rhythm: Normal rate  Pulses: Normal pulses  Pulmonary:      Effort: Pulmonary effort is normal  No respiratory distress  Abdominal:      General: Abdomen is flat  Palpations: Abdomen is soft  Tenderness: There is no abdominal tenderness  There is no right CVA tenderness or left CVA tenderness  Hernia: No hernia is present  Musculoskeletal:         General: Normal range of motion  Cervical back: Normal range of motion  Skin:     General: Skin is warm and dry  Neurological:      General: No focal deficit present  Mental Status: She is alert and oriented to person, place, and time   Mental status is at baseline  Psychiatric:         Mood and Affect: Mood normal          Behavior: Behavior normal          Thought Content: Thought content normal          Judgment: Judgment normal          Past History  Past Medical History:   Diagnosis Date   • A-fib ([facility] [address] )    • Asthma    • Brain aneurysm    • Cancer (HCC)     papillary thyroid cancer   • Cardiac disease    • CHF (congestive heart failure) (HCC)    • COPD (chronic obstructive pulmonary disease) (HCC)    • CPAP (continuous positive airway pressure) dependence    • Crohn disease (HCC)    • Diabetes mellitus (HCC)    • Disease of thyroid gland    • GERD (gastroesophageal reflux disease)    • Hyperlipidemia    • Hypertension    • Sleep apnea      Social History     Socioeconomic History   • Marital status: /Civil Union     Spouse name: None   • Number of children: None   • Years of education: None   • Highest education level: None   Occupational History   • None   Tobacco Use   • Smoking status: Former     Types: Cigarettes     Quit date: 2018     Years since quittin 0   • Smokeless tobacco: Never   Vaping Use   • Vaping Use: Never used   Substance and Sexual Activity   • Alcohol use: Not Currently     Comment: social   • Drug use: Never   • Sexual activity: None   Other Topics Concern   • None   Social History Narrative   • None     Social Determinants of Health     Financial Resource Strain: Not on file   Food Insecurity: No Food Insecurity   • Worried About Running Out of Food in the Last Year: Never true   • Ran Out of Food in the Last Year: Never true   Transportation Needs: No Transportation Needs   • Lack of Transportation (Medical): No   • Lack of Transportation (Non-Medical):  No   Physical Activity: Not on file   Stress: Not on file   Social Connections: Not on file   Intimate Partner Violence: Not on file   Housing Stability: Low Risk    • Unable to Pay for Housing in the Last Year: No   • Number of Places Lived in the Last Year: 1   • Unstable Housing in the Last Year: No     Social History     Tobacco Use   Smoking Status Former   • Types: Cigarettes   • Quit date: 2018   • Years since quittin 0   Smokeless Tobacco Never     History reviewed  No pertinent family history  The following portions of the patient's history were reviewed and updated as appropriate allergies, current medications, past medical history, past social history, past surgical history and problem list    Imaging:    Results  No results found for this or any previous visit (from the past 1 hour(s))  ]  No results found for: PSA  Lab Results   Component Value Date    CALCIUM 8 2 (L) 2022     2018    K 3 6 2022    CO2 26 2022     2022    BUN 14 2022    CREATININE 1 17 2022     Lab Results   Component Value Date    WBC 8 34 2022    HGB 11 3 (L) 2022    HCT 36 5 2022    MCV 92 2022     2022       Please Note:  Voice dictation software has been used to create this document  There may be inadvertent transcriptions errors       Gaurav Blair

## 2023-01-23 NOTE — TELEPHONE ENCOUNTER
----- Message from Thong Snell MD sent at 1/23/2023  6:03 AM EST -----  Hi,    Can you please let her know her iron studies look good?     Thank you

## 2023-01-30 ENCOUNTER — TELEPHONE (OUTPATIENT)
Dept: GASTROENTEROLOGY | Facility: CLINIC | Age: 68
End: 2023-01-30

## 2023-01-30 DIAGNOSIS — K74.00 LIVER FIBROSIS: ICD-10-CM

## 2023-01-30 DIAGNOSIS — E88.89 STEATOSIS (HCC): Primary | ICD-10-CM

## 2023-01-30 NOTE — TELEPHONE ENCOUNTER
Please contact patient to schedule with Dr Signe Goltz -[place] 30 minute appt      -hepatic steatosis, abnormal LFT DISCHARGE

## 2023-01-30 NOTE — TELEPHONE ENCOUNTER
Hi,    Can you please set her up for a visit?  Perhaps with Dr Amy Sawyer when he is in [place] (since she lives close by)    Thank you

## 2023-02-03 DIAGNOSIS — K64.9 HEMORRHOIDS, UNSPECIFIED HEMORRHOID TYPE: ICD-10-CM

## 2023-02-03 DIAGNOSIS — K50.919 CROHN'S DISEASE WITH COMPLICATION, UNSPECIFIED GASTROINTESTINAL TRACT LOCATION (HCC): ICD-10-CM

## 2023-02-05 RX ORDER — MESALAMINE 400 MG/1
CAPSULE, DELAYED RELEASE ORAL
Qty: 120 CAPSULE | Refills: 0 | Status: SHIPPED | OUTPATIENT
Start: 2023-02-05

## 2023-02-06 RX ORDER — GABAPENTIN 300 MG/1
CAPSULE ORAL
Qty: 30 CAPSULE | Refills: 0 | Status: SHIPPED | OUTPATIENT
Start: 2023-02-06

## 2023-02-18 PROBLEM — J10.1 INFLUENZA A: Status: RESOLVED | Noted: 2022-12-20 | Resolved: 2023-02-18

## 2023-03-05 DIAGNOSIS — K50.919 CROHN'S DISEASE WITH COMPLICATION, UNSPECIFIED GASTROINTESTINAL TRACT LOCATION (HCC): ICD-10-CM

## 2023-03-06 RX ORDER — MESALAMINE 400 MG/1
CAPSULE, DELAYED RELEASE ORAL
Qty: 120 CAPSULE | Refills: 0 | Status: SHIPPED | OUTPATIENT
Start: 2023-03-06

## 2023-03-16 ENCOUNTER — HOSPITAL ENCOUNTER (EMERGENCY)
Facility: HOSPITAL | Age: 68
Discharge: HOME | End: 2023-03-16
Attending: EMERGENCY MEDICINE | Admitting: EMERGENCY MEDICINE

## 2023-03-16 ENCOUNTER — APPOINTMENT (EMERGENCY)
Dept: CT IMAGING | Facility: HOSPITAL | Age: 68
End: 2023-03-16

## 2023-03-16 VITALS
BODY MASS INDEX: 35.79 KG/M2 | TEMPERATURE: 99 F | OXYGEN SATURATION: 99 % | HEIGHT: 70 IN | WEIGHT: 250 LBS | DIASTOLIC BLOOD PRESSURE: 60 MMHG | RESPIRATION RATE: 21 BRPM | SYSTOLIC BLOOD PRESSURE: 141 MMHG | HEART RATE: 66 BPM

## 2023-03-16 DIAGNOSIS — R10.9 ABDOMINAL PAIN: Primary | ICD-10-CM

## 2023-03-16 DIAGNOSIS — K44.9 HIATAL HERNIA: ICD-10-CM

## 2023-03-16 DIAGNOSIS — R19.7 NAUSEA VOMITING AND DIARRHEA: ICD-10-CM

## 2023-03-16 DIAGNOSIS — R11.2 NAUSEA VOMITING AND DIARRHEA: ICD-10-CM

## 2023-03-16 DIAGNOSIS — D72.829 LEUKOCYTOSIS: ICD-10-CM

## 2023-03-16 LAB
2HR DELTA HS TROPONIN: 1 NG/L
ALBUMIN SERPL BCP-MCNC: 4.1 G/DL (ref 3.5–5)
ALP SERPL-CCNC: 77 U/L (ref 34–104)
ALT SERPL W P-5'-P-CCNC: 21 U/L (ref 7–52)
ANION GAP SERPL CALCULATED.3IONS-SCNC: 11 MMOL/L (ref 4–13)
AST SERPL W P-5'-P-CCNC: 21 U/L (ref 13–39)
BACTERIA UR QL AUTO: NORMAL /HPF
BASOPHILS # BLD AUTO: 0.02 THOUSANDS/ÂΜL (ref 0–0.1)
BASOPHILS NFR BLD AUTO: 0 % (ref 0–1)
BILIRUB SERPL-MCNC: 0.39 MG/DL (ref 0.2–1)
BILIRUB UR QL STRIP: NEGATIVE
BUN SERPL-MCNC: 20 MG/DL (ref 5–25)
CALCIUM SERPL-MCNC: 8.9 MG/DL (ref 8.4–10.2)
CARDIAC TROPONIN I PNL SERPL HS: 3 NG/L
CARDIAC TROPONIN I PNL SERPL HS: 4 NG/L
CHLORIDE SERPL-SCNC: 102 MMOL/L (ref 96–108)
CLARITY UR: CLEAR
CO2 SERPL-SCNC: 27 MMOL/L (ref 21–32)
COLOR UR: YELLOW
CREAT SERPL-MCNC: 0.89 MG/DL (ref 0.6–1.3)
EOSINOPHIL # BLD AUTO: 0.1 THOUSAND/ÂΜL (ref 0–0.61)
EOSINOPHIL NFR BLD AUTO: 1 % (ref 0–6)
ERYTHROCYTE [DISTWIDTH] IN BLOOD BY AUTOMATED COUNT: 14.2 % (ref 11.6–15.1)
FLUAV RNA RESP QL NAA+PROBE: NEGATIVE
FLUBV RNA RESP QL NAA+PROBE: NEGATIVE
GFR SERPL CREATININE-BSD FRML MDRD: 67 ML/MIN/1.73SQ M
GLUCOSE SERPL-MCNC: 146 MG/DL (ref 65–140)
GLUCOSE UR STRIP-MCNC: NEGATIVE MG/DL
HCT VFR BLD AUTO: 44.8 % (ref 34.8–46.1)
HGB BLD-MCNC: 13.9 G/DL (ref 11.5–15.4)
HGB UR QL STRIP.AUTO: ABNORMAL
IMM GRANULOCYTES # BLD AUTO: 0.03 THOUSAND/UL (ref 0–0.2)
IMM GRANULOCYTES NFR BLD AUTO: 0 % (ref 0–2)
KETONES UR STRIP-MCNC: NEGATIVE MG/DL
LACTATE SERPL-SCNC: 1.4 MMOL/L (ref 0.5–2)
LEUKOCYTE ESTERASE UR QL STRIP: NEGATIVE
LIPASE SERPL-CCNC: 33 U/L (ref 11–82)
LYMPHOCYTES # BLD AUTO: 0.58 THOUSANDS/ÂΜL (ref 0.6–4.47)
LYMPHOCYTES NFR BLD AUTO: 4 % (ref 14–44)
MCH RBC QN AUTO: 27.6 PG (ref 26.8–34.3)
MCHC RBC AUTO-ENTMCNC: 31 G/DL (ref 31.4–37.4)
MCV RBC AUTO: 89 FL (ref 82–98)
MONOCYTES # BLD AUTO: 0.46 THOUSAND/ÂΜL (ref 0.17–1.22)
MONOCYTES NFR BLD AUTO: 3 % (ref 4–12)
NEUTROPHILS # BLD AUTO: 12.54 THOUSANDS/ÂΜL (ref 1.85–7.62)
NEUTS SEG NFR BLD AUTO: 92 % (ref 43–75)
NITRITE UR QL STRIP: NEGATIVE
NON-SQ EPI CELLS URNS QL MICRO: NORMAL /HPF
NRBC BLD AUTO-RTO: 0 /100 WBCS
PH UR STRIP.AUTO: 6 [PH]
PLATELET # BLD AUTO: 276 THOUSANDS/UL (ref 149–390)
PMV BLD AUTO: 9.4 FL (ref 8.9–12.7)
POTASSIUM SERPL-SCNC: 3.7 MMOL/L (ref 3.5–5.3)
PROT SERPL-MCNC: 7.7 G/DL (ref 6.4–8.4)
PROT UR STRIP-MCNC: NEGATIVE MG/DL
RBC # BLD AUTO: 5.04 MILLION/UL (ref 3.81–5.12)
RBC #/AREA URNS AUTO: NORMAL /HPF
RSV RNA RESP QL NAA+PROBE: NEGATIVE
SARS-COV-2 RNA RESP QL NAA+PROBE: NEGATIVE
SODIUM SERPL-SCNC: 140 MMOL/L (ref 135–147)
SP GR UR STRIP.AUTO: 1.01 (ref 1–1.03)
UROBILINOGEN UR QL STRIP.AUTO: 0.2 E.U./DL
WBC # BLD AUTO: 13.73 THOUSAND/UL (ref 4.31–10.16)
WBC #/AREA URNS AUTO: NORMAL /HPF

## 2023-03-16 RX ORDER — MORPHINE SULFATE 4 MG/ML
4 INJECTION, SOLUTION INTRAMUSCULAR; INTRAVENOUS ONCE
Status: COMPLETED | OUTPATIENT
Start: 2023-03-16 | End: 2023-03-16

## 2023-03-16 RX ORDER — ONDANSETRON 2 MG/ML
4 INJECTION INTRAMUSCULAR; INTRAVENOUS ONCE
Status: COMPLETED | OUTPATIENT
Start: 2023-03-16 | End: 2023-03-16

## 2023-03-16 RX ORDER — FAMOTIDINE 10 MG/ML
20 INJECTION, SOLUTION INTRAVENOUS ONCE
Status: COMPLETED | OUTPATIENT
Start: 2023-03-16 | End: 2023-03-16

## 2023-03-16 RX ORDER — SODIUM CHLORIDE, SODIUM GLUCONATE, SODIUM ACETATE, POTASSIUM CHLORIDE, MAGNESIUM CHLORIDE, SODIUM PHOSPHATE, DIBASIC, AND POTASSIUM PHOSPHATE .53; .5; .37; .037; .03; .012; .00082 G/100ML; G/100ML; G/100ML; G/100ML; G/100ML; G/100ML; G/100ML
1000 INJECTION, SOLUTION INTRAVENOUS ONCE
Status: COMPLETED | OUTPATIENT
Start: 2023-03-16 | End: 2023-03-16

## 2023-03-16 RX ORDER — DICYCLOMINE HCL 20 MG
20 TABLET ORAL ONCE
Status: COMPLETED | OUTPATIENT
Start: 2023-03-16 | End: 2023-03-16

## 2023-03-16 RX ORDER — ONDANSETRON 4 MG/1
4 TABLET, ORALLY DISINTEGRATING ORAL EVERY 6 HOURS PRN
Qty: 12 TABLET | Refills: 0 | Status: SHIPPED | OUTPATIENT
Start: 2023-03-16

## 2023-03-16 RX ORDER — DICYCLOMINE HYDROCHLORIDE 10 MG/1
10 CAPSULE ORAL 4 TIMES DAILY PRN
Qty: 40 CAPSULE | Refills: 0 | Status: SHIPPED | OUTPATIENT
Start: 2023-03-16

## 2023-03-16 RX ADMIN — DICYCLOMINE HYDROCHLORIDE 20 MG: 20 TABLET ORAL at 21:30

## 2023-03-16 RX ADMIN — FAMOTIDINE 20 MG: 10 INJECTION, SOLUTION INTRAVENOUS at 17:34

## 2023-03-16 RX ADMIN — ONDANSETRON 4 MG: 2 INJECTION INTRAMUSCULAR; INTRAVENOUS at 17:33

## 2023-03-16 RX ADMIN — IOHEXOL 100 ML: 350 INJECTION, SOLUTION INTRAVENOUS at 18:51

## 2023-03-16 RX ADMIN — SODIUM CHLORIDE, SODIUM GLUCONATE, SODIUM ACETATE, POTASSIUM CHLORIDE AND MAGNESIUM CHLORIDE 1000 ML: 526; 502; 368; 37; 30 INJECTION, SOLUTION INTRAVENOUS at 17:39

## 2023-03-16 RX ADMIN — MORPHINE SULFATE 4 MG: 4 INJECTION, SOLUTION INTRAMUSCULAR; INTRAVENOUS at 17:33

## 2023-03-16 NOTE — ED PROVIDER NOTES
History  Chief Complaint   Patient presents with   • Abdominal Pain     Patient started with upper abd this morning  Patient reports having n/v/d starting this afternoon  78year old female with PMH afib on eliquis, h/o thyroid cancer, CHF, COPD/asthma, Crohn's disease, HTN, HLD, GERD, MARY ELLEN presents ambulatory from home for evaluation of abdominal pain  Pt reports pain in upper abdomen which started this morning  She states she developed a severe pain in her epigastric region  Associated nausea, vomiting, diarrhea that started this afternoon  She states she ate a chocolate chip muffin and drank milk  Today reports subjective fever/chills  No measured temperature  Denies chest pain, SOB  Denies cough, congestion or recent illness  Spouse had same symptoms x 2 days ago (to include vomiting, diarrhea) but is feeling better  Denies recent travel  Denies suspicious food intake  She has a h/o Crohn's but states this feels different from her flares and has been doing well on her current meds  She states with her Crohn's she usually has lower GI symptoms  Denies any urinary complaints such as dysuria, frequency, urgency, hematuria  Prior abdominal surgeries include cholecystectomy, appendectomy, hysterectomy  No reported aggravating or alleviating factors  No specific treatments tried  History provided by:  Patient and medical records   used: No        Prior to Admission Medications   Prescriptions Last Dose Informant Patient Reported? Taking? Blood Glucose Monitoring Suppl (Ritu Nieves) w/Device KIT   Yes No   Sig: Use to check sugars once a day   Dx E11 9   Blood Glucose Monitoring Suppl (Ritu Nieves) w/Device KIT   Yes No   Sig: daily Use to check glucose   Cholecalciferol (Vitamin D3) 1 25 MG (87545 UT) CAPS   Yes No   Sig: Take 1 capsule by mouth once a week   Lancets (ONETOUCH DELICA PLUS [id number]) MISC   Yes No   Sig: USE 1 TO CHECK GLUCOSE ONCE DAILY   ONETOUCH VERIO test strip   Yes No   Sig: USE 1 STRIP TO CHECK GLUCOSE ONCE DAILY   OneTouch Delica Lancets 95O MISC   Yes No   Sig: Use to check sugars once a day  Dx E11 9   SYNTHROID 150 MCG tablet   Yes No   Sig: TAKE 1 TABLET BY MOUTH ONCE DAILY FIRST THING IN THE MORNING (AT LEAST 30 MINUTES PRIOR TO BREAKFAST OR OTHER MEDS)   amLODIPine (NORVASC) 5 mg tablet   Yes No   Sig: Take 5 mg by mouth every morning   apixaban (ELIQUIS) 5 mg   Yes No   Sig: Take 5 mg by mouth 2 (two) times a day    aspirin 81 mg chewable tablet   No No   Sig: Chew 1 tablet (81 mg total) daily   atorvastatin (LIPITOR) 20 mg tablet   Yes No   Sig: Take 20 mg by mouth every morning   docusate sodium (COLACE) 100 mg capsule   No No   Sig: Take 1 capsule (100 mg total) by mouth 2 (two) times a day   dofetilide (TIKOSYN) 500 mcg capsule   Yes No   Sig: Take 500 mcg by mouth 2 (two) times a day   famotidine (PEPCID) 40 MG tablet   No No   Sig: Take 1 tablet (40 mg total) by mouth daily at bedtime   fenofibrate (TRICOR) 54 MG tablet   Yes No   Sig: Take 54 mg by mouth daily   furosemide (LASIX) 20 mg tablet   Yes No   Sig: Take 20 mg by mouth 2 (two) times a day   gabapentin (NEURONTIN) 300 mg capsule   No No   Sig: Take 1 capsule by mouth twice daily   glipiZIDE (GLUCOTROL XL) 5 mg 24 hr tablet   Yes No   Sig: TAKE 1 TABLET BY MOUTH ONCE DAILY 30 MINUTES BEFORE A MEAL (REPLACES METFORMIN)   glucose blood test strip   Yes No   Sig: Use to check sugars once a day   Dx E11 9   hydrOXYzine HCL (ATARAX) 10 mg tablet   Yes No   Sig: Take 10 mg by mouth 2 (two) times a day   lisinopril (ZESTRIL) 10 mg tablet   Yes No   Sig: Take 10 mg by mouth every morning   mesalamine (DELZICOL) 400 mg   No No   Sig: Take 2 capsules by mouth twice daily   metoprolol succinate (TOPROL-XL) 100 mg 24 hr tablet   No No   Sig: Take 1 tablet (100 mg total) by mouth every 12 (twelve) hours   nitroglycerin (NITROSTAT) 0 4 mg SL tablet   Yes No   Sig: Place 0 4 mg under the tongue every 5 (five) minutes as needed Do not exceeda total of 3 doses in 15 minutes   Patient not taking: Reported on 2023   omeprazole (PriLOSEC) 40 MG capsule   No No   Sig: Take 1 capsule by mouth twice daily   sertraline (ZOLOFT) 100 mg tablet   Yes No   sertraline (ZOLOFT) 50 mg tablet   Yes No   Sig: Take 50 mg by mouth daily   Patient not taking: Reported on 2023   venlafaxine (EFFEXOR-XR) 37 5 mg 24 hr capsule   Yes No   Sig: Take 37 5 mg by mouth daily      Facility-Administered Medications: None       Past Medical History:   Diagnosis Date   • A-fib (HCC)    • Asthma    • Brain aneurysm    • Cancer ([place] [address] )     papillary thyroid cancer   • Cardiac disease    • CHF (congestive heart failure) ([facility])    • COPD (chronic obstructive pulmonary disease) ([facility])    • CPAP (continuous positive airway pressure) dependence    • Crohn disease ([place] [address] )    • Diabetes mellitus ([place] [address] )    • Disease of thyroid gland    • GERD (gastroesophageal reflux disease)    • Hyperlipidemia    • Hypertension    • Sleep apnea        Past Surgical History:   Procedure Laterality Date   • APPENDECTOMY     • CARDIOVERSION N/A 2019    Procedure: CARDIOVERSION;  Surgeon: Filemon Parr MD;  Location: [facility];  Service: Cardiology   • CEREBRAL ANEURYSM REPAIR     • CHOLECYSTECTOMY     • HYSTERECTOMY     • ID EXC THROMBOSED Domitila Hutchison N/A 10/7/2022    Procedure: HEMORRHOIDECTOMY EXCISION;  Surgeon: Yen Grossman DO;  Location: [facility];  Service: General   • THYROIDECTOMY     • TONSILLECTOMY AND ADENOIDECTOMY         History reviewed  No pertinent family history  I have reviewed and agree with the history as documented      E-Cigarette/Vaping   • E-Cigarette Use Never User      E-Cigarette/Vaping Substances   • Nicotine No    • THC No    • CBD No    • Flavoring No    • Other No    • Unknown No      Social History     Tobacco Use   • Smoking status: Former     Types: Cigarettes     Quit date: 2018     Years since quittin 2 • Smokeless tobacco: Never   Vaping Use   • Vaping Use: Never used   Substance Use Topics   • Alcohol use: Not Currently     Comment: social   • Drug use: Never       Review of Systems   Constitutional: Negative  Negative for chills, fatigue and fever  HENT: Negative  Negative for congestion, rhinorrhea and sore throat  Eyes: Negative  Negative for visual disturbance  Respiratory: Negative  Negative for cough, shortness of breath and wheezing  Cardiovascular: Negative  Negative for chest pain, palpitations and leg swelling  Gastrointestinal: Positive for abdominal pain, diarrhea, nausea and vomiting  Negative for constipation  Genitourinary: Negative  Negative for dysuria, flank pain, frequency and hematuria  Musculoskeletal: Negative  Negative for back pain and neck pain  Skin: Negative  Negative for rash  Neurological: Negative  Negative for dizziness, light-headedness and headaches  Psychiatric/Behavioral: Negative  All other systems reviewed and are negative  Physical Exam  Physical Exam  Vitals and nursing note reviewed  Constitutional:       General: She is awake  She is not in acute distress  Appearance: She is well-developed  She is obese  She is not toxic-appearing or diaphoretic  HENT:      Head: Normocephalic and atraumatic  Right Ear: Hearing and external ear normal       Left Ear: Hearing and external ear normal       Nose: Nose normal       Mouth/Throat:      Mouth: Mucous membranes are moist       Pharynx: Oropharynx is clear  Uvula midline  Eyes:      General: Lids are normal  No scleral icterus  Conjunctiva/sclera: Conjunctivae normal       Pupils: Pupils are equal, round, and reactive to light  Neck:      Trachea: Trachea and phonation normal  No tracheal deviation  Cardiovascular:      Rate and Rhythm: Normal rate and regular rhythm  Pulses: Normal pulses  Radial pulses are 2+ on the right side and 2+ on the left side  Dorsalis pedis pulses are 2+ on the right side and 2+ on the left side  Posterior tibial pulses are 2+ on the right side and 2+ on the left side  Heart sounds: Normal heart sounds, S1 normal and S2 normal  No murmur heard  Pulmonary:      Effort: Pulmonary effort is normal  No tachypnea or respiratory distress  Breath sounds: Normal breath sounds  No wheezing, rhonchi or rales  Abdominal:      General: Bowel sounds are normal  There is no distension  Palpations: Abdomen is soft  Tenderness: There is abdominal tenderness (mild) in the epigastric area  There is no right CVA tenderness, left CVA tenderness, guarding or rebound  Musculoskeletal:         General: No tenderness  Normal range of motion  Cervical back: Normal range of motion and neck supple  Right lower leg: No edema  Left lower leg: No edema  Skin:     General: Skin is warm and dry  Capillary Refill: Capillary refill takes less than 2 seconds  Findings: No rash  Neurological:      General: No focal deficit present  Mental Status: She is alert and oriented to person, place, and time  GCS: GCS eye subscore is 4  GCS verbal subscore is 5  GCS motor subscore is 6  Sensory: No sensory deficit  Motor: No abnormal muscle tone        Gait: Gait normal    Psychiatric:         Mood and Affect: Mood normal          Speech: Speech normal          Behavior: Behavior normal          Vital Signs  ED Triage Vitals [03/16/23 1645]   Temperature Pulse Respirations Blood Pressure SpO2   99 °F (37 2 °C) 88 18 129/65 93 %      Temp Source Heart Rate Source Patient Position - Orthostatic VS BP Location FiO2 (%)   Temporal Monitor Lying Right arm --      Pain Score       10 - Worst Possible Pain           Vitals:    03/16/23 1645 03/16/23 1830 03/16/23 2000   BP: 129/65 156/69 141/60   Pulse: 88 72 66   Patient Position - Orthostatic VS: Lying           Visual Acuity      ED Medications  Medications   ondansetron (ZOFRAN) injection 4 mg (4 mg Intravenous Given 3/16/23 1733)   Famotidine (PF) (PEPCID) injection 20 mg (20 mg Intravenous Given 3/16/23 1734)   multi-electrolyte (ISOLYTE-S PH 7 4) bolus 1,000 mL (0 mL Intravenous Stopped 3/16/23 1839)   morphine injection 4 mg (4 mg Intravenous Given 3/16/23 1733)   iohexol (OMNIPAQUE) 350 MG/ML injection (SINGLE-DOSE) 100 mL (100 mL Intravenous Given 3/16/23 1851)   dicyclomine (BENTYL) tablet 20 mg (20 mg Oral Given 3/16/23 2130)       Diagnostic Studies  Results Reviewed     Procedure Component Value Units Date/Time    Urine Microscopic [[id number]]  (Normal) Collected: 03/16/23 2056    Lab Status: Final result Specimen: Urine, Clean Catch Updated: 03/16/23 2114     RBC, UA 0-1 /hpf      WBC, UA None Seen /hpf      Epithelial Cells Occasional /hpf      Bacteria, UA Occasional /hpf     UA w Reflex to Microscopic w Reflex to Culture [[id number]]  (Abnormal) Collected: 03/16/23 2056    Lab Status: Final result Specimen: Urine, Clean Catch Updated: 03/16/23 2059     Color, UA Yellow     Clarity, UA Clear     Specific Gravity, UA 1 010     pH, UA 6 0     Leukocytes, UA Negative     Nitrite, UA Negative     Protein, UA Negative mg/dl      Glucose, UA Negative mg/dl      Ketones, UA Negative mg/dl      Urobilinogen, UA 0 2 E U /dl      Bilirubin, UA Negative     Occult Blood, UA Trace-lysed    HS Troponin I 2hr [[id number]]  (Normal) Collected: 03/16/23 1925    Lab Status: Final result Specimen: Blood from Arm, Left Updated: 03/16/23 2004     hs TnI 2hr 4 ng/L      Delta 2hr hsTnI 1 ng/L     FLU/RSV/COVID - if FLU/RSV clinically relevant [[id number]]  (Normal) Collected: 03/16/23 1651    Lab Status: Final result Specimen: Nasopharyngeal Swab Updated: 03/16/23 1746     SARS-CoV-2 Negative     INFLUENZA A PCR Negative     INFLUENZA B PCR Negative     RSV PCR Negative    Narrative:      FOR PEDIATRIC PATIENTS - copy/paste COVID Guidelines URL to browser: [email or web address]  [email or web address]    SARS-CoV-2 assay is a Nucleic Acid Amplification assay intended for the  qualitative detection of nucleic acid from SARS-CoV-2 in nasopharyngeal  swabs  Results are for the presumptive identification of SARS-CoV-2 RNA  Positive results are indicative of infection with SARS-CoV-2, the virus  causing COVID-19, but do not rule out bacterial infection or co-infection  with other viruses  Laboratories within the [place] and its  territories are required to report all positive results to the appropriate  public health authorities  Negative results do not preclude SARS-CoV-2  infection and should not be used as the sole basis for treatment or other  patient management decisions  Negative results must be combined with  clinical observations, patient history, and epidemiological information  This test has not been FDA cleared or approved  This test has been authorized by FDA under an Emergency Use Authorization  (EUA)  This test is only authorized for the duration of time the  declaration that circumstances exist justifying the authorization of the  emergency use of an in vitro diagnostic tests for detection of SARS-CoV-2  virus and/or diagnosis of COVID-19 infection under section 564(b)(1) of  the Act, 21 U  S C  054NKB-8(P)(3), unless the authorization is terminated  or revoked sooner  The test has been validated but independent review by FDA  and CLIA is pending  Test performed using [employer] GeneXpert: This RT-PCR assay targets N2,  a region unique to SARS-CoV-2  A conserved region in the E-gene was chosen  for pan-Sarbecovirus detection which includes SARS-CoV-2  According to CMS-2020-01-R, this platform meets the definition of high-throughput technology      HS Troponin 0hr (reflex protocol) [[id number]]  (Normal) Collected: 03/16/23 1651    Lab Status: Final result Specimen: Blood from Arm, Left Updated: 03/16/23 0694 hs TnI 0hr 3 ng/L     CBC and differential [[id number]]  (Abnormal) Collected: 03/16/23 1647    Lab Status: Final result Specimen: Blood from Arm, Left Updated: 03/16/23 1732     WBC 13 73 Thousand/uL      RBC 5 04 Million/uL      Hemoglobin 13 9 g/dL      Hematocrit 44 8 %      MCV 89 fL      MCH 27 6 pg      MCHC 31 0 g/dL      RDW 14 2 %      MPV 9 4 fL      Platelets 542 Thousands/uL      nRBC 0 /100 WBCs      Neutrophils Relative 92 %      Immat GRANS % 0 %      Lymphocytes Relative 4 %      Monocytes Relative 3 %      Eosinophils Relative 1 %      Basophils Relative 0 %      Neutrophils Absolute 12 54 Thousands/µL      Immature Grans Absolute 0 03 Thousand/uL      Lymphocytes Absolute 0 58 Thousands/µL      Monocytes Absolute 0 46 Thousand/µL      Eosinophils Absolute 0 10 Thousand/µL      Basophils Absolute 0 02 Thousands/µL     Narrative: This is an appended report  These results have been appended to a previously verified report      Lipase [[id number]]  (Normal) Collected: 03/16/23 1647    Lab Status: Final result Specimen: Blood from Arm, Left Updated: 03/16/23 1730     Lipase 33 u/L     Comprehensive metabolic panel [[id number]]  (Abnormal) Collected: 03/16/23 1647    Lab Status: Final result Specimen: Blood from Arm, Left Updated: 03/16/23 1730     Sodium 140 mmol/L      Potassium 3 7 mmol/L      Chloride 102 mmol/L      CO2 27 mmol/L      ANION GAP 11 mmol/L      BUN 20 mg/dL      Creatinine 0 89 mg/dL      Glucose 146 mg/dL      Calcium 8 9 mg/dL      AST 21 U/L      ALT 21 U/L      Alkaline Phosphatase 77 U/L      Total Protein 7 7 g/dL      Albumin 4 1 g/dL      Total Bilirubin 0 39 mg/dL      eGFR 67 ml/min/1 73sq m     Narrative:      [place] guidelines for Chronic Kidney Disease (CKD):   •  Stage 1 with normal or high GFR (GFR > 90 mL/min/1 73 square meters)  •  Stage 2 Mild CKD (GFR = 60-89 mL/min/1 73 square meters)  •  Stage 3A Moderate CKD (GFR = 45-59 mL/min/1 73 square meters)  •  Stage 3B Moderate CKD (GFR = 30-44 mL/min/1 73 square meters)  •  Stage 4 Severe CKD (GFR = 15-29 mL/min/1 73 square meters)  •  Stage 5 End Stage CKD (GFR <15 mL/min/1 73 square meters)  Note: GFR calculation is accurate only with a steady state creatinine    Lactic acid [[id number]]  (Normal) Collected: 03/16/23 1651    Lab Status: Final result Specimen: Blood from Arm, Left Updated: 03/16/23 1726     LACTIC ACID 1 4 mmol/L     Narrative:      Result may be elevated if tourniquet was used during collection  CT abdomen pelvis with contrast   Final Result by Katlin Gamboa DO (03/16 2018)   No CT explanation for patient's epigastric pain  Small sliding hiatal hernia  Mild extrahepatic biliary ductal dilatation considered normal status post cholecystectomy and stable from October 2022  Workstation performed: [id number]                    Procedures  ECG 12 Lead Documentation Only    Date/Time: 3/16/2023 4:53 PM  Performed by: Colin Harrington PA-C  Authorized by: Colin Harrington PA-C     Indications / Diagnosis:  Epigastric abd pain  ECG reviewed by me, the ED Provider: yes    Patient location:  ED  Previous ECG:     Comparison to cardiac monitor: Yes    Interpretation:     Interpretation: non-specific    Rate:     ECG rate:  87    ECG rate assessment: normal    Rhythm:     Rhythm: sinus rhythm    Ectopy:     Ectopy: none    QRS:     QRS axis:  Normal    QRS intervals:  Normal  Conduction:     Conduction: normal    ST segments:     ST segments:  Normal  T waves:     T waves: non-specific    Comments:      , QRS 80, QT/QTc 374/450; no acute ischemic changes               ED Course  ED Course as of 03/16/23 2206   u Mar 16, 2023   1709 WBC(!): 13 73  Previously normal two months ago   1709 Hemoglobin: 13 9   1709 Platelet Count: 375   1726 LACTIC ACID: 1 4   1731 Glucose, Random(!): 146   1731 Creatinine: 0 89   1731 BUN: 20   1731 Sodium: 140   1731 Potassium: 3 7   1731 Chloride: 102   1731 CO2: 27   1731 Anion Gap: 11   1731 Calcium: 8 9   1731 AST: 21   1731 ALT: 21   1731 Alkaline Phosphatase: 77   1731 Total Protein: 7 7   1731 Albumin: 4 1   1732 TOTAL BILIRUBIN: 0 39   1732 eGFR: 67   1732 Lipase: 33   1732 hs TnI 0hr: 3   1752 SARS-COV-2: Negative   1752 INFLU A PCR: Negative   1752 INFLU B PCR: Negative   1752 RSV PCR: Negative   1855 CT performed and pending interpretation  Pt updated on results  She reports pain still present but improving from prior  She is requesting ice chips  2005 Delta 2hr hsTnI: 1  Not c/w ACS   2019 CT abdomen pelvis with contrast  IMPRESSION:  No CT explanation for patient's epigastric pain  Small sliding hiatal hernia  Mild extrahepatic biliary ductal dilatation considered normal status post cholecystectomy and stable from October 2022 2026 Pt updated on results of CT scan  She was awoke from sleep to review  She reports feeling improved at present  She will attempt to give us urinalysis  Given spouse's recent same GI symptoms, suspect this could be a gastroenteritis  2057 UA in process  2103 UA w Reflex to Microscopic w Reflex to Culture(!)  UA shows trace blood but otherwise not suggestive of infection  2110 Pt again reassessed  Continues to feel improved  At this time, based on vitals, labs, urine and CT imaging, I feel it is reasonable to discharge pt and continue symptomatic management at home with strict return precautions  Reviewed symptomatic management and dietary guidance for a presumed gastroenteritis  Pt afebrile, non toxic appearing  Has had symptomatic improvement and tolerating PO  Recommended clears and advance to [facility]  Rx zofran and bentyl  Discussed continued symptomatic/supportive care  Advised rest, fluids, OTC meds as needed for symptoms  Strict return precautions outlined      Advised outpatient follow up with PCP or return to ER for change in condition as outlined  Pt verbalized understanding and had no further questions  HEART Risk Score    Flowsheet Row Most Recent Value   Heart Score Risk Calculator    History 0 Filed at: 03/16/2023 1711   ECG 1 Filed at: 03/16/2023 1711   Age 2 Filed at: 03/16/2023 1711   Risk Factors 2 Filed at: 03/16/2023 1711   Troponin 0 Filed at: 03/16/2023 1711   HEART Score 5 Filed at: 03/16/2023 1711                                      Medical Decision Making  Abdominal pain: acute illness or injury     Details: Associated with nausea/vomiting/diarrhea  CT scan shows small hiatal hernia  No evidence of pancreatitis  she has had prior cholecystectomy therefore this is not gallstones or cholecystitis  no evidence of bowel obstruction, colitis, diverticulitis  UA not suggestive of UTI  Hiatal hernia:     Details: Continue pepcid and medications for acid reflux  Leukocytosis: acute illness or injury     Details: Likely reactive from vomiting  No focal infectious etiology identified  Nausea vomiting and diarrhea: acute illness or injury     Details: Presumed gastroenteritis, likely viral   Spouse recent ill with same symptoms  Amount and/or Complexity of Data Reviewed  External Data Reviewed: labs and notes  Labs: ordered  Decision-making details documented in ED Course  Radiology: ordered  Decision-making details documented in ED Course  ECG/medicine tests: ordered and independent interpretation performed  Decision-making details documented in ED Course  Risk  OTC drugs  Prescription drug management            Disposition  Final diagnoses:   Abdominal pain   Nausea vomiting and diarrhea   Leukocytosis   Hiatal hernia     Time reflects when diagnosis was documented in both MDM as applicable and the Disposition within this note     Time User Action Codes Description Comment    3/16/2023  8:57 PM Justina Grate Add [R10 9] Abdominal pain     3/16/2023  8:57 PM Justina Grate Add [R11 2,  R19 7] Nausea vomiting and diarrhea     3/16/2023  8:57 PM Saravanan Jasbir Add [E25 492] Leukocytosis     3/16/2023  8:58 PM Saravanan Jasbir Add [K44 9] Hiatal hernia       ED Disposition     ED Disposition   Discharge    Condition   Stable    Date/Time   Thu Mar 16, 2023  9:05 PM    Comment   Jakub Wallace discharge to home/self care  Follow-up Information     Follow up With Specialties Details Why Contact Info Additional Information    Suman Chow MD Family Medicine Schedule an appointment as soon as possible for a visit   [address] [facility] [address]       [facility] Emergency Department Emergency Medicine  As needed [facility] [id number] [phone]  [address] [facility] Emergency Department, [address], [facility]          Discharge Medication List as of 3/16/2023  9:24 PM      START taking these medications    Details   dicyclomine (BENTYL) 10 mg capsule Take 1 capsule (10 mg total) by mouth 4 (four) times a day as needed (bowel spasm), Starting u 3/16/2023, Normal      ondansetron (ZOFRAN-ODT) 4 mg disintegrating tablet Take 1 tablet (4 mg total) by mouth every 6 (six) hours as needed for vomiting or nausea, Starting Thu 3/16/2023, Normal         CONTINUE these medications which have NOT CHANGED    Details   amLODIPine (NORVASC) 5 mg tablet Take 5 mg by mouth every morning, Starting Mon 3/21/2022, Historical Med      apixaban (ELIQUIS) 5 mg Take 5 mg by mouth 2 (two) times a day , Starting Tue 2/5/2019, Historical Med      aspirin 81 mg chewable tablet Chew 1 tablet (81 mg total) daily, Starting Sat 2/29/2020, No Print      atorvastatin (LIPITOR) 20 mg tablet Take 20 mg by mouth every morning, Starting Tue 6/7/2022, Historical Med      !! Blood Glucose Monitoring Suppl (ONETOUCH VERIO) w/Device KIT Use to check sugars once a day  Dx E11 9, Historical Med      !!  Blood Glucose Monitoring Suppl (Kamilla Laila) w/Device KIT daily Use to check glucose, Starting Tue 1/28/2020, Historical Med      Cholecalciferol (Vitamin D3) 1 25 MG (49633 UT) CAPS Take 1 capsule by mouth once a week, Starting Sat 1/21/2023, Historical Med      docusate sodium (COLACE) 100 mg capsule Take 1 capsule (100 mg total) by mouth 2 (two) times a day, Starting Fri 10/7/2022, Normal      dofetilide (TIKOSYN) 500 mcg capsule Take 500 mcg by mouth 2 (two) times a day, Historical Med      famotidine (PEPCID) 40 MG tablet Take 1 tablet (40 mg total) by mouth daily at bedtime, Starting Fri 1/13/2023, Normal      fenofibrate (TRICOR) 54 MG tablet Take 54 mg by mouth daily, Historical Med      furosemide (LASIX) 20 mg tablet Take 20 mg by mouth 2 (two) times a day, Starting Wed 6/10/2020, Historical Med      gabapentin (NEURONTIN) 300 mg capsule Take 1 capsule by mouth twice daily, Normal      glipiZIDE (GLUCOTROL XL) 5 mg 24 hr tablet TAKE 1 TABLET BY MOUTH ONCE DAILY 30 MINUTES BEFORE A MEAL (REPLACES METFORMIN), Historical Med      !! glucose blood test strip Use to check sugars once a day   Dx E11 9, Historical Med      hydrOXYzine HCL (ATARAX) 10 mg tablet Take 10 mg by mouth 2 (two) times a day, Starting Mon 6/6/2022, Historical Med      !! Lancets (ONETOUCH DELICA PLUS [id number]) MISC USE 1 TO CHECK GLUCOSE ONCE DAILY, Historical Med      lisinopril (ZESTRIL) 10 mg tablet Take 10 mg by mouth every morning, Starting Tue 6/7/2022, Historical Med      mesalamine (DELZICOL) 400 mg Take 2 capsules by mouth twice daily, Normal      metoprolol succinate (TOPROL-XL) 100 mg 24 hr tablet Take 1 tablet (100 mg total) by mouth every 12 (twelve) hours, Starting Thu 2/14/2019, Normal      nitroglycerin (NITROSTAT) 0 4 mg SL tablet Place 0 4 mg under the tongue every 5 (five) minutes as needed Do not exceeda total of 3 doses in 15 minutes, Starting Mon 3/21/2022, Historical Med      omeprazole (PriLOSEC) 40 MG capsule Take 1 capsule by mouth twice daily, Normal      !! OneTouch Delica Lancets 81O MISC Use to check sugars once a day  Dx E11 9, Historical Med      !! ONETOUCH VERIO test strip USE 1 STRIP TO CHECK GLUCOSE ONCE DAILY, Historical Med      !! sertraline (ZOLOFT) 100 mg tablet Starting Mon 1/16/2023, Historical Med      !! sertraline (ZOLOFT) 50 mg tablet Take 50 mg by mouth daily, Starting Mon 6/6/2022, Historical Med      SYNTHROID 150 MCG tablet TAKE 1 TABLET BY MOUTH ONCE DAILY FIRST THING IN THE MORNING (AT LEAST 30 MINUTES PRIOR TO BREAKFAST OR OTHER MEDS), Historical Med      venlafaxine (EFFEXOR-XR) 37 5 mg 24 hr capsule Take 37 5 mg by mouth daily, Starting Mon 12/31/2018, Historical Med       !! - Potential duplicate medications found  Please discuss with provider  No discharge procedures on file      PDMP Review       Value Time User    PDMP Reviewed  Yes 2/25/2020  6:47 PM Tawanda Robb MD          ED Provider  Electronically Signed by           Paola Cardoso PA-C  03/16/23 [id number]

## 2023-03-16 NOTE — Clinical Note
Luis Antonio Watson was seen and treated in our emergency department on 3/16/2023  Diagnosis:     José Manuel Manuel    She may return on this date: 03/20/2023         If you have any questions or concerns, please don't hesitate to call        Jeevan Valdez PA-C    ______________________________           _______________          _______________  Hospital Representative                              Date                                Time

## 2023-03-16 NOTE — Clinical Note
Meagan Hernández was seen and treated in our emergency department on 3/16/2023  Diagnosis:     Vandana Cough    She may return on this date: 03/20/2023         If you have any questions or concerns, please don't hesitate to call        Delmer Roque PA-C    ______________________________           _______________          _______________  Hospital Representative                              Date                                Time

## 2023-03-17 LAB
ATRIAL RATE: 87 BPM
P AXIS: 87 DEGREES
PR INTERVAL: 140 MS
QRS AXIS: 39 DEGREES
QRSD INTERVAL: 80 MS
QT INTERVAL: 374 MS
QTC INTERVAL: 450 MS
T WAVE AXIS: 46 DEGREES
VENTRICULAR RATE: 87 BPM

## 2023-03-17 NOTE — DISCHARGE INSTRUCTIONS
Clear liquids and advance diet as tolerated  Advance to BRAT (bananas, rice, applesauce, toast), etc   Zofran as needed for nausea/vomiting  Continue your pepcid  Bentyl as needed for bowel spasm/abdominal cramping  Follow up with PCP or return to ER as needed  Return to ER if having worsening pain, persistent vomiting, inability to keep anything down or any other concerns

## 2023-04-07 ENCOUNTER — OFFICE VISIT (OUTPATIENT)
Dept: GASTROENTEROLOGY | Facility: CLINIC | Age: 68
End: 2023-04-07

## 2023-04-07 VITALS
OXYGEN SATURATION: 96 % | DIASTOLIC BLOOD PRESSURE: 82 MMHG | RESPIRATION RATE: 18 BRPM | SYSTOLIC BLOOD PRESSURE: 128 MMHG | BODY MASS INDEX: 35.79 KG/M2 | HEIGHT: 70 IN | WEIGHT: 250 LBS | HEART RATE: 53 BPM

## 2023-04-07 DIAGNOSIS — E88.89 STEATOSIS (HCC): ICD-10-CM

## 2023-04-07 DIAGNOSIS — E66.01 SEVERE OBESITY (BMI 35.0-39.9) WITH COMORBIDITY (HCC): Primary | ICD-10-CM

## 2023-04-07 DIAGNOSIS — K74.00 LIVER FIBROSIS: ICD-10-CM

## 2023-04-07 NOTE — PROGRESS NOTES
[facility] - Outpatient Follow-up Note  Hamzah Foley 78 y o  female MRN: [id number]  Encounter: [id number]          ASSESSMENT AND PLAN:      1  Fatty liver on imaging  F4 on recent elastography, however IQR of 37% - not reliable)  She has no clear physical, laboratory or other radiologic evidence of advanced liver disease, cirrhosis or portal hypertension  I discussed with Ms Janna Acosta the natural history of fatty liver disease, including risks for development, and risk for progression to cirrhosis and its complications  We reviewed that there are no current medications that are FDA approved for the specific management of fatty liver disease  I explained that there are many, many compounds (both new and medications currently used for other indications) that are currently being studied to treat several aspects of fatty liver disease, including decreasing hepatic fat, hepatic inflammation, hepatic fibrosis, as well reducing risk factors for the development of fatty liver (primarily through weight loss)  We do not currently have active clinical trials through my office, but I will notify Ms Janna Acosta when we do if she meets criteria for inclusion  I explained that even if liver enzymes are normal, it does not rule out low levels of active fatty inflammation in the liver  NAFLD Fibrosis Score is a non-invasive calculation based on patient characteristics and basic routine labs (Age, BMI, AST, ALT, Platelet count, Albumin and presence/absence of insulin resistance)  Ms Nery Gupta NAFLD Fibrosis score is - 035  Scores between -1 455 and 0 675 are considered indeterminant and unfortunately are not very helpful for predicting fibrosis stage in NAFLD  I have requested lab based ARELLANO Fibrosure to further assess hepatic fibrosis and steatosis in a non-invasive manner  I counseled Ms Janna Acosta on the importance of weight loss through lifestyle modification, primary diet and exercise, and the benefits of seeking input of a dietician/nutritionist as well as consultation with a medical weight loss specialist or bariatric surgery team   She has agreed to meet with our weight management team     Ms Mary Alford will have repeat labs and ultrasound done in 6 months and follow-up in 7 months  I explained that if her labs based ARELLANO Fibrosure test suggestive cirrhosis (or advanced fibrosis), we would proceed with a liver biopsy to histologically stage hepatic fibrosis to allow to better guidance on ongoing management  She will continue to follow with Dr Chiki Blackwell for her Crohn's disease management  FOLLOW-UP:  Return in about 7 months (around 11/7/2023)  VISIT DIAGNOSES AND ORDERS:      1  Severe obesity (BMI 35 0-39  9) with comorbidity ([address] )    2  Steatosis ([address] )    3  Liver fibrosis      Orders Placed This Encounter   Procedures   • US abdomen complete   • ARELLANO Fibrosure   • CBC and differential   • Protime-INR   • Comprehensive metabolic panel   • Ambulatory Referral to Weight Management     ______________________________________________________________________    SUBJECTIVE:           Ms Sathish Wright is a 78 y o  female with 15-year-old female with medical history as outlined below including well-managed Crohn's on mesalamine twice daily, obesity and several components of the metabolic syndrome, pulm hypertension, history of congestive heart failure, who comes in today for initial consultation with hepatology for fatty liver disease and possible advanced fibrosis on recent elastography  Patient seems to have very little medical knowledge of a personal history of liver disease  Her , who accompanied her to her office visit today, states both he and his wife have a history of fatty liver  She has the full spectrum of the metabolic syndrome including abdominal obesity, insulin resistance with diabetes, hypertension and hyperlipidemia    Her most recent A1c was 7 6 in January  She denies a known personal history of liver disease  On review of her labs it appears that she had significantly elevated transaminases in 2019 and 2020  She believes this at the time that she had significant congestive heart failure  She does not recall being told she had evaluation done for her elevated liver enzymes at that time  She denies a history of heavy alcohol use  Ms Kreg Leventhal denies recent or history of yellow eyes/skin, dark urine, GI bleeding, abdominal distention with fluid, excessive bleeding, pruritus or confusion  She is fairly longstanding lower extremity edema and has been on diuretics for her congestive heart failure  She also has easy bruising associated with anticoagulation  REVIEW OF SYSTEMS     Review of Systems   HENT: Positive for hearing loss  Eyes: Negative  Respiratory: Negative  Cardiovascular: Positive for leg swelling  Musculoskeletal: Positive for arthralgias  All other systems reviewed and are negative        Historical Information   Patient Active Problem List   Diagnosis   • Lymphadenopathy   • A-fib (HCC)   • Elevated TSH   • Hypercholesteremia   • Transaminitis   • Microscopic hematuria   • Pulmonary hypertension (HCC)   • MARY ELLEN (obstructive sleep apnea)   • Acquired hypothyroidism   • Brain aneurysm   • Atypical chest pain   • Dizziness   • Crohn's disease with complication ([facility] )   • Pneumonia due to infectious organism   • Elevated d-dimer   • Hypokalemia   • Acute respiratory failure with hypoxia (HCC)   • COPD (chronic obstructive pulmonary disease) ([facility] )   • Exacerbation of Crohn's disease ([facility] )   • Severe obstructive sleep apnea   • Diarrhea   • Atherosclerosis   • Prolonged QT interval   • Hepatomegaly   • Hepatic steatosis   • Colon polyp   • Hyperglycemia   • Multiple renal cysts   • T12 compression fracture (HCC)   • Diabetes mellitus, type 2 (HCC)   • Fracture of right tibia   • Chronic pain syndrome   • Chronic bilateral low back pain without sciatica   • Lumbar spondylosis   • Lumbar radiculopathy   • Hemorrhoids   • Urinary retention   • Essential hypertension   • Severe obesity (BMI 35 0-39  9) with comorbidity ([facility] )     Social History     Substance and Sexual Activity   Alcohol Use Not Currently    Comment: social     Social History     Substance and Sexual Activity   Drug Use Never     Social History     Tobacco Use   Smoking Status Former   • Types: Cigarettes   • Quit date: 2018   • Years since quittin 2   Smokeless Tobacco Never       Meds/Allergies       Current Outpatient Medications:   •  amLODIPine (NORVASC) 5 mg tablet  •  apixaban (ELIQUIS) 5 mg  •  aspirin 81 mg chewable tablet  •  atorvastatin (LIPITOR) 20 mg tablet  •  Blood Glucose Monitoring Suppl (ONETOUCH VERIO) w/Device KIT  •  Blood Glucose Monitoring Suppl (ONETOUCH VERIO) w/Device KIT  •  Cholecalciferol (Vitamin D3) 1 25 MG (65667 UT) CAPS  •  dicyclomine (BENTYL) 10 mg capsule  •  docusate sodium (COLACE) 100 mg capsule  •  dofetilide (TIKOSYN) 500 mcg capsule  •  famotidine (PEPCID) 40 MG tablet  •  fenofibrate (TRICOR) 54 MG tablet  •  furosemide (LASIX) 20 mg tablet  •  gabapentin (NEURONTIN) 300 mg capsule  •  glipiZIDE (GLUCOTROL XL) 5 mg 24 hr tablet  •  glucose blood test strip  •  hydrOXYzine HCL (ATARAX) 10 mg tablet  •  Lancets (ONETOUCH DELICA PLUS [id number]) MISC  •  lisinopril (ZESTRIL) 10 mg tablet  •  mesalamine (DELZICOL) 400 mg  •  metoprolol succinate (TOPROL-XL) 100 mg 24 hr tablet  •  omeprazole (PriLOSEC) 40 MG capsule  •  ondansetron (ZOFRAN-ODT) 4 mg disintegrating tablet  •  OneTouch Delica Lancets 74Z MISC  •  ONETOUCH VERIO test strip  •  sertraline (ZOLOFT) 100 mg tablet  •  SYNTHROID 150 MCG tablet  •  venlafaxine (EFFEXOR-XR) 37 5 mg 24 hr capsule  •  nitroglycerin (NITROSTAT) 0 4 mg SL tablet  •  sertraline (ZOLOFT) 50 mg tablet    Allergies   Allergen Reactions   • Sulfa Antibiotics Rash           Objective     Blood "pressure 128/82, pulse (!) 53, resp  rate 18, height 5' 10\" (1 778 m), weight 113 kg (250 lb), SpO2 96 %  Body mass index is 35 87 kg/m²  PHYSICAL EXAM:      Physical Exam  Constitutional:       Appearance: She is obese  She is not ill-appearing  HENT:      Head: Normocephalic and atraumatic  Ears:      Comments: Difficulty hearing my voice despite wearing her hearing aids  Mouth/Throat:      Mouth: Mucous membranes are moist    Eyes:      General: No scleral icterus  Extraocular Movements: Extraocular movements intact  Cardiovascular:      Rate and Rhythm: Normal rate and regular rhythm  Heart sounds: No murmur heard  Pulmonary:      Effort: No respiratory distress  Breath sounds: No wheezing or rales  Abdominal:      General: There is distension (Obese)  Palpations: Abdomen is soft  There is hepatomegaly  Tenderness: There is abdominal tenderness in the right upper quadrant  Musculoskeletal:         General: Swelling (Bilateral lower extremity nonpitting edema) present  Skin:     Coloration: Skin is not jaundiced  Neurological:      Mental Status: She is oriented to person, place, and time           Lab Results:   Lab Results   Component Value Date     04/21/2018    K 3 7 03/16/2023    CO2 27 03/16/2023     03/16/2023    BUN 20 03/16/2023    CREATININE 0 89 03/16/2023     Lab Results   Component Value Date    WBC 13 73 (H) 03/16/2023    HGB 13 9 03/16/2023    HCT 44 8 03/16/2023    MCV 89 03/16/2023     03/16/2023     Lab Results   Component Value Date    TP 7 7 03/16/2023    AST 21 03/16/2023    ALT 21 03/16/2023    BILITOT 0 3 04/21/2018    BILIDIR 0 09 05/06/2020    INR 1 34 (H) 10/10/2022      Lab Results   Component Value Date    IRON 36 (L) 07/29/2020    FERRITIN 64 07/29/2020     Lab Results   Component Value Date    CHOL 244 (H) 04/21/2018    HDL 41 (L) 05/11/2022    TRIG 93 05/11/2022     NAFLD Fibrosis Score is: - 035    NAFLD Score " Correlated Fibrosis Severity   <0 12 F0-F2   0 12-0 676 Indeterminate Score   >0 676 F3-F4   **Fibrosis Severity Scale: F0 = no fibrosis; F1= mild fibrosis; F2 = moderate fibrosis; F3 = severe fibrosis; F4 = cirrhosis    NAFLD Score Component Values:  Component Value Date   Age: 78 y o      BMI: 35 87 kg/m²    IFG or DM: Yes    AST: 21 U/L 3/16/2023   ALT: 21 U/L 3/16/2023   Platelet: 705 Thousands/uL 3/16/2023   Albumin: 4 1 g/dL 3/16/2023         Radiology Results:   CT abdomen pelvis with contrast    Result Date: 3/16/2023  Narrative: CT ABDOMEN AND PELVIS WITH IV CONTRAST INDICATION:   Epigastric pain abd pain, n/v  COMPARISON:  None  TECHNIQUE:  CT examination of the abdomen and pelvis was performed  Axial, sagittal, and coronal 2D reformatted images were created from the source data and submitted for interpretation  Radiation dose length product (DLP) for this visit:  1201 88 mGy-cm   This examination, like all CT scans performed in the [facility], was performed utilizing techniques to minimize radiation dose exposure, including the use of iterative reconstruction and automated exposure control  IV Contrast:  100 mL of iohexol (OMNIPAQUE) Enteric Contrast:  Enteric contrast was not administered  FINDINGS: ABDOMEN LOWER CHEST:  Scarring and atelectatic changes both lung bases  LIVER/BILIARY TREE:  Enlarged mildly fatty liver  Extrahepatic biliary ducts measuring up to 9 mm, considered normal status post cholecystectomy and stable from prior  GALLBLADDER:  Removed SPLEEN:  Unremarkable  PANCREAS:  Unremarkable  ADRENAL GLANDS:  Unremarkable  KIDNEYS/URETERS:  No hydronephrosis or urinary tract calculus  One or more sharply circumscribed subcentimeter renal hypodensities are present, too small to accurately characterize, and statistically most likely benign findings  According to recent literature (Radiology 2019) no further workup of these findings is recommended   STOMACH AND BOWEL:  Limited evaluation of GI tract without oral contrast   Small sliding hiatal hernia  Stomach unremarkable  Radiopaque material in the stomach likely ingested medication  Small bowel average caliber  Large bowel mostly collapsed  No CT evidence of colitis or diverticulitis  APPENDIX:  Removed ABDOMINOPELVIC CAVITY:  Trace pelvic fluid  No free air  No lymphadenopathy VESSELS:  Unremarkable for patient's age  PELVIS REPRODUCTIVE ORGANS:  Prior hysterectomy  No adnexal mass URINARY BLADDER:  Unremarkable  ABDOMINAL WALL/INGUINAL REGIONS:  Tiny fat-containing umbilical hernia  OSSEOUS STRUCTURES:  No acute fracture or destructive osseous lesion  Impression: No CT explanation for patient's epigastric pain  Small sliding hiatal hernia  Mild extrahepatic biliary ductal dilatation considered normal status post cholecystectomy and stable from October 2022   Workstation performed: [id number]         Erich Harden MD

## 2023-04-07 NOTE — PATIENT INSTRUCTIONS
ARELLANO Fibrosure (lab test) now  If I require a liver biopsy after seeing the results, I will let you know  Blood work and ultrasound in 6 months    Meet with our weight management team

## 2023-05-03 DIAGNOSIS — K21.9 GASTROESOPHAGEAL REFLUX DISEASE, UNSPECIFIED WHETHER ESOPHAGITIS PRESENT: ICD-10-CM

## 2023-05-03 RX ORDER — FAMOTIDINE 40 MG/1
TABLET, FILM COATED ORAL
Qty: 30 TABLET | Refills: 0 | Status: SHIPPED | OUTPATIENT
Start: 2023-05-03

## 2023-05-25 ENCOUNTER — HOSPITAL ENCOUNTER (OUTPATIENT)
Dept: CT IMAGING | Facility: HOSPITAL | Age: 68
Discharge: HOME | End: 2023-05-25

## 2023-05-25 DIAGNOSIS — R93.89 ABNORMAL X-RAY: ICD-10-CM

## 2023-05-25 DIAGNOSIS — R91.8 OPACITY OF LUNG ON IMAGING STUDY: ICD-10-CM

## 2023-06-05 DIAGNOSIS — K21.9 GASTROESOPHAGEAL REFLUX DISEASE, UNSPECIFIED WHETHER ESOPHAGITIS PRESENT: ICD-10-CM

## 2023-06-05 RX ORDER — FAMOTIDINE 40 MG/1
TABLET, FILM COATED ORAL
Qty: 30 TABLET | Refills: 0 | Status: SHIPPED | OUTPATIENT
Start: 2023-06-05

## 2023-07-13 ENCOUNTER — OFFICE VISIT (OUTPATIENT)
Dept: UROLOGY | Facility: CLINIC | Age: 68
End: 2023-07-13
Payer: COMMERCIAL

## 2023-07-13 VITALS
HEIGHT: 70 IN | HEART RATE: 56 BPM | WEIGHT: 254.3 LBS | SYSTOLIC BLOOD PRESSURE: 122 MMHG | BODY MASS INDEX: 36.41 KG/M2 | OXYGEN SATURATION: 92 % | DIASTOLIC BLOOD PRESSURE: 66 MMHG

## 2023-07-13 DIAGNOSIS — N39.3 STRESS INCONTINENCE: ICD-10-CM

## 2023-07-13 DIAGNOSIS — R31.0 GROSS HEMATURIA: Primary | ICD-10-CM

## 2023-07-13 LAB
SL AMB  POCT GLUCOSE, UA: NORMAL
SL AMB LEUKOCYTE ESTERASE,UA: NORMAL
SL AMB POCT BILIRUBIN,UA: NORMAL
SL AMB POCT BLOOD,UA: NORMAL
SL AMB POCT CLARITY,UA: CLEAR
SL AMB POCT COLOR,UA: YELLOW
SL AMB POCT KETONES,UA: NORMAL
SL AMB POCT NITRITE,UA: NORMAL
SL AMB POCT PH,UA: 5
SL AMB POCT SPECIFIC GRAVITY,UA: 1
SL AMB POCT URINE PROTEIN: NORMAL
SL AMB POCT UROBILINOGEN: 0.2

## 2023-07-13 PROCEDURE — 81002 URINALYSIS NONAUTO W/O SCOPE: CPT

## 2023-07-13 PROCEDURE — 99212 OFFICE O/P EST SF 10 MIN: CPT

## 2023-07-13 NOTE — PROGRESS NOTES
7/13/2023    Chief Complaint   Patient presents with   • Follow-up       Assessment and Plan    78 y.o. female     1. Gross hematuria  · This was likely due to Espinal catheter insertion and she is reporting no recurrence of gross hematuria in her urine again remains negative for microscopic hematuria. · Follow-up as needed    2. Stress urinary incontinence  · She is seen some improvement with Kegel exercises and only experiences occasional stress incontinence with coughing or sneezing. This is manageable and she will continue with Kegel exercises and can follow-up on an as-needed basis. History of Present Illness  Nancy Stubbs is a 78 y.o. female here for 6-month follow-up evaluation of gross hematuria stress urinary incontinence. Status patient was seen by me on 1/23/2023 and was initially seen by me on 10/25/2022 for evaluation of acute urinary retention following hemorrhoid surgery on 10/7/2022. She had a Espinal catheter placed after multiple visits to [address] [place] [address] emergency department following surgery for issues with urinary retention. Espinal catheter was eventually placed for PVR greater than 500. Following placement of Espinal catheter she had experienced episodes of gross hematuria as well as burning and pressure. At the time of her office visit with me she reported that she had no longer been taking Percocet which was thought to be causing her issues with constipation resulting in her urinary retention. She was able to successfully pass a void trial that same day. Following her office visit she contacted our office on 11/16/2022 complaining of incomplete emptying and urgency. Urine testing was done which was negative for infection and a PVR was within normal limits. During her last follow-up visit with me she reported doing very well and felt as though she was emptying her bladder without any difficulties.   She did admit to experiencing some stress urinary incontinence with coughing and sneezing and denied any recurrence of gross hematuria. I recommended conservative management with Kegel exercises at home and if her symptoms did not improve we did discuss possible pelvic floor physical therapy in the future. In regards to her gross hematuria this was likely secondary to Espinal catheter insertion. Review of Systems   Constitutional: Negative for chills and fever. HENT: Negative for ear pain and sore throat. Eyes: Negative for pain and visual disturbance. Respiratory: Negative for cough and shortness of breath. Cardiovascular: Negative for chest pain and palpitations. Gastrointestinal: Negative for abdominal pain and vomiting. Genitourinary: Negative for dysuria and hematuria. Musculoskeletal: Negative for arthralgias and back pain. Skin: Negative for color change and rash. Neurological: Negative for seizures and syncope. All other systems reviewed and are negative. Vitals  Vitals:    07/13/23 1337   BP: 122/66   BP Location: Left arm   Patient Position: Sitting   Cuff Size: Adult   Pulse: 56   SpO2: 92%   Weight: 115 kg (254 lb 4.8 oz)   Height: 5' 10" (1.778 m)       Physical Exam  Vitals reviewed. Constitutional:       General: She is not in acute distress. Appearance: Normal appearance. She is normal weight. She is not ill-appearing or toxic-appearing. HENT:      Head: Normocephalic and atraumatic. Nose: Nose normal.   Eyes:      General: No scleral icterus. Conjunctiva/sclera: Conjunctivae normal.   Cardiovascular:      Rate and Rhythm: Normal rate. Pulses: Normal pulses. Pulmonary:      Effort: Pulmonary effort is normal. No respiratory distress. Abdominal:      General: Abdomen is flat. Palpations: Abdomen is soft. Tenderness: There is no abdominal tenderness. There is no right CVA tenderness or left CVA tenderness. Hernia: No hernia is present. Musculoskeletal:         General: Normal range of motion. Cervical back: Normal range of motion. Skin:     General: Skin is warm and dry. Neurological:      General: No focal deficit present. Mental Status: She is alert and oriented to person, place, and time. Mental status is at baseline. Psychiatric:         Mood and Affect: Mood normal.         Behavior: Behavior normal.         Thought Content:  Thought content normal.         Judgment: Judgment normal.         Past History  Past Medical History:   Diagnosis Date   • A-fib ([address])    • Asthma    • Brain aneurysm    • Cancer (HCC)     papillary thyroid cancer   • Cardiac disease    • CHF (congestive heart failure) (HCC)    • COPD (chronic obstructive pulmonary disease) (HCC)    • CPAP (continuous positive airway pressure) dependence    • Crohn disease (HCC)    • Diabetes mellitus (HCC)    • Disease of thyroid gland    • GERD (gastroesophageal reflux disease)    • Hyperlipidemia    • Hypertension    • Sleep apnea      Social History     Socioeconomic History   • Marital status: /Civil Union     Spouse name: None   • Number of children: None   • Years of education: None   • Highest education level: None   Occupational History   • None   Tobacco Use   • Smoking status: Former     Types: Cigarettes     Quit date: 2018     Years since quittin.5   • Smokeless tobacco: Never   Vaping Use   • Vaping Use: Never used   Substance and Sexual Activity   • Alcohol use: Not Currently     Comment: social   • Drug use: Not Currently     Types: Marijuana   • Sexual activity: None   Other Topics Concern   • None   Social History Narrative   • None     Social Determinants of Health     Financial Resource Strain: Not on file   Food Insecurity: No Food Insecurity (2022)    Hunger Vital Sign    • Worried About Running Out of Food in the Last Year: Never true    • Ran Out of Food in the Last Year: Never true   Transportation Needs: No Transportation Needs (2022)    PRAPARE - Transportation    • Lack of Transportation (Medical): No    • Lack of Transportation (Non-Medical): No   Physical Activity: Not on file   Stress: Not on file   Social Connections: Not on file   Intimate Partner Violence: Not on file   Housing Stability: Low Risk  (2022)    Housing Stability Vital Sign    • Unable to Pay for Housing in the Last Year: No    • Number of Places Lived in the Last Year: 1    • Unstable Housing in the Last Year: No     Social History     Tobacco Use   Smoking Status Former   • Types: Cigarettes   • Quit date: 2018   • Years since quittin.5   Smokeless Tobacco Never     History reviewed. No pertinent family history. The following portions of the patient's history were reviewed and updated as appropriate allergies, current medications, past medical history, past social history, past surgical history and problem list    Imaging:    Results  Recent Results (from the past 1 hour(s))   POCT urine dip    Collection Time: 23  1:39 PM   Result Value Ref Range    LEUKOCYTE ESTERASE,UA -     NITRITE,UA -     SL AMB POCT UROBILINOGEN 0.2     POCT URINE PROTEIN -      PH,UA 5.0     BLOOD,UA -     SPECIFIC GRAVITY,UA 1.005     KETONES,UA -     BILIRUBIN,UA -     GLUCOSE, UA -      COLOR,UA yellow     CLARITY,UA clear    ]  No results found for: "PSA"  Lab Results   Component Value Date    CALCIUM 8.9 2023     2018    K 3.7 2023    CO2 27 2023     2023    BUN 20 2023    CREATININE 0.89 2023     Lab Results   Component Value Date    WBC 13.73 (H) 2023    HGB 13.9 2023    HCT 44.8 2023    MCV 89 2023     2023       Please Note:  Voice dictation software has been used to create this document. There may be inadvertent transcriptions errors.      JOSH May 23

## 2023-07-18 LAB
LEFT EYE DIABETIC RETINOPATHY: NORMAL
RIGHT EYE DIABETIC RETINOPATHY: NORMAL
SEVERITY (EYE EXAM): NORMAL

## 2023-07-31 ENCOUNTER — CONSULT (OUTPATIENT)
Dept: BARIATRICS | Facility: CLINIC | Age: 68
End: 2023-07-31
Payer: COMMERCIAL

## 2023-07-31 VITALS
OXYGEN SATURATION: 97 % | WEIGHT: 255.8 LBS | TEMPERATURE: 97.4 F | DIASTOLIC BLOOD PRESSURE: 70 MMHG | BODY MASS INDEX: 36.62 KG/M2 | HEIGHT: 70 IN | HEART RATE: 53 BPM | SYSTOLIC BLOOD PRESSURE: 124 MMHG

## 2023-07-31 DIAGNOSIS — R41.3 MEMORY CHANGES: ICD-10-CM

## 2023-07-31 DIAGNOSIS — E11.9 TYPE 2 DIABETES MELLITUS WITHOUT COMPLICATION, WITHOUT LONG-TERM CURRENT USE OF INSULIN (HCC): ICD-10-CM

## 2023-07-31 DIAGNOSIS — K74.00 LIVER FIBROSIS: ICD-10-CM

## 2023-07-31 DIAGNOSIS — E88.89 STEATOSIS (HCC): ICD-10-CM

## 2023-07-31 DIAGNOSIS — E66.9 CLASS 2 OBESITY: Primary | ICD-10-CM

## 2023-07-31 PROBLEM — E66.812 CLASS 2 OBESITY: Status: ACTIVE | Noted: 2023-01-13

## 2023-07-31 PROCEDURE — 99204 OFFICE O/P NEW MOD 45 MIN: CPT | Performed by: PHYSICIAN ASSISTANT

## 2023-07-31 NOTE — PATIENT INSTRUCTIONS
Goals: Food log (ie.) www.Ecovisionpal.com (also camilla),[employer]. com,Visualtising.com,CallistoTV. com,etc. baritastic NOOM  No sugary beverages. At least 64oz of water daily. Increase physical activity by 10 minutes daily. Gradually increase physical activity to a goal of 5 days per week for 30 minutes of MODERATE intensity PLUS 2 days per week of FULL BODY resistance training  5-10 servings of fruits and vegetables per day and 25-35 grams of dietary fiber per day, gradually increasing  Measure all portions: creamers, sugars, cooking oils/butters, condiments, dressings, etc and log calories  Discuss possibility of metformin XR as an alternative to  Glipizide with your PCP as higher doses may help with weight loss  Try insight timer to help with anxiety/meditation/deep breathing  Online seiminar (surgical) at [employer]   If choosing starch pick whole grain/complex carbs and keep to 1/2 cup: oatmeal, brown rice, quinoa, whole wheat pasta, potatoes, sweet potato, chickpea noodles, red lentil noodles  7499-8731 calories  Consider protein shake (like premier protein or muscle milk) + fruit instead of skipping a meal    Monitor blood pressure and notify the provider if consistently around 100/60 or you have symptoms of low blood pressure (lightheadedness/dizziness)  Risks of untreated/uncontrolled BP reviewed with the pt, including MI, CVA, damage to the blood vessels, eyes/kidneys  Recommend checking your blood sugar one-three times a day. Goal is .  Notify the provider if consistently outside of this range

## 2023-07-31 NOTE — PROGRESS NOTES
Assessment/Plan:    Class 2 obesity  -Discussed options of HealthyCORE-Intensive Lifestyle Intervention Program, Very Low Calorie Diet-VLCD, Conservative Program, Rk-En-Y Gastric Bypass and Vertical Sleeve Gastrectomy and the role of weight loss medications. -AVOID phentermine: afib  -Initial weight loss goal of 5-10% weight loss for improved health  -Screening labs: CMP reviewed from 3/16/23, Vitamin D reviewed from4/17/23, A1c and TSH/T4  6/27/23- follows with endocrinology for thyroid s/p thyroidectomy  -Patient is interested in pursuing Conservative Program   -Pt has a personal hx of papillary thyroid cancer; her brother and daughters had thyroid cancer- unsure of type- would AVOID Wegovy and Saxenda  -Calorie goals, sample menu, portion size guidelines, and food logging reviewed with the patient.   -Recommended discussing metformin with her PCP    Reports FHx of alzheimer's- pt states she is forgetful at times- recommend seeing neurology- she was agreeable    Follow up with RD and in approximately 3 months with Non-Surgical Physician/Advanced Practitioner. Goals:  Food log (ie.) www.PlumTV.com (also camilla),sparkpeople. com,loseit.com,[employer]. com,etc. baritastic NOOM  No sugary beverages. At least 64oz of water daily. Increase physical activity by 10 minutes daily. Gradually increase physical activity to a goal of 5 days per week for 30 minutes of MODERATE intensity PLUS 2 days per week of FULL BODY resistance training  5-10 servings of fruits and vegetables per day and 25-35 grams of dietary fiber per day, gradually increasing  Measure all portions: creamers, sugars, cooking oils/butters, condiments, dressings, etc and log calories  Discuss possibility of metformin XR as an alternative to  Glipizide with your PCP as higher doses may help with weight loss  Try insight timer to help with anxiety/meditation/deep breathing  Online seiminar (surgical) at [employer]   If choosing starch pick whole grain/complex carbs and keep to 1/2 cup: oatmeal, brown rice, quinoa, whole wheat pasta, potatoes, sweet potato, chickpea noodles, red lentil noodles  2850-9406 calories      Recommend checking your blood sugar one-three times a day. Goal is . Notify the provider if consistently outside of this range   Monitor blood pressure and notify the provider if consistently around 100/60 or you have symptoms of low blood pressure (lightheadedness/dizziness)      Diagnoses and all orders for this visit:    Class 2 obesity  -     Ambulatory Referral to Weight Management  -     Ambulatory Referral to Neurology; Future    Steatosis ([address])  -     Ambulatory Referral to Weight Management    Liver fibrosis  -     Ambulatory Referral to Weight Management    Type 2 diabetes mellitus without complication, without long-term current use of insulin ([address])  Comments:  Minimize/AVOID refined carbohydrates    Memory changes  -     Ambulatory Referral to Neurology; Future        Subjective:   Chief Complaint   Patient presents with   • Consult     Already uses cpap machine. Patient ID: Joleen Eng  is a 78 y.o. female with excess weight/obesity here to pursue weight management.   Past Medical History:   Diagnosis Date   • A-fib [facility])    • Asthma    • Brain aneurysm    • Cancer ([address])     papillary thyroid cancer   • Cardiac disease    • CHF (congestive heart failure) (HCC)    • COPD (chronic obstructive pulmonary disease) (HCC)    • CPAP (continuous positive airway pressure) dependence    • Crohn disease (HCC)    • Diabetes mellitus (HCC)    • Disease of thyroid gland    • GERD (gastroesophageal reflux disease)    • Hyperlipidemia    • Hypertension    • Sleep apnea      HPI:  Obesity/Excess Weight:  Severity: Severe  Onset:  Since thyroid removal a "couple" of years ago    Modifiers: No previous attempts  Contributing factors: thyroidectomy, sometimes eats during middle of the night due to reflux sx  Associated symptoms: comorbid conditions, increased joint pain and inability to do certain activities    Goals: 160-165  Hydration: "not much" water; regular soda but "not a lot" drinks 1 glass sweet tea per day  Hot tea with sugar  Coffee black  Alcohol: special occasions  Exercise: no  Occupation: retired    Recall from last few days written out and reviewed    The following portions of the patient's history were reviewed and updated as appropriate: allergies, current medications, past family history, past medical history, past social history, past surgical history and problem list.    Review of Systems   Constitutional: Negative for chills and fever. HENT: Negative for sore throat. Respiratory: Positive for shortness of breath (managed by PCP). Cardiovascular: Positive for chest pain (seeing cardiology) and palpitations (seeing cardiology). Gastrointestinal: Negative for constipation and diarrhea. Genitourinary: Negative for dysuria. Musculoskeletal: Positive for back pain. Skin: Negative for rash. Neurological: Negative for headaches. Psychiatric/Behavioral:        +depression/anxiety. Managed by PCP. Denies SI/HI. Feels having minimal effect from SSRI/SNRI- recommend f/u with PCP     Objective:    /70 (BP Location: Left arm, Cuff Size: Large)   Pulse (!) 53   Temp (!) 97.4 °F (36.3 °C) (Temporal)   Ht 5' 10" (1.778 m)   Wt 116 kg (255 lb 12.8 oz)   SpO2 97%   BMI 36.70 kg/m²     Physical Exam  Vitals and nursing note reviewed. Constitutional   General appearance: Abnormal.  well developed and obese. Eyes No conjunctival pallor. Ears, Nose, Mouth, and Throat Oral mucosa moist.   Pulmonary   Respiratory effort: No increased work of breathing or signs of respiratory distress. Auscultation of lungs: Clear to auscultation, equal breath sounds bilaterally, no wheezes, no rales, no rhonci. Cardiovascular   Auscultation of heart: Normal rate and rhythm, normal S1 and S2, without murmurs. Examination of extremities for edema and/or varicosities: Normal.  no edema. Abdomen   Abdomen: Abnormal.  The abdomen was obese. Bowel sounds were normal. The abdomen was soft and nontender.    Musculoskeletal   Gait and station: Normal.    Psychiatric   Orientation to person, place and time: Normal.    Affect: appropriate

## 2023-07-31 NOTE — ASSESSMENT & PLAN NOTE
-Discussed options of HealthyCORE-Intensive Lifestyle Intervention Program, Very Low Calorie Diet-VLCD, Conservative Program, Rk-En-Y Gastric Bypass and Vertical Sleeve Gastrectomy and the role of weight loss medications.   -AVOID phentermine: afib  -Initial weight loss goal of 5-10% weight loss for improved health  -Screening labs: CMP reviewed from 3/16/23, Vitamin D reviewed from4/17/23, A1c and TSH/T4  6/27/23- follows with endocrinology for thyroid s/p thyroidectomy  -Patient is interested in pursuing Conservative Program   -Pt has a personal hx of papillary thyroid cancer; her brother and daughters had thyroid cancer- unsure of type- would AVOID Wegovy and Saxenda  -Calorie goals, sample menu, portion size guidelines, and food logging reviewed with the patient.   -Recommended discussing metformin with her PCP

## 2023-09-11 ENCOUNTER — OFFICE VISIT (OUTPATIENT)
Dept: BARIATRICS | Facility: CLINIC | Age: 68
End: 2023-09-11

## 2023-09-11 VITALS — HEIGHT: 70 IN | WEIGHT: 252.8 LBS | BODY MASS INDEX: 36.19 KG/M2

## 2023-09-11 DIAGNOSIS — R63.5 ABNORMAL WEIGHT GAIN: ICD-10-CM

## 2023-09-11 PROCEDURE — WMDI30

## 2023-09-11 PROCEDURE — RECHECK

## 2023-09-11 NOTE — PROGRESS NOTES
Weight Management Medical Nutrition Assessment  Karen Ng presented today for meal planning session. Today she weighed 252. 8# which reflects a loss of 3# since appointment with [facility] provider 7/31/23. She noted that she retired 5/8/23. Documented medical hx includes Type 2 DM, Crohn's disease, MARY ELLEN, HTN, steatosis, liver fibrosis, hypothyroidism. Admitted she does not read food labels nor has she been educated on CHO-counting or limiting Na intake. Educated Karen Ng on aforementioned; provider her with and reviewed handouts CHO-Counting for People With DM, DM Label-Reading, Low Na Nutrition Therapy, and Na-Free Flavoring Tips. No RD follow-up scheduled at this time.          Patient seen by Medical Provider in past 6 months:  yes  Requested to schedule appointment with Medical Provider: No      Anthropometric Measurements  Start Weight (#): 255.8#  Current Weight (#): 252.8#  TBW % Change from start weight:1.2%  Ideal Body Weight (#):150# (68.2 kg)  Goal Weight (#): 165#  Highest: 256#  Lowest: 155#     Weight Loss History  Previous weight loss attempts: "nothing;" started putting on weight when had thyroid taken out d/t CA 2 years ago    Food and Nutrition Related History  Wake up: 5 AM; takes thyroid meds at 5:45 AM   Bed Time: 8-9 PM; hx of night-time eating ~1 year ago, not but not recently; typically sleeps 6-8 hours    Food Recall  Breakfast: varies, but may be 7 AM: banana or toast with butter and PB or just butter; usually has tea 8 oz- tsp sugar; may skip breakfast  Snack: none  Lunch: skips  Snack: not usually, but occ has a cookies or chips  Dinner: 5 PM- 2 hotdogs on rolls with mustard and onion, [place] Brittany Phoenix, store-bought chocolate milk (1%); Shake and Bake pork chop, boxed scalloped potatoes, canned peas, regular applesauce; baked/grilled  chicken breast with BBQ sauce, pasta with ketchup, no vegetables, possibly applesauce; no dessert     Snack: may have bowl of ice cream      Beverages: tea, sweet tea (powdered)- 1-2 8oz cups daily, water- 2 bottles (but not everyday), regular soda sometimes (8 oz); rarely alcohol  Volume of beverage intake: ~32 oz    Weekends: same   Cravings: none  Trouble area of day: none    Frequency of Eating out: 2x/week ([place])  Food restrictions: nuts, seeds (Crohn's disease, diverticulosis- "good year or so" since flare-up)  Cooking: self   Food Shopping: self    Physical Activity Intake  Activity: none  Frequency:n/a  Physical limitations/barriers to exercise: 2 herniated discs in back; stated injections didn't work    Estimated Needs  Energy  SECA: BMR: n/a      X 1.3 -1000 =  [employer] [place] [employer] Needs: BMR: 7470    1-2# loss weekly sedentary: 6589-2009             1-2# loss weekly lightly active:9540-6247  Maintenance calories for sedentary activity level: 2120  Protein: g      (1.2-1.5g/kg IBW)  Fluid: >=80 oz     (35mL/kg IBW)    Nutrition Diagnosis  Yes; Overweight/obesity  related to Excess energy intake as evidenced by  BMI more than normative standard for age and sex (obesity-grade II 35-39. 9)       Nutrition Intervention    Nutrition Prescription  Calories:1968-4573  Protein: ~ g  Fluid: >= 80 oz    Meal Plan (Osmel/Pro/Carb)  Focused on CHO counting and label-reading    Nutrition Education:    Calorie controlled menu  Lean protein food choices  Healthy snack options  Food journaling tips      Nutrition Counseling:  Strategies: meal planning, portion sizes, healthy snack choices, hydration, fiber intake, protein intake, exercise, food journal, CHO-counting, limiting Na      Monitoring and Evaluation:  Evaluation criteria:  Energy Intake  Meet protein needs  Maintain adequate hydration  Monitor weekly weight  Meal planning/preparation  Food journal   Decreased portions at mealtimes and snacks  Physical activity     Barriers to learning:none  Readiness to change: Preparation:  (Getting ready to change)   Comprehension: fair  Expected Compliance: good

## 2023-09-14 ENCOUNTER — OFFICE VISIT (OUTPATIENT)
Dept: SLEEP CENTER | Facility: CLINIC | Age: 68
End: 2023-09-14
Payer: COMMERCIAL

## 2023-09-14 VITALS
HEIGHT: 70 IN | DIASTOLIC BLOOD PRESSURE: 60 MMHG | OXYGEN SATURATION: 98 % | SYSTOLIC BLOOD PRESSURE: 124 MMHG | HEART RATE: 63 BPM | BODY MASS INDEX: 36.25 KG/M2 | WEIGHT: 253.2 LBS | TEMPERATURE: 97.5 F

## 2023-09-14 DIAGNOSIS — G89.4 CHRONIC PAIN SYNDROME: ICD-10-CM

## 2023-09-14 DIAGNOSIS — E66.9 OBESITY (BMI 30-39.9): ICD-10-CM

## 2023-09-14 DIAGNOSIS — J44.9 CHRONIC OBSTRUCTIVE PULMONARY DISEASE, UNSPECIFIED COPD TYPE (HCC): ICD-10-CM

## 2023-09-14 DIAGNOSIS — F41.9 ANXIETY AND DEPRESSION: ICD-10-CM

## 2023-09-14 DIAGNOSIS — G25.81 RLS (RESTLESS LEGS SYNDROME): ICD-10-CM

## 2023-09-14 DIAGNOSIS — I27.20 PULMONARY HYPERTENSION (HCC): ICD-10-CM

## 2023-09-14 DIAGNOSIS — I10 ESSENTIAL HYPERTENSION: ICD-10-CM

## 2023-09-14 DIAGNOSIS — R68.2 DRY MOUTH: ICD-10-CM

## 2023-09-14 DIAGNOSIS — E11.9 TYPE 2 DIABETES MELLITUS WITHOUT COMPLICATION, WITHOUT LONG-TERM CURRENT USE OF INSULIN (HCC): ICD-10-CM

## 2023-09-14 DIAGNOSIS — F32.A ANXIETY AND DEPRESSION: ICD-10-CM

## 2023-09-14 DIAGNOSIS — G47.9 SLEEP DISTURBANCE: ICD-10-CM

## 2023-09-14 DIAGNOSIS — G47.33 OSA (OBSTRUCTIVE SLEEP APNEA): Primary | ICD-10-CM

## 2023-09-14 DIAGNOSIS — M54.16 LUMBAR RADICULOPATHY: ICD-10-CM

## 2023-09-14 DIAGNOSIS — G47.34 SLEEP RELATED HYPOXIA: ICD-10-CM

## 2023-09-14 DIAGNOSIS — G47.19 EXCESSIVE DAYTIME SLEEPINESS: ICD-10-CM

## 2023-09-14 PROCEDURE — 99214 OFFICE O/P EST MOD 30 MIN: CPT | Performed by: INTERNAL MEDICINE

## 2023-09-14 NOTE — PATIENT INSTRUCTIONS

## 2023-09-14 NOTE — PROGRESS NOTES
Follow-Up Note - [address]  78 y.o. female  :1955  ZXZ:[id number]  DOS:2023    CC: I saw this patient for follow-up in clinic today for Sleep disordered breathing, Coexisting Sleep and Medical Problems. Interval changes: She is using a DreamStation 1 machine that she states she registered with [employer] but has not yet received a replacement    Results of prior studies:2019 PSG showed severe MARY ELLEN with AHI 68 and 102 in REM. Ed of 77% with borderline oxygenation as well at baseline. 2019 had CPAP titration which showed an adequate pressure of 12cm and correction of hypoxemia. PFSH, Problem List, Medications & Allergies were reviewed in EMR. She  has a past medical history of A-fib ([address]), Asthma, Brain aneurysm, Cancer ([address]), Cardiac disease, CHF (congestive heart failure) ([address]), COPD (chronic obstructive pulmonary disease) ([address]), CPAP (continuous positive airway pressure) dependence, Crohn disease ([address]), Diabetes mellitus ([address]), Disease of thyroid gland, GERD (gastroesophageal reflux disease), Hyperlipidemia, Hypertension, and Sleep apnea. She has a current medication list which includes the following prescription(s): apixaban, aspirin, atorvastatin, onetouch verio, onetouch verio, docusate sodium, dofetilide, famotidine, fenofibrate, furosemide, glipizide, glucose blood, hydroxyzine hcl, onetouch delica plus OMZRDY10L, lisinopril, mesalamine, metoprolol succinate, omeprazole, onetouch delica lancets 43K, onetouch verio, sertraline, synthroid, amlodipine, vitamin d3, dicyclomine, gabapentin, nitroglycerin, ondansetron, sertraline, and venlafaxine. PHYSIOLOGICAL DATA REVIEW : Using PAP > 4 hours/night 90%. Estimated PIERCE 1/hour with pressure of 15.3cm Gueydan@vLine percentile; patient has not been using non FDA approved devices to sanitize the machine.   INTERPRETATION: Compliance is excellent; Pressure setting is:optimal; ;   SUBJECTIVE: With respect to use of PAP, Connor Lemos  is experiencing some adverse effects:dry mouth/throat. She derives benefit. Is satisfied with sleep and daytime function. Sleep Routine: Connor Lemos reports getting >8 hrs sleep; she has no difficulty initiating or maintaining sleep . She arises spontaneously and never feels rested. Connor Lemos reports excessive daytime sleepiness, [feels like napping but avoids.] She rated [herself] at Total score: 9 /24 on the [place] Sleepiness Scale. Other issues: She has restless legs and involuntary jerking limb movements during sleep that disturbs sleep. She did well on gabapentin but was told to discontinue the medication. She does not know who told her to or why. .  She has back pain and radicular symptoms. He is also on sertraline for anxiety and depression GFR is 67 and she is reporting no adverse effects related to use of gabapentin. Habits:[ reports that she quit smoking about 5 years ago. Her smoking use included cigarettes. She has never used smokeless tobacco.], [ reports that she does not currently use alcohol.], [ reports that she does not currently use drugs after having used the following drugs: Marijuana.], Caffeine use:moderate with supper; Exercise routine: none. ROS: Significant for weight has been stable. She is reporting no nasal, respiratory or cardiac symptoms. She has ongoing feelings of anxiety and depression. [facility] EXAM: /60 (BP Location: Right arm, Cuff Size: Large)   Pulse 63   Temp 97.5 °F (36.4 °C) (Temporal)   Ht 5' 10" (1.778 m)   Wt 115 kg (253 lb 3.2 oz)   SpO2 98%   BMI 36.33 kg/m²     Wt Readings from Last 3 Encounters:   09/14/23 115 kg (253 lb 3.2 oz)   09/11/23 115 kg (252 lb 12.8 oz)   07/31/23 116 kg (255 lb 12.8 oz)      Patient is well groomed; well appearing. CNS: Alert, orientated, speech clear & coherent  Psych: cooperative and in no distress. Mental state: Appears normal.  H&N: EOMI; NC/AT: No facial pressure marks, no rashes.     Skin/Extrem: col & hydration normal; no edema  Resp: Respiratory effort is normal  Physical findings otherwise essentially unchanged from previous. IMPRESSION: Problem List Items & Comorbidities Addressed this Visit    1. MARY ELLEN (obstructive sleep apnea)  PAP DME Resupply/Reorder      2. Sleep related hypoxia        3. RLS (restless legs syndrome)        4. Sleep disturbance        5. Excessive daytime sleepiness        6. Dry mouth        7. Anxiety and depression        8. Essential hypertension        9. Obesity (BMI 30-39.9)        10. Type 2 diabetes mellitus without complication, without long-term current use of insulin ([address])        11. Chronic obstructive pulmonary disease, unspecified COPD type ([address])        12. Pulmonary hypertension ([address])        13. Lumbar radiculopathy        14. Chronic pain syndrome            PLAN:  1. I reviewed results of prior studies and physiologic data with the patient. 2. I discussed treatment options with risks and benefits. 3. Treatment with  PAP is medically necessary and El[place]ira Durham is agreable to continue use. He is not eligible for a replacement machine for another year. I have once again advised her to follow-up with Marcelle Crowe regarding a replacement. 4. Care of equipment, methods to improve comfort using PAP and importance of compliance with therapy were discussed. 5. Pressure setting: continue 14-20 cmH2O.    6. Rx provided to replace supplies and Care coordinated with DME provider. 7. I advised on nonpharmacologic measures for restless leg symptoms. Also advised avoiding caffeine use in the evenings and regular exercise. Consideration to be given to bupropion in place of sertraline that can aggravate restless leg symptoms. If symptoms persist, a retrial of gabapentin 300 mg nightly can be considered. 8. Discussed strategies for weight reduction. 9. Follow-up is advised in 1 year or sooner if needed to monitor progress, compliance and to adjust therapy.      Thank you for allowing me to participate in the care of this patient. Sincerely,     Authenticated electronically on 37/49/95   Board Certified Specialist     Portions of the record may have been created with voice recognition software. Occasional wrong word or "sound a like" substitutions may have occurred due to the inherent limitations of voice recognition software. There may also be notations and random deletions of words or characters from malfunctioning software. Read the chart carefully and recognize, using context, where substitutions/deletions have occurred.

## 2023-09-15 ENCOUNTER — TELEPHONE (OUTPATIENT)
Dept: SLEEP CENTER | Facility: CLINIC | Age: 68
End: 2023-09-15

## 2023-09-18 LAB

## 2023-09-21 DIAGNOSIS — K21.9 GASTROESOPHAGEAL REFLUX DISEASE, UNSPECIFIED WHETHER ESOPHAGITIS PRESENT: ICD-10-CM

## 2023-09-21 RX ORDER — OMEPRAZOLE 40 MG/1
CAPSULE, DELAYED RELEASE ORAL
Qty: 60 CAPSULE | Refills: 0 | Status: SHIPPED | OUTPATIENT
Start: 2023-09-21

## 2023-10-05 DIAGNOSIS — K21.9 GASTROESOPHAGEAL REFLUX DISEASE, UNSPECIFIED WHETHER ESOPHAGITIS PRESENT: ICD-10-CM

## 2023-10-05 RX ORDER — FAMOTIDINE 40 MG/1
TABLET, FILM COATED ORAL
Qty: 30 TABLET | Refills: 0 | Status: SHIPPED | OUTPATIENT
Start: 2023-10-05

## 2023-10-06 ENCOUNTER — VBI (OUTPATIENT)
Dept: ADMINISTRATIVE | Facility: OTHER | Age: 68
End: 2023-10-06

## 2023-10-06 ENCOUNTER — HOSPITAL ENCOUNTER (OUTPATIENT)
Dept: ULTRASOUND IMAGING | Facility: HOSPITAL | Age: 68
Discharge: HOME | End: 2023-10-06
Attending: INTERNAL MEDICINE
Payer: COMMERCIAL

## 2023-10-06 DIAGNOSIS — K74.00 LIVER FIBROSIS: ICD-10-CM

## 2023-10-06 DIAGNOSIS — E66.01 SEVERE OBESITY (BMI 35.0-39.9) WITH COMORBIDITY (HCC): ICD-10-CM

## 2023-10-06 DIAGNOSIS — E88.89 STEATOSIS (HCC): ICD-10-CM

## 2023-10-06 PROCEDURE — 76700 US EXAM ABDOM COMPLETE: CPT

## 2023-10-20 DIAGNOSIS — K21.9 GASTROESOPHAGEAL REFLUX DISEASE, UNSPECIFIED WHETHER ESOPHAGITIS PRESENT: ICD-10-CM

## 2023-10-20 RX ORDER — OMEPRAZOLE 40 MG/1
CAPSULE, DELAYED RELEASE ORAL
Qty: 60 CAPSULE | Refills: 0 | Status: SHIPPED | OUTPATIENT
Start: 2023-10-20

## 2023-10-23 ENCOUNTER — OFFICE VISIT (OUTPATIENT)
Dept: GASTROENTEROLOGY | Facility: CLINIC | Age: 68
End: 2023-10-23
Payer: COMMERCIAL

## 2023-10-23 VITALS
HEART RATE: 53 BPM | DIASTOLIC BLOOD PRESSURE: 58 MMHG | HEIGHT: 70 IN | SYSTOLIC BLOOD PRESSURE: 128 MMHG | RESPIRATION RATE: 16 BRPM | OXYGEN SATURATION: 95 % | TEMPERATURE: 97.7 F | WEIGHT: 251.4 LBS | BODY MASS INDEX: 35.99 KG/M2

## 2023-10-23 DIAGNOSIS — K76.0 HEPATIC STEATOSIS: ICD-10-CM

## 2023-10-23 DIAGNOSIS — K21.9 GASTROESOPHAGEAL REFLUX DISEASE, UNSPECIFIED WHETHER ESOPHAGITIS PRESENT: ICD-10-CM

## 2023-10-23 DIAGNOSIS — K50.919 CROHN'S DISEASE WITH COMPLICATION, UNSPECIFIED GASTROINTESTINAL TRACT LOCATION (HCC): Primary | ICD-10-CM

## 2023-10-23 DIAGNOSIS — R10.84 GENERALIZED ABDOMINAL PAIN: ICD-10-CM

## 2023-10-23 DIAGNOSIS — R19.7 DIARRHEA, UNSPECIFIED TYPE: ICD-10-CM

## 2023-10-23 PROCEDURE — 99214 OFFICE O/P EST MOD 30 MIN: CPT | Performed by: STUDENT IN AN ORGANIZED HEALTH CARE EDUCATION/TRAINING PROGRAM

## 2023-10-23 RX ORDER — BISACODYL 5 MG/1
20 TABLET, DELAYED RELEASE ORAL ONCE
Qty: 4 TABLET | Refills: 0 | Status: SHIPPED | OUTPATIENT
Start: 2023-10-23 | End: 2023-10-23

## 2023-10-23 RX ORDER — DICYCLOMINE HCL 20 MG
20 TABLET ORAL EVERY 6 HOURS PRN
Qty: 120 TABLET | Refills: 1 | Status: SHIPPED | OUTPATIENT
Start: 2023-10-23 | End: 2023-11-02

## 2023-10-23 RX ORDER — POLYETHYLENE GLYCOL 3350 17 G/17G
238 POWDER, FOR SOLUTION ORAL ONCE
Qty: 238 G | Refills: 0 | Status: SHIPPED | OUTPATIENT
Start: 2023-10-23 | End: 2023-10-23

## 2023-10-23 NOTE — PROGRESS NOTES
[facility]  Outpatient Follow-up  Encounter: [id number]    PATIENT INFO     Name: Saloni Kelsey  YOB: 1955   Age: 80 y.o. Sex: female   MRN: [id number]    Levi is a 80 y.o. female with reported history of Crohn's disease previously believed to be in remission, maintained on mesalamine twice a day, prior rectal bleeding although this has resolved, diarrhea, abdominal pain, worsening GERD, and hepatic steatosis. Her last colonoscopy in 12/2021 with transverse colon polyp removed and hypertrophied anal papilla and internal hemorrhoids. Her previous capsule endoscopy in 2020 showed no evidence of Crohn's disease. Prior upper endoscopy showed hiatal hernia. She has been having worsening symptoms of diarrhea over the last couple of months. She reports 4-6 episodes of loose/liquid bowel movements per day with urgency especially after eating. She could certainly have some degree of hyperdefecation related to dietary habits and possibly exaggerated gastrocolic reflex. However, given her Crohn's disease history, there is certainly concern for Crohn's flare. She could also have postinfectious IBS as she was recently diagnosed with enterovirus/rhinovirus although appears that she has recovered from this. She could certainly have exacerbation of underlying functional pathology or IBS versus possibly SIBO as well. Low suspicion for bacterial infectious etiologies including C.difficile and exocrine pancreatic insufficiency vs malabsorptive disorder such as celiac sprue. His recent liver chemistries are within normal limits. She did previously see hepatology. Last elastography showed F4 fibrosis although IQR of 37% indicated low reliability. No clinical or laboratory stigmata of advanced liver disease, cirrhosis, or portal hypertension. She has been making efforts at weight loss.     Given her worsening symptoms and the concern for possible Crohn's flare, I believe repeating endoscopic evaluation is warranted  As such, we will schedule for bidirectional endoscopy with TI evaluation and segmental colon biopsies  We will also check blood work and stool tests including CRP, celiac panel, fecal calprotectin, and pancreatic elastase level  I recommended that she try opening the omeprazole capsule and sprinkling the contents on food and continuing this twice a day to see if this provides any additional relief of her reflux symptoms  We will also try Bentyl 20 mg every 6 hours as needed for abdominal pain  I have recommended follow-up with hepatologist for fatty liver and we will message the hepatology office to arrange for follow-up as she was recommended follow-up next month    1. Crohn's disease with complication, unspecified gastrointestinal tract location ([address])    2. Diarrhea, unspecified type    3. Gastroesophageal reflux disease, unspecified whether esophagitis present    4. Generalized abdominal pain    5. Hepatic steatosis      Orders Placed This Encounter   Procedures    Calprotectin,Fecal    Pancreatic elastase, fecal    C-reactive protein    Celiac Disease Antibody Profile    Colonoscopy    EGD       FOLLOW-UP: 8 weeks or sooner if needed    HISTORY OF PRESENT ILLNESS       Kerri Rodriguez is a 80 y.o. female who presents to GI office for follow-up of Crohn's disease, IBS, GERD, and abdominal pain. Reports over the last couple of months, she has been having worsening abdominal pain and diarrhea. She has 4-6 episodes of loose or liquid stools daily. Denies any hematochezia or melena. Does admit to urgency particularly after eating. She reports that any food item will cause her to run to the bathroom. She has also had worsening reflux and heartburn. No dysphagia or odynophagia. She is on maximal acid suppression therapy with omeprazole twice daily and Pepcid.   She did recently have enterovirus/rhinovirus and was treated with a course of steroids for primarily respiratory symptoms. No recent antibiotic use. Denies any other sick contacts. She remains on mesalamine twice daily. She is also on omeprazole twice daily as well as Pepcid.      ENDOSCOPIC HISTORY     UPPER ENDOSCOPY: 02/28/2020 with 3 cm hiatal hernia, otherwise normal    COLONOSCOPY: 12/10/2021 with 8 mm transverse colon polyp, internal hemorrhoids    REVIEW OF SYSTEMS     CONSTITUTIONAL: Denies any fever, chills, rigors  HEENT: No earache or tinnitus, denies hearing loss or visual disturbances  CARDIOVASCULAR: No chest pain or palpitations  RESPIRATORY: Denies any cough, hemoptysis, shortness of breath or dyspnea on exertion  GASTROINTESTINAL: As noted in the History of Present Illness  GENITOURINARY: No problems with urination, denies any hematuria or dysuria  NEUROLOGIC: No dizziness or vertigo, denies headaches   MUSCULOSKELETAL: Denies any muscle or joint pain   SKIN: Denies skin rashes or itching  ENDOCRINE: Denies excessive thirst, denies intolerance to heat or cold  PSYCHOSOCIAL: Denies depression or anxiety, denies any recent memory loss     Answers submitted by the patient for this visit:  Inflammatory Bowel Disease (Submitted on 10/16/2023)  When you are not experiencing symptoms of your inflammatory bowel disease, how many bowel movements do you typically have each day?: 3  What is the average (typical) number of bowel movements that you had in a single day during the last week?: 3  Over the last 3 days, have you had any bowel movements where you passed blood without stool?: No  Since your last visit, have you received any vaccinations?: No  Since the last visit, have you had an infection?: No  Is there any possibility that you may be pregnant?: No  In the past three months, have you used tobacco in any form?: No  During the last month, have you taken narcotic pain medications (such as Percocet, oxycodone, Oxycontin, morphine, Vicodin, Dilaudid, MS Contin) for your inflammatory bowel disease?: No  Have you awoken at night because you needed to move your bowels during the last month? : No  Have you had leakage of stool while sleeping during the last month?: No  Have you had leakage of stool while you were awake during the last month?: Yes  During the last month have you unintentionally lost weight?: No  Fever: Yes  Eye irritation: Yes  Mouth sores: No  Sore throat: Yes  Chest pain: Yes  Shortness of breath: Yes  Numbness or tingling in your hands or feet: No  Skin rash: No  Pain or swelling in your joints: Yes  Bruising or bleeding: Yes  Felt depressed or blue: No    Historical Information   Past Medical History:   Diagnosis Date    A-fib (HCC)     Asthma     Brain aneurysm     Cancer ([address])     papillary thyroid cancer    Cardiac disease     CHF (congestive heart failure) (HCC)     COPD (chronic obstructive pulmonary disease) (HCC)     CPAP (continuous positive airway pressure) dependence     Crohn disease ([address])     Diabetes mellitus ([address])     Disease of thyroid gland     GERD (gastroesophageal reflux disease)     Hyperlipidemia     Hypertension     Sleep apnea      Past Surgical History:   Procedure Laterality Date    APPENDECTOMY      CARDIOVERSION N/A 2/14/2019    Procedure: CARDIOVERSION;  Surgeon: Neyda Simms MD;  Location: [facility];  Service: Cardiology    CEREBRAL ANEURYSM REPAIR      CHOLECYSTECTOMY      HYSTERECTOMY      IR PELVIC ANGIOGRAM  12/14/2017    [place] EXC THROMBOSED HEMORRHOID XTRNL N/A 10/7/2022    Procedure: HEMORRHOIDECTOMY EXCISION;  Surgeon: Brandy Strange DO;  Location: [facility];  Service: General    THYROIDECTOMY      TONSILLECTOMY AND ADENOIDECTOMY       Social History   Social History     Substance and Sexual Activity   Alcohol Use Not Currently    Comment: social     Social History     Substance and Sexual Activity   Drug Use Not Currently    Types: Marijuana     Social History     Tobacco Use   Smoking Status Former    Types: Cigarettes Quit date: 2018    Years since quittin.8   Smokeless Tobacco Never     Family History   Problem Relation Age of Onset    Alzheimer's disease Mother     Cancer Father         lung and bone    Heart disease Sister     Hypertension Sister     Diabetes Sister     Stroke Sister     Cancer Sister     Cancer Brother         liver with mets to bone    Heart disease Brother     Cancer Brother         thyroid cancer       MEDICATIONS & ALLERGIES     Current Outpatient Medications   Medication Instructions    amLODIPine (NORVASC) 5 mg, Every morning    apixaban (ELIQUIS) 5 mg, Oral, 2 times daily    aspirin 81 mg, Oral, Daily    atorvastatin (LIPITOR) 20 mg, Oral, Every morning    bisacodyl (DULCOLAX) 20 mg, Oral, Once    Blood Glucose Monitoring Suppl (ONETOUCH VERIO) w/Device KIT Use to check sugars once a day. Dx E11.9    Blood Glucose Monitoring Suppl (Raynold Sacks) w/Device KIT Daily, Use to check glucose    Cholecalciferol (Vitamin D3) 1.25 MG (94833 UT) CAPS 1 capsule, Weekly    dicyclomine (BENTYL) 20 mg, Oral, Every 6 hours PRN    docusate sodium (COLACE) 100 mg, Oral, 2 times daily    dofetilide (TIKOSYN) 500 mcg, Oral, 2 times daily    famotidine (PEPCID) 40 MG tablet TAKE 1 TABLET BY MOUTH ONCE DAILY AT BEDTIME    fenofibrate (TRICOR) 54 mg, Oral, Daily    furosemide (LASIX) 20 mg, Oral, 2 times daily    gabapentin (NEURONTIN) 300 mg capsule Take 1 capsule by mouth twice daily    glipiZIDE (GLUCOTROL XL) 5 mg 24 hr tablet TAKE 1 TABLET BY MOUTH ONCE DAILY 30 MINUTES BEFORE A MEAL (REPLACES METFORMIN)    glucose blood test strip Use to check sugars once a day.  Dx E11.9    hydrOXYzine HCL (ATARAX) 10 mg, Oral, 2 times daily    Lancets (ONETOUCH DELICA PLUS [id number]) MISC USE 1 TO CHECK GLUCOSE ONCE DAILY    lisinopril (ZESTRIL) 10 mg, Oral, Every morning    mesalamine (DELZICOL) 400 mg Take 2 capsules by mouth twice daily    metoprolol succinate (TOPROL-XL) 100 mg, Oral, Every 12 hours    nitroglycerin (NITROSTAT) 0.4 mg, Every 5 minutes PRN    omeprazole (PriLOSEC) 40 MG capsule Take 1 capsule by mouth twice daily    ondansetron (ZOFRAN-ODT) 4 mg, Oral, Every 6 hours PRN    OneTouch Delica Lancets 89W MISC Use to check sugars once a day. Dx E11.9    ONETOUCH VERIO test strip USE 1 STRIP TO CHECK GLUCOSE ONCE DAILY    polyethylene glycol (MIRALAX) 238 g, Oral, Once, Take 238 g my mouth. Use as directed    sertraline (ZOLOFT) 100 mg tablet No dose, route, or frequency recorded. sertraline (ZOLOFT) 50 mg, Oral, Daily    SYNTHROID 150 MCG tablet TAKE 1 TABLET BY MOUTH ONCE DAILY FIRST THING IN THE MORNING (AT LEAST 30 MINUTES PRIOR TO BREAKFAST OR OTHER MEDS)    venlafaxine (EFFEXOR-XR) 37.5 mg, Daily     Allergies   Allergen Reactions    Sulfa Antibiotics Rash       PHYSICAL EXAM      Objective   Blood pressure 128/58, pulse (!) 53, temperature 97.7 °F (36.5 °C), temperature source Tympanic, resp. rate 16, height 5' 9.5" (1.765 m), weight 114 kg (251 lb 6.4 oz), SpO2 95 %. Body mass index is 36.59 kg/m². General Appearance:   Alert, cooperative, no distress   HEENT:   Normocephalic, atraumatic, anicteric     Neck:   Supple, symmetrical, trachea midline   Lungs:   Equal chest rise, respirations unlabored    Heart:   Regular rate and rhythm   Abdomen:   Soft, non-tender, non-distended; normal bowel sounds; no masses, no organomegaly    Rectal:   Deferred    Extremities:   No cyanosis, clubbing or edema    Neuro: Moves all 4 extremities    Skin:   No jaundice, rashes, or lesions      LABORATORY RESULTS     No visits with results within 1 Day(s) from this visit.    Latest known visit with results is:   Telephone on 09/15/2023   Component Date Value    Supplier Name 09/15/2023 [employer] - [address]     Supplier Phone Number 09/15/2023 [phone]     Order Status 09/15/2023 Delivery Successful     Delivery Request Date 09/15/2023 09/15/2023     Date Delivered  09/15/2023 09/18/2023     Item Description 09/15/2023 PAP Accessory     Item Description 09/15/2023 PAP Mask, Nasal Pillow, Fit Upon Setup, N/A, 1 per 3 months     Item Description 09/15/2023 Humidifier Water Chamber, 1 per 6 months     Item Description 09/15/2023 PAP Headgear, 1 per 6 months     Item Description 09/15/2023 PAP Chinstrap, 1 per 6 months     Item Description 09/15/2023 PAP Humidifier, Heated     Item Description 09/15/2023 Heated PAP Tubing, 1 per 3 months     Item Description 09/15/2023 Disposable PAP Filter, 2 per 1 month     Item Description 09/15/2023 Non-Disposable PAP Filter, 1 per 6 months     Item Description 09/15/2023 PAP Mask Interface Cushion, Nasal Pillow, 2 per 1 month         IMAGING RESULTS     US abdomen complete    Result Date: 10/13/2023  Narrative: ABDOMEN ULTRASOUND, COMPLETE INDICATION:   E88.89: Other specified metabolic disorders G29.02: Hepatic fibrosis, unspecified E66.01: Morbid (severe) obesity due to excess calories. COMPARISON: Elastography 1/20/2023 TECHNIQUE:   Real-time ultrasound of the abdomen was performed with a curvilinear transducer with both volumetric sweeps and still imaging techniques. FINDINGS: PANCREAS:  Visualized portions of the pancreas are within normal limits. AORTA AND IVC:  Visualized portions are normal for patient age. LIVER: Size: Mildly enlarged. The liver measures 19.9 cm in the midclavicular line. Contour:  Surface contour is smooth. Parenchyma: There is moderate diffuse increased echogenicity with smooth echotexture and acoustic beam attenuation. Most consistent with moderate hepatic steatosis. No liver mass identified. Limited imaging of the main portal vein shows it to be patent and hepatopetal. BILIARY: Patient has undergone cholecystectomy. No intrahepatic biliary dilatation. CBD mildly prominent at 10.0 mm, consistent with postcholecystectomy state. No choledocholithiasis. KIDNEY: Right kidney measures 13.9 x 5.9 x 4.5  cm.  Volume 194.6 mL Small cortical interpolar cyst measures 7 x 3 x 4 mm. Left kidney measures 12.4 x 5.8 x 4.6 cm. Volume 174.7 mL Small cortical interpolar cyst measures 6 x 5 x 7 mm. SPLEEN: Measures 11.9 x 11.7 x 5.4 cm. Volume 391.0 mL Within normal limits. ASCITES:  None. Impression: 1. Status post cholecystectomy. 2. Mildly prominent common bile duct likely related to postcholecystectomy state. 3. Mild hepatomegaly with diffuse increased hepatic echotexture likely due to steatosis and/or fibrosis. No focal abnormality. 4. Subcentimeter bilateral renal cortical cysts. Workstation performed: [id number]     MAMMOGRAM SCREENING CHRISTOPHER BILATERAL    Result Date: 10/4/2023  Narrative: Result MAMMOGRAM SCREENING CHRISTOPHER BILATERAL History Encounter for screening mammogram for malignant neoplasm of breast Atypical ductal hyperplasia of right breast Family medical history includes breast cancer in sister (age of onset: 36). Films Compared 09/22/2022 MAMMOGRAM SCREENING CHRISTOPHER BILATERAL, 09/23/2021 US BREAST LIMITED RIGHT, 09/23/2021 MAMMOGRAM DIAGNOSTIC CHRISTOPHER BILATERAL, 08/19/2020 US BREAST LIMITED RIGHT, 02/17/2020 US BREAST LIMITED RIGHT, 02/17/2020 MAMMOGRAM DIAGNOSTIC CHRISTOPHER BILATERAL, 08/28/2019 US BREAST LIMITED RIGHT, 08/28/2019 MAMMOGRAM DIAGNOSTIC CHRISTOPHER RIGHT, 01/18/2019 MAMMOGRAM DIAGNOSTIC BILATERAL, 01/18/2019 US BREAST LIMITED RIGHT, 06/11/2018 US BREAST LIMITED RIGHT, 06/11/2018 MAMMOGRAM DIAGNOSTIC CHRISTOPHER RIGHT, 01/09/2018 US BREAST-UNILATERAL LIMITED, 11/17/2017 MRI BREAST BILATERAL W WO CONTRAST, 11/15/2017 MAMMOGRAM, DIAGNOSTIC, BILAT, 11/15/2017 RADIOLOGY EXAM - MAMMOGRAPHY (IMAGES ONLY, NO REPORT) Findings The breasts have scattered areas of fibroglandular density. Left There is no evidence of suspicious masses, calcifications, or other abnormal findings in the left breast. Right Right anterior depth 9 o'clock oval mass measuring 9 x 6 mm is present.  Impression Right anterior depth 9 o'clock oval mass BI-RADS® Category: 0 - Incomplete: Needs Additional Imaging Evaluation. Recommendation Screening mammogram in 1 year is recommended for the left breast. Callback ultrasound is recommended for the right breast. Callback diagnostic mammogram is recommended for the right breast. Digital breast tomosynthesis was performed. This digital mammogram has been analyzed with the computer aided detection system. This notice contains the results of your recent mammogram, including information about breast density. If your mammogram shows that your breast tissue is dense, you should know that dense breast tissue is a common finding and is not abnormal. Statistics show many women could have dense or highly dense breasts. Dense breast tissue can make it harder to find cancer on a mammogram and may be associated with an increased risk of cancer. This information about the result of your mammogram is given to you to raise your awareness and to inform your conversations with your physician. Together, you can decide which screening options are right for you, based on your mammogram results, individual risk factors or physical examination. A report of your results was sent to your physician. Your mammographic breast density on today's study is described above. There are four categories of breast density on mammography. Fatty breasts and those with scattered fibroglandular tissue are not considered dense. Heterogeneously dense or extremely dense tissue is considered "dense". Please understand that assessment of breast density may vary from year to year. This examination was performed at [facility], [address] [place] [address] Howard Hamm Antonioton. I have personally reviewed pertinent imaging study reports. Kyleigh Oconnell D.O. [id number] [facility]  Division of Gastroenterology & Hepatology  Available on [facility]. [email or web address]    ** Please Note: This note is constructed using a voice recognition dictation system.  **

## 2023-10-23 NOTE — PATIENT INSTRUCTIONS
We will schedule you for upper endoscopy and colonoscopy  Please follow the instructions for the bowel prep  We will also check blood work and stool tests  Please get these done at your earliest convenience  You may try to open the capsule of omeprazole and sprinkling the contents on food  Continue this twice a day  We will also try Bentyl 20 mg every 6 hours as needed for abd pain  Follow-up with the liver specialist for your fatty liver

## 2023-10-25 ENCOUNTER — TELEPHONE (OUTPATIENT)
Dept: GASTROENTEROLOGY | Facility: CLINIC | Age: 68
End: 2023-10-25

## 2023-10-25 ENCOUNTER — VBI (OUTPATIENT)
Dept: ADMINISTRATIVE | Facility: OTHER | Age: 68
End: 2023-10-25

## 2023-10-25 NOTE — TELEPHONE ENCOUNTER
----- Message from Frances Drake RN sent at 10/23/2023  2:18 PM EDT -----  Regarding: FW: Follow-up with Dr. Paul Cancino    ----- Message -----  From: Romero Anna DO  Sent: 10/23/2023   1:45 PM EDT  To: [facility]  Subject: Follow-up with Dr. Briana Dunn,    Patient last saw Dr. Paul Cancino and he wanted her to follow-up in November but she does not have appointment scheduled. Please contact patient and schedule follow-up with hepatologist.    Thank you. Romero Anna D.O. [id number] [facility]  Division of Gastroenterology & Hepatology  Available on [email or web address]

## 2023-10-26 ENCOUNTER — TELEPHONE (OUTPATIENT)
Dept: GASTROENTEROLOGY | Facility: CLINIC | Age: 68
End: 2023-10-26

## 2023-10-26 DIAGNOSIS — K21.9 GASTROESOPHAGEAL REFLUX DISEASE, UNSPECIFIED WHETHER ESOPHAGITIS PRESENT: ICD-10-CM

## 2023-10-26 RX ORDER — FAMOTIDINE 40 MG/1
TABLET, FILM COATED ORAL
Qty: 30 TABLET | Refills: 5 | Status: SHIPPED | OUTPATIENT
Start: 2023-10-26

## 2023-10-26 NOTE — TELEPHONE ENCOUNTER
Called patient to let her know that I received the medication clearance  for the patient to hold her Eliquis for  2 days prior to her Colonoscopy/EGD with Dr. Sosa Olvera on 12/1/2023. Patient expressed understanding.

## 2023-10-27 ENCOUNTER — TELEPHONE (OUTPATIENT)
Dept: GASTROENTEROLOGY | Facility: CLINIC | Age: 68
End: 2023-10-27

## 2023-10-27 NOTE — TELEPHONE ENCOUNTER
----- Message from Leticia Willams RN sent at 10/23/2023  2:18 PM EDT -----  Regarding: FW: Follow-up with Dr. Lynell Phalen    ----- Message -----  From: Maycol Jimenez DO  Sent: 10/23/2023   1:45 PM EDT  To: [facility]  Subject: Follow-up with Dr. Elizabeth Huizar,    Patient last saw Dr. Lynell Phalen and he wanted her to follow-up in November but she does not have appointment scheduled. Please contact patient and schedule follow-up with hepatologist.    Thank you. Maycol Jimenez D.O. [id number] [facility]  Division of Gastroenterology & Hepatology  Available on [email or web address]

## 2023-10-27 NOTE — TELEPHONE ENCOUNTER
Appointment scheduled on 11/24/23 with Randy Colon at [facility]. Patient is aware our office will arrange transportation one week prior to appointment.

## 2023-11-01 PROBLEM — D49.89 THYMUS NEOPLASM: Status: ACTIVE | Noted: 2022-04-22

## 2023-11-01 PROBLEM — E66.01 SEVERE OBESITY WITH BODY MASS INDEX (BMI) OF 35.0 TO 39.9 WITH SERIOUS COMORBIDITY (HCC): Chronic | Status: ACTIVE | Noted: 2023-01-16

## 2023-11-01 PROBLEM — Z90.89 HISTORY OF TOTAL THYROIDECTOMY: Status: ACTIVE | Noted: 2019-01-16

## 2023-11-01 PROBLEM — E53.8 FOLIC ACID DEFICIENCY: Status: ACTIVE | Noted: 2023-10-19

## 2023-11-01 PROBLEM — I48.19 PERSISTENT ATRIAL FIBRILLATION (HCC): Chronic | Status: ACTIVE | Noted: 2020-01-16

## 2023-11-01 PROBLEM — Z98.890 HISTORY OF TOTAL THYROIDECTOMY: Status: ACTIVE | Noted: 2019-01-16

## 2023-11-01 PROBLEM — E89.0 HISTORY OF TOTAL THYROIDECTOMY: Status: ACTIVE | Noted: 2019-01-16

## 2023-11-01 PROBLEM — H90.3 SENSORINEURAL HEARING LOSS, BILATERAL: Status: ACTIVE | Noted: 2022-10-24

## 2023-11-01 PROBLEM — Z87.442 HISTORY OF RENAL CALCULI: Status: ACTIVE | Noted: 2022-10-26

## 2023-11-01 PROBLEM — Z98.890 HISTORY OF CEREBRAL ANEURYSM REPAIR: Status: ACTIVE | Noted: 2017-09-12

## 2023-11-01 PROBLEM — Z98.890 S/P ABLATION OF ATRIAL FIBRILLATION: Status: ACTIVE | Noted: 2019-11-04

## 2023-11-01 PROBLEM — Z87.81 HISTORY OF VERTEBRAL FRACTURE: Status: ACTIVE | Noted: 2021-02-11

## 2023-11-01 PROBLEM — Z86.0100 HISTORY OF COLON POLYPS: Status: ACTIVE | Noted: 2020-11-26

## 2023-11-01 PROBLEM — K22.70 BARRETT'S ESOPHAGUS WITHOUT DYSPLASIA: Status: ACTIVE | Noted: 2018-09-18

## 2023-11-01 PROBLEM — D72.829 LEUKOCYTOSIS: Status: ACTIVE | Noted: 2023-10-19

## 2023-11-01 PROBLEM — Q61.02 CONGENITAL MULTIPLE RENAL CYSTS: Status: ACTIVE | Noted: 2021-02-04

## 2023-11-01 PROBLEM — I11.0 HYPERTENSIVE HEART DISEASE WITH HEART FAILURE (HCC): Status: ACTIVE | Noted: 2022-03-16

## 2023-11-01 PROBLEM — H81.10 BPPV (BENIGN PAROXYSMAL POSITIONAL VERTIGO): Status: ACTIVE | Noted: 2020-12-11

## 2023-11-01 PROBLEM — E55.9 VITAMIN D DEFICIENCY: Status: ACTIVE | Noted: 2023-01-17

## 2023-11-01 PROBLEM — IMO0001 IMPAIRED HEARING: Status: ACTIVE | Noted: 2022-08-12

## 2023-11-01 PROBLEM — E89.0 POSTOPERATIVE HYPOTHYROIDISM: Status: ACTIVE | Noted: 2019-04-16

## 2023-11-01 PROBLEM — N60.91 ATYPICAL DUCTAL HYPERPLASIA OF RIGHT BREAST: Status: ACTIVE | Noted: 2017-05-01

## 2023-11-01 PROBLEM — K21.00 GASTROESOPHAGEAL REFLUX DISEASE WITH ESOPHAGITIS: Status: ACTIVE | Noted: 2018-10-17

## 2023-11-01 PROBLEM — I50.30 (HFPEF) HEART FAILURE WITH PRESERVED EJECTION FRACTION (HCC): Status: ACTIVE | Noted: 2020-01-16

## 2023-11-01 PROBLEM — Z85.850 HISTORY OF THYROID CANCER: Status: ACTIVE | Noted: 2020-01-10

## 2023-11-01 PROBLEM — F33.9 RECURRENT MAJOR DEPRESSIVE DISORDER (HCC): Chronic | Status: ACTIVE | Noted: 2019-09-23

## 2023-11-01 PROBLEM — H91.90 IMPAIRED HEARING: Status: ACTIVE | Noted: 2022-08-12

## 2023-11-01 PROBLEM — G25.81 RESTLESS LEGS SYNDROME: Status: ACTIVE | Noted: 2021-02-06

## 2023-11-01 PROBLEM — Z86.010 HISTORY OF COLON POLYPS: Status: ACTIVE | Noted: 2020-11-26

## 2023-11-01 PROBLEM — D50.8 IRON DEFICIENCY ANEMIA SECONDARY TO INADEQUATE DIETARY IRON INTAKE: Status: ACTIVE | Noted: 2021-08-31

## 2023-11-01 PROBLEM — Z86.79 S/P ABLATION OF ATRIAL FIBRILLATION: Status: ACTIVE | Noted: 2019-11-04

## 2023-11-01 PROBLEM — Z86.79 HISTORY OF CEREBRAL ANEURYSM REPAIR: Status: ACTIVE | Noted: 2017-09-12

## 2023-11-01 PROBLEM — S22.080A WEDGE COMPRESSION FRACTURE OF T11-T12 VERTEBRA, INITIAL ENCOUNTER FOR CLOSED FRACTURE (HCC): Status: ACTIVE | Noted: 2021-02-04

## 2023-11-02 ENCOUNTER — OFFICE VISIT (OUTPATIENT)
Dept: INTERNAL MEDICINE CLINIC | Facility: CLINIC | Age: 68
End: 2023-11-02
Payer: COMMERCIAL

## 2023-11-02 ENCOUNTER — TELEPHONE (OUTPATIENT)
Dept: ADMINISTRATIVE | Facility: OTHER | Age: 68
End: 2023-11-02

## 2023-11-02 VITALS
HEIGHT: 70 IN | WEIGHT: 245.3 LBS | DIASTOLIC BLOOD PRESSURE: 84 MMHG | HEART RATE: 58 BPM | SYSTOLIC BLOOD PRESSURE: 136 MMHG | BODY MASS INDEX: 35.12 KG/M2 | OXYGEN SATURATION: 97 %

## 2023-11-02 DIAGNOSIS — E78.00 HYPERCHOLESTEREMIA: ICD-10-CM

## 2023-11-02 DIAGNOSIS — D50.8 IRON DEFICIENCY ANEMIA SECONDARY TO INADEQUATE DIETARY IRON INTAKE: ICD-10-CM

## 2023-11-02 DIAGNOSIS — E66.01 SEVERE OBESITY WITH BODY MASS INDEX (BMI) OF 35.0 TO 39.9 WITH SERIOUS COMORBIDITY (HCC): Chronic | ICD-10-CM

## 2023-11-02 DIAGNOSIS — K21.00 GASTROESOPHAGEAL REFLUX DISEASE WITH ESOPHAGITIS WITHOUT HEMORRHAGE: ICD-10-CM

## 2023-11-02 DIAGNOSIS — E11.9 TYPE 2 DIABETES MELLITUS WITHOUT COMPLICATION, WITHOUT LONG-TERM CURRENT USE OF INSULIN (HCC): Primary | ICD-10-CM

## 2023-11-02 DIAGNOSIS — E89.0 POSTOPERATIVE HYPOTHYROIDISM: ICD-10-CM

## 2023-11-02 DIAGNOSIS — I67.1 BRAIN ANEURYSM: ICD-10-CM

## 2023-11-02 DIAGNOSIS — I48.19 PERSISTENT ATRIAL FIBRILLATION (HCC): Chronic | ICD-10-CM

## 2023-11-02 DIAGNOSIS — I10 ESSENTIAL HYPERTENSION: ICD-10-CM

## 2023-11-02 DIAGNOSIS — K50.919 CROHN'S DISEASE WITH COMPLICATION, UNSPECIFIED GASTROINTESTINAL TRACT LOCATION (HCC): ICD-10-CM

## 2023-11-02 DIAGNOSIS — Z86.79 HISTORY OF CEREBRAL ANEURYSM REPAIR: ICD-10-CM

## 2023-11-02 DIAGNOSIS — F33.41 RECURRENT MAJOR DEPRESSIVE DISORDER, IN PARTIAL REMISSION (HCC): Chronic | ICD-10-CM

## 2023-11-02 DIAGNOSIS — G47.33 OSA (OBSTRUCTIVE SLEEP APNEA): ICD-10-CM

## 2023-11-02 DIAGNOSIS — J44.9 CHRONIC OBSTRUCTIVE PULMONARY DISEASE, UNSPECIFIED COPD TYPE (HCC): ICD-10-CM

## 2023-11-02 DIAGNOSIS — E55.9 VITAMIN D DEFICIENCY: ICD-10-CM

## 2023-11-02 DIAGNOSIS — K64.9 HEMORRHOIDS, UNSPECIFIED HEMORRHOID TYPE: ICD-10-CM

## 2023-11-02 DIAGNOSIS — S22.080D COMPRESSION FRACTURE OF T12 VERTEBRA WITH ROUTINE HEALING, SUBSEQUENT ENCOUNTER: ICD-10-CM

## 2023-11-02 DIAGNOSIS — K22.70 BARRETT'S ESOPHAGUS WITHOUT DYSPLASIA: ICD-10-CM

## 2023-11-02 DIAGNOSIS — Z98.890 HISTORY OF CEREBRAL ANEURYSM REPAIR: ICD-10-CM

## 2023-11-02 DIAGNOSIS — I27.20 PULMONARY HYPERTENSION (HCC): ICD-10-CM

## 2023-11-02 DIAGNOSIS — E53.8 FOLIC ACID DEFICIENCY: ICD-10-CM

## 2023-11-02 DIAGNOSIS — I50.32 CHRONIC HEART FAILURE WITH PRESERVED EJECTION FRACTION (HCC): ICD-10-CM

## 2023-11-02 PROBLEM — J96.01 ACUTE RESPIRATORY FAILURE WITH HYPOXIA (HCC): Status: RESOLVED | Noted: 2019-11-30 | Resolved: 2023-11-02

## 2023-11-02 PROCEDURE — G0439 PPPS, SUBSEQ VISIT: HCPCS | Performed by: FAMILY MEDICINE

## 2023-11-02 PROCEDURE — 99204 OFFICE O/P NEW MOD 45 MIN: CPT | Performed by: FAMILY MEDICINE

## 2023-11-02 RX ORDER — FLUTICASONE FUROATE, UMECLIDINIUM BROMIDE AND VILANTEROL TRIFENATATE 200; 62.5; 25 UG/1; UG/1; UG/1
1 POWDER RESPIRATORY (INHALATION) DAILY
COMMUNITY

## 2023-11-02 RX ORDER — ALBUTEROL SULFATE 90 UG/1
2 AEROSOL, METERED RESPIRATORY (INHALATION) EVERY 4 HOURS PRN
COMMUNITY

## 2023-11-02 NOTE — TELEPHONE ENCOUNTER
Upon review of the In Basket request we were able to locate, review, and update the patient chart as requested for Diabetic Eye Exam and Diabetic Foot Exam.    Any additional questions or concerns should be emailed to the Practice Liaisons via the appropriate education email address, please do not reply via In Basket.     Thank you  Franklin Bonilla

## 2023-11-02 NOTE — TELEPHONE ENCOUNTER
----- Message from Branden Wolff sent at 11/2/2023  9:27 AM EDT -----  11/02/23 9:27 AM    Hello, our patient Govind Noble has had Diabetic Foot Exam completed/performed. Please assist in updating the patient chart by pulling the Care Everywhere (CE) document. The date of service is 04/17/2023.      Thank you,  Lokesh Chan MA  [facility] [address]

## 2023-11-02 NOTE — ASSESSMENT & PLAN NOTE
I recommend taking folic acid 1 mg daily. Advised that she can look for a supplement that has folic acid and W36 in it.

## 2023-11-02 NOTE — ASSESSMENT & PLAN NOTE
Advised the patient that Lidia Novak is the excellent choice for her and that she can also consider having Ozempic but will have to wait for her GI tract to be evaluated before starting to take it. Prescription sent for the Jardiance. Previous laboratory testing reviewed with her. Is up-to-date on eye exam and foot exam.  We will request these records. Continue with the glipizide for now but will hopefully be able to discontinue this in the future.

## 2023-11-02 NOTE — ASSESSMENT & PLAN NOTE
Advised the patient that Lyle Fortis is the excellent choice for her and that she can also consider having Ozempic but will have to wait for her GI tract to be evaluated before starting to take it.

## 2023-11-02 NOTE — PROGRESS NOTES
Assessment and Plan:     1. Type 2 diabetes mellitus without complication, without long-term current use of insulin ([address])  Assessment & Plan:  Advised the patient that Cydne Alondra is the excellent choice for her and that she can also consider having Ozempic but will have to wait for her GI tract to be evaluated before starting to take it. Prescription sent for the Jardiance. Previous laboratory testing reviewed with her. Is up-to-date on eye exam and foot exam.  We will request these records. Continue with the glipizide for now but will hopefully be able to discontinue this in the future. Orders:  -     Comprehensive metabolic panel; Future  -     Hemoglobin A1C; Future  -     CBC and differential; Future  -     Empagliflozin (JARDIANCE) 10 MG TABS tablet; Take 1 tablet (10 mg total) by mouth daily    2. Chronic obstructive pulmonary disease, unspecified COPD type ([address])  Assessment & Plan:  Continue to follow-up with pulmonary. Referral given for local pulmonary physician. Continue with the Trelegy on a daily basis and the albuterol if needed. Orders:  -     CBC and differential; Future  -     Ambulatory Referral to Pulmonology; Future    3. MARY ELLEN (obstructive sleep apnea)  Assessment & Plan:  Continue with CPAP. Continue to follow-up with sleep specialist.    Orders:  -     CBC and differential; Future    4. Postoperative hypothyroidism  -     Lipid panel; Future  -     TSH, 3rd generation; Future  -     CBC and differential; Future    5. Gastroesophageal reflux disease with esophagitis without hemorrhage  Assessment & Plan:  Follow-up with GI as scheduled. Continue with the omeprazole. Orders:  -     CBC and differential; Future    6. Faulkner's esophagus without dysplasia  -     CBC and differential; Future    7. Persistent atrial fibrillation [facility])  Assessment & Plan:  Continue current medications. Continue with anticoagulation with the Eliquis.   Referral given to local cardiologist.    Orders:  - CBC and differential; Future  -     Ambulatory Referral to Cardiology; Future    8. Essential hypertension  Assessment & Plan:  Blood pressure well controlled. Continue current medications. Watch salt intake. Stay adequately hydrated. Orders:  -     Comprehensive metabolic panel; Future  -     CBC and differential; Future    9. Chronic heart failure with preserved ejection fraction [facility])  Assessment & Plan:  Wt Readings from Last 3 Encounters:   11/02/23 111 kg (245 lb 4.8 oz)   10/23/23 114 kg (251 lb 6.4 oz)   09/14/23 115 kg (253 lb 3.2 oz)     Appears euvolemic. Continue current medications. Watch for any increased shortness of breath, increased peripheral edema, or sudden increases in weight. Referral given to local cardiology. Orders:  -     CBC and differential; Future  -     Ambulatory Referral to Cardiology; Future    10. Vitamin D deficiency  -     Vitamin D 25 hydroxy; Future  -     CBC and differential; Future    11. Recurrent major depressive disorder, in partial remission [facility])  Assessment & Plan:  Mood discussed. Reviewed previous medications. Reviewed previous plan that was initiated to discontinue the Zoloft and begin Wellbutrin. Discussed taking the 50 mg of the Zoloft every other day for about 1 to 2 weeks and then discontinuing it. Discussed risks and benefits of Wellbutrin. Discussed initiating this. Orders:  -     CBC and differential; Future  -     buPROPion (WELLBUTRIN XL) 150 mg 24 hr tablet; Take 1 tablet (150 mg total) by mouth every morning    12. Severe obesity with body mass index (BMI) of 35.0 to 39.9 with serious comorbidity [facility])  Assessment & Plan:  Continue to follow-up with weight management. Slow but steady weight loss recommended. Gradually increase activity level. Portion control discussed. May consider addition of Ozempic to her medication regimen for control of her diabetes but also to assisted weight loss in the future.   We will hold off for now until GI evaluation completed. Orders:  -     CBC and differential; Future    13. Iron deficiency anemia secondary to inadequate dietary iron intake  Assessment & Plan:  Hemoglobin had stabilized. We will continue to follow with routine laboratory testing. Orders:  -     Iron Panel (Includes Ferritin, Iron Sat%, Iron, and TIBC); Future  -     CBC and differential; Future    14. Folic acid deficiency  Assessment & Plan:  I recommend taking folic acid 1 mg daily. Advised that she can look for a supplement that has folic acid and Q72 in it. Orders:  -     Vitamin B12; Future  -     Folate; Future  -     CBC and differential; Future    15. Hypercholesteremia  Assessment & Plan:  Continue with the atorvastatin. Slip given for laboratory testing. Watch diet. Increase fiber in diet. Orders:  -     CBC and differential; Future    16. Crohn's disease with complication, unspecified gastrointestinal tract location [facility])  Assessment & Plan:  Continue with the mesalamine. Continue to follow-up with GI. Proceed with colonoscopy as planned on December 1. Orders:  -     CBC and differential; Future    17. Compression fracture of T12 vertebra with routine healing, subsequent encounter  Assessment & Plan:  Some chronic back symptoms. Watch for any worsening. Slip given for bone density. Orders:  -     CBC and differential; Future    18. Pulmonary hypertension ([address])  Assessment & Plan:  Follow-up with pulmonary and cardiology. Orders:  -     CBC and differential; Future    19. History of cerebral aneurysm repair  -     Ambulatory Referral to Neurosurgery; Future    20. Hemorrhoids, unspecified hemorrhoid type  -     gabapentin (NEURONTIN) 300 mg capsule; Take 1 capsule (300 mg total) by mouth daily at bedtime    21. Brain aneurysm  Assessment & Plan: Will refer to neurosurgery through [facility] for follow-up. Extensive medical history reviewed with her. Available medical records reviewed. Referrals as noted above. She does wish to streamline her medical care through the [facility] system. Orders and recommendations as noted above. Wellness  I encouraged the patient to try CBD. Limitations regarding medical marijuana discussed. I will put an order in for her blood work to get it done in 2 to 3 months. Will also put in all the referrals for her. Encouraged the patient to get the RSV vaccine through pharmacy. We will have her follow-up in about 3 to 4 months or sooner if needed. Urinary Incontinence Plan of Care: counseling topics discussed: use restroom every 2 hours and limiting fluid intake 3-4 hours before bed. History of Present Illness:     Fco Mast is a 67-year-old female who presents to [facility] as a new patient. The patient states that she has been scheduled for another colonoscopy on December 1 by his GI, Dr. Kimberly Shore and that they are set to do a series of biopsies. She has an upcoming schedule for a biopsy on 11/29/2023 of her breast.    She states that she has an upcoming appointment for an endoscopy due to her acid reflux and Faulkner's esophagus. She states that she has had Crohns disease since her late forties and that she has been taking mesalamine with benefits. Has intermittent abdominal cramping and some changes in bowel movements    She states that she has brain aneurysm and denies that she follows a specific provider for it though she had an MRI without contrast.  Has difficulty with MRIs because she is claustrophobic. She states that her previous neurosurgeon told her there is an aneurysm close to where she had the previous coiling completed for the previous aneurysm. This, however, is not usually seen on the MRI but was seen on an arteriogram.    She reports that she tried taking medical marijuana in the past that someone gave her.   She did notice improvement with this from a pain standpoint as well as a mood standpoint but this is likely cost prohibitive. She denies checking her blood glucose level every day and confirms she has been taking glipizide for her diabetes with benefits. She reports taking metformin in the past which she was advised to discontinue taking it and was switched to taking glipizide. Had GI side effects from the metformin. Does follow-up with ophthalmology regularly and is up-to-date. Does follow-up with podiatry. She reports she had an MRI in 03/2023 for her ear problem. She confirms she has hearing aids in both aspects. She states that she was encouraged by numerous providers to consider switching from Zoloft to Wellbutrin. She has an upcoming appointment with her weight management provider, Federico Amaro on 12/11/2023. She is not inclined to having Ozempic as she is not comfortable with needles. The patient reports she lost around 7-pounds as she was only consuming small portions as her way of eating. She denies taking gabapentin though she has been taking it for her leg problem in the past with benefits but discontinued it. She prefers trying Wellbutrin first. She reports she is only now taking Atarax at bedtime for her restless leg with occasional pain. She confirms that she is anxious and sometimes sleeps poorly due to family related issues. She reports she has breathing problems and that she just had her breathing test done. She confirms that she had followed up with pulmonary and that she has been taking Trelegy with benefits. The patient states that she has asthma as to why she was advised to occasionally use her rescue inhaler as she was wheezing at that time. She also confirms she had pneumonia in the past. She also follows cardiology and last saw him a month ago. She also had ablation in the past for her Afib. She prefers to be transferred to a provider from [facility]. She also prefers to be transferred at [facility] for her pulmonary issues. The patient has cataracts. Some vision issues. Follows up with ophthalmology regularly. She denies chest pains but confirms occasional palpitations after a long walk or going up and down the steps. She denies taking folic acid supplements. She reports she has had iron infusions in the past.    She denies getting a flu vaccine but confirms she has her pneumonia vaccine in the past.    Social history  The patient reports that she was a smoker and discontinued it for about 6 to 7 years now. She states that she and her  are planning to go to [place] for their 50th wedding anniversary.     Patient Care Team:  Amy Sinha MD as PCP - General (Family Medicine)  Amy Sinha MD [facility]  Amy Sinha MD as PCP - [facility] ([facility])  Pete Hutchison MD (Gastroenterology)   Problem List:     Patient Active Problem List   Diagnosis   • Lymphadenopathy   • A-fib ([address])   • Elevated TSH   • Hypercholesteremia   • Transaminitis   • Microscopic hematuria   • Pulmonary hypertension ([address])   • MARY ELLEN (obstructive sleep apnea)   • Postoperative hypothyroidism   • Brain aneurysm   • Atypical chest pain   • Dizziness   • Crohn's disease with complication ([address])   • Pneumonia due to infectious organism   • Elevated d-dimer   • Hypokalemia   • COPD (chronic obstructive pulmonary disease) ([address])   • Exacerbation of Crohn's disease ([address])   • Severe obstructive sleep apnea   • Diarrhea   • Atherosclerosis   • Prolonged QT interval   • Hepatomegaly   • Hepatic steatosis   • Colon polyp   • Multiple renal cysts   • T12 compression fracture (HCC)   • Diabetes mellitus, type 2 ([address])   • Fracture of right tibia   • Chronic pain syndrome   • Chronic bilateral low back pain without sciatica   • Lumbar spondylosis   • Lumbar radiculopathy   • Hemorrhoids   • Urinary retention   • Essential hypertension   • Class 2 obesity   • GERD (gastroesophageal reflux disease)   • Generalized abdominal pain   • (HFpEF) heart failure with preserved ejection fraction ([address])   • Hypertensive heart disease with heart failure (HCC)   • Atypical ductal hyperplasia of right breast   • Faulkner's esophagus without dysplasia   • BPPV (benign paroxysmal positional vertigo)   • Congenital multiple renal cysts   • Gastroesophageal reflux disease with esophagitis   • History of cerebral aneurysm repair   • History of colon polyps   • History of renal calculi   • History of thyroid cancer   • History of total thyroidectomy   • History of vertebral fracture   • Impaired hearing   • Iron deficiency anemia secondary to inadequate dietary iron intake   • Leukocytosis   • Recurrent major depressive disorder (HCC)   • Restless legs syndrome   • S/P ablation of atrial fibrillation   • Sensorineural hearing loss, bilateral   • Severe obesity with body mass index (BMI) of 35.0 to 39.9 with serious comorbidity (HCC)   • Thymus neoplasm   • Folic acid deficiency   • Vitamin D deficiency   • Wedge compression fracture of T11-T12 vertebra, initial encounter for closed fracture (HCC)   • Persistent atrial fibrillation (HCC)   • Vitamin B12 deficiency      Past Medical and Surgical History:     Past Medical History:   Diagnosis Date   • A-fib [facility])    • Acute respiratory failure with hypoxia ([address]) 11/30/2019   • Asthma    • Brain aneurysm    • Cancer (HCC)     papillary thyroid cancer   • Cardiac disease    • CHF (congestive heart failure) (HCC)    • COPD (chronic obstructive pulmonary disease) (HCC)    • CPAP (continuous positive airway pressure) dependence    • Crohn disease ([address])    • Diabetes mellitus ([address])    • Disease of thyroid gland    • GERD (gastroesophageal reflux disease)    • Hyperlipidemia    • Hypertension    • Sleep apnea      Past Surgical History:   Procedure Laterality Date   • APPENDECTOMY     • CARDIOVERSION N/A 2/14/2019    Procedure: CARDIOVERSION;  Surgeon: Miguel Angel Meyers MD;  Location: [facility] MAIN OR;  Service: Cardiology   • CEREBRAL ANEURYSM REPAIR     • CHOLECYSTECTOMY • HYSTERECTOMY     • IR PELVIC ANGIOGRAM  2017   • OH EXC THROMBOSED HEMORRHOID XTRNL N/A 10/7/2022    Procedure: HEMORRHOIDECTOMY EXCISION;  Surgeon: Brandy Strange DO;  Location: [facility];  Service: General   • THYROIDECTOMY     • TONSILLECTOMY AND ADENOIDECTOMY        Family History:     Family History   Problem Relation Age of Onset   • Alzheimer's disease Mother    • Cancer Father         lung and bone   • Heart disease Sister    • Hypertension Sister    • Diabetes Sister    • Stroke Sister    • Cancer Sister    • Cancer Brother         liver with mets to bone   • Heart disease Brother    • Cancer Brother         thyroid cancer      Social History:     Social History     Socioeconomic History   • Marital status: /Civil Union     Spouse name: None   • Number of children: None   • Years of education: None   • Highest education level: None   Occupational History   • None   Tobacco Use   • Smoking status: Former     Types: Cigarettes     Quit date: 2018     Years since quittin.8   • Smokeless tobacco: Never   Vaping Use   • Vaping Use: Never used   Substance and Sexual Activity   • Alcohol use: Not Currently     Comment: social   • Drug use: Not Currently     Types: Marijuana   • Sexual activity: None   Other Topics Concern   • None   Social History Narrative   • None     Social Determinants of Health     Financial Resource Strain: Low Risk  (2023)    Overall Financial Resource Strain (CARDIA)    • Difficulty of Paying Living Expenses: Not very hard   Food Insecurity: No Food Insecurity (2022)    Hunger Vital Sign    • Worried About Running Out of Food in the Last Year: Never true    • Ran Out of Food in the Last Year: Never true   Transportation Needs: No Transportation Needs (2023)    PRAPARE - Transportation    • Lack of Transportation (Medical): No    • Lack of Transportation (Non-Medical):  No   Physical Activity: Not on file   Stress: Not on file   Social Connections: Not on file   Intimate Partner Violence: Not on file   Housing Stability: Low Risk  (12/21/2022)    Housing Stability Vital Sign    • Unable to Pay for Housing in the Last Year: No    • Number of Places Lived in the Last Year: 1    • Unstable Housing in the Last Year: No      Medications and Allergies:     Current Outpatient Medications   Medication Sig Dispense Refill   • albuterol (PROVENTIL HFA,VENTOLIN HFA) 90 mcg/act inhaler Inhale 2 puffs every 4 (four) hours as needed for wheezing     • apixaban (ELIQUIS) 5 mg Take 5 mg by mouth 2 (two) times a day      • aspirin 81 mg chewable tablet Chew 1 tablet (81 mg total) daily  0   • atorvastatin (LIPITOR) 20 mg tablet Take 20 mg by mouth every morning     • buPROPion (WELLBUTRIN XL) 150 mg 24 hr tablet Take 1 tablet (150 mg total) by mouth every morning 90 tablet 3   • Cholecalciferol (Vitamin D3) 1.25 MG (82041 UT) CAPS Take 1 capsule by mouth once a week     • docusate sodium (COLACE) 100 mg capsule Take 1 capsule (100 mg total) by mouth 2 (two) times a day 30 capsule 1   • dofetilide (TIKOSYN) 500 mcg capsule Take 500 mcg by mouth 2 (two) times a day     • Empagliflozin (JARDIANCE) 10 MG TABS tablet Take 1 tablet (10 mg total) by mouth daily 90 tablet 3   • famotidine (PEPCID) 40 MG tablet TAKE 1 TABLET BY MOUTH ONCE DAILY AT BEDTIME 30 tablet 5   • fenofibrate (TRICOR) 54 MG tablet Take 54 mg by mouth daily     • fluticasone-umeclidinium-vilanterol (Trelegy Ellipta) 200-62.5-25 mcg/actuation AEPB inhaler Inhale 1 puff daily Rinse mouth after use.      • furosemide (LASIX) 20 mg tablet Take 20 mg by mouth 2 (two) times a day     • gabapentin (NEURONTIN) 300 mg capsule Take 1 capsule (300 mg total) by mouth daily at bedtime 90 capsule 1   • glipiZIDE (GLUCOTROL XL) 5 mg 24 hr tablet TAKE 1 TABLET BY MOUTH ONCE DAILY 30 MINUTES BEFORE A MEAL (REPLACES METFORMIN)     • hydrOXYzine HCL (ATARAX) 10 mg tablet Take 10 mg by mouth 2 (two) times a day     • lisinopril (ZESTRIL) 10 mg tablet Take 10 mg by mouth every morning     • mesalamine (DELZICOL) 400 mg Take 2 capsules by mouth twice daily 360 capsule 3   • metoprolol succinate (TOPROL-XL) 100 mg 24 hr tablet Take 1 tablet (100 mg total) by mouth every 12 (twelve) hours 60 tablet 0   • omeprazole (PriLOSEC) 40 MG capsule Take 1 capsule by mouth twice daily 60 capsule 0   • sertraline (ZOLOFT) 50 mg tablet Take 50 mg by mouth daily     • SYNTHROID 150 MCG tablet TAKE 1 TABLET BY MOUTH ONCE DAILY FIRST THING IN THE MORNING (AT LEAST 30 MINUTES PRIOR TO BREAKFAST OR OTHER MEDS)     • bisacodyl (DULCOLAX) 5 mg EC tablet Take 4 tablets (20 mg total) by mouth once for 1 dose 4 tablet 0   • Blood Glucose Monitoring Suppl (ONETOUCH VERIO) w/Device KIT Use to check sugars once a day. Dx E11.9     • Blood Glucose Monitoring Suppl ([employer]) w/Device KIT daily Use to check glucose     • glucose blood test strip Use to check sugars once a day. Dx E11.9     • Lancets (ONETOUCH DELICA PLUS [id number]) MISC USE 1 TO CHECK GLUCOSE ONCE DAILY     • ONETOUCH VERIO test strip USE 1 STRIP TO CHECK GLUCOSE ONCE DAILY     • polyethylene glycol (MiraLax) 17 GM/SCOOP powder Take 238 g by mouth once for 1 dose Take 238 g my mouth. Use as directed 238 g 0     No current facility-administered medications for this visit.      Allergies   Allergen Reactions   • Sulfa Antibiotics Rash      Immunizations:     Immunization History   Administered Date(s) Administered   • COVID-19 MODERNA VACC 0.5 ML IM 10/08/2021, 11/06/2021   • Pneumococcal 20-valent Conjugate (Historical) 04/17/2023   • Pneumococcal Polysaccharide PPV23 01/16/2020   • Tdap 04/17/2023   • Tuberculin Skin Test 02/04/2021, 02/13/2021      Health Maintenance:         Topic Date Due   • Breast Cancer Screening: Mammogram  10/25/2024   • Colorectal Cancer Screening  12/09/2026   • Hepatitis C Screening  Completed         Topic Date Due   • COVID-19 Vaccine (3 - Moderna series) 01/01/2022 Review of Systems:     Review of Systems  Constitutional: Negative for activity change, appetite change, chills and fever. HENT: Negative for congestion and rhinorrhea. Eyes: Negative for visual disturbance. Respiratory: Negative for chest tightness and shortness of breath. Cardiovascular: Negative for chest pain and palpitations. Gastrointestinal: Negative for abdominal pain, blood in stool, diarrhea, nausea and vomiting. Endocrine: Negative for polydipsia, polyphagia and polyuria. Genitourinary: Negative for dysuria, frequency and urgency. Musculoskeletal: Negative for gait problem. Skin: Negative for color change. Neurological: Negative for dizziness and headaches. Hematological: Does not bruise/bleed easily. Psychiatric/Behavioral: Negative for confusion and sleep disturbance. The patient is not nervous/anxious. Physical Exam:     /84 (BP Location: Left arm, Patient Position: Sitting, Cuff Size: Large)   Pulse 58   Ht 5' 9.5" (1.765 m)   Wt 111 kg (245 lb 4.8 oz)   SpO2 97%   BMI 35.71 kg/m²     Physical Exam  Vitals and nursing note reviewed. Constitutional:       General: She is not in acute distress. Appearance: She is well-developed, well-groomed and overweight. HENT:      Head: Normocephalic and atraumatic. Eyes:      Conjunctiva/sclera: Conjunctivae normal.   Neck:      Thyroid: No thyromegaly. Cardiovascular:      Rate and Rhythm: Normal rate and regular rhythm. Heart sounds: No murmur heard. Pulmonary:      Effort: Pulmonary effort is normal. No respiratory distress. Breath sounds: Decreased breath sounds present. Abdominal:      Palpations: Abdomen is soft. Tenderness: There is no abdominal tenderness. Musculoskeletal:         General: No swelling. Cervical back: Neck supple. Skin:     General: Skin is warm and dry. Capillary Refill: Capillary refill takes less than 2 seconds.    Neurological:      Mental Status: She is alert. Psychiatric:         Mood and Affect: Mood and affect normal.         Speech: Speech normal.         Behavior: Behavior is cooperative. Cognition and Memory: Cognition and memory normal.        Eye: Positive for cataracts. Respiratory: Her lungs are clear. Cardiovascular: Her heart sounds normal.   Medicare Screening Tests and Risk Assessments:     Noemy Ng is here for her Subsequent Wellness visit. Last Medicare Wellness visit information reviewed, patient interviewed and updates made to the record today. Health Risk Assessment:   Patient rates overall health as poor. Patient feels that their physical health rating is much worse. Patient is satisfied with their life. Eyesight was rated as much worse. Hearing was rated as much worse. Patient feels that their emotional and mental health rating is slightly worse. Patients states they are never, rarely angry. Patient states they are always unusually tired/fatigued. Pain experienced in the last 7 days has been some. Patient's pain rating has been 5/10. Patient states that she has experienced no weight loss or gain in last 6 months. Depression Screening:   PHQ-9 Score: 13      Fall Risk Screening: In the past year, patient has experienced: no history of falling in past year      Urinary Incontinence Screening:   Patient has not leaked urine accidently in the last six months. Home Safety:  Patient has trouble with stairs inside or outside of their home. Patient has working smoke alarms and has working carbon monoxide detector. Home safety hazards include: none. Nutrition:   Current diet is Regular and Limited junk food. Medications:   Patient is currently taking over-the-counter supplements. OTC medications include: see medication list. Patient is able to manage medications.      Activities of Daily Living (ADLs)/Instrumental Activities of Daily Living (IADLs):   Walk and transfer into and out of bed and chair?: Yes  Dress and groom yourself?: Yes    Bathe or shower yourself?: Yes    Feed yourself? Yes  Do your laundry/housekeeping?: Yes  Manage your money, pay your bills and track your expenses?: Yes  Make your own meals?: Yes    Do your own shopping?: Yes    Previous Hospitalizations:   Any hospitalizations or ED visits within the last 12 months?: No      Advance Care Planning:   Living will: Yes    Durable POA for healthcare: Yes    Advanced directive: Yes    Provider agrees with end of life decisions: Yes      Cognitive Screening:   Provider or family/friend/caregiver concerned regarding cognition?: No    PREVENTIVE SCREENINGS      Cardiovascular Screening:    General: Screening Not Indicated and History Lipid Disorder      Diabetes Screening:     General: Screening Not Indicated and History Diabetes      Colorectal Cancer Screening:     General: Screening Current      Breast Cancer Screening:     General: Screening Current      Cervical Cancer Screening:    General: Screening Not Indicated      Osteoporosis Screening:    General: Risks and Benefits Discussed    Due for: DXA Axial      Abdominal Aortic Aneurysm (AAA) Screening:        General: Screening Not Indicated      Lung Cancer Screening:     General: Screening Not Indicated      Hepatitis C Screening:    General: Screening Current    Screening, Brief Intervention, and Referral to Treatment (SBIRT)    Screening  Typical number of drinks in a day: 0  Typical number of drinks in a week: 0  Interpretation: Low risk drinking behavior. Single Item Drug Screening:  How often have you used an illegal drug (including marijuana) or a prescription medication for non-medical reasons in the past year? never    Single Item Drug Screen Score: 0  Interpretation: Negative screen for possible drug use disorder    Brief Intervention  Alcohol & drug use screenings were reviewed. No concerns regarding substance use disorder identified. No results found.     I have personally reviewed results with the patient. Her last lab work done on 10/17/2023 shows  Hemoglobin A1c was 7.2 percent. Vitamin B12 level is 389 ng/L. The cholesterol level is within the normal range. Her good cholesterol is low. Her bad cholesterol is within the normal range. Her thyroid test was normal.  Her folic acid level is 3.2 ng/mL. Her iron level is low. Her hemoglobin level is 11 gm/dL. Her MRI done in 03/2023 shows unremarkable results. Roland Carroll MD    Transcribed for Roland Carroll MD, by Morena Washington on 11/03/23 at 5:00 AM. Powered by [employer].

## 2023-11-02 NOTE — TELEPHONE ENCOUNTER
----- Message from Branden Ortega sent at 11/2/2023  9:22 AM EDT -----  11/02/23 9:36 AM    Hello, our patient Phi Atkins has had Diabetic Eye Exam completed/performed. Please assist in updating the patient chart by pulling a previous Electronic Medical Record (EMR) document. The previous EMR is epic. The date of service is 07/18/23 and also noted 7/19/23.     Thank you,  Michele Rosado MA   [facility] [address] [facility]

## 2023-11-03 PROBLEM — E53.8 VITAMIN B12 DEFICIENCY: Status: ACTIVE | Noted: 2023-11-03

## 2023-11-03 RX ORDER — GABAPENTIN 300 MG/1
300 CAPSULE ORAL
Qty: 90 CAPSULE | Refills: 1 | Status: SHIPPED | OUTPATIENT
Start: 2023-11-03

## 2023-11-03 RX ORDER — BUPROPION HYDROCHLORIDE 150 MG/1
150 TABLET ORAL EVERY MORNING
Qty: 90 TABLET | Refills: 3 | Status: SHIPPED | OUTPATIENT
Start: 2023-11-03 | End: 2024-10-28

## 2023-11-03 NOTE — ASSESSMENT & PLAN NOTE
Continue to follow-up with pulmonary. Referral given for local pulmonary physician. Continue with the Trelegy on a daily basis and the albuterol if needed.

## 2023-11-03 NOTE — ASSESSMENT & PLAN NOTE
Blood pressure well controlled. Continue current medications. Watch salt intake. Stay adequately hydrated.

## 2023-11-03 NOTE — ASSESSMENT & PLAN NOTE
Continue current medications. Continue with anticoagulation with the Eliquis.   Referral given to local cardiologist.

## 2023-11-03 NOTE — ASSESSMENT & PLAN NOTE
Continue to follow-up with weight management. Slow but steady weight loss recommended. Gradually increase activity level. Portion control discussed. May consider addition of Ozempic to her medication regimen for control of her diabetes but also to assisted weight loss in the future. We will hold off for now until GI evaluation completed.

## 2023-11-03 NOTE — ASSESSMENT & PLAN NOTE
Continue with the mesalamine. Continue to follow-up with GI. Proceed with colonoscopy as planned on December 1.

## 2023-11-03 NOTE — ASSESSMENT & PLAN NOTE
Mood discussed. Reviewed previous medications. Reviewed previous plan that was initiated to discontinue the Zoloft and begin Wellbutrin. Discussed taking the 50 mg of the Zoloft every other day for about 1 to 2 weeks and then discontinuing it. Discussed risks and benefits of Wellbutrin. Discussed initiating this.

## 2023-11-03 NOTE — ASSESSMENT & PLAN NOTE
Continue with the atorvastatin. Slip given for laboratory testing. Watch diet. Increase fiber in diet.

## 2023-11-03 NOTE — ASSESSMENT & PLAN NOTE
Wt Readings from Last 3 Encounters:   11/02/23 111 kg (245 lb 4.8 oz)   10/23/23 114 kg (251 lb 6.4 oz)   09/14/23 115 kg (253 lb 3.2 oz)     Appears euvolemic. Continue current medications. Watch for any increased shortness of breath, increased peripheral edema, or sudden increases in weight. Referral given to local cardiology.

## 2023-11-06 ENCOUNTER — LAB (OUTPATIENT)
Dept: LAB | Facility: HOSPITAL | Age: 68
End: 2023-11-06
Payer: COMMERCIAL

## 2023-11-06 DIAGNOSIS — K21.9 GASTROESOPHAGEAL REFLUX DISEASE, UNSPECIFIED WHETHER ESOPHAGITIS PRESENT: ICD-10-CM

## 2023-11-06 DIAGNOSIS — R10.84 GENERALIZED ABDOMINAL PAIN: ICD-10-CM

## 2023-11-06 DIAGNOSIS — K50.919 CROHN'S DISEASE WITH COMPLICATION, UNSPECIFIED GASTROINTESTINAL TRACT LOCATION (HCC): ICD-10-CM

## 2023-11-06 DIAGNOSIS — R19.7 DIARRHEA, UNSPECIFIED TYPE: ICD-10-CM

## 2023-11-06 LAB — CRP SERPL QL: 7.9 MG/L

## 2023-11-06 PROCEDURE — 86231 EMA EACH IG CLASS: CPT

## 2023-11-06 PROCEDURE — 83993 ASSAY FOR CALPROTECTIN FECAL: CPT

## 2023-11-06 PROCEDURE — 36415 COLL VENOUS BLD VENIPUNCTURE: CPT

## 2023-11-06 PROCEDURE — 86140 C-REACTIVE PROTEIN: CPT

## 2023-11-06 PROCEDURE — 86258 DGP ANTIBODY EACH IG CLASS: CPT

## 2023-11-06 PROCEDURE — 82653 EL-1 FECAL QUANTITATIVE: CPT

## 2023-11-06 PROCEDURE — 82784 ASSAY IGA/IGD/IGG/IGM EACH: CPT

## 2023-11-06 PROCEDURE — 86364 TISS TRNSGLTMNASE EA IG CLAS: CPT

## 2023-11-07 LAB
ENDOMYSIUM IGA SER QL: NEGATIVE
GLIADIN PEPTIDE IGA SER-ACNC: 11 UNITS (ref 0–19)
GLIADIN PEPTIDE IGG SER-ACNC: 4 UNITS (ref 0–19)
IGA SERPL-MCNC: 630 MG/DL (ref 87–352)
TTG IGA SER-ACNC: <2 U/ML (ref 0–3)
TTG IGG SER-ACNC: 4 U/ML (ref 0–5)

## 2023-11-09 LAB
CALPROTECTIN STL-MCNT: 110 UG/G (ref 0–120)
ELASTASE PANC STL-MCNT: 60 UG ELAST./G

## 2023-11-16 DIAGNOSIS — K86.81 EXOCRINE PANCREATIC INSUFFICIENCY: Primary | ICD-10-CM

## 2023-11-20 ENCOUNTER — TELEPHONE (OUTPATIENT)
Age: 68
End: 2023-11-20

## 2023-11-21 ENCOUNTER — TELEPHONE (OUTPATIENT)
Dept: GASTROENTEROLOGY | Facility: CLINIC | Age: 68
End: 2023-11-21

## 2023-11-21 NOTE — TELEPHONE ENCOUNTER
Transportation on 11/24 to [address] [place] [address],  time 12:20 PM. Called patient to let her know that transportation is arranged to [facility], and pick-up time 12:20 PM.

## 2023-11-24 ENCOUNTER — VBI (OUTPATIENT)
Dept: ADMINISTRATIVE | Facility: OTHER | Age: 68
End: 2023-11-24

## 2023-11-24 ENCOUNTER — OFFICE VISIT (OUTPATIENT)
Dept: GASTROENTEROLOGY | Facility: CLINIC | Age: 68
End: 2023-11-24
Payer: COMMERCIAL

## 2023-11-24 VITALS
HEIGHT: 69 IN | DIASTOLIC BLOOD PRESSURE: 70 MMHG | OXYGEN SATURATION: 92 % | TEMPERATURE: 98.9 F | HEART RATE: 71 BPM | BODY MASS INDEX: 35.99 KG/M2 | WEIGHT: 243 LBS | SYSTOLIC BLOOD PRESSURE: 120 MMHG

## 2023-11-24 DIAGNOSIS — E66.01 SEVERE OBESITY (BMI 35.0-39.9) WITH COMORBIDITY (HCC): ICD-10-CM

## 2023-11-24 DIAGNOSIS — K76.0 HEPATIC STEATOSIS: ICD-10-CM

## 2023-11-24 DIAGNOSIS — K74.00 LIVER FIBROSIS: Primary | ICD-10-CM

## 2023-11-24 PROCEDURE — 99214 OFFICE O/P EST MOD 30 MIN: CPT | Performed by: INTERNAL MEDICINE

## 2023-11-24 NOTE — PROGRESS NOTES
[facility] - Outpatient Follow-up Note  Trell Rapp 80 y.o. female MRN: [id number]  Encounter: [id number]          ASSESSMENT AND PLAN:      1. Fatty liver on imaging. F4 on recent elastography, however IQR of 37% - not reliable). She has no clear physical, laboratory or other radiologic evidence of advanced liver disease, cirrhosis or portal hypertension. Most recent ultrasound was last month. She continues to work with weight management and has lost 7 pounds thus far. She is focused on weight loss however he is worried that her 7 pounds of weight loss may not be consistent along with her efforts and she worries about Crohn's flare and/or breast cancer. She has scheduled upper endoscopy and colonoscopy next week and a breast biopsy coming up as well. I will request a repeat elastography in January, which will be 1 year since her last elastography to see if there has been any changes and fibrosis level. Given that her elastography suggested cirrhosis, we will continue to follow her as such until proven otherwise. This will entail blood work every 3 months including a CMP and PT/INR as well as abdominal ultrasound at least every 6 months to screen for liver cancer. She is already having her endoscopy scheduled as part of her Crohn's disease surveillance and has never had evidence of portal hypertension to date. She will continue to follow-up with Dr. Jonathan Negron for her inflammatory bowel disease and will return to see me in 6 months time. FOLLOW-UP:  Return in about 6 months (around 5/24/2024). VISIT DIAGNOSES AND ORDERS:      1. Liver fibrosis    2. Severe obesity (BMI 35.0-39. 9) with comorbidity ([address])    3.  Hepatic steatosis      Orders Placed This Encounter   Procedures    US elastography    US abdomen complete    CBC and Platelet    Protime-INR    Comprehensive metabolic panel ______________________________________________________________________    SUBJECTIVE:         Interval update 11/24/2023: At her last office visit, I requested Ms. Archana Chavez to work on lifestyle modification for weight loss, repeat Lizy edgar with repeat labs and ultrasound and follow-up with me today to check if there is been any improvement in her nonalcoholic fatty liver disease. She was seen by my gastroenterology colleague, Dr. Tylor Stone 1 month ago for follow-up of her Crohn's disease, and a repeat endoscopic evaluation was requested given her worsening IBD symptoms concerning for flare. She is scheduled for endoscopy and colonoscopy on December 1, 2023, with follow-up scheduled with Dr. Tylor Stone in February. She had abdominal ultrasound done on October 6, 2023 which showed mild hepatomegaly with diffuse increased hepatic echotexture due to steatosis and or fibrosis, with mildly prominent CBD. Her last blood work was from October 17, 2023 which showed normal LFTs, and hemoglobin A1c of 7.2. History:    HPI from office visit on 4/7/2023:  Ms. Sarah Starr is a 80 y.o. female with 52-year-old female with medical history as outlined below including well-managed Crohn's on mesalamine twice daily, obesity and several components of the metabolic syndrome, pulm hypertension, history of congestive heart failure, who comes in today for initial consultation with hepatology for fatty liver disease and possible advanced fibrosis on recent elastography. Patient seems to have very little medical knowledge of a personal history of liver disease. Her , who accompanied her to her office visit today, states both he and his wife have a history of fatty liver. She has the full spectrum of the metabolic syndrome including abdominal obesity, insulin resistance with diabetes, hypertension and hyperlipidemia. Her most recent A1c was 7.6 in January. She denies a known personal history of liver disease. On review of her labs it appears that she had significantly elevated transaminases in 2019 and 2020. She believes this at the time that she had significant congestive heart failure. She does not recall being told she had evaluation done for her elevated liver enzymes at that time. She denies a history of heavy alcohol use. Ms. Nadine Rebollar denies recent or history of yellow eyes/skin, dark urine, GI bleeding, abdominal distention with fluid, excessive bleeding, pruritus or confusion. She is fairly longstanding lower extremity edema and has been on diuretics for her congestive heart failure. She also has easy bruising associated with anticoagulation. She has had increased fecal urgency with loose stools and near incontinence. She has upper and lower endoscopy scheduled with Dr. ROPER [facility] next week for further evaluation. She has been following with our weight management team and has lost about 7 pounds since her last office visit. She is worried that the weight loss may not just be related to her efforts but rather to her IBD and or cancer. She had abnormal mammogram and is scheduled for breast biopsy shortly. REVIEW OF SYSTEMS     Review of Systems   HENT:  Positive for hearing loss. Eyes: Negative. Respiratory: Negative. Cardiovascular:  Positive for leg swelling (Improved). Musculoskeletal:  Positive for arthralgias. All other systems reviewed and are negative.       Historical Information   Patient Active Problem List   Diagnosis    Lymphadenopathy    A-fib (HCC)    Elevated TSH    Hypercholesteremia    Transaminitis    Microscopic hematuria    Pulmonary hypertension (HCC)    MARY ELLEN (obstructive sleep apnea)    Postoperative hypothyroidism    Brain aneurysm    Atypical chest pain    Dizziness    Crohn's disease with complication ([address])    Pneumonia due to infectious organism    Elevated d-dimer    Hypokalemia    COPD (chronic obstructive pulmonary disease) (HCC)    Exacerbation of Crohn's disease ([address])    Severe obstructive sleep apnea    Diarrhea    Atherosclerosis    Prolonged QT interval    Hepatomegaly    Hepatic steatosis    Colon polyp    Multiple renal cysts    T12 compression fracture (HCC)    Diabetes mellitus, type 2 (HCC)    Fracture of right tibia    Chronic pain syndrome    Chronic bilateral low back pain without sciatica    Lumbar spondylosis    Lumbar radiculopathy    Hemorrhoids    Urinary retention    Essential hypertension    Class 2 obesity    GERD (gastroesophageal reflux disease)    Generalized abdominal pain    (HFpEF) heart failure with preserved ejection fraction (HCC)    Hypertensive heart disease with heart failure (HCC)    Atypical ductal hyperplasia of right breast    Faulkner's esophagus without dysplasia    BPPV (benign paroxysmal positional vertigo)    Congenital multiple renal cysts    Gastroesophageal reflux disease with esophagitis    History of cerebral aneurysm repair    History of colon polyps    History of renal calculi    History of thyroid cancer    History of total thyroidectomy    History of vertebral fracture    Impaired hearing    Iron deficiency anemia secondary to inadequate dietary iron intake    Leukocytosis    Recurrent major depressive disorder (HCC)    Restless legs syndrome    S/P ablation of atrial fibrillation    Sensorineural hearing loss, bilateral    Severe obesity with body mass index (BMI) of 35.0 to 39.9 with serious comorbidity (HCC)    Thymus neoplasm    Folic acid deficiency    Vitamin D deficiency    Wedge compression fracture of T11-T12 vertebra, initial encounter for closed fracture (HCC)    Persistent atrial fibrillation (HCC)    Vitamin B12 deficiency     Social History     Substance and Sexual Activity   Alcohol Use Not Currently    Comment: social     Social History     Substance and Sexual Activity   Drug Use Not Currently    Types: Marijuana     Social History     Tobacco Use   Smoking Status Former    Types: Cigarettes Quit date: 2018    Years since quittin.8   Smokeless Tobacco Never       Meds/Allergies       Current Outpatient Medications:     albuterol (PROVENTIL HFA,VENTOLIN HFA) 90 mcg/act inhaler    apixaban (ELIQUIS) 5 mg    aspirin 81 mg chewable tablet    atorvastatin (LIPITOR) 20 mg tablet    Blood Glucose Monitoring Suppl (ONETOUCH VERIO) w/Device KIT    Blood Glucose Monitoring Suppl (ONETOUCH VERIO) w/Device KIT    buPROPion (WELLBUTRIN XL) 150 mg 24 hr tablet    Cholecalciferol (Vitamin D3) 1.25 MG (12952 UT) CAPS    docusate sodium (COLACE) 100 mg capsule    dofetilide (TIKOSYN) 500 mcg capsule    Empagliflozin (JARDIANCE) 10 MG TABS tablet    famotidine (PEPCID) 40 MG tablet    fenofibrate (TRICOR) 54 MG tablet    fluticasone-umeclidinium-vilanterol (Trelegy Ellipta) 200-62.5-25 mcg/actuation AEPB inhaler    furosemide (LASIX) 20 mg tablet    gabapentin (NEURONTIN) 300 mg capsule    glipiZIDE (GLUCOTROL XL) 5 mg 24 hr tablet    glucose blood test strip    hydrOXYzine HCL (ATARAX) 10 mg tablet    Lancets (ONETOUCH DELICA PLUS [id number]) MISC    lisinopril (ZESTRIL) 10 mg tablet    mesalamine (DELZICOL) 400 mg    metoprolol succinate (TOPROL-XL) 100 mg 24 hr tablet    omeprazole (PriLOSEC) 40 MG capsule    ONETOUCH VERIO test strip    pancrelipase, Lip-Prot-Amyl, (CREON) 24,000 units    SYNTHROID 150 MCG tablet    bisacodyl (DULCOLAX) 5 mg EC tablet    polyethylene glycol (MiraLax) 17 GM/SCOOP powder    sertraline (ZOLOFT) 50 mg tablet    Allergies   Allergen Reactions    Sulfa Antibiotics Rash           Objective     Blood pressure 120/70, pulse 71, temperature 98.9 °F (37.2 °C), temperature source Tympanic, height 5' 9" (1.753 m), weight 110 kg (243 lb), SpO2 92 %. Body mass index is 35.88 kg/m². PHYSICAL EXAM:      Physical Exam  Constitutional:       Appearance: She is obese. She is not ill-appearing. HENT:      Head: Normocephalic and atraumatic.       Ears:      Comments: Difficulty hearing my voice despite wearing her hearing aids. Mouth/Throat:      Mouth: Mucous membranes are moist.   Eyes:      General: No scleral icterus. Extraocular Movements: Extraocular movements intact. Cardiovascular:      Rate and Rhythm: Normal rate and regular rhythm. Heart sounds: No murmur heard. Pulmonary:      Effort: No respiratory distress. Breath sounds: No wheezing or rales. Abdominal:      General: There is distension (Obese). Palpations: Abdomen is soft. There is hepatomegaly. Tenderness: There is no abdominal tenderness. Musculoskeletal:         General: Swelling (Minimal bilateral lower extremity nonpitting edema) present. Skin:     Coloration: Skin is not jaundiced. Neurological:      Mental Status: She is oriented to person, place, and time. Lab Results:   Lab Results   Component Value Date     04/21/2018    K 3.7 03/16/2023    CO2 27 03/16/2023     03/16/2023    BUN 20 03/16/2023    CREATININE 0.89 03/16/2023     Lab Results   Component Value Date    WBC 13.73 (H) 03/16/2023    HGB 13.9 03/16/2023    HCT 44.8 03/16/2023    MCV 89 03/16/2023     03/16/2023     Lab Results   Component Value Date    TP 7.7 03/16/2023    AST 21 03/16/2023    ALT 21 03/16/2023    BILITOT 0.3 04/21/2018    BILIDIR 0.09 05/06/2020    INR 1.34 (H) 10/10/2022      Lab Results   Component Value Date    IRON 36 (L) 07/29/2020    FERRITIN 64 07/29/2020     Lab Results   Component Value Date    CHOL 244 (H) 04/21/2018    HDL 41 (L) 05/11/2022    TRIG 93 05/11/2022     NAFLD Fibrosis Score is: .003    NAFLD Score Correlated Fibrosis Severity   <0.12 F0-F2   0.12-0.676 Indeterminate Score   >0.676 F3-F4   **Fibrosis Severity Scale: F0 = no fibrosis; F1= mild fibrosis; F2 = moderate fibrosis; F3 = severe fibrosis; F4 = cirrhosis    NAFLD Score Component Values:  Component Value Date   Age: 80 y.o.     BMI: 35.88 kg/m²    IFG or DM:  Yes    AST: 21 U/L 3/16/2023   ALT: 21 U/L 3/16/2023   Platelet: 473 Thousands/uL 3/16/2023   Albumin: 4.1 g/dL 3/16/2023         Radiology Results:   CT abdomen pelvis with contrast    Result Date: 3/16/2023  Narrative: CT ABDOMEN AND PELVIS WITH IV CONTRAST INDICATION:   Epigastric pain abd pain, n/v. COMPARISON:  None. TECHNIQUE:  CT examination of the abdomen and pelvis was performed. Axial, sagittal, and coronal 2D reformatted images were created from the source data and submitted for interpretation. Radiation dose length product (DLP) for this visit:  1201.88 mGy-cm . This examination, like all CT scans performed in the [facility], was performed utilizing techniques to minimize radiation dose exposure, including the use of iterative reconstruction and automated exposure control. IV Contrast:  100 mL of iohexol (OMNIPAQUE) Enteric Contrast:  Enteric contrast was not administered. FINDINGS: ABDOMEN LOWER CHEST:  Scarring and atelectatic changes both lung bases. LIVER/BILIARY TREE:  Enlarged mildly fatty liver. Extrahepatic biliary ducts measuring up to 9 mm, considered normal status post cholecystectomy and stable from prior. GALLBLADDER:  Removed SPLEEN:  Unremarkable. PANCREAS:  Unremarkable. ADRENAL GLANDS:  Unremarkable. KIDNEYS/URETERS:  No hydronephrosis or urinary tract calculus. One or more sharply circumscribed subcentimeter renal hypodensities are present, too small to accurately characterize, and statistically most likely benign findings. According to recent literature (Radiology 2019) no further workup of these findings is recommended. STOMACH AND BOWEL:  Limited evaluation of GI tract without oral contrast.  Small sliding hiatal hernia. Stomach unremarkable. Radiopaque material in the stomach likely ingested medication. Small bowel average caliber. Large bowel mostly collapsed. No CT evidence of colitis or diverticulitis. APPENDIX:  Removed ABDOMINOPELVIC CAVITY:  Trace pelvic fluid. No free air.   No lymphadenopathy VESSELS:  Unremarkable for patient's age. PELVIS REPRODUCTIVE ORGANS:  Prior hysterectomy. No adnexal mass URINARY BLADDER:  Unremarkable. ABDOMINAL WALL/INGUINAL REGIONS:  Tiny fat-containing umbilical hernia. OSSEOUS STRUCTURES:  No acute fracture or destructive osseous lesion. Impression: No CT explanation for patient's epigastric pain. Small sliding hiatal hernia. Mild extrahepatic biliary ductal dilatation considered normal status post cholecystectomy and stable from October 2022.  Workstation performed: [id number]         Patricia Rojas MD

## 2023-11-24 NOTE — PATIENT INSTRUCTIONS
As discussed today:    Medications:  Continue taking your current medications without changes  Laboratory Testing (Listed below):  Please have blood work drawn in early January and again in early April. Radiology/Diagnostic Testing:  Please schedule an abdominal ultrasound in April and Elastrography in January. Avoid alcohol and tobacco products. Also avoid raw seafood/oysters and avoid swimming/wading in unchlorinated todd, particularly from the [facility], due to increased risk of infection from a bacteria called Vibrio Vulnificus. Avoid medications called NSAID's (Motrin/Ibuprofen, Naproxen/Naprosyn/Aleve) if possible, due to increase risk of kidney dysfunction, and if you use medications containing acetaminophen (Tylenol, percocet, Endocet, and many cough/cold medications), the dose should not exceed 2 grams/day. Seek immediate medical attention if you develop fevers over 101 F, have evidence of GI bleeding (black or bloody stools, bloody or coffee ground emesis) or confusion. Call our office if you develop worsening symptoms related to lower extremity edema, ascites, jaundice or excessive bruising/bleeding.

## 2023-11-30 ENCOUNTER — APPOINTMENT (OUTPATIENT)
Dept: LAB | Facility: HOSPITAL | Age: 68
End: 2023-11-30
Payer: COMMERCIAL

## 2023-11-30 DIAGNOSIS — D50.8 IRON DEFICIENCY ANEMIA SECONDARY TO INADEQUATE DIETARY IRON INTAKE: ICD-10-CM

## 2023-11-30 DIAGNOSIS — E89.0 POSTOPERATIVE HYPOTHYROIDISM: ICD-10-CM

## 2023-11-30 DIAGNOSIS — I27.20 PULMONARY HYPERTENSION (HCC): ICD-10-CM

## 2023-11-30 DIAGNOSIS — E55.9 VITAMIN D DEFICIENCY: ICD-10-CM

## 2023-11-30 DIAGNOSIS — G47.33 OSA (OBSTRUCTIVE SLEEP APNEA): ICD-10-CM

## 2023-11-30 DIAGNOSIS — E11.9 TYPE 2 DIABETES MELLITUS WITHOUT COMPLICATION, WITHOUT LONG-TERM CURRENT USE OF INSULIN (HCC): ICD-10-CM

## 2023-11-30 DIAGNOSIS — J44.9 CHRONIC OBSTRUCTIVE PULMONARY DISEASE, UNSPECIFIED COPD TYPE (HCC): ICD-10-CM

## 2023-11-30 DIAGNOSIS — E66.01 SEVERE OBESITY WITH BODY MASS INDEX (BMI) OF 35.0 TO 39.9 WITH SERIOUS COMORBIDITY (HCC): Chronic | ICD-10-CM

## 2023-11-30 DIAGNOSIS — E53.8 FOLIC ACID DEFICIENCY: ICD-10-CM

## 2023-11-30 DIAGNOSIS — I10 ESSENTIAL HYPERTENSION: ICD-10-CM

## 2023-11-30 DIAGNOSIS — S22.080D COMPRESSION FRACTURE OF T12 VERTEBRA WITH ROUTINE HEALING, SUBSEQUENT ENCOUNTER: ICD-10-CM

## 2023-11-30 DIAGNOSIS — I48.19 PERSISTENT ATRIAL FIBRILLATION (HCC): Chronic | ICD-10-CM

## 2023-11-30 DIAGNOSIS — K50.919 CROHN'S DISEASE WITH COMPLICATION, UNSPECIFIED GASTROINTESTINAL TRACT LOCATION (HCC): ICD-10-CM

## 2023-11-30 DIAGNOSIS — K22.70 BARRETT'S ESOPHAGUS WITHOUT DYSPLASIA: ICD-10-CM

## 2023-11-30 DIAGNOSIS — I50.32 CHRONIC HEART FAILURE WITH PRESERVED EJECTION FRACTION (HCC): ICD-10-CM

## 2023-11-30 DIAGNOSIS — E78.00 HYPERCHOLESTEREMIA: ICD-10-CM

## 2023-11-30 DIAGNOSIS — F33.41 RECURRENT MAJOR DEPRESSIVE DISORDER, IN PARTIAL REMISSION (HCC): Chronic | ICD-10-CM

## 2023-11-30 DIAGNOSIS — K21.00 GASTROESOPHAGEAL REFLUX DISEASE WITH ESOPHAGITIS WITHOUT HEMORRHAGE: ICD-10-CM

## 2023-11-30 LAB
25(OH)D3 SERPL-MCNC: 56.3 NG/ML (ref 30–100)
ALBUMIN SERPL BCP-MCNC: 3.8 G/DL (ref 3.5–5)
ALP SERPL-CCNC: 88 U/L (ref 34–104)
ALT SERPL W P-5'-P-CCNC: 20 U/L (ref 7–52)
ANION GAP SERPL CALCULATED.3IONS-SCNC: 8 MMOL/L
AST SERPL W P-5'-P-CCNC: 21 U/L (ref 13–39)
BASOPHILS # BLD AUTO: 0.03 THOUSANDS/ÂΜL (ref 0–0.1)
BASOPHILS NFR BLD AUTO: 0 % (ref 0–1)
BILIRUB SERPL-MCNC: 0.32 MG/DL (ref 0.2–1)
BUN SERPL-MCNC: 13 MG/DL (ref 5–25)
CALCIUM SERPL-MCNC: 9.2 MG/DL (ref 8.4–10.2)
CHLORIDE SERPL-SCNC: 103 MMOL/L (ref 96–108)
CHOLEST SERPL-MCNC: 150 MG/DL
CO2 SERPL-SCNC: 30 MMOL/L (ref 21–32)
CREAT SERPL-MCNC: 0.76 MG/DL (ref 0.6–1.3)
EOSINOPHIL # BLD AUTO: 0.11 THOUSAND/ÂΜL (ref 0–0.61)
EOSINOPHIL NFR BLD AUTO: 1 % (ref 0–6)
ERYTHROCYTE [DISTWIDTH] IN BLOOD BY AUTOMATED COUNT: 14.7 % (ref 11.6–15.1)
EST. AVERAGE GLUCOSE BLD GHB EST-MCNC: 160 MG/DL
FERRITIN SERPL-MCNC: 15 NG/ML (ref 11–307)
FOLATE SERPL-MCNC: 12 NG/ML
GFR SERPL CREATININE-BSD FRML MDRD: 81 ML/MIN/1.73SQ M
GLUCOSE P FAST SERPL-MCNC: 149 MG/DL (ref 65–99)
HBA1C MFR BLD: 7.2 %
HCT VFR BLD AUTO: 38.7 % (ref 34.8–46.1)
HDLC SERPL-MCNC: 39 MG/DL
HGB BLD-MCNC: 11.2 G/DL (ref 11.5–15.4)
IMM GRANULOCYTES # BLD AUTO: 0.02 THOUSAND/UL (ref 0–0.2)
IMM GRANULOCYTES NFR BLD AUTO: 0 % (ref 0–2)
IRON SATN MFR SERPL: 9 % (ref 15–50)
IRON SERPL-MCNC: 34 UG/DL (ref 50–212)
LDLC SERPL CALC-MCNC: 82 MG/DL (ref 0–100)
LYMPHOCYTES # BLD AUTO: 2.13 THOUSANDS/ÂΜL (ref 0.6–4.47)
LYMPHOCYTES NFR BLD AUTO: 26 % (ref 14–44)
MCH RBC QN AUTO: 24.6 PG (ref 26.8–34.3)
MCHC RBC AUTO-ENTMCNC: 28.9 G/DL (ref 31.4–37.4)
MCV RBC AUTO: 85 FL (ref 82–98)
MONOCYTES # BLD AUTO: 0.5 THOUSAND/ÂΜL (ref 0.17–1.22)
MONOCYTES NFR BLD AUTO: 6 % (ref 4–12)
NEUTROPHILS # BLD AUTO: 5.52 THOUSANDS/ÂΜL (ref 1.85–7.62)
NEUTS SEG NFR BLD AUTO: 67 % (ref 43–75)
NONHDLC SERPL-MCNC: 111 MG/DL
NRBC BLD AUTO-RTO: 0 /100 WBCS
PLATELET # BLD AUTO: 272 THOUSANDS/UL (ref 149–390)
PMV BLD AUTO: 9.5 FL (ref 8.9–12.7)
POTASSIUM SERPL-SCNC: 3.1 MMOL/L (ref 3.5–5.3)
PROT SERPL-MCNC: 7.5 G/DL (ref 6.4–8.4)
RBC # BLD AUTO: 4.56 MILLION/UL (ref 3.81–5.12)
SODIUM SERPL-SCNC: 141 MMOL/L (ref 135–147)
TIBC SERPL-MCNC: 363 UG/DL (ref 250–450)
TRIGL SERPL-MCNC: 144 MG/DL
TSH SERPL DL<=0.05 MIU/L-ACNC: 2.99 UIU/ML (ref 0.45–4.5)
UIBC SERPL-MCNC: 329 UG/DL (ref 155–355)
VIT B12 SERPL-MCNC: 219 PG/ML (ref 180–914)
WBC # BLD AUTO: 8.31 THOUSAND/UL (ref 4.31–10.16)

## 2023-11-30 PROCEDURE — 36415 COLL VENOUS BLD VENIPUNCTURE: CPT

## 2023-11-30 PROCEDURE — 82306 VITAMIN D 25 HYDROXY: CPT

## 2023-11-30 PROCEDURE — 83540 ASSAY OF IRON: CPT

## 2023-11-30 PROCEDURE — 82728 ASSAY OF FERRITIN: CPT

## 2023-11-30 PROCEDURE — 80061 LIPID PANEL: CPT

## 2023-11-30 PROCEDURE — 82607 VITAMIN B-12: CPT

## 2023-11-30 PROCEDURE — 84443 ASSAY THYROID STIM HORMONE: CPT

## 2023-11-30 PROCEDURE — 3051F HG A1C>EQUAL 7.0%<8.0%: CPT | Performed by: INTERNAL MEDICINE

## 2023-11-30 PROCEDURE — 85025 COMPLETE CBC W/AUTO DIFF WBC: CPT

## 2023-11-30 PROCEDURE — 80053 COMPREHEN METABOLIC PANEL: CPT

## 2023-11-30 PROCEDURE — 83550 IRON BINDING TEST: CPT

## 2023-11-30 PROCEDURE — 82746 ASSAY OF FOLIC ACID SERUM: CPT

## 2023-11-30 PROCEDURE — 83036 HEMOGLOBIN GLYCOSYLATED A1C: CPT

## 2023-11-30 RX ORDER — LIDOCAINE HYDROCHLORIDE 10 MG/ML
0.5 INJECTION, SOLUTION EPIDURAL; INFILTRATION; INTRACAUDAL; PERINEURAL ONCE AS NEEDED
Status: CANCELLED | OUTPATIENT
Start: 2023-11-30

## 2023-11-30 RX ORDER — SODIUM CHLORIDE, SODIUM LACTATE, POTASSIUM CHLORIDE, CALCIUM CHLORIDE 600; 310; 30; 20 MG/100ML; MG/100ML; MG/100ML; MG/100ML
125 INJECTION, SOLUTION INTRAVENOUS CONTINUOUS
Status: CANCELLED | OUTPATIENT
Start: 2023-11-30

## 2023-12-01 ENCOUNTER — ANESTHESIA (OUTPATIENT)
Dept: PERIOP | Facility: HOSPITAL | Age: 68
End: 2023-12-01

## 2023-12-01 ENCOUNTER — ANESTHESIA EVENT (OUTPATIENT)
Dept: PERIOP | Facility: HOSPITAL | Age: 68
End: 2023-12-01

## 2023-12-01 ENCOUNTER — HOSPITAL ENCOUNTER (OUTPATIENT)
Dept: PERIOP | Facility: HOSPITAL | Age: 68
Setting detail: OUTPATIENT SURGERY
End: 2023-12-01
Attending: STUDENT IN AN ORGANIZED HEALTH CARE EDUCATION/TRAINING PROGRAM
Payer: COMMERCIAL

## 2023-12-01 VITALS
HEIGHT: 69 IN | SYSTOLIC BLOOD PRESSURE: 136 MMHG | TEMPERATURE: 97 F | OXYGEN SATURATION: 97 % | RESPIRATION RATE: 20 BRPM | DIASTOLIC BLOOD PRESSURE: 61 MMHG | WEIGHT: 243 LBS | HEART RATE: 54 BPM | BODY MASS INDEX: 35.99 KG/M2

## 2023-12-01 DIAGNOSIS — R19.7 DIARRHEA, UNSPECIFIED TYPE: ICD-10-CM

## 2023-12-01 DIAGNOSIS — R10.84 GENERALIZED ABDOMINAL PAIN: ICD-10-CM

## 2023-12-01 DIAGNOSIS — K50.919 CROHN'S DISEASE WITH COMPLICATION, UNSPECIFIED GASTROINTESTINAL TRACT LOCATION (HCC): ICD-10-CM

## 2023-12-01 DIAGNOSIS — K44.9 HIATAL HERNIA: Primary | ICD-10-CM

## 2023-12-01 DIAGNOSIS — K21.9 GASTROESOPHAGEAL REFLUX DISEASE, UNSPECIFIED WHETHER ESOPHAGITIS PRESENT: ICD-10-CM

## 2023-12-01 LAB — GLUCOSE SERPL-MCNC: 128 MG/DL (ref 65–140)

## 2023-12-01 PROCEDURE — 82948 REAGENT STRIP/BLOOD GLUCOSE: CPT

## 2023-12-01 PROCEDURE — 43239 EGD BIOPSY SINGLE/MULTIPLE: CPT | Performed by: STUDENT IN AN ORGANIZED HEALTH CARE EDUCATION/TRAINING PROGRAM

## 2023-12-01 PROCEDURE — 45380 COLONOSCOPY AND BIOPSY: CPT | Performed by: STUDENT IN AN ORGANIZED HEALTH CARE EDUCATION/TRAINING PROGRAM

## 2023-12-01 PROCEDURE — 88305 TISSUE EXAM BY PATHOLOGIST: CPT | Performed by: STUDENT IN AN ORGANIZED HEALTH CARE EDUCATION/TRAINING PROGRAM

## 2023-12-01 RX ORDER — SODIUM CHLORIDE, SODIUM LACTATE, POTASSIUM CHLORIDE, CALCIUM CHLORIDE 600; 310; 30; 20 MG/100ML; MG/100ML; MG/100ML; MG/100ML
125 INJECTION, SOLUTION INTRAVENOUS CONTINUOUS
Status: DISCONTINUED | OUTPATIENT
Start: 2023-12-01 | End: 2023-12-05 | Stop reason: HOSPADM

## 2023-12-01 RX ORDER — LIDOCAINE HYDROCHLORIDE 10 MG/ML
0.5 INJECTION, SOLUTION EPIDURAL; INFILTRATION; INTRACAUDAL; PERINEURAL ONCE AS NEEDED
Status: DISCONTINUED | OUTPATIENT
Start: 2023-12-01 | End: 2023-12-05 | Stop reason: HOSPADM

## 2023-12-01 RX ORDER — LIDOCAINE HYDROCHLORIDE 20 MG/ML
INJECTION, SOLUTION EPIDURAL; INFILTRATION; INTRACAUDAL; PERINEURAL AS NEEDED
Status: DISCONTINUED | OUTPATIENT
Start: 2023-12-01 | End: 2023-12-01

## 2023-12-01 RX ORDER — PROPOFOL 10 MG/ML
INJECTION, EMULSION INTRAVENOUS AS NEEDED
Status: DISCONTINUED | OUTPATIENT
Start: 2023-12-01 | End: 2023-12-01

## 2023-12-01 RX ORDER — SODIUM CHLORIDE, SODIUM LACTATE, POTASSIUM CHLORIDE, CALCIUM CHLORIDE 600; 310; 30; 20 MG/100ML; MG/100ML; MG/100ML; MG/100ML
INJECTION, SOLUTION INTRAVENOUS CONTINUOUS PRN
Status: DISCONTINUED | OUTPATIENT
Start: 2023-12-01 | End: 2023-12-01

## 2023-12-01 RX ORDER — ESOMEPRAZOLE MAGNESIUM 40 MG/1
40 CAPSULE, DELAYED RELEASE ORAL DAILY
Qty: 30 CAPSULE | Refills: 2 | Status: SHIPPED | OUTPATIENT
Start: 2023-12-01

## 2023-12-01 RX ADMIN — PROPOFOL 150 MG: 10 INJECTION, EMULSION INTRAVENOUS at 07:35

## 2023-12-01 RX ADMIN — PROPOFOL 140 MCG/KG/MIN: 10 INJECTION, EMULSION INTRAVENOUS at 07:47

## 2023-12-01 RX ADMIN — PROPOFOL 50 MG: 10 INJECTION, EMULSION INTRAVENOUS at 07:37

## 2023-12-01 RX ADMIN — PROPOFOL 50 MG: 10 INJECTION, EMULSION INTRAVENOUS at 07:43

## 2023-12-01 RX ADMIN — SODIUM CHLORIDE, SODIUM LACTATE, POTASSIUM CHLORIDE, AND CALCIUM CHLORIDE: .6; .31; .03; .02 INJECTION, SOLUTION INTRAVENOUS at 07:31

## 2023-12-01 RX ADMIN — SODIUM CHLORIDE, SODIUM LACTATE, POTASSIUM CHLORIDE, AND CALCIUM CHLORIDE 125 ML/HR: .6; .31; .03; .02 INJECTION, SOLUTION INTRAVENOUS at 07:03

## 2023-12-01 RX ADMIN — LIDOCAINE HYDROCHLORIDE 100 MG: 20 INJECTION, SOLUTION EPIDURAL; INFILTRATION; INTRACAUDAL; PERINEURAL at 07:35

## 2023-12-01 RX ADMIN — PROPOFOL 50 MG: 10 INJECTION, EMULSION INTRAVENOUS at 07:46

## 2023-12-01 RX ADMIN — PROPOFOL 50 MG: 10 INJECTION, EMULSION INTRAVENOUS at 07:40

## 2023-12-01 NOTE — ANESTHESIA POSTPROCEDURE EVALUATION
Post-Op Assessment Note    CV Status:  Stable  Pain Score: 0    Pain management: satisfactory to patient       Mental Status:  Alert and awake   Hydration Status:  Euvolemic   PONV Controlled:  Controlled   Airway Patency:  Patent     Post Op Vitals Reviewed: Yes    No anethesia notable event occurred.     Staff: SWETHA           BP   91/52   Temp      Pulse 61   Resp   18   SpO2   100

## 2023-12-01 NOTE — ANESTHESIA PROCEDURE NOTES
Anesthesia Notable Event    Date/Time: 12/1/2023 3:23 PM    Performed by: Daniel Keating MD  Authorized by: Daniel Keating MD

## 2023-12-01 NOTE — H&P
[facility]  History & Physical     PATIENT INFO     Name: Lai Aleman  YOB: 1955   Age: 80 y.o. Sex: female   MRN: [id number]     HISTORY OF PRESENT ILLNESS     Lai Aleman is a 80y.o. year old female who presents for EGD and colonoscopy for GERD, abdominal pain, history of Crohn's disease, hepatic steatosis. Last colonoscopy in 2021. On Eliquis. No other antithrombotics or anticoagulants. REVIEW OF SYSTEMS     Per the HPI, and otherwise unremarkable.     Historical Information   Past Medical History:   Diagnosis Date    A-fib ([address])     Acute respiratory failure with hypoxia ([address]) 11/30/2019    Asthma     Brain aneurysm     Cancer (HCC)     papillary thyroid cancer    Cardiac disease     CHF (congestive heart failure) (HCC)     COPD (chronic obstructive pulmonary disease) (HCC)     CPAP (continuous positive airway pressure) dependence     Crohn disease ([address])     Diabetes mellitus ([address])     Disease of thyroid gland     GERD (gastroesophageal reflux disease)     Hyperlipidemia     Hypertension     Sleep apnea      Past Surgical History:   Procedure Laterality Date    APPENDECTOMY      CARDIOVERSION N/A 2/14/2019    Procedure: CARDIOVERSION;  Surgeon: Benedict Liz MD;  Location: [facility];  Service: Cardiology    CEREBRAL ANEURYSM REPAIR      CHOLECYSTECTOMY      HYSTERECTOMY      IR PELVIC ANGIOGRAM  12/14/2017    WY EXC THROMBOSED HEMORRHOID Vena Phenes N/A 10/7/2022    Procedure: HEMORRHOIDECTOMY EXCISION;  Surgeon: Niecy Diego DO;  Location: [facility];  Service: General    THYROIDECTOMY      TONSILLECTOMY AND ADENOIDECTOMY       Social History   Social History     Substance and Sexual Activity   Alcohol Use Not Currently    Comment: social     Social History     Substance and Sexual Activity   Drug Use Not Currently    Types: Marijuana     Social History     Tobacco Use   Smoking Status Former    Types: Cigarettes    Quit date: 1/1/2018    Years since quittin.9   Smokeless Tobacco Never     Family History   Problem Relation Age of Onset    Alzheimer's disease Mother     Cancer Father         lung and bone    Heart disease Sister     Hypertension Sister     Diabetes Sister     Stroke Sister     Cancer Sister     Cancer Brother         liver with mets to bone    Heart disease Brother     Cancer Brother         thyroid cancer        MEDICATIONS & ALLERGIES     Current Outpatient Medications   Medication Instructions    albuterol (PROVENTIL HFA,VENTOLIN HFA) 90 mcg/act inhaler 2 puffs, Inhalation, Every 4 hours PRN    apixaban (ELIQUIS) 5 mg, Oral, 2 times daily    aspirin 81 mg, Oral, Daily    atorvastatin (LIPITOR) 20 mg, Oral, Every morning    bisacodyl (DULCOLAX) 20 mg, Oral, Once    Blood Glucose Monitoring Suppl (ONETOUCH VERIO) w/Device KIT Use to check sugars once a day. Dx E11.9    Blood Glucose Monitoring Suppl (Rubi Areas) w/Device KIT Daily, Use to check glucose    buPROPion (WELLBUTRIN XL) 150 mg, Oral, Every morning    Cholecalciferol (Vitamin D3) 1.25 MG (46357 UT) CAPS 1 capsule, Oral, Weekly    docusate sodium (COLACE) 100 mg, Oral, 2 times daily    dofetilide (TIKOSYN) 500 mcg, Oral, 2 times daily    Empagliflozin (JARDIANCE) 10 mg, Oral, Daily    famotidine (PEPCID) 40 MG tablet TAKE 1 TABLET BY MOUTH ONCE DAILY AT BEDTIME    fenofibrate (TRICOR) 54 mg, Oral, Daily    fluticasone-umeclidinium-vilanterol (Trelegy Ellipta) 200-62.5-25 mcg/actuation AEPB inhaler 1 puff, Inhalation, Daily, Rinse mouth after use. furosemide (LASIX) 20 mg, Oral, 2 times daily    gabapentin (NEURONTIN) 300 mg, Oral, Daily at bedtime    glipiZIDE (GLUCOTROL XL) 5 mg 24 hr tablet TAKE 1 TABLET BY MOUTH ONCE DAILY 30 MINUTES BEFORE A MEAL (REPLACES METFORMIN)    glucose blood test strip Use to check sugars once a day.  Dx E11.9    hydrOXYzine HCL (ATARAX) 10 mg, Oral, 2 times daily    Lancets (ONETOUCH DELICA PLUS [id number]) MISC USE 1 TO CHECK GLUCOSE ONCE DAILY lisinopril (ZESTRIL) 10 mg, Oral, Every morning    mesalamine (DELZICOL) 400 mg Take 2 capsules by mouth twice daily    metoprolol succinate (TOPROL-XL) 100 mg, Oral, Every 12 hours    omeprazole (PriLOSEC) 40 MG capsule Take 1 capsule by mouth twice daily    ONETOUCH VERIO test strip USE 1 STRIP TO CHECK GLUCOSE ONCE DAILY    pancrelipase, Lip-Prot-Amyl, (CREON) 24,000 units 72,000 units of lipase, Oral, 3 times daily with meals    polyethylene glycol (MIRALAX) 238 g, Oral, Once, Take 238 g my mouth. Use as directed    SYNTHROID 150 MCG tablet TAKE 1 TABLET BY MOUTH ONCE DAILY FIRST THING IN THE MORNING (AT LEAST 30 MINUTES PRIOR TO BREAKFAST OR OTHER MEDS)     Allergies   Allergen Reactions    Sulfa Antibiotics Rash        PHYSICAL EXAM      Objective   Blood pressure 150/65, pulse (!) 50, temperature 98.2 °F (36.8 °C), temperature source Temporal, resp. rate 18, height 5' 9" (1.753 m), weight 110 kg (243 lb), SpO2 96 %. Body mass index is 35.88 kg/m². General Appearance:   Alert, cooperative, no distress   Lungs:   Equal chest rise, respirations unlabored    Heart:   Regular rate and rhythm   Abdomen:   Soft, non-tender, non-distended; normal bowel sounds; no masses, no organomegaly    Extremities:   No edema       ASSESSMENT & PLAN     This is a 80y.o. year old female here for EGD and colonoscopy, and she is stable and optimized for her procedure. Maycol Jimenez D.O. [address] [facility]  Division of Gastroenterology & Hepatology  Available on [email or web address]    ** Please Note: This note is constructed using a voice recognition dictation system.  **

## 2023-12-01 NOTE — ANESTHESIA PREPROCEDURE EVALUATION
Procedure:  COLONOSCOPY  EGD    EF 65%, PASP 42    Relevant Problems   CARDIO   (+) A-fib (HCC)   (+) Atypical chest pain   (+) Essential hypertension   (+) Hypercholesteremia   (+) Persistent atrial fibrillation (HCC)      ENDO   (+) Diabetes mellitus, type 2 (HCC)   (+) Postoperative hypothyroidism      GI/HEPATIC   (+) GERD (gastroesophageal reflux disease)   (+) Gastroesophageal reflux disease with esophagitis   (+) Hepatic steatosis   (+) Hepatomegaly      /RENAL   (+) Congenital multiple renal cysts   (+) Multiple renal cysts      HEMATOLOGY   (+) Iron deficiency anemia secondary to inadequate dietary iron intake      MUSCULOSKELETAL   (+) Chronic bilateral low back pain without sciatica   (+) Lumbar spondylosis      NEURO/PSYCH   (+) Chronic bilateral low back pain without sciatica   (+) Chronic pain syndrome   (+) Recurrent major depressive disorder (HCC)      PULMONARY   (+) COPD (chronic obstructive pulmonary disease) (HCC)   (+) MARY ELLEN (obstructive sleep apnea)   (+) Pneumonia due to infectious organism   (+) Severe obstructive sleep apnea      Cardiovascular and Mediastinum   (+) (HFpEF) heart failure with preserved ejection fraction (HCC)      Other   (+) Crohn's disease with complication (HCC)   (+) Prolonged QT interval   (+) Thymus neoplasm        Physical Exam    Airway    Mallampati score: III  TM Distance: <3 FB  Neck ROM: full     Dental   No notable dental hx     Cardiovascular  Rhythm: regular, Rate: normal, Cardiovascular exam normal    Pulmonary  Pulmonary exam normal Breath sounds clear to auscultation    Other Findings  post-pubertal.      Anesthesia Plan  ASA Score- 3     Anesthesia Type- IV sedation with anesthesia with ASA Monitors. Additional Monitors:     Airway Plan:     Comment: Discussed risks/benefits, including medication reactions, awareness, aspiration, and serious/life threatening complications. Plan to maintain native airway with IVGA, monitored with EtCO2. Plan Factors-Exercise tolerance (METS): >4 METS. Chart reviewed. Patient summary reviewed. Patient instructed to abstain from smoking on day of procedure. Patient did not smoke on day of surgery. Induction- intravenous. Postoperative Plan-     Informed Consent- Anesthetic plan and risks discussed with patient. I personally reviewed this patient with the CRNA. Discussed and agreed on the Anesthesia Plan with the CRNA. Enma Dowd

## 2023-12-05 PROCEDURE — 88305 TISSUE EXAM BY PATHOLOGIST: CPT | Performed by: STUDENT IN AN ORGANIZED HEALTH CARE EDUCATION/TRAINING PROGRAM

## 2023-12-11 ENCOUNTER — OFFICE VISIT (OUTPATIENT)
Dept: BARIATRICS | Facility: CLINIC | Age: 68
End: 2023-12-11
Payer: COMMERCIAL

## 2023-12-11 ENCOUNTER — APPOINTMENT (OUTPATIENT)
Dept: LAB | Facility: MEDICAL CENTER | Age: 68
End: 2023-12-11
Payer: COMMERCIAL

## 2023-12-11 VITALS
TEMPERATURE: 97.3 F | DIASTOLIC BLOOD PRESSURE: 80 MMHG | HEART RATE: 95 BPM | WEIGHT: 240 LBS | HEIGHT: 69 IN | BODY MASS INDEX: 35.55 KG/M2 | SYSTOLIC BLOOD PRESSURE: 118 MMHG

## 2023-12-11 DIAGNOSIS — E87.6 HYPOKALEMIA: ICD-10-CM

## 2023-12-11 DIAGNOSIS — E66.9 CLASS 2 OBESITY: ICD-10-CM

## 2023-12-11 DIAGNOSIS — E11.9 TYPE 2 DIABETES MELLITUS WITHOUT COMPLICATION, WITHOUT LONG-TERM CURRENT USE OF INSULIN (HCC): ICD-10-CM

## 2023-12-11 DIAGNOSIS — E66.9 CLASS 2 OBESITY: Primary | ICD-10-CM

## 2023-12-11 LAB
ANION GAP SERPL CALCULATED.3IONS-SCNC: 9 MMOL/L
BUN SERPL-MCNC: 12 MG/DL (ref 5–25)
CALCIUM SERPL-MCNC: 8.9 MG/DL (ref 8.4–10.2)
CHLORIDE SERPL-SCNC: 104 MMOL/L (ref 96–108)
CO2 SERPL-SCNC: 31 MMOL/L (ref 21–32)
CREAT SERPL-MCNC: 0.85 MG/DL (ref 0.6–1.3)
GFR SERPL CREATININE-BSD FRML MDRD: 71 ML/MIN/1.73SQ M
GLUCOSE P FAST SERPL-MCNC: 104 MG/DL (ref 65–99)
POTASSIUM SERPL-SCNC: 3.5 MMOL/L (ref 3.5–5.3)
SODIUM SERPL-SCNC: 144 MMOL/L (ref 135–147)

## 2023-12-11 PROCEDURE — 99214 OFFICE O/P EST MOD 30 MIN: CPT | Performed by: PHYSICIAN ASSISTANT

## 2023-12-11 PROCEDURE — 80048 BASIC METABOLIC PNL TOTAL CA: CPT

## 2023-12-11 PROCEDURE — 36415 COLL VENOUS BLD VENIPUNCTURE: CPT

## 2023-12-11 NOTE — PROGRESS NOTES
Assessment/Plan:    Class 2 obesity  -Patient is pursuing Conservative Program  -Initial weight loss goal of 5-10% weight loss for improved health  -Screening labs: A1c, TSH, and CMP reviewed from 11/30/23; K+ 3.1- repeat   -Pt has a personal hx of papillary thyroid cancer; her brother and daughters had thyroid cancer- unsure of type- would AVOID Wegovy and Saxenda   -cut back on sugary beverages  -increase fruits/veg --> try frozen    Initial: 255.9  Current: 240  Change: -15.9  Goal: 160-165     Diabetes mellitus, type 2 ([address])    Lab Results   Component Value Date    HGBA1C 7.2 (H) 11/30/2023       Follow up in approximately 3 months with Non-Surgical Physician/Advanced Practitioner. Goals:  Food log (ie.) [email or web address]. com,etc. baritastic  No sugary beverages. At least 64oz of water daily. Increase physical activity by 10 minutes daily. Gradually increase physical activity to a goal of 5 days per week for 30 minutes of MODERATE intensity PLUS 2 days per week of FULL BODY resistance training  5-10 servings of fruits and vegetables per day and 25-35 grams of dietary fiber per day, gradually increasing    Diagnoses and all orders for this visit:    Class 2 obesity  -     Basic metabolic panel; Future    Hypokalemia  -     Basic metabolic panel; Future    Type 2 diabetes mellitus without complication, without long-term current use of insulin (HCC)  -     Basic metabolic panel; Future    Body mass index 35.0-35.9, adult  -     Basic metabolic panel; Future        Subjective:   Chief Complaint   Patient presents with    Follow-up     Patient ID: Gideon Corrales  is a 80 y.o. female with excess weight/obesity here to pursue weight management.   Past Medical History:   Diagnosis Date    A-fib [facility])     Acute respiratory failure with hypoxia ([address]) 11/30/2019    Asthma     Brain aneurysm     Cancer ([address])     papillary thyroid cancer    Cardiac disease     CHF (congestive heart failure) (HCC)     COPD (chronic obstructive pulmonary disease) (HCC)     CPAP (continuous positive airway pressure) dependence     Crohn disease ([address])     Diabetes mellitus ([address])     Disease of thyroid gland     GERD (gastroesophageal reflux disease)     Hyperlipidemia     Hypertension     Sleep apnea      HPI:    Focusing on smaller portions     B: toast OR pancakes OR fruit  S: no  L: sandwich  S: no  D:  protein +/- veg +starch   S: sometimes- cookie or two     Dines out: up to twice a week    Has increased water- 3-4 bottles of water; Does drink sweet tea (2-3 cups/day-likely more than 8 oz cups)    Exercise limited due to back pain     The following portions of the patient's history were reviewed and updated as appropriate: allergies, current medications, past family history, past medical history, past social history, past surgical history, and problem list.    Review of Systems   Psychiatric/Behavioral:          +depression- plans to contact PCP to have medications adjusted; Denies SI/HI     Objective:    /80 (BP Location: Left arm, Cuff Size: Large)   Pulse 95   Temp (!) 97.3 °F (36.3 °C) (Temporal)   Ht 5' 9" (1.753 m)   Wt 109 kg (240 lb)   BMI 35.44 kg/m²     Physical Exam  Vitals and nursing note reviewed. Constitutional   General appearance: Abnormal.  well developed and obese. Pulmonary   Respiratory effort: No increased work of breathing or signs of respiratory distress. Abdomen   Abdomen: Abnormal.  The abdomen was obese.    Musculoskeletal   Gait and station: Normal.    Psychiatric   Orientation to person, place and time: Normal.    Affect: appropriate

## 2023-12-11 NOTE — ASSESSMENT & PLAN NOTE
-Patient is pursuing Conservative Program  -Initial weight loss goal of 5-10% weight loss for improved health  -Screening labs: A1c, TSH, and CMP reviewed from 11/30/23; K+ 3.1- repeat   -Pt has a personal hx of papillary thyroid cancer; her brother and daughters had thyroid cancer- unsure of type- would AVOID Wegovy and Saxenda   -cut back on sugary beverages  -increase fruits/veg --> try frozen    Initial: 255.9  Current: 240  Change: -15.9  Goal: 160-165

## 2023-12-28 DIAGNOSIS — K86.81 EXOCRINE PANCREATIC INSUFFICIENCY: ICD-10-CM

## 2023-12-28 RX ORDER — PANCRELIPASE 24000; 76000; 120000 [USP'U]/1; [USP'U]/1; [USP'U]/1
CAPSULE, DELAYED RELEASE PELLETS ORAL
Qty: 270 CAPSULE | Refills: 5 | Status: SHIPPED | OUTPATIENT
Start: 2023-12-28

## 2023-12-29 ENCOUNTER — CONSULT (OUTPATIENT)
Dept: CARDIOLOGY CLINIC | Facility: HOSPITAL | Age: 68
End: 2023-12-29
Payer: COMMERCIAL

## 2023-12-29 VITALS
BODY MASS INDEX: 35.07 KG/M2 | DIASTOLIC BLOOD PRESSURE: 70 MMHG | HEART RATE: 55 BPM | WEIGHT: 236.8 LBS | HEIGHT: 69 IN | SYSTOLIC BLOOD PRESSURE: 110 MMHG

## 2023-12-29 DIAGNOSIS — Z86.79 S/P ABLATION OF ATRIAL FIBRILLATION: ICD-10-CM

## 2023-12-29 DIAGNOSIS — I50.32 CHRONIC HEART FAILURE WITH PRESERVED EJECTION FRACTION (HCC): ICD-10-CM

## 2023-12-29 DIAGNOSIS — J44.9 CHRONIC OBSTRUCTIVE PULMONARY DISEASE, UNSPECIFIED COPD TYPE (HCC): Primary | ICD-10-CM

## 2023-12-29 DIAGNOSIS — Z98.890 S/P ABLATION OF ATRIAL FIBRILLATION: ICD-10-CM

## 2023-12-29 DIAGNOSIS — I48.0 PAROXYSMAL ATRIAL FIBRILLATION (HCC): ICD-10-CM

## 2023-12-29 DIAGNOSIS — I27.20 PULMONARY HYPERTENSION (HCC): ICD-10-CM

## 2023-12-29 DIAGNOSIS — I48.19 PERSISTENT ATRIAL FIBRILLATION (HCC): Chronic | ICD-10-CM

## 2023-12-29 DIAGNOSIS — G47.33 OSA (OBSTRUCTIVE SLEEP APNEA): ICD-10-CM

## 2023-12-29 PROCEDURE — 99204 OFFICE O/P NEW MOD 45 MIN: CPT | Performed by: INTERNAL MEDICINE

## 2024-01-02 ENCOUNTER — OFFICE VISIT (OUTPATIENT)
Dept: INTERNAL MEDICINE CLINIC | Facility: CLINIC | Age: 69
End: 2024-01-02
Payer: COMMERCIAL

## 2024-01-02 VITALS — HEART RATE: 78 BPM | OXYGEN SATURATION: 96 % | TEMPERATURE: 99.9 F

## 2024-01-02 DIAGNOSIS — U07.1 COVID-19: Primary | ICD-10-CM

## 2024-01-02 LAB
SARS-COV-2 AG UPPER RESP QL IA: POSITIVE
SL AMB POCT RAPID FLU A: NEGATIVE
SL AMB POCT RAPID FLU B: NEGATIVE
VALID CONTROL: ABNORMAL

## 2024-01-02 PROCEDURE — 87811 SARS-COV-2 COVID19 W/OPTIC: CPT | Performed by: FAMILY MEDICINE

## 2024-01-02 PROCEDURE — 87804 INFLUENZA ASSAY W/OPTIC: CPT | Performed by: FAMILY MEDICINE

## 2024-01-02 PROCEDURE — 99213 OFFICE O/P EST LOW 20 MIN: CPT | Performed by: FAMILY MEDICINE

## 2024-01-02 RX ORDER — DOXYCYCLINE 100 MG/1
100 CAPSULE ORAL 2 TIMES DAILY
Qty: 20 CAPSULE | Refills: 0 | Status: SHIPPED | OUTPATIENT
Start: 2024-01-02 | End: 2024-01-12

## 2024-01-02 NOTE — PROGRESS NOTES
COVID-19 Outpatient Progress Note    Assessment/Plan:    Problem List Items Addressed This Visit    None  Visit Diagnoses     COVID-19    -  Primary    Relevant Medications    doxycycline monohydrate (MONODOX) 100 mg capsule    Other Relevant Orders    POCT rapid flu A and B (Completed)    POCT Rapid Covid Ag (Completed)           Disposition:     Rapid antigen testing was performed and the result is POSITIVE for COVID-19. Patient with asymptomatic/mild COVID-19: They were recommended to isolate for at least 5 days (day 0 is the day symptoms appeared or the date the specimen was collected for the positive test for people who are asymptomatic). If they are asymptomatic or symptoms are improving with no fevers in the past 24 hours, isolation may be ended followed by 5 days of wearing a high quality mask when around others to minimize risk of infecting others. They should wear a mask through day 10 and a test-based strategy may be used to remove a mask sooner.      Discussed symptom directed medication options with patient.     Fluids.  Rest.  Tylenol or Motrin if needed.  Stay adequately hydrated.  Deep breathing exercises recommended.  Frequent position changes recommended.  Prone positioning recommended.  Coronavirus testing completed.  Rapid coronavirus testing completed and was positive.  Warning signs and symptoms discussed.  Discussed antiviral treatment.  She wishes to avoid this because of potential side effects and interactions with her other medications.      I have spent a total time of 15 minutes on the day of the encounter for this patient including discussing diagnostic results, discussing prognosis, risks and benefits of treatment options, instructions for management, patient and family education, importance of treatment compliance, risk factor reductions, impressions, counseling/coordination of care, documenting in the medical record, reviewing/ordering tests, medicine, procedures and obtaining or  reviewing history.       Encounter provider: Ina Rivas MD     Provider located at: [address] [facility] [place]  [address] [facility] [id number]     Recent Visits  No visits were found meeting these conditions.  Showing recent visits within past 7 days and meeting all other requirements  Today's Visits  Date Type Provider Dept   01/02/24 Office Visit Ina Rivas MD [facility]   Showing today's visits and meeting all other requirements  Future Appointments  No visits were found meeting these conditions.  Showing future appointments within next 150 days and meeting all other requirements     Subjective:   Kellen Gray is a 68 y.o. female who is concerned about COVID-19. Patient's symptoms include chills, fatigue, malaise, nasal congestion, rhinorrhea, sore throat, cough, myalgias and headache. Patient denies fever, shortness of breath, chest tightness, abdominal pain, nausea, vomiting and diarrhea.     - Date of symptom onset: 12/31/2023      COVID-19 vaccination status: Fully vaccinated (primary series)    Exposure:   Contact with a person who is under investigation (PUI) for or who is positive for COVID-19 within the last 14 days?: No    Hospitalized recently for fever and/or lower respiratory symptoms?: No      Currently a healthcare worker that is involved in direct patient care?: No      Works in a special setting where the risk of COVID-19 transmission may be high? (this may include long-term care, correctional and [facility] facilities; homeless shelters; assisted-living facilities and group homes.): No      Resident in a special setting where the risk of COVID-19 transmission may be high? (this may include long-term care, correctional and [facility] facilities; homeless shelters; assisted-living facilities and group homes.): No      Started over the last few days with increased nasal congestion and postnasal drip as well as sore  throat.  Has had worsening cough.  Overall body aches.  Some headache.    Lab Results   Component Value Date    SARSCOV2 Negative 03/16/2023    SARSCOVAG Positive (A) 01/02/2024       Review of Systems   Constitutional:  Positive for chills and fatigue. Negative for fever.   HENT:  Positive for congestion, rhinorrhea and sore throat.    Respiratory:  Positive for cough. Negative for chest tightness and shortness of breath.    Gastrointestinal:  Negative for abdominal pain, diarrhea, nausea and vomiting.   Musculoskeletal:  Positive for myalgias.   Neurological:  Positive for headaches.     Current Outpatient Medications on File Prior to Visit   Medication Sig   • albuterol (PROVENTIL HFA,VENTOLIN HFA) 90 mcg/act inhaler Inhale 2 puffs every 4 (four) hours as needed for wheezing   • apixaban (ELIQUIS) 5 mg Take 5 mg by mouth 2 (two) times a day    • aspirin 81 mg chewable tablet Chew 1 tablet (81 mg total) daily   • atorvastatin (LIPITOR) 20 mg tablet Take 20 mg by mouth every morning   • Blood Glucose Monitoring Suppl (ONETOUCH VERIO) w/Device KIT Use to check sugars once a day. Dx E11.9   • Blood Glucose Monitoring Suppl (ONETOUCH VERIO) w/Device KIT daily Use to check glucose   • buPROPion (WELLBUTRIN XL) 150 mg 24 hr tablet Take 1 tablet (150 mg total) by mouth every morning   • Cholecalciferol (Vitamin D3) 1.25 MG (13576 UT) CAPS Take 1 capsule by mouth once a week (Patient not taking: Reported on 12/11/2023)   • docusate sodium (COLACE) 100 mg capsule Take 1 capsule (100 mg total) by mouth 2 (two) times a day (Patient not taking: Reported on 12/29/2023)   • dofetilide (TIKOSYN) 500 mcg capsule Take 500 mcg by mouth 2 (two) times a day   • Empagliflozin (JARDIANCE) 10 MG TABS tablet Take 1 tablet (10 mg total) by mouth daily   • esomeprazole (NexIUM) 40 MG capsule Take 1 capsule (40 mg total) by mouth daily Take 30 minutes before largest meal of the day   • famotidine (PEPCID) 40 MG tablet TAKE 1 TABLET BY MOUTH  ONCE DAILY AT BEDTIME   • fenofibrate (TRICOR) 54 MG tablet Take 54 mg by mouth daily   • fluticasone-umeclidinium-vilanterol (Trelegy Ellipta) 200-62.5-25 mcg/actuation AEPB inhaler Inhale 1 puff daily Rinse mouth after use.   • furosemide (LASIX) 20 mg tablet Take 20 mg by mouth 2 (two) times a day   • gabapentin (NEURONTIN) 300 mg capsule Take 1 capsule (300 mg total) by mouth daily at bedtime   • glipiZIDE (GLUCOTROL XL) 5 mg 24 hr tablet TAKE 1 TABLET BY MOUTH ONCE DAILY 30 MINUTES BEFORE A MEAL (REPLACES METFORMIN)   • glucose blood test strip Use to check sugars once a day. Dx E11.9   • hydrOXYzine HCL (ATARAX) 10 mg tablet Take 10 mg by mouth 2 (two) times a day   • Lancets (ONETOUCH DELICA PLUS [id number]) MISC USE 1 TO CHECK GLUCOSE ONCE DAILY   • lisinopril (ZESTRIL) 10 mg tablet Take 10 mg by mouth every morning   • mesalamine (DELZICOL) 400 mg Take 2 capsules by mouth twice daily   • metoprolol succinate (TOPROL-XL) 100 mg 24 hr tablet Take 1 tablet (100 mg total) by mouth every 12 (twelve) hours   • ONETOUCH VERIO test strip USE 1 STRIP TO CHECK GLUCOSE ONCE DAILY   • pancrelipase, Lip-Prot-Amyl, (Creon) 24,000 units TAKE 3 CAPSULES BY MOUTH THREE TIMES DAILY WITH MEALS (Patient taking differently: Take 24,000 Units by mouth 2 (two) times a day)   • SYNTHROID 150 MCG tablet TAKE 1 TABLET BY MOUTH ONCE DAILY FIRST THING IN THE MORNING (AT LEAST 30 MINUTES PRIOR TO BREAKFAST OR OTHER MEDS)       Objective:    Pulse 78   Temp 99.9 °F (37.7 °C)   SpO2 96%        Physical Exam  Vitals reviewed.   Constitutional:       Appearance: She is well-developed and well-groomed.   HENT:      Head:      Comments: Boggy nasal mucosa with clear nasal discharge; posterior pharyngeal erythema  Cardiovascular:      Rate and Rhythm: Normal rate. Rhythm irregularly irregular.   Pulmonary:      Breath sounds: Transmitted upper airway sounds present. No wheezing or rhonchi.   Musculoskeletal:      Cervical back: Neck supple.    Lymphadenopathy:      Cervical: No cervical adenopathy.   Neurological:      Mental Status: She is alert.   Psychiatric:         Behavior: Behavior is cooperative.       Ina Rivas MD

## 2024-01-08 NOTE — PROGRESS NOTES
Consultation - Cardiology   Kellen Gray 68 y.o. female MRN: [id number]  Unit/Bed#:  Encounter: [id number]  Physician Requesting Consult: No att. providers found  Reason for Consult / Principal Problem: Establish with [facility]    Assessment:  AF s/p ablation  Chronic heart failure with preserved EF  COPD  Pulmonary HTN  MARY ELLEN    Plan:  She appears to be stable from a cardiac standpoint. She remains in SR. She is on Eliquis, Toprol  mg daily , and Tikosyn 500 mg BID.   BP is controlled.  I do not feel that she is in heart failure.    I will d/c her ASA. The rest of her medications will remain the same. No cardiac testing is ordered.  RTO 3 months.    History of Present Illness     HPI: Kellen Gray is a 68 y.o. year old female.   . She has a PMHx of R breat cancer s/p lumpectomy + Tamoxifen Rx completed 6/2018, severe diverticulosis and colon ulcers on on colonoscopy, dyslipidemia, L ophthalmic artery aneurysm s/p coiling 2008, has residual small aneurysm at base of coil requiring further procedural intervention, surgical hypothyroidism (on supplement), MARY ELLEN (on CPAP), persistent AF dx 1/2019 undergoing LIONEL guided DCCV 1/8/19 + initiation of Flecainide, AF recurrences with repeat DCCV's 2/14/19 + 3/15/19. Pt had been taking flecainide 200 mg qhs. Apixaban OAC.        Presented for LIONEL with Tikosyn loading on 6/17 with AF RF ablation 6/18/19.    Patient has done well on Tikosyn without further atrial fibrillation. Wore a ZIO patch monitor in March 2022 which revealed no recurrent atrial fibrillation. Brief bursts of atrial tachycardia. Had undergone echocardiogram in April 2022 with normal LVEF and no significant valvular heart disease.    She [facility]    She gets occasional palpitations, occasional lightheadedness, occasional CP.She is not very active.    Her main complaint is SOB.    She quit smoking 6 years.    She is scheduled to see pulmonary of 1/3/2024.     BP  110/76.    Creatinine 0.85                Review of Systems:    Alert awake oriented, comfortable, denies any complaints  No fevers chills nausea vomiting  No weakness, dizziness, seizures  positive for - shortness of breath  Denies any palpitations, chest pain, diaphoresis  Denies leg edema, pain or paresthesias  Denies any skin rashes  Denies abdominal pain, bloody stools, masses  Denies any depression or suicidal ideations      Historical Information   Past Medical History:   Diagnosis Date    A-fib (HCC)     Acute respiratory failure with hypoxia (HCC) 2019    Asthma     Brain aneurysm     Cancer (HCC)     papillary thyroid cancer    Cardiac disease     CHF (congestive heart failure) (HCC)     COPD (chronic obstructive pulmonary disease) (HCC)     CPAP (continuous positive airway pressure) dependence     Crohn disease (HCC)     Diabetes mellitus (HCC)     Disease of thyroid gland     GERD (gastroesophageal reflux disease)     Hyperlipidemia     Hypertension     Sleep apnea      Past Surgical History:   Procedure Laterality Date    APPENDECTOMY      CARDIOVERSION N/A 2019    Procedure: CARDIOVERSION;  Surgeon: Lee Brenner MD;  Location: [facility];  Service: Cardiology    CEREBRAL ANEURYSM REPAIR      CHOLECYSTECTOMY      HYSTERECTOMY      IR PELVIC ANGIOGRAM  2017    IN EXC THROMBOSED HEMORRHOID XTRNL N/A 10/7/2022    Procedure: HEMORRHOIDECTOMY EXCISION;  Surgeon: Leonardo Cash DO;  Location: [facility];  Service: General    THYROIDECTOMY      TONSILLECTOMY AND ADENOIDECTOMY       Social History     Substance and Sexual Activity   Alcohol Use Not Currently    Comment: social     Social History     Substance and Sexual Activity   Drug Use Not Currently    Types: Marijuana     Social History     Tobacco Use   Smoking Status Former    Current packs/day: 0.00    Types: Cigarettes    Quit date: 2018    Years since quittin.0   Smokeless Tobacco Never     Family History:  "non-contributory    Meds/Allergies   all current active meds have been reviewed  Allergies   Allergen Reactions    Sulfa Antibiotics Rash       Objective   Vitals: Blood pressure 110/70, pulse 55, height 5' 9\" (1.753 m), weight 107 kg (236 lb 12.8 oz)., Body mass index is 34.97 kg/m².,   Weight (last 2 days)       None                      Physical Exam:  GEN: Kellen Gray appears well, alert and oriented x 3, pleasant and cooperative   HEENT: pupils equal, round, and reactive to light; extraocular muscles intact  NECK: supple, no carotid bruits   HEART: regular rhythm, normal S1 and S2, no murmurs, clicks, gallops or rubs   LUNGS: clear to auscultation bilaterally; no wheezes, rales, or rhonchi   ABDOMEN: normal bowel sounds, soft, no tenderness, no distention  EXTREMITIES: peripheral pulses normal; no clubbing, cyanosis, or edema  NEURO: no focal findings   SKIN: normal without suspicious lesions on exposed skin    Lab Results:   No visits with results within 1 Day(s) from this visit.   Latest known visit with results is:   Appointment on 12/11/2023   Component Date Value Ref Range Status    Sodium 12/11/2023 144  135 - 147 mmol/L Final    Potassium 12/11/2023 3.5  3.5 - 5.3 mmol/L Final    Chloride 12/11/2023 104  96 - 108 mmol/L Final    CO2 12/11/2023 31  21 - 32 mmol/L Final    ANION GAP 12/11/2023 9  mmol/L Final    BUN 12/11/2023 12  5 - 25 mg/dL Final    Creatinine 12/11/2023 0.85  0.60 - 1.30 mg/dL Final    Standardized to IDMS reference method    Glucose, Fasting 12/11/2023 104 (H)  65 - 99 mg/dL Final    Calcium 12/11/2023 8.9  8.4 - 10.2 mg/dL Final    eGFR 12/11/2023 71  ml/min/1.73sq m Final                         Imaging: I have personally reviewed pertinent reports.                                      "

## 2024-01-09 NOTE — PROCEDURES
The procedure was explained to the patient including the risks and written consent was obtained  She received conscious sedation from anesthesia  One 200 J biphasic shock was delivered and she easily converted to NSR with HR in the 60s  Plan -     Ok for discharge today  Continue Eliquis 5 mg BID, Lipitor, Flecainide 100 mg BID, Lasix 20 mg daily, Toprol  mg BID  Stop ASA  She has a stress test and a sleep study already scheduled and has a follow up visit already scheduled with her regular cardiologist, Dr Ann Carrillo  When discharged, take only medications prescribed as instructed by your hospital provider    Do not stop or change any medications until you discuss changes with your own prescriber    Do not take any other medications, including left over medications from before your admission, over the counter medications or herbal supplements, unless you discuss with your own provider

## 2024-01-23 ENCOUNTER — HOSPITAL ENCOUNTER (OUTPATIENT)
Dept: ULTRASOUND IMAGING | Facility: HOSPITAL | Age: 69
Discharge: HOME | End: 2024-01-23
Attending: INTERNAL MEDICINE
Payer: COMMERCIAL

## 2024-01-23 DIAGNOSIS — K74.00 LIVER FIBROSIS: ICD-10-CM

## 2024-01-23 DIAGNOSIS — E66.01 SEVERE OBESITY (BMI 35.0-39.9) WITH COMORBIDITY (HCC): ICD-10-CM

## 2024-01-23 DIAGNOSIS — K76.0 HEPATIC STEATOSIS: ICD-10-CM

## 2024-01-23 PROCEDURE — 76981 USE PARENCHYMA: CPT

## 2024-01-25 NOTE — RESPIRATORY THERAPY NOTE
Home Oxygen Qualifying Test       Patient name: Julia Rodriguez        : 1955   Date of Test:  2019  Diagnosis:      Home Oxygen Test:    **Medicare Guidelines require item(s) 1-5 on all ambulatory patients or 1 and 2 on non-ambulatory patients  1   Baseline SPO2 on Room Air at rest 97 %  2   SPO2 during exercise on Room Air 91 %  During exercise monitor SpO2  If SPO2 increases >=89% with ambulation do not add supplemental             oxygen  If <= 88% on room air add O2 via NC and titrate patient  Patient must be ambulated with O2 and titrated to > 88% with exertion  3   SPO2 on Oxygen at rest N/A % N/A lpm     4   SPO2 during exercise on Oxygen  N/A% a liter flow of N/A lpm     5   Exercise performed:          walking, stairs, duration 30 (min), distance 250 (feet)          []  Supplemental Home Oxygen is indicated  [x]  Client does not qualify for home oxygen        Respiratory Additional Notes- Pt walked 250 feet without DeSAT pt went back to room to rest then walked the steps 22 steps up and 22 steps down    Triston Maynard, RT
nocturnal dyspnea, and syncope    Family History   Problem Relation Age of Onset    Heart Disease Brother     Heart Disease Sister     Diabetes Brother     Cancer Sister         breast    Alcohol Abuse Father     Stroke Father     Asthma Mother     Hypertension Father        Past Medical History:   Diagnosis Date    Acquired cyst of kidney     CAD (coronary artery disease)     Cervical disc herniation     Chronic renal disease, stage III ([facility]) 07/21/2022    Clotting disorder ([facility])     Essential hypertension     Hypercholesterolemia     Hypertension     Long term current use of anticoagulant therapy     Pacemaker     PAF (paroxysmal atrial fibrillation) ([facility]) 6/9/2016    Paroxysmal supraventricular tachycardia     Postsurgical aortocoronary bypass status 3/20/2012    S/P cardiac pacemaker procedure 6/9/2016 6/9/16 [employer] dual chamber pacemaker implant    Stroke ([facility])     Thyroid disease     Urge incontinence        Specialty Problems          Cardiology Problems    Hypertension        Chronic ischemic heart disease        Coronary atherosclerosis of native coronary artery        Mixed hyperlipidemia        Bradycardia, sinus        APC (atrial premature contractions)        First degree AV block        SSS (sick sinus syndrome) ([facility])        PAF (paroxysmal atrial fibrillation) ([facility])        Pacemaker        Bilateral carotid artery stenosis        Paroxysmal supraventricular tachycardia        TIA (transient ischemic attack)        Transient ischemic attack involving left internal carotid artery            GENERAL ROS:  A comprehensive review of systems was negative except for what was noted in the HPI.  /80 (Site: Right Upper Arm, Position: Sitting, Cuff Size: Medium Adult)   Pulse 60   Ht 1.575 m (5' 2\")   Wt 85.3 kg (188 lb)   SpO2 99%   BMI 34.39 kg/m²     Wt Readings from Last 3 Encounters:   01/25/24 85.3 kg (188 lb)   01/08/24 84.2 kg (185 lb 10 oz)   09/25/23 83.5 kg (184 lb)

## 2024-01-26 DIAGNOSIS — I48.91 ATRIAL FIBRILLATION WITH RAPID VENTRICULAR RESPONSE (HCC): ICD-10-CM

## 2024-01-28 RX ORDER — METOPROLOL SUCCINATE 100 MG/1
100 TABLET, EXTENDED RELEASE ORAL EVERY 12 HOURS
Qty: 60 TABLET | Refills: 0 | Status: SHIPPED | OUTPATIENT
Start: 2024-01-28

## 2024-02-01 ENCOUNTER — OFFICE VISIT (OUTPATIENT)
Dept: GASTROENTEROLOGY | Facility: CLINIC | Age: 69
End: 2024-02-01
Payer: COMMERCIAL

## 2024-02-01 VITALS
WEIGHT: 232.4 LBS | HEIGHT: 70 IN | SYSTOLIC BLOOD PRESSURE: 110 MMHG | DIASTOLIC BLOOD PRESSURE: 72 MMHG | OXYGEN SATURATION: 95 % | HEART RATE: 54 BPM | BODY MASS INDEX: 33.27 KG/M2 | RESPIRATION RATE: 16 BRPM | TEMPERATURE: 97.9 F

## 2024-02-01 DIAGNOSIS — K76.0 HEPATIC STEATOSIS: ICD-10-CM

## 2024-02-01 DIAGNOSIS — K86.81 EXOCRINE PANCREATIC INSUFFICIENCY: ICD-10-CM

## 2024-02-01 DIAGNOSIS — K44.9 HIATAL HERNIA: ICD-10-CM

## 2024-02-01 DIAGNOSIS — K50.919 CROHN'S DISEASE WITH COMPLICATION, UNSPECIFIED GASTROINTESTINAL TRACT LOCATION (HCC): ICD-10-CM

## 2024-02-01 DIAGNOSIS — K21.9 GASTROESOPHAGEAL REFLUX DISEASE, UNSPECIFIED WHETHER ESOPHAGITIS PRESENT: Primary | ICD-10-CM

## 2024-02-01 DIAGNOSIS — E66.09 CLASS 1 OBESITY DUE TO EXCESS CALORIES WITH SERIOUS COMORBIDITY AND BODY MASS INDEX (BMI) OF 33.0 TO 33.9 IN ADULT: ICD-10-CM

## 2024-02-01 DIAGNOSIS — R19.7 DIARRHEA, UNSPECIFIED TYPE: ICD-10-CM

## 2024-02-01 PROBLEM — K50.90 EXACERBATION OF CROHN'S DISEASE (HCC): Status: RESOLVED | Noted: 2020-02-25 | Resolved: 2024-02-01

## 2024-02-01 PROBLEM — K22.70 BARRETT'S ESOPHAGUS WITHOUT DYSPLASIA: Status: RESOLVED | Noted: 2018-09-18 | Resolved: 2024-02-01

## 2024-02-01 PROBLEM — E66.811 CLASS 1 OBESITY DUE TO EXCESS CALORIES WITH SERIOUS COMORBIDITY AND BODY MASS INDEX (BMI) OF 33.0 TO 33.9 IN ADULT: Status: ACTIVE | Noted: 2023-01-16

## 2024-02-01 PROCEDURE — 99214 OFFICE O/P EST MOD 30 MIN: CPT | Performed by: STUDENT IN AN ORGANIZED HEALTH CARE EDUCATION/TRAINING PROGRAM

## 2024-02-01 RX ORDER — ESOMEPRAZOLE MAGNESIUM 40 MG/1
40 CAPSULE, DELAYED RELEASE ORAL
Qty: 30 CAPSULE | Refills: 2 | Status: SHIPPED | OUTPATIENT
Start: 2024-02-01

## 2024-02-01 NOTE — PROGRESS NOTES
[facility]  Outpatient Follow-up  Encounter: [id number]    PATIENT INFO     Name: Kellen Gray  YOB: 1955   Age: 68 y.o.   Sex: female   MRN: [id number]    ASSESSMENT & PLAN     Kellen Gray is a 68 y.o. female with GERD, dyspepsia, hiatal hernia, obesity, exocrine pancreatic insufficiency, hepatic steatosis, and Crohn's disease.  Fortunately, she appears to be doing significantly better since last office visit.  She does continue to have dyspepsia which may be related to hiatal hernia.  Not much improvement on Nexium once daily.  However, anticipate that she will have continued improvement in symptoms with ongoing efforts at weight loss.  I believe that much of her symptomatic improvement can be attributed to her efforts at weight loss.  There has been improvement in the degree of hepatic steatosis on the elastography with steatosis grading of S1 (previously S2).  Unfortunately, unable to assess for fibrosis due to quality of the study with similar limitations previously.  Fortunately, I suspect that she has had improvement overall given her efforts at weight loss and overall health improvements.  No evidence of active Crohn's disease on recent endoscopy and colonoscopy.  Interestingly, she was found to have exocrine pancreatic insufficiency based on for stool based studies.  Fortunately, her diarrhea has improved with Creon.    Commended patient on her weight loss and overall lifestyle improvements which I believe will contribute significantly to her overall improvements in health and symptoms  Encouraged her to continue with her efforts at weight loss  Given her ongoing dyspepsia and reflux symptoms, we will increase Nexium to 40 mg twice daily  Again, I would anticipate that these reflux symptoms may improve with her ongoing weight loss  There could certainly be a component of functional dyspepsia but given her other psychiatric medications, will defer starting  nortriptyline at this time  Continue Creon twice daily (patient was unable to tolerate 3 times daily) which appears to be working well for her  Continue Delzicol for now for Crohn's disease which appears to be in remission  Will hold off on repeating elastography at this time although can consider repeating in a year if necessary or alternatively, consider liver biopsy although given her overall health improvements, we will hold off for now  Follow-up with hepatology  Optimize other metabolic risk factors per PCP    1. Gastroesophageal reflux disease, unspecified whether esophagitis present    2. Hiatal hernia    3. Class 1 obesity due to excess calories with serious comorbidity and body mass index (BMI) of 33.0 to 33.9 in adult    4. Exocrine pancreatic insufficiency    5. Hepatic steatosis    6. Crohn's disease with complication, unspecified gastrointestinal tract location (HCC)    7. Diarrhea, unspecified type      No orders of the defined types were placed in this encounter.      FOLLOW-UP: 6 months    HISTORY OF PRESENT ILLNESS       Kellen Gray is a 68 y.o. female who presents to GI office for follow-up of Crohn's disease, IBS, GERD, diarrhea, abdominal pain.  She appears to be doing remarkably well since last office visit.  She has made significant efforts at weight loss and has lost approximately 20 pounds.  She feels much better overall since losing weight.  She is becoming more active and also intends to work part-time.  Her diarrhea has also improved with Creon.  She was found to have EPI based on stool studies with low pancreatic elastase level.  She was prescribed Creon to be taken 3 times daily but states that this caused GI upset.  She is currently taking this 2 times daily and reports that her symptoms are much better.  She has approximately 2-3 loose stools per day.  This is significantly better than what she was having previously.  Recent upper endoscopy and colonoscopy were completed with no  endoscopic evidence of Crohn's disease.  Biopsies were also negative for active colitis.  She remains on Delzicol at this time.  She does continue to complain of reflux and dyspepsia.  She was changed to Nexium from omeprazole but has not noticed any significant improvement.  This does tend to worsen with eating causing some epigastric discomfort.  She was found to have hiatal hernia and mild gastritis on endoscopy.  Biopsies were negative for H. pylori.  Otherwise, offers no other acute GI complaints and reports that she is doing well.     ENDOSCOPIC HISTORY     UPPER ENDOSCOPY: 12/1/2023 with 2 cm sliding hiatal hernia, mild gastritis, otherwise unremarkable (biopsies negative for H. pylori and celiac sprue)    COLONOSCOPY: 12/1/2023 with single polyp removed (tubular adenoma), small hemorrhoids, otherwise unremarkable (terminal ileum and segmental colon biopsies negative for active colitis/Crohn's); recommend recall in 5 years    REVIEW OF SYSTEMS     CONSTITUTIONAL: Denies any fever, chills, rigors  HEENT: No earache or tinnitus, denies hearing loss or visual disturbances  CARDIOVASCULAR: No chest pain or palpitations  RESPIRATORY: Denies any cough, hemoptysis, shortness of breath or dyspnea on exertion  GASTROINTESTINAL: As noted in the History of Present Illness  GENITOURINARY: No problems with urination, denies any hematuria or dysuria  NEUROLOGIC: No dizziness or vertigo, denies headaches   MUSCULOSKELETAL: Denies any muscle or joint pain   SKIN: Denies skin rashes or itching  ENDOCRINE: Denies excessive thirst, denies intolerance to heat or cold  PSYCHOSOCIAL: Denies depression or anxiety, denies any recent memory loss     Answers submitted by the patient for this visit:  Inflammatory Bowel Disease (Submitted on 1/25/2024)  When you are not experiencing symptoms of your inflammatory bowel disease, how many bowel movements do you typically have each day?: 5  What is the average (typical) number of bowel  movements that you had in a single day during the last week?: 4  Over the last 3 days, have you had any bowel movements where you passed blood without stool?: No  Since your last visit, have you received any vaccinations?: No  Since the last visit, have you had an infection?: No  Is there any possibility that you may be pregnant?: No  In the past three months, have you used tobacco in any form?: No  During the last year, how many days have you missed work or school because of your inflammatory bowel disease?: 0  During the last year, how many days have you been hospitalized because of your inflammatory bowel disease?: 0  During the last year, how many days have you visited a hospital emergency department because of your inflammatory bowel disease?: 0  During the last month, have you taken narcotic pain medications (such as Percocet, oxycodone, Oxycontin, morphine, Vicodin, Dilaudid, MS Contin) for your inflammatory bowel disease?: No  Have you awoken at night because you needed to move your bowels during the last month? : Yes  Have you had leakage of stool while sleeping during the last month?: No  Have you had leakage of stool while you were awake during the last month?: No  In the last 6 months, have you unintentionally lost weight?: No  Fever: No  Eye irritation: Yes  Mouth sores: No  Sore throat: No  Chest pain: Yes  Shortness of breath: Yes  Numbness or tingling in your hands or feet: No  Skin rash: No  Pain or swelling in your joints: Yes  Bruising or bleeding: Yes  Felt depressed or blue: Yes    Historical Information   Past Medical History:   Diagnosis Date    A-fib (HCC)     Acute respiratory failure with hypoxia (HCC) 11/30/2019    Asthma     Brain aneurysm     Cancer (HCC)     papillary thyroid cancer    Cardiac disease     CHF (congestive heart failure) (HCC)     COPD (chronic obstructive pulmonary disease) (HCC)     CPAP (continuous positive airway pressure) dependence     Crohn disease (HCC)      Diabetes mellitus (HCC)     Disease of thyroid gland     GERD (gastroesophageal reflux disease)     Hyperlipidemia     Hypertension     Sleep apnea      Past Surgical History:   Procedure Laterality Date    APPENDECTOMY      CARDIOVERSION N/A 2019    Procedure: CARDIOVERSION;  Surgeon: Lee Brenner MD;  Location: [facility];  Service: Cardiology    CEREBRAL ANEURYSM REPAIR      CHOLECYSTECTOMY      HYSTERECTOMY      IR PELVIC ANGIOGRAM  2017    IN EXC THROMBOSED HEMORRHOID XTRNL N/A 10/7/2022    Procedure: HEMORRHOIDECTOMY EXCISION;  Surgeon: Leonardo Cash DO;  Location: [facility];  Service: General    THYROIDECTOMY      TONSILLECTOMY AND ADENOIDECTOMY       Social History   Social History     Substance and Sexual Activity   Alcohol Use Not Currently    Comment: social     Social History     Substance and Sexual Activity   Drug Use Not Currently    Types: Marijuana     Social History     Tobacco Use   Smoking Status Former    Current packs/day: 0.00    Types: Cigarettes    Quit date: 2018    Years since quittin.0   Smokeless Tobacco Never     Family History   Problem Relation Age of Onset    Alzheimer's disease Mother     Cancer Father         lung and bone    Heart disease Sister     Hypertension Sister     Diabetes Sister     Stroke Sister     Cancer Sister     Cancer Brother         liver with mets to bone    Heart disease Brother     Cancer Brother         thyroid cancer       MEDICATIONS & ALLERGIES     Current Outpatient Medications   Medication Instructions    albuterol (PROVENTIL HFA,VENTOLIN HFA) 90 mcg/act inhaler 2 puffs, Inhalation, Every 4 hours PRN    apixaban (ELIQUIS) 5 mg, Oral, 2 times daily    atorvastatin (LIPITOR) 20 mg, Oral, Every morning    Blood Glucose Monitoring Suppl (ONETOUCH VERIO) w/Device KIT Use to check sugars once a day. Dx E11.9    Blood Glucose Monitoring Suppl (ONETOUCH VERIO) w/Device KIT Daily, Use to check glucose    buPROPion (WELLBUTRIN XL) 150 mg,  "Oral, Every morning    Cholecalciferol (Vitamin D3) 1.25 MG (52064 UT) CAPS 1 capsule, Weekly    docusate sodium (COLACE) 100 mg, Oral, 2 times daily    dofetilide (TIKOSYN) 500 mcg, Oral, 2 times daily    Empagliflozin (JARDIANCE) 10 mg, Oral, Daily    esomeprazole (NEXIUM) 40 mg, Oral, 2 times daily before meals, Take 30 minutes before breakfast and 30 minutes before dinner.    famotidine (PEPCID) 40 MG tablet TAKE 1 TABLET BY MOUTH ONCE DAILY AT BEDTIME    fenofibrate (TRICOR) 54 mg, Daily    fluticasone-umeclidinium-vilanterol (Trelegy Ellipta) 200-62.5-25 mcg/actuation AEPB inhaler 1 puff, Inhalation, Daily, Rinse mouth after use.    furosemide (LASIX) 20 mg, Oral, 2 times daily    gabapentin (NEURONTIN) 300 mg, Oral, Daily at bedtime    glipiZIDE (GLUCOTROL XL) 5 mg 24 hr tablet TAKE 1 TABLET BY MOUTH ONCE DAILY 30 MINUTES BEFORE A MEAL (REPLACES METFORMIN)    glucose blood test strip Use to check sugars once a day. Dx E11.9    hydrOXYzine HCL (ATARAX) 10 mg, Oral, 2 times daily    Lancets (ONETOUCH DELICA PLUS [id number]) MISC USE 1 TO CHECK GLUCOSE ONCE DAILY    lisinopril (ZESTRIL) 10 mg, Oral, Every morning    mesalamine (DELZICOL) 400 mg Take 2 capsules by mouth twice daily    metoprolol succinate (TOPROL-XL) 100 mg, Oral, Every 12 hours    ONETOUCH VERIO test strip USE 1 STRIP TO CHECK GLUCOSE ONCE DAILY    pancrelipase, Lip-Prot-Amyl, (Creon) 24,000 units TAKE 3 CAPSULES BY MOUTH THREE TIMES DAILY WITH MEALS    pancrelipase, Lip-Prot-Amyl, (CREON) 24,000 units 24,000 units of lipase, Oral, 3 times daily with meals    SYNTHROID 150 MCG tablet TAKE 1 TABLET BY MOUTH ONCE DAILY FIRST THING IN THE MORNING (AT LEAST 30 MINUTES PRIOR TO BREAKFAST OR OTHER MEDS)     Allergies   Allergen Reactions    Sulfa Antibiotics Rash       PHYSICAL EXAM      Objective   Blood pressure 110/72, pulse (!) 54, temperature 97.9 °F (36.6 °C), temperature source Tympanic, resp. rate 16, height 5' 9.5\" (1.765 m), weight 105 kg " (232 lb 6.4 oz), SpO2 95%. Body mass index is 33.83 kg/m².    General Appearance:   Alert, cooperative, no distress   HEENT:   Normocephalic, atraumatic, anicteric     Neck:   Supple, symmetrical, trachea midline   Lungs:   Equal chest rise, respirations unlabored    Heart:   Regular rate and rhythm   Abdomen:   Soft, non-tender, non-distended; normal bowel sounds; no masses, no organomegaly    Rectal:   Deferred    Extremities:   No cyanosis, clubbing or edema    Neuro:   Moves all 4 extremities    Skin:   No jaundice, rashes, or lesions      LABORATORY RESULTS     No visits with results within 1 Day(s) from this visit.   Latest known visit with results is:   Office Visit on 01/02/2024   Component Date Value    RAPID FLU A 01/02/2024 negative     RAPID FLU B 01/02/2024 negative     POCT SARS-CoV-2 Ag 01/02/2024 Positive (A)     VALID CONTROL 01/02/2024 Valid         IMAGING RESULTS     US elastography    Result Date: 1/29/2024  Narrative: ELASTOGRAPHY, LIVER ULTRASOUND INDICATION: K74.00: Hepatic fibrosis, unspecified E66.01: Morbid (severe) obesity due to excess calories K76.0: Fatty (change of) liver, not elsewhere classified. COMPARISON: Elastography 1/20/2023. TECHNIQUE: Targeted ultrasound of the liver was performed with a curvilinear transducer on a [employer] utilizing 2D SWE. A total of 10 shear wave Elastography samples were obtained. FINDINGS: Shear Wave Elastography sampling was performed to evaluate stiffness changes within the liver associated with fibrosis. Nondiagnostic liver stiffness measurements. IQR/Med: 58.3% (Less than or equal to 30% is a satisfactory data set). Ultrasound-guided attenuation parameter (UGAP) Liver Steatosis Grading A1 0.8 dB/cm/MHz A2 0.79 dB/cm/MHz A3 0.74 dB/cm/MHz A4 0.6 dB/cm/MHz A5 0.73 dB/cm/MHz A6 0.69 dB/cm/MHz A7 0.68 dB/cm/MHz A8 0.63 dB/cm/MHz A9 0.68 dB/cm/MHz A10 0.69 dB/cm/MHz Attenuation coefficient: 0.69 dB/cm/MHz Liver steatosis grading: S1 (5%-33% steatosis)  range 0.65-0.70 dB/cm/MHz IQR/Med: 8.3% (Less than or equal to 30% is a satisfactory data set). Reference White paper for GE ultrasound-guided attenuation parameter [email or web address] Hepatomegaly measuring 22 cm.     Impression: Nondiagnostic liver stiffness values. Liver steatosis grading: S1 ( 5%-33% steatosis) Note: If this is a follow-up study to a prior UGAP exam, please note that the reference value cutoffs have been updated and may reflect a significant change in resulting steatosis grading score.  Please directly compare the number value reading for direct comparison rather than the prior S score. Workstation performed: [id number]     I have personally reviewed any available and pertinent imaging study reports.      Joe Stanton D.O.  [facility]  Division of Gastroenterology & Hepatology  Available on TigerText  [email or web address]    ** Please Note: This note is constructed using a voice recognition dictation system. **

## 2024-02-01 NOTE — PATIENT INSTRUCTIONS
We will increase the esomeprazole (Nexium) to twice a day  Take this 30 minutes before breakfast and 30 minutes before dinner  Continue taking the Creon twice a day (instead of 3 times a day)  Continue your efforts at weight loss

## 2024-02-05 ENCOUNTER — TELEPHONE (OUTPATIENT)
Age: 69
End: 2024-02-05

## 2024-02-05 NOTE — TELEPHONE ENCOUNTER
PA for esomeprazole PA BID    Submitted via  []CMM-KEY   [x]Alexa-Case ID # Case ID:[id number]:[phone]   []Faxed to plan   []Other website   []Phone call Case ID #     Office notes sent, clinical questions answered. Awaiting determination

## 2024-02-05 NOTE — TELEPHONE ENCOUNTER
----- Message from Ashley Ramos sent at 2/2/2024  2:18 PM EST -----  prior authorization for esomeprazole magnesium 40mg

## 2024-02-06 NOTE — TELEPHONE ENCOUNTER
PA for esomeprazole Approved   Date(s) approved until 12/31/2024  Case #    Patient advised by [x] [employer] Message                      [] Phone call       Pharmacy advised by [x]Fax                                     []Phone call    Approval letter scanned into Media Yes

## 2024-02-08 ENCOUNTER — OFFICE VISIT (OUTPATIENT)
Dept: INTERNAL MEDICINE CLINIC | Facility: CLINIC | Age: 69
End: 2024-02-08
Payer: COMMERCIAL

## 2024-02-08 VITALS
HEIGHT: 70 IN | WEIGHT: 232 LBS | HEART RATE: 57 BPM | SYSTOLIC BLOOD PRESSURE: 112 MMHG | BODY MASS INDEX: 33.21 KG/M2 | TEMPERATURE: 97.2 F | DIASTOLIC BLOOD PRESSURE: 70 MMHG | OXYGEN SATURATION: 96 %

## 2024-02-08 DIAGNOSIS — J44.9 CHRONIC OBSTRUCTIVE PULMONARY DISEASE, UNSPECIFIED COPD TYPE (HCC): ICD-10-CM

## 2024-02-08 DIAGNOSIS — S22.080A WEDGE COMPRESSION FRACTURE OF T11-T12 VERTEBRA, INITIAL ENCOUNTER FOR CLOSED FRACTURE (HCC): ICD-10-CM

## 2024-02-08 DIAGNOSIS — I10 ESSENTIAL HYPERTENSION: ICD-10-CM

## 2024-02-08 DIAGNOSIS — E66.09 CLASS 1 OBESITY DUE TO EXCESS CALORIES WITH SERIOUS COMORBIDITY AND BODY MASS INDEX (BMI) OF 33.0 TO 33.9 IN ADULT: ICD-10-CM

## 2024-02-08 DIAGNOSIS — I50.32 CHRONIC HEART FAILURE WITH PRESERVED EJECTION FRACTION (HCC): ICD-10-CM

## 2024-02-08 DIAGNOSIS — K76.0 HEPATIC STEATOSIS: ICD-10-CM

## 2024-02-08 DIAGNOSIS — E55.9 VITAMIN D DEFICIENCY: ICD-10-CM

## 2024-02-08 DIAGNOSIS — F33.41 RECURRENT MAJOR DEPRESSIVE DISORDER, IN PARTIAL REMISSION (HCC): Chronic | ICD-10-CM

## 2024-02-08 DIAGNOSIS — E11.9 TYPE 2 DIABETES MELLITUS WITHOUT COMPLICATION, WITHOUT LONG-TERM CURRENT USE OF INSULIN (HCC): Primary | ICD-10-CM

## 2024-02-08 DIAGNOSIS — K50.919 CROHN'S DISEASE WITH COMPLICATION, UNSPECIFIED GASTROINTESTINAL TRACT LOCATION (HCC): ICD-10-CM

## 2024-02-08 DIAGNOSIS — I48.19 PERSISTENT ATRIAL FIBRILLATION (HCC): Chronic | ICD-10-CM

## 2024-02-08 DIAGNOSIS — E53.8 VITAMIN B12 DEFICIENCY: ICD-10-CM

## 2024-02-08 DIAGNOSIS — E89.0 POSTOPERATIVE HYPOTHYROIDISM: ICD-10-CM

## 2024-02-08 DIAGNOSIS — E78.00 HYPERCHOLESTEREMIA: ICD-10-CM

## 2024-02-08 DIAGNOSIS — Z78.0 POSTMENOPAUSAL: ICD-10-CM

## 2024-02-08 PROBLEM — E66.812 CLASS 2 OBESITY: Status: RESOLVED | Noted: 2023-01-13 | Resolved: 2024-02-08

## 2024-02-08 PROBLEM — E66.9 CLASS 2 OBESITY: Status: RESOLVED | Noted: 2023-01-13 | Resolved: 2024-02-08

## 2024-02-08 PROCEDURE — 99214 OFFICE O/P EST MOD 30 MIN: CPT | Performed by: FAMILY MEDICINE

## 2024-02-08 RX ORDER — ALBUTEROL SULFATE 0.63 MG/3ML
0.63 SOLUTION RESPIRATORY (INHALATION) EVERY 6 HOURS PRN
COMMUNITY

## 2024-02-08 NOTE — PROGRESS NOTES
Assessment/Plan:       1. Type 2 diabetes mellitus without complication, without long-term current use of insulin (HCC)  Assessment & Plan:  Recent laboratory testing reviewed with her.  Previous laboratory testing reviewed with her.  Hemoglobin A1c remains above goal.  Discussed goals.  Will have her discontinue the glipizide.  Will increase the dose of the Jardiance.  Discussed potential addition of Ozempic or something similar in the future.  Discussed potential benefits of these medications.  Follow-up with ophthalmology regularly.  Check feet daily.    Orders:  -     Empagliflozin 25 MG TABS; Take 1 tablet (25 mg total) by mouth daily  -     CBC and differential; Future  -     Hemoglobin A1C; Future    2. Postoperative hypothyroidism  Assessment & Plan:  Previous laboratory testing reviewed with her.  Continue current dose of the Synthroid.  Continue to follow TSH and adjust dose if needed.    Orders:  -     CBC and differential; Future  -     TSH, 3rd generation; Future    3. Essential hypertension  Assessment & Plan:  Blood pressure well controlled.  Continue current medications.  Watch salt intake.  Stay adequately hydrated.    Orders:  -     CBC and differential; Future  -     Comprehensive metabolic panel; Future    4. Persistent atrial fibrillation (HCC)  Assessment & Plan:  Heart rate currently controlled.  Continue with the anticoagulation with the Eliquis.  Be very careful to avoid falls.  Continue to follow-up with cardiology.    Orders:  -     CBC and differential; Future    5. Chronic heart failure with preserved ejection fraction (HCC)  Assessment & Plan:  Wt Readings from Last 3 Encounters:   02/08/24 105 kg (232 lb)   02/01/24 105 kg (232 lb 6.4 oz)   12/29/23 107 kg (236 lb 12.8 oz)     Continue to follow-up with cardiology.  Watch for any increased shortness of breath, increasing peripheral edema, or sudden increases in weight.  Continue with the slow but steady weight loss.          Orders:  -      CBC and differential; Future    6. Wedge compression fracture of T11-T12 vertebra, initial encounter for closed fracture (HCC)  Assessment & Plan:  Chronic back issues discussed.  Watch for any worsening.  Continue to follow-up with pain management if needed.    Orders:  -     CBC and differential; Future    7. Crohn's disease with complication, unspecified gastrointestinal tract location (HCC)  Assessment & Plan:  GI symptoms currently stable.  Continue to follow-up with GI.  Recent note with Dr. Stanton discussed.  Continue with the mesalamine.    Orders:  -     CBC and differential; Future    8. Class 1 obesity due to excess calories with serious comorbidity and body mass index (BMI) of 33.0 to 33.9 in adult  Assessment & Plan:  Congratulated her on her weight loss.  Continue to watch diet.  Will increase the dose of the Jardiance.  Discussed with her medication such as Ozempic which may help not only with weight loss but also help to control her blood sugar better and lower cardiac and renal risks.    Orders:  -     CBC and differential; Future    9. Hypercholesteremia  -     CBC and differential; Future  -     Lipid panel; Future    10. Recurrent major depressive disorder, in partial remission (HCC)  Assessment & Plan:  Mood is improved significantly.  Wants to try a trial off of the Wellbutrin.  Watch for any worsening of depression or anxiety symptoms.    Orders:  -     CBC and differential; Future    11. Vitamin B12 deficiency  Assessment & Plan:  Continue with B12 supplementation.  Will check repeat laboratory testing.  Discussed possibly the need for regular injections if this remains low.    Orders:  -     Vitamin B12; Future  -     CBC and differential; Future    12. Vitamin D deficiency  Assessment & Plan:  Continue to take vitamin D supplementation.  Will recheck with upcoming laboratory testing.    Orders:  -     Vitamin D 25 hydroxy; Future  -     CBC and differential; Future    13. Postmenopausal  -      DXA bone density spine hip and pelvis; Future; Expected date: 02/08/2024    14. Chronic obstructive pulmonary disease, unspecified COPD type (HCC)  Assessment & Plan:  Thing has been relatively stable.  Watch for any worsening with exertion.  Continue current inhalers.  Continue with the Trelegy and use rescue inhaler if needed.      15. Hepatic steatosis  Assessment & Plan:  Previous imaging reviewed.  Continue with a slow but steady weight loss.  Watch diet.  Try to increase activity level.          Shortness of breath.  This will get better as she continues to lose weight.    Medication management.  She will continue her albuterol, Eliquis, atorvastatin, Tikosyn, and metoprolol.   I sent a 90-day supply of her medication.  I suggested Mounjaro.     Health maintenance.  She is up-to-date on her mammogram.   I will give her a slip for a bone density scan.   I will check her thyroid.  I will remove Wellbutrin from her medication list.  I will order her next set of blood work to be done in either 03/2024 or 04/2023.    Fatty liver.  She will continue with her current regimen.    Follow-up  The patient will follow up in 3 to 4 months or sooner if needed.  Discussed with her getting laboratory testing prior to her next visit.      Depression Screening and Follow-up Plan: Patient was screened for depression during today's encounter. They screened negative with a PHQ-9 score of 0.               Subjective:      Patient ID: Kellen Gray is a 68 y.o. female who presents for evaluation of multiple medical concerns.    The patient has experienced a weight loss of 20 pounds, leading to an increase in energy levels. Despite this, she continues to experience back issues, although she believes the weight loss has alleviated some of the pressure on her back. Her current weight as of this morning is 230 pounds.     She experiences dyspnea when ascending and descending stairs. Although she possesses inhalers, she does not  "utilize them because she does not know that she needs it.    She received a call from Dr. Graham's office regarding her recent CT scan. The test results were reviewed and deemed satisfactory. However, there was a minor presence of \"fattiness\". It was advised that further weight loss could potentially eliminate this issue. She was advised to undergo an MRI scan. However, she refused due to her difficulty with MRI machines. She declined both MRI and biopsy as suggested by the physician. She has undergone elastography twice.     She expressed a desire to discontinue certain medications. She has ceased using Wellbutrin but continues to take metoprolol and mesalamine, the latter being administered twice daily. She underwent a bone density test previously. She has been monitoring her glucose levels at home and is currently on glipizide.    Tolerating blood pressure medications without difficulty.  Denies any headaches or localized weakness.  Denies any chest pain or palpitations.  Denies any significant peripheral edema.  Denies any sudden changes in weight.  Mood has been stable.    GI symptoms have improved.  Denies any significant loose bowel movements.  Denies any blood or mucus in bowel movements.    The following portions of the patient's history were reviewed and updated as appropriate: She  has a past medical history of A-fib ([facility]), Acute respiratory failure with hypoxia ([facility]) (11/30/2019), Asthma, Brain aneurysm, Cancer (HCC), Cardiac disease, CHF (congestive heart failure) (HCC), COPD (chronic obstructive pulmonary disease) (HCC), CPAP (continuous positive airway pressure) dependence, Crohn disease (HCC), Diabetes mellitus (HCC), Disease of thyroid gland, GERD (gastroesophageal reflux disease), Hyperlipidemia, Hypertension, and Sleep apnea.  She   Patient Active Problem List    Diagnosis Date Noted   • Hiatal hernia 02/01/2024   • Exocrine pancreatic insufficiency 02/01/2024   • Vitamin B12 deficiency 11/03/2023 "   • GERD (gastroesophageal reflux disease) 10/23/2023   • Generalized abdominal pain 10/23/2023   • Leukocytosis 10/19/2023   • Folic acid deficiency 10/19/2023   • Vitamin D deficiency 01/17/2023   • Class 1 obesity due to excess calories with serious comorbidity and body mass index (BMI) of 33.0 to 33.9 in adult 01/16/2023   • Essential hypertension 12/20/2022   • History of renal calculi 10/26/2022   • Sensorineural hearing loss, bilateral 10/24/2022   • Urinary retention 10/18/2022   • Hemorrhoids 09/28/2022   • Impaired hearing 08/12/2022   • 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   • Hypertensive heart disease with heart failure (HCC) 03/16/2022   • Chronic pain syndrome 12/02/2021   • Chronic bilateral low back pain without sciatica 12/02/2021   • Lumbar spondylosis 12/02/2021   • Lumbar radiculopathy 12/02/2021   • Iron deficiency anemia secondary to inadequate dietary iron intake 08/31/2021   • History of vertebral fracture 02/11/2021   • Restless legs syndrome 02/06/2021   • Congenital multiple renal cysts 02/04/2021   • Wedge compression fracture of T11-T12 vertebra, initial encounter for closed fracture (HCC) 02/04/2021   • Fracture of right tibia 02/02/2021   • BPPV (benign paroxysmal positional vertigo) 12/11/2020   • History of colon polyps 11/26/2020   • Diabetes mellitus, type 2 (HCC)    • Colon polyp 02/29/2020   • Multiple renal cysts 02/29/2020   • T12 compression fracture (HCC) 02/29/2020   • Prolonged QT interval 02/27/2020   • Hepatomegaly 02/27/2020   • Hepatic steatosis 02/27/2020   • Atherosclerosis 02/26/2020   • Severe obstructive sleep apnea 02/25/2020   • Diarrhea 02/25/2020   • (HFpEF) heart failure with preserved ejection fraction (HCC) 01/16/2020   • Persistent atrial fibrillation (HCC) 01/16/2020   • History of thyroid cancer 01/10/2020   • COPD (chronic obstructive pulmonary disease) (HCC) 12/01/2019   • Pneumonia due to infectious organism 11/29/2019   • Elevated d-dimer 11/29/2019   •  Hypokalemia 11/29/2019   • S/P ablation of atrial fibrillation 11/04/2019   • Recurrent major depressive disorder ([facility]) 09/23/2019   • Brain aneurysm 09/16/2019   • Atypical chest pain 09/16/2019   • Dizziness 09/16/2019   • Crohn's disease with complication ([facility]) 09/16/2019   • Postoperative hypothyroidism 04/16/2019   • MARY ELLEN (obstructive sleep apnea)    • Microscopic hematuria 02/14/2019   • Pulmonary hypertension ([facility]) 02/14/2019   • Lymphadenopathy 02/12/2019   • A-fib ([facility]) 02/12/2019   • Elevated TSH 02/12/2019   • Hypercholesteremia 02/12/2019   • Transaminitis 02/12/2019   • History of total thyroidectomy 01/16/2019   • Gastroesophageal reflux disease with esophagitis 10/17/2018   • History of cerebral aneurysm repair 09/12/2017   • Atypical ductal hyperplasia of right breast 05/01/2017     She  has a past surgical history that includes Hysterectomy; Cardioversion (N/A, 2/14/2019); Tonsillectomy and adenoidectomy; Appendectomy; Cerebral aneurysm repair; Cholecystectomy; Thyroidectomy; pr exc thrombosed hemorrhoid xtrnl (N/A, 10/7/2022); and IR pelvic angiogram (12/14/2017).  Her family history includes Alzheimer's disease in her mother; Cancer in her brother, brother, father, and sister; Diabetes in her sister; Heart disease in her brother and sister; Hypertension in her sister; Stroke in her sister.  She  reports that she quit smoking about 6 years ago. Her smoking use included cigarettes. She has never used smokeless tobacco. She reports that she does not currently use alcohol. She reports that she does not currently use drugs after having used the following drugs: Marijuana.  Current Outpatient Medications   Medication Sig Dispense Refill   • albuterol (ACCUNEB) 0.63 MG/3ML nebulizer solution Take 0.63 mg by nebulization every 6 (six) hours as needed for wheezing     • albuterol (PROVENTIL HFA,VENTOLIN HFA) 90 mcg/act inhaler Inhale 2 puffs every 4 (four) hours as needed for wheezing     • apixaban  (ELIQUIS) 5 mg Take 5 mg by mouth 2 (two) times a day      • atorvastatin (LIPITOR) 20 mg tablet Take 20 mg by mouth every morning     • Blood Glucose Monitoring Suppl (ONETOUCH VERIO) w/Device KIT Use to check sugars once a day. Dx E11.9     • Cholecalciferol (Vitamin D3) 1.25 MG (17559 UT) CAPS Take 1 capsule by mouth once a week     • dofetilide (TIKOSYN) 500 mcg capsule Take 500 mcg by mouth 2 (two) times a day     • Empagliflozin 25 MG TABS Take 1 tablet (25 mg total) by mouth daily 90 tablet 3   • esomeprazole (NexIUM) 40 MG capsule Take 1 capsule (40 mg total) by mouth 2 (two) times a day before meals Take 30 minutes before breakfast and 30 minutes before dinner. 30 capsule 2   • famotidine (PEPCID) 40 MG tablet TAKE 1 TABLET BY MOUTH ONCE DAILY AT BEDTIME 30 tablet 5   • fluticasone-umeclidinium-vilanterol (Trelegy Ellipta) 200-62.5-25 mcg/actuation AEPB inhaler Inhale 1 puff daily Rinse mouth after use.     • furosemide (LASIX) 20 mg tablet Take 20 mg by mouth 2 (two) times a day     • glucose blood test strip Use to check sugars once a day. Dx E11.9     • hydrOXYzine HCL (ATARAX) 10 mg tablet Take 10 mg by mouth 2 (two) times a day     • Lancets (ONETOUCH DELICA PLUS [id number]) MISC USE 1 TO CHECK GLUCOSE ONCE DAILY     • lisinopril (ZESTRIL) 10 mg tablet Take 10 mg by mouth every morning     • mesalamine (DELZICOL) 400 mg Take 2 capsules by mouth twice daily 360 capsule 3   • metoprolol succinate (TOPROL-XL) 100 mg 24 hr tablet Take 1 tablet (100 mg total) by mouth every 12 (twelve) hours 60 tablet 0   • ONETOUCH VERIO test strip USE 1 STRIP TO CHECK GLUCOSE ONCE DAILY     • pancrelipase, Lip-Prot-Amyl, (Creon) 24,000 units TAKE 3 CAPSULES BY MOUTH THREE TIMES DAILY WITH MEALS (Patient taking differently: Take 24,000 Units by mouth 2 (two) times a day) 270 capsule 5   • SYNTHROID 150 MCG tablet TAKE 1 TABLET BY MOUTH ONCE DAILY FIRST THING IN THE MORNING (AT LEAST 30 MINUTES PRIOR TO BREAKFAST OR OTHER  MEDS)       No current facility-administered medications for this visit.     Current Outpatient Medications on File Prior to Visit   Medication Sig   • albuterol (ACCUNEB) 0.63 MG/3ML nebulizer solution Take 0.63 mg by nebulization every 6 (six) hours as needed for wheezing   • albuterol (PROVENTIL HFA,VENTOLIN HFA) 90 mcg/act inhaler Inhale 2 puffs every 4 (four) hours as needed for wheezing   • apixaban (ELIQUIS) 5 mg Take 5 mg by mouth 2 (two) times a day    • atorvastatin (LIPITOR) 20 mg tablet Take 20 mg by mouth every morning   • Blood Glucose Monitoring Suppl (ONETOUCH VERIO) w/Device KIT Use to check sugars once a day. Dx E11.9   • Cholecalciferol (Vitamin D3) 1.25 MG (19800 UT) CAPS Take 1 capsule by mouth once a week   • dofetilide (TIKOSYN) 500 mcg capsule Take 500 mcg by mouth 2 (two) times a day   • esomeprazole (NexIUM) 40 MG capsule Take 1 capsule (40 mg total) by mouth 2 (two) times a day before meals Take 30 minutes before breakfast and 30 minutes before dinner.   • famotidine (PEPCID) 40 MG tablet TAKE 1 TABLET BY MOUTH ONCE DAILY AT BEDTIME   • fluticasone-umeclidinium-vilanterol (Trelegy Ellipta) 200-62.5-25 mcg/actuation AEPB inhaler Inhale 1 puff daily Rinse mouth after use.   • furosemide (LASIX) 20 mg tablet Take 20 mg by mouth 2 (two) times a day   • glucose blood test strip Use to check sugars once a day. Dx E11.9   • hydrOXYzine HCL (ATARAX) 10 mg tablet Take 10 mg by mouth 2 (two) times a day   • Lancets (ONETOUCH DELICA PLUS [id number]) MISC USE 1 TO CHECK GLUCOSE ONCE DAILY   • lisinopril (ZESTRIL) 10 mg tablet Take 10 mg by mouth every morning   • mesalamine (DELZICOL) 400 mg Take 2 capsules by mouth twice daily   • metoprolol succinate (TOPROL-XL) 100 mg 24 hr tablet Take 1 tablet (100 mg total) by mouth every 12 (twelve) hours   • ONETOUCH VERIO test strip USE 1 STRIP TO CHECK GLUCOSE ONCE DAILY   • pancrelipase, Lip-Prot-Amyl, (Creon) 24,000 units TAKE 3 CAPSULES BY MOUTH THREE TIMES  DAILY WITH MEALS (Patient taking differently: Take 24,000 Units by mouth 2 (two) times a day)   • SYNTHROID 150 MCG tablet TAKE 1 TABLET BY MOUTH ONCE DAILY FIRST THING IN THE MORNING (AT LEAST 30 MINUTES PRIOR TO BREAKFAST OR OTHER MEDS)   • [DISCONTINUED] buPROPion (WELLBUTRIN XL) 150 mg 24 hr tablet Take 1 tablet (150 mg total) by mouth every morning   • [DISCONTINUED] Empagliflozin (JARDIANCE) 10 MG TABS tablet Take 1 tablet (10 mg total) by mouth daily   • [DISCONTINUED] glipiZIDE (GLUCOTROL XL) 5 mg 24 hr tablet TAKE 1 TABLET BY MOUTH ONCE DAILY 30 MINUTES BEFORE A MEAL (REPLACES METFORMIN)   • [DISCONTINUED] Blood Glucose Monitoring Suppl (ONETOUCH VERIO) w/Device KIT daily Use to check glucose   • [DISCONTINUED] docusate sodium (COLACE) 100 mg capsule Take 1 capsule (100 mg total) by mouth 2 (two) times a day   • [DISCONTINUED] fenofibrate (TRICOR) 54 MG tablet Take 54 mg by mouth daily   • [DISCONTINUED] gabapentin (NEURONTIN) 300 mg capsule Take 1 capsule (300 mg total) by mouth daily at bedtime   • [DISCONTINUED] pancrelipase, Lip-Prot-Amyl, (CREON) 24,000 units Take 24,000 units of lipase by mouth 3 (three) times a day with meals     No current facility-administered medications on file prior to visit.     She is allergic to sulfa antibiotics..    Review of Systems   Constitutional:  Positive for activity change and appetite change. Negative for chills and fever.   HENT:  Negative for congestion and rhinorrhea.    Eyes:  Negative for visual disturbance.   Respiratory:  Positive for shortness of breath. Negative for chest tightness.    Cardiovascular:  Negative for chest pain and palpitations.   Gastrointestinal:  Negative for abdominal pain, blood in stool, diarrhea, nausea and vomiting.   Endocrine: Negative for polydipsia, polyphagia and polyuria.        As per HPI   Genitourinary:  Negative for dysuria, frequency and urgency.   Musculoskeletal:  Positive for arthralgias and back pain. Negative for gait  "problem.   Skin:  Negative for color change.   Neurological:  Negative for dizziness and headaches.   Hematological:  Does not bruise/bleed easily.   Psychiatric/Behavioral:  Negative for confusion and sleep disturbance. The patient is not nervous/anxious.      Constitutional: Negative for activity change, appetite change, chills and fever.   HENT: Negative for congestion and rhinorrhea.    Eyes: Negative for visual disturbance.   Respiratory: Positive for shortness of breath. Negative for chest tightness.  Cardiovascular: Negative for chest pain and palpitations.   Gastrointestinal: Negative for abdominal pain, blood in stool, diarrhea, nausea and vomiting.   Endocrine: Negative for polydipsia, polyphagia and polyuria.   Genitourinary: Negative for dysuria, frequency and urgency.   Musculoskeletal: Positive for back pain. Negative for gait problem.   Skin: Negative for color change.   Neurological: Negative for dizziness and headaches.   Hematological: Does not bruise/bleed easily.   Psychiatric/Behavioral: Negative for confusion and sleep disturbance. The patient is not nervous/anxious.      Objective:  /70 (BP Location: Left arm, Patient Position: Sitting, Cuff Size: Large)   Pulse 57   Temp (!) 97.2 °F (36.2 °C)   Ht 5' 9.5\" (1.765 m)   Wt 105 kg (232 lb)   SpO2 96%   BMI 33.77 kg/m²          Physical Exam  Vitals and nursing note reviewed.   Constitutional:       General: She is not in acute distress.     Appearance: She is well-developed and well-groomed.   HENT:      Head: Normocephalic and atraumatic.   Eyes:      General:         Right eye: No discharge.         Left eye: No discharge.      Conjunctiva/sclera: Conjunctivae normal.      Pupils: Pupils are equal, round, and reactive to light.   Cardiovascular:      Rate and Rhythm: Normal rate. Rhythm irregularly irregular.      Heart sounds: Normal heart sounds. No murmur heard.     No friction rub. No gallop.   Pulmonary:      Effort: No " respiratory distress.      Breath sounds: Decreased breath sounds present. No wheezing or rales.   Abdominal:      General: Bowel sounds are normal. There is no distension.      Tenderness: There is no abdominal tenderness.   Lymphadenopathy:      Cervical: No cervical adenopathy.   Skin:     General: Skin is warm and dry.   Neurological:      Mental Status: She is alert and oriented to person, place, and time.   Psychiatric:         Behavior: Behavior normal. Behavior is cooperative.         Cognition and Memory: Cognition and memory normal.     Below is the patient's most recent value for Albumin, ALT, AST, BUN, Calcium, Chloride, Cholesterol, CO2, Creatinine, GFR, Glucose, HDL, Hematocrit, Hemoglobin, Hemoglobin A1C, LDL, Magnesium, Phosphorus, Platelets, Potassium, PSA, Sodium, Triglycerides, and WBC.   Lab Results   Component Value Date    ALT 20 2023    AST 21 2023    BUN 12 2023    CALCIUM 8.9 2023     2023    CHOL 244 (H) 2018    CO2 31 2023    CREATININE 0.85 2023    HDL 39 (L) 2023    HCT 38.7 2023    HGB 11.2 (L) 2023    HGBA1C 7.2 (H) 2023    MG 1.8 10/14/2022    PHOS 4.0 2020     2023    K 3.5 2023     2018    TRIG 144 2023    WBC 8.31 2023     Note: for a comprehensive list of the patient's lab results, access the Results Review activity.    I have personally reviewed results with the patient.  Liver gradin% to 33%. She is on stage one.  Elastography: No scarring detected.   Albumin level: 2.8 g/dL. It is 3.8 g/dL in 2023.  Potassium level: Within normal limits.   Her previous hemoglobin A1c was 7.2%.        Transcribed for Ina Rivas MD, by Ryan Chapa on 24 at 10:15 PM. Powered by [employer].

## 2024-02-08 NOTE — ASSESSMENT & PLAN NOTE
Recent laboratory testing reviewed with her.  Previous laboratory testing reviewed with her.  Hemoglobin A1c remains above goal.  Discussed goals.  Will have her discontinue the glipizide.  Will increase the dose of the Jardiance.  Discussed potential addition of Ozempic or something similar in the future.  Discussed potential benefits of these medications.  Follow-up with ophthalmology regularly.  Check feet daily.

## 2024-02-09 NOTE — ASSESSMENT & PLAN NOTE
Continue with B12 supplementation.  Will check repeat laboratory testing.  Discussed possibly the need for regular injections if this remains low.

## 2024-02-09 NOTE — ASSESSMENT & PLAN NOTE
Previous imaging reviewed.  Continue with a slow but steady weight loss.  Watch diet.  Try to increase activity level.

## 2024-02-09 NOTE — ASSESSMENT & PLAN NOTE
Chronic back issues discussed.  Watch for any worsening.  Continue to follow-up with pain management if needed.

## 2024-02-09 NOTE — ASSESSMENT & PLAN NOTE
Wt Readings from Last 3 Encounters:   02/08/24 105 kg (232 lb)   02/01/24 105 kg (232 lb 6.4 oz)   12/29/23 107 kg (236 lb 12.8 oz)     Continue to follow-up with cardiology.  Watch for any increased shortness of breath, increasing peripheral edema, or sudden increases in weight.  Continue with the slow but steady weight loss.

## 2024-02-09 NOTE — ASSESSMENT & PLAN NOTE
Congratulated her on her weight loss.  Continue to watch diet.  Will increase the dose of the Jardiance.  Discussed with her medication such as Ozempic which may help not only with weight loss but also help to control her blood sugar better and lower cardiac and renal risks.

## 2024-02-09 NOTE — ASSESSMENT & PLAN NOTE
GI symptoms currently stable.  Continue to follow-up with GI.  Recent note with Dr. Stanton discussed.  Continue with the mesalamine.

## 2024-02-09 NOTE — ASSESSMENT & PLAN NOTE
Mood is improved significantly.  Wants to try a trial off of the Wellbutrin.  Watch for any worsening of depression or anxiety symptoms.

## 2024-02-09 NOTE — ASSESSMENT & PLAN NOTE
Heart rate currently controlled.  Continue with the anticoagulation with the Eliquis.  Be very careful to avoid falls.  Continue to follow-up with cardiology.   no hematuria/normal urinary frequency

## 2024-02-09 NOTE — ASSESSMENT & PLAN NOTE
Thing has been relatively stable.  Watch for any worsening with exertion.  Continue current inhalers.  Continue with the Trelegy and use rescue inhaler if needed.

## 2024-02-09 NOTE — ASSESSMENT & PLAN NOTE
Previous laboratory testing reviewed with her.  Continue current dose of the Synthroid.  Continue to follow TSH and adjust dose if needed.

## 2024-02-19 ENCOUNTER — OFFICE VISIT (OUTPATIENT)
Dept: INTERNAL MEDICINE CLINIC | Facility: CLINIC | Age: 69
End: 2024-02-19
Payer: COMMERCIAL

## 2024-02-19 DIAGNOSIS — J01.10 ACUTE NON-RECURRENT FRONTAL SINUSITIS: Primary | ICD-10-CM

## 2024-02-19 DIAGNOSIS — H65.192 ACUTE EFFUSION OF LEFT EAR: ICD-10-CM

## 2024-02-19 PROCEDURE — 99214 OFFICE O/P EST MOD 30 MIN: CPT | Performed by: NURSE PRACTITIONER

## 2024-02-19 RX ORDER — AMOXICILLIN 875 MG/1
875 TABLET, COATED ORAL 2 TIMES DAILY
Qty: 20 TABLET | Refills: 0 | Status: SHIPPED | OUTPATIENT
Start: 2024-02-19 | End: 2024-02-29

## 2024-02-19 RX ORDER — FLUTICASONE PROPIONATE 50 MCG
1 SPRAY, SUSPENSION (ML) NASAL DAILY
Qty: 1 G | Refills: 3 | Status: SHIPPED | OUTPATIENT
Start: 2024-02-19

## 2024-02-19 RX ORDER — METHYLPREDNISOLONE 4 MG/1
TABLET ORAL
Qty: 21 EACH | Refills: 0 | Status: SHIPPED | OUTPATIENT
Start: 2024-02-19

## 2024-02-19 NOTE — PROGRESS NOTES
Name: Kellen Gray      : 1955      MRN: [id number]  Encounter Provider: JOSH Eden  Encounter Date: 2024   Encounter department: [facility]    Assessment & Plan Will start on Amoxil, medrol and Flonase take as directed. Take Tylenol for pain. Will follow up if any worsening of symptoms.      1. Acute non-recurrent frontal sinusitis  -     amoxicillin (AMOXIL) 875 mg tablet; Take 1 tablet (875 mg total) by mouth 2 (two) times a day for 10 days  -     methylPREDNISolone 4 MG tablet therapy pack; Use as directed on package  -     fluticasone (FLONASE) 50 mcg/act nasal spray; 1 spray into each nostril daily    2. Acute effusion of left ear  -     methylPREDNISolone 4 MG tablet therapy pack; Use as directed on package  -     fluticasone (FLONASE) 50 mcg/act nasal spray; 1 spray into each nostril daily           Subjective      Marta is for an acute visit. She states she was having horrible ear pain and did use drops the other night. The pain was so severe she did take a pain pill. She is having ear pain and feels like she has a sore throat and congestion. She denies any fever. She was exposed to strep throat by her grandson. She offers no other issues.      Review of Systems   HENT:  Positive for congestion, ear pain and sore throat.    All other systems reviewed and are negative.      Current Outpatient Medications on File Prior to Visit   Medication Sig    albuterol (ACCUNEB) 0.63 MG/3ML nebulizer solution Take 0.63 mg by nebulization every 6 (six) hours as needed for wheezing    albuterol (PROVENTIL HFA,VENTOLIN HFA) 90 mcg/act inhaler Inhale 2 puffs every 4 (four) hours as needed for wheezing    apixaban (ELIQUIS) 5 mg Take 5 mg by mouth 2 (two) times a day     atorvastatin (LIPITOR) 20 mg tablet Take 20 mg by mouth every morning    Blood Glucose Monitoring Suppl (ONETOUCH VERIO) w/Device KIT Use to check sugars once a day. Dx E11.9    Cholecalciferol (Vitamin  D3) 1.25 MG (03474 UT) CAPS Take 1 capsule by mouth once a week    dofetilide (TIKOSYN) 500 mcg capsule Take 500 mcg by mouth 2 (two) times a day    Empagliflozin 25 MG TABS Take 1 tablet (25 mg total) by mouth daily    esomeprazole (NexIUM) 40 MG capsule Take 1 capsule (40 mg total) by mouth 2 (two) times a day before meals Take 30 minutes before breakfast and 30 minutes before dinner.    famotidine (PEPCID) 40 MG tablet TAKE 1 TABLET BY MOUTH ONCE DAILY AT BEDTIME    fluticasone-umeclidinium-vilanterol (Trelegy Ellipta) 200-62.5-25 mcg/actuation AEPB inhaler Inhale 1 puff daily Rinse mouth after use.    furosemide (LASIX) 20 mg tablet Take 20 mg by mouth 2 (two) times a day    glucose blood test strip Use to check sugars once a day. Dx E11.9    hydrOXYzine HCL (ATARAX) 10 mg tablet Take 10 mg by mouth 2 (two) times a day    Lancets (ONETOUCH DELICA PLUS [id number]) MISC USE 1 TO CHECK GLUCOSE ONCE DAILY    lisinopril (ZESTRIL) 10 mg tablet Take 10 mg by mouth every morning    mesalamine (DELZICOL) 400 mg Take 2 capsules by mouth twice daily    metoprolol succinate (TOPROL-XL) 100 mg 24 hr tablet Take 1 tablet (100 mg total) by mouth every 12 (twelve) hours    ONETOUCH VERIO test strip USE 1 STRIP TO CHECK GLUCOSE ONCE DAILY    pancrelipase, Lip-Prot-Amyl, (Creon) 24,000 units TAKE 3 CAPSULES BY MOUTH THREE TIMES DAILY WITH MEALS (Patient taking differently: Take 24,000 Units by mouth 2 (two) times a day)    SYNTHROID 150 MCG tablet TAKE 1 TABLET BY MOUTH ONCE DAILY FIRST THING IN THE MORNING (AT LEAST 30 MINUTES PRIOR TO BREAKFAST OR OTHER MEDS)       Objective     There were no vitals taken for this visit.    Physical Exam  Vitals reviewed.   Constitutional:       Appearance: Normal appearance. She is normal weight.   HENT:      Right Ear: Tympanic membrane, ear canal and external ear normal.      Ears:      Comments: Effusion noted to left TM     Nose: Congestion present.      Mouth/Throat:      Mouth: Mucous  membranes are moist.      Pharynx: Oropharynx is clear.   Cardiovascular:      Rate and Rhythm: Normal rate and regular rhythm.      Pulses: Normal pulses.      Heart sounds: Normal heart sounds.   Pulmonary:      Effort: Pulmonary effort is normal.      Breath sounds: Normal breath sounds.   Skin:     General: Skin is warm and dry.      Capillary Refill: Capillary refill takes less than 2 seconds.   Neurological:      General: No focal deficit present.      Mental Status: She is alert and oriented to person, place, and time. Mental status is at baseline.   Psychiatric:         Mood and Affect: Mood normal.         Behavior: Behavior normal.         Thought Content: Thought content normal.         Judgment: Judgment normal.       JOSH Eden

## 2024-02-21 PROBLEM — J18.9 PNEUMONIA DUE TO INFECTIOUS ORGANISM: Status: RESOLVED | Noted: 2019-11-29 | Resolved: 2024-02-21

## 2024-02-21 PROBLEM — H65.192 ACUTE EFFUSION OF LEFT EAR: Status: RESOLVED | Noted: 2024-02-19 | Resolved: 2024-02-21

## 2024-02-21 PROBLEM — J01.10 ACUTE NON-RECURRENT FRONTAL SINUSITIS: Status: RESOLVED | Noted: 2024-02-19 | Resolved: 2024-02-21

## 2024-02-22 DIAGNOSIS — I48.91 ATRIAL FIBRILLATION WITH RAPID VENTRICULAR RESPONSE (HCC): ICD-10-CM

## 2024-02-23 RX ORDER — METOPROLOL SUCCINATE 100 MG/1
100 TABLET, EXTENDED RELEASE ORAL EVERY 12 HOURS
Qty: 60 TABLET | Refills: 3 | Status: SHIPPED | OUTPATIENT
Start: 2024-02-23

## 2024-03-05 ENCOUNTER — TELEPHONE (OUTPATIENT)
Dept: INTERNAL MEDICINE CLINIC | Facility: CLINIC | Age: 69
End: 2024-03-05

## 2024-03-05 NOTE — TELEPHONE ENCOUNTER
Patient called stating that her ears are bothering her again, feel blocked.  They were better after the steroid and antibiotic from last visit, but now starting again.

## 2024-03-06 DIAGNOSIS — I48.91 ATRIAL FIBRILLATION WITH RAPID VENTRICULAR RESPONSE (HCC): Primary | ICD-10-CM

## 2024-03-06 DIAGNOSIS — F41.9 ANXIETY: ICD-10-CM

## 2024-03-07 RX ORDER — HYDROXYZINE HYDROCHLORIDE 10 MG/1
10 TABLET, FILM COATED ORAL 2 TIMES DAILY
Qty: 30 TABLET | Refills: 0 | Status: SHIPPED | OUTPATIENT
Start: 2024-03-07

## 2024-03-08 DIAGNOSIS — H93.8X3 EAR FULLNESS, BILATERAL: Primary | ICD-10-CM

## 2024-03-13 ENCOUNTER — APPOINTMENT (OUTPATIENT)
Dept: LAB | Facility: HOSPITAL | Age: 69
End: 2024-03-13
Payer: COMMERCIAL

## 2024-03-13 ENCOUNTER — HOSPITAL ENCOUNTER (OUTPATIENT)
Dept: BONE DENSITY | Facility: HOSPITAL | Age: 69
Discharge: HOME | End: 2024-03-13
Payer: COMMERCIAL

## 2024-03-13 VITALS — BODY MASS INDEX: 33.18 KG/M2 | WEIGHT: 224 LBS | HEIGHT: 69 IN

## 2024-03-13 DIAGNOSIS — I48.19 PERSISTENT ATRIAL FIBRILLATION (HCC): Chronic | ICD-10-CM

## 2024-03-13 DIAGNOSIS — I50.32 CHRONIC HEART FAILURE WITH PRESERVED EJECTION FRACTION (HCC): ICD-10-CM

## 2024-03-13 DIAGNOSIS — F33.41 RECURRENT MAJOR DEPRESSIVE DISORDER, IN PARTIAL REMISSION (HCC): Chronic | ICD-10-CM

## 2024-03-13 DIAGNOSIS — K50.919 CROHN'S DISEASE WITH COMPLICATION, UNSPECIFIED GASTROINTESTINAL TRACT LOCATION (HCC): ICD-10-CM

## 2024-03-13 DIAGNOSIS — E66.09 CLASS 1 OBESITY DUE TO EXCESS CALORIES WITH SERIOUS COMORBIDITY AND BODY MASS INDEX (BMI) OF 33.0 TO 33.9 IN ADULT: ICD-10-CM

## 2024-03-13 DIAGNOSIS — I10 ESSENTIAL HYPERTENSION: ICD-10-CM

## 2024-03-13 DIAGNOSIS — E53.8 VITAMIN B12 DEFICIENCY: ICD-10-CM

## 2024-03-13 DIAGNOSIS — E55.9 VITAMIN D DEFICIENCY: ICD-10-CM

## 2024-03-13 DIAGNOSIS — E11.9 TYPE 2 DIABETES MELLITUS WITHOUT COMPLICATION, WITHOUT LONG-TERM CURRENT USE OF INSULIN (HCC): ICD-10-CM

## 2024-03-13 DIAGNOSIS — E78.00 HYPERCHOLESTEREMIA: ICD-10-CM

## 2024-03-13 DIAGNOSIS — S22.080A WEDGE COMPRESSION FRACTURE OF T11-T12 VERTEBRA, INITIAL ENCOUNTER FOR CLOSED FRACTURE (HCC): ICD-10-CM

## 2024-03-13 DIAGNOSIS — E89.0 POSTOPERATIVE HYPOTHYROIDISM: ICD-10-CM

## 2024-03-13 DIAGNOSIS — Z78.0 POSTMENOPAUSAL: ICD-10-CM

## 2024-03-13 LAB
25(OH)D3 SERPL-MCNC: 73.9 NG/ML (ref 30–100)
ALBUMIN SERPL BCP-MCNC: 3.7 G/DL (ref 3.5–5)
ALP SERPL-CCNC: 98 U/L (ref 34–104)
ALT SERPL W P-5'-P-CCNC: 29 U/L (ref 7–52)
ANION GAP SERPL CALCULATED.3IONS-SCNC: 9 MMOL/L (ref 4–13)
AST SERPL W P-5'-P-CCNC: 24 U/L (ref 13–39)
BASOPHILS # BLD AUTO: 0.03 THOUSANDS/ÂΜL (ref 0–0.1)
BASOPHILS NFR BLD AUTO: 0 % (ref 0–1)
BILIRUB SERPL-MCNC: 0.3 MG/DL (ref 0.2–1)
BUN SERPL-MCNC: 10 MG/DL (ref 5–25)
CALCIUM SERPL-MCNC: 9.4 MG/DL (ref 8.4–10.2)
CHLORIDE SERPL-SCNC: 103 MMOL/L (ref 96–108)
CHOLEST SERPL-MCNC: 158 MG/DL
CO2 SERPL-SCNC: 29 MMOL/L (ref 21–32)
CREAT SERPL-MCNC: 0.76 MG/DL (ref 0.6–1.3)
EOSINOPHIL # BLD AUTO: 0.12 THOUSAND/ÂΜL (ref 0–0.61)
EOSINOPHIL NFR BLD AUTO: 2 % (ref 0–6)
ERYTHROCYTE [DISTWIDTH] IN BLOOD BY AUTOMATED COUNT: 15.6 % (ref 11.6–15.1)
EST. AVERAGE GLUCOSE BLD GHB EST-MCNC: 169 MG/DL
GFR SERPL CREATININE-BSD FRML MDRD: 81 ML/MIN/1.73SQ M
GLUCOSE SERPL-MCNC: 163 MG/DL (ref 65–140)
HBA1C MFR BLD: 7.5 %
HCT VFR BLD AUTO: 43 % (ref 34.8–46.1)
HDLC SERPL-MCNC: 41 MG/DL
HGB BLD-MCNC: 12.8 G/DL (ref 11.5–15.4)
IMM GRANULOCYTES # BLD AUTO: 0.01 THOUSAND/UL (ref 0–0.2)
IMM GRANULOCYTES NFR BLD AUTO: 0 % (ref 0–2)
INR PPP: 1.22 (ref 0.84–1.19)
LDLC SERPL CALC-MCNC: 80 MG/DL (ref 0–100)
LYMPHOCYTES # BLD AUTO: 2.44 THOUSANDS/ÂΜL (ref 0.6–4.47)
LYMPHOCYTES NFR BLD AUTO: 34 % (ref 14–44)
MCH RBC QN AUTO: 25.3 PG (ref 26.8–34.3)
MCHC RBC AUTO-ENTMCNC: 29.8 G/DL (ref 31.4–37.4)
MCV RBC AUTO: 85 FL (ref 82–98)
MONOCYTES # BLD AUTO: 0.49 THOUSAND/ÂΜL (ref 0.17–1.22)
MONOCYTES NFR BLD AUTO: 7 % (ref 4–12)
NEUTROPHILS # BLD AUTO: 4.06 THOUSANDS/ÂΜL (ref 1.85–7.62)
NEUTS SEG NFR BLD AUTO: 57 % (ref 43–75)
NONHDLC SERPL-MCNC: 117 MG/DL
NRBC BLD AUTO-RTO: 0 /100 WBCS
PLATELET # BLD AUTO: 282 THOUSANDS/UL (ref 149–390)
PMV BLD AUTO: 9.5 FL (ref 8.9–12.7)
POTASSIUM SERPL-SCNC: 3.5 MMOL/L (ref 3.5–5.3)
PROT SERPL-MCNC: 7.3 G/DL (ref 6.4–8.4)
PROTHROMBIN TIME: 15.3 SECONDS (ref 11.6–14.5)
RBC # BLD AUTO: 5.06 MILLION/UL (ref 3.81–5.12)
SODIUM SERPL-SCNC: 141 MMOL/L (ref 135–147)
TRIGL SERPL-MCNC: 185 MG/DL
TSH SERPL DL<=0.05 MIU/L-ACNC: 0.28 UIU/ML (ref 0.45–4.5)
VIT B12 SERPL-MCNC: 225 PG/ML (ref 180–914)
WBC # BLD AUTO: 7.15 THOUSAND/UL (ref 4.31–10.16)

## 2024-03-13 PROCEDURE — 85025 COMPLETE CBC W/AUTO DIFF WBC: CPT

## 2024-03-13 PROCEDURE — 84443 ASSAY THYROID STIM HORMONE: CPT

## 2024-03-13 PROCEDURE — 77080 DXA BONE DENSITY AXIAL: CPT

## 2024-03-13 PROCEDURE — 82306 VITAMIN D 25 HYDROXY: CPT

## 2024-03-13 PROCEDURE — 83036 HEMOGLOBIN GLYCOSYLATED A1C: CPT

## 2024-03-13 PROCEDURE — 80061 LIPID PANEL: CPT

## 2024-03-13 PROCEDURE — 82607 VITAMIN B-12: CPT

## 2024-03-24 DIAGNOSIS — K21.9 GASTROESOPHAGEAL REFLUX DISEASE, UNSPECIFIED WHETHER ESOPHAGITIS PRESENT: ICD-10-CM

## 2024-03-24 DIAGNOSIS — K44.9 HIATAL HERNIA: ICD-10-CM

## 2024-03-24 DIAGNOSIS — K50.919 CROHN'S DISEASE WITH COMPLICATION, UNSPECIFIED GASTROINTESTINAL TRACT LOCATION (HCC): ICD-10-CM

## 2024-03-25 RX ORDER — MESALAMINE 400 MG/1
CAPSULE, DELAYED RELEASE ORAL
Qty: 360 CAPSULE | Refills: 0 | Status: SHIPPED | OUTPATIENT
Start: 2024-03-25

## 2024-03-25 RX ORDER — ESOMEPRAZOLE MAGNESIUM 40 MG/1
CAPSULE, DELAYED RELEASE ORAL
Qty: 30 CAPSULE | Refills: 5 | Status: SHIPPED | OUTPATIENT
Start: 2024-03-25

## 2024-03-30 DIAGNOSIS — K64.9 HEMORRHOIDS, UNSPECIFIED HEMORRHOID TYPE: ICD-10-CM

## 2024-03-30 DIAGNOSIS — F41.9 ANXIETY: ICD-10-CM

## 2024-04-01 ENCOUNTER — HOSPITAL ENCOUNTER (OUTPATIENT)
Dept: ULTRASOUND IMAGING | Facility: HOSPITAL | Age: 69
Discharge: HOME | End: 2024-04-01
Attending: INTERNAL MEDICINE
Payer: COMMERCIAL

## 2024-04-01 DIAGNOSIS — E66.01 SEVERE OBESITY (BMI 35.0-39.9) WITH COMORBIDITY (HCC): ICD-10-CM

## 2024-04-01 DIAGNOSIS — K74.00 LIVER FIBROSIS: ICD-10-CM

## 2024-04-01 DIAGNOSIS — K76.0 HEPATIC STEATOSIS: ICD-10-CM

## 2024-04-01 PROCEDURE — 76700 US EXAM ABDOM COMPLETE: CPT

## 2024-04-01 RX ORDER — HYDROXYZINE HYDROCHLORIDE 10 MG/1
10 TABLET, FILM COATED ORAL 2 TIMES DAILY
Qty: 60 TABLET | Refills: 1 | Status: SHIPPED | OUTPATIENT
Start: 2024-04-01

## 2024-04-01 RX ORDER — GABAPENTIN 300 MG/1
CAPSULE ORAL
Qty: 30 CAPSULE | Refills: 0 | Status: SHIPPED | OUTPATIENT
Start: 2024-04-01

## 2024-04-04 ENCOUNTER — OFFICE VISIT (OUTPATIENT)
Dept: CARDIOLOGY CLINIC | Facility: HOSPITAL | Age: 69
End: 2024-04-04
Payer: COMMERCIAL

## 2024-04-04 VITALS
HEART RATE: 63 BPM | SYSTOLIC BLOOD PRESSURE: 108 MMHG | BODY MASS INDEX: 33.65 KG/M2 | WEIGHT: 222 LBS | HEIGHT: 68 IN | DIASTOLIC BLOOD PRESSURE: 60 MMHG

## 2024-04-04 DIAGNOSIS — I48.0 PAROXYSMAL ATRIAL FIBRILLATION (HCC): Primary | ICD-10-CM

## 2024-04-04 DIAGNOSIS — Z86.79 S/P ABLATION OF ATRIAL FIBRILLATION: ICD-10-CM

## 2024-04-04 DIAGNOSIS — G47.33 SEVERE OBSTRUCTIVE SLEEP APNEA: ICD-10-CM

## 2024-04-04 DIAGNOSIS — I50.32 CHRONIC HEART FAILURE WITH PRESERVED EJECTION FRACTION (HCC): ICD-10-CM

## 2024-04-04 DIAGNOSIS — Z98.890 S/P ABLATION OF ATRIAL FIBRILLATION: ICD-10-CM

## 2024-04-04 PROCEDURE — 99214 OFFICE O/P EST MOD 30 MIN: CPT | Performed by: INTERNAL MEDICINE

## 2024-04-05 PROBLEM — I11.0 HYPERTENSIVE HEART DISEASE WITH HEART FAILURE (HCC): Status: RESOLVED | Noted: 2022-03-16 | Resolved: 2024-04-05

## 2024-04-05 PROBLEM — I48.19 PERSISTENT ATRIAL FIBRILLATION (HCC): Chronic | Status: RESOLVED | Noted: 2020-01-16 | Resolved: 2024-04-05

## 2024-04-05 NOTE — PROGRESS NOTES
Cardiology Follow Up    Kellen Gray  1955  [id number]  [facility] [address] [id number] [phone]    1. Paroxysmal atrial fibrillation (HCC)        2. Severe obstructive sleep apnea        3. S/P ablation of atrial fibrillation        4. Chronic heart failure with preserved ejection fraction (HCC)              Discussion/Summary: All of her assessed cardiac problems are stable. I have reviewed her medications and made no changes. No cardiac testing is ordered.  RTO 9 months.      Interval History: She has not had any cardiac problems since her last OV. Her weight is down from 236 to 222 lbs. She feel like she has more energy.  She denies CP, palpitations.  She does get SOB at moderate levels of activity.    /60    Creatinine 0.76    She had AF ablation on 6/18/2019. Patient has done well on Tikosyn without further atrial fibrillation. Wore a ZIO patch monitor in March 2022 which revealed no recurrent atrial fibrillation. Brief bursts of atrial tachycardia. Had undergone echocardiogram in April 2022 with normal LVEF and no significant valvular heart disease.     Patient Active Problem List   Diagnosis    Lymphadenopathy    A-fib (HCC)    Elevated TSH    Hypercholesteremia    Transaminitis    Microscopic hematuria    Pulmonary hypertension (HCC)    Postoperative hypothyroidism    Brain aneurysm    Atypical chest pain    Dizziness    Crohn's disease with complication (HCC)    Elevated d-dimer    Hypokalemia    COPD (chronic obstructive pulmonary disease) (HCC)    Severe obstructive sleep apnea    Diarrhea    Atherosclerosis    Prolonged QT interval    Hepatomegaly    Hepatic steatosis    Colon polyp    Multiple renal cysts    T12 compression fracture (HCC)    Diabetes mellitus, type 2 (HCC)    Fracture of right tibia    Chronic pain syndrome    Chronic bilateral low back pain without  sciatica    Lumbar spondylosis    Lumbar radiculopathy    Hemorrhoids    Urinary retention    Essential hypertension    GERD (gastroesophageal reflux disease)    Generalized abdominal pain    (HFpEF) heart failure with preserved ejection fraction (HCC)    Atypical ductal hyperplasia of right breast    BPPV (benign paroxysmal positional vertigo)    Congenital multiple renal cysts    Gastroesophageal reflux disease with esophagitis    History of cerebral aneurysm repair    History of colon polyps    History of renal calculi    History of thyroid cancer    History of total thyroidectomy    History of vertebral fracture    Impaired hearing    Iron deficiency anemia secondary to inadequate dietary iron intake    Leukocytosis    Recurrent major depressive disorder (HCC)    Restless legs syndrome    S/P ablation of atrial fibrillation    Sensorineural hearing loss, bilateral    Class 1 obesity due to excess calories with serious comorbidity and body mass index (BMI) of 33.0 to 33.9 in adult    Thymus neoplasm    Folic acid deficiency    Vitamin D deficiency    Wedge compression fracture of T11-T12 vertebra, initial encounter for closed fracture (HCC)    Vitamin B12 deficiency    Hiatal hernia    Exocrine pancreatic insufficiency     Past Medical History:   Diagnosis Date    A-fib (HCC)     Acute respiratory failure with hypoxia (HCC) 11/30/2019    Asthma     Brain aneurysm     Cancer (HCC)     papillary thyroid cancer    Cardiac disease     CHF (congestive heart failure) (HCC)     COPD (chronic obstructive pulmonary disease) (HCC)     CPAP (continuous positive airway pressure) dependence     Crohn disease (HCC)     Diabetes mellitus (HCC)     Disease of thyroid gland     GERD (gastroesophageal reflux disease)     Hyperlipidemia     Hypertension     Sleep apnea      Social History     Socioeconomic History    Marital status: /Civil Union     Spouse name: Not on file    Number of children: Not on file    Years of  education: Not on file    Highest education level: Not on file   Occupational History    Not on file   Tobacco Use    Smoking status: Former     Current packs/day: 0.00     Types: Cigarettes     Quit date: 2018     Years since quittin.2    Smokeless tobacco: Never   Vaping Use    Vaping status: Never Used   Substance and Sexual Activity    Alcohol use: Not Currently     Comment: social    Drug use: Not Currently     Types: Marijuana    Sexual activity: Not on file   Other Topics Concern    Not on file   Social History Narrative    Not on file     Social Determinants of Health     Financial Resource Strain: Low Risk  (2023)    Overall Financial Resource Strain (CARDIA)     Difficulty of Paying Living Expenses: Not very hard   Food Insecurity: Unknown (2023)    Received from [facility]    Hunger Vital Sign     Worried About Running Out of Food in the Last Year: Patient declined     Ran Out of Food in the Last Year: Patient declined   Transportation Needs: No Transportation Needs (2023)    PRAPARE - Transportation     Lack of Transportation (Medical): No     Lack of Transportation (Non-Medical): No   Physical Activity: Not on file   Stress: Not on file   Social Connections: Not on file   Intimate Partner Violence: Not on file   Housing Stability: Low Risk  (2022)    Housing Stability Vital Sign     Unable to Pay for Housing in the Last Year: No     Number of Places Lived in the Last Year: 1     Unstable Housing in the Last Year: No      Family History   Problem Relation Age of Onset    Alzheimer's disease Mother     Cancer Father         lung and bone    Heart disease Sister     Hypertension Sister     Diabetes Sister     Stroke Sister     Cancer Sister     Cancer Brother         liver with mets to bone    Heart disease Brother     Cancer Brother         thyroid cancer     Past Surgical History:   Procedure Laterality Date    APPENDECTOMY      CARDIOVERSION N/A 2019    Procedure:  CARDIOVERSION;  Surgeon: Lee Brenner MD;  Location: [facility];  Service: Cardiology    CEREBRAL ANEURYSM REPAIR      CHOLECYSTECTOMY      HYSTERECTOMY      IR PELVIC ANGIOGRAM  12/14/2017    ID EXC THROMBOSED HEMORRHOID XTRNL N/A 10/7/2022    Procedure: HEMORRHOIDECTOMY EXCISION;  Surgeon: Leonardo Cash DO;  Location: [facility];  Service: General    THYROIDECTOMY      TONSILLECTOMY AND ADENOIDECTOMY         Current Outpatient Medications:     albuterol (ACCUNEB) 0.63 MG/3ML nebulizer solution, Take 0.63 mg by nebulization every 6 (six) hours as needed for wheezing, Disp: , Rfl:     albuterol (PROVENTIL HFA,VENTOLIN HFA) 90 mcg/act inhaler, Inhale 2 puffs every 4 (four) hours as needed for wheezing, Disp: , Rfl:     apixaban (ELIQUIS) 5 mg, Take 1 tablet (5 mg total) by mouth 2 (two) times a day, Disp: 90 tablet, Rfl: 3    atorvastatin (LIPITOR) 20 mg tablet, Take 20 mg by mouth every morning, Disp: , Rfl:     Blood Glucose Monitoring Suppl (ONETOUCH VERIO) w/Device KIT, Use to check sugars once a day. Dx E11.9, Disp: , Rfl:     Cholecalciferol (Vitamin D3) 1.25 MG (60616 UT) CAPS, Take 1 capsule by mouth once a week, Disp: , Rfl:     dofetilide (TIKOSYN) 500 mcg capsule, Take 500 mcg by mouth 2 (two) times a day, Disp: , Rfl:     Empagliflozin 25 MG TABS, Take 1 tablet (25 mg total) by mouth daily, Disp: 90 tablet, Rfl: 3    esomeprazole (NexIUM) 40 MG capsule, TAKE 1 CAPSULE BY MOUTH TWICE DAILY BEFORE MEAL(S) TAKE 30 MINUTES BEFORE BREAKFAST AND 30 MINUTES BEFORE DINNER, Disp: 30 capsule, Rfl: 5    famotidine (PEPCID) 40 MG tablet, TAKE 1 TABLET BY MOUTH ONCE DAILY AT BEDTIME, Disp: 30 tablet, Rfl: 5    fluticasone (FLONASE) 50 mcg/act nasal spray, 1 spray into each nostril daily, Disp: 1 g, Rfl: 3    fluticasone-umeclidinium-vilanterol (Trelegy Ellipta) 200-62.5-25 mcg/actuation AEPB inhaler, Inhale 1 puff daily Rinse mouth after use., Disp: , Rfl:     furosemide (LASIX) 20 mg tablet, Take 20 mg by mouth  "2 (two) times a day, Disp: , Rfl:     gabapentin (NEURONTIN) 300 mg capsule, Take 1 capsule by mouth twice daily, Disp: 30 capsule, Rfl: 0    glucose blood test strip, Use to check sugars once a day. Dx E11.9, Disp: , Rfl:     hydrOXYzine HCL (ATARAX) 10 mg tablet, Take 1 tablet by mouth twice daily, Disp: 60 tablet, Rfl: 1    Lancets (ONETOUCH DELICA PLUS LTEOHE14K) MISC, USE 1 TO CHECK GLUCOSE ONCE DAILY, Disp: , Rfl:     lisinopril (ZESTRIL) 10 mg tablet, Take 10 mg by mouth every morning, Disp: , Rfl:     mesalamine (DELZICOL) 400 mg, Take 2 capsules by mouth twice daily, Disp: 360 capsule, Rfl: 0    metoprolol succinate (TOPROL-XL) 100 mg 24 hr tablet, TAKE 1 TABLET BY MOUTH EVERY 12 HOURS, Disp: 60 tablet, Rfl: 3    ONETOUCH VERIO test strip, USE 1 STRIP TO CHECK GLUCOSE ONCE DAILY, Disp: , Rfl:     pancrelipase, Lip-Prot-Amyl, (Creon) 24,000 units, TAKE 3 CAPSULES BY MOUTH THREE TIMES DAILY WITH MEALS (Patient taking differently: Take 24,000 Units by mouth 2 (two) times a day), Disp: 270 capsule, Rfl: 5    SYNTHROID 150 MCG tablet, TAKE 1 TABLET BY MOUTH ONCE DAILY FIRST THING IN THE MORNING (AT LEAST 30 MINUTES PRIOR TO BREAKFAST OR OTHER MEDS), Disp: , Rfl:     methylPREDNISolone 4 MG tablet therapy pack, Use as directed on package, Disp: 21 each, Rfl: 0  Allergies   Allergen Reactions    Sulfa Antibiotics Rash     Vitals:    04/04/24 1535   BP: 108/60   Pulse: 63   Weight: 101 kg (222 lb)   Height: 5' 8\" (1.727 m)     Weight (last 2 days)       Date/Time Weight    04/04/24 1535 101 (222)           Blood pressure 108/60, pulse 63, height 5' 8\" (1.727 m), weight 101 kg (222 lb)., Body mass index is 33.75 kg/m².    Labs:  Appointment on 03/13/2024   Component Date Value    Vit D, 25-Hydroxy 03/13/2024 73.9     Vitamin B-12 03/13/2024 225     WBC 03/13/2024 7.15     RBC 03/13/2024 5.06     Hemoglobin 03/13/2024 12.8     Hematocrit 03/13/2024 43.0     MCV 03/13/2024 85     MCH 03/13/2024 25.3 (L)     MCHC " 03/13/2024 29.8 (L)     RDW 03/13/2024 15.6 (H)     MPV 03/13/2024 9.5     Platelets 03/13/2024 282     nRBC 03/13/2024 0     Neutrophils Relative 03/13/2024 57     Immature Grans % 03/13/2024 0     Lymphocytes Relative 03/13/2024 34     Monocytes Relative 03/13/2024 7     Eosinophils Relative 03/13/2024 2     Basophils Relative 03/13/2024 0     Neutrophils Absolute 03/13/2024 4.06     Absolute Immature Grans 03/13/2024 0.01     Absolute Lymphocytes 03/13/2024 2.44     Absolute Monocytes 03/13/2024 0.49     Eosinophils Absolute 03/13/2024 0.12     Basophils Absolute 03/13/2024 0.03     Hemoglobin A1C 03/13/2024 7.5 (H)     EAG 03/13/2024 169     Cholesterol 03/13/2024 158     Triglycerides 03/13/2024 185 (H)     HDL, Direct 03/13/2024 41 (L)     LDL Calculated 03/13/2024 80     Non-HDL-Chol (CHOL-HDL) 03/13/2024 117     TSH 3RD GENERATON 03/13/2024 0.284 (L)    Office Visit on 01/02/2024   Component Date Value    RAPID FLU A 01/02/2024 negative     RAPID FLU B 01/02/2024 negative     POCT SARS-CoV-2 Ag 01/02/2024 Positive (A)     VALID CONTROL 01/02/2024 Valid    Appointment on 12/11/2023   Component Date Value    Sodium 12/11/2023 144     Potassium 12/11/2023 3.5     Chloride 12/11/2023 104     CO2 12/11/2023 31     ANION GAP 12/11/2023 9     BUN 12/11/2023 12     Creatinine 12/11/2023 0.85     Glucose, Fasting 12/11/2023 104 (H)     Calcium 12/11/2023 8.9     eGFR 12/11/2023 71    Hospital Outpatient Visit on 12/01/2023   Component Date Value    POC Glucose 12/01/2023 128     Case Report 12/01/2023                      Value:Surgical Pathology Report                         Case: [id number]                                   Authorizing Provider:  Joe Stanton DO              Collected:           12/01/2023 0735              Ordering Location:     [facility] Received:            12/01/2023 1120                                     Operating Room                                                                Pathologist:           Jannette Thorne DO                                                           Specimens:   A) - Duodenum, cold fcp bx r/o celiacs                                                              B) - Stomach, cold fcp bx r/o h. pylori                                                             C) - Terminal Ileum, cold fcp bx for hx of Crohns                                                   D) - Large Intestine, Left/Descending Colon, cold fcp bx for hx of Crohns                           E) - Large Intestine, Sigmoid Colon, cold fcp bx for hx of Crohns                                                             F) - Large Intestine, Transverse Colon, cold fcp bx for hx of Crohns                                G) - Large Intestine, Right/Ascending Colon, cold fcp bx for hx of Crohns                           H) - Large Intestine, Cecum, cold fcp bx for hx of Crohns                                           I) - Rectum, cold fcp bx for hx of Crohns                                                           J) - Large Intestine, Cecum, cold snare of polyp x1                                        Final Diagnosis 12/01/2023                      Value:This result contains rich text formatting which cannot be displayed here.    Additional Information 12/01/2023                      Value:This result contains rich text formatting which cannot be displayed here.    Synoptic Checklist 12/01/2023                      Value:                            COLON/RECTUM POLYP FORM - GI - J                                                                                     :    Adenoma(s)      Gross Description 12/01/2023                      Value:This result contains rich text formatting which cannot be displayed here.    Clinical Information 12/01/2023                      Value:· One sessile, adenomatous-appearing Melisa Is polyp measuring 5-9 mm in the cecum; completely removed en bloc by cold snare and  retrieved specimen  · Internal small hemorrhoids  · Hypertrophied anal papilla  · Performed multiple forceps biopsies in the terminal ileum, cecum, ascending colon, transverse colon, descending colon, sigmoid colon and rectum to rule out colitis. Performed biopsies from TI and segmental colon biopsies for history of Crohn's disease.  · The terminal ileum, ileocecal valve, ascending colon, hepatic flexure, transverse colon, splenic flexure, descending colon, sigmoid colon and rectosigmoid appeared normal.   Appointment on 11/30/2023   Component Date Value    Sodium 11/30/2023 141     Potassium 11/30/2023 3.1 (L)     Chloride 11/30/2023 103     CO2 11/30/2023 30     ANION GAP 11/30/2023 8     BUN 11/30/2023 13     Creatinine 11/30/2023 0.76     Glucose, Fasting 11/30/2023 149 (H)     Calcium 11/30/2023 9.2     AST 11/30/2023 21     ALT 11/30/2023 20     Alkaline Phosphatase 11/30/2023 88     Total Protein 11/30/2023 7.5     Albumin 11/30/2023 3.8     Total Bilirubin 11/30/2023 0.32     eGFR 11/30/2023 81     Hemoglobin A1C 11/30/2023 7.2 (H)     EAG 11/30/2023 160     Cholesterol 11/30/2023 150     Triglycerides 11/30/2023 144     HDL, Direct 11/30/2023 39 (L)     LDL Calculated 11/30/2023 82     Non-HDL-Chol (CHOL-HDL) 11/30/2023 111     TSH 3RD GENERATON 11/30/2023 2.993     Vit D, 25-Hydroxy 11/30/2023 56.3     Vitamin B-12 11/30/2023 219     Folate 11/30/2023 12.0     WBC 11/30/2023 8.31     RBC 11/30/2023 4.56     Hemoglobin 11/30/2023 11.2 (L)     Hematocrit 11/30/2023 38.7     MCV 11/30/2023 85     MCH 11/30/2023 24.6 (L)     MCHC 11/30/2023 28.9 (L)     RDW 11/30/2023 14.7     MPV 11/30/2023 9.5     Platelets 11/30/2023 272     nRBC 11/30/2023 0     Neutrophils Relative 11/30/2023 67     Immature Grans % 11/30/2023 0     Lymphocytes Relative 11/30/2023 26     Monocytes Relative 11/30/2023 6     Eosinophils Relative 11/30/2023 1     Basophils Relative 11/30/2023 0     Neutrophils Absolute 11/30/2023 5.52      Absolute Immature Grans 11/30/2023 0.02     Absolute Lymphocytes 11/30/2023 2.13     Absolute Monocytes 11/30/2023 0.50     Eosinophils Absolute 11/30/2023 0.11     Basophils Absolute 11/30/2023 0.03     Iron Saturation 11/30/2023 9 (L)     TIBC 11/30/2023 363     Iron 11/30/2023 34 (L)     UIBC 11/30/2023 329     Ferritin 11/30/2023 15    Office Visit on 11/24/2023   Component Date Value    Protime 03/13/2024 15.3 (H)     INR 03/13/2024 1.22 (H)     Sodium 03/13/2024 141     Potassium 03/13/2024 3.5     Chloride 03/13/2024 103     CO2 03/13/2024 29     ANION GAP 03/13/2024 9     BUN 03/13/2024 10     Creatinine 03/13/2024 0.76     Glucose 03/13/2024 163 (H)     Calcium 03/13/2024 9.4     AST 03/13/2024 24     ALT 03/13/2024 29     Alkaline Phosphatase 03/13/2024 98     Total Protein 03/13/2024 7.3     Albumin 03/13/2024 3.7     Total Bilirubin 03/13/2024 0.30     eGFR 03/13/2024 81    Lab on 11/06/2023   Component Date Value    CRP 11/06/2023 7.9 (H)     IgA 11/06/2023 630 (H)     Gliadin IgA 11/06/2023 11     Gliadin IgG 11/06/2023 4     Tissue Transglut Ab IGG 11/06/2023 4     TISSUE TRANSGLUTAMINASE * 11/06/2023 <2     Endomysial IgA 11/06/2023 Negative     Calprotectin 11/06/2023 110     Pancreatic Elastase-1 11/06/2023 60 (L)    Telephone on 11/02/2023   Component Date Value    Severity 07/18/2023 Normal     Right Eye Diabetic Retin* 07/18/2023 None     Left Eye Diabetic Retino* 07/18/2023 None      Imaging: DXA bone density spine hip and pelvis    Result Date: 3/15/2024  Narrative: DXA SCAN CLINICAL HISTORY: 68 years postmenopausal female. OTHER RISK FACTORS: Prior fracture as a result of minor injury, parental history of hip fracture status post minor injury, Crohn's disease, COPD, PPI therapy, Lasix therapy. PHARMACOLOGIC THERAPY FOR OSTEOPOROSIS:  None. TECHNIQUE: Bone densitometry was performed using a   [employer] Discovery A bone densitometer.  Regions of interest appear properly placed. COMPARISON: There  are no prior DXA studies performed on this unit for comparison. RESULTS: LUMBAR SPINE Level: L2-L4  (L1 vertebra excluded from analysis due to local structural abnormalities or artifact): BMD: 0.855 gm/cm2 T-score: -2.0 LEFT  TOTAL HIP: BMD: 0.901 gm/cm2 T-score: -0.3 LEFT  FEMORAL NECK: BMD: 0.715 gm/cm2 T score: -1.2     Impression: 1. Low bone mass (osteopenia). 2.  The 10 year risk of hip fracture is 1.9% with the 10 year risk of major osteoporotic fracture being 23% as calculated by the [employer] fracture risk assessment tool (FRAX, which is based on data generated by the WHO Collaborating [employer]are  for Metabolic Bone Diseases). 3.  The current NOF guidelines recommend treating patients with a T-score of -2.5 or less in the lumbar spine or hips, or in post-menopausal women and men over the age of 50 with low bone mass (osteopenia) and a FRAX 10 year risk score of >3% for hip fracture and/or >20% for major osteoporotic fracture. 4.  The NOF recommends follow-up DXA in 1-2 years after initiating therapy for osteoporosis and every 2 years thereafter. More frequent evaluation is appropriate for patients with conditions associated with rapid bone loss, such as glucocorticoid therapy. The interval between DXA screenings may be longer for individuals without major risk factors and initial T-score in the normal or upper low bone mass range. The FRAX algorithm has certain limitations: -FRAX has not been validated in patients currently or previously treated with pharmacotherapy for osteoporosis.  In such patients, clinical judgment must be exercised in interpreting FRAX scores. -Prior hip, vertebral and humeral fragility fractures appear to confer greater risk of subsequent fracture than fractures at other sites (this is especially true for individuals with severe vertebral fractures), but quantification of this incremental risk is not possible with FRAX. -FRAX underestimates fracture risk in patients with  history of multiple fragility fractures. -FRAX may underestimate fracture risk in patients with history of frequent falls. -It is not appropriate to use FRAX to monitor treatment response. WHO CLASSIFICATION: Normal (a T-score of -1.0 or higher) Low bone mineral density (a T-score of less than -1.0 but higher than -2.5) Osteoporosis (a T-score of -2.5 or less) Severe osteoporosis (a T-score of -2.5 or less with a fragility fracture) Workstation performed: [id number]       Review of Systems:  Review of Systems   Constitutional:  Negative for diaphoresis, fatigue, fever and unexpected weight change.   HENT: Negative.     Respiratory:  Negative for cough, shortness of breath and wheezing.    Cardiovascular:  Negative for chest pain, palpitations and leg swelling.   Gastrointestinal:  Negative for abdominal pain, diarrhea and nausea.   Musculoskeletal:  Negative for gait problem and myalgias.   Skin:  Negative for rash.   Neurological:  Negative for dizziness and numbness.   Psychiatric/Behavioral: Negative.         Physical Exam:  Physical Exam  Constitutional:       Appearance: She is well-developed.   HENT:      Head: Normocephalic and atraumatic.   Eyes:      Pupils: Pupils are equal, round, and reactive to light.   Neck:      Vascular: No JVD.   Cardiovascular:      Rate and Rhythm: Regular rhythm.      Pulses: Normal pulses.           Carotid pulses are 2+ on the right side and 2+ on the left side.     Heart sounds: S1 normal and S2 normal.   Pulmonary:      Effort: Pulmonary effort is normal.      Breath sounds: Normal breath sounds. No wheezing or rales.   Abdominal:      General: Bowel sounds are normal.      Palpations: Abdomen is soft.   Musculoskeletal:         General: No tenderness. Normal range of motion.      Cervical back: Normal range of motion and neck supple.   Skin:     General: Skin is warm.   Neurological:      Mental Status: She is alert and oriented to person, place, and time.      Cranial  Nerves: No cranial nerve deficit.      Deep Tendon Reflexes: Reflexes are normal and symmetric.

## 2024-04-08 ENCOUNTER — OFFICE VISIT (OUTPATIENT)
Dept: BARIATRICS | Facility: CLINIC | Age: 69
End: 2024-04-08
Payer: COMMERCIAL

## 2024-04-08 VITALS
DIASTOLIC BLOOD PRESSURE: 72 MMHG | WEIGHT: 223.8 LBS | HEIGHT: 68 IN | SYSTOLIC BLOOD PRESSURE: 110 MMHG | TEMPERATURE: 96.7 F | BODY MASS INDEX: 33.92 KG/M2

## 2024-04-08 DIAGNOSIS — E66.9 CLASS 1 OBESITY: Primary | ICD-10-CM

## 2024-04-08 DIAGNOSIS — E11.9 TYPE 2 DIABETES MELLITUS WITHOUT COMPLICATION, WITHOUT LONG-TERM CURRENT USE OF INSULIN (HCC): ICD-10-CM

## 2024-04-08 PROCEDURE — 99214 OFFICE O/P EST MOD 30 MIN: CPT | Performed by: PHYSICIAN ASSISTANT

## 2024-04-08 NOTE — PATIENT INSTRUCTIONS
Measure all portions: creamers, sugars, cooking oils/butters, condiments, dressings, etc and log calories   If choosing starch pick whole grain/complex carbs and keep to 1/2 cup: oatmeal, brown rice, quinoa, whole wheat pasta, potatoes, sweet potato, chickpea noodles, red lentil noodles  Vegetable at dinner should be at least 1 cup and can try to get at least 1/2 plate over time    Keep up the great work!

## 2024-04-08 NOTE — ASSESSMENT & PLAN NOTE
-Patient is pursuing Conservative Program  -Initial weight loss goal of 5-10% weight loss for improved health  -Screening labs: A1c, TSH, and CMP reviewed from 3/13/24. Pt to f/u with PCP regarding TSH  -Pt has a personal hx of papillary thyroid cancer; her brother and daughters had thyroid cancer- unsure of type- would AVOID Wegovy and Saxenda   -Discussed possibly restarting metformin. She prefers to continue lifestyle modifications and if no improvement in A1c by next f/u would consider. She does state she tolerated in the past, but was taken off for potential interaction with Tikosyn- would review with cards before starting   -increase veg, continue limiting sugary beverages, fiber rich starches discussed      Initial: 255.9  Current: 223.9 (-16.1 lbs since last visit)   Change: -32  Goal: 160-165

## 2024-04-08 NOTE — PROGRESS NOTES
Assessment/Plan:  Class 1 obesity  -Patient is pursuing Conservative Program  -Initial weight loss goal of 5-10% weight loss for improved health  -Screening labs: A1c, TSH, and CMP reviewed from 3/13/24. Pt to f/u with PCP regarding TSH  -Pt has a personal hx of papillary thyroid cancer; her brother and daughters had thyroid cancer- unsure of type- would AVOID Wegovy and Saxenda   -Discussed possibly restarting metformin. She prefers to continue lifestyle modifications and if no improvement in A1c by next f/u would consider. She does state she tolerated in the past, but was taken off for potential interaction with Tikosyn- would review with cards before starting   -increase veg, continue limiting sugary beverages, fiber rich starches discussed      Initial: 255.9  Current: 223.9 (-16.1 lbs since last visit)   Change: -32  Goal: 160-165     Diabetes mellitus, type 2 (HCC)    Lab Results   Component Value Date    HGBA1C 7.5 (H) 03/13/2024     Follow up in approximately  4 months  with Non-Surgical Physician/Advanced Practitioner.    Goals:  Food log (ie.) [email or web address],[email or web address],[email or web address],[email or web address],etc. baritastic  No sugary beverages. At least 64oz of water daily.  Increase physical activity by 10 minutes daily. Gradually increase physical activity to a goal of 5 days per week for 30 minutes of MODERATE intensity PLUS 2 days per week of FULL BODY resistance training  5-10 servings of fruits and vegetables per day and 25-35 grams of dietary fiber per day, gradually increasing     Diagnoses and all orders for this visit:    Class 1 obesity    Body mass index 34.0-34.9, adult    Type 2 diabetes mellitus without complication, without long-term current use of insulin (HCC)        Subjective:   Chief Complaint   Patient presents with    Follow-up     4 month follow up      Patient ID: Kellen Gray  is a 68 y.o. female with excess weight/obesity here to pursue weight management.  Patient is pursuing  "Conservative Program.     HPI  The patient presents for [facility] follow up.     Following recs from last appointment. Has increased water and reduced amount of sugary beverages.  Watching portions. Daughter is good support. Came off anti-depressant and reports she seems to be doing well    B: toast or english muffin with butter  S: no  L: skips or has soup or egg sandwich   S: no  D: protein + veg (very small amount) + starch; interested in starting silver sneakers  S: every once and a while has dessert     Increased appetite/cravings: no  Exercise: plans to start walking     The following portions of the patient's history were reviewed and updated as appropriate: allergies, current medications, past family history, past medical history, past social history, past surgical history, and problem list.    Review of Systems   Cardiovascular:  Positive for palpitations (sees cardiology). Negative for chest pain.   Psychiatric/Behavioral:  The patient is nervous/anxious.      Objective:    /72 (BP Location: Right arm, Patient Position: Sitting, Cuff Size: Large)   Temp (!) 96.7 °F (35.9 °C)   Ht 5' 8\" (1.727 m)   Wt 102 kg (223 lb 12.8 oz)   BMI 34.03 kg/m²      Physical Exam  Vitals and nursing note reviewed.     Constitutional   General appearance: Abnormal.  well developed and obese.   Pulmonary   Respiratory effort: No increased work of breathing or signs of respiratory distress.    Abdomen   Abdomen: Abnormal.  The abdomen was obese.   Musculoskeletal   Gait and station: Normal.    Psychiatric   Orientation to person, place and time: Normal.    Affect: appropriate  "

## 2024-04-27 DIAGNOSIS — K21.9 GASTROESOPHAGEAL REFLUX DISEASE, UNSPECIFIED WHETHER ESOPHAGITIS PRESENT: ICD-10-CM

## 2024-04-29 DIAGNOSIS — E11.9 TYPE 2 DIABETES MELLITUS WITHOUT COMPLICATION, WITHOUT LONG-TERM CURRENT USE OF INSULIN (HCC): Primary | ICD-10-CM

## 2024-04-29 DIAGNOSIS — K64.9 HEMORRHOIDS, UNSPECIFIED HEMORRHOID TYPE: ICD-10-CM

## 2024-04-29 RX ORDER — FAMOTIDINE 40 MG/1
TABLET, FILM COATED ORAL
Qty: 30 TABLET | Refills: 5 | Status: SHIPPED | OUTPATIENT
Start: 2024-04-29

## 2024-04-30 RX ORDER — GABAPENTIN 300 MG/1
CAPSULE ORAL
Qty: 30 CAPSULE | Refills: 0 | Status: SHIPPED | OUTPATIENT
Start: 2024-04-30

## 2024-05-03 RX ORDER — BLOOD-GLUCOSE METER
EACH MISCELLANEOUS DAILY
Qty: 1 KIT | Refills: 0 | Status: SHIPPED | OUTPATIENT
Start: 2024-05-03

## 2024-05-03 RX ORDER — BLOOD SUGAR DIAGNOSTIC
1 STRIP MISCELLANEOUS DAILY
Qty: 100 EACH | Refills: 3 | Status: SHIPPED | OUTPATIENT
Start: 2024-05-03

## 2024-05-22 ENCOUNTER — OFFICE VISIT (OUTPATIENT)
Dept: INTERNAL MEDICINE CLINIC | Facility: CLINIC | Age: 69
End: 2024-05-22
Payer: COMMERCIAL

## 2024-05-22 VITALS
TEMPERATURE: 98 F | BODY MASS INDEX: 32.53 KG/M2 | DIASTOLIC BLOOD PRESSURE: 68 MMHG | WEIGHT: 219.6 LBS | SYSTOLIC BLOOD PRESSURE: 102 MMHG | OXYGEN SATURATION: 94 % | HEIGHT: 69 IN | HEART RATE: 54 BPM

## 2024-05-22 DIAGNOSIS — I48.0 PAROXYSMAL ATRIAL FIBRILLATION (HCC): ICD-10-CM

## 2024-05-22 DIAGNOSIS — I50.32 CHRONIC HEART FAILURE WITH PRESERVED EJECTION FRACTION (HCC): Primary | ICD-10-CM

## 2024-05-22 DIAGNOSIS — E11.9 TYPE 2 DIABETES MELLITUS WITHOUT COMPLICATION, WITHOUT LONG-TERM CURRENT USE OF INSULIN (HCC): ICD-10-CM

## 2024-05-22 DIAGNOSIS — E53.8 VITAMIN B12 DEFICIENCY: ICD-10-CM

## 2024-05-22 DIAGNOSIS — K86.81 EXOCRINE PANCREATIC INSUFFICIENCY: ICD-10-CM

## 2024-05-22 DIAGNOSIS — E89.0 POSTOPERATIVE HYPOTHYROIDISM: ICD-10-CM

## 2024-05-22 DIAGNOSIS — E55.9 VITAMIN D DEFICIENCY: ICD-10-CM

## 2024-05-22 PROCEDURE — 99214 OFFICE O/P EST MOD 30 MIN: CPT | Performed by: FAMILY MEDICINE

## 2024-05-22 PROCEDURE — 96372 THER/PROPH/DIAG INJ SC/IM: CPT | Performed by: FAMILY MEDICINE

## 2024-05-22 RX ORDER — CYANOCOBALAMIN 1000 UG/ML
1000 INJECTION, SOLUTION INTRAMUSCULAR; SUBCUTANEOUS
Status: SHIPPED | OUTPATIENT
Start: 2024-05-22

## 2024-05-22 RX ADMIN — CYANOCOBALAMIN 1000 MCG: 1000 INJECTION, SOLUTION INTRAMUSCULAR; SUBCUTANEOUS at 07:07

## 2024-05-23 RX ORDER — LEVOTHYROXINE SODIUM 137 MCG
137 TABLET ORAL DAILY
Qty: 90 TABLET | Refills: 1 | Status: SHIPPED | OUTPATIENT
Start: 2024-05-23

## 2024-05-23 NOTE — PROGRESS NOTES
Ambulatory Visit  Name: Kellen Gray      : 1955      MRN: [id number]  Encounter Provider: Ina Rivas MD  Encounter Date: 2024   Encounter department: [facility]    Assessment & Plan   1. Chronic heart failure with preserved ejection fraction (HCC)  -     CBC and differential; Future  -     Comprehensive metabolic panel; Future  2. Exocrine pancreatic insufficiency  -     pancrelipase, Lip-Prot-Amyl, (Creon) 24,000 units; Take 1 capsule (24,000 Units total) by mouth 2 (two) times a day  -     CBC and differential; Future  -     Comprehensive metabolic panel; Future  3. Paroxysmal atrial fibrillation (HCC)  -     CBC and differential; Future  -     Comprehensive metabolic panel; Future  4. Type 2 diabetes mellitus without complication, without long-term current use of insulin (HCC)  -     CBC and differential; Future  -     Comprehensive metabolic panel; Future  -     Hemoglobin A1C; Future  5. Postoperative hypothyroidism  -     CBC and differential; Future  -     Comprehensive metabolic panel; Future  -     Lipid panel; Future  -     TSH, 3rd generation; Future  6. Vitamin D deficiency  -     Vitamin D 25 hydroxy; Future  7. Vitamin B12 deficiency  -     Vitamin B12; Future  -     cyanocobalamin injection 1,000 mcg    Orders and recommendations as noted above.  Specialist visits were reviewed.  Recent laboratory testing reviewed.  Vitamin B12 level remains significantly low.  Discussed with her vitamin B12 supplementation.  Will give her an injection of vitamin B12 today.  Discussed over-the-counter supplementation.  Discussed the risks and benefits of this.  Discussed the long-term effects of having a low vitamin B12 level.  Blood pressure currently well-controlled.  Actually running on the lower level.  Continue current medications.  Watch salt intake.  Watch for any orthostatic symptoms.  Chronic GI issues discussed.  Continue to follow-up with gastroenterology.   Continue current medications from a GI standpoint.  TSH on the lower and.  Will decrease dose slightly to Synthroid 137 mcg.  Watch for any respiratory symptoms.  Continue with the Trelegy as well as the albuterol if needed.  Continue to follow-up with pulmonary.  Continue follow-up with cardiology.  Watch for any chest pain or palpitations.  Continue with the decrease in as well as the Eliquis.  Be very careful to avoid falls.  Hemoglobin A1c not yet at goal.  Discussed goal of hemoglobin A1c less than 7.  Repeat laboratory testing.  Discussed with her additional medications depending on these results.  Will have her follow-up in about 3 to 5 months or sooner if needed.    Depression Screening and Follow-up Plan: Patient was screened for depression during today's encounter. They screened negative with a PHQ-9 score of 0.      History of Present Illness     She presents for routine follow-up.  Has generally been doing relatively well.  She does continue with some chronic respiratory issues but these have been relatively stable with her Trelegy and as needed inhalers.  She is following up with GI.  Has noticed a difference with the Creon.  She does need a refill for this.  Denies any chest pain or palpitations.  Continues to follow-up with cardiology.  Continues with chronic anticoagulation with the Eliquis.  Continues on the Tikosyn.  Appetite has been relatively stable.  Still with some loose bowel movements at times.  Some difficulty with walking.  Some joint pains and stiffness especially the right knee.  Chronic back pain.        Review of Systems   Constitutional:  Positive for activity change and appetite change. Negative for chills and fever.   HENT:  Positive for hearing loss. Negative for congestion and rhinorrhea.    Eyes:  Negative for visual disturbance.   Respiratory:  Negative for cough, chest tightness and shortness of breath.    Cardiovascular:  Negative for chest pain and palpitations.    Gastrointestinal:  Negative for abdominal pain, blood in stool, diarrhea, nausea and vomiting.   Endocrine: Negative for polydipsia, polyphagia and polyuria.   Genitourinary:  Negative for dysuria, frequency and urgency.   Musculoskeletal:  Positive for arthralgias. Negative for gait problem.   Skin:  Negative for color change.   Neurological:  Negative for dizziness and headaches.   Hematological:  Does not bruise/bleed easily.   Psychiatric/Behavioral:  Positive for dysphoric mood. Negative for confusion and sleep disturbance.      Medical History Reviewed by provider this encounter:       Past Medical History   Past Medical History:   Diagnosis Date    A-fib (HCC)     Acute respiratory failure with hypoxia (HCC) 11/30/2019    Asthma     Brain aneurysm     Cancer (HCC)     papillary thyroid cancer    Cardiac disease     CHF (congestive heart failure) (HCC)     COPD (chronic obstructive pulmonary disease) (HCC)     CPAP (continuous positive airway pressure) dependence     Crohn disease (HCC)     Diabetes mellitus (HCC)     Disease of thyroid gland     GERD (gastroesophageal reflux disease)     Hyperlipidemia     Hypertension     Sleep apnea      Past Surgical History:   Procedure Laterality Date    APPENDECTOMY      CARDIOVERSION N/A 2/14/2019    Procedure: CARDIOVERSION;  Surgeon: Lee Brenner MD;  Location: [facility];  Service: Cardiology    CEREBRAL ANEURYSM REPAIR      CHOLECYSTECTOMY      HYSTERECTOMY      IR PELVIC ANGIOGRAM  12/14/2017    MA EXC THROMBOSED HEMORRHOID XTRNL N/A 10/7/2022    Procedure: HEMORRHOIDECTOMY EXCISION;  Surgeon: Leonardo Cash DO;  Location: [facility];  Service: General    THYROIDECTOMY      TONSILLECTOMY AND ADENOIDECTOMY       Family History   Problem Relation Age of Onset    Alzheimer's disease Mother     Cancer Father         lung and bone    Heart disease Sister     Hypertension Sister     Diabetes Sister     Stroke Sister     Cancer Sister     Cancer Brother          liver with mets to bone    Heart disease Brother     Cancer Brother         thyroid cancer     Current Outpatient Medications on File Prior to Visit   Medication Sig Dispense Refill    albuterol (ACCUNEB) 0.63 MG/3ML nebulizer solution Take 0.63 mg by nebulization every 6 (six) hours as needed for wheezing      albuterol (PROVENTIL HFA,VENTOLIN HFA) 90 mcg/act inhaler Inhale 2 puffs every 4 (four) hours as needed for wheezing      apixaban (ELIQUIS) 5 mg Take 1 tablet (5 mg total) by mouth 2 (two) times a day 90 tablet 3    atorvastatin (LIPITOR) 20 mg tablet Take 20 mg by mouth every morning      Cholecalciferol (Vitamin D3) 1.25 MG (87106 UT) CAPS Take 1 capsule by mouth once a week      dofetilide (TIKOSYN) 500 mcg capsule Take 500 mcg by mouth 2 (two) times a day      Empagliflozin 25 MG TABS Take 1 tablet (25 mg total) by mouth daily 90 tablet 3    esomeprazole (NexIUM) 40 MG capsule TAKE 1 CAPSULE BY MOUTH TWICE DAILY BEFORE MEAL(S) TAKE 30 MINUTES BEFORE BREAKFAST AND 30 MINUTES BEFORE DINNER 30 capsule 5    famotidine (PEPCID) 40 MG tablet TAKE 1 TABLET BY MOUTH ONCE DAILY AT BEDTIME 30 tablet 5    fluticasone (FLONASE) 50 mcg/act nasal spray 1 spray into each nostril daily 1 g 3    fluticasone-umeclidinium-vilanterol (Trelegy Ellipta) 200-62.5-25 mcg/actuation AEPB inhaler Inhale 1 puff daily Rinse mouth after use.      furosemide (LASIX) 20 mg tablet Take 20 mg by mouth 2 (two) times a day      gabapentin (NEURONTIN) 300 mg capsule Take 1 capsule by mouth twice daily 30 capsule 0    glucose blood test strip Use to check sugars once a day. Dx E11.9      hydrOXYzine HCL (ATARAX) 10 mg tablet Take 1 tablet by mouth twice daily 60 tablet 1    lisinopril (ZESTRIL) 10 mg tablet Take 10 mg by mouth every morning      mesalamine (DELZICOL) 400 mg Take 2 capsules by mouth twice daily 360 capsule 0    metoprolol succinate (TOPROL-XL) 100 mg 24 hr tablet TAKE 1 TABLET BY MOUTH EVERY 12 HOURS 60 tablet 3    SYNTHROID  150 MCG tablet TAKE 1 TABLET BY MOUTH ONCE DAILY FIRST THING IN THE MORNING (AT LEAST 30 MINUTES PRIOR TO BREAKFAST OR OTHER MEDS)      Blood Glucose Monitoring Suppl (OneTouch Verio) w/Device KIT by Device route in the morning (Patient not taking: Reported on 5/22/2024) 1 kit 0    Lancets (ONETOUCH DELICA PLUS [id number]) MISC USE 1 TO CHECK GLUCOSE ONCE DAILY (Patient not taking: Reported on 5/22/2024)      OneTouch Verio test strip Use 1 each in the morning Use as instructed (Patient not taking: Reported on 5/22/2024) 100 each 3     No current facility-administered medications on file prior to visit.     Allergies   Allergen Reactions    Sulfa Antibiotics Rash      Current Outpatient Medications on File Prior to Visit   Medication Sig Dispense Refill    albuterol (ACCUNEB) 0.63 MG/3ML nebulizer solution Take 0.63 mg by nebulization every 6 (six) hours as needed for wheezing      albuterol (PROVENTIL HFA,VENTOLIN HFA) 90 mcg/act inhaler Inhale 2 puffs every 4 (four) hours as needed for wheezing      apixaban (ELIQUIS) 5 mg Take 1 tablet (5 mg total) by mouth 2 (two) times a day 90 tablet 3    atorvastatin (LIPITOR) 20 mg tablet Take 20 mg by mouth every morning      Cholecalciferol (Vitamin D3) 1.25 MG (35474 UT) CAPS Take 1 capsule by mouth once a week      dofetilide (TIKOSYN) 500 mcg capsule Take 500 mcg by mouth 2 (two) times a day      Empagliflozin 25 MG TABS Take 1 tablet (25 mg total) by mouth daily 90 tablet 3    esomeprazole (NexIUM) 40 MG capsule TAKE 1 CAPSULE BY MOUTH TWICE DAILY BEFORE MEAL(S) TAKE 30 MINUTES BEFORE BREAKFAST AND 30 MINUTES BEFORE DINNER 30 capsule 5    famotidine (PEPCID) 40 MG tablet TAKE 1 TABLET BY MOUTH ONCE DAILY AT BEDTIME 30 tablet 5    fluticasone (FLONASE) 50 mcg/act nasal spray 1 spray into each nostril daily 1 g 3    fluticasone-umeclidinium-vilanterol (Trelegy Ellipta) 200-62.5-25 mcg/actuation AEPB inhaler Inhale 1 puff daily Rinse mouth after use.      furosemide  "(LASIX) 20 mg tablet Take 20 mg by mouth 2 (two) times a day      gabapentin (NEURONTIN) 300 mg capsule Take 1 capsule by mouth twice daily 30 capsule 0    glucose blood test strip Use to check sugars once a day. Dx E11.9      hydrOXYzine HCL (ATARAX) 10 mg tablet Take 1 tablet by mouth twice daily 60 tablet 1    lisinopril (ZESTRIL) 10 mg tablet Take 10 mg by mouth every morning      mesalamine (DELZICOL) 400 mg Take 2 capsules by mouth twice daily 360 capsule 0    metoprolol succinate (TOPROL-XL) 100 mg 24 hr tablet TAKE 1 TABLET BY MOUTH EVERY 12 HOURS 60 tablet 3    SYNTHROID 150 MCG tablet TAKE 1 TABLET BY MOUTH ONCE DAILY FIRST THING IN THE MORNING (AT LEAST 30 MINUTES PRIOR TO BREAKFAST OR OTHER MEDS)      Blood Glucose Monitoring Suppl (OneTouch Verio) w/Device KIT by Device route in the morning (Patient not taking: Reported on 2024) 1 kit 0    Lancets (ONETOUCH DELICA PLUS ERPVXM27S) MISC USE 1 TO CHECK GLUCOSE ONCE DAILY (Patient not taking: Reported on 2024)      OneTouch Verio test strip Use 1 each in the morning Use as instructed (Patient not taking: Reported on 2024) 100 each 3     No current facility-administered medications on file prior to visit.      Social History     Tobacco Use    Smoking status: Former     Current packs/day: 0.00     Types: Cigarettes     Quit date: 2018     Years since quittin.3    Smokeless tobacco: Never   Vaping Use    Vaping status: Never Used   Substance and Sexual Activity    Alcohol use: Not Currently     Comment: social    Drug use: Not Currently     Types: Marijuana    Sexual activity: Not on file     Objective     /68   Pulse (!) 54   Temp 98 °F (36.7 °C) (Temporal)   Ht 5' 9\" (1.753 m)   Wt 99.6 kg (219 lb 9.6 oz)   SpO2 94%   BMI 32.43 kg/m²     Physical Exam  Vitals and nursing note reviewed.   Constitutional:       General: She is not in acute distress.     Appearance: She is well-developed, well-groomed and overweight.   HENT: "      Head: Normocephalic and atraumatic.   Eyes:      General:         Right eye: No discharge.         Left eye: No discharge.      Conjunctiva/sclera: Conjunctivae normal.      Pupils: Pupils are equal, round, and reactive to light.   Cardiovascular:      Rate and Rhythm: Normal rate. Rhythm irregularly irregular.      Heart sounds: Normal heart sounds. No murmur heard.     No friction rub. No gallop.   Pulmonary:      Effort: No respiratory distress.      Breath sounds: Decreased breath sounds present. No wheezing or rales.   Abdominal:      General: Bowel sounds are normal. There is no distension.      Tenderness: There is no abdominal tenderness.   Musculoskeletal:      Cervical back: Neck supple.      Comments: Degenerative changes knees right greater than left   Lymphadenopathy:      Cervical: No cervical adenopathy.   Skin:     General: Skin is warm and dry.   Neurological:      Mental Status: She is alert and oriented to person, place, and time.   Psychiatric:         Mood and Affect: Mood and affect normal.         Speech: Speech normal.         Behavior: Behavior normal. Behavior is cooperative.         Cognition and Memory: Cognition and memory normal.       Administrative Statements   I have spent a total time of 25 minutes on 05/23/24 In caring for this patient including Diagnostic results, Prognosis, Risks and benefits of tx options, Instructions for management, Patient and family education, Importance of tx compliance, Risk factor reductions, Impressions, Counseling / Coordination of care, Documenting in the medical record, Reviewing / ordering tests, medicine, procedures  , and Obtaining or reviewing history  .

## 2024-05-24 ENCOUNTER — HOSPITAL ENCOUNTER (INPATIENT)
Facility: HOSPITAL | Age: 69
LOS: 1 days | Discharge: HOME | DRG: 191 | End: 2024-05-26
Attending: EMERGENCY MEDICINE | Admitting: INTERNAL MEDICINE
Payer: COMMERCIAL

## 2024-05-24 ENCOUNTER — APPOINTMENT (EMERGENCY)
Dept: RADIOLOGY | Facility: HOSPITAL | Age: 69
DRG: 191 | End: 2024-05-24
Payer: COMMERCIAL

## 2024-05-24 DIAGNOSIS — K86.81 EXOCRINE PANCREATIC INSUFFICIENCY: ICD-10-CM

## 2024-05-24 DIAGNOSIS — J44.1 COPD EXACERBATION (HCC): Primary | ICD-10-CM

## 2024-05-24 DIAGNOSIS — R09.02 HYPOXIA: ICD-10-CM

## 2024-05-24 DIAGNOSIS — R06.02 SOB (SHORTNESS OF BREATH): ICD-10-CM

## 2024-05-24 PROBLEM — R65.10 SIRS (SYSTEMIC INFLAMMATORY RESPONSE SYNDROME) (HCC): Status: ACTIVE | Noted: 2024-05-24

## 2024-05-24 LAB
2HR DELTA HS TROPONIN: -1 NG/L
ALBUMIN SERPL BCP-MCNC: 3.7 G/DL (ref 3.5–5)
ALP SERPL-CCNC: 79 U/L (ref 34–104)
ALT SERPL W P-5'-P-CCNC: 12 U/L (ref 7–52)
ANION GAP SERPL CALCULATED.3IONS-SCNC: 9 MMOL/L (ref 4–13)
AST SERPL W P-5'-P-CCNC: 12 U/L (ref 13–39)
ATRIAL RATE: 66 BPM
BASOPHILS # BLD AUTO: 0.02 THOUSANDS/ÂΜL (ref 0–0.1)
BASOPHILS NFR BLD AUTO: 0 % (ref 0–1)
BILIRUB SERPL-MCNC: 0.59 MG/DL (ref 0.2–1)
BNP SERPL-MCNC: 143 PG/ML (ref 0–100)
BUN SERPL-MCNC: 13 MG/DL (ref 5–25)
CALCIUM SERPL-MCNC: 9 MG/DL (ref 8.4–10.2)
CARDIAC TROPONIN I PNL SERPL HS: 3 NG/L
CARDIAC TROPONIN I PNL SERPL HS: 4 NG/L
CHLORIDE SERPL-SCNC: 103 MMOL/L (ref 96–108)
CO2 SERPL-SCNC: 28 MMOL/L (ref 21–32)
CREAT SERPL-MCNC: 0.95 MG/DL (ref 0.6–1.3)
EOSINOPHIL # BLD AUTO: 0.05 THOUSAND/ÂΜL (ref 0–0.61)
EOSINOPHIL NFR BLD AUTO: 0 % (ref 0–6)
ERYTHROCYTE [DISTWIDTH] IN BLOOD BY AUTOMATED COUNT: 15.6 % (ref 11.6–15.1)
GFR SERPL CREATININE-BSD FRML MDRD: 62 ML/MIN/1.73SQ M
GLUCOSE SERPL-MCNC: 124 MG/DL (ref 65–140)
GLUCOSE SERPL-MCNC: 207 MG/DL (ref 65–140)
HCT VFR BLD AUTO: 37.4 % (ref 34.8–46.1)
HGB BLD-MCNC: 11.3 G/DL (ref 11.5–15.4)
IMM GRANULOCYTES # BLD AUTO: 0.05 THOUSAND/UL (ref 0–0.2)
IMM GRANULOCYTES NFR BLD AUTO: 0 % (ref 0–2)
LYMPHOCYTES # BLD AUTO: 1.66 THOUSANDS/ÂΜL (ref 0.6–4.47)
LYMPHOCYTES NFR BLD AUTO: 13 % (ref 14–44)
MCH RBC QN AUTO: 26.2 PG (ref 26.8–34.3)
MCHC RBC AUTO-ENTMCNC: 30.2 G/DL (ref 31.4–37.4)
MCV RBC AUTO: 87 FL (ref 82–98)
MONOCYTES # BLD AUTO: 0.92 THOUSAND/ÂΜL (ref 0.17–1.22)
MONOCYTES NFR BLD AUTO: 7 % (ref 4–12)
NEUTROPHILS # BLD AUTO: 10.17 THOUSANDS/ÂΜL (ref 1.85–7.62)
NEUTS SEG NFR BLD AUTO: 80 % (ref 43–75)
NRBC BLD AUTO-RTO: 0 /100 WBCS
P AXIS: 79 DEGREES
PLATELET # BLD AUTO: 224 THOUSANDS/UL (ref 149–390)
PMV BLD AUTO: 9.5 FL (ref 8.9–12.7)
POTASSIUM SERPL-SCNC: 3.6 MMOL/L (ref 3.5–5.3)
PR INTERVAL: 160 MS
PROT SERPL-MCNC: 6.9 G/DL (ref 6.4–8.4)
QRS AXIS: 45 DEGREES
QRSD INTERVAL: 80 MS
QT INTERVAL: 410 MS
QTC INTERVAL: 429 MS
RBC # BLD AUTO: 4.31 MILLION/UL (ref 3.81–5.12)
SODIUM SERPL-SCNC: 140 MMOL/L (ref 135–147)
T WAVE AXIS: 59 DEGREES
VENTRICULAR RATE: 66 BPM
WBC # BLD AUTO: 12.87 THOUSAND/UL (ref 4.31–10.16)

## 2024-05-24 PROCEDURE — 99285 EMERGENCY DEPT VISIT HI MDM: CPT

## 2024-05-24 PROCEDURE — 80053 COMPREHEN METABOLIC PANEL: CPT

## 2024-05-24 PROCEDURE — 93005 ELECTROCARDIOGRAM TRACING: CPT

## 2024-05-24 PROCEDURE — 94760 N-INVAS EAR/PLS OXIMETRY 1: CPT

## 2024-05-24 PROCEDURE — 99291 CRITICAL CARE FIRST HOUR: CPT | Performed by: EMERGENCY MEDICINE

## 2024-05-24 PROCEDURE — 94660 CPAP INITIATION&MGMT: CPT

## 2024-05-24 PROCEDURE — 84484 ASSAY OF TROPONIN QUANT: CPT

## 2024-05-24 PROCEDURE — 71046 X-RAY EXAM CHEST 2 VIEWS: CPT

## 2024-05-24 PROCEDURE — 36415 COLL VENOUS BLD VENIPUNCTURE: CPT

## 2024-05-24 PROCEDURE — 99223 1ST HOSP IP/OBS HIGH 75: CPT | Performed by: INTERNAL MEDICINE

## 2024-05-24 PROCEDURE — 96374 THER/PROPH/DIAG INJ IV PUSH: CPT

## 2024-05-24 PROCEDURE — 94640 AIRWAY INHALATION TREATMENT: CPT

## 2024-05-24 PROCEDURE — 82948 REAGENT STRIP/BLOOD GLUCOSE: CPT

## 2024-05-24 PROCEDURE — 94644 CONT INHLJ TX 1ST HOUR: CPT

## 2024-05-24 PROCEDURE — 83880 ASSAY OF NATRIURETIC PEPTIDE: CPT

## 2024-05-24 PROCEDURE — 85025 COMPLETE CBC W/AUTO DIFF WBC: CPT

## 2024-05-24 PROCEDURE — 94664 DEMO&/EVAL PT USE INHALER: CPT

## 2024-05-24 RX ORDER — ALBUTEROL SULFATE 90 UG/1
1 AEROSOL, METERED RESPIRATORY (INHALATION) EVERY 4 HOURS PRN
Status: DISCONTINUED | OUTPATIENT
Start: 2024-05-24 | End: 2024-05-26 | Stop reason: HOSPADM

## 2024-05-24 RX ORDER — ONDANSETRON 2 MG/ML
4 INJECTION INTRAMUSCULAR; INTRAVENOUS EVERY 6 HOURS PRN
Status: DISCONTINUED | OUTPATIENT
Start: 2024-05-24 | End: 2024-05-26 | Stop reason: HOSPADM

## 2024-05-24 RX ORDER — FLUTICASONE FUROATE AND VILANTEROL 200; 25 UG/1; UG/1
1 POWDER RESPIRATORY (INHALATION) DAILY
Status: DISCONTINUED | OUTPATIENT
Start: 2024-05-25 | End: 2024-05-26 | Stop reason: HOSPADM

## 2024-05-24 RX ORDER — FLUTICASONE PROPIONATE 50 MCG
1 SPRAY, SUSPENSION (ML) NASAL DAILY
Status: DISCONTINUED | OUTPATIENT
Start: 2024-05-25 | End: 2024-05-26 | Stop reason: HOSPADM

## 2024-05-24 RX ORDER — HYDROXYZINE HYDROCHLORIDE 25 MG/1
25 TABLET, FILM COATED ORAL ONCE
Status: COMPLETED | OUTPATIENT
Start: 2024-05-24 | End: 2024-05-24

## 2024-05-24 RX ORDER — METHYLPREDNISOLONE SODIUM SUCCINATE 40 MG/ML
40 INJECTION, POWDER, LYOPHILIZED, FOR SOLUTION INTRAMUSCULAR; INTRAVENOUS EVERY 12 HOURS SCHEDULED
Status: DISCONTINUED | OUTPATIENT
Start: 2024-05-24 | End: 2024-05-25

## 2024-05-24 RX ORDER — FAMOTIDINE 20 MG/1
40 TABLET, FILM COATED ORAL
Status: DISCONTINUED | OUTPATIENT
Start: 2024-05-24 | End: 2024-05-26 | Stop reason: HOSPADM

## 2024-05-24 RX ORDER — MESALAMINE 400 MG/1
800 CAPSULE, DELAYED RELEASE ORAL 2 TIMES DAILY
Status: DISCONTINUED | OUTPATIENT
Start: 2024-05-24 | End: 2024-05-26 | Stop reason: HOSPADM

## 2024-05-24 RX ORDER — LISINOPRIL 10 MG/1
10 TABLET ORAL EVERY MORNING
Status: DISCONTINUED | OUTPATIENT
Start: 2024-05-25 | End: 2024-05-26 | Stop reason: HOSPADM

## 2024-05-24 RX ORDER — HYDROXYZINE HYDROCHLORIDE 10 MG/1
10 TABLET, FILM COATED ORAL 2 TIMES DAILY
Status: DISCONTINUED | OUTPATIENT
Start: 2024-05-24 | End: 2024-05-26 | Stop reason: HOSPADM

## 2024-05-24 RX ORDER — INSULIN LISPRO 100 [IU]/ML
1-6 INJECTION, SOLUTION INTRAVENOUS; SUBCUTANEOUS
Status: DISCONTINUED | OUTPATIENT
Start: 2024-05-24 | End: 2024-05-25

## 2024-05-24 RX ORDER — LEVALBUTEROL INHALATION SOLUTION 1.25 MG/3ML
1.25 SOLUTION RESPIRATORY (INHALATION)
Status: DISCONTINUED | OUTPATIENT
Start: 2024-05-24 | End: 2024-05-26 | Stop reason: HOSPADM

## 2024-05-24 RX ORDER — GUAIFENESIN 600 MG/1
600 TABLET, EXTENDED RELEASE ORAL ONCE
Status: COMPLETED | OUTPATIENT
Start: 2024-05-24 | End: 2024-05-24

## 2024-05-24 RX ORDER — AZITHROMYCIN 250 MG/1
500 TABLET, FILM COATED ORAL EVERY 24 HOURS
Status: DISCONTINUED | OUTPATIENT
Start: 2024-05-24 | End: 2024-05-26 | Stop reason: HOSPADM

## 2024-05-24 RX ORDER — ATORVASTATIN CALCIUM 10 MG/1
20 TABLET, FILM COATED ORAL EVERY MORNING
Status: DISCONTINUED | OUTPATIENT
Start: 2024-05-25 | End: 2024-05-26 | Stop reason: HOSPADM

## 2024-05-24 RX ORDER — DOFETILIDE 0.12 MG/1
500 CAPSULE ORAL 2 TIMES DAILY
Status: DISCONTINUED | OUTPATIENT
Start: 2024-05-24 | End: 2024-05-26 | Stop reason: HOSPADM

## 2024-05-24 RX ORDER — ACETAMINOPHEN 325 MG/1
650 TABLET ORAL EVERY 6 HOURS PRN
Status: DISCONTINUED | OUTPATIENT
Start: 2024-05-24 | End: 2024-05-26 | Stop reason: HOSPADM

## 2024-05-24 RX ORDER — GUAIFENESIN 600 MG/1
600 TABLET, EXTENDED RELEASE ORAL EVERY 12 HOURS SCHEDULED
Status: DISCONTINUED | OUTPATIENT
Start: 2024-05-24 | End: 2024-05-26 | Stop reason: HOSPADM

## 2024-05-24 RX ORDER — FUROSEMIDE 20 MG/1
20 TABLET ORAL 2 TIMES DAILY
Status: DISCONTINUED | OUTPATIENT
Start: 2024-05-24 | End: 2024-05-26 | Stop reason: HOSPADM

## 2024-05-24 RX ORDER — SODIUM CHLORIDE FOR INHALATION 0.9 %
3 VIAL, NEBULIZER (ML) INHALATION ONCE
Status: COMPLETED | OUTPATIENT
Start: 2024-05-24 | End: 2024-05-24

## 2024-05-24 RX ORDER — METOPROLOL SUCCINATE 100 MG/1
100 TABLET, EXTENDED RELEASE ORAL EVERY 12 HOURS
Status: DISCONTINUED | OUTPATIENT
Start: 2024-05-24 | End: 2024-05-26 | Stop reason: HOSPADM

## 2024-05-24 RX ORDER — GABAPENTIN 300 MG/1
300 CAPSULE ORAL 2 TIMES DAILY
Status: DISCONTINUED | OUTPATIENT
Start: 2024-05-24 | End: 2024-05-26 | Stop reason: HOSPADM

## 2024-05-24 RX ORDER — METHYLPREDNISOLONE SODIUM SUCCINATE 125 MG/2ML
125 INJECTION, POWDER, LYOPHILIZED, FOR SOLUTION INTRAMUSCULAR; INTRAVENOUS ONCE
Status: COMPLETED | OUTPATIENT
Start: 2024-05-24 | End: 2024-05-24

## 2024-05-24 RX ORDER — PANTOPRAZOLE SODIUM 40 MG/1
40 TABLET, DELAYED RELEASE ORAL
Status: DISCONTINUED | OUTPATIENT
Start: 2024-05-24 | End: 2024-05-26 | Stop reason: HOSPADM

## 2024-05-24 RX ADMIN — MESALAMINE 800 MG: 400 CAPSULE, DELAYED RELEASE ORAL at 18:05

## 2024-05-24 RX ADMIN — LEVALBUTEROL HYDROCHLORIDE 1.25 MG: 1.25 SOLUTION RESPIRATORY (INHALATION) at 19:45

## 2024-05-24 RX ADMIN — AZITHROMYCIN DIHYDRATE 500 MG: 250 TABLET ORAL at 18:04

## 2024-05-24 RX ADMIN — INSULIN LISPRO 2 UNITS: 100 INJECTION, SOLUTION INTRAVENOUS; SUBCUTANEOUS at 16:51

## 2024-05-24 RX ADMIN — GUAIFENESIN 600 MG: 600 TABLET, EXTENDED RELEASE ORAL at 11:08

## 2024-05-24 RX ADMIN — FUROSEMIDE 20 MG: 20 TABLET ORAL at 18:05

## 2024-05-24 RX ADMIN — FAMOTIDINE 40 MG: 20 TABLET, FILM COATED ORAL at 21:01

## 2024-05-24 RX ADMIN — HYDROXYZINE HYDROCHLORIDE 25 MG: 25 TABLET ORAL at 11:08

## 2024-05-24 RX ADMIN — IPRATROPIUM BROMIDE 1 MG: 0.5 SOLUTION RESPIRATORY (INHALATION) at 09:35

## 2024-05-24 RX ADMIN — METHYLPREDNISOLONE SODIUM SUCCINATE 40 MG: 40 INJECTION, POWDER, FOR SOLUTION INTRAMUSCULAR; INTRAVENOUS at 21:01

## 2024-05-24 RX ADMIN — ISODIUM CHLORIDE 3 ML: 0.03 SOLUTION RESPIRATORY (INHALATION) at 09:35

## 2024-05-24 RX ADMIN — APIXABAN 5 MG: 5 TABLET, FILM COATED ORAL at 18:04

## 2024-05-24 RX ADMIN — PANCRELIPASE 24000 UNITS: 24000; 76000; 120000 CAPSULE, DELAYED RELEASE PELLETS ORAL at 16:50

## 2024-05-24 RX ADMIN — HYDROXYZINE HYDROCHLORIDE 10 MG: 10 TABLET ORAL at 21:02

## 2024-05-24 RX ADMIN — METHYLPREDNISOLONE SODIUM SUCCINATE 125 MG: 125 INJECTION, POWDER, FOR SOLUTION INTRAMUSCULAR; INTRAVENOUS at 09:14

## 2024-05-24 RX ADMIN — METOPROLOL SUCCINATE 100 MG: 100 TABLET, FILM COATED, EXTENDED RELEASE ORAL at 21:02

## 2024-05-24 RX ADMIN — ALBUTEROL SULFATE 10 MG: 2.5 SOLUTION RESPIRATORY (INHALATION) at 09:35

## 2024-05-24 RX ADMIN — GUAIFENESIN 600 MG: 600 TABLET, EXTENDED RELEASE ORAL at 21:02

## 2024-05-24 RX ADMIN — DOFETILIDE 500 MCG: 0.12 CAPSULE ORAL at 18:06

## 2024-05-24 RX ADMIN — GABAPENTIN 300 MG: 300 CAPSULE ORAL at 21:03

## 2024-05-24 RX ADMIN — PANTOPRAZOLE SODIUM 40 MG: 40 TABLET, DELAYED RELEASE ORAL at 18:04

## 2024-05-24 NOTE — ASSESSMENT & PLAN NOTE
Awoke in the middle of the night feeling dyspneic, did not feel improved with cpap  Has not taken her COPD inhalers in many years, supposed to be taking trelegy  Sick contact with isabella who had a URI  Given solumedrol 125 mg in the ED, continue on 40 mg BID  Zithromax x 3 days  Mucinex  Respiratory protocol  Pulm consult  Did come to the unit on O2, respiratory failure ruled out as she was taken off the oxygen immediately without issue

## 2024-05-24 NOTE — ASSESSMENT & PLAN NOTE
Present on admission with tachypnea and leukocytosis secondary to COPD exacerbation  Do not feel there is true infection present  Monitor off fluids and abx

## 2024-05-24 NOTE — H&P
"[facility]  H&P  Name: Kellen Gray 68 y.o. female I MRN: [id number]  Unit/Bed#: 401-01 I Date of Admission: 5/24/2024   Date of Service: 5/24/2024 I Hospital Day: 0      Assessment & Plan   * COPD exacerbation (HCC)  Assessment & Plan  Awoke in the middle of the night feeling dyspneic, did not feel improved with cpap  Has not taken her COPD inhalers in many years, supposed to be taking trelegy  Sick contact with isabella who had a URI  Given solumedrol 125 mg in the ED, continue on 40 mg BID  Zithromax x 3 days  Mucinex  Respiratory protocol  Pulm consult  Did come to the unit on O2, respiratory failure ruled out as she was taken off the oxygen immediately without issue    SIRS (systemic inflammatory response syndrome) ([facility])  Assessment & Plan  Present on admission with tachypnea and leukocytosis secondary to COPD exacerbation  Do not feel there is true infection present  Monitor off fluids and abx    Exocrine pancreatic insufficiency  Assessment & Plan  Continue on home pancreatic enzymes    (HFpEF) heart failure with preserved ejection fraction (HCC)  Assessment & Plan  Wt Readings from Last 3 Encounters:   05/24/24 101 kg (223 lb 1.7 oz)   05/22/24 99.6 kg (219 lb 9.6 oz)   04/08/24 102 kg (223 lb 12.8 oz)     Does not appear to be in exacerbation at this time  Continue on a low salt diet          Diabetes mellitus, type 2 (HCC)  Assessment & Plan  Lab Results   Component Value Date    HGBA1C 7.5 (H) 03/13/2024       No results for input(s): \"POCGLU\" in the last 72 hours.    Blood Sugar Average: Last 72 hrs:    Near goal A1c per last check  Cont jardiance for HF  SSI ordered      Severe obstructive sleep apnea  Assessment & Plan  Offer cpap hs    Crohn's disease with complication (HCC)  Assessment & Plan  Does not appear to be in flare  Continue mesalamine    A-fib (HCC)  Assessment & Plan  Rate controlled  Maintain AC with Eliquis  Continue on metoprolol           VTE Pharmacologic " Prophylaxis:   Moderate Risk (Score 3-4) - Pharmacological DVT Prophylaxis Ordered: apixaban (Eliquis).  Code Status: Level 1 - Full Code   Discussion with family: Patient declined call to .     Anticipated Length of Stay: Patient will be admitted on an observation basis with an anticipated length of stay of less than 2 midnights secondary to COPD exacerbation.    Total Time Spent on Date of Encounter in care of patient: 75 mins. This time was spent on one or more of the following: performing physical exam; counseling and coordination of care; obtaining or reviewing history; documenting in the medical record; reviewing/ordering tests, medications or procedures; communicating with other healthcare professionals and discussing with patient's family/caregivers.    Chief Complaint: SOB    History of Present Illness:  Kellen Gray is a 68 y.o. female with a PMH of CHF, COPD, DM, hypothyroid, MARY ELLEN who presents with SOB. Patient woke up in the middle of the night and reports she felt as if she could not breath.  She said it felt as if there is something stuck in her throat or that her throat was closing.  She put on her CPAP mask as this typically will make her feel better but she states that she did not feel better so then she subsequently removed it.  She states she was able to fall back asleep but then Waking up feeling short of breath.  Patient has a diagnosis of COPD but states that she stopped taking her inhaler regimen a long time ago because she felt that she did not need it.  She does note that her grandson recently was diagnosed with any upper respiratory infection.  She states that she has been coughing but nothing is really been coming up.  She denies fevers, chills, abdominal pain, constipation, vomiting, diarrhea, edema.  She has not smoked in 7 years but does not drink.  She was given IV Solu-Medrol in the ED and breathing treatment.  She was placed on oxygen therapy but was able to be taken  off immediately upon arrival to floor so do not suspect respiratory failure.    Review of Systems:  Review of Systems   Constitutional:  Positive for activity change.   Respiratory:  Positive for cough, shortness of breath and wheezing.    Cardiovascular:  Positive for chest pain. Negative for palpitations and leg swelling.   Gastrointestinal:  Positive for nausea. Negative for constipation, diarrhea and vomiting.   Genitourinary:  Negative for dysuria and urgency.   Musculoskeletal:  Negative for arthralgias, back pain and gait problem.   Skin:  Negative for color change, pallor, rash and wound.   Neurological:  Positive for weakness.   Psychiatric/Behavioral:  Negative for confusion. The patient is not nervous/anxious.        Past Medical and Surgical History:   Past Medical History:   Diagnosis Date    A-fib (HCC)     Acute respiratory failure with hypoxia (HCC) 11/30/2019    Asthma     Brain aneurysm     Cancer (HCC)     papillary thyroid cancer    Cardiac disease     CHF (congestive heart failure) (HCC)     COPD (chronic obstructive pulmonary disease) (HCC)     CPAP (continuous positive airway pressure) dependence     Crohn disease (HCC)     Diabetes mellitus (HCC)     Disease of thyroid gland     GERD (gastroesophageal reflux disease)     Hyperlipidemia     Hypertension     Sleep apnea        Past Surgical History:   Procedure Laterality Date    APPENDECTOMY      CARDIOVERSION N/A 2/14/2019    Procedure: CARDIOVERSION;  Surgeon: Lee Brenner MD;  Location: [facility];  Service: Cardiology    CEREBRAL ANEURYSM REPAIR      CHOLECYSTECTOMY      HYSTERECTOMY      IR PELVIC ANGIOGRAM  12/14/2017    [facility] EXC THROMBOSED HEMORRHOID XTRNL N/A 10/7/2022    Procedure: HEMORRHOIDECTOMY EXCISION;  Surgeon: Leonardo Cash DO;  Location: [facility];  Service: General    THYROIDECTOMY      TONSILLECTOMY AND ADENOIDECTOMY         Meds/Allergies:  Prior to Admission medications    Medication Sig Start Date End Date Taking?  Authorizing Provider   albuterol (ACCUNEB) 0.63 MG/3ML nebulizer solution Take 0.63 mg by nebulization every 6 (six) hours as needed for wheezing    Historical Provider, MD   albuterol (PROVENTIL HFA,VENTOLIN HFA) 90 mcg/act inhaler Inhale 2 puffs every 4 (four) hours as needed for wheezing    Historical Provider, MD   apixaban (ELIQUIS) 5 mg Take 1 tablet (5 mg total) by mouth 2 (two) times a day 3/7/24   Ina Rivas MD   atorvastatin (LIPITOR) 20 mg tablet Take 20 mg by mouth every morning 6/7/22   Historical Provider, MD   Blood Glucose Monitoring Suppl (OneTouch Verio) w/Device KIT by Device route in the morning  Patient not taking: Reported on 5/22/2024 5/3/24   Ina Rivas MD   Cholecalciferol (Vitamin D3) 1.25 MG (34887 UT) CAPS Take 1 capsule by mouth once a week 1/21/23   Historical Provider, MD   dofetilide (TIKOSYN) 500 mcg capsule Take 500 mcg by mouth 2 (two) times a day    Historical Provider, MD   Empagliflozin 25 MG TABS Take 1 tablet (25 mg total) by mouth daily 2/8/24 2/2/25  Ina Rivas MD   esomeprazole (NexIUM) 40 MG capsule TAKE 1 CAPSULE BY MOUTH TWICE DAILY BEFORE MEAL(S) TAKE 30 MINUTES BEFORE BREAKFAST AND 30 MINUTES BEFORE DINNER 3/25/24   Joe Stanton DO   famotidine (PEPCID) 40 MG tablet TAKE 1 TABLET BY MOUTH ONCE DAILY AT BEDTIME 4/29/24   Lida Astorga MD   fluticasone (FLONASE) 50 mcg/act nasal spray 1 spray into each nostril daily 2/19/24   JOSH Eden   fluticasone-umeclidinium-vilanterol (Trelegy Ellipta) 200-62.5-25 mcg/actuation AEPB inhaler Inhale 1 puff daily Rinse mouth after use.    Historical Provider, MD   furosemide (LASIX) 20 mg tablet Take 20 mg by mouth 2 (two) times a day 6/10/20   Historical Provider, MD   gabapentin (NEURONTIN) 300 mg capsule Take 1 capsule by mouth twice daily 4/30/24   Leonardo Donkar, DO   glucose blood test strip Use to check sugars once a day. Dx E11.9 1/28/20   Historical Provider, MD   hydrOXYzine HCL  (ATARAX) 10 mg tablet Take 1 tablet by mouth twice daily 24   Ina Rivas MD   Lancets (ONETOUCH DELICA PLUS [id number]) MISC USE 1 TO CHECK GLUCOSE ONCE DAILY  Patient not taking: Reported on 2024   Historical Provider, MD   lisinopril (ZESTRIL) 10 mg tablet Take 10 mg by mouth every morning 22   Historical Provider, MD   mesalamine (DELZICOL) 400 mg Take 2 capsules by mouth twice daily 3/25/24   Mary Ross MD   metoprolol succinate (TOPROL-XL) 100 mg 24 hr tablet TAKE 1 TABLET BY MOUTH EVERY 12 HOURS 24   Ina Rivas MD   OneTouch Verio test strip Use 1 each in the morning Use as instructed  Patient not taking: Reported on 2024 5/3/24   Ina Rivas MD   pancrelipase, Lip-Prot-Amyl, (Creon) 24,000 units Take 1 capsule (24,000 Units total) by mouth 2 (two) times a day 24   Ina Rivas MD   Synthroid 137 MCG tablet Take 1 tablet (137 mcg total) by mouth daily 24   Ina Rivas MD     I have reviewed home medications using recent Epic encounter.    Allergies:   Allergies   Allergen Reactions    Sulfa Antibiotics Rash       Social History:  Marital Status: /Civil Union   Occupation: noncontributory  Patient Pre-hospital Living Situation: Home  Patient Pre-hospital Level of Mobility: walks  Patient Pre-hospital Diet Restrictions: none  Substance Use History:   Social History     Substance and Sexual Activity   Alcohol Use Not Currently    Comment: social     Social History     Tobacco Use   Smoking Status Former    Current packs/day: 0.00    Types: Cigarettes    Quit date: 2018    Years since quittin.3   Smokeless Tobacco Never     Social History     Substance and Sexual Activity   Drug Use Not Currently    Types: Marijuana       Family History:  Family History   Problem Relation Age of Onset    Alzheimer's disease Mother     Cancer Father         lung and bone    Heart disease Sister     Hypertension Sister     Diabetes Sister  "    Stroke Sister     Cancer Sister     Cancer Brother         liver with mets to bone    Heart disease Brother     Cancer Brother         thyroid cancer       Physical Exam:     Vitals:   Blood Pressure: (!) 107/36 (05/24/24 1416)  Pulse: 80 (05/24/24 1416)  Temperature: 97.7 °F (36.5 °C) (05/24/24 1416)  Temp Source: Tympanic (05/24/24 0835)  Respirations: 14 (05/24/24 1416)  Height: 5' 9\" (175.3 cm) (05/24/24 1335)  Weight - Scale: 101 kg (223 lb 1.7 oz) (05/24/24 1335)  SpO2: 91 % (05/24/24 1416)    Physical Exam  Vitals and nursing note reviewed.   Constitutional:       General: She is not in acute distress.     Appearance: She is obese.   Cardiovascular:      Rate and Rhythm: Normal rate. Rhythm irregular.      Pulses: Normal pulses.      Heart sounds: Normal heart sounds.   Pulmonary:      Effort: Pulmonary effort is normal.      Breath sounds: Wheezing present. No rhonchi or rales.   Abdominal:      General: Bowel sounds are normal.      Palpations: Abdomen is soft.   Musculoskeletal:      Right lower leg: No edema.      Left lower leg: No edema.   Neurological:      Mental Status: She is alert.   Psychiatric:         Mood and Affect: Mood normal.         Behavior: Behavior normal.          Additional Data:     Lab Results:  Results from last 7 days   Lab Units 05/24/24  0914   WBC Thousand/uL 12.87*   HEMOGLOBIN g/dL 11.3*   HEMATOCRIT % 37.4   PLATELETS Thousands/uL 224   SEGS PCT % 80*   LYMPHO PCT % 13*   MONO PCT % 7   EOS PCT % 0     Results from last 7 days   Lab Units 05/24/24  0914   SODIUM mmol/L 140   POTASSIUM mmol/L 3.6   CHLORIDE mmol/L 103   CO2 mmol/L 28   BUN mg/dL 13   CREATININE mg/dL 0.95   ANION GAP mmol/L 9   CALCIUM mg/dL 9.0   ALBUMIN g/dL 3.7   TOTAL BILIRUBIN mg/dL 0.59   ALK PHOS U/L 79   ALT U/L 12   AST U/L 12*   GLUCOSE RANDOM mg/dL 124                       Lines/Drains:  Invasive Devices       Peripheral Intravenous Line  Duration             Peripheral IV 05/24/24 Left " Antecubital <1 day                        Imaging: Reviewed radiology reports from this admission including: chest xray  XR chest 2 views   Final Result by Berlin Kelley MD (05/24 1302)      Mild interstitial edema.            Workstation performed: [id number]             EKG and Other Studies Reviewed on Admission:   EKG: NSR. HR 66.    ** Please Note: This note has been constructed using a voice recognition system. **

## 2024-05-24 NOTE — ED NOTES
RT requested to start hour long breathing treatment via secure text.      Lisa Murdock, RN  05/24/24 9002

## 2024-05-24 NOTE — CONSULTS
"Pulmonary Consultation   Kellen Gray 68 y.o. female MRN: [id number]  Unit/Bed#: 401-01 Encounter: [id number]      Reason for consultation: COPD exacerbation    Requesting physician: Lisa Cowan PA-C     Impressions/Recommendations:     COPD/emphysema with mild acute exacerbation-PFTs performed in 2023 showed restriction and no evidence of obstruction.  There is mild to moderate emphysema based on imaging.  She has been prescribed Trelegy, but does not use it on a regular basis.  She has evidence of mild wheezing on exam.  Agree with methylprednisolone 40 mg every 12 hours for now.  Can likely transition to prednisone in the next 24-48 hours.  Continue Breo and Incruse with plans to resume Trelegy on discharge.  She will need updated outpatient PFTs and office follow-up    Acute hypoxic respiratory failure -titrate O2 to maintain saturations above 88%    Obstructive sleep apnea -prior sleep study suggested optimal CPAP setting of 12 cmH2O. recent compliance scanned into the system in February showed that she used the device (auto CPAP) every night with average AHI 0.8    Nicotine dependence, in remission-patient smoked half pack per day for 50+ years, quit 7 years ago.    Left lower lobe pulmonary nodule-8 mm in size, stable going back to April 2022.  She will need repeat CT, nonurgently to document stability    Chief Complaint: \"I was short of breath\"    History of Present Illness   HPI:  Kellen Gray is a 68 y.o. female who has history of diastolic congestive heart failure and COPD of unknown severity presented with sudden onset of shortness of breath.  Also with mild associated wheezing.  Patient admits to not taking Trelegy Ellipta on a regular basis, though it is prescribed as such.  She has never been seen by pulmonologist, nor had PFTs.  She was exposed to her grandson who had upper respiratory infection.  Patient denies congestion.  Denies hemoptysis.  She continues to struggle with shortness of breath, " worse with exertion.  She also reports having feeling of her throat closing up.  There is no stridor.  She denies lower extremity edema.  On arrival, saturations were 86% on room air.  Currently on 2 L with saturations of 91-94%    All other 12-point review of systems are negative.    Historical Information   Past Medical History:   Diagnosis Date    A-fib (HCC)     Acute respiratory failure with hypoxia (HCC) 11/30/2019    Asthma     Brain aneurysm     Cancer (HCC)     papillary thyroid cancer    Cardiac disease     CHF (congestive heart failure) (HCC)     COPD (chronic obstructive pulmonary disease) (HCC)     CPAP (continuous positive airway pressure) dependence     Crohn disease (HCC)     Diabetes mellitus (HCC)     Disease of thyroid gland     GERD (gastroesophageal reflux disease)     Hyperlipidemia     Hypertension     Sleep apnea      Past Surgical History:   Procedure Laterality Date    APPENDECTOMY      CARDIOVERSION N/A 2/14/2019    Procedure: CARDIOVERSION;  Surgeon: Lee Brenner MD;  Location: [facility];  Service: Cardiology    CEREBRAL ANEURYSM REPAIR      CHOLECYSTECTOMY      HYSTERECTOMY      IR PELVIC ANGIOGRAM  12/14/2017    [facility] EXC THROMBOSED HEMORRHOID XTRNL N/A 10/7/2022    Procedure: HEMORRHOIDECTOMY EXCISION;  Surgeon: Leonardo Cash DO;  Location: [facility];  Service: General    THYROIDECTOMY      TONSILLECTOMY AND ADENOIDECTOMY       Family History   Problem Relation Age of Onset    Alzheimer's disease Mother     Cancer Father         lung and bone    Heart disease Sister     Hypertension Sister     Diabetes Sister     Stroke Sister     Cancer Sister     Cancer Brother         liver with mets to bone    Heart disease Brother     Cancer Brother         thyroid cancer       Tobacco history: Smokes half pack per day for 50+ years, quit 7 years ago    Family history: Negative from pulmonary perspective    Meds/Allergies   Current Facility-Administered Medications   Medication Dose Route  Frequency    acetaminophen (TYLENOL) tablet 650 mg  650 mg Oral Q6H PRN    albuterol (PROVENTIL HFA,VENTOLIN HFA) inhaler 1 puff  1 puff Inhalation Q4H PRN    apixaban (ELIQUIS) tablet 5 mg  5 mg Oral BID    [START ON 5/25/2024] atorvastatin (LIPITOR) tablet 20 mg  20 mg Oral QAM    azithromycin (ZITHROMAX) tablet 500 mg  500 mg Oral Q24H    dofetilide (TIKOSYN) capsule 500 mcg  500 mcg Oral BID    [START ON 5/25/2024] Empagliflozin (JARDIANCE) tablet 20 mg  20 mg Oral Daily    famotidine (PEPCID) tablet 40 mg  40 mg Oral HS    [START ON 5/25/2024] fluticasone (FLONASE) 50 mcg/act nasal spray 1 spray  1 spray Nasal Daily    [START ON 5/25/2024] fluticasone-vilanterol 200-25 mcg/actuation 1 puff  1 puff Inhalation Daily    And    [START ON 5/25/2024] umeclidinium 62.5 mcg/actuation inhaler AEPB 1 puff  1 puff Inhalation Daily    furosemide (LASIX) tablet 20 mg  20 mg Oral BID    gabapentin (NEURONTIN) capsule 300 mg  300 mg Oral BID    guaiFENesin (MUCINEX) 12 hr tablet 600 mg  600 mg Oral Q12H MAURICIO    hydrOXYzine HCL (ATARAX) tablet 10 mg  10 mg Oral BID    insulin lispro (HumALOG/ADMELOG) 100 units/mL subcutaneous injection 1-6 Units  1-6 Units Subcutaneous TID AC    [START ON 5/25/2024] levothyroxine tablet 137 mcg  137 mcg Oral Daily    [START ON 5/25/2024] lisinopril (ZESTRIL) tablet 10 mg  10 mg Oral QAM    mesalamine (DELZICOL) delayed release capsule 800 mg  800 mg Oral BID    methylPREDNISolone sodium succinate (Solu-MEDROL) injection 40 mg  40 mg Intravenous Q12H MAURICIO    metoprolol succinate (TOPROL-XL) 24 hr tablet 100 mg  100 mg Oral Q12H    ondansetron (ZOFRAN) injection 4 mg  4 mg Intravenous Q6H PRN    pancrelipase (Lip-Prot-Amyl) (CREON) delayed release capsule 24,000 Units  24,000 Units Oral BID With Meals    pantoprazole (PROTONIX) EC tablet 40 mg  40 mg Oral BID AC     Allergies   Allergen Reactions    Sulfa Antibiotics Rash       Vitals: Blood pressure (!) 107/36, pulse 80, temperature 97.7 °F (36.5  "°C), resp. rate 14, height 5' 9\" (1.753 m), weight 101 kg (223 lb 1.7 oz), SpO2 91%.,  2 L, Body mass index is 32.95 kg/m².  No intake or output data in the 24 hours ending 05/24/24 1608    Physical exam:     Physical Exam  Constitutional:       General: She is not in acute distress.     Appearance: Normal appearance.   HENT:      Head: Normocephalic.      Comments: Poor dentition     Mouth/Throat:      Pharynx: No oropharyngeal exudate.   Eyes:      General: No scleral icterus.  Neck:      Vascular: No JVD.   Cardiovascular:      Rate and Rhythm: Normal rate and regular rhythm.   Pulmonary:      Breath sounds: Wheezing and rales present. No rhonchi.   Abdominal:      Palpations: Abdomen is soft.      Tenderness: There is no abdominal tenderness.   Musculoskeletal:         General: No deformity.      Cervical back: Neck supple.   Lymphadenopathy:      Cervical: No cervical adenopathy.   Skin:     General: Skin is warm and dry.   Neurological:      Mental Status: She is alert and oriented to person, place, and time.   Psychiatric:         Mood and Affect: Mood normal.         Labs: I have personally reviewed pertinent lab results.    Results from last 7 days   Lab Units 05/24/24  0914   WBC Thousand/uL 12.87*   HEMOGLOBIN g/dL 11.3*   HEMATOCRIT % 37.4   PLATELETS Thousands/uL 224         Results from last 7 days   Lab Units 05/24/24  0914   POTASSIUM mmol/L 3.6   CHLORIDE mmol/L 103   CO2 mmol/L 28   BUN mg/dL 13   CREATININE mg/dL 0.95   CALCIUM mg/dL 9.0   ALK PHOS U/L 79   ALT U/L 12   AST U/L 12*     NT-    Imaging and other studies: I have personally reviewed pertinent reports.   and I have personally reviewed pertinent films in PACS chest x-ray from today shows mild interstitial edema    Chest CT from 5/25/2023 shows mild to moderate emphysema.  There is an 8 mm left lower lobe groundglass nodule.    Sleep study performed on 4/11/2019 showed severe sleep apnea with CPAP titration to an optimal pressure " of 12 cmH2O    Other Studies: Pulmonary function testing performed at [facility] on 9/7/2023 FEV1/FVC ratio was 74%.  FEV1 68% predicted, FVC 71% predicted.Total lung capacity 92% predicted, % predicted.  DLCO 70% predicted    Code Status: Level 1 - Full Code    Thank you for allowing us to participate in the care of your patient.    Eugenie Salmeron, DO

## 2024-05-24 NOTE — ED PROVIDER NOTES
History  Chief Complaint   Patient presents with    Shortness of Breath     Patient reports sob beginning last night. Also states that she feels like something is stuck in her throat     Patient is a 68-year-old female with pmhx emphysema, allergies, CHF, hypothyroid, GERD. Brought in by EMS for SOB. She went to bed feeling fine, woke up at midnight feeling short of breath.  She wears a CPAP to bed, and felt like she could not take a deep breath then.  Patient has a pulse oximeter at home and reports oxygen saturation in the 70s.  She gave herself a nebulized breathing treatment and wore her 's oxygen, felt a little bit better, but then at 5:30 AM felt worse again, trouble taking a deep breath, breathing rapidly, feels like her throat is swollen, has congestion with postnasal drip.  Denies fever/chills, no ear pain, no chest pain, no nausea or vomiting.  Has lower right leg pain which is chronic for patient since fracture.  No other pain in her calfs, no swelling, no redness.  Patient was recently seen by PCP, was feeling fine, she reports that she stopped her Trelegy inhaler a few months ago because she felt fine and did not think she needed it.  She previously saw pulmonary, was instructed to follow-up with pulmonary but did not.  Last seen over a year ago.  Patient has both Trelegy and albuterol at home, but has not been using over the last 2 months.  Patient quit smoking 6 years ago.  Has a dog and bird at home, has seasonal allergies, not taking any allergy meds.        Prior to Admission Medications   Prescriptions Last Dose Informant Patient Reported? Taking?   Blood Glucose Monitoring Suppl (OneTouch Verio) w/Device KIT  Self No No   Sig: by Device route in the morning   Patient not taking: Reported on 5/22/2024   Cholecalciferol (Vitamin D3) 1.25 MG (31059 UT) CAPS  Self Yes No   Sig: Take 1 capsule by mouth once a week   Empagliflozin 25 MG TABS  Self No No   Sig: Take 1 tablet (25 mg total) by  mouth daily   Lancets (ONETOUCH DELICA PLUS [id number]) MISC  Self Yes No   Sig: USE 1 TO CHECK GLUCOSE ONCE DAILY   Patient not taking: Reported on 2024   OneTouch Verio test strip  Self No No   Sig: Use 1 each in the morning Use as instructed   Patient not taking: Reported on 2024   Synthroid 137 MCG tablet   No No   Sig: Take 1 tablet (137 mcg total) by mouth daily   albuterol (ACCUNEB) 0.63 MG/3ML nebulizer solution  Self Yes No   Sig: Take 0.63 mg by nebulization every 6 (six) hours as needed for wheezing   albuterol (PROVENTIL HFA,VENTOLIN HFA) 90 mcg/act inhaler  Self Yes No   Sig: Inhale 2 puffs every 4 (four) hours as needed for wheezing   apixaban (ELIQUIS) 5 mg  Self No No   Sig: Take 1 tablet (5 mg total) by mouth 2 (two) times a day   atorvastatin (LIPITOR) 20 mg tablet  Self Yes No   Sig: Take 20 mg by mouth every morning   dofetilide (TIKOSYN) 500 mcg capsule  Self Yes No   Sig: Take 500 mcg by mouth 2 (two) times a day   esomeprazole (NexIUM) 40 MG capsule  Self No No   Sig: TAKE 1 CAPSULE BY MOUTH TWICE DAILY BEFORE MEAL(S) TAKE 30 MINUTES BEFORE BREAKFAST AND 30 MINUTES BEFORE DINNER   famotidine (PEPCID) 40 MG tablet  Self No No   Sig: TAKE 1 TABLET BY MOUTH ONCE DAILY AT BEDTIME   fluticasone (FLONASE) 50 mcg/act nasal spray  Self No No   Si spray into each nostril daily   fluticasone-umeclidinium-vilanterol (Trelegy Ellipta) 200-62.5-25 mcg/actuation AEPB inhaler  Self Yes No   Sig: Inhale 1 puff daily Rinse mouth after use.   furosemide (LASIX) 20 mg tablet  Self Yes No   Sig: Take 20 mg by mouth 2 (two) times a day   gabapentin (NEURONTIN) 300 mg capsule  Self No No   Sig: Take 1 capsule by mouth twice daily   glucose blood test strip  Self Yes No   Sig: Use to check sugars once a day. Dx E11.9   hydrOXYzine HCL (ATARAX) 10 mg tablet  Self No No   Sig: Take 1 tablet by mouth twice daily   lisinopril (ZESTRIL) 10 mg tablet  Self Yes No   Sig: Take 10 mg by mouth every morning    mesalamine (DELZICOL) 400 mg  Self No No   Sig: Take 2 capsules by mouth twice daily   metoprolol succinate (TOPROL-XL) 100 mg 24 hr tablet  Self No No   Sig: TAKE 1 TABLET BY MOUTH EVERY 12 HOURS   pancrelipase, Lip-Prot-Amyl, (Creon) 24,000 units   No No   Sig: Take 1 capsule (24,000 Units total) by mouth 2 (two) times a day      Facility-Administered Medications Last Administration Doses Remaining   cyanocobalamin injection 1,000 mcg 5/22/2024  7:07 AM           Past Medical History:   Diagnosis Date    A-fib (HCC)     Acute respiratory failure with hypoxia (HCC) 11/30/2019    Asthma     Brain aneurysm     Cancer (HCC)     papillary thyroid cancer    Cardiac disease     CHF (congestive heart failure) (HCC)     COPD (chronic obstructive pulmonary disease) (HCC)     CPAP (continuous positive airway pressure) dependence     Crohn disease (HCC)     Diabetes mellitus (HCC)     Disease of thyroid gland     GERD (gastroesophageal reflux disease)     Hyperlipidemia     Hypertension     Sleep apnea        Past Surgical History:   Procedure Laterality Date    APPENDECTOMY      CARDIOVERSION N/A 2/14/2019    Procedure: CARDIOVERSION;  Surgeon: Lee Brenner MD;  Location: [facility];  Service: Cardiology    CEREBRAL ANEURYSM REPAIR      CHOLECYSTECTOMY      HYSTERECTOMY      IR PELVIC ANGIOGRAM  12/14/2017    HI EXC THROMBOSED HEMORRHOID XTRNL N/A 10/7/2022    Procedure: HEMORRHOIDECTOMY EXCISION;  Surgeon: Leonardo Cash DO;  Location: [facility];  Service: General    THYROIDECTOMY      TONSILLECTOMY AND ADENOIDECTOMY         Family History   Problem Relation Age of Onset    Alzheimer's disease Mother     Cancer Father         lung and bone    Heart disease Sister     Hypertension Sister     Diabetes Sister     Stroke Sister     Cancer Sister     Cancer Brother         liver with mets to bone    Heart disease Brother     Cancer Brother         thyroid cancer     I have reviewed and agree with the history as  documented.    E-Cigarette/Vaping    E-Cigarette Use Never User      E-Cigarette/Vaping Substances    Nicotine No     THC No     CBD No     Flavoring No     Other No     Unknown No      Social History     Tobacco Use    Smoking status: Former     Current packs/day: 0.00     Types: Cigarettes     Quit date: 2018     Years since quittin.3    Smokeless tobacco: Never   Vaping Use    Vaping status: Never Used   Substance Use Topics    Alcohol use: Not Currently     Comment: social    Drug use: Not Currently     Types: Marijuana        Review of Systems   Constitutional:  Positive for fatigue. Negative for chills and fever.   HENT:  Positive for sore throat (feels swollen) and voice change. Negative for congestion, ear discharge and ear pain.    Eyes:  Negative for pain and visual disturbance.   Respiratory:  Positive for cough, shortness of breath and wheezing.         Cannot take deep breath   Cardiovascular:  Negative for chest pain and palpitations.   Gastrointestinal:  Negative for abdominal pain, constipation, nausea and vomiting.   Genitourinary:  Negative for dysuria and hematuria.   Musculoskeletal:  Negative for arthralgias and back pain.   Skin:  Negative for color change and rash.   Neurological:  Negative for seizures and syncope.   Psychiatric/Behavioral:  Positive for sleep disturbance. Negative for agitation and confusion.    All other systems reviewed and are negative.      Physical Exam  ED Triage Vitals [24 0835]   Temperature Pulse Respirations Blood Pressure SpO2   98.9 °F (37.2 °C) 69 (!) 24 135/61 (!) 86 %      Temp Source Heart Rate Source Patient Position - Orthostatic VS BP Location FiO2 (%)   Tympanic Monitor Sitting Right arm --      Pain Score       8             Orthostatic Vital Signs  Vitals:    24 0835 24 0909 24 1002 24 1032   BP: 135/61 123/60 133/53 130/55   Pulse: 69 68 69 91   Patient Position - Orthostatic VS: Sitting          Physical  Exam  Constitutional:       General: She is not in acute distress.     Appearance: She is obese.   HENT:      Head: Normocephalic.      Right Ear: Tympanic membrane, ear canal and external ear normal.      Left Ear: Tympanic membrane, ear canal and external ear normal.      Nose: No rhinorrhea.      Mouth/Throat:      Mouth: Mucous membranes are moist.      Pharynx: Oropharynx is clear. No pharyngeal swelling, oropharyngeal exudate or posterior oropharyngeal erythema.   Eyes:      General:         Right eye: Discharge (watery) present.         Left eye: Discharge (watery) present.  Cardiovascular:      Rate and Rhythm: Normal rate and regular rhythm.      Pulses: Normal pulses.      Heart sounds: Normal heart sounds. No murmur heard.  Pulmonary:      Effort: Pulmonary effort is normal. No respiratory distress.      Breath sounds: Examination of the right-lower field reveals wheezing. Examination of the left-lower field reveals wheezing. Wheezing present. No rhonchi or rales.   Chest:      Chest wall: Tenderness present.   Abdominal:      General: There is no distension.      Palpations: Abdomen is soft.      Tenderness: There is no abdominal tenderness.   Musculoskeletal:         General: No swelling.      Cervical back: Normal range of motion.      Right lower leg: No edema.      Left lower leg: No edema.      Comments: Chronic right shin pain post fracture  No erythema  Neg Homans sign   Lymphadenopathy:      Cervical: No cervical adenopathy.   Skin:     General: Skin is warm.      Findings: No erythema or rash.   Neurological:      General: No focal deficit present.      Mental Status: She is alert. Mental status is at baseline.   Psychiatric:         Mood and Affect: Mood normal.         Behavior: Behavior normal.         ED Medications  Medications   albuterol inhalation solution 10 mg (10 mg Nebulization Given 5/24/24 0936)     And   ipratropium (ATROVENT) 0.02 % inhalation solution 1 mg (1 mg Nebulization  Given 5/24/24 0935)     And   sodium chloride 0.9 % inhalation solution 3 mL (3 mL Nebulization Given 5/24/24 0935)   methylPREDNISolone sodium succinate (Solu-MEDROL) injection 125 mg (125 mg Intravenous Given 5/24/24 0914)   guaiFENesin (MUCINEX) 12 hr tablet 600 mg (600 mg Oral Given 5/24/24 1108)   hydrOXYzine HCL (ATARAX) tablet 25 mg (25 mg Oral Given 5/24/24 1108)       Diagnostic Studies  Results Reviewed       Procedure Component Value Units Date/Time    HS Troponin I 2hr [[id number]]  (Normal) Collected: 05/24/24 1106    Lab Status: Final result Specimen: Blood from Arm, Left Updated: 05/24/24 1133     hs TnI 2hr 3 ng/L      Delta 2hr hsTnI -1 ng/L     HS Troponin I 4hr [[id number]]     Lab Status: No result Specimen: Blood     HS Troponin 0hr (reflex protocol) [[id number]]  (Normal) Collected: 05/24/24 0914    Lab Status: Final result Specimen: Blood from Arm, Left Updated: 05/24/24 0953     hs TnI 0hr 4 ng/L     B-Type Natriuretic Peptide(BNP) [[id number]]  (Abnormal) Collected: 05/24/24 0914    Lab Status: Final result Specimen: Blood from Arm, Left Updated: 05/24/24 0952      pg/mL     Comprehensive metabolic panel [[id number]]  (Abnormal) Collected: 05/24/24 0914    Lab Status: Final result Specimen: Blood from Arm, Left Updated: 05/24/24 0944     Sodium 140 mmol/L      Potassium 3.6 mmol/L      Chloride 103 mmol/L      CO2 28 mmol/L      ANION GAP 9 mmol/L      BUN 13 mg/dL      Creatinine 0.95 mg/dL      Glucose 124 mg/dL      Calcium 9.0 mg/dL      AST 12 U/L      ALT 12 U/L      Alkaline Phosphatase 79 U/L      Total Protein 6.9 g/dL      Albumin 3.7 g/dL      Total Bilirubin 0.59 mg/dL      eGFR 62 ml/min/1.73sq m     Narrative:      National Kid[employer] Foundation guidelines for Chronic Kidney Disease (CKD):     Stage 1 with normal or high GFR (GFR > 90 mL/min/1.73 square meters)    Stage 2 Mild CKD (GFR = 60-89 mL/min/1.73 square meters)    Stage 3A Moderate CKD (GFR = 45-59 mL/min/1.73  square meters)    Stage 3B Moderate CKD (GFR = 30-44 mL/min/1.73 square meters)    Stage 4 Severe CKD (GFR = 15-29 mL/min/1.73 square meters)    Stage 5 End Stage CKD (GFR <15 mL/min/1.73 square meters)  Note: GFR calculation is accurate only with a steady state creatinine    CBC and differential [[id number]]  (Abnormal) Collected: 05/24/24 0914    Lab Status: Final result Specimen: Blood from Arm, Left Updated: 05/24/24 0928     WBC 12.87 Thousand/uL      RBC 4.31 Million/uL      Hemoglobin 11.3 g/dL      Hematocrit 37.4 %      MCV 87 fL      MCH 26.2 pg      MCHC 30.2 g/dL      RDW 15.6 %      MPV 9.5 fL      Platelets 224 Thousands/uL      nRBC 0 /100 WBCs      Segmented % 80 %      Immature Grans % 0 %      Lymphocytes % 13 %      Monocytes % 7 %      Eosinophils Relative 0 %      Basophils Relative 0 %      Absolute Neutrophils 10.17 Thousands/µL      Absolute Immature Grans 0.05 Thousand/uL      Absolute Lymphocytes 1.66 Thousands/µL      Absolute Monocytes 0.92 Thousand/µL      Eosinophils Absolute 0.05 Thousand/µL      Basophils Absolute 0.02 Thousands/µL                    XR chest 2 views    (Results Pending)         Procedures  Procedures      ED Course     Room aire - Sa02 86%  2L nc - Sa02 99%  Albuterol/Ipratropium nebulizer  Solu-medrol 125mg IV  CBC - leukocytosis  CMP normal  Trop Neg  CXR normal (self interpreted)  EKG normal  Given Mucinex  Give Atarax  Admitted to Observation  Given drink/food.                            SBIRT 22yo+      Flowsheet Row Most Recent Value   Initial Alcohol Screen: US AUDIT-C     1. How often do you have a drink containing alcohol? 0 Filed at: 05/24/2024 0838   2. How many drinks containing alcohol do you have on a typical day you are drinking?  0 Filed at: 05/24/2024 0838   3a. Male UNDER 65: How often do you have five or more drinks on one occasion? 0 Filed at: 05/24/2024 0838   3b. FEMALE Any Age, or MALE 65+: How often do you have 4 or more drinks on one  occassion? 0 Filed at: 05/24/2024 0838   Audit-C Score 0 Filed at: 05/24/2024 0838   CHARLY: How many times in the past year have you...    Used an illegal drug or used a prescription medication for non-medical reasons? Never Filed at: 05/24/2024 0838                  Medical Decision Making  EMS brought pt to ED for hypoxia; oxygen saturation 87% on RA.  Placed on 2L oxygen, oxygen saturation 98%.  Patient has COPD and has not used her Trelegy inhaler in over 2 months.  Patient tachypneic, with expiratory Wheezing.  Given DuoNeb treatment, IV Solu-Medrol 125 mg.  Wheezing improved.  Patient continues to feel short of breath; complains of postnasal drip, sometimes unable to take deep breath.  Given Mucinex for congestion and Atarax for nervousness/allergies.  Patient states she is hungry; given sprite, crackers and cookies.  Evaluating patient on room air; oxygen drops to 88% when speaking.  Patient states she is nervous to go home.  Discussed case with admitting attending; agreeable to admit patient under observation.    Amount and/or Complexity of Data Reviewed  Labs: ordered.  Radiology: ordered.    Risk  Prescription drug management.          Disposition  Final diagnoses:   COPD exacerbation (HCC)   SOB (shortness of breath)   Hypoxia     Time reflects when diagnosis was documented in both MDM as applicable and the Disposition within this note       Time User Action Codes Description Comment    5/24/2024 11:26 AM Jenna Neville Add [J44.1] COPD exacerbation (HCC)     5/24/2024 11:27 AM Jenna Neville Add [R06.02] SOB (shortness of breath)     5/24/2024 11:27 AM Jenna Neville Add [R09.02] Hypoxia           ED Disposition       ED Disposition   Admit    Condition   Stable    Date/Time   Fri May 24, 2024 1126    Comment   Case was discussed with  and the patient's admission status was agreed to be Admission Status: observation status to the service of Dr. Dutton .               Follow-up  Information    None         Patient's Medications   Discharge Prescriptions    No medications on file     No discharge procedures on file.    PDMP Review         Value Time User    PDMP Reviewed  Yes 2/25/2020  6:47 PM Funmilayo Pearson MD             ED Provider  Attending physically available and evaluated Kellen Gray. I managed the patient along with the ED Attending.    Electronically Signed by           Jenna Neville MD  05/24/24 9574

## 2024-05-24 NOTE — ED ATTENDING ATTESTATION
5/24/2024  I, Sarita Sloan MD, saw and evaluated the patient. I have discussed the patient with the resident/non-physician practitioner and agree with the resident's/non-physician practitioner's findings, Plan of Care, and MDM as documented in the resident's/non-physician practitioner's note, except where noted. All available labs and Radiology studies were reviewed.  I was present for key portions of any procedure(s) performed by the resident/non-physician practitioner and I was immediately available to provide assistance.       At this point I agree with the current assessment done in the Emergency Department.  I have conducted an independent evaluation of this patient a history and physical is as follows:    68-year-old female with past medical history of COPD, CHF, A-fib on Eliquis, diabetes, hypertension and hyperlipidemia who presents for evaluation of shortness of breath.  Patient states she has had worsening shortness of breath since last night.  She feels like there is something stuck in her throat.  Denies any chest pain.  She denies fevers.  She has had a cough.  Denies abdominal pain, nausea, vomiting, or diarrhea.  Denies weight gain or orthopnea.  Denies leg pain or swelling.    Physical exam:  Vital signs reviewed, patient is slightly tachypneic, hypoxic to 86% in room air.   Patient is awake and alert, no acute distress, head normocephalic, atraumatic, mucous membranes moist, neck supple, heart regular rate and rhythm, no murmurs/rubs/gallops, lungs with diffuse end expiratory wheezing, no increased work of breathing, abdomen soft, non tender, non distended, no rebound or guarding, no peripheral edema, no skin rashes, no focal neurologic deficits.     Assessment/plan:  68-year-old female with past medical history of COPD, CHF, A-fib on Eliquis, diabetes, hypertension and hyperlipidemia who presents for evaluation of shortness of breath since last night, patient is slightly tachypneic, hypoxic in  room air, no significant increased work of breathing.  She does have diffuse mild end expiratory wheezing.  Differential diagnosis includes: COPD exacerbation, CHF, pneumothorax, pneumonia, ACS, arrhythmia, anemia.  Will check CBC to evaluate for anemia or leukocytosis, CMP to evaluate for metabolic abnormality, EKG and troponin to evaluate for ACS or arrhythmia, BNP to evaluate for fluid overload, chest x-ray to evaluate for pneumothorax or pneumonia.  Will treat symptomatically with heart neb and steroids and reassess.       ED Course       Reviewed labs, no marked abnormalities.  Reviewed and interpreted chest x-ray, no acute cardiopulmonary disease.  Reassessed patient, she is feeling better and wheezing is improved but she still has conversational dyspnea and becomes hypoxic with minimal exertion.  Will plan for admission for COPD exacerbation.  Patient is agreeable with this plan. Discussed with [facility] for admission.    Critical Care Time  CriticalCare Time    Date/Time: 5/24/2024 11:52 AM    Performed by: Sarita Sloan MD  Authorized by: Sarita Sloan MD    Critical care provider statement:     Critical care time (minutes):  33    Critical care start time:  5/24/2024 10:52 AM    Critical care end time:  5/24/2024 11:52 AM    Critical care time was exclusive of:  Separately billable procedures and treating other patients and teaching time    Critical care was necessary to treat or prevent imminent or life-threatening deterioration of the following conditions:  Respiratory failure    Critical care was time spent personally by me on the following activities:  Blood draw for specimens, obtaining history from patient or surrogate, development of treatment plan with patient or surrogate, evaluation of patient's response to treatment, discussions with consultants, examination of patient, review of old charts, re-evaluation of patient's condition, ordering and review of radiographic studies, ordering and review  of laboratory studies and ordering and performing treatments and interventions    I assumed direction of critical care for this patient from another provider in my specialty: no    Comments:      COPD exacerbation requiring supplemental O2, CASTRO neb, steroids and frequent reassessments and admission

## 2024-05-24 NOTE — ED NOTES
Pt provided warm lunch tray pt sitting up on the side of the bed.      Lisa Murdock RN  05/24/24 1342

## 2024-05-24 NOTE — RESPIRATORY THERAPY NOTE
RT Protocol Note  Kellen Gray 68 y.o. female MRN: [id number]  Unit/Bed#: 401-01 Encounter: [id number]    Assessment    Principal Problem:    COPD exacerbation (HCC)  Active Problems:    A-fib (HCC)    Crohn's disease with complication (HCC)    Severe obstructive sleep apnea    Diabetes mellitus, type 2 (HCC)    (HFpEF) heart failure with preserved ejection fraction (HCC)    Exocrine pancreatic insufficiency    SIRS (systemic inflammatory response syndrome) (HCC)      Home Pulmonary Medications:  Trelegy, PRN Albuterol  Home Devices/Therapy: BiPAP/CPAP  (CPAP +12)    Past Medical History:   Diagnosis Date    A-fib (HCC)     Acute respiratory failure with hypoxia (HCC) 2019    Asthma     Brain aneurysm     Cancer (HCC)     papillary thyroid cancer    Cardiac disease     CHF (congestive heart failure) (HCC)     COPD (chronic obstructive pulmonary disease) (HCC)     CPAP (continuous positive airway pressure) dependence     Crohn disease (HCC)     Diabetes mellitus (HCC)     Disease of thyroid gland     GERD (gastroesophageal reflux disease)     Hyperlipidemia     Hypertension     Sleep apnea      Social History     Socioeconomic History    Marital status: /Civil Union     Spouse name: None    Number of children: None    Years of education: None    Highest education level: None   Occupational History    None   Tobacco Use    Smoking status: Former     Current packs/day: 0.00     Types: Cigarettes     Quit date: 2018     Years since quittin.3    Smokeless tobacco: Never   Vaping Use    Vaping status: Never Used   Substance and Sexual Activity    Alcohol use: Not Currently     Comment: social    Drug use: Not Currently     Types: Marijuana    Sexual activity: None   Other Topics Concern    None   Social History Narrative    None     Social Determinants of Health     Financial Resource Strain: Low Risk  (2023)    Overall Financial Resource Strain (CARDIA)     Difficulty of Paying Living Expenses:  "Not very hard   Food Insecurity: Unknown (4/17/2023)    Received from Michelle Martinez    Hunger Vital Sign     Worried About Running Out of Food in the Last Year: Patient declined     Ran Out of Food in the Last Year: Patient declined   Transportation Needs: No Transportation Needs (11/2/2023)    PRAPARE - Transportation     Lack of Transportation (Medical): No     Lack of Transportation (Non-Medical): No   Physical Activity: Not on file   Stress: Not on file   Social Connections: Not on file   Intimate Partner Violence: Not on file   Housing Stability: Low Risk  (12/21/2022)    Housing Stability Vital Sign     Unable to Pay for Housing in the Last Year: No     Number of Places Lived in the Last Year: 1     Unstable Housing in the Last Year: No       Subjective         Objective    Physical Exam:   Assessment Type: Assess only  General Appearance: Alert, Awake  Respiratory Pattern: Dyspnea with exertion  Chest Assessment: Chest expansion symmetrical  Bilateral Breath Sounds: Coarse  Cough: Non-productive, Strong  O2 Device: Room air    Vitals:  Blood pressure (!) 107/36, pulse 68, temperature 97.7 °F (36.5 °C), resp. rate 20, height 5' 9\" (1.753 m), weight 101 kg (223 lb 1.7 oz), SpO2 (!) 87%.          Imaging and other studies: I have personally reviewed pertinent reports.      O2 Device: Room air     Plan    Respiratory Plan: Mild Distress pathway  Airway Clearance Plan: Flutter       Will order Xopenex TID      "

## 2024-05-24 NOTE — ASSESSMENT & PLAN NOTE
"Lab Results   Component Value Date    HGBA1C 7.5 (H) 03/13/2024       No results for input(s): \"POCGLU\" in the last 72 hours.    Blood Sugar Average: Last 72 hrs:    Near goal A1c per last check  Cont jardiance for HF  SSI ordered    "

## 2024-05-24 NOTE — ASSESSMENT & PLAN NOTE
Wt Readings from Last 3 Encounters:   05/24/24 101 kg (223 lb 1.7 oz)   05/22/24 99.6 kg (219 lb 9.6 oz)   04/08/24 102 kg (223 lb 12.8 oz)     Does not appear to be in exacerbation at this time  Continue on a low salt diet

## 2024-05-24 NOTE — CASE MANAGEMENT
Case Management Progress Note    Patient name Kellen Gray  Location /401-01 MRN [id number]  : 1955 Date 2024       LOS (days): 0  Geometric Mean LOS (GMLOS) (days):   Days to GMLOS:        OBJECTIVE:        Current admission status: Observation  Preferred Pharmacy:   [facility] [address], [address]  [address] [place] [address], [address] [facility] [address]  Phone: [phone] Fax: [phone]    Primary Care Provider: Ina Rivas MD    Primary Insurance: [facility]  Secondary Insurance:     PROGRESS NOTE:chart reviewed. Cm to follow for any discharge needs.

## 2024-05-25 ENCOUNTER — APPOINTMENT (OUTPATIENT)
Dept: CT IMAGING | Facility: HOSPITAL | Age: 69
DRG: 191 | End: 2024-05-25
Payer: COMMERCIAL

## 2024-05-25 LAB
ALBUMIN SERPL BCP-MCNC: 3.9 G/DL (ref 3.5–5)
ALP SERPL-CCNC: 82 U/L (ref 34–104)
ALT SERPL W P-5'-P-CCNC: 12 U/L (ref 7–52)
ANION GAP SERPL CALCULATED.3IONS-SCNC: 11 MMOL/L (ref 4–13)
AST SERPL W P-5'-P-CCNC: 15 U/L (ref 13–39)
ATRIAL RATE: 136 BPM
BASOPHILS # BLD MANUAL: 0 THOUSAND/UL (ref 0–0.1)
BASOPHILS NFR MAR MANUAL: 0 % (ref 0–1)
BILIRUB SERPL-MCNC: 0.42 MG/DL (ref 0.2–1)
BUN SERPL-MCNC: 18 MG/DL (ref 5–25)
CALCIUM SERPL-MCNC: 9.5 MG/DL (ref 8.4–10.2)
CHLORIDE SERPL-SCNC: 103 MMOL/L (ref 96–108)
CO2 SERPL-SCNC: 26 MMOL/L (ref 21–32)
CREAT SERPL-MCNC: 0.93 MG/DL (ref 0.6–1.3)
EOSINOPHIL # BLD MANUAL: 0 THOUSAND/UL (ref 0–0.4)
EOSINOPHIL NFR BLD MANUAL: 0 % (ref 0–6)
ERYTHROCYTE [DISTWIDTH] IN BLOOD BY AUTOMATED COUNT: 15.6 % (ref 11.6–15.1)
GFR SERPL CREATININE-BSD FRML MDRD: 63 ML/MIN/1.73SQ M
GLUCOSE SERPL-MCNC: 157 MG/DL (ref 65–140)
GLUCOSE SERPL-MCNC: 158 MG/DL (ref 65–140)
GLUCOSE SERPL-MCNC: 225 MG/DL (ref 65–140)
GLUCOSE SERPL-MCNC: 229 MG/DL (ref 65–140)
GLUCOSE SERPL-MCNC: 248 MG/DL (ref 65–140)
HCT VFR BLD AUTO: 37.8 % (ref 34.8–46.1)
HGB BLD-MCNC: 11.7 G/DL (ref 11.5–15.4)
LYMPHOCYTES # BLD AUTO: 1.39 THOUSAND/UL (ref 0.6–4.47)
LYMPHOCYTES # BLD AUTO: 7 % (ref 14–44)
MCH RBC QN AUTO: 26.3 PG (ref 26.8–34.3)
MCHC RBC AUTO-ENTMCNC: 31 G/DL (ref 31.4–37.4)
MCV RBC AUTO: 85 FL (ref 82–98)
MONOCYTES # BLD AUTO: 0.6 THOUSAND/UL (ref 0–1.22)
MONOCYTES NFR BLD: 3 % (ref 4–12)
NEUTROPHILS # BLD MANUAL: 17.88 THOUSAND/UL (ref 1.85–7.62)
NEUTS BAND NFR BLD MANUAL: 4 % (ref 0–8)
NEUTS SEG NFR BLD AUTO: 86 % (ref 43–75)
PLATELET # BLD AUTO: 232 THOUSANDS/UL (ref 149–390)
PLATELET BLD QL SMEAR: ADEQUATE
PMV BLD AUTO: 10.3 FL (ref 8.9–12.7)
POTASSIUM SERPL-SCNC: 4 MMOL/L (ref 3.5–5.3)
PROCALCITONIN SERPL-MCNC: 0.06 NG/ML
PROT SERPL-MCNC: 7.6 G/DL (ref 6.4–8.4)
QRS AXIS: 7 DEGREES
QRSD INTERVAL: 78 MS
QT INTERVAL: 372 MS
QTC INTERVAL: 523 MS
RBC # BLD AUTO: 4.45 MILLION/UL (ref 3.81–5.12)
RBC MORPH BLD: NORMAL
SODIUM SERPL-SCNC: 140 MMOL/L (ref 135–147)
T WAVE AXIS: 9 DEGREES
VENTRICULAR RATE: 119 BPM
WBC # BLD AUTO: 19.87 THOUSAND/UL (ref 4.31–10.16)

## 2024-05-25 PROCEDURE — 97165 OT EVAL LOW COMPLEX 30 MIN: CPT

## 2024-05-25 PROCEDURE — 94760 N-INVAS EAR/PLS OXIMETRY 1: CPT

## 2024-05-25 PROCEDURE — 84145 PROCALCITONIN (PCT): CPT | Performed by: PHYSICIAN ASSISTANT

## 2024-05-25 PROCEDURE — 94640 AIRWAY INHALATION TREATMENT: CPT

## 2024-05-25 PROCEDURE — 82948 REAGENT STRIP/BLOOD GLUCOSE: CPT

## 2024-05-25 PROCEDURE — 80053 COMPREHEN METABOLIC PANEL: CPT | Performed by: PHYSICIAN ASSISTANT

## 2024-05-25 PROCEDURE — 94668 MNPJ CHEST WALL SBSQ: CPT

## 2024-05-25 PROCEDURE — 70491 CT SOFT TISSUE NECK W/DYE: CPT

## 2024-05-25 PROCEDURE — 94664 DEMO&/EVAL PT USE INHALER: CPT

## 2024-05-25 PROCEDURE — 93005 ELECTROCARDIOGRAM TRACING: CPT

## 2024-05-25 PROCEDURE — 85027 COMPLETE CBC AUTOMATED: CPT | Performed by: PHYSICIAN ASSISTANT

## 2024-05-25 PROCEDURE — 99232 SBSQ HOSP IP/OBS MODERATE 35: CPT

## 2024-05-25 PROCEDURE — 85007 BL SMEAR W/DIFF WBC COUNT: CPT | Performed by: PHYSICIAN ASSISTANT

## 2024-05-25 RX ORDER — INSULIN LISPRO 100 [IU]/ML
2-12 INJECTION, SOLUTION INTRAVENOUS; SUBCUTANEOUS
Status: DISCONTINUED | OUTPATIENT
Start: 2024-05-25 | End: 2024-05-26 | Stop reason: HOSPADM

## 2024-05-25 RX ORDER — METHYLPREDNISOLONE SODIUM SUCCINATE 40 MG/ML
40 INJECTION, POWDER, LYOPHILIZED, FOR SOLUTION INTRAMUSCULAR; INTRAVENOUS DAILY
Status: DISCONTINUED | OUTPATIENT
Start: 2024-05-26 | End: 2024-05-26 | Stop reason: HOSPADM

## 2024-05-25 RX ADMIN — FUROSEMIDE 20 MG: 20 TABLET ORAL at 17:04

## 2024-05-25 RX ADMIN — INSULIN LISPRO 3 UNITS: 100 INJECTION, SOLUTION INTRAVENOUS; SUBCUTANEOUS at 12:04

## 2024-05-25 RX ADMIN — INSULIN LISPRO 4 UNITS: 100 INJECTION, SOLUTION INTRAVENOUS; SUBCUTANEOUS at 21:51

## 2024-05-25 RX ADMIN — LEVALBUTEROL HYDROCHLORIDE 1.25 MG: 1.25 SOLUTION RESPIRATORY (INHALATION) at 13:53

## 2024-05-25 RX ADMIN — EMPAGLIFLOZIN 20 MG: 10 TABLET, FILM COATED ORAL at 08:12

## 2024-05-25 RX ADMIN — LISINOPRIL 10 MG: 10 TABLET ORAL at 08:08

## 2024-05-25 RX ADMIN — ATORVASTATIN CALCIUM 20 MG: 10 TABLET, FILM COATED ORAL at 08:07

## 2024-05-25 RX ADMIN — METOPROLOL SUCCINATE 100 MG: 100 TABLET, FILM COATED, EXTENDED RELEASE ORAL at 22:36

## 2024-05-25 RX ADMIN — HYDROXYZINE HYDROCHLORIDE 10 MG: 10 TABLET ORAL at 08:08

## 2024-05-25 RX ADMIN — INSULIN LISPRO 4 UNITS: 100 INJECTION, SOLUTION INTRAVENOUS; SUBCUTANEOUS at 17:01

## 2024-05-25 RX ADMIN — UMECLIDINIUM 1 PUFF: 62.5 AEROSOL, POWDER ORAL at 08:09

## 2024-05-25 RX ADMIN — LEVOTHYROXINE SODIUM 137 MCG: 25 TABLET ORAL at 06:29

## 2024-05-25 RX ADMIN — PANCRELIPASE 24000 UNITS: 24000; 76000; 120000 CAPSULE, DELAYED RELEASE PELLETS ORAL at 17:06

## 2024-05-25 RX ADMIN — FAMOTIDINE 40 MG: 20 TABLET, FILM COATED ORAL at 21:45

## 2024-05-25 RX ADMIN — IOHEXOL 85 ML: 350 INJECTION, SOLUTION INTRAVENOUS at 11:37

## 2024-05-25 RX ADMIN — AZITHROMYCIN DIHYDRATE 500 MG: 250 TABLET ORAL at 17:03

## 2024-05-25 RX ADMIN — DOFETILIDE 500 MCG: 0.12 CAPSULE ORAL at 08:11

## 2024-05-25 RX ADMIN — LEVALBUTEROL HYDROCHLORIDE 1.25 MG: 1.25 SOLUTION RESPIRATORY (INHALATION) at 08:01

## 2024-05-25 RX ADMIN — PANTOPRAZOLE SODIUM 40 MG: 40 TABLET, DELAYED RELEASE ORAL at 06:29

## 2024-05-25 RX ADMIN — METOPROLOL SUCCINATE 100 MG: 100 TABLET, FILM COATED, EXTENDED RELEASE ORAL at 08:08

## 2024-05-25 RX ADMIN — GUAIFENESIN 600 MG: 600 TABLET, EXTENDED RELEASE ORAL at 21:45

## 2024-05-25 RX ADMIN — FLUTICASONE PROPIONATE 1 SPRAY: 50 SPRAY, METERED NASAL at 08:11

## 2024-05-25 RX ADMIN — FLUTICASONE FUROATE AND VILANTEROL TRIFENATATE 1 PUFF: 200; 25 POWDER RESPIRATORY (INHALATION) at 08:09

## 2024-05-25 RX ADMIN — PANCRELIPASE 24000 UNITS: 24000; 76000; 120000 CAPSULE, DELAYED RELEASE PELLETS ORAL at 08:11

## 2024-05-25 RX ADMIN — GABAPENTIN 300 MG: 300 CAPSULE ORAL at 17:03

## 2024-05-25 RX ADMIN — MESALAMINE 800 MG: 400 CAPSULE, DELAYED RELEASE ORAL at 08:09

## 2024-05-25 RX ADMIN — PANTOPRAZOLE SODIUM 40 MG: 40 TABLET, DELAYED RELEASE ORAL at 17:06

## 2024-05-25 RX ADMIN — LEVALBUTEROL HYDROCHLORIDE 1.25 MG: 1.25 SOLUTION RESPIRATORY (INHALATION) at 20:43

## 2024-05-25 RX ADMIN — DOFETILIDE 500 MCG: 0.12 CAPSULE ORAL at 17:06

## 2024-05-25 RX ADMIN — MESALAMINE 800 MG: 400 CAPSULE, DELAYED RELEASE ORAL at 17:04

## 2024-05-25 RX ADMIN — INSULIN LISPRO 1 UNITS: 100 INJECTION, SOLUTION INTRAVENOUS; SUBCUTANEOUS at 08:09

## 2024-05-25 RX ADMIN — FUROSEMIDE 20 MG: 20 TABLET ORAL at 08:07

## 2024-05-25 RX ADMIN — GABAPENTIN 300 MG: 300 CAPSULE ORAL at 08:08

## 2024-05-25 RX ADMIN — HYDROXYZINE HYDROCHLORIDE 10 MG: 10 TABLET ORAL at 17:04

## 2024-05-25 RX ADMIN — GUAIFENESIN 600 MG: 600 TABLET, EXTENDED RELEASE ORAL at 08:08

## 2024-05-25 RX ADMIN — APIXABAN 5 MG: 5 TABLET, FILM COATED ORAL at 08:07

## 2024-05-25 RX ADMIN — METHYLPREDNISOLONE SODIUM SUCCINATE 40 MG: 40 INJECTION, POWDER, FOR SOLUTION INTRAMUSCULAR; INTRAVENOUS at 08:08

## 2024-05-25 RX ADMIN — APIXABAN 5 MG: 5 TABLET, FILM COATED ORAL at 17:03

## 2024-05-25 NOTE — PLAN OF CARE
Problem: CARDIOVASCULAR - ADULT  Goal: Absence of cardiac dysrhythmias or at baseline rhythm  Description: INTERVENTIONS:  - Continuous cardiac monitoring, vital signs, obtain 12 lead EKG if ordered  - Administer antiarrhythmic and heart rate control medications as ordered  - Monitor electrolytes and administer replacement therapy as ordered  Outcome: Progressing     Problem: RESPIRATORY - ADULT  Goal: Achieves optimal ventilation and oxygenation  Description: INTERVENTIONS:  - Assess for changes in respiratory status  - Assess for changes in mentation and behavior  - Position to facilitate oxygenation and minimize respiratory effort  - Oxygen administered by appropriate delivery if ordered  - Initiate smoking cessation education as indicated  - Encourage broncho-pulmonary hygiene including cough, deep breathe, Incentive Spirometry  - Assess the need for suctioning and aspirate as needed  - Assess and instruct to report SOB or any respiratory difficulty  - Respiratory Therapy support as indicated  Outcome: Progressing     Problem: INFECTION - ADULT  Goal: Absence or prevention of progression during hospitalization  Description: INTERVENTIONS:  - Assess and monitor for signs and symptoms of infection  - Monitor lab/diagnostic results  - Monitor all insertion sites, indwelling lines  - [place] appropriate cooling/warming therapies per order  - Administer medications as ordered  Outcome: Progressing

## 2024-05-25 NOTE — CASE MANAGEMENT
Case Management Discharge Planning Note    Patient name Kellen Gray  Location /401-01 MRN [id number]  : 1955 Date 2024       Current Admission Date: 2024  Current Admission Diagnosis:COPD exacerbation (HCC)   Patient Active Problem List    Diagnosis Date Noted Date Diagnosed    SIRS (systemic inflammatory response syndrome) (HCC) 2024     Hiatal hernia 2024     Exocrine pancreatic insufficiency 2024     Vitamin B12 deficiency 2023     GERD (gastroesophageal reflux disease) 10/23/2023     Generalized abdominal pain 10/23/2023     Leukocytosis 10/19/2023     Folic acid deficiency 10/19/2023     Vitamin D deficiency 2023     Class 1 obesity 2023     Essential hypertension 2022     History of renal calculi 10/26/2022     Sensorineural hearing loss, bilateral 10/24/2022     Urinary retention 10/18/2022     Hemorrhoids 2022     Impaired hearing 2022     Thymus neoplasm 2022     Chronic pain syndrome 2021     Chronic bilateral low back pain without sciatica 2021     Lumbar spondylosis 2021     Lumbar radiculopathy 2021     Iron deficiency anemia secondary to inadequate dietary iron intake 2021     History of vertebral fracture 2021     Restless legs syndrome 2021     Congenital multiple renal cysts 2021     Wedge compression fracture of T11-T12 vertebra, initial encounter for closed fracture (HCC) 2021     Fracture of right tibia 2021     BPPV (benign paroxysmal positional vertigo) 2020     History of colon polyps 2020     Diabetes mellitus, type 2 (HCC)      Colon polyp 2020     Multiple renal cysts 2020     T12 compression fracture (HCC) 2020     Prolonged QT interval 2020     Hepatomegaly 2020     Hepatic steatosis 2020     Atherosclerosis 2020     Severe obstructive sleep apnea 2020     Diarrhea 2020      (HFpEF) heart failure with preserved ejection fraction (HCC) 01/16/2020     History of thyroid cancer 01/10/2020     COPD exacerbation (HCC) 12/01/2019     Elevated d-dimer 11/29/2019     Hypokalemia 11/29/2019     S/P ablation of atrial fibrillation 11/04/2019     Recurrent major depressive disorder (HCC) 09/23/2019     Brain aneurysm 09/16/2019     Atypical chest pain 09/16/2019     Dizziness 09/16/2019     Crohn's disease with complication (HCC) 09/16/2019     Postoperative hypothyroidism 04/16/2019     Microscopic hematuria 02/14/2019     Pulmonary hypertension (HCC) 02/14/2019     Lymphadenopathy 02/12/2019     A-fib (HCC) 02/12/2019     Elevated TSH 02/12/2019     Hypercholesteremia 02/12/2019     Transaminitis 02/12/2019     History of total thyroidectomy 01/16/2019     Gastroesophageal reflux disease with esophagitis 10/17/2018     History of cerebral aneurysm repair 09/12/2017     Atypical ductal hyperplasia of right breast 05/01/2017       LOS (days): 0  Geometric Mean LOS (GMLOS) (days):   Days to GMLOS:     OBJECTIVE:            Current admission status: Inpatient   Preferred Pharmacy:   [facility] [address], [address]  [address] [place] [address], [address] [facility] [address]  Phone: [phone] Fax: [phone]    Primary Care Provider: Ina Rivas MD    Primary Insurance: [facility]  Secondary Insurance:     DISCHARGE DETAILS:    Discharge planning discussed with:: patient        CM contacted family/caregiver?: No- see comments (declined)  Were Treatment Team discharge recommendations reviewed with patient/caregiver?: Yes  Did patient/caregiver verbalize understanding of patient care needs?: Yes  Were patient/caregiver advised of the risks associated with not following Treatment Team discharge recommendations?: Yes    Contacts  Contact Method: In Person  Reason/Outcome: Discharge Planning    Requested Home Health Care         Is the patient interested in [facility] at  discharge?: No      Would you like to participate in our [facility] service program?  : No - Declined    Treatment Team Recommendation: Home  Discharge Destination Plan:: Home    IMM Given (Date):: 05/25/24  IMM Given to:: Patient   Met with pt at bedside. No current discharge needs noted. Discharge home with o/p follow up tentative tomorrow.

## 2024-05-25 NOTE — ASSESSMENT & PLAN NOTE
Lab Results   Component Value Date    HGBA1C 7.5 (H) 03/13/2024       Recent Labs     05/24/24  1626 05/25/24  0654 05/25/24  1046   POCGLU 207* 158* 248*       Blood Sugar Average: Last 72 hrs:  (P) [id number]    Near goal A1c per last check  Cont jardiance for HF  SSI ordered  BG trend elevated likely with steroid induced hyperglycemia. Increase to Alg 4 SSI

## 2024-05-25 NOTE — ASSESSMENT & PLAN NOTE
Present on admission with tachypnea and leukocytosis secondary to COPD exacerbation  No evidence of acute infection  Continue to monitor off abx   PCT is negative. Suspect a reactive leukocytosis secondary to steroid use

## 2024-05-25 NOTE — UTILIZATION REVIEW
Initial Clinical Review    Pt initially admitted as Observation on 5/24. Changed to Inpatient on 5/25. Pt requiring continued stay d/t ongoing management of COPD exacerbation.    Admission: Date/Time/Statement:   Admission Orders (From admission, onward)       Ordered        05/25/24 1243  INPATIENT ADMISSION  Once            05/24/24 1127  Place in Observation  Once                          Orders Placed This Encounter   Procedures    INPATIENT ADMISSION     Standing Status:   Standing     Number of Occurrences:   1     Order Specific Question:   Level of Care     Answer:   Med Surg [16]     Order Specific Question:   Estimated length of stay     Answer:   More than 2 Midnights     Order Specific Question:   Certification     Answer:   I certify that inpatient services are medically necessary for this patient for a duration of greater than two midnights. See H&P and MD Progress Notes for additional information about the patient's course of treatment.     ED Arrival Information       Expected   5/24/2024     Arrival   5/24/2024 08:33    Acuity   Emergent              Means of arrival   Ambulance    Escorted by   [facility]    Service   Hospitalist    Admission type   Emergency              Arrival complaint   Shortness Of Breath             Chief Complaint   Patient presents with    Shortness of Breath     Patient reports sob beginning last night. Also states that she feels like something is stuck in her throat       Initial Presentation: 68 y.o. female who presented by EMS to [facility] ED. Admitted in observation status for evaluation and treatment of COPD exacerbation. PMHx: afib, asthma, CHF, COPD, Crohn disease, T2DM, GERD, HLD, HTN, sleep apnea. Presented w/ SOB, notes waking up in the middle of the night feeling as though she could not breathe. On exam, irregular rhythm, wheezing, 2L O2 NC. Imaging showed interstitial edema. Plan: IV solu-medrol, azithromycin, mucinex, Supplemental O2, weaned  as tolerated; continue PTA meds, low sodium diet. SSI w/ BG checks ACHS, supportive care, Trend labs, replete electrolytes as needed. Pulmonology consulted.    Pulmonology: Pt w/ COPD exacerbation. IV solu-medrol; continue Breo & Incruse, Supplemental O2, weaned as tolerated.      5/25 Changed to Inpatient Status: Reports some dysphonia. Exam w/ dyspnea on exertion, diminished breath sounds. Plan: continue IV solu-medrol q12H, continue current meds, SSI w/ BG checks ACHS but increase algorithm to better manage hyperglycemia, supportive care. Telemetry. Trend labs, replete electrolytes as needed.       Date: 05/26/24  Day 3: Has surpassed a 2nd midnight with active treatments and services. Overnight, required supplemental O2 while sleeping. Able to be weaned to room air once awake. Exam: voice hoarse, diminished breath sounds, expiratory wheezing. Plan: decrease IV solu-medrol to daily, continue current meds, Trend labs, replete electrolytes as needed. SSI w/ BG checks ACHS. Telemetry. I&O.       ED Triage Vitals   Temperature Pulse Respirations Blood Pressure SpO2   05/24/24 0835 05/24/24 0835 05/24/24 0835 05/24/24 0835 05/24/24 0835   98.9 °F (37.2 °C) 69 (!) 24 135/61 (!) 86 %  None (Room air)         Wt Readings from Last 1 Encounters:   05/24/24 101 kg (223 lb 1.7 oz)     Additional Vital Signs:   Date/Time Temp Pulse Resp BP MAP (mmHg) SpO2 Calculated FIO2 (%) - Nasal Cannula Nasal Cannula O2 Flow Rate (L/min) O2 Device   05/26/24 0819 -- -- -- -- -- 96 % -- -- None (Room air)   05/26/24 0730 -- -- -- -- -- -- 28 2 L/min Nasal cannula   05/26/24 07:18:13 97.8 °F (36.6 °C) 56 15 127/50 76 90 % -- -- --   05/26/24 04:16:12 97.6 °F (36.4 °C) 65 18 115/51 72 95 % -- -- --   05/25/24 23:54:05 -- 113 Abnormal  -- -- -- 92 % 28 2 L/min Nasal cannula   05/25/24 23:52:58 -- 111 Abnormal  -- -- -- 85 % Abnormal   -- -- None (Room air)   SpO2: while asleep at 05/25/24 2352   05/25/24 22:25:43 97.4 °F (36.3 °C) Abnormal   115 Abnormal  -- 133/56 82 92 % -- -- None (Room air)   05/25/24 21:44:14 -- 76 -- 119/44 Abnormal  69 90 % -- -- --   05/25/24 21:42:19 -- 71 -- 92/37 Abnormal  55 Abnormal  -- -- -- --   05/25/24 2044 -- -- -- -- -- 94 % -- -- None (Room air)   05/25/24 2001 -- -- -- -- -- -- -- -- None (Room air)   05/25/24 19:11:02 97.4 °F (36.3 °C) Abnormal  60 18 127/55 79 93 % -- -- --   05/25/24 15:34:35 97.3 °F (36.3 °C) Abnormal  64 20 128/57 81 91 % -- -- --   05/25/24 1356 -- -- -- -- -- 95 %  -- -- --   SpO2: ra at 05/25/24 [facility]   05/25/24 10:49:16 -- 64 -- 128/57 81 91 % -- -- --     Date/Time Temp Pulse Resp BP MAP (mmHg) SpO2 FiO2 (%) Calculated FIO2 (%) - Nasal Cannula Nasal Cannula O2 Flow Rate (L/min) O2 Device   05/25/24 0801 -- -- -- -- -- 95 %  -- -- -- None (Room air)   05/25/24 06:17:09 97.2 °F (36.2 °C) Abnormal  -- 18 126/56 79 -- -- -- -- None (Room air)   05/25/24 03:05:17 97.4 °F (36.3 °C) Abnormal  61 18 124/57 79 94 % -- -- -- --   05/25/24 01:23:29 -- 61 -- 118/58 78 92 % -- -- -- --   05/24/24 2159 -- -- -- -- -- 97 % -- -- -- None (Room air)   05/24/24 2149 -- -- -- -- -- 98 % 30 -- -- BiPAP   05/24/24 21:01:08 97.9 °F (36.6 °C) 76 -- 135/48 Abnormal  77 90 % -- -- -- --   05/24/24 2050 -- -- -- -- -- 95 % -- -- -- None (Room air)   05/24/24 1945 -- -- -- -- -- 92 % -- -- -- None (Room air)   05/24/24 18:32:18 97.4 °F (36.3 °C) Abnormal  79 18 135/46 Abnormal  76 95 % -- -- -- --   05/24/24 1630 -- 68 20 -- -- 87 % Abnormal  -- -- -- None (Room air)   05/24/24 14:16:42 97.7 °F (36.5 °C) 80 14 107/36 Abnormal  60 Abnormal  91 % -- -- -- --   05/24/24 13:37:25 -- 78 17 134/60 85 95 % -- -- -- --   05/24/24 13:35:31 -- 83 17 134/60 85 94 % -- -- -- --   05/24/24 1049 -- -- -- -- -- 94 % -- 28 2 L/min Nasal cannula   05/24/24 1032 -- 91 30 Abnormal  130/55 79 94 % -- 28 2 L/min Nasal cannula   05/24/24 1002 -- 69 24 Abnormal  133/53 77 -- -- -- -- --   05/24/24 0936 -- -- -- -- -- 92 % -- -- -- None  (Room air)   05/24/24 0909 -- 68 17 123/60 86 93 % -- -- -- --   05/24/24 0842 -- -- -- -- -- -- -- 28 2 L/min Nasal cannula     Pertinent Labs/Diagnostic Test Results:   CT soft tissue neck w contrast   Final Result by Gallito Stratton DO (05/25 1221)      No pathologic adenopathy or mass identified.            Workstation performed: [id number]         XR chest 2 views   Final Result by Berlin Kelley MD (05/24 1302)      Mild interstitial edema.            Workstation performed: [id number]               Results from last 7 days   Lab Units 05/26/24  0419 05/25/24 0448 05/24/24  0914   WBC Thousand/uL 18.86* 19.87* 12.87*   HEMOGLOBIN g/dL 10.8* 11.7 11.3*   HEMATOCRIT % 34.7* 37.8 37.4   PLATELETS Thousands/uL 232 232 224   TOTAL NEUT ABS Thousands/µL 15.34*  --  10.17*   BANDS PCT %  --  4  --          Results from last 7 days   Lab Units 05/26/24  0419 05/25/24  0448 05/24/24  0914   SODIUM mmol/L 140 140 140   POTASSIUM mmol/L 3.7 4.0 3.6   CHLORIDE mmol/L 106 103 103   CO2 mmol/L 25 26 28   ANION GAP mmol/L 9 11 9   BUN mg/dL 21 18 13   CREATININE mg/dL 0.82 0.93 0.95   EGFR ml/min/1.73sq m 74 63 62   CALCIUM mg/dL 8.9 9.5 9.0     Results from last 7 days   Lab Units 05/25/24  0448 05/24/24  0914   AST U/L 15 12*   ALT U/L 12 12   ALK PHOS U/L 82 79   TOTAL PROTEIN g/dL 7.6 6.9   ALBUMIN g/dL 3.9 3.7   TOTAL BILIRUBIN mg/dL 0.42 0.59     Results from last 7 days   Lab Units 05/26/24  0720 05/25/24  2148 05/25/24  1618 05/25/24  1046 05/25/24  0654 05/24/24  1626   POC GLUCOSE mg/dl 137 225* 229* 248* 158* 207*     Results from last 7 days   Lab Units 05/26/24  0419 05/25/24  0448 05/24/24  0914   GLUCOSE RANDOM mg/dL 171* 157* 124     Results from last 7 days   Lab Units 05/24/24  1106 05/24/24  0914   HS TNI 0HR ng/L  --  4   HS TNI 2HR ng/L 3  --    HSTNI D2 ng/L -1  --        Results from last 7 days   Lab Units 05/25/24  0448   PROCALCITONIN ng/ml 0.06       Results from last 7 days   Lab Units  05/24/24  0914   BNP pg/mL 143*         ED Treatment:   Medication Administration from 05/24/2024 0833 to 05/24/2024 1329         Date/Time Order Dose Route Action     05/24/2024 0935 EDT albuterol inhalation solution 10 mg 10 mg Nebulization Given     05/24/2024 0935 EDT ipratropium (ATROVENT) 0.02 % inhalation solution 1 mg 1 mg Nebulization Given     05/24/2024 0935 EDT sodium chloride 0.9 % inhalation solution 3 mL 3 mL Nebulization Given     05/24/2024 0914 EDT methylPREDNISolone sodium succinate (Solu-MEDROL) injection 125 mg 125 mg Intravenous Given     05/24/2024 1108 EDT guaiFENesin (MUCINEX) 12 hr tablet 600 mg 600 mg Oral Given     05/24/2024 1108 EDT hydrOXYzine HCL (ATARAX) tablet 25 mg 25 mg Oral Given          Past Medical History:   Diagnosis Date    A-fib ([facility])     Acute respiratory failure with hypoxia ([facility]) 11/30/2019    Asthma     Brain aneurysm     Cancer (HCC)     papillary thyroid cancer    Cardiac disease     CHF (congestive heart failure) (HCC)     COPD (chronic obstructive pulmonary disease) (HCC)     CPAP (continuous positive airway pressure) dependence     Crohn disease (HCC)     Diabetes mellitus (HCC)     Disease of thyroid gland     GERD (gastroesophageal reflux disease)     Hyperlipidemia     Hypertension     Sleep apnea      Present on Admission:   (HFpEF) heart failure with preserved ejection fraction (HCC)   A-fib (HCC)   Diabetes mellitus, type 2 (HCC)   Severe obstructive sleep apnea   Exocrine pancreatic insufficiency   Crohn's disease with complication (HCC)   COPD exacerbation ([facility])      Admitting Diagnosis: Shortness of breath [R06.02]  SOB (shortness of breath) [R06.02]  Hypoxia [R09.02]  COPD exacerbation (HCC) [J44.1]  Age/Sex: 68 y.o. female  Admission Orders:  Regular Diet w/ 2 gm sodium restriction.  Blood glucose checks ACHS.  Up & OOB as tolerated.  Telemetry.  I&O. SCDs.  CPAP qHS.    Scheduled Medications:  apixaban, 5 mg, Oral, BID  atorvastatin, 20 mg, Oral,  QAM  azithromycin, 500 mg, Oral, Q24H  dofetilide, 500 mcg, Oral, BID  Empagliflozin, 20 mg, Oral, Daily  famotidine, 40 mg, Oral, HS  fluticasone, 1 spray, Nasal, Daily  fluticasone-vilanterol, 1 puff, Inhalation, Daily   And  umeclidinium, 1 puff, Inhalation, Daily  furosemide, 20 mg, Oral, BID  gabapentin, 300 mg, Oral, BID  guaiFENesin, 600 mg, Oral, Q12H MAURICIO  hydrOXYzine HCL, 10 mg, Oral, BID  insulin lispro, 2-12 Units, Subcutaneous, TID AC  insulin lispro, 2-12 Units, Subcutaneous, HS  levalbuterol, 1.25 mg, Nebulization, TID  levothyroxine, 137 mcg, Oral, Daily  lisinopril, 10 mg, Oral, QAM  mesalamine, 800 mg, Oral, BID  methylPREDNISolone sodium succinate, 40 mg, Intravenous, Daily  metoprolol succinate, 100 mg, Oral, Q12H  pancrelipase (Lip-Prot-Amyl), 24,000 Units, Oral, BID With Meals  pantoprazole, 40 mg, Oral, BID AC    Continuous IV Infusions: none    PRN Meds:  acetaminophen, 650 mg, Oral, Q6H PRN  albuterol, 1 puff, Inhalation, Q4H PRN  ondansetron, 4 mg, Intravenous, Q6H PRN    Discontinued Meds:  insulin lispro (HumALOG/ADMELOG) 100 units/mL subcutaneous injection 1-6 Units  Dose: 1-6 Units  Freq: 3 times daily before meals Route: SC  Start: 05/24/24 1600 End: 05/25/24 1246  methylPREDNISolone sodium succinate (Solu-MEDROL) injection 40 mg  Dose: 40 mg  Freq: Every 12 hours scheduled Route: IV  Start: 05/24/24 2100 End: 05/25/24 1046    IP CONSULT TO PULMONOLOGY    Network Utilization Review Department  ATTENTION: Please call with any questions or concerns to [phone] and carefully listen to the prompts so that you are directed to the right person. All voicemails are confidential.   For Discharge needs, contact Care Management DC Support Team at [phone] opt. 2  Send all requests for admission clinical reviews, approved or denied determinations and any other requests to dedicated fax number below belonging to the campus where the patient is receiving treatment. List of dedicated fax  numbers for the Facilities:  FACILITY NAME UR FAX NUMBER   ADMISSION DENIALS (Administrative/Medical Necessity) [phone]   DISCHARGE SUPPORT TEAM (NETWORK) [phone]   PARENT CHILD HEALTH (Maternity/NICU/Pediatrics) [phone]   [facility] [phone]   [facility] [phone]   [facility] [phone]   [facility] [phone]   [facility] [phone]   [facility] [phone]   General acute hospital [phone]   [facility] [phone]   [facility] [phone]   [facility] [phone]   [facility] [phone]   [facility] [phone]

## 2024-05-25 NOTE — RESPIRATORY THERAPY NOTE
05/25/24 0801   Inhalation Therapy Tx   $ Inhalation Therapy Performed Yes   SpO2 95 %  (ra, off oxygen)   Pre-Treatment Pulse 89  (exertion)   Pre-Treatment Respirations 20   Duration 15   Breath Sounds Pre-Treatment Bilateral Clear  (posteriorly)   Breath Sounds Post-Treatment Bilateral Clear   Post-Treatment Pulse 67   Post-Treatment Respirations 18   Delivery Source Air;UDN   Position Sitting   Treatment Tolerance Tolerated well   Resp Comments pt 's baseline is RA

## 2024-05-25 NOTE — PROGRESS NOTES
[facility]  Progress Note  Name: Kellen Gray I  MRN: [id number]  Unit/Bed#: 401-01 I Date of Admission: 5/24/2024   Date of Service: 5/25/2024 I Hospital Day: 0    Assessment & Plan   * COPD exacerbation (HCC)  Assessment & Plan  Presents with acute onset nocturnal dyspnea. No improvement with CPAP. Sick exposure to her grandson with URI. Today with voice hoarseness and laryngitis   Has not taken her COPD inhalers in many years, supposed to be taking trelegy  Decrease to Solu-Medrol 40mg qd. Anticipate transition to prednisone tomorrow  Zithromax x 3 days  Mucinex  Respiratory protocol  CT neck negative for acute pathology  Appreciate pulmonary consultation   Did come to the unit on O2, respiratory failure ruled out as she was taken off the oxygen immediately without issue - since remained stable on RA  Desaturation screen on day of discharge     SIRS (systemic inflammatory response syndrome) (HCC)  Assessment & Plan  Present on admission with tachypnea and leukocytosis secondary to COPD exacerbation  No evidence of acute infection  Continue to monitor off abx   PCT is negative. Suspect a reactive leukocytosis secondary to steroid use      Exocrine pancreatic insufficiency  Assessment & Plan  Continue on home pancreatic enzymes    (HFpEF) heart failure with preserved ejection fraction (HCC)  Assessment & Plan  Wt Readings from Last 3 Encounters:   05/24/24 101 kg (223 lb 1.7 oz)   05/22/24 99.6 kg (219 lb 9.6 oz)   04/08/24 102 kg (223 lb 12.8 oz)     Does not appear to be in exacerbation at this time  Continue on a low salt diet          Diabetes mellitus, type 2 (HCC)  Assessment & Plan  Lab Results   Component Value Date    HGBA1C 7.5 (H) 03/13/2024       Recent Labs     05/24/24  1626 05/25/24  0654 05/25/24  1046   POCGLU 207* 158* 248*       Blood Sugar Average: Last 72 hrs:  (P) [id number]    Near goal A1c per last check  Cont jardiance for HF  SSI ordered  BG trend  elevated likely with steroid induced hyperglycemia. Increase to Alg 4 SSI      Severe obstructive sleep apnea  Assessment & Plan  Continue CPAP hs    Crohn's disease with complication (HCC)  Assessment & Plan  Does not appear to be in flare  Continue mesalamine    A-fib (HCC)  Assessment & Plan  Rate controlled  Maintain AC with Eliquis  Continue on metoprolol               VTE Pharmacologic Prophylaxis:   Eliquis    Mobility:   Basic Mobility Inpatient Raw Score: 24  JH-HLM Goal: 8: Walk 250 feet or more  JH-HLM Achieved: 8: Walk 250 feet ot more  JH-HLM Goal achieved. Continue to encourage appropriate mobility.    Patient Centered Rounds: I performed bedside rounds with nursing staff today.   Discussions with Specialists or Other Care Team Provider: case management    Education and Discussions with Family / Patient: Updated  (daughter) via phone.    Total Time Spent on Date of Encounter in care of patient:  mins. This time was spent on one or more of the following: performing physical exam; counseling and coordination of care; obtaining or reviewing history; documenting in the medical record; reviewing/ordering tests, medications or procedures; communicating with other healthcare professionals and discussing with patient's family/caregivers.    Current Length of Stay: 0 day(s)  Current Patient Status: Inpatient   Certification Statement: The patient will continue to require additional inpatient hospital stay due to IV steroids  Discharge Plan: Anticipate discharge tomorrow to home.    Code Status: Level 1 - Full Code    Subjective:   Patient seen and examined. Denies any further SOB, CP or wheezing. Notes that on night prior to admission, she felt as if her throat was closing up. Since she has denied any prior instances of this. Does report some dysphonia.     Objective:     Vitals:   Temp (24hrs), Av.5 °F (36.4 °C), Min:97.2 °F (36.2 °C), Max:97.9 °F (36.6 °C)    Temp:  [97.2 °F (36.2 °C)-97.9  °F (36.6 °C)] 97.2 °F (36.2 °C)  HR:  [61-83] 64  Resp:  [14-20] 18  BP: (107-135)/(36-60) 128/57  SpO2:  [87 %-98 %] 91 %  Body mass index is 32.95 kg/m².     Input and Output Summary (last 24 hours):     Intake/Output Summary (Last 24 hours) at 5/25/2024 1252  Last data filed at 5/25/2024 1245  Gross per 24 hour   Intake 1020 ml   Output --   Net 1020 ml       Physical Exam:   Physical Exam  Constitutional:       Appearance: She is obese.   HENT:      Head: Normocephalic and atraumatic.      Jaw: No tenderness or swelling.      Mouth/Throat:      Pharynx: Uvula midline. No pharyngeal swelling, oropharyngeal exudate or posterior oropharyngeal erythema.   Cardiovascular:      Rate and Rhythm: Normal rate and regular rhythm.   Pulmonary:      Effort: Pulmonary effort is normal. No respiratory distress.      Breath sounds: Normal breath sounds. No wheezing or rales.   Abdominal:      General: Abdomen is flat. Bowel sounds are normal.      Palpations: Abdomen is soft.   Musculoskeletal:      Right lower leg: No edema.      Left lower leg: No edema.   Skin:     General: Skin is warm and dry.   Neurological:      Mental Status: She is alert.          Additional Data:     Labs:  Results from last 7 days   Lab Units 05/25/24  0448 05/24/24  0914   WBC Thousand/uL 19.87* 12.87*   HEMOGLOBIN g/dL 11.7 11.3*   HEMATOCRIT % 37.8 37.4   PLATELETS Thousands/uL 232 224   BANDS PCT % 4  --    SEGS PCT %  --  80*   LYMPHO PCT % 7* 13*   MONO PCT % 3* 7   EOS PCT % 0 0     Results from last 7 days   Lab Units 05/25/24  0448   SODIUM mmol/L 140   POTASSIUM mmol/L 4.0   CHLORIDE mmol/L 103   CO2 mmol/L 26   BUN mg/dL 18   CREATININE mg/dL 0.93   ANION GAP mmol/L 11   CALCIUM mg/dL 9.5   ALBUMIN g/dL 3.9   TOTAL BILIRUBIN mg/dL 0.42   ALK PHOS U/L 82   ALT U/L 12   AST U/L 15   GLUCOSE RANDOM mg/dL 157*         Results from last 7 days   Lab Units 05/25/24  1046 05/25/24  0654 05/24/24  1626   POC GLUCOSE mg/dl 248* 158* 207*          Results from last 7 days   Lab Units 05/25/24  0448   PROCALCITONIN ng/ml 0.06       Lines/Drains:  Invasive Devices       Peripheral Intravenous Line  Duration             Peripheral IV 05/25/24 Right Antecubital <1 day                          Imaging: Reviewed radiology reports from this admission including: chest xray and CT soft tissue    Recent Cultures (last 7 days):         Last 24 Hours Medication List:   Current Facility-Administered Medications   Medication Dose Route Frequency Provider Last Rate    acetaminophen  650 mg Oral Q6H PRN Lisafausto Cowan, PA-C      albuterol  1 puff Inhalation Q4H PRN Lisa Camryn, PA-C      apixaban  5 mg Oral BID Lisa Camryn, PA-C      atorvastatin  20 mg Oral QAM Lisa Camryn, PA-C      azithromycin  500 mg Oral Q24H Lisa Cowan, PA-C      dofetilide  500 mcg Oral BID Lisa Camryn, PA-C      Empagliflozin  20 mg Oral Daily Lisa Camryn, PA-C      famotidine  40 mg Oral HS Lisa Camryn, PA-PEYTON      fluticasone  1 spray Nasal Daily Lisa Cowan, PA-C      fluticasone-vilanterol  1 puff Inhalation Daily Lisa Cowan, PA-C      And    umeclidinium  1 puff Inhalation Daily Lisa Camryn, PA-C      furosemide  20 mg Oral BID Lisa Camryn, PA-C      gabapentin  300 mg Oral BID Lisa Camryn, PA-C      guaiFENesin  600 mg Oral Q12H MAURICIO Lisa Cowan, PA-C      hydrOXYzine HCL  10 mg Oral BID Lisa Camryn, PA-PETYON      insulin lispro  2-12 Units Subcutaneous TID AC Cyndee Tobin, PA-PEYTON      insulin lispro  2-12 Units Subcutaneous HS Cyndee Tobin PA-C      levalbuterol  1.25 mg Nebulization TID Dannie Dutton DO      levothyroxine  137 mcg Oral Daily Lisa Camryn, PA-PEYTON      lisinopril  10 mg Oral QAM Lisa Cowan, PA-PEYTON      mesalamine  800 mg Oral BID Lisa Cowan, TOLU      [START ON 5/26/2024] methylPREDNISolone sodium succinate  40 mg Intravenous Daily Cyndee Tobin PA-C      metoprolol succinate  100 mg Oral Q12H Lisa Cowan, PA-PEYTON      ondansetron  4 mg Intravenous Q6H  PRN Lisa Cowan PA-C      pancrelipase (Lip-Prot-Amyl)  24,000 Units Oral BID With Meals Lisa Cowan PA-C      pantoprazole  40 mg Oral BID AC Lisa Cowan PA-C          Today, Patient Was Seen By: Cyndee Tobin PA-C    **Please Note: This note may have been constructed using a voice recognition system.**

## 2024-05-25 NOTE — PLAN OF CARE
Problem: CARDIOVASCULAR - ADULT  Goal: Absence of cardiac dysrhythmias or at baseline rhythm  Description: INTERVENTIONS:  - Continuous cardiac monitoring, vital signs, obtain 12 lead EKG if ordered  - Administer antiarrhythmic and heart rate control medications as ordered  - Monitor electrolytes and administer replacement therapy as ordered  Outcome: Progressing     Problem: RESPIRATORY - ADULT  Goal: Achieves optimal ventilation and oxygenation  Description: INTERVENTIONS:  - Assess for changes in respiratory status  - Assess for changes in mentation and behavior  - Position to facilitate oxygenation and minimize respiratory effort  - Oxygen administered by appropriate delivery if ordered  - Initiate smoking cessation education as indicated  - Encourage broncho-pulmonary hygiene including cough, deep breathe, Incentive Spirometry  - Assess the need for suctioning and aspirate as needed  - Assess and instruct to report SOB or any respiratory difficulty  - Respiratory Therapy support as indicated  Outcome: Progressing     Problem: INFECTION - ADULT  Goal: Absence or prevention of progression during hospitalization  Description: INTERVENTIONS:  - Assess and monitor for signs and symptoms of infection  - Monitor lab/diagnostic results  - Monitor all insertion sites, indwelling lines  - [place] appropriate cooling/warming therapies per order  - Administer medications as ordered  Outcome: Progressing

## 2024-05-25 NOTE — OCCUPATIONAL THERAPY NOTE
Occupational Therapy Evaluation     Patient Name: Kellen Gray  Today's Date: 5/25/2024  Problem List  Principal Problem:    COPD exacerbation (HCC)  Active Problems:    A-fib (HCC)    Crohn's disease with complication (HCC)    Severe obstructive sleep apnea    Diabetes mellitus, type 2 (HCC)    (HFpEF) heart failure with preserved ejection fraction (HCC)    Exocrine pancreatic insufficiency    SIRS (systemic inflammatory response syndrome) (HCC)    Past Medical History  Past Medical History:   Diagnosis Date    A-fib (HCC)     Acute respiratory failure with hypoxia (HCC) 11/30/2019    Asthma     Brain aneurysm     Cancer (HCC)     papillary thyroid cancer    Cardiac disease     CHF (congestive heart failure) (HCC)     COPD (chronic obstructive pulmonary disease) (HCC)     CPAP (continuous positive airway pressure) dependence     Crohn disease (HCC)     Diabetes mellitus (HCC)     Disease of thyroid gland     GERD (gastroesophageal reflux disease)     Hyperlipidemia     Hypertension     Sleep apnea      Past Surgical History  Past Surgical History:   Procedure Laterality Date    APPENDECTOMY      CARDIOVERSION N/A 2/14/2019    Procedure: CARDIOVERSION;  Surgeon: Lee Brenner MD;  Location: [facility];  Service: Cardiology    CEREBRAL ANEURYSM REPAIR      CHOLECYSTECTOMY      HYSTERECTOMY      IR PELVIC ANGIOGRAM  12/14/2017    WI EXC THROMBOSED HEMORRHOID XTRNL N/A 10/7/2022    Procedure: HEMORRHOIDECTOMY EXCISION;  Surgeon: Leonardo Cash DO;  Location: [facility];  Service: General    THYROIDECTOMY      TONSILLECTOMY AND ADENOIDECTOMY           05/25/24 0912   OT Last Visit   OT Visit Date 05/25/24   Note Type   Note type Evaluation   Pain Assessment   Pain Assessment Tool 0-10   Pain Score No Pain   Restrictions/Precautions   Weight Bearing Precautions Per Order No   Home Living   Type of Home House   Home Layout Two level;Stairs to enter with rails;Bed/bath upstairs;1/2 bath on main level  (8 USZANNE c HR,  "FFOS to second, full bath on main, and on second)   Bathroom Shower/Tub Tub/shower unit   Bathroom Toilet Standard   Bathroom Equipment Grab bars in shower;Shower chair;Commode   Bathroom Accessibility Accessible   Home Equipment Walker;Cane   Additional Comments No use of AD for functional mobility at baseline.   Prior Function   Level of [place] Independent with ADLs;Independent with functional mobility;Independent with IADLS   Lives With Spouse   Receives Help From Family   IADLs Independent with driving;Independent with meal prep;Independent with medication management   Falls in the last 6 months 0   Vocational Retired   Comments Pt worked as a PCA for many years.   Lifestyle   Autonomy Pt reporting independence in ADls, IADLs, and functional mobiltiy. Pt lives in 2  with . Pt reporting  with COPD and she does all IADLs. She drives, and is retired   Reciprocal Relationships spouse   Intrinsic Gratification Pt really enjoyed being a PCA.   Subjective   Subjective \"I was just washing up\"   ADL   Grooming Assistance 7  Independent   Grooming Deficit Denture care;Wash/dry face   UB Bathing Assistance 7  Independent   LB Bathing Assistance 7  Independent   UB Dressing Assistance 7  Independent   LB Dressing Assistance 7  Independent   LB Dressing Deficit Don/doff R shoe;Don/doff L shoe   Toileting Assistance  7  Independent   Additional Comments Pt standing up to brush her teeth at table independently, no LOB or difficulty with task. Pt also washing face independently standing at tray table. Pt don/doff B shoes standing independently , with no LOB.  Given functional performance today and medical complexity ,therapist suspecting via clinical judgement and skilled analaysis that the pt's performance in ADLs, is as above.   Bed Mobility   Additional Comments Pt seated EOB at start of session. Pt on RA. Vitals WNL.   Transfers   Sit to Stand 7  Independent   Stand to Sit 7  Independent   Additional " Comments No AD. No lob or unsteadiness. Denied any dizziness or lightheadedness.   Functional Mobility   Functional Mobility 7  Independent   Additional Comments Pt performed functional mobiltiy in hallway, ~450 ft, with no AD and independent, no LOB or unsteadiness. Pt noting some fatigue, but SPO2 at 94% during entirety of functional mobilty and post.   Balance   Static Sitting Good   Dynamic Sitting Good   Static Standing Good   Dynamic Standing Good   Ambulatory Good   Activity Tolerance   Activity Tolerance Patient tolerated treatment well   Medical Staff Made Aware RN Verbalized pt appropriate for OT eval   RUE Assessment   RUE Assessment WFL   LUE Assessment   LUE Assessment WFL   Hand Function   Gross Motor Coordination Functional   Fine Motor Coordination Functional   Vision-Basic Assessment   Current Vision Wears glasses all the time   Psychosocial   Psychosocial (WDL) WDL   Cognition   Overall Cognitive Status WFL   Arousal/Participation Alert;Responsive;Cooperative   Attention Within functional limits   Orientation Level Oriented X4   Memory Within functional limits   Following Commands Follows all commands and directions without difficulty   Comments Pt very pleasant and agreeable to OT evaluation.   Assessment   Assessment Pt is a 68 y.o. female who was admitted to [facility] on 5/24/2024 with COPD exacerbation (HCC).  At this time, patient is also affected by the comorbidities of:  A-fib, crohn's disease with complication, COPD exacerbation, MARY ELLEN, DM, heart failure, exocrine pancreatic insufficiency, SIRS . Additionally, patient is affected by the following personal factors:steps to enter environment, limited home support, and health management . Orders placed for OT evaluation/treatment. Prior to admission, independent in ADLs, IADLs, and functional mobility. Pt lives with   in 2SH, with FFOS to bed/bath. Pt A  with IADLs, she drives, and is retired PCA. Upon OT evaluation,  patient requires independent  for UB ADLs and independent  for LB ADLs. Patient presents with functional use of BUEs, with intact prehension and fine motor coordination, and symmetrical muscle strength. Patient appears to be functioning at baseline, no further Acute OT needs identified at this time to warrant OT services. D/C OT and from OT standpoint, recommendation at time of d/c would be No post-acute rehabilitation needs .   Plan   OT Frequency Eval only   Discharge Recommendation   Rehab Resource Intensity Level, OT No post-acute rehabilitation needs   Additional Comments  Pt left seated EOB , all needs met, call bell in reach, vitals WNL   AM-PAC Daily Activity Inpatient   Lower Body Dressing 4   Bathing 4   Toileting 4   Upper Body Dressing 4   Grooming 4   Eating 4   Daily Activity Raw Score 24   Daily Activity Standardized Score (Calc for Raw Score >=11) 57.54   AM-PAC Applied Cognition Inpatient   Following a Speech/Presentation 4   Understanding Ordinary Conversation 4   Taking Medications 4   Remembering Where Things Are Placed or Put Away 4   Remembering List of 4-5 Errands 4   Taking Care of Complicated Tasks 4   Applied Cognition Raw Score 24   Applied Cognition Standardized Score 62.21     Maya Falk

## 2024-05-25 NOTE — ASSESSMENT & PLAN NOTE
Presents with acute onset nocturnal dyspnea. No improvement with CPAP. Sick exposure to her grandson with URI. Today with voice hoarseness and laryngitis   Has not taken her COPD inhalers in many years, supposed to be taking trelegy  Decrease to Solu-Medrol 40mg qd. Anticipate transition to prednisone tomorrow  Zithromax x 3 days  Mucinex  Respiratory protocol  CT neck negative for acute pathology  Appreciate pulmonary consultation   Did come to the unit on O2, respiratory failure ruled out as she was taken off the oxygen immediately without issue - since remained stable on RA  Desaturation screen on day of discharge

## 2024-05-25 NOTE — RESPIRATORY THERAPY NOTE
05/25/24 0801   Inhalation Therapy Tx   $ Inhalation Therapy Performed Yes   SpO2 95 %  (ra, off oxygen)   Pre-Treatment Pulse 89  (exertion)   Pre-Treatment Respirations 20   Duration 15   Breath Sounds Pre-Treatment Bilateral Clear  (posteriorly)   Breath Sounds Post-Treatment Bilateral Clear   Post-Treatment Pulse 67   Post-Treatment Respirations 18   Delivery Source Air;UDN   Position Sitting   Treatment Tolerance Tolerated well   Resp Comments pt taken off oxygen, pt refused   bipap and needed oxygen during HSpt 's baseline is RA

## 2024-05-26 VITALS
TEMPERATURE: 97.9 F | HEIGHT: 69 IN | RESPIRATION RATE: 17 BRPM | DIASTOLIC BLOOD PRESSURE: 51 MMHG | WEIGHT: 223.11 LBS | BODY MASS INDEX: 33.04 KG/M2 | HEART RATE: 64 BPM | SYSTOLIC BLOOD PRESSURE: 126 MMHG | OXYGEN SATURATION: 91 %

## 2024-05-26 LAB
ANION GAP SERPL CALCULATED.3IONS-SCNC: 9 MMOL/L (ref 4–13)
BASOPHILS # BLD AUTO: 0.02 THOUSANDS/ÂΜL (ref 0–0.1)
BASOPHILS NFR BLD AUTO: 0 % (ref 0–1)
BUN SERPL-MCNC: 21 MG/DL (ref 5–25)
CALCIUM SERPL-MCNC: 8.9 MG/DL (ref 8.4–10.2)
CHLORIDE SERPL-SCNC: 106 MMOL/L (ref 96–108)
CO2 SERPL-SCNC: 25 MMOL/L (ref 21–32)
CREAT SERPL-MCNC: 0.82 MG/DL (ref 0.6–1.3)
EOSINOPHIL # BLD AUTO: 0 THOUSAND/ÂΜL (ref 0–0.61)
EOSINOPHIL NFR BLD AUTO: 0 % (ref 0–6)
ERYTHROCYTE [DISTWIDTH] IN BLOOD BY AUTOMATED COUNT: 16 % (ref 11.6–15.1)
GFR SERPL CREATININE-BSD FRML MDRD: 74 ML/MIN/1.73SQ M
GLUCOSE SERPL-MCNC: 137 MG/DL (ref 65–140)
GLUCOSE SERPL-MCNC: 171 MG/DL (ref 65–140)
GLUCOSE SERPL-MCNC: 270 MG/DL (ref 65–140)
HCT VFR BLD AUTO: 34.7 % (ref 34.8–46.1)
HGB BLD-MCNC: 10.8 G/DL (ref 11.5–15.4)
IMM GRANULOCYTES # BLD AUTO: 0.1 THOUSAND/UL (ref 0–0.2)
IMM GRANULOCYTES NFR BLD AUTO: 1 % (ref 0–2)
LYMPHOCYTES # BLD AUTO: 2.21 THOUSANDS/ÂΜL (ref 0.6–4.47)
LYMPHOCYTES NFR BLD AUTO: 12 % (ref 14–44)
MCH RBC QN AUTO: 26.6 PG (ref 26.8–34.3)
MCHC RBC AUTO-ENTMCNC: 31.1 G/DL (ref 31.4–37.4)
MCV RBC AUTO: 86 FL (ref 82–98)
MONOCYTES # BLD AUTO: 1.19 THOUSAND/ÂΜL (ref 0.17–1.22)
MONOCYTES NFR BLD AUTO: 6 % (ref 4–12)
NEUTROPHILS # BLD AUTO: 15.34 THOUSANDS/ÂΜL (ref 1.85–7.62)
NEUTS SEG NFR BLD AUTO: 81 % (ref 43–75)
NRBC BLD AUTO-RTO: 0 /100 WBCS
PLATELET # BLD AUTO: 232 THOUSANDS/UL (ref 149–390)
PMV BLD AUTO: 10.1 FL (ref 8.9–12.7)
POTASSIUM SERPL-SCNC: 3.7 MMOL/L (ref 3.5–5.3)
RBC # BLD AUTO: 4.06 MILLION/UL (ref 3.81–5.12)
SODIUM SERPL-SCNC: 140 MMOL/L (ref 135–147)
WBC # BLD AUTO: 18.86 THOUSAND/UL (ref 4.31–10.16)

## 2024-05-26 PROCEDURE — 85025 COMPLETE CBC W/AUTO DIFF WBC: CPT

## 2024-05-26 PROCEDURE — 94664 DEMO&/EVAL PT USE INHALER: CPT

## 2024-05-26 PROCEDURE — 94760 N-INVAS EAR/PLS OXIMETRY 1: CPT

## 2024-05-26 PROCEDURE — 94668 MNPJ CHEST WALL SBSQ: CPT

## 2024-05-26 PROCEDURE — 94640 AIRWAY INHALATION TREATMENT: CPT

## 2024-05-26 PROCEDURE — 82948 REAGENT STRIP/BLOOD GLUCOSE: CPT

## 2024-05-26 PROCEDURE — 94761 N-INVAS EAR/PLS OXIMETRY MLT: CPT

## 2024-05-26 PROCEDURE — 99239 HOSP IP/OBS DSCHRG MGMT >30: CPT

## 2024-05-26 PROCEDURE — 80048 BASIC METABOLIC PNL TOTAL CA: CPT

## 2024-05-26 RX ORDER — AZITHROMYCIN 500 MG/1
500 TABLET, FILM COATED ORAL EVERY 24 HOURS
Qty: 1 TABLET | Refills: 0 | Status: SHIPPED | OUTPATIENT
Start: 2024-05-26 | End: 2024-05-27

## 2024-05-26 RX ORDER — PREDNISONE 10 MG/1
TABLET ORAL
Qty: 30 TABLET | Refills: 0 | Status: SHIPPED | OUTPATIENT
Start: 2024-05-26 | End: 2024-06-07

## 2024-05-26 RX ADMIN — EMPAGLIFLOZIN 20 MG: 10 TABLET, FILM COATED ORAL at 08:05

## 2024-05-26 RX ADMIN — GABAPENTIN 300 MG: 300 CAPSULE ORAL at 08:05

## 2024-05-26 RX ADMIN — PANTOPRAZOLE SODIUM 40 MG: 40 TABLET, DELAYED RELEASE ORAL at 06:09

## 2024-05-26 RX ADMIN — METOPROLOL SUCCINATE 100 MG: 100 TABLET, FILM COATED, EXTENDED RELEASE ORAL at 08:06

## 2024-05-26 RX ADMIN — INSULIN LISPRO 6 UNITS: 100 INJECTION, SOLUTION INTRAVENOUS; SUBCUTANEOUS at 11:33

## 2024-05-26 RX ADMIN — HYDROXYZINE HYDROCHLORIDE 10 MG: 10 TABLET ORAL at 08:05

## 2024-05-26 RX ADMIN — ATORVASTATIN CALCIUM 20 MG: 10 TABLET, FILM COATED ORAL at 08:04

## 2024-05-26 RX ADMIN — LEVALBUTEROL HYDROCHLORIDE 1.25 MG: 1.25 SOLUTION RESPIRATORY (INHALATION) at 08:11

## 2024-05-26 RX ADMIN — FLUTICASONE PROPIONATE 1 SPRAY: 50 SPRAY, METERED NASAL at 08:10

## 2024-05-26 RX ADMIN — MESALAMINE 800 MG: 400 CAPSULE, DELAYED RELEASE ORAL at 08:06

## 2024-05-26 RX ADMIN — DOFETILIDE 500 MCG: 0.12 CAPSULE ORAL at 08:05

## 2024-05-26 RX ADMIN — APIXABAN 5 MG: 5 TABLET, FILM COATED ORAL at 08:04

## 2024-05-26 RX ADMIN — METHYLPREDNISOLONE SODIUM SUCCINATE 40 MG: 40 INJECTION, POWDER, FOR SOLUTION INTRAMUSCULAR; INTRAVENOUS at 08:06

## 2024-05-26 RX ADMIN — LISINOPRIL 10 MG: 10 TABLET ORAL at 08:05

## 2024-05-26 RX ADMIN — UMECLIDINIUM 1 PUFF: 62.5 AEROSOL, POWDER ORAL at 08:10

## 2024-05-26 RX ADMIN — GUAIFENESIN 600 MG: 600 TABLET, EXTENDED RELEASE ORAL at 08:05

## 2024-05-26 RX ADMIN — LEVOTHYROXINE SODIUM 137 MCG: 25 TABLET ORAL at 06:09

## 2024-05-26 RX ADMIN — FLUTICASONE FUROATE AND VILANTEROL TRIFENATATE 1 PUFF: 200; 25 POWDER RESPIRATORY (INHALATION) at 08:10

## 2024-05-26 RX ADMIN — FUROSEMIDE 20 MG: 20 TABLET ORAL at 08:05

## 2024-05-26 RX ADMIN — PANCRELIPASE 24000 UNITS: 24000; 76000; 120000 CAPSULE, DELAYED RELEASE PELLETS ORAL at 08:06

## 2024-05-26 NOTE — PLAN OF CARE
Problem: CARDIOVASCULAR - ADULT  Goal: Absence of cardiac dysrhythmias or at baseline rhythm  Description: INTERVENTIONS:  - Continuous cardiac monitoring, vital signs, obtain 12 lead EKG if ordered  - Administer antiarrhythmic and heart rate control medications as ordered  - Monitor electrolytes and administer replacement therapy as ordered  Outcome: Progressing     Problem: RESPIRATORY - ADULT  Goal: Achieves optimal ventilation and oxygenation  Description: INTERVENTIONS:  - Assess for changes in respiratory status  - Assess for changes in mentation and behavior  - Position to facilitate oxygenation and minimize respiratory effort  - Oxygen administered by appropriate delivery if ordered  - Initiate smoking cessation education as indicated  - Encourage broncho-pulmonary hygiene including cough, deep breathe, Incentive Spirometry  - Assess the need for suctioning and aspirate as needed  - Assess and instruct to report SOB or any respiratory difficulty  - Respiratory Therapy support as indicated  Outcome: Progressing     Problem: INFECTION - ADULT  Goal: Absence or prevention of progression during hospitalization  Description: INTERVENTIONS:  - Assess and monitor for signs and symptoms of infection  - Monitor lab/diagnostic results  - Monitor all insertion sites, indwelling lines  - [place] appropriate cooling/warming therapies per order  - Administer medications as ordered  Outcome: Progressing

## 2024-05-26 NOTE — RESPIRATORY THERAPY NOTE
Home Oxygen Qualifying Test     Patient name: Kellen Gray        : 1955   Date of Test:  May 26, 2024  Diagnosis: COPD exacerbation   Home Oxygen Test:    **Medicare Guidelines require item(s) 1-5 on all ambulatory patients or 1 and 2 on non-ambulatory patients.    1. Baseline SPO2 on Room Air at rest 95 %   If <= 88% on Room Air add O2 via NC to obtain SpO2 >=88%. If LPM needed, document LPM  needed to reach =>88%    SPO2 during exertion on Room Air 95  %  During exertion monitor SPO2. If SPO2 increases >=89%, do not add supplemental oxygen    SPO2 on Oxygen at Rest na % at na LPM    SPO2 during exertion on Oxygen na % at na LPM    Test performed during exertion activity. Walking 470 ft, 5 minutes     []  Supplemental Home Oxygen is indicated.    [x]  Client does not qualify for home oxygen.    Respiratory Additional Notes- ambulated on RA with 2 pulse ox's, 470 ft, pt spoke entire time. Pulse ox 95%    Deja Conner, RT

## 2024-05-26 NOTE — RESPIRATORY THERAPY NOTE
05/26/24 0819   Inhalation Therapy Tx   $ Inhalation Therapy Performed Yes   $ Pulse Oximetry Spot Check Charge Completed   SpO2 96 %  (ra)   Pre-Treatment Pulse 68   Pre-Treatment Respirations 18   Duration 15   Breath Sounds Pre-Treatment Bilateral Clear  (posteriorly)   Breath Sounds Post-Treatment Bilateral Clear   Post-Treatment Pulse 63   Post-Treatment Respirations 18   Delivery Source Air;UDN   Position Sitting   Treatment Tolerance Tolerated well

## 2024-05-26 NOTE — ASSESSMENT & PLAN NOTE
Lab Results   Component Value Date    HGBA1C 7.5 (H) 03/13/2024       Recent Labs     05/25/24  1618 05/25/24  2148 05/26/24  0720 05/26/24  1059   POCGLU 229* 225* 137 270*         Blood Sugar Average: Last 72 hrs:  (P) [id number]    Near goal A1c per last check  Continue Jardiance

## 2024-05-26 NOTE — CASE MANAGEMENT
Case Management Discharge Planning Note    Patient name Kellen Gray  Location /401-01 MRN [id number]  : 1955 Date 2024       Current Admission Date: 2024  Current Admission Diagnosis:COPD exacerbation (HCC)   Patient Active Problem List    Diagnosis Date Noted Date Diagnosed    SIRS (systemic inflammatory response syndrome) (HCC) 2024     Hiatal hernia 2024     Exocrine pancreatic insufficiency 2024     Vitamin B12 deficiency 2023     GERD (gastroesophageal reflux disease) 10/23/2023     Generalized abdominal pain 10/23/2023     Leukocytosis 10/19/2023     Folic acid deficiency 10/19/2023     Vitamin D deficiency 2023     Class 1 obesity 2023     Essential hypertension 2022     History of renal calculi 10/26/2022     Sensorineural hearing loss, bilateral 10/24/2022     Urinary retention 10/18/2022     Hemorrhoids 2022     Impaired hearing 2022     Thymus neoplasm 2022     Chronic pain syndrome 2021     Chronic bilateral low back pain without sciatica 2021     Lumbar spondylosis 2021     Lumbar radiculopathy 2021     Iron deficiency anemia secondary to inadequate dietary iron intake 2021     History of vertebral fracture 2021     Restless legs syndrome 2021     Congenital multiple renal cysts 2021     Wedge compression fracture of T11-T12 vertebra, initial encounter for closed fracture (HCC) 2021     Fracture of right tibia 2021     BPPV (benign paroxysmal positional vertigo) 2020     History of colon polyps 2020     Diabetes mellitus, type 2 (HCC)      Colon polyp 2020     Multiple renal cysts 2020     T12 compression fracture (HCC) 2020     Prolonged QT interval 2020     Hepatomegaly 2020     Hepatic steatosis 2020     Atherosclerosis 2020     Severe obstructive sleep apnea 2020     Diarrhea 2020      (HFpEF) heart failure with preserved ejection fraction (HCC) 01/16/2020     History of thyroid cancer 01/10/2020     COPD exacerbation (HCC) 12/01/2019     Elevated d-dimer 11/29/2019     Hypokalemia 11/29/2019     S/P ablation of atrial fibrillation 11/04/2019     Recurrent major depressive disorder (HCC) 09/23/2019     Brain aneurysm 09/16/2019     Atypical chest pain 09/16/2019     Dizziness 09/16/2019     Crohn's disease with complication (HCC) 09/16/2019     Postoperative hypothyroidism 04/16/2019     Microscopic hematuria 02/14/2019     Pulmonary hypertension (HCC) 02/14/2019     Lymphadenopathy 02/12/2019     A-fib (HCC) 02/12/2019     Elevated TSH 02/12/2019     Hypercholesteremia 02/12/2019     Transaminitis 02/12/2019     History of total thyroidectomy 01/16/2019     Gastroesophageal reflux disease with esophagitis 10/17/2018     History of cerebral aneurysm repair 09/12/2017     Atypical ductal hyperplasia of right breast 05/01/2017       LOS (days): 1  Geometric Mean LOS (GMLOS) (days): 2.7  Days to GMLOS:1.7     OBJECTIVE:  Risk of Unplanned Readmission Score: 17.9         Current admission status: Inpatient   Preferred Pharmacy:   [facility] [address] [place] [address], [address]  [address] [place] [address], [address] [facility] [address]  Phone: [phone] Fax: [phone]    Primary Care Provider: Ina Rivas MD    Primary Insurance: [facility]  Secondary Insurance:     DISCHARGE DETAILS:patient discharging home, no needs. Pt did not qualify for home oxygen. Nurse reviewed AVS, all follow up providers listed.

## 2024-05-26 NOTE — PLAN OF CARE
Problem: CARDIOVASCULAR - ADULT  Goal: Absence of cardiac dysrhythmias or at baseline rhythm  Description: INTERVENTIONS:  - Continuous cardiac monitoring, vital signs, obtain 12 lead EKG if ordered  - Administer antiarrhythmic and heart rate control medications as ordered  - Monitor electrolytes and administer replacement therapy as ordered  Outcome: Progressing     Problem: RESPIRATORY - ADULT  Goal: Achieves optimal ventilation and oxygenation  Description: INTERVENTIONS:  - Assess for changes in respiratory status  - Assess for changes in mentation and behavior  - Position to facilitate oxygenation and minimize respiratory effort  - Oxygen administered by appropriate delivery if ordered  - Initiate smoking cessation education as indicated  - Encourage broncho-pulmonary hygiene including cough, deep breathe, Incentive Spirometry  - Assess the need for suctioning and aspirate as needed  - Assess and instruct to report SOB or any respiratory difficulty  - Respiratory Therapy support as indicated  Outcome: Progressing     Problem: INFECTION - ADULT  Goal: Absence or prevention of progression during hospitalization  Description: INTERVENTIONS:  - Assess and monitor for signs and symptoms of infection  - Monitor lab/diagnostic results  - Monitor all insertion sites, indwelling lines  - [place] appropriate cooling/warming therapies per order  - Administer medications as ordered  Outcome: Progressing

## 2024-05-26 NOTE — DISCHARGE SUMMARY
[facility]  Discharge- Kellen Gray 1955, 68 y.o. female MRN: [id number]  Unit/Bed#: 401-01 Encounter: [id number]  Primary Care Provider: Ina Rivas MD   Date and time admitted to hospital: 5/24/2024  8:33 AM    * COPD exacerbation (HCC)  Assessment & Plan  Presents with acute onset nocturnal dyspnea. No improvement with CPAP. Sick exposure to her grandson with URI.    Has not taken her COPD inhalers in many years, supposed to be taking trelegy  S/p IV Solu-Medrol. Transitioned to prednisone with taper   Zithromax x 3 days, given 1 dose at discharge for completion   Mucinex  Respiratory protocol  CT neck negative for acute pathology  Appreciate pulmonary consultation   To resume Trelegy at discharge.  Given referral to establish outpatient and obtain PFTs  Discussed with patient that she is due for screening CT chest without contrast for pulmonary nodule seen on CT scan from 5/23/2023  Did come to the unit on O2, respiratory failure ruled out as she was taken off the oxygen immediately without issue - since remained stable on RA  Completed desaturation screen without desaturation-did not qualify for home oxygen    SIRS (systemic inflammatory response syndrome) (HCC)  Assessment & Plan  Present on admission with tachypnea and leukocytosis secondary to COPD exacerbation  No evidence of acute infection  CXR wo consolidation   Continue to monitor off abx   PCT is negative. Suspect a reactive leukocytosis secondary to steroid use  Recommend that patient obtain CBC 1 to 2 weeks after she has completed steroid taper.  Notes that she has blood work due in early July that includes a CBC      Exocrine pancreatic insufficiency  Assessment & Plan  Continue on home pancreatic enzymes    (HFpEF) heart failure with preserved ejection fraction (HCC)  Assessment & Plan  Wt Readings from Last 3 Encounters:   05/24/24 101 kg (223 lb 1.7 oz)   05/22/24 99.6 kg (219 lb 9.6 oz)   04/08/24 102 kg  (223 lb 12.8 oz)     Does not appear to be in exacerbation at this time  Continue on a low salt diet          Diabetes mellitus, type 2 (HCC)  Assessment & Plan  Lab Results   Component Value Date    HGBA1C 7.5 (H) 03/13/2024       Recent Labs     05/25/24  1618 05/25/24  2148 05/26/24  0720 05/26/24  1059   POCGLU 229* 225* 137 270*         Blood Sugar Average: Last 72 hrs:  (P) [id number]    Near goal A1c per last check  Continue Jardiance      Severe obstructive sleep apnea  Assessment & Plan  Continue CPAP hs    Crohn's disease with complication (HCC)  Assessment & Plan  Does not appear to be in flare  Continue mesalamine    A-fib (HCC)  Assessment & Plan  Rate controlled  Episode of RVR overnight 5/25-5/26 given p.m. metoprolol held. Resolved with resumption   No further occurrences on telemetry  Maintain AC with Eliquis  Continue on metoprolol        Medical Problems       Resolved Problems  Date Reviewed: 5/26/2024   None       Discharging Physician / Practitioner: Cyndee Tobin PA-C  PCP: Ina Rivas MD  Admission Date:   Admission Orders (From admission, onward)       Ordered        05/25/24 1243  INPATIENT ADMISSION  Once            05/24/24 1127  Place in Observation  Once                          Discharge Date: 05/26/24    Consultations During Hospital Stay:  Pulmonary    Procedures Performed:   None     Significant Findings / Test Results:   CXR: Mild interstitial edema.   CT soft tissue neck: No pathologic adenopathy or mass identified.  Results Reviewed       Procedure Component Value Units Date/Time    HS Troponin I 2hr [[id number]]  (Normal) Collected: 05/24/24 1106    Lab Status: Final result Specimen: Blood from Arm, Left Updated: 05/24/24 1133     hs TnI 2hr 3 ng/L      Delta 2hr hsTnI -1 ng/L     HS Troponin 0hr (reflex protocol) [[id number]]  (Normal) Collected: 05/24/24 0914    Lab Status: Final result Specimen: Blood from Arm, Left Updated: 05/24/24 0953     hs TnI  0hr 4 ng/L     B-Type Natriuretic Peptide(BNP) [[id number]]  (Abnormal) Collected: 05/24/24 0914    Lab Status: Final result Specimen: Blood from Arm, Left Updated: 05/24/24 0952      pg/mL     Comprehensive metabolic panel [[id number]]  (Abnormal) Collected: 05/24/24 0914    Lab Status: Final result Specimen: Blood from Arm, Left Updated: 05/24/24 0944     Sodium 140 mmol/L      Potassium 3.6 mmol/L      Chloride 103 mmol/L      CO2 28 mmol/L      ANION GAP 9 mmol/L      BUN 13 mg/dL      Creatinine 0.95 mg/dL      Glucose 124 mg/dL      Calcium 9.0 mg/dL      AST 12 U/L      ALT 12 U/L      Alkaline Phosphatase 79 U/L      Total Protein 6.9 g/dL      Albumin 3.7 g/dL      Total Bilirubin 0.59 mg/dL      eGFR 62 ml/min/1.73sq m     Narrative:      National Kidney Disease Foundation guidelines for Chronic Kidney Disease (CKD):     Stage 1 with normal or high GFR (GFR > 90 mL/min/1.73 square meters)    Stage 2 Mild CKD (GFR = 60-89 mL/min/1.73 square meters)    Stage 3A Moderate CKD (GFR = 45-59 mL/min/1.73 square meters)    Stage 3B Moderate CKD (GFR = 30-44 mL/min/1.73 square meters)    Stage 4 Severe CKD (GFR = 15-29 mL/min/1.73 square meters)    Stage 5 End Stage CKD (GFR <15 mL/min/1.73 square meters)  Note: GFR calculation is accurate only with a steady state creatinine    CBC and differential [[id number]]  (Abnormal) Collected: 05/24/24 0914    Lab Status: Final result Specimen: Blood from Arm, Left Updated: 05/24/24 0928     WBC 12.87 Thousand/uL      RBC 4.31 Million/uL      Hemoglobin 11.3 g/dL      Hematocrit 37.4 %      MCV 87 fL      MCH 26.2 pg      MCHC 30.2 g/dL      RDW 15.6 %      MPV 9.5 fL      Platelets 224 Thousands/uL      nRBC 0 /100 WBCs      Segmented % 80 %      Immature Grans % 0 %      Lymphocytes % 13 %      Monocytes % 7 %      Eosinophils Relative 0 %      Basophils Relative 0 %      Absolute Neutrophils 10.17 Thousands/µL      Absolute Immature Grans 0.05 Thousand/uL      Absolute  Lymphocytes 1.66 Thousands/µL      Absolute Monocytes 0.92 Thousand/µL      Eosinophils Absolute 0.05 Thousand/µL      Basophils Absolute 0.02 Thousands/µL               Incidental Findings:   None       Test Results Pending at Discharge (will require follow up):   None     Outpatient Tests Requested:  Nonurgent CT chest without contrast  CBC to be obtained 1 to 2 weeks after completion of prednisone taper  Referral to pulmonology for further evaluation/treatment and to undergo PFTs    Complications: None    Reason for Admission: Shortness of breath    Hospital Course:   Kellen Gray is a 68 y.o. female patient who originally presented to the hospital on 5/24/2024 due to shortness of breath found with acute COPD exacerbation.  Pulmonology was consulted.  Patient was placed on IV steroids and nebulizers.  Patient continued to symptomatically improve and was weaned to prednisone taper at discharge.  She completed 2 doses of azithromycin inpatient, was discharged with 1 dose of azithromycin to complete 3-day course for COPD exacerbation.  Of note, patient was briefly requiring oxygen in the emergency department, however was weaned to room air once admitted to the floor.  She underwent desaturation screen on day of discharge without desaturation, did not qualify for home oxygen.  Overnight 5/25 through 5/26 had an occurrence of A-fib with RVR given her nocturnal metoprolol was held.  She was given metoprolol at that time with resolution.  Telemetry revealed no further occurrences of RVR.  She remained asymptomatic throughout the event.    Patient was discharged home in stable condition.  She instructed to continue with trilogy and was given referral to establish with pulmonology to obtain PFTs and further evaluation/treatment.  Discussed with patient the need to obtain CT chest without contrast nonurgently as she is due for follow-up from pulmonary nodule seen on imaging 5/23/2023.  It was recommended to obtain CBC in  "1 to 2 weeks after completion of prednisone taper to evaluate for further leukocytosis.  This was suspected to be reactive secondary to steroid use, no evidence of underlying infection/pneumonia.        Please see above list of diagnoses and related plan for additional information.     Condition at Discharge: stable    Discharge Day Visit / Exam:   Subjective: Patient seen and examined.  Noted with episode of A-fib with RVR overnight that resolved with giving her dose of metoprolol that was held the evening prior.  Patient was asymptomatic during this event.  Vitals: Blood Pressure: 126/51 (05/26/24 1100)  Pulse: 64 (05/26/24 1100)  Temperature: 97.9 °F (36.6 °C) (05/26/24 1100)  Temp Source: Oral (05/25/24 2225)  Respirations: 17 (05/26/24 1100)  Height: 5' 9\" (175.3 cm) (05/24/24 1335)  Weight - Scale: 101 kg (223 lb 1.7 oz) (05/24/24 1335)  SpO2: 91 % (05/26/24 1100)  Exam:   Physical Exam  Constitutional:       General: She is not in acute distress.  HENT:      Head: Normocephalic and atraumatic.   Cardiovascular:      Rate and Rhythm: Normal rate. Rhythm irregular.   Pulmonary:      Effort: Pulmonary effort is normal. No respiratory distress.      Breath sounds: Normal breath sounds. No wheezing or rales.   Abdominal:      General: Abdomen is flat. Bowel sounds are normal. There is no distension.      Palpations: Abdomen is soft.   Musculoskeletal:      Right lower leg: No edema.      Left lower leg: No edema.   Skin:     General: Skin is warm and dry.   Neurological:      General: No focal deficit present.      Mental Status: She is alert and oriented to person, place, and time.          Discussion with Family: Updated  (daughter) via phone.    Discharge instructions/Information to patient and family:   See after visit summary for information provided to patient and family.      Provisions for Follow-Up Care:  See after visit summary for information related to follow-up care and any pertinent " home health orders.      Mobility at time of Discharge:   Basic Mobility Inpatient Raw Score: 24  JH-HLM Goal: 8: Walk 250 feet or more  JH-HLM Achieved: 8: Walk 250 feet ot more  HLM Goal achieved. Continue to encourage appropriate mobility.     Disposition:   Home    Planned Readmission: none     Discharge Statement:  I spent 35 minutes discharging the patient. This time was spent on the day of discharge. I had direct contact with the patient on the day of discharge. Greater than 50% of the total time was spent examining patient, answering all patient questions, arranging and discussing plan of care with patient as well as directly providing post-discharge instructions.  Additional time then spent on discharge activities.    Discharge Medications:  See after visit summary for reconciled discharge medications provided to patient and/or family.      **Please Note: This note may have been constructed using a voice recognition system**

## 2024-05-26 NOTE — ASSESSMENT & PLAN NOTE
Presents with acute onset nocturnal dyspnea. No improvement with CPAP. Sick exposure to her grandson with URI.    Has not taken her COPD inhalers in many years, supposed to be taking trelegy  S/p IV Solu-Medrol. Transitioned to prednisone with taper   Zithromax x 3 days, given 1 dose at discharge for completion   Mucinex  Respiratory protocol  CT neck negative for acute pathology  Appreciate pulmonary consultation   To resume Trelegy at discharge.  Given referral to establish outpatient and obtain PFTs  Discussed with patient that she is due for screening CT chest without contrast for pulmonary nodule seen on CT scan from 5/23/2023  Did come to the unit on O2, respiratory failure ruled out as she was taken off the oxygen immediately without issue - since remained stable on RA  Completed desaturation screen without desaturation-did not qualify for home oxygen

## 2024-05-26 NOTE — DISCHARGE INSTR - AVS FIRST PAGE
Please follow-up with primary care provider in next 7 to 10 days for transition of care appointment  As we discussed your white blood cells are elevated.  I suspect this is because of the steroid use.  Recommend repeat CBC when prednisone course has been completed.  Can follow-up with lab work ordered by your PCP in July  You are due for CT chest without contrast for evaluation of the pulmonary nodule seen on imaging 5/23/2023  Outpatient referral to pulmonology.  They are recommending you undergo pulmonary function testing.  Please resume trilogy inhaler at discharge  Continue to be compliant with CPAP machine at bedtime

## 2024-05-26 NOTE — ASSESSMENT & PLAN NOTE
Rate controlled  Episode of RVR overnight 5/25-5/26 given p.m. metoprolol held. Resolved with resumption   No further occurrences on telemetry  Maintain AC with Eliquis  Continue on metoprolol

## 2024-05-26 NOTE — ASSESSMENT & PLAN NOTE
Present on admission with tachypnea and leukocytosis secondary to COPD exacerbation  No evidence of acute infection  CXR wo consolidation   Continue to monitor off abx   PCT is negative. Suspect a reactive leukocytosis secondary to steroid use  Recommend that patient obtain CBC 1 to 2 weeks after she has completed steroid taper.  Notes that she has blood work due in early July that includes a CBC

## 2024-05-26 NOTE — PHYSICAL THERAPY NOTE
05/26/24 1049   PT Last Visit   PT Visit Date 05/26/24   Note Type   Cancel Reasons Other   Additional Comments PT eval order received.  Pt observed gait training with respiratory therapy.  Pt demonstrating good safety with all mobility. I with gait and transfers. No skilled PT needs identified. Will d/c PT.

## 2024-05-26 NOTE — NURSING NOTE
Pt discharged to home.  D/c instructions given and reviewed with pt by [facility] nurse.  Pt left floor ambulatory.

## 2024-05-27 DIAGNOSIS — K64.9 HEMORRHOIDS, UNSPECIFIED HEMORRHOID TYPE: ICD-10-CM

## 2024-05-27 LAB
ATRIAL RATE: 136 BPM
ATRIAL RATE: 66 BPM
P AXIS: 79 DEGREES
PR INTERVAL: 160 MS
QRS AXIS: 45 DEGREES
QRS AXIS: 7 DEGREES
QRSD INTERVAL: 78 MS
QRSD INTERVAL: 80 MS
QT INTERVAL: 372 MS
QT INTERVAL: 410 MS
QTC INTERVAL: 429 MS
QTC INTERVAL: 523 MS
T WAVE AXIS: 59 DEGREES
T WAVE AXIS: 9 DEGREES
VENTRICULAR RATE: 119 BPM
VENTRICULAR RATE: 66 BPM

## 2024-05-27 PROCEDURE — 93010 ELECTROCARDIOGRAM REPORT: CPT | Performed by: INTERNAL MEDICINE

## 2024-05-28 ENCOUNTER — TRANSITIONAL CARE MANAGEMENT (OUTPATIENT)
Dept: INTERNAL MEDICINE CLINIC | Facility: CLINIC | Age: 69
End: 2024-05-28

## 2024-05-28 RX ORDER — GABAPENTIN 300 MG/1
CAPSULE ORAL
Qty: 30 CAPSULE | Refills: 0 | Status: SHIPPED | OUTPATIENT
Start: 2024-05-28

## 2024-05-28 NOTE — UTILIZATION REVIEW
NOTIFICATION OF ADMISSION DISCHARGE   This is a Notification of Discharge from [facility]. Please be advised that this patient has been discharge from our facility. Below you will find the admission and discharge date and time including the patient’s disposition.   UTILIZATION REVIEW CONTACT:  Vinicio Rodriguez  Utilization   Network Utilization Review Department  Phone: [phone] x carefully listen to the prompts. All voicemails are confidential.  Email: [email or web address]     ADMISSION INFORMATION  PRESENTATION DATE: 5/24/2024  8:33 AM  OBERVATION ADMISSION DATE:   INPATIENT ADMISSION DATE: 5/25/24 12:43 PM   DISCHARGE DATE: 5/26/2024 12:34 PM   DISPOSITION:Home/Self Care    Network Utilization Review Department  ATTENTION: Please call with any questions or concerns to [phone] and carefully listen to the prompts so that you are directed to the right person. All voicemails are confidential.   For Discharge needs, contact Care Management DC Support Team at [phone] opt. 2  Send all requests for admission clinical reviews, approved or denied determinations and any other requests to dedicated fax number below belonging to the campus where the patient is receiving treatment. List of dedicated fax numbers for the Facilities:  FACILITY NAME UR FAX NUMBER   ADMISSION DENIALS (Administrative/Medical Necessity) [phone]   DISCHARGE SUPPORT TEAM ([facility]) [phone]   PARENT CHILD HEALTH (Maternity/NICU/Pediatrics) [phone]   [facility] [phone]   [facility] [phone]   [facility] [phone]   [facility] [phone]   [facility] [phone]   [facility] [phone]   [facility] [phone]   [facility] [phone]   [facility]  [facility] [phone]   [facility] [phone]   [facility] [phone]   [facility] [phone]

## 2024-06-05 ENCOUNTER — OFFICE VISIT (OUTPATIENT)
Dept: INTERNAL MEDICINE CLINIC | Facility: CLINIC | Age: 69
End: 2024-06-05
Payer: COMMERCIAL

## 2024-06-05 VITALS
WEIGHT: 218.7 LBS | BODY MASS INDEX: 32.39 KG/M2 | TEMPERATURE: 98.2 F | OXYGEN SATURATION: 99 % | DIASTOLIC BLOOD PRESSURE: 70 MMHG | SYSTOLIC BLOOD PRESSURE: 112 MMHG | HEIGHT: 69 IN | HEART RATE: 68 BPM

## 2024-06-05 DIAGNOSIS — R65.10 SIRS (SYSTEMIC INFLAMMATORY RESPONSE SYNDROME) (HCC): ICD-10-CM

## 2024-06-05 DIAGNOSIS — K86.81 EXOCRINE PANCREATIC INSUFFICIENCY: ICD-10-CM

## 2024-06-05 DIAGNOSIS — J44.1 COPD EXACERBATION (HCC): Primary | ICD-10-CM

## 2024-06-05 DIAGNOSIS — E11.69 TYPE 2 DIABETES MELLITUS WITH OTHER SPECIFIED COMPLICATION, WITHOUT LONG-TERM CURRENT USE OF INSULIN (HCC): ICD-10-CM

## 2024-06-05 DIAGNOSIS — F17.211 CIGARETTE NICOTINE DEPENDENCE IN REMISSION: ICD-10-CM

## 2024-06-05 PROCEDURE — 99495 TRANSJ CARE MGMT MOD F2F 14D: CPT | Performed by: FAMILY MEDICINE

## 2024-06-06 NOTE — PROGRESS NOTES
Transition of Care Visit  Name: eKllen Gray      : 1955      MRN: [id number]  Encounter Provider: Ina Rivas MD  Encounter Date: 2024   Encounter department: [facility] CARE [place]    Assessment & Plan   1. COPD exacerbation (HCC)  2. Exocrine pancreatic insufficiency  3. SIRS (systemic inflammatory response syndrome) (HCC)  4. Cigarette nicotine dependence in remission  -     CT lung screening program; Future; Expected date: 2024  5. Type 2 diabetes mellitus with other specified complication, without long-term current use of insulin ([facility])    Recommendations as noted above.  Reviewed recent hospitalization with her.  It is slowly improving.  Complete the tapering dose of the prednisone.  Gradually increase activity level.  Watch for any worsening of respiratory status.  Continue with the pancreatic enzyme supplementation.  Continue to remain off of cigarettes.  Discussed with her the blood sugar elevations on the prednisone.  These are improving somewhat.  Continue to follow blood sugars at home.  Discussed anxiety symptoms with her.  She is interested possibly in medical marijuana.  Information given regarding this.  Will have her follow-up as previously scheduled or sooner if needed.       History of Present Illness     Transitional Care Management Review:   Kellen Gray is a 68 y.o. female here for TCM follow up.     During the TCM phone call patient stated:  TCM Call       Date and time call was made  2024  2:05 PM    Hospital care reviewed  Discussed with Inpatient Physician    Patient was hospitialized at  [facility]    Date of Admission  24    Date of discharge  24    Diagnosis  COPD    Disposition  Home    Were the patients medications reviewed and updated  Yes          TCM Call       Post hospital issues  None    Should patient be enrolled in anticoag monitoring?  Yes    Scheduled for follow up?  Yes    Patients specialists  Pulmonlolgist     Did you obtain your prescribed medications  Yes    Do you need help managing your prescriptions or medications  No    Is transportation to your appointment needed  No    I have advised the patient to call PCP with any new or worsening symptoms  Laura Trinidad,     Living Arrangements  Spouse or Significiant other    Support System  Family    The type of support provided  Emotional; Physical    Do you have social support  Yes, as much as I need    Are you recieving any outpatient services  No    Are you recieving home care services  No    Are you using any community resources  No    Current waiver services  No    Have you fallen in the last 12 months  No    Counseling  Family    Counseling topics  Diagnostic results          She presents for follow-up after recent hospitalization.  She had been feeling generally well and had relatively sudden onset of excessive fatigue as well as shortness of breath and felt feverish.  Had a significantly sore throat and hoarse voice.  Had almost lost her voice by the time she was hospitalized.  Symptoms had worsened significantly and they had contacted the ambulance.  She was admitted with a COPD exacerbation.  Was given antibiotics as well as Solu-Medrol.  Symptoms slowly began to improve.  Blood sugars have been elevated on the tapering dose of the prednisone.  Has completed the antibiotics.  Is feeling better but does feel more fatigued.  Does not yet feel back to baseline.  Denies any chest pain or palpitations.  Appetite has been good.  Blood sugars had been significantly increased on the higher doses of the prednisone but are now gradually improving.  Appetite somewhat better.  Has noticed that her anxiety symptoms have worsened and is inquiring about medical marijuana.      Review of Systems   Constitutional:  Positive for fatigue. Negative for activity change, appetite change, chills and fever.   HENT:  Negative for congestion and rhinorrhea.    Eyes:   "Negative for visual disturbance.   Respiratory:  Positive for cough and shortness of breath. Negative for chest tightness.    Cardiovascular:  Negative for chest pain and palpitations.   Gastrointestinal:  Negative for abdominal pain, blood in stool, diarrhea, nausea and vomiting.   Endocrine: Negative for polydipsia, polyphagia and polyuria.   Genitourinary:  Negative for dysuria, frequency and urgency.   Musculoskeletal:  Positive for arthralgias. Negative for gait problem.   Skin:  Negative for color change.   Neurological:  Negative for dizziness and headaches.   Hematological:  Does not bruise/bleed easily.   Psychiatric/Behavioral:  Negative for confusion and sleep disturbance. The patient is nervous/anxious.      Objective     /70 (BP Location: Left arm, Patient Position: Sitting, Cuff Size: Large)   Pulse 68   Temp 98.2 °F (36.8 °C)   Ht 5' 9\" (1.753 m)   Wt 99.2 kg (218 lb 11.2 oz)   SpO2 99%   BMI 32.30 kg/m²     Physical Exam  Vitals and nursing note reviewed.   Constitutional:       General: She is not in acute distress.  HENT:      Head: Normocephalic and atraumatic.   Eyes:      General:         Right eye: No discharge.         Left eye: No discharge.      Conjunctiva/sclera: Conjunctivae normal.      Pupils: Pupils are equal, round, and reactive to light.   Cardiovascular:      Rate and Rhythm: Normal rate. Rhythm irregularly irregular.      Heart sounds: Normal heart sounds. No murmur heard.     No friction rub. No gallop.   Pulmonary:      Effort: No respiratory distress.      Breath sounds: Decreased breath sounds present. No wheezing, rhonchi or rales.   Abdominal:      General: Bowel sounds are normal. There is no distension.      Tenderness: There is no abdominal tenderness.   Lymphadenopathy:      Cervical: No cervical adenopathy.   Skin:     General: Skin is warm and dry.   Neurological:      Mental Status: She is oriented to person, place, and time.   Psychiatric:         Mood and " Affect: Mood is anxious.         Behavior: Behavior normal.       Medications have been reviewed by provider in current encounter    Administrative Statements

## 2024-06-09 DIAGNOSIS — K64.9 HEMORRHOIDS, UNSPECIFIED HEMORRHOID TYPE: ICD-10-CM

## 2024-06-10 RX ORDER — GABAPENTIN 300 MG/1
CAPSULE ORAL
Qty: 30 CAPSULE | Refills: 0 | Status: SHIPPED | OUTPATIENT
Start: 2024-06-10

## 2024-06-12 ENCOUNTER — HOSPITAL ENCOUNTER (OUTPATIENT)
Dept: CT IMAGING | Facility: HOSPITAL | Age: 69
Discharge: HOME | End: 2024-06-12
Payer: COMMERCIAL

## 2024-06-12 DIAGNOSIS — F17.211 CIGARETTE NICOTINE DEPENDENCE IN REMISSION: ICD-10-CM

## 2024-06-12 PROCEDURE — 71271 CT THORAX LUNG CANCER SCR C-: CPT

## 2024-06-13 DIAGNOSIS — I48.91 ATRIAL FIBRILLATION WITH RAPID VENTRICULAR RESPONSE (HCC): ICD-10-CM

## 2024-06-13 RX ORDER — METOPROLOL SUCCINATE 100 MG/1
100 TABLET, EXTENDED RELEASE ORAL EVERY 12 HOURS
Qty: 60 TABLET | Refills: 5 | Status: SHIPPED | OUTPATIENT
Start: 2024-06-13

## 2024-06-15 DIAGNOSIS — F41.9 ANXIETY: ICD-10-CM

## 2024-06-15 RX ORDER — HYDROXYZINE HYDROCHLORIDE 10 MG/1
10 TABLET, FILM COATED ORAL 2 TIMES DAILY
Qty: 60 TABLET | Refills: 0 | Status: SHIPPED | OUTPATIENT
Start: 2024-06-15

## 2024-06-19 DIAGNOSIS — K50.919 CROHN'S DISEASE WITH COMPLICATION, UNSPECIFIED GASTROINTESTINAL TRACT LOCATION (HCC): ICD-10-CM

## 2024-06-19 RX ORDER — MESALAMINE 400 MG/1
CAPSULE, DELAYED RELEASE ORAL
Qty: 360 CAPSULE | Refills: 3 | Status: SHIPPED | OUTPATIENT
Start: 2024-06-19

## 2024-06-21 ENCOUNTER — OFFICE VISIT (OUTPATIENT)
Dept: PULMONOLOGY | Facility: CLINIC | Age: 69
End: 2024-06-21
Payer: COMMERCIAL

## 2024-06-21 VITALS
DIASTOLIC BLOOD PRESSURE: 63 MMHG | HEART RATE: 57 BPM | BODY MASS INDEX: 32.29 KG/M2 | SYSTOLIC BLOOD PRESSURE: 99 MMHG | WEIGHT: 218 LBS | HEIGHT: 69 IN | OXYGEN SATURATION: 93 % | TEMPERATURE: 98 F

## 2024-06-21 DIAGNOSIS — J44.1 COPD EXACERBATION (HCC): ICD-10-CM

## 2024-06-21 DIAGNOSIS — R91.1 PULMONARY NODULE: ICD-10-CM

## 2024-06-21 DIAGNOSIS — J44.9 COPD, SEVERITY TO BE DETERMINED (HCC): Primary | ICD-10-CM

## 2024-06-21 PROCEDURE — 99214 OFFICE O/P EST MOD 30 MIN: CPT | Performed by: INTERNAL MEDICINE

## 2024-06-21 NOTE — PROGRESS NOTES
Pulmonary Follow Up Note   Kellen Gray 68 y.o. female MRN: [id number]  6/21/2024      Assessment/Plan:     COPD, severity to be determined (HCC)  Has emphysema on imaging, though prior PFTs showed no evidence of obstruction.  She finds Trelegy Ellipta to be beneficial.  I asked that she try using the rescue inhaler when she has episodes of chest tightness.  However, I suspect the chest tightness may be more esophageal in nature than underlying pulmonary issue.  She will initiate pulmonary rehabilitation.  I will see her back in 6 months.  Depending on how she is doing, may want to consider updating her PFTs.    Pulmonary nodule  7 mm pulmonary nodule in the left lower lobe is stable.  Repeat CT in 1 year    Return in about 6 months (around 12/21/2024).    Visit orders:    Diagnoses and all orders for this visit:    COPD, severity to be determined (HCC)    COPD exacerbation (HCC)  -     Ambulatory Referral to Pulmonology    Pulmonary nodule      History of Present Illness   HPI:  Kellen Gray is a 68 y.o. female who is here today for hospital follow-up.  She was hospitalized at the end of May with COPD exacerbation.  She was treated with a steroid taper and is slowly improving.  She reports being approximately 80% of her baseline.  Patient admits that prior to that hospitalization, she was not compliant with Trelegy Ellipta on a regular basis, but since hospital discharge has been better about using it.  She has occasional chest tightness that she is questioning may be related to hiatal hernia and reflux.  It is often accompanied by excessive belching.  She has not tried using her rescue inhaler during these events.  She complains of postnasal drip, not using fluticasone nasal spray.  She clears her throat often and has vocal hoarseness.  She has no fevers or chills.  She is starting pulmonary rehabilitation in the coming weeks.  She has obstructive sleep apnea and uses CPAP during hours of sleep.    Review of  Systems   Constitutional:  Negative for appetite change and fever.   HENT:  Negative for ear pain, postnasal drip, rhinorrhea, sneezing, sore throat and trouble swallowing.    Respiratory:  Positive for cough and shortness of breath.    Cardiovascular:  Positive for chest pain.   Musculoskeletal:  Positive for myalgias.   Neurological:  Negative for headaches.      Medical, Family and Social history reviewed and updated as appropriate    Historical Information   Past Medical History:   Diagnosis Date    A-fib (HCC)     Acute respiratory failure with hypoxia (HCC) 11/30/2019    Anxiety     Arthritis     Asthma     Brain aneurysm     Cancer (HCC)     papillary thyroid cancer    Cardiac disease     CHF (congestive heart failure) (HCC)     COPD (chronic obstructive pulmonary disease) (HCC)     CPAP (continuous positive airway pressure) dependence     Crohn disease (HCC)     Depression     Diabetes mellitus (HCC)     Disease of thyroid gland     Diverticulitis of colon     Emphysema of lung (HCC)     GERD (gastroesophageal reflux disease)     HL (hearing loss)     Hyperlipidemia     Hypertension     Sleep apnea     Sleep apnea, obstructive     Visual impairment      Past Surgical History:   Procedure Laterality Date    APPENDECTOMY      BREAST SURGERY  several dates    CARDIOVERSION N/A 02/14/2019    Procedure: CARDIOVERSION;  Surgeon: Lee Brenner MD;  Location: [facility];  Service: Cardiology    CEREBRAL ANEURYSM REPAIR      CHOLECYSTECTOMY      HYSTERECTOMY      IR PELVIC ANGIOGRAM  12/14/2017    AR EXC THROMBOSED HEMORRHOID XTRNL N/A 10/07/2022    Procedure: HEMORRHOIDECTOMY EXCISION;  Surgeon: Leonardo Cash DO;  Location: [facility];  Service: General    THYROIDECTOMY      TONSILLECTOMY AND ADENOIDECTOMY       Family History   Problem Relation Age of Onset    Alzheimer's disease Mother     Asthma Mother     Cancer Father         dead    Heart disease Sister     Hypertension Sister         under control     Diabetes Sister     Stroke Sister     Cancer Sister         dead    Stroke Sister     Heart disease Sister         dead    Diabetes Sister     Cancer Brother         liver with mets to bone    Heart disease Brother     Cancer Brother         dead    Asthma Daughter     Cancer Brother        Social History     Tobacco Use   Smoking Status Former    Current packs/day: 0.00    Types: Cigarettes    Quit date: 2018    Years since quittin.4   Smokeless Tobacco Never         Meds/Allergies     Current Outpatient Medications:     fluticasone-umeclidinium-vilanterol (Trelegy Ellipta) 200-62.5-25 mcg/actuation AEPB inhaler, Inhale 1 puff daily Rinse mouth after use., Disp: , Rfl:     albuterol (ACCUNEB) 0.63 MG/3ML nebulizer solution, Take 0.63 mg by nebulization every 6 (six) hours as needed for wheezing (Patient not taking: Reported on 2024), Disp: , Rfl:     albuterol (PROVENTIL HFA,VENTOLIN HFA) 90 mcg/act inhaler, Inhale 2 puffs every 4 (four) hours as needed for wheezing, Disp: , Rfl:     apixaban (ELIQUIS) 5 mg, Take 1 tablet (5 mg total) by mouth 2 (two) times a day, Disp: 90 tablet, Rfl: 3    atorvastatin (LIPITOR) 20 mg tablet, Take 20 mg by mouth every morning, Disp: , Rfl:     Blood Glucose Monitoring Suppl (OneTouch Verio) w/Device KIT, by Device route in the morning (Patient not taking: Reported on 2024), Disp: 1 kit, Rfl: 0    Cholecalciferol (Vitamin D3) 1.25 MG (30185 UT) CAPS, Take 1 capsule by mouth in the morning, Disp: , Rfl:     dofetilide (TIKOSYN) 500 mcg capsule, Take 500 mcg by mouth 2 (two) times a day, Disp: , Rfl:     Empagliflozin 25 MG TABS, Take 1 tablet (25 mg total) by mouth daily, Disp: 90 tablet, Rfl: 3    esomeprazole (NexIUM) 40 MG capsule, TAKE 1 CAPSULE BY MOUTH TWICE DAILY BEFORE MEAL(S) TAKE 30 MINUTES BEFORE BREAKFAST AND 30 MINUTES BEFORE DINNER, Disp: 30 capsule, Rfl: 5    famotidine (PEPCID) 40 MG tablet, TAKE 1 TABLET BY MOUTH ONCE DAILY AT BEDTIME, Disp: 30  "tablet, Rfl: 5    fluticasone (FLONASE) 50 mcg/act nasal spray, 1 spray into each nostril daily, Disp: 1 g, Rfl: 3    furosemide (LASIX) 20 mg tablet, Take 20 mg by mouth 2 (two) times a day, Disp: , Rfl:     gabapentin (NEURONTIN) 300 mg capsule, Take 1 capsule by mouth twice daily, Disp: 30 capsule, Rfl: 0    glucose blood test strip, Use to check sugars once a day. Dx E11.9, Disp: , Rfl:     hydrOXYzine HCL (ATARAX) 10 mg tablet, Take 1 tablet by mouth twice daily, Disp: 60 tablet, Rfl: 0    Lancets (ONETOUCH DELICA PLUS MVYIIT35L) MISC, , Disp: , Rfl:     lisinopril (ZESTRIL) 10 mg tablet, Take 10 mg by mouth every morning, Disp: , Rfl:     mesalamine (DELZICOL) 400 mg, Take 2 capsules by mouth twice daily, Disp: 360 capsule, Rfl: 3    metoprolol succinate (TOPROL-XL) 100 mg 24 hr tablet, TAKE 1 TABLET BY MOUTH EVERY 12 HOURS, Disp: 60 tablet, Rfl: 5    OneTouch Verio test strip, Use 1 each in the morning Use as instructed, Disp: 100 each, Rfl: 3    pancrelipase, Lip-Prot-Amyl, (Creon) 24,000 units, Take 3 capsules (72,000 Units total) by mouth 2 (two) times a day, Disp: 180 capsule, Rfl: 0    Synthroid 137 MCG tablet, Take 1 tablet (137 mcg total) by mouth daily, Disp: 90 tablet, Rfl: 1    Current Facility-Administered Medications:     cyanocobalamin injection 1,000 mcg, 1,000 mcg, Intramuscular, Q30 Days, , 1,000 mcg at 05/22/24 0707  Allergies   Allergen Reactions    Sulfa Antibiotics Rash       Vitals: Blood pressure 99/63, pulse 57, temperature 98 °F (36.7 °C), temperature source Temporal, height 5' 9\" (1.753 m), weight 98.9 kg (218 lb), SpO2 93%. Body mass index is 32.19 kg/m². Oxygen Therapy  SpO2: 93 %    Physical Exam   Physical Exam  Vitals reviewed.   Constitutional:       General: She is not in acute distress.     Appearance: She is well-developed.   Eyes:      General: No scleral icterus.  Neck:      Vascular: No JVD.   Cardiovascular:      Rate and Rhythm: Normal rate and regular rhythm. " "  Pulmonary:      Breath sounds: No wheezing, rhonchi or rales.   Abdominal:      Tenderness: There is no abdominal tenderness.   Skin:     General: Skin is warm and dry.   Neurological:      Mental Status: She is alert and oriented to person, place, and time.   Psychiatric:         Mood and Affect: Mood normal.         Labs: I have personally reviewed pertinent lab results.  Lab Results   Component Value Date    WBC 18.86 (H) 05/26/2024    HGB 10.8 (L) 05/26/2024    HCT 34.7 (L) 05/26/2024    MCV 86 05/26/2024     05/26/2024     Lab Results   Component Value Date    CALCIUM 8.9 05/26/2024     04/21/2018    K 3.7 05/26/2024    CO2 25 05/26/2024     05/26/2024    BUN 21 05/26/2024    CREATININE 0.82 05/26/2024     No results found for: \"IGE\"  Lab Results   Component Value Date    ALT 12 05/25/2024    AST 15 05/25/2024    ALKPHOS 82 05/25/2024    BILITOT 0.3 04/21/2018       Imaging and other studies: I have personally reviewed pertinent reports.   and I have personally reviewed pertinent films in PACS chest from 6/12/2024 shows stable 7 mm left lower lobe groundglass nodule.  There is mild biapical pleural-parenchymal scarring.  Stable emphysema.    Pulmonary function [facility] performed [facility] on 9/7/2023 FEV1/FVC ratio was 74%.  FEV1 68% predicted, FVC 71% predicted.Total lung capacity 92% predicted, % predicted.  DLCO 70% predicted     Answers submitted by the patient for this visit:  Pulmonology Questionnaire (Submitted on 6/14/2024)  Chief Complaint: Primary symptoms  Do you experience frequent throat clearing?: Yes  Do you have a hoarse voice?: Yes  Chronicity: recurrent  When did you first notice your symptoms?: 1 to 4 weeks ago  How often do your symptoms occur?: daily  Since you first noticed this problem, how has it changed?: unchanged  Do you have shortness of breath that occurs with effort or exertion?: Yes  Do you have ear congestion?: Yes  Do you have heartburn?: Yes  Do you " have fatigue?: Yes  Do you have nasal congestion?: No  Do you have shortness of breath when lying flat?: No  Do you have shortness of breath when you wake up?: Yes  Do you have sweats?: No  Have you experienced weight loss?: Yes  Which of the following makes your symptoms worse?: animal exposure, any activity, change in weather, climbing stairs, emotional stress, exposure to smoke, minimal activity, pollen, URI  Which of the following makes your symptoms better?: cold air  Risk factors for lung disease: animal exposure

## 2024-06-21 NOTE — ASSESSMENT & PLAN NOTE
Has emphysema on imaging, though prior PFTs showed no evidence of obstruction.  She finds Trelegy Ellipta to be beneficial.  I asked that she try using the rescue inhaler when she has episodes of chest tightness.  However, I suspect the chest tightness may be more esophageal in nature than underlying pulmonary issue.  She will initiate pulmonary rehabilitation.  I will see her back in 6 months.  Depending on how she is doing, may want to consider updating her PFTs.

## 2024-06-27 DIAGNOSIS — K21.9 GASTROESOPHAGEAL REFLUX DISEASE, UNSPECIFIED WHETHER ESOPHAGITIS PRESENT: ICD-10-CM

## 2024-06-27 DIAGNOSIS — K64.9 HEMORRHOIDS, UNSPECIFIED HEMORRHOID TYPE: ICD-10-CM

## 2024-06-27 DIAGNOSIS — K44.9 HIATAL HERNIA: ICD-10-CM

## 2024-06-28 RX ORDER — ESOMEPRAZOLE MAGNESIUM 40 MG/1
CAPSULE, DELAYED RELEASE ORAL
Qty: 60 CAPSULE | Refills: 5 | Status: SHIPPED | OUTPATIENT
Start: 2024-06-28

## 2024-06-28 RX ORDER — GABAPENTIN 300 MG/1
CAPSULE ORAL
Qty: 30 CAPSULE | Refills: 0 | Status: SHIPPED | OUTPATIENT
Start: 2024-06-28

## 2024-07-09 DIAGNOSIS — K64.9 HEMORRHOIDS, UNSPECIFIED HEMORRHOID TYPE: ICD-10-CM

## 2024-07-10 RX ORDER — GABAPENTIN 300 MG/1
CAPSULE ORAL
Qty: 30 CAPSULE | Refills: 0 | Status: SHIPPED | OUTPATIENT
Start: 2024-07-10

## 2024-07-11 DIAGNOSIS — F41.9 ANXIETY: ICD-10-CM

## 2024-07-11 RX ORDER — HYDROXYZINE HYDROCHLORIDE 10 MG/1
10 TABLET, FILM COATED ORAL 2 TIMES DAILY
Qty: 60 TABLET | Refills: 0 | Status: SHIPPED | OUTPATIENT
Start: 2024-07-11

## 2024-07-15 ENCOUNTER — OFFICE VISIT (OUTPATIENT)
Dept: INTERNAL MEDICINE CLINIC | Facility: CLINIC | Age: 69
End: 2024-07-15
Payer: COMMERCIAL

## 2024-07-15 DIAGNOSIS — K52.9 GASTROENTERITIS: Primary | ICD-10-CM

## 2024-07-15 PROCEDURE — G2211 COMPLEX E/M VISIT ADD ON: HCPCS | Performed by: FAMILY MEDICINE

## 2024-07-15 PROCEDURE — 99213 OFFICE O/P EST LOW 20 MIN: CPT | Performed by: FAMILY MEDICINE

## 2024-07-15 RX ORDER — DICYCLOMINE HYDROCHLORIDE 10 MG/1
10 CAPSULE ORAL
Qty: 30 CAPSULE | Refills: 1 | Status: SHIPPED | OUTPATIENT
Start: 2024-07-15

## 2024-07-15 RX ORDER — ONDANSETRON 4 MG/1
4 TABLET, FILM COATED ORAL EVERY 8 HOURS PRN
Qty: 20 TABLET | Refills: 0 | Status: SHIPPED | OUTPATIENT
Start: 2024-07-15

## 2024-07-16 VITALS — HEART RATE: 74 BPM | TEMPERATURE: 98.9 F

## 2024-07-16 NOTE — PROGRESS NOTES
Ambulatory Visit  Name: Kellen Gray      : 1955      MRN: [id number]  Encounter Provider: Ina Rivas MD  Encounter Date: 7/15/2024   Encounter department: [facility]    Assessment & Plan   1. Gastroenteritis  -     ondansetron (ZOFRAN) 4 mg tablet; Take 1 tablet (4 mg total) by mouth every 8 (eight) hours as needed for nausea or vomiting  -     dicyclomine (BENTYL) 10 mg capsule; Take 1 capsule (10 mg total) by mouth 4 (four) times a day (before meals and at bedtime)    Orders and recommendations as noted above.  Discussed the importance of staying adequately hydrated.  Nausea medication discussed.  Advised her to take this 3 times a day for at least the next 24 hours and then as needed thereafter.  Clear fluids.  May advance to soups and broths and then to brat diet.  Slowly resume normal diet.  Watch dairy products.  Avoid Imodium.  Advised her to go to the emergency room if symptoms persist or worsen.  Especially if she is unable to tolerate liquids.       History of Present Illness     She presents for acute visit.  She started suddenly overnight with nausea, vomiting, and diarrhea.  Was relatively sudden onset.  Daughter did have recent similar symptoms.  She was with her daughter over the weekend.  She did eat a piece of apple pie and she is unsure if this was going bad.  Intermittent upper abdominal cramping and soreness that can be severe at times.  Denies any blood or mucus in the bowel movements.        Review of Systems   Constitutional:  Positive for activity change, appetite change and fatigue.   HENT:  Negative for congestion.    Respiratory:  Negative for cough.    Gastrointestinal:  Positive for abdominal pain, diarrhea, nausea and vomiting. Negative for anal bleeding and blood in stool.   Genitourinary:  Negative for dysuria.     Medical History Reviewed by provider this encounter:       Past Medical History   Past Medical History:   Diagnosis Date     A-fib (HCC)     Acute respiratory failure with hypoxia (HCC) 11/30/2019    Anxiety     Arthritis     Asthma     Brain aneurysm     Cancer (HCC)     papillary thyroid cancer    Cardiac disease     CHF (congestive heart failure) (HCC)     COPD (chronic obstructive pulmonary disease) (HCC)     CPAP (continuous positive airway pressure) dependence     Crohn disease (HCC)     Depression     Diabetes mellitus (HCC)     Disease of thyroid gland     Diverticulitis of colon     Emphysema of lung (HCC)     GERD (gastroesophageal reflux disease)     HL (hearing loss)     Hyperlipidemia     Hypertension     Sleep apnea     Sleep apnea, obstructive     Visual impairment      Past Surgical History:   Procedure Laterality Date    APPENDECTOMY      BREAST SURGERY  several dates    CARDIOVERSION N/A 02/14/2019    Procedure: CARDIOVERSION;  Surgeon: Lee Brenner MD;  Location: [facility];  Service: Cardiology    CEREBRAL ANEURYSM REPAIR      CHOLECYSTECTOMY      HYSTERECTOMY      IR PELVIC ANGIOGRAM  12/14/2017    IA EXC THROMBOSED HEMORRHOID XTRNL N/A 10/07/2022    Procedure: HEMORRHOIDECTOMY EXCISION;  Surgeon: Leonardo Cash DO;  Location: [facility];  Service: General    THYROIDECTOMY      TONSILLECTOMY AND ADENOIDECTOMY       Family History   Problem Relation Age of Onset    Alzheimer's disease Mother     Asthma Mother     Cancer Father         dead    Heart disease Sister     Hypertension Sister         under control    Diabetes Sister     Stroke Sister     Cancer Sister         dead    Stroke Sister     Heart disease Sister         dead    Diabetes Sister     Cancer Brother         liver with mets to bone    Heart disease Brother     Cancer Brother         dead    Asthma Daughter     Cancer Brother      Current Outpatient Medications on File Prior to Visit   Medication Sig Dispense Refill    albuterol (ACCUNEB) 0.63 MG/3ML nebulizer solution Take 0.63 mg by nebulization every 6 (six) hours as needed for wheezing (Patient  not taking: Reported on 6/21/2024)      albuterol (PROVENTIL HFA,VENTOLIN HFA) 90 mcg/act inhaler Inhale 2 puffs every 4 (four) hours as needed for wheezing      apixaban (ELIQUIS) 5 mg Take 1 tablet (5 mg total) by mouth 2 (two) times a day 90 tablet 3    atorvastatin (LIPITOR) 20 mg tablet Take 20 mg by mouth every morning      Blood Glucose Monitoring Suppl (OneTouch Verio) w/Device KIT by Device route in the morning (Patient not taking: Reported on 5/22/2024) 1 kit 0    Cholecalciferol (Vitamin D3) 1.25 MG (63115 UT) CAPS Take 1 capsule by mouth in the morning      dofetilide (TIKOSYN) 500 mcg capsule Take 500 mcg by mouth 2 (two) times a day      Empagliflozin 25 MG TABS Take 1 tablet (25 mg total) by mouth daily 90 tablet 3    esomeprazole (NexIUM) 40 MG capsule TAKE 1 CAPSULE BY MOUTH TWICE DAILY 30 MINUTES BEFORE BREAKFAST AND 30 MINUTES BEFORE DINNER 60 capsule 5    famotidine (PEPCID) 40 MG tablet TAKE 1 TABLET BY MOUTH ONCE DAILY AT BEDTIME 30 tablet 5    fluticasone (FLONASE) 50 mcg/act nasal spray 1 spray into each nostril daily 1 g 3    fluticasone-umeclidinium-vilanterol (Trelegy Ellipta) 200-62.5-25 mcg/actuation AEPB inhaler Inhale 1 puff daily Rinse mouth after use.      furosemide (LASIX) 20 mg tablet Take 20 mg by mouth 2 (two) times a day      gabapentin (NEURONTIN) 300 mg capsule Take 1 capsule by mouth twice daily 30 capsule 0    glucose blood test strip Use to check sugars once a day. Dx E11.9      hydrOXYzine HCL (ATARAX) 10 mg tablet Take 1 tablet by mouth twice daily 60 tablet 0    Lancets (ONETOUCH DELICA PLUS [id number]) MISC       lisinopril (ZESTRIL) 10 mg tablet Take 10 mg by mouth every morning      mesalamine (DELZICOL) 400 mg Take 2 capsules by mouth twice daily 360 capsule 3    metoprolol succinate (TOPROL-XL) 100 mg 24 hr tablet TAKE 1 TABLET BY MOUTH EVERY 12 HOURS 60 tablet 5    OneTouch Verio test strip Use 1 each in the morning Use as instructed 100 each 3    pancrelipase,  Lip-Prot-Amyl, (Creon) 24,000 units Take 3 capsules (72,000 Units total) by mouth 2 (two) times a day 180 capsule 0    Synthroid 137 MCG tablet Take 1 tablet (137 mcg total) by mouth daily 90 tablet 1     Current Facility-Administered Medications on File Prior to Visit   Medication Dose Route Frequency Provider Last Rate Last Admin    cyanocobalamin injection 1,000 mcg  1,000 mcg Intramuscular Q30 Days    1,000 mcg at 05/22/24 0707     Allergies   Allergen Reactions    Sulfa Antibiotics Rash      Current Outpatient Medications on File Prior to Visit   Medication Sig Dispense Refill    albuterol (ACCUNEB) 0.63 MG/3ML nebulizer solution Take 0.63 mg by nebulization every 6 (six) hours as needed for wheezing (Patient not taking: Reported on 6/21/2024)      albuterol (PROVENTIL HFA,VENTOLIN HFA) 90 mcg/act inhaler Inhale 2 puffs every 4 (four) hours as needed for wheezing      apixaban (ELIQUIS) 5 mg Take 1 tablet (5 mg total) by mouth 2 (two) times a day 90 tablet 3    atorvastatin (LIPITOR) 20 mg tablet Take 20 mg by mouth every morning      Blood Glucose Monitoring Suppl (OneTouch Verio) w/Device KIT by Device route in the morning (Patient not taking: Reported on 5/22/2024) 1 kit 0    Cholecalciferol (Vitamin D3) 1.25 MG (41806 UT) CAPS Take 1 capsule by mouth in the morning      dofetilide (TIKOSYN) 500 mcg capsule Take 500 mcg by mouth 2 (two) times a day      Empagliflozin 25 MG TABS Take 1 tablet (25 mg total) by mouth daily 90 tablet 3    esomeprazole (NexIUM) 40 MG capsule TAKE 1 CAPSULE BY MOUTH TWICE DAILY 30 MINUTES BEFORE BREAKFAST AND 30 MINUTES BEFORE DINNER 60 capsule 5    famotidine (PEPCID) 40 MG tablet TAKE 1 TABLET BY MOUTH ONCE DAILY AT BEDTIME 30 tablet 5    fluticasone (FLONASE) 50 mcg/act nasal spray 1 spray into each nostril daily 1 g 3    fluticasone-umeclidinium-vilanterol (Trelegy Ellipta) 200-62.5-25 mcg/actuation AEPB inhaler Inhale 1 puff daily Rinse mouth after use.      furosemide  (LASIX) 20 mg tablet Take 20 mg by mouth 2 (two) times a day      gabapentin (NEURONTIN) 300 mg capsule Take 1 capsule by mouth twice daily 30 capsule 0    glucose blood test strip Use to check sugars once a day. Dx E11.9      hydrOXYzine HCL (ATARAX) 10 mg tablet Take 1 tablet by mouth twice daily 60 tablet 0    Lancets (ONETOUCH DELICA PLUS [id number]) MISC       lisinopril (ZESTRIL) 10 mg tablet Take 10 mg by mouth every morning      mesalamine (DELZICOL) 400 mg Take 2 capsules by mouth twice daily 360 capsule 3    metoprolol succinate (TOPROL-XL) 100 mg 24 hr tablet TAKE 1 TABLET BY MOUTH EVERY 12 HOURS 60 tablet 5    OneTouch Verio test strip Use 1 each in the morning Use as instructed 100 each 3    pancrelipase, Lip-Prot-Amyl, (Creon) 24,000 units Take 3 capsules (72,000 Units total) by mouth 2 (two) times a day 180 capsule 0    Synthroid 137 MCG tablet Take 1 tablet (137 mcg total) by mouth daily 90 tablet 1     Current Facility-Administered Medications on File Prior to Visit   Medication Dose Route Frequency Provider Last Rate Last Admin    cyanocobalamin injection 1,000 mcg  1,000 mcg Intramuscular Q30 Days    1,000 mcg at 24 0707      Social History     Tobacco Use    Smoking status: Former     Current packs/day: 0.00     Types: Cigarettes     Quit date: 2018     Years since quittin.5    Smokeless tobacco: Never   Vaping Use    Vaping status: Never Used   Substance and Sexual Activity    Alcohol use: Yes     Comment: social    Drug use: Never     Types: Marijuana    Sexual activity: Not Currently     Partners: Male     Birth control/protection: None     Objective     There were no vitals taken for this visit.    Physical Exam  Vitals reviewed.   Constitutional:       General: She is not in acute distress.     Appearance: She is well-developed and well-groomed.   HENT:      Head: Normocephalic and atraumatic.      Comments: Moist mucous membranes  Eyes:      General:         Right eye: No  discharge.         Left eye: No discharge.      Conjunctiva/sclera: Conjunctivae normal.      Pupils: Pupils are equal, round, and reactive to light.   Cardiovascular:      Rate and Rhythm: Normal rate and regular rhythm.      Heart sounds: Normal heart sounds. No murmur heard.     No friction rub. No gallop.   Pulmonary:      Effort: No respiratory distress.      Breath sounds: No wheezing or rales.   Abdominal:      General: Bowel sounds are increased. There is no distension.      Tenderness: There is generalized abdominal tenderness.   Musculoskeletal:      Cervical back: Neck supple.   Lymphadenopathy:      Cervical: No cervical adenopathy.   Skin:     General: Skin is warm and dry.   Neurological:      Mental Status: She is alert and oriented to person, place, and time.   Psychiatric:         Behavior: Behavior normal. Behavior is cooperative.       Administrative Statements

## 2024-07-23 ENCOUNTER — TELEPHONE (OUTPATIENT)
Dept: INTERNAL MEDICINE CLINIC | Facility: CLINIC | Age: 69
End: 2024-07-23

## 2024-07-23 NOTE — TELEPHONE ENCOUNTER
Patient came into office since it is so hard to get through with the phones.  She went to [facility], today and they need a referral for today's appointment please.  Kellen Smith,    [facility], [place]

## 2024-07-24 ENCOUNTER — CLINICAL SUPPORT (OUTPATIENT)
Dept: PULMONOLOGY | Facility: HOSPITAL | Age: 69
End: 2024-07-24
Payer: COMMERCIAL

## 2024-07-24 DIAGNOSIS — J44.1 COPD EXACERBATION (HCC): ICD-10-CM

## 2024-07-24 PROCEDURE — G0239 OTH RESP PROC, GROUP: HCPCS

## 2024-07-24 RX ORDER — LEVOTHYROXINE SODIUM 150 MCG
150 TABLET ORAL DAILY
COMMUNITY
Start: 2024-06-19

## 2024-07-24 NOTE — PROGRESS NOTES
Pulmonary Rehabilitation   Assessment and Individualized Treatment Plan  INITIAL       Today's date: 2024  # of Exercise Sessions Completed: 1  Patient name: Kellen Gray     : 1955       MRN: [id number]  Referring Physician: Dannie Dutton DO  Pulmonologist: Eugenie Salmeron DO  Provider: Mohinder  Clinician: Ina Siegel RN    Dx:    Encounter Diagnosis   Name Primary?    COPD exacerbation (HCC)      Date of onset: 24      Treatment is tailored to this patient's individual needs.  The ITP was reviewed with the patient and all questions were answered to their satisfaction.  Additional ITP documentation can be found electronically including daily and monthly exercise summaries, daily session notes with ECG summaries, education notes, daily medication reconciliation, and daily physician supervision.      COMMENTS:  Kellen was hospitalized in  for COPD exacerbation . She is taking trelegy regularly now . She has not needed her rescue inhalers or nebulizer treatments since discharge.She does complain of dyspnea  with exercise . She also had some right sided chest pressure  scale 5 (1-10 scale) with her 6MWT .This chest pressure was relieved with rest.  Her cardiac rhythm is NSR  A note will be sent to her cardiologist Dr Brenner about the chest pressure.She is enrolling in Pulmonary Rehab and plans on coming 2x week       ASSESSMENT      Medical History:   Past Medical History:   Diagnosis Date    A-fib (HCC)     Acute respiratory failure with hypoxia (HCC) 2019    Anxiety     Arthritis     Asthma     Brain aneurysm     Cancer (HCC)     papillary thyroid cancer    Cardiac disease     CHF (congestive heart failure) (HCC)     COPD (chronic obstructive pulmonary disease) (HCC)     CPAP (continuous positive airway pressure) dependence     Crohn disease (HCC)     Depression     Diabetes mellitus (HCC)     Disease of thyroid gland     Diverticulitis of colon     Emphysema of lung (HCC)      GERD (gastroesophageal reflux disease)     HL (hearing loss)     Hyperlipidemia     Hypertension     Sleep apnea     Sleep apnea, obstructive     Visual impairment        Family History:  Family History   Problem Relation Age of Onset    Alzheimer's disease Mother     Asthma Mother     Cancer Father         dead    Heart disease Sister     Hypertension Sister         under control    Diabetes Sister     Stroke Sister     Cancer Sister         dead    Stroke Sister     Heart disease Sister         dead    Diabetes Sister     Cancer Brother         liver with mets to bone    Heart disease Brother     Cancer Brother         dead    Asthma Daughter     Cancer Brother        Allergies:   Sulfa antibiotics    Current Medications:   Current Outpatient Medications   Medication Sig Dispense Refill    albuterol (ACCUNEB) 0.63 MG/3ML nebulizer solution Take 0.63 mg by nebulization every 6 (six) hours as needed for wheezing (Patient not taking: Reported on 6/21/2024)      albuterol (PROVENTIL HFA,VENTOLIN HFA) 90 mcg/act inhaler Inhale 2 puffs every 4 (four) hours as needed for wheezing      apixaban (ELIQUIS) 5 mg Take 1 tablet (5 mg total) by mouth 2 (two) times a day 90 tablet 3    atorvastatin (LIPITOR) 20 mg tablet Take 20 mg by mouth every morning      Blood Glucose Monitoring Suppl (OneTouch Verio) w/Device KIT by Device route in the morning (Patient not taking: Reported on 5/22/2024) 1 kit 0    Cholecalciferol (Vitamin D3) 1.25 MG (18708 UT) CAPS Take 1 capsule by mouth in the morning      dicyclomine (BENTYL) 10 mg capsule Take 1 capsule (10 mg total) by mouth 4 (four) times a day (before meals and at bedtime) 30 capsule 1    dofetilide (TIKOSYN) 500 mcg capsule Take 500 mcg by mouth 2 (two) times a day      Empagliflozin 25 MG TABS Take 1 tablet (25 mg total) by mouth daily 90 tablet 3    esomeprazole (NexIUM) 40 MG capsule TAKE 1 CAPSULE BY MOUTH TWICE DAILY 30 MINUTES BEFORE BREAKFAST AND 30 MINUTES BEFORE DINNER  60 capsule 5    famotidine (PEPCID) 40 MG tablet TAKE 1 TABLET BY MOUTH ONCE DAILY AT BEDTIME 30 tablet 5    fluticasone (FLONASE) 50 mcg/act nasal spray 1 spray into each nostril daily 1 g 3    fluticasone-umeclidinium-vilanterol (Trelegy Ellipta) 200-62.5-25 mcg/actuation AEPB inhaler Inhale 1 puff daily Rinse mouth after use.      furosemide (LASIX) 20 mg tablet Take 20 mg by mouth 2 (two) times a day      gabapentin (NEURONTIN) 300 mg capsule Take 1 capsule by mouth twice daily 30 capsule 0    glucose blood test strip Use to check sugars once a day. Dx E11.9      hydrOXYzine HCL (ATARAX) 10 mg tablet Take 1 tablet by mouth twice daily 60 tablet 0    Lancets (ONETOUCH DELICA PLUS [id number]) MISC       lisinopril (ZESTRIL) 10 mg tablet Take 10 mg by mouth every morning      mesalamine (DELZICOL) 400 mg Take 2 capsules by mouth twice daily 360 capsule 3    metoprolol succinate (TOPROL-XL) 100 mg 24 hr tablet TAKE 1 TABLET BY MOUTH EVERY 12 HOURS 60 tablet 5    ondansetron (ZOFRAN) 4 mg tablet Take 1 tablet (4 mg total) by mouth every 8 (eight) hours as needed for nausea or vomiting 20 tablet 0    OneTouch Verio test strip Use 1 each in the morning Use as instructed 100 each 3    pancrelipase, Lip-Prot-Amyl, (Creon) 24,000 units Take 3 capsules (72,000 Units total) by mouth 2 (two) times a day 180 capsule 0    Synthroid 137 MCG tablet Take 1 tablet (137 mcg total) by mouth daily 90 tablet 1     Current Facility-Administered Medications   Medication Dose Route Frequency Provider Last Rate Last Admin    cyanocobalamin injection 1,000 mcg  1,000 mcg Intramuscular Q30 Days    1,000 mcg at 05/22/24 0707       Physical Limitations: both knees arthritis    Fall Risk: Low   Comments: Ambulates with a steady gait with no assist device    Cultural needs: none    PFT:  Yes     FEV1/FVC ratio of 74%   FEV1 of 68% predicted  moderate restriction.    Medication compliance: Yes  Comments: Pt reports to be compliant with  medications      Pulmonary Disease Risk Factors:  second hand smoke  smoking  asthma    Sleep Disorders:   obstructive sleep apnea:  CPCP/BiPAP:  yes      EXERCISE ASSESSMENT:      Initial Fitness Assessment: 6MWT:  Resting:    Resting:  BP: 104/60  HR: 55  SpO2: 95  Dyspnea: 0  O2 Use: 0 l/min, Exercise:  BP: 120/56  HR: 79  SpO2: 91-97%  Dyspnea: 4  O2 use: 0 l/min, METs:  2.59, ECG Summary: NSR, Symptoms: right sided chest pressure, Test terminated at:  6min, and Comments: good effort 1100 ft      Current Functional Status:  Occupation: retired  Recreation/Physical activity: [employer]  ADL’s:able to perform self-care resumed driving  Wise: able to perform self-care  Exercise:   started walking  15 min  Home exercise equipment: no  Other Comments:     SMART Exercise Goals:   reduced score on CAT and reduced RPD at rest    Patient Specific EXERCISE GOALS:     attend pulmonary rehab regularly, decrease sitting time at home, begin a consistent exercise regimen , increased muscular strength, increased energy, and increased stamina with ADLs    PSYCHOSOCIAL ASSESSMENT:    Date of last assessment: 5/24/24  Depression screening:  PHQ-9 = 4    Interpretation:  1-4 = Minimal Depression  Anxiety screening:  JANUARY-7 = 4    Interpretation: 0-4  = Not anxious    Pt self-report of depression and anxiety   Patient has a history of anxiety   Financial worries    Self-reported stress level:  8  Stress Management: practice Relaxation Techniques, exercise, and enjoy a hobby take up [employer]    Quality of Life Screen:  (Higher score indicates disease impact on QOL)  [place] COOP score: 31/40      CAT: 29/40      Shortness of breath questionnaire: 74/120    Social Support:   spouse, children, and grandchildren  Community/Social Activities: none    SMART Goals:    Physical Fitness in [place] Score < 3, Daily Activity in [place] Score < 3, Social Activities in [place] Score < 3, Pain in [place] Score < 3, Overall  "Health in [facility] Score < 3, and Quality of Life in [facility] Score < 3     Patient Specific PSYCHOCOSOCIAL GOALS:    maintain compliance with medical therapy, begin using [employer], address emotional health concerns with PCP, and she had been seeing a therapist but states she is coping better now.      Psychosocial Assessment as it relates to rehabilitation: Patient denies issues with his/her family or home life that may affect their rehabilitation efforts.       NUTRITION ASSESSMENT:    Initial Weight:  214  Current Weight: 214    Height:   Ht Readings from Last 1 Encounters:   06/21/24 5' 9\" (1.753 m)       Rate Your Plate Score: 47/81    Self-reported Current Dietary Habits:  She is not watching sodium-eating some lunchmeats and drinking soda    ETOH:   Social History     Substance and Sexual Activity   Alcohol Use Yes    Comment: social       SMART Goals:   improved A1c  < 7.0%, Improved Rate Your Plate score  >58, Wt. loss 1 ppw,  goal of 10 lbs., eat 6 or more servings of grain products per day, eat whole grain breads, brown rice and whole grain cereals, eat 5 or more servings of fruits and vegetables a day, eat 2 or more servings of low fat milk or yogurt a day, eat no more than 6oz of meat per day, eat red meat once a week or less, and rarely eat processed meats or eat low fat processed meats    Patient Specific NUTRITION GOALS:    increase water intake to reduce/thin mucus production weight loss improve Aic      OTHER CORE COMPONENT ASSESSMENT:    Hospitalizations in the past year: 1    Oxygen needs:   Rest:  room air  Exercise/physical activity:  room air  Sleep:   room air    Does Pt monitor home SpO2? Yes  she doesn`t use it   Average SpO2 at rest:  %doesn`t check   Average SpO2 with ADLs/physical activity:  %      Use of Rescue Inhaler:  hasn`t needed it    Use of Maintenance Inhaler: Yes:  1 times per day    Use of Nebulizer Treatments:  No    Patient practices breathing techniques at home:  " Yes    CAD Risk Factors:  Cholesterol: Yes  HTN: Yes  DM: Type 2   Obesity: Yes     Smoking: Former user    SMART Core Component Goals:   medication compliance and demonstrate pursed lipped breathing    Patient Specific CORE COMPONENT GOALS:    medication compliance, compliance with CPAP, monitor home pulse oximetry, and Be able to walk better      Blood Pressure:    Restin/60  Exercise: 120/56  Recovery: 104/60      INDIVIDUALIZED TREATMENT PLAN    The patient is agreeable to attend 24 pulmonary rehab exercise sessions.      EXERCISE GOALS AND PLAN    Current Aerobic Exercise Prescription       Frequency: 2 days/week   Supplement with home exercise 2+ days/wk as tolerated        Minutes: 20-40 6MWT        METS: 2.59              SpO2: 91-97%              RPD: 4-6                      HR:  20-30bpm above resting   RPE: 4-5         Modalities: Room walking      Aerobic Exercise Prescription Plan for Progression:    Frequency: 2 days/week    Supplement with home exercise 2+ days/wk as tolerated       Minutes: 30-40        METS: 2/5-3.5              SpO2: >88%              RPD: 4-6                      HR:Intensity based on RPE 4-6    RPE: 4-5        Modalities: Treadmill, UBE, NuStep, Recumbent bike, and Room walking        Supplemental Oxygen Needs with Exercise:  room air.    Strength trainin-3 days / week  12-15 repetitions  1-2 sets per modality   Will be added following 2-3 weeks of monitored exercise sessions   Modalities: Chest Press, Pull Downs, Lateral Raise, Arm Curl, Front Raises, and Shoulder Shrugs    Education: pursed lipped breathing, diaphragmatic breathing, home exercise guidelines, benefits of exercise for pulmonary disease, RPE scale, RPD scale, O2 saturation monitoring, appropriate O2 response to exercise, energy conservation, and education class: Exercise For The Pulmonary Patient    Progress toward Exercise Goals:    Reviewed Pt goals and determined plan of care, Will continue to  educate and progress as tolerated.    Exercise Plan:  learn to conserve energy with ADLs , practice diaphragmatic breathing, utilize PLB with physical activity, and begin a home exercise program     Readiness to change: Contemplation:  (Acknowledging that there is a problem but not yet ready or sure of wanting to make a change)      NUTRITION GOALS AND PLAN    Progress toward Nutritional goals:    Reviewed Pt goals and determined plan of care, Will continue to educate and progress as tolerated.    Nutrition Plan: group Class: Heart Healthy Eating, increase intake of whole grains, replace refined flours with whole grains, increase daily intake of fruits and vegetables, increase daily intake of low fat dairy, limit meat intake to less than 6oz per day, and choose healthy meals while dining out    SMART goals are based Rate Your Plate Dietary Self-Assessment. These are the areas in which the patient could score higher on the assessment.  Goals include recommendations for a heart healthy diet based on American Heart Association.    Nutrition Education: low sodium diet  maintaining hydration  nutrition for  lipid management  target goal for A1c <7.0  exercise and diabetes management   portion control    Readiness to change: Contemplation:  (Acknowledging that there is a problem but not yet ready or sure of wanting to make a change)      PSYCHOSOCIAL GOALS AND PLAN    Information to begin using [employer] was provided as well as contact information for counseling through  [facility].    Progress toward Psychosocial goals:    Reviewed Pt goals and determined plan of care, Will continue to educate and progress as tolerated.    Psychosocial: Class: Relaxation, Class:  Stress and Pulmonary Disease, Enroll in [employer], Practice relaxation techniques, Exercise, Spend time outdoors, Join a support group , and Enjoy family    Psychosocial Education: signs/sxs of depression, benefits of a positive support system,  and class:  Stress and Pulmonary Disease    Readiness to change: Contemplation:  (Acknowledging that there is a problem but not yet ready or sure of wanting to make a change)      OTHER CORE COMPONENTS GOALS AND PLAN    Tobacco Use Intervention:     N/A: Pt has a remote history of smoking    Oxygen Goal: Maintain SpO2>88% during exercise or as advised by pulmonolgist    Progress toward Core Component goals:    Reviewed Pt goals and determined plan of care, Will continue to educate and progress as tolerated.    Core Component Plan: avoid processed foods, engage in regular exercise, eliminate salt shaker at the table, use salt substitutes, check labels for sodium content, group class: Pulmonary Anatomy and Physiology, and group class:  Pulmonary Medications    Core Component Education:  pathophysiology of pulmonary disease, preventing infections, dangers of smoking, tobacco triggers, control coughing, and inspiratory muscle training    Readiness to change: Contemplation:  (Acknowledging that there is a problem but not yet ready or sure of wanting to make a change)

## 2024-07-25 ENCOUNTER — OFFICE VISIT (OUTPATIENT)
Dept: GASTROENTEROLOGY | Facility: CLINIC | Age: 69
End: 2024-07-25
Payer: COMMERCIAL

## 2024-07-25 VITALS
TEMPERATURE: 98.2 F | OXYGEN SATURATION: 99 % | SYSTOLIC BLOOD PRESSURE: 93 MMHG | WEIGHT: 215 LBS | HEIGHT: 69 IN | DIASTOLIC BLOOD PRESSURE: 61 MMHG | HEART RATE: 56 BPM | BODY MASS INDEX: 31.84 KG/M2

## 2024-07-25 DIAGNOSIS — K76.0 HEPATIC STEATOSIS: ICD-10-CM

## 2024-07-25 DIAGNOSIS — K86.81 EXOCRINE PANCREATIC INSUFFICIENCY: ICD-10-CM

## 2024-07-25 DIAGNOSIS — K21.9 GASTROESOPHAGEAL REFLUX DISEASE WITHOUT ESOPHAGITIS: ICD-10-CM

## 2024-07-25 DIAGNOSIS — K50.919 CROHN'S DISEASE WITH COMPLICATION, UNSPECIFIED GASTROINTESTINAL TRACT LOCATION (HCC): Primary | ICD-10-CM

## 2024-07-25 PROBLEM — K52.9 GASTROENTERITIS: Status: RESOLVED | Noted: 2024-07-15 | Resolved: 2024-07-25

## 2024-07-25 PROBLEM — K21.00 GASTROESOPHAGEAL REFLUX DISEASE WITH ESOPHAGITIS: Status: RESOLVED | Noted: 2018-10-17 | Resolved: 2024-07-25

## 2024-07-25 PROCEDURE — 99214 OFFICE O/P EST MOD 30 MIN: CPT | Performed by: STUDENT IN AN ORGANIZED HEALTH CARE EDUCATION/TRAINING PROGRAM

## 2024-07-25 PROCEDURE — G2211 COMPLEX E/M VISIT ADD ON: HCPCS | Performed by: STUDENT IN AN ORGANIZED HEALTH CARE EDUCATION/TRAINING PROGRAM

## 2024-07-26 NOTE — ASSESSMENT & PLAN NOTE
Doing well at this time.  Recent endoscopy and colonoscopy with no evidence of active disease.    Plan to continue mesalamine 800 mg twice daily  Can consider tapering off this in the future although patient feels that her symptoms are improved while on this medication

## 2024-07-26 NOTE — PROGRESS NOTES
[facility]  Outpatient Follow-up  Encounter: [id number]    PATIENT INFO     Name: Kellen Gray  YOB: 1955   Age: 68 y.o.   Sex: female   MRN: [id number]    ASSESSMENT & PLAN     Kellen Gray is a 68 y.o. female with GERD, hiatal hernia, exocrine pancreatic insufficiency, Crohn's disease, and hepatic steatosis who presents to GI office for follow-up.    Problem List Items Addressed This Visit       Crohn's disease with complication (HCC) - Primary     Doing well at this time.  Recent endoscopy and colonoscopy with no evidence of active disease.    Plan to continue mesalamine 800 mg twice daily  Can consider tapering off this in the future although patient feels that her symptoms are improved while on this medication         Hepatic steatosis     Elastography and 1/2024 with steatosis grading of S1 but fibrosis was unable to be evaluated.  Has history of prior elastography showing possible F4.  However, she has no laboratory or clinical stigmata of cirrhosis.  I expect that there has been significant improvement given her weight loss.    NAFLD Fibrosis Score is: .566    NAFLD Score Correlated Fibrosis Severity   <0.12 F0-F2   0.12-0.676 Indeterminate Score   >0.676 F3-F4   **Fibrosis Severity Scale: F0 = no fibrosis; F1= mild fibrosis; F2 = moderate fibrosis; F3 = severe fibrosis; F4 = cirrhosis    NAFLD Score Component Values:  Component Value Date   Age: 68 y.o.     BMI: 31.75 kg/m²    IFG or DM: Yes    AST: 15 U/L 5/25/2024   ALT: 12 U/L 5/25/2024   Platelet: 232 Thousands/uL 5/26/2024   Albumin: 3.9 g/dL 5/25/2024     Plan to repeat elastography 1 year from last (January 2025)  Continue to encourage weight loss efforts and increased cardiovascular exercise         GERD (gastroesophageal reflux disease)     Symptoms are doing well at this time.  Recent EGD findings reviewed.    Continue Nexium 40 mg daily         Exocrine pancreatic insufficiency     Found to have  low pancreatic elastase levels.  Started on Creon and fortunately her symptoms have improved drastically.    Continue Creon 72,000 units 2 times daily  Consider repeating pancreatic elastase level in 6 to 12 months          No orders of the defined types were placed in this encounter.      FOLLOW-UP: 6 months    HISTORY OF PRESENT ILLNESS       Kellen Gray is a 68 y.o. female who presents to GI office for follow-up.  Patient has been doing very well since her last office visit.  She has continued to lose weight and is being active.  She weighs 215 pounds today which is further 15 pound loss from last office visit.  She is feeling that her symptoms are markedly improved with her weight loss.  She did have a recent infection causing GI symptoms of diarrhea and nausea.  Fortunately, this self resolved after approximately 3 days.  Her symptoms are now back to baseline.  She is having normal formed stools.  She is taking Creon which is helping with diarrhea.  She denies any hematochezia or melena.  She is watching what she eats and her appetite has been good.  Reflux symptoms are well-controlled on Nexium.  Offers no acute GI complaints today.     ENDOSCOPIC HISTORY     UPPER ENDOSCOPY: 12/1/2023 with 2 cm sliding hiatal hernia, mild gastritis, otherwise unremarkable (biopsies negative for H. pylori and celiac sprue)     COLONOSCOPY: 12/1/2023 with single polyp removed (tubular adenoma), small hemorrhoids, otherwise unremarkable (terminal ileum and segmental colon biopsies negative for active colitis/Crohn's); recommend recall in 5 years    REVIEW OF SYSTEMS     CONSTITUTIONAL: Denies any fever, chills, rigors, and weight loss  HEENT: No earache or tinnitus, denies hearing loss or visual disturbances  CARDIOVASCULAR: No chest pain or palpitations  RESPIRATORY: Denies any cough, hemoptysis, shortness of breath or dyspnea on exertion  GASTROINTESTINAL: As noted in the History of Present Illness  GENITOURINARY: No  problems with urination, denies any hematuria or dysuria  NEUROLOGIC: No dizziness or vertigo, denies headaches   MUSCULOSKELETAL: Denies any muscle or joint pain   SKIN: Denies jaundice or itching  PSYCHOSOCIAL: Denies depression or anxiety, denies any recent memory loss     Answers submitted by the patient for this visit:  Inflammatory Bowel Disease (Submitted on 7/19/2024)  Did the infection require hospitalization?: No  Did the infection require antibiotics?: No  Is there any possibility that you may be pregnant?: No  In the past three months, have you used tobacco in any form?: No  During the last year, how many days have you missed work or school because of your inflammatory bowel disease?: 0  During the last year, how many days have you been hospitalized because of your inflammatory bowel disease?: 2  During the last year, how many days have you visited a hospital emergency department because of your inflammatory bowel disease?: 2  During the last month, have you taken narcotic pain medications (such as Percocet, oxycodone, Oxycontin, morphine, Vicodin, Dilaudid, MS Contin) for your inflammatory bowel disease?: No  Have you awoken at night because you needed to move your bowels during the last month? : No  Have you had leakage of stool while sleeping during the last month?: No  Have you had leakage of stool while you were awake during the last month?: No  In the last 6 months, have you unintentionally lost weight?: Yes  During the last 3 days, have you had a fever?: Yes  During the last 3 days, have you had eye irritation?: No  During the last 3 days, have you had mouth sores?: No  During the last 3 days, have you had a sore throat?: No  Chest pain: Yes  Shortness of breath: Yes  Numbness or tingling in your hands or feet: Yes  During the last 3 days, have you had a skin rash?: No  Pain or swelling in your joints: Yes  During the last 3 days, have you had bruising or bleeding?: No  During the last 3 days,  have you felt depressed or blue?: Yes  Inflammatory Bowel Disease (Submitted on 7/19/2024)  When you are not experiencing symptoms of your inflammatory bowel disease, how many bowel movements do you typically have each day?: 3  Over the last 3 days, what is the maximum number of bowel movements that you had in a single day?: 3  Over the last 3 days, have you had any bowel movements where you passed blood without stool?: No  Since your last visit, have you received any vaccinations?: No  Since the last visit, have you had an infection?: Yes    Historical Information   Past Medical History:   Diagnosis Date    A-fib (HCC)     Acute respiratory failure with hypoxia (HCC) 11/30/2019    Anxiety     Arthritis     Asthma     Brain aneurysm     Cancer (HCC)     papillary thyroid cancer    Cardiac disease     CHF (congestive heart failure) (HCC)     COPD (chronic obstructive pulmonary disease) (HCC)     CPAP (continuous positive airway pressure) dependence     Crohn disease (HCC)     Depression     Diabetes mellitus (HCC)     Disease of thyroid gland     Diverticulitis of colon     Emphysema of lung (HCC)     GERD (gastroesophageal reflux disease)     HL (hearing loss)     Hyperlipidemia     Hypertension     Sleep apnea     Sleep apnea, obstructive     Visual impairment      Past Surgical History:   Procedure Laterality Date    APPENDECTOMY      BREAST SURGERY  several dates    CARDIOVERSION N/A 02/14/2019    Procedure: CARDIOVERSION;  Surgeon: Lee Brenner MD;  Location: [facility];  Service: Cardiology    CEREBRAL ANEURYSM REPAIR      CHOLECYSTECTOMY      HYSTERECTOMY      IR PELVIC ANGIOGRAM  12/14/2017    UT EXC THROMBOSED HEMORRHOID XTRNL N/A 10/07/2022    Procedure: HEMORRHOIDECTOMY EXCISION;  Surgeon: Leonardo Cash DO;  Location: [facility];  Service: General    THYROIDECTOMY      TONSILLECTOMY AND ADENOIDECTOMY       Social History   Social History     Substance and Sexual Activity   Alcohol Use Yes    Comment:  social     Social History     Substance and Sexual Activity   Drug Use Never    Types: Marijuana     Social History     Tobacco Use   Smoking Status Former    Current packs/day: 0.00    Types: Cigarettes    Quit date: 2018    Years since quittin.5   Smokeless Tobacco Never     Family History   Problem Relation Age of Onset    Alzheimer's disease Mother     Asthma Mother     Cancer Father         dead    Heart disease Sister     Hypertension Sister         under control    Diabetes Sister     Stroke Sister     Cancer Sister         dead    Stroke Sister     Heart disease Sister         dead    Diabetes Sister     Cancer Brother         liver with mets to bone    Heart disease Brother     Cancer Brother         dead    Asthma Daughter     Cancer Brother        MEDICATIONS & ALLERGIES     Current Outpatient Medications   Medication Instructions    albuterol (ACCUNEB) 0.63 mg, Every 6 hours PRN    albuterol (PROVENTIL HFA,VENTOLIN HFA) 90 mcg/act inhaler 2 puffs, Inhalation, Every 4 hours PRN    apixaban (ELIQUIS) 5 mg, Oral, 2 times daily    atorvastatin (LIPITOR) 20 mg, Oral, Every morning    Blood Glucose Monitoring Suppl (OneTouch Verio) w/Device KIT Device, Daily    Cholecalciferol (Vitamin D3) 1.25 MG (13517 UT) CAPS 1 capsule, Oral, Daily    dicyclomine (BENTYL) 10 mg, Oral, 4 times daily (before meals and at bedtime)    dofetilide (TIKOSYN) 500 mcg, Oral, 2 times daily    Empagliflozin 25 mg, Oral, Daily    esomeprazole (NexIUM) 40 MG capsule TAKE 1 CAPSULE BY MOUTH TWICE DAILY 30 MINUTES BEFORE BREAKFAST AND 30 MINUTES BEFORE DINNER    famotidine (PEPCID) 40 MG tablet TAKE 1 TABLET BY MOUTH ONCE DAILY AT BEDTIME    fluticasone (FLONASE) 50 mcg/act nasal spray 1 spray, Nasal, Daily    fluticasone-umeclidinium-vilanterol (Trelegy Ellipta) 200-62.5-25 mcg/actuation AEPB inhaler 1 puff, Inhalation, Daily, Rinse mouth after use.    furosemide (LASIX) 20 mg, Oral, 2 times daily    gabapentin (NEURONTIN) 300  "mg capsule Take 1 capsule by mouth twice daily    glucose blood test strip Use to check sugars once a day. Dx E11.9    hydrOXYzine HCL (ATARAX) 10 mg, Oral, 2 times daily    Lancets (ONETOUCH DELICA PLUS [id number]) MISC     lisinopril (ZESTRIL) 10 mg, Oral, Every morning    mesalamine (DELZICOL) 400 mg Take 2 capsules by mouth twice daily    metoprolol succinate (TOPROL-XL) 100 mg, Oral, Every 12 hours    ondansetron (ZOFRAN) 4 mg, Oral, Every 8 hours PRN    OneTouch Verio test strip 1 each, Other, Daily, Use as instructed    pancrelipase (Lip-Prot-Amyl) (CREON) 72,000 Units, Oral, 2 times daily    Synthroid 137 mcg, Oral, Daily    Synthroid 150 mcg, Oral, Daily     Allergies   Allergen Reactions    Sulfa Antibiotics Rash       PHYSICAL EXAM      Objective   Blood pressure 93/61, pulse 56, temperature 98.2 °F (36.8 °C), temperature source Temporal, height 5' 9\" (1.753 m), weight 97.5 kg (215 lb), SpO2 99%. Body mass index is 31.75 kg/m².    General Appearance:   Alert, cooperative, no distress   HEENT:   Normocephalic, atraumatic, anicteric     Neck:   Supple, symmetrical, trachea midline   Lungs:   Equal chest rise, respirations unlabored    Heart:   Regular rate   Abdomen:   Soft, non-tender, non-distended; normal bowel sounds; no masses, no organomegaly    Rectal:   Deferred    Extremities:   No cyanosis or edema    Neuro:   Moves all 4 extremities    Skin:   No jaundice, rashes, or lesions      LABORATORY RESULTS     No visits with results within 1 Day(s) from this visit.   Latest known visit with results is:   Admission on 05/24/2024, Discharged on 05/26/2024   Component Date Value    Ventricular Rate 05/24/2024 66     Atrial Rate 05/24/2024 66     ID Interval 05/24/2024 160     QRSD Interval 05/24/2024 80     QT Interval 05/24/2024 410     QTC Interval 05/24/2024 429     P Axis 05/24/2024 79     QRS Axis 05/24/2024 45     T Wave [place] 05/24/2024 59     BNP 05/24/2024 143 (H)     Sodium 05/24/2024 140     " Potassium 05/24/2024 3.6     Chloride 05/24/2024 103     CO2 05/24/2024 28     ANION GAP 05/24/2024 9     BUN 05/24/2024 13     Creatinine 05/24/2024 0.95     Glucose 05/24/2024 124     Calcium 05/24/2024 9.0     AST 05/24/2024 12 (L)     ALT 05/24/2024 12     Alkaline Phosphatase 05/24/2024 79     Total Protein 05/24/2024 6.9     Albumin 05/24/2024 3.7     Total Bilirubin 05/24/2024 0.59     eGFR 05/24/2024 62     WBC 05/24/2024 12.87 (H)     RBC 05/24/2024 4.31     Hemoglobin 05/24/2024 11.3 (L)     Hematocrit 05/24/2024 37.4     MCV 05/24/2024 87     MCH 05/24/2024 26.2 (L)     MCHC 05/24/2024 30.2 (L)     RDW 05/24/2024 15.6 (H)     MPV 05/24/2024 9.5     Platelets 05/24/2024 224     nRBC 05/24/2024 0     Segmented % 05/24/2024 80 (H)     Immature Grans % 05/24/2024 0     Lymphocytes % 05/24/2024 13 (L)     Monocytes % 05/24/2024 7     Eosinophils Relative 05/24/2024 0     Basophils Relative 05/24/2024 0     Absolute Neutrophils 05/24/2024 10.17 (H)     Absolute Immature Grans 05/24/2024 0.05     Absolute Lymphocytes 05/24/2024 1.66     Absolute Monocytes 05/24/2024 0.92     Eosinophils Absolute 05/24/2024 0.05     Basophils Absolute 05/24/2024 0.02     hs TnI 0hr 05/24/2024 4     hs TnI 2hr 05/24/2024 3     Delta 2hr hsTnI 05/24/2024 -1     POC Glucose 05/24/2024 207 (H)     Sodium 05/25/2024 140     Potassium 05/25/2024 4.0     Chloride 05/25/2024 103     CO2 05/25/2024 26     ANION GAP 05/25/2024 11     BUN 05/25/2024 18     Creatinine 05/25/2024 0.93     Glucose 05/25/2024 157 (H)     Calcium 05/25/2024 9.5     AST 05/25/2024 15     ALT 05/25/2024 12     Alkaline Phosphatase 05/25/2024 82     Total Protein 05/25/2024 7.6     Albumin 05/25/2024 3.9     Total Bilirubin 05/25/2024 0.42     eGFR 05/25/2024 63     WBC 05/25/2024 19.87 (H)     RBC 05/25/2024 4.45     Hemoglobin 05/25/2024 11.7     Hematocrit 05/25/2024 37.8     MCV 05/25/2024 85     MCH 05/25/2024 26.3 (L)     MCHC 05/25/2024 31.0 (L)     RDW  05/25/2024 15.6 (H)     MPV 05/25/2024 10.3     Platelets 05/25/2024 232     Procalcitonin 05/25/2024 0.06     POC Glucose 05/25/2024 158 (H)     Segmented % 05/25/2024 86 (H)     Bands % 05/25/2024 4     Lymphocytes % 05/25/2024 7 (L)     Monocytes % 05/25/2024 3 (L)     Eosinophils % 05/25/2024 0     Basophils % 05/25/2024 0     Absolute Neutrophils 05/25/2024 17.88 (H)     Absolute Lymphocytes 05/25/2024 1.39     Absolute Monocytes 05/25/2024 0.60     Absolute Eosinophils 05/25/2024 0.00     Absolute Basophils 05/25/2024 0.00     RBC Morphology 05/25/2024 Normal     Platelet Estimate 05/25/2024 Adequate     POC Glucose 05/25/2024 248 (H)     POC Glucose 05/25/2024 229 (H)     POC Glucose 05/25/2024 225 (H)     Ventricular Rate 05/25/2024 119     Atrial Rate 05/25/2024 136     QRSD Interval 05/25/2024 78     QT Interval 05/25/2024 372     QTC Interval 05/25/2024 523     QRS Axis 05/25/2024 7     T Wave Axis 05/25/2024 9     WBC 05/26/2024 18.86 (H)     RBC 05/26/2024 4.06     Hemoglobin 05/26/2024 10.8 (L)     Hematocrit 05/26/2024 34.7 (L)     MCV 05/26/2024 86     MCH 05/26/2024 26.6 (L)     MCHC 05/26/2024 31.1 (L)     RDW 05/26/2024 16.0 (H)     MPV 05/26/2024 10.1     Platelets 05/26/2024 232     nRBC 05/26/2024 0     Segmented % 05/26/2024 81 (H)     Immature Grans % 05/26/2024 1     Lymphocytes % 05/26/2024 12 (L)     Monocytes % 05/26/2024 6     Eosinophils Relative 05/26/2024 0     Basophils Relative 05/26/2024 0     Absolute Neutrophils 05/26/2024 15.34 (H)     Absolute Immature Grans 05/26/2024 0.10     Absolute Lymphocytes 05/26/2024 2.21     Absolute Monocytes 05/26/2024 1.19     Eosinophils Absolute 05/26/2024 0.00     Basophils Absolute 05/26/2024 0.02     Sodium 05/26/2024 140     Potassium 05/26/2024 3.7     Chloride 05/26/2024 106     CO2 05/26/2024 25     ANION GAP 05/26/2024 9     BUN 05/26/2024 21     Creatinine 05/26/2024 0.82     Glucose 05/26/2024 171 (H)     Calcium 05/26/2024 8.9     eGFR  05/26/2024 74     POC Glucose 05/26/2024 137     POC Glucose 05/26/2024 270 (H)         IMAGING RESULTS     No results found.  I have personally reviewed any available and pertinent imaging study reports.      Joe Stanton D.O.  [facility]  Division of Gastroenterology & Hepatology  Available on [email or web address]  [email or web address]    ** Please Note: This note is constructed using a voice recognition dictation system. **

## 2024-07-26 NOTE — ASSESSMENT & PLAN NOTE
Elastography and 1/2024 with steatosis grading of S1 but fibrosis was unable to be evaluated.  Has history of prior elastography showing possible F4.  However, she has no laboratory or clinical stigmata of cirrhosis.  I expect that there has been significant improvement given her weight loss.    NAFLD Fibrosis Score is: .566    NAFLD Score Correlated Fibrosis Severity   <0.12 F0-F2   0.12-0.676 Indeterminate Score   >0.676 F3-F4   **Fibrosis Severity Scale: F0 = no fibrosis; F1= mild fibrosis; F2 = moderate fibrosis; F3 = severe fibrosis; F4 = cirrhosis    NAFLD Score Component Values:  Component Value Date   Age: 68 y.o.     BMI: 31.75 kg/m²    IFG or DM: Yes    AST: 15 U/L 5/25/2024   ALT: 12 U/L 5/25/2024   Platelet: 232 Thousands/uL 5/26/2024   Albumin: 3.9 g/dL 5/25/2024     Plan to repeat elastography 1 year from last (January 2025)  Continue to encourage weight loss efforts and increased cardiovascular exercise

## 2024-07-26 NOTE — ASSESSMENT & PLAN NOTE
Symptoms are doing well at this time.  Recent EGD findings reviewed.    Continue Nexium 40 mg daily

## 2024-07-26 NOTE — ASSESSMENT & PLAN NOTE
Found to have low pancreatic elastase levels.  Started on Creon and fortunately her symptoms have improved drastically.    Continue Creon 72,000 units 2 times daily  Consider repeating pancreatic elastase level in 6 to 12 months

## 2024-07-28 DIAGNOSIS — K64.9 HEMORRHOIDS, UNSPECIFIED HEMORRHOID TYPE: ICD-10-CM

## 2024-07-29 ENCOUNTER — CLINICAL SUPPORT (OUTPATIENT)
Dept: PULMONOLOGY | Facility: HOSPITAL | Age: 69
End: 2024-07-29
Payer: COMMERCIAL

## 2024-07-29 DIAGNOSIS — J44.1 COPD EXACERBATION (HCC): Primary | ICD-10-CM

## 2024-07-29 PROCEDURE — G0239 OTH RESP PROC, GROUP: HCPCS

## 2024-07-29 RX ORDER — GABAPENTIN 300 MG/1
CAPSULE ORAL
Qty: 30 CAPSULE | Refills: 0 | Status: SHIPPED | OUTPATIENT
Start: 2024-07-29

## 2024-07-30 ENCOUNTER — TELEPHONE (OUTPATIENT)
Age: 69
End: 2024-07-30

## 2024-07-30 ENCOUNTER — CLINICAL SUPPORT (OUTPATIENT)
Dept: PULMONOLOGY | Facility: HOSPITAL | Age: 69
End: 2024-07-30
Payer: COMMERCIAL

## 2024-07-30 DIAGNOSIS — J44.1 COPD EXACERBATION (HCC): Primary | ICD-10-CM

## 2024-07-30 PROCEDURE — G0239 OTH RESP PROC, GROUP: HCPCS

## 2024-07-31 ENCOUNTER — APPOINTMENT (OUTPATIENT)
Dept: LAB | Facility: HOSPITAL | Age: 69
End: 2024-07-31
Payer: COMMERCIAL

## 2024-07-31 DIAGNOSIS — K86.81 EXOCRINE PANCREATIC INSUFFICIENCY: ICD-10-CM

## 2024-07-31 DIAGNOSIS — E55.9 VITAMIN D DEFICIENCY: ICD-10-CM

## 2024-07-31 DIAGNOSIS — I50.32 CHRONIC HEART FAILURE WITH PRESERVED EJECTION FRACTION (HCC): ICD-10-CM

## 2024-07-31 DIAGNOSIS — E53.8 VITAMIN B12 DEFICIENCY: ICD-10-CM

## 2024-07-31 DIAGNOSIS — I48.0 PAROXYSMAL ATRIAL FIBRILLATION (HCC): ICD-10-CM

## 2024-07-31 DIAGNOSIS — E11.9 TYPE 2 DIABETES MELLITUS WITHOUT COMPLICATION, WITHOUT LONG-TERM CURRENT USE OF INSULIN (HCC): ICD-10-CM

## 2024-07-31 DIAGNOSIS — E89.0 POSTOPERATIVE HYPOTHYROIDISM: ICD-10-CM

## 2024-07-31 LAB
25(OH)D3 SERPL-MCNC: 73.2 NG/ML (ref 30–100)
ALBUMIN SERPL BCG-MCNC: 3.9 G/DL (ref 3.5–5)
ALP SERPL-CCNC: 86 U/L (ref 34–104)
ALT SERPL W P-5'-P-CCNC: 14 U/L (ref 7–52)
ANION GAP SERPL CALCULATED.3IONS-SCNC: 10 MMOL/L (ref 4–13)
AST SERPL W P-5'-P-CCNC: 14 U/L (ref 13–39)
BASOPHILS # BLD AUTO: 0.02 THOUSANDS/ÂΜL (ref 0–0.1)
BASOPHILS NFR BLD AUTO: 0 % (ref 0–1)
BILIRUB SERPL-MCNC: 0.42 MG/DL (ref 0.2–1)
BUN SERPL-MCNC: 10 MG/DL (ref 5–25)
CALCIUM SERPL-MCNC: 9.5 MG/DL (ref 8.4–10.2)
CHLORIDE SERPL-SCNC: 101 MMOL/L (ref 96–108)
CHOLEST SERPL-MCNC: 154 MG/DL
CO2 SERPL-SCNC: 30 MMOL/L (ref 21–32)
CREAT SERPL-MCNC: 0.77 MG/DL (ref 0.6–1.3)
EOSINOPHIL # BLD AUTO: 0.12 THOUSAND/ÂΜL (ref 0–0.61)
EOSINOPHIL NFR BLD AUTO: 1 % (ref 0–6)
ERYTHROCYTE [DISTWIDTH] IN BLOOD BY AUTOMATED COUNT: 15 % (ref 11.6–15.1)
EST. AVERAGE GLUCOSE BLD GHB EST-MCNC: 154 MG/DL
GFR SERPL CREATININE-BSD FRML MDRD: 80 ML/MIN/1.73SQ M
GLUCOSE P FAST SERPL-MCNC: 118 MG/DL (ref 65–99)
HBA1C MFR BLD: 7 %
HCT VFR BLD AUTO: 40.2 % (ref 34.8–46.1)
HDLC SERPL-MCNC: 41 MG/DL
HGB BLD-MCNC: 12.4 G/DL (ref 11.5–15.4)
IMM GRANULOCYTES # BLD AUTO: 0.02 THOUSAND/UL (ref 0–0.2)
IMM GRANULOCYTES NFR BLD AUTO: 0 % (ref 0–2)
LDLC SERPL CALC-MCNC: 92 MG/DL (ref 0–100)
LYMPHOCYTES # BLD AUTO: 2.26 THOUSANDS/ÂΜL (ref 0.6–4.47)
LYMPHOCYTES NFR BLD AUTO: 25 % (ref 14–44)
MCH RBC QN AUTO: 26.7 PG (ref 26.8–34.3)
MCHC RBC AUTO-ENTMCNC: 30.8 G/DL (ref 31.4–37.4)
MCV RBC AUTO: 87 FL (ref 82–98)
MONOCYTES # BLD AUTO: 0.58 THOUSAND/ÂΜL (ref 0.17–1.22)
MONOCYTES NFR BLD AUTO: 6 % (ref 4–12)
NEUTROPHILS # BLD AUTO: 6.01 THOUSANDS/ÂΜL (ref 1.85–7.62)
NEUTS SEG NFR BLD AUTO: 68 % (ref 43–75)
NONHDLC SERPL-MCNC: 113 MG/DL
NRBC BLD AUTO-RTO: 0 /100 WBCS
PLATELET # BLD AUTO: 282 THOUSANDS/UL (ref 149–390)
PMV BLD AUTO: 9.7 FL (ref 8.9–12.7)
POTASSIUM SERPL-SCNC: 3.2 MMOL/L (ref 3.5–5.3)
PROT SERPL-MCNC: 7.1 G/DL (ref 6.4–8.4)
RBC # BLD AUTO: 4.64 MILLION/UL (ref 3.81–5.12)
SODIUM SERPL-SCNC: 141 MMOL/L (ref 135–147)
TRIGL SERPL-MCNC: 103 MG/DL
TSH SERPL DL<=0.05 MIU/L-ACNC: 0.58 UIU/ML (ref 0.45–4.5)
VIT B12 SERPL-MCNC: 273 PG/ML (ref 180–914)
WBC # BLD AUTO: 9.01 THOUSAND/UL (ref 4.31–10.16)

## 2024-07-31 PROCEDURE — 84443 ASSAY THYROID STIM HORMONE: CPT

## 2024-07-31 PROCEDURE — 82607 VITAMIN B-12: CPT

## 2024-07-31 PROCEDURE — 85025 COMPLETE CBC W/AUTO DIFF WBC: CPT

## 2024-07-31 PROCEDURE — 82306 VITAMIN D 25 HYDROXY: CPT

## 2024-07-31 PROCEDURE — 3051F HG A1C>EQUAL 7.0%<8.0%: CPT | Performed by: STUDENT IN AN ORGANIZED HEALTH CARE EDUCATION/TRAINING PROGRAM

## 2024-07-31 PROCEDURE — 80061 LIPID PANEL: CPT

## 2024-07-31 PROCEDURE — 83036 HEMOGLOBIN GLYCOSYLATED A1C: CPT

## 2024-07-31 PROCEDURE — 36415 COLL VENOUS BLD VENIPUNCTURE: CPT

## 2024-07-31 PROCEDURE — 80053 COMPREHEN METABOLIC PANEL: CPT

## 2024-08-02 ENCOUNTER — TELEPHONE (OUTPATIENT)
Dept: GASTROENTEROLOGY | Facility: CLINIC | Age: 69
End: 2024-08-02

## 2024-08-05 ENCOUNTER — CLINICAL SUPPORT (OUTPATIENT)
Dept: PULMONOLOGY | Facility: HOSPITAL | Age: 69
End: 2024-08-05
Payer: COMMERCIAL

## 2024-08-05 DIAGNOSIS — J44.1 COPD EXACERBATION (HCC): Primary | ICD-10-CM

## 2024-08-05 PROCEDURE — G0239 OTH RESP PROC, GROUP: HCPCS

## 2024-08-06 ENCOUNTER — CLINICAL SUPPORT (OUTPATIENT)
Dept: PULMONOLOGY | Facility: HOSPITAL | Age: 69
End: 2024-08-06
Payer: COMMERCIAL

## 2024-08-06 DIAGNOSIS — J44.1 COPD EXACERBATION (HCC): Primary | ICD-10-CM

## 2024-08-06 PROCEDURE — G0239 OTH RESP PROC, GROUP: HCPCS

## 2024-08-08 DIAGNOSIS — K64.9 HEMORRHOIDS, UNSPECIFIED HEMORRHOID TYPE: ICD-10-CM

## 2024-08-08 DIAGNOSIS — F41.9 ANXIETY: ICD-10-CM

## 2024-08-08 DIAGNOSIS — E87.6 HYPOKALEMIA: Primary | ICD-10-CM

## 2024-08-09 ENCOUNTER — APPOINTMENT (OUTPATIENT)
Dept: LAB | Facility: HOSPITAL | Age: 69
End: 2024-08-09
Payer: COMMERCIAL

## 2024-08-09 DIAGNOSIS — E87.6 HYPOKALEMIA: ICD-10-CM

## 2024-08-09 LAB
ANION GAP SERPL CALCULATED.3IONS-SCNC: 10 MMOL/L (ref 4–13)
BUN SERPL-MCNC: 12 MG/DL (ref 5–25)
CALCIUM SERPL-MCNC: 9.4 MG/DL (ref 8.4–10.2)
CHLORIDE SERPL-SCNC: 103 MMOL/L (ref 96–108)
CO2 SERPL-SCNC: 28 MMOL/L (ref 21–32)
CREAT SERPL-MCNC: 0.82 MG/DL (ref 0.6–1.3)
GFR SERPL CREATININE-BSD FRML MDRD: 74 ML/MIN/1.73SQ M
GLUCOSE P FAST SERPL-MCNC: 127 MG/DL (ref 65–99)
POTASSIUM SERPL-SCNC: 3.4 MMOL/L (ref 3.5–5.3)
SODIUM SERPL-SCNC: 141 MMOL/L (ref 135–147)

## 2024-08-09 PROCEDURE — 80048 BASIC METABOLIC PNL TOTAL CA: CPT

## 2024-08-09 PROCEDURE — 36415 COLL VENOUS BLD VENIPUNCTURE: CPT

## 2024-08-09 RX ORDER — HYDROXYZINE HYDROCHLORIDE 10 MG/1
10 TABLET, FILM COATED ORAL 2 TIMES DAILY
Qty: 60 TABLET | Refills: 2 | Status: SHIPPED | OUTPATIENT
Start: 2024-08-09

## 2024-08-11 RX ORDER — GABAPENTIN 300 MG/1
CAPSULE ORAL
Qty: 30 CAPSULE | Refills: 0 | Status: SHIPPED | OUTPATIENT
Start: 2024-08-11

## 2024-08-12 ENCOUNTER — CLINICAL SUPPORT (OUTPATIENT)
Dept: PULMONOLOGY | Facility: HOSPITAL | Age: 69
End: 2024-08-12
Payer: COMMERCIAL

## 2024-08-12 ENCOUNTER — CLINICAL SUPPORT (OUTPATIENT)
Dept: INTERNAL MEDICINE CLINIC | Facility: CLINIC | Age: 69
End: 2024-08-12
Payer: COMMERCIAL

## 2024-08-12 DIAGNOSIS — J44.1 COPD EXACERBATION (HCC): Primary | ICD-10-CM

## 2024-08-12 DIAGNOSIS — E53.8 B12 DEFICIENCY: Primary | ICD-10-CM

## 2024-08-12 PROCEDURE — G0239 OTH RESP PROC, GROUP: HCPCS

## 2024-08-12 PROCEDURE — 96372 THER/PROPH/DIAG INJ SC/IM: CPT

## 2024-08-12 RX ADMIN — CYANOCOBALAMIN 1000 MCG: 1000 INJECTION, SOLUTION INTRAMUSCULAR; SUBCUTANEOUS at 11:32

## 2024-08-13 ENCOUNTER — CLINICAL SUPPORT (OUTPATIENT)
Dept: PULMONOLOGY | Facility: HOSPITAL | Age: 69
End: 2024-08-13
Payer: COMMERCIAL

## 2024-08-13 DIAGNOSIS — J44.1 COPD EXACERBATION (HCC): Primary | ICD-10-CM

## 2024-08-13 PROCEDURE — G0239 OTH RESP PROC, GROUP: HCPCS

## 2024-08-19 ENCOUNTER — APPOINTMENT (OUTPATIENT)
Dept: PULMONOLOGY | Facility: HOSPITAL | Age: 69
End: 2024-08-19
Payer: COMMERCIAL

## 2024-08-19 DIAGNOSIS — E89.0 POSTOPERATIVE HYPOTHYROIDISM: ICD-10-CM

## 2024-08-19 DIAGNOSIS — K86.81 EXOCRINE PANCREATIC INSUFFICIENCY: ICD-10-CM

## 2024-08-19 NOTE — TELEPHONE ENCOUNTER
Patient needs a refill of the following medications:    Pancrelipase, Lip-Prot-Amyl, (Creon) 24,000 units  Take three capsules (72,000 units total) by mouth two times a day    Synthroid 137 MCG tablet  Take one tablet by mouth daily    Medication can be sent to pharmacy on file.    Please advise. Thank you.

## 2024-08-20 ENCOUNTER — CLINICAL SUPPORT (OUTPATIENT)
Dept: PULMONOLOGY | Facility: HOSPITAL | Age: 69
End: 2024-08-20
Payer: COMMERCIAL

## 2024-08-20 DIAGNOSIS — K86.81 EXOCRINE PANCREATIC INSUFFICIENCY: ICD-10-CM

## 2024-08-20 DIAGNOSIS — J44.1 COPD EXACERBATION (HCC): Primary | ICD-10-CM

## 2024-08-20 PROCEDURE — G0239 OTH RESP PROC, GROUP: HCPCS

## 2024-08-20 RX ORDER — LEVOTHYROXINE SODIUM 137 MCG
137 TABLET ORAL DAILY
Qty: 90 TABLET | Refills: 1 | Status: SHIPPED | OUTPATIENT
Start: 2024-08-20

## 2024-08-20 NOTE — PROGRESS NOTES
Pulmonary Rehabilitation   Assessment and Individualized Treatment Plan  30 DAY      Today's date: 2024  # of Exercise Sessions Completed: 8  Patient name: Kellen Gray     : 1955       MRN: [id number]  Referring Physician: Dannie Dutton DO  Pulmonologist: Eugenie Salmeron DO  Provider: Mohinder  Clinician: Ina Siegel RN    Dx:  COPD  HX CHF  EF65%   T2D      Date of onset: 24      Treatment is tailored to this patient's individual needs.  The ITP was reviewed with the patient and all questions were answered to their satisfaction.  Additional ITP documentation can be found electronically including daily and monthly exercise summaries, daily session notes with ECG summaries, education notes, daily medication reconciliation, and daily physician supervision.      COMMENTS:  Kellen was hospitalized in  for COPD exacerbation . She is taking trelegy regularly now . She has not needed her rescue inhalers or nebulizer treatments since discharge.She does complain of dyspnea  with exercise . She also had some right sided chest pressure  scale 5 (1-10 scale) with her 6MWT .This chest pressure was relieved with rest.  Her cardiac rhythm is NSR  A note will be sent to her cardiologist Dr Brenner about the chest pressure.She is enrolling in Pulmonary Rehab and plans on coming 2x week  Kellen has had no symptoms with exercise. She is not exercising at home. She does check her blood sugar daily. She did have a possible hypoglycemic symptom on . She didn`t eat  till after 11 and felt  funny this was relieved when she ate.       ASSESSMENT      Medical History:   Past Medical History:   Diagnosis Date    A-fib (HCC)     Acute respiratory failure with hypoxia (HCC) 2019    Anxiety     Arthritis     Asthma     Brain aneurysm     Cancer (HCC)     papillary thyroid cancer    Cardiac disease     CHF (congestive heart failure) (HCC)     COPD (chronic obstructive pulmonary disease) (HCC)     CPAP  (continuous positive airway pressure) dependence     Crohn disease (HCC)     Depression     Diabetes mellitus (HCC)     Disease of thyroid gland     Diverticulitis of colon     Emphysema of lung (HCC)     GERD (gastroesophageal reflux disease)     HL (hearing loss)     Hyperlipidemia     Hypertension     Sleep apnea     Sleep apnea, obstructive     Visual impairment        Family History:  Family History   Problem Relation Age of Onset    Alzheimer's disease Mother     Asthma Mother     Cancer Father         dead    Heart disease Sister     Hypertension Sister         under control    Diabetes Sister     Stroke Sister     Cancer Sister         dead    Stroke Sister     Heart disease Sister         dead    Diabetes Sister     Cancer Brother         liver with mets to bone    Heart disease Brother     Cancer Brother         dead    Asthma Daughter     Cancer Brother        Allergies:   Sulfa antibiotics    Current Medications:   Current Outpatient Medications   Medication Sig Dispense Refill    albuterol (ACCUNEB) 0.63 MG/3ML nebulizer solution Take 0.63 mg by nebulization every 6 (six) hours as needed for wheezing (Patient not taking: Reported on 6/21/2024)      albuterol (PROVENTIL HFA,VENTOLIN HFA) 90 mcg/act inhaler Inhale 2 puffs every 4 (four) hours as needed for wheezing      apixaban (ELIQUIS) 5 mg Take 1 tablet (5 mg total) by mouth 2 (two) times a day 90 tablet 3    atorvastatin (LIPITOR) 20 mg tablet Take 20 mg by mouth every morning      Blood Glucose Monitoring Suppl (OneTouch Verio) w/Device KIT by Device route in the morning (Patient not taking: Reported on 5/22/2024) 1 kit 0    Cholecalciferol (Vitamin D3) 1.25 MG (41214 UT) CAPS Take 1 capsule by mouth in the morning      dicyclomine (BENTYL) 10 mg capsule Take 1 capsule (10 mg total) by mouth 4 (four) times a day (before meals and at bedtime) 30 capsule 1    dofetilide (TIKOSYN) 500 mcg capsule Take 500 mcg by mouth 2 (two) times a day       Empagliflozin 25 MG TABS Take 1 tablet (25 mg total) by mouth daily 90 tablet 3    esomeprazole (NexIUM) 40 MG capsule TAKE 1 CAPSULE BY MOUTH TWICE DAILY 30 MINUTES BEFORE BREAKFAST AND 30 MINUTES BEFORE DINNER 60 capsule 5    famotidine (PEPCID) 40 MG tablet TAKE 1 TABLET BY MOUTH ONCE DAILY AT BEDTIME 30 tablet 5    fluticasone (FLONASE) 50 mcg/act nasal spray 1 spray into each nostril daily 1 g 3    fluticasone-umeclidinium-vilanterol (Trelegy Ellipta) 200-62.5-25 mcg/actuation AEPB inhaler Inhale 1 puff daily Rinse mouth after use.      furosemide (LASIX) 20 mg tablet Take 20 mg by mouth 2 (two) times a day      gabapentin (NEURONTIN) 300 mg capsule Take 1 capsule by mouth twice daily 30 capsule 0    glucose blood test strip Use to check sugars once a day. Dx E11.9      hydrOXYzine HCL (ATARAX) 10 mg tablet Take 1 tablet by mouth twice daily 60 tablet 2    Lancets (ONETOUCH DELICA PLUS [id number]) MISC       lisinopril (ZESTRIL) 10 mg tablet Take 10 mg by mouth every morning      mesalamine (DELZICOL) 400 mg Take 2 capsules by mouth twice daily 360 capsule 3    metoprolol succinate (TOPROL-XL) 100 mg 24 hr tablet TAKE 1 TABLET BY MOUTH EVERY 12 HOURS 60 tablet 5    ondansetron (ZOFRAN) 4 mg tablet Take 1 tablet (4 mg total) by mouth every 8 (eight) hours as needed for nausea or vomiting 20 tablet 0    OneTouch Verio test strip Use 1 each in the morning Use as instructed 100 each 3    pancrelipase, Lip-Prot-Amyl, (Creon) 24,000 units Take 3 capsules (72,000 Units total) by mouth 2 (two) times a day 180 capsule 1    Synthroid 137 MCG tablet Take 1 tablet (137 mcg total) by mouth daily 90 tablet 1    Synthroid 150 MCG tablet Take 150 mcg by mouth daily       Current Facility-Administered Medications   Medication Dose Route Frequency Provider Last Rate Last Admin    cyanocobalamin injection 1,000 mcg  1,000 mcg Intramuscular Q30 Days    1,000 mcg at 08/12/24 1132       Physical Limitations: both knees  arthritis    Fall Risk: Low   Comments: Ambulates with a steady gait with no assist device    Cultural needs: none    PFT:  Yes     FEV1/FVC ratio of 74%   FEV1 of 68% predicted  moderate restriction.    Medication compliance: Yes  Comments: Pt reports to be compliant with medications      Pulmonary Disease Risk Factors:  second hand smoke  smoking  asthma    Sleep Disorders:   obstructive sleep apnea:  CPCP/BiPAP:  yes      EXERCISE ASSESSMENT:      Initial Fitness Assessment: 6MWT:  Resting:    Resting:  BP: 104/60  HR: 55  SpO2: 95  Dyspnea: 0  O2 Use: 0 l/min, Exercise:  BP: 120/56  HR: 79  SpO2: 91-97%  Dyspnea: 4  O2 use: 0 l/min, METs:  2.59, ECG Summary: NSR, Symptoms: right sided chest pressure, Test terminated at:  6min, and Comments: good effort 1100 ft      Current Functional Status:  Occupation: retired  Recreation/Physical activity: [employer]/cards  ADL’s:able to perform self-care resumed driving  [place]: able to perform self-care  Exercise:   started walking  15 min  Home exercise equipment: no  Other Comments:     SMART Exercise Goals:   reduced score on CAT and reduced RPD at rest    Patient Specific EXERCISE GOALS:     attend pulmonary rehab regularly, decrease sitting time at home, begin a consistent exercise regimen , increased muscular strength, increased energy, and increased stamina with ADLs    PSYCHOSOCIAL ASSESSMENT:    Date of last assessment: 5/24/24  Depression screening:  PHQ-9 = 4    Interpretation:  1-4 = Minimal Depression  Anxiety screening:  JANUARY-7 = 4    Interpretation: 0-4  = Not anxious    Pt self-report of depression and anxiety   Patient has a history of anxiety   Financial worries    Self-reported stress level:  8  Stress Management: practice Relaxation Techniques, exercise, and enjoy a hobby take up [facility]    Quality of Life Screen:  (Higher score indicates disease impact on QOL)  [facility] COOP score: 31/40      CAT: 29/40      Shortness of breath questionnaire:  "74/120    Social Support:   spouse, children, and grandchildren  Community/Social Activities: none    SMART Goals:    Physical Fitness in [place] Score < 3, Daily Activity in [place] Score < 3, Social Activities in [place] Score < 3, Pain in [place] Score < 3, [facility] Health in [facility] Score < 3, and Quality of Life in [facility] Score < 3     Patient Specific PSYCHOCOSOCIAL GOALS:    maintain compliance with medical therapy, begin using [employer], address emotional health concerns with PCP, and she had been seeing a therapist but states she is coping better now.      Psychosocial Assessment as it relates to rehabilitation: Patient denies issues with his/her family or home life that may affect their rehabilitation efforts.       NUTRITION ASSESSMENT:    Initial Weight:  214  Current Weight: 215    Height:   Ht Readings from Last 1 Encounters:   07/25/24 5' 9\" (1.753 m)       Rate Your Plate Score: 47/81    Self-reported Current Dietary Habits:  She is not watching sodium-eating some lunchmeats and drinking soda.    ETOH:   Social History     Substance and Sexual Activity   Alcohol Use Yes    Comment: social       SMART Goals:   improved A1c  < 7.0%, Improved Rate Your Plate score  >58, Wt. loss 1 ppw,  goal of 10 lbs., eat 6 or more servings of grain products per day, eat whole grain breads, brown rice and whole grain cereals, eat 5 or more servings of fruits and vegetables a day, eat 2 or more servings of low fat milk or yogurt a day, eat no more than 6oz of meat per day, eat red meat once a week or less, and rarely eat processed meats or eat low fat processed meats    Patient Specific NUTRITION GOALS:    increase water intake to reduce/thin mucus production weight loss improve Aic      OTHER CORE COMPONENT ASSESSMENT:    Hospitalizations in the past year: 1    Oxygen needs:   Rest:  room air  Exercise/physical activity:  room air  Sleep:   room air    Does Pt monitor home SpO2? Yes  she doesn`t use " it   Average SpO2 at rest:  %doesn`t check   Average SpO2 with ADLs/physical activity:  %      Use of Rescue Inhaler:  hasn`t needed it    Use of Maintenance Inhaler: Yes:  1 times per day    Use of Nebulizer Treatments:  No    Patient practices breathing techniques at home:  Yes    CAD Risk Factors:  Cholesterol: Yes  HTN: Yes  DM: Type 2   Obesity: Yes     Smoking: Former user    SMART Core Component Goals:   medication compliance and demonstrate pursed lipped breathing    Patient Specific CORE COMPONENT GOALS:    medication compliance, compliance with CPAP, monitor home pulse oximetry, and Be able to walk better      Blood Pressure:        Heart rate            SO2    Restin/60                51                       97%  Exercise: 120/60               61-74                  97-99%  Recovery: 104/56              60                       98%      INDIVIDUALIZED TREATMENT PLAN    The patient is agreeable to attend 24 pulmonary rehab exercise sessions.      EXERCISE GOALS AND PLAN    Current Aerobic Exercise Prescription       Frequency: 2 days/week   Supplement with home exercise 2+ days/wk as tolerated        Minutes: 20-40 6MWT        METS: 2.59              SpO2: 91-97%              RPD: 4-6                      HR:  20-30bpm above resting   RPE: 4-5         Modalities: Room walking      Aerobic Exercise Prescription Plan for Progression:    Frequency: 2 days/week    Supplement with home exercise 2+ days/wk as tolerated       Minutes: 30-40        METS: 2/5-3.5              SpO2: >88%              RPD: 4-6                      HR:Intensity based on RPE 4-6    RPE: 4-5        Modalities: Treadmill, UBE, NuStep, Recumbent bike, and Room walking        Supplemental Oxygen Needs with Exercise:  room air.    Strength trainin-3 days / week  12-15 repetitions  1-2 sets per modality   Will be added following 2-3 weeks of monitored exercise sessions   Modalities: Chest Press, Pull Downs, Lateral Raise, Arm  Curl, Front Raises, and Shoulder Shrugs    Education: pursed lipped breathing, diaphragmatic breathing, home exercise guidelines, benefits of exercise for pulmonary disease, RPE scale, RPD scale, O2 saturation monitoring, appropriate O2 response to exercise, energy conservation, and education class: Exercise For The Pulmonary Patient    Progress toward Exercise Goals:    Reviewed Pt goals and determined plan of care, Will continue to educate and progress as tolerated.    Exercise Plan:  learn to conserve energy with ADLs , practice diaphragmatic breathing, utilize PLB with physical activity, and begin a home exercise program     Readiness to change: Contemplation:  (Acknowledging that there is a problem but not yet ready or sure of wanting to make a change)      NUTRITION GOALS AND PLAN    Progress toward Nutritional goals:    Reviewed Pt goals and determined plan of care, Will continue to educate and progress as tolerated.    Nutrition Plan: group Class: Heart Healthy Eating, increase intake of whole grains, replace refined flours with whole grains, increase daily intake of fruits and vegetables, increase daily intake of low fat dairy, limit meat intake to less than 6oz per day, and choose healthy meals while dining out    SMART goals are based Rate Your Plate Dietary Self-Assessment. These are the areas in which the patient could score higher on the assessment.  Goals include recommendations for a heart healthy diet based on American Heart Association.    Nutrition Education: low sodium diet  maintaining hydration  nutrition for  lipid management  target goal for A1c <7.0  exercise and diabetes management   portion control    Readiness to change: Contemplation:  (Acknowledging that there is a problem but not yet ready or sure of wanting to make a change)      PSYCHOSOCIAL GOALS AND PLAN    Information to begin using [employer] was provided as well as contact information for counseling through [facility].    Progress toward Psychosocial goals:    Reviewed Pt goals and determined plan of care, Will continue to educate and progress as tolerated.    Psychosocial: Class: Relaxation, Class:  Stress and Pulmonary Disease, Enroll in [employer], Practice relaxation techniques, Exercise, Spend time outdoors, Join a support group , and Enjoy family    Psychosocial Education: signs/sxs of depression, benefits of a positive support system, and class:  Stress and Pulmonary Disease    Readiness to change: Contemplation:  (Acknowledging that there is a problem but not yet ready or sure of wanting to make a change)      OTHER CORE COMPONENTS GOALS AND PLAN    Tobacco Use Intervention:     N/A: Pt has a remote history of smoking    Oxygen Goal: Maintain SpO2>88% during exercise or as advised by pulmonolgist    Progress toward Core Component goals:    Reviewed Pt goals and determined plan of care, Will continue to educate and progress as tolerated.    Core Component Plan: avoid processed foods, engage in regular exercise, eliminate salt shaker at the table, use salt substitutes, check labels for sodium content, group class: Pulmonary Anatomy and Physiology, and group class:  Pulmonary Medications    Core Component Education:  pathophysiology of pulmonary disease, preventing infections, dangers of smoking, tobacco triggers, control coughing, and inspiratory muscle training    Readiness to change: Contemplation:  (Acknowledging that there is a problem but not yet ready or sure of wanting to make a change)

## 2024-08-21 DIAGNOSIS — K86.81 EXOCRINE PANCREATIC INSUFFICIENCY: ICD-10-CM

## 2024-08-21 RX ORDER — PANCRELIPASE 24000; 76000; 120000 [USP'U]/1; [USP'U]/1; [USP'U]/1
72000 CAPSULE, DELAYED RELEASE PELLETS ORAL 2 TIMES DAILY
Qty: 180 CAPSULE | Refills: 1 | OUTPATIENT
Start: 2024-08-21

## 2024-08-22 ENCOUNTER — CLINICAL SUPPORT (OUTPATIENT)
Dept: PULMONOLOGY | Facility: HOSPITAL | Age: 69
End: 2024-08-22
Payer: COMMERCIAL

## 2024-08-22 DIAGNOSIS — J44.1 COPD EXACERBATION (HCC): Primary | ICD-10-CM

## 2024-08-22 PROCEDURE — G0239 OTH RESP PROC, GROUP: HCPCS

## 2024-08-24 DIAGNOSIS — I48.91 ATRIAL FIBRILLATION WITH RAPID VENTRICULAR RESPONSE (HCC): ICD-10-CM

## 2024-08-25 RX ORDER — APIXABAN 5 MG/1
5 TABLET, FILM COATED ORAL 2 TIMES DAILY
Qty: 90 TABLET | Refills: 3 | Status: SHIPPED | OUTPATIENT
Start: 2024-08-25

## 2024-08-25 NOTE — PROGRESS NOTES
Assessment/Plan:    Class 1 obesity  -Patient is pursuing Conservative Program  -Initial weight loss goal of 5-10% weight loss for improved health  -Screening labs: BMP reviewed from 8/9/24, A1c, TSH, LP reviewed from 7/31/23  -Pt has a personal hx of papillary thyroid cancer; her brother and daughters had thyroid cancer- unsure of type- would AVOID Wegovy and Saxenda; also has exocrine pancreatic insufficiency   -she plans to add a breakfast- likely slice toast, PB and banana  -Dietary recall reviewed, suggestions provided      Initial: 255.9  Current: 210 (-30 lbs since last visit)  Change: -45.9  Goal: 160-165     Diabetes mellitus, type 2 (HCC)    Lab Results   Component Value Date    HGBA1C 7.0 (H) 07/31/2024     -improved from 7.5 %  -taking empagliflozin  -c/w lifestyle changes    Follow up in approximately 6 months with Non-Surgical Physician/Advanced Practitioner.    Goals:  Food log (ie.) [email or web address],etc. baritastic  No sugary beverages. At least 64oz of water daily.  Increase physical activity by 10 minutes daily. Gradually increase physical activity to a goal of 5 days per week for 30 minutes of MODERATE intensity PLUS 2 days per week of FULL BODY resistance training  5-10 servings of fruits and vegetables per day and 25-35 grams of dietary fiber per day, gradually increasing     Diagnoses and all orders for this visit:    Class 1 obesity    Type 2 diabetes mellitus with other specified complication, without long-term current use of insulin (HCC)    Body mass index 31.0-31.9, adult        Subjective:   Chief Complaint   Patient presents with    Follow-up     4 month follow-up     Patient ID: Kellen Gray  is a 68 y.o. female with excess weight/obesity here to pursue weight management.  Patient is pursuing Conservative Program.     HPI  The patient presents for MW follow up.     Reports continues with lifestyle changes, but has added pulmonary rehab.  "Does add that she has had a few bouts of URIs in the last few weeks and notes her appetite has been down     Has 1 cup of tea with sugar, otherwise has cut out sugary beverages     B: often skips, sometimes has a banana   S: no  L: sometimes skips  S: not usually   D: protein + veg + starch   S: on occasion will have a bowl of ice cream with bananas     Exercise: going to pulmonary rehab, recommended continuing activity/exercise when completed; had been walking- plans to restart   Sleep: sleeps 8-9 hours     The following portions of the patient's history were reviewed and updated as appropriate: allergies, current medications, past family history, past medical history, past social history, past surgical history, and problem list.    Review of Systems   Psychiatric/Behavioral: Negative.       Objective:    /72   Pulse 60   Temp (!) 97.3 °F (36.3 °C)   Ht 5' 9\" (1.753 m)   Wt 95.3 kg (210 lb)   SpO2 96%   BMI 31.01 kg/m²      Physical Exam  Vitals and nursing note reviewed.     Constitutional   General appearance: Abnormal.  well developed and obese.   Pulmonary   Respiratory effort: No increased work of breathing or signs of respiratory distress.   Abdomen   Abdomen: Abnormal.  The abdomen was obese.   Musculoskeletal   Gait and station: Normal.    Psychiatric   Orientation to person, place and time: Normal.    Affect: appropriate    "

## 2024-08-26 ENCOUNTER — OFFICE VISIT (OUTPATIENT)
Dept: BARIATRICS | Facility: CLINIC | Age: 69
End: 2024-08-26
Payer: COMMERCIAL

## 2024-08-26 ENCOUNTER — CLINICAL SUPPORT (OUTPATIENT)
Dept: PULMONOLOGY | Facility: HOSPITAL | Age: 69
End: 2024-08-26
Payer: COMMERCIAL

## 2024-08-26 VITALS
HEIGHT: 69 IN | TEMPERATURE: 97.3 F | OXYGEN SATURATION: 96 % | WEIGHT: 210 LBS | SYSTOLIC BLOOD PRESSURE: 124 MMHG | DIASTOLIC BLOOD PRESSURE: 72 MMHG | HEART RATE: 60 BPM | BODY MASS INDEX: 31.1 KG/M2

## 2024-08-26 DIAGNOSIS — E11.69 TYPE 2 DIABETES MELLITUS WITH OTHER SPECIFIED COMPLICATION, WITHOUT LONG-TERM CURRENT USE OF INSULIN (HCC): ICD-10-CM

## 2024-08-26 DIAGNOSIS — J44.1 COPD EXACERBATION (HCC): Primary | ICD-10-CM

## 2024-08-26 DIAGNOSIS — K64.9 HEMORRHOIDS, UNSPECIFIED HEMORRHOID TYPE: ICD-10-CM

## 2024-08-26 DIAGNOSIS — E66.9 CLASS 1 OBESITY: Primary | ICD-10-CM

## 2024-08-26 PROCEDURE — G0239 OTH RESP PROC, GROUP: HCPCS

## 2024-08-26 PROCEDURE — 99214 OFFICE O/P EST MOD 30 MIN: CPT | Performed by: PHYSICIAN ASSISTANT

## 2024-08-26 NOTE — ASSESSMENT & PLAN NOTE
-Patient is pursuing Conservative Program  -Initial weight loss goal of 5-10% weight loss for improved health  -Screening labs: BMP reviewed from 8/9/24, A1c, TSH, LP reviewed from 7/31/23  -Pt has a personal hx of papillary thyroid cancer; her brother and daughters had thyroid cancer- unsure of type- would AVOID Wegovy and Saxenda; also has exocrine pancreatic insufficiency   -she plans to add a breakfast- likely slice toast, PB and banana  -Dietary recall reviewed, suggestions provided      Initial: 255.9  Current: 210 (-30 lbs since last visit)  Change: -45.9  Goal: 160-165

## 2024-08-26 NOTE — ASSESSMENT & PLAN NOTE
Lab Results   Component Value Date    HGBA1C 7.0 (H) 07/31/2024     -improved from 7.5 %  -taking empagliflozin  -c/w lifestyle changes

## 2024-08-27 ENCOUNTER — CLINICAL SUPPORT (OUTPATIENT)
Dept: PULMONOLOGY | Facility: HOSPITAL | Age: 69
End: 2024-08-27
Payer: COMMERCIAL

## 2024-08-27 DIAGNOSIS — J44.1 COPD EXACERBATION (HCC): Primary | ICD-10-CM

## 2024-08-27 PROCEDURE — G0239 OTH RESP PROC, GROUP: HCPCS

## 2024-08-27 RX ORDER — GABAPENTIN 300 MG/1
CAPSULE ORAL
Qty: 30 CAPSULE | Refills: 0 | Status: SHIPPED | OUTPATIENT
Start: 2024-08-27

## 2024-08-29 ENCOUNTER — CLINICAL SUPPORT (OUTPATIENT)
Dept: PULMONOLOGY | Facility: HOSPITAL | Age: 69
End: 2024-08-29
Payer: COMMERCIAL

## 2024-08-29 DIAGNOSIS — J44.1 COPD EXACERBATION (HCC): Primary | ICD-10-CM

## 2024-08-29 PROCEDURE — G0239 OTH RESP PROC, GROUP: HCPCS

## 2024-09-03 ENCOUNTER — APPOINTMENT (OUTPATIENT)
Dept: PULMONOLOGY | Facility: HOSPITAL | Age: 69
End: 2024-09-03
Payer: COMMERCIAL

## 2024-09-05 ENCOUNTER — OFFICE VISIT (OUTPATIENT)
Dept: SLEEP CENTER | Facility: CLINIC | Age: 69
End: 2024-09-05
Payer: COMMERCIAL

## 2024-09-05 VITALS
BODY MASS INDEX: 31.64 KG/M2 | SYSTOLIC BLOOD PRESSURE: 118 MMHG | RESPIRATION RATE: 16 BRPM | TEMPERATURE: 97.8 F | WEIGHT: 213.6 LBS | HEART RATE: 57 BPM | OXYGEN SATURATION: 98 % | HEIGHT: 69 IN | DIASTOLIC BLOOD PRESSURE: 68 MMHG

## 2024-09-05 DIAGNOSIS — G25.81 RLS (RESTLESS LEGS SYNDROME): ICD-10-CM

## 2024-09-05 DIAGNOSIS — K50.919 CROHN'S DISEASE WITH COMPLICATION, UNSPECIFIED GASTROINTESTINAL TRACT LOCATION (HCC): ICD-10-CM

## 2024-09-05 DIAGNOSIS — E66.9 OBESITY (BMI 30-39.9): ICD-10-CM

## 2024-09-05 DIAGNOSIS — Z85.3 HISTORY OF BREAST CANCER: ICD-10-CM

## 2024-09-05 DIAGNOSIS — E61.1 IRON DEFICIENCY: ICD-10-CM

## 2024-09-05 DIAGNOSIS — G47.33 OSA (OBSTRUCTIVE SLEEP APNEA): Primary | ICD-10-CM

## 2024-09-05 PROCEDURE — 1159F MED LIST DOCD IN RCRD: CPT | Performed by: STUDENT IN AN ORGANIZED HEALTH CARE EDUCATION/TRAINING PROGRAM

## 2024-09-05 PROCEDURE — 1160F RVW MEDS BY RX/DR IN RCRD: CPT | Performed by: STUDENT IN AN ORGANIZED HEALTH CARE EDUCATION/TRAINING PROGRAM

## 2024-09-05 PROCEDURE — 3074F SYST BP LT 130 MM HG: CPT | Performed by: STUDENT IN AN ORGANIZED HEALTH CARE EDUCATION/TRAINING PROGRAM

## 2024-09-05 PROCEDURE — 3078F DIAST BP <80 MM HG: CPT | Performed by: STUDENT IN AN ORGANIZED HEALTH CARE EDUCATION/TRAINING PROGRAM

## 2024-09-05 PROCEDURE — 99214 OFFICE O/P EST MOD 30 MIN: CPT | Performed by: STUDENT IN AN ORGANIZED HEALTH CARE EDUCATION/TRAINING PROGRAM

## 2024-09-05 PROCEDURE — G2211 COMPLEX E/M VISIT ADD ON: HCPCS | Performed by: STUDENT IN AN ORGANIZED HEALTH CARE EDUCATION/TRAINING PROGRAM

## 2024-09-05 NOTE — PATIENT INSTRUCTIONS
Continue PAP Therapy  Continue AutoPAP at settings of 14-20 cmH2O  Remember to clean your mask and equipment regularly, as directed.  I am ordering a new device; see below  I am also placing a referral to Hematology/Oncology, both for the low iron (which can exacerbate RLS) as well as for your history of breast cancer. In addition, would need to clear with your GI physician whether or not you could take an oral iron supplement.   -If you did take one, would recommend every other day with Vitamin C.  Could also consider increasing gabapentin dosing for your RLS; trial taking both tablets at night ~1 hour before bed, and can certainly trial an increase.  Defer this to your PCP, unless they are okay with me taking over prescribing.  Nonmedical therapy for restless legs syndrome includes: cold/warm compresses, warm/hot baths or showers, gentle massage, mild leg stretching at nighttime, or magnesium glycinate supplements (200-1000mg at nighttime daily). Mentally alerting activities help too. Note that caffeine, alcohol, nicotine, antidepressants, anti-nausea meds and antihistamines can cause or worsen symptoms.  You should be eligible for new supplies approximately every 3-6 months, depending on your insurance coverage. Contact your Durable Medical Equipment (DME) company for new supplies as needed.  Practice good Sleep Hygiene, as outlined below.  Follow up in 2 months.      Good Sleep Hygiene    Wake up at the same time every day, even on the weekends.  Use your bed for sleep and intimacy only.  If you have been in bed awake for 30 minutes, get up and leave the bedroom. Choose a dull activity not involving a blue screen (TV, computer, handheld devices). Go back to bed when you feel sleepy.  Avoid caffeine, nicotine and alcohol before you go to bed.  Avoid large meals before you go to bed.  Avoid using screens (computers, tablets, smartphones, etc.) for at least 1 hour before bedtime  Exercise regularly, but do not  exercise right before you go to bed.  Avoid daytime naps. If you do take a nap, sleep for 20-40 minutes, and not after dinner.         PAP Therapy Initiation  I will place a prescription for AutoPAP 14-20 cmH2O with a nasal pillows mask.  You will ultimately receive your machine and regular supplies from a DME (durable medical equipment) company.   After your visit today, at the  you will discuss the DME options and select your preferred supplier. My prescription will then be sent to the DME of your choice, and they should be reaching out to you within a few weeks to schedule initial device set-up.  If not done after your visit today, please call our [place] office ([phone] option 1, then option 3) to do so.  You should be eligible for new supplies approximately every 3-6 months, depending on your insurance coverage.  If your mask doesn't fit well, call our office to schedule a formal mask fitting.  Insurance requires regular usage and periodic office follow ups for PAP therapy, to continue to cover supplies.  Insurance requires a follow-up in the office within 90 days of starting your new PAP device.  This should have been scheduled today, otherwise can be scheduled when you call the office number listed above.      CMS/Insurance Requirements    Your insurance requires a face-to-face follow up visit within a 31-90 day period after starting CPAP.  Your insurance requires compliance with CPAP, which is at least 4 hours per night for 70% of the time. This must be done over a 30 day period and must occur within the initial 31-90 day period after starting CPAP.  Your insurance also requires at least yearly follow ups to continue to pay for CPAP supplies.    PAP Supply Guidelines    Below are the guidelines for reordering your supplies. You will be responsible for your deductible, co payments, and out of pocket expenses.    Item Frequency   Nasal Mask (no headgear) 1 every 3 months   Nasal Mask Cushion 1  every 2 weeks   Full Face Mask (no headgear) 1 every 3 months   Full Face Mask Cushion 1 every month   Nasal Pillows 1 every 2 weeks   Headgear 1 every 6 months   hin Strap 1 every 6 months   wallace 1 every 3 months   Filters: Reusable 1 every 6 months   Filters: Disposable 1 every 2 weeks   Humidifier Chamber(disposable) 1 every 6 months       About Your PAP Therapy    Continuous Positive Airway Pressure/Bilevel Therapy  You have been prescribed Positive Airway Pressure (PAP) therapy to treat sleep apnea. The most common type of sleep apnea is obstructive sleep apnea (MARY ELLEN), a condition in which the upper airway collapses during sleep. This obstruction keeps air from getting into your lungs. The prescribed PAP machine (CPAP or Bilevel or ASV) will smoothly blow air into your airway to prevent it from closing. The machine will use a mask to deliver the air through your nose and/or mouth. These devices are available in various sizes and styles.    It will take some time for you to get used to the new equipment and it may take a while for you to begin to feel the benefits of PAP therapy. Please be patient. If you are having problems adjusting to your machine, please contact us for help.    Beginning your PAP Therapy  Assembly:  Place your PAP machine on a level surface near your bed.  To prevent injury, do not place the machine higher than your head.  Keep the machine at least 12 inches away from anything that may block the vents.  Plug the machine into a properly grounded electrical outlet. It is better to avoid using an extension cord. If necessary, use a heavy-duty one.  If you are NOT using a humidifier with you PAP equipment:  Attach one end of your 6-foot tubing to the PAP unit outlet and the other end to your mask. (If your mask does not have a built-in exhalation port, a special exhalation valve should be used between the mask and the 6-foot tubing.)  Attach the headgear to your mask.  If you are using a  "humidifier with your PAP equipment:  Fill the humidifier with DISTILLED water to the maximum fill line.  Attach the humidifier to the PAP unit's outlet as instructed by the respiratory therapist with your home care company. Attach one end of your 6-foot tubing to the humidifier outlet and the other end to your mask. (If your mask does not have a built-in exhalation port, a special exhalation valve should be used between the mask and the 6-foot tubing.)  If you have been prescribed oxygen to be used with the PAP machine, you will be instructed on how to attach your oxygen tubing. Always turn off the oxygen tank before turning off your PAP machine.    Getting Started:  Wash your face and apply the mask and headgear as instructed by the sleep technologist or respiratory therapist. The mask should fit snugly to prevent air leaks, but not so tight as to cause skin irritation or discomfort.  Turn the PAP power switch to the ON position. You should feel air blowing through the mask. Breathe normally and adjust the mask gently if you feel an air leak.  If there are no leaks, activate the \"ramp\" feature on your PAP machine. The ramp allows a lower pressure to be delivered for a designated period of time (usually 5 to 45 minutes) to allow you to relax and  fall asleep more easily. The pressure will then gradually increase to the prescribed pressure that controls your apnea.  Remember to turn OFF your PAP unit when it is not in use.    Care and Maintenance    Headgear should be washed as needed. Daily inspection and weekly washings are recommended. Do not disassemble the straps. Machine wash in warm water, making sure to attach Velcro hooks and tabs before washing. Line dry or machine dry on a low setting.  Masks should be washed every day. Daily inspection is recommended. Leave the mask and tubing attached. Gently wash the mask with a soft cloth using warm water and mild detergent, concentrating on the mask cushion flaps. DO " NOT use alcohol or bleach. Rinse thoroughly and air dry.  Tubing and headgear should be washed weekly. Daily inspection is recommended. Wash in warm water and mild detergent and rinse thoroughly. Hook the tubing to the machine and blow until dry.  Humidifier should be washed daily and filled with DISTILLED water before use. Wash with warm water and mild detergent. Disinfect weekly by soaking with a solution of 1 part white vinegar and 3 parts water for 30 minutes. Rinse thoroughly and air dry.  Disposable filters should be replaced once a month. Wash reusable foam filters with warm water and mild detergent at least once a month. Rinse thoroughly and dry with paper towels.  Avoid  that contain fragrance or conditioners, as these will leave a residue.  NEVER iron any soft goods.    Troubleshooting    If the machine fails to turn on:  Make sure that the PAP machine is plugged into a grounded outlet.  Make sure that the ON/OFF switch is in the ON position.  Make sure that the wall outlet has power.    If there is no pressure coming from your machine:  Make sure that the inlet filter is clean and unblocked.  Make sure that the cooling fan is unblocked and that air is flowing freely.  Make sure that all tubing is securely connected.    If your PAP machine still fails to operate, please call your DME for assistance.    What You Should Know About Insurance    There are many different insurance policies and coverage for PAP equipment will vary. Find out what your coverage provides and what your responsibilities are as a consumer. PAP therapy equipment is considered Durable Medical Equipment (DME). Here are a few questions to ask your insurance provider:  What benefits do I have for DME? Do I have a deductible and/or co pay for DME?  Is there a repair or replacement plan for durable medical equipment?  How often can I receive a replacement mask, tubing, headgear and filters?  Do I have to demonstrate that I am using  my PAP device?  o How do I do that?    Important Information    It is very important to keep your PAP equipment and supplies clean. The life of the supplies depends on the care they receive. Under normal circumstances, expect the mask and headgear to last 9-12 months. Refer to the owner's manual for more information.    Important Safety Reminders    Keep equipment free from obstruction.  Use your PAP equipment as directed.  DO NOT try to adjust your pressure setting.  DO NOT block the exhalation port or valve.  Keep filters clean to prevent overheating.  If a humidifier is being used, place it at a level lower than your head.  If using a heated humidifier, allow unit to cool before cleaning or refilling.  Follow safety guidelines regarding oxygen equipment.DO NOT operate multiple electrical devices from one outlet.  If you have a medical emergency, contact the Emergency Medical Services.

## 2024-09-05 NOTE — PROGRESS NOTES
[facility]  Sleep Medicine Follow up/ Established Patient Visit      Assessment/Plan:  1. MARY ELLEN (obstructive sleep apnea)  Cpap DME      2. RLS (restless legs syndrome)  Cpap DME    Ambulatory referral to Hematology / Oncology      3. Obesity (BMI 30-39.9)  Cpap DME      4. Crohn's disease with complication, unspecified gastrointestinal tract location (HCC)  Cpap DME      5. Iron deficiency  Cpap DME    Ambulatory referral to Hematology / Oncology      6. History of breast cancer  Cpap DME    Ambulatory referral to Hematology / Oncology          Marta is a pleasant 68-year-old woman  with a PMHx of Crohn's disease, diverticulitis, HTN, history of brain aneurysm, atrial fibrillation, HFpEF (EF 65% 9/2019), COPD, T2DM, GERD, vitamin D deficiency, vitamin B12 deficiency, obesity who presents in follow up for MARY ELLEN (AHI 68.4, O2 ashley 77% 2/2019) and RLS.  She continues to endorse substantial benefit from her PAP device, although curiously, even though she registered her device through [employer], she never received the replacement for the recall.  Her RLS continues to be bothersome, although reassuringly it does not sound to be significantly causing a sleep onset insomnia.    Reviewed the risks of utilizing a recalled device versus the risks of untreated MARY ELLEN, and that at her severity, the latter is more significant.  As such, recommended that she continue her current AutoPap at settings of 14-20 cm H2O  However, as this is a recalled device and she is due for a new device regardless, I have placed an order for a new PAP device at the above settings.  Reviewed CMS/insurance requirements and resupply guidelines  Information provided on the above as well as general maintenance steps  Her iron and ferritin levels were quite low, which certainly could be contributing both to residual daytime fatigue as well as her significant RLS.  However, given her extensive GI history, I would defer to gastroenterology  as to whether oral iron supplementation would be appropriate.  If it is deemed that it is, I would recommend ferrous sulfate 325 mg taken every other day with vitamin C.  I am also placing a referral to Hematology/Oncology, both for the low iron (which can exacerbate RLS) as well as for your history of breast cancer.   Reviewed nonmedical therapy for RLS.  Also discussed that gabapentin can be very effective in the treatment of RLS.  Discussed that she could trial taking both of the 300 mg tablets at night to see if that helps (as it sounds as though this may be the most problematic time for her), versus a dosage increase/adjustment.  I will not make any active adjustments as I have not been the one to prescribe the medication, however should her PCP wish me to take over with this, I will happily do so.  She will Return in about 2 months (around 11/5/2024) for Compliance Visit.      ________________________________________________________________________________________________    Per Last Visit Note (Date: 9/14/2023):  PLAN:  I reviewed results of prior studies and physiologic data with the patient.   I discussed treatment options with risks and benefits.  Treatment with  PAP is medically necessary and Kellen is agreable to continue use.  He is not eligible for a replacement machine for another year.  I have once again advised her to follow-up with Sim regarding a replacement.  Care of equipment, methods to improve comfort using PAP and importance of compliance with therapy were discussed.  Pressure setting: continue 14-20 cmH2O.    Rx provided to replace supplies and Care coordinated with DME provider.   I advised on nonpharmacologic measures for restless leg symptoms.  Also advised avoiding caffeine use in the evenings and regular exercise.  Consideration to be given to bupropion in place of sertraline that can aggravate restless leg symptoms.  If symptoms persist, a retrial of gabapentin 300 mg nightly can  "be considered.  Discussed strategies for weight reduction.    Follow-up is advised in 1 year or sooner if needed to monitor progress, compliance and to adjust therapy.       Sleep Studies:  -PSG 2019: BMI: 33.4. TST: 315 minutes, Sleep efficiency: 73.3%. Sleep Onset Latency: 12.2 minutes, REM Onset Latency: 86 minutes. AHI: 68.4, Supine AHI: 102.1, REM AHI: 77.6. O2 ashley: 77%, Time below 90%: 243.3 minutes. PLM Index: 0, PLM Arousal Index: 0.       -PAP Titration Study 2019: Titration started at 4 cmH2O, titrated to 12 cmH2O. Best pressure noted to be 12 cmH2O.        ________________________________________________________________________________________________      Interval History: Kellen Gray is a 68 y.o. female with a PMHx of Crohn's disease, diverticulitis, HTN, history of brain aneurysm, atrial fibrillation, HFpEF (EF 65% 2019), COPD, T2DM, GERD, vitamin D deficiency, vitamin B12 deficiency, obesity who presents in follow up for MARY ELLEN (AHI 68.4, O2 ashley 77% 2019) and RLS.    Still seeing Weight Management; has lost >30 pounds.    SDB:  -Current experience with PAP Therapy: Still very beneficial.  -Mask type: Nasal Pillows  -Difficulties with mask: Denies  -Device: SimpliVity; received 2019. Did register for recall, never got new device.   -Difficulties with device: Denies  -DME: [facility]  -Compliance:          RLS:  -Startin  -Current symptom description:Most of the time she describes an uncomfortable sensation in her legs that prompt her to move her legs to \"satisfy\" the sensation. Occasional instances of legs jumping by themselves.  -Frequency: Daily. More in bed trying to sleep or inactive.   -Current Medications:   -Gabapentin 300mg BID (Rx by PCP currently)   -No iron supplementation. Does see GI for GI issues, next visit 2025. Was seeing Hematology/Oncology for iron infusions and history of breast cancer in the past.   -Previous Medications:   -Denies    Iron " Studies 2023:  Ferritin: 15  Iron Stauration: 9%  TIBC: 363  Iron: 34        Clarence Sleepiness Scale:  What are your chances of dozing?   0= no chance  1= slight chance  2= moderate chance  3= high chance    Sitting and readin   Watching TV: 3  Sitting, inactive in a public place (e.g. a theatre or a meeting):0  As a passenger in a car for an hour without a break: 1  Lying down to rest in the afternoon when circumstances permit: 1   Sitting and talking to someone: 0  Sitting quietly after a lunch without alcohol: 2  In a car, while stopped for a few minutes in the traffic: 0       TOTAL  8/24  Greater or equal to 10 is positive for excessive daytime sleepiness        SLEEP HYGIENE QUESTIONS:  Bedtime: 4720-7758   Time it takes to fall sleep: <15 minutes  Wake up Time: 0600/0700   Number of times patient wakes up per night: once in a while  Reason (s) why patient wakes up during the night: restroom   Naps: 0  Estimated total sleep time ( in a 24 hour period of time): ~8-9 hours       Changes to PMH, PSH, SH: See above     SLEEP RELATED ROS  Review of Systems  Allergies   Allergen Reactions    Sulfa Antibiotics Rash       CURRENT MEDICATIONS:  Current Outpatient Medications   Medication Instructions    albuterol (ACCUNEB) 0.63 mg, Nebulization, Every 6 hours PRN    albuterol (PROVENTIL HFA,VENTOLIN HFA) 90 mcg/act inhaler 2 puffs, Inhalation, Every 4 hours PRN    atorvastatin (LIPITOR) 20 mg, Oral, Every morning    Blood Glucose Monitoring Suppl (OneTouch Verio) w/Device KIT Device, Daily    Cholecalciferol (Vitamin D3) 1.25 MG (46537 UT) CAPS 1 capsule, Oral, Daily    dicyclomine (BENTYL) 10 mg, Oral, 4 times daily (before meals and at bedtime)    dofetilide (TIKOSYN) 500 mcg, Oral, 2 times daily    Eliquis 5 mg, Oral, 2 times daily    Empagliflozin 25 mg, Oral, Daily    esomeprazole (NexIUM) 40 MG capsule TAKE 1 CAPSULE BY MOUTH TWICE DAILY 30 MINUTES BEFORE BREAKFAST AND 30 MINUTES BEFORE DINNER     "famotidine (PEPCID) 40 MG tablet TAKE 1 TABLET BY MOUTH ONCE DAILY AT BEDTIME    fluticasone (FLONASE) 50 mcg/act nasal spray 1 spray, Nasal, Daily    fluticasone-umeclidinium-vilanterol (Trelegy Ellipta) 200-62.5-25 mcg/actuation AEPB inhaler 1 puff, Inhalation, Daily, Rinse mouth after use.    furosemide (LASIX) 20 mg, Oral, 2 times daily    gabapentin (NEURONTIN) 300 mg capsule Take 1 capsule by mouth twice daily    glucose blood test strip Use to check sugars once a day. Dx E11.9    hydrOXYzine HCL (ATARAX) 10 mg, Oral, 2 times daily    Lancets (ONETOUCH DELICA PLUS [id number]) MISC     lisinopril (ZESTRIL) 10 mg, Oral, Every morning    mesalamine (DELZICOL) 400 mg Take 2 capsules by mouth twice daily    metoprolol succinate (TOPROL-XL) 100 mg, Oral, Every 12 hours    ondansetron (ZOFRAN) 4 mg, Oral, Every 8 hours PRN    OneTouch Verio test strip 1 each, Other, Daily, Use as instructed    pancrelipase (Lip-Prot-Amyl) (CREON) 72,000 Units, Oral, 2 times daily    Synthroid 150 mcg, Oral, Daily    Synthroid 137 mcg, Oral, Daily           PHYSICAL EXAMINATION:  Vital Signs: /68 (BP Location: Left arm, Patient Position: Sitting, Cuff Size: Large)   Pulse 57   Temp 97.8 °F (36.6 °C) (Temporal)   Resp 16   Ht 5' 9\" (1.753 m)   Wt 96.9 kg (213 lb 9.6 oz)   SpO2 98%   BMI 31.54 kg/m²     Constitutional: NAD, well appearing   Mental Status: AAOx3  Skin: Warm, dry, no rashes noted   Eyes: PERRL, normal conjunctiva  ENT: Nasal congestion absent  Posterior Airspace:   Arcos Tongue Position: 4  Retrognathia: absent  Overbite: absent  High Arched Palate: absent  Tongue Scalloping/Ridging: present  Uvula: normal  Chest: No evidence of respiratory distress, no accessory muscle use; no evidence of peripheral cyanosis  Abdomen: Soft, NT/ND  Extremities: No digital clubbing or pedal edema  Neuro: Strength 5/5 throughout, sensation grossly intact          Electronically signed by:    Rolan Parker, " DO  Board-Certified Neurology and Sleep Medicine  [facility]  09/05/24

## 2024-09-06 ENCOUNTER — TELEPHONE (OUTPATIENT)
Age: 69
End: 2024-09-06

## 2024-09-06 ENCOUNTER — APPOINTMENT (EMERGENCY)
Dept: RADIOLOGY | Facility: HOSPITAL | Age: 69
End: 2024-09-06
Payer: COMMERCIAL

## 2024-09-06 ENCOUNTER — APPOINTMENT (EMERGENCY)
Dept: CT IMAGING | Facility: HOSPITAL | Age: 69
End: 2024-09-06
Payer: COMMERCIAL

## 2024-09-06 ENCOUNTER — HOSPITAL ENCOUNTER (EMERGENCY)
Facility: HOSPITAL | Age: 69
Discharge: HOME | End: 2024-09-06
Attending: EMERGENCY MEDICINE
Payer: COMMERCIAL

## 2024-09-06 ENCOUNTER — TELEPHONE (OUTPATIENT)
Dept: INTERNAL MEDICINE CLINIC | Facility: CLINIC | Age: 69
End: 2024-09-06

## 2024-09-06 VITALS
HEART RATE: 54 BPM | DIASTOLIC BLOOD PRESSURE: 45 MMHG | RESPIRATION RATE: 16 BRPM | SYSTOLIC BLOOD PRESSURE: 90 MMHG | OXYGEN SATURATION: 93 % | TEMPERATURE: 97.8 F

## 2024-09-06 DIAGNOSIS — M79.10 MYALGIA: ICD-10-CM
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
## 2024-09-06 LAB
ABO GROUP BLD: NORMAL
ALBUMIN SERPL BCG-MCNC: 3.8 G/DL (ref 3.5–5)
ALP SERPL-CCNC: 90 U/L (ref 34–104)
ALT SERPL W P-5'-P-CCNC: 12 U/L (ref 7–52)
ANION GAP SERPL CALCULATED.3IONS-SCNC: 7 MMOL/L (ref 4–13)
APTT PPP: 34 SECONDS (ref 23–34)
AST SERPL W P-5'-P-CCNC: 15 U/L (ref 13–39)
BASOPHILS # BLD AUTO: 0.03 THOUSANDS/ÂΜL (ref 0–0.1)
BASOPHILS NFR BLD AUTO: 0 % (ref 0–1)
BILIRUB SERPL-MCNC: 0.31 MG/DL (ref 0.2–1)
BLD GP AB SCN SERPL QL: NEGATIVE
BUN SERPL-MCNC: 12 MG/DL (ref 5–25)
CALCIUM SERPL-MCNC: 8.9 MG/DL (ref 8.4–10.2)
CHLORIDE SERPL-SCNC: 103 MMOL/L (ref 96–108)
CO2 SERPL-SCNC: 30 MMOL/L (ref 21–32)
CREAT SERPL-MCNC: 0.86 MG/DL (ref 0.6–1.3)
EOSINOPHIL # BLD AUTO: 0.13 THOUSAND/ÂΜL (ref 0–0.61)
EOSINOPHIL NFR BLD AUTO: 1 % (ref 0–6)
ERYTHROCYTE [DISTWIDTH] IN BLOOD BY AUTOMATED COUNT: 14.8 % (ref 11.6–15.1)
GFR SERPL CREATININE-BSD FRML MDRD: 70 ML/MIN/1.73SQ M
GLUCOSE SERPL-MCNC: 102 MG/DL (ref 65–140)
HCT VFR BLD AUTO: 39.2 % (ref 34.8–46.1)
HGB BLD-MCNC: 11.8 G/DL (ref 11.5–15.4)
IMM GRANULOCYTES # BLD AUTO: 0.02 THOUSAND/UL (ref 0–0.2)
IMM GRANULOCYTES NFR BLD AUTO: 0 % (ref 0–2)
INR PPP: 1.2 (ref 0.85–1.19)
LYMPHOCYTES # BLD AUTO: 2.71 THOUSANDS/ÂΜL (ref 0.6–4.47)
LYMPHOCYTES NFR BLD AUTO: 28 % (ref 14–44)
MCH RBC QN AUTO: 26.6 PG (ref 26.8–34.3)
MCHC RBC AUTO-ENTMCNC: 30.1 G/DL (ref 31.4–37.4)
MCV RBC AUTO: 89 FL (ref 82–98)
MONOCYTES # BLD AUTO: 0.76 THOUSAND/ÂΜL (ref 0.17–1.22)
MONOCYTES NFR BLD AUTO: 8 % (ref 4–12)
NEUTROPHILS # BLD AUTO: 6.18 THOUSANDS/ÂΜL (ref 1.85–7.62)
NEUTS SEG NFR BLD AUTO: 63 % (ref 43–75)
NRBC BLD AUTO-RTO: 0 /100 WBCS
PLATELET # BLD AUTO: 275 THOUSANDS/UL (ref 149–390)
PMV BLD AUTO: 9.4 FL (ref 8.9–12.7)
POTASSIUM SERPL-SCNC: 3.7 MMOL/L (ref 3.5–5.3)
PROT SERPL-MCNC: 7.1 G/DL (ref 6.4–8.4)
PROTHROMBIN TIME: 15.7 SECONDS (ref 12.3–15)
RBC # BLD AUTO: 4.43 MILLION/UL (ref 3.81–5.12)
RH BLD: POSITIVE
SODIUM SERPL-SCNC: 140 MMOL/L (ref 135–147)
SPECIMEN EXPIRATION DATE: NORMAL
WBC # BLD AUTO: 9.83 THOUSAND/UL (ref 4.31–10.16)

## 2024-09-06 PROCEDURE — 70450 CT HEAD/BRAIN W/O DYE: CPT

## 2024-09-06 PROCEDURE — 72125 CT NECK SPINE W/O DYE: CPT

## 2024-09-06 PROCEDURE — 85025 COMPLETE CBC W/AUTO DIFF WBC: CPT | Performed by: EMERGENCY MEDICINE

## 2024-09-06 PROCEDURE — 85730 THROMBOPLASTIN TIME PARTIAL: CPT | Performed by: EMERGENCY MEDICINE

## 2024-09-06 PROCEDURE — 99284 EMERGENCY DEPT VISIT MOD MDM: CPT | Performed by: EMERGENCY MEDICINE

## 2024-09-06 PROCEDURE — 36415 COLL VENOUS BLD VENIPUNCTURE: CPT | Performed by: EMERGENCY MEDICINE

## 2024-09-06 PROCEDURE — 80053 COMPREHEN METABOLIC PANEL: CPT | Performed by: EMERGENCY MEDICINE

## 2024-09-06 PROCEDURE — 99284 EMERGENCY DEPT VISIT MOD MDM: CPT

## 2024-09-06 PROCEDURE — 71260 CT THORAX DX C+: CPT

## 2024-09-06 PROCEDURE — 74177 CT ABD & PELVIS W/CONTRAST: CPT

## 2024-09-06 PROCEDURE — 86900 BLOOD TYPING SEROLOGIC ABO: CPT | Performed by: EMERGENCY MEDICINE

## 2024-09-06 PROCEDURE — 71045 X-RAY EXAM CHEST 1 VIEW: CPT

## 2024-09-06 PROCEDURE — 85610 PROTHROMBIN TIME: CPT | Performed by: EMERGENCY MEDICINE

## 2024-09-06 PROCEDURE — 86850 RBC ANTIBODY SCREEN: CPT | Performed by: EMERGENCY MEDICINE

## 2024-09-06 PROCEDURE — 86901 BLOOD TYPING SEROLOGIC RH(D): CPT | Performed by: EMERGENCY MEDICINE

## 2024-09-06 RX ORDER — LIDOCAINE 50 MG/G
1 PATCH TOPICAL ONCE
Status: DISCONTINUED | OUTPATIENT
Start: 2024-09-06 | End: 2024-09-06 | Stop reason: HOSPADM

## 2024-09-06 RX ADMIN — IOHEXOL 100 ML: 350 INJECTION, SOLUTION INTRAVENOUS at 15:23

## 2024-09-06 RX ADMIN — LIDOCAINE 1 PATCH: 700 PATCH TOPICAL at 16:18

## 2024-09-06 NOTE — TELEPHONE ENCOUNTER
Pt called stating she fell about two days ago and hit her R side of breast.  She stated it is hurting.  Reviewed with Zelda.  She is on Eliquis.  Advised her to go to ED.

## 2024-09-06 NOTE — TELEPHONE ENCOUNTER
Patient called,     Requesting an appointment for today, no appointments available.  Pt fell lost balance on stairs falling sideways injury to right side of breast is in pain, happened 2 days ago.    Pt doesn't want to go to ER due to cost.    Warm transfer

## 2024-09-06 NOTE — ED PROVIDER NOTES
Emergency Department Trauma Note  Klelen Gray 68 y.o. female MRN: [id number]  Unit/Bed#: RM18/RM18 Encounter: [id number]      Trauma Alert: Trauma Acuity: Trauma Evaluation  Model of Arrival:   via    Trauma Team: Current Providers  Attending Provider: Mulugeta Nolan DO  Attending Provider: Dolores Gonzalez DO  Registered Nurse: Tatianna Frederick RN  Physician Assistant: TIFFANY HernandezC  Registered Nurse: Sharda Brown RN  Registered Nurse: Sveta Crum RN  Consultants:     None      History of Present Illness     Chief Complaint:   Chief Complaint   Patient presents with    Fall     Lose balance and fell down approx 2 steps a couple days ago landing on left side. Complains of pain to right side.  No head strike. No LOC. Takes eliquis daily.      HPI:  Kellen Gray is a 68 y.o. female who presents with RUQ pain after fall.  Mechanism:Details of Incident: patient fell down two steps two days ago onto left side. paitent reports pain in right rib cage and hip. bruising noted to hip. denies hitting head. denies loc. Injury Date: 09/04/24   Injury Occurence Location - Specify County: [facility]    68-year-old female presents for evaluation of fall.  Patient states about 2 days ago she fell down a few steps in her house.  She fell landing on the left side of her body and back, she is uncertain if she hit her head however denies loss of consciousness.  Since then patient has had pain in her right upper quadrant as well as a sore back.  Patient attempted to make an appointment to see her PCP however referred her to the emergency department for evaluation, she is on Eliquis daily was made a trauma evaluation for possible head strike.  She denies significant headache, nausea or vomiting episodes, chest pain.  She has significant pain in her right abdomen with movement as well as a bruise in her right lower quadrant.  Patient is ambulatory without difficulty.      Review of Systems    Constitutional:  Negative for activity change, appetite change and fever.   Respiratory:  Negative for shortness of breath.    Cardiovascular:  Negative for chest pain.   Gastrointestinal:  Positive for abdominal pain. Negative for nausea and vomiting.   Musculoskeletal:  Positive for myalgias. Negative for back pain and neck pain.   Skin:  Positive for wound.   Neurological:  Negative for syncope, weakness and numbness.   All other systems reviewed and are negative.      Historical Information     Immunizations:   Immunization History   Administered Date(s) Administered    COVID-19 MODERNA VACC 0.5 ML IM 10/08/2021, 11/06/2021    Pneumococcal 20-valent Conjugate (Historical) 04/17/2023    Pneumococcal Polysaccharide PPV23 01/16/2020    Tdap 04/17/2023    Tuberculin Skin Test 02/04/2021, 02/13/2021       Past Medical History:   Diagnosis Date    A-fib (HCC)     Acute respiratory failure with hypoxia (HCC) 11/30/2019    Anxiety     Arthritis     Asthma     Brain aneurysm     Cancer (HCC)     papillary thyroid cancer    Cardiac disease     CHF (congestive heart failure) (HCC)     COPD (chronic obstructive pulmonary disease) (HCC)     CPAP (continuous positive airway pressure) dependence     Crohn disease (HCC)     Depression     Diabetes mellitus (HCC)     Disease of thyroid gland     Diverticulitis of colon     Emphysema of lung (HCC)     GERD (gastroesophageal reflux disease)     HL (hearing loss)     Hyperlipidemia     Hypertension     Sleep apnea     Sleep apnea, obstructive     Visual impairment        Family History   Problem Relation Age of Onset    Alzheimer's disease Mother     Asthma Mother     Cancer Father         dead    Heart disease Sister     Hypertension Sister         under control    Diabetes Sister     Stroke Sister     Cancer Sister         dead    Stroke Sister     Heart disease Sister         dead    Diabetes Sister     Cancer Brother         liver with mets to bone    Heart disease Brother      Cancer Brother         dead    Asthma Daughter     Cancer Brother      Past Surgical History:   Procedure Laterality Date    APPENDECTOMY      BREAST SURGERY  several dates    CARDIOVERSION N/A 2019    Procedure: CARDIOVERSION;  Surgeon: Lee Brenner MD;  Location: [facility];  Service: Cardiology    CEREBRAL ANEURYSM REPAIR      CHOLECYSTECTOMY      HYSTERECTOMY      IR PELVIC ANGIOGRAM  2017    ND EXC THROMBOSED HEMORRHOID XTRNL N/A 10/07/2022    Procedure: HEMORRHOIDECTOMY EXCISION;  Surgeon: Leonardo Cash DO;  Location: [facility];  Service: General    THYROIDECTOMY      TONSILLECTOMY AND ADENOIDECTOMY       Social History     Tobacco Use    Smoking status: Former     Current packs/day: 0.00     Types: Cigarettes     Quit date: 2018     Years since quittin.6    Smokeless tobacco: Never   Vaping Use    Vaping status: Never Used   Substance Use Topics    Alcohol use: Yes     Comment: social    Drug use: Never     Types: Marijuana     E-Cigarette/Vaping    E-Cigarette Use Never User      E-Cigarette/Vaping Substances    Nicotine No     THC No     CBD No     Flavoring No     Other No     Unknown No        Family History: non-contributory    Meds/Allergies   Prior to Admission Medications   Prescriptions Last Dose Informant Patient Reported? Taking?   Blood Glucose Monitoring Suppl (OneTouch Verio) w/Device KIT  Self No No   Sig: by Device route in the morning   Patient not taking: Reported on 2024   Cholecalciferol (Vitamin D3) 1.25 MG (66861 UT) CAPS  Self Yes No   Sig: Take 1 capsule by mouth in the morning   Empagliflozin 25 MG TABS  Self No No   Sig: Take 1 tablet (25 mg total) by mouth daily   Patient taking differently: Take 25 mg by mouth 2 (two) times a day   Lancets (ONETOUCH DELICA PLUS [id number]) MISC  Self Yes No   Patient not taking: Reported on 2024   OneTouch Verio test strip  Self No No   Sig: Use 1 each in the morning Use as instructed   Patient not taking:  Reported on 2024   Synthroid 137 MCG tablet  Self No No   Sig: Take 1 tablet (137 mcg total) by mouth daily   Patient not taking: Reported on 2024   Synthroid 150 MCG tablet  Self Yes No   Sig: Take 150 mcg by mouth daily   albuterol (ACCUNEB) 0.63 MG/3ML nebulizer solution  Self Yes No   Sig: Take 0.63 mg by nebulization every 6 (six) hours as needed for wheezing   albuterol (PROVENTIL HFA,VENTOLIN HFA) 90 mcg/act inhaler  Self Yes No   Sig: Inhale 2 puffs every 4 (four) hours as needed for wheezing   apixaban (Eliquis) 5 mg  Self No No   Sig: Take 1 tablet by mouth twice daily   atorvastatin (LIPITOR) 20 mg tablet  Self Yes No   Sig: Take 20 mg by mouth every morning   dicyclomine (BENTYL) 10 mg capsule  Self No No   Sig: Take 1 capsule (10 mg total) by mouth 4 (four) times a day (before meals and at bedtime)   dofetilide (TIKOSYN) 500 mcg capsule  Self Yes No   Sig: Take 500 mcg by mouth 2 (two) times a day   esomeprazole (NexIUM) 40 MG capsule  Self No No   Sig: TAKE 1 CAPSULE BY MOUTH TWICE DAILY 30 MINUTES BEFORE BREAKFAST AND 30 MINUTES BEFORE DINNER   famotidine (PEPCID) 40 MG tablet  Self No No   Sig: TAKE 1 TABLET BY MOUTH ONCE DAILY AT BEDTIME   fluticasone (FLONASE) 50 mcg/act nasal spray  Self No No   Si spray into each nostril daily   Patient not taking: Reported on 2024   fluticasone-umeclidinium-vilanterol (Trelegy Ellipta) 200-62.5-25 mcg/actuation AEPB inhaler  Self Yes No   Sig: Inhale 1 puff daily Rinse mouth after use.   furosemide (LASIX) 20 mg tablet  Self Yes No   Sig: Take 20 mg by mouth 2 (two) times a day   gabapentin (NEURONTIN) 300 mg capsule  Self No No   Sig: Take 1 capsule by mouth twice daily   glucose blood test strip  Self Yes No   Sig: Use to check sugars once a day. Dx E11.9   Patient not taking: Reported on 2024   hydrOXYzine HCL (ATARAX) 10 mg tablet  Self No No   Sig: Take 1 tablet by mouth twice daily   lisinopril (ZESTRIL) 10 mg tablet  Self Yes No    Sig: Take 10 mg by mouth every morning   mesalamine (DELZICOL) 400 mg  Self No No   Sig: Take 2 capsules by mouth twice daily   metoprolol succinate (TOPROL-XL) 100 mg 24 hr tablet  Self No No   Sig: TAKE 1 TABLET BY MOUTH EVERY 12 HOURS   Patient not taking: Reported on 8/26/2024   ondansetron (ZOFRAN) 4 mg tablet  Self No No   Sig: Take 1 tablet (4 mg total) by mouth every 8 (eight) hours as needed for nausea or vomiting   Patient not taking: Reported on 8/26/2024   pancrelipase, Lip-Prot-Amyl, (Creon) 24,000 units  Self No No   Sig: Take 3 capsules (72,000 Units total) by mouth 2 (two) times a day      Facility-Administered Medications Last Administration Doses Remaining   cyanocobalamin injection 1,000 mcg 8/12/2024 11:32 AM           Allergies   Allergen Reactions    Sulfa Antibiotics Rash       PHYSICAL EXAM    PE limited by: none    Objective   Vitals:   First set: Temperature: 97.8 °F (36.6 °C) (09/06/24 1435)  Pulse: 76 (09/06/24 1435)  Respirations: 18 (09/06/24 1435)  Blood Pressure: 158/70 (09/06/24 1435)  SpO2: 97 % (09/06/24 1435)    Primary Survey:   (A) Airway: intact  (B) Breathing: non-labored, speaking in sentences  (C) Circulation: Pulses:   normal  (D) Disabliity:  GCS Total:  15  (E) Expose:  Completed    Secondary Survey: (Click on Physical Exam tab above)  Physical Exam  Vitals reviewed.   Constitutional:       General: She is not in acute distress.     Appearance: Normal appearance. She is not toxic-appearing.   HENT:      Head: Normocephalic and atraumatic.      Right Ear: External ear normal.      Left Ear: External ear normal.      Nose: Nose normal.      Mouth/Throat:      Mouth: Mucous membranes are moist.   Eyes:      General: No scleral icterus.        Right eye: No discharge.         Left eye: No discharge.      Extraocular Movements: Extraocular movements intact.   Cardiovascular:      Rate and Rhythm: Normal rate.   Pulmonary:      Effort: Pulmonary effort is normal. No  respiratory distress.   Abdominal:      General: There is no distension.      Palpations: Abdomen is soft.      Tenderness: There is abdominal tenderness (Right abdomen). There is no guarding or rebound.   Musculoskeletal:         General: No deformity or signs of injury.      Comments: No midline c/t/l spine pain, paracervical tenderness, using all extremities freely   Skin:     General: Skin is warm.      Findings: Bruising (R lower abdomen, upper hip) present.      Comments: No chest or flank bruising   Neurological:      General: No focal deficit present.      Mental Status: She is alert. Mental status is at baseline.      Cranial Nerves: No cranial nerve deficit.      Sensory: No sensory deficit.      Motor: No weakness.      Comments: Paresthesias in hands and feet at baseline         Cervical spine cleared by clinical criteria? Yes     Invasive Devices       None                   Lab Results:   Results Reviewed       Procedure Component Value Units Date/Time    Protime-INR [[id number]]  (Abnormal) Collected: 09/06/24 1447    Lab Status: Final result Specimen: Blood from Arm, Right Updated: 09/06/24 1511     Protime 15.7 seconds      INR 1.20    Narrative:      INR Therapeutic Range    Indication                                             INR Range      Atrial Fibrillation                                               2.0-3.0  Hypercoagulable State                                    2.0.2.3  Left Ventricular Asist Device                            2.0-3.0  Mechanical Heart Valve                                  -    Aortic(with afib, MI, embolism, HF, LA enlargement,    and/or coagulopathy)                                     2.0-3.0 (2.5-3.5)     Mitral                                                             2.5-3.5  Prosthetic/Bioprosthetic Heart Valve               2.0-3.0  Venous thromboembolism (VTE: VT, PE        2.0-3.0    APTT [[id number]]  (Normal) Collected: 09/06/24 1447    Lab Status: Final  result Specimen: Blood from Arm, Right Updated: 09/06/24 1511     PTT 34 seconds     Comprehensive metabolic panel [[id number]] Collected: 09/06/24 1447    Lab Status: Final result Specimen: Blood from Arm, Right Updated: 09/06/24 1508     Sodium 140 mmol/L      Potassium 3.7 mmol/L      Chloride 103 mmol/L      CO2 30 mmol/L      ANION GAP 7 mmol/L      BUN 12 mg/dL      Creatinine 0.86 mg/dL      Glucose 102 mg/dL      Calcium 8.9 mg/dL      AST 15 U/L      ALT 12 U/L      Alkaline Phosphatase 90 U/L      Total Protein 7.1 g/dL      Albumin 3.8 g/dL      Total Bilirubin 0.31 mg/dL      eGFR 70 ml/min/1.73sq m     Narrative:      National Kidney Disease Foundation guidelines for Chronic Kidney Disease (CKD):     Stage 1 with normal or high GFR (GFR > 90 mL/min/1.73 square meters)    Stage 2 Mild CKD (GFR = 60-89 mL/min/1.73 square meters)    Stage 3A Moderate CKD (GFR = 45-59 mL/min/1.73 square meters)    Stage 3B Moderate CKD (GFR = 30-44 mL/min/1.73 square meters)    Stage 4 Severe CKD (GFR = 15-29 mL/min/1.73 square meters)    Stage 5 End Stage CKD (GFR <15 mL/min/1.73 square meters)  Note: GFR calculation is accurate only with a steady state creatinine    CBC and differential [[id number]]  (Abnormal) Collected: 09/06/24 1447    Lab Status: Final result Specimen: Blood from Arm, Right Updated: 09/06/24 1453     WBC 9.83 Thousand/uL      RBC 4.43 Million/uL      Hemoglobin 11.8 g/dL      Hematocrit 39.2 %      MCV 89 fL      MCH 26.6 pg      MCHC 30.1 g/dL      RDW 14.8 %      MPV 9.4 fL      Platelets 275 Thousands/uL      nRBC 0 /100 WBCs      Segmented % 63 %      Immature Grans % 0 %      Lymphocytes % 28 %      Monocytes % 8 %      Eosinophils Relative 1 %      Basophils Relative 0 %      Absolute Neutrophils 6.18 Thousands/µL      Absolute Immature Grans 0.02 Thousand/uL      Absolute Lymphocytes 2.71 Thousands/µL      Absolute Monocytes 0.76 Thousand/µL      Eosinophils Absolute 0.13 Thousand/µL       Basophils Absolute 0.03 Thousands/µL                    Imaging Studies:   Direct to CT: No  XR chest 1 view portable   Final Result by Quang Phillip MD (09/06 1615)      No acute cardiopulmonary disease.            Workstation performed: [id number]         TRAUMA - CT head wo contrast   Final Result by Jorgito Reis MD (09/06 1538)      No acute intracranial abnormality.                  Workstation performed: [id number]         TRAUMA - CT spine cervical wo contrast   Final Result by Jorgito Reis MD (09/06 1542)      Well-corticated osseous fragment redemonstrated along the upper half of the dens. This may either reflect an os odontoideum or sequela of remote fracture. Nevertheless, this is unchanged. No acute fracture or spondylolisthesis.                        Workstation performed: [id number]         TRAUMA - CT chest abdomen pelvis w contrast   Final Result by Jorgito Reis MD (09/06 1548)      No acute traumatic CT findings.      Stable 7 mm subpleural groundglass nodule left lower lobe. Emphysema.      Additional findings as above.               Workstation performed: [id number]               Procedures  Procedures         ED Course  ED Course as of 09/06/24 1753   Fri Sep 06, 2024   1457 Delay in imaging with miscommunication of patient being a trauma eval due to fall with possible headstrike, significant abdominal pain and bruising on thinners   1518 Comprehensive metabolic panel  No lft elevations, unremarkable   1519 Protime-INR(!)  Elevated in setting of eliquis   1519 CBC and differential(!)  Hgb stable   1541 TRAUMA - CT head wo contrast  IMPRESSION:     No acute intracranial abnormality.        1547 TRAUMA - CT spine cervical wo contrast  IMPRESSION:     Well-corticated osseous fragment redemonstrated along the upper half of the dens. This may either reflect an os odontoideum or sequela of remote fracture. Nevertheless, this is unchanged. No acute fracture or spondylolisthesis.     1554  TRAUMA - CT chest abdomen pelvis w contrast  IMPRESSION:     No acute traumatic CT findings.     Stable 7 mm subpleural groundglass nodule left lower lobe. Emphysema.     1616 Pt know about pulmonary nodule and has previously received screening CT           Medical Decision Making  68-year-old female presents for evaluation after fall with possible head strike.  Patient is on Eliquis daily was made a trauma evaluation.  Patient has significant pain in her abdomen with movement, pain abdominal imaging to rule out solid organ injury or hemorrhage, viscus injury.  Will additionally include chest to assess for rib fractures, doubt pneumothorax.  Will obtain CT head to assess for evidence of intracranial injury although patient appears to be neurologically intact at this time.  No definite midline cervical tenderness though does report significant soreness, will obtain CT spine to assess for occult fracture.    Amount and/or Complexity of Data Reviewed  Labs: ordered. Decision-making details documented in ED Course.  Radiology: ordered. Decision-making details documented in ED Course.    Risk  Prescription drug management.                Disposition  [facility] Transfer: No  Final diagnoses:   Fall   Myalgia     Time reflects when diagnosis was documented in both MDM as applicable and the Disposition within this note       Time User Action Codes Description Comment    9/6/2024  3:55 PM Dolores Gonzalez Add [W19.XXXA] Fall     9/6/2024  3:56 PM Dolores Gonzalez Add [M79.10] Myalgia           ED Disposition       ED Disposition   Discharge    Condition   Stable    Date/Time   Fri Sep 6, 2024  3:55 PM    Comment   Kellen Gray discharge to home/self care.                   Follow-up Information       Follow up With Specialties Details Why Contact Info Additional Information    [facility] Emergency Department Emergency Medicine  If symptoms worsen [address] [facility]  [address]  [phone] [facility], [address], [place], [address]    Ina Rivas MD Family Medicine  As needed [address] [facility] [address]  [phone]             Discharge Medication List as of 9/6/2024  4:12 PM        CONTINUE these medications which have NOT CHANGED    Details   albuterol (ACCUNEB) 0.63 MG/3ML nebulizer solution Take 0.63 mg by nebulization every 6 (six) hours as needed for wheezing, Historical Med      albuterol (PROVENTIL HFA,VENTOLIN HFA) 90 mcg/act inhaler Inhale 2 puffs every 4 (four) hours as needed for wheezing, Historical Med      apixaban (Eliquis) 5 mg Take 1 tablet by mouth twice daily, Starting Sun 8/25/2024, Normal      atorvastatin (LIPITOR) 20 mg tablet Take 20 mg by mouth every morning, Starting Tue 6/7/2022, Historical Med      Blood Glucose Monitoring Suppl (OneTouch Verio) w/Device KIT by Device route in the morning, Starting Fri 5/3/2024, Normal      Cholecalciferol (Vitamin D3) 1.25 MG (75614 UT) CAPS Take 1 capsule by mouth in the morning, Starting Sat 1/21/2023, Historical Med      dicyclomine (BENTYL) 10 mg capsule Take 1 capsule (10 mg total) by mouth 4 (four) times a day (before meals and at bedtime), Starting Mon 7/15/2024, Print      dofetilide (TIKOSYN) 500 mcg capsule Take 500 mcg by mouth 2 (two) times a day, Historical Med      Empagliflozin 25 MG TABS Take 1 tablet (25 mg total) by mouth daily, Starting Thu 2/8/2024, Until Sun 2/2/2025, Normal      esomeprazole (NexIUM) 40 MG capsule TAKE 1 CAPSULE BY MOUTH TWICE DAILY 30 MINUTES BEFORE BREAKFAST AND 30 MINUTES BEFORE DINNER, Normal      famotidine (PEPCID) 40 MG tablet TAKE 1 TABLET BY MOUTH ONCE DAILY AT BEDTIME, Normal      fluticasone (FLONASE) 50 mcg/act nasal spray 1 spray into each nostril daily, Starting Mon 2/19/2024, Normal      fluticasone-umeclidinium-vilanterol (Trelegy Ellipta) 200-62.5-25 mcg/actuation AEPB inhaler  Inhale 1 puff daily Rinse mouth after use., Historical Med      furosemide (LASIX) 20 mg tablet Take 20 mg by mouth 2 (two) times a day, Starting Wed 6/10/2020, Historical Med      gabapentin (NEURONTIN) 300 mg capsule Take 1 capsule by mouth twice daily, Normal      !! glucose blood test strip Use to check sugars once a day. Dx E11.9, Historical Med      hydrOXYzine HCL (ATARAX) 10 mg tablet Take 1 tablet by mouth twice daily, Starting Fri 8/9/2024, Normal      Lancets (ONETOUCH DELICA PLUS [id number]) MISC Historical Med      lisinopril (ZESTRIL) 10 mg tablet Take 10 mg by mouth every morning, Starting Tue 6/7/2022, Historical Med      mesalamine (DELZICOL) 400 mg Take 2 capsules by mouth twice daily, Normal      metoprolol succinate (TOPROL-XL) 100 mg 24 hr tablet TAKE 1 TABLET BY MOUTH EVERY 12 HOURS, Starting Thu 6/13/2024, Normal      ondansetron (ZOFRAN) 4 mg tablet Take 1 tablet (4 mg total) by mouth every 8 (eight) hours as needed for nausea or vomiting, Starting Mon 7/15/2024, Print      !! OneTouch Verio test strip Use 1 each in the morning Use as instructed, Starting Fri 5/3/2024, Normal      pancrelipase, Lip-Prot-Amyl, (Creon) 24,000 units Take 3 capsules (72,000 Units total) by mouth 2 (two) times a day, Starting Wed 8/21/2024, Normal      !! Synthroid 137 MCG tablet Take 1 tablet (137 mcg total) by mouth daily, Starting Tue 8/20/2024, Normal      !! Synthroid 150 MCG tablet Take 150 mcg by mouth daily, Starting Wed 6/19/2024, Historical Med       !! - Potential duplicate medications found. Please discuss with provider.        No discharge procedures on file.    PDMP Review         Value Time User    PDMP Reviewed  Yes 2/25/2020  6:47 PM Funmilayo Pearson MD            ED Provider  Electronically Signed by           Dolores Gonzalez DO  09/06/24 [id number]

## 2024-09-09 ENCOUNTER — APPOINTMENT (OUTPATIENT)
Dept: PULMONOLOGY | Facility: HOSPITAL | Age: 69
End: 2024-09-09
Payer: COMMERCIAL

## 2024-09-09 ENCOUNTER — VBI (OUTPATIENT)
Dept: INTERNAL MEDICINE CLINIC | Facility: CLINIC | Age: 69
End: 2024-09-09

## 2024-09-09 NOTE — TELEPHONE ENCOUNTER
09/09/24 1:27 PM    Patient contacted post ED visit, [facility] department spoke with patient/caregiver and outreach was successful.    Thank you.  Charlee Rivas MA  [facility]

## 2024-09-10 ENCOUNTER — APPOINTMENT (OUTPATIENT)
Dept: PULMONOLOGY | Facility: HOSPITAL | Age: 69
End: 2024-09-10
Payer: COMMERCIAL

## 2024-09-10 ENCOUNTER — OFFICE VISIT (OUTPATIENT)
Dept: HEMATOLOGY ONCOLOGY | Facility: CLINIC | Age: 69
End: 2024-09-10
Payer: COMMERCIAL

## 2024-09-10 ENCOUNTER — APPOINTMENT (OUTPATIENT)
Dept: LAB | Facility: HOSPITAL | Age: 69
End: 2024-09-10
Payer: COMMERCIAL

## 2024-09-10 ENCOUNTER — TELEPHONE (OUTPATIENT)
Dept: SLEEP CENTER | Facility: CLINIC | Age: 69
End: 2024-09-10

## 2024-09-10 VITALS
WEIGHT: 213.2 LBS | SYSTOLIC BLOOD PRESSURE: 105 MMHG | BODY MASS INDEX: 31.58 KG/M2 | HEART RATE: 56 BPM | DIASTOLIC BLOOD PRESSURE: 65 MMHG | OXYGEN SATURATION: 94 % | HEIGHT: 69 IN | TEMPERATURE: 97.7 F

## 2024-09-10 DIAGNOSIS — E61.1 IRON DEFICIENCY: ICD-10-CM

## 2024-09-10 DIAGNOSIS — G25.81 RLS (RESTLESS LEGS SYNDROME): ICD-10-CM

## 2024-09-10 DIAGNOSIS — G25.81 RLS (RESTLESS LEGS SYNDROME): Primary | ICD-10-CM

## 2024-09-10 DIAGNOSIS — E53.8 B12 DEFICIENCY: ICD-10-CM

## 2024-09-10 DIAGNOSIS — D50.8 IRON DEFICIENCY ANEMIA SECONDARY TO INADEQUATE DIETARY IRON INTAKE: ICD-10-CM

## 2024-09-10 DIAGNOSIS — E53.8 LOW FOLATE: ICD-10-CM

## 2024-09-10 DIAGNOSIS — N60.91 ATYPICAL DUCTAL HYPERPLASIA OF RIGHT BREAST: ICD-10-CM

## 2024-09-10 DIAGNOSIS — N64.9 DISORDER OF BREAST, UNSPECIFIED: ICD-10-CM

## 2024-09-10 PROBLEM — Z85.3 HISTORY OF BREAST CANCER: Status: RESOLVED | Noted: 2024-09-05 | Resolved: 2024-09-10

## 2024-09-10 LAB
FERRITIN SERPL-MCNC: 20 NG/ML (ref 11–307)
FOLATE SERPL-MCNC: 12.6 NG/ML
IRON SATN MFR SERPL: 10 % (ref 15–50)
IRON SERPL-MCNC: 38 UG/DL (ref 50–212)
TIBC SERPL-MCNC: 369 UG/DL (ref 250–450)
UIBC SERPL-MCNC: 331 UG/DL (ref 155–355)
VIT B12 SERPL-MCNC: 333 PG/ML (ref 180–914)

## 2024-09-10 PROCEDURE — 82607 VITAMIN B-12: CPT

## 2024-09-10 PROCEDURE — 82746 ASSAY OF FOLIC ACID SERUM: CPT

## 2024-09-10 PROCEDURE — 83540 ASSAY OF IRON: CPT

## 2024-09-10 PROCEDURE — 83550 IRON BINDING TEST: CPT

## 2024-09-10 PROCEDURE — 36415 COLL VENOUS BLD VENIPUNCTURE: CPT

## 2024-09-10 PROCEDURE — 82728 ASSAY OF FERRITIN: CPT

## 2024-09-10 PROCEDURE — 99204 OFFICE O/P NEW MOD 45 MIN: CPT | Performed by: NURSE PRACTITIONER

## 2024-09-10 NOTE — PROGRESS NOTES
HEM ONC SPCLST [facility]ATOLOG[facility] [place]  [address] [facility] [address]  [phone]  Hematology Ambulatory Consult  Kellen Gray, 1955, [id number]  9/10/2024      Assessment and Plan   1. RLS (restless legs syndrome)      2. Iron deficiency  3. B12 deficiency  4. Low folate  5. Atypical ductal hyperplasia of right breast  6. Disorder of breast, unspecified  7. Iron deficiency anemia secondary to inadequate dietary iron intake  Patient is a 68-year-old female with a history of Crohn's disease, diverticulitis, HTN, history of brain aneurysm, atrial fibrillation, HFpEF (EF 65% 9/2019), COPD, T2DM, GERD, vitamin D deficiency, vitamin B12 deficiency, obesity, and MARY ELLEN.  She was referred by sleep medicine for management of iron deficiency likely contributing to restless leg syndrome.  Per sleep medicine note patient also referred secondary to a history of breast cancer.  Most recent labs from 7/31/2024 show a normal WBC, RBC hemoglobin and hematocrit.  MCV 87.  Normal platelets and differential.  MCH and MCHC are decreased.  Previously patient was anemic with a hemoglobin of 10.8.  CMP shows a creatinine of 0.77, normal calcium protein and EGFR of 80.  Vitamin B12 level 273.  I do not have an iron panel since November 2023 iron saturation 9% ferritin level 15.  Patient states she has received iron infusions in the past, Venofer 200 mg, and tolerated without difficulty.  Most recent infusion was on 6/21/2022.  Is receiving vitamin B12 injections through Dr. Rivas's office.  We will request updated labs now and in 3 months.  I will contact her with the results and manage as indicated.   I briefly reviewed indications and adverse reactions including shortness of breath, chest heaviness, muscle cramping, headache, itching, Anaphylaxis/allergic reaction, arthralgias/myalgias, nausea.  Patient states she did not have any complications with Venofer.  If iron levels are low I am  inclined to use Venofer 300 mg IV x 3 doses.  Per chart review patient does not have a history of breast cancer but does have a history of right breast atypical ductal hyperplasia in 2012 status post excision and 6 years of tamoxifen.  Atypical ductal hyperplasia is a benign condition that increases the risk of developing breast cancer.  She previously followed with surgical oncology at [facility] in [place].  Patient has not been seen by them since September 2022.  She is also not up-to-date on mammogram.  Patient does not report any abnormalities or concerns with her breast currently.  We will place orders for diagnostic mammogram in our system.  We will follow-up in 3 months.    - Ambulatory referral to Hematology / Oncology  - Iron Panel (Includes Ferritin, Iron Sat%, Iron, and TIBC); Standing  - Vitamin B12; Standing  - Folate; Standing  - Mammo diagnostic bilateral w 3d and cad; Future    Patient verbalized understanding and is in agreement to the plan.   Patient knows to call the Hematology/Oncology office with any questions and concerns regarding the above.    Barrier(s) to care: None.   The patient is able to self care.    Cheryl MORENO  Medical Oncology/Hematology  [facility]    Subjective     Chief Complaint   Patient presents with    Consult       Referring provider    Rolan Parker,   [address] [place] [address]    History of present illness:   Patient is a 68-year-old female with a history of Crohn's disease, diverticulitis, HTN, history of brain aneurysm, atrial fibrillation, HFpEF (EF 65% 9/2019), COPD, T2DM, GERD, vitamin D deficiency, vitamin B12 deficiency, obesity, and MARY ELLEN.  She was referred by sleep medicine for management of iron deficiency likely contributing to restless leg syndrome.    09/10/24: Clinically stable    Review of Systems   Constitutional:  Positive for fatigue. Negative for activity change, appetite change, fever and unexpected  weight change.   Respiratory:  Negative for cough and shortness of breath.    Cardiovascular:  Negative for chest pain and leg swelling.   Gastrointestinal:  Negative for abdominal pain, constipation, diarrhea and nausea.   Endocrine: Negative for cold intolerance and heat intolerance.   Musculoskeletal:  Positive for myalgias. Negative for arthralgias.        Restless legs   Skin: Negative.    Neurological:  Positive for numbness (and tingling). Negative for dizziness, weakness and headaches.   Hematological:  Negative for adenopathy. Does not bruise/bleed easily.   Psychiatric/Behavioral:  Positive for decreased concentration.        Past Medical History:   Diagnosis Date    A-fib (HCC)     Acute respiratory failure with hypoxia (HCC) 11/30/2019    Anxiety     Arthritis     Asthma     Brain aneurysm     Cancer (HCC)     papillary thyroid cancer    Cardiac disease     CHF (congestive heart failure) (HCC)     COPD (chronic obstructive pulmonary disease) (HCC)     CPAP (continuous positive airway pressure) dependence     Crohn disease (HCC)     Depression     Diabetes mellitus (HCC)     Disease of thyroid gland     Diverticulitis of colon     Emphysema of lung (HCC)     GERD (gastroesophageal reflux disease)     HL (hearing loss)     Hyperlipidemia     Hypertension     Sleep apnea     Sleep apnea, obstructive     Visual impairment      Past Surgical History:   Procedure Laterality Date    APPENDECTOMY      BREAST SURGERY  several dates    CARDIOVERSION N/A 02/14/2019    Procedure: CARDIOVERSION;  Surgeon: Lee Brenner MD;  Location: [facility];  Service: Cardiology    CEREBRAL ANEURYSM REPAIR      CHOLECYSTECTOMY      HYSTERECTOMY      IR PELVIC ANGIOGRAM  12/14/2017    VT EXC THROMBOSED HEMORRHOID XTRNL N/A 10/07/2022    Procedure: HEMORRHOIDECTOMY EXCISION;  Surgeon: Leonardo Cash DO;  Location: [facility];  Service: General    THYROIDECTOMY      TONSILLECTOMY AND ADENOIDECTOMY       Family History   Problem  Relation Age of Onset    Alzheimer's disease Mother     Asthma Mother     Cancer Father         dead    Heart disease Sister     Hypertension Sister         under control    Diabetes Sister     Stroke Sister     Cancer Sister         dead    Stroke Sister     Heart disease Sister         dead    Diabetes Sister     Cancer Brother         liver with mets to bone    Heart disease Brother     Cancer Brother         dead    Asthma Daughter     Cancer Brother      Social History     Socioeconomic History    Marital status: /Civil Union     Spouse name: None    Number of children: None    Years of education: None    Highest education level: None   Occupational History    None   Tobacco Use    Smoking status: Former     Current packs/day: 0.00     Types: Cigarettes     Quit date: 2018     Years since quittin.6    Smokeless tobacco: Never   Vaping Use    Vaping status: Never Used   Substance and Sexual Activity    Alcohol use: Yes     Comment: social    Drug use: Never     Types: Marijuana    Sexual activity: Not Currently     Partners: Male     Birth control/protection: None   Other Topics Concern    None   Social History Narrative    None     Social Determinants of Health     Financial Resource Strain: Low Risk  (2023)    Overall Financial Resource Strain (CARDIA)     Difficulty of Paying Living Expenses: Not very hard   Food Insecurity: Unknown (2023)    Received from [facility]    Hunger Vital Sign     Worried About Running Out of Food in the Last Year: Patient declined     Ran Out of Food in the Last Year: Patient declined   Transportation Needs: No Transportation Needs (2023)    PRAPARE - Transportation     Lack of Transportation (Medical): No     Lack of Transportation (Non-Medical): No   Physical Activity: Not on file   Stress: Not on file   Social Connections: Not on file   Intimate Partner Violence: Not on file   Housing Stability: Low Risk  (2022)    Housing Stability  Vital Sign     Unable to Pay for Housing in the Last Year: No     Number of Places Lived in the Last Year: 1     Unstable Housing in the Last Year: No         Current Outpatient Medications:     albuterol (ACCUNEB) 0.63 MG/3ML nebulizer solution, Take 0.63 mg by nebulization every 6 (six) hours as needed for wheezing, Disp: , Rfl:     albuterol (PROVENTIL HFA,VENTOLIN HFA) 90 mcg/act inhaler, Inhale 2 puffs every 4 (four) hours as needed for wheezing, Disp: , Rfl:     apixaban (Eliquis) 5 mg, Take 1 tablet by mouth twice daily, Disp: 90 tablet, Rfl: 3    atorvastatin (LIPITOR) 20 mg tablet, Take 20 mg by mouth every morning, Disp: , Rfl:     Cholecalciferol (Vitamin D3) 1.25 MG (06320 UT) CAPS, Take 1 capsule by mouth in the morning, Disp: , Rfl:     dicyclomine (BENTYL) 10 mg capsule, Take 1 capsule (10 mg total) by mouth 4 (four) times a day (before meals and at bedtime), Disp: 30 capsule, Rfl: 1    dofetilide (TIKOSYN) 500 mcg capsule, Take 500 mcg by mouth 2 (two) times a day, Disp: , Rfl:     Empagliflozin 25 MG TABS, Take 1 tablet (25 mg total) by mouth daily (Patient taking differently: Take 25 mg by mouth 2 (two) times a day), Disp: 90 tablet, Rfl: 3    esomeprazole (NexIUM) 40 MG capsule, TAKE 1 CAPSULE BY MOUTH TWICE DAILY 30 MINUTES BEFORE BREAKFAST AND 30 MINUTES BEFORE DINNER, Disp: 60 capsule, Rfl: 5    famotidine (PEPCID) 40 MG tablet, TAKE 1 TABLET BY MOUTH ONCE DAILY AT BEDTIME, Disp: 30 tablet, Rfl: 5    fluticasone-umeclidinium-vilanterol (Trelegy Ellipta) 200-62.5-25 mcg/actuation AEPB inhaler, Inhale 1 puff daily Rinse mouth after use., Disp: , Rfl:     furosemide (LASIX) 20 mg tablet, Take 20 mg by mouth 2 (two) times a day, Disp: , Rfl:     gabapentin (NEURONTIN) 300 mg capsule, Take 1 capsule by mouth twice daily, Disp: 30 capsule, Rfl: 0    hydrOXYzine HCL (ATARAX) 10 mg tablet, Take 1 tablet by mouth twice daily, Disp: 60 tablet, Rfl: 2    lisinopril (ZESTRIL) 10 mg tablet, Take 10 mg by  "mouth every morning, Disp: , Rfl:     mesalamine (DELZICOL) 400 mg, Take 2 capsules by mouth twice daily, Disp: 360 capsule, Rfl: 3    ondansetron (ZOFRAN) 4 mg tablet, Take 1 tablet (4 mg total) by mouth every 8 (eight) hours as needed for nausea or vomiting, Disp: 20 tablet, Rfl: 0    pancrelipase, Lip-Prot-Amyl, (Creon) 24,000 units, Take 3 capsules (72,000 Units total) by mouth 2 (two) times a day, Disp: 180 capsule, Rfl: 1    Synthroid 150 MCG tablet, Take 150 mcg by mouth daily, Disp: , Rfl:     Blood Glucose Monitoring Suppl (OneTouch Verio) w/Device KIT, by Device route in the morning (Patient not taking: Reported on 5/22/2024), Disp: 1 kit, Rfl: 0    fluticasone (FLONASE) 50 mcg/act nasal spray, 1 spray into each nostril daily (Patient not taking: Reported on 8/26/2024), Disp: 1 g, Rfl: 3    glucose blood test strip, Use to check sugars once a day. Dx E11.9 (Patient not taking: Reported on 8/26/2024), Disp: , Rfl:     Lancets (ONETOUCH DELICA PLUS [id number]) MISC, , Disp: , Rfl:     metoprolol succinate (TOPROL-XL) 100 mg 24 hr tablet, TAKE 1 TABLET BY MOUTH EVERY 12 HOURS (Patient not taking: Reported on 8/26/2024), Disp: 60 tablet, Rfl: 5    OneTouch Verio test strip, Use 1 each in the morning Use as instructed (Patient not taking: Reported on 8/26/2024), Disp: 100 each, Rfl: 3    Synthroid 137 MCG tablet, Take 1 tablet (137 mcg total) by mouth daily (Patient not taking: Reported on 8/26/2024), Disp: 90 tablet, Rfl: 1    Current Facility-Administered Medications:     cyanocobalamin injection 1,000 mcg, 1,000 mcg, Intramuscular, Q30 Days, , 1,000 mcg at 08/12/24 1132  Allergies   Allergen Reactions    Sulfa Antibiotics Rash       Objective   /65 (BP Location: Right arm, Patient Position: Sitting, Cuff Size: Standard)   Pulse 56   Temp 97.7 °F (36.5 °C) (Temporal)   Ht 5' 9\" (1.753 m)   Wt 96.7 kg (213 lb 3.2 oz)   SpO2 94%   BMI 31.48 kg/m²   Physical Exam  Vitals reviewed.   Constitutional:  "      Appearance: Normal appearance. She is well-developed.   HENT:      Head: Normocephalic and atraumatic.   Eyes:      Conjunctiva/sclera: Conjunctivae normal.      Pupils: Pupils are equal, round, and reactive to light.   Pulmonary:      Effort: Pulmonary effort is normal. No respiratory distress.   Musculoskeletal:         General: Normal range of motion.      Cervical back: Normal range of motion.   Lymphadenopathy:      Cervical: No cervical adenopathy.   Skin:     General: Skin is dry.   Neurological:      Mental Status: She is alert and oriented to person, place, and time.   Psychiatric:         Behavior: Behavior normal.         Result Review  Labs:  Lab Results   Component Value Date    WBC 9.83 09/06/2024    HGB 11.8 09/06/2024    HCT 39.2 09/06/2024    MCV 89 09/06/2024     09/06/2024     Lab Results   Component Value Date     04/21/2018    SODIUM 140 09/06/2024    K 3.7 09/06/2024     09/06/2024    CO2 30 09/06/2024    ANIONGAP 12.3 04/21/2018    AGAP 7 09/06/2024    BUN 12 09/06/2024    CREATININE 0.86 09/06/2024    GLUC 102 09/06/2024    GLUF 127 (H) 08/09/2024    CALCIUM 8.9 09/06/2024    AST 15 09/06/2024    ALT 12 09/06/2024    ALKPHOS 90 09/06/2024    PROT 7.0 04/21/2018    TP 7.1 09/06/2024    BILITOT 0.3 04/21/2018    TBILI 0.31 09/06/2024    EGFR 70 09/06/2024     Imaging:   XR chest 1 view portable    Result Date: 9/6/2024  Narrative: XR CHEST PORTABLE INDICATION: trauma. COMPARISON: Compared with 5/24/2024 FINDINGS: Clear lungs. No pneumothorax or pleural effusion. Normal cardiomediastinal silhouette. Bones are unremarkable for age. Normal upper abdomen.     Impression: No acute cardiopulmonary disease. Workstation performed: [id number]     TRAUMA - CT chest abdomen pelvis w contrast    Result Date: 9/6/2024  Narrative: CT CHEST, ABDOMEN AND PELVIS WITH IV CONTRAST INDICATION: TRAUMA. COMPARISON: Prior chest CT study dated 6/12/2024 and CT scan of the abdomen pelvis dated  3/16/2023. TECHNIQUE: CT examination of the chest, abdomen and pelvis was performed. Multiplanar 2D reformatted images were created from the source data. This examination, like all CT scans performed in the [facility], was performed utilizing techniques to minimize radiation dose exposure, including the use of iterative reconstruction and automated exposure control. Radiation dose length product (DLP) for this visit: 838 mGy-cm IV Contrast: 100 mL of iohexol (OMNIPAQUE) Enteric Contrast: Not administered. FINDINGS: CHEST LUNGS: Mild emphysema. Patchy dependent atelectasis in the visualized lower lobes. Stable 7 mm subpleural groundglass nodule left lower lobe on series 4, image 97. No new nodules or masses. Mild scarring at the lung apices. No obvious endobronchial lesion. PLEURA: Unremarkable. HEART/GREAT VESSELS: Heart is unremarkable for patient's age. No thoracic aortic aneurysm. Moderate coronary artery and scattered aortic calcifications. No pericardial effusion. MEDIASTINUM AND KEYSHA: No mediastinal hematoma. There are tiny mediastinal lymph nodes but these are not frankly enlarged by CT size criteria. No significant hilar or axillary lymphadenopathy. CHEST WALL AND LOWER NECK: Status post thyroidectomy. ABDOMEN LIVER/BILIARY TREE: Slight prominence of the common bile duct is stable likely due to a postcholecystectomy state. GALLBLADDER: Status post cholecystectomy. SPLEEN: Unremarkable. PANCREAS: Unremarkable. ADRENAL GLANDS: Unremarkable. KIDNEYS/URETERS: Tiny bilateral renal cysts. No hydronephrosis or hydroureter. STOMACH AND BOWEL: Tiny hiatal hernia. No bowel obstruction or focal inflammatory process. APPENDIX: Status post appendectomy. ABDOMINOPELVIC CAVITY: No ascites. No pneumoperitoneum. No lymphadenopathy. VESSELS: The abdominal artery is calcified. No retroperitoneal hematoma PELVIS REPRODUCTIVE ORGANS: Status post hysterectomy. URINARY BLADDER: Unremarkable. ABDOMINAL  WALL/INGUINAL REGIONS: Unremarkable. BONES: No acute fracture or suspicious osseous lesion. Broad-based Schmorl's node superior endplate of T12 is stable.     Impression: No acute traumatic CT findings. Stable 7 mm subpleural groundglass nodule left lower lobe. Emphysema. Additional findings as above. Workstation performed: [id number]     TRAUMA - CT spine cervical wo contrast    Result Date: 9/6/2024  Narrative: CT CERVICAL SPINE - WITHOUT CONTRAST INDICATION:   TRAUMA. COMPARISON: 5/25/2024. TECHNIQUE:  CT examination of the cervical spine was performed without intravenous contrast.  Contiguous axial images were obtained. Multiplanar 2D reformatted images were created from the source data. Radiation dose length product (DLP) for this visit:  365 mGy-cm .  This examination, like all CT scans performed in the [facility], was performed utilizing techniques to minimize radiation dose exposure, including the use of iterative reconstruction and automated exposure control. IMAGE QUALITY:  Diagnostic. FINDINGS: ALIGNMENT:  Normal alignment of the cervical spine. No subluxation. VERTEBRAE: There is a well-corticated osseous fragment redemonstrated along the upper half of the dens. This may either reflect an os odontoideum or sequela of remote fracture. Nevertheless, this is unchanged. Smoothly marginated chronic appearing erosion seen along the posterior C2 vertebral body, unchanged. No acute fractures seen. DEGENERATIVE CHANGES: Mild multilevel cervical degenerative changes are noted without critical central canal stenosis. PREVERTEBRAL AND PARASPINAL SOFT TISSUES: No prevertebral soft tissue edema. Status post thyroidectomy. THORACIC INLET: Emphysematous changes partially seen.     Impression: Well-corticated osseous fragment redemonstrated along the upper half of the dens. This may either reflect an os odontoideum or sequela of remote fracture. Nevertheless, this is unchanged. No acute fracture or  spondylolisthesis. Workstation performed: [id number]     TRAUMA - CT head wo contrast    Result Date: 9/6/2024  Narrative: CT BRAIN - WITHOUT CONTRAST INDICATION:   TRAUMA. COMPARISON: 9/16/2019. TECHNIQUE:  CT examination of the brain was performed.  Multiplanar 2D reformatted images were created from the source data. Radiation dose length product (DLP) for this visit:  884 mGy-cm .  This examination, like all CT scans performed in the [facility], was performed utilizing techniques to minimize radiation dose exposure, including the use of iterative reconstruction and automated exposure control. IMAGE QUALITY:  Diagnostic. FINDINGS: PARENCHYMA:  No intracranial mass, mass effect or midline shift. No CT signs of acute infarction.  No acute parenchymal hemorrhage. There is artifact arising from the left supraclinoid coiling. VENTRICLES AND EXTRA-AXIAL SPACES:  Normal for the patient's age. VISUALIZED ORBITS: Normal visualized orbits. PARANASAL SINUSES: Normal visualized paranasal sinuses. CALVARIUM AND EXTRACRANIAL SOFT TISSUES:  Normal.     Impression: No acute intracranial abnormality. Workstation performed: [id number]       Please note:  This report has been generated by a voice recognition software system. Therefore there may be syntax, spelling, and/or grammatical errors. Please call if you have any questions.

## 2024-09-11 DIAGNOSIS — J44.9 COPD, SEVERITY TO BE DETERMINED (HCC): Primary | ICD-10-CM

## 2024-09-11 LAB
DME PARACHUTE DELIVERY DATE REQUESTED: NORMAL
DME PARACHUTE ITEM DESCRIPTION: NORMAL
DME PARACHUTE ORDER STATUS: NORMAL
DME PARACHUTE SUPPLIER NAME: NORMAL
DME PARACHUTE SUPPLIER PHONE: NORMAL

## 2024-09-11 RX ORDER — FLUTICASONE FUROATE, UMECLIDINIUM BROMIDE AND VILANTEROL TRIFENATATE 200; 62.5; 25 UG/1; UG/1; UG/1
1 POWDER RESPIRATORY (INHALATION) DAILY
Qty: 60 BLISTER | Refills: 5 | Status: SHIPPED | OUTPATIENT
Start: 2024-09-11

## 2024-09-12 ENCOUNTER — CLINICAL SUPPORT (OUTPATIENT)
Dept: INTERNAL MEDICINE CLINIC | Facility: CLINIC | Age: 69
End: 2024-09-12
Payer: COMMERCIAL

## 2024-09-12 DIAGNOSIS — E53.8 B12 DEFICIENCY: Primary | ICD-10-CM

## 2024-09-12 PROCEDURE — 96372 THER/PROPH/DIAG INJ SC/IM: CPT

## 2024-09-12 RX ADMIN — CYANOCOBALAMIN 1000 MCG: 1000 INJECTION, SOLUTION INTRAMUSCULAR; SUBCUTANEOUS at 11:23

## 2024-09-13 ENCOUNTER — TELEPHONE (OUTPATIENT)
Dept: HEMATOLOGY ONCOLOGY | Facility: CLINIC | Age: 69
End: 2024-09-13

## 2024-09-13 DIAGNOSIS — D50.8 IRON DEFICIENCY ANEMIA SECONDARY TO INADEQUATE DIETARY IRON INTAKE: Primary | ICD-10-CM

## 2024-09-13 DIAGNOSIS — G25.81 RLS (RESTLESS LEGS SYNDROME): ICD-10-CM

## 2024-09-13 DIAGNOSIS — K50.919 CROHN'S DISEASE WITH COMPLICATION, UNSPECIFIED GASTROINTESTINAL TRACT LOCATION (HCC): ICD-10-CM

## 2024-09-13 RX ORDER — SODIUM CHLORIDE 9 MG/ML
20 INJECTION, SOLUTION INTRAVENOUS ONCE
OUTPATIENT
Start: 2024-09-25

## 2024-09-13 NOTE — TELEPHONE ENCOUNTER
Please schedule patient for Venofer infusions at the [facility] within the next 2 weeks.  Thanks.

## 2024-09-15 DIAGNOSIS — K64.9 HEMORRHOIDS, UNSPECIFIED HEMORRHOID TYPE: ICD-10-CM

## 2024-09-16 ENCOUNTER — CLINICAL SUPPORT (OUTPATIENT)
Dept: PULMONOLOGY | Facility: HOSPITAL | Age: 69
End: 2024-09-16
Payer: COMMERCIAL

## 2024-09-16 DIAGNOSIS — J44.1 COPD EXACERBATION (HCC): Primary | ICD-10-CM

## 2024-09-16 PROCEDURE — G0239 OTH RESP PROC, GROUP: HCPCS

## 2024-09-16 RX ORDER — GABAPENTIN 300 MG/1
CAPSULE ORAL
Qty: 30 CAPSULE | Refills: 0 | Status: SHIPPED | OUTPATIENT
Start: 2024-09-16

## 2024-09-16 NOTE — TELEPHONE ENCOUNTER
Called patient to inform her of newly scheduled appointments. Message and call back number was left

## 2024-09-17 ENCOUNTER — APPOINTMENT (OUTPATIENT)
Dept: PULMONOLOGY | Facility: HOSPITAL | Age: 69
End: 2024-09-17
Payer: COMMERCIAL

## 2024-09-17 LAB

## 2024-09-18 NOTE — PROGRESS NOTES
Pulmonary Rehabilitation   Assessment and Individualized Treatment Plan  60 DAY      Today's date: 2024  # of Exercise Sessions Completed: 13  Patient name: Kellen Gray     : 1955       MRN: [id number]  Referring Physician: Dannie Dutton DO  Pulmonologist: Eugenie Salmeron DO  Provider: Mohinder  Clinician: Ina Siegel RN    Dx:  COPD  HX CHF  EF65%   T2D      Date of onset: 24      Treatment is tailored to this patient's individual needs.  The ITP was reviewed with the patient and all questions were answered to their satisfaction.  Additional ITP documentation can be found electronically including daily and monthly exercise summaries, daily session notes with ECG summaries, education notes, daily medication reconciliation, and daily physician supervision.      COMMENTS:  Kellen returned to Pulm rehab on . She fell at home on her steps and bruised her side. She was seen in the ER on . She also had blood work  on 9/10 and has a low iron .     ASSESSMENT      Medical History:   Past Medical History:   Diagnosis Date    A-fib (HCC)     Acute respiratory failure with hypoxia (HCC) 2019    Anxiety     Arthritis     Asthma     Brain aneurysm     Cancer (HCC)     papillary thyroid cancer    Cardiac disease     CHF (congestive heart failure) (HCC)     COPD (chronic obstructive pulmonary disease) (HCC)     CPAP (continuous positive airway pressure) dependence     Crohn disease (HCC)     Depression     Diabetes mellitus (HCC)     Disease of thyroid gland     Diverticulitis of colon     Emphysema of lung (HCC)     GERD (gastroesophageal reflux disease)     HL (hearing loss)     Hyperlipidemia     Hypertension     Sleep apnea     Sleep apnea, obstructive     Visual impairment        Family History:  Family History   Problem Relation Age of Onset    Alzheimer's disease Mother     Asthma Mother     Cancer Father         dead    Heart disease Sister     Hypertension Sister          under control    Diabetes Sister     Stroke Sister     Cancer Sister         dead    Stroke Sister     Heart disease Sister         dead    Diabetes Sister     Cancer Brother         liver with mets to bone    Heart disease Brother     Cancer Brother         dead    Asthma Daughter     Cancer Brother        Allergies:   Sulfa antibiotics    Current Medications:   Current Outpatient Medications   Medication Sig Dispense Refill    albuterol (ACCUNEB) 0.63 MG/3ML nebulizer solution Take 0.63 mg by nebulization every 6 (six) hours as needed for wheezing      albuterol (PROVENTIL HFA,VENTOLIN HFA) 90 mcg/act inhaler Inhale 2 puffs every 4 (four) hours as needed for wheezing      apixaban (Eliquis) 5 mg Take 1 tablet by mouth twice daily 90 tablet 3    atorvastatin (LIPITOR) 20 mg tablet Take 20 mg by mouth every morning      Blood Glucose Monitoring Suppl (OneTouch Verio) w/Device KIT by Device route in the morning (Patient not taking: Reported on 5/22/2024) 1 kit 0    Cholecalciferol (Vitamin D3) 1.25 MG (33011 UT) CAPS Take 1 capsule by mouth in the morning      dicyclomine (BENTYL) 10 mg capsule Take 1 capsule (10 mg total) by mouth 4 (four) times a day (before meals and at bedtime) 30 capsule 1    dofetilide (TIKOSYN) 500 mcg capsule Take 500 mcg by mouth 2 (two) times a day      Empagliflozin 25 MG TABS Take 1 tablet (25 mg total) by mouth daily (Patient taking differently: Take 25 mg by mouth 2 (two) times a day) 90 tablet 3    esomeprazole (NexIUM) 40 MG capsule TAKE 1 CAPSULE BY MOUTH TWICE DAILY 30 MINUTES BEFORE BREAKFAST AND 30 MINUTES BEFORE DINNER 60 capsule 5    famotidine (PEPCID) 40 MG tablet TAKE 1 TABLET BY MOUTH ONCE DAILY AT BEDTIME 30 tablet 5    fluticasone (FLONASE) 50 mcg/act nasal spray 1 spray into each nostril daily (Patient not taking: Reported on 8/26/2024) 1 g 3    fluticasone-umeclidinium-vilanterol (Trelegy Ellipta) 200-62.5-25 mcg/actuation AEPB inhaler Inhale 1 puff daily Rinse mouth  after use. 60 blister 5    furosemide (LASIX) 20 mg tablet Take 20 mg by mouth 2 (two) times a day      gabapentin (NEURONTIN) 300 mg capsule Take 1 capsule by mouth twice daily 30 capsule 0    glucose blood test strip Use to check sugars once a day. Dx E11.9 (Patient not taking: Reported on 8/26/2024)      hydrOXYzine HCL (ATARAX) 10 mg tablet Take 1 tablet by mouth twice daily 60 tablet 2    Lancets (ONETOUCH DELICA PLUS [id number]) MISC  (Patient not taking: Reported on 8/26/2024)      lisinopril (ZESTRIL) 10 mg tablet Take 10 mg by mouth every morning      mesalamine (DELZICOL) 400 mg Take 2 capsules by mouth twice daily 360 capsule 3    metoprolol succinate (TOPROL-XL) 100 mg 24 hr tablet TAKE 1 TABLET BY MOUTH EVERY 12 HOURS (Patient not taking: Reported on 8/26/2024) 60 tablet 5    ondansetron (ZOFRAN) 4 mg tablet Take 1 tablet (4 mg total) by mouth every 8 (eight) hours as needed for nausea or vomiting 20 tablet 0    OneTouch Verio test strip Use 1 each in the morning Use as instructed (Patient not taking: Reported on 8/26/2024) 100 each 3    pancrelipase, Lip-Prot-Amyl, (Creon) 24,000 units Take 3 capsules (72,000 Units total) by mouth 2 (two) times a day 180 capsule 1    Synthroid 137 MCG tablet Take 1 tablet (137 mcg total) by mouth daily (Patient not taking: Reported on 8/26/2024) 90 tablet 1    Synthroid 150 MCG tablet Take 150 mcg by mouth daily       Current Facility-Administered Medications   Medication Dose Route Frequency Provider Last Rate Last Admin    cyanocobalamin injection 1,000 mcg  1,000 mcg Intramuscular Q30 Days    1,000 mcg at 09/12/24 1123       Physical Limitations: both knees arthritis    Fall Risk: Low she fell at home . She had cookies and milk in her both hands and tripped on 2 steps.    Comments: Ambulates with a steady gait with no assist device    Cultural needs: none    PFT:  Yes     FEV1/FVC ratio of 74%   FEV1 of 68% predicted  moderate restriction.    Medication compliance:  Yes  Comments: Pt reports to be compliant with medications      Pulmonary Disease Risk Factors:  second hand smoke  smoking  asthma    Sleep Disorders:   obstructive sleep apnea:  CPCP/BiPAP:  yes      EXERCISE ASSESSMENT:      Initial Fitness Assessment: 6MWT:  Resting:    Resting:  BP: 104/60  HR: 55  SpO2: 95  Dyspnea: 0  O2 Use: 0 l/min, Exercise:  BP: 120/56  HR: 79  SpO2: 91-97%  Dyspnea: 4  O2 use: 0 l/min, METs:  2.59, ECG Summary: NSR, Symptoms: right sided chest pressure, Test terminated at:  6min, and Comments: good effort 1100 ft      Current Functional Status:  Occupation: retired  Recreation/Physical activity: "[employer]  ADL’s:able to perform self-care resumed driving  [place]: able to perform self-care  Exercise:   started walking  15 min  Home exercise equipment: no  Other Comments:     SMART Exercise Goals:   reduced score on CAT and reduced RPD at rest    Patient Specific EXERCISE GOALS:     attend pulmonary rehab regularly, decrease sitting time at home, begin a consistent exercise regimen , increased muscular strength, increased energy, and increased stamina with ADLs    PSYCHOSOCIAL ASSESSMENT:    Date of last assessment: 5/24/24  Depression screening:  PHQ-9 = 4    Interpretation:  1-4 = Minimal Depression  Anxiety screening:  JANUARY-7 = 4    Interpretation: 0-4  = Not anxious    Pt self-report of depression and anxiety   Patient has a history of anxiety   Financial worries    Self-reported stress level:  8  Stress Management: practice Relaxation Techniques, exercise, and enjoy a hobby take up "[employer]"    Quality of Life Screen:  (Higher score indicates disease impact on QOL)  [facility] COOP score: 31/40      CAT: 29/40      Shortness of breath questionnaire: 74/120    Social Support:   spouse, children, and grandchildren  Community/Social Activities: none    SMART Goals:    Physical Fitness in [facility] Score < 3, Daily Activity in [facility] Score < 3, Social Activities in [facility] Score  "< 3, Pain in [facility] Score < 3, Overall Health in [facility] Score < 3, and Quality of Life in [facility] Score < 3     Patient Specific PSYCHOCOSOCIAL GOALS:    maintain compliance with medical therapy, begin using [employer], address emotional health concerns with PCP, and she had been seeing a therapist but states she is coping better now.      Psychosocial Assessment as it relates to rehabilitation: Patient denies issues with his/her family or home life that may affect their rehabilitation efforts.       NUTRITION ASSESSMENT:    Initial Weight:  214  Current Weight: 215    Height:   Ht Readings from Last 1 Encounters:   09/10/24 5' 9\" (1.753 m)       Rate Your Plate Score: 47/81    Self-reported Current Dietary Habits:  She is not watching sodium-eating some lunchmeats and drinking soda.She is eating what she likes but cutting the portion size    ETOH:   Social History     Substance and Sexual Activity   Alcohol Use Yes    Comment: social       SMART Goals:   improved A1c  < 7.0%, Improved Rate Your Plate score  >58, Wt. loss 1 ppw,  goal of 10 lbs., eat 6 or more servings of grain products per day, eat whole grain breads, brown rice and whole grain cereals, eat 5 or more servings of fruits and vegetables a day, eat 2 or more servings of low fat milk or yogurt a day, eat no more than 6oz of meat per day, eat red meat once a week or less, and rarely eat processed meats or eat low fat processed meats    Patient Specific NUTRITION GOALS:    increase water intake to reduce/thin mucus production weight loss improve Aic      OTHER CORE COMPONENT ASSESSMENT:    Hospitalizations in the past year: 1    Oxygen needs:   Rest:  room air  Exercise/physical activity:  room air  Sleep:   room air    Does Pt monitor home SpO2? Yes  she doesn`t use it   Average SpO2 at rest:  %doesn`t check   Average SpO2 with ADLs/physical activity:  %      Use of Rescue Inhaler:  hasn`t needed it    Use of Maintenance Inhaler: Yes:  1 times " per day    Use of Nebulizer Treatments:  No    Patient practices breathing techniques at home:  Yes    CAD Risk Factors:  Cholesterol: Yes  HTN: Yes  DM: Type 2   Obesity: Yes     Smoking: Former user    SMART Core Component Goals:   medication compliance and demonstrate pursed lipped breathing    Patient Specific CORE COMPONENT GOALS:    medication compliance, compliance with CPAP, monitor home pulse oximetry, and Be able to walk better      Blood Pressure:        Heart rate            SO2    Restin/60                59                       97%  Exercise: 120/70               72-85                  94-97%  Recovery: 128/58              73                      98%      INDIVIDUALIZED TREATMENT PLAN    The patient is agreeable to attend 24 pulmonary rehab exercise sessions.      EXERCISE GOALS AND PLAN    Current Aerobic Exercise Prescription       Frequency: 2 days/week   Supplement with home exercise 2+ days/wk as tolerated        Minutes: 40        METS: 2.36              SpO2: 94-97%              RPD: 4-6                      HR:  20-30bpm above resting   RPE: 4-5         Modalities: UBE, NuStep, and Recumbent bike      Aerobic Exercise Prescription Plan for Progression:    Frequency: 2 days/week    Supplement with home exercise 2+ days/wk as tolerated       Minutes: 30-40        METS: 2/5-3.5              SpO2: >88%              RPD: 4-6                      HR:Intensity based on RPE 4-6    RPE: 4-5        Modalities: Treadmill, UBE, NuStep, Recumbent bike, and Room walking        Supplemental Oxygen Needs with Exercise:  room air.    Strength trainin-3 days / week  12-15 repetitions  1-2 sets per modality   Will be added following 2-3 weeks of monitored exercise sessions   Modalities: Chest Press, Pull Downs, Lateral Raise, Arm Curl, Front Raises, and Shoulder Shrugs    Education: pursed lipped breathing, diaphragmatic breathing, home exercise guidelines, benefits of exercise for pulmonary disease,  RPE scale, RPD scale, O2 saturation monitoring, appropriate O2 response to exercise, energy conservation, and education class: Exercise For The Pulmonary Patient    Progress toward Exercise Goals:    Reviewed Pt goals and determined plan of care, Will continue to educate and progress as tolerated.    Exercise Plan:  learn to conserve energy with ADLs , practice diaphragmatic breathing, utilize PLB with physical activity, and begin a home exercise program     Readiness to change: Action:  (Changing behavior)      NUTRITION GOALS AND PLAN    Progress toward Nutritional goals:    Reviewed Pt goals and determined plan of care, Will continue to educate and progress as tolerated.    Nutrition Plan: group Class: Heart Healthy Eating, increase intake of whole grains, replace refined flours with whole grains, increase daily intake of fruits and vegetables, increase daily intake of low fat dairy, limit meat intake to less than 6oz per day, and choose healthy meals while dining out    SMART goals are based Rate Your Plate Dietary Self-Assessment. These are the areas in which the patient could score higher on the assessment.  Goals include recommendations for a heart healthy diet based on American Heart Association.    Nutrition Education: low sodium diet  maintaining hydration  nutrition for  lipid management  target goal for A1c <7.0  exercise and diabetes management   portion control    Readiness to change: Contemplation:  (Acknowledging that there is a problem but not yet ready or sure of wanting to make a change)      PSYCHOSOCIAL GOALS AND PLAN    Information to begin using [employer] was provided as well as contact information for counseling through  [facility].    Progress toward Psychosocial goals:    Reviewed Pt goals and determined plan of care, Will continue to educate and progress as tolerated. She states she was feeling better when she was coming regularly to rehab  plans on coming regularly again after  her fall.    Psychosocial: Class: Relaxation, Class:  Stress and Pulmonary Disease, Enroll in [employer], Practice relaxation techniques, Exercise, Spend time outdoors, Join a support group , and Enjoy family    Psychosocial Education: signs/sxs of depression, benefits of a positive support system, and class:  Stress and Pulmonary Disease    Readiness to change: Contemplation:  (Acknowledging that there is a problem but not yet ready or sure of wanting to make a change)      OTHER CORE COMPONENTS GOALS AND PLAN    Tobacco Use Intervention:     N/A: Pt has a remote history of smoking    Oxygen Goal: Maintain SpO2>88% during exercise or as advised by pulmonolgist    Progress toward Core Component goals:    Reviewed Pt goals and determined plan of care, Will continue to educate and progress as tolerated.    Core Component Plan: avoid processed foods, engage in regular exercise, eliminate salt shaker at the table, use salt substitutes, check labels for sodium content, group class: Pulmonary Anatomy and Physiology, and group class:  Pulmonary Medications    Core Component Education:  pathophysiology of pulmonary disease, preventing infections, dangers of smoking, tobacco triggers, control coughing, and inspiratory muscle training    Readiness to change: Contemplation:  (Acknowledging that there is a problem but not yet ready or sure of wanting to make a change)

## 2024-09-23 ENCOUNTER — CLINICAL SUPPORT (OUTPATIENT)
Dept: PULMONOLOGY | Facility: HOSPITAL | Age: 69
End: 2024-09-23
Payer: COMMERCIAL

## 2024-09-23 DIAGNOSIS — D50.8 IRON DEFICIENCY ANEMIA SECONDARY TO INADEQUATE DIETARY IRON INTAKE: Primary | ICD-10-CM

## 2024-09-23 DIAGNOSIS — K50.919 CROHN'S DISEASE WITH COMPLICATION, UNSPECIFIED GASTROINTESTINAL TRACT LOCATION (HCC): ICD-10-CM

## 2024-09-23 DIAGNOSIS — J44.1 COPD EXACERBATION (HCC): Primary | ICD-10-CM

## 2024-09-23 DIAGNOSIS — G25.81 RLS (RESTLESS LEGS SYNDROME): ICD-10-CM

## 2024-09-23 PROCEDURE — G0239 OTH RESP PROC, GROUP: HCPCS

## 2024-09-23 RX ORDER — SODIUM CHLORIDE 9 MG/ML
20 INJECTION, SOLUTION INTRAVENOUS ONCE
Status: CANCELLED | OUTPATIENT
Start: 2024-09-25

## 2024-09-24 ENCOUNTER — CLINICAL SUPPORT (OUTPATIENT)
Dept: PULMONOLOGY | Facility: HOSPITAL | Age: 69
End: 2024-09-24
Payer: COMMERCIAL

## 2024-09-24 DIAGNOSIS — J44.1 COPD EXACERBATION (HCC): Primary | ICD-10-CM

## 2024-09-24 PROCEDURE — G0239 OTH RESP PROC, GROUP: HCPCS

## 2024-09-25 ENCOUNTER — HOSPITAL ENCOUNTER (OUTPATIENT)
Dept: INFUSION CENTER | Facility: HOSPITAL | Age: 69
Discharge: HOME | End: 2024-09-25
Attending: INTERNAL MEDICINE
Payer: COMMERCIAL

## 2024-09-25 VITALS
SYSTOLIC BLOOD PRESSURE: 125 MMHG | DIASTOLIC BLOOD PRESSURE: 79 MMHG | HEART RATE: 66 BPM | TEMPERATURE: 98.3 F | OXYGEN SATURATION: 96 % | RESPIRATION RATE: 16 BRPM

## 2024-09-25 DIAGNOSIS — K50.919 CROHN'S DISEASE WITH COMPLICATION, UNSPECIFIED GASTROINTESTINAL TRACT LOCATION (HCC): Primary | ICD-10-CM

## 2024-09-25 DIAGNOSIS — D50.8 IRON DEFICIENCY ANEMIA SECONDARY TO INADEQUATE DIETARY IRON INTAKE: ICD-10-CM

## 2024-09-25 DIAGNOSIS — G25.81 RLS (RESTLESS LEGS SYNDROME): ICD-10-CM

## 2024-09-25 PROCEDURE — 96365 THER/PROPH/DIAG IV INF INIT: CPT

## 2024-09-25 RX ORDER — SODIUM CHLORIDE 9 MG/ML
20 INJECTION, SOLUTION INTRAVENOUS ONCE
Status: COMPLETED | OUTPATIENT
Start: 2024-09-25 | End: 2024-09-25

## 2024-09-25 RX ORDER — SODIUM CHLORIDE 9 MG/ML
20 INJECTION, SOLUTION INTRAVENOUS ONCE
Status: CANCELLED | OUTPATIENT
Start: 2024-10-02

## 2024-09-25 RX ADMIN — IRON SUCROSE 300 MG: 20 INJECTION, SOLUTION INTRAVENOUS at 14:10

## 2024-09-25 RX ADMIN — SODIUM CHLORIDE 20 ML/HR: 0.9 INJECTION, SOLUTION INTRAVENOUS at 14:11

## 2024-09-25 NOTE — PROGRESS NOTES
Pt tolerated Venofer infusion well without complications. PIV removed without incident.     Kellen Gray is aware of future appt on 10/2/24 at 12PM.     AVS printed and given to Kellen Gray.    Pt discharged off unit in stable condition with all personal belongings.

## 2024-09-30 ENCOUNTER — CLINICAL SUPPORT (OUTPATIENT)
Dept: PULMONOLOGY | Facility: HOSPITAL | Age: 69
End: 2024-09-30
Payer: COMMERCIAL

## 2024-09-30 ENCOUNTER — APPOINTMENT (OUTPATIENT)
Dept: PULMONOLOGY | Facility: HOSPITAL | Age: 69
End: 2024-09-30
Payer: COMMERCIAL

## 2024-09-30 DIAGNOSIS — J44.1 COPD EXACERBATION (HCC): Primary | ICD-10-CM

## 2024-09-30 PROCEDURE — G0239 OTH RESP PROC, GROUP: HCPCS

## 2024-10-01 ENCOUNTER — TELEPHONE (OUTPATIENT)
Dept: PULMONOLOGY | Facility: CLINIC | Age: 69
End: 2024-10-01

## 2024-10-01 ENCOUNTER — APPOINTMENT (OUTPATIENT)
Dept: CARDIAC REHAB | Facility: HOSPITAL | Age: 69
End: 2024-10-01
Payer: COMMERCIAL

## 2024-10-01 ENCOUNTER — CLINICAL SUPPORT (OUTPATIENT)
Dept: PULMONOLOGY | Facility: HOSPITAL | Age: 69
End: 2024-10-01
Payer: COMMERCIAL

## 2024-10-01 DIAGNOSIS — J44.1 COPD EXACERBATION (HCC): Primary | ICD-10-CM

## 2024-10-01 LAB

## 2024-10-01 PROCEDURE — 94625 PHY/QHP OP PULM RHB W/O MNTR: CPT

## 2024-10-01 PROCEDURE — G0239 OTH RESP PROC, GROUP: HCPCS

## 2024-10-02 ENCOUNTER — HOSPITAL ENCOUNTER (OUTPATIENT)
Dept: INFUSION CENTER | Facility: HOSPITAL | Age: 69
Discharge: HOME | End: 2024-10-02
Attending: INTERNAL MEDICINE
Payer: COMMERCIAL

## 2024-10-02 VITALS
RESPIRATION RATE: 19 BRPM | TEMPERATURE: 98.4 F | SYSTOLIC BLOOD PRESSURE: 103 MMHG | HEART RATE: 56 BPM | DIASTOLIC BLOOD PRESSURE: 68 MMHG

## 2024-10-02 DIAGNOSIS — D50.8 IRON DEFICIENCY ANEMIA SECONDARY TO INADEQUATE DIETARY IRON INTAKE: ICD-10-CM

## 2024-10-02 DIAGNOSIS — G25.81 RLS (RESTLESS LEGS SYNDROME): ICD-10-CM

## 2024-10-02 DIAGNOSIS — K50.919 CROHN'S DISEASE WITH COMPLICATION, UNSPECIFIED GASTROINTESTINAL TRACT LOCATION (HCC): Primary | ICD-10-CM

## 2024-10-02 PROCEDURE — 96365 THER/PROPH/DIAG IV INF INIT: CPT

## 2024-10-02 RX ORDER — SODIUM CHLORIDE 9 MG/ML
20 INJECTION, SOLUTION INTRAVENOUS ONCE
Status: CANCELLED | OUTPATIENT
Start: 2024-10-09

## 2024-10-02 RX ORDER — SODIUM CHLORIDE 9 MG/ML
20 INJECTION, SOLUTION INTRAVENOUS ONCE
Status: COMPLETED | OUTPATIENT
Start: 2024-10-02 | End: 2024-10-02

## 2024-10-02 RX ADMIN — SODIUM CHLORIDE 20 ML/HR: 0.9 INJECTION, SOLUTION INTRAVENOUS at 12:25

## 2024-10-02 RX ADMIN — IRON SUCROSE 300 MG: 20 INJECTION, SOLUTION INTRAVENOUS at 12:25

## 2024-10-02 NOTE — TELEPHONE ENCOUNTER
10-2-24 I sent a letter out in the mail today to have radha call us to schedule her fu appt w dr albert in December.

## 2024-10-02 NOTE — PROGRESS NOTES
Kellen Gray tolerated Venofer treatment well with no complications.      Kellen Gray is aware of future appt on 10/9/24 at 1200.     AVS declined.

## 2024-10-03 ENCOUNTER — CLINICAL SUPPORT (OUTPATIENT)
Dept: PULMONOLOGY | Facility: HOSPITAL | Age: 69
End: 2024-10-03
Payer: COMMERCIAL

## 2024-10-03 ENCOUNTER — OFFICE VISIT (OUTPATIENT)
Dept: INTERNAL MEDICINE CLINIC | Facility: CLINIC | Age: 69
End: 2024-10-03
Payer: COMMERCIAL

## 2024-10-03 VITALS
HEIGHT: 69 IN | BODY MASS INDEX: 31.24 KG/M2 | WEIGHT: 210.9 LBS | DIASTOLIC BLOOD PRESSURE: 64 MMHG | TEMPERATURE: 97.9 F | HEART RATE: 61 BPM | SYSTOLIC BLOOD PRESSURE: 112 MMHG | OXYGEN SATURATION: 95 %

## 2024-10-03 DIAGNOSIS — E11.69 TYPE 2 DIABETES MELLITUS WITH OTHER SPECIFIED COMPLICATION, WITHOUT LONG-TERM CURRENT USE OF INSULIN (HCC): ICD-10-CM

## 2024-10-03 DIAGNOSIS — J44.9 COPD, SEVERITY TO BE DETERMINED (HCC): ICD-10-CM

## 2024-10-03 DIAGNOSIS — J44.1 COPD EXACERBATION (HCC): Primary | ICD-10-CM

## 2024-10-03 DIAGNOSIS — F41.9 ANXIETY: Primary | ICD-10-CM

## 2024-10-03 PROCEDURE — G0239 OTH RESP PROC, GROUP: HCPCS

## 2024-10-03 PROCEDURE — G2211 COMPLEX E/M VISIT ADD ON: HCPCS | Performed by: FAMILY MEDICINE

## 2024-10-03 PROCEDURE — 99214 OFFICE O/P EST MOD 30 MIN: CPT | Performed by: FAMILY MEDICINE

## 2024-10-03 RX ORDER — DULOXETIN HYDROCHLORIDE 20 MG/1
20 CAPSULE, DELAYED RELEASE ORAL DAILY
Qty: 90 CAPSULE | Refills: 3 | Status: SHIPPED | OUTPATIENT
Start: 2024-10-03

## 2024-10-03 RX ORDER — HYDROXYZINE HYDROCHLORIDE 25 MG/1
25 TABLET, FILM COATED ORAL 2 TIMES DAILY
Qty: 100 TABLET | Refills: 1 | Status: SHIPPED | OUTPATIENT
Start: 2024-10-03

## 2024-10-03 NOTE — PROGRESS NOTES
Ambulatory Visit  Name: Kellen Gray      : 1955      MRN: [id number]  Encounter Provider: Ina Rivas MD  Encounter Date: 10/3/2024   Encounter department: [facility]    Assessment & Plan  Anxiety    Orders:    DULoxetine (CYMBALTA) 20 mg capsule; Take 1 capsule (20 mg total) by mouth daily    hydrOXYzine HCL (ATARAX) 25 mg tablet; Take 1 tablet (25 mg total) by mouth 2 (two) times a day    Type 2 diabetes mellitus with other specified complication, without long-term current use of insulin (HCC)    Lab Results   Component Value Date    HGBA1C 7.0 (H) 2024            COPD, severity to be determined ([facility])         Orders recommendations as noted above.  Discussed previous fall with her.  She thankfully is doing better.  Be very careful to avoid falls especially with the anticoagulation.  Continue with the pulmonary rehab.  She has recently resumed this after the fall.  Continue to follow blood sugars regularly.  Continue with the gabapentin for the neuropathy symptoms into her feet now beginning in her hands.  Will add Cymbalta to help not only with the neuropathy symptoms but also with the anxiety.  May continue with the Atarax on an as needed basis.  Will increase the dose slightly.  Discussed with her potential side effects.  Will have her follow-up in about 3 to 4 months or sooner if needed.     History of Present Illness     She presents for follow-up.  She had set up this appointment after a fall but those symptoms have resolved.  She had gotten up at night to get something to drink and had bumped hard into the counter hitting her right groin area.  She had fallen backward.  She had difficulty getting up.  Was evaluated through the emergency room and has been improving especially over the last few days.  Did recently resume the pulmonary rehab and has been doing well with this.  Blood sugars have been relatively well-controlled.  Has been having neuropathy  symptoms long-term into the feet but now is beginning with some in the hands.  Has noticed her anxiety symptoms have worsened.  Has been taking the Atarax which helps slightly but is not adequate at this point.        History obtained from : patient  Review of Systems   Constitutional:  Negative for activity change, appetite change, chills and fever.   HENT:  Negative for congestion and rhinorrhea.    Eyes:  Negative for visual disturbance.   Respiratory:  Negative for chest tightness and shortness of breath.    Cardiovascular:  Negative for chest pain and palpitations.   Gastrointestinal:  Negative for abdominal pain, blood in stool, diarrhea, nausea and vomiting.   Endocrine: Negative for polydipsia, polyphagia and polyuria.   Genitourinary:  Negative for dysuria, frequency and urgency.   Musculoskeletal:  Positive for arthralgias, gait problem and myalgias.   Skin:  Negative for color change.   Neurological:  Positive for numbness. Negative for dizziness and headaches.   Hematological:  Does not bruise/bleed easily.   Psychiatric/Behavioral:  Positive for decreased concentration and dysphoric mood. Negative for confusion and sleep disturbance. The patient is nervous/anxious.      Medical History Reviewed by provider this encounter:  Meds       Past Medical History   Past Medical History:   Diagnosis Date    A-fib (HCC)     Acute respiratory failure with hypoxia (HCC) 11/30/2019    Anxiety     Arthritis     Asthma     Brain aneurysm     Cancer (HCC)     papillary thyroid cancer    Cardiac disease     CHF (congestive heart failure) (HCC)     COPD (chronic obstructive pulmonary disease) (HCC)     CPAP (continuous positive airway pressure) dependence     Crohn disease (HCC)     Depression     Diabetes mellitus (HCC)     Disease of thyroid gland     Diverticulitis of colon     Emphysema of lung (HCC)     GERD (gastroesophageal reflux disease)     HL (hearing loss)     Hyperlipidemia     Hypertension     Sleep apnea      Sleep apnea, obstructive     Visual impairment      Past Surgical History:   Procedure Laterality Date    APPENDECTOMY      BREAST SURGERY  several dates    CARDIOVERSION N/A 02/14/2019    Procedure: CARDIOVERSION;  Surgeon: Lee Brenner MD;  Location: [facility];  Service: Cardiology    CEREBRAL ANEURYSM REPAIR      CHOLECYSTECTOMY      HYSTERECTOMY      IR PELVIC ANGIOGRAM  12/14/2017    VT EXC THROMBOSED HEMORRHOID XTRNL N/A 10/07/2022    Procedure: HEMORRHOIDECTOMY EXCISION;  Surgeon: Leonardo Cash DO;  Location: [facility];  Service: General    THYROIDECTOMY      TONSILLECTOMY AND ADENOIDECTOMY       Family History   Problem Relation Age of Onset    Alzheimer's disease Mother     Asthma Mother     Cancer Father         dead    Heart disease Sister     Hypertension Sister         under control    Diabetes Sister     Stroke Sister     Cancer Sister         dead    Stroke Sister     Heart disease Sister         dead    Diabetes Sister     Cancer Brother         liver with mets to bone    Heart disease Brother     Cancer Brother         dead    Asthma Daughter     Cancer Brother      Current Outpatient Medications on File Prior to Visit   Medication Sig Dispense Refill    albuterol (ACCUNEB) 0.63 MG/3ML nebulizer solution Take 0.63 mg by nebulization every 6 (six) hours as needed for wheezing      albuterol (PROVENTIL HFA,VENTOLIN HFA) 90 mcg/act inhaler Inhale 2 puffs every 4 (four) hours as needed for wheezing      apixaban (Eliquis) 5 mg Take 1 tablet by mouth twice daily 90 tablet 3    atorvastatin (LIPITOR) 20 mg tablet Take 20 mg by mouth every morning      Cholecalciferol (Vitamin D3) 1.25 MG (66849 UT) CAPS Take 1 capsule by mouth in the morning      dofetilide (TIKOSYN) 500 mcg capsule Take 500 mcg by mouth 2 (two) times a day      Empagliflozin 25 MG TABS Take 1 tablet (25 mg total) by mouth daily (Patient taking differently: Take 25 mg by mouth 2 (two) times a day) 90 tablet 3    esomeprazole  (NexIUM) 40 MG capsule TAKE 1 CAPSULE BY MOUTH TWICE DAILY 30 MINUTES BEFORE BREAKFAST AND 30 MINUTES BEFORE DINNER 60 capsule 5    famotidine (PEPCID) 40 MG tablet TAKE 1 TABLET BY MOUTH ONCE DAILY AT BEDTIME 30 tablet 5    fluticasone (FLONASE) 50 mcg/act nasal spray 1 spray into each nostril daily 1 g 3    fluticasone-umeclidinium-vilanterol (Trelegy Ellipta) 200-62.5-25 mcg/actuation AEPB inhaler Inhale 1 puff daily Rinse mouth after use. 60 blister 5    furosemide (LASIX) 20 mg tablet Take 20 mg by mouth 2 (two) times a day      gabapentin (NEURONTIN) 300 mg capsule Take 1 capsule by mouth twice daily 30 capsule 0    lisinopril (ZESTRIL) 10 mg tablet Take 10 mg by mouth every morning      mesalamine (DELZICOL) 400 mg Take 2 capsules by mouth twice daily 360 capsule 3    metoprolol succinate (TOPROL-XL) 100 mg 24 hr tablet TAKE 1 TABLET BY MOUTH EVERY 12 HOURS 60 tablet 5    NON FORMULARY TelePR diabetic monitor, test strips and lancets      pancrelipase, Lip-Prot-Amyl, (Creon) 24,000 units Take 3 capsules (72,000 Units total) by mouth 2 (two) times a day 180 capsule 1    Synthroid 137 MCG tablet Take 1 tablet (137 mcg total) by mouth daily 90 tablet 1     Current Facility-Administered Medications on File Prior to Visit   Medication Dose Route Frequency Provider Last Rate Last Admin    cyanocobalamin injection 1,000 mcg  1,000 mcg Intramuscular Q30 Days    1,000 mcg at 09/12/24 1123     Allergies   Allergen Reactions    Sulfa Antibiotics Rash      Current Outpatient Medications on File Prior to Visit   Medication Sig Dispense Refill    albuterol (ACCUNEB) 0.63 MG/3ML nebulizer solution Take 0.63 mg by nebulization every 6 (six) hours as needed for wheezing      albuterol (PROVENTIL HFA,VENTOLIN HFA) 90 mcg/act inhaler Inhale 2 puffs every 4 (four) hours as needed for wheezing      apixaban (Eliquis) 5 mg Take 1 tablet by mouth twice daily 90 tablet 3    atorvastatin (LIPITOR) 20 mg tablet Take 20 mg by mouth  every morning      Cholecalciferol (Vitamin D3) 1.25 MG (01350 UT) CAPS Take 1 capsule by mouth in the morning      dofetilide (TIKOSYN) 500 mcg capsule Take 500 mcg by mouth 2 (two) times a day      Empagliflozin 25 MG TABS Take 1 tablet (25 mg total) by mouth daily (Patient taking differently: Take 25 mg by mouth 2 (two) times a day) 90 tablet 3    esomeprazole (NexIUM) 40 MG capsule TAKE 1 CAPSULE BY MOUTH TWICE DAILY 30 MINUTES BEFORE BREAKFAST AND 30 MINUTES BEFORE DINNER 60 capsule 5    famotidine (PEPCID) 40 MG tablet TAKE 1 TABLET BY MOUTH ONCE DAILY AT BEDTIME 30 tablet 5    fluticasone (FLONASE) 50 mcg/act nasal spray 1 spray into each nostril daily 1 g 3    fluticasone-umeclidinium-vilanterol (Trelegy Ellipta) 200-62.5-25 mcg/actuation AEPB inhaler Inhale 1 puff daily Rinse mouth after use. 60 blister 5    furosemide (LASIX) 20 mg tablet Take 20 mg by mouth 2 (two) times a day      gabapentin (NEURONTIN) 300 mg capsule Take 1 capsule by mouth twice daily 30 capsule 0    lisinopril (ZESTRIL) 10 mg tablet Take 10 mg by mouth every morning      mesalamine (DELZICOL) 400 mg Take 2 capsules by mouth twice daily 360 capsule 3    metoprolol succinate (TOPROL-XL) 100 mg 24 hr tablet TAKE 1 TABLET BY MOUTH EVERY 12 HOURS 60 tablet 5    NON FORMULARY TelePRM diabetic monitor, test strips and lancets      pancrelipase, Lip-Prot-Amyl, (Creon) 24,000 units Take 3 capsules (72,000 Units total) by mouth 2 (two) times a day 180 capsule 1    Synthroid 137 MCG tablet Take 1 tablet (137 mcg total) by mouth daily 90 tablet 1     Current Facility-Administered Medications on File Prior to Visit   Medication Dose Route Frequency Provider Last Rate Last Admin    cyanocobalamin injection 1,000 mcg  1,000 mcg Intramuscular Q30 Days    1,000 mcg at 24 1123      Social History     Tobacco Use    Smoking status: Former     Current packs/day: 0.00     Types: Cigarettes     Quit date: 2018     Years since quittin.7     "Smokeless tobacco: Never   Vaping Use    Vaping status: Never Used   Substance and Sexual Activity    Alcohol use: Yes     Comment: social    Drug use: Never     Types: Marijuana    Sexual activity: Not Currently     Partners: Male     Birth control/protection: None         Objective     /64 (BP Location: Left arm, Patient Position: Sitting, Cuff Size: Large)   Pulse 61   Temp 97.9 °F (36.6 °C)   Ht 5' 9\" (1.753 m)   Wt 95.7 kg (210 lb 14.4 oz)   SpO2 95%   BMI 31.14 kg/m²     Physical Exam  Vitals and nursing note reviewed.   Constitutional:       General: She is not in acute distress.     Appearance: She is well-developed, well-groomed and overweight.   HENT:      Head: Normocephalic and atraumatic.   Eyes:      General:         Right eye: No discharge.         Left eye: No discharge.      Conjunctiva/sclera: Conjunctivae normal.      Pupils: Pupils are equal, round, and reactive to light.   Cardiovascular:      Rate and Rhythm: Normal rate. Rhythm irregularly irregular.      Heart sounds: Normal heart sounds. No murmur heard.     No friction rub. No gallop.   Pulmonary:      Effort: No respiratory distress.      Breath sounds: Decreased breath sounds present. No wheezing or rales.   Abdominal:      General: Bowel sounds are normal. There is no distension.      Tenderness: There is no abdominal tenderness.   Lymphadenopathy:      Cervical: No cervical adenopathy.   Skin:     General: Skin is warm and dry.   Neurological:      Mental Status: She is alert and oriented to person, place, and time.   Psychiatric:         Mood and Affect: Mood is anxious.         Speech: Speech normal.         Behavior: Behavior normal. Behavior is cooperative.         Cognition and Memory: Cognition and memory normal.         "

## 2024-10-04 NOTE — ASSESSMENT & PLAN NOTE
Orders:    DULoxetine (CYMBALTA) 20 mg capsule; Take 1 capsule (20 mg total) by mouth daily    hydrOXYzine HCL (ATARAX) 25 mg tablet; Take 1 tablet (25 mg total) by mouth 2 (two) times a day

## 2024-10-07 ENCOUNTER — CLINICAL SUPPORT (OUTPATIENT)
Dept: PULMONOLOGY | Facility: HOSPITAL | Age: 69
End: 2024-10-07
Payer: COMMERCIAL

## 2024-10-07 ENCOUNTER — APPOINTMENT (OUTPATIENT)
Dept: CARDIAC REHAB | Facility: HOSPITAL | Age: 69
End: 2024-10-07
Payer: COMMERCIAL

## 2024-10-07 DIAGNOSIS — J44.1 COPD EXACERBATION (HCC): Primary | ICD-10-CM

## 2024-10-07 PROCEDURE — G0239 OTH RESP PROC, GROUP: HCPCS

## 2024-10-08 ENCOUNTER — CLINICAL SUPPORT (OUTPATIENT)
Dept: PULMONOLOGY | Facility: HOSPITAL | Age: 69
End: 2024-10-08
Payer: COMMERCIAL

## 2024-10-08 DIAGNOSIS — J44.1 COPD EXACERBATION (HCC): Primary | ICD-10-CM

## 2024-10-08 DIAGNOSIS — K64.9 HEMORRHOIDS, UNSPECIFIED HEMORRHOID TYPE: ICD-10-CM

## 2024-10-08 DIAGNOSIS — K86.81 EXOCRINE PANCREATIC INSUFFICIENCY: ICD-10-CM

## 2024-10-08 PROCEDURE — G0239 OTH RESP PROC, GROUP: HCPCS

## 2024-10-08 NOTE — TELEPHONE ENCOUNTER
Requested Prescriptions    Pending Prescriptions Disp Refills   gabapentin (NEURONTIN) 300 mg capsule 30 capsule 0    Sig: Take 1 capsule (300 mg total) by mouth 2 (two) times a day

## 2024-10-09 ENCOUNTER — HOSPITAL ENCOUNTER (OUTPATIENT)
Dept: INFUSION CENTER | Facility: HOSPITAL | Age: 69
Discharge: HOME | End: 2024-10-09
Attending: INTERNAL MEDICINE
Payer: COMMERCIAL

## 2024-10-09 VITALS
RESPIRATION RATE: 18 BRPM | SYSTOLIC BLOOD PRESSURE: 120 MMHG | HEART RATE: 54 BPM | DIASTOLIC BLOOD PRESSURE: 58 MMHG | TEMPERATURE: 98.6 F

## 2024-10-09 DIAGNOSIS — D50.8 IRON DEFICIENCY ANEMIA SECONDARY TO INADEQUATE DIETARY IRON INTAKE: ICD-10-CM

## 2024-10-09 DIAGNOSIS — G25.81 RLS (RESTLESS LEGS SYNDROME): ICD-10-CM

## 2024-10-09 DIAGNOSIS — K50.919 CROHN'S DISEASE WITH COMPLICATION, UNSPECIFIED GASTROINTESTINAL TRACT LOCATION (HCC): Primary | ICD-10-CM

## 2024-10-09 PROCEDURE — 96366 THER/PROPH/DIAG IV INF ADDON: CPT

## 2024-10-09 PROCEDURE — 96365 THER/PROPH/DIAG IV INF INIT: CPT

## 2024-10-09 RX ORDER — SODIUM CHLORIDE 9 MG/ML
20 INJECTION, SOLUTION INTRAVENOUS ONCE
Status: COMPLETED | OUTPATIENT
Start: 2024-10-09 | End: 2024-10-09

## 2024-10-09 RX ORDER — PANCRELIPASE 24000; 76000; 120000 [USP'U]/1; [USP'U]/1; [USP'U]/1
72000 CAPSULE, DELAYED RELEASE PELLETS ORAL 2 TIMES DAILY
Qty: 180 CAPSULE | Refills: 0 | Status: SHIPPED | OUTPATIENT
Start: 2024-10-09

## 2024-10-09 RX ORDER — SODIUM CHLORIDE 9 MG/ML
20 INJECTION, SOLUTION INTRAVENOUS ONCE
Status: CANCELLED | OUTPATIENT
Start: 2024-10-09

## 2024-10-09 RX ORDER — GABAPENTIN 300 MG/1
300 CAPSULE ORAL 2 TIMES DAILY
Qty: 30 CAPSULE | Refills: 0 | Status: SHIPPED | OUTPATIENT
Start: 2024-10-09 | End: 2024-10-15

## 2024-10-09 RX ADMIN — IRON SUCROSE 300 MG: 20 INJECTION, SOLUTION INTRAVENOUS at 12:15

## 2024-10-09 RX ADMIN — SODIUM CHLORIDE 20 ML/HR: 0.9 INJECTION, SOLUTION INTRAVENOUS at 12:15

## 2024-10-09 NOTE — PROGRESS NOTES
Kellen ROMAN Hadleyan  tolerated venofer well with no complications.      Kellen Gray has no further infusion scheduled at this time.    AVS  declined

## 2024-10-10 ENCOUNTER — CLINICAL SUPPORT (OUTPATIENT)
Dept: INTERNAL MEDICINE CLINIC | Facility: CLINIC | Age: 69
End: 2024-10-10
Payer: COMMERCIAL

## 2024-10-10 DIAGNOSIS — E53.8 B12 DEFICIENCY: Primary | ICD-10-CM

## 2024-10-10 PROCEDURE — 96372 THER/PROPH/DIAG INJ SC/IM: CPT

## 2024-10-10 RX ADMIN — CYANOCOBALAMIN 1000 MCG: 1000 INJECTION, SOLUTION INTRAMUSCULAR; SUBCUTANEOUS at 11:05

## 2024-10-14 ENCOUNTER — APPOINTMENT (OUTPATIENT)
Dept: PULMONOLOGY | Facility: HOSPITAL | Age: 69
End: 2024-10-14
Payer: COMMERCIAL

## 2024-10-15 DIAGNOSIS — K64.9 HEMORRHOIDS, UNSPECIFIED HEMORRHOID TYPE: ICD-10-CM

## 2024-10-15 RX ORDER — GABAPENTIN 300 MG/1
300 CAPSULE ORAL 2 TIMES DAILY
Qty: 30 CAPSULE | Refills: 0 | Status: SHIPPED | OUTPATIENT
Start: 2024-10-15

## 2024-10-17 NOTE — PROGRESS NOTES
Pulmonary Rehabilitation   Assessment and Individualized Treatment Plan  90 DAY      Today's date: 2024  # of Exercise Sessions Completed:   Patient name: Kellen Gray     : 1955       MRN: [id number]  Referring Physician: Dannie Dutton DO  Pulmonologist: Eugenie Salmeron DO  Provider: Mohinder  Clinician: Flor Garcia, MPT, CCRP    Dx:  COPD  HX CHF  EF65%   T2D      Date of onset: 24      Treatment is tailored to this patient's individual needs.  The ITP was reviewed with the patient and all questions were answered to their satisfaction.  Additional ITP documentation can be found electronically including daily and monthly exercise summaries, daily session notes with ECG summaries, education notes, daily medication reconciliation, and daily physician supervision.      COMMENTS:  Kellen has completed 20 exercise sessions of Pulm Rehab. She is able to perform her sessions on RA while maintaining her 02 Sat at 90% or greater. She exhibits correct hemodynamic responses to the exercise. She rates her NEVAREZ as 3-6/10. Currently, she is able to work at a 3.31 MET Level. Patient is pleased w/her progress thus far in Pulm Rehab.     ASSESSMENT      Medical History:   Past Medical History:   Diagnosis Date    A-fib (HCC)     Acute respiratory failure with hypoxia (HCC) 2019    Anxiety     Arthritis     Asthma     Brain aneurysm     Cancer (HCC)     papillary thyroid cancer    Cardiac disease     CHF (congestive heart failure) (HCC)     COPD (chronic obstructive pulmonary disease) (HCC)     CPAP (continuous positive airway pressure) dependence     Crohn disease (HCC)     Depression     Diabetes mellitus (HCC)     Disease of thyroid gland     Diverticulitis of colon     Emphysema of lung (HCC)     GERD (gastroesophageal reflux disease)     HL (hearing loss)     Hyperlipidemia     Hypertension     Sleep apnea     Sleep apnea, obstructive     Visual impairment        Family History:  Family  History   Problem Relation Age of Onset    Alzheimer's disease Mother     Asthma Mother     Cancer Father         dead    Heart disease Sister     Hypertension Sister         under control    Diabetes Sister     Stroke Sister     Cancer Sister         dead    Stroke Sister     Heart disease Sister         dead    Diabetes Sister     Cancer Brother         liver with mets to bone    Heart disease Brother     Cancer Brother         dead    Asthma Daughter     Cancer Brother        Allergies:   Sulfa antibiotics    Current Medications:   Current Outpatient Medications   Medication Sig Dispense Refill    albuterol (ACCUNEB) 0.63 MG/3ML nebulizer solution Take 0.63 mg by nebulization every 6 (six) hours as needed for wheezing      albuterol (PROVENTIL HFA,VENTOLIN HFA) 90 mcg/act inhaler Inhale 2 puffs every 4 (four) hours as needed for wheezing      apixaban (Eliquis) 5 mg Take 1 tablet by mouth twice daily 90 tablet 3    atorvastatin (LIPITOR) 20 mg tablet Take 20 mg by mouth every morning      Cholecalciferol (Vitamin D3) 1.25 MG (90184 UT) CAPS Take 1 capsule by mouth in the morning      Creon 52825-16579 units TAKE 3 CAPSULES BY MOUTH TWICE DAILY 180 capsule 0    dofetilide (TIKOSYN) 500 mcg capsule Take 500 mcg by mouth 2 (two) times a day      DULoxetine (CYMBALTA) 20 mg capsule Take 1 capsule (20 mg total) by mouth daily 90 capsule 3    Empagliflozin 25 MG TABS Take 1 tablet (25 mg total) by mouth daily (Patient taking differently: Take 25 mg by mouth 2 (two) times a day) 90 tablet 3    esomeprazole (NexIUM) 40 MG capsule TAKE 1 CAPSULE BY MOUTH TWICE DAILY 30 MINUTES BEFORE BREAKFAST AND 30 MINUTES BEFORE DINNER 60 capsule 5    famotidine (PEPCID) 40 MG tablet TAKE 1 TABLET BY MOUTH ONCE DAILY AT BEDTIME 30 tablet 5    fluticasone (FLONASE) 50 mcg/act nasal spray 1 spray into each nostril daily 1 g 3    fluticasone-umeclidinium-vilanterol (Trelegy Ellipta) 200-62.5-25 mcg/actuation AEPB inhaler Inhale 1 puff  daily Rinse mouth after use. 60 blister 5    furosemide (LASIX) 20 mg tablet Take 20 mg by mouth 2 (two) times a day      gabapentin (NEURONTIN) 300 mg capsule Take 1 capsule by mouth twice daily 30 capsule 0    hydrOXYzine HCL (ATARAX) 25 mg tablet Take 1 tablet (25 mg total) by mouth 2 (two) times a day 100 tablet 1    lisinopril (ZESTRIL) 10 mg tablet Take 10 mg by mouth every morning      mesalamine (DELZICOL) 400 mg Take 2 capsules by mouth twice daily 360 capsule 3    metoprolol succinate (TOPROL-XL) 100 mg 24 hr tablet TAKE 1 TABLET BY MOUTH EVERY 12 HOURS 60 tablet 5    NON FORMULARY TelePRM diabetic monitor, test strips and lancets      Synthroid 137 MCG tablet Take 1 tablet (137 mcg total) by mouth daily 90 tablet 1     Current Facility-Administered Medications   Medication Dose Route Frequency Provider Last Rate Last Admin    cyanocobalamin injection 1,000 mcg  1,000 mcg Intramuscular Q30 Days    1,000 mcg at 10/10/24 1105       Physical Limitations: Bilateral knee arthritis.     Fall Risk: Moderate- she fell at home . She tripped on 2 steps.    Comments: Ambulates with a steady gait with no assist device    Cultural needs: none    PFT:  Yes     FEV1/FVC ratio of 74%   FEV1 of 68% predicted  moderate restriction.    Medication compliance: Yes  Comments: Pt reports to be compliant with medications      Pulmonary Disease Risk Factors:  second hand smoke  smoking  asthma    Sleep Disorders:   obstructive sleep apnea:  CPCP/BiPAP:  yes      EXERCISE ASSESSMENT:      Initial Fitness Assessment: 6MWT:  Resting:    Resting:  BP: 104/60  HR: 55  SpO2: 95  Dyspnea: 0  O2 Use: 0 l/min, Exercise:  BP: 120/56  HR: 79  SpO2: 91-97%  Dyspnea: 4  O2 use: 0 l/min, METs:  2.59, ECG Summary: NSR, Symptoms: right sided chest pressure, Test terminated at:  6min, and Comments: good effort 1100 ft      Current Functional Status:  Occupation: retired  Recreation/Physical activity: bowling/cards  ADL’s:able to perform self-care  resumed driving  [place]: able to perform self-care  Exercise:   started walking  15 min  Home exercise equipment: no  Other Comments:     SMART Exercise Goals:   reduced score on CAT and reduced RPD at rest    Patient Specific EXERCISE GOALS:     attend pulmonary rehab regularly, decrease sitting time at home, begin a consistent exercise regimen , increased muscular strength, increased energy, and increased stamina with ADLs    PSYCHOSOCIAL ASSESSMENT:    Date of last assessment: 5/24/24  Depression screening:  PHQ-9 = 4    Interpretation:  1-4 = Minimal Depression  Anxiety screening:  JANUARY-7 = 4    Interpretation: 0-4  = Not anxious    Pt self-report of depression and anxiety   Patient has a history of anxiety   Financial worries    Self-reported stress level:  7-8/10  Stress Management: practice Relaxation Techniques, exercise, and enjoy a hobby take up [employer]    Quality of Life Screen:  (Higher score indicates disease impact on QOL)  [facility]: 31/40      CAT: 29/40      Shortness of breath questionnaire: 74/120    Social Support:   spouse, children, and grandchildren  Community/Social Activities: none    SMART Goals:    Physical Fitness in [place] Score < 3, Daily Activity in [place] Score < 3, Social Activities in [place] Score < 3, Pain in [place] Score < 3, Overall Health in [place] Score < 3, and [facility] Score < 3     Patient Specific PSYCHOCOSOCIAL GOALS:    maintain compliance with medical therapy, begin using [employer], address emotional health concerns with PCP, and she had been seeing a therapist but states she is coping better now.      Psychosocial Assessment as it relates to rehabilitation: Patient denies issues with his/her family or home life that may affect their rehabilitation efforts.       NUTRITION ASSESSMENT:    Initial Weight:  214  Current Weight: 210    4# weight loss during this reporting period. She continue to work on weight loss. She  "does not have a goal weight. Patient may benefit from a formal nutrition consult due to difficulty w/weight loss.     Height:   Ht Readings from Last 1 Encounters:   10/03/24 5' 9\" (1.753 m)       Rate Your Plate Score: 47/81    Self-reported Current Dietary Habits:  She is not watching sodium-eating some lunchmeats and drinking soda.She is eating what she likes but cutting the portion size. Reducing sodium has been strongly encouraged.     ETOH:   Social History     Substance and Sexual Activity   Alcohol Use Yes    Comment: social       SMART Goals:   improved A1c  < 7.0%, Improved Rate Your Plate score  >58, Wt. loss 1 ppw,  goal of 10 lbs., eat 6 or more servings of grain products per day, eat whole grain breads, brown rice and whole grain cereals, eat 5 or more servings of fruits and vegetables a day, eat 2 or more servings of low fat milk or yogurt a day, eat no more than 6oz of meat per day, eat red meat once a week or less, and rarely eat processed meats or eat low fat processed meats    Patient Specific NUTRITION GOALS:    increase water intake to reduce/thin mucus production decrease caloric intake improve hydration weight loss improve Aic      OTHER CORE COMPONENT ASSESSMENT:    Hospitalizations in the past year: 1    Oxygen needs:   Rest:  room air  Exercise/physical activity:  room air  Sleep:   room air    Does Pt monitor home SpO2? Yes  she doesn`t use it   Average SpO2 at rest:  %doesn`t check   Average SpO2 with ADLs/physical activity:  %      Use of Rescue Inhaler:  hasn`t needed it    Use of Maintenance Inhaler: Yes:  1 times per day    Use of Nebulizer Treatments:  No    Patient practices breathing techniques at home:  Yes    CAD Risk Factors:  Cholesterol: Yes  HTN: Yes  DM: Type 2   Obesity: Yes     Smoking: Former user    SMART Core Component Goals:   medication compliance and demonstrate pursed lipped breathing    Patient Specific CORE COMPONENT GOALS:    medication compliance, compliance " with CPAP, monitor home pulse oximetry, and Be able to walk better      Blood Pressure:        Heart rate            SO2    Restin/60                59                       97%  Exercise: 120/70               72-85                  94-97%  Recovery: 128/58              73                      98%      INDIVIDUALIZED TREATMENT PLAN    The patient is agreeable to attend 24 pulmonary rehab exercise sessions.      EXERCISE GOALS AND PLAN:    Physician Prescribed Exercise    Current Aerobic Exercise Prescription       Frequency: 2 days/week   Supplement with home exercise 2+ days/wk as tolerated        Minutes: 35-40        METS: 3.31              SpO2: 94-99% on RA              RPD: 3-6/10                      HR:  20-30bpm above resting   RPE: 4-5/10         Modalities: UBE, NuStep, and Recumbent bike      Aerobic Exercise Prescription Plan for Progression:    Frequency: 2 days/week    Supplement with home exercise 2+ days/wk as tolerated       Minutes: 33-40        METS: 3.31-4.50              SpO2: >90% on RA              RPD: 2-3/10                     HR:Intensity based on RPE 4-6    RPE: 4-5/10        Modalities: Treadmill, UBE, NuStep, Recumbent bike, and Room walking        Supplemental Oxygen Needs with Exercise:  room air.    Strength trainin-3 days / week  12-15 repetitions  1-2 sets per modality   Will be added following 2-3 weeks of monitored exercise sessions   Modalities: Chest Press, Pull Downs, Lateral Raise, Arm Curl, Front Raises, and Shoulder Shrugs    Education: pursed lipped breathing, diaphragmatic breathing, home exercise guidelines, benefits of exercise for pulmonary disease, RPE scale, RPD scale, O2 saturation monitoring, appropriate O2 response to exercise, energy conservation, and education class: Exercise For The Pulmonary Patient    Progress toward Exercise Goals:    Will continue to educate and progress as tolerated., Goals not met: has not initiated a HEP.  , Goals met:  "attends MI regularly decreased sitting time at home and improved ability to ambulate w/less NEVAERZ.    Exercise Plan:  learn to conserve energy with ADLs , practice diaphragmatic breathing, utilize PLB with physical activity, and begin a home exercise program     Readiness to change: Action:  (Changing behavior)      NUTRITION GOALS AND PLAN    Progress toward Nutritional goals:    Will continue to educate and progress as tolerated., has had a 4# weight loss, working towards a heart healthy diet. Currently concentrating on reducing sodium, sugar and saturated fats. Also, encouraged to drink more water instead of sugary drinks.     Nutrition Plan: group Class: Heart Healthy Eating, increase intake of whole grains, replace refined flours with whole grains, increase daily intake of fruits and vegetables, increase daily intake of low fat dairy, limit meat intake to less than 6oz per day, and choose healthy meals while dining out    SMART goals are based Rate Your Plate Dietary Self-Assessment. These are the areas in which the patient could score higher on the assessment.  Goals include recommendations for a heart healthy diet based on American Heart Association.    Nutrition Education: low sodium diet  maintaining hydration  nutrition for  lipid management  target goal for A1c <7.0  exercise and diabetes management   portion control  \"Plant Based Eating\"  \"Focus on Fiber\"    Readiness to change: Preparation:  (Getting ready to change)       PSYCHOSOCIAL GOALS AND PLAN    Information to begin using [employer] was provided as well as contact information for counseling through  [facility].    Progress toward Psychosocial goals:    Will continue to educate and progress as tolerated., Goals not met: stress level remains high at 7-8/10.   She states she was feeling better when she was coming regularly to rehab; plans on coming regularly again after her fall. PT staff to continue to offer support.     Psychosocial: " Class: Relaxation, Class:  Stress and Pulmonary Disease, Enroll in [employer], Practice relaxation techniques, Exercise, Spend time outdoors, Join a support group , and Enjoy family    Psychosocial Education: signs/sxs of depression, benefits of a positive support system, and class:  Stress and Pulmonary Disease    Readiness to change: Contemplation:  (Acknowledging that there is a problem but not yet ready or sure of wanting to make a change)      OTHER CORE COMPONENTS GOALS AND PLAN    Tobacco Use Intervention:     N/A: Pt has a remote history of smoking. Has abstained since 2018. She also avoids second hand smoke and other respiratory irritants.     Oxygen Goal: Maintain SpO2>88% during exercise or as advised by pulmonolgist    Progress toward Core Component goals:    Will continue to educate and progress as tolerated., Goals met: complete smoking abstinence and admits to medication compliance. Resting Bps have consistently been < 130/80 during this reporitng period.  .    Core Component Plan: avoid processed foods, engage in regular exercise, eliminate salt shaker at the table, use salt substitutes, check labels for sodium content, group class: Pulmonary Anatomy and Physiology, and group class:  Pulmonary Medications    Core Component Education:  pathophysiology of pulmonary disease, preventing infections, dangers of smoking, tobacco triggers, control coughing, and inspiratory muscle training    Readiness to change: Action:  (Changing behavior)

## 2024-10-21 ENCOUNTER — CLINICAL SUPPORT (OUTPATIENT)
Dept: PULMONOLOGY | Facility: HOSPITAL | Age: 69
End: 2024-10-21
Payer: COMMERCIAL

## 2024-10-21 DIAGNOSIS — J44.1 COPD EXACERBATION (HCC): Primary | ICD-10-CM

## 2024-10-21 PROCEDURE — G0239 OTH RESP PROC, GROUP: HCPCS

## 2024-10-22 ENCOUNTER — CLINICAL SUPPORT (OUTPATIENT)
Dept: PULMONOLOGY | Facility: HOSPITAL | Age: 69
End: 2024-10-22
Payer: COMMERCIAL

## 2024-10-22 DIAGNOSIS — J44.1 COPD EXACERBATION (HCC): Primary | ICD-10-CM

## 2024-10-22 PROCEDURE — G0239 OTH RESP PROC, GROUP: HCPCS

## 2024-10-24 ENCOUNTER — CLINICAL SUPPORT (OUTPATIENT)
Dept: PULMONOLOGY | Facility: HOSPITAL | Age: 69
End: 2024-10-24
Payer: COMMERCIAL

## 2024-10-24 DIAGNOSIS — J44.1 COPD EXACERBATION (HCC): Primary | ICD-10-CM

## 2024-10-24 PROCEDURE — G0239 OTH RESP PROC, GROUP: HCPCS

## 2024-10-28 ENCOUNTER — CLINICAL SUPPORT (OUTPATIENT)
Dept: PULMONOLOGY | Facility: HOSPITAL | Age: 69
End: 2024-10-28
Payer: COMMERCIAL

## 2024-10-28 DIAGNOSIS — J44.1 COPD EXACERBATION (HCC): Primary | ICD-10-CM

## 2024-10-28 PROCEDURE — G0239 OTH RESP PROC, GROUP: HCPCS

## 2024-10-28 NOTE — PROGRESS NOTES
Pulmonary Rehabilitation   Assessment and Individualized Treatment Plan  DISCHARGE        Today's date: 2024  # of Exercise Sessions Completed: 24  Patient name: Kellen Gray     : 1955       MRN: [id number]  Referring Physician: Dannie Dutton DO  Pulmonologist: Eugenie Salmeron DO  Provider: Mohinder  Clinician: Ina Siegel RN    Dx:  COPD  HX CHF  EF65%   T2D      Date of onset: 24      Treatment is tailored to this patient's individual needs.  The ITP was reviewed with the patient and all questions were answered to their satisfaction.  Additional ITP documentation can be found electronically including daily and monthly exercise summaries, daily session notes with ECG summaries, education notes, daily medication reconciliation, and daily physician supervision.      COMMENTS:  Kellen has completed 24 exercise sessions of Pulm Rehab. She is able to perform her sessions on RA while maintaining her 02 Sat at 90% or greater. She exhibits correct hemodynamic responses to the exercise. She rates her NEVAREZ as 3-6/10. Currently, she is able to work at a 4.07 MET Level. She plans on completing her exercise at home and may join a gym. She is monitoring her blood sugars regularly. Her weight has decreased from 214 to 209lb. She was able to walk 1475ft in her 6MWT which is improved from 1100ft. Her CAT score improved from 29 to 22. Her [place] improved from 31to 24. Her SOBQ increased from 74to 85. Her PHQ9 score also increase to 9 . A note was sent Dr Patino her primary DR       ASSESSMENT      Medical History:   Past Medical History:   Diagnosis Date    A-fib (HCC)     Acute respiratory failure with hypoxia (HCC) 2019    Anxiety     Arthritis     Asthma     Brain aneurysm     Cancer (HCC)     papillary thyroid cancer    Cardiac disease     CHF (congestive heart failure) (HCC)     COPD (chronic obstructive pulmonary disease) (HCC)     CPAP (continuous positive airway pressure) dependence      Crohn disease (HCC)     Depression     Diabetes mellitus (HCC)     Disease of thyroid gland     Diverticulitis of colon     Emphysema of lung (HCC)     GERD (gastroesophageal reflux disease)     HL (hearing loss)     Hyperlipidemia     Hypertension     Sleep apnea     Sleep apnea, obstructive     Visual impairment        Family History:  Family History   Problem Relation Age of Onset    Alzheimer's disease Mother     Asthma Mother     Cancer Father         dead    Heart disease Sister     Hypertension Sister         under control    Diabetes Sister     Stroke Sister     Cancer Sister         dead    Stroke Sister     Heart disease Sister         dead    Diabetes Sister     Cancer Brother         liver with mets to bone    Heart disease Brother     Cancer Brother         dead    Asthma Daughter     Cancer Brother        Allergies:   Sulfa antibiotics    Current Medications:   Current Outpatient Medications   Medication Sig Dispense Refill    albuterol (ACCUNEB) 0.63 MG/3ML nebulizer solution Take 0.63 mg by nebulization every 6 (six) hours as needed for wheezing      albuterol (PROVENTIL HFA,VENTOLIN HFA) 90 mcg/act inhaler Inhale 2 puffs every 4 (four) hours as needed for wheezing      apixaban (Eliquis) 5 mg Take 1 tablet by mouth twice daily 90 tablet 3    atorvastatin (LIPITOR) 20 mg tablet Take 20 mg by mouth every morning      Cholecalciferol (Vitamin D3) 1.25 MG (01573 UT) CAPS Take 1 capsule by mouth in the morning      Creon 49910-00897 units TAKE 3 CAPSULES BY MOUTH TWICE DAILY 180 capsule 0    dofetilide (TIKOSYN) 500 mcg capsule Take 500 mcg by mouth 2 (two) times a day      DULoxetine (CYMBALTA) 20 mg capsule Take 1 capsule (20 mg total) by mouth daily 90 capsule 3    Empagliflozin 25 MG TABS Take 1 tablet (25 mg total) by mouth daily (Patient taking differently: Take 25 mg by mouth 2 (two) times a day) 90 tablet 3    esomeprazole (NexIUM) 40 MG capsule TAKE 1 CAPSULE BY MOUTH TWICE DAILY 30 MINUTES  BEFORE BREAKFAST AND 30 MINUTES BEFORE DINNER 60 capsule 5    famotidine (PEPCID) 40 MG tablet TAKE 1 TABLET BY MOUTH ONCE DAILY AT BEDTIME 30 tablet 5    fluticasone (FLONASE) 50 mcg/act nasal spray 1 spray into each nostril daily 1 g 3    fluticasone-umeclidinium-vilanterol (Trelegy Ellipta) 200-62.5-25 mcg/actuation AEPB inhaler Inhale 1 puff daily Rinse mouth after use. 60 blister 5    furosemide (LASIX) 20 mg tablet Take 20 mg by mouth 2 (two) times a day      gabapentin (NEURONTIN) 300 mg capsule Take 1 capsule by mouth twice daily 30 capsule 0    hydrOXYzine HCL (ATARAX) 25 mg tablet Take 1 tablet (25 mg total) by mouth 2 (two) times a day 100 tablet 1    lisinopril (ZESTRIL) 10 mg tablet Take 10 mg by mouth every morning      mesalamine (DELZICOL) 400 mg Take 2 capsules by mouth twice daily 360 capsule 3    metoprolol succinate (TOPROL-XL) 100 mg 24 hr tablet TAKE 1 TABLET BY MOUTH EVERY 12 HOURS 60 tablet 5    NON FORMULARY TelePRM diabetic monitor, test strips and lancets      Synthroid 137 MCG tablet Take 1 tablet (137 mcg total) by mouth daily 90 tablet 1     Current Facility-Administered Medications   Medication Dose Route Frequency Provider Last Rate Last Admin    cyanocobalamin injection 1,000 mcg  1,000 mcg Intramuscular Q30 Days    1,000 mcg at 10/10/24 1105       Physical Limitations: Bilateral knee arthritis.     Fall Risk: Moderate- she fell at home . She tripped on 2 steps.    Comments: Ambulates with a steady gait with no assist device    Cultural needs: none    PFT:  Yes     FEV1/FVC ratio of 74%   FEV1 of 68% predicted  moderate restriction.    Medication compliance: Yes  Comments: Pt reports to be compliant with medications      Pulmonary Disease Risk Factors:  second hand smoke  smoking  asthma    Sleep Disorders:   obstructive sleep apnea:  CPCP/BiPAP:  yes      EXERCISE ASSESSMENT:      Initial Fitness Assessment: 6MWT:  Resting:    Resting:  BP: 104/60  HR: 55  SpO2: 95  Dyspnea: 0  O2  Use: 0 l/min, Exercise:  BP: 120/56  HR: 79  SpO2: 91-97%  Dyspnea: 4  O2 use: 0 l/min, METs:  2.59, ECG Summary: NSR, Symptoms: right sided chest pressure, Test terminated at:  6min, and Comments: good effort 1100 ft      Current Functional Status:  Occupation: retired  Recreation/Physical activity: bowling/cards  ADL’s:able to perform self-care resumed driving  [place]: able to perform self-care  Exercise:   started walking  15 min  Home exercise equipment: no  Other Comments:     SMART Exercise Goals:   reduced score on CAT and reduced RPD at rest    Patient Specific EXERCISE GOALS:     attend pulmonary rehab regularly, decrease sitting time at home, begin a consistent exercise regimen , increased muscular strength, increased energy, and increased stamina with ADLs    PSYCHOSOCIAL ASSESSMENT:    Date of last assessment: 5/24/24  Depression screening:  PHQ-9 = 4    Interpretation:  1-4 = Minimal Depression  Anxiety screening:  JANUARY-7 = 4    Interpretation: 0-4  = Not anxious    Pt self-report of depression and anxiety   Patient has a history of anxiety   Financial worries    Self-reported stress level:  7-8/10  Stress Management: practice Relaxation Techniques, exercise, and enjoy a hobby take up [email or web address]    Quality of Life Screen:  (Higher score indicates disease impact on QOL)  [facility] score: 31/40      CAT: 29/40      Shortness of breath questionnaire: 74/120    Social Support:   spouse, children, and grandchildren  Community/Social Activities: none    SMART Goals:    Physical Fitness in [facility] Score < 3, Daily Activity in [facility] Score < 3, Social Activities in [place] Score < 3, Pain in [place] Score < 3, Overall Health in [facility] Score < 3, and Quality of Life in [facility] Score < 3     Patient Specific PSYCHOCOSOCIAL GOALS:    maintain compliance with medical therapy, begin using [facility], address emotional health concerns with PCP, and she had been seeing a therapist but states she  "is coping better now.      Psychosocial Assessment as it relates to rehabilitation: Patient denies issues with his/her family or home life that may affect their rehabilitation efforts.       NUTRITION ASSESSMENT:    Initial Weight:  214  Current Weight: 209         Height:   Ht Readings from Last 1 Encounters:   10/03/24 5' 9\" (1.753 m)       Rate Your Plate Score: 47/81    Self-reported Current Dietary Habits:  She is not watching sodium-eating some lunchmeats and drinking soda.She is eating what she likes but cutting the portion size. Reducing sodium has been strongly encouraged.     ETOH:   Social History     Substance and Sexual Activity   Alcohol Use Yes    Comment: social       SMART Goals:   improved A1c  < 7.0%, Improved Rate Your Plate score  >58, Wt. loss 1 ppw,  goal of 10 lbs., eat 6 or more servings of grain products per day, eat whole grain breads, brown rice and whole grain cereals, eat 5 or more servings of fruits and vegetables a day, eat 2 or more servings of low fat milk or yogurt a day, eat no more than 6oz of meat per day, eat red meat once a week or less, and rarely eat processed meats or eat low fat processed meats    Patient Specific NUTRITION GOALS:    increase water intake to reduce/thin mucus production decrease caloric intake improve hydration weight loss improve Aic   She is working on continued weight loss and happy with her progress    OTHER CORE COMPONENT ASSESSMENT:    Hospitalizations in the past year: 1    Oxygen needs:   Rest:  room air  Exercise/physical activity:  room air  Sleep:   room air    Does Pt monitor home SpO2? Yes  she doesn`t use it   Average SpO2 at rest:  %doesn`t check   Average SpO2 with ADLs/physical activity:  %      Use of Rescue Inhaler:  hasn`t needed it    Use of Maintenance Inhaler: Yes:  1 times per day    Use of Nebulizer Treatments:  No    Patient practices breathing techniques at home:  Yes    CAD Risk Factors:  Cholesterol: Yes  HTN: Yes  DM: Type 2 "   Obesity: Yes     Smoking: Former user    SMART Core Component Goals:   medication compliance and demonstrate pursed lipped breathing    Patient Specific CORE COMPONENT GOALS:    medication compliance, compliance with CPAP, monitor home pulse oximetry, and Be able to walk better      Blood Pressure:        Heart rate            SO2    Restin/70              57                       100%  Exercise: 120/70               67-76                  96-98%  Recovery: 120/70             54                      96-98%      INDIVIDUALIZED TREATMENT PLAN    The patient is agreeable to attend 24 pulmonary rehab exercise sessions.      EXERCISE GOALS AND PLAN:    Physician Prescribed Exercise    Current Aerobic Exercise Prescription       Frequency: 2 days/week   Supplement with home exercise 2+ days/wk as tolerated        Minutes: 35-40        METS: 3.31              SpO2: 94-99% on RA              RPD: 3-6/10                      HR:  20-30bpm above resting   RPE: 4-5/10         Modalities: UBE, NuStep, and Recumbent bike         Supplemental Oxygen Needs with Exercise:  room air.    Strength trainin-3 days / week  12-15 repetitions  1-2 sets per modality    Modalities: Chest Press, Pull Downs, Lateral Raise, Arm Curl, Front Raises, and Shoulder Shrugs    Education: pursed lipped breathing, diaphragmatic breathing, home exercise guidelines, benefits of exercise for pulmonary disease, RPE scale, RPD scale, O2 saturation monitoring, appropriate O2 response to exercise, energy conservation, and education class: Exercise For The Pulmonary Patient    Progress toward Exercise Goals:    ., Goals  met: attends NH regularly decreased sitting time at home and improved ability to ambulate w/less NEVAREZ.        Exercise Plan:  learn to conserve energy with ADLs , practice diaphragmatic breathing, utilize PLB with physical activity, and begin a home exercise program     Readiness to change: Maintenance: (Maintaining the behavior  "change)      NUTRITION GOALS AND PLAN    Progress toward Nutritional goals:    t. Currently concentrating on reducing sodium, sugar and saturated fats. Also, encouraged to drink more water instead of sugary drinks.     Nutrition Plan:  Heart Healthy Eating, increase intake of whole grains, replace refined flours with whole grains, increase daily intake of fruits and vegetables, increase daily intake of low fat dairy, limit meat intake to less than 6oz per day, and choose healthy meals while dining out    SMART goals are based Rate Your Plate Dietary Self-Assessment. These are the areas in which the patient could score higher on the assessment.  Goals include recommendations for a heart healthy diet based on American Heart Association.    Nutrition Education: low sodium diet  maintaining hydration  nutrition for  lipid management  target goal for A1c <7.0  exercise and diabetes management   portion control  \"Plant Based Eating\"  \"Focus on Fiber\"    Readiness to change: maintanance      PSYCHOSOCIAL GOALS AND PLAN    Information to begin using [facility] was provided as well as contact information for counseling through  [facility].Her PHQ 9 did increase from 4-9    Progress toward Psychosocial goals:    , Goals not met: stress level remains high at 7-8/10.   She states she was feeling better when she was coming regularly to rehab; plans on coming regularly again after her fall. [facility] [place] information given     Psychosocial: Class: Relaxation, Class:  Stress and Pulmonary Disease, Enroll in [facility], Practice relaxation techniques, Exercise, Spend time outdoors, Join a support group , and Enjoy family    Psychosocial Education: signs/sxs of depression, benefits of a positive support system, and class:  Stress and Pulmonary Disease    Readiness to change: Contemplation:  (Acknowledging that there is a problem but not yet ready or sure of wanting to make a change)      OTHER CORE COMPONENTS GOALS AND " PLAN    Tobacco Use Intervention:     N/A: Pt has a remote history of smoking. Has abstained since 2018. She also avoids second hand smoke and other respiratory irritants.     Oxygen Goal: Maintain SpO2>88% during exercise or as advised by pulmonolgist    Progress toward Core Component goals:    d., Goals met: complete smoking abstinence and admits to medication compliance. Resting Bps have consistently been < 130/80 during this reporitng period.  .    Core Component Plan: avoid processed foods, engage in regular exercise, eliminate salt shaker at the table, use salt substitutes, check labels for sodium content, group class: Pulmonary Anatomy and Physiology, and group class:  Pulmonary Medications    Core Component Education:  pathophysiology of pulmonary disease, preventing infections, dangers of smoking, tobacco triggers, control coughing, and inspiratory muscle training    Readiness to change: Action:  (Changing behavior)

## 2024-10-28 NOTE — PROGRESS NOTES
[facility] Cardiopulmonary Rehab          Dear Dr. Patino,    Emotional well-being and depression is addressed in the Pulmonary rehab evaluation. To assess the severity of depression, patients are given the PHQ-9 Depression Questionnaire.      This is to inform you that your patient Marta Gray scored a 9 which is interpreted as 5-9 = Mild Depression.  This is up from her previous score of 4.She completed the Pulmonary Rehab program today.    Your patient was provided contact information for [facility] and has been encouraged to enroll in [facility].   .      Thank you for your support of cardiopulmonary rehab.    Sincerely,      Ina Siegel RN

## 2024-11-02 DIAGNOSIS — K64.9 HEMORRHOIDS, UNSPECIFIED HEMORRHOID TYPE: ICD-10-CM

## 2024-11-04 RX ORDER — GABAPENTIN 300 MG/1
300 CAPSULE ORAL 2 TIMES DAILY
Qty: 30 CAPSULE | Refills: 0 | Status: SHIPPED | OUTPATIENT
Start: 2024-11-04 | End: 2024-11-14 | Stop reason: SDUPTHER

## 2024-11-05 DIAGNOSIS — K86.81 EXOCRINE PANCREATIC INSUFFICIENCY: ICD-10-CM

## 2024-11-06 DIAGNOSIS — K86.81 EXOCRINE PANCREATIC INSUFFICIENCY: ICD-10-CM

## 2024-11-14 ENCOUNTER — OFFICE VISIT (OUTPATIENT)
Dept: INTERNAL MEDICINE CLINIC | Facility: CLINIC | Age: 69
End: 2024-11-14
Payer: COMMERCIAL

## 2024-11-14 VITALS
OXYGEN SATURATION: 96 % | HEART RATE: 59 BPM | DIASTOLIC BLOOD PRESSURE: 76 MMHG | TEMPERATURE: 98.4 F | SYSTOLIC BLOOD PRESSURE: 124 MMHG

## 2024-11-14 DIAGNOSIS — R19.09 LUMP IN THE GROIN: ICD-10-CM

## 2024-11-14 DIAGNOSIS — K64.9 HEMORRHOIDS, UNSPECIFIED HEMORRHOID TYPE: ICD-10-CM

## 2024-11-14 DIAGNOSIS — E11.69 TYPE 2 DIABETES MELLITUS WITH OTHER SPECIFIED COMPLICATION, WITHOUT LONG-TERM CURRENT USE OF INSULIN (HCC): ICD-10-CM

## 2024-11-14 DIAGNOSIS — K86.81 EXOCRINE PANCREATIC INSUFFICIENCY: ICD-10-CM

## 2024-11-14 DIAGNOSIS — E55.9 VITAMIN D DEFICIENCY: ICD-10-CM

## 2024-11-14 DIAGNOSIS — Z00.00 MEDICARE ANNUAL WELLNESS VISIT, SUBSEQUENT: ICD-10-CM

## 2024-11-14 DIAGNOSIS — Z12.31 VISIT FOR SCREENING MAMMOGRAM: ICD-10-CM

## 2024-11-14 DIAGNOSIS — I50.32 CHRONIC HEART FAILURE WITH PRESERVED EJECTION FRACTION (HCC): ICD-10-CM

## 2024-11-14 DIAGNOSIS — J06.9 UPPER RESPIRATORY TRACT INFECTION, UNSPECIFIED TYPE: Primary | ICD-10-CM

## 2024-11-14 DIAGNOSIS — I10 ESSENTIAL HYPERTENSION: ICD-10-CM

## 2024-11-14 DIAGNOSIS — E78.00 HYPERCHOLESTEREMIA: ICD-10-CM

## 2024-11-14 PROCEDURE — 99214 OFFICE O/P EST MOD 30 MIN: CPT | Performed by: FAMILY MEDICINE

## 2024-11-14 PROCEDURE — G0439 PPPS, SUBSEQ VISIT: HCPCS | Performed by: FAMILY MEDICINE

## 2024-11-14 PROCEDURE — 96372 THER/PROPH/DIAG INJ SC/IM: CPT | Performed by: FAMILY MEDICINE

## 2024-11-14 RX ORDER — GABAPENTIN 300 MG/1
300 CAPSULE ORAL 2 TIMES DAILY
Qty: 200 CAPSULE | Refills: 3 | Status: SHIPPED | OUTPATIENT
Start: 2024-11-14

## 2024-11-14 RX ORDER — AMOXICILLIN 500 MG/1
500 TABLET, FILM COATED ORAL 3 TIMES DAILY
Qty: 30 TABLET | Refills: 0 | Status: SHIPPED | OUTPATIENT
Start: 2024-11-14 | End: 2024-11-24

## 2024-11-14 RX ORDER — FUROSEMIDE 20 MG/1
20 TABLET ORAL 2 TIMES DAILY
Qty: 200 TABLET | Refills: 3 | Status: SHIPPED | OUTPATIENT
Start: 2024-11-14

## 2024-11-14 RX ADMIN — CYANOCOBALAMIN 1000 MCG: 1000 INJECTION, SOLUTION INTRAMUSCULAR; SUBCUTANEOUS at 12:41

## 2024-11-14 NOTE — PROGRESS NOTES
Name: Kellen Gray      : 1955      MRN: [id number]  Encounter Provider: Ina Rivas MD  Encounter Date: 2024   Encounter department: [facility]    Assessment & Plan  Exocrine pancreatic insufficiency  Creon reordered.  Discussed with her the difficulty through her pharmacy regarding this.  Advised her to contact the office if there is continued difficulty.  Continue to follow-up with GI.  Orders:    pancrelipase, Lip-Prot-Amyl, (Creon) 24,000 units; Take 3 capsules (72,000 Units total) by mouth 2 (two) times a day    CBC and differential; Future    Hemorrhoids, unspecified hemorrhoid type    Orders:    gabapentin (NEURONTIN) 300 mg capsule; Take 1 capsule (300 mg total) by mouth 2 (two) times a day    CBC and differential; Future    Upper respiratory tract infection, unspecified type  Current respiratory symptoms discussed.  With the weekend coming and with her significant medical issues, prescription was given for the amoxicillin as noted.  Call or follow-up if symptoms worsen or persist.  Orders:    amoxicillin (AMOXIL) 500 MG tablet; Take 1 tablet (500 mg total) by mouth 3 (three) times a day for 10 days    CBC and differential; Future    Type 2 diabetes mellitus with other specified complication, without long-term current use of insulin (HCC)  Hemoglobin A1c relatively well-controlled.  Continue with the Jardiance.  Watch diet.  Watch carbohydrate intake.  Try to increase activity level.  Follow-up with ophthalmology regularly.  Check feet daily.  Lab Results   Component Value Date    HGBA1C 7.0 (H) 2024     Orders:    CBC and differential; Future    Comprehensive metabolic panel; Future    Hemoglobin A1C; Future    Chronic heart failure with preserved ejection fraction (HCC)  Wt Readings from Last 3 Encounters:   10/03/24 95.7 kg (210 lb 14.4 oz)   09/10/24 96.7 kg (213 lb 3.2 oz)   24 96.9 kg (213 lb 9.6 oz)   Weight has been relatively stable.   Continue to follow-up with cardiology.  Watch for increasing shortness of breath, increasing peripheral edema, or weight change of more than 2 to 3 pounds in 24 hours or more than 5 pounds in 1 week.  Continue with the Lasix.          Orders:    furosemide (LASIX) 20 mg tablet; Take 1 tablet (20 mg total) by mouth 2 (two) times a day    CBC and differential; Future    Visit for screening mammogram    Orders:    Mammo screening bilateral w 3d and cad; Future    CBC and differential; Future    Mammo screening bilateral w 3d and cad; Future    Lump in the groin  Lump in the left groin area discussed.  Will check an ultrasound for further investigation.  Orders:    US groin/inguinal area; Future    CBC and differential; Future    Essential hypertension  Blood pressure currently well-controlled.  Continue current medications.  Watch salt intake.  Stay adequately hydrated.       Hypercholesteremia  Slip given for repeat laboratory testing in approximately 1 to 2 months.  Continue with the atorvastatin.  Watch diet.  Increase fiber in diet if tolerates.  Orders:    Lipid panel; Future    TSH, 3rd generation; Future    Vitamin D deficiency  Continue with vitamin D supplementation.  Will continue to follow vitamin D level with routine laboratory testing.  Orders:    Vitamin D 25 hydroxy; Future    Medicare annual wellness visit, subsequent           Falls Plan of Care: balance, strength, and gait training instructions were provided.       Preventive health issues were discussed with patient, and age appropriate screening tests were ordered as noted in patient's After Visit Summary. Personalized health advice and appropriate referrals for health education or preventive services given if needed, as noted in patient's After Visit Summary.    History of Present Illness     She presents for routine follow-up as well as Medicare wellness visit.  Has generally been doing relatively well given her multiple medical issues.  Has had  some difficulty with her pharmacy and obtaining certain medications.  She has had difficulty especially with the Creon.  She is requesting the new prescription be sent.  Still continues with some diarrhea symptoms at times.  Especially if she had not taken the Creon.  Tries to take this regularly but has been running out more frequently.  Continues with the Jardiance without difficulty.  Denies any significant polyuria, polydipsia, or polyphasia.  Appetite has been generally stable.  Continues to feel more tired.  Has followed up with hematology as well as GI.  Continues with the Synthroid.  Had felt a lump in her left thigh area.  This seems to her to have migrated into the area by the left groin.  Denies any pain over that area but states that she just knows its there.       Patient Care Team:  Ina Rivas MD as PCP - General (Family Medicine)  Ryan Beckman MD as PCP - PCP-[facility] ([facility])  [facility] Beck[facility]  Saurabh Graham MD (Gastroenterology)  Fabby Frausto PA-C (Bariatrics)    Review of Systems   Constitutional:  Positive for activity change and fatigue. Negative for appetite change, chills and fever.   HENT:  Positive for congestion, ear pain, postnasal drip and sinus pressure. Negative for rhinorrhea.    Eyes:  Negative for visual disturbance.   Respiratory:  Negative for cough, chest tightness and shortness of breath.    Cardiovascular:  Negative for chest pain and palpitations.   Gastrointestinal:  Positive for diarrhea. Negative for abdominal pain, blood in stool, nausea and vomiting.   Endocrine: Negative for polydipsia, polyphagia and polyuria.   Genitourinary:  Negative for dysuria, frequency and urgency.   Musculoskeletal:  Positive for arthralgias. Negative for gait problem.   Skin:  Negative for color change.   Neurological:  Negative for dizziness and headaches.   Hematological:  Does not bruise/bleed easily.   Psychiatric/Behavioral:  Negative for  confusion and sleep disturbance. The patient is not nervous/anxious.      Medical History Reviewed by provider this encounter:       Annual Wellness Visit Questionnaire   Kellen is here for her Subsequent Wellness visit. Last Medicare Wellness visit information reviewed, patient interviewed and updates made to the record today.      Health Risk Assessment:   Patient rates overall health as fair. Patient feels that their physical health rating is same. Patient is satisfied with their life. Eyesight was rated as same. Hearing was rated as same. Patient feels that their emotional and mental health rating is same. Patients states they are never, rarely angry. Patient states they are sometimes unusually tired/fatigued. Pain experienced in the last 7 days has been some. Patient's pain rating has been 10/10. Patient states that she has experienced no weight loss or gain in last 6 months.     Fall Risk Screening:   In the past year, patient has experienced: history of falling in past year    Number of falls: 1  Injured during fall?: Yes    Feels unsteady when standing or walking?: Yes    Worried about falling?: Yes      Home Safety:  Patient has trouble with stairs inside or outside of their home. Patient has working smoke alarms and has working carbon monoxide detector. Home safety hazards include: none.     Nutrition:   Current diet is Regular.     Medications:   Patient is not currently taking any over-the-counter supplements. Patient is not able to manage medications.     Activities of Daily Living (ADLs)/Instrumental Activities of Daily Living (IADLs):   Walk and transfer into and out of bed and chair?: Yes  Dress and groom yourself?: Yes    Bathe or shower yourself?: Yes    Feed yourself? Yes  Do your laundry/housekeeping?: Yes  Manage your money, pay your bills and track your expenses?: Yes  Make your own meals?: Yes    Do your own shopping?: Yes    Previous Hospitalizations:   Any hospitalizations or ED visits within  the last 12 months?: No      Advance Care Planning:   Living will: Yes    Durable POA for healthcare: Yes    Advanced directive: Yes    Provider agrees with end of life decisions: Yes      Cognitive Screening:   Provider or family/friend/caregiver concerned regarding cognition?: No    PREVENTIVE SCREENINGS      Cardiovascular Screening:    General: Screening Not Indicated and History Lipid Disorder      Diabetes Screening:     General: Screening Not Indicated and History Diabetes      Colorectal Cancer Screening:     General: Screening Current      Breast Cancer Screening:     General: Screening Current and Risks and Benefits Discussed    Due for: Mammogram        Cervical Cancer Screening:    General: Screening Not Indicated      Osteoporosis Screening:    General: Screening Current      Abdominal Aortic Aneurysm (AAA) Screening:        General: Screening Not Indicated      Lung Cancer Screening:     General: Screening Not Indicated      Hepatitis C Screening:    General: Screening Current    Screening, Brief Intervention, and Referral to Treatment (SBIRT)    Screening  Typical number of drinks in a day: 0  Typical number of drinks in a week: 0  Interpretation: Low risk drinking behavior.    Single Item Drug Screening:  How often have you used an illegal drug (including marijuana) or a prescription medication for non-medical reasons in the past year? never    Single Item Drug Screen Score: 0  Interpretation: Negative screen for possible drug use disorder    Review of Current Opioid Use    Opioid Risk Tool (ORT) Interpretation: Complete Opioid Risk Tool (ORT)    Social Drivers of Health     Financial Resource Strain: Low Risk  (11/2/2023)    Overall Financial Resource Strain (CARDIA)     Difficulty of Paying Living Expenses: Not very hard   Food Insecurity: Food Insecurity Present (11/14/2024)    Hunger Vital Sign     Worried About Running Out of Food in the Last Year: Sometimes true     Ran Out of Food in the Last  Year: Sometimes true   Transportation Needs: Unmet Transportation Needs (11/14/2024)    PRAPARE - Transportation     Lack of Transportation (Medical): Yes     Lack of Transportation (Non-Medical): Yes   Housing Stability: Unknown (11/14/2024)    Housing Stability Vital Sign     Unable to Pay for Housing in the Last Year: No     Number of Times Moved in the Last Year: 1   Utilities: At Risk (11/14/2024)    [facility] Utilities     Threatened with loss of utilities: Yes     No results found.    Objective   /76 (BP Location: Left arm, Patient Position: Sitting, Cuff Size: Large)   Pulse 59   Temp 98.4 °F (36.9 °C)   SpO2 96%     Physical Exam  Vitals and nursing note reviewed.   Constitutional:       General: She is not in acute distress.     Appearance: She is well-developed and well-groomed.   HENT:      Head: Normocephalic and atraumatic.      Comments: Boggy nasal mucosa with purulent nasal discharge; slight effusion bilateral ears left greater than right  Eyes:      General:         Right eye: No discharge.         Left eye: No discharge.      Conjunctiva/sclera: Conjunctivae normal.      Pupils: Pupils are equal, round, and reactive to light.   Cardiovascular:      Rate and Rhythm: Normal rate and regular rhythm.      Heart sounds: Normal heart sounds. No murmur heard.     No friction rub. No gallop.   Pulmonary:      Effort: No respiratory distress.      Breath sounds: Transmitted upper airway sounds present. No wheezing or rales.   Abdominal:      General: Bowel sounds are normal. There is no distension.      Tenderness: There is no abdominal tenderness.   Lymphadenopathy:      Cervical: No cervical adenopathy.   Skin:     General: Skin is warm and dry.      Comments: Palpable round lump left upper thigh/groin area; no tenderness   Neurological:      Mental Status: She is alert and oriented to person, place, and time.   Psychiatric:         Mood and Affect: Mood and affect normal.         Speech: Speech  normal.         Behavior: Behavior normal. Behavior is cooperative.         Cognition and Memory: Cognition and memory normal.

## 2024-11-15 DIAGNOSIS — K21.9 GASTROESOPHAGEAL REFLUX DISEASE, UNSPECIFIED WHETHER ESOPHAGITIS PRESENT: ICD-10-CM

## 2024-11-15 PROBLEM — R19.09 LUMP IN THE GROIN: Status: ACTIVE | Noted: 2024-11-15

## 2024-11-15 RX ORDER — FAMOTIDINE 40 MG/1
40 TABLET, FILM COATED ORAL
Qty: 30 TABLET | Refills: 5 | Status: SHIPPED | OUTPATIENT
Start: 2024-11-15

## 2024-11-15 NOTE — ASSESSMENT & PLAN NOTE
Slip given for repeat laboratory testing in approximately 1 to 2 months.  Continue with the atorvastatin.  Watch diet.  Increase fiber in diet if tolerates.  Orders:    Lipid panel; Future    TSH, 3rd generation; Future

## 2024-11-15 NOTE — PATIENT INSTRUCTIONS
Medicare Preventive Visit Patient Instructions  Thank you for completing your Welcome to Medicare Visit or Medicare Annual Wellness Visit today. Your next wellness visit will be due in one year (11/16/2025).  The screening/preventive services that you may require over the next 5-10 years are detailed below. Some tests may not apply to you based off risk factors and/or age. Screening tests ordered at today's visit but not completed yet may show as past due. Also, please note that scanned in results may not display below.  Preventive Screenings:  Service Recommendations Previous Testing/Comments   Colorectal Cancer Screening  * Colonoscopy    * Fecal Occult Blood Test (FOBT)/Fecal Immunochemical Test (FIT)  * Fecal DNA/Cologuard Test  * Flexible Sigmoidoscopy Age: 45-75 years old   Colonoscopy: every 10 years (may be performed more frequently if at higher risk)  OR  FOBT/FIT: every 1 year  OR  Cologuard: every 3 years  OR  Sigmoidoscopy: every 5 years  Screening may be recommended earlier than age 45 if at higher risk for colorectal cancer. Also, an individualized decision between you and your healthcare provider will decide whether screening between the ages of 76-85 would be appropriate. Colonoscopy: 12/01/2023  FOBT/FIT: Not on file  Cologuard: Not on file  Sigmoidoscopy: Not on file    Screening Current     Breast Cancer Screening Age: 40+ years old  Frequency: every 1-2 years  Not required if history of left and right mastectomy Mammogram: 10/25/2023    Screening Current   Cervical Cancer Screening Between the ages of 21-29, pap smear recommended once every 3 years.   Between the ages of 30-65, can perform pap smear with HPV co-testing every 5 years.   Recommendations may differ for women with a history of total hysterectomy, cervical cancer, or abnormal pap smears in past. Pap Smear: Not on file    Screening Not Indicated   Hepatitis C Screening Once for adults born between 1945 and 1965  More frequently in  patients at high risk for Hepatitis C Hep C Antibody: 02/26/2020    Screening Current   Diabetes Screening 1-2 times per year if you're at risk for diabetes or have pre-diabetes Fasting glucose: 127 mg/dL (8/9/2024)  A1C: 7.0 % (7/31/2024)  Screening Not Indicated  History Diabetes   Cholesterol Screening Once every 5 years if you don't have a lipid disorder. May order more often based on risk factors. Lipid panel: 07/31/2024    Screening Not Indicated  History Lipid Disorder     Other Preventive Screenings Covered by Medicare:  Abdominal Aortic Aneurysm (AAA) Screening: covered once if your at risk. You're considered to be at risk if you have a family history of AAA.  Lung Cancer Screening: covers low dose CT scan once per year if you meet all of the following conditions: (1) Age 55-77; (2) No signs or symptoms of lung cancer; (3) Current smoker or have quit smoking within the last 15 years; (4) You have a tobacco smoking history of at least 20 pack years (packs per day multiplied by number of years you smoked); (5) You get a written order from a healthcare provider.  Glaucoma Screening: covered annually if you're considered high risk: (1) You have diabetes OR (2) Family history of glaucoma OR (3)  aged 50 and older OR (4)  American aged 65 and older  Osteoporosis Screening: covered every 2 years if you meet one of the following conditions: (1) You're estrogen deficient and at risk for osteoporosis based off medical history and other findings; (2) Have a vertebral abnormality; (3) On glucocorticoid therapy for more than 3 months; (4) Have primary hyperparathyroidism; (5) On osteoporosis medications and need to assess response to drug therapy.   Last bone density test (DXA Scan): 03/13/2024.  HIV Screening: covered annually if you're between the age of 15-65. Also covered annually if you are younger than 15 and older than 65 with risk factors for HIV infection. For pregnant patients, it is  covered up to 3 times per pregnancy.    Immunizations:  Immunization Recommendations   Influenza Vaccine Annual influenza vaccination during flu season is recommended for all persons aged >= 6 months who do not have contraindications   Pneumococcal Vaccine   * Pneumococcal conjugate vaccine = PCV13 (Prevnar 13), PCV15 (Vaxneuvance), PCV20 (Prevnar 20)  * Pneumococcal polysaccharide vaccine = PPSV23 (Pneumovax) Adults 19-63 yo with certain risk factors or if 65+ yo  If never received any pneumonia vaccine: recommend Prevnar 20 (PCV20)  Give PCV20 if previously received 1 dose of PCV13 or PPSV23   Hepatitis B Vaccine 3 dose series if at intermediate or high risk (ex: diabetes, end stage renal disease, liver disease)   Respiratory syncytial virus (RSV) Vaccine - COVERED BY MEDICARE PART D  * RSVPreF3 (Arexvy) CDC recommends that adults 60 years of age and older may receive a single dose of RSV vaccine using shared clinical decision-making (SCDM)   Tetanus (Td) Vaccine - COST NOT COVERED BY MEDICARE PART B Following completion of primary series, a booster dose should be given every 10 years to maintain immunity against tetanus. Td may also be given as tetanus wound prophylaxis.   Tdap Vaccine - COST NOT COVERED BY MEDICARE PART B Recommended at least once for all adults. For pregnant patients, recommended with each pregnancy.   Shingles Vaccine (Shingrix) - COST NOT COVERED BY MEDICARE PART B  2 shot series recommended in those 19 years and older who have or will have weakened immune systems or those 50 years and older     Health Maintenance Due:      Topic Date Due   • Breast Cancer Screening: Mammogram  10/25/2024   • Colorectal Cancer Screening  11/29/2028   • Hepatitis C Screening  Completed     Immunizations Due:      Topic Date Due   • Hepatitis A Vaccine (1 of 2 - Risk 2-dose series) Never done   • Influenza Vaccine (1) Never done   • COVID-19 Vaccine (3 - 2024-25 season) 09/01/2024     Advance Directives   What  are advance directives?  Advance directives are legal documents that state your wishes and plans for medical care. These plans are made ahead of time in case you lose your ability to make decisions for yourself. Advance directives can apply to any medical decision, such as the treatments you want, and if you want to donate organs.   What are the types of advance directives?  There are many types of advance directives, and each state has rules about how to use them. You may choose a combination of any of the following:  Living will:  This is a written record of the treatment you want. You can also choose which treatments you do not want, which to limit, and which to stop at a certain time. This includes surgery, medicine, IV fluid, and tube feedings.   Durable power of  for healthcare (DPAHC):  This is a written record that states who you want to make healthcare choices for you when you are unable to make them for yourself. This person, called a proxy, is usually a family member or a friend. You may choose more than 1 proxy.  Do not resuscitate (DNR) order:  A DNR order is used in case your heart stops beating or you stop breathing. It is a request not to have certain forms of treatment, such as CPR. A DNR order may be included in other types of advance directives.  Medical directive:  This covers the care that you want if you are in a coma, near death, or unable to make decisions for yourself. You can list the treatments you want for each condition. Treatment may include pain medicine, surgery, blood transfusions, dialysis, IV or tube feedings, and a ventilator (breathing machine).  Values history:  This document has questions about your views, beliefs, and how you feel and think about life. This information can help others choose the care that you would choose.  Why are advance directives important?  An advance directive helps you control your care. Although spoken wishes may be used, it is better to have  your wishes written down. Spoken wishes can be misunderstood, or not followed. Treatments may be given even if you do not want them. An advance directive may make it easier for your family to make difficult choices about your care.   Fall Prevention    Fall prevention  includes ways to make your home and other areas safer. It also includes ways you can move more carefully to prevent a fall. Health conditions that cause changes in your blood pressure, vision, or muscle strength and coordination may increase your risk for falls. Medicines may also increase your risk for falls if they make you dizzy, weak, or sleepy.   Fall prevention tips:   Stand or sit up slowly.    Use assistive devices as directed.    Wear shoes that fit well and have soles that .    Wear a personal alarm.    Stay active.    Manage your medical conditions.    Home Safety Tips:  Add items to prevent falls in the bathroom.    Keep paths clear.    Install bright lights in your home.    Keep items you use often on shelves within reach.    Paint or place reflective tape on the edges of your stairs.    Weight Management   Why it is important to manage your weight:  Being overweight increases your risk of health conditions such as heart disease, high blood pressure, type 2 diabetes, and certain types of cancer. It can also increase your risk for osteoarthritis, sleep apnea, and other respiratory problems. Aim for a slow, steady weight loss. Even a small amount of weight loss can lower your risk of health problems.  How to lose weight safely:  A safe and healthy way to lose weight is to eat fewer calories and get regular exercise. You can lose up about 1 pound a week by decreasing the number of calories you eat by 500 calories each day.   Healthy meal plan for weight management:  A healthy meal plan includes a variety of foods, contains fewer calories, and helps you stay healthy. A healthy meal plan includes the following:  Eat whole-grain foods more  often.  A healthy meal plan should contain fiber. Fiber is the part of grains, fruits, and vegetables that is not broken down by your body. Whole-grain foods are healthy and provide extra fiber in your diet. Some examples of whole-grain foods are whole-wheat breads and pastas, oatmeal, brown rice, and bulgur.  Eat a variety of vegetables every day.  Include dark, leafy greens such as spinach, kale, bradley greens, and mustard greens. Eat yellow and orange vegetables such as carrots, sweet potatoes, and winter squash.   Eat a variety of fruits every day.  Choose fresh or canned fruit (canned in its own juice or light syrup) instead of juice. Fruit juice has very little or no fiber.  Eat low-fat dairy foods.  Drink fat-free (skim) milk or 1% milk. Eat fat-free yogurt and low-fat cottage cheese. Try low-fat cheeses such as mozzarella and other reduced-fat cheeses.  Choose meat and other protein foods that are low in fat.  Choose beans or other legumes such as split peas or lentils. Choose fish, skinless poultry (chicken or turkey), or lean cuts of red meat (beef or pork). Before you cook meat or poultry, cut off any visible fat.   Use less fat and oil.  Try baking foods instead of frying them. Add less fat, such as margarine, sour cream, regular salad dressing and mayonnaise to foods. Eat fewer high-fat foods. Some examples of high-fat foods include french fries, doughnuts, ice cream, and cakes.  Eat fewer sweets.  Limit foods and drinks that are high in sugar. This includes candy, cookies, regular soda, and sweetened drinks.  Exercise:  Exercise at least 30 minutes per day on most days of the week. Some examples of exercise include walking, biking, dancing, and swimming. You can also fit in more physical activity by taking the stairs instead of the elevator or parking farther away from stores. Ask your healthcare provider about the best exercise plan for you.   Narcotic (Opioid) Safety    Use narcotics safely:  Take  prescribed narcotics exactly as directed  Do not give narcotics to others or take narcotics that belong to someone else  Do not mix narcotics without medicines or alcohol  Do not drive or operate heavy machinery after you take the narcotic  Monitor for side effects and notify your healthcare provider if you experienced side effects such as nausea, sleepiness, itching, or trouble thinking clearly.    Manage constipation:    Constipation is the most common side effect of narcotic medicine. Constipation is when you have hard, dry bowel movements, or you go longer than usual between bowel movements. Tell your healthcare provider about all changes in your bowel movements while you are taking narcotics. He or she may recommend laxative medicine to help you have a bowel movement. He or she may also change the kind of narcotic you are taking, or change when you take it. The following are more ways you can prevent or relieve constipation:    Drink liquids as directed.  You may need to drink extra liquids to help soften and move your bowels. Ask how much liquid to drink each day and which liquids are best for you.  Eat high-fiber foods.  This may help decrease constipation by adding bulk to your bowel movements. High-fiber foods include fruits, vegetables, whole-grain breads and cereals, and beans. Your healthcare provider or dietitian can help you create a high-fiber meal plan. Your provider may also recommend a fiber supplement if you cannot get enough fiber from food.  Exercise regularly.  Regular physical activity can help stimulate your intestines. Walking is a good exercise to prevent or relieve constipation. Ask which exercises are best for you.  Schedule a time each day to have a bowel movement.  This may help train your body to have regular bowel movements. Bend forward while you are on the toilet to help move the bowel movement out. Sit on the toilet for at least 10 minutes, even if you do not have a bowel  movement.    Store narcotics safely:   Store narcotics where others cannot easily get them.  Keep them in a locked cabinet or secure area. Do not  keep them in a purse or other bag you carry with you. A person may be looking for something else and find the narcotics.  Make sure narcotics are stored out of the reach of children.  A child can easily overdose on narcotics. Narcotics may look like candy to a small child.    The best way to dispose of narcotics:      The laws vary by country and area. In the [place], the best way is to return the narcotics through a take-back program. This program is offered by the [place] Drug Enforcement Agency (AYAN). The following are options for using the program:  Take the narcotics to a AYAN collection site.  The site is often a law enforcement center. Call your local law enforcement center for scheduled take-back days in your area. You will be given information on where to go if the collection site is in a different location.  Take the narcotics to an approved pharmacy or hospital.  A pharmacy or hospital may be set up as a collection site. You will need to ask if it is a AYAN collection site if you were not directed there. A pharmacy or doctor's office may not be able to take back narcotics unless it is a AYAN site.  Use a mail-back system.  This means you are given containers to put the narcotics into. You will then mail them in the containers.  Use a take-back drop box.  This is a place to leave the narcotics at any time. People and animals will not be able to get into the box. Your local law enforcement agency can tell you where to find a drop box in your area.    Other ways to manage pain:   Ask your healthcare provider about non-narcotic medicines to control pain.  Nonprescription medicines include NSAIDs (such as ibuprofen) and acetaminophen. Prescription medicines include muscle relaxers, antidepressants, and steroids.  Pain may be managed without any medicines.  Some ways  to relieve pain include massage, aromatherapy, or meditation. Physical or occupational therapy may also help.    For more information:   Drug Enforcement Administration  [address] [place] [address]  Phone: 5- 926 - 136-9648  Web Address: https://www.deadiversion.[facility].gov/drug_disposal/    US Food and Drug Administration  [address] [place] [address]  Phone: 5- 318 - 307-5012  Web Address: http://www.fda.gov     © Copyright [employer] 2018 Information is for End User's use only and may not be sold, redistributed or otherwise used for commercial purposes. All illustrations and images included in CareNotes® are the copyrighted property of A.D.A.M., Inc. or [employer]       O-Z Plasty Text: The defect edges were debeveled with a #15 scalpel blade. Given the location of the defect, shape of the defect and the proximity to free margins an O-Z plasty (double transposition flap) was deemed most appropriate. Using a sterile surgical marker, the appropriate transposition flaps were drawn incorporating the defect and placing the expected incisions within the relaxed skin tension lines where possible. The area thus outlined was incised deep to adipose tissue with a #15 scalpel blade. The skin margins were undermined to an appropriate distance in all directions utilizing iris scissors. Hemostasis was achieved with electrocautery. The flaps were then transposed and carried over into place, one clockwise and the other counterclockwise, and anchored with interrupted buried subcutaneous sutures.

## 2024-11-15 NOTE — ASSESSMENT & PLAN NOTE
Orders:    gabapentin (NEURONTIN) 300 mg capsule; Take 1 capsule (300 mg total) by mouth 2 (two) times a day    CBC and differential; Future

## 2024-11-15 NOTE — PROGRESS NOTES
Name: Kellen Gray      : 1955      MRN: [id number]  Encounter Provider: Ina Rivas MD  Encounter Date: 2024   Encounter department: [facility]    Assessment & Plan  Exocrine pancreatic insufficiency    Orders:    pancrelipase, Lip-Prot-Amyl, (Creon) 24,000 units; Take 3 capsules (72,000 Units total) by mouth 2 (two) times a day    CBC and differential; Future    Hemorrhoids, unspecified hemorrhoid type    Orders:    gabapentin (NEURONTIN) 300 mg capsule; Take 1 capsule (300 mg total) by mouth 2 (two) times a day    CBC and differential; Future    Upper respiratory tract infection, unspecified type    Orders:    amoxicillin (AMOXIL) 500 MG tablet; Take 1 tablet (500 mg total) by mouth 3 (three) times a day for 10 days    CBC and differential; Future    Type 2 diabetes mellitus with other specified complication, without long-term current use of insulin (HCC)    Lab Results   Component Value Date    HGBA1C 7.0 (H) 2024     Orders:    CBC and differential; Future    Comprehensive metabolic panel; Future    Hemoglobin A1C; Future    Chronic heart failure with preserved ejection fraction (HCC)  Wt Readings from Last 3 Encounters:   10/03/24 95.7 kg (210 lb 14.4 oz)   09/10/24 96.7 kg (213 lb 3.2 oz)   24 96.9 kg (213 lb 9.6 oz)             Orders:    furosemide (LASIX) 20 mg tablet; Take 1 tablet (20 mg total) by mouth 2 (two) times a day    CBC and differential; Future    Visit for screening mammogram    Orders:    Mammo screening bilateral w 3d and cad; Future    CBC and differential; Future    Mammo screening bilateral w 3d and cad; Future    Lump in the groin    Orders:    US groin/inguinal area; Future    CBC and differential; Future    Essential hypertension         Hypercholesteremia    Orders:    Lipid panel; Future    TSH, 3rd generation; Future    Vitamin D deficiency    Orders:    Vitamin D 25 hydroxy; Future    Medicare annual wellness visit,  subsequent             Falls Plan of Care: balance, strength, and gait training instructions were provided.       Preventive health issues were discussed with patient, and age appropriate screening tests were ordered as noted in patient's After Visit Summary. Personalized health advice and appropriate referrals for health education or preventive services given if needed, as noted in patient's After Visit Summary.    History of Present Illness   {?Quick Links Encounters * My Last Note * Last Note in Specialty * Snapshot * Since Last Visit * History :08083}  HPI   Patient Care Team:  Ina Rivas MD as PCP - General (Family Medicine)  Ryan Beckman MD as PCP - PCP-[facility] ([facility])  [facility]  Saurabh Graham MD (Gastroenterology)  Fabby Frausto PA-C (Bariatrics)    Review of Systems   Constitutional:  Positive for fatigue. Negative for activity change, appetite change, chills and fever.   HENT:  Positive for congestion, ear pain and sinus pain. Negative for rhinorrhea.    Eyes:  Negative for visual disturbance.   Respiratory:  Negative for chest tightness and shortness of breath.    Cardiovascular:  Negative for chest pain and palpitations.   Gastrointestinal:  Positive for diarrhea. Negative for abdominal pain, blood in stool, nausea and vomiting.   Endocrine: Negative for polydipsia, polyphagia and polyuria.   Genitourinary:  Negative for dysuria, frequency and urgency.   Musculoskeletal:  Positive for arthralgias. Negative for gait problem.   Skin:  Negative for color change.   Neurological:  Negative for dizziness and headaches.   Hematological:  Does not bruise/bleed easily.   Psychiatric/Behavioral:  Negative for confusion and sleep disturbance. The patient is not nervous/anxious.      Medical History Reviewed by provider this encounter:       Annual Wellness Visit Questionnaire       PREVENTIVE SCREENINGS      Cardiovascular Screening:    General: Screening  Not Indicated and History Lipid Disorder      Diabetes Screening:     General: Screening Not Indicated and History Diabetes      Colorectal Cancer Screening:     General: Screening Current      Breast Cancer Screening:     General: Screening Current      Cervical Cancer Screening:    General: Screening Not Indicated      Lung Cancer Screening:     General: Screening Not Indicated      Hepatitis C Screening:    General: Screening Current    Social Drivers of Health     Financial Resource Strain: Low Risk  (11/2/2023)    Overall Financial Resource Strain (CARDIA)     Difficulty of Paying Living Expenses: Not very hard   Food Insecurity: Food Insecurity Present (11/14/2024)    Hunger Vital Sign     Worried About Running Out of Food in the Last Year: Sometimes true     Ran Out of Food in the Last Year: Sometimes true   Transportation Needs: Unmet Transportation Needs (11/14/2024)    PRAPARE - Transportation     Lack of Transportation (Medical): Yes     Lack of Transportation (Non-Medical): Yes   Housing Stability: Unknown (11/14/2024)    Housing Stability Vital Sign     Unable to Pay for Housing in the Last Year: No     Number of Times Moved in the Last Year: 1   Utilities: At Risk (11/14/2024)    [facility] Utilities     Threatened with loss of utilities: Yes     No results found.    Objective {?Quick Links Trend Vitals * Enter New Vitals * Results Review * Timeline (Adult) * Labs * Imaging * Cardiology * Procedures * Lung Cancer Screening * Surgical eConsent :31519}  /76 (BP Location: Left arm, Patient Position: Sitting, Cuff Size: Large)   Pulse 59   Temp 98.4 °F (36.9 °C)   SpO2 96%     Physical Exam  {Administrative / Billing Section (Optional):29761}

## 2024-11-15 NOTE — ASSESSMENT & PLAN NOTE
Current respiratory symptoms discussed.  With the weekend coming and with her significant medical issues, prescription was given for the amoxicillin as noted.  Call or follow-up if symptoms worsen or persist.  Orders:    amoxicillin (AMOXIL) 500 MG tablet; Take 1 tablet (500 mg total) by mouth 3 (three) times a day for 10 days    CBC and differential; Future

## 2024-11-15 NOTE — ASSESSMENT & PLAN NOTE
Blood pressure currently well-controlled.  Continue current medications.  Watch salt intake.  Stay adequately hydrated.

## 2024-11-15 NOTE — ASSESSMENT & PLAN NOTE
Creon reordered.  Discussed with her the difficulty through her pharmacy regarding this.  Advised her to contact the office if there is continued difficulty.  Continue to follow-up with GI.  Orders:    pancrelipase, Lip-Prot-Amyl, (Creon) 24,000 units; Take 3 capsules (72,000 Units total) by mouth 2 (two) times a day    CBC and differential; Future

## 2024-11-15 NOTE — ASSESSMENT & PLAN NOTE
Hemoglobin A1c relatively well-controlled.  Continue with the Jardiance.  Watch diet.  Watch carbohydrate intake.  Try to increase activity level.  Follow-up with ophthalmology regularly.  Check feet daily.  Lab Results   Component Value Date    HGBA1C 7.0 (H) 07/31/2024     Orders:    CBC and differential; Future    Comprehensive metabolic panel; Future    Hemoglobin A1C; Future

## 2024-11-15 NOTE — ASSESSMENT & PLAN NOTE
Lump in the left groin area discussed.  Will check an ultrasound for further investigation.  Orders:    US groin/inguinal area; Future    CBC and differential; Future

## 2024-11-15 NOTE — ASSESSMENT & PLAN NOTE
Wt Readings from Last 3 Encounters:   10/03/24 95.7 kg (210 lb 14.4 oz)   09/10/24 96.7 kg (213 lb 3.2 oz)   09/05/24 96.9 kg (213 lb 9.6 oz)   Weight has been relatively stable.  Continue to follow-up with cardiology.  Watch for increasing shortness of breath, increasing peripheral edema, or weight change of more than 2 to 3 pounds in 24 hours or more than 5 pounds in 1 week.  Continue with the Lasix.          Orders:    furosemide (LASIX) 20 mg tablet; Take 1 tablet (20 mg total) by mouth 2 (two) times a day    CBC and differential; Future

## 2024-11-18 DIAGNOSIS — Z13.9 ENCOUNTER FOR SCREENING INVOLVING SOCIAL DETERMINANTS OF HEALTH (SDOH): Primary | ICD-10-CM

## 2024-11-19 ENCOUNTER — PATIENT OUTREACH (OUTPATIENT)
Dept: CASE MANAGEMENT | Facility: OTHER | Age: 69
End: 2024-11-19

## 2024-11-19 NOTE — PROGRESS NOTES
Received referral from [facility] Jazmine Peters requesting that [facility] outreach patient and assess for needs. Per chart review, patient had PCP visit on 11/14 and reported transportation, food and and utility needs via SDOH report.    Attempted outreaching patient, no answer and left message for return call.

## 2024-11-21 ENCOUNTER — PATIENT OUTREACH (OUTPATIENT)
Dept: CASE MANAGEMENT | Facility: OTHER | Age: 69
End: 2024-11-21

## 2024-11-21 NOTE — LETTER
[address] [facility] [address]  [phone]    Re: Unable to reach   11/21/2024       Dear Kellen,    I am a [facility]  and wanted to be certain you had information to contact me should you desire assistance with or have questions about non-medical aspects of your care such as [but not limited to] medical insurance, housing, transportation, material needs, or emergency needs.  If I do not have an answer I will assist you in finding the appropriate agency or individual who can help.      Please feel free to contact me at Dept: [phone]. Thank You.    Sincerely,         Verenice Mcgee

## 2024-11-21 NOTE — PROGRESS NOTES
2nd attempt outreaching patient to assess for needs. Per chart review, patient had PCP visit on 11/14 and reported transportation, food and and utility needs via SDOH report. No answer and left message. UTR letter was sent via [employer].

## 2024-11-22 ENCOUNTER — HOSPITAL ENCOUNTER (OUTPATIENT)
Dept: MAMMOGRAPHY | Facility: HOSPITAL | Age: 69
Discharge: HOME | End: 2024-11-22
Payer: COMMERCIAL

## 2024-11-22 VITALS — HEIGHT: 69 IN | WEIGHT: 210 LBS | BODY MASS INDEX: 31.1 KG/M2

## 2024-11-22 DIAGNOSIS — Z12.31 VISIT FOR SCREENING MAMMOGRAM: ICD-10-CM

## 2024-11-22 PROCEDURE — 77067 SCR MAMMO BI INCL CAD: CPT

## 2024-11-22 PROCEDURE — 77063 BREAST TOMOSYNTHESIS BI: CPT

## 2024-12-02 DIAGNOSIS — I48.91 ATRIAL FIBRILLATION WITH RAPID VENTRICULAR RESPONSE (HCC): ICD-10-CM

## 2024-12-03 RX ORDER — METOPROLOL SUCCINATE 100 MG/1
100 TABLET, EXTENDED RELEASE ORAL 2 TIMES DAILY
Qty: 60 TABLET | Refills: 5 | Status: SHIPPED | OUTPATIENT
Start: 2024-12-03

## 2024-12-09 DIAGNOSIS — E89.0 POSTOPERATIVE HYPOTHYROIDISM: ICD-10-CM

## 2024-12-10 RX ORDER — LEVOTHYROXINE SODIUM 137 MCG
137 TABLET ORAL DAILY
Qty: 90 TABLET | Refills: 0 | Status: SHIPPED | OUTPATIENT
Start: 2024-12-10

## 2024-12-11 ENCOUNTER — HOSPITAL ENCOUNTER (OUTPATIENT)
Dept: ULTRASOUND IMAGING | Facility: HOSPITAL | Age: 69
Discharge: HOME | End: 2024-12-11
Payer: COMMERCIAL

## 2024-12-11 ENCOUNTER — CLINICAL SUPPORT (OUTPATIENT)
Dept: INTERNAL MEDICINE CLINIC | Facility: CLINIC | Age: 69
End: 2024-12-11
Payer: COMMERCIAL

## 2024-12-11 DIAGNOSIS — E53.8 B12 DEFICIENCY: Primary | ICD-10-CM

## 2024-12-11 DIAGNOSIS — R19.09 LUMP IN THE GROIN: ICD-10-CM

## 2024-12-11 PROCEDURE — 76882 US LMTD JT/FCL EVL NVASC XTR: CPT

## 2024-12-11 PROCEDURE — 96372 THER/PROPH/DIAG INJ SC/IM: CPT

## 2024-12-11 RX ADMIN — CYANOCOBALAMIN 1000 MCG: 1000 INJECTION, SOLUTION INTRAMUSCULAR; SUBCUTANEOUS at 10:48

## 2024-12-12 ENCOUNTER — OFFICE VISIT (OUTPATIENT)
Dept: SLEEP CENTER | Facility: CLINIC | Age: 69
End: 2024-12-12
Payer: COMMERCIAL

## 2024-12-12 VITALS
DIASTOLIC BLOOD PRESSURE: 62 MMHG | RESPIRATION RATE: 16 BRPM | TEMPERATURE: 97.3 F | WEIGHT: 207.6 LBS | HEIGHT: 69 IN | SYSTOLIC BLOOD PRESSURE: 122 MMHG | OXYGEN SATURATION: 95 % | BODY MASS INDEX: 30.75 KG/M2 | HEART RATE: 53 BPM

## 2024-12-12 DIAGNOSIS — E61.1 IRON DEFICIENCY: ICD-10-CM

## 2024-12-12 DIAGNOSIS — G47.33 OSA (OBSTRUCTIVE SLEEP APNEA): Primary | ICD-10-CM

## 2024-12-12 DIAGNOSIS — K50.919 CROHN'S DISEASE WITH COMPLICATION, UNSPECIFIED GASTROINTESTINAL TRACT LOCATION (HCC): ICD-10-CM

## 2024-12-12 DIAGNOSIS — G47.19 EXCESSIVE DAYTIME SLEEPINESS: ICD-10-CM

## 2024-12-12 DIAGNOSIS — G25.81 RLS (RESTLESS LEGS SYNDROME): ICD-10-CM

## 2024-12-12 PROCEDURE — G2211 COMPLEX E/M VISIT ADD ON: HCPCS | Performed by: STUDENT IN AN ORGANIZED HEALTH CARE EDUCATION/TRAINING PROGRAM

## 2024-12-12 PROCEDURE — 99214 OFFICE O/P EST MOD 30 MIN: CPT | Performed by: STUDENT IN AN ORGANIZED HEALTH CARE EDUCATION/TRAINING PROGRAM

## 2024-12-12 NOTE — PATIENT INSTRUCTIONS
Continue PAP Therapy  Continue AutoPAP at settings of 14-20 cmH2O  Remember to clean your mask and equipment regularly, as directed.  I am ordering a formal mask fitting appointment; this is an appointment with the DME to ensure that you have the optimal mask and fit for your face structure    Continue to follow with hematology/oncology for the iron deficiency management, as well as your history of breast cancer  Continue gabapentin per your PCP; if needed, this could be increased/adjusted to help with RLS symptoms, however will defer prescription to your PCP  Nonmedical therapy for restless legs syndrome includes: cold/warm compresses, warm/hot baths or showers, gentle massage, mild leg stretching at nighttime, or magnesium glycinate supplements (200-1000mg at nighttime daily). Mentally alerting activities help too. Note that caffeine, alcohol, nicotine, antidepressants, anti-nausea meds and antihistamines can cause or worsen symptoms.  You should be eligible for new supplies approximately every 3-6 months, depending on your insurance coverage. Contact your Durable Medical Equipment (DME) company for new supplies as needed.  Practice good Sleep Hygiene, as outlined below.  Follow up in 6 months.      Care and Maintenance  Headgear should be washed as needed. Daily inspection and weekly washings are recommended. Do not disassemble the straps. Machine wash in warm water, making sure to attach Velcro hooks and tabs before washing. Line dry or machine dry on a low setting.  Masks should be washed every day. Daily inspection is recommended. Leave the mask and tubing attached. Gently wash the mask with a soft cloth using warm water and mild detergent, concentrating on the mask cushion flaps. DO NOT use alcohol or bleach. Rinse thoroughly and air dry.  Tubing and headgear should be washed weekly. Daily inspection is recommended. Wash in warm water and mild detergent and rinse thoroughly. Hook the tubing to the machine and  blow until dry.  Humidifier should be washed daily and filled with DISTILLED water before use. Wash with warm water and mild detergent. Disinfect weekly by soaking with a solution of 1 part white vinegar and 3 parts water for 30 minutes. Rinse thoroughly and air dry.  Disposable filters should be replaced once a month. Wash reusable foam filters with warm water and mild detergent at least once a month. Rinse thoroughly and dry with paper towels.  Avoid  that contain fragrance or conditioners, as these will leave a residue.  NEVER iron any soft goods.      CMS Requirements    Your insurance requires a face-to-face follow up visit within a 31-90 day period after starting CPAP.  Your insurance requires compliance with CPAP, which is at least 4 hours per night for 70% of the time. This must be done over a 30 day period and must occur within the initial 31-90 day period after starting CPAP.  Your insurance also requires at least yearly follow ups to continue to pay for CPAP supplies.       PAP Supply Guidelines    Below are the guidelines for reordering your supplies. You will be responsible for your deductible, co payments, and out of pocket expenses.    Item Frequency   Nasal Mask (no headgear) 1 every 3 months   Nasal Mask Cushion 1 every 2 weeks   Full Face Mask (no headgear) 1 every 3 months   Full Face Mask Cushion 1 every month   Nasal Pillows 1 every 2 weeks   Headgear 1 every 6 months   hin Strap 1 every 6 months   wallace 1 every 3 months   Filters: Reusable 1 every 6 months   Filters: Disposable 1 every 2 weeks   Humidifier Chamber(disposable) 1 every 6 months         Good Sleep Hygiene    Wake up at the same time every day, even on the weekends.  Use your bed for sleep and intimacy only.  If you have been in bed awake for 30 minutes, get up and leave the bedroom. Choose a dull activity not involving a blue screen (TV, computer, handheld devices). Go back to bed when you feel sleepy.  Avoid caffeine,  nicotine and alcohol before you go to bed.  Avoid large meals before you go to bed.  Avoid using screens (computers, tablets, smartphones, etc.) for at least 1 hour before bedtime  Exercise regularly, but do not exercise right before you go to bed.  Avoid daytime naps. If you do take a nap, sleep for 20-40 minutes, and not after dinner.

## 2024-12-12 NOTE — PROGRESS NOTES
[facility]  Sleep Medicine Follow up/ Established Patient Visit      Assessment/Plan:  1. MARY ELLEN (obstructive sleep apnea)  PAP DME Resupply/Reorder    Mask fitting only      2. RLS (restless legs syndrome)  PAP DME Resupply/Reorder    Mask fitting only      3. Iron deficiency        4. Crohn's disease with complication, unspecified gastrointestinal tract location (HCC)        5. Excessive daytime sleepiness  PAP DME Resupply/Reorder    Mask fitting only          Marta is a very pleasant 69-year-old woman with a PMHx of Crohn's disease, diverticulitis, HTN, history of brain aneurysm, atrial fibrillation on Eliquis, HFpEF (EF 65% 9/2019), COPD, T2DM, GERD, vitamin D deficiency, vitamin B12 deficiency, obesity who presents in follow up for MARY ELLEN (AHI 68.4, O2 ashley 77% 2/2019).  She continues to endorse significant benefit from PAP therapy, and upon review of her compliance report, her AHI does appear therapeutic, although her mask leak is still quite substantial.  It does appear as though her restless leg syndrome symptoms have improved substantially following iron infusions through hematology, which in turn has resulted in great improvement in quality of life.    Continue AutoPAP at settings of  14-20 cmH2O  Mask fitting appointment ordered today.  Prescription for new supplies ordered today  Reviewed CMS/insurance requirements and resupply guidelines  Information provided on the above as well as general maintenance steps  Recommended maintaining good Sleep Hygiene  I encouraged her to continue to follow with hematology for her iron deficiency  I encouraged her to obtain the repeat blood work ordered by hematology to review her current iron levels.  I did discuss with her that she could increase/adjust the gabapentin to help with RLS symptoms if needed, however as of now we will continue to defer prescribing to her PCP unless they prefer me to take over.  Nonmedical therapy for RLS reviewed  today.  She will Return in about 6 months (around 6/12/2025).      ________________________________________________________________________________________________    Per Last Visit Note (Date: 9/5/2024):  Marta is a pleasant 68-year-old woman  with a PMHx of Crohn's disease, diverticulitis, HTN, history of brain aneurysm, atrial fibrillation, HFpEF (EF 65% 9/2019), COPD, T2DM, GERD, vitamin D deficiency, vitamin B12 deficiency, obesity who presents in follow up for MARY ELLEN (AHI 68.4, O2 ashley 77% 2/2019) and RLS.  She continues to endorse substantial benefit from her PAP device, although curiously, even though she registered her device through [employer], she never received the replacement for the recall.  Her RLS continues to be bothersome, although reassuringly it does not sound to be significantly causing a sleep onset insomnia.     Reviewed the risks of utilizing a recalled device versus the risks of untreated MARY ELLEN, and that at her severity, the latter is more significant.  As such, recommended that she continue her current AutoPap at settings of 14-20 cm H2O  However, as this is a recalled device and she is due for a new device regardless, I have placed an order for a new PAP device at the above settings.  Reviewed CMS/insurance requirements and resupply guidelines  Information provided on the above as well as general maintenance steps  Her iron and ferritin levels were quite low, which certainly could be contributing both to residual daytime fatigue as well as her significant RLS.  However, given her extensive GI history, I would defer to gastroenterology as to whether oral iron supplementation would be appropriate.  If it is deemed that it is, I would recommend ferrous sulfate 325 mg taken every other day with vitamin C.  I am also placing a referral to Hematology/Oncology, both for the low iron (which can exacerbate RLS) as well as for your history of breast cancer.   Reviewed nonmedical therapy for RLS.  Also  "discussed that gabapentin can be very effective in the treatment of RLS.  Discussed that she could trial taking both of the 300 mg tablets at night to see if that helps (as it sounds as though this may be the most problematic time for her), versus a dosage increase/adjustment.  I will not make any active adjustments as I have not been the one to prescribe the medication, however should her PCP wish me to take over with this, I will happily do so.  She will Return in about 2 months (around 2024) for Compliance Visit.      Sleep Studies:  -PSG 2019: BMI: 33.4. TST: 315 minutes, Sleep efficiency: 73.3%. Sleep Onset Latency: 12.2 minutes, REM Onset Latency: 86 minutes. AHI: 68.4, Supine AHI: 102.1, REM AHI: 77.6. O2 ashley: 77%, Time below 90%: 243.3 minutes. PLM Index: 0, PLM Arousal Index: 0.       -PAP Titration Study 2019: Titration started at 4 cmH2O, titrated to 12 cmH2O. Best pressure noted to be 12 cmH2O.       ________________________________________________________________________________________________      Interval History: Kellen Gray is a 69 y.o. female with a PMHx of Crohn's disease, diverticulitis, HTN, history of brain aneurysm, atrial fibrillation on Eliquis, HFpEF (EF 65% 2019), COPD, T2DM, GERD, vitamin D deficiency, vitamin B12 deficiency, obesity who presents in follow up for MARY ELLEN (AHI 68.4, O2 ashley 77% 2019).    SDB:  -Current experience with PAP Therapy: Still very beneficial  -Mask type: Nasal Pillows  -Difficulties with mask: Denies  -Device: ResMed AirSense 11; received 2024.  -Difficulties with device: Denies  -DME: [employer] [facility]  -Compliance:        RLS:  -Startin  -Current symptom description:Most of the time she describes an uncomfortable sensation in her legs that prompt her to move her legs to \"satisfy\" the sensation. Occasional instances of legs jumping by themselves.  -Baseline Frequency: Daily. More in bed trying to sleep or inactive.   -Current " "Frequency: Much improved; cannot remember the last time they bothered her!  -Current Medications:             -Gabapentin 300mg BID (Rx by PCP currently). Never tried all at once in evening.              -No iron supplementation. Does see GI for GI issues, next visit 1/2025.   -Did see Heme/Onc on 9/10/2024. They are in turn managing her iron levels. Did receive iron infusions (Venofer)   -Last infusion 10/9/2024  -F/u is 12/20/2024  -Previous Medications:             -Denies     Iron Studies 9/10/2024:  Ferritin: 20  Iron Stauration: 10%  TIBC: 369  Iron: 38    [place] Sleepiness Scale:  What are your chances of dozing?   0= no chance  1= slight chance  2= moderate chance  3= high chance    Sitting and reading: 3   Watching TV: 3  Sitting, inactive in a public place (e.g. a theatre or a meeting):1  As a passenger in a car for an hour without a break: 3  Lying down to rest in the afternoon when circumstances permit: 3   Sitting and talking to someone: 0  Sitting quietly after a lunch without alcohol: 2  In a car, while stopped for a few minutes in the traffic: 0       TOTAL  15/24 (states \"it depends\"). Does not bother her, bothers her .   Greater or equal to 10 is positive for excessive daytime sleepiness        SLEEP HYGIENE QUESTIONS:  Bedtime: 2000/2100   Time it takes to fall sleep: <15 minutes  Wake up Time: 0600/0700     Estimated total sleep time (in a 24 hour period of time): ~10 hours       Changes to PMH, PSH, SH: See above     SLEEP RELATED ROS  Review of Systems  Allergies   Allergen Reactions    Sulfa Antibiotics Rash       CURRENT MEDICATIONS:  Current Outpatient Medications   Medication Instructions    albuterol (ACCUNEB) 0.63 mg, Every 6 hours PRN    albuterol (PROVENTIL HFA,VENTOLIN HFA) 90 mcg/act inhaler 2 puffs, Every 4 hours PRN    atorvastatin (LIPITOR) 20 mg, Every morning    Cholecalciferol (Vitamin D3) 1.25 MG (82780 UT) CAPS 1 capsule, Daily    dofetilide (TIKOSYN) 500 mcg, 2 times " "daily    DULoxetine (CYMBALTA) 20 mg, Oral, Daily    Eliquis 5 mg, Oral, 2 times daily    Empagliflozin 25 mg, Oral, Daily    esomeprazole (NexIUM) 40 MG capsule TAKE 1 CAPSULE BY MOUTH TWICE DAILY 30 MINUTES BEFORE BREAKFAST AND 30 MINUTES BEFORE DINNER    famotidine (PEPCID) 40 mg, Oral, Daily at bedtime    fluticasone (FLONASE) 50 mcg/act nasal spray 1 spray, Nasal, Daily    fluticasone-umeclidinium-vilanterol (Trelegy Ellipta) 200-62.5-25 mcg/actuation AEPB inhaler 1 puff, Inhalation, Daily, Rinse mouth after use.    furosemide (LASIX) 20 mg, Oral, 2 times daily    gabapentin (NEURONTIN) 300 mg, Oral, 2 times daily    hydrOXYzine HCL (ATARAX) 25 mg, Oral, 2 times daily    lisinopril (ZESTRIL) 10 mg, Every morning    mesalamine (DELZICOL) 400 mg Take 2 capsules by mouth twice daily    metoprolol succinate (TOPROL-XL) 100 mg, Oral, 2 times daily    NON FORMULARY TelePRM diabetic monitor, test strips and lancets    pancrelipase (Lip-Prot-Amyl) (CREON) 72,000 Units, Oral, 2 times daily    Synthroid 137 mcg, Oral, Daily           PHYSICAL EXAMINATION:  Vital Signs: /62 (BP Location: Left arm, Patient Position: Sitting, Cuff Size: Large)   Pulse (!) 53   Temp (!) 97.3 °F (36.3 °C) (Temporal)   Resp 16   Ht 5' 9\" (1.753 m)   Wt 94.2 kg (207 lb 9.6 oz)   SpO2 95%   BMI 30.66 kg/m²     Constitutional: NAD, well appearing   Mental Status: AAOx3  Skin: Warm, dry, no rashes noted   Eyes: PERRL, normal conjunctiva  ENT: Nasal congestion absent  Posterior Airspace:   Arcos Tongue Position: 4  Retrognathia: absent  Overbite: absent; generally poor dentition  High Arched Palate: absent  Tongue Scalloping/Ridging: present  Uvula: normal  Chest: No evidence of respiratory distress, no accessory muscle use; no evidence of peripheral cyanosis  Abdomen: Soft, NT/ND  Extremities: No digital clubbing or pedal edema  Neuro: Strength 5/5 throughout, sensation grossly intact          Electronically signed by:    Rolan" NARDA Parker DO  Board-Certified Neurology and Sleep Medicine  [facility]  12/12/24

## 2024-12-13 ENCOUNTER — OFFICE VISIT (OUTPATIENT)
Dept: PULMONOLOGY | Facility: CLINIC | Age: 69
End: 2024-12-13
Payer: COMMERCIAL

## 2024-12-13 ENCOUNTER — TELEPHONE (OUTPATIENT)
Dept: SLEEP CENTER | Facility: CLINIC | Age: 69
End: 2024-12-13

## 2024-12-13 VITALS
TEMPERATURE: 97.5 F | DIASTOLIC BLOOD PRESSURE: 68 MMHG | HEART RATE: 56 BPM | SYSTOLIC BLOOD PRESSURE: 110 MMHG | OXYGEN SATURATION: 97 % | WEIGHT: 207 LBS | BODY MASS INDEX: 30.66 KG/M2 | HEIGHT: 69 IN

## 2024-12-13 DIAGNOSIS — R91.1 PULMONARY NODULE: Primary | ICD-10-CM

## 2024-12-13 DIAGNOSIS — F17.211 CIGARETTE NICOTINE DEPENDENCE IN REMISSION: ICD-10-CM

## 2024-12-13 DIAGNOSIS — J44.9 COPD, SEVERITY TO BE DETERMINED (HCC): ICD-10-CM

## 2024-12-13 LAB

## 2024-12-13 PROCEDURE — 99214 OFFICE O/P EST MOD 30 MIN: CPT | Performed by: INTERNAL MEDICINE

## 2024-12-13 NOTE — ASSESSMENT & PLAN NOTE
Patient is doing well with Trelegy Ellipta once daily.  She completed pulmonary rehabilitation and I encouraged her to continue exercising independently.

## 2024-12-13 NOTE — PROGRESS NOTES
Pulmonary Follow Up Note   Kellen Gray 69 y.o. female MRN: [id number]  12/13/2024      Assessment/Plan:     COPD, severity to be determined (HCC)  Patient is doing well with Trelegy Ellipta once daily.  She completed pulmonary rehabilitation and I encouraged her to continue exercising independently.    Pulmonary nodule  Patient has a 7 mm left lower lobe groundglass pulmonary nodule.  Plan to repeat CT in June.  Nodule was initially identified in 2022.    Cigarette nicotine dependence in remission  Patient has a 23-pack-year smoking history, quit 2018.      Return in about 6 months (around 6/13/2025).    Visit orders:    Diagnoses and all orders for this visit:    Pulmonary nodule  -     CT chest without contrast; Future    COPD, severity to be determined (HCC)    Cigarette nicotine dependence in remission        History of Present Illness   HPI:  Kellen Gray is a 69 y.o. female who is here today for follow-up regarding COPD.  She completed pulmonary rehabilitation since her last visit with me in June.  She found it to be extremely beneficial.  She has also lost some weight which has improved her exertional dyspnea.  She has an occasional cough, recently having an upper respiratory infection.  She has been compliant with Trelegy Ellipta but has not needed rescue inhaler therapy on any regular basis.  She denies any recent ER visits or hospitalizations.    Review of Systems   Constitutional:  Negative for appetite change and fever.   HENT:  Positive for postnasal drip, rhinorrhea and sneezing. Negative for ear pain, sore throat and trouble swallowing.    Respiratory:  Positive for cough.    Cardiovascular:  Positive for chest pain.   Musculoskeletal:  Positive for myalgias.   Neurological:  Positive for headaches.   All other systems reviewed and are negative.      Medical, Family and Social history reviewed and updated as appropriate    Historical Information   Past Medical History:   Diagnosis Date    A-fib  (HCC)     Acute respiratory failure with hypoxia (HCC) 11/30/2019    Anxiety     Arthritis     Asthma     Brain aneurysm     Cancer (HCC)     papillary thyroid cancer    Cardiac disease     CHF (congestive heart failure) (HCC)     COPD (chronic obstructive pulmonary disease) (HCC)     CPAP (continuous positive airway pressure) dependence     Crohn disease (HCC)     Depression     Diabetes mellitus (HCC)     Disease of thyroid gland     Diverticulitis of colon     Emphysema of lung (HCC)     Fibrocystic breast years ago    GERD (gastroesophageal reflux disease)     HL (hearing loss)     Hyperlipidemia     Hypertension     Sleep apnea     Sleep apnea, obstructive     Visual impairment      Past Surgical History:   Procedure Laterality Date    APPENDECTOMY      BREAST BIOPSY      BREAST CYST EXCISION      BREAST SURGERY  several dates    CARDIOVERSION N/A 02/14/2019    Procedure: CARDIOVERSION;  Surgeon: Lee Brenner MD;  Location: [facility];  Service: Cardiology    CEREBRAL ANEURYSM REPAIR      CHOLECYSTECTOMY      HYSTERECTOMY      IR PELVIC ANGIOGRAM  12/14/2017    [place] EXC THROMBOSED HEMORRHOID XTRNL N/A 10/07/2022    Procedure: HEMORRHOIDECTOMY EXCISION;  Surgeon: Leonardo Cash DO;  Location: [facility];  Service: General    THYROIDECTOMY      TONSILLECTOMY AND ADENOIDECTOMY      US GUIDED BREAST BIOPSY RIGHT COMPLETE Right 11/29/2023     Family History   Problem Relation Age of Onset    Alzheimer's disease Mother     Asthma Mother     Cancer Father         dead    Heart disease Sister     Hypertension Sister         under control    Diabetes Sister     Stroke Sister     Cancer Sister         dead    Stroke Sister     Heart disease Sister         dead    Diabetes Sister     Breast cancer Sister     Cancer Brother         liver with mets to bone    Heart disease Brother     Cancer Brother         dead    Asthma Daughter     Cancer Brother        Social History     Tobacco Use   Smoking Status Former    Current  packs/day: 0.00    Average packs/day: 0.5 packs/day for 46.0 years (23.0 ttl pk-yrs)    Types: Cigarettes    Start date:     Quit date: 2018    Years since quittin.9   Smokeless Tobacco Never     Meds/Allergies     Current Outpatient Medications:     albuterol (ACCUNEB) 0.63 MG/3ML nebulizer solution, Take 0.63 mg by nebulization every 6 (six) hours as needed for wheezing, Disp: , Rfl:     albuterol (PROVENTIL HFA,VENTOLIN HFA) 90 mcg/act inhaler, Inhale 2 puffs every 4 (four) hours as needed for wheezing, Disp: , Rfl:     apixaban (Eliquis) 5 mg, Take 1 tablet by mouth twice daily, Disp: 90 tablet, Rfl: 3    fluticasone (FLONASE) 50 mcg/act nasal spray, 1 spray into each nostril daily, Disp: 1 g, Rfl: 3    fluticasone-umeclidinium-vilanterol (Trelegy Ellipta) 200-62.5-25 mcg/actuation AEPB inhaler, Inhale 1 puff daily Rinse mouth after use., Disp: 60 blister, Rfl: 5    atorvastatin (LIPITOR) 20 mg tablet, Take 20 mg by mouth every morning, Disp: , Rfl:     Cholecalciferol (Vitamin D3) 1.25 MG (38659 UT) CAPS, Take 1 capsule by mouth in the morning, Disp: , Rfl:     dofetilide (TIKOSYN) 500 mcg capsule, Take 500 mcg by mouth 2 (two) times a day, Disp: , Rfl:     DULoxetine (CYMBALTA) 20 mg capsule, Take 1 capsule (20 mg total) by mouth daily, Disp: 90 capsule, Rfl: 3    Empagliflozin 25 MG TABS, Take 1 tablet (25 mg total) by mouth daily, Disp: 90 tablet, Rfl: 3    esomeprazole (NexIUM) 40 MG capsule, TAKE 1 CAPSULE BY MOUTH TWICE DAILY 30 MINUTES BEFORE BREAKFAST AND 30 MINUTES BEFORE DINNER, Disp: 60 capsule, Rfl: 5    famotidine (PEPCID) 40 MG tablet, TAKE 1 TABLET BY MOUTH ONCE DAILY AT BEDTIME, Disp: 30 tablet, Rfl: 5    furosemide (LASIX) 20 mg tablet, Take 1 tablet (20 mg total) by mouth 2 (two) times a day, Disp: 200 tablet, Rfl: 3    gabapentin (NEURONTIN) 300 mg capsule, Take 1 capsule (300 mg total) by mouth 2 (two) times a day, Disp: 200 capsule, Rfl: 3    hydrOXYzine HCL (ATARAX) 25 mg  "tablet, Take 1 tablet (25 mg total) by mouth 2 (two) times a day, Disp: 100 tablet, Rfl: 1    lisinopril (ZESTRIL) 10 mg tablet, Take 10 mg by mouth every morning, Disp: , Rfl:     mesalamine (DELZICOL) 400 mg, Take 2 capsules by mouth twice daily, Disp: 360 capsule, Rfl: 3    metoprolol succinate (TOPROL-XL) 100 mg 24 hr tablet, TAKE 1 TABLET BY MOUTH EVERY 12 HOURS, Disp: 60 tablet, Rfl: 5    NON FORMULARY, TelePRM diabetic monitor, test strips and lancets, Disp: , Rfl:     pancrelipase, Lip-Prot-Amyl, (Creon) 24,000 units, Take 3 capsules (72,000 Units total) by mouth 2 (two) times a day, Disp: 600 capsule, Rfl: 3    Synthroid 137 MCG tablet, Take 1 tablet by mouth once daily, Disp: 90 tablet, Rfl: 0    Current Facility-Administered Medications:     cyanocobalamin injection 1,000 mcg, 1,000 mcg, Intramuscular, Q30 Days, , 1,000 mcg at 12/11/24 1048  Allergies   Allergen Reactions    Sulfa Antibiotics Rash       Vitals: Blood pressure 110/68, pulse 56, temperature 97.5 °F (36.4 °C), temperature source Temporal, height 5' 9\" (1.753 m), weight 93.9 kg (207 lb), SpO2 97%. Body mass index is 30.57 kg/m². Oxygen Therapy  SpO2: 97 %  Oxygen Therapy: None (Room air)    Physical Exam   Physical Exam  Constitutional:       General: She is not in acute distress.     Appearance: Normal appearance.   HENT:      Head: Normocephalic.      Mouth/Throat:      Pharynx: No oropharyngeal exudate.   Eyes:      General: No scleral icterus.  Neck:      Vascular: No JVD.   Cardiovascular:      Rate and Rhythm: Normal rate and regular rhythm.   Pulmonary:      Breath sounds: No wheezing, rhonchi or rales.   Abdominal:      Palpations: Abdomen is soft.      Tenderness: There is no abdominal tenderness.   Musculoskeletal:         General: No deformity.      Cervical back: Neck supple.   Lymphadenopathy:      Cervical: No cervical adenopathy.   Skin:     General: Skin is warm and dry.   Neurological:      Mental Status: She is alert and " "oriented to person, place, and time.   Psychiatric:         Mood and Affect: Mood normal.       Labs: I have personally reviewed pertinent lab results.  Lab Results   Component Value Date    WBC 9.83 09/06/2024    HGB 11.8 09/06/2024    HCT 39.2 09/06/2024    MCV 89 09/06/2024     09/06/2024     Lab Results   Component Value Date    CALCIUM 8.9 09/06/2024     04/21/2018    K 3.7 09/06/2024    CO2 30 09/06/2024     09/06/2024    BUN 12 09/06/2024    CREATININE 0.86 09/06/2024     No results found for: \"IGE\"  Lab Results   Component Value Date    ALT 12 09/06/2024    AST 15 09/06/2024    ALKPHOS 90 09/06/2024    BILITOT 0.3 04/21/2018     Imaging and other studies: I have personally reviewed pertinent reports.   and I have personally reviewed pertinent films in PACS chest from 6/12/2024 shows stable 7 mm left lower lobe groundglass nodule.  There is mild biapical pleural-parenchymal scarring.  Stable emphysema.     Pulmonary function testing performed at [facility] on 9/7/2023 FEV1/FVC ratio was 74%.  FEV1 68% predicted, FVC 71% predicted.Total lung capacity 92% predicted, % predicted.  DLCO 70% predicted   Answers submitted by the patient for this visit:  Pulmonology Questionnaire (Submitted on 12/6/2024)  Chief Complaint: Primary symptoms  Do you have chest tightness?: Yes  Do you have difficulty breathing?: Yes  Do you experience frequent throat clearing?: Yes  Do you have a hoarse voice?: Yes  Chronicity: recurrent  When did you first notice your symptoms?: 1 to 4 weeks ago  How often do your symptoms occur?: daily  Since you first noticed this problem, how has it changed?: unchanged  Do you have shortness of breath that occurs with effort or exertion?: No  Do you have ear congestion?: Yes  Do you have heartburn?: Yes  Do you have fatigue?: Yes  Do you have nasal congestion?: Yes  Do you have shortness of breath when lying flat?: No  Do you have shortness of breath when you wake up?: No  Do " you have sweats?: No  Have you experienced weight loss?: Yes  Which of the following makes your symptoms worse?: change in weather, climbing stairs, exercise  Which of the following makes your symptoms better?: oral steroids  Risk factors for lung disease: animal exposure

## 2024-12-13 NOTE — ASSESSMENT & PLAN NOTE
Patient has a 7 mm left lower lobe groundglass pulmonary nodule.  Plan to repeat CT in June.  Nodule was initially identified in 2022.

## 2024-12-14 DIAGNOSIS — K44.9 HIATAL HERNIA: ICD-10-CM

## 2024-12-14 DIAGNOSIS — K21.9 GASTROESOPHAGEAL REFLUX DISEASE, UNSPECIFIED WHETHER ESOPHAGITIS PRESENT: ICD-10-CM

## 2024-12-16 RX ORDER — ESOMEPRAZOLE MAGNESIUM 40 MG/1
CAPSULE, DELAYED RELEASE ORAL
Qty: 60 CAPSULE | Refills: 5 | Status: SHIPPED | OUTPATIENT
Start: 2024-12-16

## 2024-12-17 ENCOUNTER — TELEPHONE (OUTPATIENT)
Dept: HEMATOLOGY ONCOLOGY | Facility: CLINIC | Age: 69
End: 2024-12-17

## 2024-12-17 ENCOUNTER — APPOINTMENT (OUTPATIENT)
Dept: LAB | Facility: HOSPITAL | Age: 69
End: 2024-12-17
Payer: COMMERCIAL

## 2024-12-17 DIAGNOSIS — E55.9 VITAMIN D DEFICIENCY: ICD-10-CM

## 2024-12-17 DIAGNOSIS — E78.00 HYPERCHOLESTEREMIA: ICD-10-CM

## 2024-12-17 DIAGNOSIS — K64.9 HEMORRHOIDS, UNSPECIFIED HEMORRHOID TYPE: ICD-10-CM

## 2024-12-17 DIAGNOSIS — E53.8 B12 DEFICIENCY: ICD-10-CM

## 2024-12-17 DIAGNOSIS — I50.32 CHRONIC HEART FAILURE WITH PRESERVED EJECTION FRACTION (HCC): ICD-10-CM

## 2024-12-17 DIAGNOSIS — K86.81 EXOCRINE PANCREATIC INSUFFICIENCY: ICD-10-CM

## 2024-12-17 DIAGNOSIS — E61.1 IRON DEFICIENCY: ICD-10-CM

## 2024-12-17 DIAGNOSIS — G25.81 RLS (RESTLESS LEGS SYNDROME): ICD-10-CM

## 2024-12-17 DIAGNOSIS — E11.69 TYPE 2 DIABETES MELLITUS WITH OTHER SPECIFIED COMPLICATION, WITHOUT LONG-TERM CURRENT USE OF INSULIN (HCC): ICD-10-CM

## 2024-12-17 DIAGNOSIS — J06.9 UPPER RESPIRATORY TRACT INFECTION, UNSPECIFIED TYPE: ICD-10-CM

## 2024-12-17 DIAGNOSIS — E53.8 LOW FOLATE: ICD-10-CM

## 2024-12-17 DIAGNOSIS — Z12.31 VISIT FOR SCREENING MAMMOGRAM: ICD-10-CM

## 2024-12-17 DIAGNOSIS — R19.09 LUMP IN THE GROIN: ICD-10-CM

## 2024-12-17 LAB
25(OH)D3 SERPL-MCNC: 76.8 NG/ML (ref 30–100)
ALBUMIN SERPL BCG-MCNC: 4.2 G/DL (ref 3.5–5)
ALP SERPL-CCNC: 98 U/L (ref 34–104)
ALT SERPL W P-5'-P-CCNC: 13 U/L (ref 7–52)
ANION GAP SERPL CALCULATED.3IONS-SCNC: 7 MMOL/L (ref 4–13)
AST SERPL W P-5'-P-CCNC: 15 U/L (ref 13–39)
BASOPHILS # BLD AUTO: 0.03 THOUSANDS/ÂΜL (ref 0–0.1)
BASOPHILS NFR BLD AUTO: 0 % (ref 0–1)
BILIRUB SERPL-MCNC: 0.51 MG/DL (ref 0.2–1)
BUN SERPL-MCNC: 14 MG/DL (ref 5–25)
CALCIUM SERPL-MCNC: 9.4 MG/DL (ref 8.4–10.2)
CHLORIDE SERPL-SCNC: 100 MMOL/L (ref 96–108)
CHOLEST SERPL-MCNC: 170 MG/DL (ref ?–200)
CO2 SERPL-SCNC: 31 MMOL/L (ref 21–32)
CREAT SERPL-MCNC: 0.83 MG/DL (ref 0.6–1.3)
EOSINOPHIL # BLD AUTO: 0.12 THOUSAND/ÂΜL (ref 0–0.61)
EOSINOPHIL NFR BLD AUTO: 1 % (ref 0–6)
ERYTHROCYTE [DISTWIDTH] IN BLOOD BY AUTOMATED COUNT: 15.3 % (ref 11.6–15.1)
EST. AVERAGE GLUCOSE BLD GHB EST-MCNC: 143 MG/DL
FERRITIN SERPL-MCNC: 53 NG/ML (ref 11–307)
FOLATE SERPL-MCNC: 13.1 NG/ML
GFR SERPL CREATININE-BSD FRML MDRD: 72 ML/MIN/1.73SQ M
GLUCOSE P FAST SERPL-MCNC: 109 MG/DL (ref 65–99)
HBA1C MFR BLD: 6.6 %
HCT VFR BLD AUTO: 46.5 % (ref 34.8–46.1)
HDLC SERPL-MCNC: 45 MG/DL
HGB BLD-MCNC: 14.3 G/DL (ref 11.5–15.4)
IMM GRANULOCYTES # BLD AUTO: 0.03 THOUSAND/UL (ref 0–0.2)
IMM GRANULOCYTES NFR BLD AUTO: 0 % (ref 0–2)
IRON SATN MFR SERPL: 27 % (ref 15–50)
IRON SERPL-MCNC: 95 UG/DL (ref 50–212)
LDLC SERPL CALC-MCNC: 104 MG/DL (ref 0–100)
LYMPHOCYTES # BLD AUTO: 2.15 THOUSANDS/ÂΜL (ref 0.6–4.47)
LYMPHOCYTES NFR BLD AUTO: 22 % (ref 14–44)
MCH RBC QN AUTO: 28 PG (ref 26.8–34.3)
MCHC RBC AUTO-ENTMCNC: 30.8 G/DL (ref 31.4–37.4)
MCV RBC AUTO: 91 FL (ref 82–98)
MONOCYTES # BLD AUTO: 0.65 THOUSAND/ÂΜL (ref 0.17–1.22)
MONOCYTES NFR BLD AUTO: 7 % (ref 4–12)
NEUTROPHILS # BLD AUTO: 6.91 THOUSANDS/ÂΜL (ref 1.85–7.62)
NEUTS SEG NFR BLD AUTO: 70 % (ref 43–75)
NONHDLC SERPL-MCNC: 125 MG/DL
NRBC BLD AUTO-RTO: 0 /100 WBCS
PLATELET # BLD AUTO: 253 THOUSANDS/UL (ref 149–390)
PMV BLD AUTO: 9.3 FL (ref 8.9–12.7)
POTASSIUM SERPL-SCNC: 4 MMOL/L (ref 3.5–5.3)
PROT SERPL-MCNC: 7.6 G/DL (ref 6.4–8.4)
RBC # BLD AUTO: 5.11 MILLION/UL (ref 3.81–5.12)
SODIUM SERPL-SCNC: 138 MMOL/L (ref 135–147)
TIBC SERPL-MCNC: 351.4 UG/DL (ref 250–450)
TRANSFERRIN SERPL-MCNC: 251 MG/DL (ref 203–362)
TRIGL SERPL-MCNC: 106 MG/DL (ref ?–150)
TSH SERPL DL<=0.05 MIU/L-ACNC: 0.9 UIU/ML (ref 0.45–4.5)
UIBC SERPL-MCNC: 256 UG/DL (ref 155–355)
VIT B12 SERPL-MCNC: 665 PG/ML (ref 180–914)
WBC # BLD AUTO: 9.89 THOUSAND/UL (ref 4.31–10.16)

## 2024-12-17 PROCEDURE — 82306 VITAMIN D 25 HYDROXY: CPT

## 2024-12-17 PROCEDURE — 83036 HEMOGLOBIN GLYCOSYLATED A1C: CPT

## 2024-12-17 PROCEDURE — 36415 COLL VENOUS BLD VENIPUNCTURE: CPT

## 2024-12-17 PROCEDURE — 80061 LIPID PANEL: CPT

## 2024-12-17 PROCEDURE — 83550 IRON BINDING TEST: CPT

## 2024-12-17 PROCEDURE — 85025 COMPLETE CBC W/AUTO DIFF WBC: CPT

## 2024-12-17 PROCEDURE — 82728 ASSAY OF FERRITIN: CPT

## 2024-12-17 PROCEDURE — 83540 ASSAY OF IRON: CPT

## 2024-12-17 PROCEDURE — 84443 ASSAY THYROID STIM HORMONE: CPT

## 2024-12-17 PROCEDURE — 80053 COMPREHEN METABOLIC PANEL: CPT

## 2024-12-17 PROCEDURE — 82607 VITAMIN B-12: CPT

## 2024-12-17 PROCEDURE — 82746 ASSAY OF FOLIC ACID SERUM: CPT

## 2024-12-17 NOTE — TELEPHONE ENCOUNTER
Message left on voicemail as a reminder to complete labs ordered before upcoming appointment with [facility]

## 2024-12-19 LAB

## 2024-12-20 ENCOUNTER — TELEPHONE (OUTPATIENT)
Dept: PULMONOLOGY | Facility: CLINIC | Age: 69
End: 2024-12-20

## 2024-12-20 ENCOUNTER — OFFICE VISIT (OUTPATIENT)
Dept: HEMATOLOGY ONCOLOGY | Facility: CLINIC | Age: 69
End: 2024-12-20
Payer: COMMERCIAL

## 2024-12-20 VITALS
SYSTOLIC BLOOD PRESSURE: 119 MMHG | DIASTOLIC BLOOD PRESSURE: 66 MMHG | OXYGEN SATURATION: 96 % | HEIGHT: 69 IN | BODY MASS INDEX: 30.21 KG/M2 | HEART RATE: 57 BPM | TEMPERATURE: 97.2 F | WEIGHT: 204 LBS

## 2024-12-20 DIAGNOSIS — I50.32 CHRONIC HEART FAILURE WITH PRESERVED EJECTION FRACTION (HCC): ICD-10-CM

## 2024-12-20 DIAGNOSIS — D50.8 IRON DEFICIENCY ANEMIA SECONDARY TO INADEQUATE DIETARY IRON INTAKE: Primary | ICD-10-CM

## 2024-12-20 DIAGNOSIS — K50.919 CROHN'S DISEASE WITH COMPLICATION, UNSPECIFIED GASTROINTESTINAL TRACT LOCATION (HCC): ICD-10-CM

## 2024-12-20 DIAGNOSIS — N60.91 ATYPICAL DUCTAL HYPERPLASIA OF RIGHT BREAST: ICD-10-CM

## 2024-12-20 PROCEDURE — G2211 COMPLEX E/M VISIT ADD ON: HCPCS | Performed by: NURSE PRACTITIONER

## 2024-12-20 PROCEDURE — 99214 OFFICE O/P EST MOD 30 MIN: CPT | Performed by: NURSE PRACTITIONER

## 2024-12-20 RX ORDER — SODIUM CHLORIDE 9 MG/ML
20 INJECTION, SOLUTION INTRAVENOUS ONCE
OUTPATIENT
Start: 2025-01-08

## 2024-12-20 NOTE — ASSESSMENT & PLAN NOTE
Wt Readings from Last 3 Encounters:   12/20/24 92.5 kg (204 lb)   12/13/24 93.9 kg (207 lb)   12/12/24 94.2 kg (207 lb 9.6 oz)    Patient has a history of CHF with preserved ejection fraction she follows with cardiology and cardiac rehab.  Per up-to-date review patient's with heart failure and anemia recommended to have iron supplementation to maintain an iron saturation between 20 to 50%.  Patient has a history of anemia with a hemoglobin of 11.3 in May 2024.  Most recent hemoglobin from 12/17/2024 is 14.3, hematocrit 46.5, otherwise normal CBC and differential.   Orders:    CBC; Standing    Iron Panel (Includes Ferritin, Iron Sat%, Iron, and TIBC); Standing

## 2024-12-20 NOTE — LETTER
2024     Ina Rivas MD  [address] [facility] [address]    Patient: Kellen Gray   YOB: 1955   Date of Visit: 2024       Dear Dr. Rivas:    Thank you for referring Kellen Gray to me for evaluation. Below are my notes for this consultation.    If you have questions, please do not hesitate to call me. I look forward to following your patient along with you.         Sincerely,        JOSH Mercado        CC: No Recipients    JOSH Mercado  2024 11:43 AM  Sign when Signing Visit  Name: Kellen Gray      : 1955      MRN: [id number]  Encounter Provider: JOSH Mercado  Encounter Date: 2024   Encounter department: [facility] [place]  :  Assessment & Plan  Iron deficiency anemia secondary to inadequate dietary iron intake   Patient has a history of iron deficiency likely contributing to restless leg syndrome.  She follows with sleep medicine for management and has received Venofer most recent in 2024.  She tolerated without difficulty.  Labs from 2024 show a ferritin level of 53, and iron saturation 27%.  Improvement from September iron saturation was 10%.  We discussed continuing maintenance infusions and we will plan on Venofer 300 mg once every 28 days x 6.  Will repeat labs every 3 months and follow-up in 6 months.  Crohn's disease with complication, unspecified gastrointestinal tract location (HCC)    Orders:  •  CBC; Standing  •  Iron Panel (Includes Ferritin, Iron Sat%, Iron, and TIBC); Standing    Chronic heart failure with preserved ejection fraction (HCC)  Wt Readings from Last 3 Encounters:   24 92.5 kg (204 lb)   24 93.9 kg (207 lb)   24 94.2 kg (207 lb 9.6 oz)    Patient has a history of CHF with preserved ejection fraction she follows with cardiology and cardiac rehab.  Per up-to-date review patient's with heart failure and anemia recommended to  have iron supplementation to maintain an iron saturation between 20 to 50%.  Patient has a history of anemia with a hemoglobin of 11.3 in May 2024.  Most recent hemoglobin from 12/17/2024 is 14.3, hematocrit 46.5, otherwise normal CBC and differential.   Orders:  •  CBC; Standing  •  Iron Panel (Includes Ferritin, Iron Sat%, Iron, and TIBC); Standing    Atypical ductal hyperplasia of right breast   Patient has a history of ADH in 2012 status post excision and 6 years of tamoxifen.  Patient previously followed with surgical oncology at [facility] in [place] but has not seen them since September 2022.  At her previous visit we ordered diagnostic mammogram completed on 11/22/2024.  Results were negative, routine screening mammogram for both breasts in 1 year.  This can be ordered by her PCP.     Follow-up in 6 months, patient verbalized understanding and is in agreement with the plan.  History of Present Illness   Chief Complaint   Patient presents with   • Follow-up       Pertinent Medical History   Patient is a 69-year-old female with a history of Crohn's disease, diverticulitis, HTN, history of brain aneurysm, atrial fibrillation, HFpEF (EF 65% 9/2019), COPD, T2DM, GERD, vitamin D deficiency, vitamin B12 deficiency, obesity, restless leg syndrome and MARY ELLEN.      Review of Systems   Constitutional:  Negative for activity change, appetite change, fatigue, fever and unexpected weight change.   Respiratory:  Negative for cough and shortness of breath.    Cardiovascular:  Negative for chest pain and leg swelling.   Gastrointestinal:  Negative for abdominal pain, constipation, diarrhea and nausea.   Endocrine: Negative for cold intolerance and heat intolerance.   Musculoskeletal:  Negative for arthralgias and myalgias.   Skin: Negative.    Neurological:  Negative for dizziness, weakness and headaches.   Hematological:  Negative for adenopathy. Does not bruise/bleed easily.           Objective   /66 (BP Location:  "Left arm, Patient Position: Sitting, Cuff Size: Standard)   Pulse 57   Temp (!) 97.2 °F (36.2 °C) (Temporal)   Ht 5' 9\" (1.753 m)   Wt 92.5 kg (204 lb)   SpO2 96%   BMI 30.13 kg/m²     Physical Exam  Vitals reviewed.   Constitutional:       Appearance: Normal appearance. She is well-developed.   HENT:      Head: Normocephalic and atraumatic.   Eyes:      Conjunctiva/sclera: Conjunctivae normal.      Pupils: Pupils are equal, round, and reactive to light.   Pulmonary:      Effort: Pulmonary effort is normal. No respiratory distress.   Musculoskeletal:         General: Normal range of motion.      Cervical back: Normal range of motion.   Lymphadenopathy:      Cervical: No cervical adenopathy.   Skin:     General: Skin is dry.   Neurological:      Mental Status: She is alert and oriented to person, place, and time.   Psychiatric:         Behavior: Behavior normal.   Labs: I have reviewed the following labs:  Results for orders placed or performed in visit on 12/17/24   Vitamin B12   Result Value Ref Range    Vitamin B-12 665 180 - 914 pg/mL   Result Value Ref Range    Folate 13.1 >5.9 ng/mL   CBC and differential   Result Value Ref Range    WBC 9.89 4.31 - 10.16 Thousand/uL    RBC 5.11 3.81 - 5.12 Million/uL    Hemoglobin 14.3 11.5 - 15.4 g/dL    Hematocrit 46.5 (H) 34.8 - 46.1 %    MCV 91 82 - 98 fL    MCH 28.0 26.8 - 34.3 pg    MCHC 30.8 (L) 31.4 - 37.4 g/dL    RDW 15.3 (H) 11.6 - 15.1 %    MPV 9.3 8.9 - 12.7 fL    Platelets 253 149 - 390 Thousands/uL    nRBC 0 /100 WBCs    Segmented % 70 43 - 75 %    Immature Grans % 0 0 - 2 %    Lymphocytes % 22 14 - 44 %    Monocytes % 7 4 - 12 %    Eosinophils Relative 1 0 - 6 %    Basophils Relative 0 0 - 1 %    Absolute Neutrophils 6.91 1.85 - 7.62 Thousands/µL    Absolute Immature Grans 0.03 0.00 - 0.20 Thousand/uL    Absolute Lymphocytes 2.15 0.60 - 4.47 Thousands/µL    Absolute Monocytes 0.65 0.17 - 1.22 Thousand/µL    Eosinophils Absolute 0.12 0.00 - 0.61 Thousand/µL "    Basophils Absolute 0.03 0.00 - 0.10 Thousands/µL   Comprehensive metabolic panel   Result Value Ref Range    Sodium 138 135 - 147 mmol/L    Potassium 4.0 3.5 - 5.3 mmol/L    Chloride 100 96 - 108 mmol/L    CO2 31 21 - 32 mmol/L    ANION GAP 7 4 - 13 mmol/L    BUN 14 5 - 25 mg/dL    Creatinine 0.83 0.60 - 1.30 mg/dL    Glucose, Fasting 109 (H) 65 - 99 mg/dL    Calcium 9.4 8.4 - 10.2 mg/dL    AST 15 13 - 39 U/L    ALT 13 7 - 52 U/L    Alkaline Phosphatase 98 34 - 104 U/L    Total Protein 7.6 6.4 - 8.4 g/dL    Albumin 4.2 3.5 - 5.0 g/dL    Total Bilirubin 0.51 0.20 - 1.00 mg/dL    eGFR 72 ml/min/1.73sq m   Hemoglobin A1C   Result Value Ref Range    Hemoglobin A1C 6.6 (H) Normal 4.0-5.6%; PreDiabetic 5.7-6.4%; Diabetic >=6.5%; Glycemic control for adults with diabetes <7.0% %     mg/dl   Lipid panel   Result Value Ref Range    Cholesterol 170 See Comment mg/dL    Triglycerides 106 See Comment mg/dL    HDL, Direct 45 (L) >=50 mg/dL    LDL Calculated 104 (H) 0 - 100 mg/dL    Non-HDL-Chol (CHOL-HDL) 125 mg/dl   TSH, 3rd generation   Result Value Ref Range    TSH 3RD GENERATON 0.904 0.450 - 4.500 uIU/mL   Vitamin D 25 hydroxy   Result Value Ref Range    Vit D, 25-Hydroxy 76.8 30.0 - 100.0 ng/mL   TIBC Panel (incl. Iron, TIBC, % Iron Saturation)   Result Value Ref Range    Iron Saturation 27 15 - 50 %    TIBC 351.4 250 - 450 ug/dL    Iron 95 50 - 212 ug/dL    Transferrin 251 203 - 362 mg/dL    UIBC 256 155 - 355 ug/dL   Result Value Ref Range    Ferritin 53 11 - 307 ng/mL

## 2024-12-20 NOTE — ASSESSMENT & PLAN NOTE
Orders:    CBC; Standing    Iron Panel (Includes Ferritin, Iron Sat%, Iron, and TIBC); Standing

## 2024-12-20 NOTE — ASSESSMENT & PLAN NOTE
Patient has a history of iron deficiency likely contributing to restless leg syndrome.  She follows with sleep medicine for management and has received Venofer most recent in October 2024.  She tolerated without difficulty.  Labs from 12/17/2024 show a ferritin level of 53, and iron saturation 27%.  Improvement from September iron saturation was 10%.  We discussed continuing maintenance infusions and we will plan on Venofer 300 mg once every 28 days x 6.  Will repeat labs every 3 months and follow-up in 6 months.

## 2024-12-20 NOTE — PROGRESS NOTES
Name: Kellen Gray      : 1955      MRN: [id number]  Encounter Provider: JOSH Mercado  Encounter Date: 2024   Encounter department: [facility]  :  Assessment & Plan  Iron deficiency anemia secondary to inadequate dietary iron intake   Patient has a history of iron deficiency likely contributing to restless leg syndrome.  She follows with sleep medicine for management and has received Venofer most recent in 2024.  She tolerated without difficulty.  Labs from 2024 show a ferritin level of 53, and iron saturation 27%.  Improvement from September iron saturation was 10%.  We discussed continuing maintenance infusions and we will plan on Venofer 300 mg once every 28 days x 6.  Will repeat labs every 3 months and follow-up in 6 months.  Crohn's disease with complication, unspecified gastrointestinal tract location (HCC)    Orders:    CBC; Standing    Iron Panel (Includes Ferritin, Iron Sat%, Iron, and TIBC); Standing    Chronic heart failure with preserved ejection fraction (HCC)  Wt Readings from Last 3 Encounters:   24 92.5 kg (204 lb)   24 93.9 kg (207 lb)   24 94.2 kg (207 lb 9.6 oz)    Patient has a history of CHF with preserved ejection fraction she follows with cardiology and cardiac rehab.  Per up-to-date review patient's with heart failure and anemia recommended to have iron supplementation to maintain an iron saturation between 20 to 50%.  Patient has a history of anemia with a hemoglobin of 11.3 in May 2024.  Most recent hemoglobin from 2024 is 14.3, hematocrit 46.5, otherwise normal CBC and differential.   Orders:    CBC; Standing    Iron Panel (Includes Ferritin, Iron Sat%, Iron, and TIBC); Standing    Atypical ductal hyperplasia of right breast   Patient has a history of ADH in 2012 status post excision and 6 years of tamoxifen.  Patient previously followed with surgical oncology at [facility] in [place]  "but has not seen them since September 2022.  At her previous visit we ordered diagnostic mammogram completed on 11/22/2024.  Results were negative, routine screening mammogram for both breasts in 1 year.  This can be ordered by her PCP.     Follow-up in 6 months, patient verbalized understanding and is in agreement with the plan.  History of Present Illness   Chief Complaint   Patient presents with    Follow-up       Pertinent Medical History   Patient is a 69-year-old female with a history of Crohn's disease, diverticulitis, HTN, history of brain aneurysm, atrial fibrillation, HFpEF (EF 65% 9/2019), COPD, T2DM, GERD, vitamin D deficiency, vitamin B12 deficiency, obesity, restless leg syndrome and MARY ELLEN.      Review of Systems   Constitutional:  Negative for activity change, appetite change, fatigue, fever and unexpected weight change.   Respiratory:  Negative for cough and shortness of breath.    Cardiovascular:  Negative for chest pain and leg swelling.   Gastrointestinal:  Negative for abdominal pain, constipation, diarrhea and nausea.   Endocrine: Negative for cold intolerance and heat intolerance.   Musculoskeletal:  Negative for arthralgias and myalgias.   Skin: Negative.    Neurological:  Negative for dizziness, weakness and headaches.   Hematological:  Negative for adenopathy. Does not bruise/bleed easily.           Objective   /66 (BP Location: Left arm, Patient Position: Sitting, Cuff Size: Standard)   Pulse 57   Temp (!) 97.2 °F (36.2 °C) (Temporal)   Ht 5' 9\" (1.753 m)   Wt 92.5 kg (204 lb)   SpO2 96%   BMI 30.13 kg/m²     Physical Exam  Vitals reviewed.   Constitutional:       Appearance: Normal appearance. She is well-developed.   HENT:      Head: Normocephalic and atraumatic.   Eyes:      Conjunctiva/sclera: Conjunctivae normal.      Pupils: Pupils are equal, round, and reactive to light.   Pulmonary:      Effort: Pulmonary effort is normal. No respiratory distress.   Musculoskeletal:        "  General: Normal range of motion.      Cervical back: Normal range of motion.   Lymphadenopathy:      Cervical: No cervical adenopathy.   Skin:     General: Skin is dry.   Neurological:      Mental Status: She is alert and oriented to person, place, and time.   Psychiatric:         Behavior: Behavior normal.   Labs: I have reviewed the following labs:  Results for orders placed or performed in visit on 12/17/24   Vitamin B12   Result Value Ref Range    Vitamin B-12 665 180 - 914 pg/mL   Result Value Ref Range    Folate 13.1 >5.9 ng/mL   CBC and differential   Result Value Ref Range    WBC 9.89 4.31 - 10.16 Thousand/uL    RBC 5.11 3.81 - 5.12 Million/uL    Hemoglobin 14.3 11.5 - 15.4 g/dL    Hematocrit 46.5 (H) 34.8 - 46.1 %    MCV 91 82 - 98 fL    MCH 28.0 26.8 - 34.3 pg    MCHC 30.8 (L) 31.4 - 37.4 g/dL    RDW 15.3 (H) 11.6 - 15.1 %    MPV 9.3 8.9 - 12.7 fL    Platelets 253 149 - 390 Thousands/uL    nRBC 0 /100 WBCs    Segmented % 70 43 - 75 %    Immature Grans % 0 0 - 2 %    Lymphocytes % 22 14 - 44 %    Monocytes % 7 4 - 12 %    Eosinophils Relative 1 0 - 6 %    Basophils Relative 0 0 - 1 %    Absolute Neutrophils 6.91 1.85 - 7.62 Thousands/µL    Absolute Immature Grans 0.03 0.00 - 0.20 Thousand/uL    Absolute Lymphocytes 2.15 0.60 - 4.47 Thousands/µL    Absolute Monocytes 0.65 0.17 - 1.22 Thousand/µL    Eosinophils Absolute 0.12 0.00 - 0.61 Thousand/µL    Basophils Absolute 0.03 0.00 - 0.10 Thousands/µL   Comprehensive metabolic panel   Result Value Ref Range    Sodium 138 135 - 147 mmol/L    Potassium 4.0 3.5 - 5.3 mmol/L    Chloride 100 96 - 108 mmol/L    CO2 31 21 - 32 mmol/L    ANION GAP 7 4 - 13 mmol/L    BUN 14 5 - 25 mg/dL    Creatinine 0.83 0.60 - 1.30 mg/dL    Glucose, Fasting 109 (H) 65 - 99 mg/dL    Calcium 9.4 8.4 - 10.2 mg/dL    AST 15 13 - 39 U/L    ALT 13 7 - 52 U/L    Alkaline Phosphatase 98 34 - 104 U/L    Total Protein 7.6 6.4 - 8.4 g/dL    Albumin 4.2 3.5 - 5.0 g/dL    Total Bilirubin 0.51  0.20 - 1.00 mg/dL    eGFR 72 ml/min/1.73sq m   Hemoglobin A1C   Result Value Ref Range    Hemoglobin A1C 6.6 (H) Normal 4.0-5.6%; PreDiabetic 5.7-6.4%; Diabetic >=6.5%; Glycemic control for adults with diabetes <7.0% %     mg/dl   Lipid panel   Result Value Ref Range    Cholesterol 170 See Comment mg/dL    Triglycerides 106 See Comment mg/dL    HDL, Direct 45 (L) >=50 mg/dL    LDL Calculated 104 (H) 0 - 100 mg/dL    Non-HDL-Chol (CHOL-HDL) 125 mg/dl   TSH, 3rd generation   Result Value Ref Range    TSH 3RD GENERATON 0.904 0.450 - 4.500 uIU/mL   Vitamin D 25 hydroxy   Result Value Ref Range    Vit D, 25-Hydroxy 76.8 30.0 - 100.0 ng/mL   TIBC Panel (incl. Iron, TIBC, % Iron Saturation)   Result Value Ref Range    Iron Saturation 27 15 - 50 %    TIBC 351.4 250 - 450 ug/dL    Iron 95 50 - 212 ug/dL    Transferrin 251 203 - 362 mg/dL    UIBC 256 155 - 355 ug/dL   Result Value Ref Range    Ferritin 53 11 - 307 ng/mL

## 2024-12-24 NOTE — ASSESSMENT & PLAN NOTE
Patient has a history of ADH in 2012 status post excision and 6 years of tamoxifen.  Patient previously followed with surgical oncology at [facility] in [place] but has not seen them since September 2022.  At her previous visit we ordered diagnostic mammogram completed on 11/22/2024.  Results were negative, routine screening mammogram for both breasts in 1 year.  This can be ordered by her PCP.     Follow-up in 6 months, patient verbalized understanding and is in agreement with the plan.

## 2024-12-25 DIAGNOSIS — F41.9 ANXIETY: ICD-10-CM

## 2024-12-26 RX ORDER — HYDROXYZINE HYDROCHLORIDE 25 MG/1
25 TABLET, FILM COATED ORAL 2 TIMES DAILY
Qty: 100 TABLET | Refills: 0 | Status: SHIPPED | OUTPATIENT
Start: 2024-12-26

## 2025-01-08 NOTE — PROGRESS NOTES
Cardiology Follow Up    Kellen Gray  1955  [id number]  [facility] [address] [id number] [phone]    1. Paroxysmal atrial fibrillation (HCC)  Echo complete w/ contrast if indicated    POCT ECG      2. S/P ablation of atrial fibrillation  Echo complete w/ contrast if indicated      3. Chronic heart failure with preserved ejection fraction (HCC)  Echo complete w/ contrast if indicated      4. Severe obstructive sleep apnea  Echo complete w/ contrast if indicated      5. Type 2 diabetes mellitus with other specified complication, without long-term current use of insulin (HCC)            Discussion/Summary:  Paroxysmal atrial fibrillation  S/p atrial fibrillation ablation in 2019   On metoprolol succinate 100 mg BID and Tikosyn 500 mcg BID.  today.  Patient notes episode of palpitations/chest heaviness which lasted for 1 day approximately 1 month ago. Has not had recurrence, and did not have an episode for quite some time prior to this. Rhythm monitoring would not have benefit at this time for infrequent episodes. Advised her to call office if this occurs again and she should come in for an EKG vs. Go to the ER. She understands.  Continue anticoagulation with Eliquis 5 mg BID.  Will update echocardiogram.     Heart failure with preserved ejection fraction   On lasix 20 mg BID and examines euvolemic.  Reports she has lost approximately 50 lbs over the last 6 months. She has been following with weight management.     MARY ELLEN  Reports compliance with CPAP     She will RTO in 9 months or sooner if necessary. She will call the office with any concerns in the interim.     Interval History:   Kellen Gray is a 69 y.o. female with paroxysmal atrial fibrillation s/p atrial fibrillation ablation in June 2019 on Tikosyn, chronic heart failure with preserved EF, MARY ELLEN who presents to the office today  for routine follow up.    Since her last office visit she has been feeling overall okay.  She has been following with weight management and has lost approximately 0 lbs. Since losing this weight, it is much easier to perform her ADLs.  She does feel much less short of breath, although still has been experiencing some dyspnea with exertion.  She states approximately 1 month ago she had an entire day where she felt sustained palpitations and heaviness in her chest.  She did not seek any evaluation at that time.  She has not had any recurrent symptoms since.  Her lower extremity edema has been controlled on her current diuretic regimen.  She has been compliant with her CPAP.    Medical Problems       Problem List       Lymphadenopathy    A-fib (HCC)    Elevated TSH    Hypercholesteremia    Transaminitis    Microscopic hematuria    Pulmonary hypertension (HCC)    Postoperative hypothyroidism    Brain aneurysm    Atypical chest pain    Dizziness    Crohn's disease with complication (HCC)    Elevated d-dimer    Hypokalemia    COPD, severity to be determined (HCC)    Severe obstructive sleep apnea    Diarrhea    Atherosclerosis    Prolonged QT interval    Hepatomegaly    Hepatic steatosis    Colon polyp    Multiple renal cysts    T12 compression fracture (HCC)    Diabetes mellitus, type 2 (HCC)      Lab Results   Component Value Date    HGBA1C 6.6 (H) 12/17/2024         Fracture of right tibia    Chronic pain syndrome    Chronic bilateral low back pain without sciatica    Lumbar spondylosis    Lumbar radiculopathy    Hemorrhoids    Urinary retention    Essential hypertension    GERD (gastroesophageal reflux disease)    Generalized abdominal pain    (HFpEF) heart failure with preserved ejection fraction (HCC)    Wt Readings from Last 3 Encounters:   01/09/25 93.9 kg (207 lb)   12/20/24 92.5 kg (204 lb)   12/13/24 93.9 kg (207 lb)                 Atypical ductal hyperplasia of right breast    BPPV (benign paroxysmal  positional vertigo)    Congenital multiple renal cysts    History of cerebral aneurysm repair    History of colon polyps    History of renal calculi    History of thyroid cancer    History of total thyroidectomy    History of vertebral fracture    Overview Signed 11/1/2023 10:07 PM by Ina Rivas MD   Formatting of this note might be different from the original. Right tibial plateau Formatting of this note might be different from the original. Compression deformity of T11/T12         Impaired hearing    Iron deficiency anemia secondary to inadequate dietary iron intake    Leukocytosis    Recurrent major depressive disorder (HCC) (Chronic)    Restless legs syndrome    S/P ablation of atrial fibrillation    Sensorineural hearing loss, bilateral    Class 1 obesity    Thymus neoplasm    Folic acid deficiency    Vitamin D deficiency    Wedge compression fracture of T11-T12 vertebra, initial encounter for closed fracture (HCC)    Vitamin B12 deficiency    Hiatal hernia    Exocrine pancreatic insufficiency    SIRS (systemic inflammatory response syndrome) (HCC)    Pulmonary nodule    Iron deficiency    MARY ELLEN (obstructive sleep apnea)    RLS (restless legs syndrome)    Crohn's disease with complication, unspecified gastrointestinal tract location (HCC)    Obesity (BMI 30-39.9)    Anxiety    Upper respiratory infection    Lump in the groin    Excessive daytime sleepiness    Cigarette nicotine dependence in remission        Past Medical History:   Diagnosis Date    A-fib (HCC)     Acute respiratory failure with hypoxia (HCC) 11/30/2019    Anxiety     Arthritis     Asthma     Brain aneurysm     Cancer (HCC)     papillary thyroid cancer    Cardiac disease     CHF (congestive heart failure) (HCC)     COPD (chronic obstructive pulmonary disease) (HCC)     CPAP (continuous positive airway pressure) dependence     Crohn disease (HCC)     Depression     Diabetes mellitus (HCC)     Disease of thyroid gland     Diverticulitis of  colon     Emphysema of lung (HCC)     Fibrocystic breast years ago    GERD (gastroesophageal reflux disease)     HL (hearing loss)     Hyperlipidemia     Hypertension     Sleep apnea     Sleep apnea, obstructive     Visual impairment      Social History     Socioeconomic History    Marital status: /Civil Union     Spouse name: Not on file    Number of children: Not on file    Years of education: Not on file    Highest education level: Not on file   Occupational History    Not on file   Tobacco Use    Smoking status: Former     Current packs/day: 0.00     Average packs/day: 0.5 packs/day for 46.0 years (23.0 ttl pk-yrs)     Types: Cigarettes     Start date:      Quit date: 2018     Years since quittin.0    Smokeless tobacco: Never   Vaping Use    Vaping status: Never Used   Substance and Sexual Activity    Alcohol use: Yes     Comment: social    Drug use: Never     Types: Marijuana    Sexual activity: Not Currently     Partners: Male     Birth control/protection: None   Other Topics Concern    Not on file   Social History Narrative    Not on file     Social Drivers of Health     Financial Resource Strain: Low Risk  (2023)    Overall Financial Resource Strain (CARDIA)     Difficulty of Paying Living Expenses: Not very hard   Food Insecurity: Food Insecurity Present (2024)    Hunger Vital Sign     Worried About Running Out of Food in the Last Year: Sometimes true     Ran Out of Food in the Last Year: Sometimes true   Transportation Needs: Unmet Transportation Needs (2024)    PRAPARE - Transportation     Lack of Transportation (Medical): Yes     Lack of Transportation (Non-Medical): Yes   Physical Activity: Not on file   Stress: Not on file   Social Connections: Not on file   Intimate Partner Violence: Not on file   Housing Stability: Unknown (2024)    Housing Stability Vital Sign     Unable to Pay for Housing in the Last Year: No     Number of Times Moved in the Last Year: 1      Homeless in the Last Year: Not on file      Family History   Problem Relation Age of Onset    Alzheimer's disease Mother     Asthma Mother     Cancer Father         dead    Heart disease Sister     Hypertension Sister         under control    Diabetes Sister     Stroke Sister     Cancer Sister         dead    Stroke Sister     Heart disease Sister         dead    Diabetes Sister     Breast cancer Sister     Cancer Brother         liver with mets to bone    Heart disease Brother     Cancer Brother         dead    Asthma Daughter     Cancer Brother      Past Surgical History:   Procedure Laterality Date    APPENDECTOMY      BREAST BIOPSY      BREAST CYST EXCISION      BREAST SURGERY  several dates    CARDIOVERSION N/A 02/14/2019    Procedure: CARDIOVERSION;  Surgeon: Lee Brenner MD;  Location: [facility];  Service: Cardiology    CEREBRAL ANEURYSM REPAIR      CHOLECYSTECTOMY      HYSTERECTOMY      IR PELVIC ANGIOGRAM  12/14/2017    NE EXC THROMBOSED HEMORRHOID XTRNL N/A 10/07/2022    Procedure: HEMORRHOIDECTOMY EXCISION;  Surgeon: Leonardo Cash DO;  Location: [facility];  Service: General    THYROIDECTOMY      TONSILLECTOMY AND ADENOIDECTOMY      US GUIDED BREAST BIOPSY RIGHT COMPLETE Right 11/29/2023       Current Outpatient Medications:     albuterol (ACCUNEB) 0.63 MG/3ML nebulizer solution, Take 0.63 mg by nebulization every 6 (six) hours as needed for wheezing, Disp: , Rfl:     albuterol (PROVENTIL HFA,VENTOLIN HFA) 90 mcg/act inhaler, Inhale 2 puffs every 4 (four) hours as needed for wheezing, Disp: , Rfl:     apixaban (Eliquis) 5 mg, Take 1 tablet by mouth twice daily, Disp: 90 tablet, Rfl: 3    atorvastatin (LIPITOR) 20 mg tablet, Take 20 mg by mouth every morning, Disp: , Rfl:     Cholecalciferol (Vitamin D3) 1.25 MG (17496 UT) CAPS, Take 1 capsule by mouth in the morning, Disp: , Rfl:     dofetilide (TIKOSYN) 500 mcg capsule, Take 500 mcg by mouth 2 (two) times a day, Disp: , Rfl:     DULoxetine  (CYMBALTA) 20 mg capsule, Take 1 capsule (20 mg total) by mouth daily, Disp: 90 capsule, Rfl: 3    Empagliflozin 25 MG TABS, Take 1 tablet (25 mg total) by mouth daily, Disp: 90 tablet, Rfl: 3    esomeprazole (NexIUM) 40 MG capsule, TAKE 1 CAPSULE BY MOUTH 30 MINUTES BEFORE BREAKFAST AND 30 MINUTES BEFORE DINNER, Disp: 60 capsule, Rfl: 5    famotidine (PEPCID) 40 MG tablet, TAKE 1 TABLET BY MOUTH ONCE DAILY AT BEDTIME, Disp: 30 tablet, Rfl: 5    fluticasone (FLONASE) 50 mcg/act nasal spray, 1 spray into each nostril daily, Disp: 1 g, Rfl: 3    fluticasone-umeclidinium-vilanterol (Trelegy Ellipta) 200-62.5-25 mcg/actuation AEPB inhaler, Inhale 1 puff daily Rinse mouth after use., Disp: 60 blister, Rfl: 5    furosemide (LASIX) 20 mg tablet, Take 1 tablet (20 mg total) by mouth 2 (two) times a day, Disp: 200 tablet, Rfl: 3    gabapentin (NEURONTIN) 300 mg capsule, Take 1 capsule (300 mg total) by mouth 2 (two) times a day, Disp: 200 capsule, Rfl: 3    hydrOXYzine HCL (ATARAX) 25 mg tablet, Take 1 tablet by mouth twice daily, Disp: 100 tablet, Rfl: 0    lisinopril (ZESTRIL) 10 mg tablet, Take 10 mg by mouth every morning, Disp: , Rfl:     mesalamine (DELZICOL) 400 mg, Take 2 capsules by mouth twice daily, Disp: 360 capsule, Rfl: 3    metoprolol succinate (TOPROL-XL) 100 mg 24 hr tablet, TAKE 1 TABLET BY MOUTH EVERY 12 HOURS, Disp: 60 tablet, Rfl: 5    NON FORMULARY, TelePR diabetic monitor, test strips and lancets, Disp: , Rfl:     pancrelipase, Lip-Prot-Amyl, (Creon) 24,000 units, Take 3 capsules (72,000 Units total) by mouth 2 (two) times a day, Disp: 600 capsule, Rfl: 3    Synthroid 137 MCG tablet, Take 1 tablet by mouth once daily, Disp: 90 tablet, Rfl: 0    Current Facility-Administered Medications:     cyanocobalamin injection 1,000 mcg, 1,000 mcg, Intramuscular, Q30 Days, , 1,000 mcg at 01/09/25 1022  Allergies   Allergen Reactions    Sulfa Antibiotics Rash       Labs:     Chemistry        Component Value  "Date/Time     04/21/2018 0852    K 4.0 12/17/2024 1051    K 3.6 10/17/2023 0801     12/17/2024 1051     10/17/2023 0801    CO2 31 12/17/2024 1051    CO2 29 10/17/2023 0801    BUN 14 12/17/2024 1051    BUN 12 10/17/2023 0801    CREATININE 0.83 12/17/2024 1051    CREATININE 0.8 10/17/2023 0801        Component Value Date/Time    CALCIUM 9.4 12/17/2024 1051    CALCIUM 9.1 10/17/2023 0801    ALKPHOS 98 12/17/2024 1051    ALKPHOS 106 10/17/2023 0801    AST 15 12/17/2024 1051    AST 23 10/17/2023 0801    ALT 13 12/17/2024 1051    ALT 25 10/17/2023 0801    BILITOT 0.3 04/21/2018 0852            Lab Results   Component Value Date    CHOL 244 (H) 04/21/2018     Lab Results   Component Value Date    HDL 45 (L) 12/17/2024    HDL 41 (L) 07/31/2024    HDL 41 (L) 03/13/2024     Lab Results   Component Value Date    LDLCALC 104 (H) 12/17/2024    LDLCALC 92 07/31/2024    LDLCALC 80 03/13/2024     Lab Results   Component Value Date    TRIG 106 12/17/2024    TRIG 103 07/31/2024    TRIG 185 (H) 03/13/2024     No results found for: \"CHOLHDL\"    Imaging: US extremity soft tissue  Result Date: 12/15/2024  Narrative: SUPERFICIAL SOFT TISSUE ULTRASOUND INDICATION: R19.09: Other intra-abdominal and pelvic swelling, mass and lump. COMPARISON: None TECHNIQUE: Real-time ultrasound of the left groin/upper medial thigh was performed with a linear transducer with both volumetric sweeps and still imaging techniques. FINDINGS: At the site of palpable concern there is an oval lesion with sonographic features suggesting a lipoma measuring 3.3 x 1.4 x 3.5 cm.     Impression: Imaging features highly suggestive of a subcutaneous lipoma in the medial upper left thigh. Workstation performed: [id number]       ECG: sinus bradycardia  Low voltage    Review of Systems   All other systems reviewed and are negative.      Vitals:    01/09/25 1427   BP: 114/96   Pulse: (!) 53   SpO2: 97%     Vitals:    01/09/25 1427   Weight: 93.9 kg (207 lb) " "    Height: 5' 9\" (175.3 cm)   Body mass index is 30.57 kg/m².    Physical Exam:  Physical Exam  Vitals reviewed.   Constitutional:       General: She is not in acute distress.     Appearance: She is not diaphoretic.   HENT:      Head: Normocephalic and atraumatic.   Eyes:      Pupils: Pupils are equal, round, and reactive to light.   Neck:      Vascular: No carotid bruit.   Cardiovascular:      Rate and Rhythm: Regular rhythm. Bradycardia present.      Pulses:           Radial pulses are 2+ on the right side and 2+ on the left side.        Popliteal pulses are 2+ on the right side and 2+ on the left side.      Heart sounds: S1 normal and S2 normal. No murmur heard.  Pulmonary:      Effort: Pulmonary effort is normal. No respiratory distress.      Breath sounds: Normal breath sounds. No wheezing or rales.   Abdominal:      General: There is no distension.      Palpations: Abdomen is soft.      Tenderness: There is no abdominal tenderness.   Musculoskeletal:         General: No deformity. Normal range of motion.      Cervical back: Normal range of motion.      Right lower leg: No edema.      Left lower leg: No edema.   Skin:     General: Skin is warm and dry.      Findings: No erythema.   Neurological:      General: No focal deficit present.      Mental Status: She is alert and oriented to person, place, and time.      Gait: Gait normal.   Psychiatric:         Mood and Affect: Mood normal.         Behavior: Behavior normal.         "

## 2025-01-09 ENCOUNTER — CLINICAL SUPPORT (OUTPATIENT)
Dept: INTERNAL MEDICINE CLINIC | Facility: CLINIC | Age: 70
End: 2025-01-09
Payer: COMMERCIAL

## 2025-01-09 ENCOUNTER — OFFICE VISIT (OUTPATIENT)
Dept: CARDIOLOGY CLINIC | Facility: HOSPITAL | Age: 70
End: 2025-01-09
Payer: COMMERCIAL

## 2025-01-09 VITALS
HEART RATE: 53 BPM | SYSTOLIC BLOOD PRESSURE: 114 MMHG | WEIGHT: 207 LBS | DIASTOLIC BLOOD PRESSURE: 96 MMHG | HEIGHT: 69 IN | BODY MASS INDEX: 30.66 KG/M2 | OXYGEN SATURATION: 97 %

## 2025-01-09 DIAGNOSIS — E53.8 B12 DEFICIENCY: Primary | ICD-10-CM

## 2025-01-09 DIAGNOSIS — Z86.79 S/P ABLATION OF ATRIAL FIBRILLATION: ICD-10-CM

## 2025-01-09 DIAGNOSIS — Z98.890 S/P ABLATION OF ATRIAL FIBRILLATION: ICD-10-CM

## 2025-01-09 DIAGNOSIS — G47.33 SEVERE OBSTRUCTIVE SLEEP APNEA: ICD-10-CM

## 2025-01-09 DIAGNOSIS — I50.32 CHRONIC HEART FAILURE WITH PRESERVED EJECTION FRACTION (HCC): ICD-10-CM

## 2025-01-09 DIAGNOSIS — E11.69 TYPE 2 DIABETES MELLITUS WITH OTHER SPECIFIED COMPLICATION, WITHOUT LONG-TERM CURRENT USE OF INSULIN (HCC): ICD-10-CM

## 2025-01-09 DIAGNOSIS — I48.0 PAROXYSMAL ATRIAL FIBRILLATION (HCC): Primary | ICD-10-CM

## 2025-01-09 PROCEDURE — 93000 ELECTROCARDIOGRAM COMPLETE: CPT | Performed by: PHYSICIAN ASSISTANT

## 2025-01-09 PROCEDURE — 96372 THER/PROPH/DIAG INJ SC/IM: CPT

## 2025-01-09 PROCEDURE — 99214 OFFICE O/P EST MOD 30 MIN: CPT | Performed by: PHYSICIAN ASSISTANT

## 2025-01-09 RX ADMIN — CYANOCOBALAMIN 1000 MCG: 1000 INJECTION, SOLUTION INTRAMUSCULAR; SUBCUTANEOUS at 10:22

## 2025-01-10 ENCOUNTER — HOSPITAL ENCOUNTER (OUTPATIENT)
Dept: INFUSION CENTER | Facility: HOSPITAL | Age: 70
End: 2025-01-10
Attending: INTERNAL MEDICINE
Payer: COMMERCIAL

## 2025-01-10 VITALS
RESPIRATION RATE: 17 BRPM | SYSTOLIC BLOOD PRESSURE: 147 MMHG | TEMPERATURE: 97.6 F | HEART RATE: 52 BPM | DIASTOLIC BLOOD PRESSURE: 65 MMHG

## 2025-01-10 DIAGNOSIS — D50.8 IRON DEFICIENCY ANEMIA SECONDARY TO INADEQUATE DIETARY IRON INTAKE: ICD-10-CM

## 2025-01-10 DIAGNOSIS — K50.919 CROHN'S DISEASE WITH COMPLICATION, UNSPECIFIED GASTROINTESTINAL TRACT LOCATION (HCC): ICD-10-CM

## 2025-01-10 DIAGNOSIS — I50.32 CHRONIC HEART FAILURE WITH PRESERVED EJECTION FRACTION (HCC): Primary | ICD-10-CM

## 2025-01-10 PROCEDURE — 96365 THER/PROPH/DIAG IV INF INIT: CPT

## 2025-01-10 RX ORDER — SODIUM CHLORIDE 9 MG/ML
20 INJECTION, SOLUTION INTRAVENOUS ONCE
Status: COMPLETED | OUTPATIENT
Start: 2025-01-10 | End: 2025-01-10

## 2025-01-10 RX ORDER — SODIUM CHLORIDE 9 MG/ML
20 INJECTION, SOLUTION INTRAVENOUS ONCE
OUTPATIENT
Start: 2025-02-07

## 2025-01-10 RX ADMIN — IRON SUCROSE 300 MG: 20 INJECTION, SOLUTION INTRAVENOUS at 09:34

## 2025-01-10 RX ADMIN — SODIUM CHLORIDE 20 ML/HR: 0.9 INJECTION, SOLUTION INTRAVENOUS at 09:34

## 2025-01-10 NOTE — PROGRESS NOTES
Pt denies being on any antibiotics or active infection. Pt tolerated Venofer infusion well without complications. PIV removed without incident.      Kellen Gray is aware of future appt on 2/7/25 at 9AM.      AVS declined by Kellen Gray. Pt uses Mychart.     Pt discharged off unit in stable condition with all personal belongings.

## 2025-01-15 ENCOUNTER — TELEPHONE (OUTPATIENT)
Age: 70
End: 2025-01-15

## 2025-01-16 ENCOUNTER — HOSPITAL ENCOUNTER (OUTPATIENT)
Dept: NON INVASIVE DIAGNOSTICS | Facility: HOSPITAL | Age: 70
Discharge: HOME | End: 2025-01-16
Payer: COMMERCIAL

## 2025-01-16 ENCOUNTER — RESULTS FOLLOW-UP (OUTPATIENT)
Dept: CARDIOLOGY CLINIC | Facility: HOSPITAL | Age: 70
End: 2025-01-16

## 2025-01-16 VITALS
WEIGHT: 207 LBS | DIASTOLIC BLOOD PRESSURE: 65 MMHG | HEART RATE: 80 BPM | SYSTOLIC BLOOD PRESSURE: 147 MMHG | BODY MASS INDEX: 30.66 KG/M2 | HEIGHT: 69 IN

## 2025-01-16 DIAGNOSIS — G47.33 SEVERE OBSTRUCTIVE SLEEP APNEA: ICD-10-CM

## 2025-01-16 DIAGNOSIS — I50.32 CHRONIC HEART FAILURE WITH PRESERVED EJECTION FRACTION (HCC): ICD-10-CM

## 2025-01-16 DIAGNOSIS — Z98.890 S/P ABLATION OF ATRIAL FIBRILLATION: ICD-10-CM

## 2025-01-16 DIAGNOSIS — Z86.79 S/P ABLATION OF ATRIAL FIBRILLATION: ICD-10-CM

## 2025-01-16 DIAGNOSIS — I48.0 PAROXYSMAL ATRIAL FIBRILLATION (HCC): ICD-10-CM

## 2025-01-16 LAB
AORTIC ROOT: 2.9 CM
AORTIC VALVE MEAN VELOCITY: 11 M/S
ASCENDING AORTA: 2.8 CM
AV MEAN PRESS GRAD SYS DOP V1V2: 5 MMHG
AV PEAK GRADIENT: 11 MMHG
AV VMAX SYS DOP: 1.64 M/S
BSA FOR ECHO PROCEDURE: 2.1 M2
DOP CALC AO VTI: 31.85 CM
E WAVE DECELERATION TIME: 349 MS
E/A RATIO: 1.62
FRACTIONAL SHORTENING: 35 (ref 28–44)
INTERVENTRICULAR SEPTUM IN DIASTOLE (PARASTERNAL SHORT AXIS VIEW): 1 CM
INTERVENTRICULAR SEPTUM: 1 CM (ref 0.6–1.1)
LAAS-AP2: 19.9 CM2
LAAS-AP4: 16.6 CM2
LEFT ATRIUM SIZE: 4.3 CM
LEFT ATRIUM VOLUME (MOD BIPLANE): 51 ML
LEFT ATRIUM VOLUME INDEX (MOD BIPLANE): 24.3 ML/M2
LEFT INTERNAL DIMENSION IN SYSTOLE: 3.1 CM (ref 2.1–4)
LEFT VENTRICULAR INTERNAL DIMENSION IN DIASTOLE: 4.8 CM (ref 3.5–6)
LEFT VENTRICULAR POSTERIOR WALL IN END DIASTOLE: 1 CM
LEFT VENTRICULAR STROKE VOLUME: 72 ML
LV EF US.2D.A4C+ESTIMATED: 80 %
LVSV (TEICH): 72 ML
MV E'TISSUE VEL-SEP: 12 CM/S
MV PEAK A VEL: 0.52 M/S
MV PEAK E VEL: 84 CM/S
MV STENOSIS PRESSURE HALF TIME: 101 MS
MV VALVE AREA P 1/2 METHOD: 2.18
RA PRESSURE ESTIMATED: 8 MMHG
RIGHT ATRIUM AREA SYSTOLE A4C: 19.5 CM2
RIGHT VENTRICLE ID DIMENSION: 3 CM
RV PSP: 36 MMHG
SL CV LEFT ATRIUM LENGTH A2C: 5.1 CM
SL CV LV EF: 60
SL CV PED ECHO LEFT VENTRICLE DIASTOLIC VOLUME (MOD BIPLANE) 2D: 109 ML
SL CV PED ECHO LEFT VENTRICLE SYSTOLIC VOLUME (MOD BIPLANE) 2D: 37 ML
TR MAX PG: 28 MMHG
TR PEAK VELOCITY: 2.7 M/S
TRICUSPID ANNULAR PLANE SYSTOLIC EXCURSION: 2 CM
TRICUSPID VALVE PEAK REGURGITATION VELOCITY: 2.65 M/S

## 2025-01-16 PROCEDURE — 93306 TTE W/DOPPLER COMPLETE: CPT

## 2025-01-16 PROCEDURE — 93306 TTE W/DOPPLER COMPLETE: CPT | Performed by: INTERNAL MEDICINE

## 2025-01-30 ENCOUNTER — APPOINTMENT (OUTPATIENT)
Dept: LAB | Facility: HOSPITAL | Age: 70
End: 2025-01-30

## 2025-01-30 ENCOUNTER — OFFICE VISIT (OUTPATIENT)
Dept: GASTROENTEROLOGY | Facility: CLINIC | Age: 70
End: 2025-01-30
Payer: COMMERCIAL

## 2025-01-30 VITALS
DIASTOLIC BLOOD PRESSURE: 69 MMHG | TEMPERATURE: 97.6 F | BODY MASS INDEX: 30.07 KG/M2 | HEART RATE: 58 BPM | WEIGHT: 203 LBS | HEIGHT: 69 IN | OXYGEN SATURATION: 93 % | SYSTOLIC BLOOD PRESSURE: 120 MMHG

## 2025-01-30 DIAGNOSIS — K50.919 CROHN'S DISEASE WITH COMPLICATION, UNSPECIFIED GASTROINTESTINAL TRACT LOCATION (HCC): ICD-10-CM

## 2025-01-30 DIAGNOSIS — K21.9 GASTROESOPHAGEAL REFLUX DISEASE WITHOUT ESOPHAGITIS: Primary | ICD-10-CM

## 2025-01-30 DIAGNOSIS — K86.81 EXOCRINE PANCREATIC INSUFFICIENCY: ICD-10-CM

## 2025-01-30 DIAGNOSIS — K76.0 HEPATIC STEATOSIS: ICD-10-CM

## 2025-01-30 PROCEDURE — 99214 OFFICE O/P EST MOD 30 MIN: CPT | Performed by: STUDENT IN AN ORGANIZED HEALTH CARE EDUCATION/TRAINING PROGRAM

## 2025-01-30 PROCEDURE — G2211 COMPLEX E/M VISIT ADD ON: HCPCS | Performed by: STUDENT IN AN ORGANIZED HEALTH CARE EDUCATION/TRAINING PROGRAM

## 2025-01-30 RX ORDER — OMEPRAZOLE 40 MG/1
40 CAPSULE, DELAYED RELEASE ORAL DAILY
Qty: 30 CAPSULE | Refills: 3 | Status: SHIPPED | OUTPATIENT
Start: 2025-01-30

## 2025-01-31 NOTE — ASSESSMENT & PLAN NOTE
Risk factors include obesity, hypertension, dyslipidemia.  Elastography a year ago showed S1 steatosis.  Stiffness values were nondiagnostic.  She does not have any clinical or laboratory stigmata suggestive of advanced liver disease or portal hypertension.  Fortunately, she has lost a significant amount of weight.    NAFLD Fibrosis Score is: -.13    NAFLD Score Correlated Fibrosis Severity   <0.12 F0-F2   0.12-0.676 Indeterminate Score   >0.676 F3-F4   **Fibrosis Severity Scale: F0 = no fibrosis; F1= mild fibrosis; F2 = moderate fibrosis; F3 = severe fibrosis; F4 = cirrhosis    NAFLD Score Component Values:  Component Value Date   Age: 69 y.o.     BMI: 29.98 kg/m²    IFG or DM: Yes    AST: 15 U/L 12/17/2024   ALT: 13 U/L 12/17/2024   Platelet: 253 Thousands/uL 12/17/2024   Albumin: 4.2 g/dL 12/17/2024     Encourage diet and exercise  Follow clinically

## 2025-01-31 NOTE — ASSESSMENT & PLAN NOTE
Symptoms appear to be well-controlled at this time.  She does take Nexium 2 times daily which is controlling her symptoms well.  Recent EGD findings reviewed.  States that her insurance will no longer be covering Nexium and may need an alternative.    We will change therapy from esomeprazole to omeprazole 40 mg daily  Can increase dosing to 2 times daily if needed  Antireflux measures    Orders:    omeprazole (PriLOSEC) 40 MG capsule; Take 1 capsule (40 mg total) by mouth daily

## 2025-01-31 NOTE — PROGRESS NOTES
Name: Kellen Gray      : 1955      MRN: [id number]  Encounter Provider: Joe Stanton DO  Encounter Date: 2025   Encounter department: [facility] Vendor  :  Assessment & Plan  Gastroesophageal reflux disease without esophagitis  Symptoms appear to be well-controlled at this time.  She does take Nexium 2 times daily which is controlling her symptoms well.  Recent EGD findings reviewed.  States that her insurance will no longer be covering Nexium and may need an alternative.    We will change therapy from esomeprazole to omeprazole 40 mg daily  Can increase dosing to 2 times daily if needed  Antireflux measures    Orders:    omeprazole (PriLOSEC) 40 MG capsule; Take 1 capsule (40 mg total) by mouth daily    Exocrine pancreatic insufficiency  Symptoms are generally well-controlled when she is able to get Creon consistently.  Currently on 72,000 units 2 times daily.  She states that if she misses doses due to the pharmacy not being able to get the medication on time, she will have flareup of symptoms which may last for a few days after restarting the medication.    Recheck pancreatic elastase level  Okay to check this while continuing Creon  We will continue Creon at current dose of 72,000 mcg 2 times daily for now  Titrate dosing as needed    Orders:    Pancreatic elastase, fecal; Future    Hepatic steatosis  Risk factors include obesity, hypertension, dyslipidemia.  Elastography a year ago showed S1 steatosis.  Stiffness values were nondiagnostic.  She does not have any clinical or laboratory stigmata suggestive of advanced liver disease or portal hypertension.  Fortunately, she has lost a significant amount of weight.    NAFLD Fibrosis Score is: -.13    NAFLD Score Correlated Fibrosis Severity   <0.12 F0-F2   0.12-0.676 Indeterminate Score   >0.676 F3-F4   **Fibrosis Severity Scale: F0 = no fibrosis; F1= mild fibrosis; F2 = moderate fibrosis; F3 = severe fibrosis; F4 =  cirrhosis    NAFLD Score Component Values:  Component Value Date   Age: 69 y.o.     BMI: 29.98 kg/m²    IFG or DM: Yes    AST: 15 U/L 12/17/2024   ALT: 13 U/L 12/17/2024   Platelet: 253 Thousands/uL 12/17/2024   Albumin: 4.2 g/dL 12/17/2024     Encourage diet and exercise  Follow clinically     Crohn's disease with complication, unspecified gastrointestinal tract location (HCC)  EGD and colonoscopy without evidence of active Crohn's disease.  I do not believe that her diarrhea complaints are due to active Crohn's disease and more likely secondary to EPI.    Check fecal calprotectin  No indication to repeat endoscopic evaluation at this time  Continue Delzicol 800 mg BID    Orders:    Calprotectin,Fecal; Future        History of Present Illness   69-year-old female with EPI, history of Crohn's disease, hepatic steatosis, and GERD who presents to GI office for follow-up.      Kellen Gray is a 69 y.o. female who presents to GI office for follow-up.  She has been doing relatively well since her last office visit.  She does report a few days of severe diarrhea and urgency due to missing Creon.  She states that this happened due to the pharmacy not having the medication.  She was having significant urgency and incontinence due to this.  Fortunately, she was able to get the medication to restart it.  She denies any significant abdominal pain.  GERD symptoms appear to be well-controlled at this time.  Denies any dysphagia or odynophagia.  She does remain on Nexium twice daily but states that insurance will no longer be covering this medication and is requesting alternative.  No other acute GI complaints today.    History obtained from: patient    Review of Systems   Constitutional:  Negative for appetite change and unexpected weight change.   HENT:  Negative for trouble swallowing.    Respiratory:  Negative for shortness of breath.    Cardiovascular:  Negative for chest pain and palpitations.   Gastrointestinal:   Positive for diarrhea. Negative for abdominal distention, abdominal pain, blood in stool, constipation, nausea, rectal pain and vomiting.   All other systems reviewed and are negative.    Medical History Reviewed by provider this encounter:     .  Current Outpatient Medications on File Prior to Visit   Medication Sig Dispense Refill    albuterol (ACCUNEB) 0.63 MG/3ML nebulizer solution Take 0.63 mg by nebulization every 6 (six) hours as needed for wheezing      albuterol (PROVENTIL HFA,VENTOLIN HFA) 90 mcg/act inhaler Inhale 2 puffs every 4 (four) hours as needed for wheezing      apixaban (Eliquis) 5 mg Take 1 tablet by mouth twice daily 90 tablet 3    atorvastatin (LIPITOR) 20 mg tablet Take 20 mg by mouth every morning      Cholecalciferol (Vitamin D3) 1.25 MG (02459 UT) CAPS Take 1 capsule by mouth in the morning      dofetilide (TIKOSYN) 500 mcg capsule Take 500 mcg by mouth 2 (two) times a day      DULoxetine (CYMBALTA) 20 mg capsule Take 1 capsule (20 mg total) by mouth daily 90 capsule 3    Empagliflozin 25 MG TABS Take 1 tablet (25 mg total) by mouth daily 90 tablet 3    famotidine (PEPCID) 40 MG tablet TAKE 1 TABLET BY MOUTH ONCE DAILY AT BEDTIME 30 tablet 5    fluticasone (FLONASE) 50 mcg/act nasal spray 1 spray into each nostril daily 1 g 3    fluticasone-umeclidinium-vilanterol (Trelegy Ellipta) 200-62.5-25 mcg/actuation AEPB inhaler Inhale 1 puff daily Rinse mouth after use. 60 blister 5    furosemide (LASIX) 20 mg tablet Take 1 tablet (20 mg total) by mouth 2 (two) times a day 200 tablet 3    gabapentin (NEURONTIN) 300 mg capsule Take 1 capsule (300 mg total) by mouth 2 (two) times a day 200 capsule 3    hydrOXYzine HCL (ATARAX) 25 mg tablet Take 1 tablet by mouth twice daily 100 tablet 0    lisinopril (ZESTRIL) 10 mg tablet Take 10 mg by mouth every morning      mesalamine (DELZICOL) 400 mg Take 2 capsules by mouth twice daily 360 capsule 3    metoprolol succinate (TOPROL-XL) 100 mg 24 hr tablet TAKE  "1 TABLET BY MOUTH EVERY 12 HOURS 60 tablet 5    NON FORMULARY TelePRM diabetic monitor, test strips and lancets      pancrelipase, Lip-Prot-Amyl, (Creon) 24,000 units Take 3 capsules (72,000 Units total) by mouth 2 (two) times a day 600 capsule 3    Synthroid 137 MCG tablet Take 1 tablet by mouth once daily 90 tablet 0     Current Facility-Administered Medications on File Prior to Visit   Medication Dose Route Frequency Provider Last Rate Last Admin    cyanocobalamin injection 1,000 mcg  1,000 mcg Intramuscular Q30 Days    1,000 mcg at 25 1022      Social History     Tobacco Use    Smoking status: Former     Current packs/day: 0.00     Average packs/day: 0.5 packs/day for 46.0 years (23.0 ttl pk-yrs)     Types: Cigarettes     Start date:      Quit date: 2018     Years since quittin.0    Smokeless tobacco: Never   Vaping Use    Vaping status: Never Used   Substance and Sexual Activity    Alcohol use: Yes     Comment: social    Drug use: Never     Types: Marijuana    Sexual activity: Not Currently     Partners: Male     Birth control/protection: None        Objective   /69 (BP Location: Left arm, Patient Position: Sitting, Cuff Size: Standard)   Pulse 58   Temp 97.6 °F (36.4 °C) (Temporal)   Ht 5' 9\" (1.753 m)   Wt 92.1 kg (203 lb)   SpO2 93%   BMI 29.98 kg/m²      Physical Exam  Vitals reviewed.   Constitutional:       Appearance: Normal appearance.   HENT:      Head: Normocephalic and atraumatic.      Nose: Nose normal.   Eyes:      Extraocular Movements: Extraocular movements intact.   Cardiovascular:      Rate and Rhythm: Normal rate and regular rhythm.   Pulmonary:      Effort: Pulmonary effort is normal. No respiratory distress.   Abdominal:      General: Abdomen is flat. Bowel sounds are normal. There is no distension.      Palpations: Abdomen is soft. There is no mass.      Tenderness: There is no abdominal tenderness. There is no guarding.   Neurological:      General: No focal " deficit present.      Mental Status: She is alert.   Psychiatric:         Mood and Affect: Mood normal.         Behavior: Behavior normal.         Administrative Statements   I have spent a total time of 20 minutes in caring for this patient on the day of the visit/encounter including Diagnostic results, Prognosis, Risks and benefits of tx options, Instructions for management, Patient and family education, Importance of tx compliance, Risk factor reductions, Impressions, Documenting in the medical record, Reviewing / ordering tests, medicine, procedures  , and Obtaining or reviewing history  . Topics discussed with the patient / family include symptom assessment and management, medication review, and medication adjustment.

## 2025-01-31 NOTE — ASSESSMENT & PLAN NOTE
EGD and colonoscopy without evidence of active Crohn's disease.  I do not believe that her diarrhea complaints are due to active Crohn's disease and more likely secondary to EPI.    Check fecal calprotectin  No indication to repeat endoscopic evaluation at this time  Continue Delzicol 800 mg BID    Orders:    Calprotectin,Fecal; Future

## 2025-01-31 NOTE — ASSESSMENT & PLAN NOTE
Symptoms are generally well-controlled when she is able to get Creon consistently.  Currently on 72,000 units 2 times daily.  She states that if she misses doses due to the pharmacy not being able to get the medication on time, she will have flareup of symptoms which may last for a few days after restarting the medication.    Recheck pancreatic elastase level  Okay to check this while continuing Creon  We will continue Creon at current dose of 72,000 mcg 2 times daily for now  Titrate dosing as needed    Orders:    Pancreatic elastase, fecal; Future

## 2025-02-03 ENCOUNTER — APPOINTMENT (OUTPATIENT)
Dept: LAB | Facility: HOSPITAL | Age: 70
End: 2025-02-03
Attending: STUDENT IN AN ORGANIZED HEALTH CARE EDUCATION/TRAINING PROGRAM
Payer: COMMERCIAL

## 2025-02-03 DIAGNOSIS — K86.81 EXOCRINE PANCREATIC INSUFFICIENCY: ICD-10-CM

## 2025-02-03 DIAGNOSIS — K50.919 CROHN'S DISEASE WITH COMPLICATION, UNSPECIFIED GASTROINTESTINAL TRACT LOCATION (HCC): ICD-10-CM

## 2025-02-03 PROCEDURE — 83993 ASSAY FOR CALPROTECTIN FECAL: CPT

## 2025-02-03 PROCEDURE — 82653 EL-1 FECAL QUANTITATIVE: CPT

## 2025-02-05 ENCOUNTER — RESULTS FOLLOW-UP (OUTPATIENT)
Dept: GASTROENTEROLOGY | Facility: CLINIC | Age: 70
End: 2025-02-05

## 2025-02-05 LAB
CALPROTECTIN STL-MCNC: 26.6 ΜG/G
ELASTASE PANC STL-MCNT: 116 UG/G

## 2025-02-06 DIAGNOSIS — I48.91 ATRIAL FIBRILLATION WITH RAPID VENTRICULAR RESPONSE (HCC): ICD-10-CM

## 2025-02-07 ENCOUNTER — HOSPITAL ENCOUNTER (OUTPATIENT)
Dept: INFUSION CENTER | Facility: HOSPITAL | Age: 70
End: 2025-02-07
Attending: INTERNAL MEDICINE
Payer: COMMERCIAL

## 2025-02-07 VITALS
DIASTOLIC BLOOD PRESSURE: 71 MMHG | TEMPERATURE: 97.7 F | SYSTOLIC BLOOD PRESSURE: 111 MMHG | HEART RATE: 42 BPM | RESPIRATION RATE: 16 BRPM

## 2025-02-07 DIAGNOSIS — D50.8 IRON DEFICIENCY ANEMIA SECONDARY TO INADEQUATE DIETARY IRON INTAKE: ICD-10-CM

## 2025-02-07 DIAGNOSIS — K50.919 CROHN'S DISEASE WITH COMPLICATION, UNSPECIFIED GASTROINTESTINAL TRACT LOCATION (HCC): ICD-10-CM

## 2025-02-07 DIAGNOSIS — I50.32 CHRONIC HEART FAILURE WITH PRESERVED EJECTION FRACTION (HCC): Primary | ICD-10-CM

## 2025-02-07 PROCEDURE — 96366 THER/PROPH/DIAG IV INF ADDON: CPT

## 2025-02-07 PROCEDURE — 96367 TX/PROPH/DG ADDL SEQ IV INF: CPT

## 2025-02-07 PROCEDURE — 96365 THER/PROPH/DIAG IV INF INIT: CPT

## 2025-02-07 RX ORDER — ACETAMINOPHEN 325 MG/1
650 TABLET ORAL ONCE
Status: COMPLETED | OUTPATIENT
Start: 2025-02-07 | End: 2025-02-07

## 2025-02-07 RX ORDER — SODIUM CHLORIDE 9 MG/ML
20 INJECTION, SOLUTION INTRAVENOUS ONCE
Status: COMPLETED | OUTPATIENT
Start: 2025-02-07 | End: 2025-02-07

## 2025-02-07 RX ORDER — ACETAMINOPHEN 325 MG/1
650 TABLET ORAL ONCE
Status: CANCELLED
Start: 2025-02-07 | End: 2025-02-07

## 2025-02-07 RX ORDER — ACETAMINOPHEN 325 MG/1
650 TABLET ORAL ONCE
Status: CANCELLED
Start: 2025-03-07 | End: 2025-03-07

## 2025-02-07 RX ORDER — SODIUM CHLORIDE 9 MG/ML
20 INJECTION, SOLUTION INTRAVENOUS ONCE
OUTPATIENT
Start: 2025-03-07

## 2025-02-07 RX ORDER — ACETAMINOPHEN 325 MG/1
650 TABLET ORAL ONCE
Start: 2025-03-07 | End: 2025-03-07

## 2025-02-07 RX ORDER — APIXABAN 5 MG/1
5 TABLET, FILM COATED ORAL 2 TIMES DAILY
Qty: 180 TABLET | Refills: 1 | Status: SHIPPED | OUTPATIENT
Start: 2025-02-07

## 2025-02-07 RX ADMIN — IRON SUCROSE 300 MG: 20 INJECTION, SOLUTION INTRAVENOUS at 08:45

## 2025-02-07 RX ADMIN — SODIUM CHLORIDE 20 ML/HR: 0.9 INJECTION, SOLUTION INTRAVENOUS at 08:45

## 2025-02-07 RX ADMIN — DIPHENHYDRAMINE HYDROCHLORIDE 25 MG: 50 INJECTION, SOLUTION INTRAMUSCULAR; INTRAVENOUS at 10:35

## 2025-02-07 RX ADMIN — ACETAMINOPHEN 650 MG: 325 TABLET, FILM COATED ORAL at 10:29

## 2025-02-07 NOTE — PROGRESS NOTES
Near the end of the patients Venofer infusion, she presented with a rash on her right arm, near her IV site. Covering RN, Juliana Nugent, for Dr. Tovar was made aware. IVPB Benadryl, and oral Tylenol ordered and administered. Okay to continue with treatment ordered. Patient tolerated the rest of the infusion well after being restarted.    Kellen Gray is aware of future appt on 3/7/25 at 0900.     AVS declined.

## 2025-02-07 NOTE — PLAN OF CARE
Problem: Potential for Falls  Goal: Patient will remain free of falls  Description: INTERVENTIONS:  - Educate patient/family on patient safety including physical limitations  - Instruct patient to call for assistance with activity   - Consult OT/PT to assist with strengthening/mobility   - Keep Call bell within reach  - Keep bed low and locked with side rails adjusted as appropriate  - Keep care items and personal belongings within reach  - Initiate and maintain comfort rounds  - Make Fall Risk Sign visible to staff  - Consider moving patient to room near nurses station  Outcome: Progressing     Problem: INFECTION - ADULT  Goal: Absence or prevention of progression during hospitalization  Description: INTERVENTIONS:  - Assess and monitor for signs and symptoms of infection  - Monitor lab/diagnostic results  - Monitor all insertion sites, i.e. indwelling lines, tubes, and drains  - Monitor endotracheal if appropriate and nasal secretions for changes in amount and color  - [place] appropriate cooling/warming therapies per order  - Administer medications as ordered  - Instruct and encourage patient and family to use good hand hygiene technique  - Identify and instruct in appropriate isolation precautions for identified infection/condition  Outcome: Progressing     Problem: Knowledge Deficit  Goal: Patient/family/caregiver demonstrates understanding of disease process, treatment plan, medications, and discharge instructions  Description: Complete learning assessment and assess knowledge base.  Interventions:  - Provide teaching at level of understanding  - Provide teaching via preferred learning methods  Outcome: Progressing

## 2025-02-10 ENCOUNTER — CLINICAL SUPPORT (OUTPATIENT)
Dept: INTERNAL MEDICINE CLINIC | Facility: CLINIC | Age: 70
End: 2025-02-10
Payer: COMMERCIAL

## 2025-02-10 DIAGNOSIS — E53.8 B12 DEFICIENCY: Primary | ICD-10-CM

## 2025-02-10 PROCEDURE — 96372 THER/PROPH/DIAG INJ SC/IM: CPT

## 2025-02-10 RX ADMIN — CYANOCOBALAMIN 1000 MCG: 1000 INJECTION, SOLUTION INTRAMUSCULAR; SUBCUTANEOUS at 10:30

## 2025-02-11 DIAGNOSIS — F41.9 ANXIETY: ICD-10-CM

## 2025-02-12 RX ORDER — HYDROXYZINE HYDROCHLORIDE 25 MG/1
25 TABLET, FILM COATED ORAL 2 TIMES DAILY
Qty: 100 TABLET | Refills: 0 | Status: SHIPPED | OUTPATIENT
Start: 2025-02-12

## 2025-02-13 ENCOUNTER — TELEPHONE (OUTPATIENT)
Age: 70
End: 2025-02-13

## 2025-02-13 NOTE — TELEPHONE ENCOUNTER
PA for hydrOXYzine HCL (ATARAX) 25 mg tablet  APPROVED     Date(s) approved 12/13/2024 to 0/12/2026      Patient advised by          []Souktelhart Message  []Phone call   [x]LMOM  []L/M to call office as no active Communication consent on file  []Unable to leave detailed message as VM not approved on Communication consent       Pharmacy advised by    [x]Fax  []Phone call    Approval letter scanned into Media Yes

## 2025-02-13 NOTE — TELEPHONE ENCOUNTER
PA for hydrOXYzine HCL (ATARAX) 25 mg tablet SUBMITTED to     via    [x]CMM-KEY: [id number]  []Surescripts-Case ID #   []Availity-Auth ID # NDC #   []Faxed to plan   []Other website   []Phone call Case ID #     [x]PA sent as URGENT    All office notes, labs and other pertaining documents and studies sent. Clinical questions answered. Awaiting determination from insurance company.     Turnaround time for your insurance to make a decision on your Prior Authorization can take 7-21 business days.

## 2025-02-17 ENCOUNTER — TELEPHONE (OUTPATIENT)
Age: 70
End: 2025-02-17

## 2025-02-17 NOTE — TELEPHONE ENCOUNTER
Caller: Marta Gray    Doctor: Dr. Brenner    Reason for call: Her insurance company will not cover her dofetilide (Tikosyn). They want to know why she is being prescribed this medication.  Please call her  to discuss.    Thank you      Call back#: [phone]

## 2025-02-19 ENCOUNTER — RA CDI HCC (OUTPATIENT)
Dept: OTHER | Facility: HOSPITAL | Age: 70
End: 2025-02-19

## 2025-02-19 ENCOUNTER — TELEPHONE (OUTPATIENT)
Age: 70
End: 2025-02-19

## 2025-02-19 NOTE — PROGRESS NOTES
HCC coding opportunities          Chart Reviewed number of suggestions sent to Provider: 1    I11.0     Patients Insurance     Medicare Insurance: [employer]

## 2025-02-19 NOTE — TELEPHONE ENCOUNTER
Called [facility]una [phone] spoke with ramona angel office asking about tikosyn since they filled it last . She will give Dr message from below and call us back .

## 2025-02-19 NOTE — TELEPHONE ENCOUNTER
Caller: Marta Gray    Doctor: Dr. Brenner/Dong    Reason for call: Tikosyn is not covered by insurance because Provider did not provide a diagnosis.  Can the DR contact the insurance with a DX so the medication can be covered?    Pt is out of meds and is concerned.  Please call pt to advise.    Call back#: [phone]

## 2025-02-24 ENCOUNTER — TELEPHONE (OUTPATIENT)
Dept: CARDIOLOGY CLINIC | Facility: HOSPITAL | Age: 70
End: 2025-02-24

## 2025-02-24 ENCOUNTER — OFFICE VISIT (OUTPATIENT)
Age: 70
End: 2025-02-24
Payer: COMMERCIAL

## 2025-02-24 VITALS
SYSTOLIC BLOOD PRESSURE: 112 MMHG | BODY MASS INDEX: 30.36 KG/M2 | DIASTOLIC BLOOD PRESSURE: 64 MMHG | HEART RATE: 52 BPM | WEIGHT: 205 LBS | HEIGHT: 69 IN | TEMPERATURE: 97.3 F | OXYGEN SATURATION: 97 %

## 2025-02-24 DIAGNOSIS — E11.9 TYPE 2 DIABETES MELLITUS WITHOUT COMPLICATION, WITHOUT LONG-TERM CURRENT USE OF INSULIN (HCC): ICD-10-CM

## 2025-02-24 DIAGNOSIS — E66.811 CLASS 1 OBESITY: Primary | ICD-10-CM

## 2025-02-24 PROCEDURE — 99214 OFFICE O/P EST MOD 30 MIN: CPT | Performed by: PHYSICIAN ASSISTANT

## 2025-02-24 NOTE — TELEPHONE ENCOUNTER
Called patients insurance [employer] @ [phone] regarding Tikosyn Denial. Informed that patient needs to try and fail 2 of the following: Amiodarone, Flecainide or Sotalol.     Dr. Brenner informed of above and informs that patient has already failed Flecainide and Amiodarone should not be used. He would like to see how she does off Tikosyn.     Left message on voice mail for patient regarding above and requested that she call the office regarding this instruction.

## 2025-02-24 NOTE — PROGRESS NOTES
Assessment/Plan:    Class 1 obesity  -Patient is pursuing Conservative Program  -Initial weight loss goal of 5-10% weight loss for improved health  -Screening labs: A1c, LP, TSH, and CMP reviewed from 12/17/24  -Pt has a personal hx of papillary thyroid cancer; her brother and daughters had thyroid cancer- unsure of type- would AVOID Wegovy and Saxenda; also has exocrine pancreatic insufficiency   -reduce middle of the night cookies- try reducing portions or keep drink at bedside   -reduce sugary beverages  -strength training recs given; as weather breaks plans to add more walking     Initial: 255.9  Current: 205 (-5 lbs since last visit)  Change: -50.9  Goal: 160-165     Follow up: feels has good foundation and would like to f/u prn     Goals:  Food log (ie.) www.ClearSlidepal.com,sparkpeople.com,loseit.com,StreamOcean.com,etc. baritastic  No sugary beverages. At least 64oz of water daily.  Increase physical activity by 10 minutes daily. Gradually increase physical activity to a goal of 5 days per week for 30 minutes of MODERATE intensity PLUS 2 days per week of FULL BODY resistance training  5-10 servings of fruits and vegetables per day and 25-35 grams of dietary fiber per day, gradually increasing     Diagnoses and all orders for this visit:    Class 1 obesity    Body mass index 30.0-30.9, adult        Subjective:   Chief Complaint   Patient presents with    Follow-up     6 month follow-up of weight management     Patient ID: Kellen Gray  is a 69 y.o. female with excess weight/obesity here to pursue weight management.  Patient is pursuing Conservative Program.     HPI  The patient presents for [facility] follow up.     Reports continues to do well with lifestyle changes; was under 200 a month ago and seems to lose and regain; attributes this to cravings or hunger through the middle of the night.  is supportive and also trying to lose weight     B: english muffin and fruit or just fruit   S: no  L: sandwich or  "fruit + PB OR soup   S: chips and dip or fruit; occasionally piece of chocolate   D: protein + veg + starch   S: sometimes- something sweet  Sometimes wakes and has a couple of cookies in the middle of the night     Exercise: sit ups, jumping jacks - 3x/week   Hydration: >64 oz of water; drinks sweet tea (1 cup), has small cans of soda as well   Sleep: 8-9 hours     The following portions of the patient's history were reviewed and updated as appropriate: allergies, current medications, past family history, past medical history, past social history, past surgical history, and problem list.    Review of Systems   Cardiovascular:  Negative for chest pain and palpitations.   Psychiatric/Behavioral: Negative.       Objective:    /64   Pulse (!) 52   Temp (!) 97.3 °F (36.3 °C)   Ht 5' 9\" (1.753 m)   Wt 93 kg (205 lb)   SpO2 97%   BMI 30.27 kg/m²      Physical Exam  Vitals and nursing note reviewed.     Constitutional   General appearance: Abnormal.  well developed and obese.   Pulmonary   Respiratory effort: No increased work of breathing or signs of respiratory distress.    Abdomen   Abdomen: Abnormal.  The abdomen was obese.   Musculoskeletal   Gait and station: Normal.    Psychiatric   Orientation to person, place and time: Normal.    Affect: appropriate    25 minute visit, >50% time spent counseling patient on diet behavior and exercise modification for weight loss.      "

## 2025-02-24 NOTE — ASSESSMENT & PLAN NOTE
-Patient is pursuing Conservative Program  -Initial weight loss goal of 5-10% weight loss for improved health  -Screening labs: A1c, LP, TSH, and CMP reviewed from 12/17/24  -Pt has a personal hx of papillary thyroid cancer; her brother and daughters had thyroid cancer- unsure of type- would AVOID Wegovy and Saxenda; also has exocrine pancreatic insufficiency   -reduce middle of the night cookies- try reducing portions or keep drink at bedside   -reduce sugary beverages  -strength training recs given; as weather breaks plans to add more walking     Initial: 255.9  Current: 205 (-5 lbs since last visit)  Change: -50.9  Goal: 160-165

## 2025-02-25 ENCOUNTER — TELEPHONE (OUTPATIENT)
Dept: CARDIOLOGY CLINIC | Facility: HOSPITAL | Age: 70
End: 2025-02-25

## 2025-02-25 ENCOUNTER — OFFICE VISIT (OUTPATIENT)
Dept: INTERNAL MEDICINE CLINIC | Facility: CLINIC | Age: 70
End: 2025-02-25
Payer: COMMERCIAL

## 2025-02-25 VITALS
HEIGHT: 69 IN | TEMPERATURE: 97.3 F | SYSTOLIC BLOOD PRESSURE: 128 MMHG | WEIGHT: 206.4 LBS | HEART RATE: 56 BPM | OXYGEN SATURATION: 98 % | BODY MASS INDEX: 30.57 KG/M2 | DIASTOLIC BLOOD PRESSURE: 76 MMHG

## 2025-02-25 DIAGNOSIS — K50.919 CROHN'S DISEASE WITH COMPLICATION, UNSPECIFIED GASTROINTESTINAL TRACT LOCATION (HCC): ICD-10-CM

## 2025-02-25 DIAGNOSIS — I48.0 PAROXYSMAL ATRIAL FIBRILLATION (HCC): ICD-10-CM

## 2025-02-25 DIAGNOSIS — I27.20 PULMONARY HYPERTENSION (HCC): ICD-10-CM

## 2025-02-25 DIAGNOSIS — D50.8 IRON DEFICIENCY ANEMIA SECONDARY TO INADEQUATE DIETARY IRON INTAKE: ICD-10-CM

## 2025-02-25 DIAGNOSIS — I50.32 CHRONIC HEART FAILURE WITH PRESERVED EJECTION FRACTION (HCC): ICD-10-CM

## 2025-02-25 DIAGNOSIS — I10 ESSENTIAL HYPERTENSION: Primary | ICD-10-CM

## 2025-02-25 DIAGNOSIS — E53.8 VITAMIN B12 DEFICIENCY: ICD-10-CM

## 2025-02-25 DIAGNOSIS — E55.9 VITAMIN D DEFICIENCY: ICD-10-CM

## 2025-02-25 DIAGNOSIS — E11.69 TYPE 2 DIABETES MELLITUS WITH OTHER SPECIFIED COMPLICATION, WITHOUT LONG-TERM CURRENT USE OF INSULIN (HCC): ICD-10-CM

## 2025-02-25 DIAGNOSIS — F33.41 RECURRENT MAJOR DEPRESSIVE DISORDER, IN PARTIAL REMISSION (HCC): Chronic | ICD-10-CM

## 2025-02-25 DIAGNOSIS — H25.9 AGE-RELATED CATARACT OF BOTH EYES, UNSPECIFIED AGE-RELATED CATARACT TYPE: ICD-10-CM

## 2025-02-25 DIAGNOSIS — K86.81 EXOCRINE PANCREATIC INSUFFICIENCY: ICD-10-CM

## 2025-02-25 DIAGNOSIS — E78.00 HYPERCHOLESTEREMIA: ICD-10-CM

## 2025-02-25 DIAGNOSIS — J44.9 COPD, SEVERITY TO BE DETERMINED (HCC): ICD-10-CM

## 2025-02-25 PROBLEM — J06.9 UPPER RESPIRATORY INFECTION: Status: RESOLVED | Noted: 2024-11-14 | Resolved: 2025-02-25

## 2025-02-25 PROCEDURE — G2211 COMPLEX E/M VISIT ADD ON: HCPCS | Performed by: FAMILY MEDICINE

## 2025-02-25 PROCEDURE — 99214 OFFICE O/P EST MOD 30 MIN: CPT | Performed by: FAMILY MEDICINE

## 2025-02-25 RX ORDER — EMPAGLIFLOZIN 25 MG/1
25 TABLET, FILM COATED ORAL DAILY
Qty: 90 TABLET | Refills: 0 | Status: SHIPPED | OUTPATIENT
Start: 2025-02-25

## 2025-02-25 NOTE — TELEPHONE ENCOUNTER
Patient stopped by office and informs that she does not feel comfortable not taking any medication for her a fib.     Called [facility] and informed that a tier exception appeal is required. Dr. Brenner agrees with appeal. Called [facility] and had to leave a voicemail with information and they will call back.     Also called [facility] in [place] and spoke with the pharmacist. He was able to obtain an emergency supply of dofetilide 500 mcg bid for one week.     Patient informed of appeal and emergency supply available at the pharmacy.

## 2025-02-26 ENCOUNTER — DOCUMENTATION (OUTPATIENT)
Dept: CARDIOLOGY CLINIC | Facility: HOSPITAL | Age: 70
End: 2025-02-26

## 2025-02-26 NOTE — ASSESSMENT & PLAN NOTE
Hemoglobin A1c has been controlled.  Continue with the Jardiance.  Continue to watch diet.  Continue with the slow but steady weight loss.  Continue with portion control.  Gradually increase activity level if tolerates given her orthopedic issues.  Follow-up with ophthalmology regularly.  Check feet daily.  Lab Results   Component Value Date    HGBA1C 6.6 (H) 12/17/2024       Orders:  •  Ambulatory Referral to Podiatry; Future  •  Ambulatory Referral to Ophthalmology; Future  •  Comprehensive metabolic panel; Future  •  Hemoglobin A1C; Future

## 2025-02-26 NOTE — ASSESSMENT & PLAN NOTE
Continue with vitamin B12 supplementation.  Will continue to follow vitamin B12 level with routine laboratory testing and adjust dose if needed.  Orders:  •  Vitamin B12; Future

## 2025-02-26 NOTE — ASSESSMENT & PLAN NOTE
Wt Readings from Last 3 Encounters:   02/25/25 93.6 kg (206 lb 6.4 oz)   02/24/25 93 kg (205 lb)   01/30/25 92.1 kg (203 lb)     Appears euvolemic.  Continue with the slow but steady weight loss.  Watch salt intake.  Watch for any increasing shortness of breath, increasing peripheral edema, or sudden increases in weight.

## 2025-02-26 NOTE — ASSESSMENT & PLAN NOTE
Continue to follow-up with GI.  Continue with the Creon as per their recommendations.  Watch for any dietary triggers.  Recent laboratory testing reviewed with her.  Orders:  •  Comprehensive metabolic panel; Future

## 2025-02-26 NOTE — ASSESSMENT & PLAN NOTE
Continue with vitamin D supplementation.  Will continue to follow vitamin D level with routine laboratory testing and adjust dose if needed.  Orders:  •  Vitamin D 25 hydroxy; Future

## 2025-02-26 NOTE — ASSESSMENT & PLAN NOTE
Anemia has improved.  Continue to follow-up with hematology.  Infusions as per their recommendation.  Orders:  •  CBC and differential; Future

## 2025-02-26 NOTE — PROGRESS NOTES
Name: Kellen Gray      : 1955      MRN: [id number]  Encounter Provider: Ina Rivas MD  Encounter Date: 2025   Encounter department: [facility] [place]  :  Assessment & Plan  Essential hypertension  Blood pressure currently well-controlled.  Continue current medications.  Watch salt intake.  Try to remain as active as possible.  Continue with the slow but steady weight loss.  Orders:  •  CBC and differential; Future  •  Comprehensive metabolic panel; Future    Paroxysmal atrial fibrillation (HCC)  Her concerns regarding the PCOS and weight discussed.  Advised her to contact cardiology to discuss their recommendation of trial off of the fatigue is sent.  Continue with the Eliquis.  Orders:  •  Comprehensive metabolic panel; Future    Chronic heart failure with preserved ejection fraction (HCC)  Wt Readings from Last 3 Encounters:   25 93.6 kg (206 lb 6.4 oz)   25 93 kg (205 lb)   25 92.1 kg (203 lb)     Appears euvolemic.  Continue with the slow but steady weight loss.  Watch salt intake.  Watch for any increasing shortness of breath, increasing peripheral edema, or sudden increases in weight.               Pulmonary hypertension (HCC)  Continue to follow-up with pulmonary and cardiology.       COPD, severity to be determined (HCC)  Continue to follow-up with pulmonary.  Watch for any respiratory illnesses.  Continue with inhalers as prescribed.       Exocrine pancreatic insufficiency  Continue to follow-up with GI.  Continue with the Creon as per their recommendations.  Watch for any dietary triggers.  Recent laboratory testing reviewed with her.  Orders:  •  Comprehensive metabolic panel; Future    Type 2 diabetes mellitus with other specified complication, without long-term current use of insulin (HCC)  Hemoglobin A1c has been controlled.  Continue with the Jardiance.  Continue to watch diet.  Continue with the slow but steady weight loss.  Continue with  portion control.  Gradually increase activity level if tolerates given her orthopedic issues.  Follow-up with ophthalmology regularly.  Check feet daily.  Lab Results   Component Value Date    HGBA1C 6.6 (H) 12/17/2024       Orders:  •  Ambulatory Referral to Podiatry; Future  •  Ambulatory Referral to Ophthalmology; Future  •  Comprehensive metabolic panel; Future  •  Hemoglobin A1C; Future    Recurrent major depressive disorder, in partial remission (HCC)  Mood has been relatively stable.  Discussed Cymbalta with her.  Watch for any worsening.         Iron deficiency anemia secondary to inadequate dietary iron intake  Anemia has improved.  Continue to follow-up with hematology.  Infusions as per their recommendation.  Orders:  •  CBC and differential; Future    Crohn's disease with complication, unspecified gastrointestinal tract location (HCC)  Continue to follow-up with GI.  Continue with the mesalamine.  Watch for any triggers.       Age-related cataract of both eyes, unspecified age-related cataract type  Patient has been worsening.  Known cataracts.  Referral given.  Orders:  •  Ambulatory Referral to Ophthalmology; Future    Hypercholesteremia  Continue with the atorvastatin.  Watch diet.  Continue with the slow but steady weight loss.  Orders:  •  Lipid panel; Future  •  TSH, 3rd generation; Future    Vitamin D deficiency  Continue with vitamin D supplementation.  Will continue to follow vitamin D level with routine laboratory testing and adjust dose if needed.  Orders:  •  Vitamin D 25 hydroxy; Future    Vitamin B12 deficiency  Continue with vitamin B12 supplementation.  Will continue to follow vitamin B12 level with routine laboratory testing and adjust dose if needed.  Orders:  •  Vitamin B12; Future          Depression Screening and Follow-up Plan: Patient was screened for depression during today's encounter. They screened negative with a PHQ-9 score of 2.      Falls Plan of Care: balance, strength, and  gait training instructions were provided. Recommended assistive device to help with gait and balance.       History of Present Illness   She presents for routine follow-up.  Is feeling stressed right now since she has not been able to get her Lissa sent.  She did contact cardiology regarding this and message in the chart was reviewed with her.  They are considering a trial off of the decongestant.  She is very anxious regarding this.  Has noticed some increasing vision issues.  Has noted that her distance vision has worsened.  Would like to follow-up with ophthalmology to discuss whether her cataracts are ready to be completed.  Has had some increased difficulty with her feet.  Has had some numbness.  Has some difficulty cutting her toenails.  Is requesting referral to podiatry.  Continues to watch diet.  Has been able to maintain her weight loss.  Has lost over 50 pounds.  Is hopeful that the weight loss will continue.  Continues with the chronic back pain.  Appetite has remained stable.  GI symptoms have been relatively stable.  Continues to follow-up with GI regularly.  Continues with the mesalamine.  Continues with the Creon.      Review of Systems   Constitutional:  Positive for fatigue. Negative for activity change, appetite change, chills and fever.   HENT:  Negative for congestion and rhinorrhea.    Eyes:  Positive for visual disturbance.   Respiratory:  Negative for chest tightness and shortness of breath.    Cardiovascular:  Negative for chest pain and palpitations.   Gastrointestinal:  Negative for abdominal pain, blood in stool, diarrhea, nausea and vomiting.   Endocrine: Negative for polydipsia, polyphagia and polyuria.   Genitourinary:  Negative for dysuria, frequency and urgency.   Musculoskeletal:  Positive for arthralgias, back pain and gait problem.   Skin:  Negative for color change.   Neurological:  Negative for dizziness and headaches.   Hematological:  Does not bruise/bleed easily.  "  Psychiatric/Behavioral:  Negative for confusion and sleep disturbance. The patient is not nervous/anxious.        Objective   /76 (BP Location: Left arm, Patient Position: Sitting, Cuff Size: Large)   Pulse 56   Temp (!) 97.3 °F (36.3 °C)   Ht 5' 9\" (1.753 m)   Wt 93.6 kg (206 lb 6.4 oz)   SpO2 98%   BMI 30.48 kg/m²      Physical Exam  Vitals and nursing note reviewed.   Constitutional:       General: She is not in acute distress.     Appearance: She is well-developed, well-groomed and overweight.   HENT:      Head: Normocephalic and atraumatic.   Eyes:      General:         Right eye: No discharge.         Left eye: No discharge.      Conjunctiva/sclera: Conjunctivae normal.      Pupils: Pupils are equal, round, and reactive to light.      Comments: Cataracts bilaterally   Cardiovascular:      Rate and Rhythm: Normal rate and regular rhythm.      Heart sounds: Normal heart sounds. No murmur heard.     No friction rub. No gallop.   Pulmonary:      Effort: No respiratory distress.      Breath sounds: No wheezing or rales.   Abdominal:      General: Bowel sounds are normal. There is no distension.      Tenderness: There is no abdominal tenderness.   Lymphadenopathy:      Cervical: No cervical adenopathy.   Skin:     General: Skin is warm and dry.   Neurological:      Mental Status: She is alert and oriented to person, place, and time.   Psychiatric:         Mood and Affect: Mood and affect normal.         Speech: Speech normal.         Behavior: Behavior normal. Behavior is cooperative.         Cognition and Memory: Cognition and memory normal.         "

## 2025-02-26 NOTE — ASSESSMENT & PLAN NOTE
Blood pressure currently well-controlled.  Continue current medications.  Watch salt intake.  Try to remain as active as possible.  Continue with the slow but steady weight loss.  Orders:  •  CBC and differential; Future  •  Comprehensive metabolic panel; Future

## 2025-02-26 NOTE — ASSESSMENT & PLAN NOTE
Her concerns regarding the PCOS and weight discussed.  Advised her to contact cardiology to discuss their recommendation of trial off of the fatigue is sent.  Continue with the Eliquis.  Orders:  •  Comprehensive metabolic panel; Future

## 2025-02-26 NOTE — ASSESSMENT & PLAN NOTE
Continue to follow-up with pulmonary.  Watch for any respiratory illnesses.  Continue with inhalers as prescribed.

## 2025-02-26 NOTE — ASSESSMENT & PLAN NOTE
Patient has been worsening.  Known cataracts.  Referral given.  Orders:  •  Ambulatory Referral to Ophthalmology; Future

## 2025-02-26 NOTE — ASSESSMENT & PLAN NOTE
Continue with the atorvastatin.  Watch diet.  Continue with the slow but steady weight loss.  Orders:  •  Lipid panel; Future  •  TSH, 3rd generation; Future

## 2025-02-27 ENCOUNTER — TELEPHONE (OUTPATIENT)
Dept: PULMONOLOGY | Facility: CLINIC | Age: 70
End: 2025-02-27

## 2025-02-27 DIAGNOSIS — I48.91 ATRIAL FIBRILLATION, UNSPECIFIED TYPE (HCC): Primary | ICD-10-CM

## 2025-02-27 RX ORDER — DOFETILIDE 0.5 MG/1
500 CAPSULE ORAL 2 TIMES DAILY
Qty: 180 CAPSULE | Refills: 4 | Status: SHIPPED | OUTPATIENT
Start: 2025-02-27

## 2025-02-27 NOTE — TELEPHONE ENCOUNTER
Patient Marta [phone] established patient of Dr. Brenner, contacting office requesting to speak with Areli.    Patient advised, she won her appeal and Dr. Brenner would need to send a new RX to [facility].

## 2025-02-27 NOTE — TELEPHONE ENCOUNTER
Called patient to inform her that Rx for dofetilide has been sent to the pharmacy.     Will obtain letter from Mr. Number that appeal was won.

## 2025-03-04 DIAGNOSIS — E78.00 HYPERCHOLESTEREMIA: Primary | ICD-10-CM

## 2025-03-04 RX ORDER — ATORVASTATIN CALCIUM 20 MG/1
20 TABLET, FILM COATED ORAL EVERY MORNING
Qty: 90 TABLET | Refills: 1 | Status: SHIPPED | OUTPATIENT
Start: 2025-03-04

## 2025-03-05 NOTE — TELEPHONE ENCOUNTER
Patient returned call and I advised her to schedule CT scan and then office can schedule her follow up visit for after that date. I provided central scheduling phone number and also transferred patient directly. I also advised patient central scheduling can transfer her back to [facility] after she is scheduled. Thank you.

## 2025-03-07 ENCOUNTER — HOSPITAL ENCOUNTER (OUTPATIENT)
Dept: INFUSION CENTER | Facility: HOSPITAL | Age: 70
End: 2025-03-07
Attending: INTERNAL MEDICINE
Payer: COMMERCIAL

## 2025-03-07 VITALS
DIASTOLIC BLOOD PRESSURE: 68 MMHG | HEART RATE: 55 BPM | RESPIRATION RATE: 16 BRPM | SYSTOLIC BLOOD PRESSURE: 111 MMHG | TEMPERATURE: 96.9 F

## 2025-03-07 DIAGNOSIS — D50.8 IRON DEFICIENCY ANEMIA SECONDARY TO INADEQUATE DIETARY IRON INTAKE: ICD-10-CM

## 2025-03-07 DIAGNOSIS — K50.919 CROHN'S DISEASE WITH COMPLICATION, UNSPECIFIED GASTROINTESTINAL TRACT LOCATION (HCC): ICD-10-CM

## 2025-03-07 DIAGNOSIS — I50.32 CHRONIC HEART FAILURE WITH PRESERVED EJECTION FRACTION (HCC): Primary | ICD-10-CM

## 2025-03-07 PROCEDURE — 96365 THER/PROPH/DIAG IV INF INIT: CPT

## 2025-03-07 PROCEDURE — 96367 TX/PROPH/DG ADDL SEQ IV INF: CPT

## 2025-03-07 RX ORDER — SODIUM CHLORIDE 9 MG/ML
20 INJECTION, SOLUTION INTRAVENOUS ONCE
OUTPATIENT
Start: 2025-04-04

## 2025-03-07 RX ORDER — ACETAMINOPHEN 325 MG/1
650 TABLET ORAL ONCE
Status: COMPLETED | OUTPATIENT
Start: 2025-03-07 | End: 2025-03-07

## 2025-03-07 RX ORDER — ACETAMINOPHEN 325 MG/1
650 TABLET ORAL ONCE
Start: 2025-04-04 | End: 2025-04-04

## 2025-03-07 RX ORDER — SODIUM CHLORIDE 9 MG/ML
20 INJECTION, SOLUTION INTRAVENOUS ONCE
Status: COMPLETED | OUTPATIENT
Start: 2025-03-07 | End: 2025-03-07

## 2025-03-07 RX ADMIN — ACETAMINOPHEN 650 MG: 325 TABLET, FILM COATED ORAL at 09:02

## 2025-03-07 RX ADMIN — IRON SUCROSE 300 MG: 20 INJECTION, SOLUTION INTRAVENOUS at 09:32

## 2025-03-07 RX ADMIN — DIPHENHYDRAMINE HYDROCHLORIDE 25 MG: 50 INJECTION, SOLUTION INTRAMUSCULAR; INTRAVENOUS at 09:00

## 2025-03-07 RX ADMIN — SODIUM CHLORIDE 20 ML/HR: 9 INJECTION, SOLUTION INTRAVENOUS at 08:59

## 2025-03-07 NOTE — PROGRESS NOTES
Pt denies being on any antibiotics or active infection. Pt tolerated Venofer infusion well without complications. PIV removed without incident.      Kellen Gray is aware of future appt on 4/4/25 at 9AM.      AVS declined by Kellen Gray. Pt uses Mychart.     Pt discharged off unit in stable condition with all personal belongings.

## 2025-03-07 NOTE — PLAN OF CARE
Problem: Potential for Falls  Goal: Patient will remain free of falls  Description: INTERVENTIONS:  - Educate patient/family on patient safety including physical limitations  - Instruct patient to call for assistance with activity   - Consult OT/PT to assist with strengthening/mobility   - Keep Call bell within reach  - Keep bed low and locked with side rails adjusted as appropriate  - Keep care items and personal belongings within reach  - Initiate and maintain comfort rounds  - Make Fall Risk Sign visible to staff  - Consider moving patient to room near nurses station  Outcome: Progressing     Problem: INFECTION - ADULT  Goal: Absence or prevention of progression during hospitalization  Description: INTERVENTIONS:  - Assess and monitor for signs and symptoms of infection  - Monitor lab/diagnostic results  - Monitor all insertion sites, i.e. indwelling lines, tubes, and drains  - Monitor endotracheal if appropriate and nasal secretions for changes in amount and color  - [place] appropriate cooling/warming therapies per order  - Administer medications as ordered  - Instruct and encourage patient and family to use good hand hygiene technique  - Identify and instruct in appropriate isolation precautions for identified infection/condition  Outcome: Progressing     Problem: Knowledge Deficit  Goal: Patient/family/caregiver demonstrates understanding of disease process, treatment plan, medications, and discharge instructions  Description: Complete learning assessment and assess knowledge base.  Interventions:  - Provide teaching at level of understanding  - Provide teaching via preferred learning methods  Outcome: Progressing

## 2025-03-10 ENCOUNTER — CLINICAL SUPPORT (OUTPATIENT)
Dept: INTERNAL MEDICINE CLINIC | Facility: CLINIC | Age: 70
End: 2025-03-10
Payer: COMMERCIAL

## 2025-03-10 DIAGNOSIS — E53.8 B12 DEFICIENCY: Primary | ICD-10-CM

## 2025-03-10 PROCEDURE — 96372 THER/PROPH/DIAG INJ SC/IM: CPT

## 2025-03-10 RX ADMIN — CYANOCOBALAMIN 1000 MCG: 1000 INJECTION, SOLUTION INTRAMUSCULAR; SUBCUTANEOUS at 11:19

## 2025-03-20 ENCOUNTER — OFFICE VISIT (OUTPATIENT)
Dept: PODIATRY | Facility: CLINIC | Age: 70
End: 2025-03-20
Payer: COMMERCIAL

## 2025-03-20 VITALS
OXYGEN SATURATION: 93 % | WEIGHT: 206 LBS | BODY MASS INDEX: 30.51 KG/M2 | HEART RATE: 64 BPM | HEIGHT: 69 IN | TEMPERATURE: 98 F | RESPIRATION RATE: 16 BRPM

## 2025-03-20 DIAGNOSIS — L85.3 XEROSIS OF SKIN: Primary | ICD-10-CM

## 2025-03-20 DIAGNOSIS — G62.9 NEUROPATHY: ICD-10-CM

## 2025-03-20 DIAGNOSIS — L60.8 PINCER NAIL DEFORMITY: ICD-10-CM

## 2025-03-20 DIAGNOSIS — B35.1 ONYCHOMYCOSIS: ICD-10-CM

## 2025-03-20 DIAGNOSIS — E11.69 TYPE 2 DIABETES MELLITUS WITH OTHER SPECIFIED COMPLICATION, WITHOUT LONG-TERM CURRENT USE OF INSULIN (HCC): ICD-10-CM

## 2025-03-20 PROCEDURE — 11720 DEBRIDE NAIL 1-5: CPT | Performed by: PODIATRIST

## 2025-03-20 PROCEDURE — 99202 OFFICE O/P NEW SF 15 MIN: CPT | Performed by: PODIATRIST

## 2025-03-20 NOTE — PROGRESS NOTES
Name: Kellen Gray      : 1955      MRN: [id number]  Encounter Provider: Rodolfo Lemos DPM  Encounter Date: 3/20/2025   Encounter department: [facility] POD[facility] [place]  :  Assessment & Plan  Type 2 diabetes mellitus with other specified complication, without long-term current use of insulin ([facility])    Lab Results   Component Value Date    HGBA1C 6.6 (H) 2024       Orders:    Ambulatory Referral to Podiatry    Xerosis of skin       Pt was instructed to use lotion once a day on both feet such as cerave, Cetaphil or similar thick style of lotion.   Onychomycosis       Debride mycotic nails and thin the nail plates x 4 with the use of a nail nipper manually and an electric Dremel bur was used to reduce the thickness of the nail beds and smoothed the distal aspect of the nails.   Pincer nail deformity         Neuropathy         Discussed proper shoe gear, daily inspections of feet, and general foot health with patient. Patient has Q9  findings and is recommended for at risk foot care every 9-10 weeks.    Return in about 10 weeks (around 2025).     History of Present Illness   HPI  Kellen Gray is a 69 y.o. female who presents chief complaint of painful thick nails on both feet.  She is a diabetic whose sugar is stable.Patient presents for at-risk foot care.  Patient has no acute concerns today.  Patient has significant lower extremity risk due to neuropathy, parasthesia, edema, and trophic skin changes to the lower extremity.   History obtained from: patient    Review of Systems  Medical History Reviewed by provider this encounter:     .  Current Outpatient Medications on File Prior to Visit   Medication Sig Dispense Refill    albuterol (ACCUNEB) 0.63 MG/3ML nebulizer solution Take 0.63 mg by nebulization every 6 (six) hours as needed for wheezing      albuterol (PROVENTIL HFA,VENTOLIN HFA) 90 mcg/act inhaler Inhale 2 puffs every 4 (four) hours as needed for wheezing      apixaban (Eliquis)  5 mg Take 1 tablet by mouth twice daily 180 tablet 1    atorvastatin (LIPITOR) 20 mg tablet Take 1 tablet (20 mg total) by mouth every morning 90 tablet 1    Cholecalciferol (Vitamin D3) 1.25 MG (05714 UT) CAPS Take 1 capsule by mouth in the morning      dofetilide (TIKOSYN) 500 mcg capsule Take 1 capsule (500 mcg total) by mouth 2 (two) times a day 180 capsule 4    DULoxetine (CYMBALTA) 20 mg capsule Take 1 capsule (20 mg total) by mouth daily 90 capsule 3    esomeprazole (NexIUM) 20 mg capsule Take 20 mg by mouth every morning before breakfast      famotidine (PEPCID) 40 MG tablet TAKE 1 TABLET BY MOUTH ONCE DAILY AT BEDTIME 30 tablet 5    fluticasone (FLONASE) 50 mcg/act nasal spray 1 spray into each nostril daily 1 g 3    fluticasone-umeclidinium-vilanterol (Trelegy Ellipta) 200-62.5-25 mcg/actuation AEPB inhaler Inhale 1 puff daily Rinse mouth after use. 60 blister 5    furosemide (LASIX) 20 mg tablet Take 1 tablet (20 mg total) by mouth 2 (two) times a day 200 tablet 3    gabapentin (NEURONTIN) 300 mg capsule Take 1 capsule (300 mg total) by mouth 2 (two) times a day 200 capsule 3    hydrOXYzine HCL (ATARAX) 25 mg tablet Take 1 tablet by mouth twice daily 100 tablet 0    Jardiance 25 MG TABS Take 1 tablet by mouth once daily 90 tablet 0    lisinopril (ZESTRIL) 10 mg tablet Take 10 mg by mouth every morning      mesalamine (DELZICOL) 400 mg Take 2 capsules by mouth twice daily 360 capsule 3    metoprolol succinate (TOPROL-XL) 100 mg 24 hr tablet TAKE 1 TABLET BY MOUTH EVERY 12 HOURS 60 tablet 5    NON FORMULARY TelePRM diabetic monitor, test strips and lancets      omeprazole (PriLOSEC) 40 MG capsule Take 1 capsule (40 mg total) by mouth daily 30 capsule 3    pancrelipase, Lip-Prot-Amyl, (Creon) 24,000 units Take 3 capsules (72,000 Units total) by mouth 2 (two) times a day 600 capsule 3    Synthroid 137 MCG tablet Take 1 tablet by mouth once daily 90 tablet 0    dofetilide (TIKOSYN) 500 mcg capsule Take 500 mcg  "by mouth 2 (two) times a day (Patient not taking: Reported on 2025)       Current Facility-Administered Medications on File Prior to Visit   Medication Dose Route Frequency Provider Last Rate Last Admin    cyanocobalamin injection 1,000 mcg  1,000 mcg Intramuscular Q30 Days    1,000 mcg at 03/10/25 1119      Social History     Tobacco Use    Smoking status: Former     Current packs/day: 0.00     Average packs/day: 0.5 packs/day for 46.0 years (23.0 ttl pk-yrs)     Types: Cigarettes     Start date:      Quit date: 2018     Years since quittin.2    Smokeless tobacco: Never   Vaping Use    Vaping status: Never Used   Substance and Sexual Activity    Alcohol use: Yes     Comment: social    Drug use: Never     Types: Marijuana    Sexual activity: Not Currently     Partners: Male     Birth control/protection: None        Objective   Pulse 64   Temp 98 °F (36.7 °C) (Temporal)   Resp 16   Ht 5' 9\" (1.753 m)   Wt 93.4 kg (206 lb)   SpO2 93%   BMI 30.42 kg/m²      Physical Exam  Cardiovascular:      Pulses: Pulses are weak.           Dorsalis pedis pulses are 1+ on the right side and 1+ on the left side.        Posterior tibial pulses are 1+ on the right side and 1+ on the left side.   Feet:      Right foot:      Skin integrity: Dry skin present. No ulcer, skin breakdown, erythema, warmth or callus.      Left foot:      Skin integrity: Dry skin present. No ulcer, skin breakdown, erythema, warmth or callus.       Vascular status is 1/4 DP PT negative digital hair, normal distal cooling, immediate capillary refill bilaterally.  Capillary refill is approximately 2 seconds.  Slight edema is present bilaterally.    Derm nails are brittle elongated hypertrophic white-yellow discoloration with subungual debris x 4.  There is an increased thickness in the nails of approximately 2 mm.  The fifth digit nails are pinched on the plantar aspect forming and I almost.  There is white flaky skin present on the plantar " aspect of both feet with no dried blood or fissures noted.  There is loss of turgor noted bilaterally.  There was hyperpigmentation present in the forefoot area around the metatarsals with no lesions.  The discoloration is within the skin and was greater on the left than the right.    Ortho mild hammertoe deformities are present on the fifth digits bilaterally which are in a slight adductovarus position.    Neuro light touch is intact and equal bilaterally 0.5 monofilament test was performed the patient was unable to feel the arch area.  When he came to submet 3 it was diminished and the plantar aspect of the hallux and the plantar aspect of the third and fourth toes were absent.    Diabetic Foot Exam    Patient's shoes and socks removed.    Right Foot/Ankle   Right Foot Inspection  Skin Exam: dry skin and abnormal color. Skin not intact, no warmth, no callus, no erythema, no maceration, no pre-ulcer, no ulcer and no callus.     Toe Exam: ROM and strength within normal limits and right toe deformity. No swelling, no tenderness and erythema    Sensory   Vibration: intact  Monofilament testing: absent    Vascular  Capillary refills: < 3 seconds  The right DP pulse is 1+. The right PT pulse is 1+.     Left Foot/Ankle  Left Foot Inspection  Skin Exam: dry skin and abnormal color. Skin not intact, no warmth, no erythema, no maceration, no pre-ulcer, no ulcer and no callus.     Toe Exam: ROM and strength within normal limits and left toe deformity. No swelling, no tenderness and no erythema.     Sensory   Vibration: intact  Monofilament testing: absent    Vascular  Capillary refills: < 3 seconds  The left DP pulse is 1+. The left PT pulse is 1+.     Assign Risk Category  Deformity present  Loss of protective sensation  Weak pulses  Risk: 2    Administrative Statements   I have spent a total time of 15 minutes in caring for this patient on the day of the visit/encounter including Risks and benefits of tx options,  Instructions for management, Patient and family education, Importance of tx compliance, Risk factor reductions, Counseling / Coordination of care, Documenting in the medical record, and Obtaining or reviewing history  .

## 2025-03-20 NOTE — ASSESSMENT & PLAN NOTE
Lab Results   Component Value Date    HGBA1C 6.6 (H) 12/17/2024       Orders:    Ambulatory Referral to Podiatry

## 2025-03-25 DIAGNOSIS — F41.9 ANXIETY: ICD-10-CM

## 2025-03-25 RX ORDER — HYDROXYZINE HYDROCHLORIDE 25 MG/1
25 TABLET, FILM COATED ORAL 2 TIMES DAILY
Qty: 100 TABLET | Refills: 0 | Status: SHIPPED | OUTPATIENT
Start: 2025-03-25

## 2025-03-31 ENCOUNTER — OFFICE VISIT (OUTPATIENT)
Dept: INTERNAL MEDICINE CLINIC | Facility: CLINIC | Age: 70
End: 2025-03-31
Payer: COMMERCIAL

## 2025-03-31 VITALS — HEART RATE: 61 BPM | OXYGEN SATURATION: 96 % | TEMPERATURE: 98.3 F

## 2025-03-31 DIAGNOSIS — J40 BRONCHITIS: Primary | ICD-10-CM

## 2025-03-31 DIAGNOSIS — I10 ESSENTIAL HYPERTENSION: Primary | ICD-10-CM

## 2025-03-31 PROCEDURE — 99213 OFFICE O/P EST LOW 20 MIN: CPT | Performed by: NURSE PRACTITIONER

## 2025-03-31 PROCEDURE — G2211 COMPLEX E/M VISIT ADD ON: HCPCS | Performed by: NURSE PRACTITIONER

## 2025-03-31 RX ORDER — LISINOPRIL 10 MG/1
10 TABLET ORAL EVERY MORNING
Qty: 90 TABLET | Refills: 3 | Status: SHIPPED | OUTPATIENT
Start: 2025-03-31

## 2025-03-31 RX ORDER — PREDNISONE 10 MG/1
TABLET ORAL
Qty: 18 TABLET | Refills: 0 | Status: SHIPPED | OUTPATIENT
Start: 2025-03-31

## 2025-03-31 RX ORDER — DOXYCYCLINE 100 MG/1
100 CAPSULE ORAL 2 TIMES DAILY
Qty: 20 CAPSULE | Refills: 0 | Status: SHIPPED | OUTPATIENT
Start: 2025-03-31 | End: 2025-04-10

## 2025-03-31 NOTE — PROGRESS NOTES
Name: Kellen Gray      : 1955      MRN: [id number]  Encounter Provider: JOSH Eden  Encounter Date: 3/31/2025   Encounter department: [facility] [place]  :  Assessment & Plan  Bronchitis    Orders:    predniSONE 10 mg tablet; 30 mg by mouth daily for 3 days, then 20 mg by mouth daily for 3 days, then 10 mg by mouth daily for 3 days, then stop    doxycycline monohydrate (MONODOX) 100 mg capsule; Take 1 capsule (100 mg total) by mouth 2 (two) times a day for 10 days    Will start on Doxy and prednisone tapered dose. Did recommend taking Delsym OTC. Will follow up as needed       History of Present Illness   Marta is for an acute visit. She is having cough, congestion and wheezing. She started getting sick on the weekend. She denies any fever. She is taking OTC cough medication. She offers no other issues.      Review of Systems   HENT:  Positive for congestion.    Respiratory:  Positive for cough, chest tightness and wheezing.    All other systems reviewed and are negative.      Objective   Pulse 61   Temp 98.3 °F (36.8 °C)   SpO2 96%      Physical Exam  Vitals reviewed.   Constitutional:       Appearance: Normal appearance. She is normal weight.   HENT:      Right Ear: Tympanic membrane, ear canal and external ear normal.      Left Ear: Tympanic membrane, ear canal and external ear normal.      Nose: Nose normal.      Mouth/Throat:      Mouth: Mucous membranes are moist.      Pharynx: Oropharynx is clear.   Cardiovascular:      Rate and Rhythm: Normal rate and regular rhythm.      Pulses: Normal pulses.      Heart sounds: Normal heart sounds.   Pulmonary:      Breath sounds: Wheezing present.   Skin:     General: Skin is warm and dry.      Capillary Refill: Capillary refill takes less than 2 seconds.   Neurological:      General: No focal deficit present.      Mental Status: She is alert and oriented to person, place, and time. Mental status is at baseline.   Psychiatric:          Mood and Affect: Mood normal.         Behavior: Behavior normal.         Thought Content: Thought content normal.         Judgment: Judgment normal.

## 2025-04-04 ENCOUNTER — HOSPITAL ENCOUNTER (OUTPATIENT)
Dept: INFUSION CENTER | Facility: HOSPITAL | Age: 70
End: 2025-04-04
Attending: INTERNAL MEDICINE
Payer: COMMERCIAL

## 2025-04-04 VITALS
SYSTOLIC BLOOD PRESSURE: 117 MMHG | HEART RATE: 57 BPM | RESPIRATION RATE: 18 BRPM | TEMPERATURE: 97.6 F | DIASTOLIC BLOOD PRESSURE: 68 MMHG

## 2025-04-04 DIAGNOSIS — D50.8 IRON DEFICIENCY ANEMIA SECONDARY TO INADEQUATE DIETARY IRON INTAKE: ICD-10-CM

## 2025-04-04 DIAGNOSIS — I50.32 CHRONIC HEART FAILURE WITH PRESERVED EJECTION FRACTION (HCC): Primary | ICD-10-CM

## 2025-04-04 DIAGNOSIS — K50.919 CROHN'S DISEASE WITH COMPLICATION, UNSPECIFIED GASTROINTESTINAL TRACT LOCATION (HCC): ICD-10-CM

## 2025-04-04 PROCEDURE — 96367 TX/PROPH/DG ADDL SEQ IV INF: CPT

## 2025-04-04 PROCEDURE — 96365 THER/PROPH/DIAG IV INF INIT: CPT

## 2025-04-04 PROCEDURE — 96366 THER/PROPH/DIAG IV INF ADDON: CPT

## 2025-04-04 RX ORDER — SODIUM CHLORIDE 9 MG/ML
20 INJECTION, SOLUTION INTRAVENOUS ONCE
Status: COMPLETED | OUTPATIENT
Start: 2025-04-04 | End: 2025-04-04

## 2025-04-04 RX ORDER — SODIUM CHLORIDE 9 MG/ML
20 INJECTION, SOLUTION INTRAVENOUS ONCE
OUTPATIENT
Start: 2025-05-02

## 2025-04-04 RX ORDER — ACETAMINOPHEN 325 MG/1
650 TABLET ORAL ONCE
Status: COMPLETED | OUTPATIENT
Start: 2025-04-04 | End: 2025-04-04

## 2025-04-04 RX ORDER — ACETAMINOPHEN 325 MG/1
650 TABLET ORAL ONCE
Start: 2025-05-02 | End: 2025-05-02

## 2025-04-04 RX ADMIN — IRON SUCROSE 300 MG: 20 INJECTION, SOLUTION INTRAVENOUS at 09:27

## 2025-04-04 RX ADMIN — SODIUM CHLORIDE 20 ML/HR: 0.9 INJECTION, SOLUTION INTRAVENOUS at 09:00

## 2025-04-04 RX ADMIN — ACETAMINOPHEN 650 MG: 325 TABLET, FILM COATED ORAL at 09:00

## 2025-04-04 RX ADMIN — DIPHENHYDRAMINE HYDROCHLORIDE 25 MG: 50 INJECTION, SOLUTION INTRAMUSCULAR; INTRAVENOUS at 09:02

## 2025-04-04 NOTE — PROGRESS NOTES
Kellen Gray  tolerated treatment well with no complications.      Kellen Gray is aware of future appt on 5/2/25 at 0900.        No (Declined by Kellen Gray)

## 2025-04-07 ENCOUNTER — CLINICAL SUPPORT (OUTPATIENT)
Dept: INTERNAL MEDICINE CLINIC | Facility: CLINIC | Age: 70
End: 2025-04-07
Payer: COMMERCIAL

## 2025-04-07 DIAGNOSIS — E53.8 B12 DEFICIENCY: Primary | ICD-10-CM

## 2025-04-07 PROCEDURE — 96372 THER/PROPH/DIAG INJ SC/IM: CPT

## 2025-04-07 RX ADMIN — CYANOCOBALAMIN 1000 MCG: 1000 INJECTION, SOLUTION INTRAMUSCULAR; SUBCUTANEOUS at 10:21

## 2025-04-11 DIAGNOSIS — E89.0 POSTOPERATIVE HYPOTHYROIDISM: ICD-10-CM

## 2025-04-11 RX ORDER — LEVOTHYROXINE SODIUM 137 MCG
137 TABLET ORAL DAILY
Qty: 90 TABLET | Refills: 0 | Status: SHIPPED | OUTPATIENT
Start: 2025-04-11

## 2025-05-02 ENCOUNTER — HOSPITAL ENCOUNTER (OUTPATIENT)
Dept: INFUSION CENTER | Facility: HOSPITAL | Age: 70
End: 2025-05-02
Attending: INTERNAL MEDICINE
Payer: COMMERCIAL

## 2025-05-02 VITALS
TEMPERATURE: 97.7 F | HEART RATE: 56 BPM | DIASTOLIC BLOOD PRESSURE: 64 MMHG | RESPIRATION RATE: 20 BRPM | OXYGEN SATURATION: 97 % | SYSTOLIC BLOOD PRESSURE: 139 MMHG

## 2025-05-02 DIAGNOSIS — K50.919 CROHN'S DISEASE WITH COMPLICATION, UNSPECIFIED GASTROINTESTINAL TRACT LOCATION (HCC): ICD-10-CM

## 2025-05-02 DIAGNOSIS — I50.32 CHRONIC HEART FAILURE WITH PRESERVED EJECTION FRACTION (HCC): Primary | ICD-10-CM

## 2025-05-02 DIAGNOSIS — D50.8 IRON DEFICIENCY ANEMIA SECONDARY TO INADEQUATE DIETARY IRON INTAKE: ICD-10-CM

## 2025-05-02 PROCEDURE — 96365 THER/PROPH/DIAG IV INF INIT: CPT

## 2025-05-02 PROCEDURE — 96367 TX/PROPH/DG ADDL SEQ IV INF: CPT

## 2025-05-02 RX ORDER — SODIUM CHLORIDE 9 MG/ML
20 INJECTION, SOLUTION INTRAVENOUS ONCE
Status: COMPLETED | OUTPATIENT
Start: 2025-05-02 | End: 2025-05-02

## 2025-05-02 RX ORDER — SODIUM CHLORIDE 9 MG/ML
20 INJECTION, SOLUTION INTRAVENOUS ONCE
OUTPATIENT
Start: 2025-05-30

## 2025-05-02 RX ORDER — ACETAMINOPHEN 325 MG/1
650 TABLET ORAL ONCE
Start: 2025-05-30 | End: 2025-05-30

## 2025-05-02 RX ORDER — ACETAMINOPHEN 325 MG/1
650 TABLET ORAL ONCE
Status: COMPLETED | OUTPATIENT
Start: 2025-05-02 | End: 2025-05-02

## 2025-05-02 RX ADMIN — ACETAMINOPHEN 650 MG: 325 TABLET ORAL at 08:58

## 2025-05-02 RX ADMIN — SODIUM CHLORIDE 20 ML/HR: 0.9 INJECTION, SOLUTION INTRAVENOUS at 08:55

## 2025-05-02 RX ADMIN — DIPHENHYDRAMINE HYDROCHLORIDE 25 MG: 50 INJECTION, SOLUTION INTRAMUSCULAR; INTRAVENOUS at 08:56

## 2025-05-02 RX ADMIN — IRON SUCROSE 300 MG: 20 INJECTION, SOLUTION INTRAVENOUS at 09:27

## 2025-05-02 NOTE — PROGRESS NOTES
Kellen Gray tolerated Venofer treatment well with no complications.      Kellen Gray is aware of future appt on 5/30/25 at 0900.     AVS declined.

## 2025-05-07 ENCOUNTER — CLINICAL SUPPORT (OUTPATIENT)
Dept: INTERNAL MEDICINE CLINIC | Facility: CLINIC | Age: 70
End: 2025-05-07
Payer: COMMERCIAL

## 2025-05-07 DIAGNOSIS — E53.8 B12 DEFICIENCY: Primary | ICD-10-CM

## 2025-05-07 DIAGNOSIS — K21.9 GASTROESOPHAGEAL REFLUX DISEASE, UNSPECIFIED WHETHER ESOPHAGITIS PRESENT: ICD-10-CM

## 2025-05-07 PROCEDURE — 96372 THER/PROPH/DIAG INJ SC/IM: CPT

## 2025-05-07 RX ORDER — FAMOTIDINE 40 MG/1
40 TABLET, FILM COATED ORAL
Qty: 90 TABLET | Refills: 1 | Status: SHIPPED | OUTPATIENT
Start: 2025-05-07

## 2025-05-07 RX ADMIN — CYANOCOBALAMIN 1000 MCG: 1000 INJECTION, SOLUTION INTRAMUSCULAR; SUBCUTANEOUS at 10:06

## 2025-05-15 DIAGNOSIS — I48.91 ATRIAL FIBRILLATION WITH RAPID VENTRICULAR RESPONSE (HCC): ICD-10-CM

## 2025-05-15 RX ORDER — METOPROLOL SUCCINATE 100 MG/1
100 TABLET, EXTENDED RELEASE ORAL 2 TIMES DAILY
Qty: 60 TABLET | Refills: 5 | Status: SHIPPED | OUTPATIENT
Start: 2025-05-15 | End: 2025-05-21

## 2025-05-18 DIAGNOSIS — K21.9 GASTROESOPHAGEAL REFLUX DISEASE WITHOUT ESOPHAGITIS: ICD-10-CM

## 2025-05-19 DIAGNOSIS — F41.9 ANXIETY: ICD-10-CM

## 2025-05-19 RX ORDER — OMEPRAZOLE 40 MG/1
40 CAPSULE, DELAYED RELEASE ORAL DAILY
Qty: 90 CAPSULE | Refills: 1 | Status: SHIPPED | OUTPATIENT
Start: 2025-05-19

## 2025-05-19 RX ORDER — HYDROXYZINE HYDROCHLORIDE 25 MG/1
25 TABLET, FILM COATED ORAL 2 TIMES DAILY
Qty: 100 TABLET | Refills: 1 | Status: SHIPPED | OUTPATIENT
Start: 2025-05-19

## 2025-05-21 ENCOUNTER — HOSPITAL ENCOUNTER (EMERGENCY)
Facility: HOSPITAL | Age: 70
Discharge: HOME | End: 2025-05-21
Attending: EMERGENCY MEDICINE
Payer: COMMERCIAL

## 2025-05-21 ENCOUNTER — NURSE TRIAGE (OUTPATIENT)
Age: 70
End: 2025-05-21

## 2025-05-21 ENCOUNTER — APPOINTMENT (EMERGENCY)
Dept: RADIOLOGY | Facility: HOSPITAL | Age: 70
End: 2025-05-21
Payer: COMMERCIAL

## 2025-05-21 VITALS
RESPIRATION RATE: 20 BRPM | SYSTOLIC BLOOD PRESSURE: 118 MMHG | HEART RATE: 49 BPM | TEMPERATURE: 98 F | OXYGEN SATURATION: 96 % | BODY MASS INDEX: 35.42 KG/M2 | DIASTOLIC BLOOD PRESSURE: 56 MMHG | WEIGHT: 239.86 LBS

## 2025-05-21 DIAGNOSIS — R06.00 DYSPNEA: ICD-10-CM

## 2025-05-21 DIAGNOSIS — R07.9 CHEST PAIN, UNSPECIFIED: ICD-10-CM

## 2025-05-21 DIAGNOSIS — I48.0 PAROXYSMAL ATRIAL FIBRILLATION (HCC): ICD-10-CM

## 2025-05-21 DIAGNOSIS — R00.2 PALPITATIONS: ICD-10-CM

## 2025-05-21 DIAGNOSIS — R00.1 SYMPTOMATIC BRADYCARDIA: Primary | ICD-10-CM

## 2025-05-21 DIAGNOSIS — E87.6 HYPOKALEMIA: ICD-10-CM

## 2025-05-21 LAB
ALBUMIN SERPL BCG-MCNC: 4 G/DL (ref 3.5–5)
ALP SERPL-CCNC: 86 U/L (ref 34–104)
ALT SERPL W P-5'-P-CCNC: 13 U/L (ref 7–52)
ANION GAP SERPL CALCULATED.3IONS-SCNC: 8 MMOL/L (ref 4–13)
AST SERPL W P-5'-P-CCNC: 15 U/L (ref 13–39)
BASOPHILS # BLD AUTO: 0.03 THOUSANDS/ÂΜL (ref 0–0.1)
BASOPHILS NFR BLD AUTO: 0 % (ref 0–1)
BILIRUB SERPL-MCNC: 0.41 MG/DL (ref 0.2–1)
BNP SERPL-MCNC: 95 PG/ML (ref 0–100)
BUN SERPL-MCNC: 12 MG/DL (ref 5–25)
CALCIUM SERPL-MCNC: 8.9 MG/DL (ref 8.4–10.2)
CARDIAC TROPONIN I PNL SERPL HS: 3 NG/L (ref ?–50)
CHLORIDE SERPL-SCNC: 102 MMOL/L (ref 96–108)
CO2 SERPL-SCNC: 31 MMOL/L (ref 21–32)
CREAT SERPL-MCNC: 0.79 MG/DL (ref 0.6–1.3)
EOSINOPHIL # BLD AUTO: 0.13 THOUSAND/ÂΜL (ref 0–0.61)
EOSINOPHIL NFR BLD AUTO: 1 % (ref 0–6)
ERYTHROCYTE [DISTWIDTH] IN BLOOD BY AUTOMATED COUNT: 13.2 % (ref 11.6–15.1)
GFR SERPL CREATININE-BSD FRML MDRD: 77 ML/MIN/1.73SQ M
GLUCOSE SERPL-MCNC: 136 MG/DL (ref 65–140)
HCT VFR BLD AUTO: 44.4 % (ref 34.8–46.1)
HGB BLD-MCNC: 14.2 G/DL (ref 11.5–15.4)
IMM GRANULOCYTES # BLD AUTO: 0.04 THOUSAND/UL (ref 0–0.2)
IMM GRANULOCYTES NFR BLD AUTO: 0 % (ref 0–2)
LIPASE SERPL-CCNC: 38 U/L (ref 11–82)
LYMPHOCYTES # BLD AUTO: 2.83 THOUSANDS/ÂΜL (ref 0.6–4.47)
LYMPHOCYTES NFR BLD AUTO: 29 % (ref 14–44)
MCH RBC QN AUTO: 30.7 PG (ref 26.8–34.3)
MCHC RBC AUTO-ENTMCNC: 32 G/DL (ref 31.4–37.4)
MCV RBC AUTO: 96 FL (ref 82–98)
MONOCYTES # BLD AUTO: 0.67 THOUSAND/ÂΜL (ref 0.17–1.22)
MONOCYTES NFR BLD AUTO: 7 % (ref 4–12)
NEUTROPHILS # BLD AUTO: 5.93 THOUSANDS/ÂΜL (ref 1.85–7.62)
NEUTS SEG NFR BLD AUTO: 63 % (ref 43–75)
NRBC BLD AUTO-RTO: 0 /100 WBCS
PLATELET # BLD AUTO: 223 THOUSANDS/UL (ref 149–390)
PMV BLD AUTO: 9.6 FL (ref 8.9–12.7)
POTASSIUM SERPL-SCNC: 3.1 MMOL/L (ref 3.5–5.3)
PROT SERPL-MCNC: 7 G/DL (ref 6.4–8.4)
RBC # BLD AUTO: 4.62 MILLION/UL (ref 3.81–5.12)
SODIUM SERPL-SCNC: 141 MMOL/L (ref 135–147)
WBC # BLD AUTO: 9.63 THOUSAND/UL (ref 4.31–10.16)

## 2025-05-21 PROCEDURE — 99285 EMERGENCY DEPT VISIT HI MDM: CPT | Performed by: EMERGENCY MEDICINE

## 2025-05-21 PROCEDURE — 80053 COMPREHEN METABOLIC PANEL: CPT | Performed by: EMERGENCY MEDICINE

## 2025-05-21 PROCEDURE — 84484 ASSAY OF TROPONIN QUANT: CPT | Performed by: EMERGENCY MEDICINE

## 2025-05-21 PROCEDURE — 71045 X-RAY EXAM CHEST 1 VIEW: CPT

## 2025-05-21 PROCEDURE — 99285 EMERGENCY DEPT VISIT HI MDM: CPT

## 2025-05-21 PROCEDURE — 36415 COLL VENOUS BLD VENIPUNCTURE: CPT | Performed by: EMERGENCY MEDICINE

## 2025-05-21 PROCEDURE — 83690 ASSAY OF LIPASE: CPT | Performed by: EMERGENCY MEDICINE

## 2025-05-21 PROCEDURE — 83880 ASSAY OF NATRIURETIC PEPTIDE: CPT | Performed by: EMERGENCY MEDICINE

## 2025-05-21 PROCEDURE — 93005 ELECTROCARDIOGRAM TRACING: CPT

## 2025-05-21 PROCEDURE — 85025 COMPLETE CBC W/AUTO DIFF WBC: CPT | Performed by: EMERGENCY MEDICINE

## 2025-05-21 RX ORDER — METOPROLOL SUCCINATE 50 MG/1
50 TABLET, EXTENDED RELEASE ORAL DAILY
Qty: 30 TABLET | Refills: 0 | Status: SHIPPED | OUTPATIENT
Start: 2025-05-21 | End: 2025-06-20

## 2025-05-21 RX ORDER — POTASSIUM CHLORIDE 1500 MG/1
40 TABLET, EXTENDED RELEASE ORAL ONCE
Status: COMPLETED | OUTPATIENT
Start: 2025-05-21 | End: 2025-05-21

## 2025-05-21 RX ORDER — SODIUM CHLORIDE 9 MG/ML
3 INJECTION INTRAVENOUS
Status: DISCONTINUED | OUTPATIENT
Start: 2025-05-21 | End: 2025-05-21 | Stop reason: HOSPADM

## 2025-05-21 RX ADMIN — POTASSIUM CHLORIDE 40 MEQ: 1500 TABLET, EXTENDED RELEASE ORAL at 14:59

## 2025-05-21 NOTE — TELEPHONE ENCOUNTER
"FOLLOW UP: n/a    REASON FOR CONVERSATION: Chest Pain    SYMPTOMS: pt called stating she has been experiencing NEVAREZ, on and off right sided sharp chest pain radiating to the right arm the past few days  #8/10  HR 47, HR 43, 56 on pulse ox  Denies any dizziness lightheadedness except when standing    OTHER: n/a    DISPOSITION: pt instructed to report to ED    Reason for Disposition   Chest pain or 'angina' comes and goes and is happening more often (increasing in frequency) or getting worse (increasing in severity) (Exception: Chest pains that last only a few seconds.)    Answer Assessment - Initial Assessment Questions  1. LOCATION: \"Where does it hurt?\"        Right side  2. RADIATION: \"Does the pain go anywhere else?\" (e.g., into neck, jaw, arms, back)      Right arm   3. ONSET: \"When did the chest pain begin?\" (Minutes, hours or days)       Began the past few days  4. PATTERN: \"Does the pain come and go, or has it been constant since it started?\"  \"Does it get worse with exertion?\"       Comes and goes  5. DURATION: \"How long does it last\" (e.g., seconds, minutes, hours)      seconds  6. SEVERITY: \"How bad is the pain?\"  (e.g., Scale 1-10; mild, moderate, or severe)      8/10  7. CARDIAC RISK FACTORS: \"Do you have any history of heart problems or risk factors for heart disease?\" (e.g., angina, prior heart attack; diabetes, high blood pressure, high cholesterol, smoker, or strong family history of heart disease)      yes  8. PULMONARY RISK FACTORS: \"Do you have any history of lung disease?\"  (e.g., blood clots in lung, asthma, emphysema, birth control pills)      unsure  9. CAUSE: \"What do you think is causing the chest pain?\"      unsure  10. OTHER SYMPTOMS: \"Do you have any other symptoms?\" (e.g., dizziness, nausea, vomiting, sweating, fever, difficulty breathing, cough)        denies    Protocols used: Chest Pain-Adult-[place]    "

## 2025-05-21 NOTE — ED PROVIDER NOTES
Time reflects when diagnosis was documented in both MDM as applicable and the Disposition within this note       Time User Action Codes Description Comment    5/21/2025  2:49 PM Binstead, Mulugeta T Add [R00.1] Symptomatic bradycardia     5/21/2025  2:49 PM Binstead, Mulugeta T Add [R07.9] Chest pain, unspecified     5/21/2025  2:49 PM Binstead, Mulugeta T Add [R00.2] Palpitations     5/21/2025  2:50 PM Binstead, Mulugeta T Add [R06.00] Dyspnea     5/21/2025  2:50 PM Binstead, Mulugeta T Add [E87.6] Hypokalemia     5/21/2025  2:51 PM Binstead, Mulugeta T Add [I48.0] Paroxysmal atrial fibrillation (HCC)           ED Disposition       ED Disposition   Discharge    Condition   Stable    Date/Time   Wed May 21, 2025  2:49 PM    Comment   Kellen Gray discharge to home/self care.                   Assessment & Plan       Medical Decision Making  69-year-old female complaint of chest tightness and shortness of breath x 5 days.  Symptoms began gradually.  She also [facility] her [facility] and it was registering low heart rate and their staff recommended she be evaluated the emergency department.  Patient does take metoprolol 100 mg twice daily at this time.  Patient has a history of atrial fibrillation status post ablation and is maintained on Eliquis therapy    Will continue with cardiac evaluation, chest x-ray, troponin, BNP and reassess.    Problems Addressed:  Chest pain, unspecified: acute illness or injury  Dyspnea: acute illness or injury  Hypokalemia: acute illness or injury  Palpitations: acute illness or injury  Paroxysmal atrial fibrillation (HCC): chronic illness or injury  Symptomatic bradycardia: acute illness or injury    Amount and/or Complexity of Data Reviewed  Labs: ordered. Decision-making details documented in ED Course.  Radiology: ordered and independent interpretation performed. Decision-making details documented in ED  Course.  ECG/medicine tests: ordered and independent interpretation performed. Decision-making details documented in ED Course.  Discussion of management or test interpretation with external provider(s): Discussed with cardiology who recommended decreasing metoprolol to 50 mg daily.  Had lengthy discussion with patient regarding dosing changes and sent a prescription for new medication.    Risk  Prescription drug management.  Risk Details: Follow up Cardiology.    I personally discussed return precautions with this patient and family. I provided the patient with written discharge instructions and particularly highlighted specific areas of interest to this patient, including but not limited to: medications for symptom managment, follow up recommendations, and return precautions. Patient and family are in agreement with this plan as outlined above.    ED Course as of 05/21/25 1735   Wed May 21, 2025   1447 Hi. Very nice 70 y/o F presenting for cp, sob and [facility]. Workup here has been benign. K of 3.1 which was repleted. Trop and BNP fine. She has h/o afib s/p ablation and is maintained on Metorprolol 100 mg BID. She's been persistently bradycardic with heart rate in the 40s and 50s while here.  Does get occasional episodes of weakness.  Not sure if she is continue at that dose or decrease her dosing of metoprolol but wanted to check with you guys first.  She follows with Dr. Brenner in the office  Cardiology recommended decreasing Metoprolol to 50 mg daily       Medications   potassium chloride (Klor-Con M20) CR tablet 40 mEq (40 mEq Oral Given 5/21/25 1459)       ED Risk Strat Scores   HEART Risk Score      Flowsheet Row Most Recent Value   Heart Score Risk Calculator    History 0 Filed at: 05/21/2025 1449   ECG 0 Filed at: 05/21/2025 1449   Age 2 Filed at: 05/21/2025 1449   Risk Factors 1 Filed at: 05/21/2025 1449   Troponin 0 Filed at: 05/21/2025 1449   HEART Score 3 Filed  at: 05/21/2025 1449          HEART Risk Score      Flowsheet Row Most Recent Value   Heart Score Risk Calculator    History 0 Filed at: 05/21/2025 1449   ECG 0 Filed at: 05/21/2025 1449   Age 2 Filed at: 05/21/2025 1449   Risk Factors 1 Filed at: 05/21/2025 1449   Troponin 0 Filed at: 05/21/2025 1449   HEART Score 3 Filed at: 05/21/2025 1449                      No data recorded        SBIRT 22yo+      Flowsheet Row Most Recent Value   Initial Alcohol Screen: US AUDIT-C     1. How often do you have a drink containing alcohol? 0 Filed at: 05/21/2025 1247   2. How many drinks containing alcohol do you have on a typical day you are drinking?  0 Filed at: 05/21/2025 1247   3b. FEMALE Any Age, or MALE 65+: How often do you have 4 or more drinks on one occassion? 0 Filed at: 05/21/2025 1247   Audit-C Score 0 Filed at: 05/21/2025 1247   CHARLY: How many times in the past year have you...    Used an illegal drug or used a prescription medication for non-medical reasons? Never Filed at: 05/21/2025 1247                            History of Present Illness       Chief Complaint   Patient presents with    Chest Pain     Chest tightness and shortness of breath that started over the weekend        Past Medical History:   Diagnosis Date    A-fib (HCC)     Abnormal ECG     Acute respiratory failure with hypoxia (HCC) 11/30/2019    Aneurysm (HCC)     Anxiety     Arthritis     Asthma     Brain aneurysm     Cancer (HCC)     papillary thyroid cancer    Cardiac disease     CHF (congestive heart failure) (HCC)     COPD (chronic obstructive pulmonary disease) (HCC)     CPAP (continuous positive airway pressure) dependence     Crohn disease (HCC)     Depression     Diabetes mellitus (HCC)     Disease of thyroid gland     Diverticulitis of colon     Emphysema of lung (HCC)     Fatty liver     Fibrocystic breast years ago    GERD (gastroesophageal reflux disease)     HL (hearing loss)     Hyperlipidemia     Hypertension     Sleep apnea      Sleep apnea, obstructive     Visual impairment       Past Surgical History:   Procedure Laterality Date    APPENDECTOMY      BREAST BIOPSY      BREAST CYST EXCISION      BREAST SURGERY  several dates    CARDIOVERSION N/A 02/14/2019    Procedure: CARDIOVERSION;  Surgeon: Lee Brenner MD;  Location: [facility];  Service: Cardiology    CEREBRAL ANEURYSM REPAIR      CHOLECYSTECTOMY      COLONOSCOPY      HYSTERECTOMY      IR PELVIC ANGIOGRAM  12/14/2017    CA EXC THROMBOSED HEMORRHOID XTRNL N/A 10/07/2022    Procedure: HEMORRHOIDECTOMY EXCISION;  Surgeon: Leonardo Cash DO;  Location: [facility];  Service: General    THYROIDECTOMY      TONSILLECTOMY AND ADENOIDECTOMY      UPPER GASTROINTESTINAL ENDOSCOPY      US GUIDED BREAST BIOPSY RIGHT COMPLETE Right 11/29/2023      Family History   Problem Relation Name Age of Onset    Alzheimer's disease Mother reza     Asthma Mother reza     Cancer Father randy         dead    Anemia Father randy     Heart disease Sister      Hypertension Sister          under control    Diabetes Sister      Stroke Sister      Cancer Sister adrian         dead    Stroke Sister adrian     Heart disease Sister adrian         dead    Diabetes Sister adrian     Breast cancer Sister adrian     Cancer Brother          liver with mets to bone    Heart disease Brother randy     Cancer Brother randy         dead    Asthma Daughter alyce     Cancer Brother bar     Heart disease Sister adrian       Social History[1]   E-Cigarette/Vaping    E-Cigarette Use Never User       E-Cigarette/Vaping Substances    Nicotine No     THC No     CBD No     Flavoring No     Other No     Unknown No       I have reviewed and agree with the history as documented.     69-year-old female complaint of chest tightness and shortness of breath x 5 days.  Symptoms began gradually.  She also took her pulse [facility] and it was  registering low heart rate and their staff recommended she be evaluated the emergency department.  Patient does take metoprolol 100 mg twice daily at this time.  Patient has a history of atrial fibrillation status post ablation and is maintained on Eliquis therapy.  Chest pain described as a right upper chest wall pain.  History of cholecystectomy.  No nausea or diaphoresis.  No fevers or chills.  No dyspnea on exertion.  No paroxysmal nocturnal dyspnea.  Denies any weight gain.  No nausea or vomiting.  No recent travel.  Has been compliant with his medication regimen.      Chest Pain  Associated symptoms: fatigue and shortness of breath    Associated symptoms: no abdominal pain, no back pain, no cough, no dizziness, no fever, no headache, no nausea, no palpitations and not vomiting        Review of Systems   Constitutional:  Positive for fatigue. Negative for appetite change, chills, fever and unexpected weight change.   HENT:  Negative for congestion, ear pain, rhinorrhea and sore throat.    Eyes:  Negative for pain and visual disturbance.   Respiratory:  Positive for shortness of breath. Negative for cough, chest tightness and wheezing.    Cardiovascular:  Positive for chest pain. Negative for palpitations and leg swelling.   Gastrointestinal:  Negative for abdominal pain, constipation, diarrhea, nausea and vomiting.   Genitourinary:  Negative for difficulty urinating, dysuria, frequency, hematuria, menstrual problem, pelvic pain, vaginal bleeding and vaginal discharge.   Musculoskeletal:  Negative for arthralgias, back pain and neck pain.   Skin:  Negative for color change and rash.   Neurological:  Negative for dizziness, seizures, syncope, light-headedness and headaches.   Psychiatric/Behavioral:  Negative for sleep disturbance.    All other systems reviewed and are negative.          Objective       ED Triage Vitals [05/21/25 1245]   Temperature Pulse Blood Pressure Respirations SpO2 Patient Position -  Orthostatic VS   98 °F (36.7 °C) 97 121/58 18 91 % Sitting      Temp Source Heart Rate Source BP Location FiO2 (%) Pain Score    Temporal Monitor Left arm -- 4      Vitals      Date and Time Temp Pulse SpO2 Resp BP Pain Score FACES Pain Rating User   05/21/25 1430 -- 49 96 % 20 118/56 -- -- [place]   05/21/25 1245 98 °F (36.7 °C) 97 91 % 18 121/58 4 -- [place]            Physical Exam  Vitals and nursing note reviewed.   Constitutional:       General: She is not in acute distress.     Appearance: Normal appearance. She is well-developed and normal weight. She is not ill-appearing, toxic-appearing or diaphoretic.   HENT:      Head: Normocephalic and atraumatic.      Nose: Nose normal.      Mouth/Throat:      Mouth: Mucous membranes are moist.      Pharynx: Oropharynx is clear.     Eyes:      General: No scleral icterus.     Extraocular Movements: Extraocular movements intact.      Conjunctiva/sclera: Conjunctivae normal.       Cardiovascular:      Rate and Rhythm: Regular rhythm. Bradycardia present.      Pulses: Normal pulses.           Carotid pulses are 2+ on the right side and 2+ on the left side.       Radial pulses are 2+ on the right side and 2+ on the left side.        Dorsalis pedis pulses are 2+ on the right side and 2+ on the left side.        Posterior tibial pulses are 2+ on the right side and 2+ on the left side.      Heart sounds: Normal heart sounds. No murmur heard.     No friction rub. No gallop.   Pulmonary:      Effort: Pulmonary effort is normal. No respiratory distress.      Breath sounds: No decreased breath sounds, wheezing, rhonchi or rales.   Chest:      Chest wall: No tenderness.   Abdominal:      General: Bowel sounds are normal. There is no distension.      Palpations: Abdomen is soft. There is no mass.      Tenderness: There is no abdominal tenderness. There is no right CVA tenderness, left CVA tenderness, guarding or rebound.      Hernia: No hernia is present.     Musculoskeletal:          General: No tenderness or deformity. Normal range of motion.      Cervical back: Normal range of motion and neck supple. No rigidity or tenderness.      Right lower leg: No edema.      Left lower leg: No edema.   Lymphadenopathy:      Cervical: No cervical adenopathy.     Skin:     General: Skin is warm and dry.      Capillary Refill: Capillary refill takes less than 2 seconds.      Coloration: Skin is not jaundiced or pale.      Findings: No bruising, erythema, lesion or rash.     Neurological:      General: No focal deficit present.      Mental Status: She is alert and oriented to person, place, and time. Mental status is at baseline.     Psychiatric:         Mood and Affect: Mood normal.         Behavior: Behavior normal.         Results Reviewed       Procedure Component Value Units Date/Time    HS Troponin 0hr (reflex protocol) [[id number]]  (Normal) Collected: 05/21/25 1321    Lab Status: Final result Specimen: Blood from Arm, Right Updated: 05/21/25 1357     hs TnI 0hr 3 ng/L     B-Type Natriuretic Peptide(BNP) [[id number]]  (Normal) Collected: 05/21/25 1321    Lab Status: Final result Specimen: Blood from Arm, Right Updated: 05/21/25 1356     BNP 95 pg/mL     Comprehensive metabolic panel [[id number]]  (Abnormal) Collected: 05/21/25 1321    Lab Status: Final result Specimen: Blood from Arm, Right Updated: 05/21/25 1350     Sodium 141 mmol/L      Potassium 3.1 mmol/L      Chloride 102 mmol/L      CO2 31 mmol/L      ANION GAP 8 mmol/L      BUN 12 mg/dL      Creatinine 0.79 mg/dL      Glucose 136 mg/dL      Calcium 8.9 mg/dL      AST 15 U/L      ALT 13 U/L      Alkaline Phosphatase 86 U/L      Total Protein 7.0 g/dL      Albumin 4.0 g/dL      Total Bilirubin 0.41 mg/dL      eGFR 77 ml/min/1.73sq m     Narrative:      National Kid[employer] guidelines for Chronic Kidney Disease (CKD):     Stage 1 with normal or high GFR (GFR > 90 mL/min/1.73 square meters)    Stage 2 Mild CKD (GFR = 60-89 mL/min/1.73  square meters)    Stage 3A Moderate CKD (GFR = 45-59 mL/min/1.73 square meters)    Stage 3B Moderate CKD (GFR = 30-44 mL/min/1.73 square meters)    Stage 4 Severe CKD (GFR = 15-29 mL/min/1.73 square meters)    Stage 5 End Stage CKD (GFR <15 mL/min/1.73 square meters)  Note: GFR calculation is accurate only with a steady state creatinine    Lipase [[id number]]  (Normal) Collected: 05/21/25 1321    Lab Status: Final result Specimen: Blood from Arm, Right Updated: 05/21/25 1350     Lipase 38 u/L     CBC and differential [[id number]] Collected: 05/21/25 1321    Lab Status: Final result Specimen: Blood from Arm, Right Updated: 05/21/25 1334     WBC 9.63 Thousand/uL      RBC 4.62 Million/uL      Hemoglobin 14.2 g/dL      Hematocrit 44.4 %      MCV 96 fL      MCH 30.7 pg      MCHC 32.0 g/dL      RDW 13.2 %      MPV 9.6 fL      Platelets 223 Thousands/uL      nRBC 0 /100 WBCs      Segmented % 63 %      Immature Grans % 0 %      Lymphocytes % 29 %      Monocytes % 7 %      Eosinophils Relative 1 %      Basophils Relative 0 %      Absolute Neutrophils 5.93 Thousands/µL      Absolute Immature Grans 0.04 Thousand/uL      Absolute Lymphocytes 2.83 Thousands/µL      Absolute Monocytes 0.67 Thousand/µL      Eosinophils Absolute 0.13 Thousand/µL      Basophils Absolute 0.03 Thousands/µL             X-ray chest 1 view portable   Final Interpretation by Pramod Conway MD (05/21 1873)      No acute cardiopulmonary disease.            Workstation performed: [id number]             ECG 12 Lead Documentation Only    Date/Time: 5/21/2025 1:10 PM    Performed by: Mulugeta Nolan DO  Authorized by: Mulugeta Nolan DO    Indications / Diagnosis:  Cp,sob  ECG reviewed by me, the ED Provider: yes    Patient location:  ED  Previous ECG:     Comparison to cardiac monitor: Yes    Rate:     ECG rate:  54    ECG rate assessment: bradycardic    Rhythm:     Rhythm: sinus bradycardia    Ectopy:     Ectopy: none    QRS:     QRS axis:   Normal    QRS intervals:  Normal  Conduction:     Conduction: normal    ST segments:     ST segments:  Normal  T waves:     T waves: non-specific        ED Medication and Procedure Management   Prior to Admission Medications   Prescriptions Last Dose Informant Patient Reported? Taking?   Cholecalciferol (Vitamin D3) 1.25 MG (42103 UT) CAPS 2025 Self Yes Yes   Sig: Take 1 capsule by mouth in the morning   DULoxetine (CYMBALTA) 20 mg capsule 2025 Self No Yes   Sig: Take 1 capsule (20 mg total) by mouth daily   Jardiance 25 MG TABS 2025 Self No Yes   Sig: Take 1 tablet by mouth once daily   NON FORMULARY  Self Yes No   Sig: TelePRM diabetic monitor, test strips and lancets   Synthroid 137 MCG tablet 2025  No Yes   Sig: Take 1 tablet by mouth once daily   albuterol (ACCUNEB) 0.63 MG/3ML nebulizer solution More than a month Self Yes No   Sig: Take 0.63 mg by nebulization every 6 (six) hours as needed for wheezing   albuterol (PROVENTIL HFA,VENTOLIN HFA) 90 mcg/act inhaler More than a month Self Yes No   Sig: Inhale 2 puffs every 4 (four) hours as needed for wheezing   apixaban (Eliquis) 5 mg 2025 Self No Yes   Sig: Take 1 tablet by mouth twice daily   atorvastatin (LIPITOR) 20 mg tablet 2025 Self No Yes   Sig: Take 1 tablet (20 mg total) by mouth every morning   dofetilide (TIKOSYN) 500 mcg capsule 2025 Self Yes Yes   Sig: Take 500 mcg by mouth 2 (two) times a day   dofetilide (TIKOSYN) 500 mcg capsule 2025 Self No Yes   Sig: Take 1 capsule (500 mcg total) by mouth 2 (two) times a day   esomeprazole (NexIUM) 20 mg capsule  Self Yes No   Sig: Take 20 mg by mouth every morning before breakfast   famotidine (PEPCID) 40 MG tablet 2025  No Yes   Sig: TAKE 1 TABLET BY MOUTH ONCE DAILY AT BEDTIME   fluticasone (FLONASE) 50 mcg/act nasal spray Not Taking Self No No   Si spray into each nostril daily   Patient not taking: Reported on 2025   fluticasone-umeclidinium-vilanterol  (Trelegy Ellipta) 200-62.5-25 mcg/actuation AEPB inhaler 2025 Self No Yes   Sig: Inhale 1 puff daily Rinse mouth after use.   furosemide (LASIX) 20 mg tablet 2025 Self No Yes   Sig: Take 1 tablet (20 mg total) by mouth 2 (two) times a day   gabapentin (NEURONTIN) 300 mg capsule 2025 Self No Yes   Sig: Take 1 capsule (300 mg total) by mouth 2 (two) times a day   hydrOXYzine HCL (ATARAX) 25 mg tablet 2025  No Yes   Sig: Take 1 tablet by mouth twice daily   lisinopril (ZESTRIL) 10 mg tablet 2025  No Yes   Sig: Take 1 tablet (10 mg total) by mouth every morning   mesalamine (DELZICOL) 400 mg 2025 Self No Yes   Sig: Take 2 capsules by mouth twice daily   metoprolol succinate (TOPROL-XL) 100 mg 24 hr tablet 2025  No Yes   Sig: TAKE 1 TABLET BY MOUTH EVERY 12 HOURS   omeprazole (PriLOSEC) 40 MG capsule 2025  No Yes   Sig: Take 1 capsule by mouth once daily   pancrelipase, Lip-Prot-Amyl, (Creon) 24,000 units 2025 Self No Yes   Sig: Take 3 capsules (72,000 Units total) by mouth 2 (two) times a day   predniSONE 10 mg tablet   No No   Si mg by mouth daily for 3 days, then 20 mg by mouth daily for 3 days, then 10 mg by mouth daily for 3 days, then stop      Facility-Administered Medications Last Administration Doses Remaining   cyanocobalamin injection 1,000 mcg 2025 10:06 AM         Discharge Medication List as of 2025  2:51 PM        CONTINUE these medications which have CHANGED    Details   metoprolol succinate (TOPROL-XL) 50 mg 24 hr tablet Take 1 tablet (50 mg total) by mouth daily for 30 doses, Starting 2025, Until 2025, Normal           CONTINUE these medications which have NOT CHANGED    Details   apixaban (Eliquis) 5 mg Take 1 tablet by mouth twice daily, Starting 2025, Normal      atorvastatin (LIPITOR) 20 mg tablet Take 1 tablet (20 mg total) by mouth every morning, Starting Tue 3/4/2025, Normal      Cholecalciferol (Vitamin D3) 1.25  MG (53060 UT) CAPS Take 1 capsule by mouth in the morning, Starting Sat 1/21/2023, Historical Med      !! dofetilide (TIKOSYN) 500 mcg capsule Take 500 mcg by mouth 2 (two) times a day, Historical Med      !! dofetilide (TIKOSYN) 500 mcg capsule Take 1 capsule (500 mcg total) by mouth 2 (two) times a day, Starting Thu 2/27/2025, Normal      DULoxetine (CYMBALTA) 20 mg capsule Take 1 capsule (20 mg total) by mouth daily, Starting Thu 10/3/2024, Normal      famotidine (PEPCID) 40 MG tablet TAKE 1 TABLET BY MOUTH ONCE DAILY AT BEDTIME, Starting Wed 5/7/2025, Normal      fluticasone-umeclidinium-vilanterol (Trelegy Ellipta) 200-62.5-25 mcg/actuation AEPB inhaler Inhale 1 puff daily Rinse mouth after use., Starting Wed 9/11/2024, Normal      furosemide (LASIX) 20 mg tablet Take 1 tablet (20 mg total) by mouth 2 (two) times a day, Starting Thu 11/14/2024, Normal      gabapentin (NEURONTIN) 300 mg capsule Take 1 capsule (300 mg total) by mouth 2 (two) times a day, Starting Thu 11/14/2024, Normal      hydrOXYzine HCL (ATARAX) 25 mg tablet Take 1 tablet by mouth twice daily, Starting Mon 5/19/2025, Normal      Jardiance 25 MG TABS Take 1 tablet by mouth once daily, Starting Tue 2/25/2025, Normal      lisinopril (ZESTRIL) 10 mg tablet Take 1 tablet (10 mg total) by mouth every morning, Starting Mon 3/31/2025, Normal      mesalamine (DELZICOL) 400 mg Take 2 capsules by mouth twice daily, Normal      omeprazole (PriLOSEC) 40 MG capsule Take 1 capsule by mouth once daily, Starting Mon 5/19/2025, Normal      pancrelipase, Lip-Prot-Amyl, (Creon) 24,000 units Take 3 capsules (72,000 Units total) by mouth 2 (two) times a day, Starting Thu 11/14/2024, Normal      Synthroid 137 MCG tablet Take 1 tablet by mouth once daily, Starting Fri 4/11/2025, Normal      albuterol (ACCUNEB) 0.63 MG/3ML nebulizer solution Take 0.63 mg by nebulization every 6 (six) hours as needed for wheezing, Historical Med      albuterol (PROVENTIL HFA,VENTOLIN  HFA) 90 mcg/act inhaler Inhale 2 puffs every 4 (four) hours as needed for wheezing, Historical Med      esomeprazole (NexIUM) 20 mg capsule Take 20 mg by mouth every morning before breakfast, Historical Med      NON FORMULARY TelePRM diabetic monitor, test strips and lancets, Historical Med      predniSONE 10 mg tablet 30 mg by mouth daily for 3 days, then 20 mg by mouth daily for 3 days, then 10 mg by mouth daily for 3 days, then stop, Normal       !! - Potential duplicate medications found. Please discuss with provider.        STOP taking these medications       fluticasone (FLONASE) 50 mcg/act nasal spray Comments:   Reason for Stopping:             No discharge procedures on file.  ED SEPSIS DOCUMENTATION   Time reflects when diagnosis was documented in both MDM as applicable and the Disposition within this note       Time User Action Codes Description Comment    2025  2:49 PM Binstead, Mulugeta T Add [R00.1] Symptomatic bradycardia     2025  2:49 PM Binstead, Mulugeta T Add [R07.9] Chest pain, unspecified     2025  2:49 PM Binstead, Mulugeta T Add [R00.2] Palpitations     2025  2:50 PM Binstead, Mulugeta T Add [R06.00] Dyspnea     2025  2:50 PM Binstead, Mulugeta T Add [E87.6] Hypokalemia     2025  2:51 PM Binstead, Mulugeta T Add [I48.0] Paroxysmal atrial fibrillation (HCC)                      [1]   Social History  Tobacco Use    Smoking status: Former     Current packs/day: 0.00     Average packs/day: 0.5 packs/day for 46.0 years (23.0 ttl pk-yrs)     Types: Cigarettes     Start date:      Quit date: 2018     Years since quittin.3    Smokeless tobacco: Never   Vaping Use    Vaping status: Never Used   Substance Use Topics    Alcohol use: Yes     Comment: social    Drug use: Never     Types: Marijuana        Mulugeta Nolan, DO  [phone]

## 2025-05-22 ENCOUNTER — VBI (OUTPATIENT)
Dept: INTERNAL MEDICINE CLINIC | Facility: CLINIC | Age: 70
End: 2025-05-22

## 2025-05-22 LAB
ATRIAL RATE: 53 BPM
P AXIS: 82 DEGREES
PR INTERVAL: 172 MS
QRS AXIS: 16 DEGREES
QRSD INTERVAL: 86 MS
QT INTERVAL: 484 MS
QTC INTERVAL: 454 MS
T WAVE AXIS: 42 DEGREES
VENTRICULAR RATE: 53 BPM

## 2025-05-22 PROCEDURE — 93010 ELECTROCARDIOGRAM REPORT: CPT | Performed by: INTERNAL MEDICINE

## 2025-05-22 NOTE — TELEPHONE ENCOUNTER
05/22/25 3:06 PM    Patient contacted post ED visit, [facility] department spoke with patient/caregiver and outreach was successful.    Thank you.  Jose L Hoff MA  [facility]

## 2025-05-29 ENCOUNTER — PROCEDURE VISIT (OUTPATIENT)
Dept: PODIATRY | Facility: CLINIC | Age: 70
End: 2025-05-29
Payer: COMMERCIAL

## 2025-05-29 VITALS
HEART RATE: 59 BPM | BODY MASS INDEX: 29.75 KG/M2 | TEMPERATURE: 97.3 F | WEIGHT: 207.8 LBS | OXYGEN SATURATION: 95 % | HEIGHT: 70 IN

## 2025-05-29 DIAGNOSIS — M79.675 PAIN IN TOES OF BOTH FEET: ICD-10-CM

## 2025-05-29 DIAGNOSIS — E11.69 TYPE 2 DIABETES MELLITUS WITH OTHER SPECIFIED COMPLICATION, WITHOUT LONG-TERM CURRENT USE OF INSULIN (HCC): ICD-10-CM

## 2025-05-29 DIAGNOSIS — E11.9 TYPE 2 DIABETES MELLITUS WITHOUT COMPLICATION, WITHOUT LONG-TERM CURRENT USE OF INSULIN (HCC): ICD-10-CM

## 2025-05-29 DIAGNOSIS — K50.919 CROHN'S DISEASE WITH COMPLICATION, UNSPECIFIED GASTROINTESTINAL TRACT LOCATION (HCC): ICD-10-CM

## 2025-05-29 DIAGNOSIS — M79.674 PAIN IN TOES OF BOTH FEET: ICD-10-CM

## 2025-05-29 DIAGNOSIS — B35.1 ONYCHOMYCOSIS: Primary | ICD-10-CM

## 2025-05-29 DIAGNOSIS — G62.9 NEUROPATHY: ICD-10-CM

## 2025-05-29 PROCEDURE — 11721 DEBRIDE NAIL 6 OR MORE: CPT | Performed by: PODIATRIST

## 2025-05-29 RX ORDER — EMPAGLIFLOZIN 25 MG/1
25 TABLET, FILM COATED ORAL DAILY
Qty: 90 TABLET | Refills: 0 | Status: SHIPPED | OUTPATIENT
Start: 2025-05-29

## 2025-05-29 RX ORDER — MESALAMINE 400 MG/1
800 CAPSULE, DELAYED RELEASE ORAL 2 TIMES DAILY
Qty: 360 CAPSULE | Refills: 1 | Status: SHIPPED | OUTPATIENT
Start: 2025-05-29

## 2025-05-29 NOTE — PROGRESS NOTES
"Name: Kellen Gray      : 1955      MRN: [id number]  Encounter Provider: Rodolfo Lemos DPM  Encounter Date: 2025   Encounter department: [facility] [place]  :  Assessment & Plan  Onychomycosis       Debride mycotic nails and thin the nail plates x 6 with the use of a nail nipper manually and an electric Dremel bur was used to reduce the thickness of the nail beds and smoothed the distal aspect of the nails.   Type 2 diabetes mellitus with other specified complication, without long-term current use of insulin ([facility])    Lab Results   Component Value Date    HGBA1C 6.6 (H) 2024            Neuropathy         Pain in toes of both feet         Pt was instructed to use lotion once a day on both feet such as cerave, Cetaphil or similar thick style of lotion.   Also recommended the patient increase her water intake by 1-2 bottles by the next time she is seen in the office.    Discussed proper shoe gear, daily inspections of feet, and general foot health with patient. Patient has Q9  findings and is recommended for at risk foot care every 9-10 weeks.    Patients most recent complete clinical foot exam was on: 2025    Return in about 10 weeks (around 2025).     History of Present Illness   HPI  Kellen Gray is a 69 y.o. female who presents with chief complaint of painful thick nails on both feet.  She is a diabetic whose last A1c was 6.6 drawn on 2024.  History obtained from: patient    Review of Systems  Medical History Reviewed by provider this encounter:     .  Medications Ordered Prior to Encounter[1]   Social History[2]     Objective   Pulse 59   Temp (!) 97.3 °F (36.3 °C) (Temporal)   Ht 5' 9.5\" (1.765 m)   Wt 94.3 kg (207 lb 12.8 oz)   SpO2 95%   BMI 30.25 kg/m²      Physical Exam  Vascular status is 1/4 DP PT negative digital hair, normal distal cooling, immediate capillary refill bilaterally.  Capillary refill is approximately 2 seconds.  Slight edema is " present bilaterally.     Derm nails are brittle elongated hypertrophic white-yellow discoloration with subungual debris x 6.  There is an increased thickness in the nails of approximately 2 mm.  The fifth digit nails are pinched on the dorsal aspect.  There is white flaky skin present on the plantar aspect of both feet with no dried blood or fissures noted.  There is loss of turgor noted bilaterally.  There was hyperpigmentation present in the forefoot area around the metatarsals with no lesions.  The discoloration is within the skin and was greater on the left than the right.    Ortho and neuro are unchanged since her last visit on 3/20/2025.       [1]   Current Outpatient Medications on File Prior to Visit   Medication Sig Dispense Refill    albuterol (ACCUNEB) 0.63 MG/3ML nebulizer solution Take 0.63 mg by nebulization every 6 (six) hours as needed for wheezing      albuterol (PROVENTIL HFA,VENTOLIN HFA) 90 mcg/act inhaler Inhale 2 puffs every 4 (four) hours as needed for wheezing      apixaban (Eliquis) 5 mg Take 1 tablet by mouth twice daily 180 tablet 1    atorvastatin (LIPITOR) 20 mg tablet Take 1 tablet (20 mg total) by mouth every morning 90 tablet 1    Cholecalciferol (Vitamin D3) 1.25 MG (56773 UT) CAPS Take 1 capsule by mouth in the morning      dofetilide (TIKOSYN) 500 mcg capsule Take 500 mcg by mouth in the morning and 500 mcg in the evening.      dofetilide (TIKOSYN) 500 mcg capsule Take 1 capsule (500 mcg total) by mouth 2 (two) times a day 180 capsule 4    DULoxetine (CYMBALTA) 20 mg capsule Take 1 capsule (20 mg total) by mouth daily 90 capsule 3    famotidine (PEPCID) 40 MG tablet TAKE 1 TABLET BY MOUTH ONCE DAILY AT BEDTIME 90 tablet 1    fluticasone-umeclidinium-vilanterol (Trelegy Ellipta) 200-62.5-25 mcg/actuation AEPB inhaler Inhale 1 puff daily Rinse mouth after use. 60 blister 5    furosemide (LASIX) 20 mg tablet Take 1 tablet (20 mg total) by mouth 2 (two) times a day 200 tablet 3     gabapentin (NEURONTIN) 300 mg capsule Take 1 capsule (300 mg total) by mouth 2 (two) times a day 200 capsule 3    hydrOXYzine HCL (ATARAX) 25 mg tablet Take 1 tablet by mouth twice daily 100 tablet 1    lisinopril (ZESTRIL) 10 mg tablet Take 1 tablet (10 mg total) by mouth every morning 90 tablet 3    metoprolol succinate (TOPROL-XL) 50 mg 24 hr tablet Take 1 tablet (50 mg total) by mouth daily for 30 doses 30 tablet 0    NON FORMULARY TelePRM diabetic monitor, test strips and lancets      omeprazole (PriLOSEC) 40 MG capsule Take 1 capsule by mouth once daily 90 capsule 1    pancrelipase, Lip-Prot-Amyl, (Creon) 24,000 units Take 3 capsules (72,000 Units total) by mouth 2 (two) times a day 600 capsule 3    Synthroid 137 MCG tablet Take 1 tablet by mouth once daily 90 tablet 0    [DISCONTINUED] Jardiance 25 MG TABS Take 1 tablet by mouth once daily 90 tablet 0    esomeprazole (NexIUM) 20 mg capsule Take 20 mg by mouth every morning before breakfast      predniSONE 10 mg tablet 30 mg by mouth daily for 3 days, then 20 mg by mouth daily for 3 days, then 10 mg by mouth daily for 3 days, then stop 18 tablet 0    [DISCONTINUED] mesalamine (DELZICOL) 400 mg Take 2 capsules by mouth twice daily 360 capsule 3     Current Facility-Administered Medications on File Prior to Visit   Medication Dose Route Frequency Provider Last Rate Last Admin    cyanocobalamin injection 1,000 mcg  1,000 mcg Intramuscular Q30 Days    1,000 mcg at 25 1006   [2]   Social History  Tobacco Use    Smoking status: Former     Current packs/day: 0.00     Average packs/day: 0.5 packs/day for 46.0 years (23.0 ttl pk-yrs)     Types: Cigarettes     Start date:      Quit date: 2018     Years since quittin.4    Smokeless tobacco: Never   Vaping Use    Vaping status: Never Used   Substance and Sexual Activity    Alcohol use: Yes     Comment: social    Drug use: Never     Types: Marijuana    Sexual activity: Not Currently     Partners: Male      Birth control/protection: None

## 2025-05-30 ENCOUNTER — HOSPITAL ENCOUNTER (OUTPATIENT)
Dept: INFUSION CENTER | Facility: HOSPITAL | Age: 70
Discharge: HOME | End: 2025-05-30
Attending: INTERNAL MEDICINE
Payer: COMMERCIAL

## 2025-05-30 VITALS
RESPIRATION RATE: 19 BRPM | TEMPERATURE: 97.4 F | HEART RATE: 59 BPM | DIASTOLIC BLOOD PRESSURE: 74 MMHG | SYSTOLIC BLOOD PRESSURE: 134 MMHG

## 2025-05-30 DIAGNOSIS — D50.8 IRON DEFICIENCY ANEMIA SECONDARY TO INADEQUATE DIETARY IRON INTAKE: ICD-10-CM

## 2025-05-30 DIAGNOSIS — I50.32 CHRONIC HEART FAILURE WITH PRESERVED EJECTION FRACTION (HCC): Primary | ICD-10-CM

## 2025-05-30 DIAGNOSIS — K50.919 CROHN'S DISEASE WITH COMPLICATION, UNSPECIFIED GASTROINTESTINAL TRACT LOCATION (HCC): ICD-10-CM

## 2025-05-30 PROCEDURE — 96367 TX/PROPH/DG ADDL SEQ IV INF: CPT

## 2025-05-30 PROCEDURE — 96365 THER/PROPH/DIAG IV INF INIT: CPT

## 2025-05-30 PROCEDURE — 96366 THER/PROPH/DIAG IV INF ADDON: CPT

## 2025-05-30 RX ORDER — SODIUM CHLORIDE 9 MG/ML
20 INJECTION, SOLUTION INTRAVENOUS ONCE
Status: COMPLETED | OUTPATIENT
Start: 2025-05-30 | End: 2025-05-30

## 2025-05-30 RX ORDER — ACETAMINOPHEN 325 MG/1
650 TABLET ORAL ONCE
Status: CANCELLED
Start: 2025-05-30 | End: 2025-05-30

## 2025-05-30 RX ORDER — SODIUM CHLORIDE 9 MG/ML
20 INJECTION, SOLUTION INTRAVENOUS ONCE
Status: CANCELLED | OUTPATIENT
Start: 2025-05-30

## 2025-05-30 RX ORDER — ACETAMINOPHEN 325 MG/1
650 TABLET ORAL ONCE
Status: COMPLETED | OUTPATIENT
Start: 2025-05-30 | End: 2025-05-30

## 2025-05-30 RX ADMIN — IRON SUCROSE 300 MG: 20 INJECTION, SOLUTION INTRAVENOUS at 09:45

## 2025-05-30 RX ADMIN — DIPHENHYDRAMINE HYDROCHLORIDE 25 MG: 50 INJECTION, SOLUTION INTRAMUSCULAR; INTRAVENOUS at 09:10

## 2025-05-30 RX ADMIN — ACETAMINOPHEN 650 MG: 325 TABLET ORAL at 09:11

## 2025-05-30 RX ADMIN — SODIUM CHLORIDE 20 ML/HR: 0.9 INJECTION, SOLUTION INTRAVENOUS at 09:13

## 2025-05-30 NOTE — PROGRESS NOTES
Kellen Gray tolerated Venofer treatment well with no complications.      No further appointments needed at this time.    AVS declined.

## 2025-06-04 ENCOUNTER — OFFICE VISIT (OUTPATIENT)
Dept: INTERNAL MEDICINE CLINIC | Facility: CLINIC | Age: 70
End: 2025-06-04
Payer: COMMERCIAL

## 2025-06-04 VITALS
HEART RATE: 68 BPM | TEMPERATURE: 98.1 F | HEIGHT: 70 IN | OXYGEN SATURATION: 97 % | SYSTOLIC BLOOD PRESSURE: 142 MMHG | BODY MASS INDEX: 29.51 KG/M2 | DIASTOLIC BLOOD PRESSURE: 70 MMHG | WEIGHT: 206.1 LBS

## 2025-06-04 DIAGNOSIS — I48.0 PAROXYSMAL ATRIAL FIBRILLATION (HCC): ICD-10-CM

## 2025-06-04 DIAGNOSIS — I50.32 CHRONIC HEART FAILURE WITH PRESERVED EJECTION FRACTION (HCC): Primary | ICD-10-CM

## 2025-06-04 PROBLEM — F17.211 CIGARETTE NICOTINE DEPENDENCE IN REMISSION: Status: RESOLVED | Noted: 2024-12-13 | Resolved: 2025-06-04

## 2025-06-04 PROCEDURE — G2211 COMPLEX E/M VISIT ADD ON: HCPCS | Performed by: NURSE PRACTITIONER

## 2025-06-04 PROCEDURE — 99213 OFFICE O/P EST LOW 20 MIN: CPT | Performed by: NURSE PRACTITIONER

## 2025-06-04 NOTE — PROGRESS NOTES
"Name: Kellen Gray      : 1955      MRN: [id number]  Encounter Provider: JOSH Eden  Encounter Date: 2025   Encounter department: [facility]  :  Assessment & Plan  Chronic heart failure with preserved ejection fraction (HCC)  Wt Readings from Last 3 Encounters:   25 93.5 kg (206 lb 1.6 oz)   25 94.3 kg (207 lb 12.8 oz)   25 109 kg (239 lb 13.8 oz)     Heart rate irregular in office today. Continue lopressor and Eliquis. Has an appointment with Cardiology on the . Did advise if any worsening of symptoms to go to ER. Asymptomatic right now.               Paroxysmal atrial fibrillation (HCC)                History of Present Illness   Marta is for an ER follow up. Was seen in the ER on  with bradycardia. Does see Cardiology Lopressor decreased to 50 mg daily. Thinks she may have [facility] . She is having some SOB but nothing significant. She does see Cardiology on the . She concerned about her lopressor being cut so drastically. She was taking 200 mg BID. She is monitoring her HR at home and it is staying in the 60s. She offers no other issues.      Review of Systems   All other systems reviewed and are negative.      Objective   Pulse 68   Temp 98.1 °F (36.7 °C)   Ht 5' 9.5\" (1.765 m)   Wt 93.5 kg (206 lb 1.6 oz)   SpO2 97%   BMI 30.00 kg/m²      Physical Exam  Vitals reviewed.   Constitutional:       Appearance: Normal appearance. She is normal weight.     Cardiovascular:      Rate and Rhythm: Bradycardia present. Rhythm irregular.      Pulses: Normal pulses.      Heart sounds: Normal heart sounds.   Pulmonary:      Effort: Pulmonary effort is normal.      Breath sounds: Normal breath sounds.     Skin:     General: Skin is warm and dry.      Capillary Refill: Capillary refill takes less than 2 seconds.     Neurological:      General: No focal deficit present.      Mental Status: She is alert and " oriented to person, place, and time. Mental status is at baseline.     Psychiatric:         Mood and Affect: Mood normal.         Behavior: Behavior normal.         Thought Content: Thought content normal.         Judgment: Judgment normal.

## 2025-06-04 NOTE — ASSESSMENT & PLAN NOTE
Wt Readings from Last 3 Encounters:   06/04/25 93.5 kg (206 lb 1.6 oz)   05/29/25 94.3 kg (207 lb 12.8 oz)   05/21/25 109 kg (239 lb 13.8 oz)     Heart rate irregular in office today. Continue lopressor and Eliquis. Has an appointment with Cardiology on the 9th. Did advise if any worsening of symptoms to go to ER. Asymptomatic right now.

## 2025-06-05 ENCOUNTER — CLINICAL SUPPORT (OUTPATIENT)
Dept: INTERNAL MEDICINE CLINIC | Facility: CLINIC | Age: 70
End: 2025-06-05
Payer: COMMERCIAL

## 2025-06-05 DIAGNOSIS — E53.8 B12 DEFICIENCY: Primary | ICD-10-CM

## 2025-06-05 PROCEDURE — 96372 THER/PROPH/DIAG INJ SC/IM: CPT

## 2025-06-05 RX ADMIN — CYANOCOBALAMIN 1000 MCG: 1000 INJECTION, SOLUTION INTRAMUSCULAR; SUBCUTANEOUS at 09:54

## 2025-06-09 ENCOUNTER — OFFICE VISIT (OUTPATIENT)
Dept: CARDIOLOGY CLINIC | Facility: HOSPITAL | Age: 70
End: 2025-06-09
Payer: COMMERCIAL

## 2025-06-09 VITALS
DIASTOLIC BLOOD PRESSURE: 56 MMHG | HEIGHT: 69 IN | SYSTOLIC BLOOD PRESSURE: 104 MMHG | WEIGHT: 205.8 LBS | HEART RATE: 65 BPM | BODY MASS INDEX: 30.48 KG/M2 | OXYGEN SATURATION: 97 %

## 2025-06-09 DIAGNOSIS — Z86.79 S/P ABLATION OF ATRIAL FIBRILLATION: ICD-10-CM

## 2025-06-09 DIAGNOSIS — G47.33 OSA (OBSTRUCTIVE SLEEP APNEA): ICD-10-CM

## 2025-06-09 DIAGNOSIS — E78.00 HYPERCHOLESTEREMIA: ICD-10-CM

## 2025-06-09 DIAGNOSIS — I10 ESSENTIAL HYPERTENSION: ICD-10-CM

## 2025-06-09 DIAGNOSIS — E11.69 TYPE 2 DIABETES MELLITUS WITH OTHER SPECIFIED COMPLICATION, WITHOUT LONG-TERM CURRENT USE OF INSULIN (HCC): ICD-10-CM

## 2025-06-09 DIAGNOSIS — Z98.890 S/P ABLATION OF ATRIAL FIBRILLATION: ICD-10-CM

## 2025-06-09 DIAGNOSIS — I50.32 CHRONIC HEART FAILURE WITH PRESERVED EJECTION FRACTION (HCC): ICD-10-CM

## 2025-06-09 DIAGNOSIS — I48.0 PAROXYSMAL ATRIAL FIBRILLATION (HCC): Primary | ICD-10-CM

## 2025-06-09 DIAGNOSIS — R07.89 OTHER CHEST PAIN: ICD-10-CM

## 2025-06-09 PROCEDURE — 99214 OFFICE O/P EST MOD 30 MIN: CPT

## 2025-06-09 PROCEDURE — 93000 ELECTROCARDIOGRAM COMPLETE: CPT

## 2025-06-09 NOTE — PROGRESS NOTES
Cardiology Follow Up    Kellen Gray  1955  [id number]  [facility] [address] [id number] [phone]    1. Paroxysmal atrial fibrillation (HCC)  Zio Monitor    POCT ECG      2. S/P ablation of atrial fibrillation  Zio Monitor      3. Chronic heart failure with preserved ejection fraction (HCC)  Basic metabolic panel      4. Essential hypertension        5. Hypercholesteremia        6. Type 2 diabetes mellitus with other specified complication, without long-term current use of insulin (HCC)        7. MARY ELLEN (obstructive sleep apnea)        8. Other chest pain  NM myocardial perfusion spect (rx stress and/or rest)        Discussion/Summary:  Chest pain/tightness/palpitations:  Previously reported at January 2025 office visit, had one single day of the above symptoms without recurrence. Since then these symptoms have become more frequent, reports a right sided chest tightness with radiation to the right arm, palpitations. Previously was associated with when she would go into afib, however all EKGs over the last 12 months have shown sinus rhythm. Went to the ER with these symptoms, was bradycardic with rates 40-50s, her metoprolol dose was decreased from 100 mg BID to 50 mg daily. This unfortunately has not made a difference in her symptoms.  Echo from January 2025 showed LVEF 60%, RVSP 36 mmHg  We will check a 2 week ZIO monitor to evaluate average heart rate, afib burden, pauses, other arrythmias, etc.  Will also check a pharmacologic stress test, did have a normal [facility] through [facility] in 2022 but now with new symptoms will evaluate further to check for ischemia.    Atrial fibrillation:  History of paroxysmal atrial fibrillation, s/p afib ablation in 2019  EKG: Normal sinus rhythm 65 bpm  Reports increased burden of palpitations, right sided chest tightness with radiation to right arm,  previously had these symptoms when she would go into afib previously.    Anticoagulation: Eliquis 5 mg BID  Rate control: Metoprolol succinate 50 mg daily, was previously reduced from 100 mg BID due to symptomatic bradycardia with rates in the 40-50s  AAD: Dofetilide 500 mcg BID  We will check a 2 week ZIO monitor to evaluate average heart rate, afib burden, pauses, other arrythmias, etc.    Chronic HFpEF:  Volume status maintained on Lasix 20 mg BID  Weights at home have been consistent between 203-207 lbs  Weight in office 205 lbs  Continues to follow low sodium diet and fluid restriction  Patient knows to take extra Lasix for weight gain > 3 lbs in 1 day, > 5 lbs in 1 week, or for any worsening symptoms such as shortness of breath and lower extremity edema  Potassium was low in hospital recently 3.1, received supplementation but was not discharged with any. Will recheck BMP and prescribe daily potassium if needed.    Hypertension:  BP today in office 104/56  Continue current regimen    Hyperlipidemia:  Lipid profile 12/17/2024: C 170, T 106, H 45, L 104  Continue atorvastatin 20 mg daily    MARY ELLEN:  Compliant with CPAP    She will RTO in 3 months or sooner or necessary.     Interval History:   Kellen Gray is a 69 y.o. female with a history of atrial fibrillation s/p ablation in June 2019 on Tikosyn, chronic HFpEF, hypertension, hyperlipidemia, MARY ELLEN, diabetes who follows with cardiologist Dr. Brenner.    She was previously seen in our office January 2025 where she had been feeling well.  Weight was down almost 50 pounds following with weight management. She noted a single day where she had palpitations and chest heaviness, did not seek evaluation at the time, had no recurrence.  Leg swelling was controlled on Lasix 20 mg twice daily.  Echocardiogram was ordered after last visit, showed preserved LVEF 60%, mild TR, RVSP 36 mmHg.  She was seen in the ER on 5/21, had been experiencing several days of exertional  "dyspnea as well as right-sided chest pain that radiated to the right arm.  Heart rate was noted to be low at the time, persistently bradycardic with rates 40-50s, was on metoprolol succinate 100 mg twice daily. Cardiac workup was negative, cardiology recommended decreasing her metoprolol to 50 mg daily at that time.     Kellen presents to the office today with her daughter for follow up. Since she was seen in the ER she reports feeling about the same. EKG today showing normal sinus rhythm, QTC 480ms, 65 bpm. She tells me the symptoms she is feeling (palpitations, chest tightness with radiation to right arm) are similar to how she would feel when going into afib previously. She felt this way for a single day prior to her last visit, but no additional testing/monitoring was warranted as there was no recurrence. Since then these symptoms have gotten more frequent. On top of the existing chest tightness, she does note a new chest pain that started around this same time that \"just feels different.\" She did have a normal heart catheterization through [facility] in 2022 where no interventions were performed. She notes chronic exertional dyspnea that has gotten worse since all of these additional symptoms have begun. Denies lightheadedness, dizziness, syncope, lower extremity edema, orthopnea, PND.  EKG today showing sinus rhythm 65 bpm, QTcB 480 ms.  We will check a 2-week ZIO applied today in office, as well as updated nuclear stress test.  Additionally we will recheck BMP, her potassium was low in the ER and she got supplementation while she was there but was not sent home on any.  Will see her back in 3 months or sooner if need be.      Medical Problems       Problem List       Lymphadenopathy    A-fib (HCC)    Elevated TSH    Hypercholesteremia    Transaminitis    Microscopic hematuria    Pulmonary hypertension (HCC)    Postoperative hypothyroidism    Brain aneurysm    Atypical chest pain    Dizziness    Crohn's disease " with complication (HCC)    Elevated d-dimer    Hypokalemia    COPD, severity to be determined (HCC)    Severe obstructive sleep apnea    Diarrhea    Atherosclerosis    Prolonged QT interval    Hepatomegaly    Hepatic steatosis    Colon polyp    Multiple renal cysts    T12 compression fracture (HCC)    Type 2 diabetes mellitus with other specified complication, without long-term current use of insulin (HCC)      Lab Results   Component Value Date    HGBA1C 6.6 (H) 12/17/2024         Fracture of right tibia    Chronic pain syndrome    Chronic bilateral low back pain without sciatica    Lumbar spondylosis    Lumbar radiculopathy    Hemorrhoids    Urinary retention    Essential hypertension    GERD (gastroesophageal reflux disease)    Generalized abdominal pain    (HFpEF) heart failure with preserved ejection fraction (HCC)    Wt Readings from Last 3 Encounters:   06/09/25 93.4 kg (205 lb 12.8 oz)   06/04/25 93.5 kg (206 lb 1.6 oz)   05/29/25 94.3 kg (207 lb 12.8 oz)                 Atypical ductal hyperplasia of right breast    BPPV (benign paroxysmal positional vertigo)    Congenital multiple renal cysts    History of cerebral aneurysm repair    History of colon polyps    History of renal calculi    History of thyroid cancer    History of total thyroidectomy    History of vertebral fracture    Overview Signed 11/1/2023 10:07 PM by Ina Rivas MD   Formatting of this note might be different from the original. Right tibial plateau Formatting of this note might be different from the original. Compression deformity of T11/T12         Impaired hearing    Iron deficiency anemia secondary to inadequate dietary iron intake    Leukocytosis    Recurrent major depressive disorder (HCC) (Chronic)    Restless legs syndrome    S/P ablation of atrial fibrillation    Sensorineural hearing loss, bilateral    Class 1 obesity    Thymus neoplasm    Folic acid deficiency    Vitamin D deficiency    Wedge compression fracture of  T11-T12 vertebra, initial encounter for closed fracture (HCC)    Vitamin B12 deficiency    Hiatal hernia    Exocrine pancreatic insufficiency    SIRS (systemic inflammatory response syndrome) (HCC)    Pulmonary nodule    Iron deficiency    MARY ELLEN (obstructive sleep apnea)    RLS (restless legs syndrome)    Crohn's disease with complication, unspecified gastrointestinal tract location (HCC)    Obesity (BMI 30-39.9)    Anxiety    Lump in the groin    Excessive daytime sleepiness    Age-related cataract of both eyes        Past Medical History[1]  Social History     Socioeconomic History    Marital status: /Civil Union     Spouse name: Not on file    Number of children: Not on file    Years of education: Not on file    Highest education level: Not on file   Occupational History    Not on file   Tobacco Use    Smoking status: Former     Current packs/day: 0.00     Average packs/day: 0.5 packs/day for 46.0 years (23.0 ttl pk-yrs)     Types: Cigarettes     Start date:      Quit date: 2018     Years since quittin.4    Smokeless tobacco: Never   Vaping Use    Vaping status: Never Used   Substance and Sexual Activity    Alcohol use: Yes     Comment: social    Drug use: Never     Types: Marijuana    Sexual activity: Not Currently     Partners: Male     Birth control/protection: None   Other Topics Concern    Not on file   Social History Narrative    Not on file     Social Drivers of Health     Financial Resource Strain: Low Risk  (2023)    Overall Financial Resource Strain (CARDIA)     Difficulty of Paying Living Expenses: Not very hard   Food Insecurity: Food Insecurity Present (2024)    Nursing - Inadequate Food Risk Classification     Worried About Running Out of Food in the Last Year: Sometimes true     Ran Out of Food in the Last Year: Sometimes true     Ran Out of Food in the Last Year: Not on file   Transportation Needs: Unmet Transportation Needs (2024)    PRAPARE - Transportation      "Lack of Transportation (Medical): Yes     Lack of Transportation (Non-Medical): Yes   Physical Activity: Not on file   Stress: Not on file   Social Connections: Not on file   Intimate Partner Violence: Not on file   Housing Stability: Unknown (11/14/2024)    Housing Stability Vital Sign     Unable to Pay for Housing in the Last Year: No     Number of Times Moved in the Last Year: 1     Homeless in the Last Year: Not on file      Family History[2]  Past Surgical History[3]  Current Medications[4]  Allergies   Allergen Reactions    Sulfa Antibiotics Rash       Labs:     Chemistry        Component Value Date/Time     04/21/2018 0852    K 3.1 (L) 05/21/2025 1321    K 3.6 10/17/2023 0801     05/21/2025 1321     10/17/2023 0801    CO2 31 05/21/2025 1321    CO2 29 10/17/2023 0801    BUN 12 05/21/2025 1321    BUN 12 10/17/2023 0801    CREATININE 0.79 05/21/2025 1321    CREATININE 0.8 10/17/2023 0801        Component Value Date/Time    CALCIUM 8.9 05/21/2025 1321    CALCIUM 9.1 10/17/2023 0801    ALKPHOS 86 05/21/2025 1321    ALKPHOS 106 10/17/2023 0801    AST 15 05/21/2025 1321    AST 23 10/17/2023 0801    ALT 13 05/21/2025 1321    ALT 25 10/17/2023 0801    BILITOT 0.3 04/21/2018 0852            Lab Results   Component Value Date    CHOL 244 (H) 04/21/2018     Lab Results   Component Value Date    HDL 45 (L) 12/17/2024    HDL 41 (L) 07/31/2024    HDL 41 (L) 03/13/2024     Lab Results   Component Value Date    LDLCALC 104 (H) 12/17/2024    LDLCALC 92 07/31/2024    LDLCALC 80 03/13/2024     Lab Results   Component Value Date    TRIG 106 12/17/2024    TRIG 103 07/31/2024    TRIG 185 (H) 03/13/2024     No results found for: \"CHOLHDL\"    Imaging: X-ray chest 1 view portable  Result Date: 5/21/2025  Narrative: XR CHEST PORTABLE INDICATION: chest pain. COMPARISON: 9/6/2024 FINDINGS: Clear lungs. No pneumothorax or pleural effusion. Normal cardiomediastinal silhouette. Bones are unremarkable for age. Normal upper " "abdomen.     Impression: No acute cardiopulmonary disease. Workstation performed: [id number]       ECG:  Normal sinus rhythm, QTC 480ms, 65 bpm      Review of Systems   Cardiovascular:  Positive for chest pain (tightness), dyspnea on exertion, irregular heartbeat and palpitations.   All other systems reviewed and are negative.      Vitals:    06/09/25 1234   BP: 104/56   Pulse: 65   SpO2: 97%     Vitals:    06/09/25 1234   Weight: 93.4 kg (205 lb 12.8 oz)     Height: 5' 9\" (175.3 cm)   Body mass index is 30.39 kg/m².    Physical Exam  Vitals reviewed.   Constitutional:       General: She is not in acute distress.     Appearance: She is obese. She is not diaphoretic.   HENT:      Head: Normocephalic and atraumatic.     Eyes:      Pupils: Pupils are equal, round, and reactive to light.     Neck:      Vascular: No carotid bruit.     Cardiovascular:      Rate and Rhythm: Normal rate and regular rhythm.      Pulses: Normal pulses.      Heart sounds: Normal heart sounds. No murmur heard.  Pulmonary:      Effort: Pulmonary effort is normal.      Breath sounds: Normal breath sounds. No wheezing, rhonchi or rales.   Abdominal:      General: There is no distension.      Palpations: Abdomen is soft.      Tenderness: There is no abdominal tenderness.     Musculoskeletal:      Cervical back: Normal range of motion.      Right lower leg: No edema.      Left lower leg: No edema.     Skin:     General: Skin is warm.      Capillary Refill: Capillary refill takes less than 2 seconds.     Neurological:      General: No focal deficit present.      Mental Status: She is alert and oriented to person, place, and time. Mental status is at baseline.      Gait: Gait normal.     Psychiatric:         Mood and Affect: Mood normal.         Behavior: Behavior normal.         Thought Content: Thought content normal.              [1]   Past Medical History:  Diagnosis Date    A-fib ([facility])     Abnormal ECG     Acute respiratory failure with hypoxia " (HCC) 11/30/2019    Aneurysm (HCC)     Anxiety     Arthritis     Asthma     Brain aneurysm     Cancer (HCC)     papillary thyroid cancer    Cardiac disease     CHF (congestive heart failure) (HCC)     COPD (chronic obstructive pulmonary disease) (HCC)     CPAP (continuous positive airway pressure) dependence     Crohn disease (HCC)     Depression     Diabetes mellitus (HCC)     Disease of thyroid gland     Diverticulitis of colon     Emphysema of lung (HCC)     Fatty liver     Fibrocystic breast years ago    GERD (gastroesophageal reflux disease)     HL (hearing loss)     Hyperlipidemia     Hypertension     Sleep apnea     Sleep apnea, obstructive     Visual impairment    [2]   Family History  Problem Relation Name Age of Onset    Alzheimer's disease Mother reza     Asthma Mother reza     Cancer Father randy         dead    Anemia Father randy     Heart disease Sister      Hypertension Sister          under control    Diabetes Sister      Stroke Sister      Cancer Sister adrian         dead    Stroke Sister adrian     Heart disease Sister adrian         dead    Diabetes Sister adrian     Breast cancer Sister adrian     Cancer Brother          liver with mets to bone    Heart disease Brother randy     Cancer Brother randy         dead    Asthma Daughter alyce     Cancer Brother bar     Heart disease Sister adrian    [3]   Past Surgical History:  Procedure Laterality Date    APPENDECTOMY      BREAST BIOPSY      BREAST CYST EXCISION      BREAST SURGERY  several dates    CARDIOVERSION N/A 02/14/2019    Procedure: CARDIOVERSION;  Surgeon: Lee Brenner MD;  Location: [facility];  Service: Cardiology    CEREBRAL ANEURYSM REPAIR      CHOLECYSTECTOMY      COLONOSCOPY      HYSTERECTOMY      IR PELVIC ANGIOGRAM  12/14/2017    FL EXC THROMBOSED HEMORRHOID XTRNL N/A 10/07/2022    Procedure: HEMORRHOIDECTOMY EXCISION;  Surgeon: Leonardo Cash DO;  Location: [facility];  Service: General     THYROIDECTOMY      TONSILLECTOMY AND ADENOIDECTOMY      UPPER GASTROINTESTINAL ENDOSCOPY      US GUIDED BREAST BIOPSY RIGHT COMPLETE Right 11/29/2023   [4]   Current Outpatient Medications:     albuterol (ACCUNEB) 0.63 MG/3ML nebulizer solution, Take 0.63 mg by nebulization every 6 (six) hours as needed for wheezing, Disp: , Rfl:     albuterol (PROVENTIL HFA,VENTOLIN HFA) 90 mcg/act inhaler, Inhale 2 puffs every 4 (four) hours as needed for wheezing, Disp: , Rfl:     apixaban (Eliquis) 5 mg, Take 1 tablet by mouth twice daily, Disp: 180 tablet, Rfl: 1    atorvastatin (LIPITOR) 20 mg tablet, Take 1 tablet (20 mg total) by mouth every morning, Disp: 90 tablet, Rfl: 1    Cholecalciferol (Vitamin D3) 1.25 MG (71524 UT) CAPS, Take 1 capsule by mouth in the morning, Disp: , Rfl:     dofetilide (TIKOSYN) 500 mcg capsule, Take 1 capsule (500 mcg total) by mouth 2 (two) times a day, Disp: 180 capsule, Rfl: 4    DULoxetine (CYMBALTA) 20 mg capsule, Take 1 capsule (20 mg total) by mouth daily, Disp: 90 capsule, Rfl: 3    famotidine (PEPCID) 40 MG tablet, TAKE 1 TABLET BY MOUTH ONCE DAILY AT BEDTIME, Disp: 90 tablet, Rfl: 1    fluticasone-umeclidinium-vilanterol (Trelegy Ellipta) 200-62.5-25 mcg/actuation AEPB inhaler, Inhale 1 puff daily Rinse mouth after use., Disp: 60 blister, Rfl: 5    furosemide (LASIX) 20 mg tablet, Take 1 tablet (20 mg total) by mouth 2 (two) times a day, Disp: 200 tablet, Rfl: 3    gabapentin (NEURONTIN) 300 mg capsule, Take 1 capsule (300 mg total) by mouth 2 (two) times a day, Disp: 200 capsule, Rfl: 3    hydrOXYzine HCL (ATARAX) 25 mg tablet, Take 1 tablet by mouth twice daily, Disp: 100 tablet, Rfl: 1    Jardiance 25 MG TABS, Take 1 tablet by mouth once daily, Disp: 90 tablet, Rfl: 0    lisinopril (ZESTRIL) 10 mg tablet, Take 1 tablet (10 mg total) by mouth every morning, Disp: 90 tablet, Rfl: 3    mesalamine (DELZICOL) 400 mg, Take 2 capsules by mouth twice daily, Disp: 360 capsule, Rfl: 1     metoprolol succinate (TOPROL-XL) 50 mg 24 hr tablet, Take 1 tablet (50 mg total) by mouth daily for 30 doses, Disp: 30 tablet, Rfl: 0    NON FORMULARY, TelePR diabetic monitor, test strips and lancets, Disp: , Rfl:     omeprazole (PriLOSEC) 40 MG capsule, Take 1 capsule by mouth once daily, Disp: 90 capsule, Rfl: 1    pancrelipase, Lip-Prot-Amyl, (Creon) 24,000 units, Take 3 capsules (72,000 Units total) by mouth 2 (two) times a day, Disp: 600 capsule, Rfl: 3    Synthroid 137 MCG tablet, Take 1 tablet by mouth once daily, Disp: 90 tablet, Rfl: 0    dofetilide (TIKOSYN) 500 mcg capsule, Take 500 mcg by mouth in the morning and 500 mcg in the evening. (Patient not taking: Reported on 6/9/2025), Disp: , Rfl:     Current Facility-Administered Medications:     cyanocobalamin injection 1,000 mcg, 1,000 mcg, Intramuscular, Q30 Days, , 1,000 mcg at 06/05/25 0954

## 2025-06-13 ENCOUNTER — HOSPITAL ENCOUNTER (OUTPATIENT)
Dept: CT IMAGING | Facility: HOSPITAL | Age: 70
Discharge: HOME | End: 2025-06-13
Attending: INTERNAL MEDICINE
Payer: COMMERCIAL

## 2025-06-13 DIAGNOSIS — R91.1 PULMONARY NODULE: ICD-10-CM

## 2025-06-13 PROCEDURE — 71250 CT THORAX DX C-: CPT

## 2025-06-18 ENCOUNTER — OFFICE VISIT (OUTPATIENT)
Dept: PULMONOLOGY | Facility: CLINIC | Age: 70
End: 2025-06-18
Payer: COMMERCIAL

## 2025-06-18 ENCOUNTER — APPOINTMENT (OUTPATIENT)
Dept: LAB | Facility: HOSPITAL | Age: 70
End: 2025-06-18
Payer: COMMERCIAL

## 2025-06-18 VITALS
OXYGEN SATURATION: 95 % | WEIGHT: 203 LBS | BODY MASS INDEX: 30.07 KG/M2 | DIASTOLIC BLOOD PRESSURE: 60 MMHG | HEIGHT: 69 IN | SYSTOLIC BLOOD PRESSURE: 98 MMHG | HEART RATE: 67 BPM | TEMPERATURE: 97.7 F

## 2025-06-18 DIAGNOSIS — E53.8 VITAMIN B12 DEFICIENCY: ICD-10-CM

## 2025-06-18 DIAGNOSIS — I48.0 PAROXYSMAL ATRIAL FIBRILLATION (HCC): ICD-10-CM

## 2025-06-18 DIAGNOSIS — F17.211 CIGARETTE NICOTINE DEPENDENCE IN REMISSION: ICD-10-CM

## 2025-06-18 DIAGNOSIS — I10 ESSENTIAL HYPERTENSION: ICD-10-CM

## 2025-06-18 DIAGNOSIS — E78.00 HYPERCHOLESTEREMIA: ICD-10-CM

## 2025-06-18 DIAGNOSIS — G47.33 OSA (OBSTRUCTIVE SLEEP APNEA): ICD-10-CM

## 2025-06-18 DIAGNOSIS — R91.1 PULMONARY NODULE: ICD-10-CM

## 2025-06-18 DIAGNOSIS — I50.32 CHRONIC HEART FAILURE WITH PRESERVED EJECTION FRACTION (HCC): ICD-10-CM

## 2025-06-18 DIAGNOSIS — K50.919 CROHN'S DISEASE WITH COMPLICATION, UNSPECIFIED GASTROINTESTINAL TRACT LOCATION (HCC): ICD-10-CM

## 2025-06-18 DIAGNOSIS — J44.9 COPD, SEVERITY TO BE DETERMINED (HCC): Primary | ICD-10-CM

## 2025-06-18 DIAGNOSIS — K86.81 EXOCRINE PANCREATIC INSUFFICIENCY: ICD-10-CM

## 2025-06-18 DIAGNOSIS — D50.8 IRON DEFICIENCY ANEMIA SECONDARY TO INADEQUATE DIETARY IRON INTAKE: ICD-10-CM

## 2025-06-18 DIAGNOSIS — E55.9 VITAMIN D DEFICIENCY: ICD-10-CM

## 2025-06-18 DIAGNOSIS — E11.69 TYPE 2 DIABETES MELLITUS WITH OTHER SPECIFIED COMPLICATION, WITHOUT LONG-TERM CURRENT USE OF INSULIN (HCC): ICD-10-CM

## 2025-06-18 LAB
25(OH)D3 SERPL-MCNC: 62.5 NG/ML (ref 30–100)
ALBUMIN SERPL BCG-MCNC: 4.2 G/DL (ref 3.5–5)
ALP SERPL-CCNC: 94 U/L (ref 34–104)
ALT SERPL W P-5'-P-CCNC: 18 U/L (ref 7–52)
ANION GAP SERPL CALCULATED.3IONS-SCNC: 7 MMOL/L (ref 4–13)
AST SERPL W P-5'-P-CCNC: 19 U/L (ref 13–39)
BASOPHILS # BLD AUTO: 0.02 THOUSANDS/ÂΜL (ref 0–0.1)
BASOPHILS NFR BLD AUTO: 0 % (ref 0–1)
BILIRUB SERPL-MCNC: 0.42 MG/DL (ref 0.2–1)
BUN SERPL-MCNC: 10 MG/DL (ref 5–25)
CALCIUM SERPL-MCNC: 9.3 MG/DL (ref 8.4–10.2)
CHLORIDE SERPL-SCNC: 104 MMOL/L (ref 96–108)
CHOLEST SERPL-MCNC: 174 MG/DL (ref ?–200)
CO2 SERPL-SCNC: 30 MMOL/L (ref 21–32)
CREAT SERPL-MCNC: 0.93 MG/DL (ref 0.6–1.3)
EOSINOPHIL # BLD AUTO: 0.11 THOUSAND/ÂΜL (ref 0–0.61)
EOSINOPHIL NFR BLD AUTO: 1 % (ref 0–6)
ERYTHROCYTE [DISTWIDTH] IN BLOOD BY AUTOMATED COUNT: 13 % (ref 11.6–15.1)
EST. AVERAGE GLUCOSE BLD GHB EST-MCNC: 140 MG/DL
FERRITIN SERPL-MCNC: 329 NG/ML (ref 30–307)
GFR SERPL CREATININE-BSD FRML MDRD: 63 ML/MIN/1.73SQ M
GLUCOSE SERPL-MCNC: 106 MG/DL (ref 65–140)
HBA1C MFR BLD: 6.5 %
HCT VFR BLD AUTO: 45.1 % (ref 34.8–46.1)
HDLC SERPL-MCNC: 42 MG/DL
HGB BLD-MCNC: 14.8 G/DL (ref 11.5–15.4)
IMM GRANULOCYTES # BLD AUTO: 0.02 THOUSAND/UL (ref 0–0.2)
IMM GRANULOCYTES NFR BLD AUTO: 0 % (ref 0–2)
IRON SATN MFR SERPL: 26 % (ref 15–50)
IRON SERPL-MCNC: 75 UG/DL (ref 50–212)
LDLC SERPL CALC-MCNC: 86 MG/DL (ref 0–100)
LYMPHOCYTES # BLD AUTO: 2.28 THOUSANDS/ÂΜL (ref 0.6–4.47)
LYMPHOCYTES NFR BLD AUTO: 23 % (ref 14–44)
MCH RBC QN AUTO: 31.1 PG (ref 26.8–34.3)
MCHC RBC AUTO-ENTMCNC: 32.8 G/DL (ref 31.4–37.4)
MCV RBC AUTO: 95 FL (ref 82–98)
MONOCYTES # BLD AUTO: 0.73 THOUSAND/ÂΜL (ref 0.17–1.22)
MONOCYTES NFR BLD AUTO: 7 % (ref 4–12)
NEUTROPHILS # BLD AUTO: 6.78 THOUSANDS/ÂΜL (ref 1.85–7.62)
NEUTS SEG NFR BLD AUTO: 69 % (ref 43–75)
NONHDLC SERPL-MCNC: 132 MG/DL
NRBC BLD AUTO-RTO: 0 /100 WBCS
PLATELET # BLD AUTO: 226 THOUSANDS/UL (ref 149–390)
PMV BLD AUTO: 9.1 FL (ref 8.9–12.7)
POTASSIUM SERPL-SCNC: 3.2 MMOL/L (ref 3.5–5.3)
PROT SERPL-MCNC: 7.4 G/DL (ref 6.4–8.4)
RBC # BLD AUTO: 4.76 MILLION/UL (ref 3.81–5.12)
SODIUM SERPL-SCNC: 141 MMOL/L (ref 135–147)
TIBC SERPL-MCNC: 289.8 UG/DL (ref 250–450)
TRANSFERRIN SERPL-MCNC: 207 MG/DL (ref 203–362)
TRIGL SERPL-MCNC: 230 MG/DL (ref ?–150)
TSH SERPL DL<=0.05 MIU/L-ACNC: 0.18 UIU/ML (ref 0.45–4.5)
UIBC SERPL-MCNC: 215 UG/DL (ref 155–355)
VIT B12 SERPL-MCNC: 678 PG/ML (ref 180–914)
WBC # BLD AUTO: 9.94 THOUSAND/UL (ref 4.31–10.16)

## 2025-06-18 PROCEDURE — 99214 OFFICE O/P EST MOD 30 MIN: CPT | Performed by: PHYSICIAN ASSISTANT

## 2025-06-18 PROCEDURE — 83540 ASSAY OF IRON: CPT

## 2025-06-18 PROCEDURE — 80053 COMPREHEN METABOLIC PANEL: CPT

## 2025-06-18 PROCEDURE — 36415 COLL VENOUS BLD VENIPUNCTURE: CPT

## 2025-06-18 PROCEDURE — 82306 VITAMIN D 25 HYDROXY: CPT

## 2025-06-18 PROCEDURE — 82728 ASSAY OF FERRITIN: CPT

## 2025-06-18 PROCEDURE — 83036 HEMOGLOBIN GLYCOSYLATED A1C: CPT

## 2025-06-18 PROCEDURE — 82607 VITAMIN B-12: CPT

## 2025-06-18 PROCEDURE — 83550 IRON BINDING TEST: CPT

## 2025-06-18 PROCEDURE — 85025 COMPLETE CBC W/AUTO DIFF WBC: CPT

## 2025-06-18 PROCEDURE — 80061 LIPID PANEL: CPT

## 2025-06-18 PROCEDURE — 84443 ASSAY THYROID STIM HORMONE: CPT

## 2025-06-18 RX ORDER — METOPROLOL SUCCINATE 50 MG/1
50 TABLET, EXTENDED RELEASE ORAL DAILY
Qty: 90 TABLET | Refills: 0 | Status: SHIPPED | OUTPATIENT
Start: 2025-06-18

## 2025-06-18 RX ORDER — FLUTICASONE FUROATE, UMECLIDINIUM BROMIDE AND VILANTEROL TRIFENATATE 200; 62.5; 25 UG/1; UG/1; UG/1
1 POWDER RESPIRATORY (INHALATION) DAILY
Qty: 60 BLISTER | Refills: 11 | Status: SHIPPED | OUTPATIENT
Start: 2025-06-18

## 2025-06-18 RX ORDER — PREDNISONE 20 MG/1
40 TABLET ORAL DAILY
Qty: 10 TABLET | Refills: 0 | Status: SHIPPED | OUTPATIENT
Start: 2025-06-18

## 2025-06-18 RX ORDER — ALBUTEROL SULFATE 90 UG/1
2 INHALANT RESPIRATORY (INHALATION) EVERY 4 HOURS PRN
Qty: 18 G | Refills: 5 | Status: SHIPPED | OUTPATIENT
Start: 2025-06-18

## 2025-06-18 NOTE — ASSESSMENT & PLAN NOTE
Remains committed to abstinence from nicotine and tobacco products.  Patient has a 23-pack-year smoking history with quit date 8 years ago which would make her a candidate for LDCT screening but not to be initiated until known ground glass nodule is stable x5 years.

## 2025-06-18 NOTE — ASSESSMENT & PLAN NOTE
7 mm groundglass opacity in left lower lobe is unchanged since 2023 but slightly increased in 2022.  That an additional 4 mm lingular nodule is noted as well.  Recommend repeat CT chest in 1 year.

## 2025-06-18 NOTE — ASSESSMENT & PLAN NOTE
Continue Trelegy 1 puff daily.  Rinse mouth after each use.  Continue albuterol HFA/nebs every 6 hours as needed.  Noticed some wheeze on exam today but patient symptoms seem to be relatively stable.  I encouraged her to use increased use of her nebulizer for the next several days and if she still feels tight or has audible wheezing sent to the pharmacy for her.  Also recommended that obtain pulmonary function prior to next appointment.   Orders:    albuterol (PROVENTIL HFA,VENTOLIN HFA) 90 mcg/act inhaler; Inhale 2 puffs every 4 (four) hours as needed for wheezing    fluticasone-umeclidinium-vilanterol (Trelegy Ellipta) 200-62.5-25 mcg/actuation AEPB inhaler; Inhale 1 puff daily Rinse mouth after use.    predniSONE 20 mg tablet; Take 2 tablets (40 mg total) by mouth daily    Complete PFT with post bronchodilator; Future

## 2025-06-18 NOTE — ASSESSMENT & PLAN NOTE
No compliance data for me to review but patient reports compliance on CPAP.  No pressure changes made today.  She endorses improved symptoms since initiating PAP therapy.

## 2025-06-18 NOTE — PROGRESS NOTES
Follow-up  Visit - Pulmonary Medicine   Name: Kellen Gray      : 1955      MRN: [id number]  Encounter Provider: Reynold Butler PA-C  Encounter Date: 2025   Encounter department: [facility] [place]  :  Assessment & Plan  COPD, severity to be determined (HCC)  Continue Trelegy 1 puff daily.  Rinse mouth after each use.  Continue albuterol HFA/nebs every 6 hours as needed.  Noticed some wheeze on exam today but patient symptoms seem to be relatively stable.  I encouraged her to use increased use of her nebulizer for the next several days and if she still feels tight or has audible wheezing sent to the pharmacy for her.  Also recommended that obtain pulmonary function prior to next appointment.   Orders:    albuterol (PROVENTIL HFA,VENTOLIN HFA) 90 mcg/act inhaler; Inhale 2 puffs every 4 (four) hours as needed for wheezing    fluticasone-umeclidinium-vilanterol (Trelegy Ellipta) 200-62.5-25 mcg/actuation AEPB inhaler; Inhale 1 puff daily Rinse mouth after use.    predniSONE 20 mg tablet; Take 2 tablets (40 mg total) by mouth daily    Complete PFT with post bronchodilator; Future    MARY ELLEN (obstructive sleep apnea)  No compliance data for me to review but patient reports compliance on CPAP.  No pressure changes made today.  She endorses improved symptoms since initiating PAP therapy.       Pulmonary nodule  7 mm groundglass opacity in left lower lobe is unchanged since  but slightly increased in .  That an additional 4 mm lingular nodule is noted as well.  Recommend repeat CT chest in 1 year.       Cigarette nicotine dependence in remission  Remains committed to abstinence from nicotine and tobacco products.  Patient has a 23-pack-year smoking history with quit date 8 years ago which would make her a candidate for LDCT screening but not to be initiated until known ground glass nodule is stable x5 years.         No follow-ups on file.    History of Present Illness   Kellen OWENS  Isaac is a 69 y.o. female who presents presents the office today for routine follow-up.  Patient was last seen in our office 6 months ago by Dr. Salmeron for COPD of unknown severity, groundglass pulmonary nodule, nicotine dependence in remission, MARY ELLEN.  Patient states that Trelegy works really well for her shortness of breath and wheeze.  More recently however she has been having issues with her heart rate.  She was in the ED a few weeks ago for bradycardia and is followed up with her cardiologist who drastically cut back her metoprolol.  She now feels that her A-fib is acting up and currently has a Zio patch on.  Patient endorses chronic cough without sputum production or hemoptysis.  She reports some wheezing off and on but does not feel the need to use her rescue inhaler or her nebulizer.  Patient denies recent sick contacts.    Review of Systems   All other systems reviewed and are negative.    Aside from what is mentioned in the HPI, ROS is otherwise negative    Past Medical History   Past Medical History[1]  Past Surgical History[2]  Family History[3]   reports that she quit smoking about 7 years ago. Her smoking use included cigarettes. She started smoking about 53 years ago. She has a 23 pack-year smoking history. She has never used smokeless tobacco. She reports current alcohol use. She reports that she does not use drugs.  Current Outpatient Medications   Medication Instructions    albuterol (ACCUNEB) 0.63 mg, Every 6 hours PRN    albuterol (PROVENTIL HFA,VENTOLIN HFA) 90 mcg/act inhaler 2 puffs, Inhalation, Every 4 hours PRN    atorvastatin (LIPITOR) 20 mg, Oral, Every morning    Cholecalciferol (Vitamin D3) 1.25 MG (11390 UT) CAPS 1 capsule, Daily    dofetilide (TIKOSYN) 500 mcg, 2 times daily    dofetilide (TIKOSYN) 500 mcg, Oral, 2 times daily    DULoxetine (CYMBALTA) 20 mg, Oral, Daily    Eliquis 5 mg, Oral, 2 times daily    famotidine (PEPCID) 40 mg, Oral, Daily at bedtime     "fluticasone-umeclidinium-vilanterol (Trelegy Ellipta) 200-62.5-25 mcg/actuation AEPB inhaler 1 puff, Inhalation, Daily, Rinse mouth after use.    furosemide (LASIX) 20 mg, Oral, 2 times daily    gabapentin (NEURONTIN) 300 mg, Oral, 2 times daily    hydrOXYzine HCL (ATARAX) 25 mg, Oral, 2 times daily    Jardiance 25 mg, Oral, Daily    lisinopril (ZESTRIL) 10 mg, Oral, Every morning    mesalamine (DELZICOL) 800 mg, Oral, 2 times daily    metoprolol succinate (TOPROL-XL) 50 mg, Oral, Daily    NON FORMULARY TelePRM diabetic monitor, test strips and lancets    omeprazole (PRILOSEC) 40 mg, Oral, Daily    pancrelipase (Lip-Prot-Amyl) (CREON) 72,000 Units, Oral, 2 times daily    predniSONE 40 mg, Oral, Daily    Synthroid 137 mcg, Oral, Daily   Allergies[4]      Medical History Reviewed by provider this encounter:     .    Objective   BP 98/60 (BP Location: Left arm, Patient Position: Sitting, Cuff Size: Adult)   Pulse 67   Temp 97.7 °F (36.5 °C) (Temporal)   Ht 5' 9\" (1.753 m)   Wt 92.1 kg (203 lb)   SpO2 95%   BMI 29.98 kg/m²     Physical Exam  Vitals reviewed.   Constitutional:       Appearance: Normal appearance. She is well-developed.   HENT:      Head: Normocephalic and atraumatic.      Nose: Nose normal.      Mouth/Throat:      Mouth: Mucous membranes are moist.     Eyes:      Extraocular Movements: Extraocular movements intact.       Cardiovascular:      Rate and Rhythm: Normal rate and regular rhythm.      Heart sounds: Normal heart sounds.   Pulmonary:      Effort: Pulmonary effort is normal. No respiratory distress.      Breath sounds: Wheezing present. No rhonchi or rales.     Musculoskeletal:         General: No swelling.     Skin:     General: Skin is warm and dry.     Neurological:      Mental Status: She is alert. Mental status is at baseline.     Psychiatric:         Mood and Affect: Mood normal.         Behavior: Behavior normal.         Diagnostic Data:  Labs: I personally reviewed the most recent " laboratory data pertinent to today's visit.    Radiology results:  Radiology Results Review: I personally reviewed the following image studies in PACS and associated radiology reports: CT chest. My interpretation of the radiology images/reports is: CT chest 6/13/2025 shows stable 7 mm left lower lobe groundglass nodule.  This is stable since May 2023 but increased since April 2022.  Possible new 4 mm nodule versus atelectasis in the lingula.  Moderate emphysematous changes noted..    PFT/spirometry results:  None to review       Reynold Butler PA-C           [1]   Past Medical History:  Diagnosis Date    A-fib (HCC)     Abnormal ECG     Acute respiratory failure with hypoxia (HCC) 11/30/2019    Aneurysm (HCC)     Anxiety     Arthritis     Asthma     Brain aneurysm     Cancer (HCC)     papillary thyroid cancer    Cardiac disease     CHF (congestive heart failure) (HCC)     COPD (chronic obstructive pulmonary disease) (HCC)     CPAP (continuous positive airway pressure) dependence     Crohn disease (HCC)     Depression     Diabetes mellitus (HCC)     Disease of thyroid gland     Diverticulitis of colon     Emphysema of lung (HCC)     Fatty liver     Fibrocystic breast years ago    GERD (gastroesophageal reflux disease)     HL (hearing loss)     Hyperlipidemia     Hypertension     Sleep apnea     Sleep apnea, obstructive     Visual impairment    [2]   Past Surgical History:  Procedure Laterality Date    APPENDECTOMY      BREAST BIOPSY      BREAST CYST EXCISION      BREAST SURGERY  several dates    CARDIOVERSION N/A 02/14/2019    Procedure: CARDIOVERSION;  Surgeon: Lee Brenner MD;  Location: [facility];  Service: Cardiology    CEREBRAL ANEURYSM REPAIR      CHOLECYSTECTOMY      COLONOSCOPY      HYSTERECTOMY      IR PELVIC ANGIOGRAM  12/14/2017    GA EXC THROMBOSED HEMORRHOID XTRNL N/A 10/07/2022    Procedure: HEMORRHOIDECTOMY EXCISION;  Surgeon: Leonardo Cash DO;  Location: [facility];  Service: General     THYROIDECTOMY      TONSILLECTOMY AND ADENOIDECTOMY      UPPER GASTROINTESTINAL ENDOSCOPY      US GUIDED BREAST BIOPSY RIGHT COMPLETE Right 11/29/2023   [3]   Family History  Problem Relation Name Age of Onset    Alzheimer's disease Mother reza     Asthma Mother reza     Cancer Father randy         dead    Anemia Father randy     Heart disease Sister      Hypertension Sister          under control    Diabetes Sister      Stroke Sister      Cancer Sister adrian         dead    Stroke Sister adrian     Heart disease Sister adrian         dead    Diabetes Sister adrian     Breast cancer Sister adrian     Cancer Brother          liver with mets to bone    Heart disease Brother randy     Cancer Brother randy         dead    Asthma Daughter alyce     Cancer Brother bar     Heart disease Sister adrian    [4]   Allergies  Allergen Reactions    Sulfa Antibiotics Rash

## 2025-06-18 NOTE — TELEPHONE ENCOUNTER
Medication: metoprolol succinate (TOPROL-XL) 50 mg 24 hr tablet     Dose/Frequency:     Take 1 tablet (50 mg total) by mouth daily       Quantity:     Pharmacy: [facility]    Office:   [] PCP/Provider -   [x] Speciality/Provider - was prescribed by the hospital    Does the patient have enough for 3 days?   [x] Yes - has enough until the weekend  [] No - Send as HP to POD

## 2025-06-19 ENCOUNTER — OFFICE VISIT (OUTPATIENT)
Age: 70
End: 2025-06-19
Payer: COMMERCIAL

## 2025-06-19 VITALS
RESPIRATION RATE: 16 BRPM | DIASTOLIC BLOOD PRESSURE: 53 MMHG | WEIGHT: 209.4 LBS | SYSTOLIC BLOOD PRESSURE: 110 MMHG | HEIGHT: 69 IN | BODY MASS INDEX: 31.01 KG/M2 | TEMPERATURE: 98.2 F | HEART RATE: 70 BPM | OXYGEN SATURATION: 99 %

## 2025-06-19 DIAGNOSIS — G25.81 RLS (RESTLESS LEGS SYNDROME): ICD-10-CM

## 2025-06-19 DIAGNOSIS — G47.33 OSA (OBSTRUCTIVE SLEEP APNEA): Primary | ICD-10-CM

## 2025-06-19 PROCEDURE — 99214 OFFICE O/P EST MOD 30 MIN: CPT | Performed by: STUDENT IN AN ORGANIZED HEALTH CARE EDUCATION/TRAINING PROGRAM

## 2025-06-19 NOTE — PROGRESS NOTES
"Name: Kellen Gray      : 1955      MRN: [id number]  Encounter Provider: Rolan Parker DO  Encounter Date: 2025   [facility]  :  Assessment & Plan  MARY ELLEN (obstructive sleep apnea)  Kellen is a very pleasant 69-year-old woman with a PMHx of Crohn's disease, diverticulitis, HTN, history of brain aneurysm, atrial fibrillation on Eliquis, HFpEF (EF 65% 2019), COPD, T2DM, GERD, vitamin D deficiency, vitamin B12 deficiency, obesity who presents in follow up for MARY ELLEN (AHI 68.4, O2 ashley 77% 2019).  As of now, despite some residual daytime tiredness, she does endorse substantial benefit from PAP therapy.  She is, however, describing significant difficulties with the mask remaining on.  As of now, the cause of her residual excessive daytime sleepiness is somewhat unclear, as her MARY ELLEN appears controlled, and none of her lab work appears overly abnormal.    Compliance report reviewed and analyzed  Continue AutoPAP at settings of 14-20 cmH2O  Order placed for a formal mask fitting   Prescription for new supplies ordered today  Reviewed CMS/insurance requirements and resupply guidelines  Information provided on the above as well as general maintenance steps  Recommended maintaining good Sleep Hygiene    Orders:    Mask fitting only; Future    PAP DME Resupply/Reorder    RLS (restless legs syndrome)  Some of these \"RLS\" symptoms are somewhat inconsistent with \"classic\" RLS.certainly, aspects of this would be consistent with RLS, however other details of her symptoms (including that they wake her up and the sensation of her legs occasionally jumping on their own) would not be \"classic\" for RLS.  As such, we will trial some interventions for RLS and specifically, however alternate considerations may need to be considered in the future.    Reviewed the suspected pathophysiology of Restless Legs Syndrome.  Reviewed the importance of treating other sleep disorders for " potential improvement is RLS symptoms  Continue following with Heme/Onc for iron levels  Recommend increasing evening dose of gabapentin, in case this is RLS (although could help if they are primary nocturnal leg cramps as well), for a final dosing of 300mg in the morning and 600mg in the evening.   Discussed the Nidra device, including MOA, contraindications, and efficacy.  Will consider Nidra at a future appointment  Reviewed nonmedical therapy for restless legs syndrome, including cold/warm compresses, warm/hot baths or showers, gentle massage, mild leg stretching at nighttime, magnesium glycinate supplements (200-1000mg at nighttime daily), mentally alerting activities, and avoidance of exacerbating factors (including caffeine, alcohol, nicotine, antidepressants, anti-nausea meds and antihistamines)             Follow-up:  She will Return in about 6 months (around 12/19/2025).      ________________________________________________________________________________________________    Per Last Visit Note (Date: 12/12/2024):  Marta is a very pleasant 69-year-old woman with a PMHx of Crohn's disease, diverticulitis, HTN, history of brain aneurysm, atrial fibrillation on Eliquis, HFpEF (EF 65% 9/2019), COPD, T2DM, GERD, vitamin D deficiency, vitamin B12 deficiency, obesity who presents in follow up for MARY ELLEN (AHI 68.4, O2 ashley 77% 2/2019).  She continues to endorse significant benefit from PAP therapy, and upon review of her compliance report, her AHI does appear therapeutic, although her mask leak is still quite substantial.  It does appear as though her restless leg syndrome symptoms have improved substantially following iron infusions through hematology, which in turn has resulted in great improvement in quality of life.     Continue AutoPAP at settings of 14-20 cmH2O  Mask fitting appointment ordered today.  Prescription for new supplies ordered today  Reviewed CMS/insurance requirements and resupply  "guidelines  Information provided on the above as well as general maintenance steps  Recommended maintaining good Sleep Hygiene  I encouraged her to continue to follow with hematology for her iron deficiency  I encouraged her to obtain the repeat blood work ordered by hematology to review her current iron levels.  I did discuss with her that she could increase/adjust the gabapentin to help with RLS symptoms if needed, however as of now we will continue to defer prescribing to her PCP unless they prefer me to take over.  Nonmedical therapy for RLS reviewed today.  She will Return in about 6 months (around 6/12/2025).         Sleep Studies:  -PSG 2/22/2019: BMI: 33.4. TST: 315 minutes, Sleep efficiency: 73.3%. Sleep Onset Latency: 12.2 minutes, REM Onset Latency: 86 minutes. AHI: 68.4, Supine AHI: 102.1, REM AHI: 77.6. O2 ashley: 77%, Time below 90%: 243.3 minutes. PLM Index: 0, PLM Arousal Index: 0.       -PAP Titration Study 4/11/2019: Titration started at 4 cmH2O, titrated to 12 cmH2O. Best pressure noted to be 12 cmH2O.        ________________________________________________________________________________________________      Interval History: Kellen Gray is a 69 y.o. female with a PMHx of Crohn's disease, diverticulitis, HTN, history of brain aneurysm, atrial fibrillation on Eliquis, HFpEF (EF 65% 9/2019), COPD, T2DM, GERD, vitamin D deficiency, vitamin B12 deficiency, obesity who presents in follow up for MARY ELLEN (AHI 68.4, O2 ashley 77% 2/2019).    Still having chest issues, chest pressure issues, and wheezing (saw pulm yesterday). Currently wearing a Zio patch. Does not have a pacemaker. Does endorse residual fatigue, worsened recently.    SDB:  -Current experience with PAP Therapy: Still very beneficial   -Mask type: Nasal Pillows  -Difficulties with mask: \"It doesn't stay on.\"  -Device: ResMed AirSense 11; received 11/2024.  -Difficulties with device: Denies  -DME: [facility]  -Compliance:          RLS:  -Startin  -Current symptom description: Most of the time she describes an uncomfortable sensation in her legs that prompt her to move her legs to \"satisfy\" the sensation. Occasional instances of legs jumping by themselves.  -Baseline Frequency: Daily. More in bed trying to sleep or inactive.   -Current Frequency: Nightly - legs get sore and above symptoms start, has to get out of bed to \"walk it out.\" Wakes her up, doesn't affect things when she first gets in bed.   -Current Medications:   -Gabapentin 300mg BID (Rx by PCP currently). Never tried all at once in evening. Does have enough for TID, but isn't yet.             -No iron supplementation. Does see GI for GI issues, next visit 2025.   -Does follow with Heme/Onc. They are in turn managing her iron levels. Did receive iron infusions (Venofer)              -Last infusion 2024  -F/u is 2025  -Prior Medications:   -Denies  -Other Pertinent Medications:   -Atarax 25mg BID   -Cymbalta 20mg daily   -Tikosyn 500mcg (>10 years)  -Iron studies (2025):   -Iron: 75   -TIBC: 289.8   -Transferrin saturation: 26%   -Ferritin: 329            SLEEP SCHEDULE:  Bedtime:    Time it takes to fall sleep: <15 minutes  Wake up Time: 8218-9421   Number of times patient wakes up per night: 2, including 0500/0600 for thyroid pill  Naps: 0  Estimated total sleep time ( in a 24 hour period of time): ~10-12 hours       Changes to PMH, PSH, SH: See above         Sitting and reading: Slight chance of dozing  Watching TV: High chance of dozing  Sitting, inactive in a public place (e.g. a theatre or a meeting): Slight chance of dozing  As a passenger in a car for an hour without a break: Moderate chance of dozing  Lying down to rest in the afternoon when circumstances permit: Moderate chance of dozing  Sitting and talking to someone: Would never doze  Sitting quietly after a lunch without alcohol: Would never doze  In a car, " "while stopped for a few minutes in traffic: Would never doze  Total score: 9     Review of Systems  Pertinent positives/negatives included in HPI and also as noted:     Medications Ordered Prior to Encounter[1]   Objective   /53 (BP Location: Left arm, Patient Position: Sitting, Cuff Size: Large)   Pulse 70   Temp 98.2 °F (36.8 °C) (Temporal)   Resp 16   Ht 5' 9\" (1.753 m)   Wt 95 kg (209 lb 6.4 oz)   SpO2 99%   BMI 30.92 kg/m²     Neck Circumference: 16  Physical Exam  Visit Vitals  /53 (BP Location: Left arm, Patient Position: Sitting, Cuff Size: Large)   Pulse 70   Temp 98.2 °F (36.8 °C) (Temporal)   Resp 16   Ht 5' 9\" (1.753 m)   Wt 95 kg (209 lb 6.4 oz)   SpO2 99%   BMI 30.92 kg/m²   OB Status Hysterectomy   Smoking Status Former   BSA 2.11 m²       PHYSICAL EXAMINATION:  Vital Signs: /53 (BP Location: Left arm, Patient Position: Sitting, Cuff Size: Large)   Pulse 70   Temp 98.2 °F (36.8 °C) (Temporal)   Resp 16   Ht 5' 9\" (1.753 m)   Wt 95 kg (209 lb 6.4 oz)   SpO2 99%   BMI 30.92 kg/m²     Constitutional: NAD, well appearing   Mental Status: AAOx3  Skin: Warm, dry, no rashes noted   Eyes: PERRL, normal conjunctiva  Chest: No evidence of respiratory distress, no accessory muscle use; no evidence of peripheral cyanosis  Abdomen: Soft, NT/ND  Extremities: No digital clubbing or pedal edema  Neuro: Strength 5/5 throughout, sensation grossly intact      Data  Lab Results   Component Value Date    HGB 14.8 06/18/2025    HCT 45.1 06/18/2025    MCV 95 06/18/2025      Lab Results   Component Value Date    CALCIUM 9.3 06/18/2025     04/21/2018    K 3.2 (L) 06/18/2025    CO2 30 06/18/2025     06/18/2025    BUN 10 06/18/2025    CREATININE 0.93 06/18/2025     Lab Results   Component Value Date    IRON 75 06/18/2025    TIBC 289.8 06/18/2025    FERRITIN 329 (H) 06/18/2025     Lab Results   Component Value Date    AST 19 06/18/2025    ALT 18 06/18/2025         Electronically signed " by:    Rolan Parker DO  Board-Certified Neurology and Sleep Medicine  [facility]  06/19/25       [1]   Current Outpatient Medications on File Prior to Visit   Medication Sig Dispense Refill    albuterol (ACCUNEB) 0.63 MG/3ML nebulizer solution Take 0.63 mg by nebulization every 6 (six) hours as needed for wheezing      albuterol (PROVENTIL HFA,VENTOLIN HFA) 90 mcg/act inhaler Inhale 2 puffs every 4 (four) hours as needed for wheezing 18 g 5    apixaban (Eliquis) 5 mg Take 1 tablet by mouth twice daily 180 tablet 1    atorvastatin (LIPITOR) 20 mg tablet Take 1 tablet (20 mg total) by mouth every morning 90 tablet 1    Cholecalciferol (Vitamin D3) 1.25 MG (13643 UT) CAPS Take 1 capsule by mouth in the morning      dofetilide (TIKOSYN) 500 mcg capsule Take 1 capsule (500 mcg total) by mouth 2 (two) times a day 180 capsule 4    DULoxetine (CYMBALTA) 20 mg capsule Take 1 capsule (20 mg total) by mouth daily 90 capsule 3    famotidine (PEPCID) 40 MG tablet TAKE 1 TABLET BY MOUTH ONCE DAILY AT BEDTIME 90 tablet 1    fluticasone-umeclidinium-vilanterol (Trelegy Ellipta) 200-62.5-25 mcg/actuation AEPB inhaler Inhale 1 puff daily Rinse mouth after use. 60 blister 11    furosemide (LASIX) 20 mg tablet Take 1 tablet (20 mg total) by mouth 2 (two) times a day 200 tablet 3    gabapentin (NEURONTIN) 300 mg capsule Take 1 capsule (300 mg total) by mouth 2 (two) times a day 200 capsule 3    hydrOXYzine HCL (ATARAX) 25 mg tablet Take 1 tablet by mouth twice daily 100 tablet 1    Jardiance 25 MG TABS Take 1 tablet by mouth once daily 90 tablet 0    lisinopril (ZESTRIL) 10 mg tablet Take 1 tablet (10 mg total) by mouth every morning 90 tablet 3    mesalamine (DELZICOL) 400 mg Take 2 capsules by mouth twice daily 360 capsule 1    metoprolol succinate (TOPROL-XL) 50 mg 24 hr tablet Take 1 tablet (50 mg total) by mouth daily 90 tablet 0    NON FORMULARY TelePRM diabetic monitor, test strips and lancets       omeprazole (PriLOSEC) 40 MG capsule Take 1 capsule by mouth once daily 90 capsule 1    pancrelipase, Lip-Prot-Amyl, (Creon) 24,000 units Take 3 capsules (72,000 Units total) by mouth 2 (two) times a day 600 capsule 3    predniSONE 20 mg tablet Take 2 tablets (40 mg total) by mouth daily 10 tablet 0    Synthroid 137 MCG tablet Take 1 tablet by mouth once daily 90 tablet 0    dofetilide (TIKOSYN) 500 mcg capsule Take 500 mcg by mouth in the morning and 500 mcg in the evening. (Patient not taking: Reported on 6/9/2025)       Current Facility-Administered Medications on File Prior to Visit   Medication Dose Route Frequency Provider Last Rate Last Admin    cyanocobalamin injection 1,000 mcg  1,000 mcg Intramuscular Q30 Days    1,000 mcg at 06/05/25 0984

## 2025-06-19 NOTE — ASSESSMENT & PLAN NOTE
"Some of these \"RLS\" symptoms are somewhat inconsistent with \"classic\" RLS.certainly, aspects of this would be consistent with RLS, however other details of her symptoms (including that they wake her up and the sensation of her legs occasionally jumping on their own) would not be \"classic\" for RLS.  As such, we will trial some interventions for RLS and specifically, however alternate considerations may need to be considered in the future.    Reviewed the suspected pathophysiology of Restless Legs Syndrome.  Reviewed the importance of treating other sleep disorders for potential improvement is RLS symptoms  Continue following with Heme/Onc for iron levels  Recommend increasing evening dose of gabapentin, in case this is RLS (although could help if they are primary nocturnal leg cramps as well), for a final dosing of 300mg in the morning and 600mg in the evening.   Discussed the Nidra device, including MOA, contraindications, and efficacy.  Will consider Nidra at a future appointment  Reviewed nonmedical therapy for restless legs syndrome, including cold/warm compresses, warm/hot baths or showers, gentle massage, mild leg stretching at nighttime, magnesium glycinate supplements (200-1000mg at nighttime daily), mentally alerting activities, and avoidance of exacerbating factors (including caffeine, alcohol, nicotine, antidepressants, anti-nausea meds and antihistamines)         "

## 2025-06-19 NOTE — ASSESSMENT & PLAN NOTE
Kellen is a very pleasant 69-year-old woman with a PMHx of Crohn's disease, diverticulitis, HTN, history of brain aneurysm, atrial fibrillation on Eliquis, HFpEF (EF 65% 9/2019), COPD, T2DM, GERD, vitamin D deficiency, vitamin B12 deficiency, obesity who presents in follow up for MARY ELLEN (AHI 68.4, O2 ashley 77% 2/2019).  As of now, despite some residual daytime tiredness, she does endorse substantial benefit from PAP therapy.  She is, however, describing significant difficulties with the mask remaining on.  As of now, the cause of her residual excessive daytime sleepiness is somewhat unclear, as her MARY ELLEN appears controlled, and none of her lab work appears overly abnormal.    Compliance report reviewed and analyzed  Continue AutoPAP at settings of 14-20 cmH2O  Order placed for a formal mask fitting   Prescription for new supplies ordered today  Reviewed CMS/insurance requirements and resupply guidelines  Information provided on the above as well as general maintenance steps  Recommended maintaining good Sleep Hygiene    Orders:    Mask fitting only; Future    PAP DME Resupply/Reorder

## 2025-06-19 NOTE — PATIENT INSTRUCTIONS
Plan:  Continue AutoPAP at settings of 14-20 cmH2O  Remember to clean your mask and equipment regularly, as directed.  You should be eligible for new supplies approximately every 3-6 months, depending on your insurance coverage. Contact your Durable Medical Equipment (DME) company for new supplies as needed.  I am ordering a formal mask fitting appointment; this is an appointment with your DME (Durable Medical Equipment company) to ensure that you have the optimal mask and fit for your face structure. This appointment can also serve as a face-to-face meeting with a DME representative to discuss technical concerns you may be having.   See RLS Plan below  Practice good Sleep Hygiene, as outlined below.  Follow up in 6 months.      Restless Legs Syndrome (RLS) Plan:  Continue treating your other sleep disorders, including sleep apnea (if applicable), as these disorders commonly worsen RLS if untreated.  Continue following with Heme/Onc for iron levels  Recommend increasing evening dose of gabapentin, in case this is RLS (although could help if they are primary nocturnal leg cramps as well), for a final dosing of 300mg in the morning and 600mg in the evening.  Further information on the Nidra device that we discussed today is below.  Nonmedical therapy for restless legs syndrome includes: cold/warm compresses, warm/hot baths or showers, gentle massage, mild leg stretching at nighttime, or magnesium glycinate supplements (200-1000mg at nighttime daily). Mentally alerting activities help too. Note that caffeine, alcohol, nicotine, antidepressants, anti-nausea meds and antihistamines can cause or worsen symptoms.        General PAP Information:  Care and Maintenance  Headgear should be washed as needed. Daily inspection and weekly washings are recommended. Do not disassemble the straps. Machine wash in warm water, making sure to attach Velcro hooks and tabs before washing. Line dry or machine dry on a low setting.  Masks  should be washed every day. Daily inspection is recommended. Leave the mask and tubing attached. Gently wash the mask with a soft cloth using warm water and mild detergent, concentrating on the mask cushion flaps. DO NOT use alcohol or bleach. Rinse thoroughly and air dry.  Tubing and headgear should be washed weekly. Daily inspection is recommended. Wash in warm water and mild detergent and rinse thoroughly. Hook the tubing to the machine and blow until dry.  Humidifier should be washed daily and filled with DISTILLED water before use. Wash with warm water and mild detergent. Rinse thoroughly and air dry.  Disposable filters should be replaced once a month. Wash reusable foam filters with warm water and mild detergent at least once a month. Rinse thoroughly and dry with paper towels.  Avoid  that contain fragrance or conditioners, as these will leave a residue.  NEVER iron any soft goods.      CMS Requirements    Your insurance requires a face-to-face follow up visit within a 31-90 day period after starting CPAP.  Your insurance requires compliance with CPAP, which is at least 4 hours per night for 70% of the time. This must be done over a 30 day period and must occur within the initial 31-90 day period after starting CPAP.  Your insurance also requires at least yearly follow ups to continue to pay for CPAP supplies.       PAP Supply Guidelines    Below are the guidelines for reordering your supplies. You will be responsible for your deductible, co payments, and out of pocket expenses.    Item Frequency   Nasal Mask (no headgear) 1 every 3 months   Nasal Mask Cushion 1 every 2 weeks   Full Face Mask (no headgear) 1 every 3 months   Full Face Mask Cushion 1 every month   Nasal Pillows Cushion 1 every 2 weeks   Headgear 1 every 6 months   hin Strap 1 every 6 months   wallace 1 every 3 months   Filters: Reusable 1 every 6 months   Filters: Disposable 1 every 2 weeks   Humidifier Chamber(disposable) 1 every 6  "months           Good Sleep Hygiene  Wake up at the same time every day, even on the weekends.  Use your bed for sleep and intimacy only.  If you have been in bed awake for 30 minutes, get up and leave the bedroom. Choose a dull activity not involving a blue screen (TV, computer, handheld devices). Go back to bed when you feel sleepy.  Avoid caffeine, nicotine and alcohol before you go to bed.  Avoid large meals before you go to bed.  Avoid using screens (computers, tablets, smartphones, etc.) for at least 1 hour before bedtime  Exercise regularly, but do not exercise right before you go to bed.  Avoid daytime naps. If you do take a nap, sleep for 20-40 minutes, and not after 2pm.       Nidra for RLS  Nidra is a device FDA-cleared to reduce RLS symptoms and improve sleep quality.  82% of patients in the clinical trials reported symptom relief during or after just 30 minutes, 91% of patients experience improvements in RLS overall,  And indeed, this benefit seemed to become more robust during subsequent therapy sessions.    Use:  It is worn just below the knee on both legs, and provides stimulation to the peroneal nerve, which results in very minimal activation of the tibialis anterior muscle, which in turn sends a signal back to the brain essentially satisfying the \"urge\" to move the legs without requiring actual movement.  Nidra is utilized at the onset of symptoms; each therapy session lasts ~30 minutes, with auto shut off.  The device is rechargeable, and 60 minutes of charging provides up to 2 therapy sessions.    There were no clinically significant adverse effects noted in the clinical trials, although as of now (due primarily to these populations not being actively tested with the device), contraindications to therapy include:  Diagnosis of epilepsy or other seizure disorder  Active medical device implant (Inspire, pacemaker, etc.)  Metal implant in the leg at the therapy site specifically (excluding knee " replacements)  Known allergy to the device materials  Cellulitis, open sores, or injury at or near the location of therapy device application  The device cannot be used while driving, operating machinery, or during any activity in which electrical stimulation can put you at risk of injury.    If Nidra is ordered:  We will submit a form with your clinical information to the company that sells the device  They will perform prior authorizations and other requirements to determine insurance coverage and potential out-of-pocket cost to you, then reach out to you with this information so that you may determine if you wish to continue with obtaining the device  Once you obtain the device, you will be put in touch with a local durable medical equipment (DME) company to titrate the therapy/stimulus, before 10 break at home for ongoing use.  You do have 45 days from the date of this titration to return the device if you find it ineffective.    Additional information can be discussed with your healthcare provider or found on the Device 's website below:  [email or web address]

## 2025-06-20 ENCOUNTER — TELEPHONE (OUTPATIENT)
Dept: SLEEP CENTER | Facility: CLINIC | Age: 70
End: 2025-06-20

## 2025-06-23 LAB

## 2025-06-24 ENCOUNTER — TELEPHONE (OUTPATIENT)
Age: 70
End: 2025-06-24

## 2025-06-24 NOTE — TELEPHONE ENCOUNTER
Call received by Marta,     Patient called this morning due to voicemail that was left yesterday by office requesting to reschedule appointment. No documentation was made, seen voicemail was left on comment section.     Called [facility] cler[facility] and spoke to Peggy, per Peggy they were calling patients due to central air in the office not working. Advised patient she can still be seen but it will need to be virtual. Per patient she doesn't mind coming into the office as she was already aware the central air was off.     Please call patient if rescheduling is still needed.    Thank you!

## 2025-06-25 ENCOUNTER — HOSPITAL ENCOUNTER (OUTPATIENT)
Dept: NUCLEAR MEDICINE | Facility: HOSPITAL | Age: 70
Discharge: HOME | End: 2025-06-25
Payer: COMMERCIAL

## 2025-06-25 DIAGNOSIS — R07.89 OTHER CHEST PAIN: ICD-10-CM

## 2025-06-25 LAB
CHEST PAIN STATEMENT: NORMAL
MAX DIASTOLIC BP: 70 MMHG
MAX HR PERCENT: 57 %
MAX HR: 88 BPM
MAX PREDICTED HEART RATE: 152 BPM
PROTOCOL NAME: NORMAL
RATE PRESSURE PRODUCT: NORMAL
REASON FOR TERMINATION: NORMAL
SL CV REST NUCLEAR ISOTOPE DOSE: 9.8 MCI
SL CV STRESS NUCLEAR ISOTOPE DOSE: 31.2 MCI
SPECT HRT GATED+EF W RNC IV: 64 %
STRESS BASELINE HR: 54 BPM
STRESS PEAK HR: 88 BPM
STRESS POST ESTIMATED WORKLOAD: 1 METS
STRESS POST EXERCISE DUR MIN: 3 MIN
STRESS POST EXERCISE DUR MIN: 3 MIN
STRESS POST EXERCISE DUR SEC: 0 SEC
STRESS POST EXERCISE DUR SEC: 0 SEC
STRESS POST PEAK BP: 144 MMHG
STRESS POST PEAK HR: 88 BPM
STRESS POST PEAK SYSTOLIC BP: 144 MMHG
STRESS/REST PERFUSION RATIO: 1.12
TARGET HR FORMULA: NORMAL
TEST INDICATION: NORMAL

## 2025-06-25 PROCEDURE — A9502 TC99M TETROFOSMIN: HCPCS

## 2025-06-25 PROCEDURE — 93017 CV STRESS TEST TRACING ONLY: CPT

## 2025-06-25 PROCEDURE — 78452 HT MUSCLE IMAGE SPECT MULT: CPT | Performed by: INTERNAL MEDICINE

## 2025-06-25 PROCEDURE — 78452 HT MUSCLE IMAGE SPECT MULT: CPT

## 2025-06-25 PROCEDURE — 93016 CV STRESS TEST SUPVJ ONLY: CPT | Performed by: INTERNAL MEDICINE

## 2025-06-25 PROCEDURE — 93018 CV STRESS TEST I&R ONLY: CPT | Performed by: INTERNAL MEDICINE

## 2025-06-25 RX ORDER — REGADENOSON 0.08 MG/ML
0.4 INJECTION, SOLUTION INTRAVENOUS ONCE
Status: COMPLETED | OUTPATIENT
Start: 2025-06-25 | End: 2025-06-25

## 2025-06-25 RX ADMIN — REGADENOSON 0.4 MG: 0.08 INJECTION, SOLUTION INTRAVENOUS at 10:15

## 2025-06-26 NOTE — PLAN OF CARE
Problem: MOBILITY - ADULT  Goal: Maintain or return to baseline ADL function  Description: INTERVENTIONS:  -  Assess patient's ability to carry out ADLs; (independent after setup)  - Assess/evaluate cause of self-care deficits (fatigue)  - Assess range of motion  - Assess patient's mobility; (standby assist)  - Assess patient's need for assistive devices and provide as appropriate  - Encourage maximum independence but intervene and supervise when necessary  - Involve family in performance of ADLs  - Assess for home care needs following discharge   - Consider OT consult to assist with ADL evaluation and planning for discharge  - Provide patient education as appropriate  Outcome: Progressing  Goal: Maintains/Returns to pre admission functional level  Description: INTERVENTIONS:  - Perform BMAT or MOVE assessment daily    - Set and communicate daily mobility goal to care team and patient/family/caregiver     - Collaborate with rehabilitation services on mobility goals if consulted  - Ambulate patient 3 times a day  - Out of bed to chair 3 times a day   - Out of bed for meals 3 times a day  - Out of bed for toileting  - Record patient progress and toleration of activity level   Outcome: Progressing     Problem: Potential for Falls  Goal: Patient will remain free of falls  Description: INTERVENTIONS:  - Educate patient/family on patient safety including physical limitations  - Instruct patient to call for assistance with activity (standby assist)  - Consult OT/PT to assist with strengthening/mobility   - Keep Call bell within reach  - Keep bed low and locked with side rails adjusted as appropriate  - Keep care items and personal belongings within reach  - Initiate and maintain comfort rounds  - Make Fall Risk Sign visible to staff (moderate fall risk)  - Offer Toileting every 1-2 Hours, in advance of need  - Obtain necessary fall risk management equipment: nonskid footwear  Outcome: Progressing     Problem: PAIN - ADULT  Goal: Verbalizes/displays adequate comfort level or baseline comfort level  Description: Interventions:  - Encourage patient to monitor pain and request assistance  - Assess pain using appropriate pain scale (0-10 pain scale)  - Administer analgesics based on type and severity of pain and evaluate response  - Implement non-pharmacological measures as appropriate and evaluate response  - Consider cultural and social influences on pain and pain management  - Notify physician/advanced practitioner if interventions unsuccessful or patient reports new pain  Outcome: Progressing     Problem: INFECTION - ADULT  Goal: Absence or prevention of progression during hospitalization  Description: INTERVENTIONS:  - Assess and monitor for signs and symptoms of infection  - Monitor lab/diagnostic results  - Monitor all insertion sites, i e  indwelling lines  - Administer medications as ordered  - Instruct and encourage patient and family to use good hand hygiene technique  - Identify and instruct in appropriate isolation precautions for identified infection/condition (contact and droplet)  Outcome: Progressing     Problem: DISCHARGE PLANNING  Goal: Discharge to home or other facility with appropriate resources  Description: INTERVENTIONS:  - Identify barriers to discharge w/patient and caregiver  - Arrange for needed discharge resources and transportation as appropriate  - Identify discharge learning needs (meds, wound care, etc )  - Refer to Case Management Department for coordinating discharge planning if the patient needs post-hospital services based on physician/advanced practitioner order or complex needs related to functional status, cognitive ability, or social support system  Outcome: Progressing     Problem: Knowledge Deficit  Goal: Patient/family/caregiver demonstrates understanding of disease process, treatment plan, medications, and discharge instructions  Description: Complete learning assessment and assess knowledge base    Interventions:  - Provide teaching at level of understanding  - Provide teaching via preferred learning methods  Outcome: Progressing     Problem: RESPIRATORY - ADULT  Goal: Achieves optimal ventilation and oxygenation  Description: INTERVENTIONS:  - Assess for changes in respiratory status  - Assess for changes in mentation and behavior  - Position to facilitate oxygenation and minimize respiratory effort  - Oxygen administered by appropriate delivery if ordered  - Encourage broncho-pulmonary hygiene including cough, deep breathe, Incentive Spirometry  - Assess and instruct to report SOB or any respiratory difficulty  - Respiratory Therapy support as indicated  Outcome: Progressing     Problem: METABOLIC, FLUID AND ELECTROLYTES - ADULT  Goal: Electrolytes maintained within normal limits  Description: INTERVENTIONS:  - Monitor labs and assess patient for signs and symptoms of electrolyte imbalances  - Administer electrolyte replacement as ordered  - Monitor response to electrolyte replacements, including repeat lab results as appropriate  - Instruct patient on fluid and nutrition as appropriate  Outcome: Progressing  Goal: Fluid balance maintained  Description: INTERVENTIONS:  - Monitor labs   - Monitor I/O and WT  - Instruct patient on fluid and nutrition as appropriate  - Assess for signs & symptoms of volume excess or deficit  Outcome: Progressing  Goal: Glucose maintained within target range  Description: INTERVENTIONS:  - Monitor Blood Glucose as ordered  - Assess for signs and symptoms of hyperglycemia and hypoglycemia  - Administer ordered medications to maintain glucose within target range  - Assess nutritional intake and initiate nutrition service referral as needed  Outcome: Progressing (3) no apparent problem

## 2025-07-08 ENCOUNTER — OFFICE VISIT (OUTPATIENT)
Dept: INTERNAL MEDICINE CLINIC | Facility: CLINIC | Age: 70
End: 2025-07-08
Payer: COMMERCIAL

## 2025-07-08 VITALS
OXYGEN SATURATION: 97 % | HEIGHT: 69 IN | TEMPERATURE: 98 F | SYSTOLIC BLOOD PRESSURE: 106 MMHG | DIASTOLIC BLOOD PRESSURE: 62 MMHG | WEIGHT: 206.7 LBS | HEART RATE: 66 BPM | BODY MASS INDEX: 30.62 KG/M2

## 2025-07-08 DIAGNOSIS — I48.0 PAROXYSMAL ATRIAL FIBRILLATION (HCC): ICD-10-CM

## 2025-07-08 DIAGNOSIS — E87.6 HYPOKALEMIA: ICD-10-CM

## 2025-07-08 DIAGNOSIS — K50.919 CROHN'S DISEASE WITH COMPLICATION, UNSPECIFIED GASTROINTESTINAL TRACT LOCATION (HCC): ICD-10-CM

## 2025-07-08 DIAGNOSIS — E11.69 TYPE 2 DIABETES MELLITUS WITH OTHER SPECIFIED COMPLICATION, WITHOUT LONG-TERM CURRENT USE OF INSULIN (HCC): ICD-10-CM

## 2025-07-08 DIAGNOSIS — D50.8 IRON DEFICIENCY ANEMIA SECONDARY TO INADEQUATE DIETARY IRON INTAKE: ICD-10-CM

## 2025-07-08 DIAGNOSIS — G47.33 SEVERE OBSTRUCTIVE SLEEP APNEA: ICD-10-CM

## 2025-07-08 DIAGNOSIS — F33.41 RECURRENT MAJOR DEPRESSIVE DISORDER, IN PARTIAL REMISSION (HCC): Chronic | ICD-10-CM

## 2025-07-08 DIAGNOSIS — E78.00 HYPERCHOLESTEREMIA: ICD-10-CM

## 2025-07-08 DIAGNOSIS — J44.9 COPD, SEVERITY TO BE DETERMINED (HCC): ICD-10-CM

## 2025-07-08 DIAGNOSIS — E55.9 VITAMIN D DEFICIENCY: ICD-10-CM

## 2025-07-08 DIAGNOSIS — E53.8 VITAMIN B12 DEFICIENCY: ICD-10-CM

## 2025-07-08 DIAGNOSIS — I50.32 CHRONIC HEART FAILURE WITH PRESERVED EJECTION FRACTION (HCC): Primary | ICD-10-CM

## 2025-07-08 DIAGNOSIS — E89.0 POSTOPERATIVE HYPOTHYROIDISM: ICD-10-CM

## 2025-07-08 PROBLEM — R19.7 DIARRHEA: Status: RESOLVED | Noted: 2020-02-25 | Resolved: 2025-07-08

## 2025-07-08 PROBLEM — R65.10 SIRS (SYSTEMIC INFLAMMATORY RESPONSE SYNDROME) (HCC): Status: RESOLVED | Noted: 2024-05-24 | Resolved: 2025-07-08

## 2025-07-08 PROCEDURE — 99214 OFFICE O/P EST MOD 30 MIN: CPT | Performed by: FAMILY MEDICINE

## 2025-07-08 PROCEDURE — 96372 THER/PROPH/DIAG INJ SC/IM: CPT | Performed by: FAMILY MEDICINE

## 2025-07-08 RX ORDER — LEVOTHYROXINE SODIUM 125 MCG
125 TABLET ORAL DAILY
Qty: 100 TABLET | Refills: 1 | Status: SHIPPED | OUTPATIENT
Start: 2025-07-08

## 2025-07-08 RX ADMIN — CYANOCOBALAMIN 1000 MCG: 1000 INJECTION, SOLUTION INTRAMUSCULAR; SUBCUTANEOUS at 11:44

## 2025-07-09 RX ORDER — POTASSIUM CHLORIDE 1500 MG/1
20 TABLET, EXTENDED RELEASE ORAL DAILY
Qty: 90 TABLET | Refills: 3 | Status: SHIPPED | OUTPATIENT
Start: 2025-07-09

## 2025-07-09 NOTE — PROGRESS NOTES
Name: Kellen Gray      : 1955      MRN: [id number]  Encounter Provider: Ina Rivas MD  Encounter Date: 2025   Encounter department: [facility]  :  Assessment & Plan  Chronic heart failure with preserved ejection fraction (HCC)  Appears euvolemic.  Continue to follow-up with cardiology.  Watch for any increasing shortness of breath, increasing peripheral edema, or sudden increases in weight.  Continue with the Lasix.  Wt Readings from Last 3 Encounters:   25 93.8 kg (206 lb 11.2 oz)   25 95 kg (209 lb 6.4 oz)   25 92.1 kg (203 lb)               Orders:  •  CBC and differential; Future    Paroxysmal atrial fibrillation (HCC)  Her concerns regarding the lower dosage of the Toprol discussed.  Discussed with her continuing to follow-up with cardiology.  Discussed that her heart rate had been significantly low into the 40s on the higher dosing of the Toprol.  Watch for any recurrent palpitations.  Continue with the Tikosyn as per cardiology.  Orders:  •  CBC and differential; Future    Type 2 diabetes mellitus with other specified complication, without long-term current use of insulin (HCC)  Hemoglobin A1c controlled.  Continue current medications.  Watch diet.  Recommend following up with ophthalmology regularly especially since she is having some vision issues.  Referral placed.  Lab Results   Component Value Date    HGBA1C 6.5 (H) 2025       Orders:  •  Ambulatory Referral to Ophthalmology; Future  •  Comprehensive metabolic panel; Future  •  Hemoglobin A1C; Future    Iron deficiency anemia secondary to inadequate dietary iron intake  Continue to follow-up with hematology.  Has completed the iron infusions as of the end of May.  Continue with regular laboratory testing as per their recommendations.        Orders:  •  CBC and differential; Future    COPD, severity to be determined (HCC)  Breathing currently stable.  Continue to follow-up with  pulmonary.  Continue with the Trelegy.  Watch for any worsening.       Severe obstructive sleep apnea  Continue to follow-up with sleep specialist.  Discussed changing dosing of the gabapentin with her taking 1 pill in the morning and 2 at bedtime.  Hopefully this will help somewhat with sleep.  Continue with the CPAP.       Postoperative hypothyroidism  Recent laboratory testing reviewed with her.  TSH is significantly low.  Do want to keep this suppressed with her history of the thyroid cancer but risks of increasing heart rate with the atrial fibrillation exist.  Will decrease dosage slightly.  Will continue to follow TSH with routine laboratory testing.  Will adjust dosage if needed.  Orders:  •  Comprehensive metabolic panel; Future  •  TSH, 3rd generation; Future  •  Synthroid 125 MCG tablet; Take 1 tablet (125 mcg total) by mouth daily    Recurrent major depressive disorder, in partial remission (HCC)  Mood relatively stable.  Continue with the Cymbalta.  Watch for any worsening.         Crohn's disease with complication, unspecified gastrointestinal tract location (HCC)  Continue to follow-up with GI.  Continue with the mesalamine.       Hypercholesteremia  Continue with the atorvastatin.  Watch diet.  Will continue to follow lipid panel and CMP with routine laboratory testing and make changes if needed.  Orders:  •  Comprehensive metabolic panel; Future  •  Lipid panel; Future  •  TSH, 3rd generation; Future    Vitamin B12 deficiency  Continue with vitamin supplementation.  Will continue to follow vitamin B12 level with routine laboratory testing and make recommendations thereafter.  Orders:  •  Vitamin B12; Future    Vitamin D deficiency  Will continue to follow vitamin D level with routine laboratory testing and make recommendations thereafter.  Orders:  •  Vitamin D 25 hydroxy; Future    Hypokalemia  Potassium has been persistently low.  Will add low-dose potassium supplementation.  Will continue to  follow levels with routine laboratory testing and adjust dosing if needed.  Orders:  •  potassium chloride (Klor-Con M20) 20 mEq tablet; Take 1 tablet (20 mEq total) by mouth daily      Assessment & Plan  Orders and recommendations as noted above.  Recommend mammograms yearly.  Recommend bone densities every other year.  Recommend flu shot in the fall.  Up-to-date on colonoscopy.  Will have her follow-up in about 3 to 5 months or sooner if needed.         History of Present Illness   She presents for follow-up.  Has generally been doing relatively well.  She is concerned about the adjustment in her dose of the metoprolol.  She had previously been on 100 mg twice a day and the dosage has now been lowered.  She is concerned about the potential return of rapid heartbeat with the atrial fibrillation.  She has had this happen in the past.  She did follow-up with sleep specialist.  Continues with the CPAP.  They adjusted her dosing on the Neurontin.  They had advised her to take 1 pill in the morning but 2 pills in the evening to help with sleep.  Breathing has been relatively stable.  Continues to follow-up with pulmonary and cardiology.  Tolerating the Trelegy without difficulty.  Continues with the Lasix.  Has been trying to eat foods higher in potassium.  Has noticed some vision changes.  Left eye seems worse than right.  Is requesting referral to ophthalmology.  Continues with the Synthroid.      Review of Systems   Constitutional:  Positive for fatigue. Negative for activity change, appetite change, chills and fever.   HENT:  Negative for congestion and rhinorrhea.    Eyes:  Negative for visual disturbance.   Respiratory:  Negative for chest tightness and shortness of breath.    Cardiovascular:  Negative for chest pain and palpitations.   Gastrointestinal:  Negative for abdominal pain, blood in stool, diarrhea, nausea and vomiting.   Endocrine: Negative for polydipsia, polyphagia and polyuria.   Genitourinary:   "Negative for dysuria, frequency and urgency.   Musculoskeletal:  Positive for arthralgias. Negative for gait problem.   Skin:  Negative for color change.   Neurological:  Negative for dizziness and headaches.   Hematological:  Does not bruise/bleed easily.   Psychiatric/Behavioral:  Negative for confusion and sleep disturbance. The patient is not nervous/anxious.        Objective   /62 (BP Location: Left arm, Patient Position: Sitting, Cuff Size: Large)   Pulse 66   Temp 98 °F (36.7 °C)   Ht 5' 9\" (1.753 m)   Wt 93.8 kg (206 lb 11.2 oz)   SpO2 97%   BMI 30.52 kg/m²      Physical Exam  Vitals and nursing note reviewed.   Constitutional:       General: She is not in acute distress.     Appearance: She is well-developed, well-groomed and overweight.   HENT:      Head: Normocephalic and atraumatic.     Eyes:      General:         Right eye: No discharge.         Left eye: No discharge.      Conjunctiva/sclera: Conjunctivae normal.      Pupils: Pupils are equal, round, and reactive to light.       Cardiovascular:      Rate and Rhythm: Normal rate and regular rhythm.      Heart sounds: Normal heart sounds. No murmur heard.     No friction rub. No gallop.   Pulmonary:      Effort: No respiratory distress.      Breath sounds: Decreased breath sounds present. No wheezing or rales.   Abdominal:      General: Bowel sounds are normal. There is no distension.      Tenderness: There is no abdominal tenderness.     Musculoskeletal:      Comments: Slow but steady gait   Lymphadenopathy:      Cervical: No cervical adenopathy.     Skin:     General: Skin is warm and dry.     Neurological:      Mental Status: She is alert and oriented to person, place, and time.     Psychiatric:         Mood and Affect: Mood and affect normal.         Speech: Speech normal.         Behavior: Behavior normal. Behavior is cooperative.         Cognition and Memory: Cognition and memory normal.         "

## 2025-07-09 NOTE — ASSESSMENT & PLAN NOTE
Continue to follow-up with hematology.  Has completed the iron infusions as of the end of May.  Continue with regular laboratory testing as per their recommendations.        Orders:  •  CBC and differential; Future

## 2025-07-09 NOTE — ASSESSMENT & PLAN NOTE
Continue with vitamin supplementation.  Will continue to follow vitamin B12 level with routine laboratory testing and make recommendations thereafter.  Orders:  •  Vitamin B12; Future

## 2025-07-09 NOTE — ASSESSMENT & PLAN NOTE
Recent laboratory testing reviewed with her.  TSH is significantly low.  Do want to keep this suppressed with her history of the thyroid cancer but risks of increasing heart rate with the atrial fibrillation exist.  Will decrease dosage slightly.  Will continue to follow TSH with routine laboratory testing.  Will adjust dosage if needed.  Orders:  •  Comprehensive metabolic panel; Future  •  TSH, 3rd generation; Future  •  Synthroid 125 MCG tablet; Take 1 tablet (125 mcg total) by mouth daily

## 2025-07-09 NOTE — ASSESSMENT & PLAN NOTE
Breathing currently stable.  Continue to follow-up with pulmonary.  Continue with the Trelegy.  Watch for any worsening.

## 2025-07-09 NOTE — ASSESSMENT & PLAN NOTE
Continue to follow-up with sleep specialist.  Discussed changing dosing of the gabapentin with her taking 1 pill in the morning and 2 at bedtime.  Hopefully this will help somewhat with sleep.  Continue with the CPAP.

## 2025-07-09 NOTE — ASSESSMENT & PLAN NOTE
Hemoglobin A1c controlled.  Continue current medications.  Watch diet.  Recommend following up with ophthalmology regularly especially since she is having some vision issues.  Referral placed.  Lab Results   Component Value Date    HGBA1C 6.5 (H) 06/18/2025       Orders:  •  Ambulatory Referral to Ophthalmology; Future  •  Comprehensive metabolic panel; Future  •  Hemoglobin A1C; Future

## 2025-07-09 NOTE — ASSESSMENT & PLAN NOTE
Appears euvolemic.  Continue to follow-up with cardiology.  Watch for any increasing shortness of breath, increasing peripheral edema, or sudden increases in weight.  Continue with the Lasix.  Wt Readings from Last 3 Encounters:   07/08/25 93.8 kg (206 lb 11.2 oz)   06/19/25 95 kg (209 lb 6.4 oz)   06/18/25 92.1 kg (203 lb)               Orders:  •  CBC and differential; Future

## 2025-07-09 NOTE — ASSESSMENT & PLAN NOTE
Her concerns regarding the lower dosage of the Toprol discussed.  Discussed with her continuing to follow-up with cardiology.  Discussed that her heart rate had been significantly low into the 40s on the higher dosing of the Toprol.  Watch for any recurrent palpitations.  Continue with the Tikosyn as per cardiology.  Orders:  •  CBC and differential; Future

## 2025-07-09 NOTE — ASSESSMENT & PLAN NOTE
Will continue to follow vitamin D level with routine laboratory testing and make recommendations thereafter.  Orders:  •  Vitamin D 25 hydroxy; Future

## 2025-07-09 NOTE — ASSESSMENT & PLAN NOTE
Potassium has been persistently low.  Will add low-dose potassium supplementation.  Will continue to follow levels with routine laboratory testing and adjust dosing if needed.  Orders:  •  potassium chloride (Klor-Con M20) 20 mEq tablet; Take 1 tablet (20 mEq total) by mouth daily

## 2025-07-09 NOTE — ASSESSMENT & PLAN NOTE
Continue with the atorvastatin.  Watch diet.  Will continue to follow lipid panel and CMP with routine laboratory testing and make changes if needed.  Orders:  •  Comprehensive metabolic panel; Future  •  Lipid panel; Future  •  TSH, 3rd generation; Future

## 2025-07-15 ENCOUNTER — HOSPITAL ENCOUNTER (OUTPATIENT)
Dept: PULMONOLOGY | Facility: HOSPITAL | Age: 70
Discharge: HOME | End: 2025-07-15
Attending: PHYSICIAN ASSISTANT
Payer: COMMERCIAL

## 2025-07-15 DIAGNOSIS — J44.9 COPD, SEVERITY TO BE DETERMINED (HCC): ICD-10-CM

## 2025-07-15 PROCEDURE — 94760 N-INVAS EAR/PLS OXIMETRY 1: CPT

## 2025-07-15 PROCEDURE — 94729 DIFFUSING CAPACITY: CPT

## 2025-07-15 PROCEDURE — 94060 EVALUATION OF WHEEZING: CPT | Performed by: INTERNAL MEDICINE

## 2025-07-15 PROCEDURE — 94726 PLETHYSMOGRAPHY LUNG VOLUMES: CPT

## 2025-07-15 PROCEDURE — 94726 PLETHYSMOGRAPHY LUNG VOLUMES: CPT | Performed by: INTERNAL MEDICINE

## 2025-07-15 PROCEDURE — 94060 EVALUATION OF WHEEZING: CPT

## 2025-07-15 PROCEDURE — 94729 DIFFUSING CAPACITY: CPT | Performed by: INTERNAL MEDICINE

## 2025-07-15 RX ORDER — ALBUTEROL SULFATE 0.83 MG/ML
2.5 SOLUTION RESPIRATORY (INHALATION) ONCE AS NEEDED
Status: COMPLETED | OUTPATIENT
Start: 2025-07-15 | End: 2025-07-15

## 2025-07-15 RX ADMIN — ALBUTEROL SULFATE 2.5 MG: 2.5 SOLUTION RESPIRATORY (INHALATION) at 09:23

## 2025-07-18 NOTE — PROGRESS NOTES
Cardiology Follow Up    Kellen Gray  1955  [id number]  [facility] [address] [id number] [phone]    1. Paroxysmal atrial fibrillation (HCC)        2. S/P ablation of atrial fibrillation        3. Chronic heart failure with preserved ejection fraction (HCC)        4. Essential hypertension        5. Hypercholesteremia        6. MARY ELLEN (obstructive sleep apnea)        7. Type 2 diabetes mellitus with other specified complication, without long-term current use of insulin (HCC)          Discussion/Summary:  Chest pain/tightness/palpitations:  Previously reported at January 2025 office visit, had one single day of the above symptoms without recurrence. Since then these symptoms have become more frequent, reports a right sided chest tightness with radiation to the right arm, palpitations. Previously was associated with when she would go into afib, however all EKGs over the last 12 months have shown sinus rhythm. Went to the ER with these symptoms, was bradycardic with rates 40-50s, her metoprolol dose was decreased from 100 mg BID to 50 mg daily. This unfortunately has not made a difference in her symptoms.  Echo from January 2025 showed LVEF 60%, RVSP 36 mmHg  Today she tells me she noticed improvement of symptoms after starting to take Trelegy faithfully, she started doing this soon after our June appointment and says her chest discomfort and shortness of breath are both drastically improved  2 week ZIO monitor showed paroxysmal SVT, predominant underlying rhythm sinus, average heart rate 65, no significant ectopy  Pharmacologic stress test showed no evidence of ischemia or infarction  If SVT begins becoming more of a burden to the patient, increased frequency/intensity in the future, would most likely recommend EP referral given there is a little room to uptitrate beta-blocker due to prior  bradycardia on higher doses     Atrial fibrillation:  History of paroxysmal atrial fibrillation, s/p afib ablation in 2019  Reports increased burden of palpitations, right sided chest tightness with radiation to right arm, previously had these symptoms when she would go into afib previously.    Anticoagulation: Eliquis 5 mg BID  Rate control: Metoprolol succinate 50 mg daily, was previously reduced from 100 mg BID due to symptomatic bradycardia with rates in the 40-50s  AAD: Dofetilide 500 mcg BID  2 weeks ZIO monitor showed paroxysmal SVT, 121 episodes the longest lasting 15 beats @ 200 bpm     Chronic HFpEF:  Volume status maintained on Lasix 20 mg BID  Weight in office 209 pounds, was 205 pounds at prior visit visit  Continues to follow low sodium diet and fluid restriction  Patient knows to take extra Lasix for weight gain > 3 lbs in 1 day, > 5 lbs in 1 week, or for any worsening symptoms such as shortness of breath and lower extremity edema     Hypertension:  BP today in office 122/60  Continue current regimen     Hyperlipidemia:  Lipid profile 12/17/2024: C 170, T 106, H 45, L 104  Continue atorvastatin 20 mg daily     MARY ELLEN:  Compliant with CPAP     She will RTO in 3 months or sooner or necessary.     Interval History:   Kellen Gray is a 69 y.o. female with a history of atrial fibrillation s/p ablation in June 2019 on Tikosyn, chronic HFpEF, hypertension, hyperlipidemia, MARY ELLEN, diabetes who follows with cardiologist Dr. Brenner.     She was seen in the ER on 5/21, had been experiencing several days of exertional dyspnea as well as right-sided chest pain that radiated to the right arm.  Heart rate was noted to be low at the time, persistently bradycardic with rates 40-50s, was on metoprolol succinate 100 mg twice daily. Cardiac workup was negative, cardiology recommended decreasing her metoprolol to 50 mg daily at that time.  At the 6/9 office visit, she endorsed that her symptoms (palpitations, chest tightness  "with radiation to her right arm) or similar to how she would feel going into atrial fibrillation previously.  She also noted a new chest pain that \"just felt different.\"  She had a normal heart catheterization through [facility] in 2022 with no interventions.  Exertional dyspnea was also felt to be getting worse in conjunction with all of the other symptoms.  She was ordered 2 weeks ZIO monitor, nuclear stress test, CMP.    Eugenie presents to the office today for follow-up.  Since her last visit she has been feeling better overall.  Shortly after our last visit she follow-up with pulmonology, she had not been taking Trelegy faithfully up until that visit and they strongly encouraged her to do so.  Since faithfully taking Trelegy she has noticed improvement in her chest discomfort and breathing.  She is still noticing daily palpitations which occur for several seconds and then go away.  I suspect this is paroxysmal SVT which was noted on her 2 weeks ZIO monitor, of which she had 121 episodes of.  She also had a normal pharmacologic nuclear stress test with no evidence of ischemia or infarction.  She denies substernal chest pain/pressure/heaviness, shortness of breath at rest.  Still gets mildly short of breath with exertion.  Denies lightheadedness, dizziness, syncope.  Denies lower extremity edema, orthopnea, PND.  Blood pressure today is well-controlled.  209 pounds today compared to 205 pounds at prior office visit.  No further ischemic workup warranted given symptoms have improved by regularly taking Trelegy.  In regards to her palpitations, she states they are manageable for now and she is at ease simply  having more of an explanation of what the cause of this is, also at ease knowing it is not recurrence of atrial fibrillation.  If SVT burden increases moving forward, in terms of frequency or intensity of symptoms, would then recommend EP reevaluation given uptitration of beta-blocker may not be feasible given " bradycardia with higher doses previously.  Will see her back in 6 months or sooner if need be.    Medical Problems       Problem List       Lymphadenopathy    A-fib (HCC)    Elevated TSH    Hypercholesteremia    Transaminitis    Microscopic hematuria    Pulmonary hypertension (HCC)    Postoperative hypothyroidism    Brain aneurysm    Atypical chest pain    Dizziness    Crohn's disease with complication (HCC)    Elevated d-dimer    Hypokalemia    COPD, severity to be determined (HCC)    Severe obstructive sleep apnea    Atherosclerosis    Prolonged QT interval    Hepatomegaly    Hepatic steatosis    Colon polyp    Multiple renal cysts    T12 compression fracture (HCC)    Type 2 diabetes mellitus with other specified complication, without long-term current use of insulin (HCC)      Lab Results   Component Value Date    HGBA1C 6.5 (H) 06/18/2025         Fracture of right tibia    Chronic pain syndrome    Chronic bilateral low back pain without sciatica    Lumbar spondylosis    Lumbar radiculopathy    Hemorrhoids    Urinary retention    Essential hypertension    GERD (gastroesophageal reflux disease)    Generalized abdominal pain    (HFpEF) heart failure with preserved ejection fraction (HCC)    Wt Readings from Last 3 Encounters:   07/21/25 94.9 kg (209 lb 3.2 oz)   07/08/25 93.8 kg (206 lb 11.2 oz)   06/19/25 95 kg (209 lb 6.4 oz)                 Atypical ductal hyperplasia of right breast    BPPV (benign paroxysmal positional vertigo)    Congenital multiple renal cysts    History of cerebral aneurysm repair    History of colon polyps    History of renal calculi    History of thyroid cancer    History of total thyroidectomy    History of vertebral fracture    Overview Signed 11/1/2023 10:07 PM by Ina Rivas MD   Formatting of this note might be different from the original. Right tibial plateau Formatting of this note might be different from the original. Compression deformity of T11/T12         Impaired  hearing    Iron deficiency anemia secondary to inadequate dietary iron intake    Leukocytosis    Recurrent major depressive disorder (HCC) (Chronic)    Restless legs syndrome    S/P ablation of atrial fibrillation    Sensorineural hearing loss, bilateral    Class 1 obesity    Thymus neoplasm    Folic acid deficiency    Vitamin D deficiency    Wedge compression fracture of T11-T12 vertebra, initial encounter for closed fracture (HCC)    Vitamin B12 deficiency    Hiatal hernia    Exocrine pancreatic insufficiency    Pulmonary nodule    Iron deficiency    MARY ELLEN (obstructive sleep apnea)    RLS (restless legs syndrome)    Crohn's disease with complication, unspecified gastrointestinal tract location (HCC)    Obesity (BMI 30-39.9)    Anxiety    Lump in the groin    Excessive daytime sleepiness    Cigarette nicotine dependence in remission    Age-related cataract of both eyes        Past Medical History[1]  Social History     Socioeconomic History    Marital status: /Civil Union     Spouse name: Not on file    Number of children: Not on file    Years of education: Not on file    Highest education level: Not on file   Occupational History    Not on file   Tobacco Use    Smoking status: Former     Current packs/day: 0.00     Average packs/day: 0.5 packs/day for 46.0 years (23.0 ttl pk-yrs)     Types: Cigarettes     Start date:      Quit date: 2018     Years since quittin.5    Smokeless tobacco: Never   Vaping Use    Vaping status: Never Used   Substance and Sexual Activity    Alcohol use: Yes     Comment: social    Drug use: Never     Types: Marijuana    Sexual activity: Not Currently     Partners: Male     Birth control/protection: None   Other Topics Concern    Not on file   Social History Narrative    Not on file     Social Drivers of Health     Financial Resource Strain: Low Risk  (2023)    Overall Financial Resource Strain (CARDIA)     Difficulty of Paying Living Expenses: Not very hard   Food  Insecurity: Food Insecurity Present (11/14/2024)    Nursing - Inadequate Food Risk Classification     Worried About Running Out of Food in the Last Year: Sometimes true     Ran Out of Food in the Last Year: Sometimes true     Ran Out of Food in the Last Year: Not on file   Transportation Needs: Unmet Transportation Needs (11/14/2024)    PRAPARE - Transportation     Lack of Transportation (Medical): Yes     Lack of Transportation (Non-Medical): Yes   Physical Activity: Not on file   Stress: Not on file   Social Connections: Not on file   Intimate Partner Violence: Not on file   Housing Stability: Unknown (11/14/2024)    Housing Stability Vital Sign     Unable to Pay for Housing in the Last Year: No     Number of Times Moved in the Last Year: 1     Homeless in the Last Year: Not on file      Family History[2]  Past Surgical History[3]  Current Medications[4]  Allergies   Allergen Reactions    Sulfa Antibiotics Rash       Labs:     Chemistry        Component Value Date/Time     04/21/2018 0852    K 3.2 (L) 06/18/2025 1601    K 3.6 10/17/2023 0801     06/18/2025 1601     10/17/2023 0801    CO2 30 06/18/2025 1601    CO2 29 10/17/2023 0801    BUN 10 06/18/2025 1601    BUN 12 10/17/2023 0801    CREATININE 0.93 06/18/2025 1601    CREATININE 0.8 10/17/2023 0801        Component Value Date/Time    CALCIUM 9.3 06/18/2025 1601    CALCIUM 9.1 10/17/2023 0801    ALKPHOS 94 06/18/2025 1601    ALKPHOS 106 10/17/2023 0801    AST 19 06/18/2025 1601    AST 23 10/17/2023 0801    ALT 18 06/18/2025 1601    ALT 25 10/17/2023 0801    BILITOT 0.3 04/21/2018 0852            Lab Results   Component Value Date    CHOL 244 (H) 04/21/2018     Lab Results   Component Value Date    HDL 42 (L) 06/18/2025    HDL 45 (L) 12/17/2024    HDL 41 (L) 07/31/2024     Lab Results   Component Value Date    LDLCALC 86 06/18/2025    LDLCALC 104 (H) 12/17/2024    LDLCALC 92 07/31/2024     Lab Results   Component Value Date    TRIG 230 (H)  "06/18/2025    TRIG 106 12/17/2024    TRIG 103 07/31/2024     No results found for: \"CHOLHDL\"    Imaging: NM myocardial perfusion spect (rx stress and/or rest)  Result Date: 6/25/2025  Narrative:   Stress ECG: No ST deviation is noted. The ECG was negative for ischemia. The stress ECG is negative for ischemia after pharmacologic vasodilation, without reproduction of symptoms.   Perfusion: There are no perfusion defects.   Stress Function: Left ventricular function post-stress is normal. Stress ejection fraction is 64%. No evidence of ischemia or infarction by pharmacologic vasodilatory nuclear stress testing.     Stress strip  Result Date: 6/25/2025  Narrative: Confirmed by MELISSA ROUSSEAU ([id number]),  Stephany Lopes ([id number]) on 6/25/2025 1:59:41 PM      Review of Systems   Cardiovascular:  Positive for dyspnea on exertion (Chronic) and palpitations.   All other systems reviewed and are negative.      Vitals:    07/21/25 0743   BP: 122/60   Pulse: 59   SpO2: 96%     Vitals:    07/21/25 0743   Weight: 94.9 kg (209 lb 3.2 oz)     Height: 5' 9\" (175.3 cm)   Body mass index is 30.89 kg/m².    Physical Exam  Vitals reviewed.   Constitutional:       General: She is not in acute distress.     Appearance: She is obese. She is not diaphoretic.   HENT:      Head: Normocephalic and atraumatic.     Eyes:      Pupils: Pupils are equal, round, and reactive to light.     Neck:      Vascular: No carotid bruit.     Cardiovascular:      Rate and Rhythm: Normal rate and regular rhythm.      Pulses: Normal pulses.      Heart sounds: Normal heart sounds. No murmur heard.  Pulmonary:      Effort: Pulmonary effort is normal.      Breath sounds: Normal breath sounds. No wheezing, rhonchi or rales.   Abdominal:      General: There is no distension.      Palpations: Abdomen is soft.      Tenderness: There is no abdominal tenderness.     Musculoskeletal:      Cervical back: Normal range of motion.      Right lower leg: No edema.      Left lower " leg: No edema.     Skin:     General: Skin is warm.      Capillary Refill: Capillary refill takes less than 2 seconds.     Neurological:      General: No focal deficit present.      Mental Status: She is alert and oriented to person, place, and time. Mental status is at baseline.      Gait: Gait normal.     Psychiatric:         Mood and Affect: Mood normal.         Behavior: Behavior normal.         Thought Content: Thought content normal.              [1]   Past Medical History:  Diagnosis Date    A-fib (HCC)     Abnormal ECG     Acute respiratory failure with hypoxia (HCC) 11/30/2019    Aneurysm (HCC)     Anxiety     Arthritis     Asthma     Brain aneurysm     Cancer (HCC)     papillary thyroid cancer    Cardiac disease     CHF (congestive heart failure) (HCC)     COPD (chronic obstructive pulmonary disease) (HCC)     CPAP (continuous positive airway pressure) dependence     Crohn disease (HCC)     Depression     Diabetes mellitus (HCC)     Disease of thyroid gland     Diverticulitis of colon     Emphysema of lung (HCC)     Fatty liver     Fibrocystic breast years ago    GERD (gastroesophageal reflux disease)     HL (hearing loss)     Hyperlipidemia     Hypertension     Sleep apnea     Sleep apnea, obstructive     Visual impairment    [2]   Family History  Problem Relation Name Age of Onset    Alzheimer's disease Mother reza     Asthma Mother reza     Cancer Father randy         dead    Anemia Father randy     Heart disease Sister      Hypertension Sister          under control    Diabetes Sister      Stroke Sister      Cancer Sister adrian         dead    Stroke Sister adrian     Heart disease Sister adrian         dead    Diabetes Sister adrian     Breast cancer Sister adrian     Cancer Brother          liver with mets to bone    Heart disease Brother randy     Cancer Brother randy         dead    Asthma Daughter alyce     Cancer Brother bar     Heart disease Sister adrian    [3]   Past  Surgical History:  Procedure Laterality Date    APPENDECTOMY      BREAST BIOPSY      BREAST CYST EXCISION      BREAST SURGERY  several dates    CARDIOVERSION N/A 02/14/2019    Procedure: CARDIOVERSION;  Surgeon: Lee Brenner MD;  Location: [facility];  Service: Cardiology    CEREBRAL ANEURYSM REPAIR      CHOLECYSTECTOMY      COLONOSCOPY      HYSTERECTOMY      IR PELVIC ANGIOGRAM  12/14/2017    [place] EXC THROMBOSED HEMORRHOID XTRNL N/A 10/07/2022    Procedure: HEMORRHOIDECTOMY EXCISION;  Surgeon: Leonardo Cash DO;  Location: [facility];  Service: General    THYROIDECTOMY      TONSILLECTOMY AND ADENOIDECTOMY      UPPER GASTROINTESTINAL ENDOSCOPY      US GUIDED BREAST BIOPSY RIGHT COMPLETE Right 11/29/2023   [4]   Current Outpatient Medications:     albuterol (ACCUNEB) 0.63 MG/3ML nebulizer solution, Take 0.63 mg by nebulization every 6 (six) hours as needed for wheezing, Disp: , Rfl:     albuterol (PROVENTIL HFA,VENTOLIN HFA) 90 mcg/act inhaler, Inhale 2 puffs every 4 (four) hours as needed for wheezing, Disp: 18 g, Rfl: 5    apixaban (Eliquis) 5 mg, Take 1 tablet by mouth twice daily, Disp: 180 tablet, Rfl: 1    atorvastatin (LIPITOR) 20 mg tablet, Take 1 tablet (20 mg total) by mouth every morning, Disp: 90 tablet, Rfl: 1    Cholecalciferol (Vitamin D3) 1.25 MG (66673 UT) CAPS, Take 1 capsule by mouth in the morning, Disp: , Rfl:     dofetilide (TIKOSYN) 500 mcg capsule, Take 1 capsule (500 mcg total) by mouth 2 (two) times a day, Disp: 180 capsule, Rfl: 4    DULoxetine (CYMBALTA) 20 mg capsule, Take 1 capsule (20 mg total) by mouth daily, Disp: 90 capsule, Rfl: 3    famotidine (PEPCID) 40 MG tablet, TAKE 1 TABLET BY MOUTH ONCE DAILY AT BEDTIME, Disp: 90 tablet, Rfl: 1    fluticasone-umeclidinium-vilanterol (Trelegy Ellipta) 200-62.5-25 mcg/actuation AEPB inhaler, Inhale 1 puff daily Rinse mouth after use., Disp: 60 blister, Rfl: 11    furosemide (LASIX) 20 mg tablet, Take 1 tablet (20 mg total) by mouth 2 (two)  times a day, Disp: 200 tablet, Rfl: 3    gabapentin (NEURONTIN) 300 mg capsule, Take 1 capsule (300 mg total) by mouth 2 (two) times a day, Disp: 200 capsule, Rfl: 3    hydrOXYzine HCL (ATARAX) 25 mg tablet, Take 1 tablet by mouth twice daily, Disp: 100 tablet, Rfl: 1    Jardiance 25 MG TABS, Take 1 tablet by mouth once daily, Disp: 90 tablet, Rfl: 0    lisinopril (ZESTRIL) 10 mg tablet, Take 1 tablet (10 mg total) by mouth every morning, Disp: 90 tablet, Rfl: 3    mesalamine (DELZICOL) 400 mg, Take 2 capsules by mouth twice daily, Disp: 360 capsule, Rfl: 1    metoprolol succinate (TOPROL-XL) 50 mg 24 hr tablet, Take 1 tablet (50 mg total) by mouth daily, Disp: 90 tablet, Rfl: 0    NON FORMULARY, TelePR diabetic monitor, test strips and lancets, Disp: , Rfl:     omeprazole (PriLOSEC) 40 MG capsule, Take 1 capsule by mouth once daily, Disp: 90 capsule, Rfl: 1    pancrelipase, Lip-Prot-Amyl, (Creon) 24,000 units, Take 3 capsules (72,000 Units total) by mouth 2 (two) times a day, Disp: 600 capsule, Rfl: 3    potassium chloride (Klor-Con M20) 20 mEq tablet, Take 1 tablet (20 mEq total) by mouth daily, Disp: 90 tablet, Rfl: 3    Synthroid 125 MCG tablet, Take 1 tablet (125 mcg total) by mouth daily, Disp: 100 tablet, Rfl: 1    predniSONE 20 mg tablet, Take 2 tablets (40 mg total) by mouth daily (Patient not taking: Reported on 7/8/2025), Disp: 10 tablet, Rfl: 0    Current Facility-Administered Medications:     cyanocobalamin injection 1,000 mcg, 1,000 mcg, Intramuscular, Q30 Days, , 1,000 mcg at 07/08/25 1143

## 2025-07-21 ENCOUNTER — OFFICE VISIT (OUTPATIENT)
Dept: CARDIOLOGY CLINIC | Facility: HOSPITAL | Age: 70
End: 2025-07-21
Payer: COMMERCIAL

## 2025-07-21 VITALS
OXYGEN SATURATION: 96 % | HEIGHT: 69 IN | DIASTOLIC BLOOD PRESSURE: 60 MMHG | SYSTOLIC BLOOD PRESSURE: 122 MMHG | WEIGHT: 209.2 LBS | BODY MASS INDEX: 30.98 KG/M2 | HEART RATE: 59 BPM

## 2025-07-21 DIAGNOSIS — Z98.890 S/P ABLATION OF ATRIAL FIBRILLATION: ICD-10-CM

## 2025-07-21 DIAGNOSIS — I48.0 PAROXYSMAL ATRIAL FIBRILLATION (HCC): Primary | ICD-10-CM

## 2025-07-21 DIAGNOSIS — E78.00 HYPERCHOLESTEREMIA: ICD-10-CM

## 2025-07-21 DIAGNOSIS — I50.32 CHRONIC HEART FAILURE WITH PRESERVED EJECTION FRACTION (HCC): ICD-10-CM

## 2025-07-21 DIAGNOSIS — G47.33 OSA (OBSTRUCTIVE SLEEP APNEA): ICD-10-CM

## 2025-07-21 DIAGNOSIS — Z86.79 S/P ABLATION OF ATRIAL FIBRILLATION: ICD-10-CM

## 2025-07-21 DIAGNOSIS — E11.69 TYPE 2 DIABETES MELLITUS WITH OTHER SPECIFIED COMPLICATION, WITHOUT LONG-TERM CURRENT USE OF INSULIN (HCC): ICD-10-CM

## 2025-07-21 DIAGNOSIS — I10 ESSENTIAL HYPERTENSION: ICD-10-CM

## 2025-07-21 PROCEDURE — 99214 OFFICE O/P EST MOD 30 MIN: CPT

## 2025-07-28 ENCOUNTER — OFFICE VISIT (OUTPATIENT)
Dept: PODIATRY | Facility: CLINIC | Age: 70
End: 2025-07-28
Payer: COMMERCIAL

## 2025-07-28 VITALS
BODY MASS INDEX: 30.93 KG/M2 | HEART RATE: 64 BPM | HEIGHT: 69 IN | WEIGHT: 208.8 LBS | OXYGEN SATURATION: 94 % | TEMPERATURE: 98 F

## 2025-07-28 DIAGNOSIS — B35.1 ONYCHOMYCOSIS: Primary | ICD-10-CM

## 2025-07-28 DIAGNOSIS — E11.69 TYPE 2 DIABETES MELLITUS WITH OTHER SPECIFIED COMPLICATION, WITHOUT LONG-TERM CURRENT USE OF INSULIN (HCC): ICD-10-CM

## 2025-07-28 DIAGNOSIS — M79.674 PAIN IN TOES OF BOTH FEET: ICD-10-CM

## 2025-07-28 DIAGNOSIS — G62.9 NEUROPATHY: ICD-10-CM

## 2025-07-28 DIAGNOSIS — M79.675 PAIN IN TOES OF BOTH FEET: ICD-10-CM

## 2025-07-28 PROCEDURE — 11721 DEBRIDE NAIL 6 OR MORE: CPT | Performed by: PODIATRIST

## 2025-07-28 PROCEDURE — RECHECK: Performed by: PODIATRIST

## 2025-07-29 ENCOUNTER — OFFICE VISIT (OUTPATIENT)
Dept: DENTISTRY | Facility: CLINIC | Age: 70
End: 2025-07-29

## 2025-07-29 DIAGNOSIS — K08.89 NONRESTORABLE TOOTH: ICD-10-CM

## 2025-07-29 DIAGNOSIS — Z01.21 ENCOUNTER FOR DENTAL EXAMINATION AND CLEANING WITH ABNORMAL FINDINGS: Primary | ICD-10-CM

## 2025-07-30 DIAGNOSIS — I48.91 ATRIAL FIBRILLATION WITH RAPID VENTRICULAR RESPONSE (HCC): ICD-10-CM

## 2025-07-30 RX ORDER — APIXABAN 5 MG/1
5 TABLET, FILM COATED ORAL 2 TIMES DAILY
Qty: 180 TABLET | Refills: 1 | Status: SHIPPED | OUTPATIENT
Start: 2025-07-30

## 2025-08-05 ENCOUNTER — OFFICE VISIT (OUTPATIENT)
Dept: HEMATOLOGY ONCOLOGY | Facility: CLINIC | Age: 70
End: 2025-08-05
Payer: COMMERCIAL

## 2025-08-05 VITALS
SYSTOLIC BLOOD PRESSURE: 102 MMHG | TEMPERATURE: 97.6 F | OXYGEN SATURATION: 96 % | DIASTOLIC BLOOD PRESSURE: 61 MMHG | HEIGHT: 69 IN | BODY MASS INDEX: 30.96 KG/M2 | WEIGHT: 209 LBS | HEART RATE: 65 BPM

## 2025-08-05 DIAGNOSIS — R79.89 ELEVATED FERRITIN: ICD-10-CM

## 2025-08-05 DIAGNOSIS — I50.30 HEART FAILURE WITH PRESERVED EJECTION FRACTION, UNSPECIFIED HF CHRONICITY (HCC): ICD-10-CM

## 2025-08-05 DIAGNOSIS — G25.81 RLS (RESTLESS LEGS SYNDROME): ICD-10-CM

## 2025-08-05 DIAGNOSIS — D50.8 IRON DEFICIENCY ANEMIA SECONDARY TO INADEQUATE DIETARY IRON INTAKE: Primary | ICD-10-CM

## 2025-08-05 PROCEDURE — 99214 OFFICE O/P EST MOD 30 MIN: CPT | Performed by: NURSE PRACTITIONER

## 2025-08-05 PROCEDURE — G2211 COMPLEX E/M VISIT ADD ON: HCPCS | Performed by: NURSE PRACTITIONER

## 2025-08-07 ENCOUNTER — CLINICAL SUPPORT (OUTPATIENT)
Dept: INTERNAL MEDICINE CLINIC | Facility: CLINIC | Age: 70
End: 2025-08-07
Payer: COMMERCIAL

## 2025-08-07 ENCOUNTER — OFFICE VISIT (OUTPATIENT)
Dept: GASTROENTEROLOGY | Facility: CLINIC | Age: 70
End: 2025-08-07

## 2025-08-07 VITALS
OXYGEN SATURATION: 94 % | WEIGHT: 208 LBS | TEMPERATURE: 98 F | HEART RATE: 64 BPM | SYSTOLIC BLOOD PRESSURE: 124 MMHG | DIASTOLIC BLOOD PRESSURE: 68 MMHG | BODY MASS INDEX: 30.81 KG/M2 | HEIGHT: 69 IN

## 2025-08-07 DIAGNOSIS — K63.5 POLYP OF COLON, UNSPECIFIED PART OF COLON, UNSPECIFIED TYPE: ICD-10-CM

## 2025-08-07 DIAGNOSIS — E66.9 OBESITY (BMI 30-39.9): ICD-10-CM

## 2025-08-07 DIAGNOSIS — K76.0 HEPATIC STEATOSIS: Primary | ICD-10-CM

## 2025-08-07 DIAGNOSIS — E53.8 VITAMIN B12 DEFICIENCY: Primary | ICD-10-CM

## 2025-08-07 DIAGNOSIS — K86.81 EXOCRINE PANCREATIC INSUFFICIENCY: ICD-10-CM

## 2025-08-07 DIAGNOSIS — K50.919 CROHN'S DISEASE WITH COMPLICATION, UNSPECIFIED GASTROINTESTINAL TRACT LOCATION (HCC): ICD-10-CM

## 2025-08-07 DIAGNOSIS — K21.9 GASTROESOPHAGEAL REFLUX DISEASE WITHOUT ESOPHAGITIS: ICD-10-CM

## 2025-08-07 PROCEDURE — 96372 THER/PROPH/DIAG INJ SC/IM: CPT

## 2025-08-07 RX ADMIN — CYANOCOBALAMIN 1000 MCG: 1000 INJECTION, SOLUTION INTRAMUSCULAR; SUBCUTANEOUS at 11:05

## 2025-08-17 DIAGNOSIS — E78.00 HYPERCHOLESTEREMIA: ICD-10-CM

## 2025-08-19 ENCOUNTER — TELEPHONE (OUTPATIENT)
Age: 70
End: 2025-08-19

## 2025-08-19 RX ORDER — ATORVASTATIN CALCIUM 20 MG/1
20 TABLET, FILM COATED ORAL EVERY MORNING
Qty: 90 TABLET | Refills: 1 | Status: SHIPPED | OUTPATIENT
Start: 2025-08-19

## 2025-08-21 ENCOUNTER — TELEPHONE (OUTPATIENT)
Dept: DENTISTRY | Facility: CLINIC | Age: 70
End: 2025-08-21

## (undated) DEVICE — GAUZE SPONGES,16 PLY: Brand: CURITY

## (undated) DEVICE — SYRINGE 10ML LL

## (undated) DEVICE — BETHLEHEM UNIVERSAL MINOR GEN: Brand: CARDINAL HEALTH

## (undated) DEVICE — SUT ETHIBOND 2-0 CT-2 30 IN X411H

## (undated) DEVICE — GLOVE INDICATOR PI UNDERGLOVE SZ 7 BLUE

## (undated) DEVICE — SUT SILK 2-0 FSL 18 IN 677G

## (undated) DEVICE — SUT CHROMIC 2-0 SH 27 IN G123H

## (undated) DEVICE — SPONGE LAP 18 X 18 IN

## (undated) DEVICE — NEEDLE 25G X 1 1/2

## (undated) DEVICE — CHEST ROLL FOAM POSITIONER: Brand: CARDINAL HEALTH

## (undated) DEVICE — GAUZE SPONGES,USP TYPE VII GAUZE, 12 PLY: Brand: CURITY

## (undated) DEVICE — SPONGE LAP 18 X 4 IN

## (undated) DEVICE — LUBRICANT SURGILUBE TUBE 4 OZ  FLIP TOP

## (undated) DEVICE — SUT CHROMIC 2-0 CT-2 27 IN 883H

## (undated) DEVICE — PLUMEPEN PRO 10FT

## (undated) DEVICE — 3000CC GUARDIAN II: Brand: GUARDIAN

## (undated) DEVICE — TIBURON PEDIATRIC DRAPE: Brand: CONVERTORS

## (undated) DEVICE — SURGICEL 2 X 3

## (undated) DEVICE — MEDI-VAC YANKAUER SUCTION HANDLE W/BULBOUS AND CONTROL VENT: Brand: CARDINAL HEALTH

## (undated) DEVICE — SURGIFOAM 7 X 12 SPONGE ABS

## (undated) DEVICE — DISPOSABLE BRIEF/UNDERWEAR

## (undated) DEVICE — 3M™ DURAPORE™ SURGICAL TAPE 1538-3, 3 INCH X 10 YARD (7,5CM X 9,1M), 4 ROLLS/BOX: Brand: 3M™ DURAPORE™

## (undated) DEVICE — SUT VICRYL 4-0 PS-2 27 IN J426H

## (undated) DEVICE — SPONGE STICK WITH PVP-I: Brand: KENDALL

## (undated) DEVICE — ABDOMINAL PAD: Brand: DERMACEA

## (undated) DEVICE — SYRINGE 10ML LL CONTROL TOP

## (undated) DEVICE — ASTOUND STANDARD SURGICAL GOWN, XL: Brand: CONVERTORS

## (undated) DEVICE — IV CATH INTROCAN 18G X 1 1/4 SAFETY

## (undated) DEVICE — ELECTRODE NEEDLE E-Z CLEAN 2.75IN 7CM -0013

## (undated) DEVICE — GLOVE SRG BIOGEL ECLIPSE 7

## (undated) DEVICE — PAD GROUNDING ADULT

## (undated) DEVICE — TUBING SUCTION 5MM X 12 FT

## (undated) DEVICE — HARMONIC FOCUS SHEARS 9CM LENGTH + ADAPTIVE TISSUE TECHNOLOGY FOR USE WITH BLUE HAND PIECE ONLY: Brand: HARMONIC FOCUS